# Patient Record
Sex: FEMALE | Race: WHITE | NOT HISPANIC OR LATINO | Employment: OTHER | URBAN - METROPOLITAN AREA
[De-identification: names, ages, dates, MRNs, and addresses within clinical notes are randomized per-mention and may not be internally consistent; named-entity substitution may affect disease eponyms.]

---

## 2017-01-21 ENCOUNTER — GENERIC CONVERSION - ENCOUNTER (OUTPATIENT)
Dept: OTHER | Facility: OTHER | Age: 77
End: 2017-01-21

## 2017-02-02 ENCOUNTER — ALLSCRIPTS OFFICE VISIT (OUTPATIENT)
Dept: OTHER | Facility: OTHER | Age: 77
End: 2017-02-02

## 2017-02-08 ENCOUNTER — ALLSCRIPTS OFFICE VISIT (OUTPATIENT)
Dept: OTHER | Facility: OTHER | Age: 77
End: 2017-02-08

## 2017-02-13 ENCOUNTER — LAB CONVERSION - ENCOUNTER (OUTPATIENT)
Dept: OTHER | Facility: OTHER | Age: 77
End: 2017-02-13

## 2017-02-13 LAB
CREATININE, RANDOM URINE (HISTORICAL): 99 MG/DL (ref 20–320)
HBA1C MFR BLD HPLC: 6.4 % OF TOTAL HGB
MAGNESIUM, UR (HISTORICAL): 2.6 MG/DL
MICROALBUMIN/CREATININE RATIO (HISTORICAL): 26 MCG/MG CREAT
TSH SERPL DL<=0.05 MIU/L-ACNC: 1.4 MIU/L (ref 0.4–4.5)

## 2017-02-14 ENCOUNTER — GENERIC CONVERSION - ENCOUNTER (OUTPATIENT)
Dept: OTHER | Facility: OTHER | Age: 77
End: 2017-02-14

## 2017-02-17 ENCOUNTER — ALLSCRIPTS OFFICE VISIT (OUTPATIENT)
Dept: OTHER | Facility: OTHER | Age: 77
End: 2017-02-17

## 2017-03-13 ENCOUNTER — HOSPITAL ENCOUNTER (OUTPATIENT)
Dept: MAMMOGRAPHY | Facility: HOSPITAL | Age: 77
Discharge: HOME | End: 2017-03-13
Payer: MEDICARE

## 2017-03-13 DIAGNOSIS — Z12.31 ENCOUNTER FOR SCREENING MAMMOGRAM FOR MALIGNANT NEOPLASM OF BREAST: ICD-10-CM

## 2017-03-13 PROCEDURE — G0202 SCR MAMMO BI INCL CAD: HCPCS

## 2017-03-26 ENCOUNTER — ANESTHESIA EVENT (OUTPATIENT)
Dept: GASTROENTEROLOGY | Facility: HOSPITAL | Age: 77
End: 2017-03-26
Payer: MEDICARE

## 2017-03-27 ENCOUNTER — ANESTHESIA (OUTPATIENT)
Dept: GASTROENTEROLOGY | Facility: HOSPITAL | Age: 77
End: 2017-03-27
Payer: MEDICARE

## 2017-03-27 ENCOUNTER — HOSPITAL ENCOUNTER (OUTPATIENT)
Facility: HOSPITAL | Age: 77
Setting detail: OUTPATIENT SURGERY
Discharge: HOME | End: 2017-03-27
Attending: INTERNAL MEDICINE | Admitting: INTERNAL MEDICINE
Payer: MEDICARE

## 2017-03-27 ENCOUNTER — GENERIC CONVERSION - ENCOUNTER (OUTPATIENT)
Dept: OTHER | Facility: OTHER | Age: 77
End: 2017-03-27

## 2017-03-27 VITALS
WEIGHT: 141.09 LBS | BODY MASS INDEX: 31.74 KG/M2 | HEIGHT: 56 IN | DIASTOLIC BLOOD PRESSURE: 76 MMHG | OXYGEN SATURATION: 95 % | HEART RATE: 71 BPM | TEMPERATURE: 97.7 F | RESPIRATION RATE: 18 BRPM | SYSTOLIC BLOOD PRESSURE: 168 MMHG

## 2017-03-27 LAB — GLUCOSE SERPL-MCNC: 93 MG/DL (ref 65–140)

## 2017-03-27 PROCEDURE — 82948 REAGENT STRIP/BLOOD GLUCOSE: CPT

## 2017-03-27 RX ORDER — SODIUM CHLORIDE, SODIUM LACTATE, POTASSIUM CHLORIDE, CALCIUM CHLORIDE 600; 310; 30; 20 MG/100ML; MG/100ML; MG/100ML; MG/100ML
125 INJECTION, SOLUTION INTRAVENOUS CONTINUOUS
Status: DISCONTINUED | OUTPATIENT
Start: 2017-03-27 | End: 2017-03-27 | Stop reason: HOSPADM

## 2017-03-27 RX ORDER — LABETALOL HYDROCHLORIDE 5 MG/ML
INJECTION, SOLUTION INTRAVENOUS AS NEEDED
Status: DISCONTINUED | OUTPATIENT
Start: 2017-03-27 | End: 2017-03-27 | Stop reason: SURG

## 2017-03-27 RX ORDER — PROPOFOL 10 MG/ML
INJECTION, EMULSION INTRAVENOUS AS NEEDED
Status: DISCONTINUED | OUTPATIENT
Start: 2017-03-27 | End: 2017-03-27 | Stop reason: SURG

## 2017-03-27 RX ORDER — CLONAZEPAM 0.5 MG/1
0.5 TABLET ORAL 2 TIMES DAILY
COMMUNITY
End: 2018-03-05 | Stop reason: SDUPTHER

## 2017-03-27 RX ORDER — LIDOCAINE HYDROCHLORIDE 10 MG/ML
0.5 INJECTION, SOLUTION INFILTRATION; PERINEURAL ONCE AS NEEDED
Status: DISCONTINUED | OUTPATIENT
Start: 2017-03-27 | End: 2017-03-27 | Stop reason: HOSPADM

## 2017-03-27 RX ORDER — PROPOFOL 10 MG/ML
INJECTION, EMULSION INTRAVENOUS CONTINUOUS PRN
Status: DISCONTINUED | OUTPATIENT
Start: 2017-03-27 | End: 2017-03-27 | Stop reason: SURG

## 2017-03-27 RX ORDER — LIDOCAINE HYDROCHLORIDE 10 MG/ML
INJECTION, SOLUTION INFILTRATION; PERINEURAL AS NEEDED
Status: DISCONTINUED | OUTPATIENT
Start: 2017-03-27 | End: 2017-03-27 | Stop reason: SURG

## 2017-03-27 RX ORDER — ESCITALOPRAM OXALATE 20 MG/1
20 TABLET ORAL DAILY
COMMUNITY
End: 2018-02-09 | Stop reason: ALTCHOICE

## 2017-03-27 RX ORDER — ATORVASTATIN CALCIUM 10 MG/1
10 TABLET, FILM COATED ORAL DAILY
COMMUNITY
End: 2018-07-18

## 2017-03-27 RX ORDER — RAMIPRIL 10 MG/1
10 CAPSULE ORAL DAILY
COMMUNITY
End: 2018-09-10 | Stop reason: SDUPTHER

## 2017-03-27 RX ORDER — BUSPIRONE HYDROCHLORIDE 10 MG/1
10 TABLET ORAL 3 TIMES DAILY
COMMUNITY
End: 2018-09-10 | Stop reason: SDUPTHER

## 2017-03-27 RX ADMIN — LABETALOL HYDROCHLORIDE 10 MG: 5 INJECTION, SOLUTION INTRAVENOUS at 08:16

## 2017-03-27 RX ADMIN — SODIUM CHLORIDE, POTASSIUM CHLORIDE, SODIUM LACTATE AND CALCIUM CHLORIDE 125 ML/HR: 600; 310; 30; 20 INJECTION, SOLUTION INTRAVENOUS at 07:49

## 2017-03-27 RX ADMIN — LIDOCAINE HYDROCHLORIDE 30 MG: 10 INJECTION, SOLUTION INFILTRATION; PERINEURAL at 08:10

## 2017-03-27 RX ADMIN — PROPOFOL 100 MCG/KG/MIN: 10 INJECTION, EMULSION INTRAVENOUS at 08:10

## 2017-03-27 RX ADMIN — PROPOFOL 100 MG: 10 INJECTION, EMULSION INTRAVENOUS at 08:10

## 2017-03-31 ENCOUNTER — ALLSCRIPTS OFFICE VISIT (OUTPATIENT)
Dept: OTHER | Facility: OTHER | Age: 77
End: 2017-03-31

## 2017-04-21 ENCOUNTER — ALLSCRIPTS OFFICE VISIT (OUTPATIENT)
Dept: OTHER | Facility: OTHER | Age: 77
End: 2017-04-21

## 2017-07-13 ENCOUNTER — ALLSCRIPTS OFFICE VISIT (OUTPATIENT)
Dept: OTHER | Facility: OTHER | Age: 77
End: 2017-07-13

## 2017-07-14 ENCOUNTER — ALLSCRIPTS OFFICE VISIT (OUTPATIENT)
Dept: OTHER | Facility: OTHER | Age: 77
End: 2017-07-14

## 2017-07-14 LAB — S PYO AG THROAT QL: NEGATIVE

## 2017-08-17 DIAGNOSIS — I10 ESSENTIAL (PRIMARY) HYPERTENSION: ICD-10-CM

## 2017-08-17 DIAGNOSIS — E78.5 HYPERLIPIDEMIA: ICD-10-CM

## 2017-08-17 DIAGNOSIS — E11.9 TYPE 2 DIABETES MELLITUS WITHOUT COMPLICATIONS (HCC): ICD-10-CM

## 2017-08-17 DIAGNOSIS — Z00.00 ENCOUNTER FOR GENERAL ADULT MEDICAL EXAMINATION WITHOUT ABNORMAL FINDINGS: ICD-10-CM

## 2017-11-13 DIAGNOSIS — I10 ESSENTIAL (PRIMARY) HYPERTENSION: ICD-10-CM

## 2017-11-13 DIAGNOSIS — E78.5 HYPERLIPIDEMIA: ICD-10-CM

## 2017-11-13 DIAGNOSIS — E11.9 TYPE 2 DIABETES MELLITUS WITHOUT COMPLICATIONS (HCC): ICD-10-CM

## 2017-11-13 DIAGNOSIS — F60.5 OBSESSIVE-COMPULSIVE PERSONALITY DISORDER (HCC): ICD-10-CM

## 2017-11-13 DIAGNOSIS — G47.00 INSOMNIA: ICD-10-CM

## 2017-11-13 DIAGNOSIS — F41.9 ANXIETY DISORDER: ICD-10-CM

## 2017-11-13 DIAGNOSIS — F41.8 OTHER SPECIFIED ANXIETY DISORDERS: ICD-10-CM

## 2017-11-20 ENCOUNTER — ALLSCRIPTS OFFICE VISIT (OUTPATIENT)
Dept: OTHER | Facility: OTHER | Age: 77
End: 2017-11-20

## 2017-11-21 NOTE — PROGRESS NOTES
Assessment    1  Hyperlipidemia (272 4) (E78 5)   2  Anxiety (300 00) (F41 9)   3  Benign essential hypertension (401 1) (I10)   4  Controlled type 2 diabetes mellitus without complication (732 92) (B70 2)   5  Insomnia (780 52) (G47 00)   6  Need for prophylactic vaccination and inoculation against influenza (V04 81) (Z23)    Plan  Anxiety, Benign essential hypertension, Controlled type 2 diabetes mellitus withoutcomplication, Hyperlipidemia, Insomnia    · (1) CBC/PLT/DIFF; Status:Active; Requested for:20Mar2018;    · (1) COMPREHENSIVE METABOLIC PANEL; Status:Active; Requested for:20Mar2018;    · (1) HEMOGLOBIN A1C; Status:Active; Requested for:20Mar2018;    · (1) LIPID PANEL, FASTING; Status:Active; Requested for:20Mar2018;    · (1) MICROALBUMIN CREATININE RATIO, RANDOM URINE; Status:Active; Requestedfor:20Mar2018;    · (1) TSH WITH FT4 REFLEX; Status:Active; Requested for:20Mar2018; Anxiety, Obsessive compulsive personality disorder    · Venlafaxine HCl  MG Oral Tablet Extended Release 24 Hour; Take 1tablet daily  Benign essential hypertension    · Ramipril 10 MG Oral Capsule; TAKE 1 CAPSULE ONCE DAILY  Controlled type 2 diabetes mellitus without complication    · MetFORMIN HCl - 500 MG Oral Tablet; take 1 tablet by mouth twice daily   · *VB - Foot Exam; Status:Active; Requested for:20Nov2017;   Depression with anxiety    · BusPIRone HCl - 10 MG Oral Tablet; TAKE 1 TABLET 3 TIMES DAILY   · ClonazePAM 0 5 MG Oral Tablet; 1/2 tablet in the morning, 1/2 tablets at 2pm andthen 1 tablet at bedtime  Hyperlipidemia    · Atorvastatin Calcium 10 MG Oral Tablet; take one tablet by mouth every day  Need for prophylactic vaccination and inoculation against influenza    · Fluzone High-Dose 0 5 ML Intramuscular Suspension Prefilled Syringe;INJECT 0 5  ML Intramuscular; To Be Done: [id number]    Discussion/Summary    - Chronic conditions remain well controlledDiabetes is well-controlled with hemoglobin A1c is 6 6%  Continue metformin 500 mg twice daily  Repeat hemoglobin A1c and urine for micro-albumin in 4 monthsCholesterol is well controlled on atorvastatin 10 mg once daily  and anxiety are well controlled on current regimen of Effexor, BuSpar and Klonopin  Refills providedflu vaccine provided in the officein 4 months for subsequent annual wellness visit  The patient was counseled regarding diagnostic results,-- instructions for management,-- prognosis,-- impressions  total time of encounter was 45 minutes-- and-- 30 minutes was spent counseling  Possible side effects of new medications were reviewed with the patient/guardian today  Chief Complaint  4 month follow up and lab review  Patient is here today for follow up of chronic conditions described in HPI  History of Present Illness  Patient presents for four-month follow-up of chronic conditions including diabetes mellitus type 2, hypertension, hyperlipidemia  She continues doing well on Effexor XR, BuSpar and regiment of clonazepam  She is no longer as negative  Labs showed hemoglobin A1c is stable at 6 6%, urine is negative for microalbumin, cholesterol is very well controlled at ADA goal, hypertension also well controlled  Patient would like to receive flu vaccination  She does need refills of all medications  Gynecologist informed her that she does not need to return this year for gynecologic examination  Review of Systems   Constitutional: recent weight loss, but-- No fever, no chills, feels well, no tiredness, no recent weight gain or weight loss  Eyes: No complaints of eye pain, no red eyes, no eyesight problems, no discharge, no dry eyes, no itching of eyes  ENT: no complaints of earache, no loss of hearing, no nose bleeds, no nasal discharge, no sore throat, no hoarseness  Cardiovascular: No complaints of slow heart rate, no fast heart rate, no chest pain, no palpitations, no leg claudication, no lower extremity edema    Respiratory: No complaints of shortness of breath, no wheezing, no cough, no SOB on exertion, no orthopnea, no PND  Gastrointestinal: No complaints of abdominal pain, no constipation, no nausea or vomiting, no diarrhea, no bloody stools  Genitourinary: No complaints of dysuria, no incontinence, no pelvic pain, no dysmenorrhea, no vaginal discharge or bleeding  Musculoskeletal: No complaints of arthralgias, no myalgias, no joint swelling or stiffness, no limb pain or swelling  Integumentary: No complaints of skin rash or lesions, no itching, no skin wounds, no breast pain or lump  Neurological: No complaints of headache, no confusion, no convulsions, no numbness, no dizziness or fainting, no tingling, no limb weakness, no difficulty walking  Psychiatric: as noted in HPI,-- not suicidal,-- no anxiety,-- no sleep disturbances-- and-- no depression  Endocrine: No complaints of proptosis, no hot flashes, no muscle weakness, no deepening of the voice, no feelings of weakness  Hematologic/Lymphatic: No complaints of swollen glands, no swollen glands in the neck, does not bleed easily, does not bruise easily  Preventive Quality 65 and Older: Falls Risk: The patient fell none times in the past 12 months  The patient is currently asymptomatic Symptoms Include:  Associated symptoms:  No associated symptoms are reported  The patient currently has no urinary incontinence symptoms  Over the past 2 weeks, how often have you been bothered by the following problems? 1 ) Little interest or pleasure in doing things? Not at all   2 ) Feeling down, depressed or hopeless? Not at all   3 ) Trouble falling asleep or sleeping too much? Not at all   4 ) Feeling tired or having little energy? Not at all   5 ) Poor appetite or overeating? Not at all   6 ) Feeling bad about yourself, or that you are a failure, or have let yourself or your family down?  Not at all   7 ) Trouble concentrating on things, such as reading a newspaper or watching television? Not at all   8 ) Moving or speaking so slowly that other people could have noticed, or the opposite, moving or speaking faster than usual? Not at all  How difficult have these problems made it for you to do your work, take care of things at home, or get along with people? Not at all  Score 0      Active Problems    1  Adenomatous polyp of colon (211 3) (D12 6)   2  Anxiety (300 00) (F41 9)   3  Anxiety associated with depression (300 4) (F41 8)   4  Benign colon polyp (211 3) (K63 5)   5  Benign essential hypertension (401 1) (I10)   6  Change in bowel habits (787 99) (R19 4)   7  Controlled type 2 diabetes mellitus without complication (644 92) (U21 4)   8  Depression with anxiety (300 4) (F41 8)   9  Encounter for gynecological examination without abnormal finding (V72 31) (Z01 419)   10  Encounter for screening mammogram for malignant neoplasm of breast (V76 12)  (Z12 31)   11  Hyperlipidemia (272 4) (E78 5)   12  Insomnia (780 52) (G47 00)   13  Need for pneumococcal vaccination (V03 82) (Z23)   14  Need for prophylactic vaccination and inoculation against influenza (V04 81) (Z23)   15  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)   16  Post-nasal drip (784 91) (R09 82)   17  Short-term memory loss (780 93) (R41 3)   18  Sore throat (462) (J02 9)    Past Medical History    1  History of Abdominal pain, LLQ (left lower quadrant) (789 04) (R10 32)   2  History of Acute venous embolism and thrombosis of deep vessels of distal lower extremity (453 42) (I82 4Z9)   3  History of Bilateral calf pain (729 5) (M79 661,M79 662)   4  History of Chest pressure (786 59) (R07 89)   5  History of Dysuria (788 1) (R30 0)   6  History of Edema of hand (782 3) (R60 0)   7  History of Edema, lower extremity (782 3) (R60 0)   8  History of dizziness (V13 89) (Z87 898)   9  History of Left hand pain (729 5) (M79 642)   10  History of Left shoulder pain (719 41) (M25 512)   11   History of Left wrist pain (719 43) (M25 532)   12  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)   13  History of Stiffness of finger joint of left hand (719 54) (M25 642)   14  History of Stress incontinence, female (625 6) (N39 3)   15  History of Swelling of left hand (729 81) (M79 89)   16  History of Urinary tract infection (599 0) (N39 0)    The active problems and past medical history were reviewed and updated today  Surgical History  1  History of  Section   2  History of Rotator Cuff Repair    The surgical history was reviewed and updated today  Family History  Mother    1  Family history of Depression With Anxiety   2  Family history of diabetes mellitus (V18 0) (Z83 3)   3  Family history of malignant neoplasm of breast (V16 3) (Z80 3)   4  Family history of Hypertension (V17 49)  Son    5  Family history of Alcoholism  Sister    6  Family history of Depression With Anxiety   7  Family history of cardiac disorder (V17 49) (Z82 49)   8  Family history of malignant neoplasm of breast (V16 3) (Z80 3)   9  Family history of Hypertension (V17 49)   10  Family history of Hypertension (V17 49)  Brother    6  Family history of diabetes mellitus (V18 0) (Z83 3)  Family History    12  Denied: Family history of Colon Cancer   13  Denied: Family history of Crohn's Disease   14  Denied: Family history of Liver Cancer    The family history was reviewed and updated today  Social History     · Denied: History of Alcohol Use (History)   · Lack of exercise (V69 0) (Z72 3)   · Marital History -    · Never A Smoker  The social history was reviewed and updated today  The social history was reviewed and is unchanged  Current Meds   1  Atorvastatin Calcium 10 MG Oral Tablet; take one tablet by mouth every day; Therapy: 21Qhp0518 to ([phone])  Requested for: 80BFP2155; Last Rx:97Qcj1960 Ordered   2  Azelastine HCl - 0 15 % Nasal Solution; insert 2 squirts in each nostril twice daily;  Therapy: 30UYS1311 to (Evaluate:59Vea1065)  Requested for: 82EAT6825; Last Rx:07Lsj4802 Ordered   3  BusPIRone HCl - 10 MG Oral Tablet; TAKE 1 TABLET 3 TIMES DAILY; Therapy: 46MQU1191 to ()  Requested for: 98XUX0778; Last Rx:40Qng8322 Ordered   4  ClonazePAM 0 5 MG Oral Tablet; 1/2 tablet in the morning, 1/2 tablets at 2pm and then 1 tablet at bedtime; Therapy: 52Jui9596 to (Evaluate:85Ifl9154); Last Rx:80Fql5679 Ordered   5  MetFORMIN HCl - 500 MG Oral Tablet; take 1 tablet by mouth twice daily; Therapy: 44YTZ9043 to ()  Requested for: 67LUQ9549; Last Rx:81Zai5289 Ordered   6  Ramipril 10 MG Oral Capsule; TAKE 1 CAPSULE ONCE DAILY; Therapy: 65TJT6489 to ()  Requested for: 38GCS8568; Last Rx:51Lrc0584 Ordered   7  Venlafaxine HCl  MG Oral Tablet Extended Release 24 Hour; Take 1 tablet daily; Therapy: 68XZB3287 to (Evaluate:05Lex6640)  Requested for: 28NFT2175; Last Rx:72Gwe0699 Ordered   8  Vitamin D3 1000 UNIT Oral Capsule; take 1 capsule daily; Therapy: 83GWB5404 to Recorded    The medication list was reviewed and updated today  Allergies  1  No Known Drug Allergies    Vitals  Vital Signs    Recorded: 20Nov2017 09:52AM   Heart Rate 84   Systolic 644   Diastolic 78   Height 4 ft 8 in   Weight 141 lb    BMI Calculated 31 61   BSA Calculated 1 53       Physical Exam   Constitutional  General appearance: No acute distress, well appearing and well nourished  Eyes  Conjunctiva and lids: No swelling, erythema or discharge  Pupils and irises: Equal, round and reactive to light  Ears, Nose, Mouth, and Throat  External inspection of ears and nose: Normal    Otoscopic examination: Tympanic membranes translucent with normal light reflex  Canals patent without erythema  Nasal mucosa, septum, and turbinates: Normal without edema or erythema  Oropharynx: Normal with no erythema, edema, exudate or lesions     Pulmonary  Respiratory effort: No increased work of breathing or signs of respiratory distress  Auscultation of lungs: Clear to auscultation  Cardiovascular  Palpation of heart: Normal PMI, no thrills  Auscultation of heart: Normal rate and rhythm, normal S1 and S2, without murmurs  Examination of extremities for edema and/or varicosities: Normal    Carotid pulses: Normal    Abdomen  Abdomen: Non-tender, no masses  Liver and spleen: No hepatomegaly or splenomegaly  Lymphatic  Palpation of lymph nodes in neck: No lymphadenopathy  Musculoskeletal  Gait and station: Normal    Digits and nails: Normal without clubbing or cyanosis  Inspection/palpation of joints, bones, and muscles: Normal    Skin  Skin and subcutaneous tissue: Normal without rashes or lesions  Neurologic  Cranial nerves: Cranial nerves 2-12 intact  Reflexes: 2+ and symmetric  Sensation: No sensory loss  Psychiatric  Orientation to person, place, and time: Normal    Mood and affect: Normal        Socks and shoes removed, Right Foot Findings: normal foot, no swelling, no erythema  The right toes were normal-- and-- had full range of motion  The sensory exam showed normal vibratory sensation at the level of the toes on the right  Normal tactile sensation with monofilament testing throughout the right foot  Socks and shoes removed, Left Foot Findings: normal foot, no swelling, no erythema  The left toes were normal-- and-- had full range of motion  The sensory exam showed normal vibratory sensation at the level of the toes on the left  Normal tactile sensation with monofilament testing throughout the left foot  Pulses:  2+ in the dorsalis pedis on the right  Pulses:  2+ in the dorsalis pedis on the left  Assign Risk Category: 0: No loss of protective sensation, no deformity  No present risk  Health Management  Benign colon polyp   COLONOSCOPY; every 3 years; Last 22OCT7432; Next Due: 14RWM9346;  Overdue    Signatures   Electronically signed by : Jada Medina DO; Nov 20 2017 10:23AM EST (Author)

## 2018-01-11 NOTE — PSYCH
History of Present Illness  Psychotherapy Provided [facility]: Individual Psychotherapy 40 minutes provided today  Goals addressed in session:   Met with pt individually for initial session  Pt reports that she is struggling with anxiety for the past several weeks  Pt reports that she struggles with catastrophic thinking pertaining to medical issues despite her doctor telling her that she is healthy  Pt reports that she lives with her daughter, son in law and grandchildren  Pt reports that she tries to stay busy by going to knitting classes, going to bible study, and reading, however, often times her anxiety gets in the way making her avoid things or stop going all together  Discussed importance of keeping up with relaxing activities and not letting the anxiety control her  HPI - Psych: Pt reports that she is on multiple medications for the anxiety  Pt reports that Dr Usha Perea prescribed Abilify recently in addition to the other medications that she is taking, however, she has not started to take it due to feeling like she is already on too much stuff  Encouraged pt to recognize that medication is not going to do everything for her that she needs to also have coping skills and techniques to manage the anxiety  Pt reports that she family is supportive of her  Pt denies SI   Note   Note:   Discussed coping skills and some grounding techniques that the pt should try to utilize when having increased anxiety  Pt does not want to commit to consistent OP therapy at this time, however, will follow up with this worker in 3 weeks to continue to discuss ways to manage the anxiety  Assessment    1   Anxiety (300 00) (F41 9)    Signatures   Electronically signed by : Gary Duran LCSW; Oct 14 2016 11:17AM EST                       (Author)

## 2018-01-11 NOTE — RESULT NOTES
Verified Results  (1) CBC/PLT/DIFF [id number] 09:05AM [employer]     Test Name Result Flag Reference   WHITE BLOOD CELL COUNT 5 1 Thousand/uL  3 8-10 8   RED BLOOD CELL COUNT 4 77 Million/uL  3 80-5 10   HEMOGLOBIN 13 6 g/dL  11 7-15 5   HEMATOCRIT 41 9 %  35 0-45 0   MCV 87 8 fL  80 0-100 0   MCH 28 5 pg  27 0-33 0   MCHC 32 5 g/dL  32 0-36 0   RDW 14 2 %  11 0-15 0   PLATELET COUNT 134 Thousand/uL  140-400   MPV 10 2 fL  7 5-12 5   ABSOLUTE NEUTROPHILS 3412 cells/uL  3055-4594   ABSOLUTE LYMPHOCYTES 1204 cells/uL  850-3900   ABSOLUTE MONOCYTES 301 cells/uL  200-950   ABSOLUTE EOSINOPHILS 153 cells/uL     ABSOLUTE BASOPHILS 31 cells/uL  0-200   NEUTROPHILS 66 9 %     LYMPHOCYTES 23 6 %     MONOCYTES 5 9 %     EOSINOPHILS 3 0 %     BASOPHILS 0 6 %       (1) COMPREHENSIVE METABOLIC PANEL [id number] 00:02ZE [employer]     Test Name Result Flag Reference   GLUCOSE 107 mg/dL H 65-99   Fasting reference interval   UREA NITROGEN (BUN) 20 mg/dL  7-25   CREATININE 0 73 mg/dL  0 60-0 93   For patients >52years of age, the reference limit  for Creatinine is approximately 13% higher for people  identified as -American  eGFR NON-AFR   AMERICAN 79 mL/min/1 73m2  > OR = 60   eGFR AFRICAN AMERICAN 91 mL/min/1 73m2  > OR = 60   BUN/CREATININE RATIO   9-47   NOT APPLICABLE (calc)   SODIUM 141 mmol/L  135-146   POTASSIUM 4 3 mmol/L  3 5-5 3   CHLORIDE 107 mmol/L     CARBON DIOXIDE 26 mmol/L  20-31   CALCIUM 9 2 mg/dL  8 6-10 4   PROTEIN, TOTAL 6 5 g/dL  6 1-8 1   ALBUMIN 4 0 g/dL  3 6-5 1   GLOBULIN 2 5 g/dL (calc)  1 9-3 7   ALBUMIN/GLOBULIN RATIO 1 6 (calc)  1 0-2 5   BILIRUBIN, TOTAL 0 7 mg/dL  0 2-1 2   ALKALINE PHOSPHATASE 69 U/L     AST 12 U/L  10-35   ALT 10 U/L  6-29     (1) LIPID PANEL, FASTING [id number] 09:05AM [employer]     Test Name Result Flag Reference   CHOLESTEROL, TOTAL 151 mg/dL  125-200   HDL CHOLESTEROL 46 mg/dL  > OR = 46   TRIGLICERIDES 357 mg/dL H <150   LDL-CHOLESTEROL 74 mg/dL (calc)  <130   Desirable range <100 mg/dL for patients with CHD or  diabetes and <70 mg/dL for diabetic patients with  known heart disease  CHOL/HDLC RATIO 3 3 (calc)  < OR = 5 0   NON HDL CHOLESTEROL 105 mg/dL (calc)     Target for non-HDL cholesterol is 30 mg/dL higher than   LDL cholesterol target  (Q) MICROALBUMIN, RANDOM URINE (W/CREATININE) [id number] 09:05AM [facility]     Test Name Result Flag Reference   CREATININE, RANDOM URINE 99 mg/dL     MICROALBUMIN 2 6 mg/dL     Reference Range  Not established   MICROALBUMIN/CREATININE$RATIO, RANDOM URINE 26 mcg/mg creat  <30   The ADA defines abnormalities in albumin  excretion as follows:     Category         Result (mcg/mg creatinine)     Normal                    <30  Microalbuminuria            Clinical albuminuria   > OR = 300     The ADA recommends that at least two of three  specimens collected within a 3-6 month period be  abnormal before considering a patient to be  within a diagnostic category  (Q) TSH, 3RD GENERATION W/REFLEX TO FT4 [id number] 09:05AM [facility]     Test Name Result Flag Reference   TSH W/REFLEX TO FT4 1 40 mIU/L  0 40-4 50     (Q) HEMOGLOBIN A1c [id number] 09:05AM [facility]   REPORT COMMENT:  FASTING:YES     Test Name Result Flag Reference   HEMOGLOBIN A1c 6 4 % of total Hgb H <5 7   According to ADA guidelines, hemoglobin A1c <7 0%  represents optimal control in non-pregnant diabetic  patients  Different metrics may apply to specific  patient populations  Standards of Medical Care in    Diabetes Care  2013;36:s11-s66     For the purpose of screening for the presence of  diabetes  <5 7%       Consistent with the absence of diabetes  5 7-6 4%    Consistent with increased risk for diabetes              (prediabetes)  >or=6 5%    Consistent with diabetes     This assay result is consistent with a higher risk  of diabetes       Currently, no consensus exists for use of hemoglobin  A1c for diagnosis of diabetes for children

## 2018-01-12 VITALS
BODY MASS INDEX: 33.07 KG/M2 | HEART RATE: 86 BPM | WEIGHT: 147 LBS | SYSTOLIC BLOOD PRESSURE: 134 MMHG | HEIGHT: 56 IN | RESPIRATION RATE: 16 BRPM | DIASTOLIC BLOOD PRESSURE: 88 MMHG

## 2018-01-12 NOTE — PROGRESS NOTES
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)   7  Encounter for preventive health examination (V70 0) (Z00 00)    Plan  Anxiety, Depression with anxiety, Obsessive compulsive personality disorder    · *1 - [facility] Physician Referral  Consult Only: the expectation is that  the referring provider will communicate back to the patient on treatment options  Evaluation and Treatment: the expectation is that the referred to provider will  communicate back to the patient on treatment options  Status: Hold For - Scheduling   Requested for: [id number]  Care Summary provided  : Yes  Benign essential hypertension    · Ramipril 10 MG Oral Capsule; TAKE 1 CAPSULE ONCE DAILY  Benign essential hypertension, Controlled type 2 diabetes mellitus without complication,  Health Maintenance, Hyperlipidemia    · (1) CBC/PLT/DIFF; Status:Canceled;    · (1) COMPREHENSIVE METABOLIC PANEL; Status:Canceled;    · (1) HEMOGLOBIN A1C; Status:Canceled;    · (1) LIPID PANEL, FASTING; Status:Canceled;    · (1) MICROALBUMIN CREATININE RATIO, RANDOM URINE; Status:Canceled;    · (1) TSH WITH FT4 REFLEX; Status:Canceled; Benign essential hypertension, Depression with anxiety    · Escitalopram Oxalate 20 MG Oral Tablet (Lexapro);  1 Tab daily  Controlled type 2 diabetes mellitus without complication    · MetFORMIN HCl - 500 MG Oral Tablet; take 1 tablet by mouth twice daily  Depression with anxiety    · BusPIRone HCl - 10 MG Oral Tablet; TAKE 1 TABLET 3 TIMES DAILY   · ClonazePAM 0 5 MG Oral Tablet; 1/2 tablet in the morning, 1/2 tablets at 2pm  and then 1 tablet at bedtime  Hyperlipidemia    · Atorvastatin Calcium 10 MG Oral Tablet; take one tablet by mouth every day    Discussion/Summary    - Initial annual wellness visit completed  - Diabetes is very well controlled on metformin 500 mg twice daily  Repeat he will A1c in 4 months  -Hypertension is well-controlled on metoprolol 10 mg once daily  - Hyperlipidemia well controlled on atorvastatin 10 mg once daily  -Patient's major issue is her depression with her anxiety  She does indeed have anxiety and is presently taking Lexapro 20 mg once daily, BuSpar 10 mg 3 times a day as well as Klonopin  She was given an additional prescription for Abilify 5 mg once daily to take additionally however she has not started this  I did recommend that patient start taking the medication  I believe that she would do much better if she was not living in her current situation  The situation is not abusive however her daughter and son-in-law have unrealistic Expectations of a 77-year-old female  She should not have to be responsible for the management of her grandchildren  I would like the patient to be seen at [facility] for positive aging to see if there are any resources that would be available or if they have any other suggestions   -Patient is to follow through with screening colonoscopy which is scheduled with gastroenterology   -Patient is to follow through with screening mammogram as ordered  Patient was reassured that her study should be perfectly fine as they have been in the past    Impression: Initial Annual Wellness Visit, with preventive exam as well as age and risk appropriate counseling completed  Cardiovascular screening and counseling: screening is current  Diabetes screening and counseling: screening is current  Colorectal cancer screening and counseling: screening is current and due for a colonoscopy (low risk)  Breast cancer screening and counseling: due for a screening mammogram    Cervical cancer screening and counseling: screening not indicated     Abdominal aortic aneurysm screening and counseling: screening is current  Glaucoma screening and counseling: screening is current  HIV screening and counseling: screening not indicated  Chief Complaint  AWV      History of Present Illness  HPI: Patient presents for initial annual wellness examination  Patient does complain of worsening depression with anxiety  She states that she is very frustrated as the woman that her daughter hired to drive her is presently working and can only take her places one day a week  She does not go out of the house  She is not able to do any of the things that she normally enjoys such as food shopping or reading at the bookstore  She is having to take care of her grandchildren who are somewhat demanding  She is having to do her homework because both her daughter and son-in-law do not return home until late in the evening  Patient feels very frustrated and track  She has also overly concerned about having her mammogram done as well as repeat screening colonoscopy  In the past both of these have been normal studies  Patient does develop unrealistic anxiety about having procedures and testing done  Patient did have one psychiatric hospitalization in the recent past  Most recent set of labs were reviewed with patient which showed improvement in her hemoglobin A1c and cholesterol profile  Normal CBC and CMP  Patient does not want to be seen with LCSW again in the office  She feels that she is just repeating herself  Suggestions have been made to her such as moving into assisted living facility however the patient states that she is not interested in moving out  Patient did not start taking Abilify in addition to her Lexapro  Abilify was prescribed at her last office visit  She did  the prescription but never started taking the medication as she was anxious about adding another medication on for her anxiety  (???)   Welcome to [employer] and Wellness Visits: The patient is being seen for the initial annual wellness visit  Medicare Screening and Risk Factors   Hospitalizations: no previous hospitalizations  Medicare Screening Tests Risk Questions   Abdominal aortic aneurysm risk assessment: none indicated  Osteoporosis risk assessment: , female gender, over 48years of age and past medical history of fracture(s)  HIV risk assessment: none indicated  Drug and Alcohol Use: The patient has never smoked cigarettes and has never used smokeless tobacco  The patient reports never drinking alcohol  Alcohol concern:   The patient has no concerns about alcohol abuse  She has never used illicit drugs  Diet and Physical Activity: Current diet includes well balanced meals, low salt food choices, 2-3 servings of fruit per day, 1 servings of vegetables per day, 1 servings of meat per day, 2-3 servings of whole grains per day and 1 cups of coffee per day  The patient does not exercise  Mood Disorder and Cognitive Impairment Screening: She reports feeling down, depressed, or hopeless over the past two weeks  She reports feeling little interest or pleasure in doing things over the past two weeks  Cognitive impairment screening: denies difficulty learning/retaining new information, denies difficulty handling complex tasks, denies difficulty with reasoning, denies difficulty with spatial ability and orientation, denies difficulty with language and denies difficulty with behavior  Functional Ability/Level of Safety: Hearing is slightly decreased, slightly decreased in the right ear, slightly decreased in the left ear and a hearing aid is not used  She reports hearing difficulties  The patient is currently able to do activities of daily living with limitations, unable to participate in social activities and unable to drive   Activities of daily living details: does not need help using the phone, no transportation help needed, does not need help shopping, no meal preparation help needed, does not need help doing housework, does not need help doing laundry, does not need help managing medications and does not need help managing money  Fall risk factors:  polypharmacy, antidepressant use, sedative use, antihypertensive use and previous fall, but The patient fell none times in the past 12 months , no alcohol use, no mobility impairment, no deconditioning, no postural hypotension, no visual impairment, no urinary incontinence, no cognitive impairment and up and go test was normal  Home safety risk factors:  no unfamiliar surroundings, no loose rugs, no poor household lighting, no uneven floors, no household clutter, grab bars in the bathroom and handrails on the stairs  Advance Directives: Advance directives: no living will, no durable power of  for health care directives and no advance directives  Co-Managers and Medical Equipment/Suppliers: See Patient Care Team   Preventive Quality Program 65 and Older: Falls Risk: The patient fell none times in the past 12 months  The patient is currently asymptomatic Symptoms Include:  Associated symptoms:  No associated symptoms are reported no pain from the injury  The patient currently has no urinary incontinence symptoms  Patient Care Team    Care Team Member Role Specialty Office Number   Alinfl[facility] Lalita HOGAN  Gastroenterology Adult [phone]   [facility]  Orthopedic Surgery [phone]   1 Steve Orozco [phone]   Lloyd Goodwin MD  Orthopedic Surgery [phone]     Review of Systems    Constitutional: negative  Eyes: negative  ENT: negative  Cardiovascular: negative  Respiratory: negative  Gastrointestinal: negative  Genitourinary: negative  Musculoskeletal: negative  Integumentary and Breasts: negative  Neurological: negative  Psychiatric: anxiety and depression, but as noted in HPI, no insomnia, no irritability and not suicidal    Endocrine: negative  Hematologic and Lymphatic: negative         No, the patient is not satisfied with Her life  Point = 1  Yes, the patient has dropped many of their activities and interests  Point = 1  Yes, the patient feels life is empty  Point = 1  Yes, the patient often gets bored  Point = 1  No, the patient is not hopeful about the future  Point = 1  Yes, the patient is bothered by thoughts they cant get out of their head  Point = 1  No, the patient is not in good spirits most of the time  Point = 1  Yes, the patient is afraid something bad is going to happen to them  Point = 1  No, the patient does not feel happy most of time  Point = 1  Yes, the patient often feels helpless  Point = 1  Yes, the patient often gets restless and fidgety  Point = 1  Yes, the patient prefers to stay home rather then try new things  Point = 1  Yes, the patient often worries about the future  Point = 1  No, the patient does not feel that they have more problems with their memory than most    No, the patient does not feel that it is wonderful to be alive now  Point = 1  Yes, the patient often feels downhearted and blue  Point = 1  Yes, the patient feels pretty worthless the way they currently are  Point = 1  No, the patient does not find life to be very exciting  Point = 1  No, the patient does not worry a lot about the past    Yes, it is hard for the patient to get started on new projects  Point = 1  No, the patient does not feel full of energy  Point = 1  Yes, the patient feels that their situation is hopeless  Point = 1  No, the patient does not feel that most people are better off then they are  Yes, the patient frequently gets upset over the little things  Point = 1  Yes, the patient frequently feels like crying  Point = 1  Yes, the patient finds it hard to concentrate  Point = 1  No, the patient does not enjoy getting up in the morning  Point = 1  Yes, the patient prefers to avoid social gatherings  Point = 1  No, the patient finds its hard to make decisions  Point = 1  No, the patient does not feel thier mind is clear as it used to be  Point = 1  Score 17   Normal 0 - 9, Mild Depression 10 - 19, Severe Depression 20 - 30      Active Problems    1  Adenomatous polyp of colon (211 3) (D12 6)   2  Anxiety (300 00) (F41 9)   3  Benign colon polyp (211 3) (K63 5)   4  Benign essential hypertension (401 1) (I10)   5  Change in bowel habits (787 99) (R19 4)   6  Controlled type 2 diabetes mellitus without complication (460 08) (J80 0)   7  Depression with anxiety (300 4) (F41 8)   8  Encounter for gynecological examination without abnormal finding (V72 31) (Z01 419)   9  Encounter for screening mammogram for malignant neoplasm of breast (V76 12)   (Z12 31)   10  Hyperlipidemia (272 4) (E78 5)   11  Insomnia (780 52) (G47 00)   12  Need for pneumococcal vaccination (V03 82) (Z23)   13  Need for prophylactic vaccination and inoculation against influenza (V04 81) (Z23)   14   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)    Past Medical History    · History of Abdominal pain, LLQ (left lower quadrant) (789 04) (R10 32)   · History of Acute venous embolism and thrombosis of deep vessels of distal lower  extremity (453 42) (I82 4Z9)   · History of Bilateral calf pain (729 5) (M79 661,M79 662)   · History of Chest pressure (786 59) (R07 89)   · History of Dysuria (788 1) (R30 0)   · History of Edema of hand (782 3) (R60 0)   · History of Edema, lower extremity (782 3) (R60 0)   · History of dizziness (V13 89) (U30 122)   · History of Left hand pain (729 5) (M79 642)   · History of Left shoulder pain (719 41) (M25 512)   · History of Left wrist pain (719 43) (M25 532)   · 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)   · History of Stiffness of finger joint of left hand (719 54) (M25 642)   · History of Stress incontinence, female (625 6) (N39 3)   · History of Swelling of left hand (729 81) (M79 89)   · History of Urinary tract infection (599 0) (N39 0)    The active problems and past medical history were reviewed and updated today  Surgical History    · History of  Section   · History of Rotator Cuff Repair    Family History  Mother    · Family history of Depression With Anxiety   · Family history of diabetes mellitus (V18 0) (Z83 3)   · Family history of malignant neoplasm of breast (V16 3) (Z80 3)   · Family history of Hypertension (V17 49)  Son    · Family history of Alcoholism  Sister    · Family history of Depression With Anxiety   · Family history of cardiac disorder (V17 49) (Z82 49)   · Family history of malignant neoplasm of breast (V16 3) (Z80 3)   · Family history of Hypertension (V17 49)   · Family history of Hypertension (V17 49)  Brother    · Family history of diabetes mellitus (V18 0) (Z83 3)  Family History    · Denied: Family history of Colon Cancer   · Denied: Family history of Crohn's Disease   · Denied: Family history of Liver Cancer    The family history was reviewed and updated today  Social History    · Denied: History of Alcohol Use (History)   · Lack of exercise (V69 0) (Z72 3)   · Marital History -    · Never A Smoker  The social history was reviewed and updated today  The social history was reviewed and is unchanged  Current Meds   1  Atorvastatin Calcium 10 MG Oral Tablet; take one tablet by mouth every day; Therapy: 93Trz7544 to ([facility])  Requested for: 94LJC6497; Last   Rx:2016 Ordered   2  BusPIRone HCl - 10 MG Oral Tablet; TAKE 1 TABLET 3 TIMES DAILY; Therapy: 46JGD6447 to [facility])  Requested for: 50QAU3541; Last   Rx:2016 Ordered   3  ClonazePAM 0 5 MG Oral Tablet; 1/2 tablet in the morning, 1/2 tablets at 2pm and then 1   tablet at bedtime; Therapy: 42Kkv2806 to (Evaluate:69Zrc4271); Last Rx:2016 Ordered   4  Dulcolax 5 MG Oral Tablet Delayed Release; TAKE 2 TABLET ONCE;    Therapy: 59AQN5698 to (Evaluate:08Dsj4422)  Requested for: 57ZYC1854; Last   Rx:17Lih4237 Ordered   5  Escitalopram Oxalate 20 MG Oral Tablet; 1 Tab daily  Requested for: 03BUQ5131; Last   Rx:11Oct2016 Ordered   6  MetFORMIN HCl - 500 MG Oral Tablet; take 1 tablet by mouth twice daily; Therapy: 50LIY4297 to ([facility])  Requested for: 61OTR9177; Last   Rx:11Oct2016 Ordered   7  PEG-3350/Electrolytes 236 GM Oral Solution Reconstituted; TAKE AS DIRECTED; Therapy: 02TMV0079 to (Last Rx:47Jmp2531)  Requested for: 33Tii3422 Ordered   8  Ramipril 10 MG Oral Capsule; TAKE 1 CAPSULE ONCE DAILY; Therapy: 31EDY1531 to ([facility])  Requested for: 16HHH7671; Last   Rx:11Oct2016 Ordered   9  Vitamin D3 1000 UNIT Oral Capsule; take 1 capsule daily; Therapy: 24LFW6472 to Recorded    The medication list was reviewed and updated today  Allergies    1  No Known Drug Allergies    Immunizations   1 2 3 4    Influenza  02-Oct-2013 18-Dec-2014 30-Sep-2015 11-Oct-2016    PCV  11-Oct-2016       PPSV  02-Oct-2013        Vitals  Signs    Temperature: 98 5 F  Heart Rate: 78  Respiration: 18  Systolic: 432  Diastolic: 84  Height: 4 ft 8 in  Weight: 153 lb   BMI Calculated: 34 3  BSA Calculated: 1 58  O2 Saturation: 97    Physical Exam    Constitutional   General appearance: No acute distress, well appearing and well nourished  Eyes   Conjunctiva and lids: No swelling, erythema or discharge  Pupils and irises: Equal, round, reactive to light  Ophthalmoscopic examination: Normal fundi and optic discs  Ears, Nose, Mouth, and Throat   External inspection of ears and nose: Normal     Otoscopic examination: Tympanic membranes translucent with normal light reflex  Canals patent without erythema  Hearing: Normal     Nasal mucosa, septum, and turbinates: Normal without edema or erythema  Lips, teeth, and gums: Normal, good dentition  Oropharynx: Normal with no erythema, edema, exudate or lesions  Neck   Neck: Supple, symmetric, trachea midline, no masses      Thyroid: Normal, no thyromegaly  Pulmonary   Respiratory effort: No increased work of breathing or signs of respiratory distress  Percussion of chest: Normal     Palpation of chest: Normal     Auscultation of lungs: Clear to auscultation  Cardiovascular   Palpation of heart: Normal PMI, no thrills  Auscultation of heart: Normal rate and rhythm, normal S1 and S2, no murmurs  Carotid pulses: 2+ bilaterally  Abdominal aorta: Normal     Femoral pulses: 2+ bilaterally  Pedal pulses: 2+ bilaterally  Examination of extremities for edema and/or varicosities: Normal     Abdomen   Abdomen: Non-tender, no masses  Liver and spleen: No hepatomegaly or splenomegaly  Examination for hernias: No hernia appreciated  Lymphatic   Palpation of lymph nodes in neck: No lymphadenopathy  Palpation of lymph nodes in axillae: No lymphadenopathy  Palpation of lymph nodes in groin: No lymphadenopathy  Palpation of lymph nodes in other areas: No lymphadenopathy  Musculoskeletal   Gait and station: Normal     Digits and nails: Normal without clubbing or cyanosis  Joints, bones, and muscles: Normal     Range of motion: Normal     Stability: Normal     Muscle strength/tone: Normal     Skin   Skin and subcutaneous tissue: Normal without rashes or lesions  Palpation of skin and subcutaneous tissue: Normal turgor  Neurologic   Cranial nerves: Cranial nerves II-XII intact  Reflexes: 2+ and symmetric  Sensation: No sensory loss  Psychiatric   Judgment and insight: Normal     Orientation to person, place, and time: Normal     Recent and remote memory: Intact  Mood and affect: Abnormal   Mood and Affect: not agitated, not angry, not anhedonic, anxious, not bewildered, not bizarre, not blunted, calm, not concerned, not constricted, not depressed, not despairing, not dysphoric, not elated, not elevated, not euphoric, no excessive crying, no excessive laughing, frustrated, not labile, not quiet, sad and tearful  Results/Data  Falls Risk Assessment (Dx Z13 89 Screen for Neurologic Disorder) 48QMK5355 09:37AM User, Ahs     Test Name Result Flag Reference   Falls Risk      No falls in the past year       Health Management  Benign colon polyp   COLONOSCOPY; every 3 years; Last 21ENW5665; Next Due: 11XBN6725; Overdue    Future Appointments    Date/Time Provider Specialty Site   06/28/2017 10:00 AM Jayson Barrios DO Family Medicine FAMILY [facility]   03/14/2017 11:00 AM JOHNNA Pritchard   Gastroenterology Adult [address]     Signatures   Electronically signed by : Reid Izquierdo DO; Feb 17 2017 12:07PM EST                       (Author)

## 2018-01-12 NOTE — PROGRESS NOTES
Assessment  Assessed    1  Anxiety associated with depression (300 4) (F41 8)   2  Short-term memory loss (780 93) (R41 3)   3  Benign essential hypertension (401 1) (I10)    Plan  #1 general anxiety disorder with depression; today GDS is 1/15  Overall patient's mood is quite improved  She is smiling  This is confirmed with her daughter who says there is less anxiety observed in the patient  She is currently taking Effexor  mg by mouth daily  No longer taking Lexapro 20 mg by mouth daily  At this point it would be in the patient's best interest to continue the current medication  I will follow-up in a few weeks  Monitor sodium while on an SNRI  #2 memory issues; daughter still concerned with some short-term memory issues last MMSE was 29/30  Given that the patient's mood has improved I will go ahead and recommend [facility] neuro psychological testing  We will monitor and follow-up  Discussion/Summary  Counseling Documentation With Imm: The patient, patient's family was counseled regarding instructions for management, risk factor reductions, prognosis, patient and family education, risks and benefits of treatment options, importance of compliance with treatment  total time of encounter was 40 minutes and 25 minutes was spent counseling  Chief Complaint  Chief Complaint Free Text Note Form: f/u new medication      History of Present Illness  Anxiety Disorder (Follow-Up): The patient is being seen for follow-up of generalized anxiety disorder  The patient reports doing well  She has had no significant interval events  Interval symptoms:  improved anxiety, improved difficulty concentrating, improved restlessness, improved panic attacks and improved sleep disruption  Medication(s): effexor  Geriatric Cognitive Impairment: Symptoms:  memory impairment  Review of Systems  Complete-Female:   Constitutional: No fever, no chills, feels well, no tiredness, no recent weight gain or weight loss  Eyes: No complaints of eye pain, no red eyes, no eyesight problems, no discharge, no dry eyes, no itching of eyes  ENT: no complaints of earache, no loss of hearing, no nose bleeds, no nasal discharge, no sore throat, no hoarseness  Cardiovascular: No complaints of slow heart rate, no fast heart rate, no chest pain, no palpitations, no leg claudication, no lower extremity edema  Respiratory: No complaints of shortness of breath, no wheezing, no cough, no SOB on exertion, no orthopnea, no PND  Gastrointestinal: No complaints of abdominal pain, no constipation, no nausea or vomiting, no diarrhea, no bloody stools  Genitourinary: No complaints of dysuria, no incontinence, no pelvic pain, no dysmenorrhea, no vaginal discharge or bleeding  Musculoskeletal: No complaints of arthralgias, no myalgias, no joint swelling or stiffness, no limb pain or swelling  Integumentary: No complaints of skin rash or lesions, no itching, no skin wounds, no breast pain or lump  Neurological: No complaints of headache, no confusion, no convulsions, no numbness, no dizziness or fainting, no tingling, no limb weakness, no difficulty walking  Psychiatric: Not suicidal, no sleep disturbance, no anxiety or depression, no change in personality, no emotional problems  Endocrine: No complaints of proptosis, no hot flashes, no muscle weakness, no deepening of the voice, no feelings of weakness  Hematologic/Lymphatic: No complaints of swollen glands, no swollen glands in the neck, does not bleed easily, does not bruise easily  Active Problems  Problems    1  Adenomatous polyp of colon (211 3) (D12 6)   2  Anxiety (300 00) (F41 9)   3  Anxiety associated with depression (300 4) (F41 8)   4  Benign colon polyp (211 3) (K63 5)   5  Benign essential hypertension (401 1) (I10)   6  Change in bowel habits (787 99) (R19 4)   7  Controlled type 2 diabetes mellitus without complication (019 23) (I65 6)   8   Depression with anxiety (300 4) (F41 8)   9  Encounter for gynecological examination without abnormal finding (V72 31) (Z01 419)   10  Encounter for screening mammogram for malignant neoplasm of breast (V76 12)    (Z12 31)   11  Hyperlipidemia (272 4) (E78 5)   12  Insomnia (780 52) (G47 00)   13  Need for pneumococcal vaccination (V03 82) (Z23)   14  Need for prophylactic vaccination and inoculation against influenza (V04 81) (Z23)   15  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)    Past Medical History  Problems    1  History of Abdominal pain, LLQ (left lower quadrant) (789 04) (R10 32)   2  History of Acute venous embolism and thrombosis of deep vessels of distal lower   extremity (453 42) (I82 4Z9)   3  History of Bilateral calf pain (729 5) (M79 661,M79 662)   4  History of Chest pressure (786 59) (R07 89)   5  History of Dysuria (788 1) (R30 0)   6  History of Edema of hand (782 3) (R60 0)   7  History of Edema, lower extremity (782 3) (R60 0)   8  History of dizziness (V13 89) (Z87 898)   9  History of Left hand pain (729 5) (M79 642)   10  History of Left shoulder pain (719 41) (M25 512)   11  History of Left wrist pain (719 43) (M25 532)   12  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)   13  History of Stiffness of finger joint of left hand (719 54) (M25 642)   14  History of Stress incontinence, female (625 6) (N39 3)   15  History of Swelling of left hand (729 81) (M79 89)   16  History of Urinary tract infection (599 0) (N39 0)    Surgical History  Problems    1  History of  Section   2  History of Rotator Cuff Repair    Family History  Mother    1  Family history of Depression With Anxiety   2  Family history of diabetes mellitus (V18 0) (Z83 3)   3  Family history of malignant neoplasm of breast (V16 3) (Z80 3)   4  Family history of Hypertension (V17 49)  Son    5  Family history of Alcoholism  Sister    6  Family history of Depression With Anxiety   7   Family history of cardiac disorder (V17 49) (Z82 49)   8  Family history of malignant neoplasm of breast (V16 3) (Z80 3)   9  Family history of Hypertension (V17 49)   10  Family history of Hypertension (V17 49)  Brother    6  Family history of diabetes mellitus (V18 0) (Z83 3)  Family History    12  Denied: Family history of Colon Cancer   13  Denied: Family history of Crohn's Disease   14  Denied: Family history of Liver Cancer    Social History  Problems    · Denied: History of Alcohol Use (History)   · Lack of exercise (V69 0) (Z72 3)   · Marital History -    · Never A Smoker    Current Meds   1  Atorvastatin Calcium 10 MG Oral Tablet; take one tablet by mouth every day; Therapy: 34Ztj0507 to (Evaluate:50Fsi8885)  Requested for: 66PEW9911; Last   Rx:70Hvq0559 Ordered   2  BusPIRone HCl - 10 MG Oral Tablet; TAKE 1 TABLET 3 TIMES DAILY; Therapy: 75HIG1981 to (Evaluate:01Set0779)  Requested for: 92ZSD4281; Last   Rx:31Gns1384 Ordered   3  ClonazePAM 0 5 MG Oral Tablet; 1/2 tablet in the morning, 1/2 tablets at 2pm and then 1   tablet at bedtime; Therapy: 62Kbn3877 to (Evaluate:82Hdt2659); Last Rx:64Byl1535 Ordered   4  Escitalopram Oxalate 20 MG Oral Tablet; 1 Tab daily  Requested for: 05TRX5087; Last   Rx:81Yde1522 Ordered   5  MetFORMIN HCl - 500 MG Oral Tablet; take 1 tablet by mouth twice daily; Therapy: 64NTE7642 to (Evaluate:92Xkn9422)  Requested for: 37LNT9228; Last   Rx:25Ajc0016 Ordered   6  PEG-3350/Electrolytes 236 GM Oral Solution Reconstituted; TAKE AS DIRECTED; Therapy: 26NDG5756 to (Last Rx:13Mar2017)  Requested for: 13WQD2762 Ordered   7  Ramipril 10 MG Oral Capsule; TAKE 1 CAPSULE ONCE DAILY; Therapy: 41UKT3136 to (Evaluate:72Kfj7854)  Requested for: 86MDA1935; Last   Rx:90Vzc5332 Ordered   8  Vitamin D3 1000 UNIT Oral Capsule; take 1 capsule daily; Therapy: 00UKK8400 to Recorded    Allergies  Medication    1   No Known Drug Allergies    Physical Exam  General Complete Exam (Brief):   Constitutional   General appearance: No acute distress, well appearing and well nourished  Eyes   Conjunctiva and lids: No swelling, erythema, or discharge  Pupils and irises: Equal, round and reactive to light  Ears, Nose, Mouth, and Throat   External inspection of ears and nose: Normal     Otoscopic examination: Tympanic membrance translucent with normal light reflex  Canals patent without erythema  Oropharynx: Normal with no erythema, edema, exudate or lesions  Pulmonary   Respiratory effort: No increased work of breathing or signs of respiratory distress  Auscultation of lungs: Clear to auscultation  Cardiovascular   Palpation of heart: Normal PMI, no thrills  Auscultation of heart: Normal rate and rhythm, normal S1 and S2, without murmurs  Examination of extremities for edema and/or varicosities: Normal     Abdomen   Abdomen: Non-tender, no masses  Liver and spleen: No hepatomegaly or splenomegaly  Lymphatic   Palpation of lymph nodes in neck: No lymphadenopathy  Musculoskeletal   Gait and station: Normal     Digits and nails: Abnormal   arthritic changes  Inspection/palpation of joints, bones, and muscles: Abnormal   arthritic changes  Skin   Skin and subcutaneous tissue: Abnormal   chronic changes  Neurologic   Cranial nerves: Cranial nerves 2-12 intact  Reflexes: 2+ and symmetric  Sensation: No sensory loss  Psychiatric   Orientation to person, place and time: Normal     Mood and affect: Normal        Health Management  Benign colon polyp   COLONOSCOPY; every 3 years; Last 70HMF0313; Next Due: 30UDB1918; Overdue    Future Appointments    Date/Time Provider Specialty Site   06/28/2017 10:00 AM Nae Christian DO [facility]  [address]     Signatures   Electronically signed by : Elias Wang DO;  Apr 21 2017 12:01PM EST                       (Author)

## 2018-01-13 VITALS
OXYGEN SATURATION: 97 % | SYSTOLIC BLOOD PRESSURE: 132 MMHG | BODY MASS INDEX: 34.42 KG/M2 | RESPIRATION RATE: 18 BRPM | HEART RATE: 78 BPM | DIASTOLIC BLOOD PRESSURE: 84 MMHG | WEIGHT: 153 LBS | TEMPERATURE: 98.5 F | HEIGHT: 56 IN

## 2018-01-13 VITALS
TEMPERATURE: 98 F | OXYGEN SATURATION: 98 % | DIASTOLIC BLOOD PRESSURE: 78 MMHG | SYSTOLIC BLOOD PRESSURE: 126 MMHG | RESPIRATION RATE: 18 BRPM | HEART RATE: 79 BPM | BODY MASS INDEX: 33.97 KG/M2 | HEIGHT: 56 IN | WEIGHT: 151 LBS

## 2018-01-13 VITALS
BODY MASS INDEX: 31.72 KG/M2 | WEIGHT: 141 LBS | HEIGHT: 56 IN | DIASTOLIC BLOOD PRESSURE: 78 MMHG | SYSTOLIC BLOOD PRESSURE: 130 MMHG | HEART RATE: 84 BPM

## 2018-01-13 VITALS
SYSTOLIC BLOOD PRESSURE: 128 MMHG | DIASTOLIC BLOOD PRESSURE: 84 MMHG | TEMPERATURE: 97.8 F | BODY MASS INDEX: 33.07 KG/M2 | RESPIRATION RATE: 16 BRPM | HEART RATE: 82 BPM | HEIGHT: 56 IN | WEIGHT: 147 LBS

## 2018-01-13 VITALS
BODY MASS INDEX: 33.97 KG/M2 | DIASTOLIC BLOOD PRESSURE: 88 MMHG | WEIGHT: 151 LBS | HEIGHT: 56 IN | SYSTOLIC BLOOD PRESSURE: 152 MMHG

## 2018-01-13 NOTE — RESULT NOTES
Verified Results  (1) MICROALBUMIN CREATININE RATIO, RANDOM URINE 22Jan2016 10:04AM Radha Look     Test Name Result Flag Reference   MICROALBUMIN/ CREAT R 28 mg/g creatinine  0-30   MICROALBUMIN,URINE 15 9 mg/L  0 0-20 0   CREATININE URINE 56 2 mg/dL       (1) CBC/PLT/DIFF 22Jan2016 09:25AM Radha Look     Test Name Result Flag Reference   WBC COUNT 5 87 Thousand/uL  4 31-10 16   RBC COUNT 4 74 Million/uL  3 81-5 12   HEMOGLOBIN 13 4 g/dL  11 5-15 4   HEMATOCRIT 42 7 %  34 8-46  1   MCV 90 1 fL  82 0-98 0   MCH 28 3 pg  26 8-34 3   MCHC 31 4 g/dL  31 4-37 4   RDW 13 7 %  11 6-15 1   MPV 11 2 fL  8 9-12 7   PLATELET COUNT 830 Thousands/uL  149-390   nRBC AUTOMATED 0 /100 WBCs     NEUTROPHILS RELATIVE PERCENT 59 %  43-75   LYMPHOCYTES RELATIVE PERCENT 29 %  14-44   MONOCYTES RELATIVE PERCENT 7 %  4-12   EOSINOPHILS RELATIVE PERCENT 4 %  0-6   BASOPHILS RELATIVE PERCENT 1 %  0-1   NEUTROPHILS ABSOLUTE COUNT 3 53 Thousands/µL  1 85-7 62   LYMPHOCYTES ABSOLUTE COUNT 1 69 Thousands/µL  0 60-4 47   MONOCYTES ABSOLUTE COUNT 0 39 Thousand/µL  0 17-1 22   EOSINOPHILS ABSOLUTE COUNT 0 21 Thousand/µL  0 00-0 61   BASOPHILS ABSOLUTE COUNT 0 03 Thousands/µL  0 00-0 10     (1) COMPREHENSIVE METABOLIC PANEL 20NNV1118 90:93DN Radha Look   National Kidney Disease Education Program recommendations are as follows:  GFR calculation is accurate only with a steady state creatinine  Chronic Kidney disease less than 60 ml/min/1 73 sq  meters  Kidney failure less than 15 ml/min/1 73 sq  meters  Test Name Result Flag Reference   GLUCOSE,RANDM 99 mg/dL     If the patient is fasting, the ADA then defines impaired fasting glucose as > 100 mg/dL and diabetes as > or equal to 123 mg/dL     SODIUM 138 mmol/L  136-145   POTASSIUM 4 4 mmol/L  3 5-5 3   CHLORIDE 106 mmol/L  100-108   CARBON DIOXIDE 28 mmol/L  21-32   ANION GAP (CALC) 4 mmol/L  4-13   BLOOD UREA NITROGEN 17 mg/dL  5-25   CREATININE 0 66 mg/dL  0 60-1 30   Standardized to IDMS reference method   CALCIUM 9 0 mg/dL  8 3-10 1   BILI, TOTAL 0 57 mg/dL  0 20-1 00   ALK PHOSPHATAS 76 U/L     ALT (SGPT) 19 U/L  12-78   AST(SGOT) 14 U/L  5-45   ALBUMIN 3 7 g/dL  3 5-5 0   TOTAL PROTEIN 6 7 g/dL  6 4-8 2   eGFR Non-African American      >60 0 ml/min/1 73sq m     (1) LIPID PANEL, FASTING 22Jan2016 09:25AM Elinor Koenig   Triglyceride:         Normal              <150 mg/dl       Borderline High    150-199 mg/dl       High               200-499 mg/dl       Very High          >499 mg/dl  Cholesterol:         Desirable        <200 mg/dl      Borderline High  200-239 mg/dl      High             >239 mg/dl  HDL Cholesterol:        High    >59 mg/dL      Low     <41 mg/dL  LDL CALCULATED:    This screening LDL is a calculated result  It does not have the accuracy of the Direct Measured LDL in the monitoring of patients with hyperlipidemia and/or statin therapy  Direct Measure LDL (PIN942) must be ordered separately in these patients  Test Name Result Flag Reference   CHOLESTEROL 153 mg/dL     HDL,DIRECT 47 mg/dL  40-60   LDL CHOLESTEROL CALCULATED 71 mg/dL  0-100   TRIGLYCERIDES 173 mg/dL H <=150     (1) HEMOGLOBIN A1C 22Jan2016 09:25AM Elinor Koenig   5 7-6 4% impaired fasting glucose  >=6 5% diagnosis of diabetes    Falsely low levels are seen in conditions linked to short RBC life span-  hemolytic anemia, and splenomegaly  Falsely elevated levels are seen in situations where there is an increased production of RBC- receipt of erythropoietin or blood transfusions  Adopted from ADA-Clinical Practice Recommendations     Test Name Result Flag Reference   HEMOGLOBIN A1C 6 3 % H 4 0-5 6   EST  AVG   GLUCOSE 134 mg/dl

## 2018-01-14 VITALS
SYSTOLIC BLOOD PRESSURE: 160 MMHG | HEIGHT: 56 IN | WEIGHT: 149 LBS | HEART RATE: 80 BPM | DIASTOLIC BLOOD PRESSURE: 90 MMHG | BODY MASS INDEX: 33.52 KG/M2 | RESPIRATION RATE: 18 BRPM

## 2018-01-16 NOTE — RESULT NOTES
Verified Results  (1) CBC/PLT/DIFF 04Oct2016 08:21AM Nickie Rolling Order Number: [id number]     Test Name Result Flag Reference   WBC COUNT 5 02 Thousand/uL  4 31-10 16   RBC COUNT 4 41 Million/uL  3 81-5 12   HEMOGLOBIN 12 8 g/dL  11 5-15 4   HEMATOCRIT 39 7 %  34 8-46  1   MCV 90 fL  82-98   MCH 29 0 pg  26 8-34 3   MCHC 32 2 g/dL  31 4-37 4   RDW 14 2 %  11 6-15 1   MPV 11 5 fL  8 9-12 7   PLATELET COUNT 679 Thousands/uL  149-390   nRBC AUTOMATED 0 /100 WBCs     NEUTROPHILS RELATIVE PERCENT 60 %  43-75   LYMPHOCYTES RELATIVE PERCENT 29 %  14-44   MONOCYTES RELATIVE PERCENT 7 %  4-12   EOSINOPHILS RELATIVE PERCENT 3 %  0-6   BASOPHILS RELATIVE PERCENT 1 %  0-1   NEUTROPHILS ABSOLUTE COUNT 3 02 Thousands/?L  1 85-7 62   LYMPHOCYTES ABSOLUTE COUNT 1 44 Thousands/?L  0 60-4 47   MONOCYTES ABSOLUTE COUNT 0 35 Thousand/?L  0 17-1 22   EOSINOPHILS ABSOLUTE COUNT 0 17 Thousand/?L  0 00-0 61   BASOPHILS ABSOLUTE COUNT 0 03 Thousands/?L  0 00-0 10     (1) COMPREHENSIVE METABOLIC PANEL 51KDB5382 96:14RH Nickie Rolling Order Number: [id number]     Test Name Result Flag Reference   GLUCOSE,RANDM 111 mg/dL     If the patient is fasting, the ADA then defines impaired fasting glucose as > 100 mg/dL and diabetes as > or equal to 123 mg/dL     SODIUM 139 mmol/L  136-145   POTASSIUM 4 6 mmol/L  3 5-5 3   CHLORIDE 105 mmol/L  100-108   CARBON DIOXIDE 28 mmol/L  21-32   ANION GAP (CALC) 6 mmol/L  4-13   BLOOD UREA NITROGEN 20 mg/dL  5-25   CREATININE 0 79 mg/dL  0 60-1 30   Standardized to IDMS reference method   CALCIUM 9 0 mg/dL  8 3-10 1   BILI, TOTAL 0 67 mg/dL  0 20-1 00   ALK PHOSPHATAS 69 U/L     ALT (SGPT) 18 U/L  12-78   AST(SGOT) 13 U/L  5-45   ALBUMIN 3 6 g/dL  3 5-5 0   TOTAL PROTEIN 6 7 g/dL  6 4-8 2   eGFR Non-African American      >60 0 ml/min/1 73sq m   [facility] [employer] Disease Education Program recommendations are as follows:  GFR calculation is accurate only with a steady state creatinine  Chronic Kidney disease less than 60 ml/min/1 73 sq  meters  Kidney failure less than 15 ml/min/1 73 sq  meters  (1) LIPID PANEL, FASTING 15MMU1900 08:21AM [employer] [facility] Order Number: [id number]     Test Name Result Flag Reference   CHOLESTEROL 135 mg/dL     HDL,DIRECT 44 mg/dL  40-60   Specimen collection should occur prior to Metamizole administration due to the potential for falsely depressed results  LDL CHOLESTEROL CALCULATED 67 mg/dL  0-100   Triglyceride:         Normal              <150 mg/dl       Borderline High    150-199 mg/dl       High               200-499 mg/dl       Very High          >499 mg/dl  Cholesterol:         Desirable        <200 mg/dl      Borderline High  200-239 mg/dl      High             >239 mg/dl  HDL Cholesterol:        High    >59 mg/dL      Low     <41 mg/dL  LDL CALCULATED:    This screening LDL is a calculated result  It does not have the accuracy of the Direct Measured LDL in the monitoring of patients with hyperlipidemia and/or statin therapy  Direct Measure LDL (SBC712) must be ordered separately in these patients  TRIGLYCERIDES 122 mg/dL  <=150   Specimen collection should occur prior to N-Acetylcysteine or Metamizole administration due to the potential for falsely depressed results  (1) HEMOGLOBIN A1C 04Oct2016 08:21AM [employer] [facility] Order Number: [id number]     Test Name Result Flag Reference   HEMOGLOBIN A1C 6 8 % H 4 2-6 3   EST  AVG   GLUCOSE 148 mg/dl       (1) MICROALBUMIN CREATININE RATIO, RANDOM URINE 04Oct2016 08:21AM [employer] [facility] Order Number: [id number]     Test Name Result Flag Reference   MICROALBUMIN/ CREAT R 23 mg/g creatinine  0-30   MICROALBUMIN,URINE 19 3 mg/L  0 0-20 0   CREATININE URINE 82 4 mg/dL       (1) TSH WITH FT4 REFLEX 04Oct2016 08:21AM [employer] [facility] Order Number: [id number]     Test Name Result Flag Reference   TSH 1 520 uIU/mL  0 358-3 740 Patients undergoing fluorescein dye angiography may retain small amounts of fluorescein in the body for 48-72 hours post procedure  Samples containing fluorescein can produce falsely depressed TSH values  If the patient had this procedure,a specimen should be resubmitted post fluorescein clearance            The recommended reference ranges for TSH during pregnancy are as follows:  First trimester 0 1 to 2 5 uIU/mL  Second trimester  0 2 to 3 0 uIU/mL  Third trimester 0 3 to 3 0 uIU/m

## 2018-01-17 NOTE — PSYCH
History of Present Illness  Psychotherapy Provided [facility]: Individual Psychotherapy 45 minutes provided today  Goals addressed in session:   Met with pt for a follow up session regarding her anxiety  Pt reports that she had a good Thanksgiving with her family  Pt reports that she has a "break down" during her knitting class recently due to her having issues with being able to concentrate fully on what she is doing and making small mistakes  Reminded the pt of the grounding techniques that have been discussed before and how those could be beneficial for her  Discussed the pt's struggle with finding her role in the home as she often wants to have control over things including her grand daughters school work  Encouraged the pt to use her thought challenging techniques when she is feeling overwhelmed by anxiety  HPI - Psych: Pt reports that she continues to take medications as prescribed, however, they do not appear to be fully helpful with her anxiety  Pt reports that she continues to go to knitting classes and read when she wants to relax   Note   Note:   Encouraged pt to utilize the skills that we have discussed in the past session in order to manage her anxiety  Pt will follow up in a few weeks in order to continue to process through anxiety and stressors  Assessment    1   Anxiety (300 00) (F41 9)    Signatures   Electronically signed by : Rolando Mendieta LCSW; Dec  2 2016  1:50PM EST                       (Author)    Electronically signed by : Rolando Mendieta LCSW; Dec  5 2016  2:23PM EST                       (Author)

## 2018-01-17 NOTE — PSYCH
History of Present Illness  Psychotherapy Provided [facility]: Individual Psychotherapy 45 minutes provided today  Goals addressed in session:   Met with pt for a follow up session  Pt reports that she continues to struggle with anxiety and that lately she has been more emotional when talking about certain subjects  Continued to discuss the pt's biggest anxiety provoking thought regarding her health and illnesses that she feels she may have  Discussed grounding techniques and practiced them by exploring how they would be utilized while doing something like knitting  Discussed how the pt wants to live the rest of her life and how getting some control over her anxiety will help that  Encouraged pt to block off 20 minutes each day to use as her "worry time" and to journal about her worries for that 20 minutes in hopes that throughout the rest of the day she will worry less  Pt was in agreement to trying this technique  HPI - Psych: Pt reports that she is still taking medication to help with the anxiety but at times is unsure if it is working well because she has been more emotional and is still struggling to manage the negative thinking  Pt reports that she is still struggling with finding her role in the household due to living with her daughter's family  Validated that living with family can be difficult and that everyone needs their own space at times  Pt denies SI   Note   Note:   Discussed some different coping skills including grounding techniques, thought challenging, and establishing a "worry time"  Pt is in agreement to try these techniques and will follow up in 4 weeks with this worker  Assessment    1   Anxiety (300 00) (F41 9)    Signatures   Electronically signed by : Rolando Mendieta LCSW; Nov 4 2016 10:09AM EST                       (Author)    Electronically signed by : Rolando Mendieta LCSW; Nov 4 2016 10:12AM EST                       (Author)

## 2018-01-17 NOTE — MISCELLANEOUS
Message  GI Reminder Recall [address] [place] [address] [address]:   Date: 01/21/2017   Dear Keyla Mi:     Review of our records shows you are due for the following: Colonoscopy  Please call the following office to schedule your appointment:   [address] [facility], [address], [place], [address] [place] [address]  [phone]  We look forward to hearing from you!      Sincerely,     Dr Gabby Awan MD      Signatures   Electronically signed by : Uriah Young, ; Jan 21 2017 12:09PM EST                       (Author)

## 2018-01-17 NOTE — RESULT NOTES
Verified Results  (1) CBC/PLT/DIFF [id number] 09:24AM Beauty Dage Order Number: [id number]   Order Number: [id number]     Test Name Result Flag Reference   WBC COUNT 4 95 Thousand/uL  4 31-10 16   RBC COUNT 4 61 Million/uL  3 81-5 12   HEMOGLOBIN 13 0 g/dL  11 5-15 4   HEMATOCRIT 41 4 %  34 8-46  1   MCV 90 fL  82-98   MCH 28 2 pg  26 8-34 3   MCHC 31 4 g/dL  31 4-37 4   RDW 13 9 %  11 6-15 1   MPV 11 3 fL  8 9-12 7   PLATELET COUNT 876 Thousands/uL  149-390   nRBC AUTOMATED 0 /100 WBCs     NEUTROPHILS RELATIVE PERCENT 57 %  43-75   LYMPHOCYTES RELATIVE PERCENT 32 %  14-44   MONOCYTES RELATIVE PERCENT 6 %  4-12   EOSINOPHILS RELATIVE PERCENT 4 %  0-6   BASOPHILS RELATIVE PERCENT 1 %  0-1   NEUTROPHILS ABSOLUTE COUNT 2 80 Thousands/?L  1 85-7 62   LYMPHOCYTES ABSOLUTE COUNT 1 60 Thousands/?L  0 60-4 47   MONOCYTES ABSOLUTE COUNT 0 31 Thousand/?L  0 17-1 22   EOSINOPHILS ABSOLUTE COUNT 0 20 Thousand/?L  0 00-0 61   BASOPHILS ABSOLUTE COUNT 0 03 Thousands/?L  0 00-0 10     (1) COMPREHENSIVE METABOLIC PANEL [id number] 34:95JX Yonatan Landa    Order Number: [id number]   Order Number: [id number] Order Number: [id number] Order Number: [id number]     Test Name Result Flag Reference   GLUCOSE,RANDM 83 mg/dL     If the patient is fasting, the ADA then defines impaired fasting glucose as > 100 mg/dL and diabetes as > or equal to 123 mg/dL     SODIUM 140 mmol/L  136-145   POTASSIUM 4 9 mmol/L  3 5-5 3   CHLORIDE 104 mmol/L  100-108   CARBON DIOXIDE 29 mmol/L  21-32   ANION GAP (CALC) 7 mmol/L  4-13   BLOOD UREA NITROGEN 17 mg/dL  5-25   CREATININE 0 68 mg/dL  0 60-1 30   Standardized to IDMS reference method   CALCIUM 8 5 mg/dL  8 3-10 1   BILI, TOTAL 0 81 mg/dL  0 20-1 00   ALK PHOSPHATAS 66 U/L     ALT (SGPT) 18 U/L  12-78   AST(SGOT) 17 U/L  5-45   ALBUMIN 3 8 g/dL  3 5-5 0   TOTAL PROTEIN 6 5 g/dL  6 4-8 2   eGFR Non-African American      >60 0 ml/min/1 73sq m   National Kidney Disease Education Program recommendations are as follows:  GFR calculation is accurate only with a steady state creatinine  Chronic Kidney disease less than 60 ml/min/1 73 sq  meters  Kidney failure less than 15 ml/min/1 73 sq  meters  (1) LIPID PANEL, FASTING 01Jun2016 09:24AM Jeannine Abraham Order Number: [id number]   Order Number: [id number] Order Number: [id number] Order Number: [id number]     Test Name Result Flag Reference   CHOLESTEROL 145 mg/dL     HDL,DIRECT 47 mg/dL  40-60   Specimen collection should occur prior to Metamizole administration due to the potential for falsely depressed results  LDL CHOLESTEROL CALCULATED 73 mg/dL  0-100   Triglyceride:         Normal              <150 mg/dl       Borderline High    150-199 mg/dl       High               200-499 mg/dl       Very High          >499 mg/dl  Cholesterol:         Desirable        <200 mg/dl      Borderline High  200-239 mg/dl      High             >239 mg/dl  HDL Cholesterol:        High    >59 mg/dL      Low     <41 mg/dL  LDL CALCULATED:    This screening LDL is a calculated result  It does not have the accuracy of the Direct Measured LDL in the monitoring of patients with hyperlipidemia and/or statin therapy  Direct Measure LDL (SRH456) must be ordered separately in these patients  TRIGLYCERIDES 127 mg/dL  <=150   Specimen collection should occur prior to N-Acetylcysteine or Metamizole administration due to the potential for falsely depressed results       (1) TSH [id number] 09:24AM Jeannine Jarad Order Number: [id number]  TW Order Number: [id number] Order Number: [id number] Order Number: [id number]     Test Name Result Flag Reference   TSH 1 490 uIU/mL  0 358-3 740   The recommended reference ranges for TSH during pregnancy are as follows:  First trimester 0 1 to 2 5 uIU/mL  Second trimester  0 2 to 3 0 uIU/mL  Third trimester 0 3 to 3 0 uIU/m     (1) HEMOGLOBIN A1C [id number] 09:24AM [facility]    Order Number: [id number]   Order Number: [id number]     Test Name Result Flag Reference   HEMOGLOBIN A1C 6 3 %  4 2-6 3   EST  AVG   GLUCOSE 134 mg/dl       (1) MICROALBUMIN CREATININE RATIO, RANDOM URINE 01Jun2016 09:24AM Alexis Swann Order Number: [id number]   Order Number: [id number]     Test Name Result Flag Reference   MICROALBUMIN/ CREAT R 21 mg/g creatinine  0-30   MICROALBUMIN,URINE 15 9 mg/L  0 0-20 0   CREATININE URINE 74 2 mg/dL

## 2018-02-09 ENCOUNTER — OFFICE VISIT (OUTPATIENT)
Dept: FAMILY MEDICINE CLINIC | Facility: CLINIC | Age: 78
End: 2018-02-09
Payer: MEDICARE

## 2018-02-09 VITALS
RESPIRATION RATE: 16 BRPM | BODY MASS INDEX: 31.72 KG/M2 | HEIGHT: 56 IN | TEMPERATURE: 98.7 F | SYSTOLIC BLOOD PRESSURE: 148 MMHG | WEIGHT: 141 LBS | HEART RATE: 76 BPM | DIASTOLIC BLOOD PRESSURE: 84 MMHG

## 2018-02-09 DIAGNOSIS — F60.5 OBSESSIVE COMPULSIVE PERSONALITY DISORDER (HCC): Primary | ICD-10-CM

## 2018-02-09 DIAGNOSIS — G47.00 INSOMNIA, UNSPECIFIED TYPE: ICD-10-CM

## 2018-02-09 DIAGNOSIS — F41.9 ANXIETY: ICD-10-CM

## 2018-02-09 PROBLEM — F41.8 ANXIETY ASSOCIATED WITH DEPRESSION: Status: ACTIVE | Noted: 2017-03-31

## 2018-02-09 PROCEDURE — 99214 OFFICE O/P EST MOD 30 MIN: CPT | Performed by: FAMILY MEDICINE

## 2018-02-09 RX ORDER — AZELASTINE HCL 205.5 UG/1
2 SPRAY NASAL 2 TIMES DAILY
COMMUNITY
Start: 2017-07-14 | End: 2019-07-08

## 2018-02-09 RX ORDER — BIOTIN 1 MG
1 TABLET ORAL DAILY
COMMUNITY
Start: 2017-02-17 | End: 2019-11-01

## 2018-02-09 RX ORDER — VENLAFAXINE HYDROCHLORIDE 150 MG/1
150 CAPSULE, EXTENDED RELEASE ORAL DAILY
Refills: 1 | COMMUNITY
Start: 2018-02-05 | End: 2018-02-09

## 2018-02-09 RX ORDER — VENLAFAXINE HYDROCHLORIDE 225 MG/1
225 TABLET, EXTENDED RELEASE ORAL
Qty: 30 TABLET | Refills: 0 | Status: SHIPPED | OUTPATIENT
Start: 2018-02-09 | End: 2018-02-12 | Stop reason: SDUPTHER

## 2018-02-09 NOTE — PROGRESS NOTES
Assessment/Plan:    -patient once again depression and anxiety as well as fixation on her own death and perceived illnesses that are not factual   She was reassured that she is in good health, she has had normal blood work, normal screening mammograms for at least the last 3 years, normal screening colonoscopy  She is not dying of anything any time soon  -patient does have some paranoia about this   -I would like for her to see our licensed social worker here in the office for counseling which she has done in the past  -increased dose of Effexor from 150 mg daily to 225 mg daily  -patient will continue on BuSpar and Klonopin t i d  She was advised to bring her nighttime dosage of clonazepam back to 1 mg at bedtime  -patient will be seen here in the office with LCSW for counseling  Patient is scheduled for a follow-up with me in March for re-evaluation of chronic conditions  -patient would likely benefit from being out of her daughter's house and possibly in an assisted living facility  She is still reluctant to consider this  Problem List Items Addressed This Visit     Anxiety    Relevant Medications    venlafaxine 225 MG TB24    Other Relevant Orders    Ambulatory referral to Social Work    Insomnia    Relevant Medications    venlafaxine 225 MG TB24    Other Relevant Orders    Ambulatory referral to Social Work    Obsessive compulsive personality disorder - Primary    Relevant Medications    venlafaxine 225 MG TB24    Other Relevant Orders    Ambulatory referral to Social Work            Subjective:      Patient ID: Smith Hernandez is a 76 y o  female  68-year-old female presents once again very depressed, anxious  She does have obsessive-compulsive disorder  She has been hospitalized for severe depression in the past   She had been doing well the last time that I had seen her back in September  Her depression and anxiety seem to be doing well  She was switched from Celexa to Effexor    She has been taking her medications as prescribed however she states that she did reduce her dose of Klonopin at bedtime from 1 mg to half a mg  She states that she recently got a reminder notice to have her annual screening mammogram performed which made her fixate on her having breast cancer, thinking about when and how she would die  She is not homicidal or suicidal   She again feels somewhat out of place at her daughter's home because she feels as though she is a burden  She argues with her daughter  She does not speak to her son-in-law  She tries not to impose upon them but she feels as though she is  She has gone through similar episodes and feelings before  Her blood work, mammograms, screening colonoscopies have always been normal       Groin Pain   Pertinent negatives include no chills  Past Medical History:   Diagnosis Date    Anxiety     Colon polyps     Depression     Diabetes mellitus ([address] )     Dizziness     [facility]     Dysuria     Hyperlipidemia     Hypertension     Insomnia     Obsessive compulsive personality disorder        No family history on file  Past Surgical History:   Procedure Laterality Date     SECTION      PA COLONOSCOPY FLX DX W/COLLJ SPEC WHEN PFRMD N/A 3/27/2017    Procedure: COLONOSCOPY;  Surgeon: Smaan Vieyra MD;  [facility]; Service: Gastroenterology    ROTATOR CUFF REPAIR Left     TONSILLECTOMY          reports that she has never smoked  She has never used smokeless tobacco  She reports that she does not drink alcohol or use drugs        Current Outpatient Prescriptions:     aspirin 81 MG tablet, Take 81 mg by mouth daily, Disp: , Rfl:     atorvastatin (LIPITOR) 10 mg tablet, Take 10 mg by mouth daily, Disp: , Rfl:     Azelastine HCl 0 15 % SOLN, 2 Squirts into each nostril 2 (two) times a day, Disp: , Rfl:     busPIRone (BUSPAR) 10 mg tablet, Take 10 mg by mouth 3 (three) times a day, Disp: , Rfl:    Cholecalciferol (VITAMIN D3) 1000 units CAPS, Take 1 capsule by mouth daily, Disp: , Rfl:     clonazePAM (KlonoPIN) 0 5 mg tablet, Take 0 5 mg by mouth 2 (two) times a day, Disp: , Rfl:     metFORMIN (GLUCOPHAGE) 500 mg tablet, Take 500 mg by mouth 2 (two) times a day with meals, Disp: , Rfl:     ramipril (ALTACE) 10 MG capsule, Take 10 mg by mouth daily, Disp: , Rfl:     venlafaxine (EFFEXOR-XR) 150 mg 24 hr capsule, Take 150 mg by mouth daily, Disp: , Rfl: 1    The following portions of the patient's history were reviewed and updated as appropriate: allergies, current medications, past family history, past medical history, past social history, past surgical history and problem list     Review of Systems   Constitutional: Negative for activity change, appetite change, chills and unexpected weight change  HENT: Negative  Genitourinary: Negative  Neurological: Negative  Psychiatric/Behavioral: Positive for agitation, behavioral problems, dysphoric mood and sleep disturbance  Negative for confusion, hallucinations, self-injury and suicidal ideas  The patient is nervous/anxious  @lab@    Objective:    /84 (BP Location: Right arm, Patient Position: Sitting, Cuff Size: Standard)   Pulse 76   Temp 98 7 °F (37 1 °C) (Tympanic)   Resp 16   Ht 4' 8" (1 422 m)   Wt 64 kg (141 lb)   BMI 31 61 kg/m²      Physical Exam   Constitutional: She is oriented to person, place, and time  She appears well-developed and well-nourished  Distressed: tearful  Neck: Normal range of motion  Neck supple  Cardiovascular: Normal rate, regular rhythm and normal heart sounds  Abdominal: Soft  Bowel sounds are normal    Neurological: She is alert and oriented to person, place, and time  Psychiatric: Her speech is normal  Her mood appears anxious  Her affect is not angry, not blunt, not labile and not inappropriate  She is agitated   She is not slowed, not withdrawn, not actively hallucinating and not combative  Thought content is paranoid  Thought content is not delusional  Cognition and memory are not impaired  She does not express impulsivity or inappropriate judgment  She exhibits a depressed mood  She expresses no homicidal and no suicidal ideation  She expresses no suicidal plans and no homicidal plans  She exhibits normal recent memory and normal remote memory  She is attentive                Problem List Items Addressed This Visit     Anxiety    Relevant Medications    venlafaxine 225 MG TB24    Other Relevant Orders    Ambulatory referral to Social Work    Insomnia    Relevant Medications    venlafaxine 225 MG TB24    Other Relevant Orders    Ambulatory referral to Social Work    Obsessive compulsive personality disorder - Primary    Relevant Medications    venlafaxine 225 MG TB24    Other Relevant Orders    Ambulatory referral to Social Work

## 2018-02-12 ENCOUNTER — TELEPHONE (OUTPATIENT)
Dept: FAMILY MEDICINE CLINIC | Facility: CLINIC | Age: 78
End: 2018-02-12

## 2018-02-12 DIAGNOSIS — F41.9 ANXIETY: ICD-10-CM

## 2018-02-12 DIAGNOSIS — G47.00 INSOMNIA, UNSPECIFIED TYPE: ICD-10-CM

## 2018-02-12 DIAGNOSIS — F60.5 OBSESSIVE COMPULSIVE PERSONALITY DISORDER (HCC): ICD-10-CM

## 2018-02-12 RX ORDER — VENLAFAXINE HYDROCHLORIDE 225 MG/1
225 TABLET, EXTENDED RELEASE ORAL
Qty: 30 TABLET | Refills: 0 | Status: SHIPPED | OUTPATIENT
Start: 2018-02-12 | End: 2018-04-03

## 2018-02-12 NOTE — TELEPHONE ENCOUNTER
Patients pharmacy called and advised that that Venlafaxine 225 mg is not covered Effexor XR is preferred  Did you wish to change or should we initiated a prior authorization? Did she try and fail anything if a prior authorization is appropriate ?

## 2018-03-05 DIAGNOSIS — F41.9 ANXIETY: Primary | ICD-10-CM

## 2018-03-05 RX ORDER — CLONAZEPAM 0.5 MG/1
0.5 TABLET ORAL 2 TIMES DAILY
Qty: 60 TABLET | Refills: 0 | Status: SHIPPED | OUTPATIENT
Start: 2018-03-05 | End: 2018-04-03 | Stop reason: SDUPTHER

## 2018-03-15 LAB
LEFT EYE DIABETIC RETINOPATHY: NORMAL
RIGHT EYE DIABETIC RETINOPATHY: NORMAL

## 2018-03-20 DIAGNOSIS — I10 ESSENTIAL (PRIMARY) HYPERTENSION: ICD-10-CM

## 2018-03-20 DIAGNOSIS — E78.5 HYPERLIPIDEMIA: ICD-10-CM

## 2018-03-20 DIAGNOSIS — F41.9 ANXIETY DISORDER: ICD-10-CM

## 2018-03-20 DIAGNOSIS — E11.9 TYPE 2 DIABETES MELLITUS WITHOUT COMPLICATIONS (HCC): ICD-10-CM

## 2018-03-20 DIAGNOSIS — G47.00 INSOMNIA: ICD-10-CM

## 2018-03-30 LAB
ALBUMIN SERPL-MCNC: 4 G/DL (ref 3.6–5.1)
ALBUMIN/CREAT UR: 22 MCG/MG CREAT
ALBUMIN/GLOB SERPL: 1.7 (CALC) (ref 1–2.5)
ALP SERPL-CCNC: 72 U/L (ref 33–130)
ALT SERPL-CCNC: 12 U/L (ref 6–29)
AST SERPL-CCNC: 16 U/L (ref 10–35)
BASOPHILS # BLD AUTO: 52 CELLS/UL (ref 0–200)
BASOPHILS NFR BLD AUTO: 0.9 %
BILIRUB SERPL-MCNC: 0.6 MG/DL (ref 0.2–1.2)
BUN SERPL-MCNC: 22 MG/DL (ref 7–25)
BUN/CREAT SERPL: ABNORMAL (CALC) (ref 6–22)
CALCIUM SERPL-MCNC: 9.2 MG/DL (ref 8.6–10.4)
CHLORIDE SERPL-SCNC: 105 MMOL/L (ref 98–110)
CHOLEST SERPL-MCNC: 174 MG/DL
CHOLEST/HDLC SERPL: 3.7 (CALC)
CO2 SERPL-SCNC: 29 MMOL/L (ref 20–31)
CREAT SERPL-MCNC: 0.74 MG/DL (ref 0.6–0.93)
CREAT UR-MCNC: 81 MG/DL (ref 20–320)
EOSINOPHIL # BLD AUTO: 220 CELLS/UL (ref 15–500)
EOSINOPHIL NFR BLD AUTO: 3.8 %
ERYTHROCYTE [DISTWIDTH] IN BLOOD BY AUTOMATED COUNT: 12.7 % (ref 11–15)
GLOBULIN SER CALC-MCNC: 2.4 G/DL (CALC) (ref 1.9–3.7)
GLUCOSE SERPL-MCNC: 104 MG/DL (ref 65–99)
HBA1C MFR BLD: 6.5 % OF TOTAL HGB
HCT VFR BLD AUTO: 39.4 % (ref 35–45)
HDLC SERPL-MCNC: 47 MG/DL
HGB BLD-MCNC: 13 G/DL (ref 11.7–15.5)
LDLC SERPL CALC-MCNC: 104 MG/DL (CALC)
LYMPHOCYTES # BLD AUTO: 1636 CELLS/UL (ref 850–3900)
LYMPHOCYTES NFR BLD AUTO: 28.2 %
MCH RBC QN AUTO: 28.8 PG (ref 27–33)
MCHC RBC AUTO-ENTMCNC: 33 G/DL (ref 32–36)
MCV RBC AUTO: 87.4 FL (ref 80–100)
MICROALBUMIN UR-MCNC: 1.8 MG/DL
MONOCYTES # BLD AUTO: 377 CELLS/UL (ref 200–950)
MONOCYTES NFR BLD AUTO: 6.5 %
NEUTROPHILS # BLD AUTO: 3515 CELLS/UL (ref 1500–7800)
NEUTROPHILS NFR BLD AUTO: 60.6 %
NONHDLC SERPL-MCNC: 127 MG/DL (CALC)
PLATELET # BLD AUTO: 229 THOUSAND/UL (ref 140–400)
PMV BLD REES-ECKER: 11.2 FL (ref 7.5–12.5)
POTASSIUM SERPL-SCNC: 4.6 MMOL/L (ref 3.5–5.3)
PROT SERPL-MCNC: 6.4 G/DL (ref 6.1–8.1)
RBC # BLD AUTO: 4.51 MILLION/UL (ref 3.8–5.1)
SL AMB EGFR AFRICAN AMERICAN: 89 ML/MIN/1.73M2
SL AMB EGFR NON AFRICAN AMERICAN: 77 ML/MIN/1.73M2
SODIUM SERPL-SCNC: 140 MMOL/L (ref 135–146)
TRIGL SERPL-MCNC: 131 MG/DL
TSH SERPL-ACNC: 2.02 MIU/L (ref 0.4–4.5)
WBC # BLD AUTO: 5.8 THOUSAND/UL (ref 3.8–10.8)

## 2018-04-03 ENCOUNTER — OFFICE VISIT (OUTPATIENT)
Dept: FAMILY MEDICINE CLINIC | Facility: CLINIC | Age: 78
End: 2018-04-03
Payer: MEDICARE

## 2018-04-03 VITALS
WEIGHT: 141 LBS | SYSTOLIC BLOOD PRESSURE: 134 MMHG | TEMPERATURE: 97.7 F | HEIGHT: 56 IN | BODY MASS INDEX: 31.72 KG/M2 | DIASTOLIC BLOOD PRESSURE: 78 MMHG | HEART RATE: 80 BPM | RESPIRATION RATE: 16 BRPM | OXYGEN SATURATION: 96 %

## 2018-04-03 DIAGNOSIS — F60.5 OBSESSIVE COMPULSIVE PERSONALITY DISORDER (HCC): ICD-10-CM

## 2018-04-03 DIAGNOSIS — F41.8 ANXIETY ASSOCIATED WITH DEPRESSION: ICD-10-CM

## 2018-04-03 DIAGNOSIS — Z00.00 MEDICARE ANNUAL WELLNESS VISIT, SUBSEQUENT: Primary | ICD-10-CM

## 2018-04-03 DIAGNOSIS — E11.9 CONTROLLED TYPE 2 DIABETES MELLITUS WITHOUT COMPLICATION, WITHOUT LONG-TERM CURRENT USE OF INSULIN (HCC): ICD-10-CM

## 2018-04-03 DIAGNOSIS — F41.9 ANXIETY: ICD-10-CM

## 2018-04-03 DIAGNOSIS — E78.2 MIXED HYPERLIPIDEMIA: ICD-10-CM

## 2018-04-03 DIAGNOSIS — I10 BENIGN ESSENTIAL HYPERTENSION: ICD-10-CM

## 2018-04-03 DIAGNOSIS — F60.5 OBSESSIVE-COMPULSIVE PERSONALITY DISORDER (HCC): Primary | ICD-10-CM

## 2018-04-03 DIAGNOSIS — Z12.39 BREAST CANCER SCREENING: ICD-10-CM

## 2018-04-03 DIAGNOSIS — F41.8 OTHER SPECIFIED ANXIETY DISORDERS: ICD-10-CM

## 2018-04-03 DIAGNOSIS — G47.00 INSOMNIA, UNSPECIFIED TYPE: ICD-10-CM

## 2018-04-03 PROCEDURE — G0439 PPPS, SUBSEQ VISIT: HCPCS | Performed by: FAMILY MEDICINE

## 2018-04-03 PROCEDURE — 99214 OFFICE O/P EST MOD 30 MIN: CPT | Performed by: FAMILY MEDICINE

## 2018-04-03 RX ORDER — VENLAFAXINE HYDROCHLORIDE 225 MG/1
225 TABLET, EXTENDED RELEASE ORAL
Qty: 90 TABLET | Refills: 1 | Status: SHIPPED | OUTPATIENT
Start: 2018-04-03 | End: 2018-04-03 | Stop reason: DRUGHIGH

## 2018-04-03 RX ORDER — VENLAFAXINE HYDROCHLORIDE 75 MG/1
CAPSULE, EXTENDED RELEASE ORAL
Refills: 0 | COMMUNITY
Start: 2018-02-12 | End: 2018-04-03

## 2018-04-03 RX ORDER — CLONAZEPAM 0.5 MG/1
TABLET ORAL
Qty: 60 TABLET | Refills: 5 | Status: SHIPPED | OUTPATIENT
Start: 2018-04-03 | End: 2018-11-01 | Stop reason: SDUPTHER

## 2018-04-03 NOTE — PROGRESS NOTES
HPI:  Rashel Ramirez is a 76 y o  female here for her  Subsequent annual wellness visit and follow-up of chronic conditions  Patient did have labs completed which shows very well controlled diabetes with hemoglobin A1c of 6 5%  Normal CBC, CMP  Urine is negative for microalbumin  Very well controlled cholesterol  Her labs have not changed very much over the course of the last 2 years  Patient does complain once again that her mind becomes fixated on her health  She is having difficulty sleeping  She states that she is having very vivid dreams / nightmares  Her daughter needs to wake her up because she is crying out in her sleep  Patient also is not actively doing anything  She resides in her daughter's house with her son-in-law and grandchildren  Her daughter is starting a new business so she is away on weekends  Her son is also busy with his own life  She does not have things that she would normally enjoy doing  In the past I have advised for her to look in to senior centers where she can be around other people in similar circumstances  She has always reluctant to do this but also frequently complains that she has nothing to do    At this time she is fixated on her annual mammogram   All of her mammograms in the past have been completely normal     Patient Active Problem List   Diagnosis    Adenomatous polyp of colon    Anxiety    Anxiety associated with depression    Benign essential hypertension    Controlled type 2 diabetes mellitus without complication ([address] )    Hyperlipidemia    Insomnia    Obsessive compulsive personality disorder     Past Medical History:   Diagnosis Date    Anxiety     Colon polyps     Depression     Diabetes mellitus ([address] )     Dizziness     [facility]     Dysuria     Hyperlipidemia     Hypertension     Insomnia     Obsessive compulsive personality disorder      Past Surgical History:   Procedure Laterality Date     SECTION      HI COLONOSCOPY FLX DX W/COLLJ SPEC WHEN PFRMD N/A 3/27/2017    Procedure: COLONOSCOPY;  Surgeon: Willian Rubio MD;  [facility]; Service: Gastroenterology    ROTATOR CUFF REPAIR Left     TONSILLECTOMY       History reviewed  No pertinent family history  History   Smoking Status    Never Smoker   Smokeless Tobacco    Never Used     History   Alcohol Use No      History   Drug Use No     /78   Pulse 80   Temp 97 7 °F (36 5 °C) (Tympanic)   Resp 16   Ht 4' 8" (1 422 m)   Wt 64 kg (141 lb)   SpO2 96%   BMI 31 61 kg/m²       Current Outpatient Prescriptions   Medication Sig Dispense Refill    aspirin 81 MG tablet Take 81 mg by mouth daily      atorvastatin (LIPITOR) 10 mg tablet Take 10 mg by mouth daily      Azelastine HCl 0 15 % SOLN 2 Squirts into each nostril 2 (two) times a day      busPIRone (BUSPAR) 10 mg tablet Take 10 mg by mouth 3 (three) times a day      Cholecalciferol (VITAMIN D3) 1000 units CAPS Take 1 capsule by mouth daily      clonazePAM (KlonoPIN) 0 5 mg tablet Take 1 tablet (0 5 mg total) by mouth 2 (two) times a day 60 tablet 0    metFORMIN (GLUCOPHAGE) 500 mg tablet Take 500 mg by mouth 2 (two) times a day with meals      ramipril (ALTACE) 10 MG capsule Take 10 mg by mouth daily      venlafaxine 225 MG TB24 Take 1 tablet (225 mg total) by mouth daily with breakfast 30 tablet 0    venlafaxine (EFFEXOR-XR) 75 mg 24 hr capsule TAKE 1 CAPSULE BY MOUTH DAILY WITH BREAKFAST  TAKE WITH 150 MG CAPSULE FOR TOTAL DOSE  MG  0     No current facility-administered medications for this visit        No Known Allergies  Immunization History   Administered Date(s) Administered    Influenza Split High Dose Preservative Free IM 10/02/2013, 12/18/2014, 09/30/2015, 10/11/2016, 11/20/2017    Pneumococcal Conjugate 13-Valent 10/11/2016    Pneumococcal Polysaccharide PPV23 10/02/2013       Patient Care Team:  Zen Dickinson DO as PCP - General  Sanjeev Pump, DO Lynn Banks MD Royce Gleason, MD    Medicare Screening Tests and Risk Assessments:  AWV Clinical     ISAR:       Once in a Lifetime Medicare Screening:       Medicare Screening Tests and Risk Assessment:   AAA Risk Assessment    Osteoporosis Risk Assessment    HIV Risk Assessment        Drug and Alcohol Use:   Tobacco use    Tobacco use duration    Tobacco Cessation Readiness    Alcohol use    Alcohol Treatment Readiness   Illicit Drug Use        Diet & Exercise:   Diet   How many servings a day of the following:   Exercise        Cognitive Impairment Screening:   Cognitive Impairment Screening        Functional Ability/Level of Safety:   Hearing    Hearing Impairment Assessment    Current Activities    Help needed with the folllowing:    ADL    Fall Risk   Injury History       Home Safety:   Home Safety Risk Factors       Advanced Directives:   Advanced Directives    Patient's End of Life Decisions        Urinary Incontinence:       Glaucoma:            Provider Screening    No data filed        No exam data present  Review of Systems   Constitutional: Negative  HENT: Negative  Eyes: Negative  Respiratory: Negative  Cardiovascular: Negative  Gastrointestinal: Negative  Endocrine: Negative  Genitourinary: Negative  Musculoskeletal: Negative  Skin: Negative  Allergic/Immunologic: Negative  Neurological: Negative  Hematological: Negative  Psychiatric/Behavioral: Positive for behavioral problems, dysphoric mood (  Depressed) and sleep disturbance ( chronic insomnia)  Negative for confusion, hallucinations, self-injury and suicidal ideas  The patient is nervous/anxious ( OCD)  The patient is not hyperactive  Physical Exam :  Physical Exam   Constitutional: She is oriented to person, place, and time  She appears well-developed and well-nourished  No distress  HENT:   Head: Normocephalic and atraumatic     Right Ear: External ear normal    Left Ear: External ear normal    Nose: Nose normal    Mouth/Throat: Oropharynx is clear and moist    Eyes: Conjunctivae and EOM are normal  Pupils are equal, round, and reactive to light  Neck: Normal range of motion  Neck supple  Cardiovascular: Normal rate, regular rhythm, normal heart sounds and intact distal pulses  Exam reveals no gallop and no friction rub  No murmur heard  Pulmonary/Chest: Effort normal and breath sounds normal  No respiratory distress  She has no wheezes  She has no rales  She exhibits no tenderness  Abdominal: Soft  Bowel sounds are normal    Musculoskeletal: Normal range of motion  She exhibits no edema, tenderness or deformity  Neurological: She is alert and oriented to person, place, and time  She displays normal reflexes  No cranial nerve deficit or sensory deficit  She exhibits normal muscle tone  Coordination normal    Skin: Skin is warm and dry  Capillary refill takes less than 2 seconds  No rash noted  She is not diaphoretic  Psychiatric: Her behavior is normal  Judgment normal    Patient is once again anxious and obsessive about her health and well being  These are unrealistic obsessions since she has had normal screening mammograms   Nursing note and vitals reviewed  Reviewed Updated [facility] Prior Wellness Visits:   Last Medicare wellness visit information was reviewed, patient interviewed , no change since last AWVyes  Last Medicare wellness visit information was reviewed, patient interviewed and updates made to the record today yes    Assessment and Plan:    Problem List Items Addressed This Visit     Anxiety      Patient's anxiety is worsening once again  Patient continues on venlafaxine 225 mg  Once daily  I did try to increase her dosage which she declined taking  She remains on BuSpar 10 mg t i d   I did suggest increasing her dosage of BuSpar which she again declined    Refill provided for clonazepam 0 5 mg  I did offer to provide referral to psychologist which the patient refused         Relevant Medications    clonazePAM (KlonoPIN) 0 5 mg tablet    venlafaxine 225 MG TB24    Other Relevant Orders    TSH, 3rd generation with T4 reflex    Anxiety associated with depression      Continue on Effexor 225 mg daily, BuSpar and clonazepam   Patient refused referral to Psychology  She is socially isolating herself by virtue of the fact that she does not drive and she resides in her daughter's home  I did suggest looking in to seen [facility] helping seniors program which can help provide transportation as well as companionship  She did take information in regards to this  Relevant Medications    venlafaxine 225 MG TB24    Benign essential hypertension      Hypertension is well controlled on ramipril 10 mg once daily  Continue same  No change in dosage  Relevant Orders    CBC and differential    TSH, 3rd generation with T4 reflex    Controlled type 2 diabetes mellitus without complication ([facility] [address] )      Very well controlled diabetes mellitus type 2 on metformin 500 mg twice daily  No change in dosage  Hemoglobin A1c of 6 5%  Urine is negative for microalbumin  Patient did recently have diabetic eye examination and glaucoma screening by ophthalmologist in March  She does see podiatrist for diabetic foot exam    Recheck diabetic parameters including hemoglobin A1c and urine for microalbumin in 6 months         Relevant Orders    HEMOGLOBIN A1C W/ EAG ESTIMATION    Microalbumin / creatinine urine ratio    Hyperlipidemia       Hyperlipidemia is well controlled on atorvastatin 10 mg once daily  This is at goal for diabetic  Continue same  Recheck lipid profile in 6 months         Relevant Orders    Comprehensive metabolic panel    Lipid panel    Insomnia    Relevant Medications    venlafaxine 225 MG TB24    Obsessive compulsive personality disorder      Continue Effexor and BuSpar    Strongly recommended psychology referral for counseling which the patient declined  Relevant Medications    venlafaxine 225 MG TB24    Breast cancer screening     Screening mammogram ordered  Patient was reassured that her prior screening mammograms were completely normal in as per  The [facility] preventative Services task Force after the age of [de-identified] she can stop having screening mammogram performed  Relevant Orders    Mammo screening bilateral w cad      Other Visit Diagnoses     Medicare annual wellness visit, subsequent    -  Primary        Health Maintenance Due   Topic Date Due    OPHTHALMOLOGY EXAM  11/15/1950    DTaP,Tdap,and Td Vaccines (1 - Tdap) 11/15/1959    GLAUCOMA SCREENING 67+ YR  11/15/2005       Will obtain reports from ophthalmologist and podiatrist   Patient will follow up in 3-4 months as I would like to see how she is doing    This is normally when her grandchildren on summer vacation in she is a little more busy

## 2018-04-03 NOTE — ASSESSMENT & PLAN NOTE
Very well controlled diabetes mellitus type 2 on metformin 500 mg twice daily  No change in dosage  Hemoglobin A1c of 6 5%  Urine is negative for microalbumin  Patient did recently have diabetic eye examination and glaucoma screening by ophthalmologist in March    She does see podiatrist for diabetic foot exam    Recheck diabetic parameters including hemoglobin A1c and urine for microalbumin in 6 months

## 2018-04-03 NOTE — ASSESSMENT & PLAN NOTE
Continue on Effexor 225 mg daily, BuSpar and clonazepam   Patient refused referral to Psychology  She is socially isolating herself by virtue of the fact that she does not drive and she resides in her daughter's home  I did suggest looking in to seen [employer] helping seniors program which can help provide transportation as well as companionship  She did take information in regards to this

## 2018-04-03 NOTE — ASSESSMENT & PLAN NOTE
Patient's anxiety is worsening once again  Patient continues on venlafaxine 225 mg  Once daily  I did try to increase her dosage which she declined taking  She remains on BuSpar 10 mg t i d   I did suggest increasing her dosage of BuSpar which she again declined    Refill provided for clonazepam 0 5 mg  I did offer to provide referral to psychologist which the patient refused

## 2018-04-03 NOTE — ASSESSMENT & PLAN NOTE
Hyperlipidemia is well controlled on atorvastatin 10 mg once daily  This is at goal for diabetic  Continue same    Recheck lipid profile in 6 months

## 2018-04-03 NOTE — ASSESSMENT & PLAN NOTE
Screening mammogram ordered  Patient was reassured that her prior screening mammograms were completely normal in as per  The [facility] preventative Services task Force after the age of [de-identified] she can stop having screening mammogram performed

## 2018-04-03 NOTE — ASSESSMENT & PLAN NOTE
Continue Effexor and BuSpar  Strongly recommended psychology referral for counseling which the patient declined

## 2018-04-04 RX ORDER — VENLAFAXINE HYDROCHLORIDE 150 MG/1
150 CAPSULE, EXTENDED RELEASE ORAL DAILY
Qty: 90 CAPSULE | Refills: 1 | Status: SHIPPED | OUTPATIENT
Start: 2018-04-04 | End: 2018-07-25 | Stop reason: SDUPTHER

## 2018-04-06 ENCOUNTER — HOSPITAL ENCOUNTER (OUTPATIENT)
Dept: MAMMOGRAPHY | Facility: HOSPITAL | Age: 78
Discharge: HOME | End: 2018-04-06
Payer: MEDICARE

## 2018-04-06 DIAGNOSIS — Z12.39 BREAST CANCER SCREENING: ICD-10-CM

## 2018-04-06 PROCEDURE — 77067 SCR MAMMO BI INCL CAD: CPT

## 2018-04-06 NOTE — PROGRESS NOTES
Please call patient and notify that results are normal   Completely normal screening mammogram   No evidence of malignancy  This has been unchanged for the last 5 years    She has the option of repeating in 1 year  Or she can stop performing mammograms altogether since she has never had an abnormality and she will be 78

## 2018-05-24 ENCOUNTER — OFFICE VISIT (OUTPATIENT)
Dept: GASTROENTEROLOGY | Facility: CLINIC | Age: 78
End: 2018-05-24
Payer: MEDICARE

## 2018-05-24 ENCOUNTER — TELEPHONE (OUTPATIENT)
Dept: GASTROENTEROLOGY | Facility: CLINIC | Age: 78
End: 2018-05-24

## 2018-05-24 VITALS
BODY MASS INDEX: 30.95 KG/M2 | TEMPERATURE: 98 F | HEART RATE: 91 BPM | HEIGHT: 56 IN | SYSTOLIC BLOOD PRESSURE: 132 MMHG | WEIGHT: 137.6 LBS | DIASTOLIC BLOOD PRESSURE: 78 MMHG

## 2018-05-24 DIAGNOSIS — R19.4 CHANGE IN BOWEL HABITS: Primary | ICD-10-CM

## 2018-05-24 PROCEDURE — 99214 OFFICE O/P EST MOD 30 MIN: CPT | Performed by: INTERNAL MEDICINE

## 2018-05-24 RX ORDER — DOCUSATE SODIUM 100 MG/1
100 CAPSULE, LIQUID FILLED ORAL 2 TIMES DAILY
COMMUNITY

## 2018-05-24 RX ORDER — POLYETHYLENE GLYCOL 3350 17 G/17G
17 POWDER, FOR SOLUTION ORAL DAILY
COMMUNITY
End: 2019-03-04

## 2018-05-24 NOTE — TELEPHONE ENCOUNTER
Dr Kimberly Duong pt    Pt called in stating she has been having loose bowels for about 1 week  She states she is taking colace and miralax   Please call back to discuss  [phone]

## 2018-05-24 NOTE — TELEPHONE ENCOUNTER
Patient has not been seen in office  Last scope was March 2017  Unable to offer advise over the phone  I scheduled office visit today with Dr Gussie Gottron in [place]

## 2018-05-24 NOTE — PROGRESS NOTES
Follow-up Note -  [facility]  Robert Noel 1940 female         Reason:  Follow-up    HPI:  Ms Trevor wang came in because of bowel issues since last week  I did colonoscopy on her in March 2017 which showed long tortuous colon  She has been taking MiraLax since then with good bowel movements but reports being constipated for couple of days last week and then liquid bowel movements  She start MiraLax and Colace but still reports having loose bowel movements couple of times a day  Denies any blood or mucus in the stool  She feels pressure in the rectum  No abdominal pain, nausea or vomiting  Good appetite, no recent weight loss  REVIEW OF SYSTEMS: Review of Systems   Constitutional: Negative for activity change, appetite change, chills, diaphoresis, fatigue, fever and unexpected weight change  HENT: Negative for ear discharge, ear pain, facial swelling, hearing loss, nosebleeds, sore throat, tinnitus and voice change  Eyes: Negative for pain, discharge, redness, itching and visual disturbance  Respiratory: Negative for apnea, cough, chest tightness, shortness of breath and wheezing  Cardiovascular: Negative for chest pain and palpitations  Gastrointestinal:        As noted in HPI   Endocrine: Negative for cold intolerance, heat intolerance and polyuria  Genitourinary: Negative for difficulty urinating, dysuria, flank pain, hematuria and urgency  Musculoskeletal: Positive for arthralgias  Negative for back pain, gait problem, joint swelling and myalgias  Skin: Negative for rash and wound  Neurological: Negative for dizziness, tremors, seizures, speech difficulty, light-headedness, numbness and headaches  Hematological: Negative for adenopathy  Does not bruise/bleed easily  Psychiatric/Behavioral: Negative for agitation, behavioral problems and confusion  The patient is not nervous/anxious           Past Medical History:   Diagnosis Date    Anxiety     Colon polyps     Depression     [facility] mellitus [facility] [address] [facility]     Dizziness     [facility]     Dysuria     Hyperlipidemia     Hypertension     Insomnia     Obsessive compulsive personality disorder       Past Surgical History:   Procedure Laterality Date     SECTION      COLONOSCOPY      [place] COLONOSCOPY FLX DX W/COLLJ SPEC WHEN PFRMD N/A 3/27/2017    Procedure: COLONOSCOPY;  Surgeon: Barbie Rivera MD;  [facility]; Service: Gastroenterology    ROTATOR CUFF REPAIR Left     TONSILLECTOMY       Social History     Social History    Marital status:      Spouse name: N/A    Number of children: N/A    Years of education: N/A     Occupational History    Not on file  Social History Main Topics    Smoking status: Never Smoker    Smokeless tobacco: Never Used    Alcohol use No    Drug use: No    Sexual activity: Not on file     Other Topics Concern    Not on file     Social History Narrative    No narrative on file     History reviewed  No pertinent family history  Patient has no known allergies  Current Outpatient Prescriptions   Medication Sig Dispense Refill    aspirin 81 MG tablet Take 81 mg by mouth daily      atorvastatin (LIPITOR) 10 mg tablet Take 10 mg by mouth daily      busPIRone (BUSPAR) 10 mg tablet Take 10 mg by mouth 3 (three) times a day      Cholecalciferol (VITAMIN D3) 1000 units CAPS Take 1 capsule by mouth daily      clonazePAM (KlonoPIN) 0 5 mg tablet 1/2 tablet in the morning, 1/2 tablet at 2:00 p m   And 1 full tablet at night 60 tablet 5    docusate sodium (COLACE) 100 mg capsule Take 100 mg by mouth 2 (two) times a day      metFORMIN (GLUCOPHAGE) 500 mg tablet Take 500 mg by mouth 2 (two) times a day with meals      polyethylene glycol (MIRALAX) 17 g packet Take 17 g by mouth daily      ramipril (ALTACE) 10 MG capsule Take 10 mg by mouth daily      venlafaxine (EFFEXOR-XR) 150 mg 24 hr capsule Take 1 capsule (150 mg total) by mouth daily 90 capsule 1    Azelastine HCl 0 15 % SOLN 2 Squirts into each nostril 2 (two) times a day       No current facility-administered medications for this visit  Blood pressure 132/78, pulse 91, temperature 98 °F (36 7 °C), temperature source Tympanic, height 4' 8" (1 422 m), weight 62 4 kg (137 lb 9 6 oz)  PHYSICAL EXAM: Physical Exam   HENT:   Head: Normocephalic and atraumatic  Mouth/Throat: Oropharynx is clear and moist    Eyes: Conjunctivae are normal  No scleral icterus  Neck: Neck supple  No JVD present  No tracheal deviation present  No thyromegaly present  Cardiovascular: Normal rate, regular rhythm and normal heart sounds  Exam reveals no gallop and no friction rub  No murmur heard  Pulmonary/Chest: Effort normal and breath sounds normal  No respiratory distress  She has no wheezes  She has no rales  She exhibits no tenderness  Abdominal: Soft  Bowel sounds are normal  She exhibits no distension and no mass  There is no tenderness  There is no rebound and no guarding  No hernia  Genitourinary:   Genitourinary Comments: Rectal exam-filled with soft brown stool   Lymphadenopathy:     She has no cervical adenopathy  Psychiatric: She has a normal mood and affect   Her behavior is normal  Thought content normal         Lab Results   Component Value Date    WBC 5 1 02/11/2017    HGB 13 0 03/29/2018    HCT 39 4 03/29/2018    MCV 87 4 03/29/2018     03/29/2018     Lab Results   Component Value Date    GLUCOSE 111 10/04/2016    CALCIUM 9 2 03/29/2018     02/11/2017    K 4 3 02/11/2017    CO2 26 02/11/2017     02/11/2017    BUN 22 03/29/2018    CREATININE 0 74 03/29/2018     Lab Results   Component Value Date    ALT 10 02/11/2017    AST 12 02/11/2017    ALKPHOS 69 02/11/2017    BILITOT 0 7 02/11/2017     Lab Results   Component Value Date    INR 1 51 (H) 02/28/2015    INR 1 23 (H) 02/27/2015    PROTIME 17 3 (H) 02/28/2015    PROTIME 14 8 (H) 02/27/2015       No results found  ASSESSMENT & PLAN:    Change in bowel habits  Episode of constipation followed by loose bowel movements-with evidence of stool on the rectal exam appear to be possible from overflow incontinence  Doubt for any significant bowel pathology     -advised her to take an enema and Dulcolax today     -Start back on MiraLax and stool softeners    -advised her to call me for any worsening symptoms

## 2018-05-24 NOTE — ASSESSMENT & PLAN NOTE
Episode of constipation followed by loose bowel movements-with evidence of stool on the rectal exam appear to be possible from overflow incontinence  Doubt for any significant bowel pathology     -advised her to take an enema and Dulcolax today     -Start back on MiraLax and stool softeners    -advised her to call me for any worsening symptoms

## 2018-05-24 NOTE — LETTER
May 24, 2018     Tej Mondragon DO  [address]  [place] [address]    Patient: Sheyla Mittal   YOB: 1940   Date of Visit: 5/24/2018       Dear Dr Melvi Hubbard:    Thank you for referring Berry Fairly to me for evaluation  Below are my notes for this consultation  If you have questions, please do not hesitate to call me  I look forward to following your patient along with you           Sincerely,        Hiwot Jefferson MD        CC: No Recipients

## 2018-06-07 NOTE — TELEPHONE ENCOUNTER
Patient called back in requesting a call back from a nurse in regards to her concerns with changes in her stools   Please return her call [phone]

## 2018-06-07 NOTE — TELEPHONE ENCOUNTER
Patient reporting "thin" bowel movements compared to large formed bowel movements a year ago, despite taking miralax and stool softener daily  She has concern that it is a sign of cancer  Denies other symptoms, states she is "obsessed" with her bowel movements  She is s/p office visit 5/24  Last scope was 3/2017, "long tortuous colon"  She only tried one dulcolax, no enema   I advised to follow your recommendation of enema and dulcolax (per package instructions)  I also suggested metamucil as a fiber source, increase water intake and increase activity  She will try these measures and advise if not effective

## 2018-06-11 NOTE — TELEPHONE ENCOUNTER
I called patient to see if symptoms improved, if not we will offer to schedule colonoscopy  I reached , left message to call back

## 2018-06-12 NOTE — TELEPHONE ENCOUNTER
I called patient to check on her symptoms to see if there was any improvement  She reports improvement with bowel movements daily with more "bulk" and denies abdominal pain and bloating  She is using metamucil, increasing fiber through food choices, increasing water intake and taking "3" stool softeners a day  She is not using miralax, dulcolax or fleets  I recommended she try one stool softener in the am and one in the pm, otherwise continue regimen if effective  I offered to schedule a colonoscopy in the future is symptoms resume

## 2018-07-18 DIAGNOSIS — E78.2 MIXED HYPERLIPIDEMIA: Primary | ICD-10-CM

## 2018-07-18 RX ORDER — ATORVASTATIN CALCIUM 10 MG/1
10 TABLET, FILM COATED ORAL DAILY
Qty: 90 TABLET | Refills: 1 | Status: SHIPPED | OUTPATIENT
Start: 2018-07-18 | End: 2019-01-03 | Stop reason: SDUPTHER

## 2018-07-25 ENCOUNTER — OFFICE VISIT (OUTPATIENT)
Dept: FAMILY MEDICINE CLINIC | Facility: CLINIC | Age: 78
End: 2018-07-25
Payer: MEDICARE

## 2018-07-25 VITALS
OXYGEN SATURATION: 98 % | DIASTOLIC BLOOD PRESSURE: 64 MMHG | WEIGHT: 141 LBS | BODY MASS INDEX: 31.72 KG/M2 | HEIGHT: 56 IN | SYSTOLIC BLOOD PRESSURE: 132 MMHG | RESPIRATION RATE: 16 BRPM | HEART RATE: 94 BPM

## 2018-07-25 DIAGNOSIS — F60.5 OBSESSIVE COMPULSIVE PERSONALITY DISORDER (HCC): ICD-10-CM

## 2018-07-25 DIAGNOSIS — E78.2 MIXED HYPERLIPIDEMIA: ICD-10-CM

## 2018-07-25 DIAGNOSIS — F41.8 OTHER SPECIFIED ANXIETY DISORDERS: ICD-10-CM

## 2018-07-25 DIAGNOSIS — F41.8 ANXIETY ASSOCIATED WITH DEPRESSION: ICD-10-CM

## 2018-07-25 DIAGNOSIS — F41.9 ANXIETY: ICD-10-CM

## 2018-07-25 DIAGNOSIS — I10 BENIGN ESSENTIAL HYPERTENSION: ICD-10-CM

## 2018-07-25 DIAGNOSIS — F60.5 OBSESSIVE-COMPULSIVE PERSONALITY DISORDER (HCC): ICD-10-CM

## 2018-07-25 DIAGNOSIS — E11.9 CONTROLLED TYPE 2 DIABETES MELLITUS WITHOUT COMPLICATION, WITHOUT LONG-TERM CURRENT USE OF INSULIN (HCC): Primary | ICD-10-CM

## 2018-07-25 DIAGNOSIS — G47.00 INSOMNIA, UNSPECIFIED TYPE: ICD-10-CM

## 2018-07-25 PROCEDURE — 99215 OFFICE O/P EST HI 40 MIN: CPT | Performed by: FAMILY MEDICINE

## 2018-07-25 RX ORDER — VENLAFAXINE HYDROCHLORIDE 150 MG/1
150 CAPSULE, EXTENDED RELEASE ORAL DAILY
Qty: 90 CAPSULE | Refills: 0 | Status: SHIPPED | OUTPATIENT
Start: 2018-07-25 | End: 2018-12-03 | Stop reason: SDUPTHER

## 2018-07-25 NOTE — ASSESSMENT & PLAN NOTE
Lab Results   Component Value Date    HGBA1C 6 5 (H) 03/29/2018       No results for input(s): POCGLU in the last 72 hours  Blood Sugar Average: Last 72 hrs:      diabetes has been very well controlled on metformin  Patient is not due for repeat hemoglobin A1c and microalbumin until October  She will continue on same medications  She is current with diabetic eye examinations and foot examinations

## 2018-07-25 NOTE — PROGRESS NOTES
Subjective:      Patient ID: Johan Marte is a 76 y o  female  Shanel Bass for counseling  Past Medical History:   Diagnosis Date    Anxiety     Colon polyps     Depression     Diabetes mellitus ([address] )     Dizziness     Last assessed: 8/31/15    [facility]     Dysuria     Hyperlipidemia     Hypertension     Insomnia     Obsessive compulsive personality disorder        Family History   Problem Relation Age of Onset    Depression Mother         w/anxiety    Diabetes Mother         Mellitus    Breast cancer Mother     Hypertension Mother     No Known Problems Father     Depression Sister         w/anxiety    Heart disease Sister         Cardiac Disorder    Breast cancer Sister     Hypertension Sister     Diabetes Brother         Mellitus    Alcohol abuse Son        Past Surgical History:   Procedure Laterality Date     SECTION      1964 son, 26 daughter    COLONOSCOPY      [place] COLONOSCOPY FLX DX W/COLLJ SPEC WHEN PFRMD N/A 3/27/2017    Procedure: COLONOSCOPY;  Surgeon: Shantel Pearson MD;  [facility]; Service: Gastroenterology    ROTATOR CUFF REPAIR Left     Last assessed: 3/29/15    TONSILLECTOMY          reports that she has never smoked  She has never used smokeless tobacco  She reports that she does not drink alcohol or use drugs  Current Outpatient Prescriptions:     aspirin 81 MG tablet, Take 81 mg by mouth daily, Disp: , Rfl:     atorvastatin (LIPITOR) 10 mg tablet, Take 1 tablet (10 mg total) by mouth daily, Disp: 90 tablet, Rfl: 1    Azelastine HCl 0 15 % SOLN, 2 Squirts into each nostril 2 (two) times a day, Disp: , Rfl:     busPIRone (BUSPAR) 10 mg tablet, Take 10 mg by mouth 3 (three) times a day, Disp: , Rfl:     Cholecalciferol (VITAMIN D3) 1000 units CAPS, Take 1 capsule by mouth daily, Disp: , Rfl:     clonazePAM (KlonoPIN) 0 5 mg tablet, 1/2 tablet in the morning, 1/2 tablet at 2:00 p m   And 1 full tablet at night, Disp: 60 tablet, Rfl: 5    docusate sodium (COLACE) 100 mg capsule, Take 100 mg by mouth 2 (two) times a day, Disp: , Rfl:     metFORMIN (GLUCOPHAGE) 500 mg tablet, Take 500 mg by mouth 2 (two) times a day with meals, Disp: , Rfl:     polyethylene glycol (MIRALAX) 17 g packet, Take 17 g by mouth daily, Disp: , Rfl:     ramipril (ALTACE) 10 MG capsule, Take 10 mg by mouth daily, Disp: , Rfl:     venlafaxine (EFFEXOR-XR) 150 mg 24 hr capsule, Take 1 capsule (150 mg total) by mouth daily, Disp: 90 capsule, Rfl: 1    {Common ambulatory SmartLinks:91866}    Review of Systems        Objective:    /64   Pulse 94   Resp 16   Ht 4' 8" (1 422 m)   Wt 64 kg (141 lb)   SpO2 98%   BMI 31 61 kg/m²      Physical Exam      No results found for this or any previous visit (from the past 1008 hour(s))  Assessment/Plan:    No problem-specific Assessment & Plan notes found for this encounter           {Assess/PlanSmartLinks:09714}

## 2018-07-25 NOTE — ASSESSMENT & PLAN NOTE
Hyperlipidemia is well controlled on atorvastatin 10 mg once daily  This is at goal for diabetic  Continue same    Recheck lipid profile  In October

## 2018-07-25 NOTE — PROGRESS NOTES
Subjective:      Patient ID: Rafael Lee is a 76 y o  female  Patient presents for 3 month follow-up at my request   This was to re-evaluate her depression with anxiety  Patient does have depression with anxiety as well as OCD tendencies  Patient states that she has actually been doing well  The last thing that she obsessed about was her bowel habits  She did see gastroenterologist and was reassured that her intermittent episodes of constipation and loose stools were not of any relation to a significant pathology  She was placed on routine dose of MiraLax  Patient actually started seeing a psychologist for counseling services which has helped  Patient was advised to do this on multiple occasions but had been putting it off  She finally found a psychologist that was close to her home and seems to be doing well  She is being seen on a biweekly basis  Psychologist is Kailyn Ocasio  patient had a normal screening mammogram   She is not due for repeat labs for a no other 3 months for chronic conditions  She does need refill of Effexor and is uncertain for refills of other medications  Patient is current on all of her screenings          Past Medical History:   Diagnosis Date    Anxiety     Colon polyps     Depression     [facility] mellitus ([facility] [address] [facility]     Dizziness     Last assessed: 8/31/15    [facility]     Dysuria     Hyperlipidemia     Hypertension     Insomnia     Obsessive compulsive personality disorder        Family History   Problem Relation Age of Onset    Depression Mother         w/anxiety    Diabetes Mother         Mellitus    Breast cancer Mother     Hypertension Mother     No Known Problems Father     Depression Sister         w/anxiety    Heart disease Sister         Cardiac Disorder    Breast cancer Sister     Hypertension Sister     Diabetes Brother         Mellitus    Alcohol abuse Son        Past Surgical History:   Procedure Laterality Date     SECTION      1966 son, 26 daughter    COLONOSCOPY      [place] COLONOSCOPY FLX DX W/COLLJ SPEC WHEN PFRMD N/A 3/27/2017    Procedure: COLONOSCOPY;  Surgeon: Willian Rubio MD;  [facility]; Service: Gastroenterology    ROTATOR CUFF REPAIR Left     Last assessed: 3/29/15    TONSILLECTOMY          reports that she has never smoked  She has never used smokeless tobacco  She reports that she does not drink alcohol or use drugs  Current Outpatient Prescriptions:     aspirin 81 MG tablet, Take 81 mg by mouth daily, Disp: , Rfl:     atorvastatin (LIPITOR) 10 mg tablet, Take 1 tablet (10 mg total) by mouth daily, Disp: 90 tablet, Rfl: 1    Azelastine HCl 0 15 % SOLN, 2 Squirts into each nostril 2 (two) times a day, Disp: , Rfl:     busPIRone (BUSPAR) 10 mg tablet, Take 10 mg by mouth 3 (three) times a day, Disp: , Rfl:     Cholecalciferol (VITAMIN D3) 1000 units CAPS, Take 1 capsule by mouth daily, Disp: , Rfl:     clonazePAM (KlonoPIN) 0 5 mg tablet, 1/2 tablet in the morning, 1/2 tablet at 2:00 p m  And 1 full tablet at night, Disp: 60 tablet, Rfl: 5    docusate sodium (COLACE) 100 mg capsule, Take 100 mg by mouth 2 (two) times a day, Disp: , Rfl:     metFORMIN (GLUCOPHAGE) 500 mg tablet, Take 500 mg by mouth 2 (two) times a day with meals, Disp: , Rfl:     polyethylene glycol (MIRALAX) 17 g packet, Take 17 g by mouth daily, Disp: , Rfl:     ramipril (ALTACE) 10 MG capsule, Take 10 mg by mouth daily, Disp: , Rfl:     venlafaxine (EFFEXOR-XR) 150 mg 24 hr capsule, Take 1 capsule (150 mg total) by mouth daily, Disp: 90 capsule, Rfl: 1    The following portions of the patient's history were reviewed and updated as appropriate: allergies, current medications, past family history, past medical history, past social history, past surgical history and problem list     Review of Systems   Constitutional: Negative  HENT: Negative  Eyes: Negative  Respiratory: Negative  Cardiovascular: Negative  Gastrointestinal: Negative  Endocrine: Negative  Genitourinary: Negative  Musculoskeletal: Negative  Skin: Negative  Allergic/Immunologic: Negative  Neurological: Negative  Hematological: Negative  Psychiatric/Behavioral: Negative  Objective:    /64   Pulse 94   Resp 16   Ht 4' 8" (1 422 m)   Wt 64 kg (141 lb)   SpO2 98%   BMI 31 61 kg/m²      Physical Exam   Constitutional: She is oriented to person, place, and time  She appears well-developed and well-nourished  HENT:   Head: Normocephalic and atraumatic  Eyes: Conjunctivae are normal  Pupils are equal, round, and reactive to light  Neck: Normal range of motion  Neck supple  Cardiovascular: Normal rate and regular rhythm  Pulmonary/Chest: Effort normal and breath sounds normal    Abdominal: Soft  Bowel sounds are normal    Neurological: She is alert and oriented to person, place, and time  She has normal reflexes  Psychiatric: She has a normal mood and affect  Her behavior is normal  Judgment and thought content normal    Nursing note and vitals reviewed  No results found for this or any previous visit (from the past 1008 hour(s))  Assessment/Plan:    No problem-specific Assessment & Plan notes found for this encounter  Problem List Items Addressed This Visit     Anxiety       Patient remains on BuSpar and routine dose of Klonopin  I actually believe that her anxiety is doing better  I am happy that she is establish with a psychologist for counseling services  That was very much longer overdue         Anxiety associated with depression      Continue on Effexor 225 mg daily, BuSpar and clonazepam  On current regimen  Patient seems to be doing well especially since she has found a psychologist for counseling services    Her significant stressors, her family, she is seeing a little less of because most all of them are working and her grandchildren are not in around the house as much         Benign essential hypertension      Hypertension is well controlled on ramipril 10 mg once daily  Continue same  No change in dosage  Controlled type 2 diabetes mellitus without complication ([facility] [address] ) - Primary     Lab Results   Component Value Date    HGBA1C 6 5 (H) 03/29/2018       No results for input(s): POCGLU in the last 72 hours  Blood Sugar Average: Last 72 hrs:      diabetes has been very well controlled on metformin  Patient is not due for repeat hemoglobin A1c and microalbumin until October  She will continue on same medications  She is current with diabetic eye examinations and foot examinations  Hyperlipidemia       Hyperlipidemia is well controlled on atorvastatin 10 mg once daily  This is at goal for diabetic  Continue same  Recheck lipid profile  In October         Insomnia      Patient remains on Klonopin at bedtime  Obsessive compulsive personality disorder      Continue Effexor and BuSpar  Strongly recommended psychology referral for counseling which the patient declined  Other Visit Diagnoses     Obsessive-compulsive personality disorder        Other specified anxiety disorders              I have spent 42 minutes with Patient  today in which greater than 50% of this time was spent in counseling/coordination of care regarding Diagnostic results, Prognosis, Risks and benefits of tx options, Intructions for management, Patient and family education, Importance of tx compliance, Risk factor reductions and Impressions

## 2018-07-25 NOTE — ASSESSMENT & PLAN NOTE
Patient remains on BuSpar and routine dose of Klonopin  I actually believe that her anxiety is doing better  I am happy that she is establish with a psychologist for counseling services    That was very much longer overdue

## 2018-07-25 NOTE — ASSESSMENT & PLAN NOTE
Continue on Effexor 225 mg daily, BuSpar and clonazepam  On current regimen  Patient seems to be doing well especially since she has found a psychologist for counseling services    Her significant stressors, her family, she is seeing a little less of because most all of them are working and her grandchildren are not in around the house as much

## 2018-09-10 DIAGNOSIS — I10 ESSENTIAL HYPERTENSION, BENIGN: ICD-10-CM

## 2018-09-10 DIAGNOSIS — E11.9 TYPE 2 DIABETES MELLITUS WITHOUT COMPLICATION, WITHOUT LONG-TERM CURRENT USE OF INSULIN (HCC): ICD-10-CM

## 2018-09-10 DIAGNOSIS — F41.8 DEPRESSION WITH ANXIETY: Primary | ICD-10-CM

## 2018-09-10 RX ORDER — RAMIPRIL 10 MG/1
10 CAPSULE ORAL DAILY
Qty: 30 CAPSULE | Refills: 5 | Status: SHIPPED | OUTPATIENT
Start: 2018-09-10 | End: 2019-03-04 | Stop reason: SDUPTHER

## 2018-09-10 RX ORDER — BUSPIRONE HYDROCHLORIDE 10 MG/1
10 TABLET ORAL 3 TIMES DAILY
Qty: 90 TABLET | Refills: 5 | Status: SHIPPED | OUTPATIENT
Start: 2018-09-10 | End: 2018-11-19 | Stop reason: SDUPTHER

## 2018-10-04 LAB
ALBUMIN SERPL-MCNC: 4.3 G/DL (ref 3.5–4.8)
ALBUMIN/CREAT UR: 34.9 MG/G CREAT (ref 0–30)
ALBUMIN/GLOB SERPL: 2 {RATIO} (ref 1.2–2.2)
ALP SERPL-CCNC: 71 IU/L (ref 39–117)
ALT SERPL-CCNC: 15 IU/L (ref 0–32)
AMBIG ABBREV DEFAULT: NORMAL
AMBIG ABBREV DEFAULT: NORMAL
AST SERPL-CCNC: 18 IU/L (ref 0–40)
BASOPHILS # BLD AUTO: 0 X10E3/UL (ref 0–0.2)
BASOPHILS NFR BLD AUTO: 1 %
BILIRUB SERPL-MCNC: 0.3 MG/DL (ref 0–1.2)
BUN SERPL-MCNC: 20 MG/DL (ref 8–27)
BUN/CREAT SERPL: 29 (ref 12–28)
CALCIUM SERPL-MCNC: 9.2 MG/DL (ref 8.7–10.3)
CHLORIDE SERPL-SCNC: 103 MMOL/L (ref 96–106)
CHOLEST SERPL-MCNC: 151 MG/DL (ref 100–199)
CO2 SERPL-SCNC: 24 MMOL/L (ref 20–29)
CREAT SERPL-MCNC: 0.7 MG/DL (ref 0.57–1)
CREAT UR-MCNC: 67.7 MG/DL
EOSINOPHIL # BLD AUTO: 0.2 X10E3/UL (ref 0–0.4)
EOSINOPHIL NFR BLD AUTO: 4 %
ERYTHROCYTE [DISTWIDTH] IN BLOOD BY AUTOMATED COUNT: 13.7 % (ref 12.3–15.4)
EST. AVERAGE GLUCOSE BLD GHB EST-MCNC: 151 MG/DL
GLOBULIN SER-MCNC: 2.1 G/DL (ref 1.5–4.5)
GLUCOSE SERPL-MCNC: 121 MG/DL (ref 65–99)
HBA1C MFR BLD: 6.9 % (ref 4.8–5.6)
HCT VFR BLD AUTO: 40.3 % (ref 34–46.6)
HDLC SERPL-MCNC: 47 MG/DL
HGB BLD-MCNC: 13.4 G/DL (ref 11.1–15.9)
IMM GRANULOCYTES # BLD: 0 X10E3/UL (ref 0–0.1)
IMM GRANULOCYTES NFR BLD: 0 %
LDLC SERPL CALC-MCNC: 76 MG/DL (ref 0–99)
LYMPHOCYTES # BLD AUTO: 1.5 X10E3/UL (ref 0.7–3.1)
LYMPHOCYTES NFR BLD AUTO: 28 %
MCH RBC QN AUTO: 28.8 PG (ref 26.6–33)
MCHC RBC AUTO-ENTMCNC: 33.3 G/DL (ref 31.5–35.7)
MCV RBC AUTO: 87 FL (ref 79–97)
MICROALBUMIN UR-MCNC: 23.6 UG/ML
MONOCYTES # BLD AUTO: 0.3 X10E3/UL (ref 0.1–0.9)
MONOCYTES NFR BLD AUTO: 6 %
NEUTROPHILS # BLD AUTO: 3.3 X10E3/UL (ref 1.4–7)
NEUTROPHILS NFR BLD AUTO: 61 %
PLATELET # BLD AUTO: 202 X10E3/UL (ref 150–379)
POTASSIUM SERPL-SCNC: 4.8 MMOL/L (ref 3.5–5.2)
PROT SERPL-MCNC: 6.4 G/DL (ref 6–8.5)
RBC # BLD AUTO: 4.65 X10E6/UL (ref 3.77–5.28)
SL AMB EGFR AFRICAN AMERICAN: 95 ML/MIN/1.73
SL AMB EGFR NON AFRICAN AMERICAN: 83 ML/MIN/1.73
SL AMB VLDL CHOLESTEROL CALC: 28 MG/DL (ref 5–40)
SODIUM SERPL-SCNC: 140 MMOL/L (ref 134–144)
TRIGL SERPL-MCNC: 140 MG/DL (ref 0–149)
TSH SERPL DL<=0.005 MIU/L-ACNC: 1.28 UIU/ML (ref 0.45–4.5)
WBC # BLD AUTO: 5.4 X10E3/UL (ref 3.4–10.8)

## 2018-10-15 ENCOUNTER — OFFICE VISIT (OUTPATIENT)
Dept: FAMILY MEDICINE CLINIC | Facility: CLINIC | Age: 78
End: 2018-10-15
Payer: MEDICARE

## 2018-10-15 VITALS
SYSTOLIC BLOOD PRESSURE: 128 MMHG | HEIGHT: 56 IN | RESPIRATION RATE: 16 BRPM | WEIGHT: 143 LBS | OXYGEN SATURATION: 98 % | BODY MASS INDEX: 32.17 KG/M2 | HEART RATE: 84 BPM | DIASTOLIC BLOOD PRESSURE: 68 MMHG

## 2018-10-15 DIAGNOSIS — F41.8 ANXIETY ASSOCIATED WITH DEPRESSION: ICD-10-CM

## 2018-10-15 DIAGNOSIS — E78.2 MIXED HYPERLIPIDEMIA: Primary | ICD-10-CM

## 2018-10-15 DIAGNOSIS — F60.5 OBSESSIVE COMPULSIVE PERSONALITY DISORDER (HCC): ICD-10-CM

## 2018-10-15 DIAGNOSIS — E11.9 CONTROLLED TYPE 2 DIABETES MELLITUS WITHOUT COMPLICATION, WITHOUT LONG-TERM CURRENT USE OF INSULIN (HCC): ICD-10-CM

## 2018-10-15 DIAGNOSIS — I10 BENIGN ESSENTIAL HYPERTENSION: ICD-10-CM

## 2018-10-15 DIAGNOSIS — F41.9 ANXIETY: ICD-10-CM

## 2018-10-15 DIAGNOSIS — Z23 FLU VACCINE NEED: ICD-10-CM

## 2018-10-15 DIAGNOSIS — G47.00 INSOMNIA, UNSPECIFIED TYPE: ICD-10-CM

## 2018-10-15 PROCEDURE — G0008 ADMIN INFLUENZA VIRUS VAC: HCPCS | Performed by: FAMILY MEDICINE

## 2018-10-15 PROCEDURE — 90662 IIV NO PRSV INCREASED AG IM: CPT | Performed by: FAMILY MEDICINE

## 2018-10-15 PROCEDURE — 99215 OFFICE O/P EST HI 40 MIN: CPT | Performed by: FAMILY MEDICINE

## 2018-10-15 NOTE — ASSESSMENT & PLAN NOTE
Hyperlipidemia is well controlled on atorvastatin 10 mg once daily  Recheck lipid profile in 4 months

## 2018-10-15 NOTE — ASSESSMENT & PLAN NOTE
Continue Effexor and BuSpar  Patient continues doing extremely well    Counseling and psychotherapy have been beneficial

## 2018-10-15 NOTE — PROGRESS NOTES
Subjective:      Patient ID: Andre Ramos is a 76 y o  female  78-year-old female presents for 3 month follow-up of chronic conditions including diabetes mellitus type 2, hyperlipidemia, hypertension, depression, anxiety  Patient has been feeling well  She does follow up with her counselor  She has not been feeling as anxious  She recently visited friends in [address] [place] [address] in [facility] 87 of her 78th birthday  Patient is current with screening colonoscopy and mammogram   Patient has followed up with geriatrician as well  On occasion she will have a wine spritzer which does make her very happy  Past Medical History:   Diagnosis Date    Anxiety     Colon polyps     Depression     [facility]     Dizziness     Last assessed: 8/31/15    [facility]     Dysuria     Hyperlipidemia     Hypertension     Insomnia     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[facility] )        Family History   Problem Relation Age of Onset    Depression Mother         w/anxiety    Diabetes Mother         Mellitus    Breast cancer Mother     Hypertension Mother     No Known Problems Father     Depression Sister         w/anxiety    Heart disease Sister         Cardiac Disorder    Breast cancer Sister     Hypertension Sister     Diabetes Brother         Mellitus    Alcohol abuse Son        Past Surgical History:   Procedure Laterality Date     SECTION       son, 26 daughter    COLONOSCOPY      MS COLONOSCOPY FLX DX W/COLLJ SPEC WHEN PFRMD N/A 3/27/2017    Procedure: COLONOSCOPY;  Surgeon: Lucy Cartwright MD;  [facility]; Service: Gastroenterology    ROTATOR CUFF REPAIR Left     Last assessed: 3/29/15    TONSILLECTOMY          reports that she has never smoked  She has never used smokeless tobacco  She reports that she does not drink alcohol or use drugs        Current Outpatient Prescriptions:     aspirin 81 MG tablet, Take 81 mg by mouth daily, Disp: , Rfl:     atorvastatin (LIPITOR) 10 mg tablet, Take 1 tablet (10 mg total) by mouth daily, Disp: 90 tablet, Rfl: 1    Azelastine HCl 0 15 % SOLN, 2 Squirts into each nostril 2 (two) times a day, Disp: , Rfl:     busPIRone (BUSPAR) 10 mg tablet, Take 1 tablet (10 mg total) by mouth 3 (three) times a day, Disp: 90 tablet, Rfl: 5    Cholecalciferol (VITAMIN D3) 1000 units CAPS, Take 1 capsule by mouth daily, Disp: , Rfl:     clonazePAM (KlonoPIN) 0 5 mg tablet, 1/2 tablet in the morning, 1/2 tablet at 2:00 p m  And 1 full tablet at night, Disp: 60 tablet, Rfl: 5    docusate sodium (COLACE) 100 mg capsule, Take 100 mg by mouth 2 (two) times a day, Disp: , Rfl:     metFORMIN (GLUCOPHAGE) 500 mg tablet, Take 1 tablet (500 mg total) by mouth 2 (two) times a day with meals, Disp: 60 tablet, Rfl: 5    polyethylene glycol (MIRALAX) 17 g packet, Take 17 g by mouth daily, Disp: , Rfl:     ramipril (ALTACE) 10 MG capsule, Take 1 capsule (10 mg total) by mouth daily, Disp: 30 capsule, Rfl: 5    venlafaxine (EFFEXOR-XR) 150 mg 24 hr capsule, Take 1 capsule (150 mg total) by mouth daily, Disp: 90 capsule, Rfl: 0    The following portions of the patient's history were reviewed and updated as appropriate: allergies, current medications, past family history, past medical history, past social history, past surgical history and problem list     Review of Systems   Constitutional: Negative  HENT: Negative  Eyes: Negative  Respiratory: Negative  Cardiovascular: Negative  Gastrointestinal: Negative  Endocrine: Negative  Genitourinary: Negative  Musculoskeletal: Negative  Skin: Negative  Allergic/Immunologic: Negative  Neurological: Negative  Hematological: Negative  Psychiatric/Behavioral: Negative              Objective:    /68   Pulse 84   Resp 16   Ht 4' 8" (1 422 m)   Wt 64 9 kg (143 lb)   SpO2 98%   BMI 32 06 kg/m²      Physical Exam   Constitutional: She is oriented to person, place, and time  She appears well-developed and well-nourished  HENT:   Head: Normocephalic and atraumatic  Eyes: Pupils are equal, round, and reactive to light  Conjunctivae are normal    Neck: Normal range of motion  Neck supple  Cardiovascular: Normal rate and regular rhythm  Pulmonary/Chest: Effort normal and breath sounds normal    Abdominal: Soft  Bowel sounds are normal    Neurological: She is alert and oriented to person, place, and time  She has normal reflexes  Psychiatric: She has a normal mood and affect  Her behavior is normal  Judgment and thought content normal    Nursing note and vitals reviewed          Recent Results (from the past 1008 hour(s))   CBC and differential    Collection Time: 10/03/18  9:08 AM   Result Value Ref Range    White Blood Cell Count 5 4 3 4 - 10 8 x10E3/uL    Red Blood Cell Count 4 65 3 77 - 5 28 x10E6/uL    Hemoglobin 13 4 11 1 - 15 9 g/dL    HCT 40 3 34 0 - 46 6 %    MCV 87 79 - 97 fL    MCH 28 8 26 6 - 33 0 pg    MCHC 33 3 31 5 - 35 7 g/dL    RDW 13 7 12 3 - 15 4 %    Platelet Count 633 032 - 379 x10E3/uL    Neutrophils 61 Not Estab  %    Lymphocytes 28 Not Estab  %    Monocytes 6 Not Estab  %    Eosinophils 4 Not Estab  %    Basophils Relative 1 Not Estab  %    Neutrophils (Absolute) 3 3 1 4 - 7 0 x10E3/uL    Lymphocytes (Absolute) 1 5 0 7 - 3 1 x10E3/uL    Monocytes (Absolute) 0 3 0 1 - 0 9 x10E3/uL    Eosinophils (Absolute) 0 2 0 0 - 0 4 x10E3/uL    Basophils (Absolute) 0 0 0 0 - 0 2 x10E3/uL    Immature Granulocytes 0 Not Estab  %    Immature Granulocytes (Absolute) 0 0 0 0 - 0 1 x10E3/uL   Comprehensive metabolic panel    Collection Time: 10/03/18  9:08 AM   Result Value Ref Range    Glucose 121 (H) 65 - 99 mg/dL    BUN 20 8 - 27 mg/dL    Creatinine 0 70 0 57 - 1 00 mg/dL    eGFR Non  83 >59 mL/min/1 73    SL AMB EGFR AFRICAN AMERICAN 95 >59 mL/min/1 73    SL AMB BUN/CREATININE RATIO 29 (H) 12 - 28    Sodium 140 134 - 144 mmol/L SL AMB POTASSIUM 4 8 3 5 - 5 2 mmol/L    Chloride 103 96 - 106 mmol/L    CO2 24 20 - 29 mmol/L    CALCIUM 9 2 8 7 - 10 3 mg/dL    SL AMB PROTEIN, TOTAL 6 4 6 0 - 8 5 g/dL    Albumin 4 3 3 5 - 4 8 g/dL    Globulin, Total 2 1 1 5 - 4 5 g/dL    SL AMB ALBUMIN/GLOBULIN RATIO 2 0 1 2 - 2 2    TOTAL BILIRUBIN 0 3 0 0 - 1 2 mg/dL    Alk Phos Isoenzymes 71 39 - 117 IU/L    SL AMB AST 18 0 - 40 IU/L    SL AMB ALT 15 0 - 32 IU/L   Lipid panel    Collection Time: 10/03/18  9:08 AM   Result Value Ref Range    Cholesterol, Total 151 100 - 199 mg/dL    Triglycerides 140 0 - 149 mg/dL    HDL 47 >39 mg/dL    SL AMB VLDL CHOLESTEROL CALC 28 5 - 40 mg/dL    LDL Direct 76 0 - 99 mg/dL   Microalbumin / creatinine urine ratio    Collection Time: 10/03/18  9:08 AM   Result Value Ref Range    Creatinine, Urine 67 7 Not Estab  mg/dL    Microalbum  ,U,Random 23 6 Not Estab  ug/mL    Microalb/Creat Ratio 34 9 (H) 0 0 - 30 0 mg/g creat   Hemoglobin A1C    Collection Time: 10/03/18  9:08 AM   Result Value Ref Range    Hemoglobin A1C 6 9 (H) 4 8 - 5 6 %    Estimated Average Glucose 151 mg/dL   TSH, 3rd generation with Free T4 reflex    Collection Time: 10/03/18  9:08 AM   Result Value Ref Range    TSH 1 280 0 450 - 4 500 uIU/mL   Ambig Abbrev Default    Collection Time: 10/03/18  9:08 AM   Result Value Ref Range    AMBIG ABBREV DEFAULT Comment    Ambig Abbrev Default    Collection Time: 10/03/18  9:08 AM   Result Value Ref Range    AMBIG ABBREV DEFAULT Comment        Assessment/Plan:    No problem-specific Assessment & Plan notes found for this encounter  Problem List Items Addressed This Visit     Anxiety       Anxiety is well controlled on Effexor  mg once daily and Klonopin 1/2 tablet t i d   Patient does follow up with counselor for therapy sessions  She has been doing extremely well on regimen medications           Relevant Orders    CBC and differential    RESOLVED: Anxiety associated with depression    Relevant Orders CBC and differential    TSH, 3rd generation with Free T4 reflex    Benign essential hypertension      Hypertension is well controlled on ramipril 10 mg once daily  Continue same  No change in dosage  Relevant Orders    CBC and differential    Controlled type 2 diabetes mellitus without complication ([facility] )     Lab Results   Component Value Date    HGBA1C 6 9 (H) 10/03/2018       No results for input(s): POCGLU in the last 72 hours  Blood Sugar Average: Last 72 hrs:     slight increase in hemoglobin A1c up from 6 4 to 6 9%  Patient does admit to some dietary indiscretion  Still within range  Patient remains on metformin 500 mg twice daily  Continue same  Recheck diabetic parameters in 4 months  Patient is current with diabetic foot examination is through podiatrist            Relevant Orders    CBC and differential    Hemoglobin A1C    Microalbumin / creatinine urine ratio    Hyperlipidemia - Primary       Hyperlipidemia is well controlled on atorvastatin 10 mg once daily  Recheck lipid profile in 4 months  Relevant Orders    Comprehensive metabolic panel    Lipid panel    Insomnia    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HCC)      Continue Effexor and BuSpar  Patient continues doing extremely well    Counseling and psychotherapy have been beneficial            Other Visit Diagnoses     Flu vaccine need        Relevant Orders    influenza vaccine, [id number], high-dose, PF 0 5 mL, for patients 65 yr+ (FLUZONE HIGH-DOSE) (Completed)

## 2018-10-15 NOTE — ASSESSMENT & PLAN NOTE
Anxiety is well controlled on Effexor  mg once daily and Klonopin 1/2 tablet t i d   Patient does follow up with counselor for therapy sessions  She has been doing extremely well on regimen medications

## 2018-11-01 DIAGNOSIS — F41.9 ANXIETY: ICD-10-CM

## 2018-11-01 RX ORDER — CLONAZEPAM 0.5 MG/1
TABLET ORAL
Qty: 60 TABLET | Refills: 5 | Status: SHIPPED | OUTPATIENT
Start: 2018-11-01 | End: 2019-05-21 | Stop reason: SDUPTHER

## 2018-11-19 DIAGNOSIS — F41.8 DEPRESSION WITH ANXIETY: ICD-10-CM

## 2018-11-19 RX ORDER — BUSPIRONE HYDROCHLORIDE 5 MG/1
10 TABLET ORAL 3 TIMES DAILY
Qty: 180 TABLET | Refills: 1 | Status: SHIPPED | OUTPATIENT
Start: 2018-11-19 | End: 2019-04-14 | Stop reason: SDUPTHER

## 2018-11-19 RX ORDER — BUSPIRONE HYDROCHLORIDE 10 MG/1
10 TABLET ORAL 3 TIMES DAILY
Qty: 90 TABLET | Refills: 3 | Status: SHIPPED | OUTPATIENT
Start: 2018-11-19 | End: 2018-11-19 | Stop reason: SDUPTHER

## 2018-11-19 NOTE — TELEPHONE ENCOUNTER
Well, I guess I have to  Sent over as 5 mg tablets taking 2 tablets 3 times a day    Hopefully the pharmacy will let the patient know

## 2018-12-03 DIAGNOSIS — F41.8 OTHER SPECIFIED ANXIETY DISORDERS: ICD-10-CM

## 2018-12-03 DIAGNOSIS — F60.5 OBSESSIVE-COMPULSIVE PERSONALITY DISORDER (HCC): ICD-10-CM

## 2018-12-03 RX ORDER — VENLAFAXINE HYDROCHLORIDE 150 MG/1
150 CAPSULE, EXTENDED RELEASE ORAL DAILY
Qty: 90 CAPSULE | Refills: 0 | Status: SHIPPED | OUTPATIENT
Start: 2018-12-03 | End: 2019-03-04 | Stop reason: SDUPTHER

## 2019-01-03 DIAGNOSIS — E78.2 MIXED HYPERLIPIDEMIA: ICD-10-CM

## 2019-01-03 RX ORDER — ATORVASTATIN CALCIUM 10 MG/1
10 TABLET, FILM COATED ORAL DAILY
Qty: 90 TABLET | Refills: 1 | Status: SHIPPED | OUTPATIENT
Start: 2019-01-03 | End: 2019-07-08 | Stop reason: SDUPTHER

## 2019-02-25 LAB
ALBUMIN SERPL-MCNC: 4 G/DL (ref 3.5–4.7)
ALBUMIN/GLOB SERPL: 1.7 {RATIO} (ref 1.2–2.2)
ALP SERPL-CCNC: 74 IU/L (ref 39–117)
ALT SERPL-CCNC: 13 IU/L (ref 0–32)
AST SERPL-CCNC: 16 IU/L (ref 0–40)
BASOPHILS # BLD AUTO: 0 X10E3/UL (ref 0–0.2)
BASOPHILS NFR BLD AUTO: 0 %
BILIRUB SERPL-MCNC: 0.5 MG/DL (ref 0–1.2)
BUN SERPL-MCNC: 17 MG/DL (ref 8–27)
BUN/CREAT SERPL: 24 (ref 12–28)
CALCIUM SERPL-MCNC: 9.2 MG/DL (ref 8.7–10.3)
CHLORIDE SERPL-SCNC: 102 MMOL/L (ref 96–106)
CHOLEST SERPL-MCNC: 154 MG/DL (ref 100–199)
CO2 SERPL-SCNC: 25 MMOL/L (ref 20–29)
CREAT SERPL-MCNC: 0.71 MG/DL (ref 0.57–1)
EOSINOPHIL # BLD AUTO: 0.2 X10E3/UL (ref 0–0.4)
EOSINOPHIL NFR BLD AUTO: 4 %
ERYTHROCYTE [DISTWIDTH] IN BLOOD BY AUTOMATED COUNT: 13.9 % (ref 12.3–15.4)
GLOBULIN SER-MCNC: 2.3 G/DL (ref 1.5–4.5)
GLUCOSE SERPL-MCNC: 118 MG/DL (ref 65–99)
HBA1C MFR BLD: 6.9 % (ref 4.8–5.6)
HCT VFR BLD AUTO: 39.5 % (ref 34–46.6)
HDLC SERPL-MCNC: 45 MG/DL
HGB BLD-MCNC: 12.7 G/DL (ref 11.1–15.9)
IMM GRANULOCYTES # BLD: 0 X10E3/UL (ref 0–0.1)
IMM GRANULOCYTES NFR BLD: 0 %
LABCORP COMMENT: NORMAL
LDLC SERPL CALC-MCNC: 82 MG/DL (ref 0–99)
LYMPHOCYTES # BLD AUTO: 1.4 X10E3/UL (ref 0.7–3.1)
LYMPHOCYTES NFR BLD AUTO: 23 %
MCH RBC QN AUTO: 28.1 PG (ref 26.6–33)
MCHC RBC AUTO-ENTMCNC: 32.2 G/DL (ref 31.5–35.7)
MCV RBC AUTO: 87 FL (ref 79–97)
MONOCYTES # BLD AUTO: 0.4 X10E3/UL (ref 0.1–0.9)
MONOCYTES NFR BLD AUTO: 6 %
NEUTROPHILS # BLD AUTO: 4.1 X10E3/UL (ref 1.4–7)
NEUTROPHILS NFR BLD AUTO: 67 %
PLATELET # BLD AUTO: 207 X10E3/UL (ref 150–379)
POTASSIUM SERPL-SCNC: 4.6 MMOL/L (ref 3.5–5.2)
PROT SERPL-MCNC: 6.3 G/DL (ref 6–8.5)
RBC # BLD AUTO: 4.52 X10E6/UL (ref 3.77–5.28)
SL AMB EGFR AFRICAN AMERICAN: 93 ML/MIN/1.73
SL AMB EGFR NON AFRICAN AMERICAN: 81 ML/MIN/1.73
SL AMB VLDL CHOLESTEROL CALC: 27 MG/DL (ref 5–40)
SODIUM SERPL-SCNC: 142 MMOL/L (ref 134–144)
TRIGL SERPL-MCNC: 133 MG/DL (ref 0–149)
TSH SERPL DL<=0.005 MIU/L-ACNC: 1.8 UIU/ML (ref 0.45–4.5)
WBC # BLD AUTO: 6 X10E3/UL (ref 3.4–10.8)

## 2019-03-04 ENCOUNTER — OFFICE VISIT (OUTPATIENT)
Dept: FAMILY MEDICINE CLINIC | Facility: CLINIC | Age: 79
End: 2019-03-04
Payer: MEDICARE

## 2019-03-04 VITALS
HEART RATE: 90 BPM | BODY MASS INDEX: 31.27 KG/M2 | RESPIRATION RATE: 16 BRPM | OXYGEN SATURATION: 96 % | HEIGHT: 56 IN | SYSTOLIC BLOOD PRESSURE: 138 MMHG | WEIGHT: 139 LBS | DIASTOLIC BLOOD PRESSURE: 82 MMHG

## 2019-03-04 DIAGNOSIS — F60.5 OBSESSIVE-COMPULSIVE PERSONALITY DISORDER (HCC): ICD-10-CM

## 2019-03-04 DIAGNOSIS — I10 ESSENTIAL HYPERTENSION, BENIGN: ICD-10-CM

## 2019-03-04 DIAGNOSIS — F41.8 OTHER SPECIFIED ANXIETY DISORDERS: ICD-10-CM

## 2019-03-04 DIAGNOSIS — E11.9 TYPE 2 DIABETES MELLITUS WITHOUT COMPLICATION, WITHOUT LONG-TERM CURRENT USE OF INSULIN (HCC): ICD-10-CM

## 2019-03-04 DIAGNOSIS — E11.9 CONTROLLED TYPE 2 DIABETES MELLITUS WITHOUT COMPLICATION, WITHOUT LONG-TERM CURRENT USE OF INSULIN (HCC): ICD-10-CM

## 2019-03-04 DIAGNOSIS — I10 BENIGN ESSENTIAL HYPERTENSION: Primary | ICD-10-CM

## 2019-03-04 DIAGNOSIS — E78.2 MIXED HYPERLIPIDEMIA: ICD-10-CM

## 2019-03-04 DIAGNOSIS — F41.9 ANXIETY: ICD-10-CM

## 2019-03-04 PROCEDURE — 99215 OFFICE O/P EST HI 40 MIN: CPT | Performed by: FAMILY MEDICINE

## 2019-03-04 RX ORDER — RAMIPRIL 10 MG/1
10 CAPSULE ORAL DAILY
Qty: 90 CAPSULE | Refills: 1 | Status: SHIPPED | OUTPATIENT
Start: 2019-03-04 | End: 2019-09-17 | Stop reason: SDUPTHER

## 2019-03-04 RX ORDER — VENLAFAXINE HYDROCHLORIDE 150 MG/1
150 CAPSULE, EXTENDED RELEASE ORAL DAILY
Qty: 90 CAPSULE | Refills: 1 | Status: SHIPPED | OUTPATIENT
Start: 2019-03-04 | End: 2019-08-21 | Stop reason: SDUPTHER

## 2019-03-04 NOTE — ASSESSMENT & PLAN NOTE
Patient remains on Effexor  mg once daily  As well as BuSpar 10 mg t i d  And Klonopin 0 5 mg t i d  He has been doing extremely well  She continues to go for counseling sessions  She should continue to follow up with psychologist   No need for change in medications

## 2019-03-04 NOTE — ASSESSMENT & PLAN NOTE
Lab Results   Component Value Date    HGBA1C 6 9 (H) 02/23/2019       No results for input(s): POCGLU in the last 72 hours  Blood Sugar Average: Last 72 hrs:     very well controlled on metformin 500 mg twice daily  Hemoglobin A1c of 6 9%  Continue same  Recheck diabetic parameters in 4 months  Patient does have appointment scheduled with podiatrist next week for follow-up in diabetic foot examination  No neuropathy  Reassured patient that she can have some sugar in moderation  She can certainly have a piece of dark chocolate

## 2019-03-04 NOTE — ASSESSMENT & PLAN NOTE
Hyperlipidemia remains very well controlled on atorvastatin 10 mg once daily  Continue same    Recheck lipid profile in 4 months

## 2019-03-04 NOTE — PROGRESS NOTES
Subjective:      Patient ID: Jenise Ledesma is a [de-identified] y o  female  Patient presents for follow-up of chronic conditions including hypertension, hyperlipidemia, diabetes mellitus type 2 which is very well controlled, anxiety and depression  Patient has been following with her counselor, Dr Compa Torres is her psychologist, [phone]  He does see the patient on a regular basis  Most recently the patient is still having issues with the coming fixated on health, helping out with her grandchildren she lives with  She lives with her daughter, son-in-law and grandchildren  Kayli Valente does have behavioral issues  Patient has difficulty controlling him when she is left at home with the grandchildren as a   She still has difficulty finding things that she enjoys doing  She was going to [employer] study, has a friend that she knits with to informed her that she would be more than willing to pick her up at her home  Patient does have issues with transportation as she does not drive  Patient becomes fixated on her medical conditions  She will not change her diet because she is diabetic  She does not want to eat anything that is sugar E or sweet  She started eating piece of dark chocolate and spit it out because she was afraid it would make her sugar go higher  Hemoglobin A1c remains well controlled at 6 9%  Patient does go to see podiatrist for diabetic foot examinations  She no longer needs mammograms as she is [de-identified]years of age  She no longer needs colonoscopies  Cholesterol remains very well controlled on atorvastatin    Needs refills all of her medications      Past Medical History:   Diagnosis Date    Anxiety     Colon polyps     Depression     Diabetes mellitus ([facility] )     Dizziness     Last assessed: 8/31/15    [facility]     Dysuria     Hyperlipidemia     Hypertension     Insomnia     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[facility] )        Family History Problem Relation Age of Onset    Depression Mother         w/anxiety    Diabetes Mother         Mellitus    Breast cancer Mother     Hypertension Mother     No Known Problems Father     Depression Sister         w/anxiety    Heart disease Sister         Cardiac Disorder    Breast cancer Sister     Hypertension Sister     Diabetes Brother         Mellitus    Alcohol abuse Son        Past Surgical History:   Procedure Laterality Date     SECTION      1964 son, 26 daughter    COLONOSCOPY      NE COLONOSCOPY FLX DX W/COLLJ SPEC WHEN PFRMD N/A 3/27/2017    Procedure: COLONOSCOPY;  Surgeon: Mae Shankar MD;  [facility]; Service: Gastroenterology    ROTATOR CUFF REPAIR Left     Last assessed: 3/29/15    TONSILLECTOMY          reports that she has never smoked  She has never used smokeless tobacco  She reports that she does not drink alcohol or use drugs  Current Outpatient Medications:     aspirin 81 MG tablet, Take 81 mg by mouth daily, Disp: , Rfl:     atorvastatin (LIPITOR) 10 mg tablet, Take 1 tablet (10 mg total) by mouth daily, Disp: 90 tablet, Rfl: 1    busPIRone (BUSPAR) 5 mg tablet, Take 2 tablets (10 mg total) by mouth 3 (three) times a day, Disp: 180 tablet, Rfl: 1    Cholecalciferol (VITAMIN D3) 1000 units CAPS, Take 1 capsule by mouth daily, Disp: , Rfl:     clonazePAM (KlonoPIN) 0 5 mg tablet, 1/2 tablet in the morning, 1/2 tablet at 2:00 p m   And 1 full tablet at night, Disp: 60 tablet, Rfl: 5    docusate sodium (COLACE) 100 mg capsule, Take 100 mg by mouth 2 (two) times a day, Disp: , Rfl:     metFORMIN (GLUCOPHAGE) 500 mg tablet, Take 1 tablet (500 mg total) by mouth 2 (two) times a day with meals, Disp: 180 tablet, Rfl: 1    ramipril (ALTACE) 10 MG capsule, Take 1 capsule (10 mg total) by mouth daily, Disp: 90 capsule, Rfl: 1    venlafaxine (EFFEXOR-XR) 150 mg 24 hr capsule, Take 1 capsule (150 mg total) by mouth daily, Disp: 90 capsule, Rfl: 1   Azelastine HCl 0 15 % SOLN, 2 Squirts into each nostril 2 (two) times a day, Disp: , Rfl:     The following portions of the patient's history were reviewed and updated as appropriate: allergies, current medications, past family history, past medical history, past social history, past surgical history and problem list     Review of Systems   Constitutional: Negative  HENT: Negative  Eyes: Negative  Respiratory: Negative  Cardiovascular: Negative  Gastrointestinal: Negative  Endocrine: Negative  Genitourinary: Negative  Musculoskeletal: Negative  Skin: Negative  Allergic/Immunologic: Negative  Neurological: Negative  Hematological: Negative  Psychiatric/Behavioral: Positive for dysphoric mood  Negative for confusion, decreased concentration, hallucinations, sleep disturbance and suicidal ideas  The patient is nervous/anxious  The patient is not hyperactive  Objective:    /82   Pulse 90   Resp 16   Ht 4' 8" (1 422 m)   Wt 63 kg (139 lb)   SpO2 96%   BMI 31 16 kg/m²      Physical Exam   Constitutional: She is oriented to person, place, and time  She appears well-developed and well-nourished  HENT:   Head: Normocephalic and atraumatic  Eyes: Pupils are equal, round, and reactive to light  Conjunctivae are normal    Neck: Normal range of motion  Neck supple  Cardiovascular: Normal rate, regular rhythm and normal heart sounds  Pulses are no weak pulses  Pulses:       Dorsalis pedis pulses are 2+ on the right side, and 2+ on the left side  Posterior tibial pulses are 2+ on the right side, and 2+ on the left side  Pulmonary/Chest: Effort normal and breath sounds normal    Abdominal: Soft  Bowel sounds are normal    Feet:   Right Foot:   Skin Integrity: Negative for ulcer, skin breakdown, erythema, warmth, callus or dry skin  Left Foot:   Skin Integrity: Negative for ulcer, skin breakdown, erythema, warmth, callus or dry skin     Neurological: She is alert and oriented to person, place, and time  She has normal reflexes  Psychiatric: Her behavior is normal  Judgment and thought content normal  Her mood appears anxious  Her speech is not rapid and/or pressured, not delayed and not slurred  Thought content is not paranoid  Cognition and memory are not impaired  She does not exhibit a depressed mood  She expresses no homicidal ideation  She expresses no suicidal plans and no homicidal plans  Patient does become obsessed about her health and well being and becomes fixated on these things  Nursing note and vitals reviewed  Patient's shoes and socks removed  Right Foot/Ankle   Right Foot Inspection  Skin Exam: skin normal and skin intact no dry skin, no warmth, no callus, no erythema, no maceration, no abnormal color, no pre-ulcer, no ulcer and no callus                          Toe Exam: ROM and strength within normal limits  Sensory   Vibration: intact  Proprioception: intact   Monofilament testing: intact  Vascular  Capillary refills: < 3 seconds  The right DP pulse is 2+  The right PT pulse is 2+  Left Foot/Ankle  Left Foot Inspection  Skin Exam: skin normal and skin intactno dry skin, no warmth, no erythema, no maceration, normal color, no pre-ulcer, no ulcer and no callus                         Toe Exam: ROM and strength within normal limits                   Sensory   Vibration: intact  Proprioception: intact  Monofilament: intact  Vascular  Capillary refills: < 3 seconds  The left DP pulse is 2+  The left PT pulse is 2+  Assign Risk Category:  No deformity present; No loss of protective sensation;  No weak pulses       Risk: 0    Recent Results (from the past 1008 hour(s))   CBC and differential    Collection Time: 02/23/19  8:26 AM   Result Value Ref Range    White Blood Cell Count 6 0 3 4 - 10 8 x10E3/uL    Red Blood Cell Count 4 52 3 77 - 5 28 x10E6/uL    Hemoglobin 12 7 11 1 - 15 9 g/dL    HCT 39 5 34 0 - 46 6 %    MCV 87 79 - 97 fL MCH 28 1 26 6 - 33 0 pg    MCHC 32 2 31 5 - 35 7 g/dL    RDW 13 9 12 3 - 15 4 %    Platelet Count 591 809 - 379 x10E3/uL    Neutrophils 67 Not Estab  %    Lymphocytes 23 Not Estab  %    Monocytes 6 Not Estab  %    Eosinophils 4 Not Estab  %    Basophils PCT 0 Not Estab  %    Neutrophils (Absolute) 4 1 1 4 - 7 0 x10E3/uL    Lymphocytes (Absolute) 1 4 0 7 - 3 1 x10E3/uL    Monocytes (Absolute) 0 4 0 1 - 0 9 x10E3/uL    Eosinophils (Absolute) 0 2 0 0 - 0 4 x10E3/uL    Basophils ABS 0 0 0 0 - 0 2 x10E3/uL    Immature Granulocytes 0 Not Estab  %    Immature Granulocytes (Absolute) 0 0 0 0 - 0 1 x10E3/uL   Comprehensive metabolic panel    Collection Time: 02/23/19  8:26 AM   Result Value Ref Range    Glucose, Random 118 (H) 65 - 99 mg/dL    BUN 17 8 - 27 mg/dL    Creatinine 0 71 0 57 - 1 00 mg/dL    eGFR Non  81 >59 mL/min/1 73    eGFR  93 >59 mL/min/1 73    SL AMB BUN/CREATININE RATIO 24 12 - 28    Sodium 142 134 - 144 mmol/L    Potassium 4 6 3 5 - 5 2 mmol/L    Chloride 102 96 - 106 mmol/L    CO2 25 20 - 29 mmol/L    CALCIUM 9 2 8 7 - 10 3 mg/dL    Protein, Total 6 3 6 0 - 8 5 g/dL    Albumin 4 0 3 5 - 4 7 g/dL    Globulin, Total 2 3 1 5 - 4 5 g/dL    Albumin/Globulin Ratio 1 7 1 2 - 2 2    TOTAL BILIRUBIN 0 5 0 0 - 1 2 mg/dL    Alk Phos Isoenzymes 74 39 - 117 IU/L    AST 16 0 - 40 IU/L    ALT 13 0 - 32 IU/L   Lipid panel    Collection Time: 02/23/19  8:26 AM   Result Value Ref Range    Cholesterol, Total 154 100 - 199 mg/dL    Triglycerides 133 0 - 149 mg/dL    HDL 45 >39 mg/dL    VLDL Cholesterol Calculated 27 5 - 40 mg/dL    LDL Direct 82 0 - 99 mg/dL   Hemoglobin A1c (w/out EAG)    Collection Time: 02/23/19  8:26 AM   Result Value Ref Range    Hemoglobin A1C 6 9 (H) 4 8 - 5 6 %   TSH WITH REFLEX TO FREE T4    Collection Time: 02/23/19  8:26 AM   Result Value Ref Range    TSH 1 800 0 450 - 4 500 uIU/mL   Specimen Status Report    Collection Time: 02/23/19  8:26 AM   Result Value Ref Range    Comment Comment        Assessment/Plan:    Hyperlipidemia  Hyperlipidemia remains very well controlled on atorvastatin 10 mg once daily  Continue same  Recheck lipid profile in 4 months    Anxiety  Patient remains on Effexor  mg once daily  As well as BuSpar 10 mg t i d  And Klonopin 0 5 mg t i d  He has been doing extremely well  She continues to go for counseling sessions  She should continue to follow up with psychologist   No need for change in medications  Benign essential hypertension  Hypertension remains well controlled on Altace 10 mg once daily  Continue same  Controlled type 2 diabetes mellitus without complication, without long-term current use of insulin ([facility])  Lab Results   Component Value Date    HGBA1C 6 9 (H) 02/23/2019       No results for input(s): POCGLU in the last 72 hours  Blood Sugar Average: Last 72 hrs:     very well controlled on metformin 500 mg twice daily  Hemoglobin A1c of 6 9%  Continue same  Recheck diabetic parameters in 4 months  Patient does have appointment scheduled with podiatrist next week for follow-up in diabetic foot examination  No neuropathy  Reassured patient that she can have some sugar in moderation  She can certainly have a piece of dark chocolate  Obsessive compulsive personality disorder  Patient remains on Effexor and BuSpar  I did reiterate once again that the patient needs to find people with similar hobbies and personalities  I did provide information for [facility] as well as [facility]  Patient needs to find things that she enjoys doing  I believe that the [facility] has transportation available  She needs to get out of the house in interact with other people of her age            Problem List Items Addressed This Visit        Endocrine    Controlled type 2 diabetes mellitus without complication, without long-term current use of insulin [facility])     Lab Results   Component Value Date HGBA1C 6 9 (H) 02/23/2019       No results for input(s): POCGLU in the last 72 hours  Blood Sugar Average: Last 72 hrs:     very well controlled on metformin 500 mg twice daily  Hemoglobin A1c of 6 9%  Continue same  Recheck diabetic parameters in 4 months  Patient does have appointment scheduled with podiatrist next week for follow-up in diabetic foot examination  No neuropathy  Reassured patient that she can have some sugar in moderation  She can certainly have a piece of dark chocolate  Relevant Medications    metFORMIN (GLUCOPHAGE) 500 mg tablet    Other Relevant Orders    Comprehensive metabolic panel    Hemoglobin A1C    Microalbumin / creatinine urine ratio    Lipid panel       Cardiovascular and Mediastinum    Benign essential hypertension - Primary     Hypertension remains well controlled on Altace 10 mg once daily  Continue same  Relevant Medications    ramipril (ALTACE) 10 MG capsule    Other Relevant Orders    CBC and differential    TSH, 3rd generation with Free T4 reflex       Other    Anxiety     Patient remains on Effexor  mg once daily  As well as BuSpar 10 mg t i d  And Klonopin 0 5 mg t i d  He has been doing extremely well  She continues to go for counseling sessions  She should continue to follow up with psychologist   No need for change in medications  Relevant Orders    TSH, 3rd generation with Free T4 reflex    Hyperlipidemia     Hyperlipidemia remains very well controlled on atorvastatin 10 mg once daily  Continue same    Recheck lipid profile in 4 months         Relevant Orders    Lipid panel      Other Visit Diagnoses     Obsessive-compulsive personality disorder ([address] )        Relevant Medications    venlafaxine (EFFEXOR-XR) 150 mg 24 hr capsule    Other specified anxiety disorders        Relevant Medications    venlafaxine (EFFEXOR-XR) 150 mg 24 hr capsule    Essential hypertension, benign        Relevant Medications    ramipril (ALTACE) 10 MG capsule    Type 2 diabetes mellitus without complication, without long-term current use of insulin ([facility])        Relevant Medications    metFORMIN (GLUCOPHAGE) 500 mg tablet          I have spent 45 minutes with Patient  today in which greater than 50% of this time was spent in counseling/coordination of care regarding Diagnostic results, Prognosis, Risks and benefits of tx options, Intructions for management, Patient and family education, Importance of tx compliance, Risk factor reductions and Impressions

## 2019-04-14 DIAGNOSIS — F41.8 DEPRESSION WITH ANXIETY: ICD-10-CM

## 2019-04-14 RX ORDER — BUSPIRONE HYDROCHLORIDE 5 MG/1
TABLET ORAL
Qty: 180 TABLET | Refills: 1 | Status: SHIPPED | OUTPATIENT
Start: 2019-04-14 | End: 2020-03-09

## 2019-05-21 DIAGNOSIS — F41.9 ANXIETY: ICD-10-CM

## 2019-05-21 RX ORDER — CLONAZEPAM 0.5 MG/1
TABLET ORAL
Qty: 60 TABLET | Refills: 5 | Status: SHIPPED | OUTPATIENT
Start: 2019-05-21 | End: 2019-07-08 | Stop reason: SDUPTHER

## 2019-07-01 LAB
ALBUMIN SERPL-MCNC: 4.1 G/DL (ref 3.6–5.1)
ALBUMIN/CREAT UR: 62 MCG/MG CREAT
ALBUMIN/GLOB SERPL: 1.8 (CALC) (ref 1–2.5)
ALP SERPL-CCNC: 68 U/L (ref 33–130)
ALT SERPL-CCNC: 13 U/L (ref 6–29)
AST SERPL-CCNC: 15 U/L (ref 10–35)
BASOPHILS # BLD AUTO: 52 CELLS/UL (ref 0–200)
BASOPHILS NFR BLD AUTO: 1 %
BILIRUB SERPL-MCNC: 0.4 MG/DL (ref 0.2–1.2)
BUN SERPL-MCNC: 20 MG/DL (ref 7–25)
BUN/CREAT SERPL: ABNORMAL (CALC) (ref 6–22)
CALCIUM SERPL-MCNC: 9.1 MG/DL (ref 8.6–10.4)
CHLORIDE SERPL-SCNC: 105 MMOL/L (ref 98–110)
CHOLEST SERPL-MCNC: 154 MG/DL
CHOLEST/HDLC SERPL: 3.1 (CALC)
CO2 SERPL-SCNC: 28 MMOL/L (ref 20–32)
CREAT SERPL-MCNC: 0.66 MG/DL (ref 0.6–0.88)
CREAT UR-MCNC: 55 MG/DL (ref 20–275)
EOSINOPHIL # BLD AUTO: 218 CELLS/UL (ref 15–500)
EOSINOPHIL NFR BLD AUTO: 4.2 %
ERYTHROCYTE [DISTWIDTH] IN BLOOD BY AUTOMATED COUNT: 12.7 % (ref 11–15)
GLOBULIN SER CALC-MCNC: 2.3 G/DL (CALC) (ref 1.9–3.7)
GLUCOSE SERPL-MCNC: 136 MG/DL (ref 65–99)
HBA1C MFR BLD: 7.2 % OF TOTAL HGB
HCT VFR BLD AUTO: 38.8 % (ref 35–45)
HDLC SERPL-MCNC: 49 MG/DL
HGB BLD-MCNC: 12.8 G/DL (ref 11.7–15.5)
LDLC SERPL CALC-MCNC: 84 MG/DL (CALC)
LYMPHOCYTES # BLD AUTO: 1576 CELLS/UL (ref 850–3900)
LYMPHOCYTES NFR BLD AUTO: 30.3 %
MCH RBC QN AUTO: 28.5 PG (ref 27–33)
MCHC RBC AUTO-ENTMCNC: 33 G/DL (ref 32–36)
MCV RBC AUTO: 86.4 FL (ref 80–100)
MICROALBUMIN UR-MCNC: 3.4 MG/DL
MONOCYTES # BLD AUTO: 307 CELLS/UL (ref 200–950)
MONOCYTES NFR BLD AUTO: 5.9 %
NEUTROPHILS # BLD AUTO: 3047 CELLS/UL (ref 1500–7800)
NEUTROPHILS NFR BLD AUTO: 58.6 %
NONHDLC SERPL-MCNC: 105 MG/DL (CALC)
PLATELET # BLD AUTO: 199 THOUSAND/UL (ref 140–400)
PMV BLD REES-ECKER: 11.5 FL (ref 7.5–12.5)
POTASSIUM SERPL-SCNC: 4.3 MMOL/L (ref 3.5–5.3)
PROT SERPL-MCNC: 6.4 G/DL (ref 6.1–8.1)
RBC # BLD AUTO: 4.49 MILLION/UL (ref 3.8–5.1)
SL AMB EGFR AFRICAN AMERICAN: 97 ML/MIN/1.73M2
SL AMB EGFR NON AFRICAN AMERICAN: 83 ML/MIN/1.73M2
SODIUM SERPL-SCNC: 141 MMOL/L (ref 135–146)
TRIGL SERPL-MCNC: 113 MG/DL
TSH SERPL-ACNC: 2.71 MIU/L (ref 0.4–4.5)
WBC # BLD AUTO: 5.2 THOUSAND/UL (ref 3.8–10.8)

## 2019-07-08 ENCOUNTER — OFFICE VISIT (OUTPATIENT)
Dept: FAMILY MEDICINE CLINIC | Facility: CLINIC | Age: 79
End: 2019-07-08
Payer: MEDICARE

## 2019-07-08 VITALS
BODY MASS INDEX: 31.54 KG/M2 | OXYGEN SATURATION: 98 % | RESPIRATION RATE: 18 BRPM | HEART RATE: 90 BPM | HEIGHT: 56 IN | DIASTOLIC BLOOD PRESSURE: 84 MMHG | SYSTOLIC BLOOD PRESSURE: 128 MMHG | WEIGHT: 140.2 LBS

## 2019-07-08 DIAGNOSIS — I10 BENIGN ESSENTIAL HYPERTENSION: ICD-10-CM

## 2019-07-08 DIAGNOSIS — E11.9 CONTROLLED TYPE 2 DIABETES MELLITUS WITHOUT COMPLICATION, WITHOUT LONG-TERM CURRENT USE OF INSULIN (HCC): ICD-10-CM

## 2019-07-08 DIAGNOSIS — F41.9 ANXIETY: Primary | ICD-10-CM

## 2019-07-08 DIAGNOSIS — F60.5 OBSESSIVE COMPULSIVE PERSONALITY DISORDER (HCC): ICD-10-CM

## 2019-07-08 DIAGNOSIS — E78.2 MIXED HYPERLIPIDEMIA: ICD-10-CM

## 2019-07-08 PROCEDURE — 99215 OFFICE O/P EST HI 40 MIN: CPT | Performed by: FAMILY MEDICINE

## 2019-07-08 RX ORDER — CLONAZEPAM 0.5 MG/1
TABLET ORAL
Qty: 60 TABLET | Refills: 5 | Status: SHIPPED | OUTPATIENT
Start: 2019-07-08 | End: 2020-02-03

## 2019-07-08 RX ORDER — ATORVASTATIN CALCIUM 10 MG/1
10 TABLET, FILM COATED ORAL DAILY
Qty: 90 TABLET | Refills: 1 | Status: SHIPPED | OUTPATIENT
Start: 2019-07-08 | End: 2020-01-13

## 2019-07-08 NOTE — PROGRESS NOTES
Subjective:      Patient ID: Celena Mancilla is a [de-identified] y o  female  47-year-old female presents for four-month follow-up of chronic conditions including diabetes mellitus type 2, hypertension, hyperlipidemia, generalized anxiety disorder, excessive compulsive personality disorder  Patient states that she is no longer seeing her therapist   She stopped going rather abruptly  She states that she really did not seem to notice a benefit from her counseling sessions and she seem to talk about the same things time after time  She feels that she is doing well on BuSpar and scheduled Klonopin  She states that things are less stressful as her grandchildren are not in school at this time so she is not having to fight with them about getting dressed in the morning or doing homework at night  She is frustrated that none of her grandchildren want to read in only want to play on their electronics (welcome to 2019 and children)  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  Labs reviewed which showed normal CBC, CMP with the exception of impaired fasting glucose of 136, hemoglobin A1c has increased from 6 9-7 2%  Cholesterol remains very well controlled  Patient does admit to dietary indiscretion and cheating  She has had cake recently and has been having more peanut butter than usual   She is compliant with taking all of her medications  She is scheduled for diabetic eye examination and diabetic foot examination    Overdue for subsequent annual wellness visit        Past Medical History:   Diagnosis Date    Anxiety     Colon polyps     Depression     Diabetes mellitus [facility]     Dizziness     Last assessed: 8/31/15    D[facility] venous thrombosis) ([facility])     Dysuria     Hyperlipidemia     Hypertension     Insomnia     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[facility] )        Family History   Problem Relation Age of Onset    Depression Mother         w/anxiety    Diabetes Mother         Mellitus    Breast cancer Mother     Hypertension Mother     No Known Problems Father     Depression Sister         w/anxiety    Heart disease Sister         Cardiac Disorder    Breast cancer Sister     Hypertension Sister     Diabetes Brother         Mellitus    Alcohol abuse Son        Past Surgical History:   Procedure Laterality Date     SECTION      1964 son, 26 daughter    COLONOSCOPY      ID COLONOSCOPY FLX DX W/COLLJ SPEC WHEN PFRMD N/A 3/27/2017    Procedure: COLONOSCOPY;  Surgeon: Hiwot Jefferson MD;  [facility]; Service: Gastroenterology    ROTATOR CUFF REPAIR Left     Last assessed: 3/29/15    TONSILLECTOMY          reports that she has never smoked  She has never used smokeless tobacco  She reports that she does not drink alcohol or use drugs        Current Outpatient Medications:     aspirin 81 MG tablet, Take 81 mg by mouth daily, Disp: , Rfl:     atorvastatin (LIPITOR) 10 mg tablet, Take 1 tablet (10 mg total) by mouth daily, Disp: 90 tablet, Rfl: 1    busPIRone (BUSPAR) 5 mg tablet, TAKE TWO TABLETS (10MG) BY MOUTH THREE TIMES A DAY, Disp: 180 tablet, Rfl: 1    Cholecalciferol (VITAMIN D3) 1000 units CAPS, Take 1 capsule by mouth daily, Disp: , Rfl:     clonazePAM (KlonoPIN) 0 5 mg tablet, TAKE ONE-HALF TABLET IN THE MORNING AND 2:00PM AND 1 FULL TABLET AT NIGHT, Disp: 60 tablet, Rfl: 5    docusate sodium (COLACE) 100 mg capsule, Take 100 mg by mouth 2 (two) times a day, Disp: , Rfl:     metFORMIN (GLUCOPHAGE) 500 mg tablet, Take 1 tablet (500 mg total) by mouth 2 (two) times a day with meals, Disp: 180 tablet, Rfl: 1    ramipril (ALTACE) 10 MG capsule, Take 1 capsule (10 mg total) by mouth daily, Disp: 90 capsule, Rfl: 1    venlafaxine (EFFEXOR-XR) 150 mg 24 hr capsule, Take 1 capsule (150 mg total) by mouth daily, Disp: 90 capsule, Rfl: 1    The following portions of the patient's history were reviewed and updated as appropriate: allergies, current medications, past family history, past medical history, past social history, past surgical history and problem list     Review of Systems   Constitutional: Negative  HENT: Negative  Eyes: Negative  Respiratory: Negative  Cardiovascular: Negative  Gastrointestinal: Negative  Endocrine: Negative  Genitourinary: Negative  Musculoskeletal: Negative  Skin: Negative  Allergic/Immunologic: Negative  Neurological: Negative  Hematological: Negative  Psychiatric/Behavioral: Negative  Negative for dysphoric mood  The patient is not nervous/anxious  Objective:    /84 (BP Location: Right arm, Patient Position: Sitting, Cuff Size: Large)   Pulse 90   Resp 18   Ht 4' 8" (1 422 m)   Wt 63 6 kg (140 lb 3 2 oz)   SpO2 98%   BMI 31 43 kg/m²      Physical Exam   Constitutional: She is oriented to person, place, and time  She appears well-developed and well-nourished  Overweight   HENT:   Head: Normocephalic and atraumatic  Eyes: Pupils are equal, round, and reactive to light  Conjunctivae are normal    Neck: Normal range of motion  Neck supple  Cardiovascular: Normal rate and regular rhythm  No murmur heard  Pulmonary/Chest: Effort normal and breath sounds normal    Abdominal: Soft  Bowel sounds are normal    Neurological: She is alert and oriented to person, place, and time  She has normal reflexes  Psychiatric: She has a normal mood and affect  Her behavior is normal  Judgment and thought content normal    Nursing note and vitals reviewed          Recent Results (from the past 1008 hour(s))   Lipid panel    Collection Time: 06/29/19  8:24 AM   Result Value Ref Range    Total Cholesterol 154 <200 mg/dL    HDL 49 (L) >50 mg/dL    Triglycerides 113 <150 mg/dL    LDL Direct 84 mg/dL (calc)    Chol HDLC Ratio 3 1 <5 0 (calc)    Non-HDL Cholesterol 105 <130 mg/dL (calc)   Microalbumin, Random Urine (W/Creatinine)    Collection Time: 06/29/19  8:24 AM   Result Value Ref Range Creatinine, Urine 55 20 - 275 mg/dL    Microalbum  ,U,Random 3 4 See Note: mg/dL    Microalb/Creat Ratio 62 (H) <30 mcg/mg creat   Comprehensive metabolic panel    Collection Time: 06/29/19  8:24 AM   Result Value Ref Range    Glucose, Random 136 (H) 65 - 99 mg/dL    BUN 20 7 - 25 mg/dL    Creatinine 0 66 0 60 - 0 88 mg/dL    eGFR Non  83 > OR = 60 mL/min/1 73m2    eGFR  97 > OR = 60 mL/min/1 73m2    SL AMB BUN/CREATININE RATIO NOT APPLICABLE 6 - 22 (calc)    Sodium 141 135 - 146 mmol/L    Potassium 4 3 3 5 - 5 3 mmol/L    Chloride 105 98 - 110 mmol/L    CO2 28 20 - 32 mmol/L    SL AMB CALCIUM 9 1 8 6 - 10 4 mg/dL    Protein, Total 6 4 6 1 - 8 1 g/dL    Albumin 4 1 3 6 - 5 1 g/dL    Globulin 2 3 1 9 - 3 7 g/dL (calc)    Albumin/Globulin Ratio 1 8 1 0 - 2 5 (calc)    TOTAL BILIRUBIN 0 4 0 2 - 1 2 mg/dL    Alkaline Phosphatase 68 33 - 130 U/L    AST 15 10 - 35 U/L    ALT 13 6 - 29 U/L   CBC and differential    Collection Time: 06/29/19  8:24 AM   Result Value Ref Range    White Blood Cell Count 5 2 3 8 - 10 8 Thousand/uL    Red Blood Cell Count 4 49 3 80 - 5 10 Million/uL    Hemoglobin 12 8 11 7 - 15 5 g/dL    HCT 38 8 35 0 - 45 0 %    MCV 86 4 80 0 - 100 0 fL    MCH 28 5 27 0 - 33 0 pg    MCHC 33 0 32 0 - 36 0 g/dL    RDW 12 7 11 0 - 15 0 %    Platelet Count 779 020 - 400 Thousand/uL    SL AMB MPV 11 5 7 5 - 12 5 fL    Neutrophils (Absolute) 3,047 1,500 - 7,800 cells/uL    Lymphocytes (Absolute) 1,576 850 - 3,900 cells/uL    Monocytes (Absolute) 307 200 - 950 cells/uL    Eosinophils (Absolute) 218 15 - 500 cells/uL    Basophils ABS 52 0 - 200 cells/uL    Neutrophils 58 6 %    Lymphocytes 30 3 %    Monocytes 5 9 %    Eosinophils 4 2 %    Basophils PCT 1 0 %   TSH, 3rd generation with Free T4 reflex    Collection Time: 06/29/19  8:24 AM   Result Value Ref Range    TSH W/RFX TO FREE T4 2 71 0 40 - 4 50 mIU/L   Hemoglobin A1c (w/out EAG)    Collection Time: 06/29/19  8:24 AM   Result Value Ref Range    Hemoglobin A1C 7 2 (H) <5 7 % of total Hgb       Assessment/Plan:    Controlled type 2 diabetes mellitus without complication, without long-term current use of insulin ([facility])  Lab Results   Component Value Date    HGBA1C 7 2 (H) 06/29/2019       No results for input(s): POCGLU in the last 72 hours  Blood Sugar Average: Last 72 hrs: Worsening glycemic control due to dietary indiscretions  I did offer to increase the dose of her metformin  Patient states that she will make dietary changes as opposed to going up in her dosage of medication  Recheck diabetic parameters in 4 months    Benign essential hypertension  Hypertension is very well controlled on ramipril 10 mg once daily  Continue same  Re-evaluate 4 months    Obsessive compulsive personality disorder  OCD seems to be slightly better  This has been something of a struggle for her but she seems to be doing well on Effexor and BuSpar  She has stopped going to see her therapist   In all likelihood this will change in she will need to go back to see her therapist once her grandchildren are back in school as they tend to be 1 of the biggest stressors for her    Hyperlipidemia  Well controlled on atorvastatin 10 mg once daily  Continue same  Repeat lipid profile in 4 months  Watch dietary intake of fat and cholesterol  Patient is aware of this  Anxiety  Anxiety seems to be well controlled on current regimen of Effexor  mg daily, BuSpar 10 mg t i d  And scheduled Klonopin  Refill provided of Klonopin  Prior to prescribing the controlled substance, a patient search was performed on the [place] prescription drug monitoring program web site  There was no evidence of diversion or misuse    Prescription provided            Problem List Items Addressed This Visit        Endocrine    Controlled type 2 diabetes mellitus without complication, without long-term current use of insulin [facility])     Lab Results   Component Value Date HGBA1C 7 2 (H) 06/29/2019       No results for input(s): POCGLU in the last 72 hours  Blood Sugar Average: Last 72 hrs: Worsening glycemic control due to dietary indiscretions  I did offer to increase the dose of her metformin  Patient states that she will make dietary changes as opposed to going up in her dosage of medication  Recheck diabetic parameters in 4 months         Relevant Orders    Comprehensive metabolic panel    Hemoglobin A1C    Lipid panel    Microalbumin / creatinine urine ratio       Cardiovascular and Mediastinum    Benign essential hypertension     Hypertension is very well controlled on ramipril 10 mg once daily  Continue same  Re-evaluate 4 months         Relevant Orders    CBC and differential    TSH, 3rd generation with Free T4 reflex       Other    Anxiety - Primary     Anxiety seems to be well controlled on current regimen of Effexor  mg daily, BuSpar 10 mg t i d  And scheduled Klonopin  Refill provided of Klonopin  Prior to prescribing the controlled substance, a patient search was performed on the [place] prescription drug monitoring program web site  There was no evidence of diversion or misuse  Prescription provided           Relevant Medications    clonazePAM (KlonoPIN) 0 5 mg tablet    Hyperlipidemia     Well controlled on atorvastatin 10 mg once daily  Continue same  Repeat lipid profile in 4 months  Watch dietary intake of fat and cholesterol  Patient is aware of this  Relevant Medications    atorvastatin (LIPITOR) 10 mg tablet    Other Relevant Orders    Lipid panel    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[address] )     OCD seems to be slightly better  This has been something of a struggle for her but she seems to be doing well on Effexor and BuSpar    She has stopped going to see her therapist   In all likelihood this will change in she will need to go back to see her therapist once her grandchildren are back in school as they tend to be 1 of the biggest stressors for her               BMI Counseling: Body mass index is 31 43 kg/m²  Discussed the patient's BMI with her  The BMI is above average  BMI counseling and education was provided to the patient  Nutrition recommendations include moderation in carbohydrate intake, increasing intake of lean protein, reducing intake of saturated fat and trans fat and reducing intake of cholesterol  I have spent 42 minutes with Patient  today in which greater than 50% of this time was spent in counseling/coordination of care regarding Diagnostic results, Prognosis, Risks and benefits of tx options, Intructions for management, Patient and family education, Importance of tx compliance, Risk factor reductions and Impressions

## 2019-07-09 NOTE — ASSESSMENT & PLAN NOTE
Well controlled on atorvastatin 10 mg once daily  Continue same  Repeat lipid profile in 4 months  Watch dietary intake of fat and cholesterol  Patient is aware of this

## 2019-07-09 NOTE — ASSESSMENT & PLAN NOTE
OCD seems to be slightly better  This has been something of a struggle for her but she seems to be doing well on Effexor and BuSpar    She has stopped going to see her therapist   In all likelihood this will change in she will need to go back to see her therapist once her grandchildren are back in school as they tend to be 1 of the biggest stressors for her

## 2019-07-09 NOTE — ASSESSMENT & PLAN NOTE
Anxiety seems to be well controlled on current regimen of Effexor  mg daily, BuSpar 10 mg t i d  And scheduled Klonopin  Refill provided of Klonopin  Prior to prescribing the controlled substance, a patient search was performed on the [place] prescription drug monitoring program web site  There was no evidence of diversion or misuse    Prescription provided

## 2019-07-09 NOTE — ASSESSMENT & PLAN NOTE
Hypertension is very well controlled on ramipril 10 mg once daily  Continue same    Re-evaluate 4 months

## 2019-07-09 NOTE — ASSESSMENT & PLAN NOTE
Lab Results   Component Value Date    HGBA1C 7 2 (H) 06/29/2019       No results for input(s): POCGLU in the last 72 hours  Blood Sugar Average: Last 72 hrs: Worsening glycemic control due to dietary indiscretions  I did offer to increase the dose of her metformin  Patient states that she will make dietary changes as opposed to going up in her dosage of medication    Recheck diabetic parameters in 4 months

## 2019-07-10 DIAGNOSIS — F41.8 DEPRESSION WITH ANXIETY: ICD-10-CM

## 2019-07-10 RX ORDER — BUSPIRONE HYDROCHLORIDE 10 MG/1
TABLET ORAL
Qty: 90 TABLET | Refills: 5 | Status: SHIPPED | OUTPATIENT
Start: 2019-07-10 | End: 2020-01-13

## 2019-08-21 DIAGNOSIS — F41.8 OTHER SPECIFIED ANXIETY DISORDERS: ICD-10-CM

## 2019-08-21 DIAGNOSIS — F60.5 OBSESSIVE-COMPULSIVE PERSONALITY DISORDER (HCC): ICD-10-CM

## 2019-08-21 RX ORDER — VENLAFAXINE HYDROCHLORIDE 150 MG/1
CAPSULE, EXTENDED RELEASE ORAL
Qty: 90 CAPSULE | Refills: 1 | Status: SHIPPED | OUTPATIENT
Start: 2019-08-21 | End: 2020-02-24

## 2019-09-17 DIAGNOSIS — I10 ESSENTIAL HYPERTENSION, BENIGN: ICD-10-CM

## 2019-09-17 DIAGNOSIS — E11.9 TYPE 2 DIABETES MELLITUS WITHOUT COMPLICATION, WITHOUT LONG-TERM CURRENT USE OF INSULIN (HCC): ICD-10-CM

## 2019-09-17 RX ORDER — RAMIPRIL 10 MG/1
10 CAPSULE ORAL DAILY
Qty: 90 CAPSULE | Refills: 1 | Status: SHIPPED | OUTPATIENT
Start: 2019-09-17 | End: 2020-03-09 | Stop reason: SDUPTHER

## 2019-10-31 LAB
ALBUMIN SERPL-MCNC: 4.2 G/DL (ref 3.5–4.7)
ALBUMIN/CREAT UR: 45.6 MG/G CREAT (ref 0–30)
ALBUMIN/GLOB SERPL: 2 {RATIO} (ref 1.2–2.2)
ALP SERPL-CCNC: 65 IU/L (ref 39–117)
ALT SERPL-CCNC: 14 IU/L (ref 0–32)
AST SERPL-CCNC: 15 IU/L (ref 0–40)
BASOPHILS # BLD AUTO: 0 X10E3/UL (ref 0–0.2)
BASOPHILS NFR BLD AUTO: 1 %
BILIRUB SERPL-MCNC: 0.3 MG/DL (ref 0–1.2)
BUN SERPL-MCNC: 19 MG/DL (ref 8–27)
BUN/CREAT SERPL: 26 (ref 12–28)
CALCIUM SERPL-MCNC: 9.1 MG/DL (ref 8.7–10.3)
CHLORIDE SERPL-SCNC: 101 MMOL/L (ref 96–106)
CHOLEST SERPL-MCNC: 151 MG/DL (ref 100–199)
CO2 SERPL-SCNC: 23 MMOL/L (ref 20–29)
CREAT SERPL-MCNC: 0.72 MG/DL (ref 0.57–1)
CREAT UR-MCNC: 73.9 MG/DL
EOSINOPHIL # BLD AUTO: 0.2 X10E3/UL (ref 0–0.4)
EOSINOPHIL NFR BLD AUTO: 4 %
ERYTHROCYTE [DISTWIDTH] IN BLOOD BY AUTOMATED COUNT: 14 % (ref 12.3–15.4)
GLOBULIN SER-MCNC: 2.1 G/DL (ref 1.5–4.5)
GLUCOSE SERPL-MCNC: 127 MG/DL (ref 65–99)
HBA1C MFR BLD: 7.1 % (ref 4.8–5.6)
HCT VFR BLD AUTO: 41 % (ref 34–46.6)
HDLC SERPL-MCNC: 50 MG/DL
HGB BLD-MCNC: 13.3 G/DL (ref 11.1–15.9)
IMM GRANULOCYTES # BLD: 0 X10E3/UL (ref 0–0.1)
IMM GRANULOCYTES NFR BLD: 0 %
LDLC SERPL CALC-MCNC: 78 MG/DL (ref 0–99)
LYMPHOCYTES # BLD AUTO: 1.5 X10E3/UL (ref 0.7–3.1)
LYMPHOCYTES NFR BLD AUTO: 29 %
MCH RBC QN AUTO: 28.8 PG (ref 26.6–33)
MCHC RBC AUTO-ENTMCNC: 32.4 G/DL (ref 31.5–35.7)
MCV RBC AUTO: 89 FL (ref 79–97)
MICROALBUMIN UR-MCNC: 33.7 UG/ML
MONOCYTES # BLD AUTO: 0.3 X10E3/UL (ref 0.1–0.9)
MONOCYTES NFR BLD AUTO: 6 %
NEUTROPHILS # BLD AUTO: 3.1 X10E3/UL (ref 1.4–7)
NEUTROPHILS NFR BLD AUTO: 60 %
PLATELET # BLD AUTO: 196 X10E3/UL (ref 150–450)
POTASSIUM SERPL-SCNC: 4.3 MMOL/L (ref 3.5–5.2)
PROT SERPL-MCNC: 6.3 G/DL (ref 6–8.5)
RBC # BLD AUTO: 4.62 X10E6/UL (ref 3.77–5.28)
SL AMB EGFR AFRICAN AMERICAN: 91 ML/MIN/1.73
SL AMB EGFR NON AFRICAN AMERICAN: 79 ML/MIN/1.73
SL AMB VLDL CHOLESTEROL CALC: 23 MG/DL (ref 5–40)
SODIUM SERPL-SCNC: 140 MMOL/L (ref 134–144)
TRIGL SERPL-MCNC: 116 MG/DL (ref 0–149)
TSH SERPL DL<=0.005 MIU/L-ACNC: 2.41 UIU/ML (ref 0.45–4.5)
WBC # BLD AUTO: 5.1 X10E3/UL (ref 3.4–10.8)

## 2019-11-01 ENCOUNTER — OFFICE VISIT (OUTPATIENT)
Dept: FAMILY MEDICINE CLINIC | Facility: CLINIC | Age: 79
End: 2019-11-01
Payer: MEDICARE

## 2019-11-01 VITALS
BODY MASS INDEX: 31.05 KG/M2 | HEART RATE: 90 BPM | OXYGEN SATURATION: 98 % | RESPIRATION RATE: 16 BRPM | DIASTOLIC BLOOD PRESSURE: 82 MMHG | SYSTOLIC BLOOD PRESSURE: 126 MMHG | HEIGHT: 56 IN | WEIGHT: 138 LBS

## 2019-11-01 DIAGNOSIS — E11.9 CONTROLLED TYPE 2 DIABETES MELLITUS WITHOUT COMPLICATION, WITHOUT LONG-TERM CURRENT USE OF INSULIN (HCC): ICD-10-CM

## 2019-11-01 DIAGNOSIS — G47.00 INSOMNIA, UNSPECIFIED TYPE: ICD-10-CM

## 2019-11-01 DIAGNOSIS — F60.5 OBSESSIVE COMPULSIVE PERSONALITY DISORDER (HCC): ICD-10-CM

## 2019-11-01 DIAGNOSIS — Z00.00 MEDICARE ANNUAL WELLNESS VISIT, SUBSEQUENT: Primary | ICD-10-CM

## 2019-11-01 DIAGNOSIS — F41.9 ANXIETY: ICD-10-CM

## 2019-11-01 DIAGNOSIS — I10 BENIGN ESSENTIAL HYPERTENSION: ICD-10-CM

## 2019-11-01 DIAGNOSIS — E78.2 MIXED HYPERLIPIDEMIA: ICD-10-CM

## 2019-11-01 DIAGNOSIS — Z23 FLU VACCINE NEED: ICD-10-CM

## 2019-11-01 PROCEDURE — 99215 OFFICE O/P EST HI 40 MIN: CPT | Performed by: FAMILY MEDICINE

## 2019-11-01 PROCEDURE — 90662 IIV NO PRSV INCREASED AG IM: CPT | Performed by: FAMILY MEDICINE

## 2019-11-01 PROCEDURE — G0439 PPPS, SUBSEQ VISIT: HCPCS | Performed by: FAMILY MEDICINE

## 2019-11-01 PROCEDURE — G0008 ADMIN INFLUENZA VIRUS VAC: HCPCS | Performed by: FAMILY MEDICINE

## 2019-11-01 NOTE — ASSESSMENT & PLAN NOTE
Lab Results   Component Value Date    HGBA1C 7 1 (H) 10/29/2019     Hemoglobin A1c of 7 1% in in [de-identified]year-old is essentially very well controlled  Patient remains on metformin 500 mg twice daily  No need to adjust medication or increase medications  She does not do well with change  I am satisfied with an A1c of 7 1%    Repeat diabetic parameters in 4 months

## 2019-11-01 NOTE — ASSESSMENT & PLAN NOTE
Hyperlipidemia remains very well controlled on atorvastatin 10 mg once daily  Continue same  Refill not necessary at this time    Total cholesterol 151, HDL 50, LDL 78

## 2019-11-01 NOTE — ASSESSMENT & PLAN NOTE
Grandchildren a back in school  This is a large stressor for the patient  She should return to seeing her therapist   The I did reach out to Dr Burton Osborn [phone] to make certain that she is on the look out the patient back in for counseling session    I also advised this to her son-in-law

## 2019-11-01 NOTE — PROGRESS NOTES
Assessment and Plan:     Problem List Items Addressed This Visit     None           Preventive health issues were discussed with patient, and age appropriate screening tests were ordered as noted in patient's After Visit Summary  Personalized health advice and appropriate referrals for health education or preventive services given if needed, as noted in patient's After Visit Summary  History of Present Illness:     Patient presents for Medicare Annual Wellness visit    Patient Care Team:  Yolis Bass DO as PCP - DO Yolis Coley DO Carolyne Mayans, MD Carletha Gavel, MD Mindy Ace, DO (Geriatric Medicine)  Brad Mccabe DPM (Podiatry)  Laura Cross MD as Endoscopist  Harley Bustillos (Optometry)     Problem List:     Patient Active Problem List   Diagnosis    Adenomatous polyp of colon    Anxiety    Benign essential hypertension    Controlled type 2 diabetes mellitus without complication, without long-term current use of insulin [facility]    Hyperlipidemia    Insomnia    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[facility]    Breast cancer screening    Change in bowel habits      Past Medical and Surgical History:     Past Medical History:   Diagnosis Date    Anxiety     Colon polyps     Depression     Diabetes mellitus ([facility] )     Dizziness     Last assessed: 8/31/15    DVT (deep venous thrombosis) ([facility])     Dysuria     Hyperlipidemia     Hypertension     Insomnia     Obsessive compulsive personality disorder [facility])      Past Surgical History:   Procedure Laterality Date     SECTION      1964 son, 26 daughter    COLONOSCOPY      [place] COLONOSCOPY FLX DX W/COLLJ SPEC WHEN PFRMD N/A 3/27/2017    Procedure: COLONOSCOPY;  Surgeon: Laura Cross MD;  [facility];   Service: Gastroenterology    ROTATOR CUFF REPAIR Left     Last assessed: 3/29/15    TONSILLECTOMY        Family History:     Family History   Problem Relation Age of Onset    Depression Mother w/anxiety    Diabetes Mother         Mellitus    Breast cancer Mother     Hypertension Mother     No Known Problems Father     Depression Sister         w/anxiety    Heart disease Sister         Cardiac Disorder    Breast cancer Sister     Hypertension Sister     Diabetes Brother         Mellitus    Alcohol abuse Son       Social History:     Social History     Socioeconomic History    Marital status:       Spouse name: None    Number of children: None    Years of education: None    Highest education level: None   Occupational History    None   Social Needs    Financial resource strain: None    Food insecurity:     Worry: None     Inability: None    Transportation needs:     Medical: None     Non-medical: None   Tobacco Use    Smoking status: Never Smoker    Smokeless tobacco: Never Used   Substance and Sexual Activity    Alcohol use: No    Drug use: No    Sexual activity: None   Lifestyle    Physical activity:     Days per week: None     Minutes per session: None    Stress: None   Relationships    Social connections:     Talks on phone: None     Gets together: None     Attends [employer] service: None     Active member of club or organization: None     Attends meetings of clubs or organizations: None     Relationship status: None    Intimate partner violence:     Fear of current or ex partner: None     Emotionally abused: None     Physically abused: None     Forced sexual activity: None   Other Topics Concern    None   Social History Narrative    Lack of exercise       Medications and Allergies:     Current Outpatient Medications   Medication Sig Dispense Refill    aspirin 81 MG tablet Take 81 mg by mouth daily      atorvastatin (LIPITOR) 10 mg tablet Take 1 tablet (10 mg total) by mouth daily 90 tablet 1    busPIRone (BUSPAR) 10 mg tablet TAKE ONE TABLET BY MOUTH THREE TIMES A DAY 90 tablet 5    busPIRone (BUSPAR) 5 mg tablet TAKE TWO TABLETS (10MG) BY MOUTH THREE TIMES A DAY 180 tablet 1    clonazePAM (KlonoPIN) 0 5 mg tablet TAKE ONE-HALF TABLET IN THE MORNING AND 2:00PM AND 1 FULL TABLET AT NIGHT 60 tablet 5    docusate sodium (COLACE) 100 mg capsule Take 100 mg by mouth 2 (two) times a day      metFORMIN (GLUCOPHAGE) 500 mg tablet Take 1 tablet (500 mg total) by mouth 2 (two) times a day with meals 180 tablet 1    ramipril (ALTACE) 10 MG capsule Take 1 capsule (10 mg total) by mouth daily 90 capsule 1    venlafaxine (EFFEXOR-XR) 150 mg 24 hr capsule TAKE ONE CAPSULE BY MOUTH EVERY DAY 90 capsule 1     No current facility-administered medications for this visit  No Known Allergies   Immunizations:     Immunization History   Administered Date(s) Administered    Influenza Split High Dose Preservative Free IM 10/02/2013, 12/18/2014, 09/30/2015, 10/11/2016, 11/20/2017    Influenza, high dose seasonal 0 5 mL 10/15/2018    Pneumococcal Conjugate 13-Valent 10/11/2016    Pneumococcal Polysaccharide PPV23 10/02/2013      Health Maintenance:         Topic Date Due    CRC Screening: Colonoscopy  03/27/2020         Topic Date Due    INFLUENZA VACCINE  07/01/2019      Medicare Health Risk Assessment:     /82   Pulse 90   Resp 16   Ht 4' 8" (1 422 m)   Wt 62 6 kg (138 lb)   SpO2 98%   BMI 30 94 kg/m²      Meri Warner is here for her Subsequent Wellness visit  Last Medicare Wellness visit information reviewed, patient interviewed, no change since last AWV  Health Risk Assessment:   Patient rates overall health as good  Patient feels that their physical health rating is same  Eyesight was rated as same  Hearing was rated as same  Patient feels that their emotional and mental health rating is same  Pain experienced in the last 7 days has been none  Patient states that she has experienced no weight loss or gain in last 6 months  Depression Screening:   PHQ-2 Score: 0  PHQ-9 Score: 0      Fall Risk Screening:    In the past year, patient has experienced: no history of falling in past year      Urinary Incontinence Screening:   Patient has not leaked urine accidently in the last six months  Home Safety:  Patient does not have trouble with stairs inside or outside of their home  Patient has working smoke alarms and has working carbon monoxide detector  Home safety hazards include: none  Nutrition:   Current diet is Regular  Medications:   Patient is currently taking over-the-counter supplements  OTC medications include: see medication list  Patient is able to manage medications  Activities of Daily Living (ADLs)/Instrumental Activities of Daily Living (IADLs):   Walk and transfer into and out of bed and chair?: Yes  Dress and groom yourself?: Yes    Bathe or shower yourself?: Yes    Feed yourself? Yes  Do your laundry/housekeeping?: Yes  Manage your money, pay your bills and track your expenses?: Yes  Make your own meals?: Yes    Do your own shopping?: Yes    Previous Hospitalizations:   Any hospitalizations or ED visits within the last 12 months?: No      Advance Care Planning:   Living will: Yes    Durable POA for healthcare:  Yes    Advanced directive: Yes    Advanced directive counseling given: No    Five wishes given: No    End of Life Decisions reviewed with patient: Yes    Provider agrees with end of life decisions: Yes      PREVENTIVE SCREENINGS      Cardiovascular Screening:    General: Screening Not Indicated and History Lipid Disorder      Diabetes Screening:     General: Screening Not Indicated and History Diabetes      Colorectal Cancer Screening:     General: Screening Current      Breast Cancer Screening:     General: Screening Current      Cervical Cancer Screening:    General: Screening Not Indicated      Osteoporosis Screening:    General: Screening Current      Abdominal Aortic Aneurysm (AAA) Screening:        General: Screening Not Indicated      Lung Cancer Screening:     General: Screening Not Indicated      Hepatitis C Screening:    General: Screening Not Indicated      Ryan Sheth, DO

## 2019-11-01 NOTE — ASSESSMENT & PLAN NOTE
Hypertension remains very well controlled on ramipril 10 mg once daily  Continue same    Re-evaluate in 4 months

## 2019-11-01 NOTE — PATIENT INSTRUCTIONS
Medicare Preventive Visit Patient Instructions  Thank you for completing your Welcome to Medicare Visit or Medicare Annual Wellness Visit today  Your next wellness visit will be due in one year (11/1/2020)  The screening/preventive services that you may require over the next 5-10 years are detailed below  Some tests may not apply to you based off risk factors and/or age  Screening tests ordered at today's visit but not completed yet may show as past due  Also, please note that scanned in results may not display below  Preventive Screenings:  Service Recommendations Previous Testing/Comments   Colorectal Cancer Screening  * Colonoscopy    * Fecal Occult Blood Test (FOBT)/Fecal Immunochemical Test (FIT)  * Fecal DNA/Cologuard Test  * Flexible Sigmoidoscopy Age: 54-65 years old   Colonoscopy: every 10 years (may be performed more frequently if at higher risk)  OR  FOBT/FIT: every 1 year  OR  Cologuard: every 3 years  OR  Sigmoidoscopy: every 5 years  Screening may be recommended earlier than age 48 if at higher risk for colorectal cancer  Also, an individualized decision between you and your healthcare provider will decide whether screening between the ages of 74-80 would be appropriate  Colonoscopy: 03/27/2017  FOBT/FIT: Not on file  Cologuard: Not on file  Sigmoidoscopy: Not on file    Screening Current     Breast Cancer Screening Age: 36 years old  Frequency: every 1-2 years  Not required if history of left and right mastectomy Mammogram: 04/06/2018    Screening Current   Cervical Cancer Screening Between the ages of 21-29, pap smear recommended once every 3 years  Between the ages of 33-67, can perform pap smear with HPV co-testing every 5 years     Recommendations may differ for women with a history of total hysterectomy, cervical cancer, or abnormal pap smears in past  Pap Smear: Not on file    Screening Not Indicated   Hepatitis C Screening Once for adults born between 1945 and 1965  More frequently in patients at high risk for Hepatitis C Hep C Antibody: Not on file       Diabetes Screening 1-2 times per year if you're at risk for diabetes or have pre-diabetes Fasting glucose: No results in last 5 years   A1C: 7 1 %    Screening Not Indicated  History Diabetes   Cholesterol Screening Once every 5 years if you don't have a lipid disorder  May order more often based on risk factors  Lipid panel: 10/29/2019    Screening Not Indicated  History Lipid Disorder     Other Preventive Screenings Covered by Medicare:  1  Abdominal Aortic Aneurysm (AAA) Screening: covered once if your at risk  You're considered to be at risk if you have a family history of AAA  2  Lung Cancer Screening: covers low dose CT scan once per year if you meet all of the following conditions: (1) Age 50-69; (2) No signs or symptoms of lung cancer; (3) Current smoker or have quit smoking within the last 15 years; (4) You have a tobacco smoking history of at least 30 pack years (packs per day multiplied by number of years you smoked); (5) You get a written order from a healthcare provider  3  Glaucoma Screening: covered annually if you're considered high risk: (1) You have diabetes OR (2) Family history of glaucoma OR (3)  aged 48 and older OR (3)  American aged 72 and older  3  Osteoporosis Screening: covered every 2 years if you meet one of the following conditions: (1) You're estrogen deficient and at risk for osteoporosis based off medical history and other findings; (2) Have a vertebral abnormality; (3) On glucocorticoid therapy for more than 3 months; (4) Have primary hyperparathyroidism; (5) On osteoporosis medications and need to assess response to drug therapy  · Last bone density test (DXA Scan): Not on file  5  HIV Screening: covered annually if you're between the age of 12-76  Also covered annually if you are younger than 13 and older than 72 with risk factors for HIV infection   For pregnant patients, it is covered up to 3 times per pregnancy  Immunizations:  Immunization Recommendations   Influenza Vaccine Annual influenza vaccination during flu season is recommended for all persons aged >= 6 months who do not have contraindications   Pneumococcal Vaccine (Prevnar and Pneumovax)  * Prevnar = PCV13  * Pneumovax = PPSV23   Adults 25-60 years old: 1-3 doses may be recommended based on certain risk factors  Adults 72 years old: Prevnar (PCV13) vaccine recommended followed by Pneumovax (PPSV23) vaccine  If already received PPSV23 since turning 65, then PCV13 recommended at least one year after PPSV23 dose  Hepatitis B Vaccine 3 dose series if at intermediate or high risk (ex: diabetes, end stage renal disease, liver disease)   Tetanus (Td) Vaccine - COST NOT COVERED BY MEDICARE PART B Following completion of primary series, a booster dose should be given every 10 years to maintain immunity against tetanus  Td may also be given as tetanus wound prophylaxis  Tdap Vaccine - COST NOT COVERED BY MEDICARE PART B Recommended at least once for all adults  For pregnant patients, recommended with each pregnancy  Shingles Vaccine (Shingrix) - COST NOT COVERED BY MEDICARE PART B  2 shot series recommended in those aged 48 and above     Health Maintenance Due:      Topic Date Due    CRC Screening: Colonoscopy  03/27/2020     Immunizations Due:      Topic Date Due    INFLUENZA VACCINE  07/01/2019     Advance Directives   What are advance directives? Advance directives are legal documents that state your wishes and plans for medical care  These plans are made ahead of time in case you lose your ability to make decisions for yourself  Advance directives can apply to any medical decision, such as the treatments you want, and if you want to donate organs  What are the types of advance directives? There are many types of advance directives, and each state has rules about how to use them   You may choose a combination of any of the following:  · Living will: This is a written record of the treatment you want  You can also choose which treatments you do not want, which to limit, and which to stop at a certain time  This includes surgery, medicine, IV fluid, and tube feedings  · Durable power of  for healthcare [facility]): This is a written record that states who you want to make healthcare choices for you when you are unable to make them for yourself  This person, called a proxy, is usually a family member or a friend  You may choose more than 1 proxy  · Do not resuscitate (DNR) order:  A DNR order is used in case your heart stops beating or you stop breathing  It is a request not to have certain forms of treatment, such as CPR  A DNR order may be included in other types of advance directives  · Medical directive: This covers the care that you want if you are in a coma, near death, or unable to make decisions for yourself  You can list the treatments you want for each condition  Treatment may include pain medicine, surgery, blood transfusions, dialysis, IV or tube feedings, and a ventilator (breathing machine)  · Values history: This document has questions about your views, beliefs, and how you feel and think about life  This information can help others choose the care that you would choose  Why are advance directives important? An advance directive helps you control your care  Although spoken wishes may be used, it is better to have your wishes written down  Spoken wishes can be misunderstood, or not followed  Treatments may be given even if you do not want them  An advance directive may make it easier for your family to make difficult choices about your care  Weight Management   Why it is important to manage your weight:  Being overweight increases your risk of health conditions such as heart disease, high blood pressure, type 2 diabetes, and certain types of cancer   It can also increase your risk for osteoarthritis, sleep apnea, and other respiratory problems  Aim for a slow, steady weight loss  Even a small amount of weight loss can lower your risk of health problems  How to lose weight safely:  A safe and healthy way to lose weight is to eat fewer calories and get regular exercise  You can lose up about 1 pound a week by decreasing the number of calories you eat by 500 calories each day  Healthy meal plan for weight management:  A healthy meal plan includes a variety of foods, contains fewer calories, and helps you stay healthy  A healthy meal plan includes the following:  · Eat whole-grain foods more often  A healthy meal plan should contain fiber  Fiber is the part of grains, fruits, and vegetables that is not broken down by your body  Whole-grain foods are healthy and provide extra fiber in your diet  Some examples of whole-grain foods are whole-wheat breads and pastas, oatmeal, brown rice, and bulgur  · Eat a variety of vegetables every day  Include dark, leafy greens such as spinach, kale, patrick greens, and mustard greens  Eat yellow and orange vegetables such as carrots, sweet potatoes, and winter squash  · Eat a variety of fruits every day  Choose fresh or canned fruit (canned in its own juice or light syrup) instead of juice  Fruit juice has very little or no fiber  · Eat low-fat dairy foods  Drink fat-free (skim) milk or 1% milk  Eat fat-free yogurt and low-fat cottage cheese  Try low-fat cheeses such as mozzarella and other reduced-fat cheeses  · Choose meat and other protein foods that are low in fat  Choose beans or other legumes such as split peas or lentils  Choose fish, skinless poultry (chicken or turkey), or lean cuts of red meat (beef or pork)  Before you cook meat or poultry, cut off any visible fat  · Use less fat and oil  Try baking foods instead of frying them  Add less fat, such as margarine, sour cream, regular salad dressing and mayonnaise to foods  Eat fewer high-fat foods   Some examples of high-fat foods include french fries, doughnuts, ice cream, and cakes  · Eat fewer sweets  Limit foods and drinks that are high in sugar  This includes candy, cookies, regular soda, and sweetened drinks  Exercise:  Exercise at least 30 minutes per day on most days of the week  Some examples of exercise include walking, biking, dancing, and swimming  You can also fit in more physical activity by taking the stairs instead of the elevator or parking farther away from stores  Ask your healthcare provider about the best exercise plan for you  © Copyright [employer]. 2018 Information is for End User's use only and may not be sold, redistributed or otherwise used for commercial purposes   All illustrations and images included in CareNotes® are the copyrighted property of [employer]  or [address] [place], [place] [address]justice

## 2019-11-01 NOTE — ASSESSMENT & PLAN NOTE
Patient remains on Effexor  mg once daily, BuSpar 10 mg t i d  And scheduled Klonopin  Does not presently need refills at this time  She has not had psychiatric follow-up    -patient always becomes very frustrated with her living situation but is unwilling to change her living situation  She does live with her daughter and son-in-law all and provides essentially the  within the home  Most of the time she feels very under appreciated  I have made recommendations about patient going to senior [facility], senior living situation so she can be around appears of her age the patient is unwilling to do this once again she does not like change

## 2019-11-01 NOTE — PROGRESS NOTES
Subjective:      Patient ID: Joseph Morales is a [address] y o  female  Patient presents for subsequent annual wellness visit and four-month follow-up of chronic conditions including diabetes mellitus type 2, hyperlipidemia, obsessive some compulsive disorder, depression with anxiety  Patient has not been going to see psychologist because she feels it is the same repetitive story  Patient feels that she is on 1 in daughter's home but that they also cannot function without  She has 1 who gets children on the bus, tries to supervised them getting there school work done  When she tries to be critical other school work, 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, her daughter becomes very defensive about it  Once again grandchildren are not always necessarily listening to instructions  It frustrates her that her grandchildren are not more interested in their education and attending school because she was a former educator and loves to read  Granddaughter who was in middle school ends up sleeping in her room in the same bed with her because she does not want asleep with her twin brother in the same room  She has made the suggestion that her daughter converts room on the 1st floor into her bedroom so that her granddaughter can have her room and her own separate room  Patient has been compliant with medications  Labs reviewed which showed normal CBC, CMP with the exception of impaired fasting glucose of 127, hemoglobin A1c of 7 1%  Patient is current with diabetic foot examinations and diabetic eye examinations  She would like to receive flu vaccination        Past Medical History:   Diagnosis Date    Anxiety     Colon polyps     Depression     [facility]     Dizziness     Last assessed: 8/31/15    [facility])     Dysuria     Hyperlipidemia     Hypertension     Insomnia     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[facility]        Family History   Problem Relation Age of Onset    Depression Mother         w/anxiety    Diabetes Mother         Mellitus    Breast cancer Mother     Hypertension Mother     No Known Problems Father     Depression Sister         w/anxiety    Heart disease Sister         Cardiac Disorder    Breast cancer Sister     Hypertension Sister     Diabetes Brother         Mellitus    Alcohol abuse Son        Past Surgical History:   Procedure Laterality Date     SECTION      1964 son, 26 daughter    COLONOSCOPY      [place] COLONOSCOPY FLX DX W/COLLJ SPEC WHEN PFRMD N/A 3/27/2017    Procedure: COLONOSCOPY;  Surgeon: Haider Wylie MD;  [facility]; Service: Gastroenterology    ROTATOR CUFF REPAIR Left     Last assessed: 3/29/15    TONSILLECTOMY          reports that she has never smoked  She has never used smokeless tobacco  She reports that she does not drink alcohol or use drugs        Current Outpatient Medications:     aspirin 81 MG tablet, Take 81 mg by mouth daily, Disp: , Rfl:     atorvastatin (LIPITOR) 10 mg tablet, Take 1 tablet (10 mg total) by mouth daily, Disp: 90 tablet, Rfl: 1    busPIRone (BUSPAR) 10 mg tablet, TAKE ONE TABLET BY MOUTH THREE TIMES A DAY, Disp: 90 tablet, Rfl: 5    busPIRone (BUSPAR) 5 mg tablet, TAKE TWO TABLETS (10MG) BY MOUTH THREE TIMES A DAY, Disp: 180 tablet, Rfl: 1    clonazePAM (KlonoPIN) 0 5 mg tablet, TAKE ONE-HALF TABLET IN THE MORNING AND 2:00PM AND 1 FULL TABLET AT NIGHT, Disp: 60 tablet, Rfl: 5    docusate sodium (COLACE) 100 mg capsule, Take 100 mg by mouth 2 (two) times a day, Disp: , Rfl:     metFORMIN (GLUCOPHAGE) 500 mg tablet, Take 1 tablet (500 mg total) by mouth 2 (two) times a day with meals, Disp: 180 tablet, Rfl: 1    ramipril (ALTACE) 10 MG capsule, Take 1 capsule (10 mg total) by mouth daily, Disp: 90 capsule, Rfl: 1    venlafaxine (EFFEXOR-XR) 150 mg 24 hr capsule, TAKE ONE CAPSULE BY MOUTH EVERY DAY, Disp: 90 capsule, Rfl: 1    The following portions of the patient's history were reviewed and updated as appropriate: allergies, current medications, past family history, past medical history, past social history, past surgical history and problem list     Review of Systems   Constitutional: Negative  HENT: Negative  Eyes: Negative  Respiratory: Negative  Cardiovascular: Negative  Gastrointestinal: Negative  Endocrine: Negative  Genitourinary: Negative  Musculoskeletal: Negative  Skin: Negative  Allergic/Immunologic: Negative  Neurological: Negative  Hematological: Negative  Psychiatric/Behavioral: Positive for dysphoric mood  Negative for behavioral problems, self-injury and suicidal ideas  The patient is nervous/anxious  Objective:    /82   Pulse 90   Resp 16   Ht 4' 8" (1 422 m)   Wt 62 6 kg (138 lb)   SpO2 98%   BMI 30 94 kg/m²      Physical Exam   Constitutional: She is oriented to person, place, and time  She appears well-developed and well-nourished  overweight   HENT:   Head: Normocephalic and atraumatic  Right Ear: External ear normal    Left Ear: External ear normal    Nose: Nose normal    Mouth/Throat: Oropharynx is clear and moist    Eyes: Pupils are equal, round, and reactive to light  Conjunctivae are normal    Neck: Normal range of motion  Neck supple  Cardiovascular: Normal rate and regular rhythm  No murmur heard  Pulmonary/Chest: Effort normal and breath sounds normal    Abdominal: Soft  Bowel sounds are normal    Musculoskeletal: Normal range of motion  Neurological: She is alert and oriented to person, place, and time  She has normal reflexes  She displays normal reflexes  No cranial nerve deficit or sensory deficit  She exhibits normal muscle tone  Coordination normal    15/15 on mini-mental status examination  Does exceptionally well with short-term and long-term recall   Skin: No erythema     Psychiatric: Her speech is normal and behavior is normal  Judgment and thought content normal  Her mood appears anxious  Cognition and memory are not impaired  She exhibits a depressed mood  She exhibits normal recent memory and normal remote memory  Readily tearful   Nursing note and vitals reviewed               Recent Results (from the past 1008 hour(s))   CBC and differential    Collection Time: 10/29/19  9:16 AM   Result Value Ref Range    White Blood Cell Count 5 1 3 4 - 10 8 x10E3/uL    Red Blood Cell Count 4 62 3 77 - 5 28 x10E6/uL    Hemoglobin 13 3 11 1 - 15 9 g/dL    HCT 41 0 34 0 - 46 6 %    MCV 89 79 - 97 fL    MCH 28 8 26 6 - 33 0 pg    MCHC 32 4 31 5 - 35 7 g/dL    RDW 14 0 12 3 - 15 4 %    Platelet Count 916 987 - 450 x10E3/uL    Neutrophils 60 Not Estab  %    Lymphocytes 29 Not Estab  %    Monocytes 6 Not Estab  %    Eosinophils 4 Not Estab  %    Basophils PCT 1 Not Estab  %    Neutrophils (Absolute) 3 1 1 4 - 7 0 x10E3/uL    Lymphocytes (Absolute) 1 5 0 7 - 3 1 x10E3/uL    Monocytes (Absolute) 0 3 0 1 - 0 9 x10E3/uL    Eosinophils (Absolute) 0 2 0 0 - 0 4 x10E3/uL    Basophils ABS 0 0 0 0 - 0 2 x10E3/uL    Immature Granulocytes 0 Not Estab  %    Immature Granulocytes (Absolute) 0 0 0 0 - 0 1 x10E3/uL   Comprehensive metabolic panel    Collection Time: 10/29/19  9:16 AM   Result Value Ref Range    Glucose, Random 127 (H) 65 - 99 mg/dL    BUN 19 8 - 27 mg/dL    Creatinine 0 72 0 57 - 1 00 mg/dL    eGFR Non  79 >59 mL/min/1 73    eGFR  91 >59 mL/min/1 73    SL AMB BUN/CREATININE RATIO 26 12 - 28    Sodium 140 134 - 144 mmol/L    Potassium 4 3 3 5 - 5 2 mmol/L    Chloride 101 96 - 106 mmol/L    CO2 23 20 - 29 mmol/L    CALCIUM 9 1 8 7 - 10 3 mg/dL    Protein, Total 6 3 6 0 - 8 5 g/dL    Albumin 4 2 3 5 - 4 7 g/dL    Globulin, Total 2 1 1 5 - 4 5 g/dL    Albumin/Globulin Ratio 2 0 1 2 - 2 2    TOTAL BILIRUBIN 0 3 0 0 - 1 2 mg/dL    Alk Phos Isoenzymes 65 39 - 117 IU/L    AST 15 0 - 40 IU/L    ALT 14 0 - 32 IU/L   Lipid panel    Collection Time: 10/29/19  9:16 AM Result Value Ref Range    Cholesterol, Total 151 100 - 199 mg/dL    Triglycerides 116 0 - 149 mg/dL    HDL 50 >39 mg/dL    VLDL Cholesterol Calculated 23 5 - 40 mg/dL    LDL Direct 78 0 - 99 mg/dL   Microalbumin / creatinine urine ratio    Collection Time: 10/29/19  9:16 AM   Result Value Ref Range    Creatinine, Urine 73 9 Not Estab  mg/dL    Microalbum  ,U,Random 33 7 Not Estab  ug/mL    Microalb/Creat Ratio 45 6 (H) 0 0 - 30 0 mg/g creat   Hemoglobin A1c (w/out EAG)    Collection Time: 10/29/19  9:16 AM   Result Value Ref Range    Hemoglobin A1C 7 1 (H) 4 8 - 5 6 %   TSH WITH REFLEX TO FREE T4    Collection Time: 10/29/19  9:16 AM   Result Value Ref Range    TSH 2 410 0 450 - 4 500 uIU/mL       Assessment/Plan:    Controlled type 2 diabetes mellitus without complication, without long-term current use of insulin ([facility])    Lab Results   Component Value Date    HGBA1C 7 1 (H) 10/29/2019     Hemoglobin A1c of 7 1% in in [de-identified]year-old is essentially very well controlled  Patient remains on metformin 500 mg twice daily  No need to adjust medication or increase medications  She does not do well with change  I am satisfied with an A1c of 7 1%  Repeat diabetic parameters in 4 months    Benign essential hypertension  Hypertension remains very well controlled on ramipril 10 mg once daily  Continue same  Re-evaluate in 4 months    Anxiety  Patient remains on Effexor  mg once daily, BuSpar 10 mg t i d  And scheduled Klonopin  Does not presently need refills at this time  She has not had psychiatric follow-up    -patient always becomes very frustrated with her living situation but is unwilling to change her living situation  She does live with her daughter and son-in-law all and provides essentially the  within the home  Most of the time she feels very under appreciated    I have made recommendations about patient going to senior center, senior living situation so she can be around appears of her age the patient is unwilling to do this once again she does not like change  Hyperlipidemia  Hyperlipidemia remains very well controlled on atorvastatin 10 mg once daily  Continue same  Refill not necessary at this time  Total cholesterol 151, HDL 50, LDL 78    Obsessive compulsive personality disorder  Grandchildren a back in school  This is a large stressor for the patient  She should return to seeing her therapist   The I did reach out to Dr Marifer Iverson [phone] to make certain that she is on the look out the patient back in for counseling session  I also advised this to her son-in-law    Medicare annual wellness visit, subsequent  Subsequent annual wellness visit completed  High-dose flu vaccine provided in the office  Patient is otherwise current on screening examinations          Problem List Items Addressed This Visit        Endocrine    Controlled type 2 diabetes mellitus without complication, without long-term current use of insulin ([facility] )       Lab Results   Component Value Date    HGBA1C 7 1 (H) 10/29/2019     Hemoglobin A1c of 7 1% in in [de-identified]year-old is essentially very well controlled  Patient remains on metformin 500 mg twice daily  No need to adjust medication or increase medications  She does not do well with change  I am satisfied with an A1c of 7 1%  Repeat diabetic parameters in 4 months         Relevant Orders    Comprehensive metabolic panel    Hemoglobin A1C    Microalbumin / creatinine urine ratio       Cardiovascular and Mediastinum    Benign essential hypertension     Hypertension remains very well controlled on ramipril 10 mg once daily  Continue same  Re-evaluate in 4 months         Relevant Orders    CBC and differential    TSH, 3rd generation with Free T4 reflex       Other    Anxiety     Patient remains on Effexor  mg once daily, BuSpar 10 mg t i d  And scheduled Klonopin  Does not presently need refills at this time    She has not had psychiatric follow-up    -patient always becomes very frustrated with her living situation but is unwilling to change her living situation  She does live with her daughter and son-in-law all and provides essentially the  within the home  Most of the time she feels very under appreciated  I have made recommendations about patient going to [facility], senior living situation so she can be around appears of her age the patient is unwilling to do this once again she does not like change  Relevant Orders    CBC and differential    Hyperlipidemia     Hyperlipidemia remains very well controlled on atorvastatin 10 mg once daily  Continue same  Refill not necessary at this time  Total cholesterol 151, HDL 50, LDL 78         Relevant Orders    Comprehensive metabolic panel    Lipid panel    Insomnia    Relevant Orders    CBC and differential    Medicare annual wellness visit, subsequent - Primary     Subsequent annual wellness visit completed  High-dose flu vaccine provided in the office  Patient is otherwise current on screening examinations         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[address] )     Grandchildren a back in school  This is a large stressor for the patient  She should return to seeing her therapist   The I did reach out to Dr Andrea Esquivel [phone] to make certain that she is on the look out the patient back in for counseling session    I also advised this to her son-in-law         Relevant Orders    CBC and differential      Other Visit Diagnoses     Flu vaccine need        Relevant Orders    influenza vaccine, [id number], high-dose, PF 0 5 mL (FLUZONE HIGH-DOSE) (Completed)          I have spent 46 minutes with Patient  today in which greater than 50% of this time was spent in counseling/coordination of care regarding Diagnostic results, Prognosis, Risks and benefits of tx options, Intructions for management, Patient and family education, Importance of tx compliance, Risk factor reductions and Impressions    Psychological counseling as well

## 2019-11-01 NOTE — ASSESSMENT & PLAN NOTE
Subsequent annual wellness visit completed  High-dose flu vaccine provided in the office    Patient is otherwise current on screening examinations

## 2019-11-12 LAB
LEFT EYE DIABETIC RETINOPATHY: NORMAL
RIGHT EYE DIABETIC RETINOPATHY: NORMAL

## 2020-01-13 DIAGNOSIS — F41.8 DEPRESSION WITH ANXIETY: ICD-10-CM

## 2020-01-13 DIAGNOSIS — E78.2 MIXED HYPERLIPIDEMIA: ICD-10-CM

## 2020-01-13 RX ORDER — ATORVASTATIN CALCIUM 10 MG/1
TABLET, FILM COATED ORAL
Qty: 90 TABLET | Refills: 0 | Status: SHIPPED | OUTPATIENT
Start: 2020-01-13 | End: 2020-03-09 | Stop reason: SDUPTHER

## 2020-01-13 RX ORDER — BUSPIRONE HYDROCHLORIDE 10 MG/1
TABLET ORAL
Qty: 90 TABLET | Refills: 0 | Status: SHIPPED | OUTPATIENT
Start: 2020-01-13 | End: 2020-02-11

## 2020-01-23 ENCOUNTER — OFFICE VISIT (OUTPATIENT)
Dept: FAMILY MEDICINE CLINIC | Facility: CLINIC | Age: 80
End: 2020-01-23
Payer: MEDICARE

## 2020-01-23 VITALS
SYSTOLIC BLOOD PRESSURE: 126 MMHG | BODY MASS INDEX: 31.05 KG/M2 | HEIGHT: 56 IN | HEART RATE: 88 BPM | WEIGHT: 138 LBS | RESPIRATION RATE: 16 BRPM | DIASTOLIC BLOOD PRESSURE: 74 MMHG | OXYGEN SATURATION: 98 %

## 2020-01-23 DIAGNOSIS — M15.9 GENERALIZED OSTEOARTHRITIS: Primary | ICD-10-CM

## 2020-01-23 DIAGNOSIS — F60.5 OBSESSIVE COMPULSIVE PERSONALITY DISORDER (HCC): ICD-10-CM

## 2020-01-23 PROCEDURE — 99214 OFFICE O/P EST MOD 30 MIN: CPT | Performed by: FAMILY MEDICINE

## 2020-01-23 RX ORDER — MELOXICAM 7.5 MG/1
7.5 TABLET ORAL DAILY
Qty: 30 TABLET | Refills: 1 | Status: SHIPPED | OUTPATIENT
Start: 2020-01-23 | End: 2020-01-23 | Stop reason: SDUPTHER

## 2020-01-23 RX ORDER — MELOXICAM 7.5 MG/1
7.5 TABLET ORAL DAILY
Qty: 30 TABLET | Refills: 1 | Status: SHIPPED | OUTPATIENT
Start: 2020-01-23 | End: 2020-06-09 | Stop reason: ALTCHOICE

## 2020-01-23 RX ORDER — CYCLOBENZAPRINE HCL 5 MG
5 TABLET ORAL
COMMUNITY
Start: 2020-01-15 | End: 2020-01-23

## 2020-01-23 NOTE — ASSESSMENT & PLAN NOTE
- at 78years of age and with some noticeable degenerative arthritic changes in many of her joints I reassured the patient that her pain that she will transiently get in her right hip is likely the result of osteoarthritis     -advised the patient that instead of being placed on prednisone she can take oral NSAIDs or even Tylenol arthritis  She would like to have a prescription    Patient given prescription for meloxicam 7 5 mg once daily

## 2020-01-23 NOTE — ASSESSMENT & PLAN NOTE
- again, I reassured the patient that her arthritic pains are nothing life-threatening  I did recommend that she reaches out to Dr Oleg Clemente upon her return from vacation for additional counseling sessions  These are usually helpful for her  Patient remains on Effexor, BuSpar and Klonopin  No need for change in dosage this time
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
## 2020-02-01 DIAGNOSIS — F41.9 ANXIETY: ICD-10-CM

## 2020-02-03 RX ORDER — CLONAZEPAM 0.5 MG/1
TABLET ORAL
Qty: 60 TABLET | Refills: 3 | Status: SHIPPED | OUTPATIENT
Start: 2020-02-03 | End: 2020-06-29 | Stop reason: SDUPTHER

## 2020-02-11 DIAGNOSIS — F41.8 DEPRESSION WITH ANXIETY: ICD-10-CM

## 2020-02-11 RX ORDER — BUSPIRONE HYDROCHLORIDE 10 MG/1
TABLET ORAL
Qty: 90 TABLET | Refills: 0 | Status: SHIPPED | OUTPATIENT
Start: 2020-02-11 | End: 2020-03-09 | Stop reason: SDUPTHER

## 2020-02-24 DIAGNOSIS — F41.8 OTHER SPECIFIED ANXIETY DISORDERS: ICD-10-CM

## 2020-02-24 DIAGNOSIS — F60.5 OBSESSIVE-COMPULSIVE PERSONALITY DISORDER (HCC): ICD-10-CM

## 2020-02-24 RX ORDER — VENLAFAXINE HYDROCHLORIDE 150 MG/1
CAPSULE, EXTENDED RELEASE ORAL
Qty: 90 CAPSULE | Refills: 1 | Status: SHIPPED | OUTPATIENT
Start: 2020-02-24 | End: 2020-08-24 | Stop reason: SDUPTHER

## 2020-03-02 ENCOUNTER — TELEPHONE (OUTPATIENT)
Dept: FAMILY MEDICINE CLINIC | Facility: CLINIC | Age: 80
End: 2020-03-02

## 2020-03-02 LAB
ALBUMIN SERPL-MCNC: 4.1 G/DL (ref 3.6–5.1)
ALBUMIN/CREAT UR: 54 MCG/MG CREAT
ALBUMIN/GLOB SERPL: 1.9 (CALC) (ref 1–2.5)
ALP SERPL-CCNC: 66 U/L (ref 37–153)
ALT SERPL-CCNC: 14 U/L (ref 6–29)
AST SERPL-CCNC: 13 U/L (ref 10–35)
BASOPHILS # BLD AUTO: 49 CELLS/UL (ref 0–200)
BASOPHILS NFR BLD AUTO: 0.9 %
BILIRUB SERPL-MCNC: 0.5 MG/DL (ref 0.2–1.2)
BUN SERPL-MCNC: 20 MG/DL (ref 7–25)
BUN/CREAT SERPL: ABNORMAL (CALC) (ref 6–22)
CALCIUM SERPL-MCNC: 9.6 MG/DL (ref 8.6–10.4)
CHLORIDE SERPL-SCNC: 106 MMOL/L (ref 98–110)
CHOLEST SERPL-MCNC: 154 MG/DL
CHOLEST/HDLC SERPL: 3.2 (CALC)
CO2 SERPL-SCNC: 30 MMOL/L (ref 20–32)
CREAT SERPL-MCNC: 0.7 MG/DL (ref 0.6–0.88)
CREAT UR-MCNC: 68 MG/DL (ref 20–275)
EOSINOPHIL # BLD AUTO: 227 CELLS/UL (ref 15–500)
EOSINOPHIL NFR BLD AUTO: 4.2 %
ERYTHROCYTE [DISTWIDTH] IN BLOOD BY AUTOMATED COUNT: 12.8 % (ref 11–15)
GLOBULIN SER CALC-MCNC: 2.2 G/DL (CALC) (ref 1.9–3.7)
GLUCOSE SERPL-MCNC: 143 MG/DL (ref 65–99)
HBA1C MFR BLD: 7.2 % OF TOTAL HGB
HCT VFR BLD AUTO: 41.3 % (ref 35–45)
HDLC SERPL-MCNC: 48 MG/DL
HGB BLD-MCNC: 13.5 G/DL (ref 11.7–15.5)
LDLC SERPL CALC-MCNC: 83 MG/DL (CALC)
LYMPHOCYTES # BLD AUTO: 1193 CELLS/UL (ref 850–3900)
LYMPHOCYTES NFR BLD AUTO: 22.1 %
MCH RBC QN AUTO: 28.8 PG (ref 27–33)
MCHC RBC AUTO-ENTMCNC: 32.7 G/DL (ref 32–36)
MCV RBC AUTO: 88.1 FL (ref 80–100)
MICROALBUMIN UR-MCNC: 3.7 MG/DL
MONOCYTES # BLD AUTO: 329 CELLS/UL (ref 200–950)
MONOCYTES NFR BLD AUTO: 6.1 %
NEUTROPHILS # BLD AUTO: 3602 CELLS/UL (ref 1500–7800)
NEUTROPHILS NFR BLD AUTO: 66.7 %
NONHDLC SERPL-MCNC: 106 MG/DL (CALC)
PLATELET # BLD AUTO: 211 THOUSAND/UL (ref 140–400)
PMV BLD REES-ECKER: 10.8 FL (ref 7.5–12.5)
POTASSIUM SERPL-SCNC: 4.5 MMOL/L (ref 3.5–5.3)
PROT SERPL-MCNC: 6.3 G/DL (ref 6.1–8.1)
RBC # BLD AUTO: 4.69 MILLION/UL (ref 3.8–5.1)
SL AMB EGFR AFRICAN AMERICAN: 94 ML/MIN/1.73M2
SL AMB EGFR NON AFRICAN AMERICAN: 81 ML/MIN/1.73M2
SODIUM SERPL-SCNC: 141 MMOL/L (ref 135–146)
TRIGL SERPL-MCNC: 132 MG/DL
TSH SERPL-ACNC: 1.01 MIU/L (ref 0.4–4.5)
WBC # BLD AUTO: 5.4 THOUSAND/UL (ref 3.8–10.8)

## 2020-03-02 NOTE — TELEPHONE ENCOUNTER
Gerard Ayala called in and wanted to know if we got Sandra's blood work back  She had them done at [facility] on [address] [phone]    Shey Carmen does have an appointment scheduled for 03/09/2020 but per Joanna Lora gets anxious    They would like a call back

## 2020-03-03 ENCOUNTER — TRANSCRIBE ORDERS (OUTPATIENT)
Dept: ADMINISTRATIVE | Facility: HOSPITAL | Age: 80
End: 2020-03-03

## 2020-03-03 ENCOUNTER — HOSPITAL ENCOUNTER (OUTPATIENT)
Dept: NON INVASIVE DIAGNOSTICS | Facility: CLINIC | Age: 80
Discharge: HOME | End: 2020-03-03
Payer: MEDICARE

## 2020-03-03 DIAGNOSIS — M79.604 RIGHT LEG PAIN: ICD-10-CM

## 2020-03-03 DIAGNOSIS — M79.604 RIGHT LEG PAIN: Primary | ICD-10-CM

## 2020-03-03 PROCEDURE — 93970 EXTREMITY STUDY: CPT

## 2020-03-03 PROCEDURE — 93970 EXTREMITY STUDY: CPT | Performed by: SURGERY

## 2020-03-09 ENCOUNTER — OFFICE VISIT (OUTPATIENT)
Dept: FAMILY MEDICINE CLINIC | Facility: CLINIC | Age: 80
End: 2020-03-09
Payer: MEDICARE

## 2020-03-09 VITALS
HEIGHT: 56 IN | WEIGHT: 137 LBS | BODY MASS INDEX: 30.82 KG/M2 | OXYGEN SATURATION: 98 % | DIASTOLIC BLOOD PRESSURE: 82 MMHG | HEART RATE: 87 BPM | RESPIRATION RATE: 14 BRPM | SYSTOLIC BLOOD PRESSURE: 130 MMHG

## 2020-03-09 DIAGNOSIS — I10 ESSENTIAL HYPERTENSION, BENIGN: ICD-10-CM

## 2020-03-09 DIAGNOSIS — F41.8 DEPRESSION WITH ANXIETY: ICD-10-CM

## 2020-03-09 DIAGNOSIS — F41.9 ANXIETY: ICD-10-CM

## 2020-03-09 DIAGNOSIS — G47.00 INSOMNIA, UNSPECIFIED TYPE: ICD-10-CM

## 2020-03-09 DIAGNOSIS — I10 BENIGN ESSENTIAL HYPERTENSION: Primary | ICD-10-CM

## 2020-03-09 DIAGNOSIS — E11.9 TYPE 2 DIABETES MELLITUS WITHOUT COMPLICATION, WITHOUT LONG-TERM CURRENT USE OF INSULIN (HCC): ICD-10-CM

## 2020-03-09 DIAGNOSIS — E78.2 MIXED HYPERLIPIDEMIA: ICD-10-CM

## 2020-03-09 PROCEDURE — 3075F SYST BP GE 130 - 139MM HG: CPT | Performed by: FAMILY MEDICINE

## 2020-03-09 PROCEDURE — 3079F DIAST BP 80-89 MM HG: CPT | Performed by: FAMILY MEDICINE

## 2020-03-09 PROCEDURE — 4040F PNEUMOC VAC/ADMIN/RCVD: CPT | Performed by: FAMILY MEDICINE

## 2020-03-09 PROCEDURE — 1036F TOBACCO NON-USER: CPT | Performed by: FAMILY MEDICINE

## 2020-03-09 PROCEDURE — 4010F ACE/ARB THERAPY RXD/TAKEN: CPT | Performed by: FAMILY MEDICINE

## 2020-03-09 PROCEDURE — 3008F BODY MASS INDEX DOCD: CPT | Performed by: FAMILY MEDICINE

## 2020-03-09 PROCEDURE — 1160F RVW MEDS BY RX/DR IN RCRD: CPT | Performed by: FAMILY MEDICINE

## 2020-03-09 PROCEDURE — 99214 OFFICE O/P EST MOD 30 MIN: CPT | Performed by: FAMILY MEDICINE

## 2020-03-09 PROCEDURE — 2022F DILAT RTA XM EVC RTNOPTHY: CPT | Performed by: FAMILY MEDICINE

## 2020-03-09 RX ORDER — BUSPIRONE HYDROCHLORIDE 10 MG/1
10 TABLET ORAL 3 TIMES DAILY
Qty: 270 TABLET | Refills: 1 | Status: SHIPPED | OUTPATIENT
Start: 2020-03-09 | End: 2020-06-04 | Stop reason: SDUPTHER

## 2020-03-09 RX ORDER — RAMIPRIL 10 MG/1
10 CAPSULE ORAL DAILY
Qty: 90 CAPSULE | Refills: 1 | Status: SHIPPED | OUTPATIENT
Start: 2020-03-09 | End: 2020-09-14 | Stop reason: SDUPTHER

## 2020-03-09 RX ORDER — ATORVASTATIN CALCIUM 10 MG/1
10 TABLET, FILM COATED ORAL DAILY
Qty: 90 TABLET | Refills: 0 | Status: SHIPPED | OUTPATIENT
Start: 2020-03-09 | End: 2020-06-29 | Stop reason: SDUPTHER

## 2020-03-09 NOTE — PROGRESS NOTES
Subjective:      Patient ID: Charli Roper is a 78 y o  female  Patient presents for 6 month follow-up of chronic conditions including diabetes mellitus type 2, hypertension, hyperlipidemia, generalized anxiety disorder and depression  Patient has actually been doing extremely well  She has been seeing her therapist on a regular basis and has been seeing her therapist along with her daughter  Daughter was actually recently laid off from her job so she now remains at home with the patient and is now helping to provide care of her children along with patient  She no longer has to fight on her own to have her grandchildren do their homework  She does make the realization that she is her own worst enemy in regards to her generalized anxiety disorder  She has come to accept this  She does rely on [place] in prayer to help her through which has seemed to help along with medications as well as counseling sessions  Labs reviewed which showed normal TSH, total cholesterol 154, HDL 48, LDL 83, triglycerides 132, normal CMP with the exception of a fasting glucose of 143, hemoglobin A1c increased slightly from 7 1-7 2%  Patient does need refills of medications  Scant amount of microalbumin in her urine  Patient was seen at Urgent Care on March 3rd after developing faint bruising on her right calf  She was sent for lower extremity venous Dopplers which were negative for DVT  The bruising actually subsided    No calf pain      Past Medical History:   Diagnosis Date    Anxiety     Colon polyps     Depression     Diabetes mellitus ([facility] )     Dizziness     Last assessed: 8/31/15    [facility]     Dysuria     Hyperlipidemia     Hypertension     Insomnia     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[facility] )        Family History   Problem Relation Age of Onset    Depression Mother         w/anxiety    Diabetes Mother         Mellitus    Breast cancer Mother     Hypertension Mother     No Known Problems Father     Depression Sister         w/anxiety    Heart disease Sister         Cardiac Disorder    Breast cancer Sister     Hypertension Sister     Diabetes Brother         Mellitus    Alcohol abuse Son        Past Surgical History:   Procedure Laterality Date     SECTION      1964 son, 26 daughter    COLONOSCOPY      CT COLONOSCOPY FLX DX W/COLLJ SPEC WHEN PFRMD N/A 3/27/2017    Procedure: COLONOSCOPY;  Surgeon: Vasu Nolan MD;  [facility]; Service: Gastroenterology    ROTATOR CUFF REPAIR Left     Last assessed: 3/29/15    TONSILLECTOMY          reports that she has never smoked  She has never used smokeless tobacco  She reports that she does not drink alcohol or use drugs        Current Outpatient Medications:     aspirin 81 MG tablet, Take 81 mg by mouth daily, Disp: , Rfl:     atorvastatin (LIPITOR) 10 mg tablet, Take 1 tablet (10 mg total) by mouth daily, Disp: 90 tablet, Rfl: 0    busPIRone (BUSPAR) 10 mg tablet, Take 1 tablet (10 mg total) by mouth 3 (three) times a day, Disp: 270 tablet, Rfl: 1    clonazePAM (KlonoPIN) 0 5 mg tablet, TAKE 1/2 TABLET BY MOUTH IN THE MORNING AND 2:00PM AND TAKE 1 TABLET AT NIGHT, Disp: 60 tablet, Rfl: 3    docusate sodium (COLACE) 100 mg capsule, Take 100 mg by mouth 2 (two) times a day, Disp: , Rfl:     meloxicam (MOBIC) 7 5 mg tablet, Take 1 tablet (7 5 mg total) by mouth daily, Disp: 30 tablet, Rfl: 1    metFORMIN (GLUCOPHAGE) 500 mg tablet, Take 1 tablet (500 mg total) by mouth 2 (two) times a day with meals, Disp: 180 tablet, Rfl: 1    ramipril (ALTACE) 10 MG capsule, Take 1 capsule (10 mg total) by mouth daily, Disp: 90 capsule, Rfl: 1    venlafaxine (EFFEXOR-XR) 150 mg 24 hr capsule, TAKE ONE CAPSULE BY MOUTH EVERY DAY, Disp: 90 capsule, Rfl: 1    The following portions of the patient's history were reviewed and updated as appropriate: allergies, current medications, past family history, past medical history, past social history, past surgical history and problem list     Review of Systems   Constitutional: Negative  HENT: Negative  Eyes: Negative  Respiratory: Negative  Cardiovascular: Negative  Negative for leg swelling  Gastrointestinal: Negative  Endocrine: Negative  Genitourinary: Negative  Musculoskeletal: Negative  Skin: Negative  Allergic/Immunologic: Negative  Neurological: Negative  Hematological: Negative for adenopathy  Bruises/bleeds easily  Psychiatric/Behavioral: Negative  Negative for dysphoric mood  The patient is not nervous/anxious  Objective:    /82 (BP Location: Left arm, Patient Position: Sitting, Cuff Size: Large)   Pulse 87   Resp 14   Ht 4' 8" (1 422 m)   Wt 62 1 kg (137 lb)   SpO2 98%   BMI 30 71 kg/m²      Physical Exam   Constitutional: She is oriented to person, place, and time  She appears well-developed and well-nourished  Overweight, no acute distress   HENT:   Head: Normocephalic and atraumatic  Eyes: Pupils are equal, round, and reactive to light  Conjunctivae are normal    Neck: Normal range of motion  Neck supple  Cardiovascular: Normal rate and regular rhythm  Pulmonary/Chest: Effort normal and breath sounds normal    Abdominal: Soft  Bowel sounds are normal    Neurological: She is alert and oriented to person, place, and time  She has normal reflexes  Psychiatric: She has a normal mood and affect  Her behavior is normal  Judgment and thought content normal    Patient is very calm and pleasant mannered   Nursing note and vitals reviewed          Recent Results (from the past 1008 hour(s))   Lipid panel    Collection Time: 02/29/20  9:27 AM   Result Value Ref Range    Total Cholesterol 154 <200 mg/dL    HDL 48 (L) > OR = 50 mg/dL    Triglycerides 132 <150 mg/dL    LDL Direct 83 mg/dL (calc)    Chol HDLC Ratio 3 2 <5 0 (calc)    Non-HDL Cholesterol 106 <130 mg/dL (calc)   Comprehensive metabolic panel    Collection Time: 02/29/20  9:27 AM   Result Value Ref Range    Glucose, Random 143 (H) 65 - 99 mg/dL    BUN 20 7 - 25 mg/dL    Creatinine 0 70 0 60 - 0 88 mg/dL    eGFR Non  81 > OR = 60 mL/min/1 73m2    eGFR  94 > OR = 60 mL/min/1 73m2    SL AMB BUN/CREATININE RATIO NOT APPLICABLE 6 - 22 (calc)    Sodium 141 135 - 146 mmol/L    Potassium 4 5 3 5 - 5 3 mmol/L    Chloride 106 98 - 110 mmol/L    CO2 30 20 - 32 mmol/L    Calcium 9 6 8 6 - 10 4 mg/dL    Protein, Total 6 3 6 1 - 8 1 g/dL    Albumin 4 1 3 6 - 5 1 g/dL    Globulin 2 2 1 9 - 3 7 g/dL (calc)    Albumin/Globulin Ratio 1 9 1 0 - 2 5 (calc)    TOTAL BILIRUBIN 0 5 0 2 - 1 2 mg/dL    Alkaline Phosphatase 66 37 - 153 U/L    AST 13 10 - 35 U/L    ALT 14 6 - 29 U/L   Microalbumin, Random Urine (W/Creatinine)    Collection Time: 02/29/20  9:27 AM   Result Value Ref Range    Creatinine, Urine 68 20 - 275 mg/dL    Microalbum  ,U,Random 3 7 See Note: mg/dL    Microalb/Creat Ratio 54 (H) <30 mcg/mg creat   CBC and differential    Collection Time: 02/29/20  9:27 AM   Result Value Ref Range    White Blood Cell Count 5 4 3 8 - 10 8 Thousand/uL    Red Blood Cell Count 4 69 3 80 - 5 10 Million/uL    Hemoglobin 13 5 11 7 - 15 5 g/dL    HCT 41 3 35 0 - 45 0 %    MCV 88 1 80 0 - 100 0 fL    MCH 28 8 27 0 - 33 0 pg    MCHC 32 7 32 0 - 36 0 g/dL    RDW 12 8 11 0 - 15 0 %    Platelet Count 162 806 - 400 Thousand/uL    SL AMB MPV 10 8 7 5 - 12 5 fL    Neutrophils (Absolute) 3,602 1,500 - 7,800 cells/uL    Lymphocytes (Absolute) 1,193 850 - 3,900 cells/uL    Monocytes (Absolute) 329 200 - 950 cells/uL    Eosinophils (Absolute) 227 15 - 500 cells/uL    Basophils ABS 49 0 - 200 cells/uL    Neutrophils 66 7 %    Lymphocytes 22 1 %    Monocytes 6 1 %    Eosinophils 4 2 %    Basophils PCT 0 9 %   TSH, 3rd generation with Free T4 reflex    Collection Time: 02/29/20  9:27 AM   Result Value Ref Range    TSH W/RFX TO FREE T4 1 01 0 40 - 4 50 mIU/L Hemoglobin A1c (w/out EAG)    Collection Time: 02/29/20  9:27 AM   Result Value Ref Range    Hemoglobin A1C 7 2 (H) <5 7 % of total Hgb       Assessment/Plan:    Controlled type 2 diabetes mellitus without complication, without long-term current use of insulin ([facility])    Lab Results   Component Value Date    HGBA1C 7 2 (H) 02/29/2020     Hemoglobin A1c of 7 2%  Adequate control given her age of 78 years  I would like to see her less than 7%  Patient remains on metformin 500 mg twice daily with meals  Refill provided  Repeat diabetic parameters in 6 months  Patient is due for diabetic foot examination  She needs to schedule with podiatrist    Benign essential hypertension  Hypertension remains very well controlled on ramipril 10 mg once daily  Refill provided  Would re-evaluate hypertension in 6 months    Anxiety  Patient continues to do well on Effexor  mg once daily, BuSpar 10 mg t i d  And Klonopin  She does need refills of BuSpar  Patient has not had follow-up with psychiatry but seems to be doing quite well on medications along with therapy  I do believe it helps that her daughter is at home to assist her with caring for her grandchildren as well as take some of the burden off of getting them to do their homework  Patient will continue on current medication regimen and follow-up with therapist as regularly scheduled  Re-evaluate in 6 months    Hyperlipidemia  Hyperlipidemia remains very well controlled on atorvastatin 10 mg once daily  Continue same  Repeat lipid profile in 6 months    Insomnia  Patient remains on Klonopin at bedtime  This does seem to help  Unfortunately she also has her granddaughter sleeping in bed with her  A definitely need to try to break that habit          Problem List Items Addressed This Visit        Cardiovascular and Mediastinum    Benign essential hypertension - Primary     Hypertension remains very well controlled on ramipril 10 mg once daily    Refill provided  Would re-evaluate hypertension in 6 months         Relevant Medications    ramipril (ALTACE) 10 MG capsule       Other    Anxiety     Patient continues to do well on Effexor  mg once daily, BuSpar 10 mg t i d  And Klonopin  She does need refills of BuSpar  Patient has not had follow-up with psychiatry but seems to be doing quite well on medications along with therapy  I do believe it helps that her daughter is at home to assist her with caring for her grandchildren as well as take some of the burden off of getting them to do their homework  Patient will continue on current medication regimen and follow-up with therapist as regularly scheduled  Re-evaluate in 6 months         Hyperlipidemia     Hyperlipidemia remains very well controlled on atorvastatin 10 mg once daily  Continue same  Repeat lipid profile in 6 months         Relevant Medications    atorvastatin (LIPITOR) 10 mg tablet    Other Relevant Orders    Comprehensive metabolic panel    Lipid panel    Insomnia     Patient remains on Klonopin at bedtime  This does seem to help  Unfortunately she also has her granddaughter sleeping in bed with her    A definitely need to try to break that habit           Other Visit Diagnoses     Type 2 diabetes mellitus without complication, without long-term current use of insulin ([facility])        Relevant Medications    metFORMIN (GLUCOPHAGE) 500 mg tablet    Other Relevant Orders    Hemoglobin A1C    Microalbumin / creatinine urine ratio    Essential hypertension, benign        Relevant Medications    ramipril (ALTACE) 10 MG capsule    Other Relevant Orders    CBC and differential    TSH, 3rd generation with Free T4 reflex    Depression with anxiety        Relevant Medications    busPIRone (BUSPAR) 10 mg tablet    Other Relevant Orders    CBC and differential    TSH, 3rd generation with Free T4 reflex

## 2020-03-10 NOTE — ASSESSMENT & PLAN NOTE
Lab Results   Component Value Date    HGBA1C 7 2 (H) 02/29/2020     Hemoglobin A1c of 7 2%  Adequate control given her age of 78 years  I would like to see her less than 7%  Patient remains on metformin 500 mg twice daily with meals  Refill provided  Repeat diabetic parameters in 6 months  Patient is due for diabetic foot examination    She needs to schedule with podiatrist

## 2020-03-10 NOTE — ASSESSMENT & PLAN NOTE
Patient continues to do well on Effexor  mg once daily, BuSpar 10 mg t i d  And Klonopin  She does need refills of BuSpar  Patient has not had follow-up with psychiatry but seems to be doing quite well on medications along with therapy  I do believe it helps that her daughter is at home to assist her with caring for her grandchildren as well as take some of the burden off of getting them to do their homework  Patient will continue on current medication regimen and follow-up with therapist as regularly scheduled    Re-evaluate in 6 months

## 2020-03-10 NOTE — ASSESSMENT & PLAN NOTE
Hypertension remains very well controlled on ramipril 10 mg once daily  Refill provided    Would re-evaluate hypertension in 6 months

## 2020-03-10 NOTE — ASSESSMENT & PLAN NOTE
Patient remains on Klonopin at bedtime  This does seem to help  Unfortunately she also has her granddaughter sleeping in bed with her    A definitely need to try to break that habit

## 2020-03-10 NOTE — ASSESSMENT & PLAN NOTE
Hyperlipidemia remains very well controlled on atorvastatin 10 mg once daily  Continue same    Repeat lipid profile in 6 months

## 2020-05-21 ENCOUNTER — APPOINTMENT (EMERGENCY)
Dept: CT IMAGING | Facility: HOSPITAL | Age: 80
DRG: 661 | End: 2020-05-21
Payer: MEDICARE

## 2020-05-21 ENCOUNTER — HOSPITAL ENCOUNTER (INPATIENT)
Facility: HOSPITAL | Age: 80
LOS: 1 days | Discharge: HOME | DRG: 661 | End: 2020-05-23
Attending: EMERGENCY MEDICINE | Admitting: INTERNAL MEDICINE
Payer: MEDICARE

## 2020-05-21 DIAGNOSIS — N28.89 PERINEPHRIC FLUID COLLECTION: ICD-10-CM

## 2020-05-21 DIAGNOSIS — N20.1 URETEROLITHIASIS: Primary | ICD-10-CM

## 2020-05-21 LAB
ALBUMIN SERPL BCP-MCNC: 3.8 G/DL (ref 3.5–5)
ALP SERPL-CCNC: 80 U/L (ref 46–116)
ALT SERPL W P-5'-P-CCNC: 19 U/L (ref 12–78)
ANION GAP SERPL CALCULATED.3IONS-SCNC: 10 MMOL/L (ref 4–13)
APTT PPP: 28 SECONDS (ref 23–37)
AST SERPL W P-5'-P-CCNC: 21 U/L (ref 5–45)
BACTERIA UR QL AUTO: ABNORMAL /HPF
BASOPHILS # BLD AUTO: 0.05 THOUSANDS/ΜL (ref 0–0.1)
BASOPHILS NFR BLD AUTO: 0 % (ref 0–1)
BILIRUB SERPL-MCNC: 0.33 MG/DL (ref 0.2–1)
BILIRUB UR QL STRIP: NEGATIVE
BUN SERPL-MCNC: 21 MG/DL (ref 5–25)
CALCIUM SERPL-MCNC: 9.2 MG/DL (ref 8.3–10.1)
CHLORIDE SERPL-SCNC: 102 MMOL/L (ref 100–108)
CLARITY UR: CLEAR
CO2 SERPL-SCNC: 24 MMOL/L (ref 21–32)
COLOR UR: YELLOW
CREAT SERPL-MCNC: 0.87 MG/DL (ref 0.6–1.3)
EOSINOPHIL # BLD AUTO: 0.12 THOUSAND/ΜL (ref 0–0.61)
EOSINOPHIL NFR BLD AUTO: 1 % (ref 0–6)
ERYTHROCYTE [DISTWIDTH] IN BLOOD BY AUTOMATED COUNT: 13.3 % (ref 11.6–15.1)
GFR SERPL CREATININE-BSD FRML MDRD: 63 ML/MIN/1.73SQ M
GLUCOSE SERPL-MCNC: 188 MG/DL (ref 65–140)
GLUCOSE UR STRIP-MCNC: NEGATIVE MG/DL
HCT VFR BLD AUTO: 45.2 % (ref 34.8–46.1)
HGB BLD-MCNC: 14.4 G/DL (ref 11.5–15.4)
HGB UR QL STRIP.AUTO: ABNORMAL
IMM GRANULOCYTES # BLD AUTO: 0.06 THOUSAND/UL (ref 0–0.2)
IMM GRANULOCYTES NFR BLD AUTO: 1 % (ref 0–2)
INR PPP: 1.01 (ref 0.84–1.19)
KETONES UR STRIP-MCNC: NEGATIVE MG/DL
LACTATE SERPL-SCNC: 1.4 MMOL/L (ref 0.5–2)
LEUKOCYTE ESTERASE UR QL STRIP: NEGATIVE
LYMPHOCYTES # BLD AUTO: 0.93 THOUSANDS/ΜL (ref 0.6–4.47)
LYMPHOCYTES NFR BLD AUTO: 7 % (ref 14–44)
MCH RBC QN AUTO: 28.9 PG (ref 26.8–34.3)
MCHC RBC AUTO-ENTMCNC: 31.9 G/DL (ref 31.4–37.4)
MCV RBC AUTO: 91 FL (ref 82–98)
MONOCYTES # BLD AUTO: 0.63 THOUSAND/ΜL (ref 0.17–1.22)
MONOCYTES NFR BLD AUTO: 5 % (ref 4–12)
NEUTROPHILS # BLD AUTO: 11.32 THOUSANDS/ΜL (ref 1.85–7.62)
NEUTS SEG NFR BLD AUTO: 86 % (ref 43–75)
NITRITE UR QL STRIP: NEGATIVE
NON-SQ EPI CELLS URNS QL MICRO: ABNORMAL /HPF
NRBC BLD AUTO-RTO: 0 /100 WBCS
PH UR STRIP.AUTO: 5.5 [PH] (ref 4.5–8)
PLATELET # BLD AUTO: 231 THOUSANDS/UL (ref 149–390)
PMV BLD AUTO: 10.5 FL (ref 8.9–12.7)
POTASSIUM SERPL-SCNC: 4.5 MMOL/L (ref 3.5–5.3)
PROT SERPL-MCNC: 7.5 G/DL (ref 6.4–8.2)
PROT UR STRIP-MCNC: ABNORMAL MG/DL
PROTHROMBIN TIME: 12.7 SECONDS (ref 11.6–14.5)
RBC # BLD AUTO: 4.99 MILLION/UL (ref 3.81–5.12)
RBC #/AREA URNS AUTO: ABNORMAL /HPF
SODIUM SERPL-SCNC: 136 MMOL/L (ref 136–145)
SP GR UR STRIP.AUTO: 1.02 (ref 1–1.03)
UROBILINOGEN UR QL STRIP.AUTO: 0.2 E.U./DL
WBC # BLD AUTO: 13.11 THOUSAND/UL (ref 4.31–10.16)
WBC #/AREA URNS AUTO: ABNORMAL /HPF

## 2020-05-21 PROCEDURE — 80053 COMPREHEN METABOLIC PANEL: CPT | Performed by: EMERGENCY MEDICINE

## 2020-05-21 PROCEDURE — 74177 CT ABD & PELVIS W/CONTRAST: CPT

## 2020-05-21 PROCEDURE — 36415 COLL VENOUS BLD VENIPUNCTURE: CPT | Performed by: EMERGENCY MEDICINE

## 2020-05-21 PROCEDURE — 81001 URINALYSIS AUTO W/SCOPE: CPT

## 2020-05-21 PROCEDURE — 99285 EMERGENCY DEPT VISIT HI MDM: CPT

## 2020-05-21 PROCEDURE — 96374 THER/PROPH/DIAG INJ IV PUSH: CPT

## 2020-05-21 PROCEDURE — 85610 PROTHROMBIN TIME: CPT | Performed by: EMERGENCY MEDICINE

## 2020-05-21 PROCEDURE — 85730 THROMBOPLASTIN TIME PARTIAL: CPT | Performed by: EMERGENCY MEDICINE

## 2020-05-21 PROCEDURE — 85025 COMPLETE CBC W/AUTO DIFF WBC: CPT | Performed by: EMERGENCY MEDICINE

## 2020-05-21 PROCEDURE — 99285 EMERGENCY DEPT VISIT HI MDM: CPT | Performed by: EMERGENCY MEDICINE

## 2020-05-21 PROCEDURE — 87635 SARS-COV-2 COVID-19 AMP PRB: CPT | Performed by: EMERGENCY MEDICINE

## 2020-05-21 PROCEDURE — 83605 ASSAY OF LACTIC ACID: CPT | Performed by: EMERGENCY MEDICINE

## 2020-05-21 PROCEDURE — 96361 HYDRATE IV INFUSION ADD-ON: CPT

## 2020-05-21 PROCEDURE — 96375 TX/PRO/DX INJ NEW DRUG ADDON: CPT

## 2020-05-21 RX ORDER — ONDANSETRON 2 MG/ML
4 INJECTION INTRAMUSCULAR; INTRAVENOUS ONCE
Status: COMPLETED | OUTPATIENT
Start: 2020-05-21 | End: 2020-05-21

## 2020-05-21 RX ORDER — MELATONIN
1000 DAILY
COMMUNITY

## 2020-05-21 RX ORDER — FENTANYL CITRATE 50 UG/ML
50 INJECTION, SOLUTION INTRAMUSCULAR; INTRAVENOUS ONCE
Status: COMPLETED | OUTPATIENT
Start: 2020-05-21 | End: 2020-05-21

## 2020-05-21 RX ADMIN — FENTANYL CITRATE 50 MCG: 50 INJECTION INTRAMUSCULAR; INTRAVENOUS at 22:46

## 2020-05-21 RX ADMIN — SODIUM CHLORIDE 500 ML: 0.9 INJECTION, SOLUTION INTRAVENOUS at 20:35

## 2020-05-21 RX ADMIN — ONDANSETRON 4 MG: 2 INJECTION INTRAMUSCULAR; INTRAVENOUS at 20:35

## 2020-05-21 RX ADMIN — IOHEXOL 100 ML: 350 INJECTION, SOLUTION INTRAVENOUS at 21:34

## 2020-05-21 RX ADMIN — MORPHINE SULFATE 2 MG: 2 INJECTION, SOLUTION INTRAMUSCULAR; INTRAVENOUS at 20:59

## 2020-05-21 RX ADMIN — MORPHINE SULFATE 2 MG: 2 INJECTION, SOLUTION INTRAMUSCULAR; INTRAVENOUS at 20:35

## 2020-05-22 ENCOUNTER — ANESTHESIA (OUTPATIENT)
Dept: PERIOP | Facility: HOSPITAL | Age: 80
DRG: 661 | End: 2020-05-22
Payer: MEDICARE

## 2020-05-22 ENCOUNTER — APPOINTMENT (OUTPATIENT)
Dept: RADIOLOGY | Facility: HOSPITAL | Age: 80
DRG: 661 | End: 2020-05-22
Payer: MEDICARE

## 2020-05-22 ENCOUNTER — ANESTHESIA EVENT (OUTPATIENT)
Dept: PERIOP | Facility: HOSPITAL | Age: 80
DRG: 661 | End: 2020-05-22
Payer: MEDICARE

## 2020-05-22 PROBLEM — N20.1 URETEROLITHIASIS: Status: ACTIVE | Noted: 2020-05-22

## 2020-05-22 LAB
ANION GAP SERPL CALCULATED.3IONS-SCNC: 8 MMOL/L (ref 4–13)
BASOPHILS # BLD AUTO: 0.05 THOUSANDS/ΜL (ref 0–0.1)
BASOPHILS NFR BLD AUTO: 1 % (ref 0–1)
BUN SERPL-MCNC: 18 MG/DL (ref 5–25)
CALCIUM SERPL-MCNC: 8 MG/DL (ref 8.3–10.1)
CHLORIDE SERPL-SCNC: 105 MMOL/L (ref 100–108)
CO2 SERPL-SCNC: 26 MMOL/L (ref 21–32)
CREAT SERPL-MCNC: 0.86 MG/DL (ref 0.6–1.3)
EOSINOPHIL # BLD AUTO: 0.03 THOUSAND/ΜL (ref 0–0.61)
EOSINOPHIL NFR BLD AUTO: 0 % (ref 0–6)
ERYTHROCYTE [DISTWIDTH] IN BLOOD BY AUTOMATED COUNT: 13.5 % (ref 11.6–15.1)
GFR SERPL CREATININE-BSD FRML MDRD: 64 ML/MIN/1.73SQ M
GLUCOSE SERPL-MCNC: 147 MG/DL (ref 65–140)
GLUCOSE SERPL-MCNC: 147 MG/DL (ref 65–140)
GLUCOSE SERPL-MCNC: 163 MG/DL (ref 65–140)
GLUCOSE SERPL-MCNC: 164 MG/DL (ref 65–140)
GLUCOSE SERPL-MCNC: 184 MG/DL (ref 65–140)
GLUCOSE SERPL-MCNC: 200 MG/DL (ref 65–140)
HCT VFR BLD AUTO: 43.1 % (ref 34.8–46.1)
HGB BLD-MCNC: 13.5 G/DL (ref 11.5–15.4)
IMM GRANULOCYTES # BLD AUTO: 0.03 THOUSAND/UL (ref 0–0.2)
IMM GRANULOCYTES NFR BLD AUTO: 0 % (ref 0–2)
LYMPHOCYTES # BLD AUTO: 1.04 THOUSANDS/ΜL (ref 0.6–4.47)
LYMPHOCYTES NFR BLD AUTO: 12 % (ref 14–44)
MCH RBC QN AUTO: 28.5 PG (ref 26.8–34.3)
MCHC RBC AUTO-ENTMCNC: 31.3 G/DL (ref 31.4–37.4)
MCV RBC AUTO: 91 FL (ref 82–98)
MONOCYTES # BLD AUTO: 0.56 THOUSAND/ΜL (ref 0.17–1.22)
MONOCYTES NFR BLD AUTO: 6 % (ref 4–12)
NEUTROPHILS # BLD AUTO: 7.1 THOUSANDS/ΜL (ref 1.85–7.62)
NEUTS SEG NFR BLD AUTO: 81 % (ref 43–75)
NRBC BLD AUTO-RTO: 0 /100 WBCS
PLATELET # BLD AUTO: 200 THOUSANDS/UL (ref 149–390)
PMV BLD AUTO: 10.4 FL (ref 8.9–12.7)
POTASSIUM SERPL-SCNC: 4.2 MMOL/L (ref 3.5–5.3)
RBC # BLD AUTO: 4.73 MILLION/UL (ref 3.81–5.12)
SARS-COV-2 RNA RESP QL NAA+PROBE: NEGATIVE
SODIUM SERPL-SCNC: 139 MMOL/L (ref 136–145)
WBC # BLD AUTO: 8.81 THOUSAND/UL (ref 4.31–10.16)

## 2020-05-22 PROCEDURE — 80048 BASIC METABOLIC PNL TOTAL CA: CPT | Performed by: PHYSICIAN ASSISTANT

## 2020-05-22 PROCEDURE — 99222 1ST HOSP IP/OBS MODERATE 55: CPT | Performed by: UROLOGY

## 2020-05-22 PROCEDURE — 99220 PR INITIAL OBSERVATION CARE/DAY 70 MINUTES: CPT | Performed by: INTERNAL MEDICINE

## 2020-05-22 PROCEDURE — C1769 GUIDE WIRE: HCPCS | Performed by: UROLOGY

## 2020-05-22 PROCEDURE — 52356 CYSTO/URETERO W/LITHOTRIPSY: CPT | Performed by: UROLOGY

## 2020-05-22 PROCEDURE — 0T768DZ DILATION OF RIGHT URETER WITH INTRALUMINAL DEVICE, VIA NATURAL OR ARTIFICIAL OPENING ENDOSCOPIC: ICD-10-PCS | Performed by: UROLOGY

## 2020-05-22 PROCEDURE — 0T5B8ZZ DESTRUCTION OF BLADDER, VIA NATURAL OR ARTIFICIAL OPENING ENDOSCOPIC: ICD-10-PCS | Performed by: UROLOGY

## 2020-05-22 PROCEDURE — C1758 CATHETER, URETERAL: HCPCS | Performed by: UROLOGY

## 2020-05-22 PROCEDURE — 0TC68ZZ EXTIRPATION OF MATTER FROM RIGHT URETER, VIA NATURAL OR ARTIFICIAL OPENING ENDOSCOPIC: ICD-10-PCS | Performed by: UROLOGY

## 2020-05-22 PROCEDURE — 52234 CYSTOSCOPY AND TREATMENT: CPT | Performed by: UROLOGY

## 2020-05-22 PROCEDURE — C2617 STENT, NON-COR, TEM W/O DEL: HCPCS | Performed by: UROLOGY

## 2020-05-22 PROCEDURE — 85025 COMPLETE CBC W/AUTO DIFF WBC: CPT | Performed by: PHYSICIAN ASSISTANT

## 2020-05-22 PROCEDURE — 74420 UROGRAPHY RTRGR +-KUB: CPT

## 2020-05-22 PROCEDURE — 88305 TISSUE EXAM BY PATHOLOGIST: CPT | Performed by: PATHOLOGY

## 2020-05-22 PROCEDURE — 82948 REAGENT STRIP/BLOOD GLUCOSE: CPT

## 2020-05-22 PROCEDURE — BT1D1ZZ FLUOROSCOPY OF RIGHT KIDNEY, URETER AND BLADDER USING LOW OSMOLAR CONTRAST: ICD-10-PCS | Performed by: UROLOGY

## 2020-05-22 DEVICE — STENT CONTOUR INJ 6FR 30CM: Type: IMPLANTABLE DEVICE | Site: URETER | Status: FUNCTIONAL

## 2020-05-22 RX ORDER — OXYCODONE HYDROCHLORIDE 5 MG/1
2.5 TABLET ORAL EVERY 6 HOURS PRN
Status: DISCONTINUED | OUTPATIENT
Start: 2020-05-22 | End: 2020-05-23 | Stop reason: HOSPADM

## 2020-05-22 RX ORDER — ALBUTEROL SULFATE 2.5 MG/3ML
2.5 SOLUTION RESPIRATORY (INHALATION) ONCE AS NEEDED
Status: DISCONTINUED | OUTPATIENT
Start: 2020-05-22 | End: 2020-05-22 | Stop reason: HOSPADM

## 2020-05-22 RX ORDER — LIDOCAINE HYDROCHLORIDE 10 MG/ML
INJECTION, SOLUTION EPIDURAL; INFILTRATION; INTRACAUDAL; PERINEURAL AS NEEDED
Status: DISCONTINUED | OUTPATIENT
Start: 2020-05-22 | End: 2020-05-22 | Stop reason: SURG

## 2020-05-22 RX ORDER — FENTANYL CITRATE/PF 50 MCG/ML
25 SYRINGE (ML) INJECTION
Status: DISCONTINUED | OUTPATIENT
Start: 2020-05-22 | End: 2020-05-22 | Stop reason: HOSPADM

## 2020-05-22 RX ORDER — GLYCOPYRROLATE 0.2 MG/ML
INJECTION INTRAMUSCULAR; INTRAVENOUS AS NEEDED
Status: DISCONTINUED | OUTPATIENT
Start: 2020-05-22 | End: 2020-05-22 | Stop reason: SURG

## 2020-05-22 RX ORDER — ATORVASTATIN CALCIUM 10 MG/1
10 TABLET, FILM COATED ORAL DAILY
Status: DISCONTINUED | OUTPATIENT
Start: 2020-05-22 | End: 2020-05-23 | Stop reason: HOSPADM

## 2020-05-22 RX ORDER — HYDROMORPHONE HCL/PF 1 MG/ML
0.2 SYRINGE (ML) INJECTION EVERY 6 HOURS PRN
Status: DISCONTINUED | OUTPATIENT
Start: 2020-05-22 | End: 2020-05-23 | Stop reason: HOSPADM

## 2020-05-22 RX ORDER — OXYBUTYNIN CHLORIDE 10 MG/1
10 TABLET, EXTENDED RELEASE ORAL DAILY
Qty: 14 TABLET | Refills: 0 | Status: SHIPPED | OUTPATIENT
Start: 2020-05-22 | End: 2020-09-14

## 2020-05-22 RX ORDER — BUSPIRONE HYDROCHLORIDE 5 MG/1
10 TABLET ORAL 3 TIMES DAILY
Status: DISCONTINUED | OUTPATIENT
Start: 2020-05-22 | End: 2020-05-23 | Stop reason: HOSPADM

## 2020-05-22 RX ORDER — METOCLOPRAMIDE HYDROCHLORIDE 5 MG/ML
5 INJECTION INTRAMUSCULAR; INTRAVENOUS ONCE AS NEEDED
Status: DISCONTINUED | OUTPATIENT
Start: 2020-05-22 | End: 2020-05-22 | Stop reason: HOSPADM

## 2020-05-22 RX ORDER — MELATONIN
1000 DAILY
Status: DISCONTINUED | OUTPATIENT
Start: 2020-05-22 | End: 2020-05-23 | Stop reason: HOSPADM

## 2020-05-22 RX ORDER — ASPIRIN 81 MG/1
81 TABLET, CHEWABLE ORAL DAILY
Status: DISCONTINUED | OUTPATIENT
Start: 2020-05-22 | End: 2020-05-23 | Stop reason: HOSPADM

## 2020-05-22 RX ORDER — CEFAZOLIN SODIUM 1 G/50ML
1000 SOLUTION INTRAVENOUS
Status: COMPLETED | OUTPATIENT
Start: 2020-05-22 | End: 2020-05-22

## 2020-05-22 RX ORDER — OXYBUTYNIN CHLORIDE 5 MG/1
10 TABLET, EXTENDED RELEASE ORAL DAILY
Status: DISCONTINUED | OUTPATIENT
Start: 2020-05-22 | End: 2020-05-23 | Stop reason: HOSPADM

## 2020-05-22 RX ORDER — CALCIUM CARBONATE 200(500)MG
1000 TABLET,CHEWABLE ORAL DAILY PRN
Status: DISCONTINUED | OUTPATIENT
Start: 2020-05-22 | End: 2020-05-23 | Stop reason: HOSPADM

## 2020-05-22 RX ORDER — HYDRALAZINE HYDROCHLORIDE 20 MG/ML
10 INJECTION INTRAMUSCULAR; INTRAVENOUS EVERY 6 HOURS PRN
Status: DISCONTINUED | OUTPATIENT
Start: 2020-05-22 | End: 2020-05-23 | Stop reason: HOSPADM

## 2020-05-22 RX ORDER — EPHEDRINE SULFATE 50 MG/ML
INJECTION INTRAVENOUS AS NEEDED
Status: DISCONTINUED | OUTPATIENT
Start: 2020-05-22 | End: 2020-05-22 | Stop reason: SURG

## 2020-05-22 RX ORDER — PROPOFOL 10 MG/ML
INJECTION, EMULSION INTRAVENOUS AS NEEDED
Status: DISCONTINUED | OUTPATIENT
Start: 2020-05-22 | End: 2020-05-22 | Stop reason: SURG

## 2020-05-22 RX ORDER — CLONAZEPAM 0.5 MG/1
0.25 TABLET ORAL 2 TIMES DAILY
Status: DISCONTINUED | OUTPATIENT
Start: 2020-05-22 | End: 2020-05-23 | Stop reason: HOSPADM

## 2020-05-22 RX ORDER — OXYCODONE HYDROCHLORIDE 5 MG/1
5 TABLET ORAL EVERY 6 HOURS PRN
Status: DISCONTINUED | OUTPATIENT
Start: 2020-05-22 | End: 2020-05-23 | Stop reason: HOSPADM

## 2020-05-22 RX ORDER — ONDANSETRON 2 MG/ML
4 INJECTION INTRAMUSCULAR; INTRAVENOUS ONCE AS NEEDED
Status: DISCONTINUED | OUTPATIENT
Start: 2020-05-22 | End: 2020-05-22 | Stop reason: HOSPADM

## 2020-05-22 RX ORDER — DOCUSATE SODIUM 100 MG/1
100 CAPSULE, LIQUID FILLED ORAL 2 TIMES DAILY
Status: DISCONTINUED | OUTPATIENT
Start: 2020-05-22 | End: 2020-05-23 | Stop reason: HOSPADM

## 2020-05-22 RX ORDER — CIPROFLOXACIN 250 MG/1
250 TABLET, FILM COATED ORAL EVERY 12 HOURS SCHEDULED
Qty: 10 TABLET | Refills: 0 | Status: SHIPPED | OUTPATIENT
Start: 2020-05-22 | End: 2020-05-27

## 2020-05-22 RX ORDER — LISINOPRIL 20 MG/1
40 TABLET ORAL DAILY
Status: DISCONTINUED | OUTPATIENT
Start: 2020-05-22 | End: 2020-05-23 | Stop reason: HOSPADM

## 2020-05-22 RX ORDER — TAMSULOSIN HYDROCHLORIDE 0.4 MG/1
0.4 CAPSULE ORAL
Status: DISCONTINUED | OUTPATIENT
Start: 2020-05-22 | End: 2020-05-22

## 2020-05-22 RX ORDER — MAGNESIUM HYDROXIDE 1200 MG/15ML
LIQUID ORAL AS NEEDED
Status: DISCONTINUED | OUTPATIENT
Start: 2020-05-22 | End: 2020-05-22 | Stop reason: HOSPADM

## 2020-05-22 RX ORDER — FENTANYL CITRATE 50 UG/ML
INJECTION, SOLUTION INTRAMUSCULAR; INTRAVENOUS AS NEEDED
Status: DISCONTINUED | OUTPATIENT
Start: 2020-05-22 | End: 2020-05-22 | Stop reason: SURG

## 2020-05-22 RX ORDER — ONDANSETRON 2 MG/ML
INJECTION INTRAMUSCULAR; INTRAVENOUS AS NEEDED
Status: DISCONTINUED | OUTPATIENT
Start: 2020-05-22 | End: 2020-05-22 | Stop reason: SURG

## 2020-05-22 RX ORDER — SODIUM CHLORIDE 9 MG/ML
75 INJECTION, SOLUTION INTRAVENOUS CONTINUOUS
Status: DISCONTINUED | OUTPATIENT
Start: 2020-05-22 | End: 2020-05-23

## 2020-05-22 RX ORDER — VENLAFAXINE HYDROCHLORIDE 150 MG/1
150 CAPSULE, EXTENDED RELEASE ORAL DAILY
Status: DISCONTINUED | OUTPATIENT
Start: 2020-05-22 | End: 2020-05-23 | Stop reason: HOSPADM

## 2020-05-22 RX ORDER — CIPROFLOXACIN 250 MG/1
250 TABLET, FILM COATED ORAL EVERY 12 HOURS SCHEDULED
Status: DISCONTINUED | OUTPATIENT
Start: 2020-05-22 | End: 2020-05-23 | Stop reason: HOSPADM

## 2020-05-22 RX ADMIN — INSULIN LISPRO 1 UNITS: 100 INJECTION, SOLUTION INTRAVENOUS; SUBCUTANEOUS at 17:20

## 2020-05-22 RX ADMIN — BUSPIRONE HYDROCHLORIDE 10 MG: 5 TABLET ORAL at 20:27

## 2020-05-22 RX ADMIN — SODIUM CHLORIDE 75 ML/HR: 0.9 INJECTION, SOLUTION INTRAVENOUS at 01:42

## 2020-05-22 RX ADMIN — OXYBUTYNIN 10 MG: 5 TABLET, FILM COATED, EXTENDED RELEASE ORAL at 17:19

## 2020-05-22 RX ADMIN — LISINOPRIL 40 MG: 20 TABLET ORAL at 09:43

## 2020-05-22 RX ADMIN — CLONAZEPAM 0.25 MG: 0.5 TABLET ORAL at 17:19

## 2020-05-22 RX ADMIN — OXYCODONE HYDROCHLORIDE 2.5 MG: 5 TABLET ORAL at 03:16

## 2020-05-22 RX ADMIN — OXYCODONE HYDROCHLORIDE 2.5 MG: 5 TABLET ORAL at 20:27

## 2020-05-22 RX ADMIN — BUSPIRONE HYDROCHLORIDE 10 MG: 5 TABLET ORAL at 17:19

## 2020-05-22 RX ADMIN — EPHEDRINE SULFATE 5 MG: 50 INJECTION, SOLUTION INTRAVENOUS at 13:23

## 2020-05-22 RX ADMIN — HYDROMORPHONE HYDROCHLORIDE 0.2 MG: 1 INJECTION, SOLUTION INTRAMUSCULAR; INTRAVENOUS; SUBCUTANEOUS at 22:54

## 2020-05-22 RX ADMIN — CIPROFLOXACIN HYDROCHLORIDE 250 MG: 250 TABLET, FILM COATED ORAL at 20:27

## 2020-05-22 RX ADMIN — CLONAZEPAM 0.25 MG: 0.5 TABLET ORAL at 09:43

## 2020-05-22 RX ADMIN — CEFAZOLIN SODIUM 1000 MG: 1 SOLUTION INTRAVENOUS at 13:13

## 2020-05-22 RX ADMIN — PHENYLEPHRINE HYDROCHLORIDE 200 MCG: 10 INJECTION INTRAVENOUS at 13:23

## 2020-05-22 RX ADMIN — GLYCOPYRROLATE 0.2 MG: 0.2 INJECTION, SOLUTION INTRAMUSCULAR; INTRAVENOUS at 13:18

## 2020-05-22 RX ADMIN — SODIUM CHLORIDE 75 ML/HR: 0.9 INJECTION, SOLUTION INTRAVENOUS at 03:05

## 2020-05-22 RX ADMIN — ONDANSETRON 4 MG: 2 INJECTION INTRAMUSCULAR; INTRAVENOUS at 13:44

## 2020-05-22 RX ADMIN — PROPOFOL 150 MG: 10 INJECTION, EMULSION INTRAVENOUS at 13:18

## 2020-05-22 RX ADMIN — LIDOCAINE HYDROCHLORIDE 60 MG: 10 INJECTION, SOLUTION EPIDURAL; INFILTRATION; INTRACAUDAL; PERINEURAL at 13:18

## 2020-05-22 RX ADMIN — FENTANYL CITRATE 25 MCG: 50 INJECTION INTRAMUSCULAR; INTRAVENOUS at 13:32

## 2020-05-22 RX ADMIN — FENTANYL CITRATE 50 MCG: 50 INJECTION INTRAMUSCULAR; INTRAVENOUS at 13:18

## 2020-05-22 RX ADMIN — BUSPIRONE HYDROCHLORIDE 10 MG: 5 TABLET ORAL at 09:43

## 2020-05-22 RX ADMIN — DOCUSATE SODIUM 100 MG: 100 CAPSULE, LIQUID FILLED ORAL at 17:20

## 2020-05-23 VITALS
RESPIRATION RATE: 18 BRPM | OXYGEN SATURATION: 95 % | DIASTOLIC BLOOD PRESSURE: 82 MMHG | HEART RATE: 89 BPM | SYSTOLIC BLOOD PRESSURE: 170 MMHG | TEMPERATURE: 98.1 F | WEIGHT: 135.36 LBS | BODY MASS INDEX: 30.45 KG/M2 | HEIGHT: 56 IN

## 2020-05-23 LAB
GLUCOSE SERPL-MCNC: 179 MG/DL (ref 65–140)
GLUCOSE SERPL-MCNC: 182 MG/DL (ref 65–140)
GLUCOSE SERPL-MCNC: 187 MG/DL (ref 65–140)

## 2020-05-23 PROCEDURE — 82948 REAGENT STRIP/BLOOD GLUCOSE: CPT

## 2020-05-23 PROCEDURE — 99239 HOSP IP/OBS DSCHRG MGMT >30: CPT | Performed by: INTERNAL MEDICINE

## 2020-05-23 PROCEDURE — 99232 SBSQ HOSP IP/OBS MODERATE 35: CPT | Performed by: UROLOGY

## 2020-05-23 RX ADMIN — CIPROFLOXACIN HYDROCHLORIDE 250 MG: 250 TABLET, FILM COATED ORAL at 10:00

## 2020-05-23 RX ADMIN — DOCUSATE SODIUM 100 MG: 100 CAPSULE, LIQUID FILLED ORAL at 10:00

## 2020-05-23 RX ADMIN — ATORVASTATIN CALCIUM 10 MG: 10 TABLET, FILM COATED ORAL at 09:59

## 2020-05-23 RX ADMIN — LISINOPRIL 40 MG: 20 TABLET ORAL at 09:59

## 2020-05-23 RX ADMIN — VENLAFAXINE HYDROCHLORIDE 150 MG: 150 CAPSULE, EXTENDED RELEASE ORAL at 10:00

## 2020-05-23 RX ADMIN — MELATONIN 1000 UNITS: at 09:59

## 2020-05-23 RX ADMIN — BUSPIRONE HYDROCHLORIDE 10 MG: 5 TABLET ORAL at 09:59

## 2020-05-23 RX ADMIN — CLONAZEPAM 0.25 MG: 0.5 TABLET ORAL at 10:00

## 2020-05-23 RX ADMIN — OXYBUTYNIN 10 MG: 5 TABLET, FILM COATED, EXTENDED RELEASE ORAL at 09:59

## 2020-05-27 ENCOUNTER — TELEPHONE (OUTPATIENT)
Dept: UROLOGY | Facility: MEDICAL CENTER | Age: 80
End: 2020-05-27

## 2020-05-27 ENCOUNTER — TRANSITIONAL CARE MANAGEMENT (OUTPATIENT)
Dept: FAMILY MEDICINE CLINIC | Facility: CLINIC | Age: 80
End: 2020-05-27

## 2020-05-29 ENCOUNTER — TELEMEDICINE (OUTPATIENT)
Dept: FAMILY MEDICINE CLINIC | Facility: CLINIC | Age: 80
End: 2020-05-29
Payer: MEDICARE

## 2020-05-29 DIAGNOSIS — N20.1 URETEROLITHIASIS: Primary | ICD-10-CM

## 2020-05-29 DIAGNOSIS — K59.04 CHRONIC IDIOPATHIC CONSTIPATION: ICD-10-CM

## 2020-05-29 PROCEDURE — 99495 TRANSJ CARE MGMT MOD F2F 14D: CPT | Performed by: FAMILY MEDICINE

## 2020-06-03 ENCOUNTER — TELEPHONE (OUTPATIENT)
Dept: FAMILY MEDICINE CLINIC | Facility: CLINIC | Age: 80
End: 2020-06-03

## 2020-06-04 ENCOUNTER — TELEPHONE (OUTPATIENT)
Dept: FAMILY MEDICINE CLINIC | Facility: CLINIC | Age: 80
End: 2020-06-04

## 2020-06-04 DIAGNOSIS — F41.8 DEPRESSION WITH ANXIETY: ICD-10-CM

## 2020-06-04 RX ORDER — BUSPIRONE HYDROCHLORIDE 15 MG/1
15 TABLET ORAL 3 TIMES DAILY
Qty: 90 TABLET | Refills: 0 | Status: SHIPPED | OUTPATIENT
Start: 2020-06-04 | End: 2020-06-29 | Stop reason: SDUPTHER

## 2020-06-09 ENCOUNTER — PROCEDURE VISIT (OUTPATIENT)
Dept: UROLOGY | Facility: MEDICAL CENTER | Age: 80
End: 2020-06-09
Payer: MEDICARE

## 2020-06-09 VITALS
WEIGHT: 135 LBS | SYSTOLIC BLOOD PRESSURE: 122 MMHG | DIASTOLIC BLOOD PRESSURE: 80 MMHG | BODY MASS INDEX: 30.37 KG/M2 | HEART RATE: 96 BPM | TEMPERATURE: 98.7 F | HEIGHT: 56 IN

## 2020-06-09 DIAGNOSIS — N20.1 URETEROLITHIASIS: ICD-10-CM

## 2020-06-09 DIAGNOSIS — D30.3 BENIGN BLADDER TUMOR: ICD-10-CM

## 2020-06-09 DIAGNOSIS — Z46.6 ENCOUNTER FOR REMOVAL OF URETERAL STENT: Primary | ICD-10-CM

## 2020-06-09 PROCEDURE — 3079F DIAST BP 80-89 MM HG: CPT | Performed by: UROLOGY

## 2020-06-09 PROCEDURE — 1111F DSCHRG MED/CURRENT MED MERGE: CPT | Performed by: UROLOGY

## 2020-06-09 PROCEDURE — 3008F BODY MASS INDEX DOCD: CPT | Performed by: UROLOGY

## 2020-06-09 PROCEDURE — 1124F ACP DISCUSS-NO DSCNMKR DOCD: CPT | Performed by: UROLOGY

## 2020-06-09 PROCEDURE — 1036F TOBACCO NON-USER: CPT | Performed by: UROLOGY

## 2020-06-09 PROCEDURE — 4040F PNEUMOC VAC/ADMIN/RCVD: CPT | Performed by: UROLOGY

## 2020-06-09 PROCEDURE — 2022F DILAT RTA XM EVC RTNOPTHY: CPT | Performed by: UROLOGY

## 2020-06-09 PROCEDURE — 3074F SYST BP LT 130 MM HG: CPT | Performed by: UROLOGY

## 2020-06-09 PROCEDURE — 99214 OFFICE O/P EST MOD 30 MIN: CPT | Performed by: UROLOGY

## 2020-06-09 PROCEDURE — 1160F RVW MEDS BY RX/DR IN RCRD: CPT | Performed by: UROLOGY

## 2020-06-09 PROCEDURE — 52310 CYSTOSCOPY AND TREATMENT: CPT | Performed by: UROLOGY

## 2020-06-09 RX ORDER — LINACLOTIDE 72 UG/1
CAPSULE, GELATIN COATED ORAL
COMMUNITY
Start: 2020-05-29 | End: 2020-06-09 | Stop reason: ALTCHOICE

## 2020-06-29 ENCOUNTER — OFFICE VISIT (OUTPATIENT)
Dept: FAMILY MEDICINE CLINIC | Facility: CLINIC | Age: 80
End: 2020-06-29
Payer: MEDICARE

## 2020-06-29 VITALS
OXYGEN SATURATION: 99 % | HEART RATE: 88 BPM | TEMPERATURE: 97.5 F | RESPIRATION RATE: 16 BRPM | DIASTOLIC BLOOD PRESSURE: 78 MMHG | HEIGHT: 56 IN | BODY MASS INDEX: 29.47 KG/M2 | WEIGHT: 131 LBS | SYSTOLIC BLOOD PRESSURE: 138 MMHG

## 2020-06-29 DIAGNOSIS — I10 BENIGN ESSENTIAL HYPERTENSION: ICD-10-CM

## 2020-06-29 DIAGNOSIS — E78.2 MIXED HYPERLIPIDEMIA: ICD-10-CM

## 2020-06-29 DIAGNOSIS — E11.9 CONTROLLED TYPE 2 DIABETES MELLITUS WITHOUT COMPLICATION, WITHOUT LONG-TERM CURRENT USE OF INSULIN (HCC): ICD-10-CM

## 2020-06-29 DIAGNOSIS — F51.01 PRIMARY INSOMNIA: ICD-10-CM

## 2020-06-29 DIAGNOSIS — F60.5 OBSESSIVE COMPULSIVE PERSONALITY DISORDER (HCC): Primary | ICD-10-CM

## 2020-06-29 DIAGNOSIS — F41.9 ANXIETY: ICD-10-CM

## 2020-06-29 DIAGNOSIS — F41.8 DEPRESSION WITH ANXIETY: ICD-10-CM

## 2020-06-29 PROCEDURE — 1036F TOBACCO NON-USER: CPT | Performed by: FAMILY MEDICINE

## 2020-06-29 PROCEDURE — 4040F PNEUMOC VAC/ADMIN/RCVD: CPT | Performed by: FAMILY MEDICINE

## 2020-06-29 PROCEDURE — 2022F DILAT RTA XM EVC RTNOPTHY: CPT | Performed by: FAMILY MEDICINE

## 2020-06-29 PROCEDURE — 3008F BODY MASS INDEX DOCD: CPT | Performed by: FAMILY MEDICINE

## 2020-06-29 PROCEDURE — 1160F RVW MEDS BY RX/DR IN RCRD: CPT | Performed by: FAMILY MEDICINE

## 2020-06-29 PROCEDURE — 3075F SYST BP GE 130 - 139MM HG: CPT | Performed by: FAMILY MEDICINE

## 2020-06-29 PROCEDURE — 1111F DSCHRG MED/CURRENT MED MERGE: CPT | Performed by: FAMILY MEDICINE

## 2020-06-29 PROCEDURE — 99215 OFFICE O/P EST HI 40 MIN: CPT | Performed by: FAMILY MEDICINE

## 2020-06-29 PROCEDURE — 3078F DIAST BP <80 MM HG: CPT | Performed by: FAMILY MEDICINE

## 2020-06-29 RX ORDER — BUSPIRONE HYDROCHLORIDE 15 MG/1
15 TABLET ORAL 3 TIMES DAILY
Qty: 90 TABLET | Refills: 2 | Status: SHIPPED | OUTPATIENT
Start: 2020-06-29 | End: 2020-09-24

## 2020-06-29 RX ORDER — ATORVASTATIN CALCIUM 10 MG/1
10 TABLET, FILM COATED ORAL DAILY
Qty: 90 TABLET | Refills: 1 | Status: SHIPPED | OUTPATIENT
Start: 2020-06-29 | End: 2021-01-12

## 2020-06-29 RX ORDER — CLONAZEPAM 0.5 MG/1
TABLET ORAL
Qty: 60 TABLET | Refills: 3 | Status: SHIPPED | OUTPATIENT
Start: 2020-06-29 | End: 2020-09-24

## 2020-06-30 PROBLEM — N20.1 URETEROLITHIASIS: Status: RESOLVED | Noted: 2020-05-22 | Resolved: 2020-06-30

## 2020-07-09 ENCOUNTER — TELEPHONE (OUTPATIENT)
Dept: FAMILY MEDICINE CLINIC | Facility: CLINIC | Age: 80
End: 2020-07-09

## 2020-07-09 NOTE — TELEPHONE ENCOUNTER
Daughter called back    She would like for her mother to try a nutritionist or dietician instead of medicine to see if this will help her    She would just need an order in the chart  She would like her to see Edmond Plummer  Can we add order or would patient need an OV?

## 2020-07-09 NOTE — TELEPHONE ENCOUNTER
Pt's daughter called and stated that the [place] that was given to her for constipation is not really helping  She wants to know if something else can be done for her? Please call back

## 2020-07-10 DIAGNOSIS — E11.9 CONTROLLED TYPE 2 DIABETES MELLITUS WITHOUT COMPLICATION, WITHOUT LONG-TERM CURRENT USE OF INSULIN (HCC): Primary | ICD-10-CM

## 2020-07-13 ENCOUNTER — CLINICAL SUPPORT (OUTPATIENT)
Dept: NUTRITION | Facility: HOSPITAL | Age: 80
End: 2020-07-13
Payer: MEDICARE

## 2020-07-13 VITALS — WEIGHT: 131.6 LBS | BODY MASS INDEX: 29.5 KG/M2

## 2020-07-13 DIAGNOSIS — E11.9 TYPE 2 DIABETES MELLITUS WITHOUT COMPLICATION, UNSPECIFIED WHETHER LONG TERM INSULIN USE (HCC): Primary | ICD-10-CM

## 2020-07-13 PROCEDURE — 97802 MEDICAL NUTRITION INDIV IN: CPT

## 2020-07-13 NOTE — PROGRESS NOTES
Initial Nutrition Assessment Form    Patient Name: Emiliano Sesay    YOB: 1940    Sex: Female     Assessment Date: 7/13/2020  Start Time: 1120 Stop Time: 65 Total Minutes: 61     Data:  Present at session: self   Parent/Patient Concerns: "I am constipated"   Medical Dx/Reason for Referral: DM   Past Medical History:   Diagnosis Date    Anxiety     Colon polyps     Depression     Diabetes mellitus ([facility] )     Dizziness     Last assessed: 8/31/15    [facility]     Dysuria     Hyperlipidemia     Hypertension     Insomnia     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[facility] )     Ureterolithiasis 5/22/2020       Current Outpatient Medications   Medication Sig Dispense Refill    aspirin 81 MG tablet Take 81 mg by mouth daily      atorvastatin (LIPITOR) 10 mg tablet Take 1 tablet (10 mg total) by mouth daily 90 tablet 1    busPIRone (BUSPAR) 15 mg tablet Take 1 tablet (15 mg total) by mouth 3 (three) times a day 90 tablet 2    cholecalciferol (VITAMIN D3) 1,000 units tablet Take 1,000 Units by mouth daily      clonazePAM (KlonoPIN) 0 5 mg tablet TAKE 1/2 TABLET BY MOUTH IN THE MORNING AND 2:00PM AND TAKE 1 TABLET AT NIGHT 60 tablet 3    docusate sodium (COLACE) 100 mg capsule Take 100 mg by mouth 2 (two) times a day      linaCLOtide (Linzess) 72 MCG CAPS Take 1 capsule by mouth daily 30 capsule 3    metFORMIN (GLUCOPHAGE) 500 mg tablet Take 1 tablet (500 mg total) by mouth 2 (two) times a day with meals 180 tablet 1    oxybutynin (DITROPAN-XL) 10 MG 24 hr tablet Take 1 tablet (10 mg total) by mouth daily for 14 days 14 tablet 0    ramipril (ALTACE) 10 MG capsule Take 1 capsule (10 mg total) by mouth daily 90 capsule 1    venlafaxine (EFFEXOR-XR) 150 mg 24 hr capsule TAKE ONE CAPSULE BY MOUTH EVERY DAY 90 capsule 1     No current facility-administered medications for this visit           Additional Meds/Supplements: n/a   Special Learning Needs: n/a   Height: HC Readings from Last 3 Encounters:   No data found for [facility]       Weight: Wt Readings from Last 10 Encounters:   07/13/20 59 7 kg (131 lb 9 6 oz)   06/29/20 59 4 kg (131 lb)   06/09/20 61 2 kg (135 lb)   05/22/20 61 4 kg (135 lb 5 8 oz)   03/09/20 62 1 kg (137 lb)   01/23/20 62 6 kg (138 lb)   11/01/19 62 6 kg (138 lb)   07/08/19 63 6 kg (140 lb 3 2 oz)   03/04/19 63 kg (139 lb)   10/15/18 64 9 kg (143 lb)     Estimated body mass index is 29 5 kg/m² as calculated from the following:    Height as of 6/29/20: 4' 8" (1 422 m)  Weight as of this encounter: 59 7 kg (131 lb 9 6 oz)  Recent Weight Change: [x]Yes     []No  Amount: 6# loss since March, wants to maintain current weight      Energy Needs:    No Known Allergies NKFA   Social History     Substance and Sexual Activity   Alcohol Use No    n/a   Social History     Tobacco Use   Smoking Status Never Smoker   Smokeless Tobacco Never Used    n/a   Who shops? daughter   Who cooks? daughter   Exercise:    Prior Counseling? []Yes     [x]No  When:      Why:         Diet Hx:  Breakfast: 2 eggs 2 sl ada killer bread (10gms fiber) with butter 8 oz water 1c of smooth move organic tea; 2 eggs 1 sl ada killer bread w/butter 1/2c oatmeal w/raisins  10   a m  Lunch: veggie pizza and 8 oz water-order out; salad chick peas blck beans and tuna fish   1230-1-130  p m  Dinner:  Anthonette Marrow on bun, salad-black beans-spring mix chick peas croutons olives gorgonzola or feta sometimes; 6-8oz water; flounder with broccoli   6-630  p m           Snacks: apple  1130; nectarine; 5 prunes AM -   PM -   HS -         Nutrition Diagnosis:   Altered Nutrition-Related Laboratory values  related to Kidney, liver, cardiac, endocrine, neurologic, and/or pulmonary dysfunction as evidenced by  Abnormal plasma glucose and/or HgbA1c levels       Medical Nutrition Therapy Intervention:  []Individualized Meal Plan []Understanding Lab Values   []Basic Pathophysiology of Disease []Food/Medication Interactions   []Food Diary []Exercise   []Lifestyle/Behavior Modification Techniques []Medication, Mechanism of Action   []Label Reading [x]Self Blood Glucose Monitoring- does not measure at home   []Weight/BMI Goals [x]Other - lives with her daughter and 3 grandchildren and    Other Notes: take out sometimes pizza; bed at 930-10; awake 2x/nt for bathroom, gest up out of bed at 9; no naps during the day Every 2-3days will have BM       Comprehension: []Excellent  []Very Good  [x]Good  []Fair   []Poor    Receptivity: []Excellent  []Very Good  [x]Good  []Fair   []Poor    Expected Compliance: []Excellent  []Very Good  [x]Good  []Fair   []Poor        Goals:  1 30-45gms CHO per meal   2   25-30gms fiber per day   3  60 water daily       No follow-ups on file    Labs:  CMP  Lab Results   Component Value Date     02/11/2017    K 4 2 05/22/2020     05/22/2020    CO2 26 05/22/2020    ANIONGAP 10 03/17/2015    BUN 18 05/22/2020    CREATININE 0 86 05/22/2020    GLUCOSE 134 03/17/2015    CALCIUM 8 0 (L) 05/22/2020    AST 21 05/21/2020    ALT 19 05/21/2020    ALKPHOS 80 05/21/2020    PROT 6 5 02/11/2017    BILITOT 0 7 02/11/2017    EGFR 64 05/22/2020       BMP  Lab Results   Component Value Date    GLUCOSE 134 03/17/2015    CALCIUM 8 0 (L) 05/22/2020     02/11/2017    K 4 2 05/22/2020    CO2 26 05/22/2020     05/22/2020    BUN 18 05/22/2020    CREATININE 0 86 05/22/2020       Lipids  Lab Results   Component Value Date    CHOL 151 02/11/2017    CHOL 127 03/08/2015    CHOL 153 12/04/2014     Lab Results   Component Value Date    HDL 48 (L) 02/29/2020    HDL 50 10/29/2019    HDL 49 (L) 06/29/2019     Lab Results   Component Value Date    LDLCALC 83 02/29/2020    LDLCALC 78 10/29/2019    LDLCALC 84 06/29/2019     Lab Results   Component Value Date    TRIG 132 02/29/2020    TRIG 116 10/29/2019    TRIG 113 06/29/2019     No results found for: CHOLHDL    Hemoglobin A1C  Lab Results   Component Value Date HGBA1C 7 2 (H) 02/29/2020       Fasting Glucose  No results found for: GLUF    Insulin     Thyroid  Lab Results   Component Value Date    TSH 2 410 10/29/2019       Hepatic Function Panel  Lab Results   Component Value Date    ALT 19 05/21/2020    AST 21 05/21/2020    ALKPHOS 80 05/21/2020    BILITOT 0 7 02/11/2017       Celiac Disease Antibody Panel  No results found for: ENDOMYSIAL IGA, GLIADIN IGA, GLIADIN IGG, IGA, TISSUE TRANSGLUT AB, TTG IGA   Iron  No results found for: IRON, TIBC, FERRITIN    Vitamins  No results found for: VITAMIN B2   No results found for: NICOTINAMIDE, NICOTINIC ACID   No results found for: VITAMINB6  No results found for: [id number]  No results found for: VITB5  No results found for: F1DAXIPQ  No results found for: THYROGLB  No results found for: VITAMIN K   No results found for: 25-HYDROXY VIT D   No components found for: Vinnie Payton MS RD LDN  [address] [id number]

## 2020-08-24 DIAGNOSIS — F41.8 OTHER SPECIFIED ANXIETY DISORDERS: ICD-10-CM

## 2020-08-24 DIAGNOSIS — F60.5 OBSESSIVE-COMPULSIVE PERSONALITY DISORDER (HCC): ICD-10-CM

## 2020-08-24 RX ORDER — VENLAFAXINE HYDROCHLORIDE 150 MG/1
150 CAPSULE, EXTENDED RELEASE ORAL DAILY
Qty: 90 CAPSULE | Refills: 1 | Status: SHIPPED | OUTPATIENT
Start: 2020-08-24 | End: 2021-02-22

## 2020-09-11 LAB
ALBUMIN SERPL-MCNC: 4.5 G/DL (ref 3.6–4.6)
ALBUMIN/CREAT UR: 34 MG/G CREAT (ref 0–29)
ALBUMIN/GLOB SERPL: 2 {RATIO} (ref 1.2–2.2)
ALP SERPL-CCNC: 78 IU/L (ref 39–117)
ALT SERPL-CCNC: 14 IU/L (ref 0–32)
AST SERPL-CCNC: 15 IU/L (ref 0–40)
BASOPHILS # BLD AUTO: 0 X10E3/UL (ref 0–0.2)
BASOPHILS NFR BLD AUTO: 1 %
BILIRUB SERPL-MCNC: 0.3 MG/DL (ref 0–1.2)
BUN SERPL-MCNC: 16 MG/DL (ref 8–27)
BUN/CREAT SERPL: 21 (ref 12–28)
CALCIUM SERPL-MCNC: 9.9 MG/DL (ref 8.7–10.3)
CHLORIDE SERPL-SCNC: 102 MMOL/L (ref 96–106)
CHOLEST SERPL-MCNC: 157 MG/DL (ref 100–199)
CO2 SERPL-SCNC: 26 MMOL/L (ref 20–29)
CREAT SERPL-MCNC: 0.75 MG/DL (ref 0.57–1)
CREAT UR-MCNC: 80.4 MG/DL
EOSINOPHIL # BLD AUTO: 0.2 X10E3/UL (ref 0–0.4)
EOSINOPHIL NFR BLD AUTO: 3 %
ERYTHROCYTE [DISTWIDTH] IN BLOOD BY AUTOMATED COUNT: 12.3 % (ref 11.7–15.4)
GLOBULIN SER-MCNC: 2.3 G/DL (ref 1.5–4.5)
GLUCOSE SERPL-MCNC: 135 MG/DL (ref 65–99)
HBA1C MFR BLD: 6.5 % (ref 4.8–5.6)
HCT VFR BLD AUTO: 43.1 % (ref 34–46.6)
HDLC SERPL-MCNC: 54 MG/DL
HGB BLD-MCNC: 14 G/DL (ref 11.1–15.9)
IMM GRANULOCYTES # BLD: 0 X10E3/UL (ref 0–0.1)
IMM GRANULOCYTES NFR BLD: 0 %
LDLC SERPL CALC-MCNC: 81 MG/DL (ref 0–99)
LYMPHOCYTES # BLD AUTO: 1.2 X10E3/UL (ref 0.7–3.1)
LYMPHOCYTES NFR BLD AUTO: 25 %
MCH RBC QN AUTO: 28.2 PG (ref 26.6–33)
MCHC RBC AUTO-ENTMCNC: 32.5 G/DL (ref 31.5–35.7)
MCV RBC AUTO: 87 FL (ref 79–97)
MICROALBUMIN UR-MCNC: 27.2 UG/ML
MONOCYTES # BLD AUTO: 0.3 X10E3/UL (ref 0.1–0.9)
MONOCYTES NFR BLD AUTO: 6 %
NEUTROPHILS # BLD AUTO: 3.2 X10E3/UL (ref 1.4–7)
NEUTROPHILS NFR BLD AUTO: 65 %
PLATELET # BLD AUTO: 224 X10E3/UL (ref 150–450)
POTASSIUM SERPL-SCNC: 4.5 MMOL/L (ref 3.5–5.2)
PROT SERPL-MCNC: 6.8 G/DL (ref 6–8.5)
RBC # BLD AUTO: 4.97 X10E6/UL (ref 3.77–5.28)
SL AMB EGFR AFRICAN AMERICAN: 86 ML/MIN/1.73
SL AMB EGFR NON AFRICAN AMERICAN: 75 ML/MIN/1.73
SL AMB VLDL CHOLESTEROL CALC: 22 MG/DL (ref 5–40)
SODIUM SERPL-SCNC: 140 MMOL/L (ref 134–144)
TRIGL SERPL-MCNC: 124 MG/DL (ref 0–149)
TSH SERPL DL<=0.005 MIU/L-ACNC: 1.51 UIU/ML (ref 0.45–4.5)
WBC # BLD AUTO: 5 X10E3/UL (ref 3.4–10.8)

## 2020-09-14 ENCOUNTER — OFFICE VISIT (OUTPATIENT)
Dept: FAMILY MEDICINE CLINIC | Facility: CLINIC | Age: 80
End: 2020-09-14
Payer: MEDICARE

## 2020-09-14 VITALS
TEMPERATURE: 97 F | HEART RATE: 84 BPM | OXYGEN SATURATION: 96 % | RESPIRATION RATE: 18 BRPM | DIASTOLIC BLOOD PRESSURE: 74 MMHG | WEIGHT: 131 LBS | SYSTOLIC BLOOD PRESSURE: 122 MMHG | BODY MASS INDEX: 29.47 KG/M2 | HEIGHT: 56 IN

## 2020-09-14 DIAGNOSIS — E78.2 MIXED HYPERLIPIDEMIA: ICD-10-CM

## 2020-09-14 DIAGNOSIS — I10 BENIGN ESSENTIAL HYPERTENSION: ICD-10-CM

## 2020-09-14 DIAGNOSIS — F41.8 OTHER SPECIFIED ANXIETY DISORDERS: ICD-10-CM

## 2020-09-14 DIAGNOSIS — I10 ESSENTIAL HYPERTENSION, BENIGN: ICD-10-CM

## 2020-09-14 DIAGNOSIS — F41.9 ANXIETY: ICD-10-CM

## 2020-09-14 DIAGNOSIS — K59.04 CHRONIC IDIOPATHIC CONSTIPATION: ICD-10-CM

## 2020-09-14 DIAGNOSIS — F60.5 OBSESSIVE-COMPULSIVE PERSONALITY DISORDER (HCC): ICD-10-CM

## 2020-09-14 DIAGNOSIS — F60.5 OBSESSIVE COMPULSIVE PERSONALITY DISORDER (HCC): Primary | ICD-10-CM

## 2020-09-14 DIAGNOSIS — E11.9 CONTROLLED TYPE 2 DIABETES MELLITUS WITHOUT COMPLICATION, WITHOUT LONG-TERM CURRENT USE OF INSULIN (HCC): ICD-10-CM

## 2020-09-14 DIAGNOSIS — Z23 FLU VACCINE NEED: ICD-10-CM

## 2020-09-14 PROCEDURE — 99215 OFFICE O/P EST HI 40 MIN: CPT | Performed by: FAMILY MEDICINE

## 2020-09-14 PROCEDURE — 90662 IIV NO PRSV INCREASED AG IM: CPT | Performed by: FAMILY MEDICINE

## 2020-09-14 PROCEDURE — G0008 ADMIN INFLUENZA VIRUS VAC: HCPCS | Performed by: FAMILY MEDICINE

## 2020-09-14 RX ORDER — VENLAFAXINE HYDROCHLORIDE 75 MG/1
75 CAPSULE, EXTENDED RELEASE ORAL
Qty: 90 CAPSULE | Refills: 1 | Status: SHIPPED | OUTPATIENT
Start: 2020-09-14 | End: 2021-03-03

## 2020-09-14 RX ORDER — RAMIPRIL 10 MG/1
10 CAPSULE ORAL DAILY
Qty: 90 CAPSULE | Refills: 1 | Status: SHIPPED | OUTPATIENT
Start: 2020-09-14 | End: 2021-03-15 | Stop reason: SDUPTHER

## 2020-09-14 RX ORDER — LINACLOTIDE 145 UG/1
145 CAPSULE, GELATIN COATED ORAL DAILY
Qty: 90 CAPSULE | Refills: 1 | Status: SHIPPED | OUTPATIENT
Start: 2020-09-14 | End: 2021-03-15 | Stop reason: SDUPTHER

## 2020-09-16 NOTE — ASSESSMENT & PLAN NOTE
Ongoing issues with anxiety  Patient has seen psychiatrist as an inpatient but not as an outpatient  We will try to make adjustments in her dose of Effexor increasing her dosage to 225 mg once daily  Will re-evaluate in 2 months  If they notice that she is having worsening anxiety despite increasing the dosage of medication then we may need referral to psychiatry    Patient will continue seeing therapist on a regular basis  Continue on BuSpar t i d   And Klonopin

## 2020-09-16 NOTE — PROGRESS NOTES
Subjective:      Patient ID: Dhaval Castro is a 78 y o  female  78-year-old female with past medical history of depression with anxiety, obsessive-compulsive disorder, hypertension, hyperlipidemia, diabetes mellitus type 2 presents with her daughter for follow-up of chronic conditions  Daughter notes that her anxiety seems to be worsened primarily due to corona virus pandemic  She is very reluctant to go anywhere other than remaining at home  She is having to help out with her grandchildren and there schooling  Patient once again seems to be obsessing about her health and certain maladies  Patient has been hospitalized for psychiatric symptoms in the past   She is presently seeing a counselor whom she sees on a regular basis  Daughter feels that she can have adjustment in her dosage of medication  Patient is in agreement  Labs reviewed which showed improved hemoglobin A1c and lipid profile  Labs reviewed in detail    Hemoglobin A1c at 6 5% which is the best it has been in several years      Past Medical History:   Diagnosis Date    Anxiety     Colon polyps     Depression     [facility] mellitus [facility] [address] [facility]     Dizziness     Last assessed: 8/31/15    DVT [facility]deep venous thrombosis) ([facility])     Dysuria     Hyperlipidemia     Hypertension     Insomnia     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[facility] [address] [facility]     Ureterolithiasis 2020       Family History   Problem Relation Age of Onset    Depression Mother         w/anxiety    Diabetes Mother         Mellitus    Breast cancer Mother     Hypertension Mother     No Known Problems Father     Depression Sister         w/anxiety    Heart disease Sister         Cardiac Disorder    Breast cancer Sister     Hypertension Sister     Diabetes Brother         Mellitus    Alcohol abuse Son        Past Surgical History:   Procedure Laterality Date     SECTION      1964 son, 26 daughter    COLONOSCOPY      [place] RETROGRADE PYELOGRAM  2020    OR COLONOSCOPY FLX DX W/COLLJ SPEC WHEN PFRMD N/A 3/27/2017    Procedure: COLONOSCOPY;  Surgeon: Lucy Cartwright MD;  [facility]; Service: Gastroenterology    [place] CYSTO/URETERO W/LITHOTRIPSY &INDWELL STENT INSRT Right 5/22/2020    Procedure: CYSTOSCOPY URETEROSCOPY WITH LITHOTRIPSY HOLMIUM LASER, RETROGRADE PYELOGRAM AND INSERTION STENT URETERAL; EXCISION OF BLADDER TUMOR;  Surgeon: Michael Bacon MD;  [facility];  Service: Urology    ROTATOR CUFF REPAIR Left     Last assessed: 3/29/15    TONSILLECTOMY          reports that she has never smoked  She has never used smokeless tobacco  She reports that she does not drink alcohol or use drugs        Current Outpatient Medications:     aspirin 81 MG tablet, Take 81 mg by mouth daily, Disp: , Rfl:     atorvastatin (LIPITOR) 10 mg tablet, Take 1 tablet (10 mg total) by mouth daily, Disp: 90 tablet, Rfl: 1    busPIRone (BUSPAR) 15 mg tablet, Take 1 tablet (15 mg total) by mouth 3 (three) times a day, Disp: 90 tablet, Rfl: 2    cholecalciferol (VITAMIN D3) 1,000 units tablet, Take 1,000 Units by mouth daily, Disp: , Rfl:     clonazePAM (KlonoPIN) 0 5 mg tablet, TAKE 1/2 TABLET BY MOUTH IN THE MORNING AND 2:00PM AND TAKE 1 TABLET AT NIGHT, Disp: 60 tablet, Rfl: 3    docusate sodium (COLACE) 100 mg capsule, Take 100 mg by mouth 2 (two) times a day, Disp: , Rfl:     metFORMIN (GLUCOPHAGE) 500 mg tablet, Take 1 tablet (500 mg total) by mouth 2 (two) times a day with meals, Disp: 180 tablet, Rfl: 1    ramipril (ALTACE) 10 MG capsule, Take 1 capsule (10 mg total) by mouth daily, Disp: 90 capsule, Rfl: 1    venlafaxine (EFFEXOR-XR) 150 mg 24 hr capsule, Take 1 capsule (150 mg total) by mouth daily, Disp: 90 capsule, Rfl: 1    linaCLOtide (Linzess) 145 MCG CAPS, Take 1 capsule (145 mcg total) by mouth daily, Disp: 90 capsule, Rfl: 1    venlafaxine (EFFEXOR-XR) 75 mg 24 hr capsule, Take 1 capsule (75 mg total) by mouth daily with breakfast Total daily dose to 225 mg, Disp: 90 capsule, Rfl: 1    The following portions of the patient's history were reviewed and updated as appropriate: allergies, current medications, past family history, past medical history, past social history, past surgical history and problem list     Review of Systems   Constitutional: Negative  HENT: Negative  Eyes: Negative  Respiratory: Negative  Cardiovascular: Negative  Gastrointestinal: Negative  Endocrine: Negative  Genitourinary: Negative  Musculoskeletal: Negative  Skin: Negative  Allergic/Immunologic: Negative  Neurological: Negative  Hematological: Negative  Psychiatric/Behavioral: Positive for sleep disturbance (Chronic insomnia)  The patient is nervous/anxious ( chronic anxiety/OCD symptoms)  Objective:    /74   Pulse 84   Temp (!) 97 °F (36 1 °C) (Tympanic)   Resp 18   Ht 4' 8" (1 422 m)   Wt 59 4 kg (131 lb)   SpO2 96%   BMI 29 37 kg/m²      Physical Exam  Vitals signs and nursing note reviewed  Constitutional:       General: She is not in acute distress  Appearance: She is well-developed  HENT:      Head: Normocephalic and atraumatic  Right Ear: External ear normal       Left Ear: External ear normal       Nose: Nose normal    Eyes:      Conjunctiva/sclera: Conjunctivae normal       Pupils: Pupils are equal, round, and reactive to light  Neck:      Musculoskeletal: Normal range of motion and neck supple  Cardiovascular:      Rate and Rhythm: Normal rate and regular rhythm  Heart sounds: Normal heart sounds  No murmur  Pulmonary:      Effort: Pulmonary effort is normal       Breath sounds: Normal breath sounds  Abdominal:      General: Bowel sounds are normal       Palpations: Abdomen is soft  Musculoskeletal: Normal range of motion  Skin:     General: Skin is warm and dry  Findings: No rash  Neurological:      Mental Status: She is alert and oriented to person, place, and time  Deep Tendon Reflexes: Reflexes are normal and symmetric  Psychiatric:         Attention and Perception: Attention normal          Mood and Affect: Mood is anxious  Affect is not inappropriate  Speech: Speech normal          Behavior: Behavior normal          Thought Content: Thought content is paranoid  Thought content is not delusional  Thought content does not include homicidal or suicidal ideation  Thought content does not include homicidal or suicidal plan           Judgment: Judgment normal            Recent Results (from the past 1008 hour(s))   CBC and differential    Collection Time: 09/10/20  9:39 AM   Result Value Ref Range    White Blood Cell Count 5 0 3 4 - 10 8 x10E3/uL    Red Blood Cell Count 4 97 3 77 - 5 28 x10E6/uL    Hemoglobin 14 0 11 1 - 15 9 g/dL    HCT 43 1 34 0 - 46 6 %    MCV 87 79 - 97 fL    MCH 28 2 26 6 - 33 0 pg    MCHC 32 5 31 5 - 35 7 g/dL    RDW 12 3 11 7 - 15 4 %    Platelet Count 473 346 - 450 x10E3/uL    Neutrophils 65 Not Estab  %    Lymphocytes 25 Not Estab  %    Monocytes 6 Not Estab  %    Eosinophils 3 Not Estab  %    Basophils PCT 1 Not Estab  %    Neutrophils (Absolute) 3 2 1 4 - 7 0 x10E3/uL    Lymphocytes (Absolute) 1 2 0 7 - 3 1 x10E3/uL    Monocytes (Absolute) 0 3 0 1 - 0 9 x10E3/uL    Eosinophils (Absolute) 0 2 0 0 - 0 4 x10E3/uL    Basophils ABS 0 0 0 0 - 0 2 x10E3/uL    Immature Granulocytes 0 Not Estab  %    Immature Granulocytes (Absolute) 0 0 0 0 - 0 1 x10E3/uL   Comprehensive metabolic panel    Collection Time: 09/10/20  9:39 AM   Result Value Ref Range    Glucose, Random 135 (H) 65 - 99 mg/dL    BUN 16 8 - 27 mg/dL    Creatinine 0 75 0 57 - 1 00 mg/dL    eGFR Non African American 75 >59 mL/min/1 73    eGFR  86 >59 mL/min/1 73    SL AMB BUN/CREATININE RATIO 21 12 - 28    Sodium 140 134 - 144 mmol/L    Potassium 4 5 3 5 - 5 2 mmol/L    Chloride 102 96 - 106 mmol/L    CO2 26 20 - 29 mmol/L    CALCIUM 9 9 8 7 - 10 3 mg/dL    Protein, Total 6 8 6 0 - 8 5 g/dL    Albumin 4 5 3 6 - 4 6 g/dL    Globulin, Total 2 3 1 5 - 4 5 g/dL    Albumin/Globulin Ratio 2 0 1 2 - 2 2    TOTAL BILIRUBIN 0 3 0 0 - 1 2 mg/dL    Alk Phos Isoenzymes 78 39 - 117 IU/L    AST 15 0 - 40 IU/L    ALT 14 0 - 32 IU/L   Lipid panel    Collection Time: 09/10/20  9:39 AM   Result Value Ref Range    Cholesterol, Total 157 100 - 199 mg/dL    Triglycerides 124 0 - 149 mg/dL    HDL 54 >39 mg/dL    VLDL Cholesterol Calculated 22 5 - 40 mg/dL    LDL Calculated 81 0 - 99 mg/dL   Microalbumin / creatinine urine ratio    Collection Time: 09/10/20  9:39 AM   Result Value Ref Range    Creatinine, Urine 80 4 Not Estab  mg/dL    Microalbum  ,U,Random 27 2 Not Estab  ug/mL    Microalb/Creat Ratio 34 (H) 0 - 29 mg/g creat   Hemoglobin A1c (w/out EAG)    Collection Time: 09/10/20  9:39 AM   Result Value Ref Range    Hemoglobin A1C 6 5 (H) 4 8 - 5 6 %   TSH WITH REFLEX TO FREE T4    Collection Time: 09/10/20  9:39 AM   Result Value Ref Range    TSH 1 510 0 450 - 4 500 uIU/mL       Assessment/Plan:    Chronic idiopathic constipation  -patient does need follow-up with Gastroenterology  -patient still continues to complain of intermittent episodes of constipation and she fixates on her bowels  She is taking Colace 100 mg twice daily and Linzess 72 mcg daily this no longer seems to be as effective  Increased dose of Linzess 145 mcg daily    Controlled type 2 diabetes mellitus without complication, without long-term current use of insulin ([facility])    Lab Results   Component Value Date    HGBA1C 6 5 (H) 09/10/2020     Diabetes remains very well controlled on metformin 500 mg twice daily  Hemoglobin A1c of 6 5% is excellent glycemic control  Repeat diabetic parameters to be done in 4 months    Benign essential hypertension  Hypertension remains very well controlled on ramipril 10 mg once daily  Continue same  Re-evaluate in 4 months    Anxiety  Ongoing issues with anxiety    Patient has seen psychiatrist as an inpatient but not as an outpatient  We will try to make adjustments in her dose of Effexor increasing her dosage to 225 mg once daily  Will re-evaluate in 2 months  If they notice that she is having worsening anxiety despite increasing the dosage of medication then we may need referral to psychiatry    Patient will continue seeing therapist on a regular basis  Continue on BuSpar t i d  And Klonopin    Hyperlipidemia  Very well controlled on atorvastatin 10 mg once daily  Repeat lipid profile in 4 months  Goal LDL less than 70 as diabetic    Obsessive compulsive personality disorder  Once again she is obsessed with her health and fixated on this  Corona virus pandemic is adding to the stress of her OCD  Patient continue seeing counselor  We will increase her dose of Effexor  mg once daily, continue on BuSpar 15 mg t i d  And Klonopin  Re-evaluate in 2 months          Problem List Items Addressed This Visit        Digestive    Chronic idiopathic constipation     -patient does need follow-up with Gastroenterology  -patient still continues to complain of intermittent episodes of constipation and she fixates on her bowels  She is taking Colace 100 mg twice daily and Linzess 72 mcg daily this no longer seems to be as effective  Increased dose of Linzess 145 mcg daily         Relevant Medications    linaCLOtide (Linzess) 145 MCG CAPS       Endocrine    Controlled type 2 diabetes mellitus without complication, without long-term current use of insulin ([facility])       Lab Results   Component Value Date    HGBA1C 6 5 (H) 09/10/2020     Diabetes remains very well controlled on metformin 500 mg twice daily  Hemoglobin A1c of 6 5% is excellent glycemic control    Repeat diabetic parameters to be done in 4 months         Relevant Orders    Hemoglobin A1C    Microalbumin / creatinine urine ratio       Cardiovascular and Mediastinum    Benign essential hypertension     Hypertension remains very well controlled on ramipril 10 mg once daily  Continue same  Re-evaluate in 4 months         Relevant Medications    ramipril (ALTACE) 10 MG capsule    Other Relevant Orders    CBC and differential    TSH, 3rd generation with Free T4 reflex       Other    Anxiety     Ongoing issues with anxiety  Patient has seen psychiatrist as an inpatient but not as an outpatient  We will try to make adjustments in her dose of Effexor increasing her dosage to 225 mg once daily  Will re-evaluate in 2 months  If they notice that she is having worsening anxiety despite increasing the dosage of medication then we may need referral to psychiatry    Patient will continue seeing therapist on a regular basis  Continue on BuSpar t i d  And Klonopin         Hyperlipidemia     Very well controlled on atorvastatin 10 mg once daily  Repeat lipid profile in 4 months  Goal LDL less than 70 as diabetic         Relevant Orders    Comprehensive metabolic panel    Lipid panel    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[facility] [address] ) - Primary     Once again she is obsessed with her health and fixated on this  Corona virus pandemic is adding to the stress of her OCD  Patient continue seeing counselor  We will increase her dose of Effexor  mg once daily, continue on BuSpar 15 mg t i d  And Klonopin  Re-evaluate in 2 months         Relevant Medications    venlafaxine (EFFEXOR-XR) 75 mg 24 hr capsule      Other Visit Diagnoses     Obsessive-compulsive personality disorder (HCC)        Relevant Medications    venlafaxine (EFFEXOR-XR) 75 mg 24 hr capsule    Other specified anxiety disorders        Relevant Medications    venlafaxine (EFFEXOR-XR) 75 mg 24 hr capsule    Essential hypertension, benign        Relevant Medications    ramipril (ALTACE) 10 MG capsule    Flu vaccine need        Relevant Orders    influenza vaccine, high-dose, PF 0 7 mL (FLUZONE HIGH-DOSE) (Completed)          BMI Counseling: Body mass index is 29 37 kg/m²   The BMI is above normal  Nutrition recommendations include moderation in carbohydrate intake, increasing intake of lean protein, reducing intake of saturated fat and trans fat and reducing intake of cholesterol  Exercise recommendations include joining a gym  I have spent 44 minutes with Patient and family today in which greater than 50% of this time was spent in counseling/coordination of care regarding Diagnostic results, Prognosis, Risks and benefits of tx options, Intructions for management, Patient and family education, Importance of tx compliance, Risk factor reductions and Impressions

## 2020-09-16 NOTE — ASSESSMENT & PLAN NOTE
Lab Results   Component Value Date    HGBA1C 6 5 (H) 09/10/2020     Diabetes remains very well controlled on metformin 500 mg twice daily  Hemoglobin A1c of 6 5% is excellent glycemic control    Repeat diabetic parameters to be done in 4 months

## 2020-09-16 NOTE — ASSESSMENT & PLAN NOTE
-patient does need follow-up with Gastroenterology  -patient still continues to complain of intermittent episodes of constipation and she fixates on her bowels  She is taking Colace 100 mg twice daily and Linzess 72 mcg daily this no longer seems to be as effective    Increased dose of Linzess 145 mcg daily

## 2020-09-16 NOTE — ASSESSMENT & PLAN NOTE
Very well controlled on atorvastatin 10 mg once daily  Repeat lipid profile in 4 months    Goal LDL less than 70 as diabetic

## 2020-09-24 DIAGNOSIS — E11.9 TYPE 2 DIABETES MELLITUS WITHOUT COMPLICATION, WITHOUT LONG-TERM CURRENT USE OF INSULIN (HCC): ICD-10-CM

## 2020-09-24 DIAGNOSIS — F41.9 ANXIETY: ICD-10-CM

## 2020-09-24 DIAGNOSIS — F41.8 DEPRESSION WITH ANXIETY: ICD-10-CM

## 2020-09-24 RX ORDER — CLONAZEPAM 0.5 MG/1
TABLET ORAL
Qty: 60 TABLET | Refills: 3 | Status: SHIPPED | OUTPATIENT
Start: 2020-09-24 | End: 2021-03-15 | Stop reason: SDUPTHER

## 2020-09-24 RX ORDER — BUSPIRONE HYDROCHLORIDE 15 MG/1
TABLET ORAL
Qty: 90 TABLET | Refills: 2 | Status: SHIPPED | OUTPATIENT
Start: 2020-09-24 | End: 2020-12-28

## 2020-12-28 DIAGNOSIS — F41.8 DEPRESSION WITH ANXIETY: ICD-10-CM

## 2020-12-28 RX ORDER — BUSPIRONE HYDROCHLORIDE 15 MG/1
TABLET ORAL
Qty: 90 TABLET | Refills: 2 | Status: SHIPPED | OUTPATIENT
Start: 2020-12-28 | End: 2021-03-29

## 2021-01-12 DIAGNOSIS — E78.2 MIXED HYPERLIPIDEMIA: ICD-10-CM

## 2021-01-12 RX ORDER — ATORVASTATIN CALCIUM 10 MG/1
TABLET, FILM COATED ORAL
Qty: 90 TABLET | Refills: 1 | Status: SHIPPED | OUTPATIENT
Start: 2021-01-12 | End: 2021-07-06

## 2021-01-22 LAB
ALBUMIN SERPL-MCNC: 4.1 G/DL (ref 3.6–5.1)
ALBUMIN/CREAT UR: 42 MCG/MG CREAT
ALBUMIN/GLOB SERPL: 1.7 (CALC) (ref 1–2.5)
ALP SERPL-CCNC: 77 U/L (ref 37–153)
ALT SERPL-CCNC: 13 U/L (ref 6–29)
AST SERPL-CCNC: 13 U/L (ref 10–35)
BASOPHILS # BLD AUTO: 51 CELLS/UL (ref 0–200)
BASOPHILS NFR BLD AUTO: 1 %
BILIRUB SERPL-MCNC: 0.4 MG/DL (ref 0.2–1.2)
BUN SERPL-MCNC: 22 MG/DL (ref 7–25)
BUN/CREAT SERPL: ABNORMAL (CALC) (ref 6–22)
CALCIUM SERPL-MCNC: 9.3 MG/DL (ref 8.6–10.4)
CHLORIDE SERPL-SCNC: 105 MMOL/L (ref 98–110)
CHOLEST SERPL-MCNC: 149 MG/DL
CHOLEST/HDLC SERPL: 3.2 (CALC)
CO2 SERPL-SCNC: 30 MMOL/L (ref 20–32)
CREAT SERPL-MCNC: 0.73 MG/DL (ref 0.6–0.88)
CREAT UR-MCNC: 79 MG/DL (ref 20–275)
EOSINOPHIL # BLD AUTO: 184 CELLS/UL (ref 15–500)
EOSINOPHIL NFR BLD AUTO: 3.6 %
ERYTHROCYTE [DISTWIDTH] IN BLOOD BY AUTOMATED COUNT: 12.8 % (ref 11–15)
GLOBULIN SER CALC-MCNC: 2.4 G/DL (CALC) (ref 1.9–3.7)
GLUCOSE SERPL-MCNC: 125 MG/DL (ref 65–99)
HBA1C MFR BLD: 6.7 % OF TOTAL HGB
HCT VFR BLD AUTO: 41.5 % (ref 35–45)
HDLC SERPL-MCNC: 47 MG/DL
HGB BLD-MCNC: 13.5 G/DL (ref 11.7–15.5)
LDLC SERPL CALC-MCNC: 80 MG/DL (CALC)
LYMPHOCYTES # BLD AUTO: 1464 CELLS/UL (ref 850–3900)
LYMPHOCYTES NFR BLD AUTO: 28.7 %
MCH RBC QN AUTO: 28.8 PG (ref 27–33)
MCHC RBC AUTO-ENTMCNC: 32.5 G/DL (ref 32–36)
MCV RBC AUTO: 88.5 FL (ref 80–100)
MICROALBUMIN UR-MCNC: 3.3 MG/DL
MONOCYTES # BLD AUTO: 311 CELLS/UL (ref 200–950)
MONOCYTES NFR BLD AUTO: 6.1 %
NEUTROPHILS # BLD AUTO: 3091 CELLS/UL (ref 1500–7800)
NEUTROPHILS NFR BLD AUTO: 60.6 %
NONHDLC SERPL-MCNC: 102 MG/DL (CALC)
PLATELET # BLD AUTO: 216 THOUSAND/UL (ref 140–400)
PMV BLD REES-ECKER: 11.4 FL (ref 7.5–12.5)
POTASSIUM SERPL-SCNC: 4.2 MMOL/L (ref 3.5–5.3)
PROT SERPL-MCNC: 6.5 G/DL (ref 6.1–8.1)
RBC # BLD AUTO: 4.69 MILLION/UL (ref 3.8–5.1)
SL AMB EGFR AFRICAN AMERICAN: 89 ML/MIN/1.73M2
SL AMB EGFR NON AFRICAN AMERICAN: 77 ML/MIN/1.73M2
SODIUM SERPL-SCNC: 142 MMOL/L (ref 135–146)
TRIGL SERPL-MCNC: 128 MG/DL
TSH SERPL-ACNC: 2.73 MIU/L (ref 0.4–4.5)
WBC # BLD AUTO: 5.1 THOUSAND/UL (ref 3.8–10.8)

## 2021-01-25 ENCOUNTER — TELEPHONE (OUTPATIENT)
Dept: FAMILY MEDICINE CLINIC | Facility: CLINIC | Age: 81
End: 2021-01-25

## 2021-01-25 NOTE — TELEPHONE ENCOUNTER
Patient's daughter called and stated that she wanted the results of her mom's labs  I told her that the doctor wanted to schedule an appointment with her to discuss them  I scheduled Meri Warner for 2/2/2021, but the daughter asked if I could send a message to Dr Luis Machado asking if he could call her Murillo Dragon) before her Mom's appointment

## 2021-02-01 ENCOUNTER — TELEPHONE (OUTPATIENT)
Dept: FAMILY MEDICINE CLINIC | Facility: CLINIC | Age: 81
End: 2021-02-01

## 2021-02-12 ENCOUNTER — OFFICE VISIT (OUTPATIENT)
Dept: FAMILY MEDICINE CLINIC | Facility: CLINIC | Age: 81
End: 2021-02-12
Payer: MEDICARE

## 2021-02-12 VITALS
DIASTOLIC BLOOD PRESSURE: 74 MMHG | WEIGHT: 132 LBS | HEART RATE: 82 BPM | RESPIRATION RATE: 16 BRPM | BODY MASS INDEX: 29.69 KG/M2 | SYSTOLIC BLOOD PRESSURE: 128 MMHG | OXYGEN SATURATION: 97 % | HEIGHT: 56 IN

## 2021-02-12 DIAGNOSIS — E78.2 MIXED HYPERLIPIDEMIA: ICD-10-CM

## 2021-02-12 DIAGNOSIS — F60.5 OBSESSIVE COMPULSIVE PERSONALITY DISORDER (HCC): ICD-10-CM

## 2021-02-12 DIAGNOSIS — G47.09 OTHER INSOMNIA: ICD-10-CM

## 2021-02-12 DIAGNOSIS — E11.9 CONTROLLED TYPE 2 DIABETES MELLITUS WITHOUT COMPLICATION, WITHOUT LONG-TERM CURRENT USE OF INSULIN (HCC): ICD-10-CM

## 2021-02-12 DIAGNOSIS — Z00.00 MEDICARE ANNUAL WELLNESS VISIT, SUBSEQUENT: Primary | ICD-10-CM

## 2021-02-12 DIAGNOSIS — I10 BENIGN ESSENTIAL HYPERTENSION: ICD-10-CM

## 2021-02-12 PROCEDURE — G0439 PPPS, SUBSEQ VISIT: HCPCS | Performed by: FAMILY MEDICINE

## 2021-02-12 PROCEDURE — 1123F ACP DISCUSS/DSCN MKR DOCD: CPT | Performed by: FAMILY MEDICINE

## 2021-02-12 PROCEDURE — 99214 OFFICE O/P EST MOD 30 MIN: CPT | Performed by: FAMILY MEDICINE

## 2021-02-12 NOTE — ASSESSMENT & PLAN NOTE
Longstanding history of generalized anxiety disorder  Patient has had inpatient psychiatric care  She continues on Effexor 225 mg once daily  Patient is still seeing counselor on a regular basis  I advised her to continue seeing counselor  -patient will continue on BuSpar 15 mg t i d   And scheduled Klonopin

## 2021-02-12 NOTE — PATIENT INSTRUCTIONS
Medicare Preventive Visit Patient Instructions  Thank you for completing your Welcome to Medicare Visit or Medicare Annual Wellness Visit today  Your next wellness visit will be due in one year (2/12/2022)  The screening/preventive services that you may require over the next 5-10 years are detailed below  Some tests may not apply to you based off risk factors and/or age  Screening tests ordered at today's visit but not completed yet may show as past due  Also, please note that scanned in results may not display below  Preventive Screenings:  Service Recommendations Previous Testing/Comments   Colorectal Cancer Screening  * Colonoscopy    * Fecal Occult Blood Test (FOBT)/Fecal Immunochemical Test (FIT)  * Fecal DNA/Cologuard Test  * Flexible Sigmoidoscopy Age: 54-65 years old   Colonoscopy: every 10 years (may be performed more frequently if at higher risk)  OR  FOBT/FIT: every 1 year  OR  Cologuard: every 3 years  OR  Sigmoidoscopy: every 5 years  Screening may be recommended earlier than age 48 if at higher risk for colorectal cancer  Also, an individualized decision between you and your healthcare provider will decide whether screening between the ages of 74-80 would be appropriate  Colonoscopy: Not on file  FOBT/FIT: Not on file  Cologuard: Not on file  Sigmoidoscopy: Not on file    Screening Current     Breast Cancer Screening Age: 36 years old  Frequency: every 1-2 years  Not required if history of left and right mastectomy Mammogram: 04/06/2018    Screening Current   Cervical Cancer Screening Between the ages of 21-29, pap smear recommended once every 3 years  Between the ages of 33-67, can perform pap smear with HPV co-testing every 5 years     Recommendations may differ for women with a history of total hysterectomy, cervical cancer, or abnormal pap smears in past  Pap Smear: Not on file    Screening Not Indicated   Hepatitis C Screening Once for adults born between 1945 and 1965  More frequently in patients at high risk for Hepatitis C Hep C Antibody: Not on file    Screening Not Indicated   Diabetes Screening 1-2 times per year if you're at risk for diabetes or have pre-diabetes Fasting glucose: No results in last 5 years   A1C: 6 7 % of total Hgb    Screening Not Indicated  History Diabetes   Cholesterol Screening Once every 5 years if you don't have a lipid disorder  May order more often based on risk factors  Lipid panel: 01/21/2021    Screening Not Indicated  History Lipid Disorder     Other Preventive Screenings Covered by Medicare:  1  Abdominal Aortic Aneurysm (AAA) Screening: covered once if your at risk  You're considered to be at risk if you have a family history of AAA  2  Lung Cancer Screening: covers low dose CT scan once per year if you meet all of the following conditions: (1) Age 50-69; (2) No signs or symptoms of lung cancer; (3) Current smoker or have quit smoking within the last 15 years; (4) You have a tobacco smoking history of at least 30 pack years (packs per day multiplied by number of years you smoked); (5) You get a written order from a healthcare provider  3  Glaucoma Screening: covered annually if you're considered high risk: (1) You have diabetes OR (2) Family history of glaucoma OR (3)  aged 48 and older OR (3)  American aged 72 and older  3  Osteoporosis Screening: covered every 2 years if you meet one of the following conditions: (1) You're estrogen deficient and at risk for osteoporosis based off medical history and other findings; (2) Have a vertebral abnormality; (3) On glucocorticoid therapy for more than 3 months; (4) Have primary hyperparathyroidism; (5) On osteoporosis medications and need to assess response to drug therapy  · Last bone density test (DXA Scan): Not on file  5  HIV Screening: covered annually if you're between the age of 12-76   Also covered annually if you are younger than 13 and older than 72 with risk factors for HIV infection  For pregnant patients, it is covered up to 3 times per pregnancy  Immunizations:  Immunization Recommendations   Influenza Vaccine Annual influenza vaccination during flu season is recommended for all persons aged >= 6 months who do not have contraindications   Pneumococcal Vaccine (Prevnar and Pneumovax)  * Prevnar = PCV13  * Pneumovax = PPSV23   Adults 25-60 years old: 1-3 doses may be recommended based on certain risk factors  Adults 72 years old: Prevnar (PCV13) vaccine recommended followed by Pneumovax (PPSV23) vaccine  If already received PPSV23 since turning 65, then PCV13 recommended at least one year after PPSV23 dose  Hepatitis B Vaccine 3 dose series if at intermediate or high risk (ex: diabetes, end stage renal disease, liver disease)   Tetanus (Td) Vaccine - COST NOT COVERED BY MEDICARE PART B Following completion of primary series, a booster dose should be given every 10 years to maintain immunity against tetanus  Td may also be given as tetanus wound prophylaxis  Tdap Vaccine - COST NOT COVERED BY MEDICARE PART B Recommended at least once for all adults  For pregnant patients, recommended with each pregnancy  Shingles Vaccine (Shingrix) - COST NOT COVERED BY MEDICARE PART B  2 shot series recommended in those aged 48 and above     Health Maintenance Due:      Topic Date Due    Colonoscopy Surveillance  03/27/2020     Immunizations Due:  There are no preventive care reminders to display for this patient  Advance Directives   What are advance directives? Advance directives are legal documents that state your wishes and plans for medical care  These plans are made ahead of time in case you lose your ability to make decisions for yourself  Advance directives can apply to any medical decision, such as the treatments you want, and if you want to donate organs  What are the types of advance directives?   There are many types of advance directives, and each state has rules about how to use them  You may choose a combination of any of the following:  · Living will: This is a written record of the treatment you want  You can also choose which treatments you do not want, which to limit, and which to stop at a certain time  This includes surgery, medicine, IV fluid, and tube feedings  · Durable power of  for healthcare [place] [facility]): This is a written record that states who you want to make healthcare choices for you when you are unable to make them for yourself  This person, called a proxy, is usually a family member or a friend  You may choose more than 1 proxy  · Do not resuscitate (DNR) order:  A DNR order is used in case your heart stops beating or you stop breathing  It is a request not to have certain forms of treatment, such as CPR  A DNR order may be included in other types of advance directives  · Medical directive: This covers the care that you want if you are in a coma, near death, or unable to make decisions for yourself  You can list the treatments you want for each condition  Treatment may include pain medicine, surgery, blood transfusions, dialysis, IV or tube feedings, and a ventilator (breathing machine)  · Values history: This document has questions about your views, beliefs, and how you feel and think about life  This information can help others choose the care that you would choose  Why are advance directives important? An advance directive helps you control your care  Although spoken wishes may be used, it is better to have your wishes written down  Spoken wishes can be misunderstood, or not followed  Treatments may be given even if you do not want them  An advance directive may make it easier for your family to make difficult choices about your care  Weight Management   Why it is important to manage your weight:  Being overweight increases your risk of health conditions such as heart disease, high blood pressure, type 2 diabetes, and certain types of cancer   It can also increase your risk for osteoarthritis, sleep apnea, and other respiratory problems  Aim for a slow, steady weight loss  Even a small amount of weight loss can lower your risk of health problems  How to lose weight safely:  A safe and healthy way to lose weight is to eat fewer calories and get regular exercise  You can lose up about 1 pound a week by decreasing the number of calories you eat by 500 calories each day  Healthy meal plan for weight management:  A healthy meal plan includes a variety of foods, contains fewer calories, and helps you stay healthy  A healthy meal plan includes the following:  · Eat whole-grain foods more often  A healthy meal plan should contain fiber  Fiber is the part of grains, fruits, and vegetables that is not broken down by your body  Whole-grain foods are healthy and provide extra fiber in your diet  Some examples of whole-grain foods are whole-wheat breads and pastas, oatmeal, brown rice, and bulgur  · Eat a variety of vegetables every day  Include dark, leafy greens such as spinach, kale, patrick greens, and mustard greens  Eat yellow and orange vegetables such as carrots, sweet potatoes, and winter squash  · Eat a variety of fruits every day  Choose fresh or canned fruit (canned in its own juice or light syrup) instead of juice  Fruit juice has very little or no fiber  · Eat low-fat dairy foods  Drink fat-free (skim) milk or 1% milk  Eat fat-free yogurt and low-fat cottage cheese  Try low-fat cheeses such as mozzarella and other reduced-fat cheeses  · Choose meat and other protein foods that are low in fat  Choose beans or other legumes such as split peas or lentils  Choose fish, skinless poultry (chicken or turkey), or lean cuts of red meat (beef or pork)  Before you cook meat or poultry, cut off any visible fat  · Use less fat and oil  Try baking foods instead of frying them   Add less fat, such as margarine, sour cream, regular salad dressing and mayonnaise to foods  Eat fewer high-fat foods  Some examples of high-fat foods include french fries, doughnuts, ice cream, and cakes  · Eat fewer sweets  Limit foods and drinks that are high in sugar  This includes candy, cookies, regular soda, and sweetened drinks  Exercise:  Exercise at least 30 minutes per day on most days of the week  Some examples of exercise include walking, biking, dancing, and swimming  You can also fit in more physical activity by taking the stairs instead of the elevator or parking farther away from stores  Ask your healthcare provider about the best exercise plan for you  © Copyright [employer] 2018 Information is for End User's use only and may not be sold, redistributed or otherwise used for commercial purposes   All illustrations and images included in CareNotes® are the copyrighted property of A D A M [employer]  or [address] [place] [address]

## 2021-02-12 NOTE — ASSESSMENT & PLAN NOTE
Hyperlipidemia remains very well controlled on atorvastatin 10 mg once daily  Repeat lipid profile to be done in 4 months    Goal LDL less than 70 since she is a diabetic

## 2021-02-12 NOTE — ASSESSMENT & PLAN NOTE
Once again the patient is obsessed with her own health and well being and fixated on this  COVID-19 pandemic is adding to her stress and her OCD  Patient continue seeing counselor on a regular basis  We did increase her dose of Effexor XR to 225 mg once daily  She remains on BuSpar 50 mg t i d  And p r n  Klonopin  Seems to be doing adequately well on this

## 2021-02-12 NOTE — PROGRESS NOTES
Assessment and Plan:     Problem List Items Addressed This Visit     None        BMI Counseling: Body mass index is 29 59 kg/m²  The BMI is above normal  Nutrition recommendations include reducing intake of saturated and trans fat and reducing intake of cholesterol  Exercise recommendations include obtaining a gym membership  Preventive health issues were discussed with patient, and age appropriate screening tests were ordered as noted in patient's After Visit Summary  Personalized health advice and appropriate referrals for health education or preventive services given if needed, as noted in patient's After Visit Summary       History of Present Illness:     Patient presents for Medicare Annual Wellness visit    Patient Care Team:  Leah Mccauley DO as PCP - General  DO Leah Diaz DO Baron Montgomery, MD Bertrum Province, MD Mila Fortes, DO (Geriatric Medicine)  Sandoval Pineda DPM (Podiatry)  Haider Wylie MD as Endoscopist  Ifrah Phillips (Optometry)     Problem List:     Patient Active Problem List   Diagnosis    Adenomatous polyp of colon    Anxiety    Benign essential hypertension    Controlled type 2 diabetes mellitus without complication, without long-term current use of insulin ([facility] )    Hyperlipidemia    Insomnia    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[facility] )    Breast cancer screening    Change in bowel habits    Medicare annual wellness visit, subsequent    Generalized osteoarthritis    Chronic idiopathic constipation      Past Medical and Surgical History:     Past Medical History:   Diagnosis Date    Anxiety     Colon polyps     Depression     Diabetes mellitus [facility]     Dizziness     Last assessed: 8/31/15    DVT (deep venous thrombosis) (HCC)     Dysuria     Hyperlipidemia     Hypertension     Insomnia     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[facility]     Ureterolithiasis 2020     Past Surgical History:   Procedure Laterality Date     Molly 38 son, 26 daughter    COLONOSCOPY      [facility] RETROGRADE P[facility]EL[facility]GRAM  5/22/2020    AZ COLONOSCOPY FLX DX W/COLLJ SPEC WHEN PFRMD N/A 3/27/2017    Procedure: COLONOSCOPY;  Surgeon: Raeann Lopez MD;  [facility]; Service: Gastroenterology    AZ CYSTO/URETERO W/LITHOTRIPSY &INDWELL STENT INSRT Right 5/22/2020    Procedure: CYSTOSCOPY URETEROSCOPY WITH LITHOTRIPSY HOLMIUM LASER, RETROGRADE PYELOGRAM AND INSERTION STENT URETERAL; EXCISION OF BLADDER TUMOR;  Surgeon: Madalyn Ramos MD;  [facility];  Service: Urology    ROTATOR CUFF REPAIR Left     Last assessed: 3/29/15    TONSILLECTOMY        Family History:     Family History   Problem Relation Age of Onset    Depression Mother         w/anxiety    Diabetes Mother         Mellitus    Breast cancer Mother     Hypertension Mother     No Known Problems Father     Depression Sister         w/anxiety    Heart disease Sister         Cardiac Disorder    Breast cancer Sister     Hypertension Sister     Diabetes Brother         Mellitus    Alcohol abuse Son       Social History:     Social History     Socioeconomic History    Marital status:       Spouse name: Not on file    Number of children: Not on file    Years of education: Not on file    Highest education level: Not on file   Occupational History    Not on file   Social Needs    Financial resource strain: Not on file    Food insecurity     Worry: Not on file     Inability: Not on file    Transportation needs     Medical: Not on file     Non-medical: Not on file   Tobacco Use    Smoking status: Never Smoker    Smokeless tobacco: Never Used   Substance and Sexual Activity    Alcohol use: No    Drug use: No    Sexual activity: Not on file   Lifestyle    Physical activity     Days per week: Not on file     Minutes per session: Not on file    Stress: Not on file   Relationships    Social connections     Talks on phone: Not on file     Gets together: Not on file     Attends Anabaptist service: Not on file     Active member of club or organization: Not on file     Attends meetings of clubs or organizations: Not on file     Relationship status: Not on file    Intimate partner violence     Fear of current or ex partner: Not on file     Emotionally abused: Not on file     Physically abused: Not on file     Forced sexual activity: Not on file   Other Topics Concern    Not on file   Social History Narrative    Lack of exercise       Medications and Allergies:     Current Outpatient Medications   Medication Sig Dispense Refill    aspirin 81 MG tablet Take 81 mg by mouth daily      atorvastatin (LIPITOR) 10 mg tablet TAKE ONE TABLET BY MOUTH EVERY DAY 90 tablet 1    busPIRone (BUSPAR) 15 mg tablet TAKE ONE TABLET BY MOUTH THREE TIMES A DAY 90 tablet 2    cholecalciferol (VITAMIN D3) 1,000 units tablet Take 1,000 Units by mouth daily      clonazePAM (KlonoPIN) 0 5 mg tablet TAKE 1/2 TABLET IN THE MORNING AND AT 2:00 P  M  AND TAKE ONE TABLET BY MOUTH AT BEDTIME 60 tablet 3    docusate sodium (COLACE) 100 mg capsule Take 100 mg by mouth 2 (two) times a day      linaCLOtide (Linzess) 145 MCG CAPS Take 1 capsule (145 mcg total) by mouth daily 90 capsule 1    metFORMIN (GLUCOPHAGE) 500 mg tablet TAKE ONE TABLET BY MOUTH TWICE A DAY WITH MEALS 180 tablet 1    ramipril (ALTACE) 10 MG capsule Take 1 capsule (10 mg total) by mouth daily 90 capsule 1    venlafaxine (EFFEXOR-XR) 150 mg 24 hr capsule Take 1 capsule (150 mg total) by mouth daily 90 capsule 1    venlafaxine (EFFEXOR-XR) 75 mg 24 hr capsule Take 1 capsule (75 mg total) by mouth daily with breakfast Total daily dose to 225 mg 90 capsule 1     No current facility-administered medications for this visit        No Known Allergies   Immunizations:     Immunization History   Administered Date(s) Administered    Influenza Split High Dose Preservative Free IM 10/02/2013, 12/18/2014, 09/30/2015, 10/11/2016, 11/20/2017  Influenza, high dose seasonal 0 7 mL 10/15/2018, 11/01/2019, 09/14/2020    Pneumococcal Conjugate 13-Valent 10/11/2016    Pneumococcal Polysaccharide PPV23 10/02/2013      Health Maintenance:         Topic Date Due    Colonoscopy Surveillance  03/27/2020     There are no preventive care reminders to display for this patient  Medicare Health Risk Assessment:     /74   Pulse 82   Resp 16   Ht 4' 8" (1 422 m)   Wt 59 9 kg (132 lb)   SpO2 97%   BMI 29 59 kg/m²      Lesly Gomez is here for her Subsequent Wellness visit  Last Medicare Wellness visit information reviewed, patient interviewed, no change since last AWV  Health Risk Assessment:   Patient rates overall health as good  Patient feels that their physical health rating is same  Eyesight was rated as same  Hearing was rated as same  Patient feels that their emotional and mental health rating is same  Pain experienced in the last 7 days has been none  Patient states that she has experienced no weight loss or gain in last 6 months  Fall Risk Screening: In the past year, patient has experienced: no history of falling in past year      Urinary Incontinence Screening:   Patient has not leaked urine accidently in the last six months  Home Safety:  Patient does not have trouble with stairs inside or outside of their home  Patient has working smoke alarms and has working carbon monoxide detector  Home safety hazards include: none  Nutrition:   Current diet is Regular  Medications:   Patient is currently taking over-the-counter supplements  OTC medications include: see medication list  Patient is able to manage medications  Activities of Daily Living (ADLs)/Instrumental Activities of Daily Living (IADLs):   Walk and transfer into and out of bed and chair?: Yes  Dress and groom yourself?: Yes    Bathe or shower yourself?: Yes    Feed yourself?  Yes  Do your laundry/housekeeping?: Yes  Manage your money, pay your bills and track your expenses?: Yes  Make your own meals?: Yes    Do your own shopping?: Yes    Previous Hospitalizations:   Any hospitalizations or ED visits within the last 12 months?: No      Advance Care Planning:   Living will: Yes    Durable POA for healthcare:  Yes    Advanced directive: Yes    Advanced directive counseling given: No    Five wishes given: No    End of Life Decisions reviewed with patient: Yes    Provider agrees with end of life decisions: Yes      PREVENTIVE SCREENINGS      Cardiovascular Screening:    General: Screening Not Indicated and History Lipid Disorder      Diabetes Screening:     General: Screening Not Indicated and History Diabetes      Colorectal Cancer Screening:     General: Screening Current      Breast Cancer Screening:     General: Screening Current      Cervical Cancer Screening:    General: Screening Not Indicated      Osteoporosis Screening:    General: Screening Current      Abdominal Aortic Aneurysm (AAA) Screening:        General: Screening Not Indicated      Lung Cancer Screening:     General: Screening Not Indicated      Hepatitis C Screening:    General: Screening Not Indicated      Efrem Beauchamp, DO

## 2021-02-12 NOTE — ASSESSMENT & PLAN NOTE
Hypertension remains very well controlled on ramipril 10 mg once daily  Continue same  Re-evaluate in 4 months in the office

## 2021-02-12 NOTE — ASSESSMENT & PLAN NOTE
Lab Results   Component Value Date    HGBA1C 6 7 (H) 01/21/2021     Hemoglobin A1c is slightly worse but still very well controlled on current dose of metformin 500 mg twice daily  No need for change of medications    Repeat diabetic parameters are to be performed in 4 months

## 2021-02-12 NOTE — PROGRESS NOTES
Subjective:      Patient ID: Yessica Esquivel is a 78 y o  female  78year-old with past medical history of depression with anxiety, obsessive-compulsive disorder, hypertension, hyperlipidemia, diabetes mellitus type 2 that is not is requiring presents for follow-up of chronic conditions  We did make adjustments to her dose of Effexor XR previously  She was having a difficult time with her anxiety and OCD along with Coronavirus pandemic  Patient does have OCD in regards to her own health and well being which she does become fixated on  Pandemic has been quite difficult  She has only been out of her house 4 times since last March  She does live with her daughter and son-in-law well as well as grandchildren  Patient has been having follow-up visits with therapist   Alejandrina Dewitt seem to be doing better on increased dose of Effexor X R  Labs reviewed which showed hemoglobin A1c at 6 7%, normal CBC, CMP with the exception of impaired fasting glucose of 125, lipid profile is largely unchanged  LDL cholesterol of 81, HDL 47   Patient would be willing to receive COVID-19 vaccination          Past Medical History:   Diagnosis Date    Anxiety     Colon polyps     Depression     Diabetes mellitus ([facility])     Dizziness     Last assessed: 8/31/15    DVT (deep venous thrombosis) (HCC)     Dysuria     Hyperlipidemia     Hypertension     Insomnia     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[place] [facility])     Ureterolithiasis 2020       Family History   Problem Relation Age of Onset    Depression Mother         w/anxiety    Diabetes Mother         Mellitus    Breast cancer Mother     Hypertension Mother     No Known Problems Father     Depression Sister         w/anxiety    Heart disease Sister         Cardiac Disorder    Breast cancer Sister     Hypertension Sister     Diabetes Brother         Mellitus    Alcohol abuse Son        Past Surgical History:   Procedure Laterality Date     SECTION 1966 son, 26 daughter    COLONOSCOPY      [facility] RETROGRADE PYEL[facility]GRAM  5/22/2020    SC COLONOSCOPY FLX DX W/COLLJ SPEC WHEN PFRMD N/A 3/27/2017    Procedure: COLONOSCOPY;  Surgeon: Gabriela Perez MD;  [facility]; Service: Gastroenterology    [place] CYSTO/URETERO W/LITHOTRIPSY &INDWELL STENT INSRT Right 5/22/2020    Procedure: CYSTOSCOPY URETEROSCOPY WITH LITHOTRIPSY HOLMIUM LASER, RETROGRADE PYELOGRAM AND INSERTION STENT URETERAL; EXCISION OF BLADDER TUMOR;  Surgeon: Otto Lema MD;  [facility];  Service: Urology    ROTATOR CUFF REPAIR Left     Last assessed: 3/29/15    TONSILLECTOMY          reports that she has never smoked  She has never used smokeless tobacco  She reports that she does not drink alcohol or use drugs  Current Outpatient Medications:     aspirin 81 MG tablet, Take 81 mg by mouth daily, Disp: , Rfl:     atorvastatin (LIPITOR) 10 mg tablet, TAKE ONE TABLET BY MOUTH EVERY DAY, Disp: 90 tablet, Rfl: 1    busPIRone (BUSPAR) 15 mg tablet, TAKE ONE TABLET BY MOUTH THREE TIMES A DAY, Disp: 90 tablet, Rfl: 2    cholecalciferol (VITAMIN D3) 1,000 units tablet, Take 1,000 Units by mouth daily, Disp: , Rfl:     clonazePAM (KlonoPIN) 0 5 mg tablet, TAKE 1/2 TABLET IN THE MORNING AND AT 2:00 P  M   AND TAKE ONE TABLET BY MOUTH AT BEDTIME, Disp: 60 tablet, Rfl: 3    docusate sodium (COLACE) 100 mg capsule, Take 100 mg by mouth 2 (two) times a day, Disp: , Rfl:     linaCLOtide (Linzess) 145 MCG CAPS, Take 1 capsule (145 mcg total) by mouth daily, Disp: 90 capsule, Rfl: 1    metFORMIN (GLUCOPHAGE) 500 mg tablet, TAKE ONE TABLET BY MOUTH TWICE A DAY WITH MEALS, Disp: 180 tablet, Rfl: 1    ramipril (ALTACE) 10 MG capsule, Take 1 capsule (10 mg total) by mouth daily, Disp: 90 capsule, Rfl: 1    venlafaxine (EFFEXOR-XR) 150 mg 24 hr capsule, Take 1 capsule (150 mg total) by mouth daily, Disp: 90 capsule, Rfl: 1    venlafaxine (EFFEXOR-XR) 75 mg 24 hr capsule, Take 1 capsule (75 mg total) by mouth daily with breakfast Total daily dose to 225 mg, Disp: 90 capsule, Rfl: 1    The following portions of the patient's history were reviewed and updated as appropriate: allergies, current medications, past family history, past medical history, past social history, past surgical history and problem list     Review of Systems   Constitutional: Negative  HENT: Negative  Eyes: Negative  Respiratory: Negative  Cardiovascular: Negative  Gastrointestinal: Negative  Endocrine: Negative  Genitourinary: Negative  Musculoskeletal: Negative  Skin: Negative  Allergic/Immunologic: Negative  Neurological: Negative  Hematological: Negative  Psychiatric/Behavioral: Positive for dysphoric mood (Obsessive compulsive disorder)  Negative for agitation, behavioral problems and confusion  The patient is nervous/anxious  Objective:    /74   Pulse 82   Resp 16   Ht 4' 8" (1 422 m)   Wt 59 9 kg (132 lb)   SpO2 97%   BMI 29 59 kg/m²      Physical Exam  Vitals signs and nursing note reviewed  Constitutional:       General: She is not in acute distress  Appearance: Normal appearance  She is well-developed and normal weight  She is not diaphoretic  HENT:      Head: Normocephalic and atraumatic  Right Ear: Tympanic membrane, ear canal and external ear normal       Left Ear: Tympanic membrane, ear canal and external ear normal    Eyes:      Extraocular Movements: Extraocular movements intact  Conjunctiva/sclera: Conjunctivae normal       Pupils: Pupils are equal, round, and reactive to light  Neck:      Musculoskeletal: Normal range of motion and neck supple  Cardiovascular:      Rate and Rhythm: Normal rate and regular rhythm  Pulses: Normal pulses  Heart sounds: Normal heart sounds  No murmur  Pulmonary:      Effort: Pulmonary effort is normal       Breath sounds: Normal breath sounds  Abdominal:      General: Abdomen is flat   Bowel sounds are normal       Palpations: Abdomen is soft  Musculoskeletal: Normal range of motion  Skin:     General: Skin is warm and dry  Findings: No rash  Neurological:      General: No focal deficit present  Mental Status: She is alert and oriented to person, place, and time  Mental status is at baseline  Deep Tendon Reflexes: Reflexes are normal and symmetric  Psychiatric:         Mood and Affect: Mood normal          Behavior: Behavior normal  Behavior is cooperative  Thought Content: Thought content normal  Thought content is not paranoid or delusional          Cognition and Memory: Cognition and memory normal          Judgment: Judgment normal  Judgment is not impulsive or inappropriate             Recent Results (from the past 1008 hour(s))   Lipid panel    Collection Time: 01/21/21  8:11 AM   Result Value Ref Range    Total Cholesterol 149 <200 mg/dL    HDL 47 (L) > OR = 50 mg/dL    Triglycerides 128 <150 mg/dL    LDL Calculated 80 mg/dL (calc)    Chol HDLC Ratio 3 2 <5 0 (calc)    Non-HDL Cholesterol 102 <130 mg/dL (calc)   Comprehensive metabolic panel    Collection Time: 01/21/21  8:11 AM   Result Value Ref Range    Glucose, Random 125 (H) 65 - 99 mg/dL    BUN 22 7 - 25 mg/dL    Creatinine 0 73 0 60 - 0 88 mg/dL    eGFR Non  77 > OR = 60 mL/min/1 73m2    eGFR  89 > OR = 60 mL/min/1 73m2    SL AMB BUN/CREATININE RATIO NOT APPLICABLE 6 - 22 (calc)    Sodium 142 135 - 146 mmol/L    Potassium 4 2 3 5 - 5 3 mmol/L    Chloride 105 98 - 110 mmol/L    CO2 30 20 - 32 mmol/L    Calcium 9 3 8 6 - 10 4 mg/dL    Protein, Total 6 5 6 1 - 8 1 g/dL    Albumin 4 1 3 6 - 5 1 g/dL    Globulin 2 4 1 9 - 3 7 g/dL (calc)    Albumin/Globulin Ratio 1 7 1 0 - 2 5 (calc)    TOTAL BILIRUBIN 0 4 0 2 - 1 2 mg/dL    Alkaline Phosphatase 77 37 - 153 U/L    AST 13 10 - 35 U/L    ALT 13 6 - 29 U/L   Microalbumin, Random Urine (W/Creatinine)    Collection Time: 01/21/21  8:11 AM Result Value Ref Range    Creatinine, Urine 79 20 - 275 mg/dL    Microalbum  ,U,Random 3 3 See Note: mg/dL    Microalb/Creat Ratio 42 (H) <30 mcg/mg creat   CBC and differential    Collection Time: 01/21/21  8:11 AM   Result Value Ref Range    White Blood Cell Count 5 1 3 8 - 10 8 Thousand/uL    Red Blood Cell Count 4 69 3 80 - 5 10 Million/uL    Hemoglobin 13 5 11 7 - 15 5 g/dL    HCT 41 5 35 0 - 45 0 %    MCV 88 5 80 0 - 100 0 fL    MCH 28 8 27 0 - 33 0 pg    MCHC 32 5 32 0 - 36 0 g/dL    RDW 12 8 11 0 - 15 0 %    Platelet Count 726 112 - 400 Thousand/uL    SL AMB MPV 11 4 7 5 - 12 5 fL    Neutrophils (Absolute) 3,091 1,500 - 7,800 cells/uL    Lymphocytes (Absolute) 1,464 850 - 3,900 cells/uL    Monocytes (Absolute) 311 200 - 950 cells/uL    Eosinophils (Absolute) 184 15 - 500 cells/uL    Basophils ABS 51 0 - 200 cells/uL    Neutrophils 60 6 %    Lymphocytes 28 7 %    Monocytes 6 1 %    Eosinophils 3 6 %    Basophils PCT 1 0 %   TSH, 3rd generation with Free T4 reflex    Collection Time: 01/21/21  8:11 AM   Result Value Ref Range    TSH W/RFX TO FREE T4 2 73 0 40 - 4 50 mIU/L   Hemoglobin A1c (w/out EAG)    Collection Time: 01/21/21  8:11 AM   Result Value Ref Range    Hemoglobin A1C 6 7 (H) <5 7 % of total Hgb       Assessment/Plan:    Controlled type 2 diabetes mellitus without complication, without long-term current use of insulin ([facility])    Lab Results   Component Value Date    HGBA1C 6 7 (H) 01/21/2021     Hemoglobin A1c is slightly worse but still very well controlled on current dose of metformin 500 mg twice daily  No need for change of medications  Repeat diabetic parameters are to be performed in 4 months    Benign essential hypertension  Hypertension remains very well controlled on ramipril 10 mg once daily  Continue same  Re-evaluate in 4 months in the office  Anxiety  Longstanding history of generalized anxiety disorder  Patient has had inpatient psychiatric care    She continues on Effexor 225 mg once daily  Patient is still seeing counselor on a regular basis  I advised her to continue seeing counselor  -patient will continue on BuSpar 15 mg t i d  And scheduled Klonopin    Mixed hyperlipidemia  Hyperlipidemia remains very well controlled on atorvastatin 10 mg once daily  Repeat lipid profile to be done in 4 months  Goal LDL less than 70 since she is a diabetic    Obsessive compulsive personality disorder  Once again the patient is obsessed with her own health and well being and fixated on this  COVID-19 pandemic is adding to her stress and her OCD  Patient continue seeing counselor on a regular basis  We did increase her dose of Effexor XR to 225 mg once daily  She remains on BuSpar 50 mg t i d  And p r n  Klonopin  Seems to be doing adequately well on this  Medicare annual wellness visit, subsequent  Subsequent annual wellness visit completed          Problem List Items Addressed This Visit        Endocrine    Controlled type 2 diabetes mellitus without complication, without long-term current use of insulin ([facility] )       Lab Results   Component Value Date    HGBA1C 6 7 (H) 01/21/2021     Hemoglobin A1c is slightly worse but still very well controlled on current dose of metformin 500 mg twice daily  No need for change of medications  Repeat diabetic parameters are to be performed in 4 months         Relevant Orders    Hemoglobin A1C    Microalbumin / creatinine urine ratio       Cardiovascular and Mediastinum    Benign essential hypertension     Hypertension remains very well controlled on ramipril 10 mg once daily  Continue same  Re-evaluate in 4 months in the office           Relevant Orders    CBC and differential    TSH, 3rd generation with Free T4 reflex       Other    Insomnia    Relevant Orders    CBC and differential    TSH, 3rd generation with Free T4 reflex    Medicare annual wellness visit, subsequent - Primary     Subsequent annual wellness visit completed         Mixed hyperlipidemia     Hyperlipidemia remains very well controlled on atorvastatin 10 mg once daily  Repeat lipid profile to be done in 4 months  Goal LDL less than 70 since she is a diabetic         Relevant Orders    Comprehensive metabolic panel    Lipid panel    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[address] )     Once again the patient is obsessed with her own health and well being and fixated on this  COVID-19 pandemic is adding to her stress and her OCD  Patient continue seeing counselor on a regular basis  We did increase her dose of Effexor XR to 225 mg once daily  She remains on BuSpar 50 mg t i d  And p r n  Klonopin  Seems to be doing adequately well on this

## 2021-02-14 ENCOUNTER — IMMUNIZATIONS (OUTPATIENT)
Dept: FAMILY MEDICINE CLINIC | Facility: HOSPITAL | Age: 81
End: 2021-02-14

## 2021-02-14 DIAGNOSIS — Z23 ENCOUNTER FOR IMMUNIZATION: Primary | ICD-10-CM

## 2021-02-14 PROCEDURE — 0001A SARS-COV-2 / COVID-19 MRNA VACCINE (PFIZER-BIONTECH) 30 MCG: CPT

## 2021-02-14 PROCEDURE — 91300 SARS-COV-2 / COVID-19 MRNA VACCINE (PFIZER-BIONTECH) 30 MCG: CPT

## 2021-02-22 DIAGNOSIS — F41.8 OTHER SPECIFIED ANXIETY DISORDERS: ICD-10-CM

## 2021-02-22 DIAGNOSIS — F60.5 OBSESSIVE-COMPULSIVE PERSONALITY DISORDER (HCC): ICD-10-CM

## 2021-02-22 RX ORDER — VENLAFAXINE HYDROCHLORIDE 150 MG/1
CAPSULE, EXTENDED RELEASE ORAL
Qty: 90 CAPSULE | Refills: 1 | Status: SHIPPED | OUTPATIENT
Start: 2021-02-22 | End: 2021-06-24 | Stop reason: SDUPTHER

## 2021-02-25 ENCOUNTER — TELEPHONE (OUTPATIENT)
Dept: FAMILY MEDICINE CLINIC | Facility: CLINIC | Age: 81
End: 2021-02-25

## 2021-02-25 NOTE — TELEPHONE ENCOUNTER
Andrés Sullivan is calling for Lisa Jaci  States her mom has 2 moles on her back that have been bothering her  She has been putting a cream on them called emuaid and covering them with a band aid  Would like to know if this cream is ok to put on  Did scheduled an appt with Dr Savannah Nicholson on 3/19 to have moles evaluated

## 2021-02-26 ENCOUNTER — TELEPHONE (OUTPATIENT)
Dept: DERMATOLOGY | Facility: CLINIC | Age: 81
End: 2021-02-26

## 2021-02-26 NOTE — TELEPHONE ENCOUNTER
Pt request an appt for 3/9 with Dr Cliff Sumner 7days told to give us a call back on Tue 3/2 appt are not guarateed

## 2021-03-02 DIAGNOSIS — F60.5 OBSESSIVE COMPULSIVE PERSONALITY DISORDER (HCC): ICD-10-CM

## 2021-03-03 ENCOUNTER — TELEPHONE (OUTPATIENT)
Dept: FAMILY MEDICINE CLINIC | Facility: CLINIC | Age: 81
End: 2021-03-03

## 2021-03-03 RX ORDER — VENLAFAXINE HYDROCHLORIDE 75 MG/1
CAPSULE, EXTENDED RELEASE ORAL
Qty: 90 CAPSULE | Refills: 1 | OUTPATIENT
Start: 2021-03-03

## 2021-03-03 NOTE — TELEPHONE ENCOUNTER
Patient is no longer on Effexor XR 75 mg  She is on 150 mg  That was refilled last month    Please call pharmacy and inform them

## 2021-03-03 NOTE — TELEPHONE ENCOUNTER
Patients daughter call in regards to Mom's venlafaxine 75 mg being stopped  Patient was taking both the 150 mg and 75 mg and in your last note patient was stable on this dosage and advised to continue  Patient's daughter was just calling to verify if mom should be reducing the dose that she is taking? Please advise

## 2021-03-04 DIAGNOSIS — F41.9 ANXIETY: Primary | ICD-10-CM

## 2021-03-04 DIAGNOSIS — F60.5 OBSESSIVE COMPULSIVE PERSONALITY DISORDER (HCC): ICD-10-CM

## 2021-03-04 RX ORDER — VENLAFAXINE HYDROCHLORIDE 75 MG/1
75 CAPSULE, EXTENDED RELEASE ORAL
Qty: 90 CAPSULE | Refills: 1 | Status: SHIPPED | OUTPATIENT
Start: 2021-03-04 | End: 2021-08-26

## 2021-03-04 NOTE — TELEPHONE ENCOUNTER
That was my falt  She should still be on 225 mg daily  Refill came over without total daily dosage of 225 mg instructions on it    Refilled and sent to pharmacy

## 2021-03-05 ENCOUNTER — IMMUNIZATIONS (OUTPATIENT)
Dept: FAMILY MEDICINE CLINIC | Facility: HOSPITAL | Age: 81
End: 2021-03-05

## 2021-03-05 DIAGNOSIS — Z23 ENCOUNTER FOR IMMUNIZATION: Primary | ICD-10-CM

## 2021-03-05 PROCEDURE — 0002A SARS-COV-2 / COVID-19 MRNA VACCINE (PFIZER-BIONTECH) 30 MCG: CPT

## 2021-03-05 PROCEDURE — 91300 SARS-COV-2 / COVID-19 MRNA VACCINE (PFIZER-BIONTECH) 30 MCG: CPT

## 2021-03-12 ENCOUNTER — TELEPHONE (OUTPATIENT)
Dept: FAMILY MEDICINE CLINIC | Facility: CLINIC | Age: 81
End: 2021-03-12

## 2021-03-12 NOTE — TELEPHONE ENCOUNTER
Andrés Sullivan (daughter) would like to speak with Dr Savannah Nicholson  Did not give any information as to why  Did inform her that she would be called back by a MA

## 2021-03-15 ENCOUNTER — OFFICE VISIT (OUTPATIENT)
Dept: FAMILY MEDICINE CLINIC | Facility: CLINIC | Age: 81
End: 2021-03-15
Payer: MEDICARE

## 2021-03-15 VITALS
HEIGHT: 56 IN | RESPIRATION RATE: 16 BRPM | BODY MASS INDEX: 28.79 KG/M2 | DIASTOLIC BLOOD PRESSURE: 80 MMHG | OXYGEN SATURATION: 97 % | SYSTOLIC BLOOD PRESSURE: 130 MMHG | TEMPERATURE: 98.5 F | HEART RATE: 88 BPM | WEIGHT: 128 LBS

## 2021-03-15 DIAGNOSIS — F41.9 ANXIETY: ICD-10-CM

## 2021-03-15 DIAGNOSIS — K59.04 CHRONIC IDIOPATHIC CONSTIPATION: ICD-10-CM

## 2021-03-15 DIAGNOSIS — L82.0 SEBORRHEIC KERATOSES, INFLAMED: Primary | ICD-10-CM

## 2021-03-15 DIAGNOSIS — I10 ESSENTIAL HYPERTENSION, BENIGN: ICD-10-CM

## 2021-03-15 DIAGNOSIS — E11.9 TYPE 2 DIABETES MELLITUS WITHOUT COMPLICATION, WITHOUT LONG-TERM CURRENT USE OF INSULIN (HCC): ICD-10-CM

## 2021-03-15 PROCEDURE — 99213 OFFICE O/P EST LOW 20 MIN: CPT | Performed by: FAMILY MEDICINE

## 2021-03-15 RX ORDER — RAMIPRIL 10 MG/1
10 CAPSULE ORAL DAILY
Qty: 90 CAPSULE | Refills: 1 | Status: SHIPPED | OUTPATIENT
Start: 2021-03-15 | End: 2021-09-03

## 2021-03-15 RX ORDER — CLONAZEPAM 0.5 MG/1
TABLET ORAL
Qty: 60 TABLET | Refills: 3 | Status: SHIPPED | OUTPATIENT
Start: 2021-03-15 | End: 2021-08-24

## 2021-03-15 RX ORDER — LINACLOTIDE 145 UG/1
145 CAPSULE, GELATIN COATED ORAL DAILY
Qty: 90 CAPSULE | Refills: 1 | Status: SHIPPED | OUTPATIENT
Start: 2021-03-15 | End: 2021-09-13

## 2021-03-15 NOTE — PROGRESS NOTES
Subjective:      Patient ID: Gautam Collins is a 78 y o  female  78-year-old female presents for evaluation 2 skin lesions on her lower back that she believes are changing in character and the patient is somewhat concerned about them  Patient also needs refills of chronic medications including ramipril, Linzess, metformin and scheduled Klonopin  Patient has been meeting with therapist through virtual visits  She has received both COVID-19 vaccinations      Past Medical History:   Diagnosis Date    Anxiety     Colon polyps     Depression     [facility]     Dizziness     Last assessed: 8/31/15    [facility]     Dysuria     Hyperlipidemia     Hypertension     Insomnia     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[facility] )     Ureterolithiasis 2020       Family History   Problem Relation Age of Onset    Depression Mother         w/anxiety    Diabetes Mother         Mellitus    Breast cancer Mother     Hypertension Mother     No Known Problems Father     Depression Sister         w/anxiety    Heart disease Sister         Cardiac Disorder    Breast cancer Sister     Hypertension Sister     Diabetes Brother         Mellitus    Alcohol abuse Son        Past Surgical History:   Procedure Laterality Date     SECTION      1966 son, 26 daughter    COLONOSCOPY      [place] RETROGRADE PYELOGRAM  2020    [place] COLONOSCOPY FLX DX W/COLLJ SPEC WHEN PFRMD N/A 3/27/2017    Procedure: COLONOSCOPY;  Surgeon: Sreedhar Hernandez MD;  [facility];   Service: Gastroenterology    [place] CYSTO/URETERO W/LITHOTRIPSY &INDWELL STENT INSRT Right 2020    Procedure: CYSTOSCOPY URETEROSCOPY WITH LITHOTRIPSY HOLMIUM LASER, RETROGRADE PYELOGRAM AND INSERTION STENT URETERAL; EXCISION OF BLADDER TUMOR;  Surgeon: Noemí Traylor MD;  [facility];  Service: Urology    ROTATOR CUFF REPAIR Left     Last assessed: 3/29/15    TONSILLECTOMY          reports that she has never smoked  She has never used smokeless tobacco  She reports that she does not drink alcohol or use drugs  Current Outpatient Medications:     aspirin 81 MG tablet, Take 81 mg by mouth daily, Disp: , Rfl:     atorvastatin (LIPITOR) 10 mg tablet, TAKE ONE TABLET BY MOUTH EVERY DAY, Disp: 90 tablet, Rfl: 1    busPIRone (BUSPAR) 15 mg tablet, TAKE ONE TABLET BY MOUTH THREE TIMES A DAY, Disp: 90 tablet, Rfl: 2    cholecalciferol (VITAMIN D3) 1,000 units tablet, Take 1,000 Units by mouth daily, Disp: , Rfl:     clonazePAM (KlonoPIN) 0 5 mg tablet, TAKE 1/2 TABLET IN THE MORNING AND AT 2:00 P  M  AND TAKE ONE TABLET BY MOUTH AT BEDTIME, Disp: 60 tablet, Rfl: 3    docusate sodium (COLACE) 100 mg capsule, Take 100 mg by mouth 2 (two) times a day, Disp: , Rfl:     linaCLOtide (Linzess) 145 MCG CAPS, Take 1 capsule (145 mcg total) by mouth daily, Disp: 90 capsule, Rfl: 1    metFORMIN (GLUCOPHAGE) 500 mg tablet, Take 1 tablet (500 mg total) by mouth 2 (two) times a day with meals, Disp: 180 tablet, Rfl: 1    ramipril (ALTACE) 10 MG capsule, Take 1 capsule (10 mg total) by mouth daily, Disp: 90 capsule, Rfl: 1    venlafaxine (EFFEXOR-XR) 150 mg 24 hr capsule, TAKE 1 CAPSULE BY MOUTH DAILY, Disp: 90 capsule, Rfl: 1    venlafaxine (EFFEXOR-XR) 75 mg 24 hr capsule, Take 1 capsule (75 mg total) by mouth daily with breakfast Total daily dose to 225 mg, Disp: 90 capsule, Rfl: 1    The following portions of the patient's history were reviewed and updated as appropriate: allergies, current medications, past family history, past medical history, past social history, past surgical history and problem list     Review of Systems   Constitutional: Negative  HENT: Negative  Eyes: Negative  Respiratory: Negative  Cardiovascular: Negative  Gastrointestinal: Negative  Endocrine: Negative  Genitourinary: Negative  Musculoskeletal: Negative      Skin: Positive for color change (Lesions on left lower back)  Allergic/Immunologic: Negative  Neurological: Negative  Hematological: Negative  Psychiatric/Behavioral: The patient is nervous/anxious  Objective:    /80 (BP Location: Right arm, Patient Position: Sitting, Cuff Size: Standard)   Pulse 88   Temp 98 5 °F (36 9 °C) (Tympanic)   Resp 16   Ht 4' 8" (1 422 m)   Wt 58 1 kg (128 lb)   SpO2 97%   BMI 28 70 kg/m²      Physical Exam  Vitals signs and nursing note reviewed  Constitutional:       General: She is not in acute distress  Appearance: Normal appearance  She is not ill-appearing  Skin:         Neurological:      Mental Status: She is alert  No results found for this or any previous visit (from the past 1008 hour(s))  Assessment/Plan:    Seborrheic keratoses, inflamed  - educational material printed from the [facility] in regards to diagnosis  -explained to the patient that these are benign skin lesions that do occur with aging    -since the lesions are irritated I did perform cryo destruction of both lesions which the patient tolerated very well  Explained to the patient that there is absolutely no concern for malignancy          Problem List Items Addressed This Visit        Digestive    Chronic idiopathic constipation    Relevant Medications    linaCLOtide (Linzess) 145 MCG CAPS       Other    Anxiety    Relevant Medications    clonazePAM (KlonoPIN) 0 5 mg tablet      Other Visit Diagnoses     Essential hypertension, benign        Relevant Medications    ramipril (ALTACE) 10 MG capsule    Type 2 diabetes mellitus without complication, without long-term current use of insulin (HCC)        Relevant Medications    metFORMIN (GLUCOPHAGE) 500 mg tablet          Lesion Destruction    Date/Time: 3/15/2021 7:05 PM  Performed by: Fabiana Doll DO  Authorized by: Fabiana Doll DO   Universal Protocol:  Consent: Verbal consent obtained    Consent given by: patient  Patient understanding: patient states understanding of the procedure being performed      Procedure Details - Lesion Destruction:     Number of Lesions:  2  Lesion 1:     Body area:  Trunk    Trunk location:  Back    Initial size (mm):  4    Malignancy: benign hyperkeratotic lesion      Destruction method: cryotherapy    Lesion 2:     Body area:  Trunk    Trunk location:  Back    Initial size (mm):  6    Malignancy: benign hyperkeratotic lesion      Destruction method: cryotherapy

## 2021-03-15 NOTE — ASSESSMENT & PLAN NOTE
- educational material printed from the [facility] in regards to diagnosis  -explained to the patient that these are benign skin lesions that do occur with aging    -since the lesions are irritated I did perform cryo destruction of both lesions which the patient tolerated very well    Explained to the patient that there is absolutely no concern for malignancy

## 2021-03-29 DIAGNOSIS — F41.8 DEPRESSION WITH ANXIETY: ICD-10-CM

## 2021-03-29 RX ORDER — BUSPIRONE HYDROCHLORIDE 15 MG/1
TABLET ORAL
Qty: 90 TABLET | Refills: 2 | Status: SHIPPED | OUTPATIENT
Start: 2021-03-29 | End: 2021-06-24 | Stop reason: SDUPTHER

## 2021-04-18 ENCOUNTER — HOSPITAL ENCOUNTER (EMERGENCY)
Facility: HOSPITAL | Age: 81
Discharge: HOME | End: 2021-04-18
Attending: EMERGENCY MEDICINE
Payer: MEDICARE

## 2021-04-18 ENCOUNTER — APPOINTMENT (EMERGENCY)
Dept: RADIOLOGY | Facility: HOSPITAL | Age: 81
End: 2021-04-18
Payer: MEDICARE

## 2021-04-18 VITALS
HEART RATE: 93 BPM | TEMPERATURE: 98.3 F | WEIGHT: 127.65 LBS | OXYGEN SATURATION: 98 % | HEIGHT: 56 IN | SYSTOLIC BLOOD PRESSURE: 189 MMHG | BODY MASS INDEX: 28.71 KG/M2 | RESPIRATION RATE: 16 BRPM | DIASTOLIC BLOOD PRESSURE: 87 MMHG

## 2021-04-18 DIAGNOSIS — S40.011A CONTUSION OF RIGHT SHOULDER, INITIAL ENCOUNTER: ICD-10-CM
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
## 2021-04-18 PROCEDURE — 99283 EMERGENCY DEPT VISIT LOW MDM: CPT

## 2021-04-18 PROCEDURE — 73030 X-RAY EXAM OF SHOULDER: CPT

## 2021-04-18 PROCEDURE — 99284 EMERGENCY DEPT VISIT MOD MDM: CPT | Performed by: EMERGENCY MEDICINE

## 2021-04-18 RX ORDER — LIDOCAINE 50 MG/G
1 PATCH TOPICAL ONCE
Status: DISCONTINUED | OUTPATIENT
Start: 2021-04-18 | End: 2021-04-18 | Stop reason: HOSPADM

## 2021-04-18 RX ORDER — ACETAMINOPHEN 325 MG/1
650 TABLET ORAL ONCE
Status: COMPLETED | OUTPATIENT
Start: 2021-04-18 | End: 2021-04-18

## 2021-04-18 RX ADMIN — LIDOCAINE 5% 1 PATCH: 700 PATCH TOPICAL at 20:03

## 2021-04-18 RX ADMIN — ACETAMINOPHEN 650 MG: 325 TABLET, FILM COATED ORAL at 20:04

## 2021-04-19 ENCOUNTER — TELEPHONE (OUTPATIENT)
Dept: FAMILY MEDICINE CLINIC | Facility: CLINIC | Age: 81
End: 2021-04-19

## 2021-04-19 DIAGNOSIS — M25.511 ACUTE PAIN OF RIGHT SHOULDER: Primary | ICD-10-CM

## 2021-04-19 RX ORDER — TRAMADOL HYDROCHLORIDE 50 MG/1
50 TABLET ORAL EVERY 8 HOURS PRN
Qty: 30 TABLET | Refills: 0 | Status: SHIPPED | OUTPATIENT
Start: 2021-04-19 | End: 2021-12-06

## 2021-04-19 NOTE — ED PROVIDER NOTES
History  Chief Complaint   Patient presents with    Fall     Pt fell down 3 steps on thursday  Pt states that she hurt her right arm during the fall  Pt states that she did not hit her head and takes baby asparin  78 YR FEMALE -- BROUGHT IN BY DAUGHTER STATES  3 DAYS AGO WALKING DOWN INDOOR CARPETED STAIRS LOST BALANCE AND FELL FORWARD ONTO ABD AND R SIDE-- C/O R SHOULDER PAIN- MILD PAIN AROUND UMBILICUS AT TIMES-- PT DENIES ANY HEAD/NECJ/CHEST/ BLE INJURY --       History provided by:  Patient and relative   used: No    Fall  Mechanism of injury: fall        Prior to Admission Medications   Prescriptions Last Dose Informant Patient Reported? Taking?   aspirin 81 MG tablet   Yes No   Sig: Take 81 mg by mouth daily   atorvastatin (LIPITOR) 10 mg tablet   No No   Sig: TAKE ONE TABLET BY MOUTH EVERY DAY   busPIRone (BUSPAR) 15 mg tablet   No No   Sig: TAKE 1 TABLET BY MOUTH 3 TIMES A DAY   cholecalciferol (VITAMIN D3) 1,000 units tablet   Yes No   Sig: Take 1,000 Units by mouth daily   clonazePAM (KlonoPIN) 0 5 mg tablet   No No   Sig: TAKE 1/2 TABLET IN THE MORNING AND AT 2:00 P  M   AND TAKE ONE TABLET BY MOUTH AT BEDTIME   docusate sodium (COLACE) 100 mg capsule   Yes No   Sig: Take 100 mg by mouth 2 (two) times a day   linaCLOtide (Linzess) 145 MCG CAPS   No No   Sig: Take 1 capsule (145 mcg total) by mouth daily   metFORMIN (GLUCOPHAGE) 500 mg tablet   No No   Sig: Take 1 tablet (500 mg total) by mouth 2 (two) times a day with meals   ramipril (ALTACE) 10 MG capsule   No No   Sig: Take 1 capsule (10 mg total) by mouth daily   venlafaxine (EFFEXOR-XR) 150 mg 24 hr capsule   No No   Sig: TAKE 1 CAPSULE BY MOUTH DAILY   venlafaxine (EFFEXOR-XR) 75 mg 24 hr capsule   No No   Sig: Take 1 capsule (75 mg total) by mouth daily with breakfast Total daily dose to 225 mg      Facility-Administered Medications: None       Past Medical History:   Diagnosis Date    Anxiety     Colon polyps     Depression     Diabetes mellitus ([facility] )     Dizziness     Last assessed: 8/31/15    [facility] thrombosis[facility]     Dysuria     Hyperlipidemia     Hypertension     Insomnia     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[facility] )     Ureterolithiasis 2020       Past Surgical History:   Procedure Laterality Date     SECTION      1964 son, 26 daughter    COLONOSCOPY      [facility] RETROGRADE PYELOGRAM  2020    SD COLONOSCOPY FLX DX W/COLLJ SPEC WHEN PFRMD N/A 3/27/2017    Procedure: COLONOSCOPY;  Surgeon: Mae Shankar MD;  [facility]; Service: Gastroenterology    SD CYSTO/URETERO W/LITHOTRIPSY &INDWELL STENT INSRT Right 2020    Procedure: CYSTOSCOPY URETEROSCOPY WITH LITHOTRIPSY HOLMIUM LASER, RETROGRADE PYELOGRAM AND INSERTION STENT URETERAL; EXCISION OF BLADDER TUMOR;  Surgeon: Jung Carlson MD;  Location: [facility];  Service: Urology    ROTATOR CUFF REPAIR Left     Last assessed: 3/29/15    TONSILLECTOMY         Family History   Problem Relation Age of Onset    Depression Mother         w/anxiety    Diabetes Mother         Mellitus    Breast cancer Mother     Hypertension Mother     No Known Problems Father     Depression Sister         w/anxiety    Heart disease Sister         Cardiac Disorder    Breast cancer Sister     Hypertension Sister     Diabetes Brother         Mellitus    Alcohol abuse Son      I have reviewed and agree with the history as documented  E-Cigarette/Vaping    E-Cigarette Use Never User      E-Cigarette/Vaping Substances     Social History     Tobacco Use    Smoking status: Never Smoker    Smokeless tobacco: Never Used   Substance Use Topics    Alcohol use: No    Drug use: No       Review of Systems   Constitutional: Negative  HENT: Negative  Eyes: Negative  Respiratory: Negative  Cardiovascular: Negative  Gastrointestinal: Negative  Endocrine: Negative  Genitourinary: Negative      Musculoskeletal: Negative  R SHOULDER PAIN    Skin: Negative  Allergic/Immunologic: Negative  Neurological: Negative  Hematological: Negative  Psychiatric/Behavioral: Negative  Physical Exam  Physical Exam  Vitals signs and nursing note reviewed  Constitutional:       General: She is not in acute distress  Appearance: Normal appearance  She is not ill-appearing, toxic-appearing or diaphoretic  Comments: AVSS- HTNSIVE-  PULSE OX 98 % ON RA- INTERPRETATION IS NORMAL- NO INTERVENTION    HENT:      Head: Normocephalic and atraumatic  Comments: NO SCALP TENDERNESS/COBNTUSION/ HEMATOMA     Nose: Nose normal       Mouth/Throat:      Mouth: Mucous membranes are moist    Eyes:      General: No scleral icterus  Right eye: No discharge  Left eye: No discharge  Extraocular Movements: Extraocular movements intact  Conjunctiva/sclera: Conjunctivae normal       Pupils: Pupils are equal, round, and reactive to light  Comments: MM PINK   Neck:      Musculoskeletal: Normal range of motion and neck supple  No neck rigidity or muscular tenderness  Vascular: No carotid bruit  Comments: NO PMT C/T/L/S SPINE   Cardiovascular:      Rate and Rhythm: Normal rate and regular rhythm  Pulses: Normal pulses  Heart sounds: Normal heart sounds  No murmur  No friction rub  No gallop  Pulmonary:      Effort: Pulmonary effort is normal  No respiratory distress  Breath sounds: Normal breath sounds  No stridor  No wheezing, rhonchi or rales  Chest:      Chest wall: No tenderness  Abdominal:      General: Bowel sounds are normal  There is no distension  Palpations: Abdomen is soft  There is no mass  Tenderness: There is no abdominal tenderness  There is no right CVA tenderness, left CVA tenderness, guarding or rebound  Hernia: No hernia is present        Comments: SOFT NT/ND- NO HSM/ NO CVA TENDERNESS/ NO PERITONEAL SIGNS- NO PULSATILE ABD MASS/BRUIT/ TENDERNESS- NO ABDOMINAL WALL ECCHYMOSIS   Musculoskeletal:         General: Tenderness and signs of injury present  No swelling or deformity  Right lower leg: No edema  Left lower leg: No edema  Comments: RUE- NT AT CLAVICLE/AC JT- MILD LATERAL SHOULDER AREA TENDERNESS- NO DEFROMITY- DECREASED ROM AT SHOULDER - NT AT ELBOW/FOREATRM/WRIST- NORMAL RUE DISTAL PULSE/SENSATION/ROM/STRENTH/CAP REFILL   Lymphadenopathy:      Cervical: No cervical adenopathy  Skin:     General: Skin is warm  Capillary Refill: Capillary refill takes less than 2 seconds  Coloration: Skin is not jaundiced or pale  Findings: No bruising, erythema, lesion or rash  Neurological:      General: No focal deficit present  Mental Status: She is alert and oriented to person, place, and time  Mental status is at baseline  Cranial Nerves: No cranial nerve deficit  Sensory: No sensory deficit  Motor: No weakness  Coordination: Coordination normal       Gait: Gait normal       Comments: NORMAL NON FOCAL NEURO EXAM    Psychiatric:         Mood and Affect: Mood normal          Behavior: Behavior normal          Thought Content:  Thought content normal          Judgment: Judgment normal          Vital Signs  ED Triage Vitals [04/18/21 1930]   Temperature Pulse Respirations Blood Pressure SpO2   98 3 °F (36 8 °C) 93 16 (!) 217/125 98 %      Temp Source Heart Rate Source Patient Position - Orthostatic VS BP Location FiO2 (%)   Oral Monitor Lying Left arm --      Pain Score       9           Vitals:    04/18/21 1930 04/18/21 2000 04/18/21 2030   BP: (!) 217/125 (!) 199/84 (!) 189/87   Pulse: 93     Patient Position - Orthostatic VS: Lying Sitting Lying         Visual Acuity  Visual Acuity      Most Recent Value   L Pupil Size (mm)  3   R Pupil Size (mm)  3          ED Medications  Medications   acetaminophen (TYLENOL) tablet 650 mg (650 mg Oral Given 4/18/21 2004)       Diagnostic Studies  Results Reviewed     None                 XR shoulder 2+ views RIGHT   ED Interpretation by Elsy Wadsworth MD (04/18 2039)                 Procedures  Procedures         ED Course  ED Course as of Apr 18 2258   Sun Apr 18, 2021 2008 R SHOULDER XRAY - NO FX/ DISLOCATION/ SEPARATION                                 SBIRT 22yo+      Most Recent Value   SBIRT (25 yo +)   In order to provide better care to our patients, we are screening all of our patients for alcohol and drug use  Would it be okay to ask you these screening questions? Yes Filed at: 04/18/2021 2010   Initial Alcohol Screen: US AUDIT-C    1  How often do you have a drink containing alcohol?  0 Filed at: 04/18/2021 2010   2  How many drinks containing alcohol do you have on a typical day you are drinking? 0 Filed at: 04/18/2021 2010   3a  Male UNDER 65: How often do you have five or more drinks on one occasion? 0 Filed at: 04/18/2021 2010   3b  FEMALE Any Age, or MALE 65+: How often do you have 4 or more drinks on one occassion? 0 Filed at: 04/18/2021 2010   Audit-C Score  0 Filed at: 04/18/2021 2010   ELROY: How many times in the past year have you    Used an illegal drug or used a prescription medication for non-medical reasons? Never Filed at: 04/18/2021 2010                    MDM    Disposition  Final diagnoses:   Fall, initial encounter   Contusion of right shoulder, initial encounter     Time reflects when diagnosis was documented in both MDM as applicable and the Disposition within this note     Time User Action Codes Description Comment    4/18/2021  8:48 PM Justyn Morton Add [[id number]  Antonio Manoj Fall, initial encounter     4/18/2021  8:48 PM Justyn Morton Add [[id number]] Contusion of right shoulder, initial encounter       ED Disposition     ED Disposition Condition Date/Time Comment    Discharge Stable Sun Apr 18, 2021 2048 [address] [facility] discharge to home/self care              Follow-up Information     Follow up With Specialties Details Why [address], MD Orthopedic Surgery Call  As needed [address] [facility]  [address] [facility]  [phone]            Discharge Medication List as of 4/18/2021  8:51 PM      CONTINUE these medications which have NOT CHANGED    Details   aspirin 81 MG tablet Take 81 mg by mouth daily, Until Discontinued, Historical Med      atorvastatin (LIPITOR) 10 mg tablet TAKE ONE TABLET BY MOUTH EVERY DAY, Normal      busPIRone (BUSPAR) 15 mg tablet TAKE 1 TABLET BY MOUTH 3 TIMES A DAY, Normal      cholecalciferol (VITAMIN D3) 1,000 units tablet Take 1,000 Units by mouth daily, Historical Med      clonazePAM (KlonoPIN) 0 5 mg tablet TAKE 1/2 TABLET IN THE MORNING AND AT 2:00 P  M  AND TAKE ONE TABLET BY MOUTH AT BEDTIME, Normal      docusate sodium (COLACE) 100 mg capsule Take 100 mg by mouth 2 (two) times a day, Historical Med      linaCLOtide (Linzess) 145 MCG CAPS Take 1 capsule (145 mcg total) by mouth daily, Starting Mon 3/15/2021, Normal      metFORMIN (GLUCOPHAGE) 500 mg tablet Take 1 tablet (500 mg total) by mouth 2 (two) times a day with meals, Starting Mon 3/15/2021, Normal      ramipril (ALTACE) 10 MG capsule Take 1 capsule (10 mg total) by mouth daily, Starting Mon 3/15/2021, Normal      !! venlafaxine (EFFEXOR-XR) 150 mg 24 hr capsule TAKE 1 CAPSULE BY MOUTH DAILY, Normal      !! venlafaxine (EFFEXOR-XR) 75 mg 24 hr capsule Take 1 capsule (75 mg total) by mouth daily with breakfast Total daily dose to 225 mg, Starting Thu 3/4/2021, Normal       !! - Potential duplicate medications found  Please discuss with provider  No discharge procedures on file      PDMP Review     None          ED Provider  Electronically Signed by           Henrietta Cuevas MD  04/18/21 6690

## 2021-04-19 NOTE — TELEPHONE ENCOUNTER
I spoke with Flora Rhodes and she stated that Rosangela Ryder (seen in ER for a fall)  was given a Lidocaine patch and Advised to take Tylenol neither of which is helping with her pain  Flora Rhodes stated that she was up all night  She wanted to know if there is something else that can be given to her

## 2021-04-19 NOTE — TELEPHONE ENCOUNTER
At age 78 I can give a limited supply of p r n  Tramadol which she did have in the past back in 2015 when she injured her shoulder after fall then

## 2021-04-19 NOTE — DISCHARGE INSTRUCTIONS
DIAGNOSIS; FALL/ RIGHT SHOULDER CONTUSION- BRUISE       - ACTIVITY AS TOLERATED     - RANGE OF MOTION OF RIGHT SHOULDER AS TOLERATED / RIGHT ARM SLING AS NEEDED FOR COMFORT     - THE LIDOCAINE PATCH CAN STAY ON FOR 12 HRS AT A TIME- CAN GET OVER THE COUNTER - CAN NOT GET WET OR WARM     - FOR PAIN- OVER THE COUNTER TYLENOL 500 MG EVERY 4 HRS     - IF NO IMPROVEMENT IN RANGE OF MOTION OF RIGHT SHOULDER WITHIN 1 WEEK- PLEASE CALL  THE ORTHOPEDIC DOCTOR TO SCHEDULE AN APPOINTMENT- YOU CAN SEE ANYONE IN THE GROUP

## 2021-04-30 ENCOUNTER — APPOINTMENT (EMERGENCY)
Dept: CT IMAGING | Facility: HOSPITAL | Age: 81
End: 2021-04-30
Payer: MEDICARE

## 2021-04-30 ENCOUNTER — HOSPITAL ENCOUNTER (EMERGENCY)
Facility: HOSPITAL | Age: 81
Discharge: HOME | End: 2021-05-01
Attending: EMERGENCY MEDICINE | Admitting: EMERGENCY MEDICINE
Payer: MEDICARE

## 2021-04-30 DIAGNOSIS — K59.00 CONSTIPATION: Primary | ICD-10-CM

## 2021-04-30 LAB
ALBUMIN SERPL BCP-MCNC: 3.7 G/DL (ref 3.5–5)
ALP SERPL-CCNC: 84 U/L (ref 46–116)
ALT SERPL W P-5'-P-CCNC: 22 U/L (ref 12–78)
ANION GAP SERPL CALCULATED.3IONS-SCNC: 7 MMOL/L (ref 4–13)
AST SERPL W P-5'-P-CCNC: 16 U/L (ref 5–45)
BASOPHILS # BLD AUTO: 0.05 THOUSANDS/ΜL (ref 0–0.1)
BASOPHILS NFR BLD AUTO: 1 % (ref 0–1)
BILIRUB SERPL-MCNC: 0.28 MG/DL (ref 0.2–1)
BUN SERPL-MCNC: 25 MG/DL (ref 5–25)
CALCIUM SERPL-MCNC: 9.2 MG/DL (ref 8.3–10.1)
CHLORIDE SERPL-SCNC: 105 MMOL/L (ref 100–108)
CO2 SERPL-SCNC: 26 MMOL/L (ref 21–32)
CREAT SERPL-MCNC: 0.91 MG/DL (ref 0.6–1.3)
EOSINOPHIL # BLD AUTO: 0.18 THOUSAND/ΜL (ref 0–0.61)
EOSINOPHIL NFR BLD AUTO: 3 % (ref 0–6)
ERYTHROCYTE [DISTWIDTH] IN BLOOD BY AUTOMATED COUNT: 13 % (ref 11.6–15.1)
GFR SERPL CREATININE-BSD FRML MDRD: 59 ML/MIN/1.73SQ M
GLUCOSE SERPL-MCNC: 334 MG/DL (ref 65–140)
HCT VFR BLD AUTO: 43.4 % (ref 34.8–46.1)
HGB BLD-MCNC: 13.6 G/DL (ref 11.5–15.4)
IMM GRANULOCYTES # BLD AUTO: 0.03 THOUSAND/UL (ref 0–0.2)
IMM GRANULOCYTES NFR BLD AUTO: 0 % (ref 0–2)
LIPASE SERPL-CCNC: 268 U/L (ref 73–393)
LYMPHOCYTES # BLD AUTO: 1.52 THOUSANDS/ΜL (ref 0.6–4.47)
LYMPHOCYTES NFR BLD AUTO: 22 % (ref 14–44)
MCH RBC QN AUTO: 28.9 PG (ref 26.8–34.3)
MCHC RBC AUTO-ENTMCNC: 31.3 G/DL (ref 31.4–37.4)
MCV RBC AUTO: 92 FL (ref 82–98)
MONOCYTES # BLD AUTO: 0.43 THOUSAND/ΜL (ref 0.17–1.22)
MONOCYTES NFR BLD AUTO: 6 % (ref 4–12)
NEUTROPHILS # BLD AUTO: 4.73 THOUSANDS/ΜL (ref 1.85–7.62)
NEUTS SEG NFR BLD AUTO: 68 % (ref 43–75)
NRBC BLD AUTO-RTO: 0 /100 WBCS
PLATELET # BLD AUTO: 218 THOUSANDS/UL (ref 149–390)
PMV BLD AUTO: 11.3 FL (ref 8.9–12.7)
POTASSIUM SERPL-SCNC: 4.2 MMOL/L (ref 3.5–5.3)
PROT SERPL-MCNC: 7.4 G/DL (ref 6.4–8.2)
RBC # BLD AUTO: 4.71 MILLION/UL (ref 3.81–5.12)
SODIUM SERPL-SCNC: 138 MMOL/L (ref 136–145)
WBC # BLD AUTO: 6.94 THOUSAND/UL (ref 4.31–10.16)

## 2021-04-30 PROCEDURE — 99284 EMERGENCY DEPT VISIT MOD MDM: CPT | Performed by: EMERGENCY MEDICINE

## 2021-04-30 PROCEDURE — 85025 COMPLETE CBC W/AUTO DIFF WBC: CPT | Performed by: EMERGENCY MEDICINE

## 2021-04-30 PROCEDURE — 74177 CT ABD & PELVIS W/CONTRAST: CPT

## 2021-04-30 PROCEDURE — G1004 CDSM NDSC: HCPCS

## 2021-04-30 PROCEDURE — 36415 COLL VENOUS BLD VENIPUNCTURE: CPT

## 2021-04-30 PROCEDURE — 99284 EMERGENCY DEPT VISIT MOD MDM: CPT

## 2021-04-30 PROCEDURE — 83690 ASSAY OF LIPASE: CPT | Performed by: EMERGENCY MEDICINE

## 2021-04-30 PROCEDURE — 80053 COMPREHEN METABOLIC PANEL: CPT | Performed by: EMERGENCY MEDICINE

## 2021-05-01 VITALS
DIASTOLIC BLOOD PRESSURE: 91 MMHG | OXYGEN SATURATION: 99 % | TEMPERATURE: 98.2 F | RESPIRATION RATE: 18 BRPM | SYSTOLIC BLOOD PRESSURE: 195 MMHG | HEART RATE: 97 BPM

## 2021-05-01 LAB
BACTERIA UR QL AUTO: ABNORMAL /HPF
BILIRUB UR QL STRIP: NEGATIVE
CAOX CRY URNS QL MICRO: ABNORMAL /HPF
CLARITY UR: CLEAR
COLOR UR: YELLOW
GLUCOSE UR STRIP-MCNC: ABNORMAL MG/DL
HGB UR QL STRIP.AUTO: ABNORMAL
KETONES UR STRIP-MCNC: ABNORMAL MG/DL
LEUKOCYTE ESTERASE UR QL STRIP: NEGATIVE
NITRITE UR QL STRIP: NEGATIVE
NON-SQ EPI CELLS URNS QL MICRO: ABNORMAL /HPF
PH UR STRIP.AUTO: 5 [PH] (ref 4.5–8)
PROT UR STRIP-MCNC: ABNORMAL MG/DL
RBC #/AREA URNS AUTO: ABNORMAL /HPF
SP GR UR STRIP.AUTO: 1.02 (ref 1–1.03)
UROBILINOGEN UR QL STRIP.AUTO: 0.2 E.U./DL
WBC #/AREA URNS AUTO: ABNORMAL /HPF

## 2021-05-01 PROCEDURE — 96360 HYDRATION IV INFUSION INIT: CPT

## 2021-05-01 PROCEDURE — 81001 URINALYSIS AUTO W/SCOPE: CPT

## 2021-05-01 RX ORDER — BISACODYL 10 MG
10 SUPPOSITORY, RECTAL RECTAL DAILY
Qty: 7 SUPPOSITORY | Refills: 0 | Status: SHIPPED | OUTPATIENT
Start: 2021-05-01 | End: 2021-05-08

## 2021-05-01 RX ORDER — DOCUSATE SODIUM 100 MG/1
100 CAPSULE, LIQUID FILLED ORAL EVERY 12 HOURS
Qty: 28 CAPSULE | Refills: 0 | Status: SHIPPED | OUTPATIENT
Start: 2021-05-01 | End: 2022-01-05

## 2021-05-01 RX ORDER — POLYETHYLENE GLYCOL 3350 17 G/17G
17 POWDER, FOR SOLUTION ORAL DAILY
Qty: 119 G | Refills: 0 | Status: SHIPPED | OUTPATIENT
Start: 2021-05-01 | End: 2021-12-06

## 2021-05-01 RX ADMIN — SODIUM CHLORIDE 500 ML: 0.9 INJECTION, SOLUTION INTRAVENOUS at 00:52

## 2021-05-01 RX ADMIN — IOHEXOL 100 ML: 350 INJECTION, SOLUTION INTRAVENOUS at 01:08

## 2021-05-01 NOTE — ED PROVIDER NOTES
History  Chief Complaint   Patient presents with    Constipation     pt recently stopped taking colace, reports trouble having a bowel movement  last bm was sometime this week  denies abdominal pain, no vomiting     HPI     68-year-old female presents emergency department due to difficulty having a bowel movement  Reports history of constipation  States she thought taking Colace  Last bowel movement sometime this week  Patient describes attempting manual disimpaction with a rubber glove  She attempted a Fleet enema with only minor stool  Describes some lower abdominal fullness  Denies any dysuria  States she was recently started on tramadol  Denies any nausea vomiting  Reports previous surgical history including 2   Prior to Admission Medications   Prescriptions Last Dose Informant Patient Reported? Taking?   aspirin 81 MG tablet   Yes No   Sig: Take 81 mg by mouth daily   atorvastatin (LIPITOR) 10 mg tablet   No No   Sig: TAKE ONE TABLET BY MOUTH EVERY DAY   busPIRone (BUSPAR) 15 mg tablet   No No   Sig: TAKE 1 TABLET BY MOUTH 3 TIMES A DAY   cholecalciferol (VITAMIN D3) 1,000 units tablet   Yes No   Sig: Take 1,000 Units by mouth daily   clonazePAM (KlonoPIN) 0 5 mg tablet   No No   Sig: TAKE 1/2 TABLET IN THE MORNING AND AT 2:00 P  M   AND TAKE ONE TABLET BY MOUTH AT BEDTIME   docusate sodium (COLACE) 100 mg capsule   Yes No   Sig: Take 100 mg by mouth 2 (two) times a day   linaCLOtide (Linzess) 145 MCG CAPS   No No   Sig: Take 1 capsule (145 mcg total) by mouth daily   metFORMIN (GLUCOPHAGE) 500 mg tablet   No No   Sig: Take 1 tablet (500 mg total) by mouth 2 (two) times a day with meals   ramipril (ALTACE) 10 MG capsule   No No   Sig: Take 1 capsule (10 mg total) by mouth daily   traMADol (ULTRAM) 50 mg tablet   No No   Sig: Take 1 tablet (50 mg total) by mouth every 8 (eight) hours as needed for moderate pain   venlafaxine (EFFEXOR-XR) 150 mg 24 hr capsule   No No   Sig: TAKE 1 CAPSULE BY MOUTH DAILY   venlafaxine (EFFEXOR-XR) 75 mg 24 hr capsule   No No   Sig: Take 1 capsule (75 mg total) by mouth daily with breakfast Total daily dose to 225 mg      Facility-Administered Medications: None       Past Medical History:   Diagnosis Date    Anxiety     Colon polyps     Depression     Diabetes mellitus ([facility] )     Dizziness     Last assessed: 8/31/15    DVT (deep venous thrombosis) ([facility])     Dysuria     Hyperlipidemia     Hypertension     Insomnia     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 RETROGRADE PYELOGRAM  2020    OR COLONOSCOPY FLX DX W/COLLJ SPEC WHEN PFRMD N/A 3/27/2017    Procedure: COLONOSCOPY;  Surgeon: Dany Godinez MD;  [facility]: [facility]; Service: Gastroenterology    OR CYSTO/URETERO W/LITHOTRIPSY &INDWELL STENT INSRT Right 2020    Procedure: CYSTOSCOPY URETEROSCOPY WITH LITHOTRIPSY HOLMIUM LASER, RETROGRADE PYELOGRAM AND INSERTION STENT URETERAL; EXCISION OF BLADDER TUMOR;  Surgeon: Sandre Skiff, MD;  Location: [facility];  Service: Urology    ROTATOR CUFF REPAIR Left     Last assessed: 3/29/15    TONSILLECTOMY         Family History   Problem Relation Age of Onset    Depression Mother         w/anxiety    Diabetes Mother         Mellitus    Breast cancer Mother     Hypertension Mother     No Known Problems Father     Depression Sister         w/anxiety    Heart disease Sister         Cardiac Disorder    Breast cancer Sister     Hypertension Sister     Diabetes Brother         Mellitus    Alcohol abuse Son      I have reviewed and agree with the history as documented      E-Cigarette/Vaping    E-Cigarette Use Never User      E-Cigarette/Vaping Substances     Social History     Tobacco Use    Smoking status: Never Smoker    Smokeless tobacco: Never Used   Substance Use Topics    Alcohol use: No    Drug use: No       Review of Systems   Gastrointestinal: Positive for abdominal distention and constipation  All other systems reviewed and are negative  Physical Exam  Physical Exam  Vitals signs and nursing note reviewed  Constitutional:       General: She is not in acute distress  Appearance: She is well-developed  HENT:      Head: Normocephalic and atraumatic  Eyes:      Pupils: Pupils are equal, round, and reactive to light  Neck:      Musculoskeletal: Normal range of motion and neck supple  Cardiovascular:      Rate and Rhythm: Normal rate and regular rhythm  Heart sounds: Normal heart sounds  No murmur  Pulmonary:      Effort: Pulmonary effort is normal  No respiratory distress  Breath sounds: Normal breath sounds  No wheezing or rales  Abdominal:      General: Bowel sounds are normal  There is no distension  Palpations: Abdomen is soft  Tenderness: There is no abdominal tenderness  There is no guarding or rebound  Comments: Slight lower abdominal distension  No focal tenderness  No tympany  Musculoskeletal: Normal range of motion  General: No deformity  Lymphadenopathy:      Cervical: No cervical adenopathy  Skin:     Capillary Refill: Capillary refill takes less than 2 seconds  Findings: No erythema or rash  Neurological:      Mental Status: She is alert and oriented to person, place, and time  Cranial Nerves: No cranial nerve deficit  Motor: No abnormal muscle tone        Coordination: Coordination normal    Psychiatric:         Behavior: Behavior normal          Vital Signs  ED Triage Vitals   Temperature Pulse Respirations Blood Pressure SpO2   04/30/21 2216 04/30/21 2215 04/30/21 2215 04/30/21 2215 04/30/21 2215   98 2 °F (36 8 °C) 100 18 (!) 194/84 98 %      Temp Source Heart Rate Source Patient Position - Orthostatic VS BP Location FiO2 (%)   04/30/21 2216 04/30/21 2215 -- 05/01/21 0052 --   Oral Monitor  Left arm       Pain Score       --                  Vitals:    04/30/21 2215 05/01/21 0052   BP: (!) 194/84 (!) 195/91   Pulse: 100 97         Visual Acuity      ED Medications  Medications   sodium chloride 0 9 % bolus 500 mL (500 mL Intravenous New Bag 5/1/21 0052)   iohexol (OMNIPAQUE) 350 MG/ML injection (SINGLE-DOSE) 100 mL (100 mL Intravenous Given 5/1/21 0108)       Diagnostic Studies  Results Reviewed     Procedure Component Value Units Date/Time    Urine Microscopic [[id number]]  (Abnormal) Collected: 05/01/21 0037    Lab Status: Final result Specimen: Urine, Clean Catch Updated: 05/01/21 0106     RBC, UA 2-4 /hpf      WBC, UA 1-2 /hpf      Epithelial Cells None Seen /hpf      Bacteria, UA None Seen /hpf      Ca Oxalate Sneha, UA Occasional /hpf     Urine Macroscopic, POC [[id number]]  (Abnormal) Collected: 05/01/21 0037    Lab Status: Final result Specimen: Urine Updated: 05/01/21 0039     Color, UA Yellow     Clarity, UA Clear     pH, UA 5 0     Leukocytes, UA Negative     Nitrite, UA Negative     Protein, UA Trace mg/dl      Glucose,  (1/2%) mg/dl      Ketones, UA Trace mg/dl      Urobilinogen, UA 0 2 E U /dl      Bilirubin, UA Negative     Blood, UA Small     Specific Kennedy, UA 1 025    Narrative:      CLINITEK RESULT    Lipase [[id number]]  (Normal) Collected: 04/30/21 2244    Lab Status: Final result Specimen: Blood from Arm, Left Updated: 04/30/21 2304     Lipase 268 u/L     Comprehensive metabolic panel [[id number]]  (Abnormal) Collected: 04/30/21 2244    Lab Status: Final result Specimen: Blood from Arm, Left Updated: 04/30/21 2304     Sodium 138 mmol/L      Potassium 4 2 mmol/L      Chloride 105 mmol/L      CO2 26 mmol/L      ANION GAP 7 mmol/L      BUN 25 mg/dL      Creatinine 0 91 mg/dL      Glucose 334 mg/dL      Calcium 9 2 mg/dL      AST 16 U/L      ALT 22 U/L      Alkaline Phosphatase 84 U/L      Total Protein 7 4 g/dL      Albumin 3 7 g/dL      Total Bilirubin 0 28 mg/dL eGFR 59 ml/min/1 73sq m     Narrative:      [place] guidelines for Chronic Kidney Disease (CKD):     Stage 1 with normal or high GFR (GFR > 90 mL/min/1 73 square meters)    Stage 2 Mild CKD (GFR = 60-89 mL/min/1 73 square meters)    Stage 3A Moderate CKD (GFR = 45-59 mL/min/1 73 square meters)    Stage 3B Moderate CKD (GFR = 30-44 mL/min/1 73 square meters)    Stage 4 Severe CKD (GFR = 15-29 mL/min/1 73 square meters)    Stage 5 End Stage CKD (GFR <15 mL/min/1 73 square meters)  Note: GFR calculation is accurate only with a steady state creatinine    CBC and differential [[id number]]  (Abnormal) Collected: 04/30/21 2244    Lab Status: Final result Specimen: Blood from Arm, Left Updated: 04/30/21 2252     WBC 6 94 Thousand/uL      RBC 4 71 Million/uL      Hemoglobin 13 6 g/dL      Hematocrit 43 4 %      MCV 92 fL      MCH 28 9 pg      MCHC 31 3 g/dL      RDW 13 0 %      MPV 11 3 fL      Platelets 331 Thousands/uL      nRBC 0 /100 WBCs      Neutrophils Relative 68 %      Immat GRANS % 0 %      Lymphocytes Relative 22 %      Monocytes Relative 6 %      Eosinophils Relative 3 %      Basophils Relative 1 %      Neutrophils Absolute 4 73 Thousands/µL      Immature Grans Absolute 0 03 Thousand/uL      Lymphocytes Absolute 1 52 Thousands/µL      Monocytes Absolute 0 43 Thousand/µL      Eosinophils Absolute 0 18 Thousand/µL      Basophils Absolute 0 05 Thousands/µL                  CT abdomen pelvis with contrast   Final Result by Beverly Headley DO (05/01 0129)      Large amount fecal material within the colon suggestive of constipation  Moderate amount of free fluid in the pelvis which may represent stercoral colitis among other etiologies              Workstation performed: [id number]                    Procedures  Procedures         ED Course                                           MDM  Number of Diagnoses or Management Options  Constipation: new and requires workup  Diagnosis management comments: CT scan to rule out obstruction  Labs unremarkable  Will gently hydrate  CT scan with no signs of fecal impaction  No signs of obstruction  Consistent with constipation  Recommended restarting Colace, MiraLax daily, Dulcolax suppository daily  Return precautions discussed  Patient had bowel movement prior to my evaluation while in the waiting room  No indication for emergent enema  Stable time of discharge home with soft, nontender abdomen  Amount and/or Complexity of Data Reviewed  Clinical lab tests: ordered and reviewed  Tests in the radiology section of CPT®: ordered and reviewed  Tests in the medicine section of CPT®: ordered and reviewed    Risk of Complications, Morbidity, and/or Mortality  Presenting problems: high  Diagnostic procedures: moderate  Management options: high    Patient Progress  Patient progress: stable      Disposition  Final diagnoses:   Constipation     Time reflects when diagnosis was documented in both MDM as applicable and the Disposition within this note     Time User Action Codes Description Comment    5/1/2021  1:35 AM Praveena Blount [K59 [id number]] Constipation       ED Disposition     ED Disposition Condition Date/Time Comment    Discharge Stable Sat May 1, 2021  1:35 AM Janice Higginbotham discharge to home/self care              Follow-up Information     Follow up With Specialties Details Why Contact Info Additional Information    Melissa Nelson DO Family Medicine Schedule an appointment as soon as possible for a visit in 2 days As needed [address]  [facility] [address] Emergency Department Emergency Medicine Go to  If symptoms worsen [address] Emergency Department, [address], [place], [place], [address]          Patient's Medications   Discharge Prescriptions    BISACODYL (DULCOLAX) 10 MG SUPPOSITORY Insert 1 suppository (10 mg total) into the rectum daily for 7 days       Start Date: 5/1/2021  End Date: 5/8/2021       Order Dose: 10 mg       Quantity: 7 suppository    Refills: 0    DOCUSATE SODIUM (COLACE) 100 MG CAPSULE    Take 1 capsule (100 mg total) by mouth every 12 (twelve) hours for 14 days       Start Date: 5/1/2021  End Date: 5/15/2021       Order Dose: 100 mg       Quantity: 28 capsule    Refills: 0    POLYETHYLENE GLYCOL (MIRALAX) 17 G PACKET    Take 17 g by mouth daily for 7 days       Start Date: 5/1/2021  End Date: 5/8/2021       Order Dose: 17 g       Quantity: 119 g    Refills: 0     No discharge procedures on file      PDMP Review     None          ED Provider  Electronically Signed by           Layo Riojas MD  05/01/21 7380

## 2021-05-01 NOTE — ED NOTES
[facility] [address] [place] [address]  05/01/21 6967

## 2021-05-06 ENCOUNTER — TELEPHONE (OUTPATIENT)
Dept: GASTROENTEROLOGY | Facility: AMBULARY SURGERY CENTER | Age: 81
End: 2021-05-06

## 2021-05-06 NOTE — TELEPHONE ENCOUNTER
Last office visit 5/24/2018 future appt 5/13/21  Hx: Constipation    Pt ws at ED 4/30 for constipation and rectal impaction  ER recommended daily miralax, PM colace, and daily dulcolax suposs for 14 days  Daughter reports pt is doing all these interventions and has had no BM since "maybe Monday"  Reports suppository tends to fall out so pt is not getting full dose  Denies N/V/blood in stool/other appreciating symptoms  Advised pt take her 145mcg Linzess with food in AM, trial fleet enema for today to help remove impacted stool if still present, increase 1 capful of miralax twice daily  Reviewed how to put in a suppository because it should not fall out if properly placed  Tomorrow can resume suppository if fleet enema does not work   Please advise

## 2021-05-06 NOTE — TELEPHONE ENCOUNTER
Agree, should do enema today  Can trial another enema tomorrow, if no BM she may need manual impaction with hands by either daughter or if not comfortable with doing so, at the ER again

## 2021-05-06 NOTE — TELEPHONE ENCOUNTER
Advised daughter Adán of provider recommendations, daughter agreeable  Instructed to call office if interventions do not work as she may need manual disimpaction by daughter or ER

## 2021-05-06 NOTE — TELEPHONE ENCOUNTER
Patients GI provider:  Dr Komal Trivedi    Number to return call: [phone]    Reason for call: Pt calling b/c after ed visit she is still experiencing chronic constaptation    Scheduled procedure/appointment date if applicable: 1/51/70

## 2021-05-13 ENCOUNTER — OFFICE VISIT (OUTPATIENT)
Dept: GASTROENTEROLOGY | Facility: CLINIC | Age: 81
End: 2021-05-13
Payer: MEDICARE

## 2021-05-13 VITALS
DIASTOLIC BLOOD PRESSURE: 88 MMHG | TEMPERATURE: 97.7 F | WEIGHT: 124.7 LBS | HEIGHT: 56 IN | BODY MASS INDEX: 28.05 KG/M2 | SYSTOLIC BLOOD PRESSURE: 152 MMHG

## 2021-05-13 DIAGNOSIS — K59.04 CHRONIC IDIOPATHIC CONSTIPATION: Primary | ICD-10-CM

## 2021-05-13 PROCEDURE — 99214 OFFICE O/P EST MOD 30 MIN: CPT | Performed by: INTERNAL MEDICINE

## 2021-05-13 NOTE — PROGRESS NOTES
Consultation - [facility]  Christine Geoff 1940 female         Chief Complaint:   constipation    HPI:   78-year-old female with history of diabetes mellitus, hyperlipidemia, depression, obsessive compulsive disorder was seen in the ER couple of weeks ago because of worsening constipation  She CT scan showed large amount of stool in the colon  Patient was brought in by her son who reports that she is very obsessive about her bowel movements and complains even though if she misses a day  Patient's son also tells me that  Prune juice helps her symptoms but she does not take regularly  Patient has problems with chronic constipation for which she has been on Linzess 145 micro g daily  I did colonoscopy on her in 2017 which showed long tortuous colon  Patient kept complaining about constipation issues and she knows that she is very obsessive with her bowel movements  REVIEW OF SYSTEMS: Review of Systems   Constitutional: Negative for activity change, appetite change, chills, diaphoresis, fatigue, fever and unexpected weight change  HENT: Negative for ear discharge, ear pain, facial swelling, hearing loss, nosebleeds, sore throat, tinnitus and voice change  Eyes: Negative for pain, discharge, redness, itching and visual disturbance  Respiratory: Negative for apnea, cough, chest tightness, shortness of breath and wheezing  Cardiovascular: Negative for chest pain and palpitations  Gastrointestinal:        As noted in HPI   Endocrine: Negative for cold intolerance, heat intolerance and polyuria  Genitourinary: Negative for difficulty urinating, dysuria, flank pain, hematuria and urgency  Musculoskeletal: Positive for arthralgias  Negative for back pain, gait problem, joint swelling and myalgias  Skin: Negative for rash and wound  Neurological: Negative for dizziness, tremors, seizures, speech difficulty, light-headedness, numbness and headaches     Hematological: Negative for adenopathy  Does not bruise/bleed easily  Psychiatric/Behavioral: Negative for agitation, behavioral problems and confusion  The patient is not nervous/anxious  Past Medical History:   Diagnosis Date    Anxiety     Colon polyps     Depression     Diabetes mellitus ([facility] [address] [facility]     Dizziness     Last assessed: 8/31/15    [facility] thrombosis[facility]     Dysuria     Hyperlipidemia     Hypertension     Insomnia     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     Ureterolithiasis 2020      Past Surgical History:   Procedure Laterality Date     SECTION      1964 son, 26 daughter    COLONOSCOPY      [facility] RETROGRADE PYELOGRAM  2020    VT COLONOSCOPY FLX DX W/COLLJ SPEC WHEN PFRMD N/A 3/27/2017    Procedure: COLONOSCOPY;  Surgeon: Mi Driver MD;  [facility]: [facility]; Service: Gastroenterology    VT CYSTO/URETERO W/LITHOTRIPSY &INDWELL STENT INSRT Right 2020    Procedure: CYSTOSCOPY URETEROSCOPY WITH LITHOTRIPSY HOLMIUM LASER, RETROGRADE PYELOGRAM AND INSERTION STENT URETERAL; EXCISION OF BLADDER TUMOR;  Surgeon: Missy Aguila MD;  Location: [facility];  Service: Urology    ROTATOR CUFF REPAIR Left     Last assessed: 3/29/15    TONSILLECTOMY       Social History     Socioeconomic History    Marital status:       Spouse name: Not on file    Number of children: Not on file    Years of education: Not on file    Highest education level: Not on file   Occupational History    Not on file   Social Needs    Financial resource strain: Not on file    Food insecurity     Worry: Not on file     Inability: Not on file    Transportation needs     Medical: Not on file     Non-medical: Not on file   Tobacco Use    Smoking status: Never Smoker    Smokeless tobacco: Never Used   Substance and Sexual Activity    Alcohol use: No    Drug use: No    Sexual activity: Not on file   Lifestyle    Physical activity     Days per week: Not on file     Minutes per session: Not on file    Stress: Not on file   Relationships    Social connections     Talks on phone: Not on file     Gets together: Not on file     Attends Confucianism service: Not on file     Active member of club or organization: Not on file     Attends meetings of clubs or organizations: Not on file     Relationship status: Not on file    Intimate partner violence     Fear of current or ex partner: Not on file     Emotionally abused: Not on file     Physically abused: Not on file     Forced sexual activity: Not on file   Other Topics Concern    Not on file   Social History Narrative    Lack of exercise     Family History   Problem Relation Age of Onset    Depression Mother         w/anxiety   Sylwia Mera Diabetes Mother         Mellitus    Breast cancer Mother     Hypertension Mother     No Known Problems Father     Depression Sister         w/anxiety    Heart disease Sister         Cardiac Disorder    Breast cancer Sister     Hypertension Sister     Diabetes Brother         Mellitus    Alcohol abuse Son      Patient has no known allergies  Current Outpatient Medications   Medication Sig Dispense Refill    aspirin 81 MG tablet Take 81 mg by mouth daily      atorvastatin (LIPITOR) 10 mg tablet TAKE ONE TABLET BY MOUTH EVERY DAY 90 tablet 1    busPIRone (BUSPAR) 15 mg tablet TAKE 1 TABLET BY MOUTH 3 TIMES A DAY 90 tablet 2    cholecalciferol (VITAMIN D3) 1,000 units tablet Take 1,000 Units by mouth daily      clonazePAM (KlonoPIN) 0 5 mg tablet TAKE 1/2 TABLET IN THE MORNING AND AT 2:00 P  M   AND TAKE ONE TABLET BY MOUTH AT BEDTIME 60 tablet 3    docusate sodium (COLACE) 100 mg capsule Take 1 capsule (100 mg total) by mouth every 12 (twelve) hours for 14 days 28 capsule 0    linaCLOtide (Linzess) 145 MCG CAPS Take 1 capsule (145 mcg total) by mouth daily 90 capsule 1    metFORMIN (GLUCOPHAGE) 500 mg tablet Take 1 tablet (500 mg total) by mouth 2 (two) times a day with meals 180 tablet 1    ramipril (ALTACE) 10 MG capsule Take 1 capsule (10 mg total) by mouth daily 90 capsule 1    traMADol (ULTRAM) 50 mg tablet Take 1 tablet (50 mg total) by mouth every 8 (eight) hours as needed for moderate pain 30 tablet 0    venlafaxine (EFFEXOR-XR) 150 mg 24 hr capsule TAKE 1 CAPSULE BY MOUTH DAILY 90 capsule 1    venlafaxine (EFFEXOR-XR) 75 mg 24 hr capsule Take 1 capsule (75 mg total) by mouth daily with breakfast Total daily dose to 225 mg 90 capsule 1    bisacodyl (DULCOLAX) 10 mg suppository Insert 1 suppository (10 mg total) into the rectum daily for 7 days 7 suppository 0    docusate sodium (COLACE) 100 mg capsule Take 100 mg by mouth 2 (two) times a day      polyethylene glycol (MIRALAX) 17 g packet Take 17 g by mouth daily for 7 days 119 g 0     No current facility-administered medications for this visit  Blood pressure 152/88, temperature 97 7 °F (36 5 °C), temperature source Temporal, height 4' 8" (1 422 m), weight 56 6 kg (124 lb 11 2 oz)  PHYSICAL EXAM: Physical Exam  Constitutional:       Appearance: She is well-developed  HENT:      Head: Normocephalic and atraumatic  Nose: Nose normal    Eyes:      General: No scleral icterus  Right eye: No discharge  Left eye: No discharge  Conjunctiva/sclera: Conjunctivae normal    Neck:      Musculoskeletal: Neck supple  Thyroid: No thyromegaly  Vascular: No JVD  Trachea: No tracheal deviation  Cardiovascular:      Rate and Rhythm: Normal rate and regular rhythm  Heart sounds: Normal heart sounds  No murmur  No friction rub  No gallop  Pulmonary:      Effort: Pulmonary effort is normal  No respiratory distress  Breath sounds: Normal breath sounds  No wheezing or rales  Chest:      Chest wall: No tenderness  Abdominal:      General: Bowel sounds are normal  There is no distension  Palpations: Abdomen is soft  There is no mass  Tenderness: There is no abdominal tenderness   There is no guarding or rebound  Hernia: No hernia is present  Musculoskeletal:         General: No tenderness or deformity  Lymphadenopathy:      Cervical: No cervical adenopathy  Skin:     General: Skin is warm and dry  Findings: No erythema or rash  Neurological:      Mental Status: She is alert and oriented to person, place, and time  Psychiatric:         Behavior: Behavior normal          Thought Content: Thought content normal           Lab Results   Component Value Date    WBC 6 94 04/30/2021    HGB 13 6 04/30/2021    HCT 43 4 04/30/2021    MCV 92 04/30/2021     04/30/2021     Lab Results   Component Value Date    GLUCOSE 134 03/17/2015    CALCIUM 9 2 04/30/2021     02/11/2017    K 4 2 04/30/2021    CO2 26 04/30/2021     04/30/2021    BUN 25 04/30/2021    CREATININE 0 91 04/30/2021     Lab Results   Component Value Date    ALT 22 04/30/2021    AST 16 04/30/2021    ALKPHOS 84 04/30/2021    BILITOT 0 7 02/11/2017     Lab Results   Component Value Date    INR 1 01 05/21/2020    INR 1 51 (H) 02/28/2015    INR 1 23 (H) 02/27/2015    PROTIME 12 7 05/21/2020    PROTIME 17 3 (H) 02/28/2015    PROTIME 14 8 (H) 02/27/2015       Ct Abdomen Pelvis With Contrast    Result Date: 5/1/2021  Impression: Large amount fecal material within the colon suggestive of constipation  Moderate amount of free fluid in the pelvis which may represent stercoral colitis among other etiologies  Workstation performed: [id number]       ASSESSMENT & PLAN:    Chronic idiopathic constipation  Patient with history of chronic constipation for which she has been on Linzess 145 micro g  Some of her symptoms appear to be secondary to her obsessive behavior     - explained to patient and her son in detail about the regular bowel regimen with MiraLax, prune juice and stool softeners  Advised to take Metamucil also regularly    - discussed about colonoscopy but concerned because of her age and also long tortuous colon    Patient is refusing at this time unless any worsening of symptoms     -  Continue Linzess 145 micro g daily    If she continues to have symptoms we can increase to 290 micro g    -  Patient reports that she has an appointment with geriatrician to discuss about her  Medication for depression and OCD

## 2021-05-13 NOTE — ASSESSMENT & PLAN NOTE
Patient with history of chronic constipation for which she has been on Linzess 145 micro g  Some of her symptoms appear to be secondary to her obsessive behavior     - explained to patient and her son in detail about the regular bowel regimen with MiraLax, prune juice and stool softeners  Advised to take Metamucil also regularly    - discussed about colonoscopy but concerned because of her age and also long tortuous colon  Patient is refusing at this time unless any worsening of symptoms     -  Continue Linzess 145 micro g daily    If she continues to have symptoms we can increase to 290 micro g    -  Patient reports that she has an appointment with geriatrician to discuss about her  Medication for depression and OCD

## 2021-06-15 LAB
ALBUMIN SERPL-MCNC: 4 G/DL (ref 3.6–5.1)
ALBUMIN/CREAT UR: 38 MCG/MG CREAT
ALBUMIN/GLOB SERPL: 1.7 (CALC) (ref 1–2.5)
ALP SERPL-CCNC: 74 U/L (ref 37–153)
ALT SERPL-CCNC: 11 U/L (ref 6–29)
AST SERPL-CCNC: 14 U/L (ref 10–35)
BASOPHILS # BLD AUTO: 28 CELLS/UL (ref 0–200)
BASOPHILS NFR BLD AUTO: 0.5 %
BILIRUB SERPL-MCNC: 0.4 MG/DL (ref 0.2–1.2)
BUN SERPL-MCNC: 16 MG/DL (ref 7–25)
BUN/CREAT SERPL: ABNORMAL (CALC) (ref 6–22)
CALCIUM SERPL-MCNC: 9.3 MG/DL (ref 8.6–10.4)
CHLORIDE SERPL-SCNC: 105 MMOL/L (ref 98–110)
CHOLEST SERPL-MCNC: 138 MG/DL
CHOLEST/HDLC SERPL: 2.8 (CALC)
CO2 SERPL-SCNC: 32 MMOL/L (ref 20–32)
CREAT SERPL-MCNC: 0.68 MG/DL (ref 0.6–0.88)
CREAT UR-MCNC: 47 MG/DL (ref 20–275)
EOSINOPHIL # BLD AUTO: 149 CELLS/UL (ref 15–500)
EOSINOPHIL NFR BLD AUTO: 2.7 %
ERYTHROCYTE [DISTWIDTH] IN BLOOD BY AUTOMATED COUNT: 12.5 % (ref 11–15)
GLOBULIN SER CALC-MCNC: 2.3 G/DL (CALC) (ref 1.9–3.7)
GLUCOSE SERPL-MCNC: 122 MG/DL (ref 65–99)
HBA1C MFR BLD: 6.6 % OF TOTAL HGB
HCT VFR BLD AUTO: 39 % (ref 35–45)
HDLC SERPL-MCNC: 50 MG/DL
HGB BLD-MCNC: 12.8 G/DL (ref 11.7–15.5)
LDLC SERPL CALC-MCNC: 68 MG/DL (CALC)
LYMPHOCYTES # BLD AUTO: 1573 CELLS/UL (ref 850–3900)
LYMPHOCYTES NFR BLD AUTO: 28.6 %
MCH RBC QN AUTO: 28.8 PG (ref 27–33)
MCHC RBC AUTO-ENTMCNC: 32.8 G/DL (ref 32–36)
MCV RBC AUTO: 87.8 FL (ref 80–100)
MICROALBUMIN UR-MCNC: 1.8 MG/DL
MONOCYTES # BLD AUTO: 363 CELLS/UL (ref 200–950)
MONOCYTES NFR BLD AUTO: 6.6 %
NEUTROPHILS # BLD AUTO: 3388 CELLS/UL (ref 1500–7800)
NEUTROPHILS NFR BLD AUTO: 61.6 %
NONHDLC SERPL-MCNC: 88 MG/DL (CALC)
PLATELET # BLD AUTO: 220 THOUSAND/UL (ref 140–400)
PMV BLD REES-ECKER: 10.9 FL (ref 7.5–12.5)
POTASSIUM SERPL-SCNC: 4.7 MMOL/L (ref 3.5–5.3)
PROT SERPL-MCNC: 6.3 G/DL (ref 6.1–8.1)
RBC # BLD AUTO: 4.44 MILLION/UL (ref 3.8–5.1)
SL AMB EGFR AFRICAN AMERICAN: 94 ML/MIN/1.73M2
SL AMB EGFR NON AFRICAN AMERICAN: 81 ML/MIN/1.73M2
SODIUM SERPL-SCNC: 142 MMOL/L (ref 135–146)
TRIGL SERPL-MCNC: 113 MG/DL
TSH SERPL-ACNC: 2.57 MIU/L (ref 0.4–4.5)
WBC # BLD AUTO: 5.5 THOUSAND/UL (ref 3.8–10.8)

## 2021-06-24 ENCOUNTER — OFFICE VISIT (OUTPATIENT)
Dept: FAMILY MEDICINE CLINIC | Facility: CLINIC | Age: 81
End: 2021-06-24
Payer: MEDICARE

## 2021-06-24 VITALS
RESPIRATION RATE: 16 BRPM | BODY MASS INDEX: 27.9 KG/M2 | HEART RATE: 90 BPM | DIASTOLIC BLOOD PRESSURE: 78 MMHG | HEIGHT: 56 IN | OXYGEN SATURATION: 98 % | WEIGHT: 124 LBS | SYSTOLIC BLOOD PRESSURE: 144 MMHG

## 2021-06-24 DIAGNOSIS — E11.9 TYPE 2 DIABETES MELLITUS WITHOUT COMPLICATION, WITHOUT LONG-TERM CURRENT USE OF INSULIN (HCC): ICD-10-CM

## 2021-06-24 DIAGNOSIS — E11.9 CONTROLLED TYPE 2 DIABETES MELLITUS WITHOUT COMPLICATION, WITHOUT LONG-TERM CURRENT USE OF INSULIN (HCC): ICD-10-CM

## 2021-06-24 DIAGNOSIS — F60.5 OBSESSIVE-COMPULSIVE PERSONALITY DISORDER (HCC): ICD-10-CM

## 2021-06-24 DIAGNOSIS — F41.8 OTHER SPECIFIED ANXIETY DISORDERS: ICD-10-CM

## 2021-06-24 DIAGNOSIS — E78.2 MIXED HYPERLIPIDEMIA: ICD-10-CM

## 2021-06-24 DIAGNOSIS — F41.8 DEPRESSION WITH ANXIETY: ICD-10-CM

## 2021-06-24 DIAGNOSIS — I10 BENIGN ESSENTIAL HYPERTENSION: Primary | ICD-10-CM

## 2021-06-24 DIAGNOSIS — F60.5 OBSESSIVE COMPULSIVE PERSONALITY DISORDER (HCC): ICD-10-CM

## 2021-06-24 PROCEDURE — 99215 OFFICE O/P EST HI 40 MIN: CPT | Performed by: FAMILY MEDICINE

## 2021-06-24 RX ORDER — BUSPIRONE HYDROCHLORIDE 15 MG/1
15 TABLET ORAL 3 TIMES DAILY
Qty: 90 TABLET | Refills: 5 | Status: SHIPPED | OUTPATIENT
Start: 2021-06-24 | End: 2021-12-06 | Stop reason: SDUPTHER

## 2021-06-24 RX ORDER — VENLAFAXINE HYDROCHLORIDE 150 MG/1
150 CAPSULE, EXTENDED RELEASE ORAL DAILY
Qty: 90 CAPSULE | Refills: 1 | Status: SHIPPED | OUTPATIENT
Start: 2021-06-24 | End: 2021-12-06 | Stop reason: SDUPTHER

## 2021-06-24 NOTE — PROGRESS NOTES
Subjective:      Patient ID: Librado Duckworth is a 78 y o  female  77-year-old female with past medical history of diabetes mellitus type 2, hypertension, hyperlipidemia, obsessive-compulsive personality disorder presents with her daughter for four-month follow-up of chronic conditions  Overall she has actually been doing quite well  She still has some obsessive tendencies as well as anxiety symptoms  She does meet with her therapist on a weekly basis which has proven to be very beneficial for her  She remains on Effexor XR, BuSpar and regimen id Klonopin  She finds that her worst times are usually in the morning as far as her anxiety is concerned  By the afternoon she is quite busy with her grandchildren that she really does not notice her anxiety  Also taking half a tablet at bedtime which does work well for her insomnia  Labs reviewed which showed improvement of hemoglobin A1c from 6 7-6 6%, improved lipid profile, normal CBC, CMP  Patient did meet with gastroenterologist in regards to chronic constipation  She does have a bowel movement once or twice per day  She remains on a regimen of high-fiber food, Linzess 145 mcg once daily, prune juice as well as Colace  She does become fixated on certain things especially her bowel movements  Definitely getting along with her daughter much better since her daughter is out working during the week and her grandchildren are getting older and more responsible for the their own behaviors    Patient does see podiatrist for diabetic foot examination      Past Medical History:   Diagnosis Date    Anxiety     Colon polyps     Depression     Diabetes mellitus ([facility] [address] )     Dizziness     Last assessed: 8/31/15    [facility]     Dysuria     Hyperlipidemia     Hypertension     Insomnia     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     Ureterolithiasis 5/22/2020       Family History   Problem Relation Age of Onset    Depression Mother         w/anxiety    Diabetes Mother         Mellitus    Breast cancer Mother     Hypertension Mother     No Known Problems Father     Depression Sister         w/anxiety    Heart disease Sister         Cardiac Disorder    Breast cancer Sister     Hypertension Sister     Diabetes Brother         Mellitus    Alcohol abuse Son        Past Surgical History:   Procedure Laterality Date     SECTION      1964 son, 26 daughter    COLONOSCOPY      [facility] RETROGRADE PYELOGRAM  2020    CT COLONOSCOPY FLX DX W/COLLJ SPEC WHEN PFRMD N/A 3/27/2017    Procedure: COLONOSCOPY;  Surgeon: Nita Sam MD;  [facility]; Service: Gastroenterology    CT CYSTO/URETERO W/LITHOTRIPSY &INDWELL STENT INSRT Right 2020    Procedure: CYSTOSCOPY URETEROSCOPY WITH LITHOTRIPSY HOLMIUM LASER, RETROGRADE PYELOGRAM AND INSERTION STENT URETERAL; EXCISION OF BLADDER TUMOR;  Surgeon: Mark Lee MD;  Location: [facility];  Service: Urology    ROTATOR CUFF REPAIR Left     Last assessed: 3/29/15    TONSILLECTOMY          reports that she has never smoked  She has never used smokeless tobacco  She reports that she does not drink alcohol and does not use drugs  Current Outpatient Medications:     aspirin 81 MG tablet, Take 81 mg by mouth daily, Disp: , Rfl:     atorvastatin (LIPITOR) 10 mg tablet, TAKE ONE TABLET BY MOUTH EVERY DAY, Disp: 90 tablet, Rfl: 1    cholecalciferol (VITAMIN D3) 1,000 units tablet, Take 1,000 Units by mouth daily, Disp: , Rfl:     clonazePAM (KlonoPIN) 0 5 mg tablet, TAKE 1/2 TABLET IN THE MORNING AND AT 2:00 P  M   AND TAKE ONE TABLET BY MOUTH AT BEDTIME, Disp: 60 tablet, Rfl: 3    docusate sodium (COLACE) 100 mg capsule, Take 100 mg by mouth 2 (two) times a day, Disp: , Rfl:     linaCLOtide (Linzess) 145 MCG CAPS, Take 1 capsule (145 mcg total) by mouth daily, Disp: 90 capsule, Rfl: 1    ramipril (ALTACE) 10 MG capsule, Take 1 capsule (10 mg total) by mouth daily, Disp: 90 capsule, Rfl: 1    traMADol (ULTRAM) 50 mg tablet, Take 1 tablet (50 mg total) by mouth every 8 (eight) hours as needed for moderate pain, Disp: 30 tablet, Rfl: 0    venlafaxine (EFFEXOR-XR) 75 mg 24 hr capsule, Take 1 capsule (75 mg total) by mouth daily with breakfast Total daily dose to 225 mg, Disp: 90 capsule, Rfl: 1    bisacodyl (DULCOLAX) 10 mg suppository, Insert 1 suppository (10 mg total) into the rectum daily for 7 days, Disp: 7 suppository, Rfl: 0    busPIRone (BUSPAR) 15 mg tablet, Take 1 tablet (15 mg total) by mouth 3 (three) times a day, Disp: 90 tablet, Rfl: 5    docusate sodium (COLACE) 100 mg capsule, Take 1 capsule (100 mg total) by mouth every 12 (twelve) hours for 14 days, Disp: 28 capsule, Rfl: 0    metFORMIN (GLUCOPHAGE) 500 mg tablet, Take 1 tablet (500 mg total) by mouth 2 (two) times a day with meals, Disp: 180 tablet, Rfl: 1    polyethylene glycol (MIRALAX) 17 g packet, Take 17 g by mouth daily for 7 days, Disp: 119 g, Rfl: 0    venlafaxine (EFFEXOR-XR) 150 mg 24 hr capsule, Take 1 capsule (150 mg total) by mouth daily, Disp: 90 capsule, Rfl: 1    The following portions of the patient's history were reviewed and updated as appropriate: allergies, current medications, past family history, past medical history, past social history, past surgical history and problem list     Review of Systems   Constitutional: Negative  HENT: Negative  Eyes: Negative  Respiratory: Negative  Cardiovascular: Negative  Gastrointestinal: Negative  Endocrine: Negative  Genitourinary: Negative  Musculoskeletal: Negative  Skin: Negative  Allergic/Immunologic: Negative  Neurological: Negative  Hematological: Negative  Psychiatric/Behavioral: The patient is nervous/anxious  Objective:    /78   Pulse 90   Resp 16   Ht 4' 8" (1 422 m)   Wt 56 2 kg (124 lb)   SpO2 98%   BMI 27 80 kg/m²      Physical Exam  Vitals and nursing note reviewed  Constitutional:       General: She is not in acute distress  Appearance: She is well-developed  She is not diaphoretic  HENT:      Head: Normocephalic and atraumatic  Right Ear: Tympanic membrane, ear canal and external ear normal       Left Ear: Tympanic membrane, ear canal and external ear normal    Eyes:      Extraocular Movements: Extraocular movements intact  Conjunctiva/sclera: Conjunctivae normal       Pupils: Pupils are equal, round, and reactive to light  Cardiovascular:      Rate and Rhythm: Normal rate and regular rhythm  Pulses: Normal pulses  Heart sounds: Normal heart sounds  No murmur heard  Pulmonary:      Effort: Pulmonary effort is normal       Breath sounds: Normal breath sounds  Abdominal:      General: Abdomen is flat  Bowel sounds are normal       Palpations: Abdomen is soft  Musculoskeletal:         General: Normal range of motion  Cervical back: Normal range of motion and neck supple  Skin:     General: Skin is warm and dry  Findings: No rash  Neurological:      General: No focal deficit present  Mental Status: She is alert and oriented to person, place, and time  Mental status is at baseline  Deep Tendon Reflexes: Reflexes are normal and symmetric  Psychiatric:         Mood and Affect: Mood is anxious  Behavior: Behavior normal          Thought Content:  Thought content normal          Judgment: Judgment normal            Recent Results (from the past 1008 hour(s))   Lipid panel    Collection Time: 06/14/21  8:12 AM   Result Value Ref Range    Total Cholesterol 138 <200 mg/dL    HDL 50 > OR = 50 mg/dL    Triglycerides 113 <150 mg/dL    LDL Calculated 68 mg/dL (calc)    Chol HDLC Ratio 2 8 <5 0 (calc)    Non-HDL Cholesterol 88 <130 mg/dL (calc)   Comprehensive metabolic panel    Collection Time: 06/14/21  8:12 AM   Result Value Ref Range    Glucose, Random 122 (H) 65 - 99 mg/dL    BUN 16 7 - 25 mg/dL    Creatinine 0 68 0 60 - 0 88 mg/dL    eGFR Non  81 > OR = 60 mL/min/1 73m2    eGFR  94 > OR = 60 mL/min/1 73m2    SL AMB BUN/CREATININE RATIO NOT APPLICABLE 6 - 22 (calc)    Sodium 142 135 - 146 mmol/L    Potassium 4 7 3 5 - 5 3 mmol/L    Chloride 105 98 - 110 mmol/L    CO2 32 20 - 32 mmol/L    Calcium 9 3 8 6 - 10 4 mg/dL    Protein, Total 6 3 6 1 - 8 1 g/dL    Albumin 4 0 3 6 - 5 1 g/dL    Globulin 2 3 1 9 - 3 7 g/dL (calc)    Albumin/Globulin Ratio 1 7 1 0 - 2 5 (calc)    TOTAL BILIRUBIN 0 4 0 2 - 1 2 mg/dL    Alkaline Phosphatase 74 37 - 153 U/L    AST 14 10 - 35 U/L    ALT 11 6 - 29 U/L   Microalbumin, Random Urine (W/Creatinine)    Collection Time: 06/14/21  8:12 AM   Result Value Ref Range    Creatinine, Urine 47 20 - 275 mg/dL    Microalbum  ,U,Random 1 8 See Note: mg/dL    Microalb/Creat Ratio 38 (H) <30 mcg/mg creat   CBC and differential    Collection Time: 06/14/21  8:12 AM   Result Value Ref Range    White Blood Cell Count 5 5 3 8 - 10 8 Thousand/uL    Red Blood Cell Count 4 44 3 80 - 5 10 Million/uL    Hemoglobin 12 8 11 7 - 15 5 g/dL    HCT 39 0 35 0 - 45 0 %    MCV 87 8 80 0 - 100 0 fL    MCH 28 8 27 0 - 33 0 pg    MCHC 32 8 32 0 - 36 0 g/dL    RDW 12 5 11 0 - 15 0 %    Platelet Count 408 893 - 400 Thousand/uL    SL AMB MPV 10 9 7 5 - 12 5 fL    Neutrophils (Absolute) 3,388 1,500 - 7,800 cells/uL    Lymphocytes (Absolute) 1,573 850 - 3,900 cells/uL    Monocytes (Absolute) 363 200 - 950 cells/uL    Eosinophils (Absolute) 149 15 - 500 cells/uL    Basophils ABS 28 0 - 200 cells/uL    Neutrophils 61 6 %    Lymphocytes 28 6 %    Monocytes 6 6 %    Eosinophils 2 7 %    Basophils PCT 0 5 %   TSH, 3rd generation with Free T4 reflex    Collection Time: 06/14/21  8:12 AM   Result Value Ref Range    TSH W/RFX TO FREE T4 2 57 0 40 - 4 50 mIU/L   Hemoglobin A1c (w/out EAG)    Collection Time: 06/14/21  8:12 AM   Result Value Ref Range    Hemoglobin A1C 6 6 (H) <5 7 % of total Hgb Assessment/Plan:    Controlled type 2 diabetes mellitus without complication, without long-term current use of insulin ([facility])    Lab Results   Component Value Date    HGBA1C 6 6 (H) 06/14/2021     Diabetes remains very well controlled on metformin 500 mg twice daily  Repeat diabetic parameters in 4 months  Patient does see podiatrist as well as ophthalmologist for diabetic foot and eye examination is respectively    Benign essential hypertension  Hypertension remains well controlled on ramipril 10 mg once daily  Continue same  Re-evaluate in 4 months    Obsessive compulsive personality disorder  Still something of a struggle with patient  She does still see her therapist on a weekly basis  Seems to be doing a little bit better with increase dosage of Effexor XR at 225 mg once daily  Remains on BuSpar 15 mg t i d  And scheduled Klonopin  I did recommend switching her Klonopin so that she is taking a full tablet in the morning and then half a tablet in the afternoon and half tablet at bedtime  Re-evaluate in 4 months    Mixed hyperlipidemia  Cholesterol remains well controlled on atorvastatin 10 mg once daily  Repeat lipid profile in 4 months  Goal LDL less than 70 since she is diabetic but she is 78years of age and has not had heart          Problem List Items Addressed This Visit        Endocrine    Controlled type 2 diabetes mellitus without complication, without long-term current use of insulin ([facility])       Lab Results   Component Value Date    HGBA1C 6 6 (H) 06/14/2021     Diabetes remains very well controlled on metformin 500 mg twice daily  Repeat diabetic parameters in 4 months    Patient does see podiatrist as well as ophthalmologist for diabetic foot and eye examination is respectively         Relevant Medications    metFORMIN (GLUCOPHAGE) 500 mg tablet    Other Relevant Orders    Hemoglobin A1C    Microalbumin / creatinine urine ratio       Cardiovascular and Mediastinum    Benign essential hypertension - Primary     Hypertension remains well controlled on ramipril 10 mg once daily  Continue same  Re-evaluate in 4 months         Relevant Orders    CBC and differential       Other    Mixed hyperlipidemia     Cholesterol remains well controlled on atorvastatin 10 mg once daily  Repeat lipid profile in 4 months  Goal LDL less than 70 since she is diabetic but she is 78years of age and has not had heart         Relevant Orders    Comprehensive metabolic panel    Lipid panel    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[address] )     Still something of a struggle with patient  She does still see her therapist on a weekly basis  Seems to be doing a little bit better with increase dosage of Effexor XR at 225 mg once daily  Remains on BuSpar 15 mg t i d  And scheduled Klonopin  I did recommend switching her Klonopin so that she is taking a full tablet in the morning and then half a tablet in the afternoon and half tablet at bedtime    Re-evaluate in 4 months         Relevant Medications    venlafaxine (EFFEXOR-XR) 150 mg 24 hr capsule    busPIRone (BUSPAR) 15 mg tablet    Other Relevant Orders    CBC and differential      Other Visit Diagnoses     Type 2 diabetes mellitus without complication, without long-term current use of insulin (HCC)        Relevant Medications    metFORMIN (GLUCOPHAGE) 500 mg tablet    Other Relevant Orders    Hemoglobin A1C    Microalbumin / creatinine urine ratio    Obsessive-compulsive personality disorder (HCC)        Relevant Medications    venlafaxine (EFFEXOR-XR) 150 mg 24 hr capsule    busPIRone (BUSPAR) 15 mg tablet    Other specified anxiety disorders        Relevant Medications    venlafaxine (EFFEXOR-XR) 150 mg 24 hr capsule    busPIRone (BUSPAR) 15 mg tablet    Other Relevant Orders    CBC and differential    TSH, 3rd generation with Free T4 reflex    Depression with anxiety        Relevant Medications    venlafaxine (EFFEXOR-XR) 150 mg 24 hr capsule    busPIRone (BUSPAR) 15 mg tablet    Other Relevant Orders    TSH, 3rd generation with Free T4 reflex          BMI Counseling: Body mass index is 27 8 kg/m²  The BMI is above normal  Nutrition recommendations include moderation in carbohydrate intake, increasing intake of lean protein, reducing intake of saturated fat and trans fat and reducing intake of cholesterol  Exercise recommendations include exercising 3-5 times per week  I have spent 41 minutes with Patient and family today in which greater than 50% of this time was spent in counseling/coordination of care regarding Diagnostic results, Prognosis, Risks and benefits of tx options, Intructions for management, Patient and family education, Importance of tx compliance, Risk factor reductions and Impressions

## 2021-06-25 PROBLEM — L82.0 SEBORRHEIC KERATOSES, INFLAMED: Status: RESOLVED | Noted: 2021-03-15 | Resolved: 2021-06-25

## 2021-06-25 NOTE — ASSESSMENT & PLAN NOTE
Cholesterol remains well controlled on atorvastatin 10 mg once daily  Repeat lipid profile in 4 months    Goal LDL less than 70 since she is diabetic but she is 78years of age and has not had heart

## 2021-06-25 NOTE — ASSESSMENT & PLAN NOTE
Lab Results   Component Value Date    HGBA1C 6 6 (H) 06/14/2021     Diabetes remains very well controlled on metformin 500 mg twice daily  Repeat diabetic parameters in 4 months    Patient does see podiatrist as well as ophthalmologist for diabetic foot and eye examination is respectively

## 2021-06-25 NOTE — ASSESSMENT & PLAN NOTE
Still something of a struggle with patient  She does still see her therapist on a weekly basis  Seems to be doing a little bit better with increase dosage of Effexor XR at 225 mg once daily  Remains on BuSpar 15 mg t i d  And scheduled Klonopin  I did recommend switching her Klonopin so that she is taking a full tablet in the morning and then half a tablet in the afternoon and half tablet at bedtime    Re-evaluate in 4 months

## 2021-06-25 NOTE — ASSESSMENT & PLAN NOTE
Hypertension remains well controlled on ramipril 10 mg once daily  Continue same    Re-evaluate in 4 months

## 2021-06-28 ENCOUNTER — TELEPHONE (OUTPATIENT)
Dept: FAMILY MEDICINE CLINIC | Facility: CLINIC | Age: 81
End: 2021-06-28

## 2021-06-28 NOTE — TELEPHONE ENCOUNTER
My fault  Once they start talking sometimes have become distracted and ordered labs for 6 months  She normally is seen every 4 months    New lab order generated

## 2021-06-28 NOTE — TELEPHONE ENCOUNTER
Pt was seen last week with Dr Cely Ferrari, she set up an appointment for 6 months when her labs are due  Bar Huang called today and stated that she is suppose to be seen in 4 months and do her lab dates need to be changed? Please call daughter back

## 2021-07-06 DIAGNOSIS — E78.2 MIXED HYPERLIPIDEMIA: ICD-10-CM

## 2021-07-06 RX ORDER — ATORVASTATIN CALCIUM 10 MG/1
TABLET, FILM COATED ORAL
Qty: 90 TABLET | Refills: 1 | Status: SHIPPED | OUTPATIENT
Start: 2021-07-06 | End: 2022-01-12 | Stop reason: SDUPTHER

## 2021-08-16 ENCOUNTER — TELEPHONE (OUTPATIENT)
Dept: FAMILY MEDICINE CLINIC | Facility: CLINIC | Age: 81
End: 2021-08-16

## 2021-08-16 NOTE — TELEPHONE ENCOUNTER
Ella Bales called and stated that Brittni Regan has been complaining of an excessively itchy scalp  She read that this is a side effect of Linzess  She wants to know if she should just continue the med and take something to remedy the itching or try something else  ?

## 2021-08-17 NOTE — TELEPHONE ENCOUNTER
She has been on Linzess for it least a year and a half  I would recommend that she continues taking it as she gets much more worked up when she is constipated

## 2021-08-24 DIAGNOSIS — F41.9 ANXIETY: ICD-10-CM

## 2021-08-24 RX ORDER — CLONAZEPAM 0.5 MG/1
TABLET ORAL
Qty: 60 TABLET | Refills: 3 | Status: SHIPPED | OUTPATIENT
Start: 2021-08-24 | End: 2022-01-03 | Stop reason: SDUPTHER

## 2021-08-25 DIAGNOSIS — F41.9 ANXIETY: ICD-10-CM

## 2021-08-25 DIAGNOSIS — F60.5 OBSESSIVE COMPULSIVE PERSONALITY DISORDER (HCC): ICD-10-CM

## 2021-08-26 RX ORDER — VENLAFAXINE HYDROCHLORIDE 75 MG/1
CAPSULE, EXTENDED RELEASE ORAL
Qty: 90 CAPSULE | Refills: 1 | Status: SHIPPED | OUTPATIENT
Start: 2021-08-26 | End: 2021-12-06

## 2021-09-13 DIAGNOSIS — K59.04 CHRONIC IDIOPATHIC CONSTIPATION: ICD-10-CM

## 2021-09-13 RX ORDER — LINACLOTIDE 145 UG/1
CAPSULE, GELATIN COATED ORAL
Qty: 90 CAPSULE | Refills: 1 | Status: SHIPPED | OUTPATIENT
Start: 2021-09-13 | End: 2022-03-17 | Stop reason: SDUPTHER

## 2021-10-22 ENCOUNTER — APPOINTMENT (OUTPATIENT)
Dept: MRI IMAGING | Facility: HOSPITAL | Age: 81
DRG: 069 | End: 2021-10-22
Payer: MEDICARE

## 2021-10-22 ENCOUNTER — TELEPHONE (OUTPATIENT)
Dept: CT IMAGING | Facility: HOSPITAL | Age: 81
End: 2021-10-22

## 2021-10-22 ENCOUNTER — HOSPITAL ENCOUNTER (INPATIENT)
Facility: HOSPITAL | Age: 81
LOS: 1 days | Discharge: HOME | DRG: 069 | End: 2021-10-24
Attending: EMERGENCY MEDICINE | Admitting: INTERNAL MEDICINE
Payer: MEDICARE

## 2021-10-22 ENCOUNTER — APPOINTMENT (OUTPATIENT)
Dept: CT IMAGING | Facility: HOSPITAL | Age: 81
DRG: 069 | End: 2021-10-22
Payer: MEDICARE

## 2021-10-22 ENCOUNTER — APPOINTMENT (EMERGENCY)
Dept: CT IMAGING | Facility: HOSPITAL | Age: 81
DRG: 069 | End: 2021-10-22
Payer: MEDICARE

## 2021-10-22 DIAGNOSIS — F60.5 OBSESSIVE COMPULSIVE PERSONALITY DISORDER (HCC): ICD-10-CM

## 2021-10-22 DIAGNOSIS — G47.09 OTHER INSOMNIA: ICD-10-CM

## 2021-10-22 DIAGNOSIS — I16.0 HYPERTENSIVE URGENCY: Primary | ICD-10-CM

## 2021-10-22 DIAGNOSIS — F41.9 ANXIETY: ICD-10-CM

## 2021-10-22 DIAGNOSIS — G45.9 TIA (TRANSIENT ISCHEMIC ATTACK): ICD-10-CM

## 2021-10-22 PROBLEM — H54.3 VISION LOSS, BILATERAL: Status: ACTIVE | Noted: 2021-10-22

## 2021-10-22 PROBLEM — H54.7 TRANSIENT ISCHEMIC ATTACK WITH VISUAL IMPAIRMENT: Status: ACTIVE | Noted: 2021-10-22

## 2021-10-22 LAB
ALBUMIN SERPL BCP-MCNC: 3.7 G/DL (ref 3.5–5)
ALP SERPL-CCNC: 77 U/L (ref 46–116)
ALT SERPL W P-5'-P-CCNC: 26 U/L (ref 12–78)
ANION GAP SERPL CALCULATED.3IONS-SCNC: 11 MMOL/L (ref 4–13)
AST SERPL W P-5'-P-CCNC: 25 U/L (ref 5–45)
BASOPHILS # BLD AUTO: 0.04 THOUSANDS/ΜL (ref 0–0.1)
BASOPHILS NFR BLD AUTO: 1 % (ref 0–1)
BILIRUB SERPL-MCNC: 0.39 MG/DL (ref 0.2–1)
BILIRUB UR QL STRIP: NEGATIVE
BUN SERPL-MCNC: 17 MG/DL (ref 5–25)
CALCIUM SERPL-MCNC: 9.1 MG/DL (ref 8.3–10.1)
CHLORIDE SERPL-SCNC: 106 MMOL/L (ref 100–108)
CHOLEST SERPL-MCNC: 157 MG/DL (ref 50–200)
CLARITY UR: CLEAR
CO2 SERPL-SCNC: 26 MMOL/L (ref 21–32)
COLOR UR: YELLOW
CREAT SERPL-MCNC: 0.75 MG/DL (ref 0.6–1.3)
EOSINOPHIL # BLD AUTO: 0.19 THOUSAND/ΜL (ref 0–0.61)
EOSINOPHIL NFR BLD AUTO: 3 % (ref 0–6)
ERYTHROCYTE [DISTWIDTH] IN BLOOD BY AUTOMATED COUNT: 13.4 % (ref 11.6–15.1)
GFR SERPL CREATININE-BSD FRML MDRD: 74 ML/MIN/1.73SQ M
GLUCOSE SERPL-MCNC: 121 MG/DL (ref 65–140)
GLUCOSE SERPL-MCNC: 142 MG/DL (ref 65–140)
GLUCOSE UR STRIP-MCNC: NEGATIVE MG/DL
HCT VFR BLD AUTO: 42.8 % (ref 34.8–46.1)
HDLC SERPL-MCNC: 54 MG/DL
HGB BLD-MCNC: 13.2 G/DL (ref 11.5–15.4)
HGB UR QL STRIP.AUTO: NEGATIVE
IMM GRANULOCYTES # BLD AUTO: 0.01 THOUSAND/UL (ref 0–0.2)
IMM GRANULOCYTES NFR BLD AUTO: 0 % (ref 0–2)
KETONES UR STRIP-MCNC: NEGATIVE MG/DL
LDLC SERPL CALC-MCNC: 75 MG/DL (ref 0–100)
LEUKOCYTE ESTERASE UR QL STRIP: NEGATIVE
LYMPHOCYTES # BLD AUTO: 1.28 THOUSANDS/ΜL (ref 0.6–4.47)
LYMPHOCYTES NFR BLD AUTO: 21 % (ref 14–44)
MAGNESIUM SERPL-MCNC: 2.2 MG/DL (ref 1.6–2.6)
MCH RBC QN AUTO: 28.5 PG (ref 26.8–34.3)
MCHC RBC AUTO-ENTMCNC: 30.8 G/DL (ref 31.4–37.4)
MCV RBC AUTO: 92 FL (ref 82–98)
MONOCYTES # BLD AUTO: 0.33 THOUSAND/ΜL (ref 0.17–1.22)
MONOCYTES NFR BLD AUTO: 6 % (ref 4–12)
NEUTROPHILS # BLD AUTO: 4.12 THOUSANDS/ΜL (ref 1.85–7.62)
NEUTS SEG NFR BLD AUTO: 69 % (ref 43–75)
NITRITE UR QL STRIP: NEGATIVE
NONHDLC SERPL-MCNC: 103 MG/DL
NRBC BLD AUTO-RTO: 0 /100 WBCS
PH UR STRIP.AUTO: 7.5 [PH] (ref 4.5–8)
PLATELET # BLD AUTO: 190 THOUSANDS/UL (ref 149–390)
PMV BLD AUTO: 10.7 FL (ref 8.9–12.7)
POTASSIUM SERPL-SCNC: 5.1 MMOL/L (ref 3.5–5.3)
PROT SERPL-MCNC: 7.1 G/DL (ref 6.4–8.2)
PROT UR STRIP-MCNC: NEGATIVE MG/DL
RBC # BLD AUTO: 4.63 MILLION/UL (ref 3.81–5.12)
SODIUM SERPL-SCNC: 143 MMOL/L (ref 136–145)
SP GR UR STRIP.AUTO: 1.02 (ref 1–1.03)
TRIGL SERPL-MCNC: 138 MG/DL
TROPONIN I SERPL-MCNC: <0.02 NG/ML
UROBILINOGEN UR QL STRIP.AUTO: 0.2 E.U./DL
WBC # BLD AUTO: 5.97 THOUSAND/UL (ref 4.31–10.16)

## 2021-10-22 PROCEDURE — 85025 COMPLETE CBC W/AUTO DIFF WBC: CPT | Performed by: EMERGENCY MEDICINE

## 2021-10-22 PROCEDURE — 80061 LIPID PANEL: CPT | Performed by: NURSE PRACTITIONER

## 2021-10-22 PROCEDURE — 99285 EMERGENCY DEPT VISIT HI MDM: CPT | Performed by: EMERGENCY MEDICINE

## 2021-10-22 PROCEDURE — 99205 OFFICE O/P NEW HI 60 MIN: CPT | Performed by: PSYCHIATRY & NEUROLOGY

## 2021-10-22 PROCEDURE — 84484 ASSAY OF TROPONIN QUANT: CPT | Performed by: EMERGENCY MEDICINE

## 2021-10-22 PROCEDURE — G1004 CDSM NDSC: HCPCS

## 2021-10-22 PROCEDURE — 70496 CT ANGIOGRAPHY HEAD: CPT

## 2021-10-22 PROCEDURE — 80053 COMPREHEN METABOLIC PANEL: CPT | Performed by: EMERGENCY MEDICINE

## 2021-10-22 PROCEDURE — 70450 CT HEAD/BRAIN W/O DYE: CPT

## 2021-10-22 PROCEDURE — 70498 CT ANGIOGRAPHY NECK: CPT

## 2021-10-22 PROCEDURE — 99220 PR INITIAL OBSERVATION CARE/DAY 70 MINUTES: CPT | Performed by: HOSPITALIST

## 2021-10-22 PROCEDURE — 96374 THER/PROPH/DIAG INJ IV PUSH: CPT

## 2021-10-22 PROCEDURE — 82948 REAGENT STRIP/BLOOD GLUCOSE: CPT

## 2021-10-22 PROCEDURE — 36415 COLL VENOUS BLD VENIPUNCTURE: CPT | Performed by: EMERGENCY MEDICINE

## 2021-10-22 PROCEDURE — 99285 EMERGENCY DEPT VISIT HI MDM: CPT

## 2021-10-22 PROCEDURE — 83735 ASSAY OF MAGNESIUM: CPT | Performed by: EMERGENCY MEDICINE

## 2021-10-22 PROCEDURE — 81003 URINALYSIS AUTO W/O SCOPE: CPT

## 2021-10-22 PROCEDURE — 93005 ELECTROCARDIOGRAM TRACING: CPT

## 2021-10-22 RX ORDER — BUSPIRONE HYDROCHLORIDE 5 MG/1
15 TABLET ORAL 3 TIMES DAILY
Status: DISCONTINUED | OUTPATIENT
Start: 2021-10-22 | End: 2021-10-24 | Stop reason: HOSPADM

## 2021-10-22 RX ORDER — TRAMADOL HYDROCHLORIDE 50 MG/1
50 TABLET ORAL EVERY 8 HOURS PRN
Status: DISCONTINUED | OUTPATIENT
Start: 2021-10-22 | End: 2021-10-24 | Stop reason: HOSPADM

## 2021-10-22 RX ORDER — CLONAZEPAM 0.5 MG/1
0.25 TABLET ORAL 2 TIMES DAILY
Status: DISCONTINUED | OUTPATIENT
Start: 2021-10-22 | End: 2021-10-24 | Stop reason: HOSPADM

## 2021-10-22 RX ORDER — ASPIRIN 81 MG/1
81 TABLET, CHEWABLE ORAL DAILY
Status: DISCONTINUED | OUTPATIENT
Start: 2021-10-23 | End: 2021-10-24 | Stop reason: HOSPADM

## 2021-10-22 RX ORDER — ONDANSETRON 2 MG/ML
4 INJECTION INTRAMUSCULAR; INTRAVENOUS EVERY 6 HOURS PRN
Status: DISCONTINUED | OUTPATIENT
Start: 2021-10-22 | End: 2021-10-24 | Stop reason: HOSPADM

## 2021-10-22 RX ORDER — LISINOPRIL 20 MG/1
40 TABLET ORAL DAILY
Status: DISCONTINUED | OUTPATIENT
Start: 2021-10-23 | End: 2021-10-24 | Stop reason: HOSPADM

## 2021-10-22 RX ORDER — LORAZEPAM 2 MG/ML
0.5 INJECTION INTRAMUSCULAR EVERY 6 HOURS PRN
Status: DISCONTINUED | OUTPATIENT
Start: 2021-10-22 | End: 2021-10-24 | Stop reason: HOSPADM

## 2021-10-22 RX ORDER — ASPIRIN 81 MG/1
81 TABLET ORAL ONCE
Status: COMPLETED | OUTPATIENT
Start: 2021-10-22 | End: 2021-10-22

## 2021-10-22 RX ORDER — CLONAZEPAM 0.5 MG/1
0.5 TABLET ORAL
Status: DISCONTINUED | OUTPATIENT
Start: 2021-10-22 | End: 2021-10-24 | Stop reason: HOSPADM

## 2021-10-22 RX ORDER — ATORVASTATIN CALCIUM 10 MG/1
10 TABLET, FILM COATED ORAL DAILY
Status: DISCONTINUED | OUTPATIENT
Start: 2021-10-23 | End: 2021-10-24 | Stop reason: HOSPADM

## 2021-10-22 RX ORDER — LABETALOL 20 MG/4 ML (5 MG/ML) INTRAVENOUS SYRINGE
10 ONCE
Status: COMPLETED | OUTPATIENT
Start: 2021-10-22 | End: 2021-10-22

## 2021-10-22 RX ADMIN — CLONAZEPAM 0.5 MG: 0.5 TABLET ORAL at 21:31

## 2021-10-22 RX ADMIN — LABETALOL 20 MG/4 ML (5 MG/ML) INTRAVENOUS SYRINGE 10 MG: at 13:15

## 2021-10-22 RX ADMIN — BUSPIRONE HYDROCHLORIDE 15 MG: 5 TABLET ORAL at 18:26

## 2021-10-22 RX ADMIN — IOHEXOL 85 ML: 350 INJECTION, SOLUTION INTRAVENOUS at 20:13

## 2021-10-22 RX ADMIN — CLONAZEPAM 0.25 MG: 0.5 TABLET ORAL at 18:26

## 2021-10-22 RX ADMIN — BUSPIRONE HYDROCHLORIDE 15 MG: 5 TABLET ORAL at 21:31

## 2021-10-22 RX ADMIN — ASPIRIN 81 MG: 81 TABLET, COATED ORAL at 18:26

## 2021-10-23 ENCOUNTER — APPOINTMENT (OUTPATIENT)
Dept: NON INVASIVE DIAGNOSTICS | Facility: HOSPITAL | Age: 81
DRG: 069 | End: 2021-10-23
Payer: MEDICARE

## 2021-10-23 ENCOUNTER — APPOINTMENT (INPATIENT)
Dept: MRI IMAGING | Facility: HOSPITAL | Age: 81
DRG: 069 | End: 2021-10-23
Payer: MEDICARE

## 2021-10-23 LAB
ANION GAP SERPL CALCULATED.3IONS-SCNC: 11 MMOL/L (ref 4–13)
AORTIC ROOT: 2.9 CM
APICAL FOUR CHAMBER EJECTION FRACTION: 52 %
ATRIAL RATE: 79 BPM
BASOPHILS # BLD AUTO: 0.04 THOUSANDS/ΜL (ref 0–0.1)
BASOPHILS NFR BLD AUTO: 1 % (ref 0–1)
BUN SERPL-MCNC: 18 MG/DL (ref 5–25)
CALCIUM SERPL-MCNC: 9.1 MG/DL (ref 8.3–10.1)
CHLORIDE SERPL-SCNC: 107 MMOL/L (ref 100–108)
CHOLEST SERPL-MCNC: 149 MG/DL (ref 50–200)
CO2 SERPL-SCNC: 25 MMOL/L (ref 21–32)
CREAT SERPL-MCNC: 0.83 MG/DL (ref 0.6–1.3)
E WAVE DECELERATION TIME: 211 MS
EOSINOPHIL # BLD AUTO: 0.16 THOUSAND/ΜL (ref 0–0.61)
EOSINOPHIL NFR BLD AUTO: 3 % (ref 0–6)
ERYTHROCYTE [DISTWIDTH] IN BLOOD BY AUTOMATED COUNT: 13.5 % (ref 11.6–15.1)
EST. AVERAGE GLUCOSE BLD GHB EST-MCNC: 143 MG/DL
FRACTIONAL SHORTENING: 28 % (ref 28–44)
GFR SERPL CREATININE-BSD FRML MDRD: 66 ML/MIN/1.73SQ M
GLUCOSE P FAST SERPL-MCNC: 137 MG/DL (ref 65–99)
GLUCOSE SERPL-MCNC: 137 MG/DL (ref 65–140)
GLUCOSE SERPL-MCNC: 143 MG/DL (ref 65–140)
GLUCOSE SERPL-MCNC: 172 MG/DL (ref 65–140)
GLUCOSE SERPL-MCNC: 172 MG/DL (ref 65–140)
GLUCOSE SERPL-MCNC: 271 MG/DL (ref 65–140)
HBA1C MFR BLD: 6.6 %
HCT VFR BLD AUTO: 41.6 % (ref 34.8–46.1)
HDLC SERPL-MCNC: 55 MG/DL
HGB BLD-MCNC: 13.2 G/DL (ref 11.5–15.4)
IMM GRANULOCYTES # BLD AUTO: 0.01 THOUSAND/UL (ref 0–0.2)
IMM GRANULOCYTES NFR BLD AUTO: 0 % (ref 0–2)
INTERVENTRICULAR SEPTUM IN DIASTOLE (PARASTERNAL SHORT AXIS VIEW): 1 CM
LDLC SERPL CALC-MCNC: 75 MG/DL (ref 0–100)
LEFT INTERNAL DIMENSION IN SYSTOLE: 2.8 CM (ref 2.1–4)
LEFT VENTRICULAR INTERNAL DIMENSION IN DIASTOLE: 3.9 CM (ref 3.66–5.45)
LEFT VENTRICULAR POSTERIOR WALL IN END DIASTOLE: 1 CM
LEFT VENTRICULAR STROKE VOLUME: 37 ML
LV EF: 48 %
LYMPHOCYTES # BLD AUTO: 1.43 THOUSANDS/ΜL (ref 0.6–4.47)
LYMPHOCYTES NFR BLD AUTO: 26 % (ref 14–44)
MCH RBC QN AUTO: 29 PG (ref 26.8–34.3)
MCHC RBC AUTO-ENTMCNC: 31.7 G/DL (ref 31.4–37.4)
MCV RBC AUTO: 91 FL (ref 82–98)
MONOCYTES # BLD AUTO: 0.43 THOUSAND/ΜL (ref 0.17–1.22)
MONOCYTES NFR BLD AUTO: 8 % (ref 4–12)
MV E'TISSUE VEL-SEP: 6 CM/S
MV PEAK A VEL: 0.86 M/S
MV PEAK E VEL: 50 CM/S
MV STENOSIS PRESSURE HALF TIME: 0 MS
NEUTROPHILS # BLD AUTO: 3.36 THOUSANDS/ΜL (ref 1.85–7.62)
NEUTS SEG NFR BLD AUTO: 62 % (ref 43–75)
NRBC BLD AUTO-RTO: 0 /100 WBCS
P AXIS: 68 DEGREES
PLATELET # BLD AUTO: 208 THOUSANDS/UL (ref 149–390)
PMV BLD AUTO: 10.9 FL (ref 8.9–12.7)
POTASSIUM SERPL-SCNC: 4.2 MMOL/L (ref 3.5–5.3)
PR INTERVAL: 166 MS
QRS AXIS: 24 DEGREES
QRSD INTERVAL: 68 MS
QT INTERVAL: 352 MS
QTC INTERVAL: 403 MS
RBC # BLD AUTO: 4.55 MILLION/UL (ref 3.81–5.12)
RIGHT VENTRICLE ID DIMENSION: 2.4 CM
SL CV PED ECHO LEFT VENTRICLE DIASTOLIC VOLUME (MOD BIPLANE) 2D: 67 ML
SL CV PED ECHO LEFT VENTRICLE SYSTOLIC VOLUME (MOD BIPLANE) 2D: 30 ML
SODIUM SERPL-SCNC: 143 MMOL/L (ref 136–145)
T WAVE AXIS: 71 DEGREES
TRICUSPID VALVE S': 0.5 CM/S
TRIGL SERPL-MCNC: 96 MG/DL
VENTRICULAR RATE: 79 BPM
WBC # BLD AUTO: 5.43 THOUSAND/UL (ref 4.31–10.16)
Z-SCORE OF LEFT VENTRICULAR DIMENSION IN END SYSTOLE: -1.35

## 2021-10-23 PROCEDURE — 83036 HEMOGLOBIN GLYCOSYLATED A1C: CPT | Performed by: HOSPITALIST

## 2021-10-23 PROCEDURE — NC001 PR NO CHARGE: Performed by: PSYCHIATRY & NEUROLOGY

## 2021-10-23 PROCEDURE — G1004 CDSM NDSC: HCPCS

## 2021-10-23 PROCEDURE — 85025 COMPLETE CBC W/AUTO DIFF WBC: CPT | Performed by: HOSPITALIST

## 2021-10-23 PROCEDURE — 70551 MRI BRAIN STEM W/O DYE: CPT

## 2021-10-23 PROCEDURE — 93306 TTE W/DOPPLER COMPLETE: CPT

## 2021-10-23 PROCEDURE — 82948 REAGENT STRIP/BLOOD GLUCOSE: CPT

## 2021-10-23 PROCEDURE — 80048 BASIC METABOLIC PNL TOTAL CA: CPT | Performed by: HOSPITALIST

## 2021-10-23 PROCEDURE — 93306 TTE W/DOPPLER COMPLETE: CPT | Performed by: INTERNAL MEDICINE

## 2021-10-23 PROCEDURE — 80061 LIPID PANEL: CPT | Performed by: HOSPITALIST

## 2021-10-23 PROCEDURE — 99232 SBSQ HOSP IP/OBS MODERATE 35: CPT | Performed by: INTERNAL MEDICINE

## 2021-10-23 PROCEDURE — 93010 ELECTROCARDIOGRAM REPORT: CPT | Performed by: INTERNAL MEDICINE

## 2021-10-23 PROCEDURE — 99214 OFFICE O/P EST MOD 30 MIN: CPT | Performed by: PSYCHIATRY & NEUROLOGY

## 2021-10-23 RX ADMIN — CLONAZEPAM 0.5 MG: 0.5 TABLET ORAL at 21:26

## 2021-10-23 RX ADMIN — ATORVASTATIN CALCIUM 10 MG: 10 TABLET, FILM COATED ORAL at 08:27

## 2021-10-23 RX ADMIN — ENOXAPARIN SODIUM 40 MG: 40 INJECTION SUBCUTANEOUS at 08:26

## 2021-10-23 RX ADMIN — INSULIN LISPRO 1 UNITS: 100 INJECTION, SOLUTION INTRAVENOUS; SUBCUTANEOUS at 21:26

## 2021-10-23 RX ADMIN — INSULIN LISPRO 3 UNITS: 100 INJECTION, SOLUTION INTRAVENOUS; SUBCUTANEOUS at 17:15

## 2021-10-23 RX ADMIN — VENLAFAXINE HYDROCHLORIDE 225 MG: 37.5 CAPSULE, EXTENDED RELEASE ORAL at 08:27

## 2021-10-23 RX ADMIN — BUSPIRONE HYDROCHLORIDE 15 MG: 5 TABLET ORAL at 08:27

## 2021-10-23 RX ADMIN — CLONAZEPAM 0.25 MG: 0.5 TABLET ORAL at 14:44

## 2021-10-23 RX ADMIN — CLONAZEPAM 0.25 MG: 0.5 TABLET ORAL at 08:27

## 2021-10-23 RX ADMIN — BUSPIRONE HYDROCHLORIDE 15 MG: 5 TABLET ORAL at 21:26

## 2021-10-23 RX ADMIN — INSULIN LISPRO 1 UNITS: 100 INJECTION, SOLUTION INTRAVENOUS; SUBCUTANEOUS at 14:42

## 2021-10-23 RX ADMIN — ASPIRIN 81 MG: 81 TABLET, CHEWABLE ORAL at 08:27

## 2021-10-23 RX ADMIN — LORAZEPAM 0.5 MG: 2 INJECTION INTRAMUSCULAR; INTRAVENOUS at 13:19

## 2021-10-23 RX ADMIN — LISINOPRIL 40 MG: 20 TABLET ORAL at 08:27

## 2021-10-23 RX ADMIN — BUSPIRONE HYDROCHLORIDE 15 MG: 5 TABLET ORAL at 17:14

## 2021-10-24 VITALS
DIASTOLIC BLOOD PRESSURE: 88 MMHG | WEIGHT: 126 LBS | OXYGEN SATURATION: 98 % | TEMPERATURE: 98.3 F | HEART RATE: 68 BPM | SYSTOLIC BLOOD PRESSURE: 148 MMHG | BODY MASS INDEX: 28.34 KG/M2 | HEIGHT: 56 IN | RESPIRATION RATE: 16 BRPM

## 2021-10-24 LAB
ANION GAP SERPL CALCULATED.3IONS-SCNC: 9 MMOL/L (ref 4–13)
BUN SERPL-MCNC: 25 MG/DL (ref 5–25)
CALCIUM SERPL-MCNC: 8.9 MG/DL (ref 8.3–10.1)
CHLORIDE SERPL-SCNC: 108 MMOL/L (ref 100–108)
CO2 SERPL-SCNC: 27 MMOL/L (ref 21–32)
CREAT SERPL-MCNC: 0.83 MG/DL (ref 0.6–1.3)
GFR SERPL CREATININE-BSD FRML MDRD: 66 ML/MIN/1.73SQ M
GLUCOSE SERPL-MCNC: 118 MG/DL (ref 65–140)
GLUCOSE SERPL-MCNC: 138 MG/DL (ref 65–140)
POTASSIUM SERPL-SCNC: 4.7 MMOL/L (ref 3.5–5.3)
SODIUM SERPL-SCNC: 144 MMOL/L (ref 136–145)

## 2021-10-24 PROCEDURE — 99239 HOSP IP/OBS DSCHRG MGMT >30: CPT | Performed by: INTERNAL MEDICINE

## 2021-10-24 PROCEDURE — 82948 REAGENT STRIP/BLOOD GLUCOSE: CPT

## 2021-10-24 PROCEDURE — 97163 PT EVAL HIGH COMPLEX 45 MIN: CPT

## 2021-10-24 PROCEDURE — 80048 BASIC METABOLIC PNL TOTAL CA: CPT

## 2021-10-24 RX ADMIN — ATORVASTATIN CALCIUM 10 MG: 10 TABLET, FILM COATED ORAL at 08:07

## 2021-10-24 RX ADMIN — CLONAZEPAM 0.25 MG: 0.5 TABLET ORAL at 08:06

## 2021-10-24 RX ADMIN — ENOXAPARIN SODIUM 40 MG: 40 INJECTION SUBCUTANEOUS at 08:06

## 2021-10-24 RX ADMIN — VENLAFAXINE HYDROCHLORIDE 225 MG: 37.5 CAPSULE, EXTENDED RELEASE ORAL at 08:06

## 2021-10-24 RX ADMIN — LISINOPRIL 40 MG: 20 TABLET ORAL at 08:06

## 2021-10-24 RX ADMIN — ASPIRIN 81 MG: 81 TABLET, CHEWABLE ORAL at 08:07

## 2021-10-24 RX ADMIN — BUSPIRONE HYDROCHLORIDE 15 MG: 5 TABLET ORAL at 08:06

## 2021-10-25 ENCOUNTER — TELEPHONE (OUTPATIENT)
Dept: PSYCHIATRY | Facility: CLINIC | Age: 81
End: 2021-10-25

## 2021-10-26 ENCOUNTER — TELEPHONE (OUTPATIENT)
Dept: NEUROLOGY | Facility: CLINIC | Age: 81
End: 2021-10-26

## 2021-10-26 ENCOUNTER — TRANSITIONAL CARE MANAGEMENT (OUTPATIENT)
Dept: FAMILY MEDICINE CLINIC | Facility: CLINIC | Age: 81
End: 2021-10-26

## 2021-11-02 ENCOUNTER — TELEPHONE (OUTPATIENT)
Dept: PSYCHIATRY | Facility: CLINIC | Age: 81
End: 2021-11-02

## 2021-11-04 ENCOUNTER — OFFICE VISIT (OUTPATIENT)
Dept: FAMILY MEDICINE CLINIC | Facility: CLINIC | Age: 81
End: 2021-11-04
Payer: MEDICARE

## 2021-11-04 VITALS
BODY MASS INDEX: 28.57 KG/M2 | OXYGEN SATURATION: 97 % | WEIGHT: 127 LBS | SYSTOLIC BLOOD PRESSURE: 144 MMHG | DIASTOLIC BLOOD PRESSURE: 74 MMHG | HEART RATE: 80 BPM | HEIGHT: 56 IN | RESPIRATION RATE: 16 BRPM

## 2021-11-04 DIAGNOSIS — H54.7 TRANSIENT ISCHEMIC ATTACK WITH VISUAL IMPAIRMENT: Primary | ICD-10-CM

## 2021-11-04 DIAGNOSIS — G45.9 TRANSIENT ISCHEMIC ATTACK WITH VISUAL IMPAIRMENT: Primary | ICD-10-CM

## 2021-11-04 PROCEDURE — 1111F DSCHRG MED/CURRENT MED MERGE: CPT | Performed by: FAMILY MEDICINE

## 2021-11-04 PROCEDURE — 99495 TRANSJ CARE MGMT MOD F2F 14D: CPT | Performed by: FAMILY MEDICINE

## 2021-12-02 DIAGNOSIS — Z03.818 ENCOUNTER FOR OBSERVATION FOR SUSPECTED EXPOSURE TO OTHER BIOLOGICAL AGENTS RULED OUT: Primary | ICD-10-CM

## 2021-12-02 PROCEDURE — U0005 INFEC AGEN DETEC AMPLI PROBE: HCPCS | Performed by: FAMILY MEDICINE

## 2021-12-02 PROCEDURE — U0003 INFECTIOUS AGENT DETECTION BY NUCLEIC ACID (DNA OR RNA); SEVERE ACUTE RESPIRATORY SYNDROME CORONAVIRUS 2 (SARS-COV-2) (CORONAVIRUS DISEASE [COVID-19]), AMPLIFIED PROBE TECHNIQUE, MAKING USE OF HIGH THROUGHPUT TECHNOLOGIES AS DESCRIBED BY CMS-2020-01-R: HCPCS | Performed by: FAMILY MEDICINE

## 2021-12-03 ENCOUNTER — TELEPHONE (OUTPATIENT)
Dept: PSYCHIATRY | Facility: CLINIC | Age: 81
End: 2021-12-03

## 2021-12-03 NOTE — TELEPHONE ENCOUNTER
My Allie Telles is currently not work  I informed administration  We'll see if they fix it by then  Would be better to see patient via MyChart   Thanks

## 2021-12-03 NOTE — TELEPHONE ENCOUNTER
Patient lives with her daughter who has covid, and she is quarantining  Patient asked to be seen virtually on Monday   Please use the email in the appt note to see patient via amwell

## 2021-12-06 ENCOUNTER — TELEMEDICINE (OUTPATIENT)
Dept: PSYCHIATRY | Facility: CLINIC | Age: 81
End: 2021-12-06
Payer: MEDICARE

## 2021-12-06 ENCOUNTER — TELEMEDICINE (OUTPATIENT)
Dept: NEUROLOGY | Facility: CLINIC | Age: 81
End: 2021-12-06
Payer: MEDICARE

## 2021-12-06 VITALS — WEIGHT: 127 LBS | BODY MASS INDEX: 28.47 KG/M2

## 2021-12-06 DIAGNOSIS — F33.1 MAJOR DEPRESSIVE DISORDER, RECURRENT, MODERATE (HCC): Primary | ICD-10-CM

## 2021-12-06 DIAGNOSIS — H54.7 TRANSIENT ISCHEMIC ATTACK WITH VISUAL IMPAIRMENT: Primary | ICD-10-CM

## 2021-12-06 DIAGNOSIS — G45.9 TRANSIENT ISCHEMIC ATTACK WITH VISUAL IMPAIRMENT: Primary | ICD-10-CM

## 2021-12-06 DIAGNOSIS — F41.9 ANXIETY: ICD-10-CM

## 2021-12-06 DIAGNOSIS — F45.1 SOMATIC SYMPTOM DISORDER: ICD-10-CM

## 2021-12-06 DIAGNOSIS — F41.1 GENERALIZED ANXIETY DISORDER: ICD-10-CM

## 2021-12-06 PROCEDURE — 99213 OFFICE O/P EST LOW 20 MIN: CPT | Performed by: NURSE PRACTITIONER

## 2021-12-06 PROCEDURE — 90792 PSYCH DIAG EVAL W/MED SRVCS: CPT | Performed by: PSYCHIATRY & NEUROLOGY

## 2021-12-06 RX ORDER — VENLAFAXINE HYDROCHLORIDE 150 MG/1
150 CAPSULE, EXTENDED RELEASE ORAL DAILY
Qty: 7 CAPSULE | Refills: 0 | Status: SHIPPED | OUTPATIENT
Start: 2021-12-06 | End: 2021-12-16

## 2021-12-06 RX ORDER — BUSPIRONE HYDROCHLORIDE 15 MG/1
15 TABLET ORAL 3 TIMES DAILY
Qty: 90 TABLET | Refills: 5 | Status: SHIPPED | OUTPATIENT
Start: 2021-12-06 | End: 2022-01-03 | Stop reason: SDUPTHER

## 2021-12-06 RX ORDER — FLUOXETINE HYDROCHLORIDE 20 MG/1
20 CAPSULE ORAL DAILY
Qty: 30 CAPSULE | Refills: 1 | Status: SHIPPED | OUTPATIENT
Start: 2021-12-13 | End: 2022-01-03 | Stop reason: SDUPTHER

## 2021-12-13 ENCOUNTER — TELEPHONE (OUTPATIENT)
Dept: FAMILY MEDICINE CLINIC | Facility: CLINIC | Age: 81
End: 2021-12-13

## 2021-12-13 ENCOUNTER — TELEPHONE (OUTPATIENT)
Dept: PSYCHIATRY | Facility: CLINIC | Age: 81
End: 2021-12-13

## 2021-12-13 NOTE — TELEPHONE ENCOUNTER
Pt's daughter called and stated pt's bp was high  She wants to know if pt should increase bp meds       12/13: 164/72   12/13: 154/75    12/10: 151/88  12/7:162/87  11/22:167/90

## 2021-12-15 ENCOUNTER — TELEPHONE (OUTPATIENT)
Dept: PSYCHIATRY | Facility: CLINIC | Age: 81
End: 2021-12-15

## 2021-12-15 LAB
ALBUMIN SERPL-MCNC: 3.9 G/DL (ref 3.6–5.1)
ALBUMIN/CREAT UR: 45 MCG/MG CREAT
ALBUMIN/GLOB SERPL: 1.7 (CALC) (ref 1–2.5)
ALP SERPL-CCNC: 73 U/L (ref 37–153)
ALT SERPL-CCNC: 14 U/L (ref 6–29)
AST SERPL-CCNC: 16 U/L (ref 10–35)
BASOPHILS # BLD AUTO: 61 CELLS/UL (ref 0–200)
BASOPHILS NFR BLD AUTO: 1.2 %
BILIRUB SERPL-MCNC: 0.5 MG/DL (ref 0.2–1.2)
BUN SERPL-MCNC: 19 MG/DL (ref 7–25)
BUN/CREAT SERPL: ABNORMAL (CALC) (ref 6–22)
CALCIUM SERPL-MCNC: 9.3 MG/DL (ref 8.6–10.4)
CHLORIDE SERPL-SCNC: 105 MMOL/L (ref 98–110)
CHOLEST SERPL-MCNC: 151 MG/DL
CHOLEST/HDLC SERPL: 3.1 (CALC)
CO2 SERPL-SCNC: 30 MMOL/L (ref 20–32)
CREAT SERPL-MCNC: 0.77 MG/DL (ref 0.6–0.88)
CREAT UR-MCNC: 64 MG/DL (ref 20–275)
EOSINOPHIL # BLD AUTO: 240 CELLS/UL (ref 15–500)
EOSINOPHIL NFR BLD AUTO: 4.7 %
ERYTHROCYTE [DISTWIDTH] IN BLOOD BY AUTOMATED COUNT: 12.4 % (ref 11–15)
GLOBULIN SER CALC-MCNC: 2.3 G/DL (CALC) (ref 1.9–3.7)
GLUCOSE SERPL-MCNC: 117 MG/DL (ref 65–99)
HBA1C MFR BLD: 6.5 % OF TOTAL HGB
HCT VFR BLD AUTO: 38.5 % (ref 35–45)
HDLC SERPL-MCNC: 48 MG/DL
HGB BLD-MCNC: 12.7 G/DL (ref 11.7–15.5)
LDLC SERPL CALC-MCNC: 79 MG/DL (CALC)
LYMPHOCYTES # BLD AUTO: 1372 CELLS/UL (ref 850–3900)
LYMPHOCYTES NFR BLD AUTO: 26.9 %
MCH RBC QN AUTO: 29 PG (ref 27–33)
MCHC RBC AUTO-ENTMCNC: 33 G/DL (ref 32–36)
MCV RBC AUTO: 87.9 FL (ref 80–100)
MICROALBUMIN UR-MCNC: 2.9 MG/DL
MONOCYTES # BLD AUTO: 357 CELLS/UL (ref 200–950)
MONOCYTES NFR BLD AUTO: 7 %
NEUTROPHILS # BLD AUTO: 3070 CELLS/UL (ref 1500–7800)
NEUTROPHILS NFR BLD AUTO: 60.2 %
NONHDLC SERPL-MCNC: 103 MG/DL (CALC)
PLATELET # BLD AUTO: 184 THOUSAND/UL (ref 140–400)
PMV BLD REES-ECKER: 10.9 FL (ref 7.5–12.5)
POTASSIUM SERPL-SCNC: 3.9 MMOL/L (ref 3.5–5.3)
PROT SERPL-MCNC: 6.2 G/DL (ref 6.1–8.1)
RBC # BLD AUTO: 4.38 MILLION/UL (ref 3.8–5.1)
SL AMB EGFR AFRICAN AMERICAN: 83 ML/MIN/1.73M2
SL AMB EGFR NON AFRICAN AMERICAN: 71 ML/MIN/1.73M2
SODIUM SERPL-SCNC: 141 MMOL/L (ref 135–146)
TRIGL SERPL-MCNC: 147 MG/DL
TSH SERPL-ACNC: 1.36 MIU/L (ref 0.4–4.5)
WBC # BLD AUTO: 5.1 THOUSAND/UL (ref 3.8–10.8)

## 2021-12-16 DIAGNOSIS — F41.1 GENERALIZED ANXIETY DISORDER: ICD-10-CM

## 2021-12-16 DIAGNOSIS — F33.1 MAJOR DEPRESSIVE DISORDER, RECURRENT, MODERATE (HCC): Primary | ICD-10-CM

## 2021-12-16 RX ORDER — VENLAFAXINE HYDROCHLORIDE 37.5 MG/1
37.5 CAPSULE, EXTENDED RELEASE ORAL DAILY
Qty: 7 CAPSULE | Refills: 0 | Status: SHIPPED | OUTPATIENT
Start: 2021-12-16 | End: 2022-01-03

## 2021-12-20 ENCOUNTER — OFFICE VISIT (OUTPATIENT)
Dept: FAMILY MEDICINE CLINIC | Facility: CLINIC | Age: 81
End: 2021-12-20
Payer: MEDICARE

## 2021-12-20 VITALS
RESPIRATION RATE: 16 BRPM | SYSTOLIC BLOOD PRESSURE: 128 MMHG | OXYGEN SATURATION: 97 % | HEIGHT: 56 IN | BODY MASS INDEX: 28.79 KG/M2 | DIASTOLIC BLOOD PRESSURE: 74 MMHG | WEIGHT: 128 LBS | HEART RATE: 80 BPM

## 2021-12-20 DIAGNOSIS — E78.2 MIXED HYPERLIPIDEMIA: ICD-10-CM

## 2021-12-20 DIAGNOSIS — I16.0 HYPERTENSIVE URGENCY: ICD-10-CM

## 2021-12-20 DIAGNOSIS — E11.9 CONTROLLED TYPE 2 DIABETES MELLITUS WITHOUT COMPLICATION, WITHOUT LONG-TERM CURRENT USE OF INSULIN (HCC): Primary | ICD-10-CM

## 2021-12-20 DIAGNOSIS — F41.9 ANXIETY: ICD-10-CM

## 2021-12-20 DIAGNOSIS — I10 BENIGN ESSENTIAL HYPERTENSION: ICD-10-CM

## 2021-12-20 PROCEDURE — 99215 OFFICE O/P EST HI 40 MIN: CPT | Performed by: FAMILY MEDICINE

## 2021-12-27 DIAGNOSIS — E11.9 TYPE 2 DIABETES MELLITUS WITHOUT COMPLICATION, WITHOUT LONG-TERM CURRENT USE OF INSULIN (HCC): ICD-10-CM

## 2022-01-03 ENCOUNTER — TELEPHONE (OUTPATIENT)
Dept: PSYCHIATRY | Facility: CLINIC | Age: 82
End: 2022-01-03

## 2022-01-03 ENCOUNTER — TELEMEDICINE (OUTPATIENT)
Dept: PSYCHIATRY | Facility: CLINIC | Age: 82
End: 2022-01-03

## 2022-01-03 DIAGNOSIS — F51.5 NIGHTMARES: ICD-10-CM

## 2022-01-03 DIAGNOSIS — F41.1 GENERALIZED ANXIETY DISORDER: ICD-10-CM

## 2022-01-03 DIAGNOSIS — F33.1 MAJOR DEPRESSIVE DISORDER, RECURRENT, MODERATE (HCC): Primary | ICD-10-CM

## 2022-01-03 DIAGNOSIS — F45.1 SOMATIC SYMPTOM DISORDER: ICD-10-CM

## 2022-01-03 DIAGNOSIS — F41.9 ANXIETY: ICD-10-CM

## 2022-01-03 RX ORDER — BUSPIRONE HYDROCHLORIDE 15 MG/1
15 TABLET ORAL 3 TIMES DAILY
Qty: 90 TABLET | Refills: 5 | Status: SHIPPED | OUTPATIENT
Start: 2022-01-03 | End: 2022-02-01 | Stop reason: SDUPTHER

## 2022-01-03 RX ORDER — PRAZOSIN HYDROCHLORIDE 1 MG/1
1 CAPSULE ORAL
Qty: 30 CAPSULE | Refills: 1 | Status: SHIPPED | OUTPATIENT
Start: 2022-01-03 | End: 2022-03-25

## 2022-01-03 RX ORDER — FLUOXETINE HYDROCHLORIDE 40 MG/1
40 CAPSULE ORAL DAILY
Qty: 30 CAPSULE | Refills: 1 | Status: SHIPPED | OUTPATIENT
Start: 2022-01-03 | End: 2022-02-01

## 2022-01-03 RX ORDER — CEPHALEXIN 500 MG/1
500 CAPSULE ORAL 2 TIMES DAILY
COMMUNITY
Start: 2022-01-02 | End: 2022-01-09

## 2022-01-03 NOTE — PSYCH
Virtual Brief Visit    Patient is located in the following state in which I hold an active license [place]      Encounter provider Atif Guzmán DO    Provider located at [address] [address] [phone]    The patient was identified by name and date of birth  Evelyn Salgado was informed that this is a telemedicine visit and that the visit is being conducted through [facility] and patient was informed this is a secure, HIPAA-complaint platform  She agrees to proceed  My office door was closed  No one else was in the room  She acknowledged consent and understanding of privacy and security of the video platform  The patient has agreed to participate and understands they can discontinue the visit at any time  Patient is aware this is a billable service  MEDICATION MANAGEMENT NOTE        [facility]      Name and Date of Birth:  Leonarda Rueda 78 y o  1940    Date of Visit: January 3, 2022    SUBJECTIVE:  CC: Ace Medina presents today for follow up with MDD, recurrent, moderate, JONAH, and somatic symptoms disorder  Somatic symptoms have continued but hard to determine because of the actual medical issues recent  Did have some tooth pain due to having them pulled  Does continue to have irrational and excessive reactions to her symptoms  Exams her body every day  Can not have a conversation about anything physical without crying  Effexor has now been discontinued which patient tolerated without issue  Depression and anxiety have mostly remained the same since last visit  Patient does "a lot dreaming" and often wakes up in a sweat and calls out in the middle of the night upset, can happen multiple times  Content is focused on being trapped or getting lost/confused  Appetite WNL and no noticeable changes in her weight  Concentration and memory have been reduced   Psychotherapy continues to be very helpful for her but doesn't always focus on what is bothering her  Indicates she had an incident last night with "belly ache" and needed to go to ED  Had blood in her urine  Ended up at Urgent care instead  Blood pressure was 145/71 on most recent measurement  Gave Xanax yesterday due to crying and high anxiety yesterday  Lito Presume denies any side effects from medications unless noted above    Since our last visit, overall symptoms have been unchanged  HPI ROS:     PHQ-9 Depression Screening    Little interest or pleasure in doing things: 3 - nearly every day  Feeling down, depressed, or hopeless: 3 - nearly every day  Trouble falling or staying asleep, or sleeping too much: 2 - more than half the days  Feeling tired or having little energy: 3 - nearly every day  Poor appetite or overeatin - several days  Feeling bad about yourself - or that you are a failure or have let yourself or your family down: 3 - nearly every day  Trouble concentrating on things, such as reading the newspaper or watching television: 3 - nearly every day  Moving or speaking so slowly that other people could have noticed   Or the opposite - being so fidgety or restless that you have been moving around a lot more than usual: 1 - several days  Thoughts that you would be better off dead, or of hurting yourself in some way: 0 - not at all  PHQ-9 Score: 19  Score Interpretation: Moderately severe depression         PHQ-2/9 Depression Screening    Little interest or pleasure in doing things: 3 - nearly every day  Feeling down, depressed, or hopeless: 3 - nearly every day  Trouble falling or staying asleep, or sleeping too much: 2 - more than half the days  Feeling tired or having little energy: 3 - nearly every day  Poor appetite or overeatin - several days  Feeling bad about yourself - or that you are a failure or have let yourself or your family down: 3 - nearly every day  Trouble concentrating on things, such as reading the newspaper or watching television: 3 - nearly every day  Moving or speaking so slowly that other people could have noticed  Or the opposite - being so fidgety or restless that you have been moving around a lot more than usual: 1 - several days  Thoughts that you would be better off dead, or of hurting yourself in some way: 0 - not at all  PHQ-9 Score: 19   PHQ-9 Interpretation: Moderately severe depression            JONAH-7 Flowsheet Screening      Most Recent Value   Over the last 2 weeks, how often have you been bothered by any of the following problems? Feeling nervous, anxious, or on edge 3   Not being able to stop or control worrying 3   Worrying too much about different things 3   Trouble relaxing 3   Being so restless that it is hard to sit still 0   Becoming easily annoyed or irritable 1   Feeling afraid as if something awful might happen 3   JONAH-7 Total Score 16        HPI ROS:             ('was' notes: recent => remote)  Medication Side Effects:  Denies  High blood pressure on Effexor   Depression (10 worst): 8/10 (Was 2/10)   Anxiety (10 worst): 8/10 (Was 2/10)   Safety concerns (SI, HI, etc): Denies SI/HI/AVH (Was Denied SI/HI/AVH)   Sleep: (NM = Nightmares) Somewhat reduced due to nightmares and awakening (Was 8 hours per night at baseline)   Energy: Reduced (Was WNL)   Appetite: WNL with 3 full meals (Was Overeating episodes more frequently)   Weight Change: No endorsed Denies     Review Of Systems as noted above  In addition:     Constitutional negative   ENT negative   Cardiovascular negative   Respiratory negative   Gastrointestinal abdominal pain and constipation   Genitourinary negative   Musculoskeletal pain unspecified   Integumentary skin lesions and itching   Neurological negative   Endocrine negative   Other Symptoms none     Pain none   Pain Scale 0     History Review:  The following portions of the patient's history were reviewed and updated as appropriate: allergies, current medications, past family history, past medical history, past social history, past surgical history and problem list      Lab Review: No new labs or no relevant labs needing review with patient today      OBJECTIVE:     MENTAL STATUS EXAM  Appearance:  age appropriate, dressed casually, overweight   Behavior:  Pleasant & cooperative, guarded   Speech:  Normal volume, regular rate and rhythm   Mood:  "worried about my body "   Affect:  mood congruent, depressed, constricted, anxious  Worse since last visit  Language: intact and appropriate for age, education, and intellect   Thought Process:  circumstantial, perserverative   Associations: intact associations   Thought Content:  negative thinking and cognitive distortions, somatic preoccupation   Perceptual Disturbances: no auditory or visual hallcunations   Risk Potential / Abnormal Thoughts: Suicidal ideation - None  Homicidal ideation - None  Potential for aggression - No       Consciousness:  Alert & Awake   Sensorium:  Grossly oriented   Attention: attention span and concentration appear shorter than expected for age       Fund of Knowledge:  Memory: awareness of current events: yes  past history: yes  vocabulary: yes  short term memory mildly impaired, long term memory grossly intact   Insight:  fair   Judgment: fair   Muscle Strength Muscle Tone: normal  normal   Gait/Station: uses cane   Motor Activity: no abnormal movements       Risks, Benefits And Possible Side Effects Of Medications:    AGREE: Risks, benefits, and possible side effects of medications explained to Catie Duncan and she (or legal representative) verbalizes understanding and agreement for treatment      Controlled Medication Discussion:     Patient using medication appropriately  Catie Duncan has been filling controlled prescriptions on time as prescribed according to [facility] program    Discussed with Catie Duncan the risks of sedation, respiratory depression, impairment of ability to drive and potential for abuse and addiction related to treatment with benzodiazepine medications  She understands risk of treatment with benzodiazepine medications, agrees to not drive if feels impaired and agrees to take medications as prescribed  Discussed with Sonny Yip Box warning on concurrent use of benzodiazepines and opioid medications including sedation, respiratory depression, coma and death  She understands the risk of treatment with benzodiazepines in addition to opioids and wants to continue taking those medications  ______________________________________________________________      Recent labs:  Orders Only on 12/15/2021   Component Date Value    Total Cholesterol 12/15/2021 151     HDL 12/15/2021 48*    Triglycerides 12/15/2021 147     LDL Calculated 12/15/2021 79     Chol HDLC Ratio 12/15/2021 3 1     Non-HDL Cholesterol 12/15/2021 103     Glucose, Random 12/15/2021 117*    BUN 12/15/2021 19     Creatinine 12/15/2021 0 77     eGFR Non  12/15/2021 71     eGFR  12/15/2021 83     SL AMB BUN/CREATININE RA* 74/82/4318 NOT APPLICABLE     Sodium 35/88/4788 141     Potassium 12/15/2021 3 9     Chloride 12/15/2021 105     CO2 12/15/2021 30     Calcium 12/15/2021 9 3     Protein, Total 12/15/2021 6 2     Albumin 12/15/2021 3 9     Globulin 12/15/2021 2 3     Albumin/Globulin Ratio 12/15/2021 1 7     TOTAL BILIRUBIN 12/15/2021 0 5     Alkaline Phosphatase 12/15/2021 73     AST 12/15/2021 16     ALT 12/15/2021 14     Creatinine, Urine 12/15/2021 64     Microalbum  ,U,Random 12/15/2021 2 9     Microalb/Creat Ratio 12/15/2021 45*    White Blood Cell Count 12/15/2021 5 1     Red Blood Cell Count 12/15/2021 4 38     Hemoglobin 12/15/2021 12 7     HCT 12/15/2021 38 5     MCV 12/15/2021 87 9     MCH 12/15/2021 29 0     MCHC 12/15/2021 33 0     RDW 12/15/2021 12 4     Platelet Count 72/11/3665 184     SL AMB MPV 12/15/2021 10 9     Neutrophils (Absolute) 12/15/2021 3,070     Lymphocytes (Absolute) 12/15/2021 1,372     Monocytes (Absolute) 12/15/2021 357     Eosinophils (Absolute) 12/15/2021 240     Basophils ABS 12/15/2021 61     Neutrophils 12/15/2021 60 2     Lymphocytes 12/15/2021 26 9     Monocytes 12/15/2021 7 0     Eosinophils 12/15/2021 4 7     Basophils PCT 12/15/2021 1 2     TSH W/RFX TO FREE T4 12/15/2021 1 36     Hemoglobin A1C 12/15/2021 6 5*         Past Psychiatric History  Previous diagnoses include: Depression, anxiety, and OCD  Prior outpatient psychiatric treatment:  Saw psychiatrist in her 35s for a period of 10 years  Prior therapy:  Sees therapist Dr Gillian Reese for the past 2-3 years  Prior inpatient psychiatric treatment:  1 time for a period of 3 weeks at [facility] in 2013 for panic attacks post surgery  Prior suicide attempts:  Denies  Prior self harm:  Denies  Prior violence or aggression:  Denies     Previous psychotropic medication trials:      Antidepressants:  Effexor, Zoloft, Prozac, and Lexapro     Mood Stabilizers:  Denies     Anxiolytics: BuSpar and Klonopin     Typical antipsychotics:  Denies     Atypical antipsychotics:  Denies     Hypnotics/sleep aids:  Denies     Social History:     The patient grew up in [address]  Childhood was described as "I scardy cat"      During childhood, parents were together but father cheated on her mother with another girl  They have 2 sister(s) and 2 brother(s)  Only 1 brother is still living but she has good relationship with him       Abuse/neglect: none     As far as the patient (or present family member) is aware, overall childhood development: Patient does ascribe to normal developmental including childhood as well as regular educational course overall (eg no IEP, special education)      Education level: Bachelor of arts   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  Supports herself on pension and social security    Marital status:  since 2006 when   from cancer  Children:  1 daughter whom she lives with and 1 son who lives in [facility] whom she has good relationship with  Current Living Situation: the patient currently lives with daughter, son-in-law, and 3 grandkids in [place], [place]   Social support:  Brother, daughter, son-in-law, grandkids, her ghanshyam, and her friend Edwin Zavala     [employer]ism Affiliation:  Yes   experience:  Denies  Legal history:  Denies  Access to Guns:  Denies     Substance use and treatment:  Tobacco use:  Denies  Caffeine Use:  1 cup of decaf in the morning  ETOH use:  Denies  Other substance use:  Denies  Endorses previous experimentation with:  Denies     Longest clean time: not applicable  History of Inpatient/Outpatient rehabilitation program: no        Traumatic History:      Abuse: none  Other Traumatic Events: none      Family Psychiatric History:      Psychiatric Illness:       Mother - depression, Sister - depression and anxiety disorder, Sister - depression and anxiety disorder  Substance Abuse:       Son - alcohol abuse  Suicide Attempts:        no family history of suicide attempts    Family Psychiatric History:   Family History   Problem Relation Age of Onset   Ardeth Needs Depression Mother         w/anxiety    Diabetes Mother         Mellitus    Breast cancer Mother     Hypertension Mother     No Known Problems Father     Depression Sister         w/anxiety    Heart disease Sister         Cardiac Disorder    Breast cancer Sister     Hypertension Sister     Diabetes Brother         Mellitus    Alcohol abuse Son          Medical / Surgical History:    Past Medical History:   Diagnosis Date    Anxiety     Colon polyps     Diabetes mellitus ([facility] )     Dizziness     Last assessed: 8/31/15    [facility]     Dysuria     Hyperlipidemia     Hypertension     Hypertensive urgency 10/22/2021    Insomnia     Ureterolithiasis 2020     Past Surgical History:   Procedure Laterality Date     SECTION      1964 son, 26 daughter    COLONOSCOPY      [facility] RETROGRADE PYELOGRAM  2020    VT COLONOSCOPY FLX DX W/COLLJ SPEC WHEN PFRMD N/A 3/27/2017    Procedure: COLONOSCOPY;  Surgeon: Romana Coreas MD;  [facility]; Service: Gastroenterology    VT CYSTO/URETERO W/LITHOTRIPSY &INDWELL STENT INSRT Right 2020    Procedure: CYSTOSCOPY URETEROSCOPY WITH LITHOTRIPSY HOLMIUM LASER, RETROGRADE PYELOGRAM AND INSERTION STENT URETERAL; EXCISION OF BLADDER TUMOR;  Surgeon: Keesha Ferrer MD;  Location: [facility];  Service: Urology    ROTATOR CUFF REPAIR Left     Last assessed: 3/29/15    TONSILLECTOMY         Assessment/Plan:        Diagnoses and all orders for this visit:    Major depressive disorder, recurrent, moderate (HCC)  -     FLUoxetine (PROzac) 40 MG capsule; Take 1 capsule (40 mg total) by mouth daily    Generalized anxiety disorder  -     busPIRone (BUSPAR) 15 mg tablet; Take 1 tablet (15 mg total) by mouth 3 (three) times a day  -     FLUoxetine (PROzac) 40 MG capsule; Take 1 capsule (40 mg total) by mouth daily    Somatic symptom disorder    Anxiety    Nightmares  -     prazosin (MINIPRESS) 1 mg capsule; Take 1 capsule (1 mg total) by mouth daily at bedtime    Other orders  -     cephalexin (KEFLEX) 500 mg capsule; Take 500 mg by mouth 2 (two) times a day        ______________________________________________________________________    Additional Assessment:  Patient reports worse anxiety and mood since discontinuation of Effexor and starting Prozac  Reports that the holidays have also contributed to these symptoms as she misses her   Did provide education to patient that she is now on a low-dose of Prozac which will need to be titrated accordingly to address these concerns and she voices understanding    Does report that she has only been taking approximately half the prescribed dose of her Klonopin and patient was encouraged to take her medications as prescribed  Did discuss plan moving forward of weaning her off of Klonopin once her Prozac has been established at a therapeutic level but that her anxiety needs to be controlled in the meantime 3 use of these medications  States that she was taking Xanax 1 time last week for acute episode of high anxiety and patient was encouraged to use Klonopin instead as it does not have rebound effects that are associated with Xanax  Patient voiced understanding was agreeable to taking medication as it had been prescribed  Will increase patient's dose of Prozac for further supportive mood and anxiety  Patient has been experiencing nightmares which have been impacting her sleep negatively and causing her more anxiety  States that these are often about her getting lost or confused  Will start patient on low dose of prazosin and did discuss orthostatic affects with both patient and daughter who agree to monitor her for symptoms and get out of bed slowly and only take the medication at night  Patient was encouraged to come into the office for in-person visit in order to monitor blood pressures which they report have been under moderately good control in addition to need for further evaluation on SLUMS for noted memory and concentration difficulties  Will continue patient's BuSpar without change  Continued to encourage increased physical activity, stable diet, and maintain sleep hygiene  Patient's labs are currently up-to-date  Scales:  PHQ-9 was 10, now 19    JONAH-7 was 17, now 16         Treatment Plan:        Patient has been educated about their diagnosis and treatment modalities  They voiced understanding and agreement with the following plan:    Discussed medications and if treatment adjustment was needed/desired  1) MEDS:           - Increase Prozac 40 mg PO QD for mood and anxiety    PARQ completed including serotonin syndrome, SIADH, worsening depression, suicidality, induction of eligio, GI upset, headaches, activation, sexual side effects, sedation, potential drug interactions, and others    - Continue Klonopin 0 5 mg PO TID for breakthrough anxiety as a bridge and wean off in the future  Discussed with patient the risks of sedation, respiratory depression, impairment in judgment and motor function  Depression, mood changes, GI changes, and others discussed  Patient was also informed of risks of being on or starting opioid medications due to drug interactions and potential for serious respiratory depression and death  - Continue Buspar 15 mg PO TID for anxiety  PARQ completed including serotonin syndrome, rare TD/EPS, dizziness, sedation, GI distress, confusion, possible mood changes, xerostomia and visual disturbances    - Start Prazosin 1 mg PO QHS for nightmares  PARQ completed including dizziness, sedation, headache, blood pressure changes (including orthostatic hypotension and syncope), medication interaction risk, and GI distress     2) Labs:  - 10/24/21: BMP unremarkable  - 10/23/21: CBC and lipid panel unremarkable   A1c elevated at 6 6   - 6/14/21: TSH wnl at 2 57  -12/15/21: A1c was 6 5 and controlled and TSH wnl at 1 36     3) Therapy:    - Continue with own therapist Dr Camacho Reyes     4) Medical:   - Pt will f/u with other providers as needed   - On Vitamin D supplement  - Consider cognitive examination with SLUMS at next visit due to virtual nature of current visit and need for in person testing     5) Other: Support as needed   - encouraged increase in physical activity including walking and low impact exercises/stretching   - recommended improvement of diet with low carbohydrates and high fiber   - recommended improvement in sleep hygiene and avoiding electronics at night     6) Follow up:   - Follow up in 1 month for medication management    - Patient will call if issues or concerns      7) Treatment Plan:    - Enacted 12/06/2021     Discussed self monitoring of symptoms, and symptom monitoring tools  Patient has been informed of 24 hours and weekend coverage for urgent situations accessed by calling the main clinic phone number  Psychotherapy in session:  Time spent performing psychotherapy: 16 Minutes    Video Exam    There were no vitals filed for this visit  Physical Exam     I spent 45 minutes directly with the patient during this visit    VIRTUAL VISIT DISCLAIMER      Soraida Randall verbally agrees to participate in [employer]  Pt is aware that [employer] could be limited without vital signs or the ability to perform a full hands-on physical exam  Soraida Randall understands she or the provider may request at any time to terminate the video visit and request the patient to seek care or treatment in person

## 2022-01-03 NOTE — TELEPHONE ENCOUNTER
Returned patient's call  As per patient, changed today's appt to virtual  Relayed that to the doctor

## 2022-01-04 ENCOUNTER — TELEPHONE (OUTPATIENT)
Dept: PSYCHIATRY | Facility: CLINIC | Age: 82
End: 2022-01-04

## 2022-01-04 NOTE — TELEPHONE ENCOUNTER
Pt's daughter did  the Klonopin yesterday, thinking it was your prescription  It was from Dr Wilfredo Perez  The patient will have enough to last a while  Daughter is requesting that you put in an rx (for when she needs a refill) that reads 1 pill 3 times per day

## 2022-01-05 RX ORDER — CLONAZEPAM 0.5 MG/1
0.5 TABLET ORAL 3 TIMES DAILY
Qty: 90 TABLET | Refills: 1
Start: 2022-01-05 | End: 2022-01-24 | Stop reason: SDUPTHER

## 2022-01-12 DIAGNOSIS — E78.2 MIXED HYPERLIPIDEMIA: ICD-10-CM

## 2022-01-12 RX ORDER — ATORVASTATIN CALCIUM 10 MG/1
10 TABLET, FILM COATED ORAL DAILY
Qty: 90 TABLET | Refills: 0 | Status: SHIPPED | OUTPATIENT
Start: 2022-01-12 | End: 2022-04-17 | Stop reason: SDUPTHER

## 2022-01-23 ENCOUNTER — IMMUNIZATIONS (OUTPATIENT)
Dept: FAMILY MEDICINE CLINIC | Facility: HOSPITAL | Age: 82
End: 2022-01-23

## 2022-01-23 DIAGNOSIS — Z23 ENCOUNTER FOR IMMUNIZATION: Primary | ICD-10-CM

## 2022-01-23 PROCEDURE — 91300 COVID-19 PFIZER VACC 0.3 ML: CPT

## 2022-01-23 PROCEDURE — 0001A COVID-19 PFIZER VACC 0.3 ML: CPT

## 2022-01-24 DIAGNOSIS — F41.9 ANXIETY: ICD-10-CM

## 2022-01-24 RX ORDER — CLONAZEPAM 0.5 MG/1
0.5 TABLET ORAL 3 TIMES DAILY
Qty: 90 TABLET | Refills: 1 | Status: SHIPPED | OUTPATIENT
Start: 2022-02-02 | End: 2022-04-11 | Stop reason: SDUPTHER

## 2022-01-24 NOTE — TELEPHONE ENCOUNTER
PDMP review shows that patient had refill by Dr Jud Guillaume on 1/3/22 for 30 days  Will reorder medication for 2/2/22 as patient should have enough until that time

## 2022-01-27 ENCOUNTER — APPOINTMENT (EMERGENCY)
Dept: CT IMAGING | Facility: HOSPITAL | Age: 82
End: 2022-01-27
Payer: MEDICARE

## 2022-01-27 ENCOUNTER — HOSPITAL ENCOUNTER (EMERGENCY)
Facility: HOSPITAL | Age: 82
Discharge: HOME | End: 2022-01-27
Attending: EMERGENCY MEDICINE
Payer: MEDICARE

## 2022-01-27 VITALS
DIASTOLIC BLOOD PRESSURE: 78 MMHG | RESPIRATION RATE: 16 BRPM | SYSTOLIC BLOOD PRESSURE: 192 MMHG | OXYGEN SATURATION: 96 % | HEART RATE: 87 BPM | TEMPERATURE: 97.8 F

## 2022-01-27 DIAGNOSIS — N13.2 HYDRONEPHROSIS WITH URETERAL CALCULUS: Primary | ICD-10-CM

## 2022-01-27 LAB
ALBUMIN SERPL BCP-MCNC: 3.7 G/DL (ref 3.5–5)
ALP SERPL-CCNC: 78 U/L (ref 46–116)
ALT SERPL W P-5'-P-CCNC: 31 U/L (ref 12–78)
ANION GAP SERPL CALCULATED.3IONS-SCNC: 10 MMOL/L (ref 4–13)
AST SERPL W P-5'-P-CCNC: 42 U/L (ref 5–45)
BACTERIA UR QL AUTO: ABNORMAL /HPF
BASOPHILS # BLD AUTO: 0.04 THOUSANDS/ΜL (ref 0–0.1)
BASOPHILS NFR BLD AUTO: 1 % (ref 0–1)
BILIRUB SERPL-MCNC: 0.53 MG/DL (ref 0.2–1)
BILIRUB UR QL STRIP: NEGATIVE
BUN SERPL-MCNC: 22 MG/DL (ref 5–25)
CALCIUM SERPL-MCNC: 8.9 MG/DL (ref 8.3–10.1)
CHLORIDE SERPL-SCNC: 103 MMOL/L (ref 100–108)
CLARITY UR: ABNORMAL
CO2 SERPL-SCNC: 24 MMOL/L (ref 21–32)
COLOR UR: ABNORMAL
CREAT SERPL-MCNC: 0.82 MG/DL (ref 0.6–1.3)
EOSINOPHIL # BLD AUTO: 0.31 THOUSAND/ΜL (ref 0–0.61)
EOSINOPHIL NFR BLD AUTO: 5 % (ref 0–6)
ERYTHROCYTE [DISTWIDTH] IN BLOOD BY AUTOMATED COUNT: 13.2 % (ref 11.6–15.1)
GFR SERPL CREATININE-BSD FRML MDRD: 66 ML/MIN/1.73SQ M
GLUCOSE SERPL-MCNC: 250 MG/DL (ref 65–140)
GLUCOSE UR STRIP-MCNC: ABNORMAL MG/DL
HCT VFR BLD AUTO: 39.3 % (ref 34.8–46.1)
HGB BLD-MCNC: 12.7 G/DL (ref 11.5–15.4)
HGB UR QL STRIP.AUTO: ABNORMAL
IMM GRANULOCYTES # BLD AUTO: 0.01 THOUSAND/UL (ref 0–0.2)
IMM GRANULOCYTES NFR BLD AUTO: 0 % (ref 0–2)
KETONES UR STRIP-MCNC: NEGATIVE MG/DL
LACTATE SERPL-SCNC: 2.1 MMOL/L (ref 0.5–2)
LEUKOCYTE ESTERASE UR QL STRIP: NEGATIVE
LIPASE SERPL-CCNC: 108 U/L (ref 73–393)
LYMPHOCYTES # BLD AUTO: 1.22 THOUSANDS/ΜL (ref 0.6–4.47)
LYMPHOCYTES NFR BLD AUTO: 20 % (ref 14–44)
MCH RBC QN AUTO: 29.8 PG (ref 26.8–34.3)
MCHC RBC AUTO-ENTMCNC: 32.3 G/DL (ref 31.4–37.4)
MCV RBC AUTO: 92 FL (ref 82–98)
MONOCYTES # BLD AUTO: 0.38 THOUSAND/ΜL (ref 0.17–1.22)
MONOCYTES NFR BLD AUTO: 6 % (ref 4–12)
NEUTROPHILS # BLD AUTO: 4.01 THOUSANDS/ΜL (ref 1.85–7.62)
NEUTS SEG NFR BLD AUTO: 68 % (ref 43–75)
NITRITE UR QL STRIP: NEGATIVE
NON-SQ EPI CELLS URNS QL MICRO: ABNORMAL /HPF
NRBC BLD AUTO-RTO: 0 /100 WBCS
PH UR STRIP.AUTO: 5.5 [PH]
PLATELET # BLD AUTO: 191 THOUSANDS/UL (ref 149–390)
PMV BLD AUTO: 11.4 FL (ref 8.9–12.7)
POTASSIUM SERPL-SCNC: 4.8 MMOL/L (ref 3.5–5.3)
PROT SERPL-MCNC: 7.4 G/DL (ref 6.4–8.2)
PROT UR STRIP-MCNC: ABNORMAL MG/DL
RBC # BLD AUTO: 4.26 MILLION/UL (ref 3.81–5.12)
RBC #/AREA URNS AUTO: ABNORMAL /HPF
SODIUM SERPL-SCNC: 137 MMOL/L (ref 136–145)
SP GR UR STRIP.AUTO: 1.02 (ref 1–1.03)
UROBILINOGEN UR QL STRIP.AUTO: 0.2 E.U./DL
WBC # BLD AUTO: 5.97 THOUSAND/UL (ref 4.31–10.16)
WBC #/AREA URNS AUTO: ABNORMAL /HPF

## 2022-01-27 PROCEDURE — 83690 ASSAY OF LIPASE: CPT | Performed by: EMERGENCY MEDICINE

## 2022-01-27 PROCEDURE — 85025 COMPLETE CBC W/AUTO DIFF WBC: CPT | Performed by: EMERGENCY MEDICINE

## 2022-01-27 PROCEDURE — 99284 EMERGENCY DEPT VISIT MOD MDM: CPT

## 2022-01-27 PROCEDURE — 96361 HYDRATE IV INFUSION ADD-ON: CPT

## 2022-01-27 PROCEDURE — 96374 THER/PROPH/DIAG INJ IV PUSH: CPT

## 2022-01-27 PROCEDURE — 96375 TX/PRO/DX INJ NEW DRUG ADDON: CPT

## 2022-01-27 PROCEDURE — 83605 ASSAY OF LACTIC ACID: CPT | Performed by: EMERGENCY MEDICINE

## 2022-01-27 PROCEDURE — 74176 CT ABD & PELVIS W/O CONTRAST: CPT

## 2022-01-27 PROCEDURE — 81001 URINALYSIS AUTO W/SCOPE: CPT | Performed by: EMERGENCY MEDICINE

## 2022-01-27 PROCEDURE — 80053 COMPREHEN METABOLIC PANEL: CPT | Performed by: EMERGENCY MEDICINE

## 2022-01-27 PROCEDURE — 36415 COLL VENOUS BLD VENIPUNCTURE: CPT | Performed by: EMERGENCY MEDICINE

## 2022-01-27 PROCEDURE — 99284 EMERGENCY DEPT VISIT MOD MDM: CPT | Performed by: EMERGENCY MEDICINE

## 2022-01-27 RX ORDER — ONDANSETRON 2 MG/ML
4 INJECTION INTRAMUSCULAR; INTRAVENOUS ONCE
Status: COMPLETED | OUTPATIENT
Start: 2022-01-27 | End: 2022-01-27

## 2022-01-27 RX ORDER — KETOROLAC TROMETHAMINE 30 MG/ML
15 INJECTION, SOLUTION INTRAMUSCULAR; INTRAVENOUS ONCE
Status: COMPLETED | OUTPATIENT
Start: 2022-01-27 | End: 2022-01-27

## 2022-01-27 RX ORDER — HYDROMORPHONE HCL/PF 1 MG/ML
0.5 SYRINGE (ML) INJECTION ONCE
Status: COMPLETED | OUTPATIENT
Start: 2022-01-27 | End: 2022-01-27

## 2022-01-27 RX ADMIN — KETOROLAC TROMETHAMINE 15 MG: 30 INJECTION, SOLUTION INTRAMUSCULAR at 22:54

## 2022-01-27 RX ADMIN — HYDROMORPHONE HYDROCHLORIDE 0.5 MG: 1 INJECTION, SOLUTION INTRAMUSCULAR; INTRAVENOUS; SUBCUTANEOUS at 21:19

## 2022-01-27 RX ADMIN — SODIUM CHLORIDE 500 ML: 0.9 INJECTION, SOLUTION INTRAVENOUS at 21:18

## 2022-01-27 RX ADMIN — ONDANSETRON 4 MG: 2 INJECTION INTRAMUSCULAR; INTRAVENOUS at 21:18

## 2022-01-28 NOTE — DISCHARGE INSTRUCTIONS
If symptoms return, go to [address] if it is between Friday and Sunday

## 2022-01-28 NOTE — ED PROVIDER NOTES
History  Chief Complaint   Patient presents with    Abdominal Pain     C/o RLQ pain (radiates posterior) and nausea starting this evening  This is an 78-year-old female presenting from home with her daughter for evaluation of right lower quadrant abdominal pain that started about 2 hours ago around 7:00 p m  [place] She states it came on fairly suddenly it has been constant ever since  No vomiting, diarrhea or fevers or chills  History of kidney stones, UTIs  History provided by:  Patient   used: No    Abdominal Pain      Prior to Admission Medications   Prescriptions Last Dose Informant Patient Reported? Taking?    FLUoxetine (PROzac) 40 MG capsule   No No   Sig: Take 1 capsule (40 mg total) by mouth daily   Linzess 145 MCG CAPS  Self No No   Sig: TAKE ONE CAPSULE BY MOUTH EVERY DAY   aspirin 81 MG tablet  Self Yes No   Sig: Take 81 mg by mouth daily   atorvastatin (LIPITOR) 10 mg tablet   No No   Sig: Take 1 tablet (10 mg total) by mouth daily   bisacodyl (DULCOLAX) 10 mg suppository   No No   Sig: Insert 1 suppository (10 mg total) into the rectum daily for 7 days   busPIRone (BUSPAR) 15 mg tablet   No No   Sig: Take 1 tablet (15 mg total) by mouth 3 (three) times a day   cholecalciferol (VITAMIN D3) 1,000 units tablet  Self Yes No   Sig: Take 1,000 Units by mouth daily   clonazePAM (KlonoPIN) 0 5 mg tablet   No No   Sig: Take 1 tablet (0 5 mg total) by mouth 3 (three) times a day   docusate sodium (COLACE) 100 mg capsule  Self Yes No   Sig: Take 100 mg by mouth 2 (two) times a day   metFORMIN (GLUCOPHAGE) 500 mg tablet   No No   Sig: Take 1 tablet (500 mg total) by mouth 2 (two) times a day with meals   polyethylene glycol (MIRALAX) 17 g packet   No No   Sig: Take 17 g by mouth daily for 7 days   prazosin (MINIPRESS) 1 mg capsule   No No   Sig: Take 1 capsule (1 mg total) by mouth daily at bedtime   ramipril (ALTACE) 10 MG capsule  Self No No   Sig: TAKE ONE CAPSULE BY MOUTH EVERY DAY Facility-Administered Medications: None       Past Medical History:   Diagnosis Date    Anxiety     Colon polyps     [facility] mellitus ([facility] [address] )     Dizziness     Last assessed: 8/31/15    DVT (deep venous thrombosis) ([facility] )     Dysuria     Hyperlipidemia     Hypertension     Hypertensive urgency 10/22/2021    Insomnia     Ureterolithiasis 2020       Past Surgical History:   Procedure Laterality Date     SECTION      1964 son, 26 daughter    COLONOSCOPY      [place] RETROGRADE PYELOGRAM  2020    MT COLONOSCOPY FLX DX W/COLLJ SPEC WHEN PFRMD N/A 3/27/2017    Procedure: COLONOSCOPY;  Surgeon: Jeffery Mark MD;  [facility]: [facility]; Service: Gastroenterology    MT CYSTO/URETERO W/LITHOTRIPSY &INDWELL STENT INSRT Right 2020    Procedure: CYSTOSCOPY URETEROSCOPY WITH LITHOTRIPSY HOLMIUM LASER, RETROGRADE PYELOGRAM AND INSERTION STENT URETERAL; EXCISION OF BLADDER TUMOR;  Surgeon: Yajaira Diaan MD;  Location: [facility];  Service: Urology    ROTATOR CUFF REPAIR Left     Last assessed: 3/29/15    TONSILLECTOMY         Family History   Problem Relation Age of Onset    Depression Mother         w/anxiety    Diabetes Mother         Mellitus    Breast cancer Mother     Hypertension Mother     No Known Problems Father     Depression Sister         w/anxiety    Heart disease Sister         Cardiac Disorder    Breast cancer Sister     Hypertension Sister     Diabetes Brother         Mellitus    Alcohol abuse Son      I have reviewed and agree with the history as documented  E-Cigarette/Vaping    E-Cigarette Use Never User      E-Cigarette/Vaping Substances     Social History     Tobacco Use    Smoking status: Never Smoker    Smokeless tobacco: Never Used   Vaping Use    Vaping Use: Never used   Substance Use Topics    Alcohol use: No    Drug use: No       Review of Systems   Gastrointestinal: Positive for abdominal pain     All other systems reviewed and are negative  Physical Exam  Physical Exam  Vitals and nursing note reviewed  Constitutional:       General: She is in acute distress  Appearance: She is well-developed  She is obese  She is not ill-appearing, toxic-appearing or diaphoretic  Comments: Mild pain distress   HENT:      Head: Normocephalic and atraumatic  Mouth/Throat:      Mouth: Mucous membranes are moist    Eyes:      Extraocular Movements: Extraocular movements intact  Cardiovascular:      Rate and Rhythm: Normal rate and regular rhythm  Heart sounds: Normal heart sounds  Pulmonary:      Effort: Pulmonary effort is normal       Breath sounds: Normal breath sounds  Abdominal:      General: Abdomen is flat  Bowel sounds are normal  There is no distension or abdominal bruit  There are no signs of injury  Palpations: Abdomen is soft  There is no shifting dullness  Tenderness: There is abdominal tenderness in the right upper quadrant and right lower quadrant  There is no right CVA tenderness, left CVA tenderness, guarding or rebound  Hernia: No hernia is present  Genitourinary:     Adnexa: Right adnexa normal and left adnexa normal    Skin:     General: Skin is warm and dry  Capillary Refill: Capillary refill takes less than 2 seconds  Neurological:      General: No focal deficit present  Mental Status: She is alert     Psychiatric:         Mood and Affect: Mood normal          Behavior: Behavior normal          Vital Signs  ED Triage Vitals [01/27/22 2012]   Temperature Pulse Respirations Blood Pressure SpO2   97 8 °F (36 6 °C) 82 18 (!) 196/82 98 %      Temp Source Heart Rate Source Patient Position - Orthostatic VS BP Location FiO2 (%)   Oral Monitor Sitting Left arm --      Pain Score       8           Vitals:    01/27/22 2030 01/27/22 2230 01/27/22 2300 01/27/22 2352   BP: (!) 211/91 (!) 178/81 (!) 192/78    Pulse: 78 84 72 87   Patient Position - Orthostatic VS: Lying Lying Lying Visual Acuity      ED Medications  Medications   ondansetron (ZOFRAN) injection 4 mg (4 mg Intravenous Given 1/27/22 2118)   HYDROmorphone (DILAUDID) injection 0 5 mg (0 5 mg Intravenous Given 1/27/22 2119)   sodium chloride 0 9 % bolus 500 mL (0 mL Intravenous Stopped 1/27/22 2200)   ketorolac (TORADOL) injection 15 mg (15 mg Intravenous Given 1/27/22 2254)       Diagnostic Studies  Results Reviewed     Procedure Component Value Units Date/Time    Urine Microscopic [[id number]]  (Abnormal) Collected: 01/27/22 2111    Lab Status: Final result Specimen: Urine, Clean Catch Updated: 01/27/22 2218     RBC, UA 30-50 /hpf      WBC, UA 0-1 /hpf      Epithelial Cells None Seen /hpf      Bacteria, UA None Seen /hpf     Lactic acid [[id number]]  (Abnormal) Collected: 01/27/22 2109    Lab Status: Final result Specimen: Blood from Arm, Right Updated: 01/27/22 2151     LACTIC ACID 2 1 mmol/L     Narrative:      Result may be elevated if tourniquet was used during collection      CMP [[id number]]  (Abnormal) Collected: 01/27/22 2109    Lab Status: Final result Specimen: Blood from Arm, Right Updated: 01/27/22 2144     Sodium 137 mmol/L      Potassium 4 8 mmol/L      Chloride 103 mmol/L      CO2 24 mmol/L      ANION GAP 10 mmol/L      BUN 22 mg/dL      Creatinine 0 82 mg/dL      Glucose 250 mg/dL      Calcium 8 9 mg/dL      AST 42 U/L      ALT 31 U/L      Alkaline Phosphatase 78 U/L      Total Protein 7 4 g/dL      Albumin 3 7 g/dL      Total Bilirubin 0 53 mg/dL      eGFR 66 ml/min/1 73sq m     Narrative:      Parish guidelines for Chronic Kidney Disease (CKD):     Stage 1 with normal or high GFR (GFR > 90 mL/min/1 73 square meters)    Stage 2 Mild CKD (GFR = 60-89 mL/min/1 73 square meters)    Stage 3A Moderate CKD (GFR = 45-59 mL/min/1 73 square meters)    Stage 3B Moderate CKD (GFR = 30-44 mL/min/1 73 square meters)    Stage 4 Severe CKD (GFR = 15-29 mL/min/1 73 square meters)    Stage 5 End Stage CKD (GFR <15 mL/min/1 73 square meters)  Note: GFR calculation is accurate only with a steady state creatinine    Lipase [[id number]]  (Normal) Collected: 01/27/22 2109    Lab Status: Final result Specimen: Blood from Arm, Right Updated: 01/27/22 2144     Lipase 108 u/L     UA w Reflex to Microscopic w Reflex to Culture [[id number]]  (Abnormal) Collected: 01/27/22 2111    Lab Status: Final result Specimen: Urine, Clean Catch Updated: 01/27/22 2129     Color, UA Light Yellow     Clarity, UA Slightly Cloudy     Specific Gravity, UA 1 025     pH, UA 5 5     Leukocytes, UA Negative     Nitrite, UA Negative     Protein, UA 30 (1+) mg/dl      Glucose,  (1/4%) mg/dl      Ketones, UA Negative mg/dl      Urobilinogen, UA 0 2 E U /dl      Bilirubin, UA Negative     Blood, UA Large    CBC and differential [[id number]] Collected: 01/27/22 2109    Lab Status: Final result Specimen: Blood from Arm, Right Updated: 01/27/22 2121     WBC 5 97 Thousand/uL      RBC 4 26 Million/uL      Hemoglobin 12 7 g/dL      Hematocrit 39 3 %      MCV 92 fL      MCH 29 8 pg      MCHC 32 3 g/dL      RDW 13 2 %      MPV 11 4 fL      Platelets 238 Thousands/uL      nRBC 0 /100 WBCs      Neutrophils Relative 68 %      Immat GRANS % 0 %      Lymphocytes Relative 20 %      Monocytes Relative 6 %      Eosinophils Relative 5 %      Basophils Relative 1 %      Neutrophils Absolute 4 01 Thousands/µL      Immature Grans Absolute 0 01 Thousand/uL      Lymphocytes Absolute 1 22 Thousands/µL      Monocytes Absolute 0 38 Thousand/µL      Eosinophils Absolute 0 31 Thousand/µL      Basophils Absolute 0 04 Thousands/µL                  CT renal stone study abdomen pelvis without contrast   Final Result by Abel Mathur DO (01/27 2253)      Moderate right-sided hydroureteronephrosis with a 3 mm obstructing stone in the right portion of the urinary bladder  Moderate right-sided perinephric stranding  Nonobstructing bilateral intrarenal calculi  Workstation performed: [id number]                    Procedures  Procedures         ED Course  ED Course as of 01/29/22 1237   Thu Jan 27, 2022   2311 Pt is pain free (states just before pain meds given)  3 mm stone at right UVJ, but may have just passed  Stable for d/c home - d/w [facility], will arrange f/u  SBIRT 20yo+      Most Recent Value   SBIRT (24 yo +)    In order to provide better care to our patients, we are screening all of our patients for alcohol and drug use  Would it be okay to ask you these screening questions? Yes Filed at: 01/27/2022 2201   Initial Alcohol Screen: US AUDIT-C     1  How often do you have a drink containing alcohol? 0 Filed at: 01/27/2022 2201   2  How many drinks containing alcohol do you have on a typical day you are drinking? 0 Filed at: 01/27/2022 2201   3b  FEMALE Any Age, or MALE 65+: How often do you have 4 or more drinks on one occassion? 0 Filed at: 01/27/2022 2201   Audit-C Score 0 Filed at: 01/27/2022 2201   ELROY: How many times in the past year have you    Used an illegal drug or used a prescription medication for non-medical reasons?  Never Filed at: 01/27/2022 2201                    [facility]  Number of Diagnoses or Management Options  Hydronephrosis with ureteral calculus: new and requires workup     Amount and/or Complexity of Data Reviewed  Clinical lab tests: ordered and reviewed  Tests in the radiology section of CPT®: ordered and reviewed  Decide to obtain previous medical records or to obtain history from someone other than the patient: yes  Discuss the patient with other providers: yes    Risk of Complications, Morbidity, and/or Mortality  Presenting problems: moderate  Diagnostic procedures: moderate  Management options: moderate    Patient Progress  Patient progress: stable      Disposition  Final diagnoses:   Hydronephrosis with ureteral calculus     Time reflects when diagnosis was documented in both MDM as applicable and the Disposition within this note     Time User Action Codes Description Comment    1/27/2022 11:15 PM Luis Hooper Add [N13 2] Hydronephrosis with ureteral calculus       ED Disposition     ED Disposition Condition Date/Time Comment    Discharge Stable Thu Jan 27, 2022 11:14 PM Ethelene Failing discharge to home/self care              Follow-up Information     Follow up With Specialties Details Why Contact Info Additional [address] [facility] In 3 days  [phone]  70  [facility] [place], [address] [place], [place], [address] [facility]          Discharge Medication List as of 1/27/2022 11:16 PM      CONTINUE these medications which have NOT CHANGED    Details   aspirin 81 MG tablet Take 81 mg by mouth daily, Historical Med      atorvastatin (LIPITOR) 10 mg tablet Take 1 tablet (10 mg total) by mouth daily, Starting Wed 1/12/2022, Normal      bisacodyl (DULCOLAX) 10 mg suppository Insert 1 suppository (10 mg total) into the rectum daily for 7 days, Starting Sat 5/1/2021, Until Sat 5/8/2021, Print      busPIRone (BUSPAR) 15 mg tablet Take 1 tablet (15 mg total) by mouth 3 (three) times a day, Starting Mon 1/3/2022, Normal      cholecalciferol (VITAMIN D3) 1,000 units tablet Take 1,000 Units by mouth daily, Historical Med      clonazePAM (KlonoPIN) 0 5 mg tablet Take 1 tablet (0 5 mg total) by mouth 3 (three) times a day, Starting Wed 2/2/2022, Until Sun 4/3/2022, Normal      docusate sodium (COLACE) 100 mg capsule Take 100 mg by mouth 2 (two) times a day, Historical Med      FLUoxetine (PROzac) 40 MG capsule Take 1 capsule (40 mg total) by mouth daily, Starting Mon 1/3/2022, Until Fri 3/4/2022, Normal      Linzess 145 MCG CAPS TAKE ONE CAPSULE BY MOUTH EVERY DAY, Normal      metFORMIN (GLUCOPHAGE) 500 mg tablet Take 1 tablet (500 mg total) by mouth 2 (two) times a day with meals, Starting Mon 12/27/2021, Normal      polyethylene glycol (MIRALAX) 17 g packet Take 17 g by mouth daily for 7 days, Starting Sat 5/1/2021, Until Mon 12/6/2021, Print      prazosin (MINIPRESS) 1 mg capsule Take 1 capsule (1 mg total) by mouth daily at bedtime, Starting Mon 1/3/2022, Until Fri 3/4/2022, Normal      ramipril (ALTACE) 10 MG capsule TAKE ONE CAPSULE BY MOUTH EVERY DAY, Normal             No discharge procedures on file      PDMP Review       Value Time User    PDMP Reviewed  Yes 12/6/2021  3:33 PM Atif Guzmán DO          ED Provider  Electronically Signed by           Molly Monroe DO  01/29/22 5054

## 2022-02-01 ENCOUNTER — OFFICE VISIT (OUTPATIENT)
Dept: PSYCHIATRY | Facility: CLINIC | Age: 82
End: 2022-02-01

## 2022-02-01 VITALS — DIASTOLIC BLOOD PRESSURE: 78 MMHG | SYSTOLIC BLOOD PRESSURE: 190 MMHG | WEIGHT: 131 LBS | BODY MASS INDEX: 29.37 KG/M2

## 2022-02-01 DIAGNOSIS — G31.84 MILD NEUROCOGNITIVE DISORDER: ICD-10-CM

## 2022-02-01 DIAGNOSIS — F45.21 ILLNESS ANXIETY DISORDER: Primary | ICD-10-CM

## 2022-02-01 DIAGNOSIS — F41.1 GENERALIZED ANXIETY DISORDER: ICD-10-CM

## 2022-02-01 DIAGNOSIS — F33.0 MAJOR DEPRESSIVE DISORDER, RECURRENT, MILD (HCC): ICD-10-CM

## 2022-02-01 RX ORDER — BUSPIRONE HYDROCHLORIDE 15 MG/1
15 TABLET ORAL 3 TIMES DAILY
Qty: 90 TABLET | Refills: 5 | Status: SHIPPED | OUTPATIENT
Start: 2022-02-01 | End: 2022-06-15 | Stop reason: SDUPTHER

## 2022-02-01 RX ORDER — FLUOXETINE HYDROCHLORIDE 60 MG/1
60 TABLET, FILM COATED ORAL; ORAL DAILY
Qty: 30 TABLET | Refills: 1 | Status: SHIPPED | OUTPATIENT
Start: 2022-02-01 | End: 2022-02-09

## 2022-02-01 NOTE — PSYCH
MEDICATION MANAGEMENT NOTE        [facility]      Name and Date of Birth:  Idalia Ink 78 y o  1940    Date of Visit: February 1, 2022    SUBJECTIVE:  CC: Sofy Gunderson presents today for follow up with MDD, recurrent, moderate, JONAH, Somatic symptom disorder, and nightmares  States that she is anxious in regards to having a neurology follow up for a kidney stone she had this week  Blood pressure elevated in office  Indicates that she feels depressed and has negative thoughts about her health  Says she is "panicking now for 2 weeks from now and will continue to until then " Looks at her body everyday  Daughter says her health anxiety is longstanding with extreme reactions to symptoms and is never sure when something else is wrong  Says she gets thorough workups but they have all been negative to this person, yet patient persists with anxiety about her health with significant somatic symptoms  Reports that she is angry with her physician currently because she missed her last appointment and reportedly "lost her mind" about this per daughter  States this reaction was disproportionate as it was just for a check up  Worrying excessively about her constipation  Daughter indicates that she needs reassure from him continuously  Does report worry about multiple other things in her life including her grandchildren who has IEP but is doing well in school despite that  Also constantly worrying about son who is an alcoholic  Sleep is unchanged and she states she did not take her Prazosin because she was worried about the syncope  Continues to have dreams of being lost or confused  Forgets words when she is trying to talk and is repeating stories multiple times  Klonopin use is now steady and consistent  Appetite is WNL and diet is under decent control  Physical activity is unchanged   Therapy with Dr Jack Thomas continue which is of good benefit to her, however her anxiety continues despite the therapy  Rosangela Ryder denies any side effects from medications unless noted above    Since our last visit, overall symptoms have been unchanged  HPI ROS:     PHQ-9 Depression Screening    Little interest or pleasure in doing things: 1 - several days  Feeling down, depressed, or hopeless: 3 - nearly every day  Trouble falling or staying asleep, or sleeping too much: 1 - several days  Feeling tired or having little energy: 2 - more than half the days  Poor appetite or overeatin - not at all  Feeling bad about yourself - or that you are a failure or have let yourself or your family down: 1 - several days  Trouble concentrating on things, such as reading the newspaper or watching television: 1 - several days  Moving or speaking so slowly that other people could have noticed  Or the opposite - being so fidgety or restless that you have been moving around a lot more than usual: 0 - not at all  Thoughts that you would be better off dead, or of hurting yourself in some way: 0 - not at all  PHQ-9 Score: 9  Score Interpretation: Mild depression         PHQ-2/9 Depression Screening    Little interest or pleasure in doing things: 1 - several days  Feeling down, depressed, or hopeless: 3 - nearly every day  Trouble falling or staying asleep, or sleeping too much: 1 - several days  Feeling tired or having little energy: 2 - more than half the days  Poor appetite or overeatin - not at all  Feeling bad about yourself - or that you are a failure or have let yourself or your family down: 1 - several days  Trouble concentrating on things, such as reading the newspaper or watching television: 1 - several days  Moving or speaking so slowly that other people could have noticed   Or the opposite - being so fidgety or restless that you have been moving around a lot more than usual: 0 - not at all  Thoughts that you would be better off dead, or of hurting yourself in some way: 0 - not at all  PHQ-9 Score: 9 PHQ-9 Interpretation: Mild depression            JONAH-7 Flowsheet Screening      Most Recent Value   Over the last 2 weeks, how often have you been bothered by any of the following problems? Feeling nervous, anxious, or on edge 3   Not being able to stop or control worrying 3   Worrying too much about different things 3   Trouble relaxing 3   Being so restless that it is hard to sit still 2   Becoming easily annoyed or irritable 2   Feeling afraid as if something awful might happen 3   JONAH-7 Total Score 19        HPI ROS:             ('was' notes: recent => remote)  Medication Side Effects:  Denied  Denied   Depression (10 worst): 9/10 (Was 7/7)   Anxiety (10 worst): 9/10 (Was 7/7)   Safety concerns (SI, HI, etc): Denies SI/HI/AVH (Was Denied SI/HI/AVH)   Sleep: (NM = Nightmares) Unchanged (8-9 hours interrupted) (Was Somewhat reduced due to nightmares and awakening)   Energy: Fluctuating based on activity level (Was Reduced)   Appetite: WNL (Was WNL with 3 full meals/day)   Weight Change: Gained 3 lbs in a month None endorsed     Review Of Systems as noted above  In addition:     Constitutional recent weight gain (3 lbs)   ENT itchy scalp   Cardiovascular negative   Respiratory negative   Gastrointestinal negative   Genitourinary negative   Musculoskeletal negative   Integumentary negative   Neurological negative   Endocrine negative   Other Symptoms none     Pain none   Pain Scale 0     History Review:  The following portions of the patient's history were reviewed and updated as appropriate: allergies, current medications, past family history, past medical history, past social history, past surgical history and problem list      Lab Review: Labs were reviewed and discussed with patient      OBJECTIVE:     MENTAL STATUS EXAM  Appearance:  age appropriate, dressed casually, overweight   Behavior:  Pleasant & cooperative, guarded, psychomotor agitation   Speech:  soft, paucity of speech   Mood:  "I just panic until I follow up for stuff "   Affect:  mood congruent, dysphoric, depressed, constricted, anxious   Language: intact and appropriate for age, education, and intellect   Thought Process:  circumstantial, perserverative   Associations: intact associations   Thought Content:  negative thinking and cognitive distortions, negative ruminations   Perceptual Disturbances: no auditory or visual hallcunations   Risk Potential / Abnormal Thoughts: Suicidal ideation - None  Homicidal ideation - None  Potential for aggression - No       Consciousness:  Alert & Awake   Sensorium:  Grossly oriented   Attention: attention span and concentration appear shorter than expected for age       Fund of Knowledge:  Memory: awareness of current events: yes  past history: yes  vocabulary: yes  short term memory mildly impaired, long term memory grossly intact   Insight:  fair   Judgment: fair   Muscle Strength Muscle Tone: normal  normal   Gait/Station: normal gait/station with good balance   Motor Activity: no abnormal movements       Risks, Benefits And Possible Side Effects Of Medications:    AGREE: Risks, benefits, and possible side effects of medications explained to Sofy Gunderson and she (or legal representative) verbalizes understanding and agreement for treatment  Controlled Medication Discussion:     Patient using medication appropriately  Sofy Lindsayfelipe has been filling controlled prescriptions on time as prescribed according to [facility]    Discussed with Sofy Gunderson the risks of sedation, respiratory depression, impairment of ability to drive and potential for abuse and addiction related to treatment with benzodiazepine medications  She understands risk of treatment with benzodiazepine medications, agrees to not drive if feels impaired and agrees to take medications as prescribed    Discussed with Dheeraj Dwyer warning on concurrent use of benzodiazepines and opioid medications including sedation, respiratory depression, coma and death  She understands the risk of treatment with benzodiazepines in addition to opioids and wants to continue taking those medications  ______________________________________________________________      Recent labs:   Admission on 01/27/2022, Discharged on 01/27/2022   Component Date Value    WBC 01/27/2022 5 97     RBC 01/27/2022 4 26     Hemoglobin 01/27/2022 12 7     Hematocrit 01/27/2022 39 3     MCV 01/27/2022 92     MCH 01/27/2022 29 8     MCHC 01/27/2022 32 3     RDW 01/27/2022 13 2     MPV 01/27/2022 11 4     Platelets 77/61/9835 191     nRBC 01/27/2022 0     Neutrophils Relative 01/27/2022 68     Immat GRANS % 01/27/2022 0     Lymphocytes Relative 01/27/2022 20     Monocytes Relative 01/27/2022 6     Eosinophils Relative 01/27/2022 5     Basophils Relative 01/27/2022 1     Neutrophils Absolute 01/27/2022 4 01     Immature Grans Absolute 01/27/2022 0 01     Lymphocytes Absolute 01/27/2022 1 22     Monocytes Absolute 01/27/2022 0 38     Eosinophils Absolute 01/27/2022 0 31     Basophils Absolute 01/27/2022 0 04     Sodium 01/27/2022 137     Potassium 01/27/2022 4 8     Chloride 01/27/2022 103     CO2 01/27/2022 24     ANION GAP 01/27/2022 10     BUN 01/27/2022 22     Creatinine 01/27/2022 0 82     Glucose 01/27/2022 250*    Calcium 01/27/2022 8 9     AST 01/27/2022 42     ALT 01/27/2022 31     Alkaline Phosphatase 01/27/2022 78     Total Protein 01/27/2022 7 4     Albumin 01/27/2022 3 7     Total Bilirubin 01/27/2022 0 53     eGFR 01/27/2022 66     Lipase 01/27/2022 108     Color, UA 01/27/2022 Light Yellow     Clarity, UA 01/27/2022 Slightly Cloudy     Specific Gravity, UA 01/27/2022 1 025     pH, UA 01/27/2022 5 5     Leukocytes, UA 01/27/2022 Negative     Nitrite, UA 01/27/2022 Negative     Protein, UA 01/27/2022 30 (1+)*    Glucose, UA 01/27/2022 250 (1/4%)*    Ketones, UA 01/27/2022 Negative     Urobilinogen, UA 2022 0 2     Bilirubin, UA 2022 Negative     Blood, UA 2022 Large*    LACTIC ACID 2022 2 1*    RBC, UA 2022 30-50*    WBC, UA 2022 0-1     Epithelial Cells 2022 None Seen     Bacteria, UA 2022 None Seen          Past Psychiatric History  Previous diagnoses include: Depression, anxiety, and OCD  Prior outpatient psychiatric treatment:  Saw psychiatrist in her 35s for a period of 10 years  Prior therapy:  Sees therapist Dr Marielle Anguiano the past 2-3 years  Prior inpatient psychiatric treatment:  1 time for a period of 3 weeks at [facility] in 2013 for panic attacks post surgery  Prior suicide attempts:  Denies  Prior self harm:  Denies  Prior violence or aggression:  Denies     Previous psychotropic medication trials:      Antidepressants:  Effexor, Zoloft, Prozac, and Lexapro     Mood Stabilizers:  Denies     Anxiolytics:  BuSpar and Klonopin     Typical antipsychotics:  Denies     Atypical antipsychotics:  Denies     Hypnotics/sleep aids:  Denies     Social History:     The patient grew up [address] was described as "I scardy cat"      During childhood, parents were together but father cheated on her mother with another girl  They have 2 sister(s) and 2 brother(s)   Only 1 brother is still living but she has good relationship with him       Abuse/neglect: none     As far as the patient (or present family member) is aware, overall childhood development: Patient does ascribe to normal developmental including childhood as well as regular educational course overall (eg no IEP, special education)      Education level:  Bachelor of arts   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   Supports herself on pension and social security    Marital status:   since  when   from cancer  Children:  1 daughter whom she lives with and 1 son who lives in [address] whom she has good relationship with  Current Living Situation: the patient currently lives with daughter, son-in-law, and 3 grandkids in [place], [facility] Priscilla Mata, daughter, son-in-law, grandkids, her ghanshyam, and her friend Edmundo Reddy  Access to Guns:  Denies     Substance use and treatment:  Tobacco use:  Denies  Caffeine Use:  1 cup of decaf in the morning  ETOH use:  Denies  Other substance use:  Denies     Endorses previous experimentation with:  Denies     Longest clean time: not applicable  History of Inpatient/Outpatient rehabilitation program: no        Traumatic History:      Abuse: none  Other Traumatic Events: none      Family Psychiatric History:      Psychiatric Illness:      Mother - depression, Sister - depression and anxiety disorder, Sister - depression and anxiety disorder  Substance Abuse:       Son - alcohol abuse  Suicide Attempts:        no family history of suicide attempts    Family Psychiatric History:   Family History   Problem Relation Age of Onset    Depression Mother         w/anxiety    Diabetes Mother         Mellitus    Breast cancer Mother     Hypertension Mother     No Known Problems Father     Depression Sister         w/anxiety    Heart disease Sister         Cardiac Disorder    Breast cancer Sister     Hypertension Sister     Diabetes Brother         Mellitus    Alcohol abuse Son          Medical / Surgical History:    Past Medical History:   Diagnosis Date    Colon polyps     Diabetes [facility]     Dizziness     Last assessed: 8/31/15    DVT [facility]deep venous thrombosis) ([facility]     Dysuria     Hyperlipidemia     Hypertension     Hypertensive urgency 10/22/2021    Insomnia     Ureterolithiasis 2020     Past Surgical History:   Procedure Laterality Date     SECTION      1964 son, 26 daughter    COLONOSCOPY      FL RETROGRADE PYELOGRAM  2020    TN COLONOSCOPY FLX DX W/COLLJ SPEC WHEN PFRMD N/A 3/27/2017    Procedure: COLONOSCOPY;  Surgeon: Savanna Bell MD;  [facility]: [facility]; Service: Gastroenterology    IA CYSTO/URETERO W/LITHOTRIPSY &INDWELL STENT INSRT Right 5/22/2020    Procedure: CYSTOSCOPY URETEROSCOPY WITH LITHOTRIPSY HOLMIUM LASER, RETROGRADE PYELOGRAM AND INSERTION STENT URETERAL; EXCISION OF BLADDER TUMOR;  Surgeon: Brock Machuca MD;  Location: [facility];  Service: Urology    ROTATOR CUFF REPAIR Left     Last assessed: 3/29/15    TONSILLECTOMY         Assessment/Plan:        Diagnoses and all orders for this visit:    Illness anxiety disorder  -     FLUoxetine (PROzac) 60 MG TABS; Take 1 tablet (60 mg total) by mouth daily    Major depressive disorder, recurrent, mild (HCC)  -     FLUoxetine (PROzac) 60 MG TABS; Take 1 tablet (60 mg total) by mouth daily    Generalized anxiety disorder  -     FLUoxetine (PROzac) 60 MG TABS; Take 1 tablet (60 mg total) by mouth daily  -     busPIRone (BUSPAR) 15 mg tablet; Take 1 tablet (15 mg total) by mouth 3 (three) times a day    Mild neurocognitive disorder        ______________________________________________________________________    Additional Assessment:  Patient shows very high level of anxiety increased since last time which is centrally focused on her physical health but does generalized other areas of her life  Depression is noticeably decreased on objective questioning which indicates that Prozac may have started working to a degree  Does report having fall out with PCP recently after not arriving to appointment on time and being denied access to the appointment  Daughter reports excessive preoccupation with illnesses and believing she will become sick  Spending excessive time checking her body and worrying about her physical health  Patient and daughter both state that when she has a follow-up for something she worries about it until she is able to follow up    This is causing moderate level of dysfunction in the family and her relationship with others  Endorse some troubles with memory and patient received screening via slums which resulted at 23/30 indicating mild neurocognitive impairment  It is unclear how much of this may be due to her current treatment with Klonopin which she is receiving 3 times a day and could be impacting her memory at her current age  Nevertheless, patient understands the risks associated with using this medication but believes treatment of her anxiety currently outweighs the benefits of discontinuing Klonopin  Did explain to patient that this medication should be weaned off after she has been stabilized on Prozac to which she voiced understanding and agreement with  Continue to encourage patient to increase physical activity, monitor diet, increase peer socialization  Blood pressure was elevated in office today and she was encouraged to follow up with her primary care provider for possible titration is indicated  This provider will reach out to PCP to inform him of blood pressure elevation in multiple settings including EGD yesterday  It is likely the patient's anxiety is higher today after having kidney stone removed/past earlier this week  Will increase patient's Prozac dose and plan to titrate the max dose at calming appointment for illness anxiety disorder which does seem to be a better diagnosis and somatic symptom disorder as her physical complaints are mild and anxiety about them is disproportionate  Will continue BuSpar for daytime anxiety coverage  Patient has not been taking her prazosin and reports nightmares continue  Instructed patient to inform provider if she makes any medication changes in the future to which she voiced understanding and agreement with  We will continue to trial prazosin and daughter plans to monitor her    Patient was unwilling to try this medication previously due to being concerned with fall risk and was reassured about this and just needing close monitoring by daughter which she will have  Scales:  PHQ-9 was 19, now 9    JONAH-7 was 16, now 19         Treatment Plan:        Patient has been educated about their diagnosis and treatment modalities  They voiced understanding and agreement with the following plan:    Discussed medications and if treatment adjustment was needed/desired  1) MEDS:           - Increase Prozac to 60 mg PO QD for mood and anxiety  PARQ completed including serotonin syndrome, SIADH, worsening depression, suicidality, induction of eligio, GI upset, headaches, activation, sexual side effects, sedation, potential drug interactions, and others    - Continue Klonopin 0 5 mg PO TID for breakthrough anxiety as a bridge and wean off in the future  Discussed with patient the risks of sedation, respiratory depression, impairment in judgment and motor function  Depression, mood changes, GI changes, and others discussed  Patient was also informed of risks of being on or starting opioid medications due to drug interactions and potential for serious respiratory depression and death     - Continue Buspar 15 mg PO TID for anxiety  PARQ completed including serotonin syndrome, rare TD/EPS, dizziness, sedation, GI distress, confusion, possible mood changes, xerostomia and visual disturbances  - Continue/start Prazosin 1 mg PO QHS for nightmares  PARQ completed including dizziness, sedation, headache, blood pressure changes (including orthostatic hypotension and syncope), medication interaction risk, and GI distress     2) Labs:  - 10/24/21: BMP unremarkable  - 10/23/21: CBC and lipid panel unremarkable  A1c elevated at 6 6   - 6/14/21: TSH wnl at 2 57  -12/15/21: A1c was 6 5 and controlled and TSH wnl at 1 36     3) Therapy:    - Continue with own therapist Dr Lisseth Mock     4) Medical:   - Pt will f/u with other providers as needed   - On Vitamin D supplement  - SLUMS: 23/30 showing mild neurocognitive impairment   Of note, it is unclear how much BZDs could be impacting these scores  - F/u with PCP for blood pressure medication adjustments as indicated     5) Other: Support as needed   - encouraged increase in physical activity including walking and low impact exercises/stretching   - recommended improvement of diet with low carbohydrates and high fiber   - recommended improvement in sleep hygiene and avoiding electronics at night     6) Follow up:   - Follow up in 1 month for medication management    - Patient will call if issues or concerns      7) Treatment Plan:    - Enacted 12/06/2021     Discussed self monitoring of symptoms, and symptom monitoring tools  Patient has been informed of 24 hours and weekend coverage for urgent situations accessed by calling the main clinic phone number               Psychotherapy in session:  Time spent performing psychotherapy: 16 Minutes

## 2022-02-02 ENCOUNTER — TELEPHONE (OUTPATIENT)
Dept: FAMILY MEDICINE CLINIC | Facility: CLINIC | Age: 82
End: 2022-02-02

## 2022-02-02 NOTE — TELEPHONE ENCOUNTER
Please contact the patient's daughter about scheduling an office follow-up visit  Received message from psychiatrist that her blood pressure was elevated while she was at his office  Patient's blood pressure was also elevated while she was in the emergency room due to flank pain in the process of passing a kidney stone  Most human beings would have elevated blood pressure when passing a kidney stone  Not the most fun thing in the world  Blood pressure should have normalized by the time she was being seen by psychiatrist   Thank you    ----- Message from Ivonne Green DO sent at 2/1/2022  1:00 PM EST -----  Regarding: Blood pressure  Hello there, just wanted to inform you that your patient had elevated blood pressure of 190/78 today during our office visit  She states it was elevated in ED last week as well  She might need an increase of her blood pressure medications but that of course is up to your clinical judgement  Thank you

## 2022-02-03 ENCOUNTER — OFFICE VISIT (OUTPATIENT)
Dept: FAMILY MEDICINE CLINIC | Facility: CLINIC | Age: 82
End: 2022-02-03
Payer: MEDICARE

## 2022-02-03 VITALS
HEART RATE: 78 BPM | SYSTOLIC BLOOD PRESSURE: 154 MMHG | WEIGHT: 128 LBS | OXYGEN SATURATION: 97 % | DIASTOLIC BLOOD PRESSURE: 82 MMHG | HEIGHT: 56 IN | RESPIRATION RATE: 16 BRPM | BODY MASS INDEX: 28.79 KG/M2

## 2022-02-03 DIAGNOSIS — N20.0 NEPHROLITHIASIS: ICD-10-CM

## 2022-02-03 DIAGNOSIS — F41.1 GENERALIZED ANXIETY DISORDER: ICD-10-CM

## 2022-02-03 DIAGNOSIS — I10 ESSENTIAL HYPERTENSION, BENIGN: ICD-10-CM

## 2022-02-03 DIAGNOSIS — I10 BENIGN ESSENTIAL HYPERTENSION: Primary | ICD-10-CM

## 2022-02-03 PROCEDURE — 99214 OFFICE O/P EST MOD 30 MIN: CPT | Performed by: FAMILY MEDICINE

## 2022-02-03 RX ORDER — LISINOPRIL AND HYDROCHLOROTHIAZIDE 12.5; 1 MG/1; MG/1
1 TABLET ORAL DAILY
Qty: 90 TABLET | Refills: 1 | Status: SHIPPED | OUTPATIENT
Start: 2022-02-03 | End: 2022-08-01

## 2022-02-03 NOTE — PROGRESS NOTES
Subjective:      Patient ID: Librado Duckworth is a 78 y o  female  24-year-old female with past medical history of benign essential hypertension, generalized anxiety disorder which at times can be severe presents for re-evaluation of hypertension  Patient was previously noted to be well controlled on ramipril 10 mg once daily  Psychiatrist has been making adjustments to her psychiatric medications  She has been gradually weaned off of Effexor and is being titrated upwards on Prozac  She remains on t i d  Klonopin as well as BuSpar  Patient does not always do well with change  Patient was seen in ER after developing flank pain  Diagnosed with nephrolithiasis and a 3 mm obstructing kidney stone with nephrolithiasis  Patient did passed a kidney stone while in the ER and is now scheduled for follow-up with Urology  She does drink adequate amounts of water  Urinating normally  She does have some anxiety about upcoming Urology appointment    She is still seeing psychiatrist as well as therapist      Past Medical History:   Diagnosis Date    Colon polyps     Diabetes mellitus ([facility] [address] )     Dizziness     Last assessed: 8/31/15    DVT (deep venous thrombosis) ([facility] [address] [facility]     Dysuria     Hyperlipidemia     Hypertension     Hypertensive urgency 10/22/2021    Insomnia     Ureterolithiasis 2020       Family History   Problem Relation Age of Onset    Depression Mother         w/anxiety    Diabetes Mother         Mellitus    Breast cancer Mother     Hypertension Mother     No Known Problems Father     Depression Sister         w/anxiety    Heart disease Sister         Cardiac Disorder    Breast cancer Sister     Hypertension Sister     Diabetes Brother         Mellitus    Alcohol abuse Son        Past Surgical History:   Procedure Laterality Date     SECTION       son, 26 daughter    COLONOSCOPY      FL RETROGRADE PYELOGRAM  2020    UT COLONOSCOPY FLX DX W/COLLJ SPEC WHEN PFRMD N/A 3/27/2017    Procedure: COLONOSCOPY;  Surgeon: Gisselle Pierce MD;  [facility]: [facility]; Service: Gastroenterology    WV CYSTO/URETERO W/LITHOTRIPSY &INDWELL STENT INSRT Right 5/22/2020    Procedure: CYSTOSCOPY URETEROSCOPY WITH LITHOTRIPSY HOLMIUM LASER, RETROGRADE PYELOGRAM AND INSERTION STENT URETERAL; EXCISION OF BLADDER TUMOR;  Surgeon: Dylan Loo MD;  Location: [facility];  Service: Urology    ROTATOR CUFF REPAIR Left     Last assessed: 3/29/15    TONSILLECTOMY          reports that she has never smoked  She has never used smokeless tobacco  She reports that she does not drink alcohol and does not use drugs        Current Outpatient Medications:     aspirin 81 MG tablet, Take 81 mg by mouth daily, Disp: , Rfl:     atorvastatin (LIPITOR) 10 mg tablet, Take 1 tablet (10 mg total) by mouth daily, Disp: 90 tablet, Rfl: 0    busPIRone (BUSPAR) 15 mg tablet, Take 1 tablet (15 mg total) by mouth 3 (three) times a day, Disp: 90 tablet, Rfl: 5    cholecalciferol (VITAMIN D3) 1,000 units tablet, Take 1,000 Units by mouth daily, Disp: , Rfl:     clonazePAM (KlonoPIN) 0 5 mg tablet, Take 1 tablet (0 5 mg total) by mouth 3 (three) times a day, Disp: 90 tablet, Rfl: 1    docusate sodium (COLACE) 100 mg capsule, Take 100 mg by mouth 2 (two) times a day, Disp: , Rfl:     FLUoxetine (PROzac) 60 MG TABS, Take 1 tablet (60 mg total) by mouth daily, Disp: 30 tablet, Rfl: 1    Linzess 145 MCG CAPS, TAKE ONE CAPSULE BY MOUTH EVERY DAY, Disp: 90 capsule, Rfl: 1    metFORMIN (GLUCOPHAGE) 500 mg tablet, Take 1 tablet (500 mg total) by mouth 2 (two) times a day with meals, Disp: 180 tablet, Rfl: 0    prazosin (MINIPRESS) 1 mg capsule, Take 1 capsule (1 mg total) by mouth daily at bedtime, Disp: 30 capsule, Rfl: 1    bisacodyl (DULCOLAX) 10 mg suppository, Insert 1 suppository (10 mg total) into the rectum daily for 7 days, Disp: 7 suppository, Rfl: 0    lisinopril-hydrochlorothiazide (PRINZIDE,ZESTORETIC) 10-12 5 MG per tablet, Take 1 tablet by mouth daily, Disp: 90 tablet, Rfl: 1    polyethylene glycol (MIRALAX) 17 g packet, Take 17 g by mouth daily for 7 days, Disp: 119 g, Rfl: 0    The following portions of the patient's history were reviewed and updated as appropriate: allergies, current medications, past family history, past medical history, past social history, past surgical history and problem list     Review of Systems   Constitutional: Negative  Eyes: Negative  Negative for visual disturbance  Cardiovascular: Positive for leg swelling (Mild lower extremity swelling)  Psychiatric/Behavioral: Negative for agitation, confusion and sleep disturbance  The patient is nervous/anxious  Objective:    /82   Pulse 78   Resp 16   Ht 4' 8" (1 422 m)   Wt 58 1 kg (128 lb)   SpO2 97%   BMI 28 70 kg/m²      Physical Exam  Vitals and nursing note reviewed  Constitutional:       General: She is not in acute distress  Appearance: Normal appearance  She is normal weight  She is not ill-appearing  Cardiovascular:      Rate and Rhythm: Normal rate and regular rhythm  Pulses: Normal pulses  Heart sounds: Normal heart sounds  Musculoskeletal:         General: No swelling or tenderness  Right lower leg: Edema (Trace) present  Left lower leg: Edema ( trace) present  Neurological:      General: No focal deficit present  Mental Status: She is alert and oriented to person, place, and time  Mental status is at baseline  Cranial Nerves: No cranial nerve deficit  Psychiatric:         Mood and Affect: Mood normal          Behavior: Behavior normal          Thought Content:  Thought content normal          Judgment: Judgment normal            Recent Results (from the past 1008 hour(s))   CBC and differential    Collection Time: 01/27/22  9:09 PM   Result Value Ref Range    WBC 5 97 4 31 - 10 16 Thousand/uL    RBC 4 26 3 81 - 5 12 Million/uL    Hemoglobin 12 7 11 5 - 15 4 g/dL    Hematocrit 39 3 34 8 - 46 1 %    MCV 92 82 - 98 fL    MCH 29 8 26 8 - 34 3 pg    MCHC 32 3 31 4 - 37 4 g/dL    RDW 13 2 11 6 - 15 1 %    MPV 11 4 8 9 - 12 7 fL    Platelets 477 354 - 182 Thousands/uL    nRBC 0 /100 WBCs    Neutrophils Relative 68 43 - 75 %    Immat GRANS % 0 0 - 2 %    Lymphocytes Relative 20 14 - 44 %    Monocytes Relative 6 4 - 12 %    Eosinophils Relative 5 0 - 6 %    Basophils Relative 1 0 - 1 %    Neutrophils Absolute 4 01 1 85 - 7 62 Thousands/µL    Immature Grans Absolute 0 01 0 00 - 0 20 Thousand/uL    Lymphocytes Absolute 1 22 0 60 - 4 47 Thousands/µL    Monocytes Absolute 0 38 0 17 - 1 22 Thousand/µL    Eosinophils Absolute 0 31 0 00 - 0 61 Thousand/µL    Basophils Absolute 0 04 0 00 - 0 10 Thousands/µL   CMP    Collection Time: 01/27/22  9:09 PM   Result Value Ref Range    Sodium 137 136 - 145 mmol/L    Potassium 4 8 3 5 - 5 3 mmol/L    Chloride 103 100 - 108 mmol/L    CO2 24 21 - 32 mmol/L    ANION GAP 10 4 - 13 mmol/L    BUN 22 5 - 25 mg/dL    Creatinine 0 82 0 60 - 1 30 mg/dL    Glucose 250 (H) 65 - 140 mg/dL    Calcium 8 9 8 3 - 10 1 mg/dL    AST 42 5 - 45 U/L    ALT 31 12 - 78 U/L    Alkaline Phosphatase 78 46 - 116 U/L    Total Protein 7 4 6 4 - 8 2 g/dL    Albumin 3 7 3 5 - 5 0 g/dL    Total Bilirubin 0 53 0 20 - 1 00 mg/dL    eGFR 66 ml/min/1 73sq m   Lipase    Collection Time: 01/27/22  9:09 PM   Result Value Ref Range    Lipase 108 73 - 393 u/L   Lactic acid    Collection Time: 01/27/22  9:09 PM   Result Value Ref Range    LACTIC ACID 2 1 (HH) 0 5 - 2 0 mmol/L   UA w Reflex to Microscopic w Reflex to Culture    Collection Time: 01/27/22  9:11 PM    Specimen: Urine, Clean Catch   Result Value Ref Range    Color, UA Light Yellow     Clarity, UA Slightly Cloudy     Specific [place], UA 1 025 1 003 - 1 030    pH, UA 5 5 4 5, 5 0, 5 5, 6 0, 6 5, 7 0, 7 5, 8 0    Leukocytes, UA Negative Negative    Nitrite, UA Negative Negative    Protein, UA 30 (1+) (A) Negative mg/dl Glucose,  (1/4%) (A) Negative mg/dl    Ketones, UA Negative Negative mg/dl    Urobilinogen, UA 0 2 0 2, 1 0 E U /dl E U /dl    Bilirubin, UA Negative Negative    Blood, UA Large (A) Negative   Urine Microscopic    Collection Time: 01/27/22  9:11 PM   Result Value Ref Range    RBC, UA 30-50 (A) None Seen, 0-1, 1-2, 2-4, 0-5 /hpf    WBC, UA 0-1 None Seen, 0-1, 1-2, 0-5, 2-4 /hpf    Epithelial Cells None Seen None Seen, Occasional /hpf    Bacteria, UA None Seen None Seen, Occasional /hpf       Assessment/Plan:    Benign essential hypertension  Patient had been previously well controlled on lisinopril 10 mg once daily alone  She is also on prazosin which is prescribed by psychiatrist 1 mg at bedtime  Blood pressure is slightly more elevated  Likely because of some of the changes in her medications as well as recent kidney stone she is developing some worsening of her anxiety which is not quite under control just yet    -since the patient did have a kidney stone and her blood pressure is elevated, I will place her on her low-dose hydrochlorothiazide in combination with her lisinopril  If she does have tendency for calcium oxalate kidney stones then this would help to prevent stone formation and will also lower her blood pressure     -they do have blood pressure cuff at home and her daughter will periodically check her blood pressure readings and she will also have blood pressure checks done while at specialists office and with me in follow-up    Nephrolithiasis  3 mm kidney stone that caused hydronephrosis  Patient is scheduled to see Urology in follow-up with a renal ultrasound as well     -make certain the patient is drinking adequate amount of water  Will also place patient on thiazide diuretic    She did not catch her stone so we do not know what the composition of her stone was but since the vast majority of kidney stones 10 to be calcium based thiazide diuretic will help with prevention    Generalized anxiety disorder  Still has her generalized anxiety disorder  Appreciate psychiatrist help in making adjustments to her medication  Daughter has to  increased dose of fluoxetine so that she is taking 60 mg once daily  Follow-up with psychiatrist as scheduled          Problem List Items Addressed This Visit        Cardiovascular and Mediastinum    Benign essential hypertension - Primary     Patient had been previously well controlled on lisinopril 10 mg once daily alone  She is also on prazosin which is prescribed by psychiatrist 1 mg at bedtime  Blood pressure is slightly more elevated  Likely because of some of the changes in her medications as well as recent kidney stone she is developing some worsening of her anxiety which is not quite under control just yet    -since the patient did have a kidney stone and her blood pressure is elevated, I will place her on her low-dose hydrochlorothiazide in combination with her lisinopril  If she does have tendency for calcium oxalate kidney stones then this would help to prevent stone formation and will also lower her blood pressure     -they do have blood pressure cuff at home and her daughter will periodically check her blood pressure readings and she will also have blood pressure checks done while at specialists office and with me in follow-up         Relevant Medications    lisinopril-hydrochlorothiazide (PRINZIDE,ZESTORETIC) 10-12 5 MG per tablet       Genitourinary    Nephrolithiasis     3 mm kidney stone that caused hydronephrosis  Patient is scheduled to see Urology in follow-up with a renal ultrasound as well     -make certain the patient is drinking adequate amount of water  Will also place patient on thiazide diuretic    She did not catch her stone so we do not know what the composition of her stone was but since the vast majority of kidney stones 10 to be calcium based thiazide diuretic will help with prevention            Other    Generalized anxiety disorder     Still has her generalized anxiety disorder  Appreciate psychiatrist help in making adjustments to her medication  Daughter has to  increased dose of fluoxetine so that she is taking 60 mg once daily    Follow-up with psychiatrist as scheduled           Other Visit Diagnoses     Essential hypertension, benign        Relevant Medications    lisinopril-hydrochlorothiazide (PRINZIDE,ZESTORETIC) 10-12 5 MG per tablet

## 2022-02-03 NOTE — ASSESSMENT & PLAN NOTE
3 mm kidney stone that caused hydronephrosis  Patient is scheduled to see Urology in follow-up with a renal ultrasound as well     -make certain the patient is drinking adequate amount of water  Will also place patient on thiazide diuretic    She did not catch her stone so we do not know what the composition of her stone was but since the vast majority of kidney stones 10 to be calcium based thiazide diuretic will help with prevention

## 2022-02-03 NOTE — ASSESSMENT & PLAN NOTE
Still has her generalized anxiety disorder  Appreciate psychiatrist help in making adjustments to her medication  Daughter has to  increased dose of fluoxetine so that she is taking 60 mg once daily    Follow-up with psychiatrist as scheduled

## 2022-02-03 NOTE — ASSESSMENT & PLAN NOTE
Patient had been previously well controlled on lisinopril 10 mg once daily alone  She is also on prazosin which is prescribed by psychiatrist 1 mg at bedtime  Blood pressure is slightly more elevated  Likely because of some of the changes in her medications as well as recent kidney stone she is developing some worsening of her anxiety which is not quite under control just yet    -since the patient did have a kidney stone and her blood pressure is elevated, I will place her on her low-dose hydrochlorothiazide in combination with her lisinopril    If she does have tendency for calcium oxalate kidney stones then this would help to prevent stone formation and will also lower her blood pressure     -they do have blood pressure cuff at home and her daughter will periodically check her blood pressure readings and she will also have blood pressure checks done while at specialists office and with me in follow-up

## 2022-02-09 ENCOUNTER — TELEPHONE (OUTPATIENT)
Dept: PSYCHIATRY | Facility: CLINIC | Age: 82
End: 2022-02-09

## 2022-02-09 RX ORDER — FLUOXETINE HYDROCHLORIDE 20 MG/1
60 CAPSULE ORAL DAILY
Qty: 90 CAPSULE | Refills: 1 | Status: SHIPPED | OUTPATIENT
Start: 2022-02-09 | End: 2022-03-25 | Stop reason: SDUPTHER

## 2022-02-09 NOTE — TELEPHONE ENCOUNTER
Patients daughter called and stated that medicare will not cover prozac 60mg tablets  The pharmacist informed her that medicare will cover prozac capsules equaling 60mg  Could you please send new rx to [facility]?

## 2022-02-21 ENCOUNTER — TELEPHONE (OUTPATIENT)
Dept: PSYCHIATRY | Facility: CLINIC | Age: 82
End: 2022-02-21

## 2022-02-21 NOTE — TELEPHONE ENCOUNTER
Pt's daughter called  Wants to know if pt can skip the middle of the day Clonazepam  Two reasons  You increased one of her medications and mom is groggy

## 2022-03-17 DIAGNOSIS — K59.04 CHRONIC IDIOPATHIC CONSTIPATION: ICD-10-CM

## 2022-03-17 RX ORDER — LINACLOTIDE 145 UG/1
1 CAPSULE, GELATIN COATED ORAL DAILY
Qty: 90 CAPSULE | Refills: 0 | Status: SHIPPED | OUTPATIENT
Start: 2022-03-17 | End: 2022-06-14

## 2022-03-25 ENCOUNTER — TELEMEDICINE (OUTPATIENT)
Dept: PSYCHIATRY | Facility: CLINIC | Age: 82
End: 2022-03-25

## 2022-03-25 DIAGNOSIS — G31.84 MILD NEUROCOGNITIVE DISORDER: ICD-10-CM

## 2022-03-25 DIAGNOSIS — F41.1 GENERALIZED ANXIETY DISORDER: ICD-10-CM

## 2022-03-25 DIAGNOSIS — F33.0 MAJOR DEPRESSIVE DISORDER, RECURRENT, MILD (HCC): ICD-10-CM

## 2022-03-25 DIAGNOSIS — F45.21 ILLNESS ANXIETY DISORDER: Primary | ICD-10-CM

## 2022-03-25 DIAGNOSIS — F33.1 MAJOR DEPRESSIVE DISORDER, RECURRENT, MODERATE (HCC): ICD-10-CM

## 2022-03-25 RX ORDER — FLUOXETINE HYDROCHLORIDE 40 MG/1
80 CAPSULE ORAL DAILY
Qty: 60 CAPSULE | Refills: 1 | Status: SHIPPED | OUTPATIENT
Start: 2022-03-25 | End: 2022-05-31 | Stop reason: SDUPTHER

## 2022-03-25 NOTE — PSYCH
Virtual Brief Visit    Patient is located in the following state in which I hold an active license [place]      Encounter provider Deb Cloud DO    Provider located at [address] [address] [phone]    The patient was identified by name and date of birth  Maria Isabel Alegre was informed that this is a telemedicine visit and that the visit is being conducted through [facility] and patient was informed this is a secure, HIPAA-complaint platform  She agrees to proceed  My office door was closed  No one else was in the room  She acknowledged consent and understanding of privacy and security of the video platform  The patient has agreed to participate and understands they can discontinue the visit at any time  Patient is aware this is a billable service  MEDICATION MANAGEMENT NOTE        [facility]      Name and Date of Birth:  Maria Isabel Alegre 78 y o  1940    Date of Visit: March 25, 2022    SUBJECTIVE:  CC: Christoph Gongora presents today for follow up with Illness anxiety disorder, MDD, recurrent, mild, and JONAH  Indicates they had to switch to virtual due her daughter doing taxes  Says they can come in after tax season  Patient states that she had a fall in the middle of the night and couldn't get back up  Granddaughter heard her and got patient's daughter and  to come get her  Indicates that she now fears going into the bathroom  Daughter has ordered rail for her to get up from the toilet now which are on the way  Also got an S O S  button which will inform daughter if she needs help  Anxiety is now much higher due to this episode  Does not report depression  Indicates she is obsessed and worried about having bowel movements and plans her entire day around that  Says she has been to the ED several times and they have reassured her but her worry continues   States she does not understand why she is so obsessed over these things  States she is isn't sure how he got on the floor but thinks it was because "I missed the bowel " Sleep has been adequate with at least 8 hours per night  Nightmares have not been occurring but does have some odd dreams  Appetite is WNL and diet has been varied with vegetation and fiber  Physical activity has been limited other than chores in the house and has been out to store with family  PCP follow up care has been done without problems  Blood pressure monitoring has been consistent and says that last blood pressure at PCP was within normal limits  Continues to worry about appointments until she is able to follow up  Continues to check her body due to concerns about her physical health but less than before  Memory continues to be unchanged  Klonopin use is consistent at twice per day  Peer socialization has been limited as her best friend has her own problems and doesn't socialize much  Indicates that she is going to therapy one time per week which has been helpful for there  Reza Graceh to possible syncope with Prazosin    Since our last visit, overall symptoms have been unchanged  HPI ROS:     PHQ-9 Depression Screening    Little interest or pleasure in doing things: 2 - more than half the days  Feeling down, depressed, or hopeless: 3 - nearly every day  Trouble falling or staying asleep, or sleeping too much: 0 - not at all  Feeling tired or having little energy: 2 - more than half the days  Poor appetite or overeatin - not at all  Feeling bad about yourself - or that you are a failure or have let yourself or your family down: 3 - nearly every day  Trouble concentrating on things, such as reading the newspaper or watching television: 2 - more than half the days  Moving or speaking so slowly that other people could have noticed   Or the opposite - being so fidgety or restless that you have been moving around a lot more than usual: 0 - not at all  Thoughts that you would be better off dead, or of hurting yourself in some way: 0 - not at all  PHQ-9 Score: 12  Score Interpretation: Moderate depression         PHQ-2/9 Depression Screening    Little interest or pleasure in doing things: 2 - more than half the days  Feeling down, depressed, or hopeless: 3 - nearly every day  Trouble falling or staying asleep, or sleeping too much: 0 - not at all  Feeling tired or having little energy: 2 - more than half the days  Poor appetite or overeatin - not at all  Feeling bad about yourself - or that you are a failure or have let yourself or your family down: 3 - nearly every day  Trouble concentrating on things, such as reading the newspaper or watching television: 2 - more than half the days  Moving or speaking so slowly that other people could have noticed  Or the opposite - being so fidgety or restless that you have been moving around a lot more than usual: 0 - not at all  Thoughts that you would be better off dead, or of hurting yourself in some way: 0 - not at all  PHQ-9 Score: 12   PHQ-9 Interpretation: Moderate depression        JONAH-7 Flowsheet Screening      Most Recent Value   Over the last 2 weeks, how often have you been bothered by any of the following problems?     Feeling nervous, anxious, or on edge 3   Not being able to stop or control worrying 3   Worrying too much about different things 3   Trouble relaxing 2   Being so restless that it is hard to sit still 2   Becoming easily annoyed or irritable 3   Feeling afraid as if something awful might happen 3   JONAH-7 Total Score 19          HPI ROS:             ('was' notes: recent => remote)  Medication Side Effects:  Possible syncope with Prazosin  Denied   Depression (10 worst): 8/10 (Was 9/10)   Anxiety (10 worst): 8/10 (Was 9/10)   Safety concerns (SI, HI, etc): Denied SI/HI/AVH (Was Denied SI/HI/AVH)   Sleep: (NM = Nightmares) WNL (Was Unchanged 8-9 hours interrupted)   Energy: Fluctuating based on activity level (Was Fluctuating based on activity level)   Appetite: WNL (Was WNL)   Weight Change: Unknown due to virtual visit Gained 3 lbs in a month     Review Of Systems as noted above  In addition:     Constitutional negative   ENT negative   Cardiovascular negative   Respiratory negative   Gastrointestinal negative   Genitourinary negative   Musculoskeletal negative   Integumentary negative   Neurological negative   Endocrine negative   Other Symptoms none     Pain none   Pain Scale 0     History Review:  The following portions of the patient's history were reviewed and updated as appropriate: allergies, current medications, past family history, past medical history, past social history, past surgical history and problem list      Lab Review: Labs were reviewed and discussed with patient      OBJECTIVE:     MENTAL STATUS EXAM  Appearance:  age appropriate, dressed casually, thin & gaunt looking   Behavior:  Pleasant & cooperative, guarded, psychomotor agitation   Speech:  soft, staccato   Mood:  "frustrated "   Affect:  mood congruent, depressed, constricted, anxious   Language: intact and appropriate for age, education, and intellect   Thought Process:  Linear and goal directed   Associations: intact associations   Thought Content:  negative thinking and cognitive distortions, obsessive, intrusive, obsessive, ruminating thoughts   Perceptual Disturbances: no auditory or visual hallcunations   Risk Potential / Abnormal Thoughts: Suicidal ideation - None  Homicidal ideation - None  Potential for aggression - No       Consciousness:  Alert & Awake   Sensorium:  Grossly oriented   Attention: attention span and concentration appear shorter than expected for age       Fund of Knowledge:  Memory: awareness of current events: yes  past history: yes  vocabulary: yes  short term memory moderately impaired, long term memory mildly impaired   Insight:  limited   Judgment: fair   Muscle Strength Muscle Tone: Unable to determine due to virtual visit  Unable to determine due to virtual visit   Gait/Station: not observed   Motor Activity: no abnormal movements       Risks, Benefits And Possible Side Effects Of Medications:    AGREE: Risks, benefits, and possible side effects of medications explained to Agatha Avalos and she (or legal representative) verbalizes understanding and agreement for treatment  Controlled Medication Discussion:     Patient using medication appropriately  Agatha Avalos has been filling controlled prescriptions on time as prescribed according to [facility]    Discussed with Agatha Avalos the risks of sedation, respiratory depression, impairment of ability to drive and potential for abuse and addiction related to treatment with benzodiazepine medications  She understands risk of treatment with benzodiazepine medications, agrees to not drive if feels impaired and agrees to take medications as prescribed  Discussed with Mickey Dwyer warning on concurrent use of benzodiazepines and opioid medications including sedation, respiratory depression, coma and death  She understands the risk of treatment with benzodiazepines in addition to opioids and wants to continue taking those medications  ______________________________________________________________      Recent labs:  No visits with results within 1 Month(s) from this visit     Latest known visit with results is:   Admission on 01/27/2022, Discharged on 01/27/2022   Component Date Value    WBC 01/27/2022 5 97     RBC 01/27/2022 4 26     Hemoglobin 01/27/2022 12 7     Hematocrit 01/27/2022 39 3     MCV 01/27/2022 92     MCH 01/27/2022 29 8     MCHC 01/27/2022 32 3     RDW 01/27/2022 13 2     MPV 01/27/2022 11 4     Platelets 13/18/1243 191     nRBC 01/27/2022 0     Neutrophils Relative 01/27/2022 68     Immat GRANS % 01/27/2022 0     Lymphocytes Relative 01/27/2022 20     Monocytes Relative 01/27/2022 6     Eosinophils Relative 01/27/2022 5     Basophils Relative 01/27/2022 1     Neutrophils Absolute 01/27/2022 4 01     Immature Grans Absolute 01/27/2022 0 01     Lymphocytes Absolute 01/27/2022 1 22     Monocytes Absolute 01/27/2022 0 38     Eosinophils Absolute 01/27/2022 0 31     Basophils Absolute 01/27/2022 0 04     Sodium 01/27/2022 137     Potassium 01/27/2022 4 8     Chloride 01/27/2022 103     CO2 01/27/2022 24     ANION GAP 01/27/2022 10     BUN 01/27/2022 22     Creatinine 01/27/2022 0 82     Glucose 01/27/2022 250*    Calcium 01/27/2022 8 9     AST 01/27/2022 42     ALT 01/27/2022 31     Alkaline Phosphatase 01/27/2022 78     Total Protein 01/27/2022 7 4     Albumin 01/27/2022 3 7     Total Bilirubin 01/27/2022 0 53     eGFR 01/27/2022 66     Lipase 01/27/2022 108     Color, UA 01/27/2022 Light Yellow     Clarity, UA 01/27/2022 Slightly Cloudy     Specific Gravity, UA 01/27/2022 1 025     pH, UA 01/27/2022 5 5     Leukocytes, UA 01/27/2022 Negative     Nitrite, UA 01/27/2022 Negative     Protein, UA 01/27/2022 30 (1+)*    Glucose, UA 01/27/2022 250 (1/4%)*    Ketones, UA 01/27/2022 Negative     Urobilinogen, UA 01/27/2022 0 2     Bilirubin, UA 01/27/2022 Negative     Blood, UA 01/27/2022 Large*    LACTIC ACID 01/27/2022 2 1*    RBC, UA 01/27/2022 30-50*    WBC, UA 01/27/2022 0-1     Epithelial Cells 01/27/2022 None Seen     Bacteria, UA 01/27/2022 None Seen          Past Psychiatric History  Previous diagnoses include: Depression, anxiety, and OCD  Prior outpatient psychiatric treatment:  Saw psychiatrist in her 35s for a period of 10 years  Prior therapy:  Sees therapist Dr Evens Euceda the past 2-3 years  Prior inpatient psychiatric treatment:  1 time for a period of 3 weeks at [address] [place] [address] in 2013 for panic attacks post surgery  Prior suicide attempts:  Denies  Prior self harm:  Denies  Prior violence or aggression:  Denies     Previous psychotropic medication trials:    Antidepressants:  Effexor, Zoloft, Prozac, and Lexapro     Mood Stabilizers:  Denies     Anxiolytics:  BuSpar and Klonopin     Typical antipsychotics:  Denies     Atypical antipsychotics:  Denies     Hypnotics/sleep aids:  Denies     Social History:     The patient grew up [address] was described as "I scardy cat"      During childhood, parents were together but father cheated on her mother with another girl  They have 2 sister(s) and 2 brother(s)   Only 1 brother is still living but she has good relationship with him       Abuse/neglect: none     As far as the patient (or present family member) is aware, overall childhood development: Patient does ascribe to normal developmental including childhood as well as regular educational course overall (eg no IEP, special education)      Education level:  Bachelor of arts   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   Supports herself on pension and social security    Marital status:   since  when   from cancer  Children:  1 daughter whom she lives with and 1 son who lives in [place] whom she has good relationship with  Current Living Situation: the patient currently lives with daughter, son-in-law, and 3 grandkids in [place], [place]   Social support:  Brother, daughter, son-in-law, grandkids, her ghanshyam, and her friend Devi Likes  Access to Guns:  Denies     Substance use and treatment:  Tobacco use:  Denies  Caffeine Use:  1 cup of decaf in the morning  ETOH use:  Denies  Other substance use:  Denies     Endorses previous experimentation with:  Denies     Longest clean time: not applicable  History of Inpatient/Outpatient rehabilitation program: no        Traumatic History:      Abuse: none  Other Traumatic Events: none      Family Psychiatric History:      Psychiatric Illness:      Mother - depression, Sister - depression and anxiety disorder, Sister - depression and anxiety disorder  Substance Abuse:       Son - alcohol abuse  Suicide Attempts:        no family history of suicide attempts    Family Psychiatric History:   Family History   Problem Relation Age of Onset    Depression Mother         w/anxiety    Diabetes Mother         Mellitus    Breast cancer Mother     Hypertension Mother     No Known Problems Father     Depression Sister         w/anxiety    Heart disease Sister         Cardiac Disorder    Breast cancer Sister     Hypertension Sister     Diabetes Brother         Mellitus    Alcohol abuse Son          Medical / Surgical History:    Past Medical History:   Diagnosis Date    Colon polyps     [facility] mellitus [facility]     Dizziness     Last assessed: 8/31/15    DVT (deep venous thrombosis) ([facility]     Dysuria     Hyperlipidemia     Hypertension     Hypertensive urgency 10/22/2021    Insomnia     Ureterolithiasis 2020     Past Surgical History:   Procedure Laterality Date     SECTION      1964 son, 26 daughter    COLONOSCOPY      [facility] RETROGRADE PYELOGRAM  2020    NC COLONOSCOPY FLX DX W/COLLJ SPEC WHEN PFRMD N/A 3/27/2017    Procedure: COLONOSCOPY;  Surgeon: Asuncion Chandler MD;  [facility];   Service: Gastroenterology    NC CYSTO/URETERO W/LITHOTRIPSY &INDWELL STENT INSRT Right 2020    Procedure: CYSTOSCOPY URETEROSCOPY WITH LITHOTRIPSY HOLMIUM LASER, RETROGRADE PYELOGRAM AND INSERTION STENT URETERAL; EXCISION OF BLADDER TUMOR;  Surgeon: Samuel Merlos MD;  [facility]: [facility];  Service: Urology    ROTATOR CUFF REPAIR Left     Last assessed: 3/29/15    TONSILLECTOMY         Assessment/Plan:        Diagnoses and all orders for this visit:    Illness anxiety disorder    Major depressive disorder, recurrent, moderate (HCC)    Generalized anxiety disorder    Mild neurocognitive disorder        ______________________________________________________________________    Additional Assessment:  Patient continues to display a high level of anxiety and depression based on objective and subjective measurements  Patient's anxiety continues to be fixated on her physical condition but does report that she is checking her body less frequently  Does continue to worry about follow-up and getting reassured about her health constantly  Patient did have recent fall in bathroom and reportedly landed on her backside as she was not able to correctly sit on the toilet seat  Syncope secondary to prazosin cannot be ruled out however and patient secondarily reports that her odd dreams continue  Also reports that she gets very worried when she gets up at night to go to the bathroom because of stated concerns and warning with this medication  Regardless of if this medication is causing syncope, it is clearly causing patient to have worsening illness anxiety and will therefore be discontinued at this time  Will increase patient's Prozac for further support of overall baseline anxiety which will now be at optimal level  Could consider augmenting with mood stabilizer such as Abilify in the future  Consider decreasing Klonopin further due to noted and unchanged reduction in memory and possible contribution to instability/ataxia as well  Did discuss these risk with patient and daughter who is patient's power of  and both voiced understanding and agreement to continue Klonopin with plan to wean off in the coming appointments as her medications are adjusted  Encourage patient to increase physical activity and social interactions with finding activities with others her own age  Encouraged continued efforts to maintain appropriate diet and monitor her sleep hygiene  Patient reports that her blood pressure has been stable in recent visits with her PCP    Did discuss limitations of virtual visit including inability to monitor her blood pressure to which she voiced understanding of risks associated with this   Reminded patient to acquire outstanding lab work ordered by her PCP for full workup of psychiatric concerns as well  Scales:  PHQ-9 was 9, now 12    JONAH-7 was 19, now 19         Treatment Plan:        Patient has been educated about their diagnosis and treatment modalities  They voiced understanding and agreement with the following plan:    Discussed medications and if treatment adjustment was needed/desired     1) MEDS:           - Increase Prozac to 80 mg PO QD for mood and anxiety  PARQ completed including serotonin syndrome, SIADH, worsening depression, suicidality, induction of eligio, GI upset, headaches, activation, sexual side effects, sedation, potential drug interactions, and others    - Continue Klonopin 0 5 mg PO BID for breakthrough anxiety as a bridge and wean off in the future  Discussed with patient the risks of sedation, respiratory depression, impairment in judgment and motor function  Depression, mood changes, GI changes, and others discussed  Patient was also informed of risks of being on or starting opioid medications due to drug interactions and potential for serious respiratory depression and death     - Continue Buspar 15 mg PO TID for anxiety  PARQ completed including serotonin syndrome, rare TD/EPS, dizziness, sedation, GI distress, confusion, possible mood changes, xerostomia and visual disturbances    - Discontinue Prazosin 1 mg PO QHS due to refractory nightmares, worsening illness anxiety, and possible syncope event  PARQ completed including dizziness, sedation, headache, blood pressure changes (including orthostatic hypotension and syncope), medication interaction risk, and GI distress     2) Labs:  - 10/24/21: BMP unremarkable  - 10/23/21: CBC and lipid panel unremarkable   A1c elevated at 6 6   - 6/14/21: TSH wnl at 2 57  -12/15/21: A1c was 6 5 and controlled and TSH wnl at 1 36  - 1/27/22: CBC unremarkable and CMP has increased glucose at 205   - 12/20/22 TSH, Lipid panel, and A1c ordered  Reminded patient to acquire these labs     3) Therapy:    - Continue with own therapist Dr Juanjose Fernandes     4) Medical:   - Pt will f/u with other providers as needed   - On Vitamin D supplement  - SLUMS: 23/30 showing mild neurocognitive impairment  Of note, it is unclear how much BZDs could be impacting these scores     5) Other: Support as needed   - encouraged increase in physical activity including walking and low impact exercises/stretching   - recommended improvement of diet with low carbohydrates and high fiber   - recommended improvement in sleep hygiene and avoiding electronics at night     6) Follow up:   - Follow up in 1 month for medication management    - Patient will call if issues or concerns      7) Treatment Plan:    - Enacted 12/06/2021 (renew at next appointment)     Discussed self monitoring of symptoms, and symptom monitoring tools  Patient has been informed of 24 hours and weekend coverage for urgent situations accessed by calling the main clinic phone number  Psychotherapy in session:  Time spent performing psychotherapy: 16 Minutes     Video Exam    There were no vitals filed for this visit  Physical Exam     I spent 45 minutes directly with the patient during this visit    VIRTUAL VISIT Kenny 96 verbally agrees to participate in [employer]  Pt is aware that [employer] could be limited without vital signs or the ability to perform a full hands-on physical exam  Iglesia Damon understands she or the provider may request at any time to terminate the video visit and request the patient to seek care or treatment in person

## 2022-03-28 DIAGNOSIS — E11.9 TYPE 2 DIABETES MELLITUS WITHOUT COMPLICATION, WITHOUT LONG-TERM CURRENT USE OF INSULIN (HCC): ICD-10-CM

## 2022-04-11 DIAGNOSIS — F41.9 ANXIETY: ICD-10-CM

## 2022-04-11 NOTE — TELEPHONE ENCOUNTER
Pt calling stating that she called the pharmacy today for a refill of her Klonopin and they said they were going to contact the office about it  Pharmacy told her the office never got back to her  She is out of her Klonopin and would like it refilled as soon as possible  Gently reminded pt that offices have 72 hours to refill medications and to please call more in advance for refills  She verbalized understanding and was appreciative

## 2022-04-12 RX ORDER — CLONAZEPAM 0.5 MG/1
0.5 TABLET ORAL 3 TIMES DAILY
Qty: 90 TABLET | Refills: 0 | Status: SHIPPED | OUTPATIENT
Start: 2022-04-12 | End: 2022-05-16 | Stop reason: SDUPTHER

## 2022-04-17 DIAGNOSIS — E78.2 MIXED HYPERLIPIDEMIA: ICD-10-CM

## 2022-04-18 RX ORDER — ATORVASTATIN CALCIUM 10 MG/1
10 TABLET, FILM COATED ORAL DAILY
Qty: 90 TABLET | Refills: 0 | Status: SHIPPED | OUTPATIENT
Start: 2022-04-18 | End: 2022-07-04 | Stop reason: SDUPTHER

## 2022-04-20 ENCOUNTER — HOSPITAL ENCOUNTER (OUTPATIENT)
Facility: HOSPITAL | Age: 82
Setting detail: OBSERVATION
Discharge: HOME | End: 2022-04-21
Attending: EMERGENCY MEDICINE | Admitting: HOSPITALIST
Payer: MEDICARE

## 2022-04-20 ENCOUNTER — APPOINTMENT (EMERGENCY)
Dept: CT IMAGING | Facility: HOSPITAL | Age: 82
End: 2022-04-20
Payer: MEDICARE

## 2022-04-20 DIAGNOSIS — N13.2 HYDRONEPHROSIS CONCURRENT WITH AND DUE TO CALCULI OF KIDNEY AND URETER: Primary | ICD-10-CM

## 2022-04-20 DIAGNOSIS — E86.0 DEHYDRATION: ICD-10-CM

## 2022-04-20 DIAGNOSIS — N20.0 NEPHROLITHIASIS: ICD-10-CM

## 2022-04-20 DIAGNOSIS — N39.0 UTI (URINARY TRACT INFECTION): ICD-10-CM

## 2022-04-20 DIAGNOSIS — R31.9 HEMATURIA: ICD-10-CM

## 2022-04-20 LAB
ALBUMIN SERPL BCP-MCNC: 3.8 G/DL (ref 3.5–5)
ALP SERPL-CCNC: 58 U/L (ref 46–116)
ALT SERPL W P-5'-P-CCNC: 25 U/L (ref 12–78)
ANION GAP SERPL CALCULATED.3IONS-SCNC: 9 MMOL/L (ref 4–13)
AST SERPL W P-5'-P-CCNC: 15 U/L (ref 5–45)
BACTERIA UR QL AUTO: ABNORMAL /HPF
BASOPHILS # BLD AUTO: 0.03 THOUSANDS/ΜL (ref 0–0.1)
BASOPHILS NFR BLD AUTO: 1 % (ref 0–1)
BILIRUB SERPL-MCNC: 0.28 MG/DL (ref 0.2–1)
BILIRUB UR QL STRIP: NEGATIVE
BUN SERPL-MCNC: 30 MG/DL (ref 5–25)
CALCIUM SERPL-MCNC: 9.1 MG/DL (ref 8.3–10.1)
CHLORIDE SERPL-SCNC: 102 MMOL/L (ref 100–108)
CK SERPL-CCNC: 80 U/L (ref 26–192)
CLARITY UR: ABNORMAL
CO2 SERPL-SCNC: 27 MMOL/L (ref 21–32)
COLOR UR: ABNORMAL
CREAT SERPL-MCNC: 1.05 MG/DL (ref 0.6–1.3)
EOSINOPHIL # BLD AUTO: 0.16 THOUSAND/ΜL (ref 0–0.61)
EOSINOPHIL NFR BLD AUTO: 3 % (ref 0–6)
ERYTHROCYTE [DISTWIDTH] IN BLOOD BY AUTOMATED COUNT: 14 % (ref 11.6–15.1)
GFR SERPL CREATININE-BSD FRML MDRD: 49 ML/MIN/1.73SQ M
GLUCOSE SERPL-MCNC: 185 MG/DL (ref 65–140)
GLUCOSE UR STRIP-MCNC: ABNORMAL MG/DL
HCT VFR BLD AUTO: 37.1 % (ref 34.8–46.1)
HGB BLD-MCNC: 11.7 G/DL (ref 11.5–15.4)
HGB UR QL STRIP.AUTO: ABNORMAL
IMM GRANULOCYTES # BLD AUTO: 0.02 THOUSAND/UL (ref 0–0.2)
IMM GRANULOCYTES NFR BLD AUTO: 0 % (ref 0–2)
KETONES UR STRIP-MCNC: NEGATIVE MG/DL
LEUKOCYTE ESTERASE UR QL STRIP: ABNORMAL
LYMPHOCYTES # BLD AUTO: 1.39 THOUSANDS/ΜL (ref 0.6–4.47)
LYMPHOCYTES NFR BLD AUTO: 25 % (ref 14–44)
MCH RBC QN AUTO: 28.3 PG (ref 26.8–34.3)
MCHC RBC AUTO-ENTMCNC: 31.5 G/DL (ref 31.4–37.4)
MCV RBC AUTO: 90 FL (ref 82–98)
MONOCYTES # BLD AUTO: 0.4 THOUSAND/ΜL (ref 0.17–1.22)
MONOCYTES NFR BLD AUTO: 7 % (ref 4–12)
NEUTROPHILS # BLD AUTO: 3.57 THOUSANDS/ΜL (ref 1.85–7.62)
NEUTS SEG NFR BLD AUTO: 64 % (ref 43–75)
NITRITE UR QL STRIP: NEGATIVE
NON-SQ EPI CELLS URNS QL MICRO: ABNORMAL /HPF
NRBC BLD AUTO-RTO: 0 /100 WBCS
PH UR STRIP.AUTO: 5.5 [PH]
PLATELET # BLD AUTO: 204 THOUSANDS/UL (ref 149–390)
PMV BLD AUTO: 10.9 FL (ref 8.9–12.7)
POTASSIUM SERPL-SCNC: 4 MMOL/L (ref 3.5–5.3)
PROT SERPL-MCNC: 7.2 G/DL (ref 6.4–8.2)
PROT UR STRIP-MCNC: ABNORMAL MG/DL
RBC # BLD AUTO: 4.13 MILLION/UL (ref 3.81–5.12)
RBC #/AREA URNS AUTO: ABNORMAL /HPF
SODIUM SERPL-SCNC: 138 MMOL/L (ref 136–145)
SP GR UR STRIP.AUTO: 1.02 (ref 1–1.03)
UROBILINOGEN UR QL STRIP.AUTO: 0.2 E.U./DL
WBC # BLD AUTO: 5.57 THOUSAND/UL (ref 4.31–10.16)
WBC #/AREA URNS AUTO: ABNORMAL /HPF

## 2022-04-20 PROCEDURE — G1004 CDSM NDSC: HCPCS

## 2022-04-20 PROCEDURE — 85610 PROTHROMBIN TIME: CPT | Performed by: EMERGENCY MEDICINE

## 2022-04-20 PROCEDURE — 74178 CT ABD&PLV WO CNTR FLWD CNTR: CPT

## 2022-04-20 PROCEDURE — 81001 URINALYSIS AUTO W/SCOPE: CPT | Performed by: EMERGENCY MEDICINE

## 2022-04-20 PROCEDURE — 36415 COLL VENOUS BLD VENIPUNCTURE: CPT | Performed by: EMERGENCY MEDICINE

## 2022-04-20 PROCEDURE — 86850 RBC ANTIBODY SCREEN: CPT | Performed by: EMERGENCY MEDICINE

## 2022-04-20 PROCEDURE — 99285 EMERGENCY DEPT VISIT HI MDM: CPT | Performed by: EMERGENCY MEDICINE

## 2022-04-20 PROCEDURE — 86901 BLOOD TYPING SEROLOGIC RH(D): CPT | Performed by: EMERGENCY MEDICINE

## 2022-04-20 PROCEDURE — 85730 THROMBOPLASTIN TIME PARTIAL: CPT | Performed by: EMERGENCY MEDICINE

## 2022-04-20 PROCEDURE — 85025 COMPLETE CBC W/AUTO DIFF WBC: CPT | Performed by: EMERGENCY MEDICINE

## 2022-04-20 PROCEDURE — 87086 URINE CULTURE/COLONY COUNT: CPT | Performed by: EMERGENCY MEDICINE

## 2022-04-20 PROCEDURE — 96360 HYDRATION IV INFUSION INIT: CPT

## 2022-04-20 PROCEDURE — 86900 BLOOD TYPING SEROLOGIC ABO: CPT | Performed by: EMERGENCY MEDICINE

## 2022-04-20 PROCEDURE — 99285 EMERGENCY DEPT VISIT HI MDM: CPT

## 2022-04-20 PROCEDURE — 80053 COMPREHEN METABOLIC PANEL: CPT | Performed by: EMERGENCY MEDICINE

## 2022-04-20 PROCEDURE — 82550 ASSAY OF CK (CPK): CPT | Performed by: EMERGENCY MEDICINE

## 2022-04-20 RX ADMIN — SODIUM CHLORIDE 250 ML: 0.9 INJECTION, SOLUTION INTRAVENOUS at 23:29

## 2022-04-21 ENCOUNTER — TRANSITIONAL CARE MANAGEMENT (OUTPATIENT)
Dept: FAMILY MEDICINE CLINIC | Facility: CLINIC | Age: 82
End: 2022-04-21

## 2022-04-21 ENCOUNTER — APPOINTMENT (EMERGENCY)
Dept: RADIOLOGY | Facility: HOSPITAL | Age: 82
End: 2022-04-21
Payer: MEDICARE

## 2022-04-21 VITALS
SYSTOLIC BLOOD PRESSURE: 145 MMHG | WEIGHT: 127.87 LBS | HEIGHT: 56 IN | HEART RATE: 79 BPM | DIASTOLIC BLOOD PRESSURE: 72 MMHG | OXYGEN SATURATION: 98 % | TEMPERATURE: 98 F | RESPIRATION RATE: 16 BRPM | BODY MASS INDEX: 28.76 KG/M2

## 2022-04-21 PROBLEM — R31.9 HEMATURIA: Status: ACTIVE | Noted: 2022-04-21

## 2022-04-21 LAB
ABO GROUP BLD: NORMAL
ABO GROUP BLD: NORMAL
ANION GAP SERPL CALCULATED.3IONS-SCNC: 9 MMOL/L (ref 4–13)
APTT PPP: 29 SECONDS (ref 23–37)
ATRIAL RATE: 72 BPM
BASOPHILS # BLD AUTO: 0.04 THOUSANDS/ΜL (ref 0–0.1)
BASOPHILS NFR BLD AUTO: 1 % (ref 0–1)
BLD GP AB SCN SERPL QL: NEGATIVE
BUN SERPL-MCNC: 25 MG/DL (ref 5–25)
CALCIUM SERPL-MCNC: 8.8 MG/DL (ref 8.3–10.1)
CHLORIDE SERPL-SCNC: 105 MMOL/L (ref 100–108)
CO2 SERPL-SCNC: 24 MMOL/L (ref 21–32)
CREAT SERPL-MCNC: 0.79 MG/DL (ref 0.6–1.3)
EOSINOPHIL # BLD AUTO: 0.17 THOUSAND/ΜL (ref 0–0.61)
EOSINOPHIL NFR BLD AUTO: 3 % (ref 0–6)
ERYTHROCYTE [DISTWIDTH] IN BLOOD BY AUTOMATED COUNT: 13.8 % (ref 11.6–15.1)
GFR SERPL CREATININE-BSD FRML MDRD: 69 ML/MIN/1.73SQ M
GLUCOSE P FAST SERPL-MCNC: 108 MG/DL (ref 65–99)
GLUCOSE SERPL-MCNC: 101 MG/DL (ref 65–140)
GLUCOSE SERPL-MCNC: 108 MG/DL (ref 65–140)
GLUCOSE SERPL-MCNC: 90 MG/DL (ref 65–140)
HCT VFR BLD AUTO: 38.8 % (ref 34.8–46.1)
HGB BLD-MCNC: 12.3 G/DL (ref 11.5–15.4)
IMM GRANULOCYTES # BLD AUTO: 0.02 THOUSAND/UL (ref 0–0.2)
IMM GRANULOCYTES NFR BLD AUTO: 0 % (ref 0–2)
INR PPP: 1.01 (ref 0.84–1.19)
LYMPHOCYTES # BLD AUTO: 1.35 THOUSANDS/ΜL (ref 0.6–4.47)
LYMPHOCYTES NFR BLD AUTO: 23 % (ref 14–44)
MCH RBC QN AUTO: 28.9 PG (ref 26.8–34.3)
MCHC RBC AUTO-ENTMCNC: 31.7 G/DL (ref 31.4–37.4)
MCV RBC AUTO: 91 FL (ref 82–98)
MONOCYTES # BLD AUTO: 0.46 THOUSAND/ΜL (ref 0.17–1.22)
MONOCYTES NFR BLD AUTO: 8 % (ref 4–12)
NEUTROPHILS # BLD AUTO: 3.76 THOUSANDS/ΜL (ref 1.85–7.62)
NEUTS SEG NFR BLD AUTO: 65 % (ref 43–75)
NRBC BLD AUTO-RTO: 0 /100 WBCS
P AXIS: 55 DEGREES
PLATELET # BLD AUTO: 189 THOUSANDS/UL (ref 149–390)
PMV BLD AUTO: 10.6 FL (ref 8.9–12.7)
POTASSIUM SERPL-SCNC: 4.3 MMOL/L (ref 3.5–5.3)
PR INTERVAL: 162 MS
PROTHROMBIN TIME: 13.3 SECONDS (ref 11.6–14.5)
QRS AXIS: 4 DEGREES
QRSD INTERVAL: 74 MS
QT INTERVAL: 372 MS
QTC INTERVAL: 407 MS
RBC # BLD AUTO: 4.25 MILLION/UL (ref 3.81–5.12)
RH BLD: POSITIVE
RH BLD: POSITIVE
SODIUM SERPL-SCNC: 138 MMOL/L (ref 136–145)
SPECIMEN EXPIRATION DATE: NORMAL
T WAVE AXIS: 24 DEGREES
VENTRICULAR RATE: 72 BPM
WBC # BLD AUTO: 5.8 THOUSAND/UL (ref 4.31–10.16)

## 2022-04-21 PROCEDURE — 85025 COMPLETE CBC W/AUTO DIFF WBC: CPT | Performed by: INTERNAL MEDICINE

## 2022-04-21 PROCEDURE — RECHECK: Performed by: HOSPITALIST

## 2022-04-21 PROCEDURE — 80048 BASIC METABOLIC PNL TOTAL CA: CPT | Performed by: INTERNAL MEDICINE

## 2022-04-21 PROCEDURE — 99220 PR INITIAL OBSERVATION CARE/DAY 70 MINUTES: CPT | Performed by: HOSPITALIST

## 2022-04-21 PROCEDURE — 71045 X-RAY EXAM CHEST 1 VIEW: CPT

## 2022-04-21 PROCEDURE — 82948 REAGENT STRIP/BLOOD GLUCOSE: CPT

## 2022-04-21 PROCEDURE — 93005 ELECTROCARDIOGRAM TRACING: CPT

## 2022-04-21 PROCEDURE — 93010 ELECTROCARDIOGRAM REPORT: CPT | Performed by: INTERNAL MEDICINE

## 2022-04-21 RX ORDER — ASPIRIN 81 MG/1
81 TABLET, CHEWABLE ORAL DAILY
Status: DISCONTINUED | OUTPATIENT
Start: 2022-04-21 | End: 2022-04-21 | Stop reason: HOSPADM

## 2022-04-21 RX ORDER — MELATONIN
1000 DAILY
Status: DISCONTINUED | OUTPATIENT
Start: 2022-04-21 | End: 2022-04-21 | Stop reason: HOSPADM

## 2022-04-21 RX ORDER — FLUOXETINE HYDROCHLORIDE 20 MG/1
80 CAPSULE ORAL DAILY
Status: DISCONTINUED | OUTPATIENT
Start: 2022-04-21 | End: 2022-04-21 | Stop reason: HOSPADM

## 2022-04-21 RX ORDER — ACETAMINOPHEN 325 MG/1
650 TABLET ORAL EVERY 6 HOURS PRN
Status: DISCONTINUED | OUTPATIENT
Start: 2022-04-21 | End: 2022-04-21 | Stop reason: HOSPADM

## 2022-04-21 RX ORDER — SODIUM CHLORIDE 9 MG/ML
100 INJECTION, SOLUTION INTRAVENOUS CONTINUOUS
Status: DISCONTINUED | OUTPATIENT
Start: 2022-04-21 | End: 2022-04-21 | Stop reason: HOSPADM

## 2022-04-21 RX ORDER — DOCUSATE SODIUM 100 MG/1
100 CAPSULE, LIQUID FILLED ORAL 2 TIMES DAILY
Status: DISCONTINUED | OUTPATIENT
Start: 2022-04-21 | End: 2022-04-21 | Stop reason: HOSPADM

## 2022-04-21 RX ORDER — ATORVASTATIN CALCIUM 10 MG/1
10 TABLET, FILM COATED ORAL DAILY
Status: DISCONTINUED | OUTPATIENT
Start: 2022-04-21 | End: 2022-04-21 | Stop reason: HOSPADM

## 2022-04-21 RX ORDER — CLONAZEPAM 0.5 MG/1
0.5 TABLET ORAL 2 TIMES DAILY
Status: DISCONTINUED | OUTPATIENT
Start: 2022-04-21 | End: 2022-04-21 | Stop reason: HOSPADM

## 2022-04-21 RX ORDER — NITROFURANTOIN 25; 75 MG/1; MG/1
CAPSULE ORAL
COMMUNITY
Start: 2022-04-20 | End: 2022-04-25

## 2022-04-21 RX ORDER — TAMSULOSIN HYDROCHLORIDE 0.4 MG/1
0.4 CAPSULE ORAL
Status: DISCONTINUED | OUTPATIENT
Start: 2022-04-21 | End: 2022-04-21 | Stop reason: HOSPADM

## 2022-04-21 RX ORDER — PHENAZOPYRIDINE HYDROCHLORIDE 100 MG/1
100 TABLET, FILM COATED ORAL 3 TIMES DAILY PRN
COMMUNITY
Start: 2022-04-20 | End: 2022-04-25

## 2022-04-21 RX ORDER — TAMSULOSIN HYDROCHLORIDE 0.4 MG/1
0.4 CAPSULE ORAL ONCE
Status: COMPLETED | OUTPATIENT
Start: 2022-04-21 | End: 2022-04-21

## 2022-04-21 RX ORDER — TAMSULOSIN HYDROCHLORIDE 0.4 MG/1
0.4 CAPSULE ORAL
Qty: 20 CAPSULE | Refills: 0 | Status: SHIPPED | OUTPATIENT
Start: 2022-04-21 | End: 2022-04-25

## 2022-04-21 RX ORDER — HYDRALAZINE HYDROCHLORIDE 20 MG/ML
10 INJECTION INTRAMUSCULAR; INTRAVENOUS EVERY 6 HOURS PRN
Status: DISCONTINUED | OUTPATIENT
Start: 2022-04-21 | End: 2022-04-21 | Stop reason: HOSPADM

## 2022-04-21 RX ADMIN — IOHEXOL 100 ML: 350 INJECTION, SOLUTION INTRAVENOUS at 00:11

## 2022-04-21 RX ADMIN — ATORVASTATIN CALCIUM 10 MG: 10 TABLET, FILM COATED ORAL at 09:00

## 2022-04-21 RX ADMIN — CLONAZEPAM 0.5 MG: 0.5 TABLET ORAL at 08:59

## 2022-04-21 RX ADMIN — Medication 1000 MG: at 01:31

## 2022-04-21 RX ADMIN — DOCUSATE SODIUM 100 MG: 100 CAPSULE, LIQUID FILLED ORAL at 09:00

## 2022-04-21 RX ADMIN — ASPIRIN 81 MG CHEWABLE TABLET 81 MG: 81 TABLET CHEWABLE at 09:00

## 2022-04-21 RX ADMIN — TAMSULOSIN HYDROCHLORIDE 0.4 MG: 0.4 CAPSULE ORAL at 01:03

## 2022-04-21 RX ADMIN — HYDROCHLOROTHIAZIDE: 12.5 TABLET ORAL at 08:59

## 2022-04-21 RX ADMIN — FLUOXETINE 80 MG: 20 CAPSULE ORAL at 09:00

## 2022-04-21 RX ADMIN — BUSPIRONE HYDROCHLORIDE 15 MG: 10 TABLET ORAL at 09:10

## 2022-04-21 RX ADMIN — SODIUM CHLORIDE 125 ML/HR: 0.9 INJECTION, SOLUTION INTRAVENOUS at 01:04

## 2022-04-21 RX ADMIN — Medication 1000 UNITS: at 09:00

## 2022-04-21 NOTE — H&P
[facility]  H&P- Joseph Morales 1940, 78 y o  female MRN: [id number]  Unit/Bed#: W -01 Encounter: [id number]  Primary Care Provider: Monika Ahumada, DO   Date and time admitted to hospital: 4/20/2022 10:20 PM    * Hematuria  Assessment & Plan  · Gross hematuria for 1 day  No urinary symptoms, no pain  · Was seen in [facility] urgent care, discharged on Macrobid  Because of persistent hematuria patient came to LTAC, located within [facility]  · CT renal protocol showed mild left hydroureteronephrosis secondary to a 6 mm stone at the distal left ureter  Bilateral nonobstructing nephrolithiasis  · Hemoglobin stable but decreased compared to a few months ago  · Was started on IV fluids, tamsulosin and ceftriaxone in ER  · Etiology for hematuria nephrolithiasis, unlikely UTI    Plan:  · Continue IV normal saline with 100 cc/h   · Continue tamsulosin  · Strain all urine  · Monitor off antibiotics  Patient's urine is clear and she has no symptoms  · Tylenol for pain  · Urology consult    Hypertension  Assessment & Plan  · Blood pressure persistent elevated  · Patient on lisinopril-hydrochlorothiazide 10-12 5 mg daily  Reported that her blood pressure this morning was 145/65 mmHg but has intermittent high blood pressure  Patient was recently started on this medication  · Will need medication adjustment through PCP for better blood pressure control  · Hydralazine 10 mg p r n  For systolic blood pressure >371 mmHg    Nephrolithiasis  Assessment & Plan  · History of nephrolithiasis, per patient had lithotripsy in the past  · Last ER presentation in January with hematuria, was found to have 3 mm stone, was passed in ER     · Follows with Urology    Generalized anxiety disorder  Assessment & Plan  · Follows with psychiatry  · On buspirone 15 mg t i d , Prozac 80 mg daily, Klonopin 0 5 b i d     Mixed hyperlipidemia  Assessment & Plan  · Continue atorvastatin 10 mg daily    Diabetes mellitus [facility])  Assessment & Plan  Lab Results   Component Value Date    HGBA1C 6 5 (H) 12/15/2021       No results for input(s): POCGLU in the last 72 hours  Blood Sugar Average: Last 72 hrs:  ·  home medication metformin 500 mg b i d   · Hold metformin while inpatient,  · SSI and Accu-Chek  · Diabetic diet    VTE Pharmacologic Prophylaxis: VTE Score: 3 Moderate Risk (Score 3-4) - Pharmacological DVT Prophylaxis Contraindicated  Sequential Compression Devices Ordered  Code Status: Level 1 - Full Code   Discussion with family: Updated  (daughter) at bedside  Anticipated Length of Stay: Patient will be admitted on an observation basis with an anticipated length of stay of less than 2 midnights secondary to Hematuria  Chief Complaint:  Hematuria    History of Present Illness:  Allyson Reed is a 78 y o  female with a PMH of hypertension, generalized anxiety disorder, hyperlipidemia, nephrolithiasis  who presents with hematuria  Patient reported gross hematuria x1 day, went to [facility] to be seen and was discharged on Macrobid  Patient continued to have hematuria, came in [facility] Emergency Department to be evaluated  Patient denied dysuria, frequency, abdominal or flank pain  No chills or fever, nausea or vomiting  Patient reported she had lithotripsy in the past, also in January she had a 3 mm stone that was passed by itself  Upon presentation patient stable afebrile, no leukocytosis, urine showed RBC no leukocytosis, nitrates  CT renal protocol showed 6 mm stone at the distal left ureter with mild left hydroureteronephrosis, conglomerate of nonobstructive stones at the left inferior pole consisting of approximately 5 stones measuring on the order of 3 mm  Patient was admitted for further management    Review of Systems:  Review of Systems   Constitutional: Negative for chills and fever  Respiratory: Negative for cough, chest tightness and shortness of breath  Cardiovascular: Negative for chest pain  Gastrointestinal: Negative for abdominal pain, constipation, diarrhea, nausea and vomiting  Genitourinary: Positive for hematuria  Negative for difficulty urinating, dysuria, flank pain and frequency  All other systems reviewed and are negative  Past Medical and Surgical History:   Past Medical History:   Diagnosis Date    Colon polyps     Diabetes mellitus ([facility] )     Dizziness     Last assessed: 8/31/15    DVT (deep venous thrombosis) ([facility] )     Dysuria     Hyperlipidemia     Hypertension     Hypertensive urgency 10/22/2021    Insomnia     Ureterolithiasis 2020       Past Surgical History:   Procedure Laterality Date     SECTION      1964 son, 26 daughter    COLONOSCOPY      [facility] RETROGRADE PYELOGRAM  2020    MD COLONOSCOPY FLX DX W/COLLJ SPEC WHEN PFRMD N/A 3/27/2017    Procedure: COLONOSCOPY;  Surgeon: Sonal Lobo MD;  Location: [facility]; Service: Gastroenterology    MD CYSTO/URETERO W/LITHOTRIPSY &INDWELL STENT INSRT Right 2020    Procedure: CYSTOSCOPY URETEROSCOPY WITH LITHOTRIPSY HOLMIUM LASER, RETROGRADE PYELOGRAM AND INSERTION STENT URETERAL; EXCISION OF BLADDER TUMOR;  Surgeon: Delsie Bence, MD;  Location: [facility];  Service: Urology    ROTATOR CUFF REPAIR Left     Last assessed: 3/29/15    TONSILLECTOMY         Meds/Allergies:  Prior to Admission medications    Medication Sig Start Date End Date Taking?  Authorizing Provider   aspirin 81 MG tablet Take 81 mg by mouth daily   Yes Historical Provider, MD   atorvastatin (LIPITOR) 10 mg tablet Take 1 tablet (10 mg total) by mouth daily 22  Yes Denise Solorio DO   busPIRone (BUSPAR) 15 mg tablet Take 1 tablet (15 mg total) by mouth 3 (three) times a day 22  Yes Jose Hammer,    cholecalciferol (VITAMIN D3) 1,000 units tablet Take 1,000 Units by mouth daily   Yes Historical Provider, MD   clonazePAM (KlonoPIN) 0 5 mg tablet Take 1 tablet (0 5 mg total) by mouth 3 (three) times a day 4/12/22 6/11/22 Yes Keyshawn Cortez DO   docusate sodium (COLACE) 100 mg capsule Take 100 mg by mouth 2 (two) times a day   Yes Talia Pimentel MD   FLUoxetine (PROzac) 40 MG capsule Take 2 capsules (80 mg total) by mouth daily 3/25/22 5/24/22 Yes Keyshawn Cortez DO   linaCLOtide (Linzess) 145 MCG CAPS Take 1 capsule (145 mcg total) by mouth daily 3/17/22  Yes Ryan Sheth DO   lisinopril-hydrochlorothiazide (PRINZIDE,ZESTORETIC) 10-12 5 MG per tablet Take 1 tablet by mouth daily 2/3/22  Yes Ryan Sheth DO   metFORMIN (GLUCOPHAGE) 500 mg tablet Take 1 tablet (500 mg total) by mouth 2 (two) times a day with meals 3/29/22  Yes Ryan Sheth DO   bisacodyl (DULCOLAX) 10 mg suppository Insert 1 suppository (10 mg total) into the rectum daily for 7 days 5/1/21 5/8/21  Merritt Rothman MD   polyethylene glycol (MIRALAX) 17 g packet Take 17 g by mouth daily for 7 days 5/1/21 12/6/21  Merritt Rothman MD     I have reviewed home medications with patient personally  Allergies: No Known Allergies    Social History:  Marital Status:     Occupation: retired   Patient Pre-hospital Living Situation: With other family member: daughter   Patient Pre-hospital Level of Mobility: walks  Patient Pre-hospital Diet Restrictions: diabetic   Substance Use History:   Social History     Substance and Sexual Activity   Alcohol Use No     Social History     Tobacco Use   Smoking Status Never Smoker   Smokeless Tobacco Never Used     Social History     Substance and Sexual Activity   Drug Use No       Family History:  Family History   Problem Relation Age of Onset    Depression Mother         w/anxiety    Diabetes Mother         Mellitus    Breast cancer Mother     Hypertension Mother     No Known Problems Father     Depression Sister         w/anxiety    Heart disease Sister         Cardiac Disorder    Breast cancer Sister     Hypertension Sister     Diabetes Brother         Mellitus    Alcohol abuse Son        Physical Exam:     Vitals:   Blood Pressure: (!) 190/86 (04/21/22 0044)  Pulse: 78 (04/21/22 0044)  Temperature: 98 1 °F (36 7 °C) (04/20/22 2148)  Temp Source: Oral (04/20/22 2148)  Respirations: 20 (04/21/22 0044)  SpO2: 98 % (04/21/22 0044)    Physical Exam  Vitals reviewed  Constitutional:       General: She is not in acute distress  Appearance: She is normal weight  She is not diaphoretic  HENT:      Head: Normocephalic  Eyes:      General: No scleral icterus  Right eye: No discharge  Left eye: No discharge  Cardiovascular:      Rate and Rhythm: Normal rate and regular rhythm  Pulmonary:      Effort: Pulmonary effort is normal  No respiratory distress  Breath sounds: Normal breath sounds  No wheezing, rhonchi or rales  Abdominal:      General: There is no distension  Palpations: Abdomen is soft  Tenderness: There is no abdominal tenderness  There is no right CVA tenderness, left CVA tenderness, guarding or rebound  Musculoskeletal:      Right lower leg: Edema (trace ) present  Left lower leg: Edema (trace ) present  Skin:     General: Skin is warm  Neurological:      Mental Status: She is alert  Psychiatric:         Mood and Affect: Mood normal          Behavior: Behavior normal          Thought Content:  Thought content normal          Judgment: Judgment normal           Additional Data:     Lab Results:  Results from last 7 days   Lab Units 04/20/22  2326   WBC Thousand/uL 5 57   HEMOGLOBIN g/dL 11 7   HEMATOCRIT % 37 1   PLATELETS Thousands/uL 204   NEUTROS PCT % 64   LYMPHS PCT % 25   MONOS PCT % 7   EOS PCT % 3     Results from last 7 days   Lab Units 04/20/22  2326   SODIUM mmol/L 138   POTASSIUM mmol/L 4 0   CHLORIDE mmol/L 102   CO2 mmol/L 27   BUN mg/dL 30*   CREATININE mg/dL 1 05   ANION GAP mmol/L 9   CALCIUM mg/dL 9 1   ALBUMIN g/dL 3 8   TOTAL BILIRUBIN mg/dL 0 28   ALK PHOS U/L 58   ALT U/L 25   AST U/L 15   GLUCOSE RANDOM mg/dL 185*     Results from last 7 days   Lab Units 04/20/22  2326   INR  1 01                   Imaging: Reviewed radiology reports from this admission including: chest xray and abdominal/pelvic CT  XR chest 1 view portable   ED Interpretation by Prema Farfan MD (04/21 0106)   No acute disease read by me  CT renal protocol   ED Interpretation by Prema Farfan MD (04/21 0046)   FINDINGS:     ABDOMEN     RIGHT KIDNEY AND URETER:  No solid renal mass  No detectable urothelial mass  No hydronephrosis or hydroureter  Punctate nonobstructing calculus the right superior pole  No perinephric collection      LEFT KIDNEY AND URETER:  No solid renal mass  1 4 cm cyst at the inferior pole of left kidney  No detectable urothelial mass  Mild left hydroureteronephrosis secondary to a 6 mm stone at the distal left ureter     Conglomerate of nonobstructing stones at the left inferior pole consisting of approximately 5 stones measuring on the order of 3 mm  No perinephric collection      URINARY BLADDER:  No bladder wall mass  No calculi         LOWER CHEST:  No clinically significant abnormality identified in the visualized lower chest      LIVER/BILIARY TREE:  One or more subcentimeter sharply circumscribed low-density hepatic lesion(s) are noted, too small to accurately characterize, but statistically most likely to represent subcentimeter hepatic cysts  No suspiciou   s solid hepatic   lesion is identified  Hepatic contours are normal   No biliary dilatation      GALLBLADDER:  No calcified gallstones  No pericholecystic inflammatory change      SPLEEN:  Unremarkable      PANCREAS:  Unremarkable      ADRENAL GLANDS:  Unremarkable      STOMACH AND BOWEL:  Unremarkable      ABDOMINOPELVIC CAVITY:  No ascites  No free intraperitoneal air   No lymphadenopathy      VESSELS:  Unremarkable for patient's age      PELVIS     REPRODUCTIVE ORGANS:  Unremarkable for patient's age      ABDOMINAL WALL/INGUINAL REGIONS: Unremarkable      OSSEOUS STRUCTURES:  No acute fracture or destructive osseous lesion      IMPRESSION:     Mild left hydroureteronephrosis secondary to a 6 mm stone at the distal left ureter       Bilateral nonobstructing nephrolithiasis     Workstation performed: [id number]      Final Result by Ilsa Montiel MD (04/21 0044)      Mild left hydroureteronephrosis secondary to a 6 mm stone at the distal left ureter        Bilateral nonobstructing nephrolithiasis      Workstation performed: [id number]             EKG and Other Studies Reviewed on Admission:       ** Please Note: This note has been constructed using a voice recognition system   **

## 2022-04-21 NOTE — DISCHARGE SUMMARY
[facility]  Discharge- Avelina Escobar 1940, 78 y o  female MRN: [id number]  Unit/Bed#: W -01 Encounter: [id number]  Primary Care Provider: Leah Mccauley DO   Date and time admitted to hospital: 4/20/2022 10:20 PM    * Hematuria  Assessment & Plan  · Gross hematuria for 1 day  No urinary symptoms, no pain  · Was seen in [facility] urgent care, discharged on Macrobid  Because of persistent hematuria patient came to [facility]  · CT renal protocol showed mild left hydroureteronephrosis secondary to a 6 mm stone at the distal left ureter  Bilateral nonobstructing nephrolithiasis  · Was started on IV fluids, tamsulosin  · Discussed with urology: recommend medical expulsive therapy    Plan:  · Tamsulosin  · Encouraged on p o  Hydration  · Advised to return to the ED if patient develops fever, chills, suprapubic pain, flank pain, or other signs of infection  · Urology will follow-up with patient next week were address resolution of symptoms versus definitive management if persistent symptoms    Nephrolithiasis  Assessment & Plan  · History of nephrolithiasis, per patient had lithotripsy in the past  · Last ER presentation in January with hematuria, was found to have 3 mm stone, was passed in ER  · Follows with Urology  · See above    Generalized anxiety disorder  Assessment & Plan  · Follows with psychiatry  · On buspirone 15 mg t i d , Prozac 80 mg daily, Klonopin 0 5 b i d     Mixed hyperlipidemia  Assessment & Plan  · Continue atorvastatin 10 mg daily    Diabetes mellitus [facility])  Assessment & Plan  Lab Results   Component Value Date    HGBA1C 6 5 (H) 12/15/2021       Recent Labs     04/21/22  0159 04/21/22  0523   POCGLU 90 101       Blood Sugar Average: Last 72 hrs:  · (P) 95 5 home medication metformin 500 mg b i d , continue    Hypertension  Assessment & Plan  · Blood pressure persistent elevated  · Patient on lisinopril-hydrochlorothiazide 10-12 5 mg daily    Reported that her blood pressure this morning was 145/65 mmHg but has intermittent high blood pressure  Patient was recently started on this medication  · Will need medication adjustment through PCP for better blood pressure control      Medical Problems             Resolved Problems  Date Reviewed: 4/20/2022    None              Discharging Resident: Rosy Michaud MD  Discharging Attending: No att  providers found  PCP: Estefani Bustillos DO  Admission Date:   Admission Orders (From admission, onward)     Ordered        04/21/22 0107  Place in Observation  Once                      Discharge Date: 04/21/22    Consultations During Hospital Stay:  · Urology    Procedures Performed:   · None    Significant Findings / Test Results:   · CT renal protocol:Mild left hydroureteronephrosis secondary to a 6 mm stone at the distal left ureter  Bilateral nonobstructing nephrolithiasis    Incidental Findings:   · None    Test Results Pending at Discharge (will require follow up): · None     Outpatient Tests Requested:  · None    Complications:  None    Reason for Admission:  Gross hematuria    Hospital Course:   Nickie Johnson is a 78 y o  female patient with hypertension, hyperlipidemia, recurrent urolithiasis, diabetes mellitus, DVT (not on anticoagulation), who originally presented to the hospital on 4/20/2022 due to gross hematuria  She was seen in [facility] and was prescribed antibiotics  However due to persistent hematuria, patient presented to the emergency department  On admission she had stable vital signs  CT renal protocol showed mild left hydroureteronephrosis with a 6 mm stone in the distal left ureter  She was started on IV fluids with tamsulosin  Urology was consulted  As the stone was in the distal ureter, and patient was hemodynamically stable and asymptomatic, Urology recommended medical expulsive therapy  Patient was discharged on tamsulosin    Advised to return to the emergency department if she develops any signs/symptoms of infection  She will be seen the urology office next week to evaluate for resolution of symptoms and if not for definitive management of persistent symptoms  Please see above list of diagnoses and related plan for additional information  Condition at Discharge: stable    Discharge Day Visit / Exam:     Subjective:  Feeling well, denies abdominal pain, flank pain  Hematuria is now resolved  No fevers or chills  Good p o  Intake  Vitals: Blood Pressure: 145/72 (04/21/22 0738)  Pulse: 79 (04/21/22 0738)  Temperature: 98 °F (36 7 °C) (04/21/22 0738)  Temp Source: Oral (04/20/22 2148)  Respirations: 16 (04/21/22 0738)  Height: 4' 8" (142 2 cm) (04/21/22 0203)  Weight - Scale: 58 kg (127 lb 13 9 oz) (04/21/22 0203)  SpO2: 98 % (04/21/22 0900)     Exam:   Physical Exam  Vitals reviewed  Constitutional:       General: She is not in acute distress  Appearance: Normal appearance  She is not ill-appearing or toxic-appearing  HENT:      Head: Normocephalic  Nose: Nose normal       Mouth/Throat:      Mouth: Mucous membranes are moist       Pharynx: Oropharynx is clear  Eyes:      Pupils: Pupils are equal, round, and reactive to light  Cardiovascular:      Rate and Rhythm: Normal rate and regular rhythm  Pulses: Normal pulses  Heart sounds: Normal heart sounds  No murmur heard  Pulmonary:      Effort: Pulmonary effort is normal  No respiratory distress  Breath sounds: Normal breath sounds  No wheezing or rales  Abdominal:      General: There is no distension  Palpations: Abdomen is soft  Tenderness: There is no abdominal tenderness  There is no right CVA tenderness or left CVA tenderness  Musculoskeletal:      Right lower leg: No edema  Left lower leg: No edema  Skin:     General: Skin is warm  Capillary Refill: Capillary refill takes less than 2 seconds  Neurological:      Mental Status: She is alert   Mental status is at baseline  Psychiatric:         Mood and Affect: Mood normal           Discussion with Family: daughter updated by Dr Weeks Most  Discharge instructions/Information to patient and family:   See after visit summary for information provided to patient and family  Provisions for Follow-Up Care:  See after visit summary for information related to follow-up care and any pertinent home health orders  Disposition:   Home    Planned Readmission: none     Discharge Medications:  See after visit summary for reconciled discharge medications provided to patient and/or family        **Please Note: This note may have been constructed using a voice recognition system**

## 2022-04-21 NOTE — ASSESSMENT & PLAN NOTE
Lab Results   Component Value Date    HGBA1C 6 5 (H) 12/15/2021       Recent Labs     04/21/22  0159 04/21/22  0523   POCGLU 90 101       Blood Sugar Average: Last 72 hrs:  · (P) 95 5 home medication metformin 500 mg b i d , continue

## 2022-04-21 NOTE — ASSESSMENT & PLAN NOTE
· History of nephrolithiasis, per patient had lithotripsy in the past  · Last ER presentation in January with hematuria, was found to have 3 mm stone, was passed in ER     · Follows with Urology  · See above

## 2022-04-21 NOTE — DISCHARGE INSTRUCTIONS
Kidney Stones   WHAT YOU NEED TO KNOW:   Kidney stones form in the urinary system when the water and waste in your urine are out of balance  When this happens, certain types of waste crystals separate from the urine  The crystals build up and form kidney stones  You may have more than one kidney stone  DISCHARGE INSTRUCTIONS:   Seek care immediately if:   · You are vomiting and it is not relieved with medicine  Call your doctor or kidney specialist if:   · You have a fever  · You have trouble urinating  · You see blood in your urine  · You have severe pain  · You have any questions or concerns about your condition or care  Medicines: You may need any of the following:  · NSAIDs , such as ibuprofen, help decrease swelling, pain, and fever  This medicine is available with or without a doctor's order  NSAIDs can cause stomach bleeding or kidney problems in certain people  If you take blood thinner medicine, always ask your healthcare provider if NSAIDs are safe for you  Always read the medicine label and follow directions  · Acetaminophen  decreases pain and fever  It is available without a doctor's order  Ask how much to take and how often to take it  Follow directions  Read the labels of all other medicines you are using to see if they also contain acetaminophen, or ask your doctor or pharmacist  Acetaminophen can cause liver damage if not taken correctly  Do not use more than 4 grams (4,000 milligrams) total of acetaminophen in one day  · Prescription pain medicine  may be given  Ask your healthcare provider how to take this medicine safely  Some prescription pain medicines contain acetaminophen  Do not take other medicines that contain acetaminophen without talking to your healthcare provider  Too much acetaminophen may cause liver damage  Prescription pain medicine may cause constipation  Ask your healthcare provider how to prevent or treat constipation       · Medicines  to balance your electrolytes may be needed  · Take your medicine as directed  Contact your healthcare provider if you think your medicine is not helping or if you have side effects  Tell him or her if you are allergic to any medicine  Keep a list of the medicines, vitamins, and herbs you take  Include the amounts, and when and why you take them  Bring the list or the pill bottles to follow-up visits  Carry your medicine list with you in case of an emergency  What you can do to manage kidney stones:   · Drink more liquids  Your healthcare provider may tell you to drink at least 8 to 12 (eight-ounce) cups of liquids each day  This helps flush out the kidney stones when you urinate  Water is the best liquid to drink  · Strain your urine every time you go to the bathroom  Urinate through a strainer or a piece of thin cloth to catch the stones  Take the stones to your healthcare provider so they can be sent to the lab for tests  This will help your healthcare providers plan the best treatment for you  · Ask if you should avoid any foods  You may need to limit oxalate  Oxalate is a chemical found in some plant foods  The most common type of kidney stone is made up of crystals that contain calcium and oxalate  Your healthcare provider or dietitian may recommend that you limit oxalate if you get this type of kidney stone often  You may need to limit how much sodium (salt) or protein you eat  Ask for information about the best foods for you  · Be physically active as directed  Your stones may pass more easily if you stay active  Physical activity can also help you manage your weight  Ask about the best activities for you  After you pass the kidney stones: Your healthcare provider may  order a 24-hour urine test  Results from a 24-hour urine test will help your healthcare provider plan ways to prevent more stones from forming  Your healthcare provider will give you more instructions    Follow up with your doctor or kidney specialist as directed:  Write down your questions so you remember to ask them during your visits  © Copyright [employer] 2022 Information is for End User's use only and may not be sold, redistributed or otherwise used for commercial purposes  All illustrations and images included in CareNotes® are the copyrighted property of [employer]  or Alex Snowden  The above information is an  only  It is not intended as medical advice for individual conditions or treatments  Talk to your doctor, nurse or pharmacist before following any medical regimen to see if it is safe and effective for you

## 2022-04-21 NOTE — ASSESSMENT & PLAN NOTE
· Gross hematuria for 1 day  No urinary symptoms, no pain  · Was seen in [facility] urgent care, discharged on Macrobid  Because of persistent hematuria patient came to [facility]  · CT renal protocol showed mild left hydroureteronephrosis secondary to a 6 mm stone at the distal left ureter  Bilateral nonobstructing nephrolithiasis  · Hemoglobin stable but decreased compared to a few months ago  · Was started on IV fluids, tamsulosin and ceftriaxone in ER  · Etiology for hematuria nephrolithiasis, unlikely UTI    Plan:  · Continue IV normal saline with 100 cc/h   · Continue tamsulosin  · Strain all urine  · Monitor off antibiotics  Patient's urine is clear and she has no symptoms     · Tylenol for pain  · Urology consult

## 2022-04-21 NOTE — PLAN OF CARE
Problem: MOBILITY - ADULT  Goal: Maintain or return to baseline ADL function  Description: INTERVENTIONS:  -  Assess patient's ability to carry out ADLs; assess patient's baseline for ADL function and identify physical deficits which impact ability to perform ADLs (bathing, care of mouth/teeth, toileting, grooming, dressing, etc )  - Assess/evaluate cause of self-care deficits   - Assess range of motion  - Assess patient's mobility; develop plan if impaired  - Assess patient's need for assistive devices and provide as appropriate  - Encourage maximum independence but intervene and supervise when necessary  - Involve family in performance of ADLs  - Assess for home care needs following discharge   - Consider OT consult to assist with ADL evaluation and planning for discharge  - Provide patient education as appropriate  4/21/2022 1051 by Lucretia Palma  Outcome: Adequate for Discharge  4/21/2022 0915 by Lucretia Palma  Outcome: Progressing  Goal: Maintains/Returns to pre admission functional level  Description: INTERVENTIONS:  - Perform BMAT or MOVE assessment daily    - Set and communicate daily mobility goal to care team and patient/family/caregiver  - Collaborate with rehabilitation services on mobility goals if consulted  - Perform Range of Motion 2 times a day  - Reposition patient every 2 hours    - Dangle patient 2 times a day  - Stand patient 2 times a day  - Ambulate patient 2 times a day  - Out of bed to chair 2 times a day   - Out of bed for meals 2 times a day  - Out of bed for toileting  - Record patient progress and toleration of activity level   4/21/2022 1051 by Lucretia Palma  Outcome: Adequate for Discharge  4/21/2022 0915 by Lucretia Palma  Outcome: Progressing     Problem: Potential for Falls  Goal: Patient will remain free of falls  Description: INTERVENTIONS:  - Educate patient/family on patient safety including physical limitations  - Instruct patient to call for assistance with activity   - Consult OT/PT to assist with strengthening/mobility   - Keep Call bell within reach  - Keep bed low and locked with side rails adjusted as appropriate  - Keep care items and personal belongings within reach  - Initiate and maintain comfort rounds  - Make Fall Risk Sign visible to staff  - Offer Toileting every 2 Hours, in advance of need  - Initiate/Maintain bed alarm  - Obtain necessary fall risk management equipment: bed  - Apply yellow socks and bracelet for high fall risk patients  - Consider moving patient to room near nurses station  4/21/2022 1051 by Rose Flores  Outcome: Adequate for Discharge  4/21/2022 0915 by Rose Flores  Outcome: Progressing     Problem: Prexisting or High Potential for Compromised Skin Integrity  Goal: Skin integrity is maintained or improved  Description: INTERVENTIONS:  - Identify patients at risk for skin breakdown  - Assess and monitor skin integrity  - Assess and monitor nutrition and hydration status  - Monitor labs   - Assess for incontinence   - Turn and reposition patient  - Assist with mobility/ambulation  - Relieve pressure over bony prominences  - Avoid friction and shearing  - Provide appropriate hygiene as needed including keeping skin clean and dry  - Evaluate need for skin moisturizer/barrier cream  - Collaborate with interdisciplinary team   - Patient/family teaching  - Consider wound care consult   4/21/2022 1051 by Rose Flores  Outcome: Adequate for Discharge  4/21/2022 0915 by Rose Flores  Outcome: Progressing     Problem: PAIN - ADULT  Goal: Verbalizes/displays adequate comfort level or baseline comfort level  Description: Interventions:  - Encourage patient to monitor pain and request assistance  - Assess pain using appropriate pain scale  - Administer analgesics based on type and severity of pain and evaluate response  - Implement non-pharmacological measures as appropriate and evaluate response  - Consider cultural and social influences on pain and pain management  - Notify physician/advanced practitioner if interventions unsuccessful or patient reports new pain  4/21/2022 1051 by Lazaro Doan  Outcome: Adequate for Discharge  4/21/2022 0915 by Lazaro Doan  Outcome: Progressing     Problem: INFECTION - ADULT  Goal: Absence or prevention of progression during hospitalization  Description: INTERVENTIONS:  - Assess and monitor for signs and symptoms of infection  - Monitor lab/diagnostic results  - Monitor all insertion sites, i e  indwelling lines, tubes, and drains  - Monitor endotracheal if appropriate and nasal secretions for changes in amount and color  - [place] appropriate cooling/warming therapies per order  - Administer medications as ordered  - Instruct and encourage patient and family to use good hand hygiene technique  - Identify and instruct in appropriate isolation precautions for identified infection/condition  4/21/2022 1051 by Lazaro Doan  Outcome: Adequate for Discharge  4/21/2022 0915 by Lazaro Doan  Outcome: Progressing  Goal: Absence of fever/infection during neutropenic period  Description: INTERVENTIONS:  - Monitor WBC    4/21/2022 1051 by Lazaro Doan  Outcome: Adequate for Discharge  4/21/2022 0915 by Lazaro Doan  Outcome: Progressing     Problem: DISCHARGE PLANNING  Goal: Discharge to home or other facility with appropriate resources  Description: INTERVENTIONS:  - Identify barriers to discharge w/patient and caregiver  - Arrange for needed discharge resources and transportation as appropriate  - Identify discharge learning needs (meds, wound care, etc )  - Arrange for interpretive services to assist at discharge as needed  - Refer to Case Management Department for coordinating discharge planning if the patient needs post-hospital services based on physician/advanced practitioner order or complex needs related to functional status, cognitive ability, or social support system  4/21/2022 1051 by Lazaro Doan  Outcome: Adequate for Discharge  4/21/2022 0915 by Stefano Terrazas Katarzyna  Outcome: Progressing     Problem: Knowledge Deficit  Goal: Patient/family/caregiver demonstrates understanding of disease process, treatment plan, medications, and discharge instructions  Description: Complete learning assessment and assess knowledge base    Interventions:  - Provide teaching at level of understanding  - Provide teaching via preferred learning methods  4/21/2022 1051 by Sarah Owen  Outcome: Adequate for Discharge  4/21/2022 0915 by Sarah Owen  Outcome: Progressing

## 2022-04-21 NOTE — ASSESSMENT & PLAN NOTE
· Blood pressure persistent elevated  · Patient on lisinopril-hydrochlorothiazide 10-12 5 mg daily  Reported that her blood pressure this morning was 145/65 mmHg but has intermittent high blood pressure    Patient was recently started on this medication  · Will need medication adjustment through PCP for better blood pressure control

## 2022-04-21 NOTE — ASSESSMENT & PLAN NOTE
Lab Results   Component Value Date    HGBA1C 6 5 (H) 12/15/2021       No results for input(s): POCGLU in the last 72 hours      Blood Sugar Average: Last 72 hrs:  ·  home medication metformin 500 mg b i d   · Hold metformin while inpatient,  · SSI and Accu-Chek  · Diabetic diet

## 2022-04-21 NOTE — ASSESSMENT & PLAN NOTE
· Gross hematuria for 1 day  No urinary symptoms, no pain  · Was seen in [facility] urgent care, discharged on Macrobid  Because of persistent hematuria patient came to [facility]  · CT renal protocol showed mild left hydroureteronephrosis secondary to a 6 mm stone at the distal left ureter  Bilateral nonobstructing nephrolithiasis  · Was started on IV fluids, tamsulosin  · Discussed with urology: recommend medical expulsive therapy    Plan:  · Tamsulosin  · Encouraged on p o   Hydration  · Advised to return to the ED if patient develops fever, chills, suprapubic pain, flank pain, or other signs of infection  · Urology will follow-up with patient next week were address resolution of symptoms versus definitive management if persistent symptoms

## 2022-04-21 NOTE — ASSESSMENT & PLAN NOTE
· Blood pressure persistent elevated  · Patient on lisinopril-hydrochlorothiazide 10-12 5 mg daily  Reported that her blood pressure this morning was 145/65 mmHg but has intermittent high blood pressure  Patient was recently started on this medication  · Will need medication adjustment through PCP for better blood pressure control  · Hydralazine 10 mg p r n   For systolic blood pressure >169 mmHg

## 2022-04-21 NOTE — ED PROVIDER NOTES
History  Chief Complaint   Patient presents with    Blood in Urine     Pt states she has noticed discolored urine recently  Today noticed pink/red urine, seen at urgent care and given pyridium/macrobid  Denies ABD pain, CP/SOB, fevers, denies GI sx  Hx kidney stones/bloody urine  Patient is a 78year old female with hematuria today without clots  No trauma  No fever  No N/V  No abdominal pain or back or flank pain  Was seen by Mica Bustillos today and started on pyridium and macrobid  Was last seen in this ED on 1/27/22 for hydronephrosis with ureteral calculus  Takes low dose ASA  No other anticoagulant use  (+) Increased anxiety from blood in urine  [facility] website checked on this patient and last Rx filled was on 4/12/22 for clonazepam for 30 day supply  History provided by:  Patient and relative (daughter)   used: No    Blood in Urine  Pertinent negatives include no abdominal pain, fever, flank pain, nausea or vomiting  Prior to Admission Medications   Prescriptions Last Dose Informant Patient Reported? Taking?    FLUoxetine (PROzac) 40 MG capsule   No No   Sig: Take 2 capsules (80 mg total) by mouth daily   aspirin 81 MG tablet  Self Yes No   Sig: Take 81 mg by mouth daily   atorvastatin (LIPITOR) 10 mg tablet   No No   Sig: Take 1 tablet (10 mg total) by mouth daily   bisacodyl (DULCOLAX) 10 mg suppository   No No   Sig: Insert 1 suppository (10 mg total) into the rectum daily for 7 days   busPIRone (BUSPAR) 15 mg tablet   No No   Sig: Take 1 tablet (15 mg total) by mouth 3 (three) times a day   cholecalciferol (VITAMIN D3) 1,000 units tablet  Self Yes No   Sig: Take 1,000 Units by mouth daily   clonazePAM (KlonoPIN) 0 5 mg tablet   No No   Sig: Take 1 tablet (0 5 mg total) by mouth 3 (three) times a day   docusate sodium (COLACE) 100 mg capsule  Self Yes No   Sig: Take 100 mg by mouth 2 (two) times a day   linaCLOtide (Linzess) 145 MCG CAPS   No No   Sig: Take 1 capsule (145 mcg total) by mouth daily   lisinopril-hydrochlorothiazide (PRINZIDE,ZESTORETIC) 10-12 5 MG per tablet   No No   Sig: Take 1 tablet by mouth daily   metFORMIN (GLUCOPHAGE) 500 mg tablet   No No   Sig: Take 1 tablet (500 mg total) by mouth 2 (two) times a day with meals   polyethylene glycol (MIRALAX) 17 g packet   No No   Sig: Take 17 g by mouth daily for 7 days      Facility-Administered Medications: None       Past Medical History:   Diagnosis Date    Colon polyps     Diabetes mellitus ([facility] )     Dizziness     Last assessed: 8/31/15    DVT [facility]deep venous thrombosis) [facility] )     Dysuria     Hyperlipidemia     Hypertension     Hypertensive urgency 10/22/2021    Insomnia     Ureterolithiasis 2020       Past Surgical History:   Procedure Laterality Date     SECTION      1964 son, 26 daughter    COLONOSCOPY      [facility] RETROGRADE PYELOGRAM  2020    MD COLONOSCOPY FLX DX W/COLLJ SPEC WHEN PFRMD N/A 3/27/2017    Procedure: COLONOSCOPY;  Surgeon: Estrella Hernandez MD;  Location: [facility]; Service: Gastroenterology    MD CYSTO/URETERO W/LITHOTRIPSY &INDWELL STENT INSRT Right 2020    Procedure: CYSTOSCOPY URETEROSCOPY WITH LITHOTRIPSY HOLMIUM LASER, RETROGRADE PYELOGRAM AND INSERTION STENT URETERAL; EXCISION OF BLADDER TUMOR;  Surgeon: Precious Urias MD;  Location: [facility];  Service: Urology    ROTATOR CUFF REPAIR Left     Last assessed: 3/29/15    TONSILLECTOMY         Family History   Problem Relation Age of Onset    Depression Mother         w/anxiety    Diabetes Mother         Mellitus    Breast cancer Mother     Hypertension Mother     No Known Problems Father     Depression Sister         w/anxiety    Heart disease Sister         Cardiac Disorder    Breast cancer Sister     Hypertension Sister     Diabetes Brother         Mellitus    Alcohol abuse Son      I have reviewed and agree with the history as documented      E-Cigarette/Vaping    E-Cigarette Use Never User      E-Cigarette/Vaping Substances     Social History     Tobacco Use    Smoking status: Never Smoker    Smokeless tobacco: Never Used   Vaping Use    Vaping Use: Never used   Substance Use Topics    Alcohol use: No    Drug use: No       Review of Systems   Constitutional: Negative for fever  Gastrointestinal: Negative for abdominal pain, nausea and vomiting  Genitourinary: Positive for hematuria  Negative for flank pain  Musculoskeletal: Negative for back pain  Psychiatric/Behavioral: The patient is nervous/anxious  All other systems reviewed and are negative  Physical Exam  Physical Exam  Vitals and nursing note reviewed  Constitutional:       General: She is in acute distress (mild)  HENT:      Head: Normocephalic and atraumatic  Mouth/Throat:      Mouth: Mucous membranes are moist    Eyes:      General: No scleral icterus  Conjunctiva/sclera: Conjunctivae normal    Cardiovascular:      Rate and Rhythm: Normal rate and regular rhythm  Heart sounds: Normal heart sounds  No murmur heard  Pulmonary:      Effort: Pulmonary effort is normal  No respiratory distress  Breath sounds: Normal breath sounds  Abdominal:      General: Bowel sounds are normal  There is no distension  Palpations: Abdomen is soft  Tenderness: There is no abdominal tenderness  There is no right CVA tenderness or left CVA tenderness  Musculoskeletal:         General: No deformity  Cervical back: Normal range of motion and neck supple  Right lower leg: No edema  Left lower leg: No edema  Skin:     General: Skin is warm and dry  Findings: No erythema or rash  Neurological:      General: No focal deficit present  Mental Status: She is alert and oriented to person, place, and time  Psychiatric:      Comments: Somewhat anxious            Vital Signs  ED Triage Vitals   Temperature Pulse Respirations Blood Pressure SpO2   04/20/22 2148 04/20/22 2148 04/20/22 2148 04/20/22 2148 04/20/22 2148   98 1 °F (36 7 °C) 83 20 (!) 201/84 98 %      Temp Source Heart Rate Source Patient Position - Orthostatic VS BP Location FiO2 (%)   04/20/22 2148 04/20/22 2148 04/21/22 0044 04/20/22 2148 --   Oral Monitor Lying Left arm       Pain Score       --                  Vitals:    04/20/22 2148 04/21/22 0044   BP: (!) 201/84 (!) 190/86   Pulse: 83 78   Patient Position - Orthostatic VS:  Lying         Visual Acuity  Visual Acuity      Most Recent Value   L Pupil Size (mm) 2   R Pupil Size (mm) 2          ED Medications  Medications   sodium chloride 0 9 % infusion (125 mL/hr Intravenous New Bag 4/21/22 0104)   ceftriaxone (ROCEPHIN) 1 g/50 mL in dextrose IVPB (has no administration in time range)   sodium chloride 0 9 % bolus 250 mL (0 mL Intravenous Stopped 4/21/22 0036)   iohexol (OMNIPAQUE) 350 MG/ML injection (SINGLE-DOSE) 100 mL (100 mL Intravenous Given 4/21/22 0011)   tamsulosin (FLOMAX) capsule 0 4 mg (0 4 mg Oral Given 4/21/22 0103)       Diagnostic Studies  Results Reviewed     Procedure Component Value Units Date/Time    Protime-INR [[id number]]  (Normal) Collected: 04/20/22 2326    Lab Status: Final result Specimen: Blood from Arm, Left Updated: 04/21/22 0010     Protime 13 3 seconds      INR 1 01    APTT [[id number]]  (Normal) Collected: 04/20/22 2326    Lab Status: Final result Specimen: Blood from Arm, Left Updated: 04/21/22 0010     PTT 29 seconds     Urine Microscopic [[id number]]  (Abnormal) Collected: 04/20/22 2304    Lab Status: Final result Specimen: Urine, Clean Catch Updated: 04/20/22 2359     RBC, UA Innumerable /hpf      WBC, UA 1-2 /hpf      Epithelial Cells Occasional /hpf      Bacteria, UA Occasional /hpf     CK Total with Reflex CKMB [[id number]]  (Normal) Collected: 04/20/22 2326    Lab Status: Final result Specimen: Blood from Arm, Left Updated: 04/20/22 2353     Total CK 80 U/L     Comprehensive metabolic panel [[id number]]  (Abnormal) Collected: 04/20/22 2326    Lab Status: Final result Specimen: Blood from Arm, Left Updated: 04/20/22 2353     Sodium 138 mmol/L      Potassium 4 0 mmol/L      Chloride 102 mmol/L      CO2 27 mmol/L      ANION GAP 9 mmol/L      BUN 30 mg/dL      Creatinine 1 05 mg/dL      Glucose 185 mg/dL      Calcium 9 1 mg/dL      AST 15 U/L      ALT 25 U/L      Alkaline Phosphatase 58 U/L      Total Protein 7 2 g/dL      Albumin 3 8 g/dL      Total Bilirubin 0 28 mg/dL      eGFR 49 ml/min/1 73sq m     Narrative:      [place] guidelines for Chronic Kidney Disease (CKD):     Stage 1 with normal or high GFR (GFR > 90 mL/min/1 73 square meters)    Stage 2 Mild CKD (GFR = 60-89 mL/min/1 73 square meters)    Stage 3A Moderate CKD (GFR = 45-59 mL/min/1 73 square meters)    Stage 3B Moderate CKD (GFR = 30-44 mL/min/1 73 square meters)    Stage 4 Severe CKD (GFR = 15-29 mL/min/1 73 square meters)    Stage 5 End Stage CKD (GFR <15 mL/min/1 73 square meters)  Note: GFR calculation is accurate only with a steady state creatinine    CBC and differential [[id number]] Collected: 04/20/22 2326    Lab Status: Final result Specimen: Blood from Arm, Left Updated: 04/20/22 2342     WBC 5 57 Thousand/uL      RBC 4 13 Million/uL      Hemoglobin 11 7 g/dL      Hematocrit 37 1 %      MCV 90 fL      MCH 28 3 pg      MCHC 31 5 g/dL      RDW 14 0 %      MPV 10 9 fL      Platelets 984 Thousands/uL      nRBC 0 /100 WBCs      Neutrophils Relative 64 %      Immat GRANS % 0 %      Lymphocytes Relative 25 %      Monocytes Relative 7 %      Eosinophils Relative 3 %      Basophils Relative 1 %      Neutrophils Absolute 3 57 Thousands/µL      Immature Grans Absolute 0 02 Thousand/uL      Lymphocytes Absolute 1 39 Thousands/µL      Monocytes Absolute 0 40 Thousand/µL      Eosinophils Absolute 0 16 Thousand/µL      Basophils Absolute 0 03 Thousands/µL     UA (URINE) with reflex to Scope [[id number]]  (Abnormal) Collected: 04/20/22 2304    Lab Status: Final result Specimen: Urine, Clean Catch Updated: 04/20/22 2319     Color, UA [place]     Clarity, UA Cloudy     Specific [place], UA 1 020     pH, UA 5 5     Leukocytes, UA Elevated glucose may cause decreased leukocyte values  See urine microscopic for [facility] result/     Nitrite, UA Negative     Protein, UA 30 (1+) mg/dl      Glucose, UA >=1000 (1%) mg/dl      Ketones, UA Negative mg/dl      Urobilinogen, UA 0 2 E U /dl      Bilirubin, UA Negative     Blood, UA Large    Urine culture [[id number]] Collected: 04/20/22 2304    Lab Status: In process Specimen: Urine, Clean Catch Updated: 04/20/22 2315                 XR chest 1 view portable   ED Interpretation by Karolina Loaiza MD (04/21 0106)   No acute disease read by me  CT renal protocol   ED Interpretation by Karolina Loaiza MD (04/21 0046)   FINDINGS:     ABDOMEN     RIGHT KIDNEY AND URETER:  No solid renal mass  No detectable urothelial mass  No hydronephrosis or hydroureter  Punctate nonobstructing calculus the right superior pole  No perinephric collection      LEFT KIDNEY AND URETER:  No solid renal mass  1 4 cm cyst at the inferior pole of left kidney  No detectable urothelial mass  Mild left hydroureteronephrosis secondary to a 6 mm stone at the distal left ureter     Conglomerate of nonobstructing stones at the left inferior pole consisting of approximately 5 stones measuring on the order of 3 mm  No perinephric collection      URINARY BLADDER:  No bladder wall mass  No calculi         LOWER CHEST:  No clinically significant abnormality identified in the visualized lower chest      LIVER/BILIARY TREE:  One or more subcentimeter sharply circumscribed low-density hepatic lesion(s) are noted, too small to accurately characterize, but statistically most likely to represent subcentimeter hepatic cysts  No suspiciou   s solid hepatic   lesion is identified    Hepatic contours are normal   No biliary dilatation      GALLBLADDER:  No calcified gallstones  No pericholecystic inflammatory change      SPLEEN:  Unremarkable      PANCREAS:  Unremarkable      ADRENAL GLANDS:  Unremarkable      STOMACH AND BOWEL:  Unremarkable      ABDOMINOPELVIC CAVITY:  No ascites  No free intraperitoneal air  No lymphadenopathy      VESSELS:  Unremarkable for patient's age      PELVIS     REPRODUCTIVE ORGANS:  Unremarkable for patient's age      ABDOMINAL WALL/INGUINAL REGIONS:  Unremarkable      OSSEOUS STRUCTURES:  No acute fracture or destructive osseous lesion      IMPRESSION:     Mild left hydroureteronephrosis secondary to a 6 mm stone at the distal left ureter       Bilateral nonobstructing nephrolithiasis     Workstation performed: [id number]      Final Result by Bam Honeycutt MD (04/21 0044)      Mild left hydroureteronephrosis secondary to a 6 mm stone at the distal left ureter        Bilateral nonobstructing nephrolithiasis      Workstation performed: [id number]                    Procedures  ECG 12 Lead Documentation Only    Date/Time: 4/21/2022 1:11 AM  Performed by: Salud Parra MD  Authorized by: Salud Parra MD     Indications / Diagnosis:  Hematuria; kidney stone  ECG reviewed by me, the ED Provider: yes    Patient location:  ED  Previous ECG:     Previous ECG:  Compared to current    Comparison ECG info:  10/22/21    Similarity:  No change  Quality:     Tracing quality:  Limited by artifact  Rate:     ECG rate:  72    ECG rate assessment: normal    Rhythm:     Rhythm: sinus rhythm    Ectopy:     Ectopy: none    QRS:     QRS axis:  Normal    QRS intervals:  Normal  Conduction:     Conduction: normal    ST segments:     ST segments:  Non-specific  T waves:     T waves: non-specific               ED Course  ED Course as of 04/21/22 0116   Thu Apr 21, 2022   0000 eGFR: 49  IVFs ordered  [place] d/w patient and daughter with patient's permission  1225 CT d/w patient and daughter   Daughter wants patient to be admitted overnight for observation  MDM  Number of Diagnoses or Management Options  Diagnosis management comments: DDx including but not limited to: UTI, hemorrhagic cystitis, coagulopathy, anemia, renal colic, pyelonephritis, tumor, urinary retention, recent instrumentation, rhabdomyolysis  Amount and/or Complexity of Data Reviewed  Clinical lab tests: ordered and reviewed  Tests in the radiology section of CPT®: ordered and reviewed  Decide to obtain previous medical records or to obtain history from someone other than the patient: yes  Obtain history from someone other than the patient: yes  Review and summarize past medical records: yes  Independent visualization of images, tracings, or specimens: yes        Disposition  Final diagnoses:   Hydronephrosis concurrent with and due to calculi of kidney and ureter - left side   Hematuria   UTI (urinary tract infection)   Dehydration     Time reflects when diagnosis was documented in both MDM as applicable and the Disposition within this note     Time User Action Codes Description Comment    4/21/2022 12:47 AM Catha Dimmer Add [N13 2] Hydronephrosis concurrent with and due to calculi of kidney and ureter     4/21/2022 12:47 AM Catha Dimmer Modify [N13 2] Hydronephrosis concurrent with and due to calculi of kidney and ureter left side    4/21/2022 12:47 AM Catha Dimmer Add [R31 9] Hematuria     4/21/2022 12:47 AM Catha Dimmer Add [N39 0] UTI (urinary tract infection)     4/21/2022  1:06 AM Catha Dimmer Add [E86 0] Dehydration       ED Disposition     ED Disposition Condition Date/Time Comment    Admit Stable Thu Apr 21, 2022  1:07 AM Case was discussed with [facility] resident and the patient's admission status was agreed to be Admission Status: observation status to the service of Dr Marielena Baez           Follow-up Information    None         Patient's Medications   Discharge Prescriptions    No medications on file No discharge procedures on file      PDMP Review       Value Time User    PDMP Reviewed  Yes 4/20/2022 10:34 PM Fior Martino MD          ED Provider  Electronically Signed by           Fior Martino MD  04/21/22 6099

## 2022-04-21 NOTE — PLAN OF CARE
Problem: MOBILITY - ADULT  Goal: Maintain or return to baseline ADL function  Description: INTERVENTIONS:  -  Assess patient's ability to carry out ADLs; assess patient's baseline for ADL function and identify physical deficits which impact ability to perform ADLs (bathing, care of mouth/teeth, toileting, grooming, dressing, etc )  - Assess/evaluate cause of self-care deficits   - Assess range of motion  - Assess patient's mobility; develop plan if impaired  - Assess patient's need for assistive devices and provide as appropriate  - Encourage maximum independence but intervene and supervise when necessary  - Involve family in performance of ADLs  - Assess for home care needs following discharge   - Consider OT consult to assist with ADL evaluation and planning for discharge  - Provide patient education as appropriate  Outcome: Progressing  Goal: Maintains/Returns to pre admission functional level  Description: INTERVENTIONS:  - Perform BMAT or MOVE assessment daily    - Set and communicate daily mobility goal to care team and patient/family/caregiver     - Collaborate with rehabilitation services on mobility goals if consulted  - Out of bed for toileting  - Record patient progress and toleration of activity level   Outcome: Progressing     Problem: Prexisting or High Potential for Compromised Skin Integrity  Goal: Skin integrity is maintained or improved  Description: INTERVENTIONS:  - Identify patients at risk for skin breakdown  - Assess and monitor skin integrity  - Assess and monitor nutrition and hydration status  - Monitor labs   - Assess for incontinence   - Turn and reposition patient  - Assist with mobility/ambulation  - Relieve pressure over bony prominences  - Avoid friction and shearing  - Provide appropriate hygiene as needed including keeping skin clean and dry  - Evaluate need for skin moisturizer/barrier cream  - Collaborate with interdisciplinary team   - Patient/family teaching  - Consider wound care consult   Outcome: Progressing     Problem: PAIN - ADULT  Goal: Verbalizes/displays adequate comfort level or baseline comfort level  Description: Interventions:  - Encourage patient to monitor pain and request assistance  - Assess pain using appropriate pain scale  - Administer analgesics based on type and severity of pain and evaluate response  - Implement non-pharmacological measures as appropriate and evaluate response  - Consider cultural and social influences on pain and pain management  - Notify physician/advanced practitioner if interventions unsuccessful or patient reports new pain  Outcome: Progressing     Problem: INFECTION - ADULT  Goal: Absence or prevention of progression during hospitalization  Description: INTERVENTIONS:  - Assess and monitor for signs and symptoms of infection  - Monitor lab/diagnostic results  - Monitor all insertion sites, i e  indwelling lines, tubes, and drains  - Monitor endotracheal if appropriate and nasal secretions for changes in amount and color  - Sod appropriate cooling/warming therapies per order  - Administer medications as ordered  - Instruct and encourage patient and family to use good hand hygiene technique  - Identify and instruct in appropriate isolation precautions for identified infection/condition  Outcome: Progressing  Goal: Absence of fever/infection during neutropenic period  Description: INTERVENTIONS:  - Monitor WBC    Outcome: Progressing     Problem: DISCHARGE PLANNING  Goal: Discharge to home or other facility with appropriate resources  Description: INTERVENTIONS:  - Identify barriers to discharge w/patient and caregiver  - Arrange for needed discharge resources and transportation as appropriate  - Identify discharge learning needs (meds, wound care, etc )  - Arrange for interpretive services to assist at discharge as needed  - Refer to Case Management Department for coordinating discharge planning if the patient needs post-hospital services based on physician/advanced practitioner order or complex needs related to functional status, cognitive ability, or social support system  Outcome: Progressing     Problem: Knowledge Deficit  Goal: Patient/family/caregiver demonstrates understanding of disease process, treatment plan, medications, and discharge instructions  Description: Complete learning assessment and assess knowledge base    Interventions:  - Provide teaching at level of understanding  - Provide teaching via preferred learning methods  Outcome: Progressing

## 2022-04-21 NOTE — DISCHARGE INSTR - AVS FIRST PAGE
Dear Lexii Weaver,     It was our pleasure to care for you here at [facility]  It is our hope that we were always able to exceed the expected standards for your care during your stay  You were hospitalized due to kidney stone  You were cared for on the 4th floor under the service of Mak Gomes MD with the [facility] who covers for your primary care physician (PCP), Fabiana Doll DO, while you were hospitalized  If you have any questions or concerns related to this hospitalization, you may contact us at [phone]  For follow up as well as medication refills, we recommend that you follow up with your primary care physician  A registered nurse will reach out to you by phone within a few days after your discharge to answer any additional questions that you may have after going home  However, at this time we provide for you here, the most important instructions / recommendations at discharge:     Notable Medication Adjustments -   Continue to take flomax once daily  Testing Required after Discharge -   None   Important follow up information -   Please strain all urine and keep stone when passed for analysis  Other Instructions -   Any sign of worsening condition including fevers, chills, increased lethargy, or nausea and vomiting, please return to the ED for further treatment   Please review this entire after visit summary as additional general instructions including medication list, appointments, activity, diet, any pertinent wound care, and other additional recommendations from your care team that may be provided for you        Sincerely,     Mak Gomes MD

## 2022-04-21 NOTE — ASSESSMENT & PLAN NOTE
· History of nephrolithiasis, per patient had lithotripsy in the past  · Last ER presentation in January with hematuria, was found to have 3 mm stone, was passed in ER     · Follows with Urology

## 2022-04-21 NOTE — TREATMENT PLAN
Nickie Johnson is an 68-year-old female admitted for left flank pain secondary to CT confirmed left obstructing ureteral calculus--6 mm and distal with mild hydronephrosis  Patient was admitted overnight for symptom management  She is afebrile, hemodynamically stable without evidence of toxicity/infection, acute kidney injury or uncontrolled pain  She will be discharged by the Internal Medicine service to continue medical expulsive therapy  Should patient developed fever, chills or intractable symptoms would recommend ER re-evaluation  Our service will contact patient for symptom reassessment with reimaging and potential for fast track ureteroscopy outpatient

## 2022-04-21 NOTE — PLAN OF CARE
Problem: MOBILITY - ADULT  Goal: Maintain or return to baseline ADL function  Description: INTERVENTIONS:  -  Assess patient's ability to carry out ADLs; assess patient's baseline for ADL function and identify physical deficits which impact ability to perform ADLs (bathing, care of mouth/teeth, toileting, grooming, dressing, etc )  - Assess/evaluate cause of self-care deficits   - Assess range of motion  - Assess patient's mobility; develop plan if impaired  - Assess patient's need for assistive devices and provide as appropriate  - Encourage maximum independence but intervene and supervise when necessary  - Involve family in performance of ADLs  - Assess for home care needs following discharge   - Consider OT consult to assist with ADL evaluation and planning for discharge  - Provide patient education as appropriate  Outcome: Progressing  Goal: Maintains/Returns to pre admission functional level  Description: INTERVENTIONS:  - Perform BMAT or MOVE assessment daily    - Set and communicate daily mobility goal to care team and patient/family/caregiver  - Collaborate with rehabilitation services on mobility goals if consulted  - Perform Range of Motion 2 times a day  - Reposition patient every 2 hours    - Dangle patient 2 times a day  - Stand patient 2 times a day  - Ambulate patient 2 times a day  - Out of bed to chair 2 times a day   - Out of bed for meals 2 times a day  - Out of bed for toileting  - Record patient progress and toleration of activity level   Outcome: Progressing     Problem: Potential for Falls  Goal: Patient will remain free of falls  Description: INTERVENTIONS:  - Educate patient/family on patient safety including physical limitations  - Instruct patient to call for assistance with activity   - Consult OT/PT to assist with strengthening/mobility   - Keep Call bell within reach  - Keep bed low and locked with side rails adjusted as appropriate  - Keep care items and personal belongings within reach  - Initiate and maintain comfort rounds  - Make Fall Risk Sign visible to staff  - Offer Toileting every 2 Hours, in advance of need  - Initiate/Maintain bed alarm  - Obtain necessary fall risk management equipment: bed  - Apply yellow socks and bracelet for high fall risk patients  - Consider moving patient to room near nurses station  Outcome: Progressing     Problem: Prexisting or High Potential for Compromised Skin Integrity  Goal: Skin integrity is maintained or improved  Description: INTERVENTIONS:  - Identify patients at risk for skin breakdown  - Assess and monitor skin integrity  - Assess and monitor nutrition and hydration status  - Monitor labs   - Assess for incontinence   - Turn and reposition patient  - Assist with mobility/ambulation  - Relieve pressure over bony prominences  - Avoid friction and shearing  - Provide appropriate hygiene as needed including keeping skin clean and dry  - Evaluate need for skin moisturizer/barrier cream  - Collaborate with interdisciplinary team   - Patient/family teaching  - Consider wound care consult   Outcome: Progressing     Problem: PAIN - ADULT  Goal: Verbalizes/displays adequate comfort level or baseline comfort level  Description: Interventions:  - Encourage patient to monitor pain and request assistance  - Assess pain using appropriate pain scale  - Administer analgesics based on type and severity of pain and evaluate response  - Implement non-pharmacological measures as appropriate and evaluate response  - Consider cultural and social influences on pain and pain management  - Notify physician/advanced practitioner if interventions unsuccessful or patient reports new pain  Outcome: Progressing     Problem: INFECTION - ADULT  Goal: Absence or prevention of progression during hospitalization  Description: INTERVENTIONS:  - Assess and monitor for signs and symptoms of infection  - Monitor lab/diagnostic results  - Monitor all insertion sites, i e  indwelling lines, tubes, and drains  - Monitor endotracheal if appropriate and nasal secretions for changes in amount and color  - Emma appropriate cooling/warming therapies per order  - Administer medications as ordered  - Instruct and encourage patient and family to use good hand hygiene technique  - Identify and instruct in appropriate isolation precautions for identified infection/condition  Outcome: Progressing  Goal: Absence of fever/infection during neutropenic period  Description: INTERVENTIONS:  - Monitor WBC    Outcome: Progressing     Problem: DISCHARGE PLANNING  Goal: Discharge to home or other facility with appropriate resources  Description: INTERVENTIONS:  - Identify barriers to discharge w/patient and caregiver  - Arrange for needed discharge resources and transportation as appropriate  - Identify discharge learning needs (meds, wound care, etc )  - Arrange for interpretive services to assist at discharge as needed  - Refer to Case Management Department for coordinating discharge planning if the patient needs post-hospital services based on physician/advanced practitioner order or complex needs related to functional status, cognitive ability, or social support system  Outcome: Progressing     Problem: Knowledge Deficit  Goal: Patient/family/caregiver demonstrates understanding of disease process, treatment plan, medications, and discharge instructions  Description: Complete learning assessment and assess knowledge base    Interventions:  - Provide teaching at level of understanding  - Provide teaching via preferred learning methods  Outcome: Progressing

## 2022-04-22 LAB — BACTERIA UR CULT: NORMAL

## 2022-04-23 ENCOUNTER — HOSPITAL ENCOUNTER (OUTPATIENT)
Dept: ULTRASOUND IMAGING | Facility: HOSPITAL | Age: 82
Discharge: HOME | End: 2022-04-23
Payer: MEDICARE

## 2022-04-23 DIAGNOSIS — N13.2 HYDRONEPHROSIS CONCURRENT WITH AND DUE TO CALCULI OF KIDNEY AND URETER: ICD-10-CM

## 2022-04-23 PROCEDURE — 76770 US EXAM ABDO BACK WALL COMP: CPT

## 2022-04-25 ENCOUNTER — OFFICE VISIT (OUTPATIENT)
Dept: FAMILY MEDICINE CLINIC | Facility: CLINIC | Age: 82
End: 2022-04-25
Payer: MEDICARE

## 2022-04-25 VITALS
HEART RATE: 80 BPM | HEIGHT: 56 IN | RESPIRATION RATE: 16 BRPM | BODY MASS INDEX: 27.9 KG/M2 | OXYGEN SATURATION: 96 % | WEIGHT: 124 LBS | SYSTOLIC BLOOD PRESSURE: 126 MMHG | DIASTOLIC BLOOD PRESSURE: 78 MMHG

## 2022-04-25 DIAGNOSIS — F33.0 MAJOR DEPRESSIVE DISORDER, RECURRENT, MILD (HCC): ICD-10-CM

## 2022-04-25 DIAGNOSIS — I10 PRIMARY HYPERTENSION: ICD-10-CM

## 2022-04-25 DIAGNOSIS — E11.9 TYPE 2 DIABETES MELLITUS WITHOUT COMPLICATION, WITHOUT LONG-TERM CURRENT USE OF INSULIN (HCC): ICD-10-CM

## 2022-04-25 DIAGNOSIS — E78.2 MIXED HYPERLIPIDEMIA: ICD-10-CM

## 2022-04-25 DIAGNOSIS — N20.0 NEPHROLITHIASIS: Primary | ICD-10-CM

## 2022-04-25 PROBLEM — R31.9 HEMATURIA: Status: RESOLVED | Noted: 2022-04-21 | Resolved: 2022-04-25

## 2022-04-25 LAB — SL AMB POCT HEMOGLOBIN AIC: 6.7 (ref ?–6.5)

## 2022-04-25 PROCEDURE — 99496 TRANSJ CARE MGMT HIGH F2F 7D: CPT | Performed by: FAMILY MEDICINE

## 2022-04-25 PROCEDURE — 83036 HEMOGLOBIN GLYCOSYLATED A1C: CPT | Performed by: FAMILY MEDICINE

## 2022-04-25 PROCEDURE — 1111F DSCHRG MED/CURRENT MED MERGE: CPT | Performed by: FAMILY MEDICINE

## 2022-04-25 NOTE — PROGRESS NOTES
Subjective:      Patient ID: Radha Acharya is a 78 y o  female  TCM Call (since 3/25/2022)     Date and time call was made  4/21/2022 11:52 AM    Hospital care reviewed  Records reviewed        Patient was hospitialized at  [facility]        Date of Admission  04/20/22    Date of discharge  04/21/22    Diagnosis  Hematuria    Disposition  Home    Were the patients medications reviewed and updated  Yes    Current Symptoms  None      TCM Call (since 3/25/2022)     Post hospital issues  None    Should patient be enrolled in anticoag monitoring? No    Scheduled for follow up? Yes    Patients specialists  Urologist    Did you obtain your prescribed medications  Yes    Do you need help managing your prescriptions or medications  No    Is transportation to your appointment needed  No    I have advised the patient to call PCP with any new or worsening symptoms    Gerardo , [address] [place] [address]  Family    The type of support provided  None    Do you have social support  Yes, as much as I need    Are you recieving any outpatient services  No    Are you recieving home care services  No    Are you using any community resources  No    Current waiver services  No    Have you fallen in the last 12 months  No    Interperter language line needed  No    Counseling  Patient      TCM visit with her daughter  Below is discharge summary as per hospitalist team   Dr Stephan Temple (attending hospitalist):    "Hospital Course:   Radha Acharya is a 78 y o  female patient with hypertension, hyperlipidemia, recurrent urolithiasis, diabetes mellitus, DVT (not on anticoagulation), who originally presented to the hospital on 4/20/2022 due to gross hematuria  She was seen in [facility] and was prescribed antibiotics  However due to persistent hematuria, patient presented to the emergency department  On admission she had stable vital signs    CT renal protocol showed mild left hydroureteronephrosis with a 6 mm stone in the distal left ureter  She was started on IV fluids with tamsulosin  Urology was consulted  As the stone was in the distal ureter, and patient was hemodynamically stable and asymptomatic, Urology recommended medical expulsive therapy  Patient was discharged on tamsulosin  Advised to return to the emergency department if she develops any signs/symptoms of infection  She will be seen the urology office next week to evaluate for resolution of symptoms and if not for definitive management of persistent symptoms "    Patient did pass the kidney stone which they did collect and brought in in a specimen cup  No longer having any pain or discomfort  Scheduled to follow-up with Urology on Wednesday  No fevers or chills  No flank pain  Past Medical History:   Diagnosis Date    Colon polyps     Diabetes mellitus ([address] )     Dizziness     Last assessed: 8/31/15    [facility] thrombosis[facility]     Dysuria     Hematuria 2022    Hyperlipidemia     Hypertension     Hypertensive urgency 10/22/2021    Insomnia     Ureterolithiasis 2020       Family History   Problem Relation Age of Onset    Depression Mother         w/anxiety    Diabetes Mother         Mellitus    Breast cancer Mother     Hypertension Mother     No Known Problems Father     Depression Sister         w/anxiety    Heart disease Sister         Cardiac Disorder    Breast cancer Sister     Hypertension Sister     Diabetes Brother         Mellitus    Alcohol abuse Son        Past Surgical History:   Procedure Laterality Date     SECTION      1964 son, 26 daughter    COLONOSCOPY      [place] RETROGRADE PYELOGRAM  2020    [place] COLONOSCOPY FLX DX W/COLLJ SPEC WHEN PFRMD N/A 3/27/2017    Procedure: COLONOSCOPY;  Surgeon: Ulices Hinds MD;  [facility];   Service: Gastroenterology    [place] CYSTO/URETERO W/LITHOTRIPSY &INDWELL STENT INSRT Right 2020    Procedure: CYSTOSCOPY URETEROSCOPY WITH LITHOTRIPSY HOLMIUM LASER, RETROGRADE PYELOGRAM AND INSERTION STENT URETERAL; EXCISION OF BLADDER TUMOR;  Surgeon: Cristel Velasquez MD;  Location[facility];  Service: Urology    ROTATOR CUFF REPAIR Left     Last assessed: 3/29/15    TONSILLECTOMY          reports that she has never smoked  She has never used smokeless tobacco  She reports that she does not drink alcohol and does not use drugs        Current Outpatient Medications:     aspirin 81 MG tablet, Take 81 mg by mouth daily, Disp: , Rfl:     atorvastatin (LIPITOR) 10 mg tablet, Take 1 tablet (10 mg total) by mouth daily, Disp: 90 tablet, Rfl: 0    busPIRone (BUSPAR) 15 mg tablet, Take 1 tablet (15 mg total) by mouth 3 (three) times a day, Disp: 90 tablet, Rfl: 5    cholecalciferol (VITAMIN D3) 1,000 units tablet, Take 1,000 Units by mouth daily, Disp: , Rfl:     clonazePAM (KlonoPIN) 0 5 mg tablet, Take 1 tablet (0 5 mg total) by mouth 3 (three) times a day, Disp: 90 tablet, Rfl: 0    docusate sodium (COLACE) 100 mg capsule, Take 100 mg by mouth 2 (two) times a day, Disp: , Rfl:     FLUoxetine (PROzac) 40 MG capsule, Take 2 capsules (80 mg total) by mouth daily, Disp: 60 capsule, Rfl: 1    linaCLOtide (Linzess) 145 MCG CAPS, Take 1 capsule (145 mcg total) by mouth daily, Disp: 90 capsule, Rfl: 0    lisinopril-hydrochlorothiazide (PRINZIDE,ZESTORETIC) 10-12 5 MG per tablet, Take 1 tablet by mouth daily, Disp: 90 tablet, Rfl: 1    metFORMIN (GLUCOPHAGE) 500 mg tablet, Take 1 tablet (500 mg total) by mouth 2 (two) times a day with meals, Disp: 180 tablet, Rfl: 0    bisacodyl (DULCOLAX) 10 mg suppository, Insert 1 suppository (10 mg total) into the rectum daily for 7 days, Disp: 7 suppository, Rfl: 0    polyethylene glycol (MIRALAX) 17 g packet, Take 17 g by mouth daily for 7 days, Disp: 119 g, Rfl: 0    The following portions of the patient's history were reviewed and updated as appropriate: allergies, current medications, past family history, past medical history, past social history, past surgical history and problem list     Review of Systems   Constitutional: Negative  HENT: Negative  Eyes: Negative  Respiratory: Negative  Cardiovascular: Negative  Gastrointestinal: Negative  Endocrine: Negative  Genitourinary: Negative  Negative for difficulty urinating, dysuria and flank pain  Musculoskeletal: Negative  Skin: Negative  Allergic/Immunologic: Negative  Neurological: Negative  Hematological: Negative  Psychiatric/Behavioral: Positive for dysphoric mood (Depression)  The patient is nervous/anxious ( anxiety)  Objective:    /78   Pulse 80   Resp 16   Ht 4' 8" (1 422 m)   Wt 56 2 kg (124 lb)   SpO2 96%   BMI 27 80 kg/m²      Physical Exam  Vitals and nursing note reviewed  Constitutional:       General: She is not in acute distress  Appearance: Normal appearance  She is well-developed and normal weight  She is not diaphoretic  HENT:      Nose: Nose normal    Eyes:      Conjunctiva/sclera: Conjunctivae normal       Pupils: Pupils are equal, round, and reactive to light  Cardiovascular:      Rate and Rhythm: Normal rate and regular rhythm  Heart sounds: Normal heart sounds  No murmur heard  Pulmonary:      Effort: Pulmonary effort is normal       Breath sounds: Normal breath sounds  Abdominal:      General: Bowel sounds are normal       Palpations: Abdomen is soft  Musculoskeletal:         General: Normal range of motion  Cervical back: Normal range of motion and neck supple  Right lower leg: No edema  Left lower leg: No edema  Skin:     General: Skin is warm and dry  Findings: No rash  Neurological:      General: No focal deficit present  Mental Status: She is alert and oriented to person, place, and time  Mental status is at baseline  Deep Tendon Reflexes: Reflexes are normal and symmetric  Psychiatric:         Mood and Affect: Mood is depressed  Behavior: Behavior normal          Thought Content: Thought content normal          Judgment: Judgment normal            Recent Results (from the past 1008 hour(s))   UA (URINE) with reflex to Scope    Collection Time: 04/20/22 11:04 PM   Result Value Ref Range    Color, UA [place]     Clarity, UA Cloudy     Specific [place], UA 1 020 1 003 - 1 030    pH, UA 5 5 4 5, 5 0, 5 5, 6 0, 6 5, 7 0, 7 5, 8 0    Leukocytes, UA (A) Negative     Elevated glucose may cause decreased leukocyte values   See urine microscopic for [facility] result/    Nitrite, UA Negative Negative    Protein, UA 30 (1+) (A) Negative mg/dl    Glucose, UA >=1000 (1%) (A) Negative mg/dl    Ketones, UA Negative Negative mg/dl    Urobilinogen, UA 0 2 0 2, 1 0 E U /dl E U /dl    Bilirubin, UA Negative Negative    Blood, UA Large (A) Negative   Urine culture    Collection Time: 04/20/22 11:04 PM    Specimen: Urine, Clean Catch   Result Value Ref Range    Urine Culture No Growth <1000 cfu/mL    Urine Microscopic    Collection Time: 04/20/22 11:04 PM   Result Value Ref Range    RBC, UA Innumerable (A) None Seen, 0-1, 1-2, 2-4, 0-5 /hpf    WBC, UA 1-2 None Seen, 0-1, 1-2, 0-5, 2-4 /hpf    Epithelial Cells Occasional None Seen, Occasional /hpf    Bacteria, UA Occasional None Seen, Occasional /hpf   CBC and differential    Collection Time: 04/20/22 11:26 PM   Result Value Ref Range    WBC 5 57 4 31 - 10 16 Thousand/uL    RBC 4 13 3 81 - 5 12 Million/uL    Hemoglobin 11 7 11 5 - 15 4 g/dL    Hematocrit 37 1 34 8 - 46 1 %    MCV 90 82 - 98 fL    MCH 28 3 26 8 - 34 3 pg    MCHC 31 5 31 4 - 37 4 g/dL    RDW 14 0 11 6 - 15 1 %    MPV 10 9 8 9 - 12 7 fL    Platelets 981 787 - 184 Thousands/uL    nRBC 0 /100 WBCs    Neutrophils Relative 64 43 - 75 %    Immat GRANS % 0 0 - 2 %    Lymphocytes Relative 25 14 - 44 %    Monocytes Relative 7 4 - 12 %    Eosinophils Relative 3 0 - 6 %    Basophils Relative 1 0 - 1 % Neutrophils Absolute 3 57 1 85 - 7 62 Thousands/µL    Immature Grans Absolute 0 02 0 00 - 0 20 Thousand/uL    Lymphocytes Absolute 1 39 0 60 - 4 47 Thousands/µL    Monocytes Absolute 0 40 0 17 - 1 22 Thousand/µL    Eosinophils Absolute 0 16 0 00 - 0 61 Thousand/µL    Basophils Absolute 0 03 0 00 - 0 10 Thousands/µL   Protime-INR    Collection Time: 04/20/22 11:26 PM   Result Value Ref Range    Protime 13 3 11 6 - 14 5 seconds    INR 1 01 0 84 - 1 19   APTT    Collection Time: 04/20/22 11:26 PM   Result Value Ref Range    PTT 29 23 - 37 seconds   Comprehensive metabolic panel    Collection Time: 04/20/22 11:26 PM   Result Value Ref Range    Sodium 138 136 - 145 mmol/L    Potassium 4 0 3 5 - 5 3 mmol/L    Chloride 102 100 - 108 mmol/L    CO2 27 21 - 32 mmol/L    ANION GAP 9 4 - 13 mmol/L    BUN 30 (H) 5 - 25 mg/dL    Creatinine 1 05 0 60 - 1 30 mg/dL    Glucose 185 (H) 65 - 140 mg/dL    Calcium 9 1 8 3 - 10 1 mg/dL    AST 15 5 - 45 U/L    ALT 25 12 - 78 U/L    Alkaline Phosphatase 58 46 - 116 U/L    Total Protein 7 2 6 4 - 8 2 g/dL    Albumin 3 8 3 5 - 5 0 g/dL    Total Bilirubin 0 28 0 20 - 1 00 mg/dL    eGFR 49 ml/min/1 73sq m   Type and screen    Collection Time: 04/20/22 11:26 PM   Result Value Ref Range    ABO Grouping A     Rh Factor Positive     Antibody Screen Negative     Specimen Expiration Date [id number]    CK Total with Reflex CKMB    Collection Time: 04/20/22 11:26 PM   Result Value Ref Range    Total CK 80 26 - 192 U/L   ECG 12 lead    Collection Time: 04/21/22  1:06 AM   Result Value Ref Range    Ventricular Rate 72 BPM    Atrial Rate 72 BPM    SC Interval 162 ms    QRSD Interval 74 ms    QT Interval 372 ms    QTC Interval 407 ms    P Axis 55 degrees    QRS Axis 4 degrees    T Wave Axis 24 degrees   Fingerstick Glucose (POCT)    Collection Time: 04/21/22  1:59 AM   Result Value Ref Range    POC Glucose 90 65 - 140 mg/dl   ABORh Recheck - Contact Blood Bank Prior to Collection    Collection Time: 04/21/22  4:43 AM   Result Value Ref Range    ABO Grouping A     Rh Factor Positive    Basic metabolic panel    Collection Time: 04/21/22  4:43 AM   Result Value Ref Range    Sodium 138 136 - 145 mmol/L    Potassium 4 3 3 5 - 5 3 mmol/L    Chloride 105 100 - 108 mmol/L    CO2 24 21 - 32 mmol/L    ANION GAP 9 4 - 13 mmol/L    BUN 25 5 - 25 mg/dL    Creatinine 0 79 0 60 - 1 30 mg/dL    Glucose 108 65 - 140 mg/dL    Glucose, Fasting 108 (H) 65 - 99 mg/dL    Calcium 8 8 8 3 - 10 1 mg/dL    eGFR 69 ml/min/1 73sq m   CBC and differential    Collection Time: 04/21/22  4:43 AM   Result Value Ref Range    WBC 5 80 4 31 - 10 16 Thousand/uL    RBC 4 25 3 81 - 5 12 Million/uL    Hemoglobin 12 3 11 5 - 15 4 g/dL    Hematocrit 38 8 34 8 - 46 1 %    MCV 91 82 - 98 fL    MCH 28 9 26 8 - 34 3 pg    MCHC 31 7 31 4 - 37 4 g/dL    RDW 13 8 11 6 - 15 1 %    MPV 10 6 8 9 - 12 7 fL    Platelets 488 064 - 597 Thousands/uL    nRBC 0 /100 WBCs    Neutrophils Relative 65 43 - 75 %    Immat GRANS % 0 0 - 2 %    Lymphocytes Relative 23 14 - 44 %    Monocytes Relative 8 4 - 12 %    Eosinophils Relative 3 0 - 6 %    Basophils Relative 1 0 - 1 %    Neutrophils Absolute 3 76 1 85 - 7 62 Thousands/µL    Immature Grans Absolute 0 02 0 00 - 0 20 Thousand/uL    Lymphocytes Absolute 1 35 0 60 - 4 47 Thousands/µL    Monocytes Absolute 0 46 0 17 - 1 22 Thousand/µL    Eosinophils Absolute 0 17 0 00 - 0 61 Thousand/µL    Basophils Absolute 0 04 0 00 - 0 10 Thousands/µL   Fingerstick Glucose (POCT)    Collection Time: 04/21/22  5:23 AM   Result Value Ref Range    POC Glucose 101 65 - 140 mg/dl   POCT hemoglobin A1c    Collection Time: 04/25/22 12:00 PM   Result Value Ref Range    Hemoglobin A1C 6 7 (A) 6 5       Assessment/Plan:    Type 2 diabetes mellitus without complication, without long-term current use of insulin (HCC)    Lab Results   Component Value Date    HGBA1C 6 7 (A) 04/25/2022     Hemoglobin A1c performed in the office which was normal at 6 7%  Still under her goal   Continue on metformin 500 mg twice daily    Primary hypertension  Hypertension remains well controlled on current dose of lisinopril/hydrochlorothiazide    Nephrolithiasis  Patient did pass 6 mm kidney stone after being placed on Flomax  She does have other stones bilaterally  She will be seeing Urology  She is on thiazide diuretic  She does drink water throughout the course of the day    -stone will be sent for stone analysis for composition    Mixed hyperlipidemia  Remains on atorvastatin 10 mg once daily  She did not have lipid panel done during her hospitalization which I explained is normal because her hospitalization was for an acute issue    -patient's cholesterol really has largely remained unchanged  Repeat lipid panel will be done in 4 months    Major depressive disorder, recurrent, mild ([facility] [address] )  Patient does follow up with psychiatrist as well as therapist     Psychiatrist is apparently trying to increase her dose of fluoxetine and trying to get her away from taking Klonopin for her anxiety  Patient is quite an anxious person normally          Problem List Items Addressed This Visit        Endocrine    Type 2 diabetes mellitus without complication, without long-term current use of insulin ([facility] [address] )       Lab Results   Component Value Date    HGBA1C 6 7 (A) 04/25/2022     Hemoglobin A1c performed in the office which was normal at 6 7%    Still under her goal   Continue on metformin 500 mg twice daily         Relevant Orders    Comprehensive metabolic panel    Hemoglobin A1C    Microalbumin / creatinine urine ratio    POCT hemoglobin A1c (Completed)       Cardiovascular and Mediastinum    Primary hypertension     Hypertension remains well controlled on current dose of lisinopril/hydrochlorothiazide         Relevant Orders    CBC and differential    TSH, 3rd generation with Free T4 reflex       Genitourinary    Nephrolithiasis - Primary     Patient did pass 6 mm kidney stone after being placed on Flomax  She does have other stones bilaterally  She will be seeing Urology  She is on thiazide diuretic  She does drink water throughout the course of the day    -stone will be sent for stone analysis for composition         Relevant Orders    Stone analysis       Other    Major depressive disorder, recurrent, mild ([address] )     Patient does follow up with psychiatrist as well as therapist     Psychiatrist is apparently trying to increase her dose of fluoxetine and trying to get her away from taking Klonopin for her anxiety  Patient is quite an anxious person normally         Mixed hyperlipidemia     Remains on atorvastatin 10 mg once daily  She did not have lipid panel done during her hospitalization which I explained is normal because her hospitalization was for an acute issue    -patient's cholesterol really has largely remained unchanged    Repeat lipid panel will be done in 4 months         Relevant Orders    Lipid panel

## 2022-04-25 NOTE — ASSESSMENT & PLAN NOTE
Lab Results   Component Value Date    HGBA1C 6 7 (A) 04/25/2022     Hemoglobin A1c performed in the office which was normal at 6 7%    Still under her goal   Continue on metformin 500 mg twice daily

## 2022-04-25 NOTE — ASSESSMENT & PLAN NOTE
Patient does follow up with psychiatrist as well as therapist     Psychiatrist is apparently trying to increase her dose of fluoxetine and trying to get her away from taking Klonopin for her anxiety    Patient is quite an anxious person normally

## 2022-04-25 NOTE — ASSESSMENT & PLAN NOTE
Remains on atorvastatin 10 mg once daily  She did not have lipid panel done during her hospitalization which I explained is normal because her hospitalization was for an acute issue    -patient's cholesterol really has largely remained unchanged    Repeat lipid panel will be done in 4 months

## 2022-04-25 NOTE — ASSESSMENT & PLAN NOTE
Patient did pass 6 mm kidney stone after being placed on Flomax  She does have other stones bilaterally  She will be seeing Urology  She is on thiazide diuretic    She does drink water throughout the course of the day    -stone will be sent for stone analysis for composition

## 2022-04-26 ENCOUNTER — OFFICE VISIT (OUTPATIENT)
Dept: PSYCHIATRY | Facility: CLINIC | Age: 82
End: 2022-04-26

## 2022-04-26 VITALS — WEIGHT: 123 LBS | BODY MASS INDEX: 27.58 KG/M2 | DIASTOLIC BLOOD PRESSURE: 81 MMHG | SYSTOLIC BLOOD PRESSURE: 160 MMHG

## 2022-04-26 DIAGNOSIS — F33.0 MAJOR DEPRESSIVE DISORDER, RECURRENT, MILD (HCC): ICD-10-CM

## 2022-04-26 DIAGNOSIS — F41.1 GENERALIZED ANXIETY DISORDER: ICD-10-CM

## 2022-04-26 DIAGNOSIS — F45.21 ILLNESS ANXIETY DISORDER: Primary | ICD-10-CM

## 2022-04-26 RX ORDER — ARIPIPRAZOLE 2 MG/1
2 TABLET ORAL DAILY
Qty: 30 TABLET | Refills: 1 | Status: SHIPPED | OUTPATIENT
Start: 2022-04-26 | End: 2022-06-07

## 2022-04-26 NOTE — PSYCH
MEDICATION MANAGEMENT NOTE        [place] [address] [facility]      Name and Date of Birth:  Jeanie Smith 78 y o  1940    Date of Visit: April 26, 2022    SUBJECTIVE:  CC: Mal Mcneal presents today for follow up with Illness anxiety disorder, MDD, recurrent, moderate, JONAH, and mild neurocognitive disorder  Worries about physical condition had an episode with a kidney stone last week while in observation in hospital  Had ultrasound and has urology follow up tomorrow  They are trying to figure out what we can do to avoid as many stones  Worrying about her follow up appointments continues but has also canceled some appointments for fear of knowing what is going on with her  Has been checking urine color lately and is anxious about having another one  Does not have as much of a problem with depression as with anxiety which is still elevated at times  States she is engaged in psychotherapy which is helpful for her and gets reading to engage  State her son is getting professional help for his alcoholism which is upsetting to her  Says she is also relieved that he is getting help also at the same time  Klonopin use has been consistently used 2 times per day  Memory has been decent and she doesn't seem as "sluggish" to daughter  Recent falls/instability have not recurred since prior visit  Odd dreams are less odd and less frequent but does scream out when they happen which upset's her and given her a hard time getting back to sleep  Sleep is fragmented by getting up and going to the bathroom but getting 8-10 hours per night  Appetite is decent and diet has been improved and she is mindful doing more to improve the quality of what she eats  Physical activity is limit but she does get to the store sometimes and gets out on the porch  Patient was unable to fill labs because she forgot but indicates she will do so prior to follow up appointment      Mal Mcneal denies any side effects from medications unless noted above    Since our last visit, overall symptoms have been gradually improving  HPI ROS:     PHQ-9 Depression Screening    Little interest or pleasure in doing things: 1 - several days  Feeling down, depressed, or hopeless: 2 - more than half the days  Trouble falling or staying asleep, or sleeping too much: 1 - several days  Feeling tired or having little energy: 1 - several days  Poor appetite or overeatin - not at all  Feeling bad about yourself - or that you are a failure or have let yourself or your family down: 1 - several days  Trouble concentrating on things, such as reading the newspaper or watching television: 1 - several days  Moving or speaking so slowly that other people could have noticed  Or the opposite - being so fidgety or restless that you have been moving around a lot more than usual: 0 - not at all  Thoughts that you would be better off dead, or of hurting yourself in some way: 0 - not at all  PHQ-9 Score: 7  Score Interpretation: Mild depression         PHQ-2/9 Depression Screening    Little interest or pleasure in doing things: 1 - several days  Feeling down, depressed, or hopeless: 2 - more than half the days  Trouble falling or staying asleep, or sleeping too much: 1 - several days  Feeling tired or having little energy: 1 - several days  Poor appetite or overeatin - not at all  Feeling bad about yourself - or that you are a failure or have let yourself or your family down: 1 - several days  Trouble concentrating on things, such as reading the newspaper or watching television: 1 - several days  Moving or speaking so slowly that other people could have noticed   Or the opposite - being so fidgety or restless that you have been moving around a lot more than usual: 0 - not at all  Thoughts that you would be better off dead, or of hurting yourself in some way: 0 - not at all  PHQ-9 Score: 7   PHQ-9 Interpretation: Mild depression        JONAH-7 Flowsheet Screening      Most Recent Value   Over the last 2 weeks, how often have you been bothered by any of the following problems? Feeling nervous, anxious, or on edge 3   Not being able to stop or control worrying 3   Worrying too much about different things 2   Trouble relaxing 3   Being so restless that it is hard to sit still 2   Becoming easily annoyed or irritable 1   Feeling afraid as if something awful might happen 3   JONAH-7 Total Score 17        HPI ROS:             ('was' notes: recent => remote)  Medication Side Effects:  Denied  Possible syncope with Prazosin   Depression (10 worst): 9/10 (Was 8/10)   Anxiety (10 worst): 9/10 (Was 8/10)   Safety concerns (SI, HI, etc): Denied SI/HI/AVH (Was Denied SI/HI/AVH)   Sleep: (NM = Nightmares) WNL (Was WNL)   Energy: Unchangd (Was Fluctuating based on activity level)   Appetite: WNL and healthier choices (Was WNL)   Weight Change: Lost 8 lbs in last 3 months Unknown due to virtual visit     Review Of Systems as noted above  In addition:     Constitutional recent weight loss (8 lbs)   ENT negative   Cardiovascular negative   Respiratory negative   Gastrointestinal negative   Genitourinary negative   Musculoskeletal negative   Integumentary negative   Neurological negative   Endocrine negative   Other Symptoms none     Pain none   Pain Scale 0     History Review:  The following portions of the patient's history were reviewed and updated as appropriate: allergies, current medications, past family history, past medical history, past social history, past surgical history and problem list      Lab Review: Labs were reviewed and discussed with patient      OBJECTIVE:     MENTAL STATUS EXAM  Appearance:  age appropriate, dressed casually, overweight   Behavior:  Pleasant & cooperative, guarded, psychomotor agitation, restless and fidgety   Speech:  Regular rate and rhythm, soft   Mood:  "still anxious about my health "   Affect:  mood congruent, depressed, brighter with some improvement, constricted, anxious   Language: intact and appropriate for age, education, and intellect   Thought Process:  Linear and goal directed   Associations: intact associations   Thought Content:  negative thinking and cognitive distortions, obsessive, negative ruminations, preoccupied with medical health concerns   Perceptual Disturbances: no auditory or visual hallcunations   Risk Potential / Abnormal Thoughts: Suicidal ideation - None  Homicidal ideation - None  Potential for aggression - No       Consciousness:  Alert & Awake   Sensorium:  Grossly oriented   Attention: attention span and concentration are age appropriate       Fund of Knowledge:  Memory: awareness of current events: yes  past history: yes  vocabulary: yes  short term memory mildly impaired, long term memory grossly intact   Insight:  limited   Judgment: fair   Muscle Strength Muscle Tone: normal  normal   Gait/Station: normal gait/station with good balance   Motor Activity: no abnormal movements       Risks, Benefits And Possible Side Effects Of Medications:    AGREE: Risks, benefits, and possible side effects of medications explained to Scott Luz and she (or legal representative) verbalizes understanding and agreement for treatment  Controlled Medication Discussion:     Patient using medication appropriately  Scott Luz has been filling controlled prescriptions on time as prescribed according to [facility] program    Discussed with Scott Luz the risks of sedation, respiratory depression, impairment of ability to drive and potential for abuse and addiction related to treatment with benzodiazepine medications  She understands risk of treatment with benzodiazepine medications, agrees to not drive if feels impaired and agrees to take medications as prescribed  Discussed with Danette Dwyer warning on concurrent use of benzodiazepines and opioid medications including sedation, respiratory depression, coma and death   She understands the risk of treatment with benzodiazepines in addition to opioids and wants to continue taking those medications    ______________________________________________________________      Recent labs:  Office Visit on 04/25/2022   Component Date Value    Hemoglobin A1C 04/25/2022 6 7*   Admission on 04/20/2022, Discharged on 04/21/2022   Component Date Value    WBC 04/20/2022 5 57     RBC 04/20/2022 4 13     Hemoglobin 04/20/2022 11 7     Hematocrit 04/20/2022 37 1     MCV 04/20/2022 90     MCH 04/20/2022 28 3     MCHC 04/20/2022 31 5     RDW 04/20/2022 14 0     MPV 04/20/2022 10 9     Platelets 38/08/1318 204     nRBC 04/20/2022 0     Neutrophils Relative 04/20/2022 64     Immat GRANS % 04/20/2022 0     Lymphocytes Relative 04/20/2022 25     Monocytes Relative 04/20/2022 7     Eosinophils Relative 04/20/2022 3     Basophils Relative 04/20/2022 1     Neutrophils Absolute 04/20/2022 3 57     Immature Grans Absolute 04/20/2022 0 02     Lymphocytes Absolute 04/20/2022 1 39     Monocytes Absolute 04/20/2022 0 40     Eosinophils Absolute 04/20/2022 0 16     Basophils Absolute 04/20/2022 0 03     Protime 04/20/2022 13 3     INR 04/20/2022 1 01     PTT 04/20/2022 29     Sodium 04/20/2022 138     Potassium 04/20/2022 4 0     Chloride 04/20/2022 102     CO2 04/20/2022 27     ANION GAP 04/20/2022 9     BUN 04/20/2022 30*    Creatinine 04/20/2022 1 05     Glucose 04/20/2022 185*    Calcium 04/20/2022 9 1     AST 04/20/2022 15     ALT 04/20/2022 25     Alkaline Phosphatase 04/20/2022 58     Total Protein 04/20/2022 7 2     Albumin 04/20/2022 3 8     Total Bilirubin 04/20/2022 0 28     eGFR 04/20/2022 49     ABO Grouping 04/20/2022 A     Rh Factor 04/20/2022 Positive     Antibody Screen 04/20/2022 Negative     Specimen Expiration Date 04/20/2022 [id number]     Total CK 04/20/2022 80     Color, UA 04/20/2022 Linda     Clarity, UA 04/20/2022 Cloudy     Specific Gravity, UA 04/20/2022 1 020     pH, UA 04/20/2022 5 5     Leukocytes, UA 04/20/2022 Elevated glucose may cause decreased leukocyte values   See urine microscopic for [facility] result/*    Nitrite, UA 04/20/2022 Negative     Protein, UA 04/20/2022 30 (1+)*    Glucose, UA 04/20/2022 >=1000 (1%)*    Ketones, UA 04/20/2022 Negative     Urobilinogen, UA 04/20/2022 0 2     Bilirubin, UA 04/20/2022 Negative     Blood, UA 04/20/2022 Large*    Urine Culture 04/20/2022 No Growth <1000 cfu/mL     RBC, UA 04/20/2022 Innumerable*    WBC, UA 04/20/2022 1-2     Epithelial Cells 04/20/2022 Occasional     Bacteria, UA 04/20/2022 Occasional     ABO Grouping 04/21/2022 A     Rh Factor 04/21/2022 Positive     POC Glucose 04/21/2022 90     Sodium 04/21/2022 138     Potassium 04/21/2022 4 3     Chloride 04/21/2022 105     CO2 04/21/2022 24     ANION GAP 04/21/2022 9     BUN 04/21/2022 25     Creatinine 04/21/2022 0 79     Glucose 04/21/2022 108     Glucose, Fasting 04/21/2022 108*    Calcium 04/21/2022 8 8     eGFR 04/21/2022 69     WBC 04/21/2022 5 80     RBC 04/21/2022 4 25     Hemoglobin 04/21/2022 12 3     Hematocrit 04/21/2022 38 8     MCV 04/21/2022 91     MCH 04/21/2022 28 9     MCHC 04/21/2022 31 7     RDW 04/21/2022 13 8     MPV 04/21/2022 10 6     Platelets 97/96/6917 189     nRBC 04/21/2022 0     Neutrophils Relative 04/21/2022 65     Immat GRANS % 04/21/2022 0     Lymphocytes Relative 04/21/2022 23     Monocytes Relative 04/21/2022 8     Eosinophils Relative 04/21/2022 3     Basophils Relative 04/21/2022 1     Neutrophils Absolute 04/21/2022 3 76     Immature Grans Absolute 04/21/2022 0 02     Lymphocytes Absolute 04/21/2022 1 35     Monocytes Absolute 04/21/2022 0 46     Eosinophils Absolute 04/21/2022 0 17     Basophils Absolute 04/21/2022 0 04     POC Glucose 04/21/2022 101     Ventricular Rate 04/21/2022 72     Atrial Rate 04/21/2022 72     OR Interval 04/21/2022 162     QRSD Interval 2022 74     QT Interval 2022 372     QTC Interval 2022 407     P Axis 2022 55     QRS Axis 2022 4     T Wave Axis 2022 24          Past Psychiatric History  Previous diagnoses include: Depression, anxiety, and OCD  Prior outpatient psychiatric treatment:  Saw psychiatrist in her 35s for a period of 10 years  Prior therapy:  Sees therapist Dr Мария Shafer the past 2-3 years  Prior inpatient psychiatric treatment:  1 time for a period of 3 weeks at [facility] in 2013 for panic attacks post surgery  Prior suicide attempts:  Denies  Prior self harm:  Denies  Prior violence or aggression:  Denies     Previous psychotropic medication trials:      Antidepressants:  Effexor, Zoloft, Prozac, and Lexapro     Mood Stabilizers:  Denies     Anxiolytics:  BuSpar and Klonopin     Typical antipsychotics:  Denies     Atypical antipsychotics:  Denies     Hypnotics/sleep aids:  Denies    PTSD meds: Prazosin (fainting?)     Social History:     The patient grew up [address] was described as "I scardy cat"      During childhood, parents were together but father cheated on her mother with another girl  They have 2 sister(s) and 2 brother(s)   Only 1 brother is still living but she has good relationship with him       Abuse/neglect: none     As far as the patient (or present family member) is aware, overall childhood development: Patient does ascribe to normal developmental including childhood as well as regular educational course overall (eg no IEP, special education)      Education level:  Bachelor of arts   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   Supports herself on pension and social security    Marital status:   since  when   from cancer  Children:  1 daughter whom she lives with and 1 son who lives in [address] whom she has good relationship with  Current Living Situation: the patient currently lives with daughter, son-in-law, and 3 grandkids in [place], [facility] Priscilla Mata, daughter, son-in-law, grandkids, her ghanshyam, and her friend Krysten Naik  Access to Guns:  Denies     Substance use and treatment:  Tobacco use:  Denies  Caffeine Use:  1 cup of decaf in the morning  ETOH use:  Denies  Other substance use:  Denies     Endorses previous experimentation with:  Denies     Longest clean time: not applicable  History of Inpatient/Outpatient rehabilitation program: no        Traumatic History:      Abuse: none  Other Traumatic Events: none      Family Psychiatric History:      Psychiatric Illness:      Mother - depression, Sister - depression and anxiety disorder, Sister - depression and anxiety disorder  Substance Abuse:       Son - alcohol abuse  Suicide Attempts:        no family history of suicide attempts    Family Psychiatric History:   Family History   Problem Relation Age of Onset    Depression Mother         w/anxiety    Diabetes Mother         Mellitus    Breast cancer Mother     Hypertension Mother     No Known Problems Father     Depression Sister         w/anxiety    Heart disease Sister         Cardiac Disorder    Breast cancer Sister     Hypertension Sister     Diabetes Brother         Mellitus    Alcohol abuse Son          Medical / Surgical History:    Past Medical History:   Diagnosis Date    Colon polyps     [facility] mellitus [facility] )     Dizziness     Last assessed: 8/31/15    DVT [facility]deep venous thrombosis) [facility] )     Dysuria     Hematuria 2022    Hyperlipidemia     Hypertension     Hypertensive urgency 10/22/2021    Insomnia     Ureterolithiasis 2020     Past Surgical History:   Procedure Laterality Date     SECTION      1964 son, 26 daughter    COLONOSCOPY      [facility] RETROGRADE P[facility]EL[facility]GRAM  2020    ND COLONOSCOPY FLX DX W/COLLJ SPEC WHEN PFRMD N/A 3/27/2017    Procedure: COLONOSCOPY;  Surgeon: Antelmo Kumar MD;  [facility]; Service: Gastroenterology    NV CYSTO/URETERO W/LITHOTRIPSY &INDWELL STENT INSRT Right 5/22/2020    Procedure: CYSTOSCOPY URETEROSCOPY WITH LITHOTRIPSY HOLMIUM LASER, RETROGRADE PYELOGRAM AND INSERTION STENT URETERAL; EXCISION OF BLADDER TUMOR;  Surgeon: Vivian Martínez MD;  [facility];  Service: Urology    ROTATOR CUFF REPAIR Left     Last assessed: 3/29/15    TONSILLECTOMY         Assessment/Plan:        Diagnoses and all orders for this visit:    Illness anxiety disorder  -     ARIPiprazole (ABILIFY) 2 mg tablet; Take 1 tablet (2 mg total) by mouth daily    Major depressive disorder, recurrent, mild (HCC)  -     ARIPiprazole (ABILIFY) 2 mg tablet; Take 1 tablet (2 mg total) by mouth daily    Generalized anxiety disorder        ______________________________________________________________________    Additional Assessment:  Patient shows good improvement in terms of mood and some mild improvement in terms of anxiety but this does remain high in the setting of legitimate medical concerns as she recently passed an additional kidney stone  States that she has anxiety about her son who recently engaged in detox/rehab for his alcoholism  Significant psychosocial stressors are occurring but patient actually seems to be handling them quite well despite this  She has been engaging in therapy which is significant benefit to her and doing assignments at home  Encourage patient to engage in behavioral activation and get outside more and be more active which she is attempting  Patient states she would like to re-engage in Christianity activities but has been unable to motivate herself to do so  Discussed with patient that sometimes we have to do things that we do not have a desire to in order to gain the benefit from them and keep are self in motion to which she voiced understanding and agreement with    Patient continues to have illness anxiety with checking her urine following kidney stones and having excessive worry about her follow-up appointments with providers and occasionally even cancelling these in fear of learning what is actually going on with her  Patient did become upset when told that she lost weight and did explain to patient that improvements of diet that she endorsed to this provider were likely contributing to this and tried to reframe this for patient to which she was modestly receptive to  Encourage patient to obtain outstanding lab work for full organic workup patient's psychiatric symptoms  Will add small dose of Abilify to augment her SSRI and due to symptoms that border on paranoia currently  Could titrate this medication towards effectiveness  Would like to discontinue patient's Klonopin in the near future if anxiety can become under better control on mood stabilizing agent as this medication is contraindicated in the elderly due to instability issues and could be contributing to patient's memory difficulties that she experiences from time to time  Will continue Prozac at high dose for mood and anxiety in addition to BuSpar but could consider cross titrating this medication with gabapentin in the future  Recommended improvements in diet and monitoring her sleep hygiene  Scales:  PHQ-9 was 12, now 7  - Prior 9    JONAH-7 was 19, now 17  - Prior 19         Treatment Plan:        Patient has been educated about their diagnosis and treatment modalities  They voiced understanding and agreement with the following plan:    Discussed medications and if treatment adjustment was needed/desired  1) MEDS:           - Continue Prozac 80 mg PO QD for mood and anxiety    PARQ completed including serotonin syndrome, SIADH, worsening depression, suicidality, induction of eligio, GI upset, headaches, activation, sexual side effects, sedation, potential drug interactions, and others    - Continue Klonopin 0 5 mg PO BID for breakthrough anxiety as a bridge and wean off in the future  Discussed with patient the risks of sedation, respiratory depression, impairment in judgment and motor function  Depression, mood changes, GI changes, and others discussed  Patient was also informed of risks of being on or starting opioid medications due to drug interactions and potential for serious respiratory depression and death     - Continue Buspar 15 mg PO TID for anxiety  PARQ completed including serotonin syndrome, rare TD/EPS, dizziness, sedation, GI distress, confusion, possible mood changes, xerostomia and visual disturbances   - Start Abilify 2 mg PO QD for mood stabilization and augmentation of SSRI  PARQ discussed which include dizziness, insomnia, akathisia, nausea/vomiting, orthostatic hypotension, constipation, headache, sedation, rare risk of EPS, rare impulse control problems, seizures, rare NMS, and risk of CVA in elderly with dementia  - Consider cross titration of Buspar with Gabapentin once optimized on Abilify     2) Labs:  - 10/24/21: BMP unremarkable  - 10/23/21: CBC and lipid panel unremarkable  A1c elevated at 6 6   - 6/14/21: TSH wnl at 2 57  -12/15/21: A1c was 6 5 and controlled and TSH wnl at 1 36  - 1/27/22: CBC unremarkable and CMP has increased glucose at 205  - 4/25/22: A1c was elevated at 6 7  - 12/20/22 TSH, Lipid panel, CBC, CMP, and A1c ordered by PCP  Reminded patient to acquire these labs     3) Therapy:    - Continue with own therapist Dr Belinda Ferraro     4) Medical:   - Pt will f/u with other providers as needed   - On Vitamin D supplement  - SLUMS: 23/30 showing mild neurocognitive impairment  Of note, it is unclear how much BZDs could be impacting these scores       5) Other: Support as needed   - encouraged increase in physical activity including walking and low impact exercises/stretching   - recommended improvement of diet with low carbohydrates and high fiber   - recommended improvement in sleep hygiene and avoiding electronics at night     6) Follow up:   - Follow up in 1 month for medication management    - Patient will call if issues or concerns      7) Treatment Plan:    - Enacted 4/26/22     Discussed self monitoring of symptoms, and symptom monitoring tools  Patient has been informed of 24 hours and weekend coverage for urgent situations accessed by calling the main clinic phone number               Psychotherapy in session:  Time spent performing psychotherapy: 16 Minutes

## 2022-04-26 NOTE — BH TREATMENT PLAN
Name and Date of Birth:  Yessica Esquivel 78 y o  1940    Date of Treatment Plan: April 26, 2022    Diagnosis/Diagnoses:     1  Illness anxiety disorder    2  Major depressive disorder, recurrent, mild ([address] )    3  Generalized anxiety disorder      Strengths/Personal Resources for Self-Care: supportive family, supportive friends, taking medications as prescribed, ability to adapt to life changes, ability to communicate needs, ability to communicate well, ability to listen, ability to reason, ability to understand psychiatric illness, family ties, financial means, financial security, general fund of knowledge, motivation for treatment, ability to negotiate basic needs, Mosque affiliation, strong ghanshyam, well educated, willingness to work on problems, work skills      Area/Areas of need (in own words): anxiety, depression, attention and concentration problems, difficulty tolerating medications, poor attention span, poor concentration, poor memory  1  Long Term Goal: alleviate anxiety, alleviate depression, decrease paranoid thoughts, improve attention, improve concentration, improve interaction with people in the community, Feel better about self, Feel calmer, Feel happier, Feel more positive about the future  Target Date: 6 months - 10/26/2022  Person/Persons responsible for completion of goal: Court Rom and adult daughter    2  Short Term Objective (s) - How will we reach this goal?:     A  Provider new recommended medication/dosage changes and/or continue medication(s): continue current medications as prescribed, obtain outstanding labs  B   Attend psychotherapy regularly  C   Take walks regularly      Target Date: 6 months - 10/26/2022  Person/Persons Responsible for Completion of Goal: Court Rom and adult daughter     Progress Towards Goals: continuing treatment    Treatment Modality: medication management every 1 month, continue psychotherapy with own therapist    Review due 90 to 120 days from date of this plan: 4 months - 8/26/2022  Expected length of service: ongoing treatment unless revised    My Physician/PA/NP and I have developed this plan together and I agree to work on the goals and objectives  I understand the treatment goals that were developed for my treatment      Signature:       Date and time:  Signature of parent/guardian if under age of 15 years: Date and time:  Signature of provider:      Date and time:  Signature of Supervising Physician:    Date and time: 4/26/2022      Edith Hedrick DO

## 2022-04-27 ENCOUNTER — OFFICE VISIT (OUTPATIENT)
Dept: UROLOGY | Facility: CLINIC | Age: 82
End: 2022-04-27
Payer: MEDICARE

## 2022-04-27 VITALS
BODY MASS INDEX: 28.21 KG/M2 | HEART RATE: 62 BPM | HEIGHT: 56 IN | WEIGHT: 125.4 LBS | DIASTOLIC BLOOD PRESSURE: 78 MMHG | SYSTOLIC BLOOD PRESSURE: 132 MMHG

## 2022-04-27 DIAGNOSIS — N20.0 NEPHROLITHIASIS: Primary | ICD-10-CM

## 2022-04-27 LAB
COMPN STONE: NORMAL
SL AMB  POCT GLUCOSE, UA: NORMAL
SL AMB LEUKOCYTE ESTERASE,UA: NORMAL
SL AMB POCT BILIRUBIN,UA: NORMAL
SL AMB POCT BLOOD,UA: NORMAL
SL AMB POCT CLARITY,UA: CLEAR
SL AMB POCT COLOR,UA: YELLOW
SL AMB POCT KETONES,UA: NORMAL
SL AMB POCT NITRITE,UA: NORMAL
SL AMB POCT PH,UA: 5
SL AMB POCT SPECIFIC GRAVITY,UA: 1.03
SL AMB POCT URINE PROTEIN: NORMAL
SL AMB POCT UROBILINOGEN: 0.2
WT STONE: 0.12 G

## 2022-04-27 PROCEDURE — 81002 URINALYSIS NONAUTO W/O SCOPE: CPT | Performed by: PHYSICIAN ASSISTANT

## 2022-04-27 PROCEDURE — 99213 OFFICE O/P EST LOW 20 MIN: CPT | Performed by: PHYSICIAN ASSISTANT

## 2022-04-27 NOTE — PROGRESS NOTES
1  Nephrolithiasis  POCT urine dip    XR abdomen 1 view kub       Assessment and plan:       1  Nephrolithiasis   - passed stone, f/u US negative for residual obstruction  Pending stone analysis  - proper hydration and dietary modifications  - f/u 1 year KUB  - contact sooner with s/sx of a passing stone     Ravi Thacker PA-C      Chief Complaint     Chief Complaint   Patient presents with    Nephrolithiasis       History of Present Illness     Avelina Escobar is a 78 y o  female presenting for hospital follow-up of nephrolithiasis  Patient had presented to the emergency department 04/20/2022 secondary to left flank pain  CT confirmed a left obstructing 6 mm left ureteral stone with mild hydronephrosis  Patient was discharged with medical expulsive therapy  Patient fortunately was able to pass her stone and provided to her primary care provider 04/25/2022  This was sent out for stone analysis and is pending  Ultrasound performed 04/23/2022 was negative for hydronephrosis  Bilateral ureteral jets detected  Urine dip leukocyte and nitrite negative, moderate blood  Laboratory     Lab Results   Component Value Date    CREATININE 0 79 04/21/2022       Review of Systems     Review of Systems   Constitutional: Negative for activity change, appetite change, chills, diaphoresis, fatigue, fever and unexpected weight change  Respiratory: Negative for chest tightness and shortness of breath  Cardiovascular: Negative for chest pain, palpitations and leg swelling  Gastrointestinal: Negative for abdominal distention, abdominal pain, constipation, diarrhea, nausea and vomiting  Genitourinary: Negative for decreased urine volume, difficulty urinating, dysuria, enuresis, flank pain, frequency, genital sores, hematuria and urgency  Musculoskeletal: Negative for back pain, gait problem and myalgias  Skin: Negative for color change, pallor, rash and wound     Psychiatric/Behavioral: Negative for behavioral problems  The patient is not nervous/anxious  Allergies     No Known Allergies    Physical Exam     Physical Exam  Constitutional:       General: She is not in acute distress  Appearance: Normal appearance  She is normal weight  She is not ill-appearing, toxic-appearing or diaphoretic  HENT:      Head: Normocephalic and atraumatic  Eyes:      General:         Right eye: No discharge  Left eye: No discharge  Conjunctiva/sclera: Conjunctivae normal    Pulmonary:      Effort: Pulmonary effort is normal  No respiratory distress  Musculoskeletal:         General: No swelling or tenderness  Normal range of motion  Skin:     General: Skin is warm and dry  Coloration: Skin is not jaundiced or pale  Neurological:      General: No focal deficit present  Mental Status: She is alert and oriented to person, place, and time  Psychiatric:         Mood and Affect: Mood normal          Behavior: Behavior normal          Thought Content:  Thought content normal            Vital Signs     Vitals:    04/27/22 0752   BP: 132/78   BP Location: Left arm   Patient Position: Sitting   Cuff Size: Adult   Pulse: 62   Weight: 56 9 kg (125 lb 6 4 oz)   Height: 4' 8" (1 422 m)         Current Medications       Current Outpatient Medications:     aspirin 81 MG tablet, Take 81 mg by mouth daily, Disp: , Rfl:     atorvastatin (LIPITOR) 10 mg tablet, Take 1 tablet (10 mg total) by mouth daily, Disp: 90 tablet, Rfl: 0    busPIRone (BUSPAR) 15 mg tablet, Take 1 tablet (15 mg total) by mouth 3 (three) times a day, Disp: 90 tablet, Rfl: 5    cholecalciferol (VITAMIN D3) 1,000 units tablet, Take 1,000 Units by mouth daily, Disp: , Rfl:     docusate sodium (COLACE) 100 mg capsule, Take 100 mg by mouth 2 (two) times a day, Disp: , Rfl:     linaCLOtide (Linzess) 145 MCG CAPS, Take 1 capsule (145 mcg total) by mouth daily, Disp: 90 capsule, Rfl: 0    lisinopril-hydrochlorothiazide (PRINZIDE,ZESTORETIC) 10-12 5 MG per tablet, Take 1 tablet by mouth daily, Disp: 90 tablet, Rfl: 1    metFORMIN (GLUCOPHAGE) 500 mg tablet, Take 1 tablet (500 mg total) by mouth 2 (two) times a day with meals, Disp: 180 tablet, Rfl: 0    bisacodyl (DULCOLAX) 10 mg suppository, Insert 1 suppository (10 mg total) into the rectum daily for 7 days, Disp: 7 suppository, Rfl: 0    clonazePAM (KlonoPIN) 0 5 mg tablet, Take 1 tablet (0 5 mg total) by mouth daily, Disp: 30 tablet, Rfl: 0    clonazePAM (KlonoPIN) 1 mg tablet, Take 1 tablet (1 mg total) by mouth daily at bedtime, Disp: 30 tablet, Rfl: 0    FLUoxetine (PROzac) 20 mg capsule, Take 1 capsule (20 mg total) by mouth daily for 7 days, Disp: 7 capsule, Rfl: 0    FLUoxetine (PROzac) 40 MG capsule, Take 1 capsule (40 mg total) by mouth daily, Disp: 30 capsule, Rfl: 1    polyethylene glycol (MIRALAX) 17 g packet, Take 17 g by mouth daily for 7 days, Disp: 119 g, Rfl: 0      Active Problems     Patient Active Problem List   Diagnosis    Adenomatous polyp of colon    Primary hypertension    Type 2 diabetes mellitus without complication, without long-term current use of insulin (HCC)    Mixed hyperlipidemia    Insomnia    Breast cancer screening    Change in bowel habits    Medicare annual wellness visit, subsequent    Generalized osteoarthritis    Chronic idiopathic constipation    Likely TIA with visual impairment vs HTN episode    Illness anxiety disorder    Major depressive disorder, recurrent, mild (HCC)    Generalized anxiety disorder    Mild neurocognitive disorder    Uric acid kidney stone         Past Medical History     Past Medical History:   Diagnosis Date    Colon polyps     Diabetes mellitus [facility] [address] )     Dizziness     Last assessed: 8/31/15    [facility] thrombosis[facility]     Dysuria     Hematuria 4/21/2022    Hyperlipidemia     Hypertension     Hypertensive urgency 10/22/2021    Insomnia     Ureterolithiasis 5/22/2020 Surgical History     Past Surgical History:   Procedure Laterality Date     SECTION      1964 son, 26 daughter    COLONOSCOPY      [place] RETROGRADE PYELOGRAM  2020    MI COLONOSCOPY FLX DX W/COLLJ SPEC WHEN PFRMD N/A 3/27/2017    Procedure: COLONOSCOPY;  Surgeon: Sam Martines MD;  [facility];   Service: Gastroenterology    MI CYSTO/URETERO W/LITHOTRIPSY &INDWELL STENT INSRT Right 2020    Procedure: CYSTOSCOPY URETEROSCOPY WITH LITHOTRIPSY HOLMIUM LASER, RETROGRADE PYELOGRAM AND INSERTION STENT URETERAL; EXCISION OF BLADDER TUMOR;  Surgeon: Adwoa Hughes MD;  Location: [facility];  Service: Urology    ROTATOR CUFF REPAIR Left     Last assessed: 3/29/15    TONSILLECTOMY           Family History     Family History   Problem Relation Age of Onset    Depression Mother         w/anxiety    Diabetes Mother         Mellitus    Breast cancer Mother     Hypertension Mother     No Known Problems Father     Depression Sister         w/anxiety    Heart disease Sister         Cardiac Disorder    Breast cancer Sister     Hypertension Sister     Diabetes Brother         Mellitus    Alcohol abuse Son          Social History     Social History       Radiology

## 2022-04-28 ENCOUNTER — TELEPHONE (OUTPATIENT)
Dept: FAMILY MEDICINE CLINIC | Facility: CLINIC | Age: 82
End: 2022-04-28

## 2022-04-28 NOTE — TELEPHONE ENCOUNTER
Patient was advised and will have lab test done    Lab order was emailed to Radha Jordan at her request

## 2022-04-28 NOTE — TELEPHONE ENCOUNTER
----- Message from Candi SenderDO sent at 4/28/2022  7:58 AM EDT -----  Please call the patient regarding her abnormal result  Kidney stone was predominantly uric acid 80% and calcium oxalate 20%  I have never tested her uric acid level    Order placed for uric acid level

## 2022-05-02 ENCOUNTER — TELEPHONE (OUTPATIENT)
Dept: FAMILY MEDICINE CLINIC | Facility: CLINIC | Age: 82
End: 2022-05-02

## 2022-05-02 NOTE — TELEPHONE ENCOUNTER
Blood pressure was 126/78 when she was seen in the office on April 25th  I would actually recommend that her daughter give her an extra Klonopin  I see that she was placed on Abilify 2 mg in addition to 80 mg of fluoxetine by psychiatrist   Heather Nolen is new agent to her  I would like for her to hold the Abilify as that can be associated with increases in blood pressure as well as dizziness    This message is also being copied to psychiatrist

## 2022-05-02 NOTE — TELEPHONE ENCOUNTER
Lane Baker was advised and will give an extra klonopin and hold abilify    We will call her back after response from psychiatrist

## 2022-05-05 ENCOUNTER — TELEPHONE (OUTPATIENT)
Dept: FAMILY MEDICINE CLINIC | Facility: CLINIC | Age: 82
End: 2022-05-05

## 2022-05-05 LAB — URATE SERPL-MCNC: 4 MG/DL (ref 2.5–7)

## 2022-05-05 NOTE — RESULT ENCOUNTER NOTE
Please call patient and notify that results are normal   Normal uric acid level at 4 0  No indication for hyperuricemia  To prevent kidney stones just make certain that she is drinking adequate amount of fluids throughout the course the day    Sixty-four fluid ounces is recommended

## 2022-05-05 NOTE — TELEPHONE ENCOUNTER
----- Message from Keiko Dove DO sent at 5/5/2022  8:09 AM EDT -----  Please call patient and notify that results are normal   Normal uric acid level at 4 0  No indication for hyperuricemia  To prevent kidney stones just make certain that she is drinking adequate amount of fluids throughout the course the day    Sixty-four fluid ounces is recommended

## 2022-05-16 DIAGNOSIS — F41.9 ANXIETY: ICD-10-CM

## 2022-05-16 RX ORDER — CLONAZEPAM 0.5 MG/1
0.5 TABLET ORAL 3 TIMES DAILY
Qty: 90 TABLET | Refills: 0 | Status: SHIPPED | OUTPATIENT
Start: 2022-05-16 | End: 2022-06-07 | Stop reason: SDUPTHER

## 2022-05-31 DIAGNOSIS — F33.0 MAJOR DEPRESSIVE DISORDER, RECURRENT, MILD (HCC): ICD-10-CM

## 2022-05-31 DIAGNOSIS — F41.1 GENERALIZED ANXIETY DISORDER: ICD-10-CM

## 2022-05-31 DIAGNOSIS — F45.21 ILLNESS ANXIETY DISORDER: ICD-10-CM

## 2022-05-31 RX ORDER — FLUOXETINE HYDROCHLORIDE 40 MG/1
80 CAPSULE ORAL DAILY
Qty: 60 CAPSULE | Refills: 1 | Status: SHIPPED | OUTPATIENT
Start: 2022-05-31 | End: 2022-06-07 | Stop reason: SDUPTHER

## 2022-06-01 LAB
LEFT EYE DIABETIC RETINOPATHY: NORMAL
RIGHT EYE DIABETIC RETINOPATHY: NORMAL

## 2022-06-07 ENCOUNTER — TELEMEDICINE (OUTPATIENT)
Dept: PSYCHIATRY | Facility: CLINIC | Age: 82
End: 2022-06-07

## 2022-06-07 DIAGNOSIS — F45.21 ILLNESS ANXIETY DISORDER: Primary | ICD-10-CM

## 2022-06-07 DIAGNOSIS — F41.1 GENERALIZED ANXIETY DISORDER: ICD-10-CM

## 2022-06-07 DIAGNOSIS — F33.1 MAJOR DEPRESSIVE DISORDER, RECURRENT, MODERATE (HCC): ICD-10-CM

## 2022-06-07 RX ORDER — CLONAZEPAM 1 MG/1
1 TABLET ORAL
Qty: 30 TABLET | Refills: 0 | Status: SHIPPED | OUTPATIENT
Start: 2022-06-07 | End: 2022-07-05 | Stop reason: SDUPTHER

## 2022-06-07 RX ORDER — FLUOXETINE HYDROCHLORIDE 40 MG/1
40 CAPSULE ORAL DAILY
Qty: 30 CAPSULE | Refills: 1 | Status: SHIPPED | OUTPATIENT
Start: 2022-06-07 | End: 2022-08-06

## 2022-06-07 RX ORDER — FLUOXETINE HYDROCHLORIDE 20 MG/1
20 CAPSULE ORAL DAILY
Qty: 7 CAPSULE | Refills: 0 | Status: SHIPPED | OUTPATIENT
Start: 2022-06-07 | End: 2022-06-15

## 2022-06-07 RX ORDER — CLONAZEPAM 0.5 MG/1
0.5 TABLET ORAL DAILY
Qty: 30 TABLET | Refills: 0 | Status: SHIPPED | OUTPATIENT
Start: 2022-06-07 | End: 2022-07-05 | Stop reason: SDUPTHER

## 2022-06-07 NOTE — PSYCH
Patient is located in the following state in which I hold an active license [place]      Encounter provider Ariana Esquivel DO    Provider located at [address] [address] [phone]    The patient was identified by name and date of birth  Eri Quiroga was informed that this is a telemedicine visit and that the visit is being conducted through [facility] and patient was informed this is a secure, HIPAA-complaint platform  She agrees to proceed  My office door was closed  No one else was in the room  She acknowledged consent and understanding of privacy and security of the video platform  The patient has agreed to participate and understands they can discontinue the visit at any time  Patient is aware this is a billable service  MEDICATION MANAGEMENT NOTE        [facility]      Name and Date of Birth:  Natalie Dan 78 y o  1940    Date of Visit: June 7, 2022    SUBJECTIVE:  CC: Scott Silva presents today for follow up with Illness anxiety disorder, MDD, recurrent, mild, and JONAH  States that things are "not going too great " Says she recently wanted to see a friend in [place] and was later told that her daughter couldn't go with here  States she is afraid to go anywhere alone and was told by her daughter that they could go in two weeks which she now is not sure she wants to do  Continues to be afraid of falling and to be away from home  Says she doesn't go for walks or leave her house at all now due to this feel of falling  Says she keeps thinking something bad is going to happen to her  Indicates she is becoming more forgetful and derails her line of thoughts sometimes  Does report a poor mood secondary to these feelings and high level of anxiety  States daughter things patient is being selfish for needing her around all the time  Worries about her physical health  Continues to check urine, skin, and cancel provider appointments to avoid learning pronoses  Psychotherapy has been occurring but does not seem to be controlling her Illness anxiety  Motivation to do things is very poor  States she feels too sluggish with her evening dose of Klonopin  Sleep initiation is good but she gets nervous when she gets up to go to the bathroom but does get good quantity at 10 hours of sleep but does not feel rested  States she is worried about the things she needs to do for the day  Appetite is good and her diet is stable and unchanged  Physical activity is limited 2nd to worries about her health and mobility  Lab work has still not been acquired as patient reports she forgot but states she will try to obtain this prior to next follow up visit  [facility] to memory issues and lethargy on Klonopin    Since our last visit, overall symptoms have been unchanged  HPI ROS:     PHQ-9 Depression Screening    Little interest or pleasure in doing things: 3 - nearly every day  Feeling down, depressed, or hopeless: 3 - nearly every day  Trouble falling or staying asleep, or sleeping too much: 0 - not at all  Feeling tired or having little energy: 2 - more than half the days  Poor appetite or overeatin - not at all  Feeling bad about yourself - or that you are a failure or have let yourself or your family down: 3 - nearly every day  Trouble concentrating on things, such as reading the newspaper or watching television: 3 - nearly every day  Moving or speaking so slowly that other people could have noticed   Or the opposite - being so fidgety or restless that you have been moving around a lot more than usual: 2 - more than half the days  Thoughts that you would be better off dead, or of hurting yourself in some way: 0 - not at all  PHQ-9 Score: 16  Score Interpretation: Moderately severe depression         PHQ-2/9 Depression Screening    Little interest or pleasure in doing things: 3 - nearly every day  Feeling down, depressed, or hopeless: 3 - nearly every day  Trouble falling or staying asleep, or sleeping too much: 0 - not at all  Feeling tired or having little energy: 2 - more than half the days  Poor appetite or overeatin - not at all  Feeling bad about yourself - or that you are a failure or have let yourself or your family down: 3 - nearly every day  Trouble concentrating on things, such as reading the newspaper or watching television: 3 - nearly every day  Moving or speaking so slowly that other people could have noticed  Or the opposite - being so fidgety or restless that you have been moving around a lot more than usual: 2 - more than half the days  Thoughts that you would be better off dead, or of hurting yourself in some way: 0 - not at all  PHQ-9 Score: 16   PHQ-9 Interpretation: Moderately severe depression        JONAH-7 Flowsheet Screening    Flowsheet Row Most Recent Value   Over the last 2 weeks, how often have you been bothered by any of the following problems? Feeling nervous, anxious, or on edge 3   Not being able to stop or control worrying 3   Worrying too much about different things 3   Trouble relaxing 3   Being so restless that it is hard to sit still 2   Becoming easily annoyed or irritable 2   Feeling afraid as if something awful might happen 3   JONAH-7 Total Score 19        HPI ROS:             ('was' notes: recent => remote)  Medication Side Effects:  Memory issues and lethargy on Klonopin  Denied   Depression (10 worst): 910 (Was /10)   Anxiety (10 worst): /10 (Was 10)   Safety concerns (SI, HI, etc): Denied SI/HI/AVH (Was Denied SI/HI/AVH)   Sleep: (NM = Nightmares) WNL (Was WNL)   Energy: Unchanged and up and down (Was Unchanged)   Appetite: WNL (Was WNL and healthier choices)   Weight Change: No changes Lost 8 lbs in last 3 months     Review Of Systems as noted above   In addition:     Constitutional negative   ENT negative   Cardiovascular negative   Respiratory negative   Gastrointestinal negative   Genitourinary negative   Musculoskeletal negative   Integumentary negative   Neurological negative   Endocrine negative   Other Symptoms none     Pain none   Pain Scale 0     History Review:  The following portions of the patient's history were reviewed and updated as appropriate: allergies, current medications, past family history, past medical history, past social history, past surgical history and problem list      Lab Review: No new labs or no relevant labs needing review with patient today      OBJECTIVE:     MENTAL STATUS EXAM  Appearance:  age appropriate, dressed casually, overweight   Behavior:  Pleasant & cooperative, guarded, psychomotor agitation, restless and fidgety   Speech:  loud, hyperverbal but not pressured, rambling   Mood:  "I'm so anxious I can't leave the house "   Affect:  mood congruent, depressed, tearful, labile, constricted, anxious   Language: intact and appropriate for age, education, and intellect   Thought Process:  circumferential, tangential   Associations: intact associations   Thought Content:  negative thinking and cognitive distortions, intrusive, obsessive, negative, ruminating thoughts   Perceptual Disturbances: no auditory or visual hallcunations   Risk Potential / Abnormal Thoughts: Suicidal ideation - None  Homicidal ideation - None  Potential for aggression - No       Consciousness:  Alert & Awake   Sensorium:  Grossly oriented   Attention: attention span and concentration appear shorter than expected for age       Fund of Knowledge:  Memory: awareness of current events: yes  past history: yes  vocabulary: yes  short term memory moderately impaired, long term memory grossly intact   Insight:  limited   Judgment: fair   Muscle Strength Muscle Tone: Could not assess due to virtual visit  Could not assess due to virtual visit   Gait/Station: not observed   Motor Activity: no abnormal movements       Risks, Benefits And Possible Side Effects Of Medications:    AGREE: Risks, benefits, and possible side effects of medications explained to Leticia Rodríguez and she (or legal representative) verbalizes understanding and agreement for treatment  Controlled Medication Discussion:     Patient using medication appropriately  Leticia Rodríguez has been filling controlled prescriptions on time as prescribed according to [facility] program    Discussed with Leticia Rodríguez the risks of sedation, respiratory depression, impairment of ability to drive and potential for abuse and addiction related to treatment with benzodiazepine medications  She understands risk of treatment with benzodiazepine medications, agrees to not drive if feels impaired and agrees to take medications as prescribed  Discussed with Omie Favre Box warning on concurrent use of benzodiazepines and opioid medications including sedation, respiratory depression, coma and death  She understands the risk of treatment with benzodiazepines in addition to opioids and wants to continue taking those medications  ______________________________________________________________      Recent labs:  No visits with results within 1 Month(s) from this visit     Latest known visit with results is:   Orders Only on 05/04/2022   Component Date Value    Uric Acid 05/04/2022 4 0          Past Psychiatric History  Previous diagnoses include: Depression, anxiety, and OCD  Prior outpatient psychiatric treatment:  Saw psychiatrist in her 35s for a period of 10 years  Prior therapy:  Sees therapist Dr Vincent Stauffer the past 2-3 years  Prior inpatient psychiatric treatment:  1 time for a period of 3 weeks at [facility] in 2013 for panic attacks post surgery  Prior suicide attempts:  Denies  Prior self harm:  Denies  Prior violence or aggression:  Denies     Previous psychotropic medication trials:      Antidepressants:  Effexor, Zoloft, Prozac, and Lexapro     Mood Stabilizers:  Denies     Anxiolytics:  BuSpar and Klonopin     Typical antipsychotics:  Denies     Atypical antipsychotics:  Denies     Hypnotics/sleep aids:  Denies     PTSD meds: Prazosin (fainting?)     Social History:     The patient grew up [address] was described as "I scardy cat"      During childhood, parents were together but father cheated on her mother with another girl  They have 2 sister(s) and 2 brother(s)   Only 1 brother is still living but she has good relationship with him       Abuse/neglect: none     As far as the patient (or present family member) is aware, overall childhood development: Patient does ascribe to normal developmental including childhood as well as regular educational course overall (eg no IEP, special education)      Education level:  Bachelor of arts   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   Supports herself on pension and social security    Marital status:   since  when   from cancer  Children:  1 daughter whom she lives with and 1 son who lives in [place] whom she has good relationship with  Current Living Situation: the patient currently lives with daughter, son-in-law, and 3 grandkids in [place], [place]   Social support:  Brother, daughter, son-in-law, grandkids, her ghanshyam, and her friend Lenward [employer]  Access to Guns:  Denies     Substance use and treatment:  Tobacco use:  Denies  Caffeine Use:  1 cup of decaf in the morning  ETOH use:  Denies  Other substance use:  Denies     Endorses previous experimentation with:  Denies     Longest clean time: not applicable  History of Inpatient/Outpatient rehabilitation program: no        Traumatic History:      Abuse: none  Other Traumatic Events: none      Family Psychiatric History:      Psychiatric Illness:      Mother - depression, Sister - depression and anxiety disorder, Sister - depression and anxiety disorder  Substance Abuse:       Son - alcohol abuse  Suicide Attempts:        no family history of suicide attempts    Family Psychiatric History:   Family History   Problem Relation Age of Onset    Depression Mother         w/anxiety    Diabetes Mother         Mellitus    Breast cancer Mother     Hypertension Mother     No Known Problems Father     Depression Sister         w/anxiety    Heart disease Sister         Cardiac Disorder    Breast cancer Sister     Hypertension Sister     Diabetes Brother         Mellitus    Alcohol abuse Son          Medical / Surgical History:    Past Medical History:   Diagnosis Date    Colon polyps     Diabetes mellitus ([facility] [address] )     Dizziness     Last assessed: 8/31/15    DVT [facility]deep venous thrombosis) ([facility])     Dysuria     Hematuria 2022    Hyperlipidemia     Hypertension     Hypertensive urgency 10/22/2021    Insomnia     Ureterolithiasis 2020     Past Surgical History:   Procedure Laterality Date     SECTION      1964 son, 26 daughter    COLONOSCOPY      [facility] RETROGRADE PYELOGRAM  2020    NC COLONOSCOPY FLX DX W/COLLJ SPEC WHEN PFRMD N/A 3/27/2017    Procedure: COLONOSCOPY;  Surgeon: Chantale Gardner MD;  [facility];   Service: Gastroenterology    NC CYSTO/URETERO W/LITHOTRIPSY &INDWELL STENT INSRT Right 2020    Procedure: CYSTOSCOPY URETEROSCOPY WITH LITHOTRIPSY HOLMIUM LASER, RETROGRADE PYELOGRAM AND INSERTION STENT URETERAL; EXCISION OF BLADDER TUMOR;  Surgeon: Mike Cleveland MD;  [facility];  Service: Urology    ROTATOR CUFF REPAIR Left     Last assessed: 3/29/15    TONSILLECTOMY         Assessment/Plan:        Diagnoses and all orders for this visit:    Illness anxiety disorder    Generalized anxiety disorder    Major depressive disorder, recurrent, mild ([facility] [address] )        ______________________________________________________________________    Additional Assessment:  Patient has significantly increased depression anxiety symptoms based on objective and subjective measurements today  Patient does not seem to be responding to high dose of Prozac despite this provider believing she has some OCD like processing behind her line of thinking  Has high level of illness anxiety disorder and is completely fixated on her physical symptoms  Continues to check her skin in urine frequently and has canceled physician appointments due to worrying about prognoses she could have in regards to her health  States that she wakes herself up at night to go to the bathroom and has difficulty getting back to sleep due to her nervousness with walking  States that her anxiety is so high that she is now isolating herself in her house and is not engaging in activities  States that she recently had to discontinue a trip that she was planning to [place] because her daughter could not go with her as she has significant fears of being alone  Indicates that she feels lethargic and tired with her mid day dose of Klonopin and is experiencing moderate level of memory difficulties  Will consolidate patient's Klonopin from the afternoon towards the evening hours but keep her on current dose at this time as this provider will start decreasing Prozac with plan to cross titrate her to different SNRI of Effexor versus Trintellix at next appointment  Will move her follow-up to next week and try to get her in person so that blood pressure can be acquired prior to making this change  Will continue BuSpar during the day for anxiety control but could consider changing this to gabapentin as her anxiety seems to be refractory to BuSpar  Informed patient that she should make attempts to acquire her outstanding lab work for full organic workup of her psychiatric concerns to which she voiced understanding and agreement with    Encouraged her to increase physical activity at least try to ambulate as this is likely causing significant problems with her anxiety and depression as well  Encourage reduce isolation and trying to get out of the house a little  Encouraged continued efforts at stable diet with good intake which has not been an issue for her to this point  Scales:  PHQ-9 was 7, now 16  - Prior 12, 9    JONAH-7 was 17, now 19  - Prior 19, 19         Treatment Plan:        Patient has been educated about their diagnosis and treatment modalities  They voiced understanding and agreement with the following plan:    Discussed medications and if treatment adjustment was needed/desired  1) MEDS:           - Decrease Prozac to 40 mg PO QD over the next week and plan to switch patient to Effexor vs Trintellix at next appointment next week  PARQ completed including serotonin syndrome, SIADH, worsening depression, suicidality, induction of eligio, GI upset, headaches, activation, sexual side effects, sedation, potential drug interactions, and others    - Changed Klonopin to 0 5 mg PO QD and 1 mg PO QHS for breakthrough anxiety  Discussed with patient the risks of sedation, respiratory depression, impairment in judgment and motor function  Depression, mood changes, GI changes, and others discussed  Patient was also informed of risks of being on or starting opioid medications due to drug interactions and potential for serious respiratory depression and death     - Continue Buspar 15 mg PO TID for anxiety  PARQ completed including serotonin syndrome, rare TD/EPS, dizziness, sedation, GI distress, confusion, possible mood changes, xerostomia and visual disturbances  - Consider cross titration of Buspar with Gabapentin     2) Labs:  - 10/24/21: BMP unremarkable  - 10/23/21: CBC and lipid panel unremarkable  A1c elevated at 6 6   - 6/14/21: TSH wnl at 2 57  -12/15/21: A1c was 6 5 and controlled and TSH wnl at 1 36  - 1/27/22: CBC unremarkable and CMP has increased glucose at 205    - 4/25/22: A1c was elevated at 6 7  - 12/20/22 TSH, Lipid panel, CBC, CMP, and A1c ordered by PCP and reminded (3rd time) to acquire these medications     3) Therapy:    - Continue with own therapist Dr Violetta Live     4) Medical:   - Pt will f/u with other providers as needed   - On Vitamin D supplement  - SLUMS: 23/30 showing mild neurocognitive impairment  Of note, it is unclear how much BZDs could be impacting these scores     5) Other: Support as needed   - encouraged increase in physical activity including walking and low impact exercises/stretching   - recommended improvement of diet with low carbohydrates and high fiber   - recommended improvement in sleep hygiene and avoiding electronics at night     6) Follow up:   - Follow up in 1 week for medication management    - Patient will call if issues or concerns      7) Treatment Plan:    - Enacted 4/26/22     Discussed self monitoring of symptoms, and symptom monitoring tools  Patient has been informed of 24 hours and weekend coverage for urgent situations accessed by calling the main clinic phone number  Psychotherapy in session:  Time spent performing psychotherapy: 16 Minutes    Video Exam    There were no vitals filed for this visit  Physical Exam     I spent 45 minutes directly with the patient during this visit    VIRTUAL VISIT DISCLAIMER      Kristopher Botello verbally agrees to participate in [employer]  Pt is aware that [employer] could be limited without vital signs or the ability to perform a full hands-on physical exam  Kristopher Botello understands she or the provider may request at any time to terminate the video visit and request the patient to seek care or treatment in person

## 2022-06-08 ENCOUNTER — TELEPHONE (OUTPATIENT)
Dept: FAMILY MEDICINE CLINIC | Facility: CLINIC | Age: 82
End: 2022-06-08

## 2022-06-08 ENCOUNTER — TELEPHONE (OUTPATIENT)
Dept: PSYCHIATRY | Facility: CLINIC | Age: 82
End: 2022-06-08

## 2022-06-08 NOTE — TELEPHONE ENCOUNTER
He is prescribing her psychiatric medication  I am not managing her psychiatric medication  She has long psychiatric history    She is to continue with Dr Annika Puga as well as with her therapist

## 2022-06-08 NOTE — TELEPHONE ENCOUNTER
Zenon Hernandez called regarding her Mom's visits to the Psychiatrist who is with [facility]  She says she is not totally sure that there's a benefit to it and would like to have a consultation or a phone call with the doctor    Please advise

## 2022-06-08 NOTE — TELEPHONE ENCOUNTER
Daughter called and asked what blood work Gretel Tee has to do  I checked the chart but did not see anything  Can you please clarify and let me know so I can give the patient a call back

## 2022-06-09 NOTE — TELEPHONE ENCOUNTER
Patient's daughter, Savanna Naik, called back to request her last message requesting to speak to Dr Sandi Zhou be cancelled  She stated her mother has an appointment with Dr Evan Dorsey, the psychiatrist, on Wednesday and she will talk to him about managing this then

## 2022-06-09 NOTE — TELEPHONE ENCOUNTER
I spoke with patients daughter and gave her the message, Patient reiterated that she wants you to manage the medication  I repeated he advised he is not willing to do that  Patient then requested that you call her to discuss  I advised I do not know if you would do that but I would send a message  Daughter then advised that it is the least you can do as her PCP to call her and discuss  Then proceeded to say I have concerns about my mother  I did tell her she may need to schedule an appointment to discuss concerns  Please advise

## 2022-06-14 DIAGNOSIS — K59.04 CHRONIC IDIOPATHIC CONSTIPATION: ICD-10-CM

## 2022-06-14 RX ORDER — LINACLOTIDE 145 UG/1
CAPSULE, GELATIN COATED ORAL
Qty: 90 CAPSULE | Refills: 0 | Status: SHIPPED | OUTPATIENT
Start: 2022-06-14

## 2022-06-15 ENCOUNTER — OFFICE VISIT (OUTPATIENT)
Dept: PSYCHIATRY | Facility: CLINIC | Age: 82
End: 2022-06-15

## 2022-06-15 VITALS — DIASTOLIC BLOOD PRESSURE: 80 MMHG | SYSTOLIC BLOOD PRESSURE: 157 MMHG | WEIGHT: 121 LBS | BODY MASS INDEX: 27.13 KG/M2

## 2022-06-15 DIAGNOSIS — F41.1 GENERALIZED ANXIETY DISORDER: ICD-10-CM

## 2022-06-15 DIAGNOSIS — F45.21 ILLNESS ANXIETY DISORDER: Primary | ICD-10-CM

## 2022-06-15 DIAGNOSIS — Z79.899 ENCOUNTER FOR MEDICATION MANAGEMENT: ICD-10-CM

## 2022-06-15 DIAGNOSIS — F33.0 MAJOR DEPRESSIVE DISORDER, RECURRENT, MILD (HCC): ICD-10-CM

## 2022-06-15 RX ORDER — GABAPENTIN 100 MG/1
100 CAPSULE ORAL 3 TIMES DAILY
Qty: 90 CAPSULE | Refills: 1 | Status: SHIPPED | OUTPATIENT
Start: 2022-06-15 | End: 2022-07-14

## 2022-06-15 RX ORDER — BUSPIRONE HYDROCHLORIDE 10 MG/1
10 TABLET ORAL 2 TIMES DAILY
Qty: 14 TABLET | Refills: 0 | Status: SHIPPED | OUTPATIENT
Start: 2022-06-15 | End: 2022-07-14

## 2022-06-15 NOTE — PSYCH
MEDICATION MANAGEMENT NOTE        [facility]      Name and Date of Birth:  Radha Barnett 78 y o  1940    Date of Visit: Madisyn 15, 2022    SUBJECTIVE:  CC: Scott Sukh presents today for follow up with Illness anxiety disorder, JONAH, and MDD, recurrent, mild  States that "I still am a little depressed " Indicates she needs to "have my little ifeanyi " Does report that she is happy to be here to take care of herself  She is happy her daughter's boss is understanding of them taking time to come  States her therapy has been going well and it does help her with her anxiety sometimes  Says therapist will be away from a weeks soon  Gets reading material that helps her understand what is going on which helps her feel ok about how to handle things sometimes  Says she has negative thoughts a lot still  Therapist thinks that she is engaging in therapy better following medication management appointments with this provider  Anxiety continues to be focused mostly on her health but also generalized at times  States that she is surprised by how many doctor appointments and labs she has had done which increases her anxiety  Buspar does not seem to be providing her with any relief of anxiety  Patient continues to be extremely fixated on her physical health and spends many hours during the day with obsessive and intrusive thoughts regarding this and performing compulsive actions of checking her body  States she is very concerned about her ability to ambulate and worries when she gets up at night to use the restroom but is able to get back to sleep sometimes also  Continues to isolate herself in her house and not engage in activities  Becomes very concerned when her daughter is not around  Indicates that evening hour lethargy has improved since consolidation of her Klonopin to night time  Memory continues to be an issue at times   Lab work was again not acquired because the script time was dates for much later  Sleep at least 8 hours of sleep  Appetite has been WNL and indicates she is watching her diet and avoiding sugary snacks at night  Physical activity has been limited but did get out the last few days  Says she has hobbies she wants to do but can not concentrate or motivate herself to do them  Negrita Moreno denies any side effects from medications unless noted above    Since our last visit, overall symptoms have been unchanged  HPI ROS:     PHQ-9 Depression Screening    Little interest or pleasure in doing things: 2 - more than half the days  Feeling down, depressed, or hopeless: 3 - nearly every day  Trouble falling or staying asleep, or sleeping too much: 1 - several days  Feeling tired or having little energy: 3 - nearly every day  Poor appetite or overeatin - not at all  Feeling bad about yourself - or that you are a failure or have let yourself or your family down: 2 - more than half the days  Trouble concentrating on things, such as reading the newspaper or watching television: 2 - more than half the days  Moving or speaking so slowly that other people could have noticed   Or the opposite - being so fidgety or restless that you have been moving around a lot more than usual: 0 - not at all  Thoughts that you would be better off dead, or of hurting yourself in some way: 0 - not at all  PHQ-9 Score: 13  Score Interpretation: Moderate depression         PHQ-2/9 Depression Screening    Little interest or pleasure in doing things: 2 - more than half the days  Feeling down, depressed, or hopeless: 3 - nearly every day  Trouble falling or staying asleep, or sleeping too much: 1 - several days  Feeling tired or having little energy: 3 - nearly every day  Poor appetite or overeatin - not at all  Feeling bad about yourself - or that you are a failure or have let yourself or your family down: 2 - more than half the days  Trouble concentrating on things, such as reading the newspaper or watching television: 2 - more than half the days  Moving or speaking so slowly that other people could have noticed  Or the opposite - being so fidgety or restless that you have been moving around a lot more than usual: 0 - not at all  Thoughts that you would be better off dead, or of hurting yourself in some way: 0 - not at all  PHQ-9 Score: 13   PHQ-9 Interpretation: Moderate depression        JONAH-7 Flowsheet Screening    Flowsheet Row Most Recent Value   Over the last 2 weeks, how often have you been bothered by any of the following problems? Feeling nervous, anxious, or on edge 3   Not being able to stop or control worrying 3   Worrying too much about different things 3   Trouble relaxing 3   Being so restless that it is hard to sit still 2   Becoming easily annoyed or irritable 1   Feeling afraid as if something awful might happen 1   JONAH-7 Total Score 16        HPI ROS:             ('was' notes: recent => remote)  Medication Side Effects:  Denied  Memory issues and lethargy on Klonopin   Depression (10 worst): 9/10 (Was 9/10)   Anxiety (10 worst): 9/10 (Was 9/10)   Safety concerns (SI, HI, etc): Denied SI/HI/AVH (Was Denied SI/HI/AVH)   Sleep: (NM = Nightmares) WNL at 8+ hours (Was WNL)   Energy: Up and down (Was Unchanged and up and down)   Appetite: WNL (Was WNL)   Weight Change: Lost 7 lbs in one year No changes     Review Of Systems as noted above  In addition:     Constitutional recent weight loss (7 lbs)   ENT negative   Cardiovascular negative   Respiratory negative   Gastrointestinal negative   Genitourinary negative   Musculoskeletal negative   Integumentary negative   Neurological negative   Endocrine negative   Other Symptoms none     Pain none   Pain Scale 0     History Review:  The following portions of the patient's history were reviewed and updated as appropriate: allergies, current medications, past family history, past medical history, past social history, past surgical history and problem list      Lab Review: No new labs or no relevant labs needing review with patient today      OBJECTIVE:     MENTAL STATUS EXAM  Appearance:  age appropriate, dressed casually, thin & gaunt looking   Behavior:  Pleasant & cooperative, guarded, psychomotor agitation   Speech:  soft, paucity of speech, latency   Mood:  "I still am a little depressed "   Affect:  mood congruent, depressed, constricted, anxious   Language: intact and appropriate for age, education, and intellect   Thought Process:  circumferential   Associations: intact associations   Thought Content:  negative thinking and cognitive distortions, somatic preoccupation, negative ruminations   Perceptual Disturbances: no auditory or visual hallcunations   Risk Potential / Abnormal Thoughts: Suicidal ideation - None  Homicidal ideation - None  Potential for aggression - No       Consciousness:  Alert & Awake   Sensorium:  Grossly oriented   Attention: attention span and concentration are age appropriate       Fund of Knowledge:  Memory: awareness of current events: yes  past history: yes  vocabulary: yes  short term memory moderately impaired, long term memory grossly intact   Insight:  fair   Judgment: fair   Muscle Strength Muscle Tone: normal  normal   Gait/Station: normal gait/station with good balance   Motor Activity: no abnormal movements       Risks, Benefits And Possible Side Effects Of Medications:    AGREE: Risks, benefits, and possible side effects of medications explained to Kalie Polo and she (or legal representative) verbalizes understanding and agreement for treatment      Controlled Medication Discussion:     Patient using medication appropriately  Kalie Polo has been filling controlled prescriptions on time as prescribed according to [facility]    Discussed with Kalie Polo the risks of sedation, respiratory depression, impairment of ability to drive and potential for abuse and addiction related to treatment with benzodiazepine medications  She understands risk of treatment with benzodiazepine medications, agrees to not drive if feels impaired and agrees to take medications as prescribed  Discussed with Nanette McHenry Box warning on concurrent use of benzodiazepines and opioid medications including sedation, respiratory depression, coma and death  She understands the risk of treatment with benzodiazepines in addition to opioids and wants to continue taking those medications  ______________________________________________________________      Recent labs:  No visits with results within 1 Month(s) from this visit  Latest known visit with results is:   Orders Only on 05/04/2022   Component Date Value    Uric Acid 05/04/2022 4 0          Past Psychiatric History  Previous diagnoses include: Depression, anxiety, and OCD  Prior outpatient psychiatric treatment:  Saw psychiatrist in her 35s for a period of 10 years  Prior therapy:  Sees therapist Dr Jon Garces the past 2-3 years  Prior inpatient psychiatric treatment:  1 time for a period of 3 weeks at [facility] in 2013 for panic attacks post surgery  Prior suicide attempts:  Denies  Prior self harm:  Denies  Prior violence or aggression:  Denies     Previous psychotropic medication trials:      Antidepressants:  Effexor, Zoloft, Prozac, and Lexapro     Mood Stabilizers:  Denies     Anxiolytics:  BuSpar and Klonopin     Typical antipsychotics:  Denies     Atypical antipsychotics:  Denies     Hypnotics/sleep aids:  Denies     PTSD meds: Prazosin (fainting?)     Social History:     The patient grew up [address] was described as "I scardy cat"      During childhood, parents were together but father cheated on her mother with another girl   They have 2 sister(s) and 2 brother(s)   Only 1 brother is still living but she has good relationship with him       Abuse/neglect: none     As far as the patient (or present family member) is aware, overall childhood development: Patient does ascribe to normal developmental including childhood as well as regular educational course overall (eg no IEP, special education)      Education level:  Bachelor of arts   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   Supports herself on pension and social security    Marital status:   since  when   from cancer  Children:  1 daughter whom she lives with and 1 son who lives in [facility] whom she has good relationship with  Current Living Situation: the patient currently lives with daughter, son-in-law, and 3 grandkids in [place], [place]   Social support:  Brother, daughter, son-in-law, grandkids, her ghanshyam, and her friend Laron Dutton  Access to Guns:  Denies     Substance use and treatment:  Tobacco use:  Denies  Caffeine Use:  1 cup of decaf in the morning  ETOH use:  Denies  Other substance use:  Denies     Endorses previous experimentation with:  Denies     Longest clean time: not applicable  History of Inpatient/Outpatient rehabilitation program: no        Traumatic History:      Abuse: none  Other Traumatic Events: none      Family Psychiatric History:      Psychiatric Illness:      Mother - depression, Sister - depression and anxiety disorder, Sister - depression and anxiety disorder  Substance Abuse:       Son - alcohol abuse  Suicide Attempts:        no family history of suicide attempts    Family Psychiatric History:   Family History   Problem Relation Age of Onset    Depression Mother         w/anxiety    Diabetes Mother         Mellitus    Breast cancer Mother     Hypertension Mother     No Known Problems Father     Depression Sister         w/anxiety    Heart disease Sister         Cardiac Disorder    Breast cancer Sister     Hypertension Sister     Diabetes Brother         Mellitus    Alcohol abuse Son          Medical / Surgical History:    Past Medical History:   Diagnosis Date    Colon polyps     Diabetes mellitus ([address] )     Dizziness     Last assessed: 8/31/15    DVT (deep venous thrombosis) ([facility])     Dysuria     Hematuria 2022    Hyperlipidemia     Hypertension     Hypertensive urgency 10/22/2021    Insomnia     Ureterolithiasis 2020     Past Surgical History:   Procedure Laterality Date     SECTION      1964 son, 26 daughter    COLONOSCOPY      [place] RETROGRADE PYELOGRAM  2020    MT COLONOSCOPY FLX DX W/COLLJ SPEC WHEN PFRMD N/A 3/27/2017    Procedure: COLONOSCOPY;  Surgeon: Carmen Barnett MD;  [facility]; Service: Gastroenterology    MT CYSTO/URETERO W/LITHOTRIPSY &INDWELL STENT INSRT Right 2020    Procedure: CYSTOSCOPY URETEROSCOPY WITH LITHOTRIPSY HOLMIUM LASER, RETROGRADE PYELOGRAM AND INSERTION STENT URETERAL; EXCISION OF BLADDER TUMOR;  Surgeon: Paul Villalta MD;  Location: [facility];  Service: Urology    ROTATOR CUFF REPAIR Left     Last assessed: 3/29/15    TONSILLECTOMY         Assessment/Plan:        There are no diagnoses linked to this encounter     ______________________________________________________________________    Additional Assessment:  Patient does display mild reduction in both mood and anxiety symptoms based on objective measurements in approximately same level of both based on subjective measurements  Patient does appear brighter in conversation and does smile occasionally during interview which does represent an improvement since previous virtual appointment with this provider  Does not have episodes of crying or display acute agitation  Does report that she does have crying spells when not at appointment and feels better at the moment as she is taking care of her health  Reports high level of anxiety with many diagnostic tests being done on her and states she even had anxiety when this provider weight her earlier in appointment    Did provide psychoeducation about her close follow-up she is receiving and how this should provide her with some level of relief to which she voiced understanding and agreement with  Both patient and her daughter have a relationship which is stressful at times but are undergoing counseling which this provider recommended to be done in a family oriented session more frequently to help with control of their relationship characteristics which they both agreed  Patient has been getting benefit of attending therapy which is largely where she will probably gain most of her anxiety control for her illness anxiety disorder  Patient does appear less agitated after reduction of Prozac which will be done again as she was feeling very stable on 40 mg dose previously  If this does not control her anxiety could consider switching to Trintellix or Effexor for norepinephrine benefit which could help with motivation and energy concerns  Patient did benefit from consolidation of Klonopin towards the evening hours as she is no longer feeling lethargic during the afternoon hours  Could consider further reduction of this medication as it may be impacting her memory though this provider does suspect some organic trouble with memory is supported by her most recent cognitive examination  Could consider retesting cognitive skills in the near future as well  Patient would also benefit from reducing Klonopin due to ambulation difficulties and alternative methods for managing her anxiety  Will discontinue patient's BuSpar as this is been unhelpful for her anxiety to this point and will switch to gabapentin 3 times a day instead  Patient was encouraged to increase her physical activity in engage in activities outside of the house including physical therapy for balance which would be preventative for ambulation difficulties to which he voiced understanding and agreement with  Encouraged engagement in Congregation which is of significant spiritual benefit for this patient  Will reorder patient's labs as they were dated by PCP to be drawn later for full organic workup of patient's psychiatric concerns  Encourage patient to maintain appropriate diet and appetite which he has been doing  Sleep luckily has been within normal limits and was encouraged to continue good sleep hygiene practices  Scales:  PHQ-9 was 16, now 13  - Prior 7, 12, 9    JONAH-7 was 19, now 16  - Prior 17, 19, 19         Treatment Plan:        Patient has been educated about their diagnosis and treatment modalities  They voiced understanding and agreement with the following plan:    Discussed medications and if treatment adjustment was needed/desired  1) JPZU:           - Continue Prozac 40 mg PO QD for anxiety and mood  Could consider switch to Effexor or Trintellix for norepinephrine benefit if depressive symptoms are refractory  PARQ completed including serotonin syndrome, SIADH, worsening depression, suicidality, induction of eligio, GI upset, headaches, activation, sexual side effects, sedation, potential drug interactions, and others    - Continue Klonopin 0 5 mg PO QD and 1 mg PO QHS for breakthrough anxiety  Consider reduction in coming appointments  Discussed with patient the risks of sedation, respiratory depression, impairment in judgment and motor function  Depression, mood changes, GI changes, and others discussed  Patient was also informed of risks of being on or starting opioid medications due to drug interactions and potential for serious respiratory depression and death     - Decrease Buspar to 10 PO BID for anxiety for one week and then discontinue in one week due to refractory anxiety symptoms    PARQ completed including serotonin syndrome, rare TD/EPS, dizziness, sedation, GI distress, confusion, possible mood changes, xerostomia and visual disturbances   - Start Gabapentin 100 mg PO TID for anxiety  PARQ for neurontin including depression/suicidality, allergic reactions (SJS, angioedema, rhabdomyolysis, eosinophilia, anaphylaxis), dizziness and somnolence, diarrhea, xerostomia, headaches, drug interactions and others     2) Labs:  - 10/24/21: BMP unremarkable  - 10/23/21: CBC and lipid panel unremarkable  A1c elevated at 6 6   - 6/14/21: TSH wnl at 2 57  -12/15/21: A1c was 6 5 and controlled and TSH wnl at 1 36  - 1/27/22: CBC unremarkable and CMP has increased glucose at 205  - 4/25/22: A1c was elevated at 6 7  - Reordered TSH, Lipid panel, CBC, CMP, and A1c  Added Vitamin D level      3) Therapy:    - Continue with own therapist Dr Jodi Constantino     4) Medical:   - Pt will f/u with other providers as needed   - On Vitamin D supplement  - SLUMS: 23/30 showing mild neurocognitive impairment  Of note, it is unclear how much BZDs could be impacting these scores     5) Other: Support as needed   - encouraged increase in physical activity including walking and low impact exercises/stretching   - recommended improvement of diet with low carbohydrates and high fiber   - recommended improvement in sleep hygiene and avoiding electronics at night     6) Follow up:   - Follow up in 1 month for medication management    - Patient will call if issues or concerns      7) Treatment Plan:    - Enacted 4/26/22     Discussed self monitoring of symptoms, and symptom monitoring tools  Patient has been informed of 24 hours and weekend coverage for urgent situations accessed by calling the main clinic phone number               Psychotherapy in session:  Time spent performing psychotherapy: 16 Minutes

## 2022-07-04 DIAGNOSIS — E11.9 TYPE 2 DIABETES MELLITUS WITHOUT COMPLICATION, WITHOUT LONG-TERM CURRENT USE OF INSULIN (HCC): ICD-10-CM

## 2022-07-04 DIAGNOSIS — E78.2 MIXED HYPERLIPIDEMIA: ICD-10-CM

## 2022-07-05 DIAGNOSIS — E11.9 TYPE 2 DIABETES MELLITUS WITHOUT COMPLICATION, WITHOUT LONG-TERM CURRENT USE OF INSULIN (HCC): ICD-10-CM

## 2022-07-05 DIAGNOSIS — F41.1 GENERALIZED ANXIETY DISORDER: ICD-10-CM

## 2022-07-05 RX ORDER — ATORVASTATIN CALCIUM 10 MG/1
10 TABLET, FILM COATED ORAL DAILY
Qty: 90 TABLET | Refills: 0 | Status: SHIPPED | OUTPATIENT
Start: 2022-07-05 | End: 2022-10-03 | Stop reason: SDUPTHER

## 2022-07-05 RX ORDER — CLONAZEPAM 0.5 MG/1
0.5 TABLET ORAL DAILY
Qty: 30 TABLET | Refills: 0 | Status: SHIPPED | OUTPATIENT
Start: 2022-07-07 | End: 2022-08-16 | Stop reason: SDUPTHER

## 2022-07-05 RX ORDER — CLONAZEPAM 1 MG/1
1 TABLET ORAL
Qty: 30 TABLET | Refills: 0 | Status: SHIPPED | OUTPATIENT
Start: 2022-07-07 | End: 2022-08-01 | Stop reason: SDUPTHER

## 2022-07-14 ENCOUNTER — OFFICE VISIT (OUTPATIENT)
Dept: PSYCHIATRY | Facility: CLINIC | Age: 82
End: 2022-07-14
Payer: MEDICARE

## 2022-07-14 VITALS — WEIGHT: 124.7 LBS | DIASTOLIC BLOOD PRESSURE: 74 MMHG | BODY MASS INDEX: 27.96 KG/M2 | SYSTOLIC BLOOD PRESSURE: 161 MMHG

## 2022-07-14 DIAGNOSIS — F33.0 MAJOR DEPRESSIVE DISORDER, RECURRENT, MILD (HCC): ICD-10-CM

## 2022-07-14 DIAGNOSIS — F41.1 GENERALIZED ANXIETY DISORDER: Primary | ICD-10-CM

## 2022-07-14 PROCEDURE — 90792 PSYCH DIAG EVAL W/MED SRVCS: CPT | Performed by: PSYCHIATRY & NEUROLOGY

## 2022-07-14 RX ORDER — GABAPENTIN 100 MG/1
200 CAPSULE ORAL 3 TIMES DAILY
Qty: 180 CAPSULE | Refills: 0 | Status: SHIPPED | OUTPATIENT
Start: 2022-07-14 | End: 2022-08-23

## 2022-07-14 NOTE — PSYCH
PSYCHIATRIC EVALUATION - Transition of [address] [place] [address]    Name and Date of Birth:  Cirilo Salguero 78 y o  1940 MRN: [id number]    Date of Visit: July 14, 2022    Reason for visit: transition of care    HPI     Cirilo Salguero is a 78 y o  female with a past psychiatric history significant for MDD, JONAH, OCD, Illness Anxiety Disorder, who presents to the [address] [place] [address] [address] with her daughter Victoriano Roper to outpatient clinic for transition of care visit  Dothanvikram Taylor presents reporting ongoing chronic anxiety symptoms  She notes that she typically worries about multiple things in her daily life, including preoccupation about her somatic issues, about the safety of her family members, integrity of the household, and in general, anxious about everything   She says that this has been happening for decades, originating since she was a teenager, as she would worry about everything that is going on around her at that time, but recently has been getting worse  She says that when she gets up in the morning, she typically thinks that the day will go bad  With regards to somatic issues, sometimes she worries when she does not have a bowel movement every single day, thinking that something is wrong with her  Whenever she goes for medical followups, she becomes preoccupied with worries about whether she is okay  She frequently is in conflict with her daughter, as her daughter thinks that she is [place]" with worries and checks up on everyone and everything, despite multiple reassurances that everything is okay  She notes that she has been on multiple different medications in the past, and has been seeing a therapist for the past 3 years to address these worries, but thinks that she is not making as much progress she would like  She reports feeling depressed at times due to her worries    She reports decreased interest in her previous activities, intermittent feelings of hopelessness, intermittent decreased energy, intermittent decrease in concentration, and intermittent psychomotor slowing  She denies suicidal thoughts, ideation, and denies homicidal ideation  She denies history of auditory or visual hallucinations  She feels safe at home, and does not believe that someone is after her or trying to intentionally hurt her  The anxious thoughts occur, and at times are difficult to control, but does not have compulsive thoughts and her anxieties not alleviated by addressing the cause of the anxiety  Rather, she starts to focus on other things that can be wrong  She denies history of eligio/hypomania  Patient states that she has been taking her medications regularly, including gabapentin, Prozac, and Klonopin  Patient's daughter and her state that they have been improvement in anxiety level since they started gabapentin  Previously, she was on Prozac 80 mg, but had to cut down to 40 mg at the instruction of her previous psychiatrist due to increased moodiness, although patient's daughter disagrees with this  Patient and daughter are interested in eventual taper of clonazepam in subsequent visits  According to the daughter, she is frustrated because their household is stable  Patient lives with Ema Carney, her , and 3 grand kids ranging from age 15-16  Patient's daughter has tried multiple things before to try to help the patient, but says that she does not understand her worries or anxieties, since the patient does not have any real problems   She says that the patient has worries are causing arguments  According to the patient, she says that she wants more friends and someone to talk to  She says that she has 2 friends that she grew up with, but both live in [place] and apparently both have their own personal issues, and that she does not want to call them and bother them    She says that even when she was a teenager, because of her anxiety, she would stay at her friend's place so that she does not have to be alone  Current Rating Scores:     Current PHQ-9   PHQ-2/9 Depression Screening    Little interest or pleasure in doing things: 2 - more than half the days  Feeling down, depressed, or hopeless: 3 - nearly every day  Trouble falling or staying asleep, or sleeping too much: 0 - not at all  Feeling tired or having little energy: 2 - more than half the days  Poor appetite or overeatin - not at all  Feeling bad about yourself - or that you are a failure or have let yourself or your family down: 1 - several days  Trouble concentrating on things, such as reading the newspaper or watching television: 2 - more than half the days  Moving or speaking so slowly that other people could have noticed  Or the opposite - being so fidgety or restless that you have been moving around a lot more than usual: 0 - not at all  Thoughts that you would be better off dead, or of hurting yourself in some way: 0 - not at all  PHQ-9 Score: 10   PHQ-9 Interpretation: Moderate depression        Current JONAH-7 is   JONAH-7 Flowsheet Screening    Flowsheet Row Most Recent Value   Over the last 2 weeks, how often have you been bothered by any of the following problems?     Feeling nervous, anxious, or on edge 2   Not being able to stop or control worrying 3   Worrying too much about different things 2   Trouble relaxing 3   Being so restless that it is hard to sit still 0   Becoming easily annoyed or irritable 2   Feeling afraid as if something awful might happen 3   JONAH-7 Total Score 15          Psychiatric Review Of Systems:    Sleep changes: no  Appetite changes: no  Weight changes: no  Energy/anergy: decreased  Interest/pleasure/anhedonia: decreased  Attention/concentration: decreased  Psychomotor agitation/retardation: yes  Somatic symptoms: no  Anxiety/panic: worrying  Zaira: no  Guilty/hopeless: yes, intermittently  Self injurious behavior/risky behavior: no  Suicidal ideation: no  Homicidal ideation: no  Auditory hallucinations: no  Visual hallucinations: no  Other hallucinations: no  Delusional thinking: no  Eating disorder history: no  Obsessive/compulsive symptoms: no    Review Of Systems:    Constitutional negative   ENT negative   Cardiovascular negative   Respiratory negative   Gastrointestinal negative   Genitourinary negative   Musculoskeletal negative   Integumentary negative   Neurological negative   Endocrine negative   Other Symptoms none, all other systems are negative       Family Psychiatric History:     Family History   Problem Relation Age of Onset    Depression Mother         w/anxiety    Diabetes Mother         Mellitus    Breast cancer Mother     Hypertension Mother     No Known Problems Father     Depression Sister         w/anxiety    Heart disease Sister         Cardiac Disorder    Breast cancer Sister     Hypertension Sister     Diabetes Brother         Mellitus    Alcohol abuse Son      Mother and sisters with hx of depression  Past Psychiatric History:   Past diagnoses: MDD, Anxiety, OCD  Inpatient psychiatric admissions: one previous admission for 3 weeks at [facility] in 2013 for panic attack after surgery  Prior outpatient psychiatric linkage: Dr Monk Drain until transition of care  Past/current psychotherapy: sees Dr Zuly Fox for the past 2-3 years  History of suicidal attempts/gestures: denies  History of violence/aggressive behaviors: denies  Psychotropic medication trials: Effexor, Zoloft, Prozac, Lexapro, Buspar, Klonopin, Prasozin (syncopy?)  Substance abuse inpatient/outpatient rehabilitation: denies    Substance Abuse History:  Denies substance use  Denies history of alcohol, illict substance, or tobacco abuse  Denies past legal actions or arrests secondary to substance intoxication  The patient denies prior DWIs/DUIs   Ace Medina does not exhibit objective evidence of substance withdrawal during today's examination nor does Ace Medina appear under the influence of any psychoactive substance  Social History:  Grew up in [place], [place]  Developmental: Denies a history of milestone/developmental delay  Denies a history of in-utero exposure to toxins/illicit substances  There is no documented history of IEP or need for special education  Education: Bachelor of Arts  Marital history:  since 2006 since   from cancer  Living arrangement, social support: lives with daughter, son in law, and 3 grandkids in [place], [place]  Social support includes brother, daughter, S-I-L, and 3 grandkids  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  Has pension and social security  Access to firearms: Denies direct access to weapons/firearms  Elyse Jorgensen has no history of arrests or violence with a deadly weapon  Traumatic History:     Abuse: denies  Other Traumatic Events: other traumatic events: denies    Past Medical History:    Past Medical History:   Diagnosis Date    Colon polyps     Diabetes mellitus ([facility] [address] )     Dizziness     Last assessed: 8/31/15    [facility]deep venous thrombosis) ([facility])     Dysuria     Hematuria 2022    Hyperlipidemia     Hypertension     Hypertensive urgency 10/22/2021    Insomnia     Ureterolithiasis 2020        Past Surgical History:   Procedure Laterality Date     SECTION      1964 son, 26 daughter    COLONOSCOPY      [facility] RETROGRADE PYELOGRAM  2020    MS COLONOSCOPY FLX DX W/COLLJ SPEC WHEN PFRMD N/A 3/27/2017    Procedure: COLONOSCOPY;  Surgeon: Siena Mueller MD;  [facility];   Service: Gastroenterology    MS CYSTO/URETERO W/LITHOTRIPSY &INDWELL STENT INSRT Right 2020    Procedure: CYSTOSCOPY URETEROSCOPY WITH LITHOTRIPSY HOLMIUM LASER, RETROGRADE PYELOGRAM AND INSERTION STENT URETERAL; EXCISION OF BLADDER TUMOR;  Surgeon: Terell Rebolledo MD;  Location: [facility];  Service: Urology    ROTATOR CUFF REPAIR Left     Last assessed: 3/29/15    TONSILLECTOMY       No Known Allergies    History Review: The following portions of the patient's history were reviewed and updated as appropriate: allergies, current medications, past family history, past medical history, past social history, past surgical history and problem list     OBJECTIVE:    Vital signs in last 24 hours:    Vitals:    07/14/22 1152   BP: 161/74   Weight: 56 6 kg (124 lb 11 2 oz)       Mental Status Evaluation:    Appearance age appropriate, casually dressed  Not in acute distress     Behavior pleasant, cooperative, calm   Speech normal rate and volume   Mood "a bit anxious"   Affect constricted   Thought Processes organized, goal directed   Associations intact associations   Thought Content no overt delusions   Perceptual Disturbances: Denies auditory or visual hallucinations and Does not appear to be responding to internal stimuli   Abnormal Thoughts  Risk Potential Denies suicidal or homicidal ideation, intent, or plan   Orientation oriented to person, place, time/date and situation   Memory recent and remote memory grossly intact   Consciousness alert and awake   Attention Span Concentration Span attention span and concentration are age appropriate   Intellect appears to be of average intelligence   Insight fair   Judgement fair   Muscle Strength and  Gait normal muscle strength and normal muscle tone, slow gait and normal balance   Motor Activity no abnormal movements   Language no difficulty naming common objects, no difficulty repeating a phrase   Fund of Knowledge adequate knowledge of current events  adequate fund of knowledge regarding past history  adequate fund of knowledge regarding vocabulary        Laboratory Results: I have personally reviewed all pertinent laboratory/tests results    Recent Labs (last 6 months):   Orders Only on 06/01/2022   Component Date Value    Right Eye Diabetic Retin* 06/01/2022 None     Left Eye Diabetic Retino* 06/01/2022 None    Orders Only on 05/04/2022   Component Date Value    Uric Acid 05/04/2022 4 0    Office Visit on 04/27/2022   Component Date Value    LEUKOCYTE ESTERASE,UA 04/27/2022 neg     NITRITE,UA 04/27/2022 neg     [facility]B POCT UROBILINOGEN 04/27/2022 0 2     POCT URINE PROTEIN 04/27/2022 neg      PH,UA 04/27/2022 5 0     BLOOD,UA 04/27/2022 moderate     SPECIFIC GRAVITY,UA 04/27/2022 1 030     KETONES,UA 04/27/2022 neg     BILIRUBIN,UA 04/27/2022 neg     GLUCOSE, UA 04/27/2022 neg      COLOR,UA 04/27/2022 yellow     CLARITY,UA 04/27/2022 clear    Orders Only on 04/25/2022   Component Date Value    Component 1 04/25/2022 Uric Acid Dihydrate 80% Calcium Oxalate Monohydrate (Whewellite) 20%     Stone Weight 04/25/2022 0 120    Office Visit on 04/25/2022   Component Date Value    Hemoglobin A1C 04/25/2022 6 7 (A)   Admission on 04/20/2022, Discharged on 04/21/2022   Component Date Value    WBC 04/20/2022 5 57     RBC 04/20/2022 4 13     Hemoglobin 04/20/2022 11 7     Hematocrit 04/20/2022 37 1     MCV 04/20/2022 90     MCH 04/20/2022 28 3     MCHC 04/20/2022 31 5     RDW 04/20/2022 14 0     MPV 04/20/2022 10 9     Platelets 53/26/8755 204     nRBC 04/20/2022 0     Neutrophils Relative 04/20/2022 64     Immat GRANS % 04/20/2022 0     Lymphocytes Relative 04/20/2022 25     Monocytes Relative 04/20/2022 7     Eosinophils Relative 04/20/2022 3     Basophils Relative 04/20/2022 1     Neutrophils Absolute 04/20/2022 3 57     Immature Grans Absolute 04/20/2022 0 02     Lymphocytes Absolute 04/20/2022 1 39     Monocytes Absolute 04/20/2022 0 40     Eosinophils Absolute 04/20/2022 0 16     Basophils Absolute 04/20/2022 0 03     Protime 04/20/2022 13 3     INR 04/20/2022 1 01     PTT 04/20/2022 29     Sodium 04/20/2022 138     Potassium 04/20/2022 4 0     Chloride 04/20/2022 102     CO2 04/20/2022 27     ANION GAP 04/20/2022 9     BUN 04/20/2022 30 (A)    Creatinine 04/20/2022 1 05     Glucose 04/20/2022 185 (A)    Calcium 04/20/2022 9 1     AST 04/20/2022 15     ALT 04/20/2022 25     Alkaline Phosphatase 04/20/2022 58     Total Protein 04/20/2022 7 2     Albumin 04/20/2022 3 8     Total Bilirubin 04/20/2022 0 28     eGFR 04/20/2022 49     ABO Grouping 04/20/2022 A     Rh Factor 04/20/2022 Positive     Antibody Screen 04/20/2022 Negative     Specimen Expiration Date 04/20/2022 [id number]     Total CK 04/20/2022 80     Color, UA 04/20/2022 Linda     Clarity, UA 04/20/2022 Cloudy     Specific Gravity, UA 04/20/2022 1 020     pH, UA 04/20/2022 5 5     Leukocytes, UA 04/20/2022 Elevated glucose may cause decreased leukocyte values   See urine microscopic for [facility] result/ (A)    Nitrite, UA 04/20/2022 Negative     Protein, UA 04/20/2022 30 (1+) (A)    Glucose, UA 04/20/2022 >=1000 (1%) (A)    Ketones, UA 04/20/2022 Negative     Urobilinogen, UA 04/20/2022 0 2     Bilirubin, UA 04/20/2022 Negative     Occult Blood, UA 04/20/2022 Large (A)    Urine Culture 04/20/2022 No Growth <1000 cfu/mL     RBC, UA 04/20/2022 Innumerable (A)    WBC, UA 04/20/2022 1-2     Epithelial Cells 04/20/2022 Occasional     Bacteria, UA 04/20/2022 Occasional     ABO Grouping 04/21/2022 A     Rh Factor 04/21/2022 Positive     POC Glucose 04/21/2022 90     Sodium 04/21/2022 138     Potassium 04/21/2022 4 3     Chloride 04/21/2022 105     CO2 04/21/2022 24     ANION GAP 04/21/2022 9     BUN 04/21/2022 25     Creatinine 04/21/2022 0 79     Glucose 04/21/2022 108     Glucose, Fasting 04/21/2022 108 (A)    Calcium 04/21/2022 8 8     eGFR 04/21/2022 69     WBC 04/21/2022 5 80     RBC 04/21/2022 4 25     Hemoglobin 04/21/2022 12 3     Hematocrit 04/21/2022 38 8     MCV 04/21/2022 91     MCH 04/21/2022 28 9     MCHC 04/21/2022 31 7     RDW 04/21/2022 13 8     MPV 04/21/2022 10 6     Platelets 53/00/4648 189     nRBC 04/21/2022 0     Neutrophils Relative 04/21/2022 65     Immat GRANS % 04/21/2022 0     Lymphocytes Relative 04/21/2022 23     Monocytes Relative 04/21/2022 8     Eosinophils Relative 04/21/2022 3     Basophils Relative 04/21/2022 1     Neutrophils Absolute 04/21/2022 3 76     Immature Grans Absolute 04/21/2022 0 02     Lymphocytes Absolute 04/21/2022 1 35     Monocytes Absolute 04/21/2022 0 46     Eosinophils Absolute 04/21/2022 0 17     Basophils Absolute 04/21/2022 0 04     POC Glucose 04/21/2022 101     Ventricular Rate 04/21/2022 72     Atrial Rate 04/21/2022 72     MD Interval 04/21/2022 162     QRSD Interval 04/21/2022 74     QT Interval 04/21/2022 372     QTC Interval 04/21/2022 407     P Axis 04/21/2022 55     QRS Axis 04/21/2022 4     T [place] 04/21/2022 24    Admission on 01/27/2022, Discharged on 01/27/2022   Component Date Value    WBC 01/27/2022 5 97     RBC 01/27/2022 4 26     Hemoglobin 01/27/2022 12 7     Hematocrit 01/27/2022 39 3     MCV 01/27/2022 92     MCH 01/27/2022 29 8     MCHC 01/27/2022 32 3     RDW 01/27/2022 13 2     MPV 01/27/2022 11 4     Platelets 52/93/2978 191     nRBC 01/27/2022 0     Neutrophils Relative 01/27/2022 68     Immat GRANS % 01/27/2022 0     Lymphocytes Relative 01/27/2022 20     Monocytes Relative 01/27/2022 6     Eosinophils Relative 01/27/2022 5     Basophils Relative 01/27/2022 1     Neutrophils Absolute 01/27/2022 4 01     Immature Grans Absolute 01/27/2022 0 01     Lymphocytes Absolute 01/27/2022 1 22     Monocytes Absolute 01/27/2022 0 38     Eosinophils Absolute 01/27/2022 0 31     Basophils Absolute 01/27/2022 0 04     Sodium 01/27/2022 137     Potassium 01/27/2022 4 8     Chloride 01/27/2022 103     CO2 01/27/2022 24     ANION GAP 01/27/2022 10     BUN 01/27/2022 22     Creatinine 01/27/2022 0 82     Glucose 01/27/2022 250 (A)    Calcium 01/27/2022 8 9     AST 01/27/2022 42     ALT 01/27/2022 31     Alkaline Phosphatase 01/27/2022 78     Total Protein 01/27/2022 7 4     Albumin 01/27/2022 3 7     Total Bilirubin 01/27/2022 0 53     eGFR 01/27/2022 66     Lipase 01/27/2022 108     Color, UA 01/27/2022 Light Yellow     Clarity, UA 01/27/2022 Slightly Cloudy     Specific Gravity, UA 01/27/2022 1 025     pH, UA 01/27/2022 5 5     Leukocytes, UA 01/27/2022 Negative     Nitrite, UA 01/27/2022 Negative     Protein, UA 01/27/2022 30 (1+) (A)    Glucose, UA 01/27/2022 250 (1/4%) (A)    Ketones, UA 01/27/2022 Negative     Urobilinogen, UA 01/27/2022 0 2     Bilirubin, UA 01/27/2022 Negative     Occult Blood, UA 01/27/2022 Large (A)    LACTIC ACID 01/27/2022 2 1 (A)    RBC, UA 01/27/2022 30-50 (A)    WBC, UA 01/27/2022 0-1     Epithelial Cells 01/27/2022 None Seen     Bacteria, UA 01/27/2022 None Seen        Suicide/Homicide Risk Assessment:    Risk of Harm to Self:  The following ratings are based on assessment at the time of the interview and review of records  Demographic risk factors include: ,  status, elderly (76 or older)   Historical Risk Factors include: chronic depressive symptoms, chronic anxiety symptoms  Recent Specific Risk Factors include: diagnosis of mood disorder, current depressive symptoms, current anxiety symptoms  Protective Factors: no current suicidal ideation, access to mental health treatment, being a parent, compliant with medications, compliant with mental health treatment, connection to own children, good health, having a sense of purpose or meaning in life, healthy fear of risky behaviors and pain, supportive family, supportive friends, Fear of illness and fear of death  Weapons: none  The following steps have been taken to ensure weapons are properly secured: not applicable  Based on today's assessment, Luciano Gordon presents the following risk of harm to self: low    Risk of Harm to Others: The following ratings are based on assessment at the time of the interview and review of records  Demographic Risk Factors include: none    Historical Risk Factors include: none   Recent Specific Risk Factors include: concomitant mood disorder  Protective Factors: no current homicidal ideation, access to mental health treatment, being a parent, compliant with medications, compliant with mental health treatment, connection to own children, supportive family  Weapons: none  The following steps have been taken to ensure weapons are properly secured: not applicable  Based on today's assessment, Luciano Gordon presents the following risk of harm to others: low    The following interventions are recommended: no intervention changes needed  Although patient's acute lethality risk is LOW, long-term/chronic lethality risk is mildly elevated given chronic anxiety and depression  However, at the current moment, Luciano Gordon is future-oriented, forward-thinking, and demonstrates ability to act in a self-preserving manner as evidenced by volitionally presenting to the clinic today, seeking treatment  She is also concerned about her health and afraid of death and dying  Smith Hernandez contracts for safety and is not an imminent risk of harm to self or others  Outpatient level of care is deemed appropriate at this current time  Luciano Gordon understands that if they can no longer contract for safety, they need to call the office or report to their nearest Emergency Room for immediate evaluation  At this juncture, inpatient hospitalization is not currently warranted  To mitigate future risk, patient should adhere to treatment recommendations, avoid alcohol/illicit substance use, utilize community-based resources and familiar support, and prioritize mental health treatment  Assessment/Plan:   Smith Hernandez is a 78 y o   female, , lives with daughter, son-in-law, and 3 grandkids, retiredw/ PMH of diabetes, hyperlipidemia, hypertension and PPH of MDD, JONAH, one prior psychiatric admissions, no prior SA, who presented to the mental health clinic for transition of care visit    It appears that the patient continues to experience anxiety symptoms and symptoms of depression  These issues have been going on for some time, possibly even decades, according to the patient  She notes that at time she feels lonely and wants someone to talk to, even when there people in the household  She says that she frequently has to check up on them, and has being worrying about multiple things, which have been coming into conflict with her family members who she has been living with, primarily her daughter  She denies SI/HI  She denies symptoms of psychosis, and objectively does not appear to be experiencing psychosis  She has been appropriately taking her medications, and appropriately seeing her psychotherapist for the last 3 years, who she will continue seeing for the foreseeable future  Patient and daughter would like to continue with regular psychiatric follow-up for medication management  Discussed with the patient various strategies to manage anxiety, including planning activities for the day, including a reward system for completing tasks and monitoring causes/triggers for anxiety  Discussed journaling has an option as well  Discuss treatment options at this time regarding medications, and patient and daughter are willing to try a slow increase in gabapentin does to help with anxiety symptoms  They said that they will try increasing nighttime dose to 200 mg, and see it affects anxiety, and if needed, increase it further up to 200 mg TID  Instructed the patient and daughter to call if she experiences any concerning adverse effects  Will put order for 200 mg TID and explained to patient and daughter to increase QHS dose to 200 mg for now  DSM-5 Diagnoses:     1 )  Generalized anxiety disorder   2 )  Major depressive disorder      Treatment Recommendations/Precautions:   Continue Prozac 40 mg daily for mood symptoms    Increase nighttime gabapentin from 100 mg to 200 mg    Continue gabapentin 100 mg QAM and 100 mg afternoon doses   Continue Klonopin 0 5 mg in the morning and 1 mg QHS for anxiety symptoms  Will consider tapering in the future   Encouraged patient and daughter to obtain labs prior to next visit   Continue following up with primary care physician for medical management   Continue seeing own psychotherapist Dr Debbi Lee Medication management follow-up in 4 weeks   Aware of 24 hour and weekend coverage for urgent situations accessed by calling [facility] main practice number    Medications Risks/Benefits:      Risks, Benefits And Possible Side Effects Of Medications:    Risks, benefits, and possible side effects of medications explained to Marisa Troy and she verbalizes understanding and agreement for treatment  Side effect discussed for Prozac, including serotonin syndrome, SIADH, worsening depression, suicidality, induction of eligio, GI upset, headaches, activation, sexual side effects, sedation, potential drug interactions, and others  Side effects discussed for neurontin, including depression/suicidality, allergic reactions (SJS, angioedema, rhabdomyolysis, eosinophilia, anaphylaxis), dizziness and somnolence, diarrhea, xerostomia, headaches, drug interactions and others  Side effect discussed for Klonopin, including sedation, dizziness, tremors, hypotension, possible link to neurocognitive problems, risk of dependence  Explained patient not to discontinue abruptly due to risk of withdrawal symptoms  Controlled Medication Discussion:     Marisa Troy has been filling controlled prescriptions on time as prescribed according to [place] [facility] Monitoring Program    Treatment Plan:    Completed and signed during the session: Yes - with Marisa Troy      Note Share Disclaimer:      This note was not shared with the patient due to reasonable likelihood of causing patient harm      Maurizio Monsalve MD 07/14/22

## 2022-07-14 NOTE — BH TREATMENT PLAN
TREATMENT PLAN        [facility]    Name and Date of Birth:  Homa Butt 78 y o  1940  Date of Treatment Plan: July 14, 2022  Diagnosis/Diagnoses:    1  Generalized anxiety disorder    2  Major depressive disorder, recurrent, mild (HCC)            Strengths/Personal Resources for Self-Care: supportive family, taking medications as prescribed, ability to listen, average or above intelligence, family ties, general fund of knowledge, ability to negotiate basic needs, well educated    Area/Areas of need: anxiety symptoms, depressive symptoms    Long Term Goal: improve control of mood  Target Date: 6 months - January 14, 2023  Person/Persons responsible for completion of goal: Henrietta Rubi and Sofya Beth MD     Short Term Objective (s) - How will we reach this goal?:   1  Take medications as prescribed  2  Attend psychiatry appointments regularly  3  Continue psychotherapy regularly  4  Try relaxation techniques  Target Date: 6 months - January 14, 2023  Person/Persons Responsible for Completion of Goal: Henrietta Rubi     Progress Towards Goals: Continuing treatment    Treatment Modality: medication management every 1-3 months as needed and follow up with PCP  Review due 180 days from date of this plan: January 10, 2023   Expected length of service: Ongoing treatment    My physician and I have developed this plan together, and I agree to work on the goals and objectives  I understand the treatment goals that were developed for my treatment      The treatment plan was created between Sofya Beth MD and Homa Butt on 07/14/22 at 11:42 AM

## 2022-08-01 DIAGNOSIS — F41.1 GENERALIZED ANXIETY DISORDER: ICD-10-CM

## 2022-08-01 DIAGNOSIS — I10 BENIGN ESSENTIAL HYPERTENSION: ICD-10-CM

## 2022-08-01 RX ORDER — LISINOPRIL AND HYDROCHLOROTHIAZIDE 12.5; 1 MG/1; MG/1
TABLET ORAL
Qty: 90 TABLET | Refills: 1 | Status: SHIPPED | OUTPATIENT
Start: 2022-08-01 | End: 2022-10-24 | Stop reason: SDUPTHER

## 2022-08-01 RX ORDER — CLONAZEPAM 1 MG/1
1 TABLET ORAL
Qty: 30 TABLET | Refills: 0 | Status: SHIPPED | OUTPATIENT
Start: 2022-08-01 | End: 2022-09-09

## 2022-08-16 DIAGNOSIS — F41.1 GENERALIZED ANXIETY DISORDER: ICD-10-CM

## 2022-08-16 RX ORDER — CLONAZEPAM 0.5 MG/1
0.5 TABLET ORAL DAILY
Qty: 30 TABLET | Refills: 0 | Status: CANCELLED | OUTPATIENT
Start: 2022-08-16 | End: 2022-09-15

## 2022-08-16 RX ORDER — CLONAZEPAM 0.5 MG/1
0.5 TABLET ORAL DAILY
Qty: 30 TABLET | Refills: 0 | Status: SHIPPED | OUTPATIENT
Start: 2022-08-16 | End: 2022-09-09 | Stop reason: SDUPTHER

## 2022-08-23 ENCOUNTER — TELEPHONE (OUTPATIENT)
Dept: FAMILY MEDICINE CLINIC | Facility: CLINIC | Age: 82
End: 2022-08-23

## 2022-08-23 ENCOUNTER — TELEMEDICINE (OUTPATIENT)
Dept: PSYCHIATRY | Facility: CLINIC | Age: 82
End: 2022-08-23
Payer: MEDICARE

## 2022-08-23 DIAGNOSIS — F41.1 GENERALIZED ANXIETY DISORDER: ICD-10-CM

## 2022-08-23 DIAGNOSIS — F45.21 ILLNESS ANXIETY DISORDER: ICD-10-CM

## 2022-08-23 DIAGNOSIS — F33.1 MAJOR DEPRESSIVE DISORDER, RECURRENT, MODERATE (HCC): ICD-10-CM

## 2022-08-23 PROCEDURE — 99214 OFFICE O/P EST MOD 30 MIN: CPT | Performed by: PSYCHIATRY & NEUROLOGY

## 2022-08-23 RX ORDER — FLUOXETINE HYDROCHLORIDE 20 MG/1
20 CAPSULE ORAL DAILY
Qty: 30 CAPSULE | Refills: 1 | Status: SHIPPED | OUTPATIENT
Start: 2022-08-23 | End: 2022-09-19 | Stop reason: SDUPTHER

## 2022-08-23 RX ORDER — GABAPENTIN 100 MG/1
CAPSULE ORAL
Qty: 180 CAPSULE | Refills: 1 | Status: SHIPPED | OUTPATIENT
Start: 2022-08-23

## 2022-08-23 RX ORDER — FLUOXETINE HYDROCHLORIDE 40 MG/1
40 CAPSULE ORAL DAILY
Qty: 30 CAPSULE | Refills: 1 | Status: SHIPPED | OUTPATIENT
Start: 2022-08-23 | End: 2022-09-26 | Stop reason: SDUPTHER

## 2022-08-23 NOTE — PSYCH
MEDICATION MANAGEMENT NOTE - VIRTUAL        [address]      Name and Date of Birth:  Elyse Jorgensen 78 y o  1940 MRN: [id number]    Date of Visit: August 23, 2022    Virtual Regular Visit    Verification of patient location:    Patient is located in the following state in which I hold an active license [place]    Encounter provider Jo-Ann Henry MD    Provider located at [address] [place] [id number] [phone]    Recent Visits  No visits were found meeting these conditions  Showing recent visits within past 7 days and meeting all other requirements  Today's Visits  Date Type Provider Dept   08/23/22 Telemedicine MD Amie Matute 18 today's visits and meeting all other requirements  Future Appointments  No visits were found meeting these conditions  Showing future appointments within next 150 days and meeting all other requirements      The patient was identified by name and date of birth  Elyse Jorgensen was informed that this is a telemedicine visit and that the visit is being conducted through [facility] and patient was informed this is a secure, HIPAA-complaint platform  She agrees to proceed     My office door was closed  The patient was notified the following individuals were present in the room: Glory Schilder, MD for direct supervision  She acknowledged consent and understanding of privacy and security of the video platform  The patient has agreed to participate and understands they can discontinue the visit at any time  Patient is aware this is a billable service         Reason for Visit: Follow-up visit for medication management     SUBJECTIVE:    Elyse Jorgensen is a 78 y o  female with past psychiatric history significant for MDD, JONAH, OCD, 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, who was personally seen and evaluated today at the [address] [place] [address] [address] outpatient clinic for virtual visit for follow-up and medication management  Patient's daughter, Josie Miller, also present in the room with Lito Presume  Lito Presume presents reporting that anxiety has been the same as before and thinks that it may have worsened over the last week  She says that she went to visit her friend out of state recently which made her anxious by anticipation, but improved after she was on the road  Yesterday, she fell down while trying to get up from chair after therapy session and hit the chair with back of her head, and then was on the ground  Reports no LOC or blacking out, but reports some localized minor pain but no swelling  She says that she was on the ground for a short time and unable to get up, and called her grandkids who rushed to help, but they had difficulty picking her up  She told them to go ask neighbors for help, and while they went outside, she was able to get up by herself  Since that time, she has been spending time worrying about potentially falling to the point of asking everyone for help just to walk  She notes that she had fear of falling in the past but this incident made the anxiety worse  Aside from the worry about falling, she still has generalized anxiety about multiple things which also includes worries about her health  She also notes that sometimes she gets anxious to the point of not being able to pronounce words correctly, especially to others  She also notes that she forgets people's names  She still has anxiety about bodily functions such as bowel movements  Denies anxiety attacks  Reports some intermittent depression that is linked to anxiety  According to daughter, Josie Miller, she is concerned that mentally Lito Presume is not doing well  She notes that she has been more anxious recently, and has been "Moping" around  When not thinking about anxiety, she reports that she focuses on worrying about something   And recently, has been worrying about falling and due to this worrying, patient was unstable whereas when she is not preoccupied with worries about fall she has no issues with balance  Carmina Rendon says that she did not call anyone after Sandra's fall, but called during the session to request a referral for PT  Carmina Rendon is frustrated due to not seeing any relief in patient's anxiety levels, and does not think that the current therapy sessions with own therapist is helping much, and wants to find additional therapist  Carmina Rendon and Raj Barraza still want to keep the current therapist        Current Rating Scores:     Current PHQ-9   PHQ-2/9 Depression Screening    Little interest or pleasure in doing things: 0 - not at all  Feeling down, depressed, or hopeless: 3 - nearly every day  Trouble falling or staying asleep, or sleeping too much: 2 - more than half the days  Feeling tired or having little energy: 3 - nearly every day  Poor appetite or overeatin - not at all  Feeling bad about yourself - or that you are a failure or have let yourself or your family down: 1 - several days  Trouble concentrating on things, such as reading the newspaper or watching television: 1 - several days  Moving or speaking so slowly that other people could have noticed  Or the opposite - being so fidgety or restless that you have been moving around a lot more than usual: 1 - several days  Thoughts that you would be better off dead, or of hurting yourself in some way: 0 - not at all  PHQ-9 Score: 11   PHQ-9 Interpretation: Moderate depression        Current JONAH-7 is   JONAH-7 Flowsheet Screening    Flowsheet Row Most Recent Value   Over the last 2 weeks, how often have you been bothered by any of the following problems?     Feeling nervous, anxious, or on edge 1   Not being able to stop or control worrying 3   Worrying too much about different things 3   Trouble relaxing 3   Being so restless that it is hard to sit still 2   Becoming easily annoyed or irritable 2   Feeling afraid as if something awful might happen 3   JNOAH-7 Total Score 17        Review Of Systems:      Constitutional negative   ENT negative   Cardiovascular negative   Respiratory negative   Gastrointestinal negative   Genitourinary negative   Musculoskeletal negative   Integumentary negative   Neurological negative   Endocrine negative   Other Symptoms none, all other systems are negative       Past Psychiatric History: (unchanged information from previous note copied and italicized) - Information that is bolded has been updated  Past diagnoses: MDD, Anxiety, OCD  Inpatient psychiatric admissions: one previous admission for 3 weeks at [facility] in 2013 for panic attack after surgery  Prior outpatient psychiatric linkage: Dr Hernandez Query until transition of care  Past/current psychotherapy: sees Dr Brenna Amezquita for the past 2-3 years  History of suicidal attempts/gestures: denies  History of violence/aggressive behaviors: denies  Psychotropic medication trials: Effexor, Zoloft, Prozac, Lexapro, Buspar, Klonopin, Prasozin (syncopy?)  Substance abuse inpatient/outpatient rehabilitation: denies     Substance Abuse History: (unchanged information from previous note copied and italicized) - Information that is bolded has been updated  Denies history of alcohol, illict substance, or tobacco abuse  Denies past legal actions or arrests secondary to substance intoxication  The patient denies prior DWIs/DUIs  Cornell Lu does not exhibit objective evidence of substance withdrawal during today's examination nor does Cornell Lu appear under the influence of any psychoactive substance  Social History: (unchanged information from previous note copied and italicized) - Information that is bolded has been updated  Grew up in [place], [place]  Developmental: Denies a history of milestone/developmental delay  Denies a history of in-utero exposure to toxins/illicit substances   There is no documented history of IEP or need for special education  Education: Bachelor of Arts  Marital history:  since 2006 since   from cancer  Living arrangement, social support: lives with daughter, son in law, and 3 grandkids in [place], [place]  Social support includes brother, daughter, S-I-L, and 3 grandkids  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  Has pension and social security  Access to firearms: Denies direct access to weapons/firearms  Sebastian Soliman has no history of arrests or violence with a deadly weapon  Traumatic History: (unchanged information from previous note copied and italicized) - Information that is bolded has been updated  Abuse: denies  Other Traumatic Events: other traumatic events: denies      Past Medical History:    Past Medical History:   Diagnosis Date    Colon polyps     Diabetes mellitus ([facility] [address] )     Dizziness     Last assessed: 8/31/15    [facility] thrombosis[facility])     Dysuria     Hematuria 2022    Hyperlipidemia     Hypertension     Hypertensive urgency 10/22/2021    Insomnia     Ureterolithiasis 2020        Past Surgical History:   Procedure Laterality Date     SECTION      1964 son, 26 daughter    COLONOSCOPY      [facility] RETROGRADE PYELOGRAM  2020    NC COLONOSCOPY FLX DX W/COLLJ SPEC WHEN PFRMD N/A 3/27/2017    Procedure: COLONOSCOPY;  Surgeon: Gisselle Pierce MD;  [facility];   Service: Gastroenterology    NC CYSTO/URETERO W/LITHOTRIPSY &INDWELL STENT INSRT Right 2020    Procedure: CYSTOSCOPY URETEROSCOPY WITH LITHOTRIPSY HOLMIUM LASER, RETROGRADE PYELOGRAM AND INSERTION STENT URETERAL; EXCISION OF BLADDER TUMOR;  Surgeon: Dylan Loo MD;  Location: [facility];  Service: Urology    ROTATOR CUFF REPAIR Left     Last assessed: 3/29/15    TONSILLECTOMY       No Known Allergies    Family Psychiatric History:     Family History   Problem Relation Age of Onset    Depression Mother         w/anxiety    Diabetes Mother         Mellitus  Breast cancer Mother     Hypertension Mother     No Known Problems Father     Depression Sister         w/anxiety    Heart disease Sister         Cardiac Disorder    Breast cancer Sister     Hypertension Sister     Diabetes Brother         Mellitus    Alcohol abuse Son        History Review: The following portions of the patient's history were reviewed and updated as appropriate: allergies, current medications, past family history, past medical history, past social history, past surgical history and problem list          OBJECTIVE:     Vital signs in last 24 hours: There were no vitals filed for this visit      Mental Status Evaluation:    Appearance appears stated age and casually dressed   Behavior cooperative and good eye contact, appears calm   Speech normal rate and volume   Mood ""not too good""   Affect constricted   Thought Processes organized, goal directed   Associations intact associations   Thought Content no overt delusions   Perceptual Disturbances: no auditory hallucinations, no visual hallucinations, does not appear responding to internal stimuli   Abnormal Thoughts  Risk Potential Denies suicidal or homicidal ideation, intent, or plan   Orientation oriented to person, place and time/date   Memory recent and remote memory grossly intact   Consciousness alert and awake   Attention Span Concentration Span attention span and concentration are age appropriate   Intellect appears to be of average intelligence   Insight fair   Judgement fair   Muscle Strength and  Gait unable to assess today due to virtual visit   Motor activity unable to assess today due to virtual visit   Language no difficulty naming common objects, no difficulty repeating a phrase   Fund of Knowledge adequate knowledge of current events  adequate fund of knowledge regarding past history  adequate fund of knowledge regarding vocabulary        Laboratory Results: I have personally reviewed all pertinent laboratory/tests results    Recent Labs (last 6 months):   Orders Only on 06/01/2022   Component Date Value    Right Eye Diabetic Retin* 06/01/2022 None     Left Eye Diabetic Retino* 06/01/2022 None    Orders Only on 05/04/2022   Component Date Value    Uric Acid 05/04/2022 4 0    Office Visit on 04/27/2022   Component Date Value    LEUKOCYTE ESTERASE,UA 04/27/2022 neg     NITRITE,UA 04/27/2022 neg     SL AMB POCT UROBILINOGEN 04/27/2022 0 2     POCT URINE PROTEIN 04/27/2022 neg      PH,UA 04/27/2022 5 0     BLOOD,UA 04/27/2022 moderate     SPECIFIC GRAVITY,UA 04/27/2022 1 030     KETONES,UA 04/27/2022 neg     BILIRUBIN,UA 04/27/2022 neg     GLUCOSE, UA 04/27/2022 neg      COLOR,UA 04/27/2022 yellow     CLARITY,UA 04/27/2022 clear    Orders Only on 04/25/2022   Component Date Value    Component 1 04/25/2022 Uric Acid Dihydrate 80% Calcium Oxalate Monohydrate (Whewellite) 20%     Stone Weight 04/25/2022 0 120    Office Visit on 04/25/2022   Component Date Value    Hemoglobin A1C 04/25/2022 6 7 (A)   Admission on 04/20/2022, Discharged on 04/21/2022   Component Date Value    WBC 04/20/2022 5 57     RBC 04/20/2022 4 13     Hemoglobin 04/20/2022 11 7     Hematocrit 04/20/2022 37 1     MCV 04/20/2022 90     MCH 04/20/2022 28 3     MCHC 04/20/2022 31 5     RDW 04/20/2022 14 0     MPV 04/20/2022 10 9     Platelets 83/63/9263 204     nRBC 04/20/2022 0     Neutrophils Relative 04/20/2022 64     Immat GRANS % 04/20/2022 0     Lymphocytes Relative 04/20/2022 25     Monocytes Relative 04/20/2022 7     Eosinophils Relative 04/20/2022 3     Basophils Relative 04/20/2022 1     Neutrophils Absolute 04/20/2022 3 57     Immature Grans Absolute 04/20/2022 0 02     Lymphocytes Absolute 04/20/2022 1 39     Monocytes Absolute 04/20/2022 0 40     Eosinophils Absolute 04/20/2022 0 16     Basophils Absolute 04/20/2022 0 03     Protime 04/20/2022 13 3     INR 04/20/2022 1 01     PTT 04/20/2022 29     Sodium 04/20/2022 138     Potassium 04/20/2022 4 0     Chloride 04/20/2022 102     CO2 04/20/2022 27     ANION GAP 04/20/2022 9     BUN 04/20/2022 30 (A)    Creatinine 04/20/2022 1 05     Glucose 04/20/2022 185 (A)    Calcium 04/20/2022 9 1     AST 04/20/2022 15     ALT 04/20/2022 25     Alkaline Phosphatase 04/20/2022 58     Total Protein 04/20/2022 7 2     Albumin 04/20/2022 3 8     Total Bilirubin 04/20/2022 0 28     eGFR 04/20/2022 49     ABO Grouping 04/20/2022 A     Rh Factor 04/20/2022 Positive     Antibody Screen 04/20/2022 Negative     Specimen Expiration Date 04/20/2022 [id number]     Total CK 04/20/2022 80     Color, UA 04/20/2022 Linda     Clarity, UA 04/20/2022 Cloudy     Specific Gravity, UA 04/20/2022 1 020     pH, UA 04/20/2022 5 5     Leukocytes, UA 04/20/2022 Elevated glucose may cause decreased leukocyte values   See urine microscopic for [facility] result/ (A)    Nitrite, UA 04/20/2022 Negative     Protein, UA 04/20/2022 30 (1+) (A)    Glucose, UA 04/20/2022 >=1000 (1%) (A)    Ketones, UA 04/20/2022 Negative     Urobilinogen, UA 04/20/2022 0 2     Bilirubin, UA 04/20/2022 Negative     Occult Blood, UA 04/20/2022 Large (A)    Urine Culture 04/20/2022 No Growth <1000 cfu/mL     RBC, UA 04/20/2022 Innumerable (A)    WBC, UA 04/20/2022 1-2     Epithelial Cells 04/20/2022 Occasional     Bacteria, UA 04/20/2022 Occasional     ABO Grouping 04/21/2022 A     Rh Factor 04/21/2022 Positive     POC Glucose 04/21/2022 90     Sodium 04/21/2022 138     Potassium 04/21/2022 4 3     Chloride 04/21/2022 105     CO2 04/21/2022 24     ANION GAP 04/21/2022 9     BUN 04/21/2022 25     Creatinine 04/21/2022 0 79     Glucose 04/21/2022 108     Glucose, Fasting 04/21/2022 108 (A)    Calcium 04/21/2022 8 8     eGFR 04/21/2022 69     WBC 04/21/2022 5 80     RBC 04/21/2022 4 25     Hemoglobin 04/21/2022 12 3     Hematocrit 04/21/2022 38 8     MCV 04/21/2022 91     MCH 04/21/2022 28 9  MCHC 04/21/2022 31 7     RDW 04/21/2022 13 8     MPV 04/21/2022 10 6     Platelets 26/52/6286 189     nRBC 04/21/2022 0     Neutrophils Relative 04/21/2022 65     Immat GRANS % 04/21/2022 0     Lymphocytes Relative 04/21/2022 23     Monocytes Relative 04/21/2022 8     Eosinophils Relative 04/21/2022 3     Basophils Relative 04/21/2022 1     Neutrophils Absolute 04/21/2022 3 76     Immature Grans Absolute 04/21/2022 0 02     Lymphocytes Absolute 04/21/2022 1 35     Monocytes Absolute 04/21/2022 0 46     Eosinophils Absolute 04/21/2022 0 17     Basophils Absolute 04/21/2022 0 04     POC Glucose 04/21/2022 101     Ventricular Rate 04/21/2022 72     Atrial Rate 04/21/2022 72     IA Interval 04/21/2022 162     QRSD Interval 04/21/2022 74     QT Interval 04/21/2022 372     QTC Interval 04/21/2022 407     P Axis 04/21/2022 55     QRS Axis 04/21/2022 4     T Wave Axis 04/21/2022 24        Suicide/Homicide Risk Assessment:    Risk of Harm to Self:  The following ratings are based on assessment at the time of the interview and review of records  Demographic risk factors include: ,  status, elderly (76 or older)   Historical Risk Factors include: chronic depressive symptoms, chronic anxiety symptoms  Recent Specific Risk Factors include: diagnosis of mood disorder, current depressive symptoms, current anxiety symptoms  Protective Factors: no current suicidal ideation, access to mental health treatment, being a parent, compliant with medications, compliant with mental health treatment, connection to own children, good health, having a sense of purpose or meaning in life, supportive family, fears of illness and fear of death  Weapons: none  The following steps have been taken to ensure weapons are properly secured: not applicable  Based on today's assessment, Paola Nash presents the following risk of harm to self: low    Risk of Harm to Others:   The following ratings are based on assessment at the time of the interview and review of records  Demographic Risk Factors include: none  Historical Risk Factors include: none  Recent Specific Risk Factors include: concomitant mood disorder  Protective Factors: no current homicidal ideation, access to mental health treatment, being , compliant with medications, compliant with mental health treatment, connection to own children, support system, supportive friends  Weapons: none  The following steps have been taken to ensure weapons are properly secured: not applicable  Based on today's assessment, Claudetta Repjason presents the following risk of harm to others: low    The following interventions are recommended: no intervention changes needed      Lethality Statement:  Based on today's assessment and clinical criteria, Judi Jimenez contracts for safety and is not an imminent risk of harm to self or others  Outpatient level of care is deemed appropriate at this current time  Claudetta Repine understands that if they can no longer contract for safety, they need to call the office or report to their nearest Emergency Room for immediate evaluation  Assessment/Plan:     Judi Jimenez is a 78 y o   female, , lives with daughter, son-in-law, and 3 grandkids, retiredw/ PMH of diabetes, hyperlipidemia, hypertension and PPH of MDD, JONAH, one prior psychiatric admissions, no prior SA, who presented to the mental health clinic virtually for medication management  Pt has chronic hx of anxiety symptoms since childhood  DSM-5 Diagnoses:   1  Illness anxiety disorder    2  Generalized anxiety disorder    3   Major depressive disorder, recurrent, moderate (HCC)      JONAH  MDD    Treatment Recommendations/Precautions:  · Continue Prozac 40 mg daily for mood symptoms   · Increase Neurontin from 100 mg to 200 mg QAM for anxiety (off-label, but evidence based)  · Continue Neurontin 100 mg at noon, 200 mg QHS for anxiety  · Continue Klonopin 0 5 mg in the morning for anxiety  · Decrease Klonopin from 1 mg QHS to 0 5 mg QHS for anxiety  Will initiate slow taper over time   Medication management follow up in 4 weeks   Continue medical management with PCP   Schedule psychotherapy intake session with Dr Tanner Gonsales with own therapist   Aware of 24 hour and weekend coverage for urgent situations accessed by calling [facility] main practice number    Medications Risks/Benefits      Risks, Benefits And Possible Side Effects Of Medications:    Risks, benefits, and possible side effects of medications explained to Quita Escalona and she verbalizes understanding and agreement for treatment  Prozac risks and potential SE discussed, including serotonin syndrome, SIADH, worsening depression, suicidality, induction of eligio, GI upset, headaches, activation, sexual side effects, sedation, potential drug interactions, and others  Neurontin risks and potential SE discussed,  including depression/suicidality, allergic reactions (SJS, angioedema, rhabdomyolysis, eosinophilia, anaphylaxis), dizziness and somnolence, diarrhea, xerostomia, headaches, drug interactions and others  Controlled Medication Discussion:     Quita Escalona has been filling controlled prescriptions on time as prescribed according to Melchor Lizama [address]     Quita Escalona informed of the adverse risks and effects of chronic benzodiazepine use including: propensity for falls/falling, increased sedation and respiratory depression (particularly if administered in higher dose(s) than prescribed or if taken together with another sedating substance like alcohol, pain medication, etc ), as well as the risk for addiction and/or dependency (particularly if administered in increased frequency or in higher doses than prescribed) and withdrawal (abrupt cessation) including seizures and death   Quita Escalona was informed to not drive or operate heavy machinery while taking benzodiazepines, as the medication can cause increased drowsiness  Encouraged the patient to consider tapering as risks of side effects and consequences can be higher, especially in older age  Patient verbalizes understanding and wishes to proceed with slow taper  Psychotherapy Provided:     Individual psychotherapy provided: Yes  Medication education provided to Junius Maker  Supportive therapy provided  Treatment Plan:    Completed and signed during the session: Not applicable - Treatment Plan not due at this session    Note Share Disclaimer: This note was not shared with the patient due to reasonable likelihood of causing patient harm    VIRTUAL VISIT [facility] verbally agrees to participate in [employer]  Pt is aware that [employer] could be limited without vital signs or the ability to perform a full hands-on physical exam  Sheree Mancera understands she or the provider may request at any time to terminate the video visit and request the patient to seek care or treatment in person       Surendra Kilpatrick MD 08/23/22

## 2022-08-23 NOTE — TELEPHONE ENCOUNTER
Patient's daughter, Dayanara Dos Santos, called and stated her mother has fallen in the past and again yesterday  She would like to request an order for PT for balance to see if it helps

## 2022-08-24 DIAGNOSIS — R26.89 BALANCE PROBLEM: Primary | ICD-10-CM

## 2022-08-24 NOTE — PROGRESS NOTES
PT Evaluation     Today's date: 2022  Patient name: Mckinley Tate  : 1940  MRN: [id number]  Referring provider: Areli Sal DO  Dx:   Encounter Diagnosis     ICD-10-CM    1  Balance problem  R26 [id number] Ambulatory Referral to Physical Therapy                  Assessment  Assessment details: Mckinley Tate is a 78 y o  female who presents with complaints of falls  She demonstrates decreased blaance and stability, slowed gait speed, decreased safety awareness, decreased endurance and activity tolerance, decreased LE strength and decreased function  Patient would benefit from skilled physical therapy in order to address said deficits and improve functional mobility  Understanding of Dx/Px/POC: good   Prognosis: good    Goals  STG:  Decrease TUG > 3 seconds   Increase LE strength 1/2 grade    LTG:  Improve 2 min walk test > 50 feet  Decrease TUG > 3 seconds  Increase LE strength 1 grade  Increase FOTO to expected score   Pt will ambulate outdoors without fear or difficulty   Pt will demonstrate safe/appropriate use of SPC     Plan  Patient would benefit from: skilled physical therapy  Planned therapy interventions: abdominal trunk stabilization, activity modification, joint mobilization, manual therapy, massage, neuromuscular re-education, postural training, patient education, strengthening, stretching, therapeutic activities, balance, body mechanics training, therapeutic exercise, functional ROM exercises, flexibility, gait training and home exercise program  Frequency: 2x week  Duration in weeks: 6  Treatment plan discussed with: patient        Subjective Evaluation    History of Present Illness  Mechanism of injury: Patient with history of falls, especially recently  She reports difficulty with balance  She notes recently she had a difficult time getting up  She does not know source of falls  No leg pain, n/t, no bowel bladder changes  She does present with [facility] today for instructional use   She uses at night when she goes to the bathroom at night  She lives at home with her daughter  2 [address] home with 2 story home  She notes a second railing was put in for her to utilize  Usually performing step-to  She does do house keeping  She does dress and shower on her own  She has a walk in shower but is fearful of showering on her own  She will look into shower chair  She is fearful to walk alone     Pain  No pain reported          Objective     Strength/Myotome Testing     Left Hip   Planes of Motion   Flexion: 4    Right Hip   Planes of Motion   Flexion: 4-    Left Knee   Flexion: 4+  Extension: 4    Right Knee   Flexion: 4+  Extension: 4    Left Ankle/Foot   Dorsiflexion: 4    Right Ankle/Foot   Dorsiflexion: 4  Neuro Exam:     Functional outcomes   5x sit to stand: 17 55 (seconds)  2 minute walk test: 320 feet ( 2 laps ) no AD  TU 36 (seconds)             Precautions:     Manuals                                                                 Neuro Re-Ed             Step ups to foam NV            SLS             Side step foam beam NV            Tandem walk foam beam  NV            FRANKS balance test  NV                                      Ther Ex             nustep NV            Marches at bar NV            Standing hip abd NV            Standing hip ext NV            Mini squats  NV   Leg press          Heel/toe raises  NV            Knee ext machine             Ham curl machine              TB side step             Ther Activity             Step up and overs             Sit to stands from chair with foam  NV           Stepping over hurdles fwd/lateral   NV  With foams          Retro walks tony                                        Gait Training             Gait tx with SPC (in right hand)  8'

## 2022-08-24 NOTE — TELEPHONE ENCOUNTER
I certainly can place an order for physical therapy however have a noticed increased incidence of fall since she was switched from buspirone to gabapentin by Psychiatry?   There is possibility that gabapentin could be contributing to her imbalance

## 2022-08-26 ENCOUNTER — TELEMEDICINE (OUTPATIENT)
Dept: PSYCHIATRY | Facility: CLINIC | Age: 82
End: 2022-08-26

## 2022-08-26 DIAGNOSIS — F45.1 SOMATIC SYMPTOM DISORDER: ICD-10-CM

## 2022-08-26 DIAGNOSIS — F41.1 GENERALIZED ANXIETY DISORDER: Primary | ICD-10-CM

## 2022-08-26 DIAGNOSIS — F33.1 MAJOR DEPRESSIVE DISORDER, RECURRENT, MODERATE (HCC): ICD-10-CM

## 2022-08-26 NOTE — PSYCH
Assessment/Plan:      Diagnoses and all orders for this visit:    Generalized anxiety disorder    Major depressive disorder, recurrent, moderate (HCC)    Somatic symptom disorder          Subjective:      Patient ID: Velasquez Padilla is a 78 y o  female  HPI:     Pre-morbid level of function and History of Present Illness: Sarika Mack is an 78 YOF w/ hx of MDD, JONAH, illness anxiety vs somatic symptoms disorder, MCI, who presents for psychotherapy intake session  She notes that her primary complaint is anxiety, which has been ongoing since she was a young child, around the age of 11-16  She notes that she has had no triggers to cause anxiety and reports no traumatic events in the past, but was always afraid of her environment  She presents herself as a chronic worrier, constantly worrying about multiple things on a daily basis, from worrying about locked doors, to safety of her family, to somatic symptoms and worrying about her healthy in general      Exploring her source of anxiety,y she says that she 1st started to experience worries when she was a child, and at the [place] [address]  She notes that there are a lot of foot traffic in the area, and that she was afraid of a lot of people there who were coming from work every day  She says that she would often times run and hide when a train would stop every evening and workers would get off  She notes that she would be afraid of people entering their homes, breaking through the windows, yet there has been no such incidences in the past   She says that she was always afraid to be alone, and always needed someone to be there, especially in the evening time  She is notes that whenever she would be alone at home, she would always run up stairs to her neighbor's apartment, and sleep in the same room as her female friend    When she was , the anxiety continued, and she would sometimes run downstairs to her mother-in-law's apartment and sometimes leaving her  child, which was silly to her in hind sight  She eventually tackled her fear of being alone and was able to sleep in her own apartment  Over the years, the anxiety symptoms have persisted  The death of her  few years ago has exacerbated her symptoms  There is also miscommunication between daughter and Christoph Gongora per her report  IT seems like there is always arguments and frustrations on both of their behalf  She currently lives with daughter, son in law, grandchildren, two dogs  Feels relatively safe at home when anxiety is not high  Reports that she mostly spends the days at home and rarely comes outside due to anxiety  Previous Psychiatric/psychological treatment/year: Patient sees own private therapist  Current Psychiatrist/Therapist: Dr Allen Alarcon (psychiatrist)  Outpatient and/or Partial and Other Community Resources Used (CTT, ICM, VNA): N/A      Problem Assessment:     SOCIAL/VOCATION:  Family Constellation (include parents, relationship with each and pertinent Psych/Medical History):     Family History   Problem Relation Age of Onset    Depression Mother         w/anxiety    Diabetes Mother         Mellitus    Breast cancer Mother     Hypertension Mother     No Known Problems Father     Depression Sister         w/anxiety    Heart disease Sister         Cardiac Disorder    Breast cancer Sister     Hypertension Sister     Diabetes Brother         Mellitus    Alcohol abuse Son      Mother: "okay" relationship with mother  Hx of Depression and Mother  Spouse: good relationship with   No psych issues  Father: strained relationship with father   Children: good relationship with son and daughter   Sibling ( sister): oldest sister took care of her   Sibling ( sister): good relationship   Sibling ( brother): good relationship  Sibling (alive brother): 80  Good relationship now   In the past had some conflict d/t needing to care for father  Other: [place] relates best to daughter  she lives with daughter  she does not live alone  Domestic Violence: No past history of domestic violence    Additional Comments related to family/relationships/peer support: N/A  School or Work History (strengths/limitations/needs): BA, retired     Her highest grade level achieved was [employer] history includes: none    Financial status: stable    LEISURE ASSESSMENT (Include past and present hobbies/interests and level of involvement (Ex: Group/Club Affiliations): Affiliated with [employer]  Not going since COVID    her primary language is [place]  Preferred language is [place]  Ethnic considerations are none  Religions affiliations and level of involvement none   Does spirituality help you cope? Yes   Watches mas on the computer  FUNCTIONAL STATUS: There has been a recent change in Kendra Blizzard ability to do the following: walking    Level of Assistance Needed/By Whom?: assistance with walking  Uses cane  Kendra Blizzard learns best by  reading    SUBSTANCE ABUSE ASSESSMENT: no substance abuse    Substance/Route/Age/Amount/Frequency/Last Use: n/a    DETOX HISTORY: n/a    Previous detox/rehab treatment: n/a    HEALTH ASSESSMENT: no referral to PCP needed at this time  Daughter called to schedule appt for PT  LEGAL: No Mental Health Advance Directive or Power of  on file    Prenatal History: N/A    Delivery History: N/A    Developmental Milestones: N/A  Temperament as an infant was N/A  Temperament as a toddler was N/A  Temperament at school age was N/A  Temperament as a teenager was anxious      Risk Assessment:   The following ratings are based on my interview(s) with Kendra Blizzard    Risk of Harm to Self:   Demographic risk factors include , never  or  status and elderly (76 or older)   Historical Risk Factors include chronic depressive symptoms, chornic anxiety symptoms  Recent Specific Risk Factors include diagnosis of mood disorder, current depressive symptoms, curent anxiety symptoms  Additional Factors for a Child or Adolescent n/a    Risk of Harm to Others:   Demographic Risk Factors include none  Historical Risk Factors include none  Recent Specific Risk Factors include concomitant mood or thought disorder    Access to Weapons:   Samara Dakin has access to the following weapons: none  The following steps have been taken to ensure weapons are properly secured: n/a    Based on the above information, the client presents the following risk of harm to self or others:  low    The following interventions are recommended:   no intervention changes    Notes regarding this Risk Assessment: none        Review Of Systems:     Mood Anxiety   Behavior Normal    Thought Content no overt delusions   General Normal    Personality Normal   Other Psych Symptoms depression and anxiety   Constitutional Negative   ENT Negative   Cardiovascular Negative   Respiratory Negative   Gastrointestinal Constipation   Genitourinary Negative   Musculoskeletal Negative   Integumentary Negative   Neurological Negative   Endocrine Normal          Mental status:  Appearance calm and cooperative    Mood anxious   Affect affect was constricted   Speech soft speech      Thought Processes normal thought processes   Hallucinations no hallucinations present    Thought Content no delusions   Abnormal Thoughts no suicidal thoughts  and no homicidal thoughts    Orientation  oriented to person and place and time   Remote Memory short term memory intact and long term memory intact   Attention Span concentration intact   Intellect Appears to be of Average Intelligence   Fund of Knowledge displays adequate knowledge of current events and adequate fund of knowledge regarding past history   Insight Insight intact   Judgement judgment was intact   Muscle Strength unable to assess due to virtual visit   Language no difficulty naming common objects and no difficulty repeating a phrase    Pain none   Pain Scale 0     This note was not shared with the patient due to this is a psychotherapy note

## 2022-08-28 LAB
ALBUMIN SERPL-MCNC: 4.1 G/DL (ref 3.6–5.1)
ALBUMIN/CREAT UR: 11 MCG/MG CREAT
ALBUMIN/GLOB SERPL: 1.9 (CALC) (ref 1–2.5)
ALP SERPL-CCNC: 51 U/L (ref 37–153)
ALT SERPL-CCNC: 14 U/L (ref 6–29)
AST SERPL-CCNC: 15 U/L (ref 10–35)
BASOPHILS # BLD AUTO: 38 CELLS/UL (ref 0–200)
BASOPHILS NFR BLD AUTO: 0.8 %
BILIRUB SERPL-MCNC: 0.4 MG/DL (ref 0.2–1.2)
BUN SERPL-MCNC: 27 MG/DL (ref 7–25)
BUN/CREAT SERPL: 35 (CALC) (ref 6–22)
CALCIUM SERPL-MCNC: 9.7 MG/DL (ref 8.6–10.4)
CHLORIDE SERPL-SCNC: 105 MMOL/L (ref 98–110)
CHOLEST SERPL-MCNC: 156 MG/DL
CHOLEST/HDLC SERPL: 2.7 (CALC)
CO2 SERPL-SCNC: 30 MMOL/L (ref 20–32)
CREAT SERPL-MCNC: 0.77 MG/DL (ref 0.6–0.95)
CREAT UR-MCNC: 63 MG/DL (ref 20–275)
EOSINOPHIL # BLD AUTO: 197 CELLS/UL (ref 15–500)
EOSINOPHIL NFR BLD AUTO: 4.1 %
ERYTHROCYTE [DISTWIDTH] IN BLOOD BY AUTOMATED COUNT: 12.4 % (ref 11–15)
GFR/BSA.PRED SERPLBLD CYS-BASED-ARV: 76 ML/MIN/1.73M2
GLOBULIN SER CALC-MCNC: 2.2 G/DL (CALC) (ref 1.9–3.7)
GLUCOSE SERPL-MCNC: 107 MG/DL (ref 65–99)
HBA1C MFR BLD: 6.3 % OF TOTAL HGB
HCT VFR BLD AUTO: 38.9 % (ref 35–45)
HDLC SERPL-MCNC: 57 MG/DL
HGB BLD-MCNC: 12.2 G/DL (ref 11.7–15.5)
LDLC SERPL CALC-MCNC: 77 MG/DL (CALC)
LYMPHOCYTES # BLD AUTO: 1502 CELLS/UL (ref 850–3900)
LYMPHOCYTES NFR BLD AUTO: 31.3 %
MCH RBC QN AUTO: 28 PG (ref 27–33)
MCHC RBC AUTO-ENTMCNC: 31.4 G/DL (ref 32–36)
MCV RBC AUTO: 89.4 FL (ref 80–100)
MICROALBUMIN UR-MCNC: 0.7 MG/DL
MONOCYTES # BLD AUTO: 326 CELLS/UL (ref 200–950)
MONOCYTES NFR BLD AUTO: 6.8 %
NEUTROPHILS # BLD AUTO: 2736 CELLS/UL (ref 1500–7800)
NEUTROPHILS NFR BLD AUTO: 57 %
NONHDLC SERPL-MCNC: 99 MG/DL (CALC)
PLATELET # BLD AUTO: 188 THOUSAND/UL (ref 140–400)
PMV BLD REES-ECKER: 10.9 FL (ref 7.5–12.5)
POTASSIUM SERPL-SCNC: 4.7 MMOL/L (ref 3.5–5.3)
PROT SERPL-MCNC: 6.3 G/DL (ref 6.1–8.1)
RBC # BLD AUTO: 4.35 MILLION/UL (ref 3.8–5.1)
SODIUM SERPL-SCNC: 140 MMOL/L (ref 135–146)
TRIGL SERPL-MCNC: 137 MG/DL
TSH SERPL-ACNC: 2.02 MIU/L (ref 0.4–4.5)
WBC # BLD AUTO: 4.8 THOUSAND/UL (ref 3.8–10.8)

## 2022-08-30 ENCOUNTER — EVALUATION (OUTPATIENT)
Dept: PHYSICAL THERAPY | Facility: CLINIC | Age: 82
End: 2022-08-30
Payer: MEDICARE

## 2022-08-30 ENCOUNTER — OFFICE VISIT (OUTPATIENT)
Dept: FAMILY MEDICINE CLINIC | Facility: CLINIC | Age: 82
End: 2022-08-30
Payer: MEDICARE

## 2022-08-30 VITALS — WEIGHT: 127 LBS | BODY MASS INDEX: 28.57 KG/M2 | HEIGHT: 56 IN

## 2022-08-30 DIAGNOSIS — Z00.00 MEDICARE ANNUAL WELLNESS VISIT, SUBSEQUENT: ICD-10-CM

## 2022-08-30 DIAGNOSIS — F41.1 GENERALIZED ANXIETY DISORDER: ICD-10-CM

## 2022-08-30 DIAGNOSIS — R26.89 BALANCE PROBLEM: ICD-10-CM

## 2022-08-30 DIAGNOSIS — I10 PRIMARY HYPERTENSION: ICD-10-CM

## 2022-08-30 DIAGNOSIS — N20.0 URIC ACID KIDNEY STONE: ICD-10-CM

## 2022-08-30 DIAGNOSIS — R26.89 BALANCE PROBLEM: Primary | ICD-10-CM

## 2022-08-30 DIAGNOSIS — K59.04 CHRONIC IDIOPATHIC CONSTIPATION: ICD-10-CM

## 2022-08-30 DIAGNOSIS — E11.9 TYPE 2 DIABETES MELLITUS WITHOUT COMPLICATION, WITHOUT LONG-TERM CURRENT USE OF INSULIN (HCC): ICD-10-CM

## 2022-08-30 DIAGNOSIS — F33.0 MAJOR DEPRESSIVE DISORDER, RECURRENT, MILD (HCC): ICD-10-CM

## 2022-08-30 DIAGNOSIS — Z23 FLU VACCINE NEED: Primary | ICD-10-CM

## 2022-08-30 DIAGNOSIS — E78.2 MIXED HYPERLIPIDEMIA: ICD-10-CM

## 2022-08-30 PROCEDURE — 97162 PT EVAL MOD COMPLEX 30 MIN: CPT | Performed by: PHYSICAL THERAPIST

## 2022-08-30 PROCEDURE — G0439 PPPS, SUBSEQ VISIT: HCPCS | Performed by: FAMILY MEDICINE

## 2022-08-30 PROCEDURE — 99215 OFFICE O/P EST HI 40 MIN: CPT | Performed by: FAMILY MEDICINE

## 2022-08-30 PROCEDURE — 90662 IIV NO PRSV INCREASED AG IM: CPT | Performed by: FAMILY MEDICINE

## 2022-08-30 PROCEDURE — 97530 THERAPEUTIC ACTIVITIES: CPT | Performed by: PHYSICAL THERAPIST

## 2022-08-30 PROCEDURE — G0008 ADMIN INFLUENZA VIRUS VAC: HCPCS | Performed by: FAMILY MEDICINE

## 2022-08-30 RX ORDER — LINACLOTIDE 290 UG/1
290 CAPSULE, GELATIN COATED ORAL DAILY
Qty: 90 CAPSULE | Refills: 1 | Status: SHIPPED | OUTPATIENT
Start: 2022-08-30 | End: 2022-09-07

## 2022-08-30 NOTE — ASSESSMENT & PLAN NOTE
Patient has been on BuSpar as well as Klonopin in the past   Most recently she was switched off of BuSpar and placed on gabapentin  I believe that the gabapentin is very sedating and also can be making her feel excessively fatigued and unsteady  I sent a message to Psychiatry resident about possibly reconsidering the use of gabapentin

## 2022-08-30 NOTE — ASSESSMENT & PLAN NOTE
Patient is seeing therapist as well as psychiatry resident    Her dose of fluoxetine has been increased to 60 mg once daily

## 2022-08-30 NOTE — PROGRESS NOTES
Assessment and Plan:     Problem List Items Addressed This Visit        Digestive    Chronic idiopathic constipation     -patient still with symptoms of constipation  She remains on Linzess 145 mcg  Some of her concern is related to her obsessive nature and anxiety     -as per recommendation from Gastroenterology notes if she is still having constipation then her dose of Linzess can be increased to 290 mcg once daily  Dose increased         Relevant Medications    linaCLOtide (Linzess) 290 MCG CAPS       Endocrine    Type 2 diabetes mellitus without complication, without long-term current use of insulin ([facility])       Lab Results   Component Value Date    HGBA1C 6 3 (H) 08/27/2022     Hemoglobin A1c at 6 3%  Continue on metformin 500 mg twice daily  Working well for her  Repeat diabetic parameters in 4 months         Relevant Orders    Hemoglobin A1C    Microalbumin / creatinine urine ratio       Cardiovascular and Mediastinum    Primary hypertension     Well controlled on lisinopril/hydrochlorothiazide  Continue same  Genitourinary    Uric acid kidney stone     She is seeing Urology  Remains on thiazide diuretic  She does drink water during the course the day  Check uric acid level with next set of labs         Relevant Orders    Uric acid       Other    Balance problem     Really did not have issue until she was started on gabapentin  Generalized anxiety disorder     Patient has been on BuSpar as well as Klonopin in the past   Most recently she was switched off of BuSpar and placed on gabapentin  I believe that the gabapentin is very sedating and also can be making her feel excessively fatigued and unsteady  I sent a message to Psychiatry resident about possibly reconsidering the use of gabapentin  Relevant Orders    TSH, 3rd generation with Free T4 reflex    Major depressive disorder, recurrent, mild ([facility] [address] )     Patient is seeing therapist as well as psychiatry resident    Her dose of fluoxetine has been increased to 60 mg once daily         Medicare annual wellness visit, subsequent     Subsequent annual wellness visit completed         Mixed hyperlipidemia     Cholesterol remains very well controlled on atorvastatin 10 mg once daily  Repeat lipid panel in 4 months         Relevant Orders    Comprehensive metabolic panel    Lipid panel      Other Visit Diagnoses     Flu vaccine need    -  Primary    Relevant Orders    influenza vaccine, high-dose, PF 0 7 mL (FLUZONE HIGH-DOSE) (Completed)        BMI Counseling: There is no height or weight on file to calculate BMI  The BMI is above normal  Nutrition recommendations include reducing intake of saturated and trans fat and reducing intake of cholesterol  Exercise recommendations include obtaining a gym membership  Falls Plan of Care: referral to physical therapy  Preventive health issues were discussed with patient, and age appropriate screening tests were ordered as noted in patient's After Visit Summary  Personalized health advice and appropriate referrals for health education or preventive services given if needed, as noted in patient's After Visit Summary       History of Present Illness:     Patient presents for Medicare Annual Wellness visit    Patient Care Team:  Andra Loera DO as PCP - General  DO Andra Oliveros DO Rosalee Junker, MD Orin Hutching, MD Verba Fisher, DPM (Podiatry)  Catie Samson MD as Endoscopist  [facility])     Problem List:     Patient Active Problem List   Diagnosis    Adenomatous polyp of colon    Primary hypertension    Type 2 diabetes mellitus without complication, without long-term current use of insulin ([facility] [address] )    Mixed hyperlipidemia    Insomnia    Breast cancer screening    Change in bowel habits    Medicare annual wellness visit, subsequent    Generalized osteoarthritis    Chronic idiopathic constipation    Likely TIA with visual impairment vs HTN episode  Illness anxiety disorder    Major depressive disorder, recurrent, mild (HCC)    Generalized anxiety disorder    Mild neurocognitive disorder    Uric acid kidney stone    Balance problem      Past Medical and Surgical History:     Past Medical History:   Diagnosis Date    Colon polyps     Diabetes mellitus ([address] )     Dizziness     Last assessed: 8/31/15    [facility] thrombosis) [facility]     Dysuria     Hematuria 2022    Hyperlipidemia     Hypertension     Hypertensive urgency 10/22/2021    Insomnia     Ureterolithiasis 2020     Past Surgical History:   Procedure Laterality Date     SECTION      1964 son, 26 daughter    COLONOSCOPY      [facility] RETROGRADE PYELOGRAM  2020    GA COLONOSCOPY FLX DX W/COLLJ SPEC WHEN PFRMD N/A 3/27/2017    Procedure: COLONOSCOPY;  Surgeon: Savanna Bell MD;  [facility]: [facility]; Service: Gastroenterology    GA CYSTO/URETERO W/LITHOTRIPSY &INDWELL STENT INSRT Right 2020    Procedure: CYSTOSCOPY URETEROSCOPY WITH LITHOTRIPSY HOLMIUM LASER, RETROGRADE PYELOGRAM AND INSERTION STENT URETERAL; EXCISION OF BLADDER TUMOR;  Surgeon: Brock Machuca MD;  Location: [facility];  Service: Urology    ROTATOR CUFF REPAIR Left     Last assessed: 3/29/15    TONSILLECTOMY        Family History:     Family History   Problem Relation Age of Onset    Depression Mother         w/anxiety    Diabetes Mother         Mellitus    Breast cancer Mother     Hypertension Mother     No Known Problems Father     Depression Sister         w/anxiety    Heart disease Sister         Cardiac Disorder    Breast cancer Sister     Hypertension Sister     Diabetes Brother         Mellitus    Alcohol abuse Son       Social History:     Social History     Socioeconomic History    Marital status:       Spouse name: None    Number of children: None    Years of education: None    Highest education level: None   Occupational History    None   Tobacco Use  Smoking status: Never Smoker    Smokeless tobacco: Never Used   Vaping Use    Vaping Use: Never used   Substance and Sexual Activity    Alcohol use: Never    Drug use: No    Sexual activity: None   Other Topics Concern    None   Social History Narrative    Lack of exercise     Social Determinants of Health     Financial Resource Strain: Low Risk     Difficulty of Paying Living Expenses: Not very hard   Food Insecurity: Not on file   Transportation Needs: No Transportation Needs    Lack of Transportation (Medical): No    Lack of Transportation (Non-Medical): No   Physical Activity: Not on file   Stress: Not on file   Social Connections: Not on file   Intimate Partner Violence: Not on file   Housing Stability: Not on file       Medications and Allergies:     Current Outpatient Medications   Medication Sig Dispense Refill    aspirin 81 MG tablet Take 81 mg by mouth daily      atorvastatin (LIPITOR) 10 mg tablet Take 1 tablet (10 mg total) by mouth daily 90 tablet 0    cholecalciferol (VITAMIN D3) 1,000 units tablet Take 1,000 Units by mouth daily      clonazePAM (KlonoPIN) 0 5 mg tablet Take 1 tablet (0 5 mg total) by mouth daily 30 tablet 0    docusate sodium (COLACE) 100 mg capsule Take 100 mg by mouth 2 (two) times a day      FLUoxetine (PROzac) 20 mg capsule Take 1 capsule (20 mg total) by mouth daily 30 capsule 1    FLUoxetine (PROzac) 40 MG capsule Take 1 capsule (40 mg total) by mouth daily 30 capsule 1    gabapentin (Neurontin) 100 mg capsule Take 2 capsule (200 mg total) in the morning, 1 capsule (100 mg total)  in the afternoon, and 2 capsules (200 mg total) at night time   180 capsule 1    linaCLOtide (Linzess) 290 MCG CAPS Take 1 capsule by mouth in the morning 90 capsule 1    lisinopril-hydrochlorothiazide (PRINZIDE,ZESTORETIC) 10-12 5 MG per tablet TAKE ONE TABLET BY MOUTH EVERY DAY 90 tablet 1    metFORMIN (GLUCOPHAGE) 500 mg tablet Take 1 tablet (500 mg total) by mouth 2 (two) times a day with meals 180 tablet 0    metFORMIN (GLUCOPHAGE) 500 mg tablet TAKE ONE TABLET BY MOUTH TWICE A DAY WITH MEALS 180 tablet 0    clonazePAM (KlonoPIN) 1 mg tablet Take 1 tablet (1 mg total) by mouth daily at bedtime (Patient not taking: Reported on 8/30/2022) 30 tablet 0     No current facility-administered medications for this visit  No Known Allergies   Immunizations:     Immunization History   Administered Date(s) Administered    COVID-19 PFIZER VACCINE 0 3 ML IM 02/14/2021, 03/05/2021, 01/23/2022    Influenza Split High Dose Preservative Free IM 10/02/2013, 12/18/2014, 09/30/2015, 10/11/2016, 11/20/2017    Influenza, high dose seasonal 0 7 mL 10/15/2018, 11/01/2019, 09/14/2020, 08/30/2022    Pneumococcal Conjugate 13-Valent 10/11/2016    Pneumococcal Polysaccharide PPV23 10/02/2013      Health Maintenance:         Topic Date Due    Colorectal Cancer Screening  03/27/2020         Topic Date Due    COVID-19 Vaccine (4 - Booster for [place] series) 05/23/2022      Medicare Health Risk Assessment:     Ht 4' 8" (1 422 m)   Wt 57 6 kg (127 lb)   BMI 28 47 kg/m²      Betty Martínez is here for her Subsequent Wellness visit  Last Medicare Wellness visit information reviewed, patient interviewed, no change since last AWV  Health Risk Assessment:   Patient rates overall health as good  Patient feels that their physical health rating is same  Patient is satisfied with their life  Eyesight was rated as same  Hearing was rated as same  Patient feels that their emotional and mental health rating is same  Patients states they are never, rarely angry  Patient states they are sometimes unusually tired/fatigued  Pain experienced in the last 7 days has been none  Patient states that she has experienced no weight loss or gain in last 6 months  Fall Risk Screening:    In the past year, patient has experienced: no history of falling in past year      Urinary Incontinence Screening:   Patient has not leaked urine accidently in the last six months  Home Safety:  Patient does not have trouble with stairs inside or outside of their home  Patient has working smoke alarms and has working carbon monoxide detector  Home safety hazards include: none  Nutrition:   Current diet is Regular  Medications:   Patient is currently taking over-the-counter supplements  OTC medications include: see medication list  Patient is able to manage medications  Activities of Daily Living (ADLs)/Instrumental Activities of Daily Living (IADLs):   Walk and transfer into and out of bed and chair?: Yes  Dress and groom yourself?: Yes    Bathe or shower yourself?: Yes    Feed yourself? Yes  Do your laundry/housekeeping?: Yes  Manage your money, pay your bills and track your expenses?: Yes  Make your own meals?: Yes    Do your own shopping?: Yes    Previous Hospitalizations:   Any hospitalizations or ED visits within the last 12 months?: No      Advance Care Planning:   Living will: Yes    Durable POA for healthcare:  Yes    Advanced directive: Yes    Advanced directive counseling given: No    Five wishes given: No    End of Life Decisions reviewed with patient: Yes    Provider agrees with end of life decisions: Yes      PREVENTIVE SCREENINGS      Cardiovascular Screening:    General: Screening Not Indicated and History Lipid Disorder      Diabetes Screening:     General: Screening Not Indicated and History Diabetes      Colorectal Cancer Screening:     General: Screening Current      Breast Cancer Screening:     General: Screening Current      Cervical Cancer Screening:    General: Screening Not Indicated      Osteoporosis Screening:    General: Screening Current      Abdominal Aortic Aneurysm (AAA) Screening:        General: Screening Not Indicated      Lung Cancer Screening:     General: Screening Not Indicated      Hepatitis C Screening:    General: Screening Not Indicated    Screening, Brief Intervention, and Referral to Treatment (SBIRT)    Screening  Typical number of drinks in a day: 0  Typical number of drinks in a week: 0  Interpretation: Low risk drinking behavior  AUDIT-C Screenin) How often did you have a drink containing alcohol in the past year? never  2) How many drinks did you have on a typical day when you were drinking in the past year? 0  3) How often did you have 6 or more drinks on one occasion in the past year? never    AUDIT-C Score: 0  Interpretation: Score 0-2 (female): Negative screen for alcohol misuse    Single Item Drug Screening:  How often have you used an illegal drug (including marijuana) or a prescription medication for non-medical reasons in the past year? never    Single Item Drug Screen Score: 0  Interpretation: Negative screen for possible drug use disorder      Dilma Snow DO    A 1year-old female with past medical history of hypertension, diabetes mellitus type 2, hyperlipidemia, major depressive disorder, generalized anxiety disorder presents with her daughter for subsequent annual wellness visit and follow-up of chronic conditions  Most recently the patient has been complaining of excessive fatigue, tiredness, hypersomnia and feeling unsteady on her feet  She even fell out of her chair after having virtual visit with new psychiatrist   Not certain why because I can not read psychiatry notes due to patient can not potentiality but the patient was taken off of BuSpar and placed on titrating dose of gabapentin in the past month  Since that time the patient has been referred to Physical therapy to evaluate her gait and balance  According to daughter they are trying to get her away from being on benzodiazepines because she is elderly but not certain why they discontinued BuSpar  Labs reviewed which showed normal CBC, CMP, TSH, very well controlled lipid profile and her hemoglobin A1c decreased from 6 5-6 3%      Review of Systems   Constitutional: Positive for fatigue  HENT: Negative  Eyes: Negative  Respiratory: Negative  Cardiovascular: Negative  Gastrointestinal: Positive for constipation  Endocrine: Negative  Genitourinary: Negative  Musculoskeletal: Positive for gait problem ( unsteady on her feet)  Skin: Negative  Allergic/Immunologic: Negative  Hematological: Negative  Psychiatric/Behavioral: Positive for sleep disturbance (Hypersomnia)  The patient is nervous/anxious  Physical Exam  Vitals and nursing note reviewed  Constitutional:       General: She is not in acute distress  Appearance: She is well-developed  She is not diaphoretic  HENT:      Head: Normocephalic and atraumatic  Right Ear: External ear normal       Left Ear: External ear normal       Nose: Nose normal    Eyes:      Conjunctiva/sclera: Conjunctivae normal       Pupils: Pupils are equal, round, and reactive to light  Cardiovascular:      Rate and Rhythm: Normal rate and regular rhythm  Heart sounds: Normal heart sounds  No murmur heard  No friction rub  No gallop  Pulmonary:      Effort: Pulmonary effort is normal  No respiratory distress  Breath sounds: Normal breath sounds  No wheezing or rales  Chest:      Chest wall: No tenderness  Abdominal:      General: Bowel sounds are normal       Palpations: Abdomen is soft  Musculoskeletal:         General: No tenderness or deformity  Normal range of motion  Cervical back: Normal range of motion and neck supple  Skin:     General: Skin is warm and dry  Capillary Refill: Capillary refill takes less than 2 seconds  Findings: No rash  Neurological:      Mental Status: She is alert and oriented to person, place, and time  Cranial Nerves: No cranial nerve deficit  Sensory: No sensory deficit  Motor: No abnormal muscle tone        Coordination: Coordination normal       Deep Tendon Reflexes: Reflexes normal    Psychiatric:         Mood and Affect: Mood normal          Behavior: Behavior normal          Thought Content:  Thought content normal          Judgment: Judgment normal

## 2022-08-30 NOTE — PATIENT INSTRUCTIONS
Medicare Preventive Visit Patient Instructions  Thank you for completing your Welcome to Medicare Visit or Medicare Annual Wellness Visit today  Your next wellness visit will be due in one year (8/31/2023)  The screening/preventive services that you may require over the next 5-10 years are detailed below  Some tests may not apply to you based off risk factors and/or age  Screening tests ordered at today's visit but not completed yet may show as past due  Also, please note that scanned in results may not display below  Preventive Screenings:  Service Recommendations Previous Testing/Comments   Colorectal Cancer Screening  * Colonoscopy    * Fecal Occult Blood Test (FOBT)/Fecal Immunochemical Test (FIT)  * Fecal DNA/Cologuard Test  * Flexible Sigmoidoscopy Age: 39-70 years old   Colonoscopy: every 10 years (may be performed more frequently if at higher risk)  OR  FOBT/FIT: every 1 year  OR  Cologuard: every 3 years  OR  Sigmoidoscopy: every 5 years  Screening may be recommended earlier than age 39 if at higher risk for colorectal cancer  Also, an individualized decision between you and your healthcare provider will decide whether screening between the ages of 74-80 would be appropriate  Colonoscopy: 03/27/2017  FOBT/FIT: Not on file  Cologuard: Not on file  Sigmoidoscopy: Not on file    Screening Current     Breast Cancer Screening Age: 36 years old  Frequency: every 1-2 years  Not required if history of left and right mastectomy Mammogram: 04/06/2018    Screening Current   Cervical Cancer Screening Between the ages of 21-29, pap smear recommended once every 3 years  Between the ages of 33-67, can perform pap smear with HPV co-testing every 5 years     Recommendations may differ for women with a history of total hysterectomy, cervical cancer, or abnormal pap smears in past  Pap Smear: Not on file    Screening Not Indicated   Hepatitis C Screening Once for adults born between 1945 and 1965  More frequently in patients at high risk for Hepatitis C Hep C Antibody: Not on file    Screening Not Indicated   Diabetes Screening 1-2 times per year if you're at risk for diabetes or have pre-diabetes Fasting glucose: 108 mg/dL (4/21/2022)  A1C: 6 3 % of total Hgb (8/27/2022)  Screening Not Indicated  History Diabetes   Cholesterol Screening Once every 5 years if you don't have a lipid disorder  May order more often based on risk factors  Lipid panel: 08/27/2022    Screening Not Indicated  History Lipid Disorder     Other Preventive Screenings Covered by Medicare:  1  Abdominal Aortic Aneurysm (AAA) Screening: covered once if your at risk  You're considered to be at risk if you have a family history of AAA  2  Lung Cancer Screening: covers low dose CT scan once per year if you meet all of the following conditions: (1) Age 50-69; (2) No signs or symptoms of lung cancer; (3) Current smoker or have quit smoking within the last 15 years; (4) You have a tobacco smoking history of at least 20 pack years (packs per day multiplied by number of years you smoked); (5) You get a written order from a healthcare provider  3  Glaucoma Screening: covered annually if you're considered high risk: (1) You have diabetes OR (2) Family history of glaucoma OR (3)  aged 48 and older OR (3)  American aged 72 and older  3  Osteoporosis Screening: covered every 2 years if you meet one of the following conditions: (1) You're estrogen deficient and at risk for osteoporosis based off medical history and other findings; (2) Have a vertebral abnormality; (3) On glucocorticoid therapy for more than 3 months; (4) Have primary hyperparathyroidism; (5) On osteoporosis medications and need to assess response to drug therapy  · Last bone density test (DXA Scan): Not on file  5  HIV Screening: covered annually if you're between the age of 12-76   Also covered annually if you are younger than 13 and older than 72 with risk factors for HIV infection  For pregnant patients, it is covered up to 3 times per pregnancy  Immunizations:  Immunization Recommendations   Influenza Vaccine Annual influenza vaccination during flu season is recommended for all persons aged >= 6 months who do not have contraindications   Pneumococcal Vaccine   * Pneumococcal conjugate vaccine = PCV13 (Prevnar 13), PCV15 (Vaxneuvance), PCV20 (Prevnar 20)  * Pneumococcal polysaccharide vaccine = PPSV23 (Pneumovax) Adults 25-60 years old: 1-3 doses may be recommended based on certain risk factors  Adults 72 years old: 1-2 doses may be recommended based off what pneumonia vaccine you previously received   Hepatitis B Vaccine 3 dose series if at intermediate or high risk (ex: diabetes, end stage renal disease, liver disease)   Tetanus (Td) Vaccine - COST NOT COVERED BY MEDICARE PART B Following completion of primary series, a booster dose should be given every 10 years to maintain immunity against tetanus  Td may also be given as tetanus wound prophylaxis  Tdap Vaccine - COST NOT COVERED BY MEDICARE PART B Recommended at least once for all adults  For pregnant patients, recommended with each pregnancy  Shingles Vaccine (Shingrix) - COST NOT COVERED BY MEDICARE PART B  2 shot series recommended in those aged 48 and above     Health Maintenance Due:      Topic Date Due    Colorectal Cancer Screening  03/27/2020     Immunizations Due:      Topic Date Due    COVID-19 Vaccine (4 - Booster for Pfizer series) 05/23/2022    Influenza Vaccine (1) 09/01/2022     Advance Directives   What are advance directives? Advance directives are legal documents that state your wishes and plans for medical care  These plans are made ahead of time in case you lose your ability to make decisions for yourself  Advance directives can apply to any medical decision, such as the treatments you want, and if you want to donate organs  What are the types of advance directives?   There are many types of advance directives, and each state has rules about how to use them  You may choose a combination of any of the following:  · Living will: This is a written record of the treatment you want  You can also choose which treatments you do not want, which to limit, and which to stop at a certain time  This includes surgery, medicine, IV fluid, and tube feedings  · Durable power of  for healthcare [facility]): This is a written record that states who you want to make healthcare choices for you when you are unable to make them for yourself  This person, called a proxy, is usually a family member or a friend  You may choose more than 1 proxy  · Do not resuscitate (DNR) order:  A DNR order is used in case your heart stops beating or you stop breathing  It is a request not to have certain forms of treatment, such as CPR  A DNR order may be included in other types of advance directives  · Medical directive: This covers the care that you want if you are in a coma, near death, or unable to make decisions for yourself  You can list the treatments you want for each condition  Treatment may include pain medicine, surgery, blood transfusions, dialysis, IV or tube feedings, and a ventilator (breathing machine)  · Values history: This document has questions about your views, beliefs, and how you feel and think about life  This information can help others choose the care that you would choose  Why are advance directives important? An advance directive helps you control your care  Although spoken wishes may be used, it is better to have your wishes written down  Spoken wishes can be misunderstood, or not followed  Treatments may be given even if you do not want them  An advance directive may make it easier for your family to make difficult choices about your care     Weight Management   Why it is important to manage your weight:  Being overweight increases your risk of health conditions such as heart disease, high blood pressure, type 2 diabetes, and certain types of cancer  It can also increase your risk for osteoarthritis, sleep apnea, and other respiratory problems  Aim for a slow, steady weight loss  Even a small amount of weight loss can lower your risk of health problems  How to lose weight safely:  A safe and healthy way to lose weight is to eat fewer calories and get regular exercise  You can lose up about 1 pound a week by decreasing the number of calories you eat by 500 calories each day  Healthy meal plan for weight management:  A healthy meal plan includes a variety of foods, contains fewer calories, and helps you stay healthy  A healthy meal plan includes the following:  · Eat whole-grain foods more often  A healthy meal plan should contain fiber  Fiber is the part of grains, fruits, and vegetables that is not broken down by your body  Whole-grain foods are healthy and provide extra fiber in your diet  Some examples of whole-grain foods are whole-wheat breads and pastas, oatmeal, brown rice, and bulgur  · Eat a variety of vegetables every day  Include dark, leafy greens such as spinach, kale, patrick greens, and mustard greens  Eat yellow and orange vegetables such as carrots, sweet potatoes, and winter squash  · Eat a variety of fruits every day  Choose fresh or canned fruit (canned in its own juice or light syrup) instead of juice  Fruit juice has very little or no fiber  · Eat low-fat dairy foods  Drink fat-free (skim) milk or 1% milk  Eat fat-free yogurt and low-fat cottage cheese  Try low-fat cheeses such as mozzarella and other reduced-fat cheeses  · Choose meat and other protein foods that are low in fat  Choose beans or other legumes such as split peas or lentils  Choose fish, skinless poultry (chicken or turkey), or lean cuts of red meat (beef or pork)  Before you cook meat or poultry, cut off any visible fat  · Use less fat and oil  Try baking foods instead of frying them   Add less fat, such as margarine, sour cream, regular salad dressing and mayonnaise to foods  Eat fewer high-fat foods  Some examples of high-fat foods include french fries, doughnuts, ice cream, and cakes  · Eat fewer sweets  Limit foods and drinks that are high in sugar  This includes candy, cookies, regular soda, and sweetened drinks  Exercise:  Exercise at least 30 minutes per day on most days of the week  Some examples of exercise include walking, biking, dancing, and swimming  You can also fit in more physical activity by taking the stairs instead of the elevator or parking farther away from stores  Ask your healthcare provider about the best exercise plan for you  © Copyright [employer] 2018 Information is for End User's use only and may not be sold, redistributed or otherwise used for commercial purposes   All illustrations and images included in CareNotes® are the copyrighted property of A D A M , Inc  or [address] [place] [address]

## 2022-08-30 NOTE — ASSESSMENT & PLAN NOTE
Lab Results   Component Value Date    HGBA1C 6 3 (H) 08/27/2022     Hemoglobin A1c at 6 3%  Continue on metformin 500 mg twice daily  Working well for her    Repeat diabetic parameters in 4 months

## 2022-08-30 NOTE — ASSESSMENT & PLAN NOTE
Cholesterol remains very well controlled on atorvastatin 10 mg once daily    Repeat lipid panel in 4 months

## 2022-08-30 NOTE — ASSESSMENT & PLAN NOTE
She is seeing Urology  Remains on thiazide diuretic  She does drink water during the course the day    Check uric acid level with next set of labs

## 2022-08-30 NOTE — ASSESSMENT & PLAN NOTE
-patient still with symptoms of constipation  She remains on Linzess 145 mcg  Some of her concern is related to her obsessive nature and anxiety     -as per recommendation from Gastroenterology notes if she is still having constipation then her dose of Linzess can be increased to 290 mcg once daily    Dose increased

## 2022-09-01 ENCOUNTER — OFFICE VISIT (OUTPATIENT)
Dept: PHYSICAL THERAPY | Facility: CLINIC | Age: 82
End: 2022-09-01
Payer: MEDICARE

## 2022-09-01 DIAGNOSIS — R26.89 BALANCE PROBLEM: Primary | ICD-10-CM

## 2022-09-01 PROCEDURE — 97530 THERAPEUTIC ACTIVITIES: CPT | Performed by: PHYSICAL THERAPIST

## 2022-09-01 PROCEDURE — 97110 THERAPEUTIC EXERCISES: CPT | Performed by: PHYSICAL THERAPIST

## 2022-09-01 PROCEDURE — 97112 NEUROMUSCULAR REEDUCATION: CPT | Performed by: PHYSICAL THERAPIST

## 2022-09-01 NOTE — PROGRESS NOTES
Daily Note     Today's date: 2022  Patient name: Christine Tellez  : 1940  MRN: [id number]  Referring provider: Daniel Cm DO  Dx:   Encounter Diagnosis     ICD-10-CM    1  Balance problem  R26 89                 Subjective: Pt reports she is having some difficulty using her cane and getting the pattern down  She feels like she is "being clumsy" when she walks         Objective: See treatment diary below      Assessment: Tolerated treatment well  Patient would benefit from continued PT Pt reports she feels fatigue after Nustep and standing exercises  She reports no pain though is able to complete exercises without excessive rest breaks  Most benefit from walking with canes for understanding reciprocal UE swing during gait for proper cane use  Educated on cane use again after this activity with significantly improved carryover  Continue to review  Plan: Progress treatment as tolerated         Precautions:     Manuals                                                                Neuro Re-Ed             Step ups to foam NV            SLS             Side step foam beam NV            Tandem walk foam beam  NV            FRANKS balance test  NV                                      Ther Ex             nustep NV 5' lvl 2            Marches at bar NV 2x10           Standing hip abd NV 2x10           Standing hip ext NV 2x10           Mini squats  NV x10  Leg press          Heel/toe raises  NV x10 ea            Knee ext machine             Ham curl machine              TB side step             Ther Activity             Step up and overs             Sit to stands from chair with foam  NV           Stepping over hurdles fwd/lateral   NV  With foams          Retro walks tony              Gait tx with 2 canes for arms   5'                        Gait Training             Gait tx with SPC (in right hand)  8' 10'

## 2022-09-02 ENCOUNTER — TELEMEDICINE (OUTPATIENT)
Dept: PSYCHIATRY | Facility: CLINIC | Age: 82
End: 2022-09-02
Payer: MEDICARE

## 2022-09-02 DIAGNOSIS — F41.1 GENERALIZED ANXIETY DISORDER: Primary | ICD-10-CM

## 2022-09-02 DIAGNOSIS — F33.0 MAJOR DEPRESSIVE DISORDER, RECURRENT, MILD (HCC): ICD-10-CM

## 2022-09-02 DIAGNOSIS — F45.1 SOMATIC SYMPTOM DISORDER: ICD-10-CM

## 2022-09-02 PROCEDURE — 90834 PSYTX W PT 45 MINUTES: CPT | Performed by: PSYCHIATRY & NEUROLOGY

## 2022-09-02 NOTE — PSYCH
Psychotherapy Provided: Individual Psychotherapy 55 minutes     Length of time in session: 55 minutes, follow up in 1 week    Encounter Diagnosis     ICD-10-CM    1  Generalized anxiety disorder  F41 1    2  Somatic symptom disorder  F45 1    3  Major depressive disorder, recurrent, mild (HCC)  F33 0        Goals addressed in session: Goal 1     Pain:  none      Current suicide risk : Low     Data:  Jalenjoan Andersrset notes slight improvement in anxiety from last visit  Last Saturday, woke up and was feeling anxious prior to going to [facility] with family  Malena Isaac was angry with her and reportedly was yelling at her  Argument ensured, but Malena Isaac stayed  This is one of the rarer times that Sushil Duvall had chance to speak to Malena Isaac as pt felt like Malena Isaac is avoiding her  They went to dinner together and had catching up to do, which Sushil Duvall chance to talk to her  Sandra's daughter's in laws are here for the weekend  Belkis Delacruz looked sick and Sushil Duvall was worried to stay with kids and MIL due to her worrying something wrong with her liver  She says that this made her worried about her own health, thinking that something bad might happen to her own health  Sushil Duvall notes that she went to physical therapy for ambulation issues which she liked  She says that taking care of her health has been helpful for anxiety  Additionally, Malena Isaac signed her up for bible class which she is looking forward to attend  When suggesting senior community centers, she says that she does not like social situations but feels alone at home even when there are a lot of people at home  Still has periods of preoccupation with bowel movements  When discussing suggestions from previous session, she says that she did not make a planner for the day and to make list of tasks that she can use to distract her self when worrying about things       Assessment:  78 YOF w/ PPH for MDD, JONAH, OCD, illness anxiety disorder v s somatic symptom disorder who is following up for individual psychotherapy session  Patient continues to report anxiety symptoms but slightly improved compared to previous intake session  Performed cognitive reframing and mindfulness, which the patient was receptive to  Encouraged to speak with daughter regarding attending [facility] and/or go to [place]  Encouraged to write letter to her younger self  Plan:    Continue individual psychotherapy  Continue medication management with Dr Alvarez Landing: Diagnosis and Treatment Plan explained to Steven Veloz relates understanding diagnosis and is agreeable to Treatment Plan   Yes     This note was not shared with the patient due to this is a psychotherapy note

## 2022-09-06 ENCOUNTER — OFFICE VISIT (OUTPATIENT)
Dept: PHYSICAL THERAPY | Facility: CLINIC | Age: 82
End: 2022-09-06
Payer: MEDICARE

## 2022-09-06 ENCOUNTER — TELEPHONE (OUTPATIENT)
Dept: FAMILY MEDICINE CLINIC | Facility: CLINIC | Age: 82
End: 2022-09-06

## 2022-09-06 DIAGNOSIS — R26.89 BALANCE PROBLEM: Primary | ICD-10-CM

## 2022-09-06 PROCEDURE — 97110 THERAPEUTIC EXERCISES: CPT

## 2022-09-06 PROCEDURE — 97530 THERAPEUTIC ACTIVITIES: CPT

## 2022-09-06 PROCEDURE — 97112 NEUROMUSCULAR REEDUCATION: CPT

## 2022-09-06 NOTE — BH TREATMENT PLAN
Alek Dixon  1940     Date of Initial Treatment Plan: 08/26/22   Date of Current Treatment Plan: 08/26/22    Treatment Plan Number 1     Strengths/Personal Resources for Self Care: educated, relatively self-sufficient, likes reading    Diagnosis:   1  Generalized anxiety disorder     2  Major depressive disorder, recurrent, moderate (HCC)     3  Somatic symptom disorder         Area of Needs: generalized anxiety, illness anxiety, somatic symptom anxiety      Long Term Goal 1: A: Decrease in overall anxiety levels    Target Date: 6 months  Completion Date: TBD         Short Term Objectives for Goal 1: AAttend therapy sessions for individual psychotherapy, CBT intervention, and complete weekly assignments    GOAL 1: Modality: Individual 4x per month   Completion Date TBD    Behavioral Health Treatment Plan [facility]: Diagnosis and Treatment Plan explained to Gloria Franz relates understanding diagnosis and is agreeable to Treatment Plan         This note was not shared with the patient due to this is a psychotherapy note

## 2022-09-06 NOTE — TELEPHONE ENCOUNTER
Patient's daughter, Casimiro Mi, called and stated her mother's Linzess dosage was increased from 145mcg to 290mcg during her last visit  She has been experiencing stomaches/cramping and would like to know if she can go back to the original dose of 145mcg  If, so, she will need a new order sent to the pharmacy as she no longer has any of the 145mcg left

## 2022-09-06 NOTE — PROGRESS NOTES
Daily Note     Today's date: 2022  Patient name: Yumi Huang  : 1940  MRN: [id number]  Referring provider: Sin Guzman DO  Dx:   Encounter Diagnosis     ICD-10-CM    1  Balance problem  R26 89                   Subjective: Pt c/o LE fatigue post tx last visit  Objective: See treatment diary below      Assessment: Tolerated treatment well  Patient would benefit from continued PT  Min UE assist needed w/ side stepping on foam, negotiating hurdles; pt needs cues to  feet w/ forward hurdles  Pt able to perform sit to stands w/out UE assist  Min to mod LE fatigue noted post tx  Continues to have difficulty demonstrating reciprocal gait; focused on heel/toe gait  Plan: Progress treatment as tolerated         Precautions:     Manuals                                                               Neuro Re-Ed             Step ups to foam NV  10 ea green foam          SLS             Side step foam beam NV  3 laps          Tandem walk foam beam  NV            FRANKS balance test  NV                                      Ther Ex             nustep NV 5' lvl 2  5' lv 2          Marches at bar NV 2x10 2x10 ea          Standing hip abd NV 2x10 2x10 ea          Standing hip ext NV 2x10 2x10 ea          Mini squats  NV x10 2x10 Leg press          Heel/toe raises  NV x10 ea  15 ea          Knee ext machine             Ham curl machine              TB side step             Ther Activity             Step up and overs             Sit to stands from chair with foam  NV x10          Stepping over hurdles fwd/lateral   NV 3 laps ea With foams          Retro walks tony              Gait tx with 2 canes for arms   5' 3'                       Gait Training             Gait tx with SPC (in right hand)  8' 10' 5', no cane, heel/toe

## 2022-09-07 DIAGNOSIS — K59.04 CHRONIC IDIOPATHIC CONSTIPATION: ICD-10-CM

## 2022-09-07 NOTE — TELEPHONE ENCOUNTER
Whether her insurance will cover going back to 145 mcg after the just filled 852 mcg I am not certain    Sent to pharmacy

## 2022-09-08 ENCOUNTER — APPOINTMENT (OUTPATIENT)
Dept: PHYSICAL THERAPY | Facility: CLINIC | Age: 82
End: 2022-09-08
Payer: MEDICARE

## 2022-09-09 ENCOUNTER — TELEMEDICINE (OUTPATIENT)
Dept: PSYCHIATRY | Facility: CLINIC | Age: 82
End: 2022-09-09

## 2022-09-09 DIAGNOSIS — F33.0 MAJOR DEPRESSIVE DISORDER, RECURRENT, MILD (HCC): ICD-10-CM

## 2022-09-09 DIAGNOSIS — F41.1 GENERALIZED ANXIETY DISORDER: Primary | ICD-10-CM

## 2022-09-09 DIAGNOSIS — F45.1 SOMATIC SYMPTOM DISORDER: ICD-10-CM

## 2022-09-09 PROCEDURE — NC001 PR NO CHARGE: Performed by: PSYCHIATRY & NEUROLOGY

## 2022-09-09 RX ORDER — CLONAZEPAM 0.5 MG/1
0.5 TABLET ORAL 2 TIMES DAILY
Qty: 60 TABLET | Refills: 0 | Status: SHIPPED | OUTPATIENT
Start: 2022-09-09 | End: 2022-10-04 | Stop reason: SDUPTHER

## 2022-09-09 NOTE — PSYCH
Psychotherapy Provided: Individual Psychotherapy 55 minutes     Length of time in session: 55 minutes, follow up in 1 week    Encounter Diagnosis     ICD-10-CM    1  Generalized anxiety disorder  F41 1 clonazePAM (KlonoPIN) 0 5 mg tablet   2  Major depressive disorder, recurrent, mild (HCC)  F33 0    3  Somatic symptom disorder  F45 1        Goals addressed in session: Goal 1     Pain:      none    0    Current suicide risk : Low     Data: In-laws were visiting, and Marisa Troy was the only one home  MIL was jaundiced  Afraid to let them in at first  MIL in [facility], will stay with Balwinder Vieira and will have visiting nurse  She went to Dr Namrata Akhtar and asked to change to higher dosage of Linzess  Rozella Purchase upset was upset with Alexrahul Dial about her anxiety and fear of interaction with others, which made Tanda Dial upset  She notes that her Granddaughter has COVID from school band - which made her upset about the jose of her grand daughter which made her anxious about her own health  Moreover, her son came last weekend, and drank alcohol by himself - upsetting for Marisa Troy  Since last week's session, she wrote a letter to herself when she was 13years old  Major theme was that much of the anxiety is unfounded and has not happened  Message to her 13year old self was to live in the present moment  Assessment:  78 YOF w/ PPH for MDD, JONAH, OCD, illness anxiety disorder v s somatic symptom disorder who is following up for individual psychotherapy session  Continues to worry about multiple things, including fear of serious illness, daughter's mother in-law's health, and fear of interaction with daughter's in-laws  CBT interventions focused on cognitive distortions performed  Plan:  Continue individual psychotherapy  Continue medication management with psychiatrist    [id number]: Diagnosis and Treatment Plan explained to Whitney Alpers relates understanding diagnosis and is agreeable to Treatment Plan   Yes

## 2022-09-12 ENCOUNTER — APPOINTMENT (OUTPATIENT)
Dept: PHYSICAL THERAPY | Facility: CLINIC | Age: 82
End: 2022-09-12
Payer: MEDICARE

## 2022-09-13 ENCOUNTER — APPOINTMENT (OUTPATIENT)
Dept: PHYSICAL THERAPY | Facility: CLINIC | Age: 82
End: 2022-09-13
Payer: MEDICARE

## 2022-09-14 ENCOUNTER — OFFICE VISIT (OUTPATIENT)
Dept: PHYSICAL THERAPY | Facility: CLINIC | Age: 82
End: 2022-09-14
Payer: MEDICARE

## 2022-09-14 DIAGNOSIS — R26.89 BALANCE PROBLEM: Primary | ICD-10-CM

## 2022-09-14 PROCEDURE — 97112 NEUROMUSCULAR REEDUCATION: CPT

## 2022-09-14 PROCEDURE — 97110 THERAPEUTIC EXERCISES: CPT

## 2022-09-14 PROCEDURE — 97530 THERAPEUTIC ACTIVITIES: CPT

## 2022-09-14 NOTE — PROGRESS NOTES
Daily Note     Today's date: 2022  Patient name: Alek Dixon  : 1940  MRN: [id number]  Referring provider: Nae Christian DO  Dx:   Encounter Diagnosis     ICD-10-CM    1  Balance problem  R26 89                   Subjective: Pt reports no adverse effects following last tx  Objective: See treatment diary below      Assessment: Tolerated treatment fair  Patient would benefit from continued PT   LE fatigue noted post tx; defers leg press to nv  Frequent rest breaks needed today  Improved performance when negotiating hurdles today  Plan: Progress treatment as tolerated         Precautions:     Manuals                                                              Neuro Re-Ed             Step ups to foam NV  10 ea green foam 10 ea green foam          SLS             Side step foam beam NV  3 laps 3 laps         Tandem walk foam beam  NV            FRANKS balance test  NV                                      Ther Ex             nustep NV 5' lvl 2  5' lv 2 5' lv 2         Marches at bar NV 2x10 2x10 ea 2x10 ea         Standing hip abd NV 2x10 2x10 ea 2x10 ea         Standing hip ext NV 2x10 2x10 ea 2x10 ea         Mini squats  NV x10 2x10 Leg press NV          Heel/toe raises  NV x10 ea  15 ea 20 ea         Knee ext machine             Ham curl machine              TB side step             Ther Activity             Step up and overs             Sit to stands from chair with foam  NV x10 x10 blk foam         Stepping over hurdles fwd/lateral   NV 3 laps ea 3 laps ea W/ foams        Retro walks tony              Gait tx with 2 canes for arms   5' 3'                       Gait Training             Gait tx with SPC (in right hand)  8' 10' 5', no cane, heel/toe

## 2022-09-15 ENCOUNTER — APPOINTMENT (OUTPATIENT)
Dept: PHYSICAL THERAPY | Facility: CLINIC | Age: 82
End: 2022-09-15
Payer: MEDICARE

## 2022-09-16 ENCOUNTER — TELEMEDICINE (OUTPATIENT)
Dept: PSYCHIATRY | Facility: CLINIC | Age: 82
End: 2022-09-16

## 2022-09-16 DIAGNOSIS — F33.0 MAJOR DEPRESSIVE DISORDER, RECURRENT, MILD (HCC): ICD-10-CM

## 2022-09-16 DIAGNOSIS — F41.1 GENERALIZED ANXIETY DISORDER: Primary | ICD-10-CM

## 2022-09-16 DIAGNOSIS — F45.1 SOMATIC SYMPTOM DISORDER: ICD-10-CM

## 2022-09-16 PROCEDURE — NC001 PR NO CHARGE: Performed by: PSYCHIATRY & NEUROLOGY

## 2022-09-16 NOTE — PSYCH
Psychotherapy Provided: Individual Psychotherapy 55 minutes     Length of time in session: 55 minutes, follow up in 1 week    Encounter Diagnosis     ICD-10-CM    1  Generalized anxiety disorder  F41 1    2  Somatic symptom disorder  F45 1    3  Major depressive disorder, recurrent, mild (HCC)  F33 0      Goals addressed in session: Goal 1     Pain:   none    Current suicide risk : Low     Data:   Lizzy Diggs is an 78year old female presenting for individual psychotherapy  Upset about daughter's MIL being sick  MIL and her  will be moving into their household and there have been a lot of activity  Daughter has been putting off the patient and has not been paying attention  Has been having more anxiety about her own body and her health, which she has been ruminating frequently about  She notes that generally she tends to stay away from daughter's in-laws due to [address] being "not a people person" and BEN being "very talkative " She note that daughter will be going to [place] and wanted to take Samara Dakin and to drop her off at [facility] home but Samara Dakin was hesitant and did not want to leave her home  It appears that part of the anxiety is that she believes that other people may be judging her negatively and she is always concerned about how she will be perceived and therefore causes her anxiety  As discussed during pervious sessions, she has not done guided mediation and scheduled worry time  She is unsure why she has not done so but will try again  Assessment:  78 YOF w/ PPH for MDD, JONAH, OCD, illness anxiety disorder v s somatic symptom disorder who is following up for individual psychotherapy session  Continues to report anxiety about multiple things, including about possibility of having a serious illness, interaction with others, and worries in general   Supportive psychotherapy provided in session  CBT interventions performed      Plan:  Continue individual psychotherapy weekly  Continue medication management with psychiatrist    [address]: Diagnosis and Treatment Plan explained to Ramon Pineda relates understanding diagnosis and is agreeable to Treatment Plan   Yes     This note was not shared with the patient due to this is a psychotherapy note

## 2022-09-19 ENCOUNTER — APPOINTMENT (OUTPATIENT)
Dept: PHYSICAL THERAPY | Facility: CLINIC | Age: 82
End: 2022-09-19
Payer: MEDICARE

## 2022-09-19 DIAGNOSIS — F33.1 MAJOR DEPRESSIVE DISORDER, RECURRENT, MODERATE (HCC): ICD-10-CM

## 2022-09-19 DIAGNOSIS — F41.1 GENERALIZED ANXIETY DISORDER: ICD-10-CM

## 2022-09-19 RX ORDER — FLUOXETINE HYDROCHLORIDE 20 MG/1
20 CAPSULE ORAL DAILY
Qty: 30 CAPSULE | Refills: 1 | Status: SHIPPED | OUTPATIENT
Start: 2022-09-19 | End: 2022-10-04

## 2022-09-21 ENCOUNTER — APPOINTMENT (OUTPATIENT)
Dept: PHYSICAL THERAPY | Facility: CLINIC | Age: 82
End: 2022-09-21
Payer: MEDICARE

## 2022-09-23 ENCOUNTER — TELEMEDICINE (OUTPATIENT)
Dept: PSYCHIATRY | Facility: CLINIC | Age: 82
End: 2022-09-23
Payer: MEDICARE

## 2022-09-23 DIAGNOSIS — F41.1 GENERALIZED ANXIETY DISORDER: Primary | ICD-10-CM

## 2022-09-23 DIAGNOSIS — F45.1 SOMATIC SYMPTOM DISORDER: ICD-10-CM

## 2022-09-23 DIAGNOSIS — F33.0 MAJOR DEPRESSIVE DISORDER, RECURRENT, MILD (HCC): ICD-10-CM

## 2022-09-23 PROCEDURE — 90834 PSYTX W PT 45 MINUTES: CPT | Performed by: PSYCHIATRY & NEUROLOGY

## 2022-09-23 NOTE — PSYCH
Psychotherapy Provided: Individual Psychotherapy 55 minutes     Length of time in session: 55 minutes, follow up in 1 week    Encounter Diagnosis     ICD-10-CM    1  Generalized anxiety disorder  F41 1    2  Major depressive disorder, recurrent, mild (HCC)  F33 0    3  Somatic symptom disorder  F45 1        Goals addressed in session: Goal 1     Pain:    none    Current suicide risk : Low     Data:    Chayito Hernandes is an 78year old female presenting for individual psychotherapy  She didn't speak to Sen Ko because she does not want to be a burden  Talked a bit about family situation and appears that everyone is stressed at this time  Flora Rhodes spoke to grief counselor who will be coming to speak with family members  Flora Rhodes wants Rosangela Ryder to be involved as well and Rosangela Ryder agrees  Rosangela Ryder is afraid, anticipating, when he MIL's disease will catch up to her  She doesn't talk much to others as she doesn't want to be a burden  She said that she does not want to go to her friend's place because she doesn't want to be burden  She feels abandoned when her daughter Saul Schneider to clean the home of her in-laws  Discussed goals, coping skills, and Rosangela Ryder says that she hasn't tried the interventions discussed during previous sessions  Reinforced the concepts discussed before and provided CBT  Rosangela Ryder and Flora Rhodes will make a plan for today to go out to [place] and to dinner and have daughter-mother day  Rosangela Ryder was hesitant initially but then agreed  Assessment:  78 YOF w/ PPH for MDD, JOANH, OCD, illness anxiety disorder v s somatic symptom disorder who is following up for individual psychotherapy session  Notes anxiety about multiple things, about having a serious illness, and interactions with others  Supportive and CBT provided  Tasks given to patient and daughter who will assist Rosangela Ryder      Plan:  Continue individual psychotherapy weekly  Continue medication management with psychiatrist    [address]: Diagnosis and Treatment Plan explained to Ar An relates understanding diagnosis and is agreeable to Treatment Plan   Yes     This note was not shared with the patient due to this is a psychotherapy note

## 2022-09-26 ENCOUNTER — OFFICE VISIT (OUTPATIENT)
Dept: PHYSICAL THERAPY | Facility: CLINIC | Age: 82
End: 2022-09-26
Payer: MEDICARE

## 2022-09-26 DIAGNOSIS — F41.1 GENERALIZED ANXIETY DISORDER: ICD-10-CM

## 2022-09-26 DIAGNOSIS — F45.21 ILLNESS ANXIETY DISORDER: ICD-10-CM

## 2022-09-26 DIAGNOSIS — F33.1 MAJOR DEPRESSIVE DISORDER, RECURRENT, MODERATE (HCC): ICD-10-CM

## 2022-09-26 DIAGNOSIS — R26.89 BALANCE PROBLEM: Primary | ICD-10-CM

## 2022-09-26 PROCEDURE — 97110 THERAPEUTIC EXERCISES: CPT

## 2022-09-26 PROCEDURE — 97112 NEUROMUSCULAR REEDUCATION: CPT

## 2022-09-26 PROCEDURE — 97530 THERAPEUTIC ACTIVITIES: CPT

## 2022-09-26 RX ORDER — FLUOXETINE HYDROCHLORIDE 40 MG/1
40 CAPSULE ORAL DAILY
Qty: 30 CAPSULE | Refills: 1 | Status: SHIPPED | OUTPATIENT
Start: 2022-09-26 | End: 2022-10-24 | Stop reason: SDUPTHER

## 2022-09-26 NOTE — PROGRESS NOTES
Daily Note     Today's date: 2022  Patient name: Dhaval Castro  : 1940  MRN: [id number]  Referring provider: Ciara Garrett DO  Dx:   Encounter Diagnosis     ICD-10-CM    1  Balance problem  R26 89                   Subjective: Pt reports she continues to feel off balance when going to the bathroom at night, and states she uses a cane for safety  Objective: See treatment diary below      Assessment: Tolerated treatment fair  Patient would benefit from continued PT  Pt c/o BL quad fatigue, weakness following standing hip exercises  Brief, but frequent rest breaks needed throughout tx today  Min UE assist needed when negotiating foam beam, hurdles; progress toward no UE  Pt unable to perform sit to stands w/out some UE assist      Plan: Progress treatment as tolerated         Precautions:     Manuals                                                             Neuro Re-Ed             Step ups to foam NV  10 ea green foam 10 ea green foam  10 ea green foam        SLS             Side step foam beam NV  3 laps 3 laps 3 laps        Tandem walk foam beam  NV    2 laps        FRANKS balance test  NV                                      Ther Ex             nustep NV 5' lvl 2  5' lv 2 5' lv 2 5' lv 2        Marches at bar NV 2x10 2x10 ea 2x10 ea 2x10 ea        Standing hip abd NV 2x10 2x10 ea 2x10 ea 2x10 ea        Standing hip ext NV 2x10 2x10 ea 2x10 ea 2x10 ea        Mini squats  NV x10 2x10 Leg press NV  2x10 Leg press NV       Heel/toe raises  NV x10 ea  15 ea 20 ea 20 ea        Knee ext machine             Ham curl machine              TB side step             Ther Activity             Step up and overs             Sit to stands from chair with foam  NV x10 x10 blk foam 10         Stepping over hurdles fwd/lateral   NV 3 laps ea 3 laps ea W/ foams 2 laps ea        Retro walks tony              Gait tx with 2 canes for arms   5' 3'                       Gait Training Gait tx with SPC (in right hand)  8' 10' 5', no cane, heel/toe

## 2022-09-29 ENCOUNTER — OFFICE VISIT (OUTPATIENT)
Dept: PHYSICAL THERAPY | Facility: CLINIC | Age: 82
End: 2022-09-29
Payer: MEDICARE

## 2022-09-29 DIAGNOSIS — R26.89 BALANCE PROBLEM: Primary | ICD-10-CM

## 2022-09-29 PROCEDURE — 97110 THERAPEUTIC EXERCISES: CPT

## 2022-09-29 PROCEDURE — 97530 THERAPEUTIC ACTIVITIES: CPT

## 2022-09-29 PROCEDURE — 97112 NEUROMUSCULAR REEDUCATION: CPT

## 2022-09-29 NOTE — PROGRESS NOTES
Daily Note     Today's date: 2022  Patient name: Cari Osborn  : 1940  MRN: [id number]  Referring provider: Kodak Lacy DO  Dx:   Encounter Diagnosis     ICD-10-CM    1  Balance problem  R26 89                   Subjective: Pt reports no soreness following last visit  Objective: See treatment diary below      Assessment: Tolerated treatment well  Patient would benefit from continued PT   Improved endurance noted today, as pt takes very few rest breaks  Mild LE fatigue noted post tx  Fair tolerance to addition of seated knee extension; no discomfort noted, only fatigue  Pt uncomfortable placing feet on the leg press platform, despite seat adjustments; hold at this time  Plan: Progress treatment as tolerated         Precautions:     Manuals                                                            Neuro Re-Ed             Step ups to foam NV  10 ea green foam 10 ea green foam  10 ea green foam 10 ea green foam       SLS             Side step foam beam NV  3 laps 3 laps 3 laps 3 laps        Tandem walk foam beam  NV    2 laps 2 laps       FRANKS balance test  NV                                      Ther Ex             nustep NV 5' lvl 2  5' lv 2 5' lv 2 5' lv 2 5' lv 2       Marches at bar NV 2x10 2x10 ea 2x10 ea 2x10 ea 2x10 ea       Standing hip abd NV 2x10 2x10 ea 2x10 ea 2x10 ea 2x10 ea       Standing hip ext NV 2x10 2x10 ea 2x10 ea 2x10 ea 2x10 ea       Mini squats  NV x10 2x10 Leg press NV  2x10 Leg press-unable; squats 2x10       Heel/toe raises  NV x10 ea  15 ea 20 ea 20 ea 20 ea       Knee ext machine      11# x10       Ham curl machine              TB side step             Ther Activity             Step up and overs             Sit to stands from chair with foam  NV x10 x10 blk foam 10  10       Stepping over hurdles fwd/lateral   NV 3 laps ea 3 laps ea W/ foams 2 laps ea w/ foams 2 laps ea       Retro walks tony              Gait tx with 2 canes for arms 5' 3'                       Gait Training             Gait tx with SPC (in right hand)  8' 10' 5', no cane, heel/toe

## 2022-09-30 ENCOUNTER — TELEMEDICINE (OUTPATIENT)
Dept: PSYCHIATRY | Facility: CLINIC | Age: 82
End: 2022-09-30

## 2022-09-30 DIAGNOSIS — F33.0 MAJOR DEPRESSIVE DISORDER, RECURRENT, MILD (HCC): ICD-10-CM

## 2022-09-30 DIAGNOSIS — F45.1 SOMATIC SYMPTOM DISORDER: ICD-10-CM

## 2022-09-30 DIAGNOSIS — F41.1 GENERALIZED ANXIETY DISORDER: Primary | ICD-10-CM

## 2022-09-30 PROCEDURE — NC001 PR NO CHARGE: Performed by: PSYCHIATRY & NEUROLOGY

## 2022-09-30 NOTE — PSYCH
Psychotherapy Provided: Individual Psychotherapy 55 minutes     Length of time in session: 55 minutes, follow up in 1 week    Encounter Diagnosis     ICD-10-CM    1  Generalized anxiety disorder  F41 1    2  Major depressive disorder, recurrent, mild (HCC)  F33 0    3  Somatic symptom disorder  F45 1        Goals addressed in session: Goal 1     Pain:  None    Current suicide risk : Low     Data: This is a 78year old female who is presenting for individual psychotherapy  Patient notes ongoing issues with anxiety and depression but for the last week has had some mild improvements  She notes that she still has the intrusive thoughts about what other people think, in particular about her daughter's MIL who is terminally ill  She notes that she still has worries about multiple things on a daily basis and has not started the process of making a journal and task list for the day  Discussed ways to address anxiety, CBT utilized during session  Daughter says that she will help Lois Reyna with making task list     Assessment:  78 YOF w/ PPH for MDD, JONAH, OCD, illness anxiety disorder v s somatic symptom disorder who is following up for individual psychotherapy session  Still has anxiety issues  Less preoccupation with somatic symptoms today during session  CBT utilizing which was somewhat effective and will need to continue with CBT  Plan:  Continue individual therapy weekly, will switch to bi-weekly starting next week  Continue medication management with psychiatrist    [address]: Diagnosis and Treatment Plan explained to Emerita Brooke relates understanding diagnosis and is agreeable to Treatment Plan   Yes     This note was not shared with the patient due to this is a psychotherapy note

## 2022-10-03 ENCOUNTER — OFFICE VISIT (OUTPATIENT)
Dept: PHYSICAL THERAPY | Facility: CLINIC | Age: 82
End: 2022-10-03
Payer: MEDICARE

## 2022-10-03 DIAGNOSIS — R26.89 BALANCE PROBLEM: Primary | ICD-10-CM

## 2022-10-03 DIAGNOSIS — E11.9 TYPE 2 DIABETES MELLITUS WITHOUT COMPLICATION, WITHOUT LONG-TERM CURRENT USE OF INSULIN (HCC): ICD-10-CM

## 2022-10-03 DIAGNOSIS — E78.2 MIXED HYPERLIPIDEMIA: ICD-10-CM

## 2022-10-03 PROCEDURE — 97112 NEUROMUSCULAR REEDUCATION: CPT | Performed by: PHYSICAL THERAPIST

## 2022-10-03 PROCEDURE — 97530 THERAPEUTIC ACTIVITIES: CPT | Performed by: PHYSICAL THERAPIST

## 2022-10-03 PROCEDURE — 97110 THERAPEUTIC EXERCISES: CPT | Performed by: PHYSICAL THERAPIST

## 2022-10-03 RX ORDER — ATORVASTATIN CALCIUM 10 MG/1
10 TABLET, FILM COATED ORAL DAILY
Qty: 90 TABLET | Refills: 0 | Status: SHIPPED | OUTPATIENT
Start: 2022-10-03

## 2022-10-03 NOTE — PROGRESS NOTES
Daily Note     Today's date: 10/3/2022  Patient name: Smith Hernandez  : 1940  MRN: [id number]  Referring provider: Carolyn Castillo DO  Dx:   Encounter Diagnosis     ICD-10-CM    1  Balance problem  R26 89                   Subjective: Pt reports she feels like she is getting around a little faster  She went food shopping with her daughter this morning and wasn't "too far behind her"       Objective: See treatment diary below      Assessment: Tolerated treatment well  Patient would benefit from continued PT   Patient tolerates added weight today without complaints  She reports no significant change in fatigue level  Patient encouraged to not use UE assist with balance exercises in order to maximize lower extremity stability  Patient does well, though noted apprehension and CG needed  Continue progressing as tolerated  Plan: Progress treatment as tolerated         Precautions:     Manuals 8/30 9/1 9/6 9/14 9/26 9/29 10/3                                                          Neuro Re-Ed             Step ups to foam NV  10 ea green foam 10 ea green foam  10 ea green foam 10 ea green foam biodes foam x10      SLS             Side step foam beam NV  3 laps 3 laps 3 laps 3 laps  x3 laps       Tandem walk foam beam  NV    2 laps 2 laps       FRANKS balance test  NV                                      Ther Ex             nustep NV 5' lvl 2  5' lv 2 5' lv 2 5' lv 2 5' lv 2 5' lvl 2       Marches at bar NV 2x10 2x10 ea 2x10 ea 2x10 ea 2x10 ea x10 1 5#       Standing hip abd NV 2x10 2x10 ea 2x10 ea 2x10 ea 2x10 ea x10 1 5#      Standing hip ext NV 2x10 2x10 ea 2x10 ea 2x10 ea 2x10 ea x10 1 5#      Mini squats  NV x10 2x10 Leg press NV  2x10 Leg press-unable; squats 2x10       Heel/toe raises  NV x10 ea  15 ea 20 ea 20 ea 20 ea 20x ea       Knee ext machine      11# x10       Ham curl machine              TB side step             Ther Activity             Step up and overs             Sit to stands from chair with foam  NV x10 x10 blk foam 10  10       Stepping over hurdles fwd/lateral   NV 3 laps ea 3 laps ea W/ foams 2 laps ea w/ foams 2 laps ea NV      Retro walks tony              Gait tx with 2 canes for arms   5' 3'                       Gait Training             Gait tx with SPC (in right hand)  8' 10' 5', no cane, heel/toe

## 2022-10-04 ENCOUNTER — TELEMEDICINE (OUTPATIENT)
Dept: PSYCHIATRY | Facility: CLINIC | Age: 82
End: 2022-10-04
Payer: MEDICARE

## 2022-10-04 DIAGNOSIS — F41.1 GENERALIZED ANXIETY DISORDER: Primary | ICD-10-CM

## 2022-10-04 DIAGNOSIS — F45.1 SOMATIC SYMPTOM DISORDER: ICD-10-CM

## 2022-10-04 DIAGNOSIS — F45.21 ILLNESS ANXIETY DISORDER: ICD-10-CM

## 2022-10-04 DIAGNOSIS — F33.1 MAJOR DEPRESSIVE DISORDER, RECURRENT, MODERATE (HCC): ICD-10-CM

## 2022-10-04 PROCEDURE — 99214 OFFICE O/P EST MOD 30 MIN: CPT | Performed by: PSYCHIATRY & NEUROLOGY

## 2022-10-04 RX ORDER — CLONAZEPAM 0.5 MG/1
0.5 TABLET ORAL 2 TIMES DAILY
Qty: 60 TABLET | Refills: 0 | Status: SHIPPED | OUTPATIENT
Start: 2022-10-04 | End: 2022-11-03

## 2022-10-04 NOTE — PSYCH
MEDICATION MANAGEMENT NOTE - Virtual         [facility]        Name and Date of Birth:  Lillie Figueroa 78 y o  1940 MRN: [id number]    Date of Visit: October 4, 2022    Reason for Visit: Follow-up visit for medication management     Verification of patient location:    Patient is located in the following state in which I hold an active license [place]    Reason for visit is for medication management for anxiety and depression     Encounter provider Joseph Almendarez MD    Provider located at [address] [place] [id number] [phone]      Recent Visits  Date Type Provider Dept   09/30/22 Telemedicine MD Amie Valdez 18 recent visits within past 7 days and meeting all other requirements  Today's Visits  Date Type Provider Dept   10/04/22 Telemedicine MD Amie Valdez 18 today's visits and meeting all other requirements  Future Appointments  No visits were found meeting these conditions  Showing future appointments within next 150 days and meeting all other requirements       The patient was identified by name and date of birth  Lillie Figueroa was informed that this is a telemedicine visit and that the visit is being conducted through[employer] Embedded and patient was informed this is a secure, HIPAA-complaint platform  She agrees to proceed     My office door was closed  The patient was notified the following individuals were present in the room: Dr Kate Smith  She acknowledged consent and understanding of privacy and security of the video platform  The patient has agreed to participate and understands they can discontinue the visit at any time  Patient is aware this is a billable service      SUBJECTIVE:    Lillie Figueroa is a 78 y o  female with past psychiatric history significant for JONAH, MDD, somatic symptoms disorder, who was personally seen and evaluated today at the [address] [place] [address] [address] outpatient clinic for follow-up and medication management  Kendra Blizzard presents reporting ongoing depression and anxiety issues  According to daughter, Palmer Choi, patient still seems to focus a lot on anxiety issues, which has been contributing to depression as well  However, she notes that since Kendra Blizzard started PT, she feels "brighter" after completing sessions  Sometimes still cries about her life and out of sadness but unable to localize the source of sadness  Recently, has had episodes of anxiety, but also had birthday for granddaughter  Patient's daughter's mother in law, who is in Hospice care at home, will be moving out of the house and going to [facility]  Kendra Blizzard denies SI/HI  Denies auditory hallucinations, visual hallucinations  Denies paranoia  Feeling safe at home      Current Rating Scores:     None completed today  Review Of Systems:      Constitutional negative   ENT negative   Cardiovascular negative   Respiratory negative   Gastrointestinal negative   Genitourinary negative   Musculoskeletal negative   Integumentary negative   Neurological negative   Endocrine negative   Other Symptoms none, all other systems are negative       Past Psychiatric History: (unchanged information from previous note copied and italicized) - Information that is bolded has been updated       Past diagnoses: MDD, Anxiety, OCD  Inpatient psychiatric admissions: one previous admission for 3 weeks at [facility] in 2013 for panic attack after surgery  Prior outpatient psychiatric linkage: Dr Kaity Gong until transition of care  Past/current psychotherapy: sees Dr Benjamin De La Paz for the past 2-3 years  History of suicidal attempts/gestures: denies  History of violence/aggressive behaviors: denies  Psychotropic medication trials: Effexor, Zoloft, Prozac, Lexapro, Buspar, Klonopin, Prasozin (syncopy?)  Substance abuse inpatient/outpatient rehabilitation: denies     Substance Abuse History: (unchanged information from previous note copied and italicized) - Information that is bolded has been updated  Denies history of alcohol, illict substance, or tobacco abuse  Denies past legal actions or arrests secondary to substance intoxication  The patient denies prior DWIs/DUIs  Sandra does not exhibit objective evidence of substance withdrawal during today's examination nor does Sandra appear under the influence of any psychoactive substance  Social History: (unchanged information from previous note copied and italicized) - Information that is bolded has been updated  Grew up in [place], [place]  Developmental: Denies a history of milestone/developmental delay  Denies a history of in-utero exposure to toxins/illicit substances  There is no documented history of IEP or need for special education  Education: Bachelor of Arts  Marital history:  since  since   from cancer  Living arrangement, social support: lives with daughter, son in law, and 3 grandkids in [place], [place]  Social support includes brother, daughter, S-I-L, and 3 grandkids  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  Has pension and social security  Access to firearms: Denies direct access to weapons/firearms  Sandra Peña has no history of arrests or violence with a deadly weapon  Traumatic History: (unchanged information from previous note copied and italicized) - Information that is bolded has been updated       Abuse: denies  Other Traumatic Events: other traumatic events: denies      Past Medical History:    Past Medical History:   Diagnosis Date    Colon polyps     Diabetes mellitus ([facility] [address] )     Dizziness     Last assessed: 8/31/15    DVT [facility]deep venous thrombosis) ([facility])     Dysuria     Hematuria 2022    Hyperlipidemia     Hypertension     Hypertensive urgency 10/22/2021    Insomnia     Ureterolithiasis 2020 Past Surgical History:   Procedure Laterality Date     SECTION      1964 son, 26 daughter    COLONOSCOPY      [facility] RETROGRADE PYELOGRAM  2020    DE COLONOSCOPY FLX DX W/COLLJ SPEC WHEN PFRMD N/A 3/27/2017    Procedure: COLONOSCOPY;  Surgeon: Cassie Gamboa MD;  [facility]; Service: Gastroenterology    DE CYSTO/URETERO W/LITHOTRIPSY &INDWELL STENT INSRT Right 2020    Procedure: CYSTOSCOPY URETEROSCOPY WITH LITHOTRIPSY HOLMIUM LASER, RETROGRADE PYELOGRAM AND INSERTION STENT URETERAL; EXCISION OF BLADDER TUMOR;  Surgeon: Wagner Quiroga MD;  Location: [facility];  Service: Urology    ROTATOR CUFF REPAIR Left     Last assessed: 3/29/15    TONSILLECTOMY       No Known Allergies      Family Psychiatric History:     Family History   Problem Relation Age of Onset    Depression Mother         w/anxiety    Diabetes Mother         Mellitus    Breast cancer Mother     Hypertension Mother     No Known Problems Father     Depression Sister         w/anxiety    Heart disease Sister         Cardiac Disorder    Breast cancer Sister     Hypertension Sister     Diabetes Brother         Mellitus    Alcohol abuse Son      Mother and sister with hx of depression    History Review: The following portions of the patient's history were reviewed and updated as appropriate: allergies, current medications, past family history, past medical history, past social history, past surgical history and problem list        OBJECTIVE:     Vital signs in last 24 hours: There were no vitals filed for this visit      Mental Status Evaluation:    Appearance appears stated age and casually dressed   Behavior calm and cooperative   Speech normal rate and volume   Mood "good"   Affect constricted   Thought Processes organized, goal directed   Associations intact associations   Thought Content no overt delusions   Perceptual Disturbances: no auditory hallucinations, no visual hallucinations, does not appear responding to internal stimuli   Abnormal Thoughts  Risk Potential Denies suicidal or homicidal ideation, intent, or plan   Orientation oriented to person, place, time/date and situation   Memory recent and remote memory grossly intact   Consciousness alert and awake   Attention Span Concentration Span attention span and concentration are age appropriate   Intellect appears to be of average intelligence   Insight fair   Judgement fair   Muscle Strength and  Gait unable to assess today due to virtual visit   Motor activity unable to assess today due to virtual visit   Language no difficulty naming common objects, no difficulty repeating a phrase   Fund of Knowledge adequate knowledge of current events  adequate fund of knowledge regarding past history  adequate fund of knowledge regarding vocabulary        Laboratory Results: I have personally reviewed all pertinent laboratory/tests results    Recent Labs (last 6 months):   Orders Only on 08/27/2022   Component Date Value    Total Cholesterol 08/27/2022 156     HDL 08/27/2022 57     Triglycerides 08/27/2022 137     LDL Calculated 08/27/2022 77     Chol HDLC Ratio 08/27/2022 2 7     Non-HDL Cholesterol 08/27/2022 99     Glucose, Random 08/27/2022 107 (A)    BUN 08/27/2022 27 (A)    Creatinine 08/27/2022 0 77     eGFR 08/27/2022 76     SL AMB BUN/CREATININE RA* 08/27/2022 35 (A)    Sodium 08/27/2022 140     Potassium 08/27/2022 4 7     Chloride 08/27/2022 105     CO2 08/27/2022 30     Calcium 08/27/2022 9 7     Protein, Total 08/27/2022 6 3     Albumin 08/27/2022 4 1     Globulin 08/27/2022 2 2     Albumin/Globulin Ratio 08/27/2022 1 9     TOTAL BILIRUBIN 08/27/2022 0 4     Alkaline Phosphatase 08/27/2022 51     AST 08/27/2022 15     ALT 08/27/2022 14     Creatinine, Urine 08/27/2022 63     Microalbum  ,U,Random 08/27/2022 0 7     Microalb/Creat Ratio 08/27/2022 11     White Blood Cell Count 08/27/2022 4 8     Red Blood Cell Count 08/27/2022 4 35     Hemoglobin 08/27/2022 12 2     HCT 08/27/2022 38 9     MCV 08/27/2022 89 4     MCH 08/27/2022 28 0     MCHC 08/27/2022 31 4 (A)    RDW 08/27/2022 12 4     Platelet Count 00/46/4964 188     SL AMB MPV 08/27/2022 10 9     Neutrophils (Absolute) 08/27/2022 2,736     Lymphocytes (Absolute) 08/27/2022 1,502     Monocytes (Absolute) 08/27/2022 326     Eosinophils (Absolute) 08/27/2022 197     Basophils ABS 08/27/2022 38     Neutrophils 08/27/2022 57     Lymphocytes 08/27/2022 31 3     Monocytes 08/27/2022 6 8     Eosinophils 08/27/2022 4 1     Basophils PCT 08/27/2022 0 8     TSH W/RFX TO FREE T4 08/27/2022 2 02     Hemoglobin A1C 08/27/2022 6 3 (A)   Orders Only on 06/01/2022   Component Date Value    Right Eye Diabetic Retin* 06/01/2022 None     Left Eye Diabetic Retino* 06/01/2022 None    Orders Only on 05/04/2022   Component Date Value    Uric Acid 05/04/2022 4 0    Office Visit on 04/27/2022   Component Date Value    LEUKOCYTE ESTERASE,UA 04/27/2022 neg     NITRITE,UA 04/27/2022 neg     SL AMB POCT UROBILINOGEN 04/27/2022 0 2     POCT URINE PROTEIN 04/27/2022 neg      PH,UA 04/27/2022 5 0     BLOOD,UA 04/27/2022 moderate     SPECIFIC GRAVITY,UA 04/27/2022 1 030     KETONES,UA 04/27/2022 neg     BILIRUBIN,UA 04/27/2022 neg     GLUCOSE, UA 04/27/2022 neg      COLOR,UA 04/27/2022 yellow     CLARITY,UA 04/27/2022 clear    Orders Only on 04/25/2022   Component Date Value    Component 1 04/25/2022 Uric Acid Dihydrate 80% Calcium Oxalate Monohydrate (Whewellite) 20%     Stone Weight 04/25/2022 0 120    Office Visit on 04/25/2022   Component Date Value    Hemoglobin A1C 04/25/2022 6 7 (A)   Admission on 04/20/2022, Discharged on 04/21/2022   Component Date Value    WBC 04/20/2022 5 57     RBC 04/20/2022 4 13     Hemoglobin 04/20/2022 11 7     Hematocrit 04/20/2022 37 1     MCV 04/20/2022 90     [address] 04/20/2022 28 3     MCHC 04/20/2022 31 5     RDW 04/20/2022 14 0     MPV 04/20/2022 10 9     Platelets 48/13/3984 204     nRBC 04/20/2022 0     Neutrophils Relative 04/20/2022 64     Immat GRANS % 04/20/2022 0     Lymphocytes Relative 04/20/2022 25     Monocytes Relative 04/20/2022 7     Eosinophils Relative 04/20/2022 3     Basophils Relative 04/20/2022 1     Neutrophils Absolute 04/20/2022 3 57     Immature Grans Absolute 04/20/2022 0 02     Lymphocytes Absolute 04/20/2022 1 39     Monocytes Absolute 04/20/2022 0 40     Eosinophils Absolute 04/20/2022 0 16     Basophils Absolute 04/20/2022 0 03     Protime 04/20/2022 13 3     INR 04/20/2022 1 01     PTT 04/20/2022 29     Sodium 04/20/2022 138     Potassium 04/20/2022 4 0     Chloride 04/20/2022 102     CO2 04/20/2022 27     ANION GAP 04/20/2022 9     BUN 04/20/2022 30 (A)    Creatinine 04/20/2022 1 05     Glucose 04/20/2022 185 (A)    Calcium 04/20/2022 9 1     AST 04/20/2022 15     ALT 04/20/2022 25     Alkaline Phosphatase 04/20/2022 58     Total Protein 04/20/2022 7 2     Albumin 04/20/2022 3 8     Total Bilirubin 04/20/2022 0 28     eGFR 04/20/2022 49     ABO Grouping 04/20/2022 A     Rh Factor 04/20/2022 Positive     Antibody Screen 04/20/2022 Negative     Specimen Expiration Date 04/20/2022 [id number]     Total CK 04/20/2022 80     Color, UA 04/20/2022 Linda     Clarity, UA 04/20/2022 Cloudy     Specific Gravity, UA 04/20/2022 1 020     pH, UA 04/20/2022 5 5     Leukocytes, UA 04/20/2022 Elevated glucose may cause decreased leukocyte values   See urine microscopic for [facility] result/ (A)    Nitrite, UA 04/20/2022 Negative     Protein, UA 04/20/2022 30 (1+) (A)    Glucose, UA 04/20/2022 >=1000 (1%) (A)    Ketones, UA 04/20/2022 Negative     Urobilinogen, UA 04/20/2022 0 2     Bilirubin, UA 04/20/2022 Negative     Occult Blood, UA 04/20/2022 Large (A)    Urine Culture 04/20/2022 No Growth <1000 cfu/mL     RBC, UA 04/20/2022 Innumerable (A)    WBC, UA 04/20/2022 1-2     Epithelial Cells 04/20/2022 Occasional     Bacteria, UA 04/20/2022 Occasional     ABO Grouping 04/21/2022 A     Rh Factor 04/21/2022 Positive     POC Glucose 04/21/2022 90     Sodium 04/21/2022 138     Potassium 04/21/2022 4 3     Chloride 04/21/2022 105     CO2 04/21/2022 24     ANION GAP 04/21/2022 9     BUN 04/21/2022 25     Creatinine 04/21/2022 0 79     Glucose 04/21/2022 108     Glucose, Fasting 04/21/2022 108 (A)    Calcium 04/21/2022 8 8     eGFR 04/21/2022 69     WBC 04/21/2022 5 80     RBC 04/21/2022 4 25     Hemoglobin 04/21/2022 12 3     Hematocrit 04/21/2022 38 8     MCV 04/21/2022 91     MCH 04/21/2022 28 9     MCHC 04/21/2022 31 7     RDW 04/21/2022 13 8     MPV 04/21/2022 10 6     Platelets 37/48/3980 189     nRBC 04/21/2022 0     Neutrophils Relative 04/21/2022 65     Immat GRANS % 04/21/2022 0     Lymphocytes Relative 04/21/2022 23     Monocytes Relative 04/21/2022 8     Eosinophils Relative 04/21/2022 3     Basophils Relative 04/21/2022 1     Neutrophils Absolute 04/21/2022 3 76     Immature Grans Absolute 04/21/2022 0 02     Lymphocytes Absolute 04/21/2022 1 35     Monocytes Absolute 04/21/2022 0 46     Eosinophils Absolute 04/21/2022 0 17     Basophils Absolute 04/21/2022 0 04     POC Glucose 04/21/2022 101     Ventricular Rate 04/21/2022 72     Atrial Rate 04/21/2022 72     MT Interval 04/21/2022 162     QRSD Interval 04/21/2022 74     QT Interval 04/21/2022 372     QTC Interval 04/21/2022 407     P Axis 04/21/2022 55     QRS Axis 04/21/2022 4     T Wave Axis 04/21/2022 24        Suicide/Homicide Risk Assessment:    Risk of Harm to Self:  · The following ratings are based on assessment at the time of the interview and review of records  · Demographic risk factors include: ,  status, elderly (76 or older)   · Historical Risk Factors include: chronic depressive symptoms, chronic anxiety symptoms  · Recent Specific Risk Factors include: diagnosis of mood disorder, current depressive symptoms, current anxiety symptoms  · Protective Factors: no current suicidal ideation, access to mental health treatment, being a parent, compliant with medications, compliant with mental health treatment, connection to own children, good health, having a sense of purpose or meaning in life, supportive family, fears of illness and fear of death  · Weapons: none  The following steps have been taken to ensure weapons are properly secured: not applicable  · Based on today's assessment, Waldon Rinne presents the following risk of harm to self: low     Risk of Harm to Others:  · The following ratings are based on assessment at the time of the interview and review of records  · Demographic Risk Factors include: none  · Historical Risk Factors include: none  · Recent Specific Risk Factors include: concomitant mood disorder  · Protective Factors: no current homicidal ideation, access to mental health treatment, being , compliant with medications, compliant with mental health treatment, connection to own children, support system, supportive friends  · Weapons: none  The following steps have been taken to ensure weapons are properly secured: not applicable  · Based on today's assessment, Waldon Rinne presents the following risk of harm to others: low    The following interventions are recommended: no intervention changes needed      Lethality Statement:  Based on today's assessment and clinical criteria, Rose Gabriel contracts for safety and is not an imminent risk of harm to self or others  Outpatient level of care is deemed appropriate at this current time  Waldon Rinne understands that if they can no longer contract for safety, they need to call the office or report to their nearest Emergency Room for immediate evaluation  Assessment/Plan:     Rose Gabriel is a 78 y o    female, , lives with daughter, son-in-law, and 3 grandkids, retiredw/ PMH of diabetes, hyperlipidemia, hypertension and PPH of MDD, JONAH, one prior psychiatric admissions, no prior SA, who presented to the mental health clinic virtually for medication management  Pt has chronic hx of anxiety symptoms since childhood  Undergoing psychotherapy sessions  Still reports anxiety and depression at this time  Today's Plan:  After discussion with Lisseth Carver (daughter) and [facility], as well as discussion with attending psychiatrist, will increase Prozac from 60 mg to 80 mg daily for depression anxiety  Additionally, will decrease total gabapentin dose from 400 mg daily to 200 mg daily  Long-term plan is to gradually taper the patient from gabapentin and clonazepam while optimizing the dose of SSRI  Risks, side effects, and potential benefits discussed with patient and daughter, who verbalize understanding and agreement with plan  Encouraged patient to continue PT, get out of the house and spend time with family as much as possible  DSM-5 Diagnoses:   1  Generalized anxiety disorder    2  Illness anxiety disorder    3  Major depressive disorder, recurrent, moderate (HCC)    4  Somatic symptom disorder          Treatment Recommendations/Precautions:   Increase Prozac from 60 mg daily to 80 mg daily for mood symptoms    Decrease Neurontin to 100 mg twice daily (decrease in the total dose by half)   Continue Klonopin 0 5 mg BID for anxiety   Medication management follow up in 4 weeks   Continue medical management with PCP   Continue individual psychotherapy session biweekly   Aware of 24 hour and weekend coverage for urgent situations accessed by calling [facility] main practice number    Medications Risks/Benefits      Risks, Benefits And Possible Side Effects Of Medications:    Risks, benefits, and possible side effects of medications explained to [facility] and she verbalizes understanding and agreement for treatment      Prozac PARQ completed including serotonin syndrome, SIADH, worsening depression, suicidality, induction of eligio, GI upset, headaches, activation, sexual side effects, sedation, potential drug interactions, and others  Controlled Medication Discussion:     Demarcus Starr has been filling controlled prescriptions on time as prescribed according to [address] informed of the adverse risks and effects of chronic benzodiazepine use including: propensity for falls/falling, increased sedation and respiratory depression (particularly if administered in higher dose(s) than prescribed or if taken together with another sedating substance like alcohol, pain medication, etc ), as well as the risk for addiction and/or dependency (particularly if administered in increased frequency or in higher doses than prescribed) and withdrawal (abrupt cessation) including seizures and death  Demarcus Starr was informed to not drive or operate heavy machinery while taking benzodiazepines, as the medication can cause increased drowsiness  Patient verbalizes understanding  Psychotherapy Provided:     Individual psychotherapy provided: Yes  Medication changes discussed with Demarcus Starr  Medication education provided to Demarcus Starr  Educated on importance of medication and treatment compliance  Supportive therapy provided  Treatment Plan:    Completed and signed during the session: Not applicable - Treatment Plan not due at this session    Note Share Disclaimer: This note was not shared with the patient due to reasonable likelihood of causing patient harm       VIRTUAL VISIT [address] verbally agrees to participate in [employer]  Pt is aware that [employer] could be limited without vital signs or the ability to perform a full hands-on physical exam  Alek Dixon understands she or the provider may request at any time to terminate the video visit and request the patient to seek care or treatment in person      Shereen Zavala Diego Boyd MD 10/04/22

## 2022-10-06 ENCOUNTER — APPOINTMENT (OUTPATIENT)
Dept: PHYSICAL THERAPY | Facility: CLINIC | Age: 82
End: 2022-10-06

## 2022-10-07 ENCOUNTER — TELEMEDICINE (OUTPATIENT)
Dept: PSYCHIATRY | Facility: CLINIC | Age: 82
End: 2022-10-07

## 2022-10-07 DIAGNOSIS — F41.1 GENERALIZED ANXIETY DISORDER: Primary | ICD-10-CM

## 2022-10-07 DIAGNOSIS — F45.1 SOMATIC SYMPTOM DISORDER: ICD-10-CM

## 2022-10-07 DIAGNOSIS — F33.0 MAJOR DEPRESSIVE DISORDER, RECURRENT, MILD (HCC): ICD-10-CM

## 2022-10-07 PROCEDURE — NC001 PR NO CHARGE: Performed by: PSYCHIATRY & NEUROLOGY

## 2022-10-07 NOTE — PSYCH
Psychotherapy Provided: Individual Psychotherapy 45 minutes     Length of time in session: 45 minutes, follow up in 2 week    Encounter Diagnosis     ICD-10-CM    1  Generalized anxiety disorder  F41 1    2  Somatic symptom disorder  F45 1    3  Major depressive disorder, recurrent, mild (HCC)  F33 0        Goals addressed in session: Goal 1     Pain:    none  Current suicide risk : Low     Data: This is a 78year old female who is presenting for individual psychotherapy  Ramone Moreland says that she has an itchy scalp, which is bothering her  She thinks that it could be something serious  Tries to forget about it during the day  Still anxious about different things  Thought that Johana Pat is close to death and alive  Feels like others are judging her, such as focusing on her scalp without hair  Does not like to look at her mirror and does not like to take photos of herself as she thinks that she does not look attractive  Assessment:  78 YOF w/ PPH for MDD, JONAH, OCD, illness anxiety disorder v s somatic symptom disorder who is following up for individual psychotherapy session  Patient continues to express somatic-oriented anxious concerns  Concerned about how others perceive her based on her appearance  Internal worries are externalized to other's judgement and perception  CBT utilized  Encouraged to spend more time with family and to go outside of home as frequently as possible  Plan:  Continue individual therapy biweekly  Continue medication management with psychiatrist    [address]: Diagnosis and Treatment Plan explained to Cielo Restrepo relates understanding diagnosis and is agreeable to Treatment Plan   Yes     This note was not shared with the patient due to this is a psychotherapy note

## 2022-10-24 ENCOUNTER — OFFICE VISIT (OUTPATIENT)
Dept: FAMILY MEDICINE CLINIC | Facility: CLINIC | Age: 82
End: 2022-10-24
Payer: MEDICARE

## 2022-10-24 VITALS
OXYGEN SATURATION: 97 % | BODY MASS INDEX: 28.79 KG/M2 | WEIGHT: 128 LBS | HEIGHT: 56 IN | DIASTOLIC BLOOD PRESSURE: 78 MMHG | RESPIRATION RATE: 16 BRPM | HEART RATE: 80 BPM | SYSTOLIC BLOOD PRESSURE: 128 MMHG

## 2022-10-24 DIAGNOSIS — R23.8 DRY SCALP: Primary | ICD-10-CM

## 2022-10-24 DIAGNOSIS — F33.1 MAJOR DEPRESSIVE DISORDER, RECURRENT, MODERATE (HCC): ICD-10-CM

## 2022-10-24 DIAGNOSIS — I10 BENIGN ESSENTIAL HYPERTENSION: ICD-10-CM

## 2022-10-24 DIAGNOSIS — F41.1 GENERALIZED ANXIETY DISORDER: ICD-10-CM

## 2022-10-24 DIAGNOSIS — F45.21 ILLNESS ANXIETY DISORDER: ICD-10-CM

## 2022-10-24 PROCEDURE — 99213 OFFICE O/P EST LOW 20 MIN: CPT | Performed by: FAMILY MEDICINE

## 2022-10-24 RX ORDER — FLUOXETINE HYDROCHLORIDE 40 MG/1
80 CAPSULE ORAL DAILY
Qty: 60 CAPSULE | Refills: 2 | Status: SHIPPED | OUTPATIENT
Start: 2022-10-24 | End: 2022-12-23

## 2022-10-24 RX ORDER — CLOBETASOL PROPIONATE 0.05 G/100ML
1 SHAMPOO TOPICAL DAILY
Qty: 118 ML | Refills: 1 | Status: SHIPPED | OUTPATIENT
Start: 2022-10-24

## 2022-10-24 RX ORDER — LISINOPRIL AND HYDROCHLOROTHIAZIDE 12.5; 1 MG/1; MG/1
1 TABLET ORAL DAILY
Qty: 90 TABLET | Refills: 0 | Status: SHIPPED | OUTPATIENT
Start: 2022-10-24

## 2022-10-24 NOTE — PROGRESS NOTES
Subjective:      Patient ID: Rubin Jenkins is a 78 y o  female  57-year-old female presents with her daughter complaining of dry, itchy scalp  She did try using over-the-counter tinge of product without any relief  The itching will keep her up at night  No rash  Scalp is dry and flaky  She thought it may have been related to a side effect from 1 of her medications  Past Medical History:   Diagnosis Date   • Colon polyps    • Diabetes mellitus ([address] )    • Dizziness     Last assessed: 8/31/15   • DVT (deep venous thrombosis) ([facility])    • Dysuria    • Hematuria 2022   • Hyperlipidemia    • Hypertension    • Hypertensive urgency 10/22/2021   • Insomnia    • Ureterolithiasis 2020       Family History   Problem Relation Age of Onset   • Depression Mother         w/anxiety   • Diabetes Mother         Mellitus   • Breast cancer Mother    • Hypertension Mother    • No Known Problems Father    • Depression Sister         w/anxiety   • Heart disease Sister         Cardiac Disorder   • Breast cancer Sister    • Hypertension Sister    • Diabetes Brother         Mellitus   • Alcohol abuse Son        Past Surgical History:   Procedure Laterality Date   •  SECTION      1964 son, 26 daughter   • COLONOSCOPY     • FL RETROGRADE PYELOGRAM  2020   • ME COLONOSCOPY FLX DX W/COLLJ SPEC WHEN PFRMD N/A 3/27/2017    Procedure: COLONOSCOPY;  Surgeon: Cullen Yip MD;  Location: [facility]; Service: Gastroenterology   • ME CYSTO/URETERO W/LITHOTRIPSY &INDWELL STENT INSRT Right 2020    Procedure: CYSTOSCOPY URETEROSCOPY WITH LITHOTRIPSY HOLMIUM LASER, RETROGRADE PYELOGRAM AND INSERTION STENT URETERAL; EXCISION OF BLADDER TUMOR;  Surgeon: Deborah Mauricio MD;  Location: [facility];  Service: Urology   • ROTATOR CUFF REPAIR Left     Last assessed: 3/29/15   • TONSILLECTOMY          reports that she has never smoked   She has never used smokeless tobacco  She reports that she does not drink alcohol and does not use drugs  Current Outpatient Medications:   •  aspirin 81 MG tablet, Take 81 mg by mouth daily, Disp: , Rfl:   •  atorvastatin (LIPITOR) 10 mg tablet, Take 1 tablet (10 mg total) by mouth daily, Disp: 90 tablet, Rfl: 0  •  cholecalciferol (VITAMIN D3) 1,000 units tablet, Take 1,000 Units by mouth daily, Disp: , Rfl:   •  clobetasol propionate (CLOBEX) 0 05 % shampoo, Apply 1 application topically daily, Disp: 118 mL, Rfl: 1  •  clonazePAM (KlonoPIN) 0 5 mg tablet, Take 1 tablet (0 5 mg total) by mouth 2 (two) times a day, Disp: 60 tablet, Rfl: 0  •  docusate sodium (COLACE) 100 mg capsule, Take 100 mg by mouth 2 (two) times a day, Disp: , Rfl:   •  FLUoxetine (PROzac) 40 MG capsule, Take 1 capsule (40 mg total) by mouth daily (Patient taking differently: Take 40 mg by mouth 2 (two) times a day), Disp: 30 capsule, Rfl: 1  •  gabapentin (Neurontin) 100 mg capsule, Take 2 capsule (200 mg total) in the morning, 1 capsule (100 mg total)  in the afternoon, and 2 capsules (200 mg total) at night time   (Patient taking differently: 2 (two) times a day Take 1 capsule (100 mg total) in the morning, 1 capsule (100 mg total)  in the afternoon,), Disp: 180 capsule, Rfl: 1  •  linaCLOtide 145 MCG CAPS, Take 1 capsule (145 mcg total) by mouth in the morning, Disp: 90 capsule, Rfl: 1  •  lisinopril-hydrochlorothiazide (PRINZIDE,ZESTORETIC) 10-12 5 MG per tablet, TAKE ONE TABLET BY MOUTH EVERY DAY, Disp: 90 tablet, Rfl: 1  •  metFORMIN (GLUCOPHAGE) 500 mg tablet, TAKE ONE TABLET BY MOUTH TWICE A DAY WITH MEALS, Disp: 180 tablet, Rfl: 0  •  metFORMIN (GLUCOPHAGE) 500 mg tablet, Take 1 tablet (500 mg total) by mouth 2 (two) times a day with meals, Disp: 180 tablet, Rfl: 0    The following portions of the patient's history were reviewed and updated as appropriate: allergies, current medications, past family history, past medical history, past social history, past surgical history and problem list     Review of Systems Constitutional: Negative  Skin: Positive for rash (Dry and itchy scalp)  Objective:    /78   Pulse 80   Resp 16   Ht 4' 8" (1 422 m)   Wt 58 1 kg (128 lb)   SpO2 97%   BMI 28 70 kg/m²      Physical Exam  Vitals and nursing note reviewed  Constitutional:       Appearance: Normal appearance  She is normal weight  Skin:     Comments: Some scaling  No fluorescence with black light of the scalp   Neurological:      Mental Status: She is alert  No results found for this or any previous visit (from the past 1008 hour(s))  Assessment/Plan:    Dry scalp  Mild seborrheic dermatitis of the scalp    -clobetasol shampoo prescribed and instructed to use once daily  Can also apply scalpicin ointment          Problem List Items Addressed This Visit        Other    Dry scalp - Primary     Mild seborrheic dermatitis of the scalp    -clobetasol shampoo prescribed and instructed to use once daily    Can also apply scalpicin ointment         Relevant Medications    clobetasol propionate (CLOBEX) 0 05 % shampoo

## 2022-10-24 NOTE — ASSESSMENT & PLAN NOTE
Mild seborrheic dermatitis of the scalp    -clobetasol shampoo prescribed and instructed to use once daily    Can also apply scalpicin ointment

## 2022-10-28 ENCOUNTER — TELEMEDICINE (OUTPATIENT)
Dept: PSYCHIATRY | Facility: CLINIC | Age: 82
End: 2022-10-28

## 2022-10-28 DIAGNOSIS — F41.1 GENERALIZED ANXIETY DISORDER: Primary | ICD-10-CM

## 2022-10-28 DIAGNOSIS — F33.0 MAJOR DEPRESSIVE DISORDER, RECURRENT, MILD (HCC): ICD-10-CM

## 2022-10-28 DIAGNOSIS — F45.1 SOMATIC SYMPTOM DISORDER: ICD-10-CM

## 2022-10-28 PROCEDURE — NC001 PR NO CHARGE: Performed by: PSYCHIATRY & NEUROLOGY

## 2022-10-28 NOTE — PSYCH
Psychotherapy Provided: Individual Psychotherapy 50 minutes     Length of time in session: 50 minutes, follow up in 2 week    Encounter Diagnosis     ICD-10-CM    1  Generalized anxiety disorder  F41 1    2  Somatic symptom disorder  F45 1    3  Major depressive disorder, recurrent, mild (HCC)  F33 0      Goals addressed in session: Goal 1     Pain:    none  0  Current suicide risk : Low     Data:  78year old female who is presenting for individual psychotherapy  She notes that she went to PCP office regarding her scalp itching and was given a shampoo  She notes that she made comment as she was leaving PCP office that daughter's mother in law came to [place] to die which led to argument on the way back home with daughter  She still struggles with negative thoughts and preoccupation with physical symptoms thinking that something seriously is wrong with her  Hs thoughts that other people are judging her as "crazy"  She notes that she is still the same person with anxiety issues now and when she was younger  During conversation she says that she gets jealous when others like her daughter are not taking care of others and caring for others  She says that the she has thoughts that the in-laws came on purpose to their household to shift her daughter away from her  Assessment:  78 YOF w/ PPH for MDD, JONAH, OCD, illness anxiety disorder v s somatic symptom disorder who is following up for individual psychotherapy session  Exhibits similar recurrent negative thinking patterns  No SI/HI  There appears to be an element of learned helplessness  Will continue with CBT and focused homework/assignemtns  Plan:  Continue individual psychotherapy  Continue psychiatric follow up    [address]: Diagnosis and Treatment Plan explained to Chente Gardiner relates understanding diagnosis and is agreeable to Treatment Plan   Yes     This note was not shared with the patient due to this is a psychotherapy note

## 2022-10-31 ENCOUNTER — EVALUATION (OUTPATIENT)
Dept: PHYSICAL THERAPY | Facility: CLINIC | Age: 82
End: 2022-10-31

## 2022-10-31 DIAGNOSIS — R26.89 BALANCE PROBLEM: Primary | ICD-10-CM

## 2022-10-31 NOTE — PROGRESS NOTES
PT Re-Evaluation     Today's date: 10/31/2022  Patient name: Teresa Méndez  : 1940  MRN: [id number]  Referring provider: Donna Montano DO  Dx:   Encounter Diagnosis     ICD-10-CM    1  Balance problem  R26 89                   Assessment  Assessment details: Patient demonstrates improved sit to stand test and improved TUG score as compared to initial evaluation  Patient demonstrates mild improvement in bilateral LE strength since initial evaluation  Patient states she wishes to continue exercises independently at home at this time  Patient will be discharged from PT at this time with recommendation to continue with HEP activities  Goals  STG:  Decrease TUG > 3 seconds - met  Increase LE strength 1/2 grade - partially met    LTG:  Improve 2 min walk test > 50 feet - not met  Increase LE strength 1 grade - not met  Increase FOTO to expected score - NT  Pt will ambulate outdoors without fear or difficulty - not met  Pt will demonstrate safe/appropriate use of SPC - met    Plan  Planned therapy interventions: home exercise program        Subjective Evaluation    History of Present Illness  Mechanism of injury: Patient states she feels approximately 10% improved since beginning PT treatment  Patient states she has not had any recent falls, but states she continues to feel unsteady during ambulation  Patient states she is fearful of walking to her mailbox at her home           Objective     Functional Assessment        Comments  Strength/Myotome Testing      Left Hip   Planes of Motion   Flexion: 4     Right Hip   Planes of Motion   Flexion: 4     Left Knee   Flexion: 4+  Extension: 4+     Right Knee   Flexion: 4+  Extension: 4+     Left Ankle/Foot   Dorsiflexion: 4+     Right Ankle/Foot   Dorsiflexion: 4+       Functional outcomes   5x sit to stand: 14 95 (seconds)  2 minute walk test: 325 feet ( 2 laps ) no AD  TU 46 (seconds)            Precautions:      Manuals 8/30 9/1 9/6 9/14 9/26 9/29 10/3  10/31                                                        Re-eval                KK                               Neuro Re-Ed                       Step ups to foam NV   10 ea green foam 10 ea green foam  10 ea green foam 10 ea green foam biodex foam x10  biodex foam x10       SLS                       Side step foam beam NV   3 laps 3 laps 3 laps 3 laps  x3 laps   3 laps       Tandem walk foam beam  NV       2 laps 2 laps           FRANKS balance test  NV                                                                     Ther Ex                       nustep NV 5' lvl 2  5' lv 2 5' lv 2 5' lv 2 5' lv 2 5' lvl 2   np       Marches at bar NV 2x10 2x10 ea 2x10 ea 2x10 ea 2x10 ea x10 1 5#   x20 2 5#        Standing hip abd NV 2x10 2x10 ea 2x10 ea 2x10 ea 2x10 ea x10 1 5#  x20 2 5#        Standing hip ext NV 2x10 2x10 ea 2x10 ea 2x10 ea 2x10 ea x10 1 5#  x20 2 5#        Mini squats  NV x10 2x10 Leg press NV  2x10 Leg press-unable; squats 2x10          Heel/toe raises  NV x10 ea  15 ea 20 ea 20 ea 20 ea 20x ea   20x ea       Knee ext machine           11# x10           Ham curl machine                        TB side step                       Ther Activity                       Step up and overs                       Sit to stands from chair with foam   NV x10 x10 blk foam 10  10           Stepping over hurdles fwd/lateral    NV 3 laps ea 3 laps ea W/ foams 2 laps ea w/ foams 2 laps ea NV  np       Retro walks tony                        Gait tx with 2 canes for arms    5' 3'                                         Gait Training                       Gait tx with SPC (in right hand)  8' 10' 5', no cane, heel/toe

## 2022-11-07 DIAGNOSIS — F41.1 GENERALIZED ANXIETY DISORDER: ICD-10-CM

## 2022-11-07 RX ORDER — CLONAZEPAM 0.5 MG/1
0.5 TABLET ORAL 2 TIMES DAILY
Qty: 60 TABLET | Refills: 0 | Status: SHIPPED | OUTPATIENT
Start: 2022-11-07 | End: 2022-12-07

## 2022-11-08 ENCOUNTER — TELEMEDICINE (OUTPATIENT)
Dept: PSYCHIATRY | Facility: CLINIC | Age: 82
End: 2022-11-08

## 2022-11-08 DIAGNOSIS — F33.0 MAJOR DEPRESSIVE DISORDER, RECURRENT, MILD (HCC): ICD-10-CM

## 2022-11-08 DIAGNOSIS — F41.1 GENERALIZED ANXIETY DISORDER: Primary | ICD-10-CM

## 2022-11-08 DIAGNOSIS — F45.1 SOMATIC SYMPTOM DISORDER: ICD-10-CM

## 2022-11-08 NOTE — PSYCH
MEDICATION MANAGEMENT NOTE - VIRTUAL        [facility]      Name and Date of Birth:  Sabina Collier 78 y o  1940 MRN: [id number]    Date of Visit: November 8, 2022    Visit Time    Visit Start Time: 8:50 AM  Visit Stop Time: 10:00 AM  Total Visit Duration: 70 minutes    Virtual Regular Visit    Verification of patient location:    Patient is located in the following state in which I hold an active license [place]    Encounter provider Shiva Dotson MD    Provider located at [address] [place] [id number] [phone]    Recent Visits  No visits were found meeting these conditions  Showing recent visits within past 7 days and meeting all other requirements  Today's Visits  Date Type Provider Dept   11/08/22 Telemedicine MD [facility] today's visits and meeting all other requirements  Future Appointments  No visits were found meeting these conditions  Showing future appointments within next 150 days and meeting all other requirements    The patient was identified by name and date of birth  Sabina Collier was informed that this is a telemedicine visit and that the visit is being conducted throughthe e-channel platform  She agrees to proceed     My office door was closed  The patient was notified the following individuals were present in the room Dr Ilan Garcia  She acknowledged consent and understanding of privacy and security of the video platform  The patient has agreed to participate and understands they can discontinue the visit at any time  Patient is aware this is a billable service         Reason for Visit: Follow-up visit for medication management     SUBJECTIVE:    Sabina Collier is a 78 y o  female with past psychiatric history significant for JONAH, Somatic Symptom Disorder, Depression,  who was personally seen and evaluated today at the [address] [place] [address] E outpatient clinic for follow-up and medication management  With regards to current mood symptoms, she continues to experience preoccupation with somatic symptoms, such as bowel movement  She note that she thinks about bowel movement until she has had a bowel movement  She says that her daughter Jose E Warren is "ready to throw me out" with regards to Sandra's complaints about anxiety issues and concerns about Janay's father in law  She reports constant arguing and fighting with each other  She reports other somatic complaints such as scalp itching which she has brought up in the past and how she feels being judged negatively by her PCP for coming to the office with these issues  Anxiety seems to be situational with regards to somatic symptoms, as it worsens when she thinks about various somatic issues and life in general  Overall, has not had worsening of her anxiety symptoms and seems to not have changed over the years  However, notes that she is able to do her tasks around the house without being prompted by her daughter  Denies SI/HI  Denies AVH  Denies medication SE  Current Rating Scores:     Current PHQ-9   PHQ-2/9 Depression Screening    Little interest or pleasure in doing things: 3 - nearly every day  Feeling down, depressed, or hopeless: 1 - several days  Trouble falling or staying asleep, or sleeping too much: 1 - several days  Feeling tired or having little energy: 2 - more than half the days  Poor appetite or overeatin - not at all  Feeling bad about yourself - or that you are a failure or have let yourself or your family down: 2 - more than half the days  Trouble concentrating on things, such as reading the newspaper or watching television: 0 - not at all  Moving or speaking so slowly that other people could have noticed   Or the opposite - being so fidgety or restless that you have been moving around a lot more than usual: 0 - not at all  Thoughts that you would be better off dead, or of hurting yourself in some way: 0 - not at all  PHQ-9 Score: 9   PHQ-9 Interpretation: Mild depression        Current JONAH-7 is   JONAH-7 Flowsheet Screening    Flowsheet Row Most Recent Value   Over the last 2 weeks, how often have you been bothered by any of the following problems? Feeling nervous, anxious, or on edge 2   Not being able to stop or control worrying 3   Worrying too much about different things 3   Trouble relaxing 3   Being so restless that it is hard to sit still 1   Becoming easily annoyed or irritable 1   Feeling afraid as if something awful might happen 3   JONAH-7 Total Score 16        Review Of Systems:      Constitutional negative   ENT negative   Cardiovascular negative   Respiratory negative   Gastrointestinal negative   Genitourinary negative   Musculoskeletal negative   Integumentary negative   Neurological negative   Endocrine negative   Other Symptoms none, all other systems are negative       Past Psychiatric History: (unchanged information from previous note copied and italicized) - Information that is bolded has been updated  Past diagnoses: MDD, Anxiety, OCD  Inpatient psychiatric admissions: one previous admission for 3 weeks at [facility] in 2013 for panic attack after surgery  Prior outpatient psychiatric linkage: Dr Levi Son until transition of care  Past/current psychotherapy: sees Dr Vicki Pantoja for the past 2-3 years  History of suicidal attempts/gestures: denies  History of violence/aggressive behaviors: denies  Psychotropic medication trials: Effexor, Zoloft, Prozac, Lexapro, Buspar, Klonopin, Prasozin (syncopy?)  Substance abuse inpatient/outpatient rehabilitation: denies     Substance Abuse History: (unchanged information from previous note copied and italicized) - Information that is bolded has been updated  Denies history of alcohol, illict substance, or tobacco abuse   Denies past legal actions or arrests secondary to substance intoxication  The patient denies prior DWIs/DUIs  Cordero Breath does not exhibit objective evidence of substance withdrawal during today's examination nor does Cordero Breath appear under the influence of any psychoactive substance  Social History: (unchanged information from previous note copied and italicized) - Information that is bolded has been updated  Grew up in [place], [place]  Developmental: Denies a history of milestone/developmental delay  Denies a history of in-utero exposure to toxins/illicit substances  There is no documented history of IEP or need for special education  Education: Bachelor of Arts  Marital history:  since 2006 since   from cancer  Living arrangement, social support: lives with daughter, son in law, and 3 grandkids in [place], [place]  Social support includes brother, daughter, S-I-L, and 3 grandkids  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  Has pension and social security  Access to firearms: Denies direct access to weapons/firearms  Sandra Peña has no history of arrests or violence with a deadly weapon  Traumatic History: (unchanged information from previous note copied and italicized) - Information that is bolded has been updated  Abuse: denies  Other Traumatic Events: other traumatic events: denies    Past Medical History:    Past Medical History:   Diagnosis Date   • Colon polyps    • Diabetes mellitus ([facility] [address] [facility] )    • Dizziness     Last assessed: 8/31/15   • [facility] thrombosis[facility]    • Dysuria    • Hematuria 2022   • Hyperlipidemia    • Hypertension    • Hypertensive urgency 10/22/2021   • Insomnia    • Ureterolithiasis 2020        Past Surgical History:   Procedure Laterality Date   •  SECTION      1964 son, 26 daughter   • COLONOSCOPY     • FL RETROGRADE PYELOGRAM  2020   • WA COLONOSCOPY FLX DX W/COLLJ SPEC WHEN PFRMD N/A 3/27/2017    Procedure: COLONOSCOPY;  Surgeon: Mauri Gaspar MD;  Location: [facility];   Service: Gastroenterology   • AK CYSTO/URETERO W/LITHOTRIPSY &INDWELL STENT INSRT Right 5/22/2020    Procedure: CYSTOSCOPY URETEROSCOPY WITH LITHOTRIPSY HOLMIUM LASER, RETROGRADE PYELOGRAM AND INSERTION STENT URETERAL; EXCISION OF BLADDER TUMOR;  Surgeon: Lynette Osman MD;  Location: [facility];  Service: Urology   • ROTATOR CUFF REPAIR Left     Last assessed: 3/29/15   • TONSILLECTOMY       No Known Allergies      Family Psychiatric History:     Family History   Problem Relation Age of Onset   • Depression Mother         w/anxiety   • Diabetes Mother         Mellitus   • Breast cancer Mother    • Hypertension Mother    • No Known Problems Father    • Depression Sister         w/anxiety   • Heart disease Sister         Cardiac Disorder   • Breast cancer Sister    • Hypertension Sister    • Diabetes Brother         Mellitus   • Alcohol abuse Son      Mother - Depression   Sister - Depression    History Review: The following portions of the patient's history were reviewed and updated as appropriate: allergies, current medications, past family history, past medical history, past social history, past surgical history and problem list          OBJECTIVE:     Vital signs in last 24 hours: There were no vitals filed for this visit      Mental Status Evaluation:    Appearance appears stated age and casually dressed   Behavior calm and cooperative   Speech normal rate, normal volume, normal pitch   Mood "ok"   Affect normal range and intensity, appropriate   Thought Processes organized, goal directed   Associations intact associations   Thought Content no overt delusions   Perceptual Disturbances: no auditory hallucinations, no visual hallucinations, does not appear responding to internal stimuli   Abnormal Thoughts  Risk Potential Denies suicidal or homicidal ideation, intent, or plan   Orientation oriented to person, place, time/date and situation   Memory recent and remote memory grossly intact   Consciousness alert and awake Attention Span Concentration Span attention span and concentration are age appropriate   Intellect appears to be of average intelligence   Insight fair   Judgement fair   Muscle Strength and  Gait unable to assess today due to virtual visit   Motor activity unable to assess today due to virtual visit   Language no difficulty naming common objects, no difficulty repeating a phrase   Fund of Knowledge adequate knowledge of current events  adequate fund of knowledge regarding past history  adequate fund of knowledge regarding vocabulary        Laboratory Results: I have personally reviewed all pertinent laboratory/tests results    Recent Labs (last 6 months):   Orders Only on 08/27/2022   Component Date Value   • Total Cholesterol 08/27/2022 156    • HDL 08/27/2022 57    • Triglycerides 08/27/2022 137    • LDL Calculated 08/27/2022 77    • Chol HDLC Ratio 08/27/2022 2 7    • Non-HDL Cholesterol 08/27/2022 99    • Glucose, Random 08/27/2022 107 (A)   • BUN 08/27/2022 27 (A)   • Creatinine 08/27/2022 0 77    • eGFR 08/27/2022 76    • SL AMB BUN/CREATININE RA* 08/27/2022 35 (A)   • Sodium 08/27/2022 140    • Potassium 08/27/2022 4 7    • Chloride 08/27/2022 105    • CO2 08/27/2022 30    • Calcium 08/27/2022 9 7    • Protein, Total 08/27/2022 6 3    • Albumin 08/27/2022 4 1    • Globulin 08/27/2022 2 2    • Albumin/Globulin Ratio 08/27/2022 1 9    • TOTAL BILIRUBIN 08/27/2022 0 4    • Alkaline Phosphatase 08/27/2022 51    • AST 08/27/2022 15    • ALT 08/27/2022 14    • Creatinine, Urine 08/27/2022 63    • Microalbum  ,U,Random 08/27/2022 0 7    • Microalb/Creat Ratio 08/27/2022 11    • White Blood Cell Count 08/27/2022 4 8    • Red Blood Cell Count 08/27/2022 4 35    • Hemoglobin 08/27/2022 12 2    • HCT 08/27/2022 38 9    • MCV 08/27/2022 89 4    • MCH 08/27/2022 28 0    • MCHC 08/27/2022 31 4 (A)   • RDW 08/27/2022 12 4    • Platelet Count 60/96/7462 188    • SL AMB MPV 08/27/2022 10 9    • Neutrophils (Absolute) 08/27/2022 2,736    • Lymphocytes (Absolute) 08/27/2022 1,502    • Monocytes (Absolute) 08/27/2022 326    • Eosinophils (Absolute) 08/27/2022 197    • Basophils ABS 08/27/2022 38    • Neutrophils 08/27/2022 57    • Lymphocytes 08/27/2022 31 3    • Monocytes 08/27/2022 6 8    • Eosinophils 08/27/2022 4 1    • Basophils PCT 08/27/2022 0 8    • TSH W/RFX TO FREE T4 08/27/2022 2 02    • Hemoglobin A1C 08/27/2022 6 3 (A)   Orders Only on 06/01/2022   Component Date Value   • Right Eye Diabetic Retin* 06/01/2022 None    • Left Eye Diabetic Retino* 06/01/2022 None        Suicide/Homicide Risk Assessment:  Risks Reviewed  Risk of Harm to Self:  ·           The following ratings are based on assessment at the time of the interview and review of records  ·           Demographic risk factors include: ,  status, elderly (76 or older)   ·           Historical Risk Factors include: chronic depressive symptoms, chronic anxiety symptoms  ·           Recent Specific Risk Factors include: diagnosis of mood disorder, current depressive symptoms, current anxiety symptoms  ·           Protective Factors: no current suicidal ideation, access to mental health treatment, being a parent, compliant with medications, compliant with mental health treatment, connection to own children, good health, having a sense of purpose or meaning in life, supportive family, fears of illness and fear of death  ·           Weapons: none  The following steps have been taken to ensure weapons are properly secured: not applicable  ·           Based on today's assessment, [facility] presents the following risk of harm to self: low     Risk of Harm to Others:  ·           The following ratings are based on assessment at the time of the interview and review of records  ·           Demographic Risk Factors include: none  ·           Historical Risk Factors include: none  ·           Recent Specific Risk Factors include: concomitant mood disorder    · Protective Factors: no current homicidal ideation, access to mental health treatment, being , compliant with medications, compliant with mental health treatment, connection to own children, support system, supportive friends  ·           Weapons: none  The following steps have been taken to ensure weapons are properly secured: not applicable  ·           Based on today's assessment, Maryan Tanner presents the following risk of harm to others: low    The following interventions are recommended: no intervention changes needed      Lethality Statement:  Based on today's assessment and clinical criteria, Allyson Reed contracts for safety and is not an imminent risk of harm to self or others  Outpatient level of care is deemed appropriate at this current time  Maryan Tanner understands that if they can no longer contract for safety, they need to call the office or report to their nearest Emergency Room for immediate evaluation  Assessment/Plan:     Allyson Reed is a 78 y o   female, , lives with daughter, son-in-law, and 3 grandkids, retiredw/ PMH of diabetes, hyperlipidemia, hypertension and PPH of MDD, JONHA, one prior psychiatric admissions, no prior SA, who presented to the mental health clinic virtually for medication management  Pt has chronic hx of anxiety symptoms since childhood which have not seemed to change over the years  Currently participating in individual psychotherapy sessions every 2 weeks  At this time will continue the current Prozac dose for at least another 4 weeks and re-evaluate  If there is still no progress, will consider starting augmentation regimen  Today's Plan: Will continue current mediation regimen consisting of Prozac 80 mg daily, Klonopin 0 5 mg bid, and Neurontin 100 mg twice daily  DSM-5 Diagnoses:   1  Generalized anxiety disorder    2  Somatic symptom disorder    3   Major depressive disorder, recurrent, mild [facility]        Treatment Recommendations/Precautions:  • Continue Prozac 80 mg daily for mood symptoms  • Continue Neurontin 100 mg bid for anxiety  • Continue Klonopin 0 5 mg bid for anxiety  • Medication management follow up in 4-6 weeks  • Continue medical management with PCP  • Continue psychotherapy with own therapist  • Aware of 24 hour and weekend coverage for urgent situations accessed by calling [facility] main practice number    Medications Risks/Benefits      Risks, Benefits And Possible Side Effects Of Medications:    Risks, benefits, and possible side effects of medications explained to Jass Donahue and she verbalizes understanding and agreement for treatment  Prozac risks, possible SE discussed, including serotonin syndrome, SIADH, worsening depression, suicidality, induction of eligio, GI upset, headaches, activation, sexual side effects, sedation, potential drug interactions, and others  Controlled Medication Discussion:     Jass Donahue has been filling controlled prescriptions on time as prescribed according to [address] informed of the adverse risks and effects of chronic benzodiazepine use including: propensity for falls/falling, increased sedation and respiratory depression (particularly if administered in higher dose(s) than prescribed or if taken together with another sedating substance like alcohol, pain medication, etc ), as well as the risk for addiction and/or dependency (particularly if administered in increased frequency or in higher doses than prescribed) and withdrawal (abrupt cessation) including seizures and death  Jass Donahue was informed to not drive or operate heavy machinery while taking benzodiazepines, as the medication can cause increased drowsiness  Patient verbalizes understanding  Psychotherapy Provided:     Individual psychotherapy provided: Yes  Medication changes discussed with Jass Donahue  Medication education provided to Jassalbin Donahue    Goals discussed during in session: control anxiety  Supportive therapy provided  Treatment Plan:    Completed and signed during the session: Not applicable - Treatment Plan not due at this session    Note Share Disclaimer: This note was not shared with the patient due to privacy exception: note includes other individuals    VIRTUAL VISIT [facility] verbally agrees to participate in [employer]  Pt is aware that [employer] could be limited without vital signs or the ability to perform a full hands-on physical exam  Star Patee understands she or the provider may request at any time to terminate the video visit and request the patient to seek care or treatment in person       Amy Morton MD 11/08/22

## 2022-11-11 ENCOUNTER — TELEMEDICINE (OUTPATIENT)
Dept: PSYCHIATRY | Facility: CLINIC | Age: 82
End: 2022-11-11

## 2022-11-11 DIAGNOSIS — F41.1 GENERALIZED ANXIETY DISORDER: Primary | ICD-10-CM

## 2022-11-11 DIAGNOSIS — F45.1 SOMATIC SYMPTOM DISORDER: ICD-10-CM

## 2022-11-11 DIAGNOSIS — F33.0 MAJOR DEPRESSIVE DISORDER, RECURRENT, MILD (HCC): ICD-10-CM

## 2022-11-11 NOTE — PSYCH
Psychotherapy Provided: Individual Psychotherapy 55 minutes     Length of time in session: 55 minutes, follow up in 4 weeks    Encounter Diagnosis     ICD-10-CM    1  Generalized anxiety disorder  F41 1    2  Somatic symptom disorder  F45 1    3  Major depressive disorder, recurrent, mild (HCC)  F33 0        Goals addressed in session: Goal 1     Pain:      none    0    Current suicide risk : Low     Data: This is a 78year old female with hx of JONAH, illness anxiety disorder, and Depression, presenting for individual psychotherapy  Patient and daughter Amada Anderson state that the patient has been feeling depressed recently  Marine Jalyn says that the still has anxiety symptoms with regard to somatic symptoms which make her anxious despite reassurance from multiple sources  She once again speaks of how she cannot comprehend how her daughter' MIL decided to show up to their home and move in with them  She continues to talk about how her PCP thinks negative of her whenever she goes for a visit  As discussed previously she has done the task of making a list of what others have done for her and what she has done for others recently  Assessment:  78 YOF presenting with long-term history of JONAH, illness anxiety disorder, and depression  Ongoing symptoms of anxiety  CBT utilized during session with various effect, though effect is temporary  Encourage patient to journal her feels with respect to what is making her anxious and to use mindfulness techniques  Plan:  Continue individual psychotherapy  Continue outpatient psychiatric follow up    [address]: Diagnosis and Treatment Plan explained to Vito Vazquez relates understanding diagnosis and is agreeable to Treatment Plan   Yes     This note was not shared with the patient due to this is a psychotherapy note    11/15/22

## 2022-12-05 DIAGNOSIS — F41.1 GENERALIZED ANXIETY DISORDER: ICD-10-CM

## 2022-12-05 RX ORDER — CLONAZEPAM 0.5 MG/1
0.5 TABLET ORAL 2 TIMES DAILY
Qty: 60 TABLET | Refills: 0 | Status: SHIPPED | OUTPATIENT
Start: 2022-12-05 | End: 2023-01-04

## 2022-12-09 ENCOUNTER — TELEMEDICINE (OUTPATIENT)
Dept: PSYCHIATRY | Facility: CLINIC | Age: 82
End: 2022-12-09

## 2022-12-09 DIAGNOSIS — F45.1 SOMATIC SYMPTOM DISORDER: ICD-10-CM

## 2022-12-09 DIAGNOSIS — F41.1 GENERALIZED ANXIETY DISORDER: Primary | ICD-10-CM

## 2022-12-09 DIAGNOSIS — F33.0 MAJOR DEPRESSIVE DISORDER, RECURRENT, MILD (HCC): ICD-10-CM

## 2022-12-09 NOTE — PSYCH
Psychotherapy Provided: Individual Psychotherapy 50 minutes     Length of time in session: 50 minutes, follow up in 2-4 weeks    Encounter Diagnosis     ICD-10-CM    1  Generalized anxiety disorder  F41 1       2  Somatic symptom disorder  F45 1       3  Major depressive disorder, recurrent, mild (HCC)  F33 0         Goals addressed in session: Goal 1     Pain:      none    0    Current suicide risk : Low     Data: 78year old female presenting for psychotherapy  Patient anxious about having BM now and taking Linzess, and having regular daily bowel movements  She notes that when she wakes up in the morning, she has urge to go to the bathroom  She wants constant reassurance and know what is going on regarding her bowel movements  Continues to be preoccupied with these issues  She also states that she feels jealous of her daughter's father in law, because he does things "like nothing"  She has fear that Zakia Morales came in the first place  She states that she is "jealous" that Zulema Faith is doing things by herself and not around to be available for her  Wants Janay's BEN to be gone because she wants her "privacy again " Janay's BEN tries to help Lois Reyna, helps around the house  Offered Lois Sirjaxson once basement is done, to have see at his place  She told him that she does not like to go down the stairs  She says that she was always worried about herself  She was happy when she started having regular BM, but now is a source of worries  Assessment: 78 YOF presenting with long-term history of JONAH, illness anxiety disorder, and Depression  Preoccupation with bowel movements have been ongoing, which is causing anxiety  Other source of anxiety include preoccupation with daughter's BEN being present  There seems to be underlying element of jealousy with daughter  Task: Write down what would happen if you don't go to the bathroom and what would happen if you do go to the bathroom       Plan:  Continue psychotherapy   Continue medication management    Behavioral Health Treatment Plan [facility]: Diagnosis and Treatment Plan explained to Donah Peabody relates understanding diagnosis and is agreeable to Treatment Plan   Yes     Visit start and stop times:    12/09/22

## 2022-12-20 ENCOUNTER — TELEMEDICINE (OUTPATIENT)
Dept: PSYCHIATRY | Facility: CLINIC | Age: 82
End: 2022-12-20

## 2022-12-20 DIAGNOSIS — F45.1 SOMATIC SYMPTOM DISORDER: ICD-10-CM

## 2022-12-20 DIAGNOSIS — F33.1 MAJOR DEPRESSIVE DISORDER, RECURRENT, MODERATE (HCC): ICD-10-CM

## 2022-12-20 DIAGNOSIS — F41.1 GENERALIZED ANXIETY DISORDER: Primary | ICD-10-CM

## 2022-12-20 DIAGNOSIS — F45.21 ILLNESS ANXIETY DISORDER: ICD-10-CM

## 2022-12-20 DIAGNOSIS — F33.0 MAJOR DEPRESSIVE DISORDER, RECURRENT, MILD (HCC): ICD-10-CM

## 2022-12-20 NOTE — PSYCH
MEDICATION MANAGEMENT NOTE - VIRTUAL        [facility]      Name and Date of Birth:  Epifanio Robbins 78 y o  1940 MRN: [id number]    Date of Visit: December 20, 2022    Virtual Regular Visit    Verification of patient location:    Patient is located in the following state in which I hold an active license [place]    Encounter provider Yuriy Falcon MD    Provider located at [address] [place], [place] [id number] [phone]    Recent Visits  No visits were found meeting these conditions  Showing recent visits within past 7 days and meeting all other requirements  Future Appointments  No visits were found meeting these conditions  Showing future appointments within next 150 days and meeting all other requirements      The patient was identified by name and date of birth  Epifanio Robbins was informed that this is a telemedicine visit and that the visit is being conducted throughthe [employer] platform  She agrees to proceed  My office door was closed  The patient was notified the following individuals were present in the room Dr Nory Porras  They acknowledged consent and understanding of privacy and security of the video platform  The patient has agreed to participate and understands they can discontinue the visit at any time  Patient is aware this is a billable service  Reason for Visit: Follow-up visit for medication management     SUBJECTIVE:    Rodrigo Butt is a 78 y o   Female with history of JONAH, Somatic Symptom Disorder, Depression, presents for follow up for medication management  At this time, patient is experiencing sore throat, and her grandson has the coronavirus - not as worried about the symptoms  Still reports symptoms of anxiety and intrusive thoughts  Still finds it hard to have another person in the household, cant get used to her daughter's father in law   Still "getting over the arrival of in laws  Attempted to journal, but didn't feel like it  Has been doing housework to keep her mind occupied  Thinks that anxiety symptoms have been improving until today in the morning when she noticed she looked into mirror and she started focusing on somatic symptoms  At times when she wakes up she feels fine  Helped grandkids around house  Feels like she had good weekend  Less tearful when speaking with daughter  Has been encouraging herself  Not as preoccupied with bowel movements  Has gone outside, more comfortable outdoors, went with son in law, daughter  Pt denies SI/HI  Pt denies symptoms of psychosis including AVH and paranoia  Denies sx of eligio or hypomania at this time  Current Rating Scores:     None completed today  Review Of Systems:      Constitutional negative   ENT negative   Cardiovascular negative   Respiratory negative   Gastrointestinal negative   Genitourinary negative   Musculoskeletal negative   Integumentary negative   Neurological negative   Endocrine negative   Other Symptoms none, all other systems are negative     Past Psychiatric History: (unchanged information from previous note copied and italicized) - Information that is bolded has been updated  Past diagnoses: MDD, Anxiety, OCD  Inpatient psychiatric admissions: one previous admission for 3 weeks at [facility] in 2013 for panic attack after surgery  Prior outpatient psychiatric linkage: Dr Julia Gaxiola until transition of care  Past/current psychotherapy: sees Dr Chayr Sterling for the past 2-3 years  History of suicidal attempts/gestures: denies  History of violence/aggressive behaviors: denies  Psychotropic medication trials: Effexor, Zoloft, Prozac, Lexapro, Buspar, Klonopin, Prasozin (syncopy?)  Substance abuse inpatient/outpatient rehabilitation: denies      Substance Abuse History: (unchanged information from previous note copied and italicized) - Information that is bolded has been updated     Denies history of alcohol, illict substance, or tobacco abuse  Denies past legal actions or arrests secondary to substance intoxication  The patient denies prior DWIs/DUIs  Juni Durbin does not exhibit objective evidence of substance withdrawal during today's examination nor does Juni Durbin appear under the influence of any psychoactive substance      Social History: (unchanged information from previous note copied and italicized) - Information that is bolded has been updated  Grew up in [place], [place]  Developmental: Denies a history of milestone/developmental delay  Denies a history of in-utero exposure to toxins/illicit substances  There is no documented history of IEP or need for special education  Education: Bachelor of Arts  Marital history:  since 2006 since   from cancer  Living arrangement, social support: lives with daughter, son in law, and 3 grandkids in [place], [place]  Social support includes brother, daughter, S-I-L, and 3 grandkids  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  Has pension and social security    Access to firearms: Denies direct access to weapons/firearms  Sandra Peña has no history of arrests or violence with a deadly weapon      Traumatic History: (unchanged information from previous note copied and italicized) - Information that is bolded has been updated       Abuse: denies  Other Traumatic Events: other traumatic events: denies    Past Medical History:    Past Medical History:   Diagnosis Date   • Colon polyps    • Diabetes mellitus ([facility] [address] )    • Dizziness     Last assessed: 8/31/15   • DVT (deep venous thrombosis) ([facility] [address] )    • Dysuria    • Hematuria 2022   • Hyperlipidemia    • Hypertension    • Hypertensive urgency 10/22/2021   • Insomnia    • Ureterolithiasis 2020        Past Surgical History:   Procedure Laterality Date   •  SECTION      1964 son, 26 daughter   • COLONOSCOPY     • FL RETROGRADE PYELOGRAM  2020   • CA COLONOSCOPY FLX DX W/COLLJ SPEC WHEN PFRMD N/A 3/27/2017    Procedure: COLONOSCOPY;  Surgeon: Bolivar Maya MD;  Location: [facility]; Service: Gastroenterology   • NE CYSTO/URETERO W/LITHOTRIPSY &INDWELL STENT INSRT Right 5/22/2020    Procedure: CYSTOSCOPY URETEROSCOPY WITH LITHOTRIPSY HOLMIUM LASER, RETROGRADE PYELOGRAM AND INSERTION STENT URETERAL; EXCISION OF BLADDER TUMOR;  Surgeon: Fanny Peres MD;  Location: [facility];  Service: Urology   • ROTATOR CUFF REPAIR Left     Last assessed: 3/29/15   • TONSILLECTOMY       No Known Allergies    Substance Abuse History:    Social History     Substance and Sexual Activity   Alcohol Use Never     Social History     Substance and Sexual Activity   Drug Use No       Social History:    Social History     Socioeconomic History   • Marital status:      Spouse name: Not on file   • Number of children: Not on file   • Years of education: Not on file   • Highest education level: Not on file   Occupational History   • Not on file   Tobacco Use   • Smoking status: Never   • Smokeless tobacco: Never   Vaping Use   • Vaping Use: Never used   Substance and Sexual Activity   • Alcohol use: Never   • Drug use: No   • Sexual activity: Not on file   Other Topics Concern   • Not on file   Social History Narrative    Lack of exercise     Social Determinants of Health     Financial Resource Strain: Low Risk    • Difficulty of Paying Living Expenses: Not very hard   Food Insecurity: Not on file   Transportation Needs: No Transportation Needs   • Lack of Transportation (Medical): No   • Lack of Transportation (Non-Medical):  No   Physical Activity: Not on file   Stress: Not on file   Social Connections: Not on file   Intimate Partner Violence: Not on file   Housing Stability: Not on file       Family Psychiatric History:     Family History   Problem Relation Age of Onset   • Depression Mother         w/anxiety   • Diabetes Mother         Mellitus   • Breast cancer Mother    • Hypertension Mother • No Known Problems Father    • Depression Sister         w/anxiety   • Heart disease Sister         Cardiac Disorder   • Breast cancer Sister    • Hypertension Sister    • Diabetes Brother         Mellitus   • Alcohol abuse Son        History Review: The following portions of the patient's history were reviewed and updated as appropriate: allergies, current medications, past family history, past medical history, past social history, past surgical history and problem list          OBJECTIVE:     Vital signs in last 24 hours: There were no vitals filed for this visit      Mental Status Evaluation:    Appearance appears stated age and casually dressed   Behavior calm and cooperative   Speech normal rate, normal volume, normal pitch   Mood "not bad"   Affect reactive   Thought Processes organized, goal directed   Associations intact associations   Thought Content no overt delusions   Perceptual Disturbances: no auditory hallucinations, no visual hallucinations, does not appear responding to internal stimuli   Abnormal Thoughts  Risk Potential Denies suicidal or homicidal ideation, intent, or plan   Orientation oriented to person, place, time/date and situation   Memory recent and remote memory grossly intact   Consciousness alert and awake   Attention Span Concentration Span attention span and concentration are age appropriate   Intellect appears to be of average intelligence   Insight fair   Judgement fair   Muscle Strength and  Gait unable to assess today due to virtual visit   Motor activity unable to assess today due to virtual visit   Language no difficulty naming common objects   Fund of Knowledge adequate knowledge of current events  adequate fund of knowledge regarding past history  adequate fund of knowledge regarding vocabulary        Laboratory Results: I have personally reviewed all pertinent laboratory/tests results    Recent Labs (last 6 months):   Orders Only on 08/27/2022   Component Date Value   • Total Cholesterol 08/27/2022 156    • HDL 08/27/2022 57    • Triglycerides 08/27/2022 137    • LDL Calculated 08/27/2022 77    • Chol HDLC Ratio 08/27/2022 2 7    • Non-HDL Cholesterol 08/27/2022 99    • Glucose, Random 08/27/2022 107 (H)    • BUN 08/27/2022 27 (H)    • Creatinine 08/27/2022 0 77    • eGFR 08/27/2022 76    • SL AMB BUN/CREATININE RA* 08/27/2022 35 (H)    • Sodium 08/27/2022 140    • Potassium 08/27/2022 4 7    • Chloride 08/27/2022 105    • CO2 08/27/2022 30    • Calcium 08/27/2022 9 7    • Protein, Total 08/27/2022 6 3    • Albumin 08/27/2022 4 1    • Globulin 08/27/2022 2 2    • Albumin/Globulin Ratio 08/27/2022 1 9    • TOTAL BILIRUBIN 08/27/2022 0 4    • Alkaline Phosphatase 08/27/2022 51    • AST 08/27/2022 15    • ALT 08/27/2022 14    • Creatinine, Urine 08/27/2022 63    • Microalbum  ,U,Random 08/27/2022 0 7    • Microalb/Creat Ratio 08/27/2022 11    • White Blood Cell Count 08/27/2022 4 8    • Red Blood Cell Count 08/27/2022 4 35    • Hemoglobin 08/27/2022 12 2    • HCT 08/27/2022 38 9    • MCV 08/27/2022 89 4    • MCH 08/27/2022 28 0    • MCHC 08/27/2022 31 4 (L)    • RDW 08/27/2022 12 4    • Platelet Count 14/76/5752 188    • SL AMB MPV 08/27/2022 10 9    • Neutrophils (Absolute) 08/27/2022 2,736    • Lymphocytes (Absolute) 08/27/2022 1,502    • Monocytes (Absolute) 08/27/2022 326    • Eosinophils (Absolute) 08/27/2022 197    • Basophils ABS 08/27/2022 38    • Neutrophils 08/27/2022 57    • Lymphocytes 08/27/2022 31 3    • Monocytes 08/27/2022 6 8    • Eosinophils 08/27/2022 4 1    • Basophils PCT 08/27/2022 0 8    • TSH W/RFX TO FREE T4 08/27/2022 2 02    • Hemoglobin A1C 08/27/2022 6 3 (H)        Suicide/Homicide Risk Assessment:  Risks Reviewed      Risk of Harm to Self:  ·           The following ratings are based on assessment at the time of the interview and review of records  ·           Demographic risk factors include: ,  status, elderly (76 or older)   · Historical Risk Factors include: chronic depressive symptoms, chronic anxiety symptoms  ·           Recent Specific Risk Factors include: diagnosis of mood disorder, current depressive symptoms, current anxiety symptoms  ·           Protective Factors: no current suicidal ideation, access to mental health treatment, being a parent, compliant with medications, compliant with mental health treatment, connection to own children, good health, having a sense of purpose or meaning in life, supportive family, fears of illness and fear of death  ·           Weapons: none  The following steps have been taken to ensure weapons are properly secured: not applicable  ·           Based on today's assessment, Deysi Stanford presents the following risk of harm to self: low     Risk of Harm to Others:  ·           The following ratings are based on assessment at the time of the interview and review of records  ·           Demographic Risk Factors include: none  ·           Historical Risk Factors include: none  ·           Recent Specific Risk Factors include: concomitant mood disorder  ·           Protective Factors: no current homicidal ideation, access to mental health treatment, being , compliant with medications, compliant with mental health treatment, connection to own children, support system, supportive friends  ·           Weapons: none  The following steps have been taken to ensure weapons are properly secured: not applicable  ·           Based on today's assessment, Deysi Stanford presents the following risk of harm to others: low    The following interventions are recommended: no intervention changes needed      Lethality Statement:  Based on today's assessment and clinical criteria, Rodrick Jane contracts for safety and is not an imminent risk of harm to self or others  Outpatient level of care is deemed appropriate at this current time   Anaijohnson Abdoulaye understands that if they can no longer contract for safety, they need to call the office or report to their nearest Emergency Room for immediate evaluation  Assessment/Plan:   Alma Noble is a 78 y o   female, , lives with daughter, son-in-law, and 3 grandkids, retiredw/ PMH of diabetes, hyperlipidemia, hypertension and PPH of MDD, JONAH, one prior psychiatric admissions, no prior SA, who presented to the mental health clinic virtually for medication management  At this time, patient is experiencing improvement in symptoms of anxiety  Less preoccupied with somatic symptoms over the last week  Less anxious overall  Brighter affect, going outside more  More confident  Will continue current medication regimen  Today's Plan: At this time, will continue current medication regimen  Risks, potential side effects discussed with patient who verbalizes agreement  DSM-5 Diagnoses:   1  Generalized anxiety disorder    2  Somatic symptom disorder    3  Major depressive disorder, recurrent, mild (HCC)      Treatment Recommendations/Precautions:  • Continue Prozac 80 mg daily for mood symptoms  • Continue Klonopin 0 5 mg bid for anxiety  • Continue Neurontin 100 mg bid for anxiety  • Medication management follow up in 4 weeks  • Continue medical management with PCP  • Aware of need to follow up with family physician for medical issues  • Aware of 24 hour and weekend coverage for urgent situations accessed by calling [facility] main practice number    Medications Risks/Benefits      Risks, Benefits And Possible Side Effects Of Medications:    Risks, benefits, and possible side effects of medications explained to Alma Noble and she verbalizes understanding and agreement for treatment        Controlled Medication Discussion:     Alma Noble has been filling controlled prescriptions on time as prescribed according to [address] Monitoring Program    Psychotherapy Provided:     Individual psychotherapy provided: Counseling was provided during the session today for 16 minutes  Medications, treatment progress and treatment plan reviewed with Demteri Lindsey  Medication education provided to Demetri Lindsey  Supportive therapy provided  Treatment Plan:    Completed and signed during the session: Not applicable - Treatment Plan not due at this session    Visit Time    Visit Start Time: 8:45 AM  Visit Stop Time: 9:35 AM  Total Visit Duration: 55 minutes    VIRTUAL VISIT [facility] verbally agrees to participate in [employer]  Pt is aware that [employer] could be limited without vital signs or the ability to perform a full hands-on physical exam  Darrel Mckeon understands she or the provider may request at any time to terminate the video visit and request the patient to seek care or treatment in person       Kevin Salvador MD 12/20/22

## 2023-01-03 DIAGNOSIS — F41.1 GENERALIZED ANXIETY DISORDER: ICD-10-CM

## 2023-01-03 RX ORDER — CLONAZEPAM 0.5 MG/1
0.5 TABLET ORAL 2 TIMES DAILY
Qty: 60 TABLET | Refills: 0 | Status: SHIPPED | OUTPATIENT
Start: 2023-01-03 | End: 2023-02-02

## 2023-01-05 ENCOUNTER — RA CDI HCC (OUTPATIENT)
Dept: OTHER | Facility: HOSPITAL | Age: 83
End: 2023-01-05

## 2023-01-05 NOTE — PROGRESS NOTES
[facility] [address]  coding opportunities       Chart reviewed, no opportunity found: CHART REVIEWED, NO OPPORTUNITY FOUND        Patients Insurance     Medicare Insurance: Medicare

## 2023-01-06 ENCOUNTER — TELEMEDICINE (OUTPATIENT)
Dept: PSYCHIATRY | Facility: CLINIC | Age: 83
End: 2023-01-06

## 2023-01-06 DIAGNOSIS — F33.0 MAJOR DEPRESSIVE DISORDER, RECURRENT, MILD (HCC): ICD-10-CM

## 2023-01-06 DIAGNOSIS — F45.1 SOMATIC SYMPTOM DISORDER: ICD-10-CM

## 2023-01-06 DIAGNOSIS — F41.1 GENERALIZED ANXIETY DISORDER: Primary | ICD-10-CM

## 2023-01-06 DIAGNOSIS — E11.9 TYPE 2 DIABETES MELLITUS WITHOUT COMPLICATION, WITHOUT LONG-TERM CURRENT USE OF INSULIN (HCC): ICD-10-CM

## 2023-01-06 NOTE — PSYCH
Virtual Regular Visit    Verification of patient location:    Patient is located in the following state in which I hold an active license [place]    Encounter provider Shari Yoo MD    Provider located at [address] [place] [id number] [phone]    Recent Visits  No visits were found meeting these conditions  Showing recent visits within past 7 days and meeting all other requirements  Future Appointments  No visits were found meeting these conditions  Showing future appointments within next 150 days and meeting all other requirements     The patient was identified by name and date of birth  Sebastian Soliman was informed that this is a telemedicine visit and that the visit is being conducted throughthe [employer] platform  She agrees to proceed  My office door was closed  No one else was in the room  She acknowledged consent and understanding of privacy and security of the video platform  The patient has agreed to participate and understands they can discontinue the visit at any time  Psychotherapy Provided: Individual Psychotherapy 65 minutes     Length of time in session: 65 minutes, follow up in 2 week    Encounter Diagnosis     ICD-10-CM    1  Generalized anxiety disorder  F41 1       2  Somatic symptom disorder  F45 1       3  Major depressive disorder, recurrent, mild (HCC)  F33 0           Goals addressed in session: Goal 1     Pain:      none    0    Current suicide risk : Low     Data:  She notes that things are so-so  She notes that her son is now in rehab which makes her feel calmer and more reassured   Had an argument with daughter Doug Dietz as her granddaughter's friend came to visit over the holidays and she didn't think that Doug Dietz served the appropriate meal  She wrote a note and once daughter read it, she stormed to her room and threw it at her, stating that if she didn't like it then "too bad " She thinks its because she was not feeling herself  She was having problems with "bathroom" and still have problem with her "scalp" that it is itching  She finds it hard that Janay's BEN has not been around as much  She thinks that "something is going on in my head " Still thinking about her scalp care and finds that it has been on her mind for the last several weeks  Feels more  Depressed daily because of her scalp, and anxiously awaiting appointment with Dr Chica Abdi which is next week  She is thinking of what she will say to her PCP so that she does not take up much of his time  Thinking every day what she will say to him, and feels embarrassed that she has not showered every day  Already anticipates what her PCP will say and what she will say  Unsure why she only showers twice a week  She notes that she never showered every single day and therefore did not apply for the shampoo  She says that if her PCP says that shampoo wont work if she doesn't use it, she will respond with "time to find another doctor " She is unsure why she fill respond about it  Initially she felt comfortable with her PCP who was similar to her PCP in [place] and initially she "used him" as her psychiatrist initially and thinks she took advantage of him  It started with difference in time of appt, and then what was written in appt book was another  She went by appt card and at one point when she went to appt she was not seen as she was 40 minutes late  She said that she was "hysterical " This happened in the beginning of Pandemic and afterwards she says that she went into appointments not saying as much  Upset about food served over Christmas holiday when granddaughter's friend  She said that the meat was hard, and she was upset that her son in law made lasagna with side of olives, and she thought that she wanted something else  She also has been having less bowel movements   Didn't remember about her last BM, and she panicked yesterday as a result, and her last BM entry was on Helga  She says that she stopped prune juice after Linzess  She told her daughter about it, and Ly Krishbairon told her not to worry too much about it  Last BM was today in the morning  She sent her daughter's BEN to get prune juice  She was upset with him because he was shopping and came bask two hours later with the prune juice  Has been having word finding difficulties and forgetfulness  Assessment:  Burke Segura is a 78 y o   female, , lives with daughter, son-in-law, and 3 grandkids, retiredw/ PMH of diabetes, hyperlipidemia, hypertension and PPH of MDD, JONAH, one prior psychiatric admissions, no prior SA, who presented to the mental health clinic virtually for medication management  Still continues to perseverate on somatic concerns  Anxiety seems to be less prominent  Plan:  Continue individual psychotherapy  Continue medication management     Behavioral Health Treatment Plan [facility]: Diagnosis and Treatment Plan explained to Wicho Robert relates understanding diagnosis and is agreeable to Treatment Plan   Yes     Visit start and stop times:    01/06/23     Visit Time    Visit Start Time: 8:05 AM  Visit Stop Time: 9:10 AM  Total Visit Duration: 65 minutes    This note was not shared with the patient due to this is a psychotherapy note

## 2023-01-09 LAB
ALBUMIN SERPL-MCNC: 3.9 G/DL (ref 3.6–5.1)
ALBUMIN/CREAT UR: 19 MCG/MG CREAT
ALBUMIN/GLOB SERPL: 1.6 (CALC) (ref 1–2.5)
ALP SERPL-CCNC: 55 U/L (ref 37–153)
ALT SERPL-CCNC: 15 U/L (ref 6–29)
AST SERPL-CCNC: 17 U/L (ref 10–35)
BILIRUB SERPL-MCNC: 0.4 MG/DL (ref 0.2–1.2)
BUN SERPL-MCNC: 26 MG/DL (ref 7–25)
BUN/CREAT SERPL: 32 (CALC) (ref 6–22)
CALCIUM SERPL-MCNC: 9.4 MG/DL (ref 8.6–10.4)
CHLORIDE SERPL-SCNC: 105 MMOL/L (ref 98–110)
CHOLEST SERPL-MCNC: 149 MG/DL
CHOLEST/HDLC SERPL: 3.1 (CALC)
CO2 SERPL-SCNC: 28 MMOL/L (ref 20–32)
CREAT SERPL-MCNC: 0.82 MG/DL (ref 0.6–0.95)
CREAT UR-MCNC: 70 MG/DL (ref 20–275)
GFR/BSA.PRED SERPLBLD CYS-BASED-ARV: 70 ML/MIN/1.73M2
GLOBULIN SER CALC-MCNC: 2.4 G/DL (CALC) (ref 1.9–3.7)
GLUCOSE SERPL-MCNC: 105 MG/DL (ref 65–99)
HDLC SERPL-MCNC: 48 MG/DL
LDLC SERPL CALC-MCNC: 75 MG/DL (CALC)
MICROALBUMIN UR-MCNC: 1.3 MG/DL
NONHDLC SERPL-MCNC: 101 MG/DL (CALC)
POTASSIUM SERPL-SCNC: 4.1 MMOL/L (ref 3.5–5.3)
PROT SERPL-MCNC: 6.3 G/DL (ref 6.1–8.1)
SODIUM SERPL-SCNC: 140 MMOL/L (ref 135–146)
TRIGL SERPL-MCNC: 162 MG/DL
TSH SERPL-ACNC: 2.32 MIU/L (ref 0.4–4.5)
URATE SERPL-MCNC: 4.9 MG/DL (ref 2.5–7)

## 2023-01-15 DIAGNOSIS — I10 BENIGN ESSENTIAL HYPERTENSION: ICD-10-CM

## 2023-01-15 DIAGNOSIS — E78.2 MIXED HYPERLIPIDEMIA: ICD-10-CM

## 2023-01-16 DIAGNOSIS — F41.1 GENERALIZED ANXIETY DISORDER: ICD-10-CM

## 2023-01-16 RX ORDER — LISINOPRIL AND HYDROCHLOROTHIAZIDE 12.5; 1 MG/1; MG/1
1 TABLET ORAL DAILY
Qty: 90 TABLET | Refills: 0 | Status: SHIPPED | OUTPATIENT
Start: 2023-01-16

## 2023-01-16 RX ORDER — ATORVASTATIN CALCIUM 10 MG/1
10 TABLET, FILM COATED ORAL DAILY
Qty: 90 TABLET | Refills: 0 | Status: SHIPPED | OUTPATIENT
Start: 2023-01-16

## 2023-01-17 RX ORDER — GABAPENTIN 100 MG/1
CAPSULE ORAL
Qty: 180 CAPSULE | Refills: 1 | Status: SHIPPED | OUTPATIENT
Start: 2023-01-17

## 2023-01-23 ENCOUNTER — OFFICE VISIT (OUTPATIENT)
Dept: PSYCHIATRY | Facility: CLINIC | Age: 83
End: 2023-01-23

## 2023-01-23 DIAGNOSIS — F45.1 SOMATIC SYMPTOM DISORDER: ICD-10-CM

## 2023-01-23 DIAGNOSIS — F33.0 MAJOR DEPRESSIVE DISORDER, RECURRENT, MILD (HCC): Primary | ICD-10-CM

## 2023-01-23 DIAGNOSIS — G31.84 MILD NEUROCOGNITIVE DISORDER: ICD-10-CM

## 2023-01-23 DIAGNOSIS — F41.1 GENERALIZED ANXIETY DISORDER: ICD-10-CM

## 2023-01-23 DIAGNOSIS — F33.1 MAJOR DEPRESSIVE DISORDER, RECURRENT, MODERATE (HCC): ICD-10-CM

## 2023-01-23 RX ORDER — FLUOXETINE HYDROCHLORIDE 20 MG/1
20 CAPSULE ORAL DAILY
Qty: 7 CAPSULE | Refills: 0 | Status: SHIPPED | OUTPATIENT
Start: 2023-01-23 | End: 2023-01-30

## 2023-01-23 RX ORDER — RISPERIDONE 0.25 MG/1
0.25 TABLET ORAL
Qty: 30 TABLET | Refills: 0 | Status: SHIPPED | OUTPATIENT
Start: 2023-01-23

## 2023-01-23 RX ORDER — FLUOXETINE HYDROCHLORIDE 40 MG/1
40 CAPSULE ORAL DAILY
Qty: 30 CAPSULE | Refills: 2 | Status: SHIPPED | OUTPATIENT
Start: 2023-01-23 | End: 2023-03-24

## 2023-01-23 NOTE — PATIENT INSTRUCTIONS
Medication Instruction:    Decrease Prozac (fluoxetine) from 80 mg to 60 mg  Take Prozac 60 mg (40 mg capsule and 20 mg capsule together) for 1 week  After 1 week, start taking Prozac 40 mg daily    Start taking Risperdal (risperidone) 0 25 mg (1 tablet) once a day in the evening  Continue taking Klonopin (clonazepam) 0 5 mg (1 tablet) twice a day  Continue taking Neurontin (gabapentin) 200 mg in the morning, 100 mg in the afternoon, and 200 mg in the evening

## 2023-01-23 NOTE — PSYCH
MEDICATION MANAGEMENT NOTE        [facility]    Name and Date of Birth:  Isabelle Osborn 78 y o  1940 MRN: [id number]    Date of Visit: January 23, 2023    Reason for Visit: Follow-up visit for medication management     SUBJECTIVE:    Yao Henry is a 78 y o   Female with history of Generalized Anxiety Disorder, Somatic Symptoms Disorder, presents with daughter Luciana Macias for follow up for medication management  Yao Henry presents reporting ongoing anxiety symptoms  She states that she continues to think about bowel movements to the point of preoccupation and obsession, this time believing the medication Linzess is causing excess BM  Still anxious about Janay's BEN moving in with them several months ago and continues to be preoccupied with these recurrent thoughts  Also has other somatic preoccupations such as itchy scalp for which she was prescribed medicated shampoo but has not used it, then spending a lot of time thinking about what her PCP will tell her, and thinking that he will not spend much time with her  Luciana Dinnathan noticed that Yao Henry continues to break down at random times and cry, and with episodes of sadness  Reports good appetite, good energy levels, concentration  Denies feelings of hopelessness  Denies medication SE  Recaaristideso Carl currently denies SI/HI  Recardo Starch denies symptoms of eligio/hypomania  Recardo Starch denies symptoms of psychosis such as AVH, paranoia  Current Rating Scores:     None completed today  Review Of Systems:      Constitutional negative   ENT negative   Cardiovascular negative   Respiratory negative   Gastrointestinal negative   Genitourinary negative   Musculoskeletal negative   Integumentary negative   Neurological negative   Endocrine negative   Other Symptoms none, all other systems are negative       Past Psychiatric History: (unchanged information from previous note copied and italicized) - Information that is bolded has been updated  Past diagnoses: MDD, Anxiety, OCD  Inpatient psychiatric admissions: one previous admission for 3 weeks at [facility] in 2013 for panic attack after surgery  Prior outpatient psychiatric linkage: Dr Zoila Yu until transition of care  Past/current psychotherapy: sees Dr Alex Draper for the past 2-3 years  History of suicidal attempts/gestures: denies  History of violence/aggressive behaviors: denies  Psychotropic medication trials: Effexor, Zoloft, Prozac, Lexapro, Buspar, Klonopin, Prasozin (syncopy?)  Substance abuse inpatient/outpatient rehabilitation: denies       Substance Abuse History: (unchanged information from previous note copied and italicized) - Information that is bolded has been updated  Denies history of alcohol, illict substance, or tobacco abuse  Denies past legal actions or arrests secondary to substance intoxication  The patient denies prior DWIs/DUIs  Maribel Frames does not exhibit objective evidence of substance withdrawal during today's examination nor does Maribel Frames appear under the influence of any psychoactive substance  Social History: (unchanged information from previous note copied and italicized) - Information that is bolded has been updated  Denies a history of milestone/developmental delay  Denies a history of in-utero exposure to toxins/illicit substances  There is no documented history of IEP or need for special education  Education: Bachelor of Arts  Marital history:  since 2006 since   from cancer  Living arrangement, social support: lives with daughter, son in law, and 3 grandkids in [place], [place]  Social support includes brother, daughter, S-I-L, and 3 grandkids  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  Has pension and social security    Access to firearms: Denies direct access to weapons/firearms  Sandra Peña has no history of arrests or violence with a deadly weapon        Traumatic History: (unchanged information from previous note copied and italicized) - Information that is bolded has been updated  Abuse: denies  Other Traumatic Events: other traumatic events: denies      Past Medical History:    Past Medical History:   Diagnosis Date   • Colon polyps    • Diabetes mellitus ([facility] [address] )    • Dizziness     Last assessed: 8/31/15   • DVT (deep venous thrombosis) ([facility] )    • Dysuria    • Hematuria 2022   • Hyperlipidemia    • Hypertension    • Hypertensive urgency 10/22/2021   • Insomnia    • Ureterolithiasis 2020        Past Surgical History:   Procedure Laterality Date   •  SECTION      1964 son, 26 daughter   • COLONOSCOPY     • FL RETROGRADE PYELOGRAM  2020   • MS COLONOSCOPY FLX DX W/COLLJ SPEC WHEN PFRMD N/A 3/27/2017    Procedure: COLONOSCOPY;  Surgeon: Sylvia Baez MD;  Location: [facility]; Service: Gastroenterology   • MS CYSTO/URETERO W/LITHOTRIPSY &INDWELL STENT INSRT Right 2020    Procedure: CYSTOSCOPY URETEROSCOPY WITH LITHOTRIPSY HOLMIUM LASER, RETROGRADE PYELOGRAM AND INSERTION STENT URETERAL; EXCISION OF BLADDER TUMOR;  Surgeon: Damaris Gabriel MD;  Location: [facility];  Service: Urology   • ROTATOR CUFF REPAIR Left     Last assessed: 3/29/15   • TONSILLECTOMY       No Known Allergies    Substance Abuse History:    Social History     Substance and Sexual Activity   Alcohol Use Never     Social History     Substance and Sexual Activity   Drug Use No       Social History:    Social History     Socioeconomic History   • Marital status:       Spouse name: Not on file   • Number of children: Not on file   • Years of education: Not on file   • Highest education level: Not on file   Occupational History   • Not on file   Tobacco Use   • Smoking status: Never   • Smokeless tobacco: Never   Vaping Use   • Vaping Use: Never used   Substance and Sexual Activity   • Alcohol use: Never   • Drug use: No   • Sexual activity: Not on file   Other Topics Concern   • Not on file   Social History Narrative    Lack of exercise     Social Determinants of Health     Financial Resource Strain: Low Risk    • Difficulty of Paying Living Expenses: Not very hard   Food Insecurity: Not on file   Transportation Needs: No Transportation Needs   • Lack of Transportation (Medical): No   • Lack of Transportation (Non-Medical): No   Physical Activity: Not on file   Stress: Not on file   Social Connections: Not on file   Intimate Partner Violence: Not on file   Housing Stability: Not on file       Family Psychiatric History:     Family History   Problem Relation Age of Onset   • Depression Mother         w/anxiety   • Diabetes Mother         Mellitus   • Breast cancer Mother    • Hypertension Mother    • No Known Problems Father    • Depression Sister         w/anxiety   • Heart disease Sister         Cardiac Disorder   • Breast cancer Sister    • Hypertension Sister    • Diabetes Brother         Mellitus   • Alcohol abuse Son        History Review: The following portions of the patient's history were reviewed and updated as appropriate: allergies, current medications, past family history, past medical history, past social history, past surgical history and problem list          OBJECTIVE:     Vital signs in last 24 hours: There were no vitals filed for this visit      Mental Status Evaluation:    Appearance appears stated age   Behavior calm, cooperative and minimally cooperative   Speech normal rate, normal volume, normal pitch   Mood "ok"   Affect normal range and intensity, appropriate   Thought Processes organized, goal directed   Associations intact associations   Thought Content no overt delusions   Perceptual Disturbances: no auditory hallucinations, no visual hallucinations   Abnormal Thoughts  Risk Potential Denies suicidal or homicidal ideation, plan, or intent   Orientation oriented to person, place, time/date and situation   Memory recent and remote memory grossly intact   Consciousness alert and awake   Attention Span Concentration Span attention span and concentration are age appropriate   Intellect appears to be of average intelligence   Insight intact   Judgement intact   Muscle Strength and  Gait normal muscle strength and normal muscle tone, normal gait and normal balance   Motor activity no abnormal movements   Language no difficulty naming common objects   Fund of Knowledge adequate knowledge of current events       Laboratory Results: I have personally reviewed all pertinent laboratory/tests results    Recent Labs (last 6 months):   Orders Only on 01/09/2023   Component Date Value   • Total Cholesterol 01/09/2023 149    • HDL 01/09/2023 48 (L)    • Triglycerides 01/09/2023 162 (H)    • LDL Calculated 01/09/2023 75    • Chol HDLC Ratio 01/09/2023 3 1    • Non-HDL Cholesterol 01/09/2023 101    • Uric Acid 01/09/2023 4 9    • Glucose, Random 01/09/2023 105 (H)    • BUN 01/09/2023 26 (H)    • Creatinine 01/09/2023 0 82    • eGFR 01/09/2023 70    • SL AMB BUN/CREATININE RA* 01/09/2023 32 (H)    • Sodium 01/09/2023 140    • Potassium 01/09/2023 4 1    • Chloride 01/09/2023 105    • CO2 01/09/2023 28    • Calcium 01/09/2023 9 4    • Protein, Total 01/09/2023 6 3    • Albumin 01/09/2023 3 9    • Globulin 01/09/2023 2 4    • Albumin/Globulin Ratio 01/09/2023 1 6    • TOTAL BILIRUBIN 01/09/2023 0 4    • Alkaline Phosphatase 01/09/2023 55    • AST 01/09/2023 17    • ALT 01/09/2023 15    • Creatinine, Urine 01/09/2023 70    • Microalbum  ,U,Random 01/09/2023 1 3    • Microalb/Creat Ratio 01/09/2023 19    • TSH 01/09/2023 2 32    Orders Only on 08/27/2022   Component Date Value   • Total Cholesterol 08/27/2022 156    • HDL 08/27/2022 57    • Triglycerides 08/27/2022 137    • LDL Calculated 08/27/2022 77    • Chol HDLC Ratio 08/27/2022 2 7    • Non-HDL Cholesterol 08/27/2022 99    • Glucose, Random 08/27/2022 107 (H)    • BUN 08/27/2022 27 (H)    • Creatinine 08/27/2022 0 77    • eGFR 08/27/2022 76    • SL AMB BUN/CREATININE RA* 08/27/2022 35 (H)    • Sodium 08/27/2022 140    • Potassium 08/27/2022 4 7    • Chloride 08/27/2022 105    • CO2 08/27/2022 30    • Calcium 08/27/2022 9 7    • Protein, Total 08/27/2022 6 3    • Albumin 08/27/2022 4 1    • Globulin 08/27/2022 2 2    • Albumin/Globulin Ratio 08/27/2022 1 9    • TOTAL BILIRUBIN 08/27/2022 0 4    • Alkaline Phosphatase 08/27/2022 51    • AST 08/27/2022 15    • ALT 08/27/2022 14    • Creatinine, Urine 08/27/2022 63    • Microalbum  ,U,Random 08/27/2022 0 7    • Microalb/Creat Ratio 08/27/2022 11    • White Blood Cell Count 08/27/2022 4 8    • Red Blood Cell Count 08/27/2022 4 35    • Hemoglobin 08/27/2022 12 2    • HCT 08/27/2022 38 9    • MCV 08/27/2022 89 4    • MCH 08/27/2022 28 0    • MCHC 08/27/2022 31 4 (L)    • RDW 08/27/2022 12 4    • Platelet Count 00/73/1427 188    • SL AMB MPV 08/27/2022 10 9    • Neutrophils (Absolute) 08/27/2022 2,736    • Lymphocytes (Absolute) 08/27/2022 1,502    • Monocytes (Absolute) 08/27/2022 326    • Eosinophils (Absolute) 08/27/2022 197    • Basophils ABS 08/27/2022 38    • Neutrophils 08/27/2022 57    • Lymphocytes 08/27/2022 31 3    • Monocytes 08/27/2022 6 8    • Eosinophils 08/27/2022 4 1    • Basophils PCT 08/27/2022 0 8    • TSH W/RFX TO FREE T4 08/27/2022 2 02    • Hemoglobin A1C 08/27/2022 6 3 (H)        Suicide/Homicide Risk Assessment:  Reviewed risks    Risk of Harm to Self:  ·           The following ratings are based on assessment at the time of the interview and review of records  ·           Demographic risk factors include: ,  status, elderly (76 or older)   ·           Historical Risk Factors include: chronic depressive symptoms, chronic anxiety symptoms  ·           Recent Specific Risk Factors include: diagnosis of mood disorder, current depressive symptoms, current anxiety symptoms  ·           Protective Factors: no current suicidal ideation, access to mental health treatment, being a parent, compliant with medications, compliant with mental health treatment, connection to own children, good health, having a sense of purpose or meaning in life, supportive family, fears of illness and fear of death  ·           Weapons: none  The following steps have been taken to ensure weapons are properly secured: not applicable  ·           Based on today's assessment, Juan Pablo Ragsdale presents the following risk of harm to self: low     Risk of Harm to Others:  ·           The following ratings are based on assessment at the time of the interview and review of records  ·           Demographic Risk Factors include: none  ·           Historical Risk Factors include: none  ·           Recent Specific Risk Factors include: concomitant mood disorder  ·           Protective Factors: no current homicidal ideation, access to mental health treatment, being , compliant with medications, compliant with mental health treatment, connection to own children, support system, supportive friends  ·           Weapons: none  The following steps have been taken to ensure weapons are properly secured: not applicable  ·           Based on today's assessment, Juan Pablo Ragsdale presents the following risk of harm to others: low      The following interventions are recommended: no intervention changes needed      Lethality Statement:  Based on today's assessment and clinical criteria, [facility] contracts for safety and is not an imminent risk of harm to self or others  Outpatient level of care is deemed appropriate at this current time  Juan Pablo Ragsdale understands that if they can no longer contract for safety, they need to call the office or report to their nearest Emergency Room for immediate evaluation  Assessment/Plan:     Juan Pablo Ragsdale is a 78 y o    female, , lives with daughter, son-in-law, and 3 grandkids, retiredw/ PMH of diabetes, hyperlipidemia, hypertension and PPH of MDD, JONAH, one prior psychiatric admissions, no prior SA, who presented to the mental health clinic virtually for medication management  At this time, patient reports rumination and constantly thinking about somatic symptoms  Still thinking about itchy scalp being serious illness; thinking about bowel movements, and others  Ongoing anxiety symptoms  Pt will be started on Risperdal 0 25 mg dose and decrease in Prozac dose gradually  The obsessive thoughts appear to be almost delusional in nature and Risperdal would be useful in this situation  Long term goal discontinuing benzodiazepines and other meds that can contribute to cognitive dysfunction, falls, and safety in general       MOCA Score 25/30, indicating mild neurocognitive disorder/dysfunction  Today's Plan: At this time, will start Risperdal 0 25 mg dose qhs  Will decrease Prozac to 40 mg daily  Risks, potential side effects discussed with patient who verbalizes agreement  DSM-5 Diagnoses:   1  Major depressive disorder, recurrent, mild ([address] )    2  Somatic symptom disorder    3  Generalized anxiety disorder    4  Major depressive disorder, recurrent, moderate ([facility])        Treatment Recommendations/Precautions:  • Start Risperidone 0 25 mg qhs for anxiety, obsessive thoughts, and depression  • Decrease Prozac from 80 mg to 60 mg, and eventually, to 40 mg    • Continue Neurontin 200 mg QAM, 100 mg during afternoon, and 200 mg in the evening  • Continue Klonopin 0 5 mg tablet bid for anxiety    • Medication management follow up in 9-10 weeks  • Continue medical management with PCP  • Aware of need to follow up with family physician for medical issues  • Aware of 24 hour and weekend coverage for urgent situations accessed by calling [facility] main practice number    Medications Risks/Benefits      Risks, Benefits And Possible Side Effects Of Medications:    Risks, benefits, and possible side effects of medications explained to Ela Caraballo and she verbalizes understanding and agreement for treatment  Risperdal PARQ completed including sedation, agitation, akathisia, TD, dystonic reactions, NMS, EPS, GI distress, dizziness, risk of metabolic syndrome, orthostatic hypotension and cardiovascular risks such as QT prolongation, increased prolactin  Antipsychotic Black-Box Warning explained  Controlled Medication Discussion:     No recent records found for controlled prescriptions according to [place] Prescription Drug Monitoring Program    Psychotherapy Provided:     Individual psychotherapy provided: Counseling was provided during the session today for 16 minutes  Medications, treatment progress and treatment plan reviewed with Tenna Cord  Medication education provided to Tenna Cord  Goals discussed during in session: continue improvement in anxiety        Treatment Plan:    Completed and signed during the session: Not applicable - Treatment Plan not due at this session    Visit Time    Visit Start Time: 3:35 PM  Visit Stop Time: 4:50 PM  Total Visit Duration: 75 minutes    Tanmay Shi MD 01/23/23

## 2023-01-26 ENCOUNTER — OFFICE VISIT (OUTPATIENT)
Dept: FAMILY MEDICINE CLINIC | Facility: CLINIC | Age: 83
End: 2023-01-26

## 2023-01-26 VITALS
RESPIRATION RATE: 16 BRPM | DIASTOLIC BLOOD PRESSURE: 72 MMHG | WEIGHT: 119 LBS | SYSTOLIC BLOOD PRESSURE: 138 MMHG | OXYGEN SATURATION: 98 % | BODY MASS INDEX: 26.77 KG/M2 | HEART RATE: 80 BPM | HEIGHT: 56 IN

## 2023-01-26 DIAGNOSIS — K59.04 CHRONIC IDIOPATHIC CONSTIPATION: ICD-10-CM

## 2023-01-26 DIAGNOSIS — I10 PRIMARY HYPERTENSION: ICD-10-CM

## 2023-01-26 DIAGNOSIS — F33.0 MAJOR DEPRESSIVE DISORDER, RECURRENT, MILD (HCC): ICD-10-CM

## 2023-01-26 DIAGNOSIS — R23.8 DRY SCALP: ICD-10-CM

## 2023-01-26 DIAGNOSIS — E11.9 TYPE 2 DIABETES MELLITUS WITHOUT COMPLICATION, WITHOUT LONG-TERM CURRENT USE OF INSULIN (HCC): Primary | ICD-10-CM

## 2023-01-26 DIAGNOSIS — F41.1 GENERALIZED ANXIETY DISORDER: ICD-10-CM

## 2023-01-26 DIAGNOSIS — E78.2 MIXED HYPERLIPIDEMIA: ICD-10-CM

## 2023-01-26 PROBLEM — H54.7 TRANSIENT ISCHEMIC ATTACK WITH VISUAL IMPAIRMENT: Status: RESOLVED | Noted: 2021-10-22 | Resolved: 2023-01-26

## 2023-01-26 PROBLEM — G45.9 TRANSIENT ISCHEMIC ATTACK WITH VISUAL IMPAIRMENT: Status: RESOLVED | Noted: 2021-10-22 | Resolved: 2023-01-26

## 2023-01-26 LAB — SL AMB POCT HEMOGLOBIN AIC: 6.5 (ref ?–6.5)

## 2023-01-26 RX ORDER — TRIAMCINOLONE ACETONIDE 1 MG/ML
LOTION TOPICAL DAILY
Qty: 60 ML | Refills: 0 | Status: SHIPPED | OUTPATIENT
Start: 2023-01-26

## 2023-01-26 NOTE — ASSESSMENT & PLAN NOTE
Subdermal of the scalp  Does not really like the way that she has to wait 15 minutes before shampooing out clobetasol shampoo    Given a prescription for triamcinolone ointment which she can simply rub into her scalp

## 2023-01-26 NOTE — ASSESSMENT & PLAN NOTE
Cholesterol still remains very well controlled on atorvastatin 10 mg once daily  Repeat lipid panel to be done in 4 months    Goal LDL less than 70

## 2023-01-26 NOTE — PROGRESS NOTES
Subjective:      Patient ID: Lindsay Simons is a 78 y o  female  47-year-old female with past medical history of hypertension, diabetes mellitus type 2, hyperlipidemia, major depressive disorder, generalized anxiety disorder presents with her daughter for subsequent annual wellness visit and follow-up of chronic conditions  Patient has had follow-up with her psychiatrist   [place] [facility] resident is seeing her  They are reducing her dose of fluoxetine from 80 mg to 60 mg and have ordered Risperdal to take at bedtime, very low dosage  Home life is a little busier since daughters father-in-law has moved in with them and apparently they do not really get along that well and most everybody else is out of the house  Total cholesterol 149, HDL 48, triglycerides 162, LDL 75, uric acid level 4 9, normal CMP with the exception of impaired fasting glucose of 105, hemoglobin A1c increased from 6 3 to 6 5%, urine is negative for microalbumin  Patient has lost some weight  Does not have much of an appetite  She did have COVID-19 infection about 1 month ago and daughter thinks that that may have affected her sense of taste but she really does not have much appetite    She is taking Linzess for chronic constipation      Past Medical History:   Diagnosis Date   • Colon polyps    • Diabetes mellitus ([facility] [address] )    • Dizziness     Last assessed: 8/31/15   • DVT (deep venous thrombosis) ([facility])    • Dysuria    • Hematuria 4/21/2022   • Hyperlipidemia    • Hypertension    • Hypertensive urgency 10/22/2021   • Insomnia    • Ureterolithiasis 5/22/2020       Family History   Problem Relation Age of Onset   • Depression Mother         w/anxiety   • Diabetes Mother         Mellitus   • Breast cancer Mother    • Hypertension Mother    • No Known Problems Father    • Depression Sister         w/anxiety   • Heart disease Sister         Cardiac Disorder   • Breast cancer Sister    • Hypertension Sister    • Diabetes Brother         Mellitus • Alcohol abuse Son        Past Surgical History:   Procedure Laterality Date   •  SECTION      1964 son, 26 daughter   • COLONOSCOPY     • FL RETROGRADE PYELOGRAM  2020   • NV COLONOSCOPY FLX DX W/COLLJ SPEC WHEN PFRMD N/A 3/27/2017    Procedure: COLONOSCOPY;  Surgeon: Carlton Reardon MD;  Location: [facility]; Service: Gastroenterology   • NV CYSTO/URETERO W/LITHOTRIPSY &INDWELL STENT INSRT Right 2020    Procedure: CYSTOSCOPY URETEROSCOPY WITH LITHOTRIPSY HOLMIUM LASER, RETROGRADE PYELOGRAM AND INSERTION STENT URETERAL; EXCISION OF BLADDER TUMOR;  Surgeon: Milton Krishnamurthy MD;  Location: [facility];  Service: Urology   • ROTATOR CUFF REPAIR Left     Last assessed: 3/29/15   • TONSILLECTOMY          reports that she has never smoked  She has never used smokeless tobacco  She reports that she does not drink alcohol and does not use drugs  Current Outpatient Medications:   •  aspirin 81 MG tablet, Take 81 mg by mouth daily, Disp: , Rfl:   •  atorvastatin (LIPITOR) 10 mg tablet, Take 1 tablet (10 mg total) by mouth daily, Disp: 90 tablet, Rfl: 0  •  cholecalciferol (VITAMIN D3) 1,000 units tablet, Take 1,000 Units by mouth daily, Disp: , Rfl:   •  clonazePAM (KlonoPIN) 0 5 mg tablet, Take 1 tablet (0 5 mg total) by mouth 2 (two) times a day, Disp: 60 tablet, Rfl: 0  •  docusate sodium (COLACE) 100 mg capsule, Take 100 mg by mouth 2 (two) times a day, Disp: , Rfl:   •  FLUoxetine (PROzac) 20 mg capsule, Take 1 capsule (20 mg total) by mouth daily for 7 days   Take together with 40 mg dose for a total daily dose of 60 mg , Disp: 7 capsule, Rfl: 0  •  FLUoxetine (PROzac) 40 MG capsule, Take 1 capsule (40 mg total) by mouth daily, Disp: 30 capsule, Rfl: 2  •  gabapentin (Neurontin) 100 mg capsule, Take 2 capsule (200 mg total) in the morning, 1 capsule (100 mg total)  in the afternoon, and 2 capsules (200 mg total) at night time  , Disp: 180 capsule, Rfl: 1  •  linaCLOtide 145 MCG CAPS, Take 1 capsule (145 mcg total) by mouth in the morning, Disp: 90 capsule, Rfl: 1  •  lisinopril-hydrochlorothiazide (PRINZIDE,ZESTORETIC) 10-12 5 MG per tablet, Take 1 tablet by mouth daily, Disp: 90 tablet, Rfl: 0  •  metFORMIN (GLUCOPHAGE) 500 mg tablet, TAKE ONE TABLET BY MOUTH TWICE A DAY WITH MEALS, Disp: 180 tablet, Rfl: 0  •  metFORMIN (GLUCOPHAGE) 500 mg tablet, TAKE ONE TABLET BY MOUTH TWICE A DAY WITH MEALS, Disp: 180 tablet, Rfl: 0  •  risperiDONE (RisperDAL) 0 25 mg tablet, Take 1 tablet (0 25 mg total) by mouth daily at bedtime, Disp: 30 tablet, Rfl: 0  •  triamcinolone (KENALOG) 0 1 % lotion, Apply topically in the morning, Disp: 60 mL, Rfl: 0    The following portions of the patient's history were reviewed and updated as appropriate: allergies, current medications, past family history, past medical history, past social history, past surgical history and problem list     Review of Systems   Constitutional: Positive for unexpected weight change ( Weight loss)  Negative for fatigue  HENT: Negative  Eyes: Negative  Respiratory: Negative  Cardiovascular: Negative  Gastrointestinal: Positive for constipation  Endocrine: Negative  Genitourinary: Negative  Musculoskeletal: Negative for gait problem ( unsteady on her feet)  Skin: Negative  Allergic/Immunologic: Negative  Hematological: Negative  Psychiatric/Behavioral: Positive for sleep disturbance (Hypersomnia)  Negative for behavioral problems  The patient is not nervous/anxious  Objective:    /72   Pulse 80   Resp 16   Ht 4' 8" (1 422 m)   Wt 54 kg (119 lb)   SpO2 98%   BMI 26 68 kg/m²      Physical Exam  Vitals and nursing note reviewed  Constitutional:       General: She is not in acute distress  Appearance: Normal appearance  She is well-developed and normal weight  She is not diaphoretic     HENT:      Nose: Nose normal    Eyes:      Conjunctiva/sclera: Conjunctivae normal       Pupils: Pupils are equal, round, and reactive to light  Cardiovascular:      Rate and Rhythm: Normal rate and regular rhythm  Heart sounds: Normal heart sounds  No murmur heard  Pulmonary:      Effort: Pulmonary effort is normal       Breath sounds: Normal breath sounds  Abdominal:      General: Bowel sounds are normal       Palpations: Abdomen is soft  Musculoskeletal:         General: Normal range of motion  Cervical back: Normal range of motion and neck supple  Right lower leg: No edema  Left lower leg: No edema  Skin:     General: Skin is warm and dry  Findings: No rash  Neurological:      General: No focal deficit present  Mental Status: She is alert and oriented to person, place, and time  Mental status is at baseline  Deep Tendon Reflexes: Reflexes are normal and symmetric  Psychiatric:         Mood and Affect: Mood is depressed  Behavior: Behavior normal          Thought Content:  Thought content normal          Judgment: Judgment normal            Recent Results (from the past 1008 hour(s))   Lipid panel    Collection Time: 01/09/23  7:58 AM   Result Value Ref Range    Total Cholesterol 149 <200 mg/dL    HDL 48 (L) > OR = 50 mg/dL    Triglycerides 162 (H) <150 mg/dL    LDL Calculated 75 mg/dL (calc)    Chol HDLC Ratio 3 1 <5 0 (calc)    Non-HDL Cholesterol 101 <130 mg/dL (calc)   Uric acid    Collection Time: 01/09/23  7:58 AM   Result Value Ref Range    Uric Acid 4 9 2 5 - 7 0 mg/dL   Comprehensive metabolic panel    Collection Time: 01/09/23  7:58 AM   Result Value Ref Range    Glucose, Random 105 (H) 65 - 99 mg/dL    BUN 26 (H) 7 - 25 mg/dL    Creatinine 0 82 0 60 - 0 95 mg/dL    eGFR 70 > OR = 60 mL/min/1 73m2    SL AMB BUN/CREATININE RATIO 32 (H) 6 - 22 (calc)    Sodium 140 135 - 146 mmol/L    Potassium 4 1 3 5 - 5 3 mmol/L    Chloride 105 98 - 110 mmol/L    CO2 28 20 - 32 mmol/L    Calcium 9 4 8 6 - 10 4 mg/dL    Protein, Total 6 3 6 1 - 8 1 g/dL    Albumin 3 9 3 6 - 5 1 g/dL    Globulin 2 4 1 9 - 3 7 g/dL (calc)    Albumin/Globulin Ratio 1 6 1 0 - 2 5 (calc)    TOTAL BILIRUBIN 0 4 0 2 - 1 2 mg/dL    Alkaline Phosphatase 55 37 - 153 U/L    AST 17 10 - 35 U/L    ALT 15 6 - 29 U/L   Microalbumin, Random Urine (W/Creatinine)    Collection Time: 01/09/23  7:58 AM   Result Value Ref Range    Creatinine, Urine 70 20 - 275 mg/dL    Microalbum  ,U,Random 1 3 See Note: mg/dL    Microalb/Creat Ratio 19 <30 mcg/mg creat   TSH, 3rd generation    Collection Time: 01/09/23  7:58 AM   Result Value Ref Range    TSH 2 32 0 40 - 4 50 mIU/L   POCT hemoglobin A1c    Collection Time: 01/26/23  1:41 PM   Result Value Ref Range    Hemoglobin A1C 6 5 6 5       Assessment/Plan:    Chronic idiopathic constipation  Patient remains on Linzess 145 mcg once daily  It sounds as though she is having adequate bowel movements  At 1 point we did try increasing her dose of Linzess to 290 mcg daily and she developed excessive diarrhea  Either feast or famine  Stay with 145 mcg    Type 2 diabetes mellitus without complication, without long-term current use of insulin (HCC)  Hemoglobin A1c performed today in the office was at 6 5%  Lab Results   Component Value Date    HGBA1C 6 5 01/26/2023     Doing well  Patient remains on metformin  Happy with hemoglobin A1c less than 7%  Repeat diabetic parameters in 4 months    Primary hypertension  Very well controlled on lisinopril/hydrochlorothiazide  Continue same    Mixed hyperlipidemia  Cholesterol still remains very well controlled on atorvastatin 10 mg once daily  Repeat lipid panel to be done in 4 months  Goal LDL less than 70    Major depressive disorder, recurrent, mild ([address] )  Managed per psychiatry as well as her therapist on an outpatient basis    Dry scalp  Subdermal of the scalp  Does not really like the way that she has to wait 15 minutes before shampooing out clobetasol shampoo    Given a prescription for triamcinolone ointment which she can simply rub into her scalp          Problem List Items Addressed This Visit        Digestive    Chronic idiopathic constipation     Patient remains on Linzess 145 mcg once daily  It sounds as though she is having adequate bowel movements  At 1 point we did try increasing her dose of Linzess to 290 mcg daily and she developed excessive diarrhea  Either feast or famine  Stay with 145 mcg            Endocrine    Type 2 diabetes mellitus without complication, without long-term current use of insulin (HCC) - Primary     Hemoglobin A1c performed today in the office was at 6 5%  Lab Results   Component Value Date    HGBA1C 6 5 01/26/2023     Doing well  Patient remains on metformin  Happy with hemoglobin A1c less than 7%  Repeat diabetic parameters in 4 months         Relevant Orders    POCT hemoglobin A1c (Completed)    Hemoglobin A1C    Microalbumin / creatinine urine ratio       Cardiovascular and Mediastinum    Primary hypertension     Very well controlled on lisinopril/hydrochlorothiazide  Continue same         Relevant Orders    CBC and differential       Other    Dry scalp     Subdermal of the scalp  Does not really like the way that she has to wait 15 minutes before shampooing out clobetasol shampoo  Given a prescription for triamcinolone ointment which she can simply rub into her scalp         Relevant Medications    triamcinolone (KENALOG) 0 1 % lotion    Generalized anxiety disorder    Relevant Orders    TSH, 3rd generation with Free T4 reflex    Major depressive disorder, recurrent, mild ([address] )     Managed per psychiatry as well as her therapist on an outpatient basis         Mixed hyperlipidemia     Cholesterol still remains very well controlled on atorvastatin 10 mg once daily  Repeat lipid panel to be done in 4 months    Goal LDL less than 70         Relevant Orders    Comprehensive metabolic panel    Lipid panel

## 2023-01-26 NOTE — ASSESSMENT & PLAN NOTE
Patient remains on Linzess 145 mcg once daily  It sounds as though she is having adequate bowel movements  At 1 point we did try increasing her dose of Linzess to 290 mcg daily and she developed excessive diarrhea  Either feast or famine    Stay with 145 mcg

## 2023-01-26 NOTE — ASSESSMENT & PLAN NOTE
Hemoglobin A1c performed today in the office was at 6 5%  Lab Results   Component Value Date    HGBA1C 6 5 01/26/2023     Doing well  Patient remains on metformin  Happy with hemoglobin A1c less than 7%    Repeat diabetic parameters in 4 months

## 2023-02-02 ENCOUNTER — TELEPHONE (OUTPATIENT)
Dept: PSYCHIATRY | Facility: CLINIC | Age: 83
End: 2023-02-02

## 2023-02-02 NOTE — TELEPHONE ENCOUNTER
Patient daughter called and is question the dose on Gabapentin  Patient is stating that she is only take 1 cap BID and the bottle says 2 AM, 1 afternoon, 2 HS  Daughter is saying that the patient is doing well and is not sure what to do  Daughter is worried that making a jump from 200mg to 500mg is to much for her  Can you please call patient daughter and let her know what she should do

## 2023-02-03 ENCOUNTER — TELEPHONE (OUTPATIENT)
Dept: PSYCHIATRY | Facility: CLINIC | Age: 83
End: 2023-02-03

## 2023-02-03 NOTE — TELEPHONE ENCOUNTER
Called patient's daughter back, per request, she reports that Casie Zamarripa is only taking 100 mg qAM and 100 qHS though per Dr Eva Mason notes, she is supposed to be taking 200 mg qAM, 100 mg afternoon, and 200 mg qhs  Per Landon Pablo, she does not have in her notes of this increase and she attends all of the medication management appointments  They would like to continue gabapentin at 100 mg bid, as they have been doing  Plan to continue gabapentin 100 mg bid, to be reevaluated during next appointment with Dr Lawrence Quintero  No other questions or concerns at this time

## 2023-02-06 DIAGNOSIS — F41.1 GENERALIZED ANXIETY DISORDER: ICD-10-CM

## 2023-02-06 RX ORDER — CLONAZEPAM 0.5 MG/1
0.5 TABLET ORAL 2 TIMES DAILY
Qty: 60 TABLET | Refills: 0 | Status: SHIPPED | OUTPATIENT
Start: 2023-02-06 | End: 2023-03-08

## 2023-02-06 RX ORDER — CLONAZEPAM 0.5 MG/1
0.5 TABLET ORAL 2 TIMES DAILY
Qty: 60 TABLET | Refills: 0
Start: 2023-02-06 | End: 2023-02-06 | Stop reason: SDUPTHER

## 2023-02-11 DIAGNOSIS — K59.04 CHRONIC IDIOPATHIC CONSTIPATION: ICD-10-CM

## 2023-02-20 DIAGNOSIS — F33.0 MAJOR DEPRESSIVE DISORDER, RECURRENT, MILD (HCC): ICD-10-CM

## 2023-02-20 DIAGNOSIS — F45.1 SOMATIC SYMPTOM DISORDER: ICD-10-CM

## 2023-02-20 RX ORDER — RISPERIDONE 0.25 MG/1
0.25 TABLET ORAL
Qty: 30 TABLET | Refills: 0 | Status: SHIPPED | OUTPATIENT
Start: 2023-02-20

## 2023-03-06 DIAGNOSIS — F41.1 GENERALIZED ANXIETY DISORDER: ICD-10-CM

## 2023-03-06 RX ORDER — CLONAZEPAM 0.5 MG/1
0.5 TABLET ORAL 2 TIMES DAILY
Qty: 60 TABLET | Refills: 0
Start: 2023-03-06 | End: 2023-03-06 | Stop reason: SDUPTHER

## 2023-03-06 RX ORDER — CLONAZEPAM 0.5 MG/1
0.5 TABLET ORAL 2 TIMES DAILY
Qty: 60 TABLET | Refills: 0 | Status: SHIPPED | OUTPATIENT
Start: 2023-03-06 | End: 2023-04-05

## 2023-03-11 ENCOUNTER — HOSPITAL ENCOUNTER (EMERGENCY)
Facility: HOSPITAL | Age: 83
Discharge: HOME | End: 2023-03-11
Attending: EMERGENCY MEDICINE | Admitting: EMERGENCY MEDICINE

## 2023-03-11 VITALS
TEMPERATURE: 97.8 F | OXYGEN SATURATION: 98 % | RESPIRATION RATE: 18 BRPM | DIASTOLIC BLOOD PRESSURE: 79 MMHG | HEART RATE: 81 BPM | SYSTOLIC BLOOD PRESSURE: 183 MMHG

## 2023-03-11 DIAGNOSIS — L29.9 ITCHY SCALP: Primary | ICD-10-CM

## 2023-03-12 NOTE — ED PROVIDER NOTES
History  Chief Complaint   Patient presents with   • Medical Problem     As per daughter patient has been complaining of heda irritation, feels like a bruise/as if patient bumped head but denies injury, progressively  getting worse, was treated with cream/shampoo     78year old female presenting today with concerns of scalp irritation  Patient states that she has tried shampoo and cream but states that it feels like she has a bruise and pain on her scalp  Patient unable to localize where she gets pain at this time  Patient denies any falls or headstrikes  Patient's daughter states that she has been scratching her head  Patient states that when she lays her head on the pillow, after a while she will need to swap positions because she experiences discomfort in her scalp, but it improves when she moves her head  Patient denies any vision loss, falls, headstrikes, ear pain, difficultyt swallowing, photophobia, or any other symptoms at this time  Prior to Admission Medications   Prescriptions Last Dose Informant Patient Reported? Taking? FLUoxetine (PROzac) 20 mg capsule   No No   Sig: Take 1 capsule (20 mg total) by mouth daily for 7 days   Take together with 40 mg dose for a total daily dose of 60 mg     FLUoxetine (PROzac) 40 MG capsule   No No   Sig: Take 1 capsule (40 mg total) by mouth daily   aspirin 81 MG tablet   Yes No   Sig: Take 81 mg by mouth daily   atorvastatin (LIPITOR) 10 mg tablet   No No   Sig: Take 1 tablet (10 mg total) by mouth daily   cholecalciferol (VITAMIN D3) 1,000 units tablet   Yes No   Sig: Take 1,000 Units by mouth daily   clonazePAM (KlonoPIN) 0 5 mg tablet   No No   Sig: Take 1 tablet (0 5 mg total) by mouth 2 (two) times a day   docusate sodium (COLACE) 100 mg capsule   Yes No   Sig: Take 100 mg by mouth 2 (two) times a day   gabapentin (Neurontin) 100 mg capsule   No No   Sig: Take 2 capsule (200 mg total) in the morning, 1 capsule (100 mg total)  in the afternoon, and 2 capsules (200 mg total) at night time  linaCLOtide 145 MCG CAPS   No No   Sig: Take 1 capsule (145 mcg total) by mouth in the morning   lisinopril-hydrochlorothiazide (PRINZIDE,ZESTORETIC) 10-12 5 MG per tablet   No No   Sig: Take 1 tablet by mouth daily   metFORMIN (GLUCOPHAGE) 500 mg tablet   No No   Sig: TAKE ONE TABLET BY MOUTH TWICE A DAY WITH MEALS   metFORMIN (GLUCOPHAGE) 500 mg tablet   No No   Sig: TAKE ONE TABLET BY MOUTH TWICE A DAY WITH MEALS   risperiDONE (RisperDAL) 0 25 mg tablet   No No   Sig: Take 1 tablet (0 25 mg total) by mouth daily at bedtime   triamcinolone (KENALOG) 0 1 % lotion   No No   Sig: Apply topically in the morning      Facility-Administered Medications: None       Past Medical History:   Diagnosis Date   • Colon polyps    • Diabetes mellitus (HCC)    • Dizziness     Last assessed: 8/31/15   • DVT (deep venous thrombosis) (HCC)    • Dysuria    • Hematuria 2022   • Hyperlipidemia    • Hypertension    • Hypertensive urgency 10/22/2021   • Insomnia    • Ureterolithiasis 2020       Past Surgical History:   Procedure Laterality Date   •  SECTION      1966 son, 26 daughter   • COLONOSCOPY     • FL RETROGRADE PYELOGRAM  2020   • LA COLONOSCOPY FLX DX W/COLLJ SPEC WHEN PFRMD N/A 3/27/2017    Procedure: COLONOSCOPY;  Surgeon: Adry Arias MD;  [facility]: [facility];   Service: Gastroenterology   • LA CYSTO/URETERO W/LITHOTRIPSY &INDWELL STENT INSRT Right 2020    Procedure: CYSTOSCOPY URETEROSCOPY WITH LITHOTRIPSY HOLMIUM LASER, RETROGRADE PYELOGRAM AND INSERTION STENT URETERAL; EXCISION OF BLADDER TUMOR;  Surgeon: Demetra Finney MD;  Location: [facility];  Service: Urology   • ROTATOR CUFF REPAIR Left     Last assessed: 3/29/15   • TONSILLECTOMY         Family History   Problem Relation Age of Onset   • Depression Mother         w/anxiety   • Diabetes Mother         Mellitus   • Breast cancer Mother    • Hypertension Mother    • No Known Problems Father    • Depression Sister         w/anxiety   • Heart disease Sister         Cardiac Disorder   • Breast cancer Sister    • Hypertension Sister    • Diabetes Brother         Mellitus   • Alcohol abuse Son      I have reviewed and agree with the history as documented  E-Cigarette/Vaping   • E-Cigarette Use Never User      E-Cigarette/Vaping Substances   • Nicotine No    • THC No    • CBD No    • Flavoring No    • Other No    • Unknown No      Social History     Tobacco Use   • Smoking status: Never   • Smokeless tobacco: Never   Vaping Use   • Vaping Use: Never used   Substance Use Topics   • Alcohol use: Never   • Drug use: No       Review of Systems   Constitutional: Negative for chills and fever  HENT: Negative for ear pain and sore throat  Head itchiness and scalp pain     Eyes: Negative for pain and visual disturbance  Respiratory: Negative for cough and shortness of breath  Cardiovascular: Negative for chest pain and palpitations  Gastrointestinal: Negative for abdominal pain and vomiting  Genitourinary: Negative for dysuria and hematuria  Musculoskeletal: Negative for arthralgias and back pain  Skin: Negative for color change and rash  Neurological: Negative for seizures and syncope  All other systems reviewed and are negative  Physical Exam  Physical Exam  Vitals and nursing note reviewed  Constitutional:       General: She is not in acute distress  Appearance: She is well-developed  She is not ill-appearing  HENT:      Head: Normocephalic and atraumatic  Comments: Dry scalp noted, no lacerations, or signs of injury  Eyes:      Conjunctiva/sclera: Conjunctivae normal    Cardiovascular:      Rate and Rhythm: Normal rate and regular rhythm  Heart sounds: No murmur heard  Pulmonary:      Effort: Pulmonary effort is normal  No respiratory distress  Breath sounds: Normal breath sounds  Abdominal:      Palpations: Abdomen is soft  Tenderness:  There is no abdominal tenderness  Musculoskeletal:         General: No swelling  Cervical back: Neck supple  Skin:     General: Skin is warm and dry  Capillary Refill: Capillary refill takes less than 2 seconds  Neurological:      Mental Status: She is alert  Psychiatric:         Mood and Affect: Mood normal          Vital Signs  ED Triage Vitals   Temperature Pulse Respirations Blood Pressure SpO2   03/11/23 1520 03/11/23 1519 03/11/23 1519 03/11/23 1520 03/11/23 1519   97 8 °F (36 6 °C) 88 18 (!) 183/79 99 %      Temp Source Heart Rate Source Patient Position - Orthostatic VS BP Location FiO2 (%)   03/11/23 1520 03/11/23 1519 03/11/23 1530 03/11/23 1530 --   Oral Monitor Lying Right arm       Pain Score       --                  Vitals:    03/11/23 1519 03/11/23 1520 03/11/23 1530   BP:  (!) 183/79 (!) 183/79   Pulse: 88  81   Patient Position - Orthostatic VS:   Lying         Visual Acuity      ED Medications  Medications - No data to display    Diagnostic Studies  Results Reviewed     None                 No orders to display              Procedures  Procedures         ED Course                                             Medical Decision Making  78year old female presenting today with concerns of itchy and discomfort in her scalp  Unable to identify a specific region  Patient's head atraumatic, normocephalic and without any signs of traumas  No recent falls or headstrikes  Poatient has tried shampoo and steroid cream to help with her dry hair but states that she has not seen any improvement  Daughter states that she will try a treatment for a couple days and then stop due to her not thinking that it is working  Admits to some stress recently in her life  Patient requesting head CT because she "thinks that she has a brain tumor"   Patient without any neurologic deficits, and well appearing on exam  Patient informed that she does not meet any criteria for a head CT, and it would likely be a wasted exam/charge for her  Will discharge patient with close follow up to PCP and have her purchase over the counter steroid/cooling cream for instant relief of any itching or head pain, as well as to help with her dry scalp  Patient at time of discharge was well appearing and in no acute distress  All questions answered  Disposition  Final diagnoses:   Itchy scalp     Time reflects when diagnosis was documented in both MDM as applicable and the Disposition within this note     Time User Action Codes Description Comment    3/11/2023  4:01 PM Misbah Frandy Add [L29 9] Itchy scalp       ED Disposition     ED Disposition   Discharge    Condition   Stable    Date/Time   Sat Mar 11, 2023  4:01 PM    [address] discharge to home/self care                 Follow-up Information     Follow up With Specialties Details Why Contact Info Additional [address] Emergency Department Emergency Medicine Go to  If symptoms worsen [address] Emergency Department, [address], [facility], [address] [facility],  Family Medicine Schedule an appointment as soon as possible for a visit  As needed [address] [facility]  [phone]             Discharge Medication List as of 3/11/2023  4:03 PM      CONTINUE these medications which have NOT CHANGED    Details   aspirin 81 MG tablet Take 81 mg by mouth daily, Historical Med      atorvastatin (LIPITOR) 10 mg tablet Take 1 tablet (10 mg total) by mouth daily, Starting Mon 1/16/2023, Normal      cholecalciferol (VITAMIN D3) 1,000 units tablet Take 1,000 Units by mouth daily, Historical Med      clonazePAM (KlonoPIN) 0 5 mg tablet Take 1 tablet (0 5 mg total) by mouth 2 (two) times a day, Starting Mon 3/6/2023, Until Wed 4/5/2023, Normal      docusate sodium (COLACE) 100 mg capsule Take 100 mg by mouth 2 (two) times a day, Historical Med      FLUoxetine (PROzac) 40 MG capsule Take 1 capsule (40 mg total) by mouth daily, Starting Mon 1/23/2023, Until Fri 3/24/2023, Normal      gabapentin (Neurontin) 100 mg capsule Take 2 capsule (200 mg total) in the morning, 1 capsule (100 mg total)  in the afternoon, and 2 capsules (200 mg total) at night time , Normal      linaCLOtide 145 MCG CAPS Take 1 capsule (145 mcg total) by mouth in the morning, Starting Mon 2/13/2023, Normal      lisinopril-hydrochlorothiazide (PRINZIDE,ZESTORETIC) 10-12 5 MG per tablet Take 1 tablet by mouth daily, Starting Mon 1/16/2023, Normal      !! metFORMIN (GLUCOPHAGE) 500 mg tablet TAKE ONE TABLET BY MOUTH TWICE A DAY WITH MEALS, Normal      !! metFORMIN (GLUCOPHAGE) 500 mg tablet TAKE ONE TABLET BY MOUTH TWICE A DAY WITH MEALS, Normal      risperiDONE (RisperDAL) 0 25 mg tablet Take 1 tablet (0 25 mg total) by mouth daily at bedtime, Starting Mon 2/20/2023, Normal      triamcinolone (KENALOG) 0 1 % lotion Apply topically in the morning, Starting Thu 1/26/2023, Normal       !! - Potential duplicate medications found  Please discuss with provider  No discharge procedures on file      PDMP Review       Value Time User    PDMP Reviewed  Yes 3/6/2023 10:22 AM Debra Marquez MD          ED Provider  Electronically Signed by           Nydia Naranjo PA-C  03/12/23 [id number]

## 2023-03-13 DIAGNOSIS — K59.04 CHRONIC IDIOPATHIC CONSTIPATION: ICD-10-CM

## 2023-03-17 ENCOUNTER — TELEMEDICINE (OUTPATIENT)
Dept: PSYCHIATRY | Facility: CLINIC | Age: 83
End: 2023-03-17

## 2023-03-17 DIAGNOSIS — F33.0 MAJOR DEPRESSIVE DISORDER, RECURRENT, MILD (HCC): ICD-10-CM

## 2023-03-17 DIAGNOSIS — F41.1 GENERALIZED ANXIETY DISORDER: Primary | ICD-10-CM

## 2023-03-17 DIAGNOSIS — F45.1 SOMATIC SYMPTOM DISORDER: ICD-10-CM

## 2023-03-17 NOTE — PSYCH
Behavioral Health Psychotherapy Progress Note    Psychotherapy Provided: Individual Psychotherapy     1  Generalized anxiety disorder        2  Major depressive disorder, recurrent, mild ([address] )        3  Somatic symptom disorder            Goals addressed in session: Goal 1      DATA: Not so good  She says that she is in trouble again  Last Saturday, she decided that she will not be waking up with itchy scalp, worried aabout BM, and then went to the ED  Had recent evaluation with scalp by PCP, which she found not useful  Daughter was mad, and then then her JEANIE drove to the hospital  She was reexamined in the ED which made her feel well  Afterwards started having problems with Zulema Faith  After dinner had argument with daughter, told her that her daughter made "terrible dinner" and she notes that she was "nasty" and mean to her daughter and daughter walked out of the room  She wanted to argue with Zulema Faith, to see where she is not being right or doing things that she feels is not right (she does not get help with housework, though Zulema Faith says that she will take care of it, but then Lois Reyna feels guilty about it )     Next day she realized she was sorry  She went to store with her BEN the next day for grocery shopping  She notes that Risperdal making her feel better and she has been talking more  She is anxious because daughter's BEN's family will visit this weekend  Wants others to feel sorry for Lois Reyna, that Janay's mean and making it worse by having her BEN live with them  Does not have a "man" protecting her  Feels afraid to be by herself  And wants attention from her daughter  Pt acknowledged that this is what is happening  She gets upset when daughter leaves to do things for herself  She gets scared  Feels afraid of alienating others  She says that she is tired of saying the same things in every session and begins to feel that she is "stupid "    Goal: wants to stop thinking about something being wrong with her  -----------------------------------------------------------------    During this session, this clinician used the following therapeutic modalities: Cognitive Behavioral Therapy and Supportive Psychotherapy    Substance Abuse was not addressed during this session  If the client is diagnosed with a co-occurring substance use disorder, please indicate any changes in the frequency or amount of use: N/A  Stage of change for addressing substance use diagnoses: No substance use/Not applicable    ASSESSMENT:  Lillie Figueroa presents with a Euthymic/ normal mood  her affect is Normal range and intensity, which is congruent, with her mood and the content of the session  The client has made minimal progress on their goals  Patient seems to continue exhibiting similar concerns that are source of anxiety despite repeated reassurance  Needs persistent reassurance, and the need to be taken care of other which has been ongoing since she was a child  She has had to rely on others to make decisions for her  Lillie Figueroa presents with a minimal risk of suicide, minimal risk of self-harm, and minimal risk of harm to others  For any risk assessment that surpasses a "low" rating, a safety plan must be developed  A safety plan was indicated: no  If yes, describe in detail N/A    PLAN: Between sessions, Lillie Figueroa will continue with individual therapy  At the next session, the therapist will use Cognitive Behavioral Therapy and Supportive Psychotherapy to address  Behavioral Health Treatment Plan and Discharge Planning: Lillie Figueroa is aware of and agrees to continue to work on their treatment plan  They have identified and are working toward their discharge goals   yes    Visit start and stop times:    03/17/23    Visit Time    Visit Start Time: 8:00 AM  Visit Stop Time: 8:58 AM  Total Visit Duration: 58 minutes

## 2023-03-23 DIAGNOSIS — F45.1 SOMATIC SYMPTOM DISORDER: ICD-10-CM

## 2023-03-23 DIAGNOSIS — F33.0 MAJOR DEPRESSIVE DISORDER, RECURRENT, MILD (HCC): ICD-10-CM

## 2023-03-23 RX ORDER — RISPERIDONE 0.25 MG/1
0.25 TABLET ORAL
Qty: 30 TABLET | Refills: 0 | Status: SHIPPED | OUTPATIENT
Start: 2023-03-23

## 2023-03-31 ENCOUNTER — TELEPHONE (OUTPATIENT)
Dept: PSYCHIATRY | Facility: CLINIC | Age: 83
End: 2023-03-31

## 2023-03-31 ENCOUNTER — TELEMEDICINE (OUTPATIENT)
Dept: PSYCHIATRY | Facility: CLINIC | Age: 83
End: 2023-03-31

## 2023-03-31 DIAGNOSIS — F45.1 SOMATIC SYMPTOM DISORDER: ICD-10-CM

## 2023-03-31 DIAGNOSIS — F33.0 MAJOR DEPRESSIVE DISORDER, RECURRENT, MILD (HCC): ICD-10-CM

## 2023-03-31 DIAGNOSIS — F41.1 GENERALIZED ANXIETY DISORDER: Primary | ICD-10-CM

## 2023-03-31 NOTE — PSYCH
Virtual Regular Visit    Verification of patient location:    Patient is located in the following state in which I hold an active license [place]      Assessment/Plan:    Problem List Items Addressed This Visit        Other    Major depressive disorder, recurrent, mild ([facility] [address] )    Generalized anxiety disorder - Primary   Other Visit Diagnoses     Somatic symptom disorder              Goals addressed in session: Goal 1          Reason for visit is No chief complaint on file  Encounter provider Colonel Gottron, MD    Provider located at [address] [place] [id number] [phone]      Recent Visits  No visits were found meeting these conditions  Showing recent visits within past 7 days and meeting all other requirements  Future Appointments  No visits were found meeting these conditions  Showing future appointments within next 150 days and meeting all other requirements       The patient was identified by name and date of birth  Rangel Rogel was informed that this is a telemedicine visit and that the visit is being conducted throughthe [employer] platform  She agrees to proceed     My office door was closed  No one else was in the room  She acknowledged consent and understanding of privacy and security of the video platform  The patient has agreed to participate and understands they can discontinue the visit at any time  Patient is aware this is a billable service  Behavioral Health Psychotherapy Progress Note    Psychotherapy Provided: Individual Psychotherapy     1  Generalized anxiety disorder        2  Major depressive disorder, recurrent, mild ([facility] [address] )        3  Somatic symptom disorder            Goals addressed in session: Goal 1     DATA: Elvin Boas is reporting that things are the same as they were with no changes  Still having problems with scalp   Daughter BEN made comments towards her daughter, that she doesn't tell him "anything  Saw another therapist, didn't want to talk about her issues  She focused on doing worksheets instead  Talked about health anxiety with therapist  She thinks that her daughter is overwhelmed with everything  Notes that it doesn't help her to talk about negative thinks, except to think that she is thankful that she is here  She thinks that she is actually complaining about herself  \"Do I want to live with it, the scalp itchiness? \"     Yumiko Farrell one 3/19: Wrote about her daily routine, then talked about her daughter  She thought that she would be going to shopping, but plans changed  3/20: It was 1 week using OTC headcream  3/21: somatic symptom complaints  Upset that her day is ruined because of it  She didn't follow any instruction to put on any treatment recs for itchy scalp  She notes that one somatic issue finishes another starts  She says that she doesn't know what to do  Asked Janay's DIL to take her to grocery store to buy cereal for granddaughter and cookies for grandson  CBT, perspective, made her realize that her requests of her daughter is not working  She says that she will try not to attack her when she comes from work  She will not talk about herself or problems to [place]  She will talk about things that happened around the house  She doesn't have anyone to talk to all day, and therefore she feels lonely  Calls brother often  Gets panicky when she doesn't call her brother  She says that sometimes she doesn't call her friend and makes excuses for not calling on them  Wants to no longer just talk about herself, she says that she is frustrated with her problems and wants to get better  During this session, this clinician used the following therapeutic modalities: Cognitive Behavioral Therapy    Substance Abuse was not addressed during this session   If the client is diagnosed with a co-occurring substance use disorder, please indicate any changes in the frequency or amount " "of use: N/a  Stage of change for addressing substance use diagnoses: No substance use/Not applicable    ASSESSMENT:  Shaquille Blevins presents with a Euthymic/ normal mood  her affect is Constricted, which is congruent, with her mood and the content of the session  The client has made some progress on their goals  Shaquille Blevins presents with a minimal risk of suicide, minimal risk of self-harm, and minimal risk of harm to others  For any risk assessment that surpasses a \"low\" rating, a safety plan must be developed  A safety plan was indicated: no  If yes, describe in detail n/a    PLAN: Between sessions, Shaquille Blevins will continue to journal about her negative emotions and reflect on the reason for why she feels resentment and anger towards her daughter and herself  At the next session, the therapist will use Cognitive Behavioral Therapy to address the automatic and fixed beliefs  Referral given to 60+ group psychotherapy  Behavioral Health Treatment Plan and Discharge Planning: Shaquille Blevins is aware of and agrees to continue to work on their treatment plan  They have identified and are working toward their discharge goals   yes    Visit start and stop times:    03/31/23     Visit Time    Visit Start Time: 8:02 AM  Visit Stop Time: 9:07 AM  Total Visit Duration: 65 minutes      "

## 2023-03-31 NOTE — TELEPHONE ENCOUNTER
Pt called to get her appt for 4/3/23 at 8:00am switched to a virtual visit due to transportation issues   Writer switched appt

## 2023-04-03 ENCOUNTER — TELEMEDICINE (OUTPATIENT)
Dept: PSYCHIATRY | Facility: CLINIC | Age: 83
End: 2023-04-03

## 2023-04-03 DIAGNOSIS — E11.9 TYPE 2 DIABETES MELLITUS WITHOUT COMPLICATION, WITHOUT LONG-TERM CURRENT USE OF INSULIN (HCC): ICD-10-CM

## 2023-04-03 DIAGNOSIS — F33.0 MAJOR DEPRESSIVE DISORDER, RECURRENT, MILD (HCC): Primary | ICD-10-CM

## 2023-04-03 DIAGNOSIS — F41.1 GENERALIZED ANXIETY DISORDER: ICD-10-CM

## 2023-04-03 DIAGNOSIS — F45.1 SOMATIC SYMPTOM DISORDER: ICD-10-CM

## 2023-04-03 DIAGNOSIS — F33.1 MAJOR DEPRESSIVE DISORDER, RECURRENT, MODERATE (HCC): ICD-10-CM

## 2023-04-03 RX ORDER — FLUOXETINE HYDROCHLORIDE 40 MG/1
40 CAPSULE ORAL DAILY
Qty: 30 CAPSULE | Refills: 2 | Status: SHIPPED | OUTPATIENT
Start: 2023-04-03 | End: 2023-06-02

## 2023-04-03 RX ORDER — GABAPENTIN 100 MG/1
100 CAPSULE ORAL 2 TIMES DAILY
Qty: 90 CAPSULE | Refills: 1 | Status: SHIPPED | OUTPATIENT
Start: 2023-04-03

## 2023-04-03 RX ORDER — RISPERIDONE 0.25 MG/1
0.25 TABLET ORAL 2 TIMES DAILY
Qty: 60 TABLET | Refills: 2 | Status: SHIPPED | OUTPATIENT
Start: 2023-04-03 | End: 2023-05-03

## 2023-04-03 RX ORDER — CLONAZEPAM 0.5 MG/1
0.5 TABLET ORAL 2 TIMES DAILY
Qty: 60 TABLET | Refills: 0 | Status: SHIPPED | OUTPATIENT
Start: 2023-04-03 | End: 2023-05-03

## 2023-04-03 NOTE — PSYCH
MEDICATION MANAGEMENT NOTE - VIRTUAL        [facility]      Name and Date of Birth:  Spencer Joaquin 78 y o  1940 MRN: [id number]    Date of Visit: April 3, 2023    Virtual Regular Visit    Verification of patient location:    Patient is located in the following state in which I hold an active license [place]    Encounter provider Yumiko Salazar MD    Provider located at [address] [place] [id number] [phone]    Recent Visits  Date Type Provider Dept   03/31/23 Telephone Keily Mckenna MD [facility]   03/31/23 Telemedicine [facility] recent visits within past 7 days and meeting all other requirements  Future Appointments  No visits were found meeting these conditions  Showing future appointments within next 150 days and meeting all other requirements      The patient was identified by name and date of birth  Spencer Joaquin was informed that this is a telemedicine visit and that the visit is being conducted throughthe [employer] platform  She agrees to proceed     My office door was closed  The patient was notified the following individuals were present in the room: Dr Suleman Jauregui  They acknowledged consent and understanding of privacy and security of the video platform  The patient has agreed to participate and understands they can discontinue the visit at any time  Patient is aware this is a billable service  Reason for Visit: Follow-up visit for medication management     SUBJECTIVE:    Minnie Ortiz is a 78 y o   female, , lives with daughter, son-in-law, and 3 grandkids, retiredw/ PMH of diabetes, hyperlipidemia, hypertension and PPH of MDD, JONAH, one prior psychiatric admissions, no prior SA, who presented to the mental health clinic virtually for medication management       At this time, Minnie Ortiz states that she still has intrusive thoughts about somatic symptoms and anxiety  According to daughter Asia Headley who was present, she says that Sandra's mood changes depending on day  Has good days at times  Unsure of what causes negative days  Asia Headley states that when Risperdal was started it worked as she felt more confident and less anxious, but the effects decreased after several weeks  However, she notes that there have been improvement in mood swings, that she is no longer breaking down and crying as frequently  Reports good sleep, appetite, and energy levels  Denies medication SE  [place] denies SI/HI, and self harm  [place] denies symptoms of psychosis including AVH, delusions, and paranoia  Denies symptoms consistent with presentation of eligio or hypomania at this time  Current Rating Scores:     None completed today  Review Of Systems:      Constitutional negative   ENT negative   Cardiovascular negative   Respiratory negative   Gastrointestinal negative   Genitourinary negative   Musculoskeletal negative   Integumentary negative   Neurological negative   Endocrine negative   Other Symptoms none, all other systems are negative       Past Psychiatric History: (unchanged information from previous note copied and italicized) - Information that is bolded has been updated       Past diagnoses: MDD, Anxiety, OCD  Inpatient psychiatric admissions: one previous admission for 3 weeks at [facility] in 2013 for panic attack after surgery  Prior outpatient psychiatric linkage: Dr Brandon Kelly until transition of care  Past/current psychotherapy: sees Dr Pooja Robert for the past 2-3 years  History of suicidal attempts/gestures: denies  History of violence/aggressive behaviors: denies  Psychotropic medication trials: Effexor, Zoloft, Prozac, Lexapro, Buspar, Klonopin, Prasozin (syncopy?), Abilify (dizziness), Risperdal (new)  Substance abuse inpatient/outpatient rehabilitation: denies     Substance Abuse History: (unchanged information from previous note copied and italicized) - Information that is bolded has been updated  Denies history of alcohol, illict substance, or tobacco abuse  Denies past legal actions or arrests secondary to substance intoxication  The patient denies prior DWIs/DUIs  Mar Che does not exhibit objective evidence of substance withdrawal during today's examination nor does Mar Che appear under the influence of any psychoactive substance  Social History: (unchanged information from previous note copied and italicized) - Information that is bolded has been updated  Denies a history of milestone/developmental delay  Denies a history of in-utero exposure to toxins/illicit substances  There is no documented history of IEP or need for special education  Education: Bachelor of Arts  Marital history:  since 2006 since   from cancer  Living arrangement, social support: lives with daughter, son in law, and 3 grandkids in [place], [place]  Social support includes brother, daughter, S-I-L, and 3 grandkids  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 [address] )    • Dizziness     Last assessed: 8/31/15   • DVT (deep venous thrombosis) ([facility] [address] )    • Dysuria    • Hematuria 2022   • Hyperlipidemia    • Hypertension    • Hypertensive urgency 10/22/2021   • Insomnia    • Ureterolithiasis 2020        Past Surgical History:   Procedure Laterality Date   •  SECTION      1964 son, 26 daughter   • COLONOSCOPY • FL RETROGRADE PYELOGRAM  5/22/2020   • GA COLONOSCOPY FLX DX W/COLLJ SPEC WHEN PFRMD N/A 3/27/2017    Procedure: COLONOSCOPY;  Surgeon: Marcelino Serna MD;  Location: [facility]; Service: Gastroenterology   • GA CYSTO/URETERO W/LITHOTRIPSY &INDWELL STENT INSRT Right 5/22/2020    Procedure: CYSTOSCOPY URETEROSCOPY WITH LITHOTRIPSY HOLMIUM LASER, RETROGRADE PYELOGRAM AND INSERTION STENT URETERAL; EXCISION OF BLADDER TUMOR;  Surgeon: Kriss Rincon MD;  Location: [facility];  Service: Urology   • ROTATOR CUFF REPAIR Left     Last assessed: 3/29/15   • TONSILLECTOMY       No Known Allergies    Substance Abuse History:    Social History     Substance and Sexual Activity   Alcohol Use Never     Social History     Substance and Sexual Activity   Drug Use No       Social History:    Social History     Socioeconomic History   • Marital status:      Spouse name: Not on file   • Number of children: Not on file   • Years of education: Not on file   • Highest education level: Not on file   Occupational History   • Not on file   Tobacco Use   • Smoking status: Never   • Smokeless tobacco: Never   Vaping Use   • Vaping Use: Never used   Substance and Sexual Activity   • Alcohol use: Never   • Drug use: No   • Sexual activity: Not on file   Other Topics Concern   • Not on file   Social History Narrative    Lack of exercise     Social Determinants of Health     Financial Resource Strain: Low Risk    • Difficulty of Paying Living Expenses: Not very hard   Food Insecurity: Not on file   Transportation Needs: No Transportation Needs   • Lack of Transportation (Medical): No   • Lack of Transportation (Non-Medical):  No   Physical Activity: Not on file   Stress: Not on file   Social Connections: Not on file   Intimate Partner Violence: Not on file   Housing Stability: Not on file       Family Psychiatric History:     Family History   Problem Relation Age of Onset   • Depression Mother         w/anxiety   • Diabetes Mother "   Mellitus   • Breast cancer Mother    • Hypertension Mother    • No Known Problems Father    • Depression Sister         w/anxiety   • Heart disease Sister         Cardiac Disorder   • Breast cancer Sister    • Hypertension Sister    • Diabetes Brother         Mellitus   • Alcohol abuse Son        History Review: The following portions of the patient's history were reviewed and updated as appropriate: allergies, current medications, past family history, past medical history, past social history, past surgical history and problem list          OBJECTIVE:     Vital signs in last 24 hours: There were no vitals filed for this visit      Mental Status Evaluation:    Appearance appears stated age and casually dressed   Behavior calm and cooperative   Speech normal rate, normal volume, normal pitch   Mood \"\"I'm ok\"\"   Affect constricted   Thought Processes circumstantial and perseverative   Associations intact associations   Thought Content no overt delusions, , but has recurrent somatic preoccupation   Perceptual Disturbances: no auditory hallucinations, no visual hallucinations, does not appear responding to internal stimuli   Abnormal Thoughts  Risk Potential Denies suicidal or homicidal ideation, plan, or intent   Orientation oriented to person, place, time/date and situation   Memory recent and remote memory grossly intact   Consciousness alert and awake   Attention Span Concentration Span attention span and concentration are age appropriate   Intellect appears to be of average intelligence   Insight intact   Judgement intact   Muscle Strength and  Gait unable to assess today due to virtual visit   Motor activity unable to assess today due to virtual visit   Language no difficulty naming common objects   Fund of Knowledge adequate knowledge of current events  adequate fund of knowledge regarding past history  adequate fund of knowledge regarding vocabulary        Laboratory Results: I have personally reviewed all " pertinent laboratory/tests results    Recent Labs (last 6 months):   Office Visit on 01/26/2023   Component Date Value   • Hemoglobin A1C 01/26/2023 6 5    Orders Only on 01/09/2023   Component Date Value   • Total Cholesterol 01/09/2023 149    • HDL 01/09/2023 48 (L)    • Triglycerides 01/09/2023 162 (H)    • LDL Calculated 01/09/2023 75    • Chol HDLC Ratio 01/09/2023 3 1    • Non-HDL Cholesterol 01/09/2023 101    • Uric Acid 01/09/2023 4 9    • Glucose, Random 01/09/2023 105 (H)    • BUN 01/09/2023 26 (H)    • Creatinine 01/09/2023 0 82    • eGFR 01/09/2023 70    • SL AMB BUN/CREATININE RA* 01/09/2023 32 (H)    • Sodium 01/09/2023 140    • Potassium 01/09/2023 4 1    • Chloride 01/09/2023 105    • CO2 01/09/2023 28    • Calcium 01/09/2023 9 4    • Protein, Total 01/09/2023 6 3    • Albumin 01/09/2023 3 9    • Globulin 01/09/2023 2 4    • Albumin/Globulin Ratio 01/09/2023 1 6    • TOTAL BILIRUBIN 01/09/2023 0 4    • Alkaline Phosphatase 01/09/2023 55    • AST 01/09/2023 17    • ALT 01/09/2023 15    • Creatinine, Urine 01/09/2023 70    • Microalbum  ,U,Random 01/09/2023 1 3    • Microalb/Creat Ratio 01/09/2023 19    • TSH 01/09/2023 2 32        Suicide/Homicide Risk Assessment:  Risks Reviewed      Risk of Harm to Self:  ·           The following ratings are based on assessment at the time of the interview and review of records  ·           Demographic risk factors include: ,  status, elderly (76 or older)   ·           Historical Risk Factors include: chronic depressive symptoms, chronic anxiety symptoms  ·           Recent Specific Risk Factors include: diagnosis of mood disorder, current depressive symptoms, current anxiety symptoms  ·           Protective Factors: no current suicidal ideation, access to mental health treatment, being a parent, compliant with medications, compliant with mental health treatment, connection to own children, good health, having a sense of purpose or meaning in life, supportive family, fears of illness and fear of death  ·           Weapons: none  The following steps have been taken to ensure weapons are properly secured: not applicable  ·           Based on today's assessment, Blenda Sandhoff presents the following risk of harm to self: low     Risk of Harm to Others:  ·           The following ratings are based on assessment at the time of the interview and review of records  ·           Demographic Risk Factors include: none  ·           Historical Risk Factors include: none  ·           Recent Specific Risk Factors include: concomitant mood disorder  ·           Protective Factors: no current homicidal ideation, access to mental health treatment, being , compliant with medications, compliant with mental health treatment, connection to own children, support system, supportive friends  ·           Weapons: none  The following steps have been taken to ensure weapons are properly secured: not applicable  ·           Based on today's assessment, Blenda Sandhoff presents the following risk of harm to others: low    The following interventions are recommended: no intervention changes needed      Lethality Statement:  Based on today's assessment and clinical criteria, Maite Villatoro contracts for safety and is not an imminent risk of harm to self or others  Outpatient level of care is deemed appropriate at this current time  Blenda Sandhoff understands that if they can no longer contract for safety, they need to call the office or report to their nearest Emergency Room for immediate evaluation  Assessment/Plan:   Blenda Sandhoff is a 78 y o   female, , lives with daughter, son-in-law, and 3 grandkids, retiredw/ PMH of diabetes, hyperlipidemia, hypertension and PPH of MDD, JONAH, one prior psychiatric admissions, no prior SA, who presented to the mental health clinic virtually for medication management      At this time, patient is experiencing ongoing symptoms of anxiety,intrusive and obsessive thoughts about somatic issues, as well as rumination about her concerns  Initial improvement in these symptoms when Risperdal was started in January, and lasted for several weeks, but then the effect seems to have disappeared  At this time, will start Risperdal 0 25 mg in the morning to complement evening dose  Neurontin was decreased to 100 mg bid by family  Prozac now at 40 mg daily  Today's Plan: At this time, will start Risperdal 0 25 mg in the AM  Risks, potential side effects discussed with patient who verbalizes agreement  DSM-5 Diagnoses:   1  Major depressive disorder, recurrent, mild ([facility] [address] )    2  Generalized anxiety disorder    3  Somatic symptom disorder      Treatment Recommendations/Precautions:  • Increase Risperdal from 0 25 mg qhs to 0 25 mg bid for anxiety, obsessive intrusive thoughts, and depression  • Continue Prozac 40 mg daily for mood symptoms   • Continue Neurontin 100 mg bid (decreased by family) for anxiety  • Klonopin 0 5 mg bid for anxiety  • Medication management follow up in 8 weeks  • Continue medical management with PCP  • Aware of need to follow up with family physician for medical issues  • Aware of 24 hour and weekend coverage for urgent situations accessed by calling [facility] main practice number  • Recommend following up with ordered labs    Medications Risks/Benefits      Risks, Benefits And Possible Side Effects Of Medications:    Risks, benefits, and possible side effects of medications explained to Mark Lee and she verbalizes understanding and agreement for treatment      Risperdal PARQ completed including sedation, agitation, akathisia, TD, dystonic reactions, NMS, EPS, GI distress, dizziness, risk of metabolic syndrome, orthostatic hypotension and cardiovascular risks such as QT prolongation, increased prolactin    Controlled Medication Discussion:     Mark Lee has been filling controlled prescriptions on time as prescribed according to [place] [employer] Monitoring [facility]    Psychotherapy Provided:     Individual psychotherapy provided: Yes  Medications, treatment progress and treatment plan reviewed with Concepcion Aschoff  Medication changes discussed with Concepcion Aschoff  Medication education provided to Concepcion Aschoff  Treatment Plan:    Completed and signed during the session: Yes - Treatment Plan done but not signed at time of office visit due to: virtual visit  Plan reviewed in person and verbal consent given  Visit Time    Visit Start Time: 8:08 AM  Visit Stop Time: 8:42 AM  Total Visit Duration: 34 minutes    VIRTUAL VISIT [facility] verbally agrees to participate in [employer]  Pt is aware that [employer] could be limited without vital signs or the ability to perform a full hands-on physical exam  Eric Agee understands she or the provider may request at any time to terminate the video visit and request the patient to seek care or treatment in person       Yessenia Guerrero MD 04/03/23

## 2023-04-03 NOTE — BH TREATMENT PLAN
TREATMENT PLAN        [facility]    Name and Date of Birth:  Adelita Simons 78 y o  1940  Date of Treatment Plan: April 3, 2023  Diagnosis/Diagnoses:    1  Major depressive disorder, recurrent, mild ([address] )    2  Generalized anxiety disorder    3  Somatic symptom disorder    4  Major depressive disorder, recurrent, moderate ([facility])        Strengths/Personal Resources for Self-Care: supportive family, taking medications as prescribed, ability to reason, average or above intelligence, good physical health, motivation for treatment, ability to negotiate basic needs    Area/Areas of need: anxiety symptoms, depressive symptoms, obsessive-compulsive symptoms, somatic preoccupation    Long Term Goal: continue improvement in anxiety, continue improvement in obsessive thoughts, somatic preoccupation  Target Date: 6 months - October 3, 2023  Person/Persons responsible for completion of goal: Sushil Horner and Talib Wiley MD     Short Term Objective (s) - How will we reach this goal?:   1  Take medications as prescribed  2  Attend psychiatry appointments regularly  3  Continue psychotherapy regularly  4  Avoid alcohol   5  Avoid drugs   6  Try relaxation techniques  Target Date: 6 months - October 3, 2023  Person/Persons Responsible for Completion of Goal: Sushil Horner     Progress Towards Goals: Continuing treatment    Treatment Modality: medication management every 1-3 months as needed and continue psychotherapy  Review due 180 days from date of this plan: September 30, 2023   Expected length of service: Ongoing treatment    My physician and I have developed this plan together, and I agree to work on the goals and objectives  I understand the treatment goals that were developed for my treatment  The treatment plan was created between Talib Wiley MD and Adelita Simons on 04/03/23 at 8:30 AM but not signed at the time of the visit due to virtual visit   The plan was reviewed, and verbal consent was given

## 2023-04-04 DIAGNOSIS — R23.8 DRY SCALP: ICD-10-CM

## 2023-04-05 RX ORDER — TRIAMCINOLONE ACETONIDE 1 MG/ML
LOTION TOPICAL DAILY
Qty: 60 ML | Refills: 0 | Status: SHIPPED | OUTPATIENT
Start: 2023-04-05

## 2023-05-01 ENCOUNTER — HOSPITAL ENCOUNTER (EMERGENCY)
Facility: HOSPITAL | Age: 83
Discharge: HOME | End: 2023-05-02
Attending: EMERGENCY MEDICINE

## 2023-05-01 DIAGNOSIS — F41.1 GENERALIZED ANXIETY DISORDER: ICD-10-CM

## 2023-05-01 DIAGNOSIS — N20.0 KIDNEY STONE: ICD-10-CM

## 2023-05-01 DIAGNOSIS — N13.30 HYDRONEPHROSIS: ICD-10-CM

## 2023-05-01 DIAGNOSIS — R31.9 HEMATURIA: Primary | ICD-10-CM

## 2023-05-01 LAB
BACTERIA UR QL AUTO: ABNORMAL /HPF
BILIRUB UR QL STRIP: NEGATIVE
CAOX CRY URNS QL MICRO: ABNORMAL /HPF
CLARITY UR: CLEAR
COLOR UR: ABNORMAL
GLUCOSE UR STRIP-MCNC: NEGATIVE MG/DL
HGB UR QL STRIP.AUTO: ABNORMAL
KETONES UR STRIP-MCNC: NEGATIVE MG/DL
LEUKOCYTE ESTERASE UR QL STRIP: NEGATIVE
NITRITE UR QL STRIP: NEGATIVE
NON-SQ EPI CELLS URNS QL MICRO: ABNORMAL /HPF
PH UR STRIP.AUTO: 6.5 [PH]
PROT UR STRIP-MCNC: ABNORMAL MG/DL
RBC #/AREA URNS AUTO: ABNORMAL /HPF
SP GR UR STRIP.AUTO: 1.01 (ref 1–1.03)
UROBILINOGEN UR STRIP-ACNC: <2 MG/DL
WBC #/AREA URNS AUTO: ABNORMAL /HPF

## 2023-05-01 RX ORDER — CLONAZEPAM 0.5 MG/1
0.5 TABLET ORAL 2 TIMES DAILY
Qty: 60 TABLET | Refills: 0 | Status: SHIPPED | OUTPATIENT
Start: 2023-05-01 | End: 2023-05-31

## 2023-05-02 ENCOUNTER — APPOINTMENT (EMERGENCY)
Dept: CT IMAGING | Facility: HOSPITAL | Age: 83
End: 2023-05-02

## 2023-05-02 ENCOUNTER — TELEPHONE (OUTPATIENT)
Dept: UROLOGY | Facility: MEDICAL CENTER | Age: 83
End: 2023-05-02

## 2023-05-02 VITALS
DIASTOLIC BLOOD PRESSURE: 79 MMHG | HEART RATE: 70 BPM | OXYGEN SATURATION: 98 % | SYSTOLIC BLOOD PRESSURE: 176 MMHG | TEMPERATURE: 98 F | RESPIRATION RATE: 16 BRPM

## 2023-05-02 LAB
ALBUMIN SERPL BCP-MCNC: 3.9 G/DL (ref 3.5–5)
ALP SERPL-CCNC: 56 U/L (ref 34–104)
ALT SERPL W P-5'-P-CCNC: 14 U/L (ref 7–52)
ANION GAP SERPL CALCULATED.3IONS-SCNC: 7 MMOL/L (ref 4–13)
AST SERPL W P-5'-P-CCNC: 15 U/L (ref 13–39)
BASOPHILS # BLD AUTO: 0.03 THOUSANDS/ÂΜL (ref 0–0.1)
BASOPHILS NFR BLD AUTO: 1 % (ref 0–1)
BILIRUB SERPL-MCNC: 0.36 MG/DL (ref 0.2–1)
BUN SERPL-MCNC: 26 MG/DL (ref 5–25)
CALCIUM SERPL-MCNC: 9.4 MG/DL (ref 8.4–10.2)
CHLORIDE SERPL-SCNC: 102 MMOL/L (ref 96–108)
CO2 SERPL-SCNC: 29 MMOL/L (ref 21–32)
CREAT SERPL-MCNC: 0.9 MG/DL (ref 0.6–1.3)
EOSINOPHIL # BLD AUTO: 0.22 THOUSAND/ÂΜL (ref 0–0.61)
EOSINOPHIL NFR BLD AUTO: 4 % (ref 0–6)
ERYTHROCYTE [DISTWIDTH] IN BLOOD BY AUTOMATED COUNT: 13.4 % (ref 11.6–15.1)
GFR SERPL CREATININE-BSD FRML MDRD: 58 ML/MIN/1.73SQ M
GLUCOSE SERPL-MCNC: 150 MG/DL (ref 65–140)
HCT VFR BLD AUTO: 37.5 % (ref 34.8–46.1)
HGB BLD-MCNC: 11.7 G/DL (ref 11.5–15.4)
IMM GRANULOCYTES # BLD AUTO: 0.01 THOUSAND/UL (ref 0–0.2)
IMM GRANULOCYTES NFR BLD AUTO: 0 % (ref 0–2)
LYMPHOCYTES # BLD AUTO: 1.34 THOUSANDS/ÂΜL (ref 0.6–4.47)
LYMPHOCYTES NFR BLD AUTO: 26 % (ref 14–44)
MCH RBC QN AUTO: 28.5 PG (ref 26.8–34.3)
MCHC RBC AUTO-ENTMCNC: 31.2 G/DL (ref 31.4–37.4)
MCV RBC AUTO: 91 FL (ref 82–98)
MONOCYTES # BLD AUTO: 0.4 THOUSAND/ÂΜL (ref 0.17–1.22)
MONOCYTES NFR BLD AUTO: 8 % (ref 4–12)
NEUTROPHILS # BLD AUTO: 3.13 THOUSANDS/ÂΜL (ref 1.85–7.62)
NEUTS SEG NFR BLD AUTO: 61 % (ref 43–75)
NRBC BLD AUTO-RTO: 0 /100 WBCS
PLATELET # BLD AUTO: 176 THOUSANDS/UL (ref 149–390)
PMV BLD AUTO: 10.4 FL (ref 8.9–12.7)
POTASSIUM SERPL-SCNC: 3.9 MMOL/L (ref 3.5–5.3)
PROT SERPL-MCNC: 6.5 G/DL (ref 6.4–8.4)
RBC # BLD AUTO: 4.11 MILLION/UL (ref 3.81–5.12)
SODIUM SERPL-SCNC: 138 MMOL/L (ref 135–147)
WBC # BLD AUTO: 5.13 THOUSAND/UL (ref 4.31–10.16)

## 2023-05-02 RX ORDER — TAMSULOSIN HYDROCHLORIDE 0.4 MG/1
0.4 CAPSULE ORAL
Qty: 10 CAPSULE | Refills: 0 | Status: SHIPPED | OUTPATIENT
Start: 2023-05-02 | End: 2023-05-08 | Stop reason: SDUPTHER

## 2023-05-02 RX ADMIN — IOHEXOL 100 ML: 350 INJECTION, SOLUTION INTRAVENOUS at 00:51

## 2023-05-02 NOTE — ED PROVIDER NOTES
History  Chief Complaint   Patient presents with    Possible UTI     Reports an episode of dark urine followed by an episode of pink tinged urine  Denies pain  Hx kidney stones     78-year-old female presenting due to red urine  Patient states earlier today she noticed that her urine was dark when she peed  Patient states that the water in the toilet was red but when she wiped there was no blood so she does not think it was from her vagina  Patient denies any abdominal pain, nausea, vomiting, fever, chills, pain with urination, AMS  Patient does endorse a history of kidney stones  Prior to Admission Medications   Prescriptions Last Dose Informant Patient Reported? Taking?    FLUoxetine (PROzac) 40 MG capsule 5/2/2023  No Yes   Sig: Take 1 capsule (40 mg total) by mouth daily   aspirin 81 MG tablet 5/2/2023  Yes Yes   Sig: Take 81 mg by mouth daily   atorvastatin (LIPITOR) 10 mg tablet 5/1/2023  No Yes   Sig: Take 1 tablet (10 mg total) by mouth daily   cholecalciferol (VITAMIN D3) 1,000 units tablet 5/2/2023  Yes Yes   Sig: Take 1,000 Units by mouth daily   clonazePAM (KlonoPIN) 0 5 mg tablet 5/2/2023  No Yes   Sig: Take 1 tablet (0 5 mg total) by mouth 2 (two) times a day   docusate sodium (COLACE) 100 mg capsule 5/1/2023  Yes Yes   Sig: Take 100 mg by mouth 2 (two) times a day   gabapentin (Neurontin) 100 mg capsule 5/2/2023  No Yes   Sig: Take 1 capsule (100 mg total) by mouth 2 (two) times a day   linaCLOtide 145 MCG CAPS 5/2/2023  No Yes   Sig: Take 1 capsule (145 mcg total) by mouth in the morning   lisinopril-hydrochlorothiazide (PRINZIDE,ZESTORETIC) 10-12 5 MG per tablet 5/2/2023  No Yes   Sig: Take 1 tablet by mouth daily   metFORMIN (GLUCOPHAGE) 500 mg tablet 5/2/2023  No Yes   Sig: TAKE ONE TABLET BY MOUTH TWICE A DAY WITH MEALS   metFORMIN (GLUCOPHAGE) 500 mg tablet 5/2/2023  No Yes   Sig: Take 1 tablet (500 mg total) by mouth 2 (two) times a day with meals   risperiDONE (RisperDAL) 0 25 mg tablet 2023  No Yes   Sig: Take 1 tablet (0 25 mg total) by mouth 2 (two) times a day   triamcinolone (KENALOG) 0 1 % lotion Not Taking  No No   Sig: Apply topically in the morning   Patient not taking: Reported on 2023      Facility-Administered Medications: None       Past Medical History:   Diagnosis Date    Colon polyps     Diabetes mellitus ([address] )     Dizziness     Last assessed: 8/31/15    [facility] thrombosis[facility])     Dysuria     Hematuria 2022    Hyperlipidemia     Hypertension     Hypertensive urgency 10/22/2021    Insomnia     Ureterolithiasis 2020       Past Surgical History:   Procedure Laterality Date     SECTION      1964 son, 26 daughter    COLONOSCOPY      [facility] RETROGRADE PYELOGRAM  2020    NC COLONOSCOPY FLX DX W/COLLJ SPEC WHEN PFRMD N/A 3/27/2017    Procedure: COLONOSCOPY;  Surgeon: Howard Patterson MD;  [facility]; Service: Gastroenterology    NC CYSTO/URETERO W/LITHOTRIPSY &INDWELL STENT INSRT Right 2020    Procedure: CYSTOSCOPY URETEROSCOPY WITH LITHOTRIPSY HOLMIUM LASER, RETROGRADE PYELOGRAM AND INSERTION STENT URETERAL; EXCISION OF BLADDER TUMOR;  Surgeon: Mauri Solo MD;  Location: [facility];  Service: Urology    ROTATOR CUFF REPAIR Left     Last assessed: 3/29/15    TONSILLECTOMY         Family History   Problem Relation Age of Onset    Depression Mother         w/anxiety    Diabetes Mother         Mellitus    Breast cancer Mother     Hypertension Mother     No Known Problems Father     Depression Sister         w/anxiety    Heart disease Sister         Cardiac Disorder    Breast cancer Sister     Hypertension Sister     Diabetes Brother         Mellitus    Alcohol abuse Son      I have reviewed and agree with the history as documented      E-Cigarette/Vaping    E-Cigarette Use Never User      E-Cigarette/Vaping Substances    Nicotine No     THC No     CBD No     Flavoring No     Other No     Unknown No      Social History     Tobacco Use    Smoking status: Never    Smokeless tobacco: Never   Vaping Use    Vaping Use: Never used   Substance Use Topics    Alcohol use: Never    Drug use: No        Review of Systems   Constitutional: Negative for chills and fever  HENT: Negative for ear pain and sore throat  Eyes: Negative for pain and visual disturbance  Respiratory: Negative for cough and shortness of breath  Cardiovascular: Negative for chest pain and palpitations  Gastrointestinal: Negative for abdominal pain, constipation, diarrhea, nausea and vomiting  Genitourinary: Positive for hematuria  Negative for dysuria  Musculoskeletal: Negative for arthralgias and back pain  Skin: Negative for color change and rash  Neurological: Negative for dizziness, seizures, syncope, weakness and headaches  All other systems reviewed and are negative  Physical Exam  ED Triage Vitals [05/01/23 2228]   Temperature Pulse Respirations Blood Pressure SpO2   98 °F (36 7 °C) 79 16 (!) 202/81 99 %      Temp Source Heart Rate Source Patient Position - Orthostatic VS BP Location FiO2 (%)   Oral Monitor Sitting Right arm --      Pain Score       --             Orthostatic Vital Signs  Vitals:    05/01/23 2228 05/01/23 2230 05/02/23 0200   BP: (!) 202/81 (!) 171/77 (!) 176/79   Pulse: 79  70   Patient Position - Orthostatic VS: Sitting Sitting Lying       Physical Exam  Vitals and nursing note reviewed  Constitutional:       General: She is not in acute distress  Appearance: She is well-developed  HENT:      Head: Normocephalic and atraumatic  Nose: Nose normal  No congestion  Mouth/Throat:      Mouth: Mucous membranes are moist       Pharynx: Oropharynx is clear  Eyes:      Extraocular Movements: Extraocular movements intact  Conjunctiva/sclera: Conjunctivae normal       Pupils: Pupils are equal, round, and reactive to light     Cardiovascular:      Rate and Rhythm: Normal rate and regular rhythm  Pulses: Normal pulses  Heart sounds: Normal heart sounds  No murmur heard  Pulmonary:      Effort: Pulmonary effort is normal  No respiratory distress  Breath sounds: Normal breath sounds  Chest:      Chest wall: No tenderness  Abdominal:      General: Abdomen is flat  Bowel sounds are normal       Palpations: Abdomen is soft  Tenderness: There is no abdominal tenderness  There is no right CVA tenderness or left CVA tenderness  Musculoskeletal:         General: No deformity or signs of injury  Normal range of motion  Cervical back: Normal range of motion and neck supple  No rigidity or tenderness  Skin:     General: Skin is warm and dry  Findings: No bruising, lesion or rash  Neurological:      General: No focal deficit present  Mental Status: She is alert  Cranial Nerves: No cranial nerve deficit  Sensory: No sensory deficit           ED Medications  Medications   iohexol (OMNIPAQUE) 350 MG/ML injection (SINGLE-DOSE) 100 mL (100 mL Intravenous Given 5/2/23 0051)       Diagnostic Studies  Results Reviewed     Procedure Component Value Units Date/Time    Comprehensive metabolic panel [[id number]]  (Abnormal) Collected: 05/02/23 0014    Lab Status: Final result Specimen: Blood from Arm, Right Updated: 05/02/23 0042     Sodium 138 mmol/L      Potassium 3 9 mmol/L      Chloride 102 mmol/L      CO2 29 mmol/L      ANION GAP 7 mmol/L      BUN 26 mg/dL      Creatinine 0 90 mg/dL      Glucose 150 mg/dL      Calcium 9 4 mg/dL      AST 15 U/L      ALT 14 U/L      Alkaline Phosphatase 56 U/L      Total Protein 6 5 g/dL      Albumin 3 9 g/dL      Total Bilirubin 0 36 mg/dL      eGFR 58 ml/min/1 73sq m     Narrative:      [place] guidelines for Chronic Kidney Disease (CKD):     Stage 1 with normal or high GFR (GFR > 90 mL/min/1 73 square meters)    Stage 2 Mild CKD (GFR = 60-89 mL/min/1 73 square meters)    Stage 3A Moderate CKD (GFR = 45-59 mL/min/1 73 square meters)    Stage 3B Moderate CKD (GFR = 30-44 mL/min/1 73 square meters)    Stage 4 Severe CKD (GFR = 15-29 mL/min/1 73 square meters)    Stage 5 End Stage CKD (GFR <15 mL/min/1 73 square meters)  Note: GFR calculation is accurate only with a steady state creatinine    CBC and differential [[id number]]  (Abnormal) Collected: 05/02/23 0014    Lab Status: Final result Specimen: Blood from Arm, Right Updated: 05/02/23 0022     WBC 5 13 Thousand/uL      RBC 4 11 Million/uL      Hemoglobin 11 7 g/dL      Hematocrit 37 5 %      MCV 91 fL      MCH 28 5 pg      MCHC 31 2 g/dL      RDW 13 4 %      MPV 10 4 fL      Platelets 363 Thousands/uL      nRBC 0 /100 WBCs      Neutrophils Relative 61 %      Immat GRANS % 0 %      Lymphocytes Relative 26 %      Monocytes Relative 8 %      Eosinophils Relative 4 %      Basophils Relative 1 %      Neutrophils Absolute 3 13 Thousands/µL      Immature Grans Absolute 0 01 Thousand/uL      Lymphocytes Absolute 1 34 Thousands/µL      Monocytes Absolute 0 40 Thousand/µL      Eosinophils Absolute 0 22 Thousand/µL      Basophils Absolute 0 03 Thousands/µL     Urine Microscopic [[id number]]  (Abnormal) Collected: 05/01/23 2331    Lab Status: Final result Specimen: Urine, Clean Catch Updated: 05/01/23 2350     RBC, UA Innumerable /hpf      WBC, UA 20-30 /hpf      Epithelial Cells Occasional /hpf      Bacteria, UA Occasional /hpf      Ca Oxalate Sneha, UA Occasional /hpf     UA (URINE) with reflex to Scope [[id number]]  (Abnormal) Collected: 05/01/23 2331    Lab Status: Final result Specimen: Urine, Clean Catch Updated: 05/01/23 2343     Color, UA Light Brown     Clarity, UA Clear     Specific Gravity, UA 1 007     pH, UA 6 5     Leukocytes, UA Negative     Nitrite, UA Negative     Protein, UA Trace mg/dl      Glucose, UA Negative mg/dl      Ketones, UA Negative mg/dl      Urobilinogen, UA <2 0 mg/dl      Bilirubin, UA Negative     Occult Blood, UA Large CT abdomen pelvis with contrast   Final Result by Kirit Antonio MD (05/02 0141)      Bilateral nephrolithiasis  5 mm calculus in the proximal left ureter  Additional 7 mm calculus in the left mid ureter  There is associated moderate left hydronephrosis  Workstation performed: [id number]               Procedures  Procedures      ED Course  ED Course as of 05/02/23 0537   Mon May 01, 2023   212 78year-old female presenting due to dark-colored urine today  Patient with history of kidney stones is denying any dysuria, abdominal pain, lower back pain, altered mental status, nausea, vomiting, fever, chills  Will check urine for infection  2356 RBC, UA(!): Innumerable   2356 WBC, UA(!): 20-30   2356 Ca Oxalate Sneha, UA(!): Occasional   2356 Due to gross blood in urine and innumerable RBCs with no clear signs of infection, will check labs and do a CT AP to evaluate  Concern for kidney stone, neoplasm, fistula, vaginal/rectal bleeding  Tue May 02, 2023   0027 CBC and differential(!)  Unremarkable   0049 Comprehensive metabolic panel(!)  Unremarkable   0052 Pt requesting Derm referral for scalp dryness and itching  0148 CT abdomen pelvis with contrast  IMPRESSION:     Bilateral nephrolithiasis  5 mm calculus in the proximal left ureter  Additional 7 mm calculus in the left mid ureter  There is associated moderate left hydronephrosis       0207 Patient informed of CT results and to kidney stones in the left ureter  Patient continues to deny pain at this time  Recommend follow-up with urology for reassessment and management  Patient is agreeable and appropriate for discharge  SBIRT 20yo+    Flowsheet Row Most Recent Value   Initial Alcohol Screen: US AUDIT-C     1  How often do you have a drink containing alcohol? 0 Filed at: 05/02/2023 0141   2  How many drinks containing alcohol do you have on a typical day you are drinking? 0 Filed at: 05/02/2023 0141   3a  Male UNDER 65: How often do you have five or more drinks on one occasion? 0 Filed at: 05/02/2023 0141   3b  FEMALE Any Age, or MALE 65+: How often do you have 4 or more drinks on one occassion? 0 Filed at: 05/02/2023 0141   Audit-C Score 0 Filed at: 05/02/2023 0141   ELROY: How many times in the past year have you    Used an illegal drug or used a prescription medication for non-medical reasons? Never Filed at: 05/02/2023 0141                MDM      Disposition  Final diagnoses:   Hematuria   Hydronephrosis   Kidney stone     Time reflects when diagnosis was documented in both MDM as applicable and the Disposition within this note     Time User Action Codes Description Comment    5/2/2023  1:59 AM Catherine Munoz Add [R31 9] Hematuria     5/2/2023  2:00 AM Catherine Munoz Add [N13 30] Hydronephrosis     5/2/2023  2:00 AM Catherine Munoz Add [N20 0] Kidney stone       ED Disposition     ED Disposition   Discharge    Condition   Stable    Date/Time   Tue May 2, 2023  1:59 AM    Comment   Cleelisabet Pontiff discharge to home/self care                 Follow-up Information     Follow up With Specialties Details Why Contact Info Additional [address] [facility]   [address] [facility] [id number]  [phone] [facility] [address] [place], [place], [address] [address] Emergency Department Emergency Medicine   [facility] [address] Emergency Department, [address], [place], [place], [address]          Discharge Medication List as of 5/2/2023  2:06 AM      START taking these medications    Details   tamsulosin (FLOMAX) 0 4 mg Take 1 capsule (0 4 mg total) by mouth daily with dinner for 10 days, Starting Tue 5/2/2023, Until Fri 5/12/2023, Normal         CONTINUE these medications which have NOT CHANGED Details   aspirin 81 MG tablet Take 81 mg by mouth daily, Historical Med      atorvastatin (LIPITOR) 10 mg tablet Take 1 tablet (10 mg total) by mouth daily, Starting Mon 4/17/2023, Normal      cholecalciferol (VITAMIN D3) 1,000 units tablet Take 1,000 Units by mouth daily, Historical Med      clonazePAM (KlonoPIN) 0 5 mg tablet Take 1 tablet (0 5 mg total) by mouth 2 (two) times a day, Starting Mon 5/1/2023, Until Wed 5/31/2023, Normal      docusate sodium (COLACE) 100 mg capsule Take 100 mg by mouth 2 (two) times a day, Historical Med      FLUoxetine (PROzac) 40 MG capsule Take 1 capsule (40 mg total) by mouth daily, Starting Mon 4/3/2023, Until Fri 6/2/2023, Normal      gabapentin (Neurontin) 100 mg capsule Take 1 capsule (100 mg total) by mouth 2 (two) times a day, Starting Mon 4/3/2023, Normal      linaCLOtide 145 MCG CAPS Take 1 capsule (145 mcg total) by mouth in the morning, Starting Tue 3/14/2023, Normal      lisinopril-hydrochlorothiazide (PRINZIDE,ZESTORETIC) 10-12 5 MG per tablet Take 1 tablet by mouth daily, Starting Mon 4/17/2023, Normal      !! metFORMIN (GLUCOPHAGE) 500 mg tablet TAKE ONE TABLET BY MOUTH TWICE A DAY WITH MEALS, Normal      !! metFORMIN (GLUCOPHAGE) 500 mg tablet Take 1 tablet (500 mg total) by mouth 2 (two) times a day with meals, Starting Mon 4/3/2023, Normal      risperiDONE (RisperDAL) 0 25 mg tablet Take 1 tablet (0 25 mg total) by mouth 2 (two) times a day, Starting Mon 4/3/2023, Until Wed 5/3/2023, Normal      triamcinolone (KENALOG) 0 1 % lotion Apply topically in the morning, Starting Wed 4/5/2023, Normal       !! - Potential duplicate medications found  Please discuss with provider  PDMP Review       Value Time User    PDMP Reviewed  Yes 5/1/2023 10:01 AM Keily Rehman MD           ED Provider  Attending physically available and evaluated Valery Leal  I managed the patient along with the ED Attending      Electronically Signed by         Flavio Kamara MD  05/02/23 9576

## 2023-05-02 NOTE — DISCHARGE INSTRUCTIONS
Please call Urology to schedule an appointment for reassessment and management of kidney stone  Thank you for allowing us to take part in your care

## 2023-05-02 NOTE — ED ATTENDING ATTESTATION
5/1/2023  I, Phoebe Ochoa MD, saw and evaluated the patient  I have discussed the patient with the resident/non-physician practitioner and agree with the resident's/non-physician practitioner's findings, Plan of Care, and MDM as documented in the resident's/non-physician practitioner's note, except where noted  All available labs and Radiology studies were reviewed  I was present for key portions of any procedure(s) performed by the resident/non-physician practitioner and I was immediately available to provide assistance  At this point I agree with the current assessment done in the Emergency Department  I have conducted an independent evaluation of this patient a history and physical is as follows:    ED Course  ED Course as of 05/02/23 0540   Mon May 01, 2023   2352 RBC, UA(!): Innumerable   2353 WBC, UA(!): 20-30   2353 Ca Oxalate Sneha, UA(!): Occasional   2353 Asymptomatic   Tue May 02, 2023   0145 CT abdomen pelvis with contrast  Bilateral nephrolithiasis  5 mm calculus in the proximal left ureter  Additional 7 mm calculus in the left mid ureter  There is associated moderate left hydronephrosis           In summary, this is an 66-year-old female presenting to the emergency department with urine changes  Past medical history significant for previous kidney stones, hypertension, diabetes, hyperlipidemia  Patient reports that earlier today she noted that she had an episode of dark urine which then progressed to a pinkish tinged urine prompting arrival to the emergency department  Patient denies fever, back pain, dysuria, urinary frequency, urinary retention, changes in stool, vaginal bleeding, chest pain, shortness of breath, palpitations, dizziness  Vital signs on arrival notable for hypertension  Physical exam is grossly unremarkable  Patient is well-appearing without abdominal tenderness or flank pain  Heart and lungs within normal limits      Differential diagnosis includes urinary tract infection, pyelonephritis, bladder cancer, vaginal bleeding, stones  UA shows innumerable red blood cells with white blood cells, but no evidence of bacteria  Further lab work and imaging was ordered  Reassessment, patient continues to be without any symptoms  CT showed lateral nephrolithiasis  After extensive discussion with patient, patient feels comfortable at this time  Results were reviewed with the patient  Recommended prompt follow-up with urology tomorrow  Patient verbalized understanding and will call office schedule an appointment  Strict return precautions given  Upon discharge, patient was fully alert, oriented, GCS 15, ambulatory, without further complaints      Critical Care Time  Procedures

## 2023-05-02 NOTE — TELEPHONE ENCOUNTER
Patient seen by Romel Renteria at [facility]    Patient was in the ER yesterday with hematuria  Patient had a ct scan and it showed   Bilateral nephrolithiasis  5 mm calculus in the proximal left ureter  Additional 7 mm calculus in the left mid ureter  There is associated moderate left hydronephrosis  Please review and see how soon patient is to be seen in the office      Patient can be reached at    [phone]

## 2023-05-02 NOTE — TELEPHONE ENCOUNTER
Call transferred by AAN MCCAIN \[facility]\"" patient daughter reports that she is not having any pain or discomfort   Was found on imaging and referred to our practice  Is currently on flomax   Due to appt availability , appt given in our [facility] location

## 2023-05-05 ENCOUNTER — TELEMEDICINE (OUTPATIENT)
Dept: PSYCHIATRY | Facility: CLINIC | Age: 83
End: 2023-05-05

## 2023-05-05 DIAGNOSIS — F33.0 MAJOR DEPRESSIVE DISORDER, RECURRENT, MILD (HCC): ICD-10-CM

## 2023-05-05 DIAGNOSIS — F45.1 SOMATIC SYMPTOM DISORDER: Primary | ICD-10-CM

## 2023-05-05 DIAGNOSIS — F41.1 GENERALIZED ANXIETY DISORDER: ICD-10-CM

## 2023-05-05 NOTE — PSYCH
"Behavioral Health Psychotherapy Progress Note     Psychotherapy Provided: Individual Psychotherapy     1  Somatic symptom disorder        2  Major depressive disorder, recurrent, mild ([address] )        3  Generalized anxiety disorder          Goals addressed in session: Goal 1     DATA: Royal Neely states that Kathy Jacome is crying less, less drama, went to two shows, saw granddaughter perform in two bands  Overall generalized anxiety seems to have     Daughter notes that the catastrophic thinking persists, and talks to her all of the time about the somatic symptoms  Goal for next session: journal common complaints, write down the most likely reason that the problem exists, and the most reasonable reason that the problem exists  During this session, this clinician used the following therapeutic modalities: Cognitive Behavioral Therapy    Substance Abuse was not addressed during this session  If the client is diagnosed with a co-occurring substance use disorder, please indicate any changes in the frequency or amount of use: n/a  Stage of change for addressing substance use diagnoses: No substance use/Not applicable    ASSESSMENT:  Barbara Narayanan presents with a Euthymic/ normal mood  her affect is Normal range and intensity, which is congruent, with her mood and the content of the session  The client has made progress on their goals  Barbara Narayanan presents with a minimal risk of suicide, minimal risk of self-harm, and minimal risk of harm to others  For any risk assessment that surpasses a \"low\" rating, a safety plan must be developed  A safety plan was indicated: no  If yes, describe in detail n/a    PLAN: Between sessions, Barbara Narayanan will continue to use mindfulness techniques and journaling  At the next session, the therapist will use Cognitive Behavioral Therapy to address the negative automatic thought processes      Behavioral Health Treatment Plan and Discharge Planning: Barbara Narayanan is aware of " and agrees to continue to work on their treatment plan  They have identified and are working toward their discharge goals   yes    Visit start and stop times:  Start Time: 8:01 AM  Stop Time: 8:50 AM  05/05/23      This note was not shared with the patient due to this is a psychotherapy note

## 2023-05-08 ENCOUNTER — OFFICE VISIT (OUTPATIENT)
Dept: UROLOGY | Facility: CLINIC | Age: 83
End: 2023-05-08

## 2023-05-08 VITALS
DIASTOLIC BLOOD PRESSURE: 78 MMHG | SYSTOLIC BLOOD PRESSURE: 120 MMHG | HEIGHT: 56 IN | HEART RATE: 80 BPM | BODY MASS INDEX: 27.8 KG/M2 | RESPIRATION RATE: 18 BRPM | OXYGEN SATURATION: 98 % | WEIGHT: 123.6 LBS

## 2023-05-08 DIAGNOSIS — R31.9 HEMATURIA: ICD-10-CM

## 2023-05-08 DIAGNOSIS — N13.30 HYDRONEPHROSIS: ICD-10-CM

## 2023-05-08 DIAGNOSIS — N20.0 KIDNEY STONE: ICD-10-CM

## 2023-05-08 LAB
SL AMB  POCT GLUCOSE, UA: NORMAL
SL AMB LEUKOCYTE ESTERASE,UA: NORMAL
SL AMB POCT BILIRUBIN,UA: NORMAL
SL AMB POCT BLOOD,UA: NORMAL
SL AMB POCT CLARITY,UA: CLEAR
SL AMB POCT COLOR,UA: YELLOW
SL AMB POCT KETONES,UA: NORMAL
SL AMB POCT NITRITE,UA: NORMAL
SL AMB POCT PH,UA: 5
SL AMB POCT SPECIFIC GRAVITY,UA: 1010
SL AMB POCT URINE PROTEIN: NORMAL
SL AMB POCT UROBILINOGEN: 0.2

## 2023-05-08 RX ORDER — TAMSULOSIN HYDROCHLORIDE 0.4 MG/1
0.4 CAPSULE ORAL
Qty: 30 CAPSULE | Refills: 1 | Status: SHIPPED | OUTPATIENT
Start: 2023-05-08 | End: 2023-07-07

## 2023-05-08 NOTE — PATIENT INSTRUCTIONS
This done the past is likely 1 that was at the bottom of the ureter but the stone at the top of the ureter is probably still present  Because the stone may have already passed was bigger than this stone at the top of the ureter I think it is reasonable to see if you would be able to pass the stone spontaneously versus going to the operating room  We typically give 4 to 6 weeks for stone passage on tamsulosin/Flomax    We will continue tamsulosin (Flomax) for another 4 to 5 weeks  I gave you a refill  Please reach out have any dizziness or lightheadedness    Also call the office if you experience any fevers, chills, nausea, vomiting, or symptoms of a UTI such as burning with urination, frequency, urgency  Please strain all urine when reasonable and call the office if you were to pass a stone  If there is no known stone passage in 4 to 5 weeks we will plan to get a repeat CT scan to see if stone is still present and at that time we will more strongly consider surgical intervention to remove the stone  To monitor your kidney function to this time please obtain the blood work that I ordered (BMP) within the next 1 to 2 weeks    This should be fasting

## 2023-05-08 NOTE — PROGRESS NOTES
Office Visit- Urology  Herrera Turner 1940 MRN: [id number]      Assessment/Discussion/Plan    78 y o  female managed by     1  Left ureteral stone  -5 mm proximal left ureteral stone and 5x5x7 mm stone in the distal left ureter  Subsequent moderate hydronephrosis   -Creatinine last measured at 0 9 on May 2, 2023 -we will obtain a repeat BMP in 1 to 2 weeks to ensure that patient is not in severe GIGI  -Afebrile and asymptomatic    -Patient passed stone and brought into the office will send for stone analysis -in all likelihood this represents the distal stone which was larger in size than the proximal left ureteral stone  Therefore I think it is reasonable to see if patient will be able to pass 5 cm proximal left ureteral stone  Offered surgical intervention but patient wants to see if she can pass    -Patient instructed to continue with Flomax and to strain all urine  She was notified to call office if she passed a stone  Reviewed ER parameters  If there is no known stone passage within the next approximately 4 weeks placement we will obtain a repeat CT scan  Consideration of surgical intervention if stone is still present  -follow up after obtainment of CT scan          Chief Complaint:   Keli Elder is a 78 y o  female presenting to the office for an initial evaluation regarding  Left ureteral stones        Subjective    Patient is an 24-year-old female with a past medical history significant for hypertension, hyperlipidemia, diabetes mellitus, and DVT presents to the office today for evaluation of left ureteral stones  Patient presented to the emergency on 5/1 for evaluation of hematuria as she thought she was experiencing an infection  However, a CT scan demonstrated two ureteral stones- 15X5X 7 mm distal ureteral stone andone 5 mm proximal ureteral stone both in the left ureter   She denied pain at that time so patient was discharged medical expulsive therapy with Flomax and instructed to follow-up with urology  Patient states that she did pass a stone was able to bring it into the office  She is currently asymptomatic denying subjective fever, chills, dysuria, frequency, urgency, gross hematuria, nausea, vomiting  Patient does have a history of nephrolithiasis requiring ureteroscopy intervention about 2 years ago  Spontaneously passed her stone in the past as well  She was last seen in the office a year ago  ROS:   Review of Systems   Constitutional: Negative for chills, fatigue and fever  HENT: Negative  Respiratory: Negative for shortness of breath  Cardiovascular: Negative for chest pain  Genitourinary: Negative for difficulty urinating, dysuria, flank pain, frequency and urgency  Neurological: Negative for dizziness and light-headedness  Past Medical History  Past Medical History:   Diagnosis Date   • Colon polyps    • [facility] mellitus [facility] )    • Dizziness     Last assessed: 8/31/15   • DVT (deep venous thrombosis) ([facility] )    • Dysuria    • Hematuria 2022   • Hyperlipidemia    • Hypertension    • Hypertensive urgency 10/22/2021   • Insomnia    • Ureterolithiasis 2020       Past Surgical History  Past Surgical History:   Procedure Laterality Date   •  SECTION      1964 son, 26 daughter   • COLONOSCOPY     • FL RETROGRADE PYELOGRAM  2020   • MD COLONOSCOPY FLX DX W/COLLJ SPEC WHEN PFRMD N/A 3/27/2017    Procedure: COLONOSCOPY;  Surgeon: Anabell Madera MD;  [facility]: [facility];   Service: Gastroenterology   • MD CYSTO/URETERO W/LITHOTRIPSY &INDWELL STENT INSRT Right 2020    Procedure: CYSTOSCOPY URETEROSCOPY WITH LITHOTRIPSY HOLMIUM LASER, RETROGRADE PYELOGRAM AND INSERTION STENT URETERAL; EXCISION OF BLADDER TUMOR;  Surgeon: Nanci Peck MD;  Location: [facility];  Service: Urology   • ROTATOR CUFF REPAIR Left     Last assessed: 3/29/15   • TONSILLECTOMY         Past Family History  Family History   Problem Relation Age of Onset   • Depression Mother         w/anxiety   • Diabetes Mother         Mellitus   • Breast cancer Mother    • Hypertension Mother    • No Known Problems Father    • Depression Sister         w/anxiety   • Heart disease Sister         Cardiac Disorder   • Breast cancer Sister    • Hypertension Sister    • Diabetes Brother         Mellitus   • Alcohol abuse Son        Past Social history  Social History     Socioeconomic History   • Marital status:      Spouse name: Not on file   • Number of children: Not on file   • Years of education: Not on file   • Highest education level: Not on file   Occupational History   • Not on file   Tobacco Use   • Smoking status: Never   • Smokeless tobacco: Never   Vaping Use   • Vaping Use: Never used   Substance and Sexual Activity   • Alcohol use: Never   • Drug use: No   • Sexual activity: Not on file   Other Topics Concern   • Not on file   Social History Narrative    Lack of exercise     Social Determinants of Health     Financial Resource Strain: Low Risk    • Difficulty of Paying Living Expenses: Not very hard   Food Insecurity: Not on file   Transportation Needs: No Transportation Needs   • Lack of Transportation (Medical): No   • Lack of Transportation (Non-Medical):  No   Physical Activity: Not on file   Stress: Not on file   Social Connections: Not on file   Intimate Partner Violence: Not on file   Housing Stability: Not on file       Current Medications  Current Outpatient Medications   Medication Sig Dispense Refill   • aspirin 81 MG tablet Take 81 mg by mouth daily     • atorvastatin (LIPITOR) 10 mg tablet Take 1 tablet (10 mg total) by mouth daily 90 tablet 0   • cholecalciferol (VITAMIN D3) 1,000 units tablet Take 1,000 Units by mouth daily     • clonazePAM (KlonoPIN) 0 5 mg tablet Take 1 tablet (0 5 mg total) by mouth 2 (two) times a day 60 tablet 0   • docusate sodium (COLACE) 100 mg capsule Take 100 mg by mouth 2 (two) times a day     • FLUoxetine (PROzac) 40 MG capsule Take 1 "capsule (40 mg total) by mouth daily 30 capsule 2   • gabapentin (Neurontin) 100 mg capsule Take 1 capsule (100 mg total) by mouth 2 (two) times a day 90 capsule 1   • linaCLOtide 145 MCG CAPS Take 1 capsule (145 mcg total) by mouth in the morning 90 capsule 0   • lisinopril-hydrochlorothiazide (PRINZIDE,ZESTORETIC) 10-12 5 MG per tablet Take 1 tablet by mouth daily 90 tablet 0   • metFORMIN (GLUCOPHAGE) 500 mg tablet TAKE ONE TABLET BY MOUTH TWICE A DAY WITH MEALS 180 tablet 0   • metFORMIN (GLUCOPHAGE) 500 mg tablet Take 1 tablet (500 mg total) by mouth 2 (two) times a day with meals 180 tablet 0   • tamsulosin (FLOMAX) 0 4 mg Take 1 capsule (0 4 mg total) by mouth daily with dinner 30 capsule 1   • risperiDONE (RisperDAL) 0 25 mg tablet Take 1 tablet (0 25 mg total) by mouth 2 (two) times a day 60 tablet 2   • triamcinolone (KENALOG) 0 1 % lotion Apply topically in the morning (Patient not taking: Reported on 5/2/2023) 60 mL 0     No current facility-administered medications for this visit  Allergies  No Known Allergies    OBJECTIVE    Vitals   Vitals:    05/08/23 0759   BP: 120/78   BP Location: Right arm   Patient Position: Sitting   Cuff Size: Large   Pulse: 80   Resp: 18   SpO2: 98%   Weight: 56 1 kg (123 lb 9 6 oz)   Height: 4' 8\" (1 422 m)       PVR:    Physical Exam  Vitals reviewed  Constitutional:       General: She is not in acute distress  Appearance: Normal appearance  She is normal weight  She is not ill-appearing or toxic-appearing  HENT:      Head: Normocephalic and atraumatic  Right Ear: External ear normal       Left Ear: External ear normal    Eyes:      Conjunctiva/sclera: Conjunctivae normal    Cardiovascular:      Rate and Rhythm: Normal rate  Pulmonary:      Effort: Pulmonary effort is normal  No respiratory distress  Abdominal:      Tenderness: There is no right CVA tenderness or left CVA tenderness     Musculoskeletal:      Cervical back: Normal range of motion and " neck supple  Skin:     General: Skin is warm and dry  Neurological:      General: No focal deficit present  Mental Status: She is alert  Psychiatric:         Mood and Affect: Mood normal          Behavior: Behavior normal          Thought Content: Thought content normal           Labs:     Lab Results   Component Value Date    CREATININE 0 90 05/02/2023      Lab Results   Component Value Date    HGBA1C 6 5 01/26/2023     Lab Results   Component Value Date    GLUCOSE 134 03/17/2015    CALCIUM 9 4 05/02/2023     02/11/2017    K 3 9 05/02/2023    CO2 29 05/02/2023     05/02/2023    BUN 26 (H) 05/02/2023    CREATININE 0 90 05/02/2023       I have personally reviewed all pertinent lab results and reviewed with patient    Imaging     CT ABDOMEN AND PELVIS WITH IV CONTRAST 5/2     INDICATION:   Hematuria, gross/macroscopic  hematuria, hx of kidney stone      COMPARISON: CT of the abdomen and pelvis on April 21, 2022      TECHNIQUE:  CT examination of the abdomen and pelvis was performed  Multiplanar 2D reformatted images were created from the source data      Radiation dose length product (DLP) for this visit:  576 mGy-cm   This examination, like all CT scans performed in the [facility], was performed utilizing techniques to minimize radiation dose exposure, including the use of iterative   reconstruction and automated exposure control      IV Contrast:  100 mL of iohexol (OMNIPAQUE)  Enteric Contrast:  Enteric contrast was not administered      FINDINGS:     ABDOMEN     LOWER CHEST:  No clinically significant abnormality identified in the visualized lower chest      LIVER/BILIARY TREE:  One or more subcentimeter sharply circumscribed low-density hepatic lesion(s) are noted, too small to accurately characterize, but statistically most likely to represent subcentimeter hepatic cysts  No suspicious solid hepatic   lesion is identified    Hepatic contours are normal   No biliary dilatation      GALLBLADDER:  No calcified gallstones  No pericholecystic inflammatory change      SPLEEN:  Unremarkable      PANCREAS:  Unremarkable      ADRENAL GLANDS:  Unremarkable      KIDNEYS/URETERS: There is a 5 mm calculus in the proximal left ureter (series 301, image 85)  There is an additional 5 x 5 x 7 mm calculus further downstream in the left mid ureter (series 301, image 90)  Additional calculi within the left kidney measure   up to 4 mm  There is moderate left hydronephrosis  Calculi within the right kidney measure up to 3 mm  No right-sided hydronephrosis      STOMACH AND BOWEL: No bowel obstruction  Stool-filled colon      APPENDIX: A normal appendix was visualized      ABDOMINOPELVIC CAVITY:  No ascites  No pneumoperitoneum  No lymphadenopathy      VESSELS:  Unremarkable for patient's age      PELVIS     REPRODUCTIVE ORGANS:  Unremarkable for patient's age      URINARY BLADDER:  Unremarkable      ABDOMINAL WALL/INGUINAL REGIONS:  Unremarkable      OSSEOUS STRUCTURES: No acute fracture or destructive osseous lesion  Spinal degenerative changes are noted      IMPRESSION:     Bilateral nephrolithiasis  5 mm calculus in the proximal left ureter  Additional 7 mm calculus in the left mid ureter  There is associated moderate left hydronephrosis             Salvatore Ryder PA-C  Date: 5/8/2023 Time: 8:11 PM  [facility]    This note was written using fluency dictation software  Please excuse any resulting minor grammatical errors

## 2023-05-10 ENCOUNTER — TELEPHONE (OUTPATIENT)
Dept: FAMILY MEDICINE CLINIC | Facility: CLINIC | Age: 83
End: 2023-05-10

## 2023-05-10 NOTE — TELEPHONE ENCOUNTER
Patient called and stated she's been taking Linzess and Colace and last Tuesday (5/2), she made two bowel movements in a row and felt fine  However, she's been doing the same routine and hasn't had a bowel movement now in 6 days    Patient said she isn't going to the ER and will try a laxative tonight but wanted me to send a message to see what else she can do since the med combo isn't working for her

## 2023-05-11 NOTE — TELEPHONE ENCOUNTER
Would recommend that she reach out to gastroenterology, Dr Rosey Sánchez, regarding recommendations  At this point I have no other recommendations

## 2023-05-15 LAB
COLOR STONE: NORMAL
COM MFR STONE: 10 %
COMMENT-STONE3: NORMAL
COMPOSITION: NORMAL
LABORATORY COMMENT REPORT: NORMAL
PHOTO: NORMAL
SIZE STONE: NORMAL MM
SPEC SOURCE SUBJ: NORMAL
STONE ANALYSIS-IMP: NORMAL
URATE MFR STONE: 90 %
WT STONE: 106 MG

## 2023-05-19 ENCOUNTER — TELEMEDICINE (OUTPATIENT)
Dept: PSYCHIATRY | Facility: CLINIC | Age: 83
End: 2023-05-19

## 2023-05-19 DIAGNOSIS — F33.0 MAJOR DEPRESSIVE DISORDER, RECURRENT, MILD (HCC): ICD-10-CM

## 2023-05-19 DIAGNOSIS — F41.1 GENERALIZED ANXIETY DISORDER: Primary | ICD-10-CM

## 2023-05-19 DIAGNOSIS — F45.1 SOMATIC SYMPTOM DISORDER: ICD-10-CM

## 2023-05-19 NOTE — PSYCH
"Behavioral Health Psychotherapy Progress Note    Psychotherapy Provided: Individual Psychotherapy     1  Generalized anxiety disorder        2  Somatic symptom disorder        3  Major depressive disorder, recurrent, mild ([address] )          Goals addressed in session: Goal 1     DATA: Magali Diaz notes that she is confused and angry because of events over the last week  Notes that she was unable to have BM  She called her PCP and said that he has no further recommendations  She said that her PCP told her that she was supposed to adhere to the medication regimen which she has not followed up  She said that she went to GI physician before who told her that she needs to follow her medication regimen appropriately  This made her upset and has been keeping her down  Then she has had conflicts with her daughter at home  She continues to be preoccupied with BM on daily basis  During this session, this clinician used the following therapeutic modalities: Cognitive Behavioral Therapy and Supportive Psychotherapy    Substance Abuse was not addressed during this session  If the client is diagnosed with a co-occurring substance use disorder, please indicate any changes in the frequency or amount of use: n/a  Stage of change for addressing substance use diagnoses: No substance use/Not applicable     ASSESSMENT:  Bucky Lacy presents with a Euthymic/ normal mood  her affect is Constricted, which is congruent, with her mood and the content of the session  The client has made some progress on their goals  Bucky Lacy presents with a minimal risk of suicide, minimal risk of self-harm, and minimal risk of harm to others  For any risk assessment that surpasses a \"low\" rating, a safety plan must be developed  A safety plan was indicated: no  If yes, describe in detail n/a    PLAN: Between sessions, Bucky Lacy will continue to use the strategies to cope   At the next session, the therapist will use Cognitive " Behavioral Therapy and Supportive Psychotherapy  Behavioral Health Treatment Plan and Discharge Planning: Hansa Ceballos is aware of and agrees to continue to work on their treatment plan  They have identified and are working toward their discharge goals   yes    Visit start and stop times:  Start: 8:00 AM  Stop: 8:59 AM  05/19/23      This note was not shared with the patient due to this is a psychotherapy note

## 2023-05-24 ENCOUNTER — TELEPHONE (OUTPATIENT)
Dept: PSYCHIATRY | Facility: CLINIC | Age: 83
End: 2023-05-24

## 2023-05-24 NOTE — TELEPHONE ENCOUNTER
Joer scheduled patient for group therapy beginning 5/25 at 2 pm and scheduled appointments for every 2nd and 4th Thursday through July

## 2023-06-01 ENCOUNTER — RA CDI HCC (OUTPATIENT)
Dept: OTHER | Facility: HOSPITAL | Age: 83
End: 2023-06-01

## 2023-06-01 NOTE — PROGRESS NOTES
[facility]  coding opportunities          Chart Reviewed number of suggestions sent to Provider: 1  [id number]     Patients Insurance     Medicare Insurance: Medicare

## 2023-06-02 ENCOUNTER — APPOINTMENT (OUTPATIENT)
Dept: LAB | Facility: CLINIC | Age: 83
End: 2023-06-02
Payer: MEDICARE

## 2023-06-02 DIAGNOSIS — F41.1 GENERALIZED ANXIETY DISORDER: ICD-10-CM

## 2023-06-02 DIAGNOSIS — N20.0 KIDNEY STONE: ICD-10-CM

## 2023-06-02 DIAGNOSIS — E78.2 MIXED HYPERLIPIDEMIA: ICD-10-CM

## 2023-06-02 DIAGNOSIS — E11.9 TYPE 2 DIABETES MELLITUS WITHOUT COMPLICATION, WITHOUT LONG-TERM CURRENT USE OF INSULIN (HCC): ICD-10-CM

## 2023-06-02 DIAGNOSIS — N20.0 URIC ACID KIDNEY STONE: ICD-10-CM

## 2023-06-02 DIAGNOSIS — Z79.899 ENCOUNTER FOR MEDICATION MANAGEMENT: ICD-10-CM

## 2023-06-02 DIAGNOSIS — I10 PRIMARY HYPERTENSION: ICD-10-CM

## 2023-06-02 LAB
25(OH)D3 SERPL-MCNC: 37.1 NG/ML (ref 30–100)
ALBUMIN SERPL BCP-MCNC: 3.7 G/DL (ref 3.5–5)
ALP SERPL-CCNC: 55 U/L (ref 46–116)
ALT SERPL W P-5'-P-CCNC: 17 U/L (ref 12–78)
ANION GAP SERPL CALCULATED.3IONS-SCNC: 4 MMOL/L (ref 4–13)
AST SERPL W P-5'-P-CCNC: 14 U/L (ref 5–45)
BASOPHILS # BLD AUTO: 0.05 THOUSANDS/ÂΜL (ref 0–0.1)
BASOPHILS NFR BLD AUTO: 1 % (ref 0–1)
BILIRUB SERPL-MCNC: 0.36 MG/DL (ref 0.2–1)
BUN SERPL-MCNC: 28 MG/DL (ref 5–25)
CALCIUM SERPL-MCNC: 9.6 MG/DL (ref 8.3–10.1)
CHLORIDE SERPL-SCNC: 106 MMOL/L (ref 96–108)
CHOLEST SERPL-MCNC: 130 MG/DL
CO2 SERPL-SCNC: 27 MMOL/L (ref 21–32)
CREAT SERPL-MCNC: 0.91 MG/DL (ref 0.6–1.3)
CREAT UR-MCNC: 89.9 MG/DL
EOSINOPHIL # BLD AUTO: 0.17 THOUSAND/ÂΜL (ref 0–0.61)
EOSINOPHIL NFR BLD AUTO: 3 % (ref 0–6)
ERYTHROCYTE [DISTWIDTH] IN BLOOD BY AUTOMATED COUNT: 13.8 % (ref 11.6–15.1)
EST. AVERAGE GLUCOSE BLD GHB EST-MCNC: 140 MG/DL
GFR SERPL CREATININE-BSD FRML MDRD: 58 ML/MIN/1.73SQ M
GLUCOSE P FAST SERPL-MCNC: 101 MG/DL (ref 65–99)
HBA1C MFR BLD: 6.5 %
HCT VFR BLD AUTO: 37.8 % (ref 34.8–46.1)
HDLC SERPL-MCNC: 58 MG/DL
HGB BLD-MCNC: 11.9 G/DL (ref 11.5–15.4)
IMM GRANULOCYTES # BLD AUTO: 0.01 THOUSAND/UL (ref 0–0.2)
IMM GRANULOCYTES NFR BLD AUTO: 0 % (ref 0–2)
LDLC SERPL CALC-MCNC: 47 MG/DL (ref 0–100)
LYMPHOCYTES # BLD AUTO: 1.49 THOUSANDS/ÂΜL (ref 0.6–4.47)
LYMPHOCYTES NFR BLD AUTO: 30 % (ref 14–44)
MCH RBC QN AUTO: 29.2 PG (ref 26.8–34.3)
MCHC RBC AUTO-ENTMCNC: 31.5 G/DL (ref 31.4–37.4)
MCV RBC AUTO: 93 FL (ref 82–98)
MICROALBUMIN UR-MCNC: 9.8 MG/L (ref 0–20)
MICROALBUMIN/CREAT 24H UR: 11 MG/G CREATININE (ref 0–30)
MONOCYTES # BLD AUTO: 0.35 THOUSAND/ÂΜL (ref 0.17–1.22)
MONOCYTES NFR BLD AUTO: 7 % (ref 4–12)
NEUTROPHILS # BLD AUTO: 2.97 THOUSANDS/ÂΜL (ref 1.85–7.62)
NEUTS SEG NFR BLD AUTO: 59 % (ref 43–75)
NRBC BLD AUTO-RTO: 0 /100 WBCS
PLATELET # BLD AUTO: 198 THOUSANDS/UL (ref 149–390)
PMV BLD AUTO: 11.2 FL (ref 8.9–12.7)
POTASSIUM SERPL-SCNC: 4.5 MMOL/L (ref 3.5–5.3)
PROT SERPL-MCNC: 6.7 G/DL (ref 6.4–8.4)
RBC # BLD AUTO: 4.08 MILLION/UL (ref 3.81–5.12)
SODIUM SERPL-SCNC: 137 MMOL/L (ref 135–147)
TRIGL SERPL-MCNC: 125 MG/DL
TSH SERPL DL<=0.05 MIU/L-ACNC: 1.64 UIU/ML (ref 0.45–4.5)
URATE SERPL-MCNC: 5.6 MG/DL (ref 2–7.5)
WBC # BLD AUTO: 5.04 THOUSAND/UL (ref 4.31–10.16)

## 2023-06-02 PROCEDURE — 83036 HEMOGLOBIN GLYCOSYLATED A1C: CPT

## 2023-06-02 PROCEDURE — 80061 LIPID PANEL: CPT

## 2023-06-02 PROCEDURE — 82043 UR ALBUMIN QUANTITATIVE: CPT

## 2023-06-02 PROCEDURE — 84443 ASSAY THYROID STIM HORMONE: CPT

## 2023-06-02 PROCEDURE — 82306 VITAMIN D 25 HYDROXY: CPT

## 2023-06-02 PROCEDURE — 80053 COMPREHEN METABOLIC PANEL: CPT

## 2023-06-02 PROCEDURE — 82570 ASSAY OF URINE CREATININE: CPT

## 2023-06-02 PROCEDURE — 36415 COLL VENOUS BLD VENIPUNCTURE: CPT

## 2023-06-02 PROCEDURE — 85025 COMPLETE CBC W/AUTO DIFF WBC: CPT

## 2023-06-02 PROCEDURE — 84550 ASSAY OF BLOOD/URIC ACID: CPT

## 2023-06-05 ENCOUNTER — TELEMEDICINE (OUTPATIENT)
Dept: PSYCHIATRY | Facility: CLINIC | Age: 83
End: 2023-06-05
Payer: MEDICARE

## 2023-06-05 ENCOUNTER — TELEPHONE (OUTPATIENT)
Dept: PSYCHIATRY | Facility: CLINIC | Age: 83
End: 2023-06-05

## 2023-06-05 DIAGNOSIS — G31.84 MILD NEUROCOGNITIVE DISORDER: ICD-10-CM

## 2023-06-05 DIAGNOSIS — F45.1 SOMATIC SYMPTOM DISORDER: ICD-10-CM

## 2023-06-05 DIAGNOSIS — F41.1 GENERALIZED ANXIETY DISORDER: Primary | ICD-10-CM

## 2023-06-05 DIAGNOSIS — F33.0 MAJOR DEPRESSIVE DISORDER, RECURRENT, MILD (HCC): ICD-10-CM

## 2023-06-05 DIAGNOSIS — F33.1 MAJOR DEPRESSIVE DISORDER, RECURRENT, MODERATE (HCC): ICD-10-CM

## 2023-06-05 PROCEDURE — 99214 OFFICE O/P EST MOD 30 MIN: CPT | Performed by: PSYCHIATRY & NEUROLOGY

## 2023-06-05 PROCEDURE — 90833 PSYTX W PT W E/M 30 MIN: CPT | Performed by: PSYCHIATRY & NEUROLOGY

## 2023-06-05 RX ORDER — GABAPENTIN 100 MG/1
100 CAPSULE ORAL 2 TIMES DAILY
Qty: 90 CAPSULE | Refills: 1 | Status: SHIPPED | OUTPATIENT
Start: 2023-06-05

## 2023-06-05 RX ORDER — RISPERIDONE 0.25 MG/1
0.25 TABLET ORAL 2 TIMES DAILY
Qty: 60 TABLET | Refills: 2 | Status: SHIPPED | OUTPATIENT
Start: 2023-06-05 | End: 2023-07-05

## 2023-06-05 RX ORDER — FLUOXETINE HYDROCHLORIDE 40 MG/1
40 CAPSULE ORAL DAILY
Qty: 30 CAPSULE | Refills: 2 | Status: SHIPPED | OUTPATIENT
Start: 2023-06-05 | End: 2023-08-04

## 2023-06-05 RX ORDER — CLONAZEPAM 0.5 MG/1
0.5 TABLET ORAL 2 TIMES DAILY
Qty: 60 TABLET | Refills: 0 | Status: SHIPPED | OUTPATIENT
Start: 2023-06-05 | End: 2023-07-05

## 2023-06-05 NOTE — PSYCH
MEDICATION MANAGEMENT NOTE - VIRTUAL        [facility]    Name and Date of Birth:  Tiny Rincon 78 y o  1940 MRN: [id number]    Date of Visit: June 5, 2023    Virtual Regular Visit    Verification of patient location:    Patient is located in the following state in which I hold an active license [place]    Encounter provider Oziel Thompson MD    Provider located at [address] [place] [id number] [phone]    Recent Visits  No visits were found meeting these conditions  Showing recent visits within past 7 days and meeting all other requirements  Today's Visits  Date Type Provider Dept   06/05/23 Telemedicine MD Stanley Culvermarcia 16 today's visits and meeting all other requirements  Future Appointments  No visits were found meeting these conditions  Showing future appointments within next 150 days and meeting all other requirements      The patient was identified by name and date of birth  Tiny Rincon was informed that this is a telemedicine visit and that the visit is being conducted throughthe [employer] platform  She agrees to proceed  My office door was closed  The patient was notified the following individuals were present in the room Dr Elissa Patterson  They acknowledged consent and understanding of privacy and security of the video platform  The patient has agreed to participate and understands they can discontinue the visit at any time  Patient is aware this is a billable service  Reason for Visit: Follow-up visit for medication management     SUBJECTIVE:    Jonel Montgomery is a 78 y o    female, , lives with daughter, son-in-law, and 3 grandkids, retiredw/ PMH of diabetes, hyperlipidemia, hypertension and PPH of MDD, JONAH, one prior psychiatric admissions, no prior SA, who presented to the mental health clinic virtually for medication management  At this time, Colt Miramontes reports that things have been the same, with similar issues including intermittent anxiety, intrusive thoughts, and mild depressive episodes that last for several days and improve  At this time, does not wish to make changes to medications  She thinks that Risperdal twice daily has made most of the changes for her in terms of how she feels, and says that he is able to think clearer  She feels that her daughter has been avoiding her due to continuously bringing up the issues regarding somatic complaints  Denies medication side effects  Denies falls, dizziness, problems with balance  Colt Miramontes denies SI/HI, and self harm  Colt Miramontes denies symptoms of psychosis including AVH, delusions, and paranoia  Denies symptoms consistent with presentation of eligio or hypomania at this time  Current Rating Scores:     None completed today      Review Of Systems:      Constitutional negative   ENT negative   Cardiovascular negative   Respiratory negative   Gastrointestinal negative   Genitourinary negative   Musculoskeletal negative   Integumentary negative   Neurological negative   Endocrine negative   Other Symptoms none, all other systems are negative       Past Psychiatric History: (unchanged information from previous note copied and italicized) - Information that is bolded has been updated       Past diagnoses: MDD, Anxiety, OCD  Inpatient psychiatric admissions: one previous admission for 3 weeks at [facility] in 2013 for panic attack after surgery  Prior outpatient psychiatric linkage: Dr Param Quintero until transition of care  Past/current psychotherapy: sees Dr Nakita Roth for the past 2-3 years  History of suicidal attempts/gestures: denies  History of violence/aggressive behaviors: denies  Psychotropic medication trials: Effexor, Zoloft, Prozac, Lexapro, Buspar, Klonopin, Prasozin (syncopy?), Abilify (dizziness), Neurontin, Risperdal   Substance abuse inpatient/outpatient rehabilitation: denies      Substance Abuse History: (unchanged information from previous note copied and italicized) - Information that is bolded has been updated       Denies history of alcohol, illict substance, or tobacco abuse  Denies past legal actions or arrests secondary to substance intoxication  The patient denies prior DWIs/DUIs  Vanessa Trammell does not exhibit objective evidence of substance withdrawal during today's examination nor does Vanessa Trammell appear under the influence of any psychoactive substance      Social History: (unchanged information from previous note copied and italicized) - Information that is bolded has been updated       Denies a history of milestone/developmental delay  Denies a history of in-utero exposure to toxins/illicit substances  There is no documented history of IEP or need for special education  Education: Bachelor of Arts  Marital history:  since 2006 since   from cancer  Living arrangement, social support: lives with daughter, son in law, and 3 grandkids in [place], [place]  Social support includes brother, daughter, S-I-L, and 3 grandkids  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  Has pension and social security    Access to firearms: Denies direct access to weapons/firearms  Sandra Peña has no history of arrests or violence with a deadly weapon         Traumatic History: (unchanged information from previous note copied and italicized) - Information that is bolded has been updated       Abuse: denies  Other Traumatic Events: other traumatic events: denies    Past Medical History:    Past Medical History:   Diagnosis Date   • Colon polyps    • Diabetes mellitus [facility] )    • Dizziness     Last assessed: 8/31/15   • DVT (deep venous thrombosis) ([facility] )    • Dysuria    • Hematuria 2022   • Hyperlipidemia    • Hypertension    • Hypertensive urgency 10/22/2021   • Insomnia    • Ureterolithiasis 2020        Past Surgical History:   Procedure Laterality Date • [address] son, 26 daughter   • COLONOSCOPY     • FL RETROGRADE PYELOGRAM  5/22/2020   • SD COLONOSCOPY FLX DX W/COLLJ SPEC WHEN PFRMD N/A 3/27/2017    Procedure: COLONOSCOPY;  Surgeon: Kulwinder Gonzales MD;  Location: [facility]; Service: Gastroenterology   • SD CYSTO/URETERO W/LITHOTRIPSY &INDWELL STENT INSRT Right 5/22/2020    Procedure: CYSTOSCOPY URETEROSCOPY WITH LITHOTRIPSY HOLMIUM LASER, RETROGRADE PYELOGRAM AND INSERTION STENT URETERAL; EXCISION OF BLADDER TUMOR;  Surgeon: Jacki Arriaza MD;  Location: [facility];  Service: Urology   • ROTATOR CUFF REPAIR Left     Last assessed: 3/29/15   • TONSILLECTOMY       No Known Allergies    Substance Abuse History:    Social History     Substance and Sexual Activity   Alcohol Use Never     Social History     Substance and Sexual Activity   Drug Use No       Social History:    Social History     Socioeconomic History   • Marital status:      Spouse name: Not on file   • Number of children: Not on file   • Years of education: Not on file   • Highest education level: Not on file   Occupational History   • Not on file   Tobacco Use   • Smoking status: Never   • Smokeless tobacco: Never   Vaping Use   • Vaping Use: Never used   Substance and Sexual Activity   • Alcohol use: Never   • Drug use: No   • Sexual activity: Not on file   Other Topics Concern   • Not on file   Social History Narrative    Lack of exercise     Social Determinants of Health     Financial Resource Strain: Low Risk  (8/30/2022)    Overall Financial Resource Strain (CARDIA)    • Difficulty of Paying Living Expenses: Not very hard   Food Insecurity: Not on file   Transportation Needs: No Transportation Needs (8/30/2022)    PRAPARE - Transportation    • Lack of Transportation (Medical): No    • Lack of Transportation (Non-Medical):  No   Physical Activity: Not on file   Stress: Not on file   Social Connections: Not on file   Intimate Partner Violence: Not on file   Housing "Stability: Not on file       Family Psychiatric History:     Family History   Problem Relation Age of Onset   • Depression Mother         w/anxiety   • Diabetes Mother         Mellitus   • Breast cancer Mother    • Hypertension Mother    • No Known Problems Father    • Depression Sister         w/anxiety   • Heart disease Sister         Cardiac Disorder   • Breast cancer Sister    • Hypertension Sister    • Diabetes Brother         Mellitus   • Alcohol abuse Son        History Review: The following portions of the patient's history were reviewed and updated as appropriate: allergies, current medications, past family history, past medical history, past social history, past surgical history and problem list          OBJECTIVE:     Vital signs in last 24 hours: There were no vitals filed for this visit      Mental Status Evaluation:    Appearance appears stated age and casually dressed   Behavior calm and cooperative   Speech normal rate, normal volume, normal pitch   Mood \"\"still the same\"\"   Affect normal range and intensity, appropriate   Thought Processes organized, goal directed   Associations intact associations   Thought Content no overt delusions   Perceptual Disturbances: no auditory hallucinations, no visual hallucinations, does not appear responding to internal stimuli   Abnormal Thoughts  Risk Potential Denies suicidal or homicidal ideation, plan, or intent   Orientation oriented to person, place, time/date and situation   Memory recent and remote memory grossly intact   Consciousness alert and awake   Attention Span Concentration Span attention span and concentration are age appropriate   Intellect appears to be of average intelligence   Insight intact   Judgement intact   Muscle Strength and  Gait unable to assess today due to virtual visit   Motor activity unable to assess today due to virtual visit   Language no difficulty naming common objects   Fund of Knowledge adequate knowledge of current " events  adequate fund of knowledge regarding past history  adequate fund of knowledge regarding vocabulary        Laboratory Results: I have personally reviewed all pertinent laboratory/tests results    Recent Labs (last 6 months):   Appointment on 06/02/2023   Component Date Value   • Cholesterol 06/02/2023 130    • Triglycerides 06/02/2023 125    • HDL, Direct 06/02/2023 58    • LDL Calculated 06/02/2023 47    • Vit D, 25-Hydroxy 06/02/2023 37 1    • Uric Acid 06/02/2023 5 6    • WBC 06/02/2023 5 04    • RBC 06/02/2023 4 08    • Hemoglobin 06/02/2023 11 9    • Hematocrit 06/02/2023 37 8    • MCV 06/02/2023 93    • MCH 06/02/2023 29 2    • MCHC 06/02/2023 31 5    • RDW 06/02/2023 13 8    • MPV 06/02/2023 11 2    • Platelets 37/31/9934 198    • nRBC 06/02/2023 0    • Neutrophils Relative 06/02/2023 59    • Immat GRANS % 06/02/2023 0    • Lymphocytes Relative 06/02/2023 30    • Monocytes Relative 06/02/2023 7    • Eosinophils Relative 06/02/2023 3    • Basophils Relative 06/02/2023 1    • Neutrophils Absolute 06/02/2023 2 97    • Immature Grans Absolute 06/02/2023 0 01    • Lymphocytes Absolute 06/02/2023 1 49    • Monocytes Absolute 06/02/2023 0 35    • Eosinophils Absolute 06/02/2023 0 17    • Basophils Absolute 06/02/2023 0 05    • Sodium 06/02/2023 137    • Potassium 06/02/2023 4 5    • Chloride 06/02/2023 106    • CO2 06/02/2023 27    • ANION GAP 06/02/2023 4    • BUN 06/02/2023 28 (H)    • Creatinine 06/02/2023 0 91    • Glucose, Fasting 06/02/2023 101 (H)    • Calcium 06/02/2023 9 6    • AST 06/02/2023 14    • ALT 06/02/2023 17    • Alkaline Phosphatase 06/02/2023 55    • Total Protein 06/02/2023 6 7    • Albumin 06/02/2023 3 7    • Total Bilirubin 06/02/2023 0 36    • eGFR 06/02/2023 58    • Hemoglobin A1C 06/02/2023 6 5 (H)    • EAG 06/02/2023 140    • Creatinine, Ur 06/02/2023 89 9    • Albumin,U,Random 06/02/2023 9 8    • Albumin Creat Ratio 06/02/2023 11    • TSH 3RD GENERATON 06/02/2023 1 640    Office Visit on 05/08/2023   Component Date Value   • COLOR 05/08/2023 Johnson    • Size 05/08/2023 7x6    • Stone Weight 05/08/2023 106    • Composition 05/08/2023 Comment    • Ca Oxalate,Monohydr  05/08/2023 10    • URIC ACID 05/08/2023 90    • STONE COMMENT 05/08/2023 Comment    • Please note 05/08/2023 Comment    • Disclaimer 05/08/2023 Comment    • Photo 05/08/2023 Comment    • Stone Source 05/08/2023 Comment    • LEUKOCYTE ESTERASE,UA 05/08/2023 -    • Lowella Me 05/08/2023 -    • SL AMB POCT UROBILINOGEN 05/08/2023 0 2    • POCT URINE PROTEIN 05/08/2023 -    •  PH,UA 05/08/2023 5 0    • BLOOD,UA 05/08/2023 +    • SPECIFIC GRAVITY,UA 05/08/2023 1,010    • Laural Leavens 05/08/2023 -    • BILIRUBIN,UA 05/08/2023 -    • GLUCOSE, UA 05/08/2023 -    •  COLOR,UA 05/08/2023 yellow    • CLARITY,UA 05/08/2023 cleaR    Admission on 05/01/2023, Discharged on 05/02/2023   Component Date Value   • Color, UA 05/01/2023 Light Brown    • Clarity, UA 05/01/2023 Clear    • Specific Gravity, UA 05/01/2023 1 007    • pH, UA 05/01/2023 6 5    • Leukocytes, UA 05/01/2023 Negative    • Nitrite, UA 05/01/2023 Negative    • Protein, UA 05/01/2023 Trace (A)    • Glucose, UA 05/01/2023 Negative    • Ketones, UA 05/01/2023 Negative    • Urobilinogen, UA 05/01/2023 <2 0    • Bilirubin, UA 05/01/2023 Negative    • Occult Blood, UA 05/01/2023 Large (A)    • RBC, UA 05/01/2023 Innumerable (A)    • WBC, UA 05/01/2023 20-30 (A)    • Epithelial Cells 05/01/2023 Occasional    • Bacteria, UA 05/01/2023 Occasional    • Ca Oxalate Sneha, UA 05/01/2023 Occasional (A)    • WBC 05/02/2023 5 13    • RBC 05/02/2023 4 11    • Hemoglobin 05/02/2023 11 7    • Hematocrit 05/02/2023 37 5    • MCV 05/02/2023 91    • MCH 05/02/2023 28 5    • MCHC 05/02/2023 31 2 (L)    • RDW 05/02/2023 13 4    • MPV 05/02/2023 10 4    • Platelets 62/03/2310 176    • nRBC 05/02/2023 0    • Neutrophils Relative 05/02/2023 61    • Immat GRANS % 05/02/2023 0    • Lymphocytes Relative 05/02/2023 26    • Monocytes Relative 05/02/2023 8    • Eosinophils Relative 05/02/2023 4    • Basophils Relative 05/02/2023 1    • Neutrophils Absolute 05/02/2023 3 13    • Immature Grans Absolute 05/02/2023 0 01    • Lymphocytes Absolute 05/02/2023 1 34    • Monocytes Absolute 05/02/2023 0 40    • Eosinophils Absolute 05/02/2023 0 22    • Basophils Absolute 05/02/2023 0 03    • Sodium 05/02/2023 138    • Potassium 05/02/2023 3 9    • Chloride 05/02/2023 102    • CO2 05/02/2023 29    • ANION GAP 05/02/2023 7    • BUN 05/02/2023 26 (H)    • Creatinine 05/02/2023 0 90    • Glucose 05/02/2023 150 (H)    • Calcium 05/02/2023 9 4    • AST 05/02/2023 15    • ALT 05/02/2023 14    • Alkaline Phosphatase 05/02/2023 56    • Total Protein 05/02/2023 6 5    • Albumin 05/02/2023 3 9    • Total Bilirubin 05/02/2023 0 36    • eGFR 05/02/2023 58    Office Visit on 01/26/2023   Component Date Value   • Hemoglobin A1C 01/26/2023 6 5    Orders Only on 01/09/2023   Component Date Value   • Total Cholesterol 01/09/2023 149    • HDL 01/09/2023 48 (L)    • Triglycerides 01/09/2023 162 (H)    • LDL Calculated 01/09/2023 75    • Chol HDLC Ratio 01/09/2023 3 1    • Non-HDL Cholesterol 01/09/2023 101    • Uric Acid 01/09/2023 4 9    • Glucose, Random 01/09/2023 105 (H)    • BUN 01/09/2023 26 (H)    • Creatinine 01/09/2023 0 82    • eGFR 01/09/2023 70    • SL AMB BUN/CREATININE RA* 01/09/2023 32 (H)    • Sodium 01/09/2023 140    • Potassium 01/09/2023 4 1    • Chloride 01/09/2023 105    • CO2 01/09/2023 28    • Calcium 01/09/2023 9 4    • Protein, Total 01/09/2023 6 3    • Albumin 01/09/2023 3 9    • Globulin 01/09/2023 2 4    • Albumin/Globulin Ratio 01/09/2023 1 6    • TOTAL BILIRUBIN 01/09/2023 0 4    • Alkaline Phosphatase 01/09/2023 55    • AST 01/09/2023 17    • ALT 01/09/2023 15    • Creatinine, Urine 01/09/2023 70    • Albumin,U,Random 01/09/2023 1 3    • Microalb/Creat Ratio 01/09/2023 19    • TSH 01/09/2023 2 32        Suicide/Homicide Risk Assessment:  Risks Reviewed  Risk of Harm to Self:  ·           The following ratings are based on assessment at the time of the interview and review of records  ·           Demographic risk factors include: ,  status, elderly (76 or older)   ·           Historical Risk Factors include: chronic depressive symptoms, chronic anxiety symptoms  ·           Recent Specific Risk Factors include: diagnosis of mood disorder, current depressive symptoms, current anxiety symptoms  ·           Protective Factors: no current suicidal ideation, access to mental health treatment, being a parent, compliant with medications, compliant with mental health treatment, connection to own children, good health, having a sense of purpose or meaning in life, supportive family, fears of illness and fear of death  ·           Weapons: none  The following steps have been taken to ensure weapons are properly secured: not applicable  ·           Based on today's assessment, Mikaela Reilly presents the following risk of harm to self: low     Risk of Harm to Others:  ·           The following ratings are based on assessment at the time of the interview and review of records  ·           Demographic Risk Factors include: none  ·           Historical Risk Factors include: none  ·           Recent Specific Risk Factors include: concomitant mood disorder  ·           Protective Factors: no current homicidal ideation, access to mental health treatment, being , compliant with medications, compliant with mental health treatment, connection to own children, support system, supportive friends  ·           Weapons: none   The following steps have been taken to ensure weapons are properly secured: not applicable  ·           Based on today's assessment, Mikaela Reilly presents the following risk of harm to others: low    The following interventions are recommended: no intervention changes needed      Lethality Statement:  Based on today's assessment and clinical criteria, [facility] contracts for safety and is not an imminent risk of harm to self or others  Outpatient level of care is deemed appropriate at this current time  Sandra Grey understands that if they can no longer contract for safety, they need to call the office or report to their nearest Emergency Room for immediate evaluation  Assessment/Plan:   Sandra Grey is a 78 y o   female, , lives with daughter, son-in-law, and 3 grandkids, retiredw/ PMH of diabetes, hyperlipidemia, hypertension and PPH of MDD, JONAH, one prior psychiatric admissions, no prior SA, who presented to the mental health clinic virtually for medication management  At this time, patient is experiencing ongoing symptoms of anxiety,intrusive and obsessive thoughts about somatic issues, as well as rumination about her concerns, though slightly improved on Risperdal     At this time, patient would like to continue current medication without any changes  She wants to wait a little bit before making any decisions regarding medications  Patient will follow up with group psychotherapy June 22, and continue scheduling thereafter  Patient will follow-up with Dr Maximus Leiva for transfer of care visit in July  Today's Plan: At this time, will continue current medication    Risks, potential side effects discussed with patient who verbalizes agreement  DSM-5 Diagnoses:   1  Generalized anxiety disorder    2  Somatic symptom disorder    3  Major depressive disorder, recurrent, mild ([address] )    4  Mild neurocognitive disorder    5   Major depressive disorder, recurrent, moderate (HCC)      Treatment Recommendations/Precautions:  • Continue Risperdal from 0 25 mg bid for anxiety, obsessive intrusive thoughts, and depression  • Continue Prozac 40 mg daily for mood symptoms   • Continue Neurontin 100 mg bid for anxiety  • Continue Klonopin 0 5 mg bid for anxiety  • Medication management follow up in 8 weeks for MARIZA with Dr Martha Christopher  • Continue medical management with PCP  • Aware of need to follow up with family physician for medical issues  • Aware of 24 hour and weekend coverage for urgent situations accessed by calling [facility] main practice number    Medications Risks/Benefits      Risks, Benefits And Possible Side Effects Of Medications:    Risks, benefits, and possible side effects of medications explained to Hollie Puentes and she verbalizes understanding and agreement for treatment  Controlled Medication Discussion:     Hollie Puentes has been filling controlled prescriptions on time as prescribed according to [address] [place] [address] Monitoring Program    Psychotherapy Provided:    Individual psychotherapy provided: Yes  Counseling was provided during the session today for 16 minutes  Medications, treatment progress and treatment plan reviewed with Hollie Puentes  Medication education provided to Hollie Puentes  Treatment Plan:    Completed and signed during the session: Not applicable - Treatment Plan not due at this session    Visit Time    Visit Start Time: 9:05 AM  Visit Stop Time: 9:55 AM  Total Visit Duration: 55 minutes    VIRTUAL VISIT [facility] verbally agrees to participate in [employer]  Pt is aware that [employer] could be limited without vital signs or the ability to perform a full hands-on physical exam  Carlos Martinez understands she or the provider may request at any time to terminate the video visit and request the patient to seek care or treatment in person       Jose Allen MD 06/05/23

## 2023-06-08 ENCOUNTER — OFFICE VISIT (OUTPATIENT)
Dept: FAMILY MEDICINE CLINIC | Facility: CLINIC | Age: 83
End: 2023-06-08
Payer: MEDICARE

## 2023-06-08 VITALS
HEIGHT: 56 IN | RESPIRATION RATE: 16 BRPM | BODY MASS INDEX: 27.44 KG/M2 | DIASTOLIC BLOOD PRESSURE: 74 MMHG | HEART RATE: 82 BPM | OXYGEN SATURATION: 98 % | SYSTOLIC BLOOD PRESSURE: 122 MMHG | WEIGHT: 122 LBS

## 2023-06-08 DIAGNOSIS — E78.2 MIXED HYPERLIPIDEMIA: ICD-10-CM

## 2023-06-08 DIAGNOSIS — I10 BENIGN ESSENTIAL HYPERTENSION: ICD-10-CM

## 2023-06-08 DIAGNOSIS — K59.04 CHRONIC IDIOPATHIC CONSTIPATION: ICD-10-CM

## 2023-06-08 DIAGNOSIS — E11.9 TYPE 2 DIABETES MELLITUS WITHOUT COMPLICATION, WITHOUT LONG-TERM CURRENT USE OF INSULIN (HCC): Primary | ICD-10-CM

## 2023-06-08 DIAGNOSIS — R23.8 DRY SCALP: ICD-10-CM

## 2023-06-08 DIAGNOSIS — I10 PRIMARY HYPERTENSION: ICD-10-CM

## 2023-06-08 DIAGNOSIS — F33.0 MAJOR DEPRESSIVE DISORDER, RECURRENT, MILD (HCC): ICD-10-CM

## 2023-06-08 PROBLEM — R26.89 BALANCE PROBLEM: Status: RESOLVED | Noted: 2022-08-24 | Resolved: 2023-06-08

## 2023-06-08 PROCEDURE — 99215 OFFICE O/P EST HI 40 MIN: CPT | Performed by: FAMILY MEDICINE

## 2023-06-08 RX ORDER — LISINOPRIL AND HYDROCHLOROTHIAZIDE 12.5; 1 MG/1; MG/1
1 TABLET ORAL DAILY
Qty: 90 TABLET | Refills: 3 | Status: SHIPPED | OUTPATIENT
Start: 2023-06-08

## 2023-06-08 RX ORDER — ATORVASTATIN CALCIUM 10 MG/1
10 TABLET, FILM COATED ORAL DAILY
Qty: 90 TABLET | Refills: 0 | Status: SHIPPED | OUTPATIENT
Start: 2023-06-08 | End: 2023-06-08 | Stop reason: SDUPTHER

## 2023-06-08 RX ORDER — LISINOPRIL AND HYDROCHLOROTHIAZIDE 12.5; 1 MG/1; MG/1
1 TABLET ORAL DAILY
Qty: 90 TABLET | Refills: 0 | Status: SHIPPED | OUTPATIENT
Start: 2023-06-08 | End: 2023-06-08 | Stop reason: SDUPTHER

## 2023-06-08 RX ORDER — ATORVASTATIN CALCIUM 10 MG/1
10 TABLET, FILM COATED ORAL DAILY
Qty: 90 TABLET | Refills: 3 | Status: SHIPPED | OUTPATIENT
Start: 2023-06-08

## 2023-06-08 NOTE — PROGRESS NOTES
Subjective:      Patient ID: Solange Brand is a 78 y o  female  49-year-old female with past medical history of hypertension, diabetes mellitus type 2, hyperlipidemia, major depressive disorder, generalized anxiety disorder presents for follow-up of chronic conditions  Patient has had follow-up with her psychiatrist, therapist   She has been doing quite well on Risperdal   It seems as though the Resporal has made her a little more talkative and interactive  She is still living with her daughter, son-in-law, son-in-law's father as well as her grandchildren  She was registered for group therapy sessions and continue seeing her therapist through virtual visits  Labs reviewed which showed hemoglobin A1c at 6 5%, cholesterol is significantly improved with an LDL of 43  She states that her daughter has been feeding them more chicken because beef is too expensive  Remainder of labs reviewed  She continues doing quite well  Does need refill of medications    Sometimes her Kristina Apple is working well for her other times she may not have a bowel movement for for 5 days      Past Medical History:   Diagnosis Date   • Colon polyps    • Diabetes mellitus ([facility] [address] )    • Dizziness     Last assessed: 8/31/15   • DVT (deep venous thrombosis) ([facility])    • Dysuria    • Hematuria 2022   • Hyperlipidemia    • Hypertension    • Hypertensive urgency 10/22/2021   • Insomnia    • Ureterolithiasis 2020       Family History   Problem Relation Age of Onset   • Depression Mother         w/anxiety   • Diabetes Mother         Mellitus   • Breast cancer Mother    • Hypertension Mother    • No Known Problems Father    • Depression Sister         w/anxiety   • Heart disease Sister         Cardiac Disorder   • Breast cancer Sister    • Hypertension Sister    • Diabetes Brother         Mellitus   • Alcohol abuse Son        Past Surgical History:   Procedure Laterality Date   •  SECTION       son, 26 daughter   • COLONOSCOPY • FL RETROGRADE PYELOGRAM  5/22/2020   • DE COLONOSCOPY FLX DX W/COLLJ SPEC WHEN PFRMD N/A 3/27/2017    Procedure: COLONOSCOPY;  Surgeon: Samy Adam MD;  Location: [facility]; Service: Gastroenterology   • DE CYSTO/URETERO W/LITHOTRIPSY &INDWELL STENT INSRT Right 5/22/2020    Procedure: CYSTOSCOPY URETEROSCOPY WITH LITHOTRIPSY HOLMIUM LASER, RETROGRADE PYELOGRAM AND INSERTION STENT URETERAL; EXCISION OF BLADDER TUMOR;  Surgeon: Tika Steward MD;  Location: [facility];  Service: Urology   • ROTATOR CUFF REPAIR Left     Last assessed: 3/29/15   • TONSILLECTOMY          reports that she has never smoked  She has never used smokeless tobacco  She reports that she does not drink alcohol and does not use drugs        Current Outpatient Medications:   •  aspirin 81 MG tablet, Take 81 mg by mouth daily, Disp: , Rfl:   •  atorvastatin (LIPITOR) 10 mg tablet, Take 1 tablet (10 mg total) by mouth daily, Disp: 90 tablet, Rfl: 3  •  cholecalciferol (VITAMIN D3) 1,000 units tablet, Take 1,000 Units by mouth daily, Disp: , Rfl:   •  clonazePAM (KlonoPIN) 0 5 mg tablet, Take 1 tablet (0 5 mg total) by mouth 2 (two) times a day, Disp: 60 tablet, Rfl: 0  •  docusate sodium (COLACE) 100 mg capsule, Take 100 mg by mouth 2 (two) times a day, Disp: , Rfl:   •  FLUoxetine (PROzac) 40 MG capsule, Take 1 capsule (40 mg total) by mouth daily, Disp: 30 capsule, Rfl: 2  •  gabapentin (Neurontin) 100 mg capsule, Take 1 capsule (100 mg total) by mouth 2 (two) times a day, Disp: 90 capsule, Rfl: 1  •  linaCLOtide 145 MCG CAPS, Take 1 capsule (145 mcg total) by mouth in the morning, Disp: 90 capsule, Rfl: 3  •  lisinopril-hydrochlorothiazide (PRINZIDE,ZESTORETIC) 10-12 5 MG per tablet, Take 1 tablet by mouth daily, Disp: 90 tablet, Rfl: 3  •  metFORMIN (GLUCOPHAGE) 500 mg tablet, TAKE ONE TABLET BY MOUTH TWICE A DAY WITH MEALS, Disp: 180 tablet, Rfl: 0  •  metFORMIN (GLUCOPHAGE) 500 mg tablet, Take 1 tablet (500 mg total) by mouth 2 "(two) times a day with meals, Disp: 180 tablet, Rfl: 0  •  risperiDONE (RisperDAL) 0 25 mg tablet, Take 1 tablet (0 25 mg total) by mouth 2 (two) times a day, Disp: 60 tablet, Rfl: 2  •  tamsulosin (FLOMAX) 0 4 mg, Take 1 capsule (0 4 mg total) by mouth daily with dinner, Disp: 30 capsule, Rfl: 1  •  triamcinolone (KENALOG) 0 1 % lotion, Apply topically in the morning (Patient not taking: Reported on 5/2/2023), Disp: 60 mL, Rfl: 0    The following portions of the patient's history were reviewed and updated as appropriate: allergies, current medications, past family history, past medical history, past social history, past surgical history and problem list     Review of Systems   Constitutional: Negative for fatigue and unexpected weight change  HENT: Negative  Eyes: Negative  Respiratory: Negative  Cardiovascular: Negative  Gastrointestinal: Positive for constipation  Endocrine: Negative  Genitourinary: Negative  Musculoskeletal: Negative for gait problem ( unsteady on her feet)  Skin:        Dry itchy scalp   Allergic/Immunologic: Negative  Hematological: Negative  Psychiatric/Behavioral: Negative for behavioral problems and sleep disturbance  The patient is not nervous/anxious  Objective:    /74   Pulse 82   Resp 16   Ht 4' 8\" (1 422 m)   Wt 55 3 kg (122 lb)   SpO2 98%   BMI 27 35 kg/m²      Physical Exam  Vitals and nursing note reviewed  Constitutional:       General: She is not in acute distress  Appearance: Normal appearance  She is well-developed and normal weight  She is not diaphoretic  HENT:      Head: Normocephalic and atraumatic  Nose: Nose normal    Eyes:      Conjunctiva/sclera: Conjunctivae normal       Pupils: Pupils are equal, round, and reactive to light  Cardiovascular:      Rate and Rhythm: Normal rate and regular rhythm  Heart sounds: Normal heart sounds  No murmur heard    Pulmonary:      Effort: Pulmonary effort is normal  " Breath sounds: Normal breath sounds  Abdominal:      General: Bowel sounds are normal       Palpations: Abdomen is soft  Musculoskeletal:         General: Normal range of motion  Cervical back: Normal range of motion and neck supple  Right lower leg: No edema  Left lower leg: No edema  Skin:     General: Skin is warm and dry  Findings: No rash  Neurological:      General: No focal deficit present  Mental Status: She is alert and oriented to person, place, and time  Mental status is at baseline  Deep Tendon Reflexes: Reflexes are normal and symmetric  Psychiatric:         Mood and Affect: Mood is not depressed  Behavior: Behavior normal          Thought Content:  Thought content normal          Judgment: Judgment normal            Recent Results (from the past 1008 hour(s))   UA (URINE) with reflex to Scope    Collection Time: 05/01/23 11:31 PM   Result Value Ref Range    Color, UA Light Brown     Clarity, UA Clear     Specific Gravity, UA 1 007 1 003 - 1 030    pH, UA 6 5 4 5, 5 0, 5 5, 6 0, 6 5, 7 0, 7 5, 8 0    Leukocytes, UA Negative Negative    Nitrite, UA Negative Negative    Protein, UA Trace (A) Negative mg/dl    Glucose, UA Negative Negative mg/dl    Ketones, UA Negative Negative mg/dl    Urobilinogen, UA <2 0 <2 0 mg/dl mg/dl    Bilirubin, UA Negative Negative    Occult Blood, UA Large (A) Negative   Urine Microscopic    Collection Time: 05/01/23 11:31 PM   Result Value Ref Range    RBC, UA Innumerable (A) None Seen, 1-2 /hpf    WBC, UA 20-30 (A) None Seen, 1-2 /hpf    Epithelial Cells Occasional None Seen, Occasional /hpf    Bacteria, UA Occasional None Seen, Occasional /hpf    Ca Oxalate Sneha, UA Occasional (A) None Seen /hpf   CBC and differential    Collection Time: 05/02/23 12:14 AM   Result Value Ref Range    WBC 5 13 4 31 - 10 16 Thousand/uL    RBC 4 11 3 81 - 5 12 Million/uL    Hemoglobin 11 7 11 5 - 15 4 g/dL    Hematocrit 37 5 34 8 - 46 1 % MCV 91 82 - 98 fL    MCH 28 5 26 8 - 34 3 pg    MCHC 31 2 (L) 31 4 - 37 4 g/dL    RDW 13 4 11 6 - 15 1 %    MPV 10 4 8 9 - 12 7 fL    Platelets 145 601 - 881 Thousands/uL    nRBC 0 /100 WBCs    Neutrophils Relative 61 43 - 75 %    Immat GRANS % 0 0 - 2 %    Lymphocytes Relative 26 14 - 44 %    Monocytes Relative 8 4 - 12 %    Eosinophils Relative 4 0 - 6 %    Basophils Relative 1 0 - 1 %    Neutrophils Absolute 3 13 1 85 - 7 62 Thousands/µL    Immature Grans Absolute 0 01 0 00 - 0 20 Thousand/uL    Lymphocytes Absolute 1 34 0 60 - 4 47 Thousands/µL    Monocytes Absolute 0 40 0 17 - 1 22 Thousand/µL    Eosinophils Absolute 0 22 0 00 - 0 61 Thousand/µL    Basophils Absolute 0 03 0 00 - 0 10 Thousands/µL   Comprehensive metabolic panel    Collection Time: 05/02/23 12:14 AM   Result Value Ref Range    Sodium 138 135 - 147 mmol/L    Potassium 3 9 3 5 - 5 3 mmol/L    Chloride 102 96 - 108 mmol/L    CO2 29 21 - 32 mmol/L    ANION GAP 7 4 - 13 mmol/L    BUN 26 (H) 5 - 25 mg/dL    Creatinine 0 90 0 60 - 1 30 mg/dL    Glucose 150 (H) 65 - 140 mg/dL    Calcium 9 4 8 4 - 10 2 mg/dL    AST 15 13 - 39 U/L    ALT 14 7 - 52 U/L    Alkaline Phosphatase 56 34 - 104 U/L    Total Protein 6 5 6 4 - 8 4 g/dL    Albumin 3 9 3 5 - 5 0 g/dL    Total Bilirubin 0 36 0 20 - 1 00 mg/dL    eGFR 58 ml/min/1 73sq m   POCT urine dip    Collection Time: 05/08/23  8:18 AM   Result Value Ref Range    LEUKOCYTE ESTERASE,UA -     NITRITE,UA -     SL AMB POCT UROBILINOGEN 0 2     POCT URINE PROTEIN -      PH,UA 5 0     BLOOD,UA +     SPECIFIC GRAVITY,UA 1,010     KETONES,UA -     BILIRUBIN,UA -     GLUCOSE, UA -      COLOR,UA yellow     CLARITY,UA cleaR    Stone analysis    Collection Time: 05/08/23  9:56 AM   Result Value Ref Range    COLOR Tan     Size 7x6 mm    Stone Weight 106 mg    Composition Comment     Ca Oxalate,Monohydr  10 %    URIC ACID 90 %    STONE COMMENT Comment     Please note Comment     Disclaimer Comment     Photo Comment     Stone Source Comment    Lipid Panel with Direct LDL reflex    Collection Time: 06/02/23  7:57 AM   Result Value Ref Range    Cholesterol 130 See Comment mg/dL    Triglycerides 125 See Comment mg/dL    HDL, Direct 58 >=50 mg/dL    LDL Calculated 47 0 - 100 mg/dL   Vitamin D 25 hydroxy    Collection Time: 06/02/23  7:57 AM   Result Value Ref Range    Vit D, 25-Hydroxy 37 1 30 0 - 100 0 ng/mL   Uric acid    Collection Time: 06/02/23  7:57 AM   Result Value Ref Range    Uric Acid 5 6 2 0 - 7 5 mg/dL   CBC and differential    Collection Time: 06/02/23  7:57 AM   Result Value Ref Range    WBC 5 04 4 31 - 10 16 Thousand/uL    RBC 4 08 3 81 - 5 12 Million/uL    Hemoglobin 11 9 11 5 - 15 4 g/dL    Hematocrit 37 8 34 8 - 46 1 %    MCV 93 82 - 98 fL    MCH 29 2 26 8 - 34 3 pg    MCHC 31 5 31 4 - 37 4 g/dL    RDW 13 8 11 6 - 15 1 %    MPV 11 2 8 9 - 12 7 fL    Platelets 864 245 - 114 Thousands/uL    nRBC 0 /100 WBCs    Neutrophils Relative 59 43 - 75 %    Immat GRANS % 0 0 - 2 %    Lymphocytes Relative 30 14 - 44 %    Monocytes Relative 7 4 - 12 %    Eosinophils Relative 3 0 - 6 %    Basophils Relative 1 0 - 1 %    Neutrophils Absolute 2 97 1 85 - 7 62 Thousands/µL    Immature Grans Absolute 0 01 0 00 - 0 20 Thousand/uL    Lymphocytes Absolute 1 49 0 60 - 4 47 Thousands/µL    Monocytes Absolute 0 35 0 17 - 1 22 Thousand/µL    Eosinophils Absolute 0 17 0 00 - 0 61 Thousand/µL    Basophils Absolute 0 05 0 00 - 0 10 Thousands/µL   Comprehensive metabolic panel    Collection Time: 06/02/23  7:57 AM   Result Value Ref Range    Sodium 137 135 - 147 mmol/L    Potassium 4 5 3 5 - 5 3 mmol/L    Chloride 106 96 - 108 mmol/L    CO2 27 21 - 32 mmol/L    ANION GAP 4 4 - 13 mmol/L    BUN 28 (H) 5 - 25 mg/dL    Creatinine 0 91 0 60 - 1 30 mg/dL    Glucose, Fasting 101 (H) 65 - 99 mg/dL    Calcium 9 6 8 3 - 10 1 mg/dL    AST 14 5 - 45 U/L    ALT 17 12 - 78 U/L    Alkaline Phosphatase 55 46 - 116 U/L    Total Protein 6 7 6 4 - 8 4 g/dL    Albumin 3 7 3 5 - 5 0 g/dL    Total Bilirubin 0 36 0 20 - 1 00 mg/dL    eGFR 58 ml/min/1 73sq m   Hemoglobin A1C    Collection Time: 06/02/23  7:57 AM   Result Value Ref Range    Hemoglobin A1C 6 5 (H) Normal 3 8-5 6%; PreDiabetic 5 7-6 4%; Diabetic >=6 5%; Glycemic control for adults with diabetes <7 0% %     mg/dl   Microalbumin / creatinine urine ratio    Collection Time: 06/02/23  7:57 AM   Result Value Ref Range    Creatinine, Ur 89 9 mg/dL    Albumin,U,Random 9 8 0 0 - 20 0 mg/L    Albumin Creat Ratio 11 0 - 30 mg/g creatinine   TSH, 3rd generation with Free T4 reflex    Collection Time: 06/02/23  7:57 AM   Result Value Ref Range    TSH 3RD GENERATON 1 640 0 450 - 4 500 uIU/mL       Assessment/Plan:    Major depressive disorder, recurrent, mild ([address] )  Managed by psychiatry as well as therapist    Mixed hyperlipidemia  LDL goal less than 70 since the patient is diabetic  She is at goal with an LDL of 47  Continue on atorvastatin 10 mg once daily  Repeat lipid panel to be done in 4 months    Type 2 diabetes mellitus without complication, without long-term current use of insulin ([facility])    Lab Results   Component Value Date    HGBA1C 6 5 (H) 06/02/2023     Again she continues doing well  Hemoglobin A1c less than 7%  Repeat diabetic parameters to be done in 4 months  Continue on same dose of metformin    Chronic idiopathic constipation  Remains on Linzess 145 mcg once daily  Refill provided  Advised patient that she should be taking Metamucil on a daily basis to increase her fiber content and only can take MiraLAX if she has not had a bowel movement in several days  Primary hypertension  Remains very well controlled on lisinopril/hydrochlorothiazide  Continue same  Refill provided    Dry scalp  Seborrheic dermatitis of the scalp  Triamcinolone ointment was given to the patient at last visit  Just needs to rub it into her scalp  She had it in her purse    I showed her how to apply            Problem List Items Addressed This Visit        Digestive    Chronic idiopathic constipation     Remains on Linzess 145 mcg once daily  Refill provided  Advised patient that she should be taking Metamucil on a daily basis to increase her fiber content and only can take MiraLAX if she has not had a bowel movement in several days  Relevant Medications    linaCLOtide 145 MCG CAPS       Endocrine    Type 2 diabetes mellitus without complication, without long-term current use of insulin ([facility] [address] ) - Primary       Lab Results   Component Value Date    HGBA1C 6 5 (H) 06/02/2023     Again she continues doing well  Hemoglobin A1c less than 7%  Repeat diabetic parameters to be done in 4 months  Continue on same dose of metformin         Relevant Orders    Comprehensive metabolic panel    Hemoglobin A1C    Albumin / creatinine urine ratio       Cardiovascular and Mediastinum    Primary hypertension     Remains very well controlled on lisinopril/hydrochlorothiazide  Continue same  Refill provided         Relevant Medications    lisinopril-hydrochlorothiazide (PRINZIDE,ZESTORETIC) 10-12 5 MG per tablet       Musculoskeletal and Integument    Dry scalp     Seborrheic dermatitis of the scalp  Triamcinolone ointment was given to the patient at last visit  Just needs to rub it into her scalp  She had it in her purse  I showed her how to apply            Other    Major depressive disorder, recurrent, mild ([facility] [address] )     Managed by psychiatry as well as therapist         Mixed hyperlipidemia     LDL goal less than 70 since the patient is diabetic  She is at goal with an LDL of 47  Continue on atorvastatin 10 mg once daily    Repeat lipid panel to be done in 4 months         Relevant Medications    atorvastatin (LIPITOR) 10 mg tablet    Other Relevant Orders    Comprehensive metabolic panel    Lipid panel   Other Visit Diagnoses     Benign essential hypertension        Relevant Medications    lisinopril-hydrochlorothiazide (PRINZIDE,ZESTORETIC) 10-12 5 MG per tablet    Other Relevant Orders    CBC and differential    TSH, 3rd generation with Free T4 reflex          I have spent a total time of 41 minutes on 06/08/23 in caring for this patient including Diagnostic results, Prognosis, Risks and benefits of tx options, Instructions for management, Patient and family education, Importance of tx compliance, Risk factor reductions, Impressions, Counseling / Coordination of care, Documenting in the medical record, Reviewing / ordering tests, medicine, procedures  , Obtaining or reviewing history   and Communicating with other healthcare professionals

## 2023-06-08 NOTE — ASSESSMENT & PLAN NOTE
LDL goal less than 70 since the patient is diabetic  She is at goal with an LDL of 47  Continue on atorvastatin 10 mg once daily    Repeat lipid panel to be done in 4 months
Lab Results   Component Value Date    HGBA1C 6 5 (H) 06/02/2023     Again she continues doing well  Hemoglobin A1c less than 7%  Repeat diabetic parameters to be done in 4 months    Continue on same dose of metformin
Managed by psychiatry as well as therapist
Remains on Linzess 145 mcg once daily  Refill provided  Advised patient that she should be taking Metamucil on a daily basis to increase her fiber content and only can take MiraLAX if she has not had a bowel movement in several days 
Remains very well controlled on lisinopril/hydrochlorothiazide  Continue same    Refill provided
Seborrheic dermatitis of the scalp  Triamcinolone ointment was given to the patient at last visit  Just needs to rub it into her scalp  She had it in her purse    I showed her how to apply
No

## 2023-06-19 ENCOUNTER — HOSPITAL ENCOUNTER (OUTPATIENT)
Dept: CT IMAGING | Facility: HOSPITAL | Age: 83
Discharge: HOME | End: 2023-06-19
Payer: MEDICARE

## 2023-06-19 DIAGNOSIS — N20.0 KIDNEY STONE: ICD-10-CM

## 2023-06-19 PROCEDURE — 74176 CT ABD & PELVIS W/O CONTRAST: CPT

## 2023-06-19 PROCEDURE — G1004 CDSM NDSC: HCPCS

## 2023-06-22 ENCOUNTER — OFFICE VISIT (OUTPATIENT)
Dept: BEHAVIORAL/MENTAL HEALTH CLINIC | Facility: CLINIC | Age: 83
End: 2023-06-22
Payer: MEDICARE

## 2023-06-22 DIAGNOSIS — F41.1 GENERALIZED ANXIETY DISORDER: ICD-10-CM

## 2023-06-22 DIAGNOSIS — F33.0 MAJOR DEPRESSIVE DISORDER, RECURRENT, MILD (HCC): Primary | ICD-10-CM

## 2023-06-22 PROCEDURE — 90853 GROUP PSYCHOTHERAPY: CPT | Performed by: PSYCHIATRY & NEUROLOGY

## 2023-06-22 NOTE — PSYCH
"Behavioral Health Psychotherapy Group Progress Note    Psychotherapy Provided: Group Therapy    1  Major depressive disorder, recurrent, mild ([address] )        2  Generalized anxiety disorder            Goals addressed in session: Goal 1     Group Name: Older Adult Anxiety and depression group    Topic(s) covered: anxiety, depression, PTSD, coping skills    Skill(s) covered: mindfulness, grounding, breathing techniques, thought reframing    Group summary:  Group discussed how anxiety affects them on a daily basis, general psychoeducation on fight/flight/freeze process, and coping strategies to help ease anxiety symptoms  Data: Maxie Burkitt attended today's group  She shared that she struggles with both anxiety and depression, with her biggest anxiety revolving around her health  She said that even a small cut will lead her to think \"my whole finger is cut off  \"  She shared that she has even lost friendships because she could not handle others' health conditions because she is so fearful of being sick herself  Substance Abuse was not addressed during this session  If the client is diagnosed with a co-occurring substance use disorder, please indicate any changes in the frequency or amount of use: n/a  Stage of change for addressing substance use diagnoses: No substance use/Not applicable    ASSESSMENT: Kp Sims appeared  with a Anxious mood  Her affect is Constricted, which is congruent, with his mood and the content of the session  She appeared to was attentive throughout the group and interacted appropriately with and was supportive of the other group members  Antoinette Quiñonez presents with a lowrisk of suicide,lowrisk of self-harm, and minimal risk of harm to others  For any risk assessment that surpasses a \"low\" rating, a safety plan must be developed  A safety plan was indicated: no  If yes, describe in detail n/a    PLAN: Maxie Burkitt will work on coping skills discussed today   The next group is scheduled for July " 13th and will cover the topic of anxiety, depression, PTSD, coping skills  Behavioral Health Treatment Plan and Discharge Planning: Sharonda Gaines is aware of and agrees to continue to work on their treatment plan  They have identified and are working toward their discharge goals   yes    Visit start and stop times:    06/22/23  Start Time: 1401  Stop Time: 1500  Total Visit Time: 59 minutes

## 2023-07-03 DIAGNOSIS — E11.9 TYPE 2 DIABETES MELLITUS WITHOUT COMPLICATION, WITHOUT LONG-TERM CURRENT USE OF INSULIN (HCC): ICD-10-CM

## 2023-07-05 DIAGNOSIS — F33.1 MAJOR DEPRESSIVE DISORDER, RECURRENT, MODERATE (HCC): ICD-10-CM

## 2023-07-05 DIAGNOSIS — F41.1 GENERALIZED ANXIETY DISORDER: ICD-10-CM

## 2023-07-05 DIAGNOSIS — F33.0 MAJOR DEPRESSIVE DISORDER, RECURRENT, MILD (HCC): ICD-10-CM

## 2023-07-06 RX ORDER — FLUOXETINE HYDROCHLORIDE 40 MG/1
40 CAPSULE ORAL DAILY
Qty: 30 CAPSULE | Refills: 2 | Status: SHIPPED | OUTPATIENT
Start: 2023-07-06 | End: 2023-09-04

## 2023-07-06 RX ORDER — CLONAZEPAM 0.5 MG/1
0.5 TABLET ORAL 2 TIMES DAILY
Qty: 60 TABLET | Refills: 0 | Status: SHIPPED | OUTPATIENT
Start: 2023-07-06 | End: 2023-08-05

## 2023-07-10 DIAGNOSIS — F41.1 GENERALIZED ANXIETY DISORDER: ICD-10-CM

## 2023-07-10 DIAGNOSIS — E78.2 MIXED HYPERLIPIDEMIA: ICD-10-CM

## 2023-07-10 RX ORDER — GABAPENTIN 100 MG/1
100 CAPSULE ORAL 2 TIMES DAILY
Qty: 90 CAPSULE | Refills: 1 | Status: SHIPPED | OUTPATIENT
Start: 2023-07-10

## 2023-07-10 RX ORDER — ATORVASTATIN CALCIUM 10 MG/1
10 TABLET, FILM COATED ORAL DAILY
Qty: 90 TABLET | Refills: 0 | Status: SHIPPED | OUTPATIENT
Start: 2023-07-10

## 2023-07-17 DIAGNOSIS — I10 BENIGN ESSENTIAL HYPERTENSION: ICD-10-CM

## 2023-07-17 RX ORDER — LISINOPRIL AND HYDROCHLOROTHIAZIDE 12.5; 1 MG/1; MG/1
1 TABLET ORAL DAILY
Qty: 90 TABLET | Refills: 0 | Status: SHIPPED | OUTPATIENT
Start: 2023-07-17

## 2023-07-19 ENCOUNTER — OFFICE VISIT (OUTPATIENT)
Dept: PSYCHIATRY | Facility: CLINIC | Age: 83
End: 2023-07-19
Payer: MEDICARE

## 2023-07-19 DIAGNOSIS — F41.1 GENERALIZED ANXIETY DISORDER: Primary | ICD-10-CM

## 2023-07-19 DIAGNOSIS — F33.0 MAJOR DEPRESSIVE DISORDER, RECURRENT, MILD (HCC): ICD-10-CM

## 2023-07-19 PROCEDURE — 90792 PSYCH DIAG EVAL W/MED SRVCS: CPT | Performed by: PSYCHIATRY & NEUROLOGY

## 2023-07-19 NOTE — BH TREATMENT PLAN
TREATMENT PLAN        [address] [facility]    Name and Date of Birth:  Marco Antonio Jansen 78 y.o. 1940  Date of Treatment Plan: July 19, 2023  Diagnosis/Diagnoses:    1. Generalized anxiety disorder    2. Major depressive disorder, recurrent, mild (HCC)        Strengths/Personal Resources for Self-Care: supportive family, ability to listen, general fund of knowledge, motivation for treatment, ability to negotiate basic needs, [employer] affiliation, special hobby/interest, strong ghanshyam, well educated, work skills    Area/Areas of need: anxiety symptoms, depressive symptoms    Long Term Goal: improve control of anxiety  Target Date: 6 months - January 19, 2024  Person/Persons responsible for completion of goal: Licha Mooney and Carolina July, DO     Short Term Objective (s) - How will we reach this goal?:   1. Take medications as prescribed  2. Attend psychiatry appointments regularly  3. Take walks regularly  4. Try breathing exercises  5. Try relaxation techniques  Target Date: 2 months - September 19, 2023  Person/Persons Responsible for Completion of Goal: Licha Mooney     Progress Towards Goals: Continuing treatment    Treatment Modality: medication management every 1-3 months as needed  Review due 180 days from date of this plan: January 15, 2024   Expected length of service: Ongoing treatment    My physician and I have developed this plan together, and I agree to work on the goals and objectives. I understand the treatment goals that were developed for my treatment. The treatment plan was created between Melrose July, DO and Marco Antonio Jansen on 07/19/23 at 11:21 AM but not signed at the time of the visit due to [address] The plan was reviewed, and verbal consent was given.

## 2023-07-19 NOTE — PATIENT INSTRUCTIONS
Stop your Risperidone by taking 0.25 once a day for 5 days then stopping it on day 6. Practice utilizing the 5 senses (a grounding technique) & 4-7-8 breathing technique    Please present for your previously scheduled appointment approximately 15 minutes prior to appointment time. If you are running late or are unable to attend your appointment, please call our [facility] [facility] office at [phone] or our [facility]) office at [phone] if applicable to notify the office of your absence. If you are in psychological crisis including not limited to suicidal/homicidal thoughts or thoughts of self-injury, do not hesitate to call/contact your [facility] hotline (see below) or attend to the nearest emergency department.   [facility] Crisis: [address] Crisis: [phone]  [address] Crisis: [address] Crisis: [address] Crisis: [address] Crisis: [address] Crisis: [phone]  National Suicide Prevention Hotline: 1-991.579.7750

## 2023-07-19 NOTE — PSYCH
Psychiatric Evaluation - Department of [address] [place] [address]    Name and Date of Birth:  Martine Canas 78 y.o. 1940 MRN: [id number]    Date of Visit: July 19, 2023    ASSESSMENT & PLAN     Martine Canas is a 78 y.o. female, ,  and presently living daughter, son-in-law, 3 grandkids; retired teacher; with prior psychiatric diagnoses of MDD, JONAH; no past suicide attempts (0); 1 past psychiatric hospitalizations (for anxiety); with suicide risk factors including medical problems, anxiety and age (52+); and medical history including DM2, HTN, DVT hx, OA, HLD, TIA, status post rotator cuff surgery. Martine Canas is referred from Dr. Talisha Copeland, presenting today with a main concern of anxiety & depressive symptoms. Treatment Plan:  The Treatment Plan was completed and signed. . If done today, the next plan will be due January 15, 2024. Suicide/Homicide Risk Assessment:  Risk of Harm to Self:  The following ratings are based on assessment at the time of the interview  Demographic risk factors include: ,  status, elderly (76 or older)   Historical Risk Factors include: chronic psychiatric problems, history of depression, history of anxiety  Recent Specific Risk Factors include: diagnosis of depression, current depressive symptoms, current anxiety symptoms  Protective Factors: no current suicidal ideation, being a parent, compliant with medications, compliant with mental health treatment, having a desire to be alive, having a desire to live, having a sense of purpose or meaning in life, having pets, personal beliefs, personal beliefs about the meaning and value of life, Gnosticism beliefs discouraging suicide, resiliency, restricted access to lethal means, stable living environment, supportive family  Weapons: none.  The following steps have been taken to ensure weapons are properly secured: not applicable  Based on today's assessment, Lucita Romero presents the following risk of harm to self: minimal    Risk of Harm to Others: The following ratings are based on assessment at the time of the interview  Demographic Risk Factors include: none. Historical Risk Factors include: none. Recent Specific Risk Factors include: multiple stressors, social difficulties. Protective Factors: no current homicidal ideation, being a parent, compliant with medications, compliant with mental health treatment, no substance use problems, personal beliefs, Temple beliefs, resilience, restricted access to lethal means, safe and stable living environment, supportive family  Weapons: none. The following steps have been taken to ensure weapons are properly secured: not applicable  Based on today's assessment, Lucita Romero presents the following risk of harm to others: minimal    Medical Decision Making / Counseling / Coordination of Care: The following interventions are recommended: no intervention changes needed. Although patient's acute lethality risk is LOW, long-term/chronic lethality risk is mildly elevated in the presence of anxiety & depressive symptoms. At the current moment, Lucita Romero is future-oriented, forward-thinking, and demonstrates ability to act in a self-preserving manner as evidenced by volitionally seeking psychiatric evaluation and treatment today. At this juncture, inpatient hospitalization is not currently warranted. To mitigate future risk, patient should adhere to the recommendations of this writer, avoid alcohol/illicit substance use, utilize community-based resources and familiar support, and prioritize mental health treatment. Based on today's assessment and clinical criteria, Bruce Thornton contracts for safety and is not an imminent risk of harm to self or others. Outpatient level of care is deemed appropriate at this present time.  Lucita Romero understands that if they are no longer able to contract for safety, they need to call/contact the outpatient office including this writer, call/contact crisis and/orattend to the nearest Emergency Department for immediate evaluation. The diagnosis and treatment plan were reviewed with the patient. Risks, benefits, and alternatives to treatment were discussed. The importance of medication and treatment compliance was reviewed with the patient. Diagnoses:   1. Anxiety  2. MDD    Treatment Recommendations/Precautions:  • Discontinue Risperdal due to concerns for side effects  • Continue Prozac 40mg for anxiety and depression symptoms  o Considerations of Symbyax for anxiety with obsessive thoughts  • Continue Gabapentin 100mg BID for anxiety (used off-label but evidence-based)  • Continue Klonopin 0.5mg BID for anxiety  o Plans of decreasing in the future  • Medication management every 2 months  • Aware of 24 hour and weekend coverage for urgent situations accessed by calling [facility] main practice number    Medications Risks/Benefits:    Risks, benefits, and possible side effects of medications explained to Licha Vinicio and she verbalizes understanding and agreement for treatment. including:   • PARQ was completed for the class of SSRIs including transient anxiety, insomnia or sedation, headaches, constipation or diarrhea; and longer-standing sexual side effects, bleeding risk (especially with NSAIDs), and weight gain; as well as suicidal thoughts in patients under 22years old, serotonin syndrome, and induction of eligio. Potential for discontinuation syndrome (flu-like symptoms, insomnia, nausea, sensory disturbances, and headache) was also discussed. • PARQ was completed for gabapentin including sedation, dizziness, tremor, GI upset, potential for weight gain, and rare suicidal thinking.     Controlled Medication Discussion:   Licha Mooney has been filling controlled prescriptions on time as prescribed according to [address] [facility]    Reason for visit: Initial psychiatric intake assessment    Chief complaint: Anxiety and depressive symptoms. HISTORY OF PRESENT ILLNESS (HPI)      Norm Rowe was referred by Dr. Edna Maria and is now presenting for transfer of care for a full psychiatric intake assessment including evaluation for medication and psychotherapy. Norm Rowe is a 78 y.o., female,  ,  and presently living daughter, son-in-law, 3 grandkids; retired teacher; with prior psychiatric diagnoses of MDD, JONAH; no past suicide attempts (0); 1 past psychiatric hospitalizations (for anxiety); with suicide risk factors including medical problems, anxiety and age (52+); and medical history including DM2, HTN, DVT hx, OA, HLD, TIA, status post rotator cuff surgery, presenting to the [address] [place] [address] outpatient clinic for intake assessment. At her last visit with Dr. Edna Maria, pt was reporting: intermittent anxiety, intrusive thoughts, and mild depressive episodes that last for several days. At the time, she did not want medication changes, and felt te Risperdal was making most of the change in able to think clearer. Risperdal was initially begun for concerns of obsessive thoughts with concerns of delusions in nature. There were some stressors with daughter. Patient presents today with daughter, Brie Joiner. Patient notes that she has headaches and itchy scalp. The medication was increased and they felt that it has worsened. PCP has taken a look, prescribed a cream, without any relief. They note that this is new and never been an issue. She notes waking up feeling her head has been itchy, head being sore, and with a headache. It can awake her from sleep as well. She notes she falls asleep fairly early, but awakes throughout the night because of the bathroom. It can be difficult to go back to sleep. Difficulty comes from sometimes worrying, and/or the discomfort with her scalp.      Stressor mentioned was her granddaughter who is dating a female, while she is [employer]. Additionally, a father-in-law recently moved in. He lives in the basement, but she feels worried about things including concerns of being believing they are together which does not feel "right." It was talked that if he does not eat a second portion, she is afraid to eat a second portion because it would not be "fair."     She feels like the other medications are helping, but is also concerned about being overmedicated. At this time, she was agreeable to discontinue the Risperdal to see if it was causing her scalp reaction. Presently, patient adamantly denies suicidal/homicidal ideation in addition to thoughts of self-injury, citing family & Orthodoxy as deterrents against self-harm; contracts for safety, see above for risk assessment. At conclusion of evaluation, patient is amenable to the recommendations of this writer including: medications as prescribed, attending routine appointments. Also, patient is amenable to calling/contacting the outpatient office including this writer if any acute adverse effects of their medication regimen arise in addition to any comments or concerns pertaining to their psychiatric management. Patient is amenable to calling/contacting crisis and/or attending to the nearest emergency department if their clinical condition deteriorates to assure their safety and stability, stating that they are able to appropriately confide in their Family (daughter) & good friend regarding their psychiatric state.     PSYCHIATRIC REVIEW OF SYSTEMS     Sleep change: no  Interest change: no  • Interests include: read (books), knitting  Guilt/Hopelessness/helplessness/worthlessness: yes, guilt (giving daughter stress)  Energy change: yes, decreased  Concentration/Attention change: yes, decreased  Appetite change: yes, decreased   • Weight changes & timeframe: no  Psychomotor agitation/retardation: no  Somatic symptoms: no  Suicidal ideation: no  Homicidal ideation: no  Zaira/hypomania: no    Anxiety/panic attack: yes  • JONAH symptoms: difficulty concentrating, fatigue, insomnia, irritable and restlessness/keyed up  • Panic Disorder symptoms: no symptoms suggestive of panic disorder  Obsessive/compulsive symptoms: obsessive thoughts without compulsions  Eating Disorder symptoms: no historical or current eating disorder. no binge eating disorder; no anorexia nervosa. no symptoms of bulimia; does not stopped eating when trying to lose weight when younger    Auditory hallucinations: no  Visual hallucinations: no  Other perceptual disturbances: no  Delusional thinking: no    REVIEW OF SYSTEMS     Constitutional negative   ENT negative   Cardiovascular negative   Respiratory negative   Gastrointestinal negative   Genitourinary negative   Musculoskeletal negative   Integumentary itchy scalp   Neurological negative   Endocrine negative   Other Symptoms none, all other systems are negative     HISTORICAL INFORMATION     Family Psychiatric History:     Family History   Problem Relation Age of Onset   • Depression Mother         w/anxiety   • Diabetes Mother         Mellitus   • Breast cancer Mother    • Hypertension Mother    • No Known Problems Father    • Depression Sister         w/anxiety   • Heart disease Sister         Cardiac Disorder   • Breast cancer Sister    • Hypertension Sister    • Diabetes Brother         Mellitus   • Alcohol abuse Son      Additional fam hx:   Family hx of psychiatric diagnosis: yes, 2 sisters (depression & anxiety), Mom (Depression & anxiety)  Family hx of suicide: no  Family Hx of drug abuse:  Son with alcohol abuse  Family Hx of medical diagnosis: yes, as listed above Mom (DM, breast cancer, HTN), sister (heart issues, breast cancer, HTN), brother (DM)    Past Psychiatric History:   Previous diagnosis: MDD, Anxiety  Previous inpatient psychiatric admissions: 1 prior admission for 3 weeks at [facility] in 2013 (panic attack after surgery). Present/previous outpatient psychiatrist: saw psychiatrists in [facility] growing up; Dr. Huber Freeman & Dr. Kirsty Man prior to transfer. Present/previous therapy/psychotherapy: Dr. Matilde Lemon for past 3 years. History of suicidal attempts/gestures: Denies. Self-injurious behavior/high-risk behavior: no.  History of violence/aggressive behaviors: No.  Other Services: patient denies    Psychiatric medication trial:   • Antidepressants  o Effexor, Zoloft, Prozac, Lexapro  • Antipsychotics  o Abilify (Dizzy), Risperdal  • Mood stabilizers  o N/A  • Sedative hypnotics  o Klonopin  • Others  o Buspar, Neurontin    Substance Abuse History:  Nicotine use (cigarettes & vape): Denies  Caffeine use: 1 cup day Decafe  Alcohol use: Denies  Marijuana use: Denies  Other substances: Denies    Longest clean time: N/A  Previous inpatient/outpatient substance abuse rehabilitation: No.    Patient denies previous legal actions or arrests related to substance intoxication including prior DWIs/DUIs. Juan Luis Trejo does not apear under the influence or withdrawal of any psychoactive substance throughout today's examination. I have assessed this patient for substance use within the past 12 months. Social History:  Born/Raise: [address] - moved to [place]  when begun living with family  Early life/developmental: Denies a history of milestone/developmental delay. Denies a history of in-utero exposure to toxins/illicit substances. Family: 2 brother(s) & 2 sister(s), raised by parents  Education: Bachelor of Arts - Teaching  • Learning Disabilities: There is no documented history of IEP or need for special education.   Occupational History: retired teacher  Anabapt[employer] Affiliation: [address]  Marital history:  since  -   from cancer  Children: yes, 2 (1 daughter & 1 son), 3 grandkids  Living arrangement: daughter, father-in-law, son-in-law, 3 grandkids in [facility], 2 dogs, [address]  Support system: good support system - daughter, therapist   Hx: no  Legal Hx: no  Access to firearms: patient denies. Katey Rosen has no history of arrests or violence pertaining to use of a deadly weapon. Traumatic History:   Abuse: none is reported  Other Traumatic Events: Denies  Flashbacks/Nightmares: no     Past Medical History:  Hx of seizures: no  Hx of concussions & ongoing symptoms: no    Past Medical History:   Diagnosis Date   • Colon polyps    • Diabetes mellitus ([address])    • Dizziness     Last assessed: 8/31/15   • DVT (deep venous thrombosis) ([address])    • Dysuria    • Hematuria 2022   • Hyperlipidemia    • Hypertension    • Hypertensive urgency 10/22/2021   • Insomnia    • Ureterolithiasis 2020        Past Surgical History:   Procedure Laterality Date   •  SECTION      1964 son, 26 daughter   • COLONOSCOPY     • FL RETROGRADE PYELOGRAM  2020   • LA COLONOSCOPY FLX DX W/COLLJ SPEC WHEN PFRMD N/A 3/27/2017    Procedure: COLONOSCOPY;  Surgeon: George Medel MD;  [facility]; Service: Gastroenterology   • LA CYSTO/URETERO W/LITHOTRIPSY &INDWELL STENT INSRT Right 2020    Procedure: CYSTOSCOPY URETEROSCOPY WITH LITHOTRIPSY HOLMIUM LASER, RETROGRADE PYELOGRAM AND INSERTION STENT URETERAL; EXCISION OF BLADDER TUMOR;  Surgeon: Montez Gentile MD;  Location: [facility];  Service: Urology   • ROTATOR CUFF REPAIR Left     Last assessed: 3/29/15   • TONSILLECTOMY       No Known Allergies    History Review: The following portions of the patient's history were reviewed and updated as appropriate: allergies, current medications, past family history, past medical history, past social history, past surgical history and problem list.    OBJECTIVE     Vital signs in last 24 hours: There were no vitals filed for this visit.   Visit Vitals  OB Status Postmenopausal   Smoking Status Never      Wt Readings from Last 6 Encounters:   23 55.3 kg (122 lb)   23 56.1 kg (123 lb 9.6 oz)   23 54 kg (119 lb)   10/24/22 58.1 kg (128 lb)   22 57.6 kg (127 lb)   22 56.6 kg (124 lb 11.2 oz)        Last recorded QTc: 407 on 22    Current Rating Scores:   Current PHQ-9   PHQ-2/9 Depression Screening    Little interest or pleasure in doing things: 1 - several days  Feeling down, depressed, or hopeless: 3 - nearly every day  Trouble falling or staying asleep, or sleeping too much: 0 - not at all  Feeling tired or having little energy: 1 - several days  Poor appetite or overeatin - several days  Feeling bad about yourself - or that you are a failure or have let yourself or your family down: 2 - more than half the days  Trouble concentrating on things, such as reading the newspaper or watching television: 1 - several days  Moving or speaking so slowly that other people could have noticed. Or the opposite - being so fidgety or restless that you have been moving around a lot more than usual: 0 - not at all  Thoughts that you would be better off dead, or of hurting yourself in some way: 0 - not at all  PHQ-9 Score: 9   PHQ-9 Interpretation: Mild depression        Current JONAH-7 is   JONAH-7 Flowsheet Screening    Flowsheet Row Most Recent Value   Over the last 2 weeks, how often have you been bothered by any of the following problems? Feeling nervous, anxious, or on edge 2   Not being able to stop or control worrying 2   Worrying too much about different things 3   Trouble relaxing 2   Being so restless that it is hard to sit still 1   Becoming easily annoyed or irritable 1   Feeling afraid as if something awful might happen 2   JONAH-7 Total Score 13      .     Mental Status Evaluation:    Appearance:  age appropriate, casually dressed   Behavior:  cooperative, calm   Motor: no abnormal movements   Speech:  normal volume, rat   Mood:  anxious   Affect:  constricted   Thought Process:  circumstantial   Thought Content: {no overt delusions   Perceptual disturbances: no auditory hallucinations, no visual hallucinations   Risk Potential: Suicidal ideation - None  Homicidal ideation - None  Potential for aggression - No   Cognition: oriented to self and situation, appears to be of average intelligence and cognition not formally tested   Insight:  fair   Judgment: fair     Labs & Imaging:  I have personally reviewed all pertinent laboratory tests and imaging results. Appointment on 06/02/2023   Component Date Value Ref Range Status   • Cholesterol 06/02/2023 130  See Comment mg/dL Final    Cholesterol:         Pediatric <18 Years        Desirable          <170 mg/dL      Borderline High    170-199 mg/dL      High               >=200 mg/dL        Adult >=18 Years            Desirable         <200 mg/dL      Borderline High   200-239 mg/dL      High              >239 mg/dL     • Triglycerides 06/02/2023 125  See Comment mg/dL Final    Triglyceride:     0-9Y            <75mg/dL     10Y-17Y         <90 mg/dL       >=18Y     Normal          <150 mg/dL     Borderline High 150-199 mg/dL     High            200-499 mg/dL        Very High       >499 mg/dL    Specimen collection should occur prior to N-Acetylcysteine or Metamizole administration due to the potential for falsely depressed results. • HDL, Direct 06/02/2023 58  >=50 mg/dL Final    Specimen collection should occur prior to Metamizole administration due to the potential for falsley depressed results. • LDL Calculated 06/02/2023 47  0 - 100 mg/dL Final    LDL Cholesterol:     Optimal           <100 mg/dl     Near Optimal      100-129 mg/dl     Above Optimal       Borderline High 130-159 mg/dl       High            160-189 mg/dl       Very High       >189 mg/dl         This screening LDL is a calculated result. It does not have the accuracy of the Direct Measured LDL in the monitoring of patients with hyperlipidemia and/or statin therapy. Direct Measure LDL ([id number]) must be ordered separately in these patients.    • Vit D, 25-Hydroxy 06/02/2023 37.1  30.0 - 100.0 ng/mL Final    Vitamin D guidelines established by Clinical Guidelines Subcommittee  of the Endocrine Society Task Force, 2011    Deficiency <20ng/ml   Insufficiency 20-30ng/ml   Sufficient  ng/ml    • Uric Acid 06/02/2023 5.6  2.0 - 7.5 mg/dL Final    Specimen collection should occur prior to Metamizole administration due to the potential for falsely depressed results.    • WBC 06/02/2023 5.04  4.31 - 10.16 Thousand/uL Final   • RBC 06/02/2023 4.08  3.81 - 5.12 Million/uL Final   • Hemoglobin 06/02/2023 11.9  11.5 - 15.4 g/dL Final   • Hematocrit 06/02/2023 37.8  34.8 - 46.1 % Final   • MCV 06/02/2023 93  82 - 98 fL Final   • MCH 06/02/2023 29.2  26.8 - 34.3 pg Final   • MCHC 06/02/2023 31.5  31.4 - 37.4 g/dL Final   • RDW 06/02/2023 13.8  11.6 - 15.1 % Final   • MPV 06/02/2023 11.2  8.9 - 12.7 fL Final   • Platelets 32/45/8159 198  149 - 390 Thousands/uL Final   • nRBC 06/02/2023 0  /100 WBCs Final   • Neutrophils Relative 06/02/2023 59  43 - 75 % Final   • Immat GRANS % 06/02/2023 0  0 - 2 % Final   • Lymphocytes Relative 06/02/2023 30  14 - 44 % Final   • Monocytes Relative 06/02/2023 7  4 - 12 % Final   • Eosinophils Relative 06/02/2023 3  0 - 6 % Final   • Basophils Relative 06/02/2023 1  0 - 1 % Final   • Neutrophils Absolute 06/02/2023 2.97  1.85 - 7.62 Thousands/µL Final   • Immature Grans Absolute 06/02/2023 0.01  0.00 - 0.20 Thousand/uL Final   • Lymphocytes Absolute 06/02/2023 1.49  0.60 - 4.47 Thousands/µL Final   • Monocytes Absolute 06/02/2023 0.35  0.17 - 1.22 Thousand/µL Final   • Eosinophils Absolute 06/02/2023 0.17  0.00 - 0.61 Thousand/µL Final   • Basophils Absolute 06/02/2023 0.05  0.00 - 0.10 Thousands/µL Final   • Sodium 06/02/2023 137  135 - 147 mmol/L Final   • Potassium 06/02/2023 4.5  3.5 - 5.3 mmol/L Final   • Chloride 06/02/2023 106  96 - 108 mmol/L Final   • CO2 06/02/2023 27  21 - 32 mmol/L Final   • ANION GAP 06/02/2023 4  4 - 13 mmol/L Final   • BUN 06/02/2023 28 (H)  5 - 25 mg/dL Final   • Creatinine 06/02/2023 0.91  0.60 - 1.30 mg/dL Final    Standardized to IDMS reference method   • Glucose, Fasting 06/02/2023 101 (H)  65 - 99 mg/dL Final    Specimen collection should occur prior to Sulfasalazine administration due to the potential for falsely depressed results. Specimen collection should occur prior to Sulfapyridine administration due to the potential for falsely elevated results. • Calcium 06/02/2023 9.6  8.3 - 10.1 mg/dL Final   • AST 06/02/2023 14  5 - 45 U/L Final    Specimen collection should occur prior to Sulfasalazine administration due to the potential for falsely depressed results. • ALT 06/02/2023 17  12 - 78 U/L Final    Specimen collection should occur prior to Sulfasalazine and/or Sulfapyridine administration due to the potential for falsely depressed results. • Alkaline Phosphatase 06/02/2023 55  46 - 116 U/L Final   • Total Protein 06/02/2023 6.7  6.4 - 8.4 g/dL Final   • Albumin 06/02/2023 3.7  3.5 - 5.0 g/dL Final   • Total Bilirubin 06/02/2023 0.36  0.20 - 1.00 mg/dL Final    Use of this assay is not recommended for patients undergoing treatment with eltrombopag due to the potential for falsely elevated results. • eGFR 06/02/2023 58  ml/min/1.73sq m Final   • Hemoglobin A1C 06/02/2023 6.5 (H)  Normal 3.8-5.6%; PreDiabetic 5.7-6.4%;  Diabetic >=6.5%; Glycemic control for adults with diabetes <7.0% % Final   • EAG 06/02/2023 140  mg/dl Final   • Creatinine, Ur 06/02/2023 89.9  mg/dL Final   • Albumin,U,Random 06/02/2023 9.8  0.0 - 20.0 mg/L Final   • Albumin Creat Ratio 06/02/2023 11  0 - 30 mg/g creatinine Final   • TSH 3RD GENERATON 06/02/2023 1.640  0.450 - 4.500 uIU/mL Final    The recommended reference ranges for TSH during pregnancy are as follows:   First trimester 0.1 to 2.5 uIU/mL   Second trimester  0.2 to 3.0 uIU/mL   Third trimester 0.3 to 3.0 uIU/m    Note: Normal ranges may not apply to patients who are transgender, non-binary, or whose legal sex, sex at birth, and gender identity differ. Adult TSH (3rd generation) reference range follows the recommended guidelines of the American Thyroid Association, January, 2020. Office Visit on 05/08/2023   Component Date Value Ref Range Status   • COLOR 05/08/2023 Johnson   Final   • Size 05/08/2023 7x6  mm Final    Single piece received. • Stone Weight 05/08/2023 106  mg Final   • Composition 05/08/2023 Comment   Final    Percentage (Represents the % composition)   • Ca Oxalate,Monohydr. 05/08/2023 10  % Final   • URIC ACID 05/08/2023 90  % Final   • STONE COMMENT 05/08/2023 Comment   Final    Physician questions regarding Calcul[employer] Analysis contact  [employer] at: [phone]. • Please note 05/08/2023 Comment   Final    Calculi report will follow via computer, mail or   delivery. • Disclaimer 05/08/2023 Comment   Final    This test was developed and its performance characteristics  determined by [facility].  It has not been cleared or approved  by the Food and Drug Administration.    • Photo 05/08/2023 Comment   Final    Photograph will follow under a separate cover   • [employer] 05/08/2023 Comment   Final    Not provided   • LEUKOCYTE ESTERASE,UA 05/08/2023 -   Final   • NITRITE,UA 05/08/2023 -   Final   • SL AMB POCT UROBILINOGEN 05/08/2023 0.2   Final   • POCT URINE PROTEIN 05/08/2023 -   Final   •  PH,UA 05/08/2023 5.0   Final   • BLOOD,UA 05/08/2023 +   Final   • SPECIFIC GRAVITY,UA 05/08/2023 1,010   Final   • Gwyndolyn Punter 05/08/2023 -   Final   • BILIRUBIN,UA 05/08/2023 -   Final   • GLUCOSE, UA 05/08/2023 -   Final   •  COLOR,UA 05/08/2023 yellow   Final   • CLARITY,UA 05/08/2023 cleaR   Final   Admission on 05/01/2023, Discharged on 05/02/2023   Component Date Value Ref Range Status   • Color, UA 05/01/2023 Light Brown   Final   • Clarity, UA 05/01/2023 Clear   Final   • Specific Gravity, UA 05/01/2023 1.007  1.003 - 1.030 Final   • pH, UA 05/01/2023 6.5  4.5, 5.0, 5.5, 6.0, 6.5, 7.0, 7.5, 8.0 Final   • Leukocytes, UA 05/01/2023 Negative  Negative Final   • Nitrite, UA 05/01/2023 Negative  Negative Final   • Protein, UA 05/01/2023 Trace (A)  Negative mg/dl Final   • Glucose, UA 05/01/2023 Negative  Negative mg/dl Final   • Ketones, UA 05/01/2023 Negative  Negative mg/dl Final   • Urobilinogen, UA 05/01/2023 <2.0  <2.0 mg/dl mg/dl Final   • Bilirubin, UA 05/01/2023 Negative  Negative Final   • Occult Blood, UA 05/01/2023 Large (A)  Negative Final   • RBC, UA 05/01/2023 Innumerable (A)  None Seen, 1-2 /hpf Final   • WBC, UA 05/01/2023 20-30 (A)  None Seen, 1-2 /hpf Final   • Epithelial Cells 05/01/2023 Occasional  None Seen, Occasional /hpf Final   • Bacteria, UA 05/01/2023 Occasional  None Seen, Occasional /hpf Final   • Ca Oxalate Sneha, UA 05/01/2023 Occasional (A)  None Seen /hpf Final   • WBC 05/02/2023 5.13  4.31 - 10.16 Thousand/uL Final   • RBC 05/02/2023 4.11  3.81 - 5.12 Million/uL Final   • Hemoglobin 05/02/2023 11.7  11.5 - 15.4 g/dL Final   • Hematocrit 05/02/2023 37.5  34.8 - 46.1 % Final   • MCV 05/02/2023 91  82 - 98 fL Final   • MCH 05/02/2023 28.5  26.8 - 34.3 pg Final   • MCHC 05/02/2023 31.2 (L)  31.4 - 37.4 g/dL Final   • RDW 05/02/2023 13.4  11.6 - 15.1 % Final   • MPV 05/02/2023 10.4  8.9 - 12.7 fL Final   • Platelets 24/89/2391 176  149 - 390 Thousands/uL Final   • nRBC 05/02/2023 0  /100 WBCs Final   • Neutrophils Relative 05/02/2023 61  43 - 75 % Final   • Immat GRANS % 05/02/2023 0  0 - 2 % Final   • Lymphocytes Relative 05/02/2023 26  14 - 44 % Final   • Monocytes Relative 05/02/2023 8  4 - 12 % Final   • Eosinophils Relative 05/02/2023 4  0 - 6 % Final   • Basophils Relative 05/02/2023 1  0 - 1 % Final   • Neutrophils Absolute 05/02/2023 3.13  1.85 - 7.62 Thousands/µL Final   • Immature Grans Absolute 05/02/2023 0.01  0.00 - 0.20 Thousand/uL Final   • Lymphocytes Absolute 05/02/2023 1.34  0.60 - 4.47 Thousands/µL Final   • Monocytes Absolute 05/02/2023 0.40  0.17 - 1.22 Thousand/µL Final   • Eosinophils Absolute 05/02/2023 0.22  0.00 - 0.61 Thousand/µL Final   • Basophils Absolute 05/02/2023 0.03  0.00 - 0.10 Thousands/µL Final   • Sodium 05/02/2023 138  135 - 147 mmol/L Final   • Potassium 05/02/2023 3.9  3.5 - 5.3 mmol/L Final   • Chloride 05/02/2023 102  96 - 108 mmol/L Final   • CO2 05/02/2023 29  21 - 32 mmol/L Final   • ANION GAP 05/02/2023 7  4 - 13 mmol/L Final   • BUN 05/02/2023 26 (H)  5 - 25 mg/dL Final   • Creatinine 05/02/2023 0.90  0.60 - 1.30 mg/dL Final    Standardized to IDMS reference method   • Glucose 05/02/2023 150 (H)  65 - 140 mg/dL Final    If the patient is fasting, the ADA then defines impaired fasting glucose as > 100 mg/dL and diabetes as > or equal to 123 mg/dL. • Calcium 05/02/2023 9.4  8.4 - 10.2 mg/dL Final   • AST 05/02/2023 15  13 - 39 U/L Final   • ALT 05/02/2023 14  7 - 52 U/L Final    Specimen collection should occur prior to Sulfasalazine administration due to the potential for falsely depressed results. • Alkaline Phosphatase 05/02/2023 56  34 - 104 U/L Final   • Total Protein 05/02/2023 6.5  6.4 - 8.4 g/dL Final   • Albumin 05/02/2023 3.9  3.5 - 5.0 g/dL Final   • Total Bilirubin 05/02/2023 0.36  0.20 - 1.00 mg/dL Final    Use of this assay is not recommended for patients undergoing treatment with eltrombopag due to the potential for falsely elevated results. N-acetyl-p-benzoquinone imine (metabolite of Acetaminophen) will generate erroneously low results in samples for patients that have taken an overdose of Acetaminophen. • eGFR 05/02/2023 58  ml/min/1.73sq m Final   Office Visit on 01/26/2023   Component Date Value Ref Range Status   • Hemoglobin A1C 01/26/2023 6.5  6.5 Final       Note Share: This note was shared with patient.     I spent 60 minutes directly with the patient during this visit    Kaylah Hernández DO 07/19/23

## 2023-07-21 ENCOUNTER — HOSPITAL ENCOUNTER (EMERGENCY)
Facility: HOSPITAL | Age: 83
Discharge: HOME | End: 2023-07-21
Attending: EMERGENCY MEDICINE
Payer: MEDICARE

## 2023-07-21 ENCOUNTER — APPOINTMENT (EMERGENCY)
Dept: CT IMAGING | Facility: HOSPITAL | Age: 83
End: 2023-07-21
Payer: MEDICARE

## 2023-07-21 ENCOUNTER — TELEPHONE (OUTPATIENT)
Dept: UROLOGY | Facility: AMBULATORY SURGERY CENTER | Age: 83
End: 2023-07-21

## 2023-07-21 VITALS
RESPIRATION RATE: 18 BRPM | DIASTOLIC BLOOD PRESSURE: 72 MMHG | SYSTOLIC BLOOD PRESSURE: 158 MMHG | HEART RATE: 69 BPM | BODY MASS INDEX: 27.88 KG/M2 | OXYGEN SATURATION: 97 % | WEIGHT: 124.34 LBS | TEMPERATURE: 98.2 F

## 2023-07-21 DIAGNOSIS — N20.0 KIDNEY STONE: Primary | ICD-10-CM

## 2023-07-21 DIAGNOSIS — R10.9 FLANK PAIN: Primary | ICD-10-CM

## 2023-07-21 DIAGNOSIS — K59.00 CONSTIPATION: ICD-10-CM

## 2023-07-21 DIAGNOSIS — N20.0 KIDNEY STONES: Primary | ICD-10-CM

## 2023-07-21 LAB
ALBUMIN SERPL BCP-MCNC: 3.8 G/DL (ref 3.5–5)
ALP SERPL-CCNC: 42 U/L (ref 34–104)
ALT SERPL W P-5'-P-CCNC: 10 U/L (ref 7–52)
ANION GAP SERPL CALCULATED.3IONS-SCNC: 8 MMOL/L
AST SERPL W P-5'-P-CCNC: 13 U/L (ref 13–39)
BASOPHILS # BLD AUTO: 0.04 THOUSANDS/ÂΜL (ref 0–0.1)
BASOPHILS NFR BLD AUTO: 1 % (ref 0–1)
BILIRUB SERPL-MCNC: 0.38 MG/DL (ref 0.2–1)
BILIRUB UR QL STRIP: NEGATIVE
BUN SERPL-MCNC: 31 MG/DL (ref 5–25)
CALCIUM SERPL-MCNC: 9 MG/DL (ref 8.4–10.2)
CHLORIDE SERPL-SCNC: 105 MMOL/L (ref 96–108)
CLARITY UR: CLEAR
CO2 SERPL-SCNC: 24 MMOL/L (ref 21–32)
COLOR UR: NORMAL
CREAT SERPL-MCNC: 0.88 MG/DL (ref 0.6–1.3)
EOSINOPHIL # BLD AUTO: 0.2 THOUSAND/ÂΜL (ref 0–0.61)
EOSINOPHIL NFR BLD AUTO: 3 % (ref 0–6)
ERYTHROCYTE [DISTWIDTH] IN BLOOD BY AUTOMATED COUNT: 13.4 % (ref 11.6–15.1)
GFR SERPL CREATININE-BSD FRML MDRD: 60 ML/MIN/1.73SQ M
GLUCOSE SERPL-MCNC: 93 MG/DL (ref 65–140)
GLUCOSE UR STRIP-MCNC: NEGATIVE MG/DL
HCT VFR BLD AUTO: 36.7 % (ref 34.8–46.1)
HGB BLD-MCNC: 11.7 G/DL (ref 11.5–15.4)
HGB UR QL STRIP.AUTO: NEGATIVE
IMM GRANULOCYTES # BLD AUTO: 0.02 THOUSAND/UL (ref 0–0.2)
IMM GRANULOCYTES NFR BLD AUTO: 0 % (ref 0–2)
KETONES UR STRIP-MCNC: NEGATIVE MG/DL
LEUKOCYTE ESTERASE UR QL STRIP: NEGATIVE
LIPASE SERPL-CCNC: 228 U/L (ref 11–82)
LYMPHOCYTES # BLD AUTO: 1.24 THOUSANDS/ÂΜL (ref 0.6–4.47)
LYMPHOCYTES NFR BLD AUTO: 21 % (ref 14–44)
MCH RBC QN AUTO: 29.5 PG (ref 26.8–34.3)
MCHC RBC AUTO-ENTMCNC: 31.9 G/DL (ref 31.4–37.4)
MCV RBC AUTO: 92 FL (ref 82–98)
MONOCYTES # BLD AUTO: 0.39 THOUSAND/ÂΜL (ref 0.17–1.22)
MONOCYTES NFR BLD AUTO: 6 % (ref 4–12)
NEUTROPHILS # BLD AUTO: 4.17 THOUSANDS/ÂΜL (ref 1.85–7.62)
NEUTS SEG NFR BLD AUTO: 69 % (ref 43–75)
NITRITE UR QL STRIP: NEGATIVE
NRBC BLD AUTO-RTO: 0 /100 WBCS
PH UR STRIP.AUTO: 5 [PH]
PLATELET # BLD AUTO: 188 THOUSANDS/UL (ref 149–390)
PMV BLD AUTO: 10.8 FL (ref 8.9–12.7)
POTASSIUM SERPL-SCNC: 4.4 MMOL/L (ref 3.5–5.3)
PROT SERPL-MCNC: 6.1 G/DL (ref 6.4–8.4)
PROT UR STRIP-MCNC: NEGATIVE MG/DL
RBC # BLD AUTO: 3.97 MILLION/UL (ref 3.81–5.12)
SODIUM SERPL-SCNC: 137 MMOL/L (ref 135–147)
SP GR UR STRIP.AUTO: 1.01 (ref 1–1.03)
UROBILINOGEN UR STRIP-ACNC: <2 MG/DL
WBC # BLD AUTO: 6.06 THOUSAND/UL (ref 4.31–10.16)

## 2023-07-21 PROCEDURE — G1004 CDSM NDSC: HCPCS

## 2023-07-21 PROCEDURE — 36415 COLL VENOUS BLD VENIPUNCTURE: CPT | Performed by: EMERGENCY MEDICINE

## 2023-07-21 PROCEDURE — 80053 COMPREHEN METABOLIC PANEL: CPT | Performed by: EMERGENCY MEDICINE

## 2023-07-21 PROCEDURE — 85025 COMPLETE CBC W/AUTO DIFF WBC: CPT | Performed by: EMERGENCY MEDICINE

## 2023-07-21 PROCEDURE — 74176 CT ABD & PELVIS W/O CONTRAST: CPT

## 2023-07-21 PROCEDURE — 83690 ASSAY OF LIPASE: CPT | Performed by: EMERGENCY MEDICINE

## 2023-07-21 PROCEDURE — 81003 URINALYSIS AUTO W/O SCOPE: CPT | Performed by: EMERGENCY MEDICINE

## 2023-07-21 NOTE — ED ATTENDING ATTESTATION
7/21/2023  IYesenia, DO, saw and evaluated the patient. I have discussed the patient with the resident/non-physician practitioner and agree with the resident's/non-physician practitioner's findings, Plan of Care, and MDM as documented in the resident's/non-physician practitioner's note, except where noted. All available labs and Radiology studies were reviewed. I was present for key portions of any procedure(s) performed by the resident/non-physician practitioner and I was immediately available to provide assistance. At this point I agree with the current assessment done in the Emergency Department. I have conducted an independent evaluation of this patient a history and physical is as follows:    ED Course   78year old female presents to the ED for evaluation of right sided flank pain and dysuria x 1 week. Patient has been having urinary frequency and dysuria intermittently. She denies having fevers or chills. She has been ambulating without difficulty. No nausea or vomiting. Patient does have a history of kidney stones and recalls having similar symptoms. She has had prior intervention for kidney stones that she was unable to pass. Patient took ibuprofen this morning and states that she feels better and is not requesting pain medication at this time. PMHx: HTN, DM, kidney stones  Physical exam: Patient is awake and alert, no acute distress. Pupils are equal reactive. Mucous membranes are moist.  Neck is supple. Heart is regular rate and rhythm without ectopy. Lungs are clear to auscultation. Abdomen is soft, nontender, nondistended with normal active bowel sounds. No reproducible CVA tenderness. Speech is clear and appropriate. No focal motor or sensory deficits. Assessment: 47-year-old female presents with 1 week history of right-sided flank and low back pain with dysuria and urinary frequency. Patient is nontoxic and afebrile.   Differential diagnosis includes but is not limited to acute renal colic, musculoskeletal back pain, acute cystitis, urinary tract infection, pyelonephritis. Plan: We will check CT scan abdomen pelvis to evaluate for obstructing kidney stone, will evaluate urinalysis to check for infection. We will check lab work and provide pain medication as needed and reassess.             Critical Care Time  Procedures

## 2023-07-21 NOTE — ED PROVIDER NOTES
History  Chief Complaint   Patient presents with   • Back Pain     Pt c/o lower back pain x1 week. Pt has hx of kidney stones. Pt reports burning with urination and frequency     Palma Gordon) Palma Gordon is a 78 y.o. female who identifies as female    They presented to the emergency department on July 21, 2023. Patient presents with:  Back Pain: Pt c/o lower back pain x1 week. Pt has hx of kidney stones. Pt reports burning with urination and frequency  . The patient states that waxing and waning right flank and right back pain associated with dysuria and increased urinary frequency. Patient states symptoms feel similar to prior kidney stones. Called her urologist today who recommended UA and renal ultrasound along with KUB but they wanted to come in for further imaging. Pain is well controlled at this time reports taken ibuprofen prior to arrival.  Denies injury or trauma to the back. Reports chronic constipation, and has been using Linzess. Denies fevers chills, abdominal pain, chest pain shortness of breath nausea vomiting diarrhea. Denies vaginal bleeding or discharge. Denies radiation of back pain to the legs, urinary/bowel incontinence. Allergies include:  No Known Allergies      Immunizations:    Immunization History  Administered            Date(s) Administered   COVID-19 PFIZER VACCINE 0.3 ML IM                         02/14/2021 03/05/2021 01/23/2022     Influenza Split High Dose Preservative Free IM                         10/02/2013  12/18/2014  09/30/2015                           10/11/2016  11/20/2017     Influenza, high dose seasonal 0.7 mL                         10/15/2018  11/01/2019  09/14/2020                           08/30/2022     Pneumococcal Conjugate 13-Valent                         10/11/2016     Pneumococcal Polysaccharide PPV23                         10/02/2013    Immunizations Reviewed.             Prior to Admission Medications   Prescriptions Last Dose Informant Patient Reported? Taking? FLUoxetine (PROzac) 40 MG capsule   No No   Sig: Take 1 capsule (40 mg total) by mouth daily   aspirin 81 MG tablet  Self Yes No   Sig: Take 81 mg by mouth daily   atorvastatin (LIPITOR) 10 mg tablet   No No   Sig: Take 1 tablet (10 mg total) by mouth daily   clonazePAM (KlonoPIN) 0.5 mg tablet   No No   Sig: Take 1 tablet (0.5 mg total) by mouth 2 (two) times a day   docusate sodium (COLACE) 100 mg capsule  Self Yes No   Sig: Take 100 mg by mouth 2 (two) times a day   gabapentin (Neurontin) 100 mg capsule   No No   Sig: Take 1 capsule (100 mg total) by mouth 2 (two) times a day   linaCLOtide 145 MCG CAPS   No No   Sig: Take 1 capsule (145 mcg total) by mouth in the morning   lisinopril-hydrochlorothiazide (PRINZIDE,ZESTORETIC) 10-12.5 MG per tablet   No No   Sig: Take 1 tablet by mouth daily   metFORMIN (GLUCOPHAGE) 500 mg tablet   No No   Sig: TAKE ONE TABLET BY MOUTH TWICE A DAY WITH MEALS   metFORMIN (GLUCOPHAGE) 500 mg tablet   No No   Sig: Take 1 tablet (500 mg total) by mouth 2 (two) times a day with meals   tamsulosin (FLOMAX) 0.4 mg   No No   Sig: Take 1 capsule (0.4 mg total) by mouth daily with dinner      Facility-Administered Medications: None       Past Medical History:   Diagnosis Date   • Colon polyps    • Diabetes mellitus (HCC)    • Dizziness     Last assessed: 8/31/15   • DVT (deep venous thrombosis) (HCC)    • Dysuria    • Hematuria 2022   • Hyperlipidemia    • Hypertension    • Hypertensive urgency 10/22/2021   • Insomnia    • Ureterolithiasis 2020       Past Surgical History:   Procedure Laterality Date   •  SECTION      1964 son, 26 daughter   • COLONOSCOPY     • FL RETROGRADE PYELOGRAM  2020   • RI COLONOSCOPY FLX DX W/COLLJ SPEC WHEN PFRMD N/A 3/27/2017    Procedure: COLONOSCOPY;  Surgeon: Garry Rosales MD;  Location: [facility];   Service: Gastroenterology   • RI CYSTO/URETERO W/LITHOTRIPSY &INDWELL STENT INSRT Right 2020 Procedure: CYSTOSCOPY URETEROSCOPY WITH LITHOTRIPSY HOLMIUM LASER, RETROGRADE PYELOGRAM AND INSERTION STENT URETERAL; EXCISION OF BLADDER TUMOR;  Surgeon: Oswald Carey MD;  Location: [facility];  Service: Urology   • ROTATOR CUFF REPAIR Left     Last assessed: 3/29/15   • TONSILLECTOMY         Family History   Problem Relation Age of Onset   • Depression Mother         w/anxiety   • Diabetes Mother         Mellitus   • Breast cancer Mother    • Hypertension Mother    • No Known Problems Father    • Depression Sister         w/anxiety   • Heart disease Sister         Cardiac Disorder   • Breast cancer Sister    • Hypertension Sister    • Diabetes Brother         Mellitus   • Alcohol abuse Son      I have reviewed and agree with the history as documented. E-Cigarette/Vaping   • E-Cigarette Use Never User      E-Cigarette/Vaping Substances   • Nicotine No    • THC No    • CBD No    • Flavoring No    • Other No    • Unknown No      Social History     Tobacco Use   • Smoking status: Never   • Smokeless tobacco: Never   Vaping Use   • Vaping Use: Never used   Substance Use Topics   • Alcohol use: Never   • Drug use: No        Review of Systems   Constitutional: Negative for chills and fever. HENT: Negative for ear pain and sore throat. Eyes: Negative for pain and visual disturbance. Respiratory: Negative for cough and shortness of breath. Cardiovascular: Negative for chest pain and palpitations. Gastrointestinal: Positive for constipation. Negative for abdominal pain, blood in stool, diarrhea, nausea and vomiting. Genitourinary: Positive for dysuria, flank pain and frequency. Negative for hematuria, vaginal bleeding and vaginal pain. Musculoskeletal: Positive for back pain. Negative for arthralgias. Skin: Negative for color change and rash. Neurological: Positive for headaches. Negative for seizures, syncope and numbness. All other systems reviewed and are negative.       Physical Exam  ED Triage Vitals [07/21/23 1411]   Temperature Pulse Respirations Blood Pressure SpO2   98.2 °F (36.8 °C) 79 20 (!) 199/84 98 %      Temp Source Heart Rate Source Patient Position - Orthostatic VS BP Location FiO2 (%)   Oral Monitor Sitting Right arm --      Pain Score       --             Orthostatic Vital Signs  Vitals:    07/21/23 1411 07/21/23 1425 07/21/23 1500 07/21/23 1600   BP: (!) 199/84 (!) 181/81 145/65 158/72   Pulse: 79  72 69   Patient Position - Orthostatic VS: Sitting  Sitting Lying       Physical Exam  Vitals and nursing note reviewed. Constitutional:       Appearance: Normal appearance. She is normal weight. HENT:      Head: Normocephalic and atraumatic. Right Ear: External ear normal.      Left Ear: External ear normal.      Nose: Nose normal.      Mouth/Throat:      Mouth: Mucous membranes are moist.      Pharynx: Oropharynx is clear. Eyes:      Extraocular Movements: Extraocular movements intact. Conjunctiva/sclera: Conjunctivae normal.      Pupils: Pupils are equal, round, and reactive to light. Cardiovascular:      Rate and Rhythm: Normal rate and regular rhythm. Pulses: Normal pulses. Heart sounds: Normal heart sounds. No murmur heard. No friction rub. No gallop. Pulmonary:      Effort: Pulmonary effort is normal. No respiratory distress. Breath sounds: Normal breath sounds. No wheezing, rhonchi or rales. Abdominal:      General: Abdomen is flat. Bowel sounds are normal. There is no distension. Palpations: Abdomen is soft. There is no mass. Tenderness: There is no abdominal tenderness. There is no right CVA tenderness, left CVA tenderness, guarding or rebound. Hernia: No hernia is present. Musculoskeletal:         General: No swelling or tenderness. Normal range of motion. Cervical back: Normal range of motion and neck supple. No tenderness or bony tenderness. Thoracic back: No tenderness or bony tenderness.       Lumbar back: No tenderness or bony tenderness. Negative right straight leg raise test and negative left straight leg raise test.   Skin:     General: Skin is warm and dry. Capillary Refill: Capillary refill takes less than 2 seconds. Neurological:      General: No focal deficit present. Mental Status: She is alert and oriented to person, place, and time. Mental status is at baseline. Psychiatric:         Mood and Affect: Mood normal.         Behavior: Behavior normal.         Thought Content:  Thought content normal.         Judgment: Judgment normal.         ED Medications  Medications - No data to display    Diagnostic Studies  Results Reviewed     Procedure Component Value Units Date/Time    CMP [[id number]]  (Abnormal) Collected: 07/21/23 1533    Lab Status: Final result Specimen: Blood from Arm, Right Updated: 07/21/23 1559     Sodium 137 mmol/L      Potassium 4.4 mmol/L      Chloride 105 mmol/L      CO2 24 mmol/L      ANION GAP 8 mmol/L      BUN 31 mg/dL      Creatinine 0.88 mg/dL      Glucose 93 mg/dL      Calcium 9.0 mg/dL      AST 13 U/L      ALT 10 U/L      Alkaline Phosphatase 42 U/L      Total Protein 6.1 g/dL      Albumin 3.8 g/dL      Total Bilirubin 0.38 mg/dL      eGFR 60 ml/min/1.73sq m     Narrative:      [place] guidelines for Chronic Kidney Disease (CKD):   •  Stage 1 with normal or high GFR (GFR > 90 mL/min/1.73 square meters)  •  Stage 2 Mild CKD (GFR = 60-89 mL/min/1.73 square meters)  •  Stage 3A Moderate CKD (GFR = 45-59 mL/min/1.73 square meters)  •  Stage 3B Moderate CKD (GFR = 30-44 mL/min/1.73 square meters)  •  Stage 4 Severe CKD (GFR = 15-29 mL/min/1.73 square meters)  •  Stage 5 End Stage CKD (GFR <15 mL/min/1.73 square meters)  Note: GFR calculation is accurate only with a steady state creatinine    Lipase [[id number]]  (Abnormal) Collected: 07/21/23 1533    Lab Status: Final result Specimen: Blood from Arm, Right Updated: 07/21/23 1559     Lipase 228 u/L     UA w Reflex to Microscopic w Reflex to Culture [[id number]] Collected: 07/21/23 1543    Lab Status: Final result Specimen: Urine, Clean Catch Updated: 07/21/23 1558     Color, UA Light Yellow     Clarity, UA Clear     Specific Gravity, UA 1.014     pH, UA 5.0     Leukocytes, UA Negative     Nitrite, UA Negative     Protein, UA Negative mg/dl      Glucose, UA Negative mg/dl      Ketones, UA Negative mg/dl      Urobilinogen, UA <2.0 mg/dl      Bilirubin, UA Negative     Occult Blood, UA Negative    CBC and differential [[id number]] Collected: 07/21/23 1436    Lab Status: Final result Specimen: Blood from Arm, Right Updated: 07/21/23 1449     WBC 6.06 Thousand/uL      RBC 3.97 Million/uL      Hemoglobin 11.7 g/dL      Hematocrit 36.7 %      MCV 92 fL      MCH 29.5 pg      MCHC 31.9 g/dL      RDW 13.4 %      MPV 10.8 fL      Platelets 973 Thousands/uL      nRBC 0 /100 WBCs      Neutrophils Relative 69 %      Immat GRANS % 0 %      Lymphocytes Relative 21 %      Monocytes Relative 6 %      Eosinophils Relative 3 %      Basophils Relative 1 %      Neutrophils Absolute 4.17 Thousands/µL      Immature Grans Absolute 0.02 Thousand/uL      Lymphocytes Absolute 1.24 Thousands/µL      Monocytes Absolute 0.39 Thousand/µL      Eosinophils Absolute 0.20 Thousand/µL      Basophils Absolute 0.04 Thousands/µL                  CT renal stone study abdomen pelvis wo contrast   Final Result by Courtney Valencia MD (07/21 1515)      Bilateral nonobstructing renal calculi. No hydronephrosis. Large amount stool throughout the colon, suggesting constipation. Workstation performed: [id number]               Procedures  Procedures      ED Course  ED Course as of 07/22/23 0142 Fri Jul 21, 2023   1408 Reveiwed CT 6/13/2023: RIGHT KIDNEY AND URETER: There are several tiny punctate nonobstructing right renal calculi.  No hydronephrosis or hydroureter.     LEFT KIDNEY AND URETER: Previously noted left-sided hydroureteronephrosis and ureteral calculi no longer seen. There are several nonobstructing left renal calculi with the largest measuring up to 5 mm in the lower pole. There is a 1.1 cm hypodense lesion   with faint dependent calcification anteriorly in the left kidney, not significantly changed in size possibly a complex cyst.     URINARY BLADDER:  Unremarkable. \Bilateral nephrolithiasis. No obstructive uropathy. Previously noted left-sided hydronephrosis and left ureteral calculi no longer seen.     Complex left renal cyst appears stable in size. 1434 Patient is an 78-year-old female presenting with right-sided flank pain back pain with dysuria and urinary frequency. Patient states symptoms feel like prior kidney stones, wanted to come in for further evaluation after talking with her urologist for kidney stones. Patient is afebrile, appears well-hydrated, exam is benign no CVA tenderness or abdominal tenderness. No red flag symptoms for back pain. Suspect urolithiasis/renal colic, but DDx includes pyelonephritis, hydronephrosis, constipation, MSK back pain, cystitis, pancreatitis. Given history and exam, low suspicion for AAA, cauda equina, cholecystitis, mesenteric ischemia, shingles, pneumonia, malignancy, acute fracture. Will eval with CBC, CMP, Lipase, UA and CT renal imaging. Offered patient pain medicines but she declines at this time. [phone] Patient hypertensive. 199/84, repeat 181/81. Daughter states this is common for her when coming to the emergency room. She reports full compliance with her hypertensive medication. Denies current headache.   1522 CBC and differential  CBC unremarkable. No elevated white count, no anemia.   1522 CT renal study: Bilateral nonobstructing renal calculi. No hydronephrosis.     Large amount stool throughout the colon, suggesting constipation   1523 Reviewed CT imaging which showed no acute findings other than constipation.   Suspect back pain to be related to both renal colic and concurrent constipation. UA pending. patient is currently taking Linzess we will add on MiraLAX and have her follow-up with her primary care doctor. Dispo pending further lab work, if reassuring will discharge patient home. 1536 Reviewed results with patient and daughter thus far. Patient declines pain medicine at this time. Patient does report passing a bowel movement today. Has also been taking Colace suggested adding MiraLAX daily to help with constipation. 1558 UA w Reflex to Microscopic w Reflex to Culture  UA without signs of infection, or blood. 1608 Lipase(!): 228  This is not greater than 3 times upper limit of normal.  Patient does not have a white count. Suspect this is related to constipation/GI irritation. Patient has elevated lipases in the past.  I do not suspect acute pancreatitis at this time. Repeat abdominal exam is benign without tenderness to palpation   1613 CMP(!)  Creatinine is at patient's baseline. No other electrolyte abnormalities at this time. 1622 Reviewed all results with patient and daughter. Patient blood pressure improved. Pain is well managed at this time. They are advised to follow-up with her PMD as well as urology regarding results. Reviewed strict return precautions. Advised Tylenol for pain control along with hydration, and MiraLAX for constipation. Patient and daughter agreeable with discharge plan                                       Medical Decision Making  Patient presents with:  Back Pain: Pt c/o lower back pain x1 week. Pt has hx of kidney stones. Pt reports burning with urination and frequency    Patient is an 57-year-old female presenting with right-sided flank pain back pain with dysuria and urinary frequency. Patient states symptoms feel like prior kidney stones, she wanted to come in for further evaluation after talking with her urologist for kidney stones.   Patient is afebrile, appears well-hydrated, exam is benign, with no CVA tenderness or abdominal tenderness. No red flag symptoms for back pain, bilateral negative straight leg raise. Suspect urolithiasis/renal colic, but DDx includes  Cystitis, pyelonephritis, hydronephrosis, constipation, MSK back pain, pancreatitis, gastroparesis. Given history and exam, low suspicion for AAA, cauda equina, cholecystitis, mesenteric ischemia, shingles, pneumonia, malignancy, acute fracture. Will eval with CBC, CMP, Lipase, UA and CT renal imaging. Offered patient pain medicines but she declines at this time. Patient without focal abdominal tenderness on serial exam, pain well-tolerated. CT renal study showing bilateral nonobstructing stones with no hydronephrosis. CBC unremarkable. CMP showing baseline renal function normal electrolytes and normal LFTs. Lipase is elevated however it is not greater than 3 times the upper limit of normal, CT scan negative for acute pancreatitis, patient has no focal abdominal tenderness, is tolerating p.o. without N/V, I do not suspect acute pancreatitis at this time. UA without signs of infection or hematuria to suggest obstructing stone. CT however significant for large amount of stool. Suspect symptoms renal colic with concurrent constipation. Patient is already on Linzess and Colace, and she was advised to start taking MiraLAX daily constipation. She reports passing a bowel movement earlier today. She feels like she can manage her pain using Tylenol at home. Patient given strict return precautions and advised follow-up with her urologist as well as her PMD.  She understands the need for follow-up and is agreeable with discharge plan.     Temp:  (98.2 °F (36.8 °C)) 98.2 °F (36.8 °C)  HR:  (69-79) 69  Resp:  (18-20) 18  BP: (145-199)/(65-84) 158/72        Due to patient's history and presentation the following laboratory evidence was collected:  Recent Results (from the past 12 hour(s))  -CBC and differential:   Collection Time: 07/21/23  2:36 PM       Result Value             Ref Range           WBC                         6.06              4.31 - 10.16*       RBC                         3.97              3.81 - 5.12 *       Hemoglobin                  11.7              11.5 - 15.4 *       Hematocrit                  36.7              34.8 - 46.1 %       MCV                         92                82 - 98 fL          MCH                         29.5              26.8 - 34.3 *       MCHC                        31.9              31.4 - 37.4 *       RDW                         13.4              11.6 - 15.1 %       MPV                         10.8              8.9 - 12.7 fL       Platelets                   188               149 - 390 Th*       nRBC                        0                 /100 WBCs           Neutrophils Relative        69                43 - 75 %           Immat GRANS %               0                 0 - 2 %             Lymphocytes Relative        21                14 - 44 %           Monocytes Relative          6                 4 - 12 %            Eosinophils Relative        3                 0 - 6 %             Basophils Relative          1                 0 - 1 %             Neutrophils Absolute        4.17              1.85 - 7.62 *       Immature Grans Absolute     0.02              0.00 - 0.20 *       Lymphocytes Absolute        1.24              0.60 - 4.47 *       Monocytes Absolute          0.39              0.17 - 1.22 *       Eosinophils Absolute        0.20              0.00 - 0.61 *       Basophils Absolute          0.04              0.00 - 0.10 *  -CMP:   Collection Time: 07/21/23  3:33 PM       Result                      Value             Ref Range           Sodium                      137               135 - 147 mm*       Potassium                   4.4               3.5 - 5.3 mm*       Chloride                    105               96 - 108 mmo*       CO2                         24                21 - 32 mmol*       ANION GAP                   8                 mmol/L              BUN                         31 (H)            5 - 25 mg/dL        Creatinine                  0.88              0.60 - 1.30 *       Glucose                     93                65 - 140 mg/*       Calcium                     9.0               8.4 - 10.2 m*       AST                         13                13 - 39 U/L         ALT                         10                7 - 52 U/L          Alkaline Phosphatase        42                34 - 104 U/L        Total Protein               6.1 (L)           6.4 - 8.4 g/*       Albumin                     3.8               3.5 - 5.0 g/*       Total Bilirubin             0.38              0.20 - 1.00 *       eGFR                        60                ml/min/1.73s*  -Lipase:   Collection Time: 07/21/23  3:33 PM       Result                      Value             Ref Range           Lipase                      228 (H)           11 - 82 u/L    -UA w Reflex to Microscopic w Reflex to Culture:   Collection Time: 07/21/23  3:43 PM  Specimen: Urine, Clean Catch       Result                      Value             Ref Range           Color, UA                   Light Yellow                          Clarity, UA                 Clear                                 Specific [place], UA        1.014             1.003 - 1.030       pH, UA                      5.0               4.5, 5.0, 5.*       Leukocytes, UA              Negative          Negative            Nitrite, UA                 Negative          Negative            Protein, UA                 Negative          Negative mg/*       Glucose, UA                 Negative          Negative mg/*       Ketones, UA                 Negative          Negative mg/*       Urobilinogen, UA            <2.0              <2.0 mg/dl m*       Bilirubin, UA               Negative          Negative            Occult Blood, UA            Negative          Negative         Due to patient's history and presentation the following imaging was collected:  CT renal stone study abdomen pelvis wo contrast   Final Result        Bilateral nonobstructing renal calculi. No hydronephrosis. Large amount stool throughout the colon, suggesting constipation. Workstation performed: [id number]         No results found. Patient appears well, is nontoxic appearing, she expresses understanding and agrees with plan of care at this time. In light of this patient would benefit from outpatient management. Amount and/or Complexity of Data Reviewed  Independent Historian:      Details: Daughter, Jaime Martino. External Data Reviewed: labs, radiology and notes. Labs: ordered. Decision-making details documented in ED Course. Radiology: ordered. Decision-making details documented in ED Course. Risk  OTC drugs. Disposition  Final diagnoses:   Flank pain   Constipation     Time reflects when diagnosis was documented in both MDM as applicable and the Disposition within this note     Time User Action Codes Description Comment    7/21/2023  4:07 PM Julia Blount [R10.9] Flank pain     7/21/2023  4:07 PM Julia Blount [K59.00] Constipation       ED Disposition     ED Disposition   Discharge    Condition   Stable    Date/Time   Fri Jul 21, 2023  4:15 PM    [address] [place] [address] discharge to home/self care.                Follow-up Information     Follow up With Specialties Details Why Contact Marlen Diaz, DO Family Medicine Schedule an appointment as soon as possible for a visit  As needed, If symptoms worsen [address] [facility] [place] [address] [facility] NADINE [place] Physician Assistant, Urology Call in 2 days As needed, If symptoms worsen [facility]  [phone]            Discharge Medication List as of 7/21/2023  4:15 PM      CONTINUE these medications which have NOT CHANGED    Details aspirin 81 MG tablet Take 81 mg by mouth daily, Historical Med      atorvastatin (LIPITOR) 10 mg tablet Take 1 tablet (10 mg total) by mouth daily, Starting Mon 7/10/2023, Normal      clonazePAM (KlonoPIN) 0.5 mg tablet Take 1 tablet (0.5 mg total) by mouth 2 (two) times a day, Starting Thu 7/6/2023, Until Sat 8/5/2023, Normal      docusate sodium (COLACE) 100 mg capsule Take 100 mg by mouth 2 (two) times a day, Historical Med      FLUoxetine (PROzac) 40 MG capsule Take 1 capsule (40 mg total) by mouth daily, Starting Thu 7/6/2023, Until Mon 9/4/2023, Normal      gabapentin (Neurontin) 100 mg capsule Take 1 capsule (100 mg total) by mouth 2 (two) times a day, Starting Mon 7/10/2023, Normal      linaCLOtide 145 MCG CAPS Take 1 capsule (145 mcg total) by mouth in the morning, Starting Thu 6/8/2023, Normal      lisinopril-hydrochlorothiazide (PRINZIDE,ZESTORETIC) 10-12.5 MG per tablet Take 1 tablet by mouth daily, Starting Mon 7/17/2023, Normal      !! metFORMIN (GLUCOPHAGE) 500 mg tablet TAKE ONE TABLET BY MOUTH TWICE A DAY WITH MEALS, Normal      !! metFORMIN (GLUCOPHAGE) 500 mg tablet Take 1 tablet (500 mg total) by mouth 2 (two) times a day with meals, Starting Mon 7/3/2023, Normal      tamsulosin (FLOMAX) 0.4 mg Take 1 capsule (0.4 mg total) by mouth daily with dinner, Starting Wed 6/14/2023, Normal       !! - Potential duplicate medications found. Please discuss with provider. No discharge procedures on file. PDMP Review       Value Time User    PDMP Reviewed  Yes 7/19/2023  9:54 AM Yoly Florentino DO           ED Provider  Attending physically available and evaluated Katey Rosen. I managed the patient along with the ED Attending.     Electronically Signed by         Corie Scott DO  07/22/23 0881

## 2023-07-21 NOTE — TELEPHONE ENCOUNTER
Called and spoke to patient and patient's daughter, Dennie Millman. Reviewed urine testing order in patient's chart as well as KUB and ultrasound. Central scheduling number given for ultrasound. Dennie Millman will have patient go for urine testing and KUB today. Advise given the weekend they can call after hours or over the weekend to review results if final.  Reviewed we will not reorder Flomax until we have the imaging resulted. Dennie Millman thankful for call and verbalized understanding.

## 2023-07-21 NOTE — DISCHARGE INSTRUCTIONS
Today you are seen in the emergency department for right-sided flank and back pain. We did a work-up that consisted of CT imaging urinalysis and blood work. At this time there is no clear emergent cause to explain your symptoms. Your CT scan did not show any signs of obstructing kidney stones in your urine and blood work did not show any signs of infection. Your CT scan did show constipation, please continue to use Colace and MiraLAX daily to help with this. Please follow-up with your primary care doctor and a urologist within the next week. Please return to the emergency department if you develop any fevers, vomiting, inability urinate/pass bowel movements, worsening pain, chest pain, shortness of breath or any other concerning symptoms.

## 2023-07-21 NOTE — TELEPHONE ENCOUNTER
Pt under care of Suzanne Saez     Last Seen:  5/8/23    Pt calling due to: Pt daughter called and stated pt is starting to have back pain for about a week on the right and pt did pass a kidney stone not sure if more are present. Pt is out of flomax. Pt daughter stated [facility] or [facility] for appt if needed.  Pt daughter is also asking for suggestions to help with the pain in the meantime    Pt can be reached at: [phone] (daughter)

## 2023-07-24 NOTE — TELEPHONE ENCOUNTER
Patient ended up presenting to the ER. CT obtained as well as labs. Patient had non obstruction stones and constipation on imaging. Urine testing negative.  Please review and advise if any fu needed with uro

## 2023-07-24 NOTE — TELEPHONE ENCOUNTER
NADINE Saucedo; [facility] Clinical 16 minutes ago (12:03 PM)       She can just follow-up in a year with a KUB prior to visit as recommended in Yasmine's last note.

## 2023-08-06 DIAGNOSIS — F41.1 GENERALIZED ANXIETY DISORDER: ICD-10-CM

## 2023-08-06 RX ORDER — CLONAZEPAM 0.5 MG/1
0.5 TABLET ORAL 2 TIMES DAILY
Qty: 60 TABLET | Refills: 0 | OUTPATIENT
Start: 2023-08-06

## 2023-08-07 ENCOUNTER — TELEPHONE (OUTPATIENT)
Dept: PSYCHIATRY | Facility: CLINIC | Age: 83
End: 2023-08-07

## 2023-08-07 RX ORDER — CLONAZEPAM 0.5 MG/1
0.5 TABLET ORAL 2 TIMES DAILY
Qty: 60 TABLET | Refills: 0 | Status: SHIPPED | OUTPATIENT
Start: 2023-08-07 | End: 2023-09-06

## 2023-08-07 RX ORDER — CLONAZEPAM 0.5 MG/1
0.5 TABLET ORAL 2 TIMES DAILY
Qty: 60 TABLET | Refills: 0
Start: 2023-08-07 | End: 2023-08-07 | Stop reason: SDUPTHER

## 2023-08-07 NOTE — TELEPHONE ENCOUNTER
Patient daughter called and asked for a f/u appt and provider does not have anything for today or tomorrow. If you can give her a call back. After the last appt it was in agreement that she would stop the Risperdal but now she is have affects and is wondering if she can go back on it. Patient is leaving for vacation Wednesday morning with daughter.

## 2023-08-08 ENCOUNTER — TELEPHONE (OUTPATIENT)
Dept: PSYCHIATRY | Facility: CLINIC | Age: 83
End: 2023-08-08

## 2023-08-08 NOTE — TELEPHONE ENCOUNTER
Discussed with daughter Terese Nageotte) at noon in regards to request for a call. Pt has notably continued to have scalp issues after being off the Risperdal. Initially there were no correlations as to why the pt's scalp could have become dry aside from starting Risperdal and also worsening scalp issues when Risperdal was increased. Though this is not a recorded side effect of Risperdal, it was decided to wean off the medication to see if there was a correlation. Since the patient has been off the medication, she has been in much more distress. The scalp continues to also have issues, and it was decided that it would be better to restart the medication gradually to help the pt's mood and behaviors. All parties in agreement at this time, and no further voiced questions or concerns.

## 2023-08-08 NOTE — TELEPHONE ENCOUNTER
Patient's daughter called again this morning. She would very much like an answer to her call yesterday.

## 2023-08-21 ENCOUNTER — TELEPHONE (OUTPATIENT)
Dept: PSYCHIATRY | Facility: CLINIC | Age: 83
End: 2023-08-21

## 2023-08-21 DIAGNOSIS — F45.1 SOMATIC SYMPTOM DISORDER: Primary | ICD-10-CM

## 2023-08-21 DIAGNOSIS — F33.1 MAJOR DEPRESSIVE DISORDER, RECURRENT, MODERATE (HCC): ICD-10-CM

## 2023-08-21 RX ORDER — RISPERIDONE 0.25 MG/1
0.25 TABLET ORAL 2 TIMES DAILY
Qty: 20 TABLET | Refills: 0 | Status: SHIPPED | OUTPATIENT
Start: 2023-08-21 | End: 2023-08-28

## 2023-08-21 NOTE — TELEPHONE ENCOUNTER
Patient daughter called and stated that they need a refill on Risperidone 0.25mg 1 BID. Please send to [facility] in [facility].

## 2023-08-25 NOTE — PSYCH
Psychiatric Progress Note: Medication Management    [address] [facility]    Name and Date of Birth:  Haim Duque 78 y.o. 1940 MRN: [id number]    Date of Visit: August 28, 2023    Reason for Visit: Follow-up visit regarding medication management     Virtual Visit Disclaimer & Required Documentation  TeleMed provider: Jackelin Manuel. and Dr. Arianna Baca. My office door is closed. No one else is in the room. The patient is located in [place], where I hold an active license. Haim Duque understands that this is a telemedicine visit and that the visit is being conducted through [employer]. Patient is aware that [address] could be limited without vital signs or the ability to perform a full hands-on physical exam. Patient is aware this is a billable service. The patient agrees to participate and understands they can discontinue the visit at any time. ASSESSMENT & PLAN     Haim Duque is a 78 y.o. female, ,  and presently living daughter, son-in-law, 3 grandkids; retired teacher; with prior psychiatric diagnoses of MDD, JONAH; no past suicide attempts (0); 1 past psychiatric hospitalizations (for anxiety); with suicide risk factors including medical problems, anxiety and age (52+); and medical history including DM2, HTN, DVT hx, OA, HLD, TIA, status post rotator cuff surgery, was personally seen and evaluated today at the [address] [place] [address] outpatient clinic for follow-up regarding medication management. She notes that the risperdal being stopped really worsened her symptoms and while on it has been helping, but continues to have many of the same issues and symptoms as previous (obsessive and ruminating thoughts). Considered zyprexa as a Symbax combination, but due to risperdal being helpful and risk for sedation, discussed increasing risperdal dose.  Also discussed pushing her boundaries to achieve her goals such as meeting others and worked on CBT to reframe thinking. No acute risks for suicidal or homicidal as well as auditory/visual hallucinations. Suicide/Homicide Risk Assessment:  Risk of Harm to Self:  · The following ratings are based on assessment at the time of the interview and review of records   • Demographic risk factors include: ,  status, elderly (76 or older)   • Historical Risk Factors include: chronic psychiatric problems, history of depression, history of anxiety  • Recent Specific Risk Factors include: diagnosis of depression, current depressive symptoms, current anxiety symptoms  • Protective Factors: no current suicidal ideation, being a parent, compliant with medications, compliant with mental health treatment, having a desire to be alive, having a desire to live, having a sense of purpose or meaning in life, having pets, personal beliefs, personal beliefs about the meaning and value of life, Buddhist beliefs discouraging suicide, resiliency, restricted access to lethal means, stable living environment, supportive family  • Weapons: none. The following steps have been taken to ensure weapons are properly secured: not applicable  • Based on today's assessment, Benjamincathryn Holcomb presents the following risk of harm to self: minimal    Risk of Harm to Others:  • The following ratings are based on assessment at the time of the interview and review of records   • Demographic Risk Factors include: none. • Historical Risk Factors include: none. • Recent Specific Risk Factors include: multiple stressors, social difficulties. • Protective Factors: no current homicidal ideation, being a parent, compliant with medications, compliant with mental health treatment, no substance use problems, personal beliefs, Buddhist beliefs, resilience, restricted access to lethal means, safe and stable living environment, supportive family  • Weapons: none.  The following steps have been taken to ensure weapons are properly secured: not applicable  • Based on today's assessment, Deidra Esposito presents the following risk of harm to others: minimal    The following interventions are recommended: no intervention changes needed. Although patient's acute lethality risk is low, long-term/chronic lethality risk is mildly elevated in the presence of anxiety & depressive symptoms. At the current moment, Deidra Esposito is future-oriented, forward-thinking, and demonstrates ability to act in a self-preserving manner as evidenced by volitionally presenting to the clinic today, seeking treatment. Additionally, Deidra Esposito sits throughout the assessment wearing personal protective gear (i.e. medical mask) in the context of the ongoing COVID-19 pandemic, suggesting a will and desire to live. At this juncture, inpatient hospitalization is not currently warranted. To mitigate future risk, patient should adhere to the recommendations of this writer, avoid alcohol/illicit substance use, utilize community-based resources and familiar support and prioritize mental health treatment. Based on today's assessment and clinical criteria, Ruy Telles contracts for safety and is not an imminent risk of harm to self or others. Outpatient level of care is deemed appropriate at this present time. Deidra Esposito understands that if they are no longer able to contract for safety, they need to call/contact the outpatient office including this writer, call/contact crisis and/orattend to the nearest Emergency Department for immediate evaluation.     DSM-5 Diagnoses:   • Anxiety  • MDD    Treatment Recommendations/Precautions:  • Increase Risperdal 0.25 QD and 0.50mg HS for obsessive thoughts  • Continue Prozac 40mg for anxiety and depression symptoms  • Continue Gabapentin 100mg BID for anxiety (used off-label but evidence-based)  • Continue Klonopin 0.5mg BID for anxiety  • Medication management every 1-3 months  • Aware of 24 hour and weekend coverage for urgent situations accessed by calling [facility] practice number    Medications Risks/Benefits    Risks, benefits, and possible side effects of medications explained to David Macdonaldster and she verbalizes understanding and agreement for treatment. including:   • PARQ was completed for the class of SSRIs including transient anxiety, insomnia or sedation, headaches, constipation or diarrhea; and longer-standing sexual side effects, bleeding risk (especially with NSAIDs), and weight gain; as well as suicidal thoughts in patients under 22years old, serotonin syndrome, and induction of eligio. Potential for discontinuation syndrome (flu-like symptoms, insomnia, nausea, sensory disturbances, and headache) was also discussed. • Risks of taking benzodiazepines were discussed, including risk of sedation and respiratory depression, particularly when combined with alcohol, opioids, or other central nervous system-depressants. Addiction potential, withdrawal seizures with abrupt discontinuation, and other potential adverse effects were discussed. The patient was instructed not to drive or operate machinery while taking benzodiazepines. • PARQ was completed for gabapentin including sedation, dizziness, tremor, GI upset, potential for weight gain, and rare suicidal thinking. • PARQ was completed for antipsychotic medication including sedation, GI distress, dizziness, risk of metabolic syndrome, EPS (akathisia, TD, etc), rare NMS, orthostatic hypotension, cardiovascular risks such as QT prolongation, increased prolactin, and others    Controlled Medication Discussion:   David Macdonaldster has been filling controlled prescriptions on time as prescribed according to [facility]    Psychotherapy Provided:   Individual psychotherapy provided: Yes  Counseling was provided during the session today for 20 minutes.      Treatment Plan:  Completed and signed during the session: Not applicable - Treatment Plan not due at this session. Next due treatment plan date is: January 15, 2024. SUBJECTIVE     Chief Complaint: Worries about granddaughter. Ruy Telles ,  and presently living daughter, son-in-law, 3 grandkids; retired teacher; with prior psychiatric diagnoses of MDD, JONAH; no past suicide attempts (0); 1 past psychiatric hospitalizations (for anxiety); with suicide risk factors including medical problems, anxiety and age (52+); and medical history including DM2, HTN, DVT hx, OA, HLD, TIA, status post rotator cuff surgery, who was personally seen and evaluated at the [address] [place] [address] outpatient clinic for follow-up regarding medication management. At the time of last visit, pt had reported concerns for risperdal causing an itching scalp. Since stopping, pt worsened and multiple phone call discussions to have the patient restarted on Risperdal due to worsening symptoms. Pt reported at the time, difficulty with stressors including granddaughter dating preferences, father-in-law moving in, and pt's ongoing ruminating thoughts. Deidra Esposito states that since their previous outpatient psychiatric appointment with this writer, "more or less okay." Today pt is present with daughter, Radha Snell. She notes still thinking about granddaughter and it has taken up a lot of time in her head. Notes that that the father-in-law sometimes they can talk a little bit maybe during coffee, and then he usually goes out so they don't interact too much which is a positive as it has been a previous stressor. Pt wants more time with daughter - though understands she is busy. She notes wanting to meet people, but then also not wanting to do anything to meet other people, understanding that she is stopping herself from succeeding. She also understands sometimes jumping to conclusions as if her granddaughter "hates me" though there is no proof of it.  She mentions that her granddaughter does well in school, no drugs, very responsible, but is dating another girl at age 12 which is hard for her. She is hesitant about going to Bible study despite never giving it a chance and was encouraged to write a list of things to increase her social network, and having to attempt at least one per week. She also mentions reading prison through a book and afraid someone was going to die in the book, and in her mind she felt she had to throw it away. She denies any side effects to her medicaitons and noting that she still has an itchy scalp so realized it was not caused by the risperdal. She is agreeable to both zyprexa trial and incresaing risperdal, but ultimately decided to increase risperdal to see if it helps. No other concerns with her medications and will continue with her weekly therapy. Presently, patient adamantly denies suicidal/homicidal ideation in addition to thoughts of self-injury, citing family & Taoism as deterrents against self-harm; contracts for safety, see above for risk assessment. At conclusion of evaluation, patient is amenable to the recommendations of this writer including: medications as prescribed, attending routine appointments. Also, patient is amenable to calling/contacting the outpatient office including this writer if any acute adverse effects of their medication regimen arise in addition to any comments or concerns pertaining to their psychiatric management. Patient is amenable to calling/contacting crisis and/or attending to the nearest emergency department if their clinical condition deteriorates to assure their safety and stability, stating that they are able to appropriately confide in their Family (daughter) & good friend regarding their psychiatric state. PSYCHIATRIC REVIEW OF SYSTEMS     Unchanged information from this writer's previous assessment is copied; information that has changed is bolded.     Sleep change: no  Interest change: no  • Interests include: read (books), knitting  Guilt/Hopelessness/helplessness/worthlessness: yes, guilt (giving daughter stress)  Energy change: yes, decreased  Concentration/Attention change: yes, decreased  Appetite change: yes, decreased   • Weight changes & timeframe: no  Psychomotor agitation/retardation: no  Somatic symptoms: yes  Suicidal ideation: no  Homicidal ideation: no  Zaira/hypomania: no     Anxiety/panic attack: yes  • JONAH symptoms: difficulty concentrating, fatigue, insomnia, irritable and restlessness/keyed up  • Panic Disorder symptoms: no symptoms suggestive of panic disorder  Obsessive/compulsive symptoms: obsessive thoughts without compulsions  Eating Disorder symptoms: no historical or current eating disorder. no binge eating disorder; no anorexia nervosa. no symptoms of bulimia; does not stopped eating when trying to lose weight when younger     Auditory hallucinations: no  Visual hallucinations: no  Other perceptual disturbances: no  Delusional thinking: no    REVIEW OF SYSTEMS     Constitutional negative   ENT negative   Cardiovascular negative   Respiratory negative   Gastrointestinal negative   Genitourinary negative   Musculoskeletal negative   Integumentary negative   Neurological negative   Endocrine negative   Other Symptoms none, all other systems are negative     HISTORICAL INFORMATION     History Review: The following portions of the patient's history were reviewed and updated as appropriate: allergies, current medications, past family history, past medical history, past social history, past surgical history and problem list.    Unchanged information from this writer's previous assessment is copied; information that has changed is bolded.     Family History   Problem Relation Age of Onset   • Depression Mother         w/anxiety   • Diabetes Mother         Mellitus   • Breast cancer Mother    • Hypertension Mother    • No Known Problems Father    • Depression Sister         w/anxiety   • Heart disease Sister Cardiac Disorder   • Breast cancer Sister    • Hypertension Sister    • Diabetes Brother         Mellitus   • Alcohol abuse Son      Additional fam hx:   Family hx of psychiatric diagnosis: yes, 2 sisters (depression & anxiety), Mom (Depression & anxiety)  Family hx of suicide: no  Family Hx of drug abuse: Son with alcohol abuse  Family Hx of medical diagnosis: yes, as listed above Mom (DM, breast cancer, HTN), sister (heart issues, breast cancer, HTN), brother (DM)     Past Psychiatric History:   Previous diagnosis: MDD, Anxiety  Previous inpatient psychiatric admissions: 1 prior admission for 3 weeks at [facility] in 2013 (panic attack after surgery). Present/previous outpatient psychiatrist: saw psychiatrists in [facility] growing up; Dr. Yajaira Padilla & Dr. Abdoul Chaidez prior to transfer. Present/previous therapy/psychotherapy: Dr. Chyna Munoz for past 3 years. History of suicidal attempts/gestures: Denies. Self-injurious behavior/high-risk behavior: no.  History of violence/aggressive behaviors: No.  Other Services: patient denies     Psychiatric medication trial:   • Antidepressants  ? Effexor, Zoloft, Prozac, Lexapro  • Antipsychotics  ? Abilify (Dizzy), Risperdal  • Mood stabilizers  ? N/A  • Sedative hypnotics  ? Klonopin  • Others  ? Buspar, Neurontin     Substance Abuse History:  Nicotine use (cigarettes & vape): Denies  Caffeine use: 1 cup day Decafe  Alcohol use: Denies  Marijuana use: Denies  Other substances: Denies     Longest clean time: N/A  Previous inpatient/outpatient substance abuse rehabilitation: No.     Patient denies previous legal actions or arrests related to substance intoxication including prior DWIs/DUIs.  Angie Chino does not apear under the influence or withdrawal of any psychoactive substance throughout today's examination.      I have assessed this patient for substance use within the past 12 months.     Social History:  Born/Raise: [address] - moved to [place] 2012 when begun living with family  Early life/developmental: Denies a history of milestone/developmental delay. Denies a history of in-utero exposure to toxins/illicit substances. Family: 2 brother(s) & 2 sister(s), raised by parents  Education: Bachelor of Arts - Teaching  • Learning Disabilities: There is no documented history of IEP or need for special education. Occupational History: retired teacher  Zoroastrianism Affiliation: [address] [place] [address]  Marital history:  since  -   from cancer  Children: yes, 2 (1 daughter & 1 son), 3 grandkids  Living arrangement: daughter, father-in-law, son-in-law, 3 grandkids in [facility], 2 dogs, guinea pig  Support system: good support system - daughter, therapist   Hx: no  Legal Hx: no  Access to firearms: patient denies. Maria Fernanda Ramirez has no history of arrests or violence pertaining to use of a deadly weapon.      Traumatic History:   Abuse: none is reported  Other Traumatic Events: Denies  Flashbacks/Nightmares: no     Past Medical History:  Hx of seizures: no  Hx of concussions & ongoing symptoms: no    OBJECTIVE     Vital signs in last 24 hours: There were no vitals filed for this visit. Laboratory Results: I have personally reviewed all pertinent laboratory/tests results  Recent Labs (last 2 months):    Admission on 2023, Discharged on 2023   Component Date Value   • WBC 2023 6.06    • RBC 2023 3.97    • Hemoglobin 2023 11.7    • Hematocrit 2023 36.7    • MCV 2023 92    • MCH 2023 29.5    • MCHC 2023 31.9    • RDW 2023 13.4    • MPV 2023 10.8    • Platelets  188    • nRBC 2023 0    • Neutrophils Relative 2023 69    • Immat GRANS % 2023 0    • Lymphocytes Relative 2023 21    • Monocytes Relative 2023 6    • Eosinophils Relative 2023 3    • Basophils Relative 2023 1    • Neutrophils Absolute 2023 4.17    • Immature Grans Absolute 2023 0.02    • Lymphocytes Absolute 07/21/2023 1.24    • Monocytes Absolute 07/21/2023 0.39    • Eosinophils Absolute 07/21/2023 0.20    • Basophils Absolute 07/21/2023 0.04    • Sodium 07/21/2023 137    • Potassium 07/21/2023 4.4    • Chloride 07/21/2023 105    • CO2 07/21/2023 24    • ANION GAP 07/21/2023 8    • BUN 07/21/2023 31 (H)    • Creatinine 07/21/2023 0.88    • Glucose 07/21/2023 93    • Calcium 07/21/2023 9.0    • AST 07/21/2023 13    • ALT 07/21/2023 10    • Alkaline Phosphatase 07/21/2023 42    • Total Protein 07/21/2023 6.1 (L)    • Albumin 07/21/2023 3.8    • Total Bilirubin 07/21/2023 0.38    • eGFR 07/21/2023 60    • Lipase 07/21/2023 228 (H)    • Color, UA 07/21/2023 Light Yellow    • Clarity, UA 07/21/2023 Clear    • Specific Gravity, UA 07/21/2023 1.014    • pH, UA 07/21/2023 5.0    • Leukocytes, UA 07/21/2023 Negative    • Nitrite, UA 07/21/2023 Negative    • Protein, UA 07/21/2023 Negative    • Glucose, UA 07/21/2023 Negative    • Ketones, UA 07/21/2023 Negative    • Urobilinogen, UA 07/21/2023 <2.0    • Bilirubin, UA 07/21/2023 Negative    • Occult Blood, UA 07/21/2023 Negative        Mental Status Evaluation:    Appearance:  age appropriate, casually dressed   Behavior:  cooperative, calm   Motor: no abnormal movements   Speech:  normal rate, normal volume, normal pitch   Mood:  "more or less okay"   Affect:  constricted   Thought Process:  circumstantial at times   Thought Content: {no overt delusions but ruminating thoughts   Perceptual disturbances: no auditory hallucinations, no visual hallucinations   Risk Potential: Suicidal ideation - None  Homicidal ideation - None  Potential for aggression - No   Cognition: oriented to self and situation, appears to be of average intelligence and cognition not formally tested   Insight:  fair   Judgment: fair     Note Share: This note was shared with patient.     I spent 45 minutes directly with the patient during this visit    Charley Harry DO 08/28/23

## 2023-08-28 ENCOUNTER — TELEMEDICINE (OUTPATIENT)
Dept: PSYCHIATRY | Facility: CLINIC | Age: 83
End: 2023-08-28
Payer: MEDICARE

## 2023-08-28 DIAGNOSIS — F45.1 SOMATIC SYMPTOM DISORDER: ICD-10-CM

## 2023-08-28 DIAGNOSIS — F41.1 GENERALIZED ANXIETY DISORDER: Primary | ICD-10-CM

## 2023-08-28 DIAGNOSIS — F33.1 MAJOR DEPRESSIVE DISORDER, RECURRENT, MODERATE (HCC): ICD-10-CM

## 2023-08-28 PROCEDURE — 99213 OFFICE O/P EST LOW 20 MIN: CPT

## 2023-08-28 RX ORDER — OLANZAPINE 2.5 MG/1
5 TABLET ORAL
Qty: 60 TABLET | Refills: 1 | Status: CANCELLED | OUTPATIENT
Start: 2023-08-28 | End: 2023-10-27

## 2023-08-28 RX ORDER — GABAPENTIN 100 MG/1
100 CAPSULE ORAL 2 TIMES DAILY
Qty: 60 CAPSULE | Refills: 1 | Status: SHIPPED | OUTPATIENT
Start: 2023-08-28 | End: 2023-10-27

## 2023-08-28 RX ORDER — RISPERIDONE 0.25 MG/1
TABLET ORAL
Qty: 90 TABLET | Refills: 1 | Status: SHIPPED | OUTPATIENT
Start: 2023-08-28 | End: 2023-10-27

## 2023-08-28 RX ORDER — CLONAZEPAM 0.5 MG/1
0.5 TABLET ORAL 2 TIMES DAILY
Qty: 60 TABLET | Refills: 0
Start: 2023-08-28 | End: 2023-09-05 | Stop reason: SDUPTHER

## 2023-08-28 RX ORDER — FLUOXETINE HYDROCHLORIDE 40 MG/1
40 CAPSULE ORAL DAILY
Qty: 30 CAPSULE | Refills: 1 | Status: SHIPPED | OUTPATIENT
Start: 2023-08-28 | End: 2023-10-27

## 2023-08-28 NOTE — PATIENT INSTRUCTIONS
Continue your Klonopin, Prozac, and Gabapentin dosing  Change risperdal to 0.25mg in the morning and 0.5mg at night  Create a list of 5 things to increase your social network and due 1 task per week  Gratitude journal by writing 3 new things you're grateful for each day    Please present for your previously scheduled appointment approximately 15 minutes prior to appointment time. If you are running late or are unable to attend your appointment, please call our [facility] office at [phone] or our [facility] office at [phone] if applicable to notify the office of your absence. If you are in psychological crisis including not limited to suicidal/homicidal thoughts or thoughts of self-injury, do not hesitate to call/contact your [facility] hotline (see below) or attend to the nearest emergency department.   [facility] Crisis: [address] [facility] Crisis: [phone]  [address] [facility] Crisis: [address] [facility] Crisis: [address] Crisis: [address] Crisis: [address] [facility] Crisis: [phone]  National Suicide Prevention Hotline: 6-121.239.2691

## 2023-09-05 DIAGNOSIS — F41.1 GENERALIZED ANXIETY DISORDER: ICD-10-CM

## 2023-09-05 RX ORDER — CLONAZEPAM 0.5 MG/1
0.5 TABLET ORAL 2 TIMES DAILY
Qty: 60 TABLET | Refills: 1
Start: 2023-09-05 | End: 2023-09-05 | Stop reason: SDUPTHER

## 2023-09-05 RX ORDER — CLONAZEPAM 0.5 MG/1
0.5 TABLET ORAL 2 TIMES DAILY
Qty: 60 TABLET | Refills: 1 | Status: SHIPPED | OUTPATIENT
Start: 2023-09-05 | End: 2023-11-04

## 2023-09-05 RX ORDER — CLONAZEPAM 0.5 MG/1
0.5 TABLET ORAL 2 TIMES DAILY
Qty: 60 TABLET | Refills: 1 | Status: SHIPPED | OUTPATIENT
Start: 2023-09-05 | End: 2023-09-05 | Stop reason: SDUPTHER

## 2023-09-05 NOTE — TELEPHONE ENCOUNTER
Received a message from the pharmacist at [facility] regarding the prescription for clonazepam. Dr. George Harry number is invalid in the system. Please review/have a supervising physician re-send.

## 2023-09-22 ENCOUNTER — TELEPHONE (OUTPATIENT)
Age: 83
End: 2023-09-22

## 2023-09-25 DIAGNOSIS — E11.9 TYPE 2 DIABETES MELLITUS WITHOUT COMPLICATION, WITHOUT LONG-TERM CURRENT USE OF INSULIN (HCC): ICD-10-CM

## 2023-10-08 DIAGNOSIS — E78.2 MIXED HYPERLIPIDEMIA: ICD-10-CM

## 2023-10-09 ENCOUNTER — RA CDI HCC (OUTPATIENT)
Dept: OTHER | Facility: HOSPITAL | Age: 83
End: 2023-10-09

## 2023-10-09 RX ORDER — ATORVASTATIN CALCIUM 10 MG/1
10 TABLET, FILM COATED ORAL DAILY
Qty: 90 TABLET | Refills: 0 | Status: SHIPPED | OUTPATIENT
Start: 2023-10-09 | End: 2023-10-16 | Stop reason: SDUPTHER

## 2023-10-11 ENCOUNTER — APPOINTMENT (OUTPATIENT)
Dept: LAB | Facility: CLINIC | Age: 83
End: 2023-10-11
Payer: MEDICARE

## 2023-10-11 DIAGNOSIS — I10 BENIGN ESSENTIAL HYPERTENSION: ICD-10-CM

## 2023-10-11 DIAGNOSIS — E11.9 TYPE 2 DIABETES MELLITUS WITHOUT COMPLICATION, WITHOUT LONG-TERM CURRENT USE OF INSULIN (HCC): ICD-10-CM

## 2023-10-11 DIAGNOSIS — N20.0 KIDNEY STONE: ICD-10-CM

## 2023-10-11 DIAGNOSIS — E78.2 MIXED HYPERLIPIDEMIA: ICD-10-CM

## 2023-10-11 LAB
ALBUMIN SERPL BCP-MCNC: 4 G/DL (ref 3.5–5)
ALP SERPL-CCNC: 48 U/L (ref 34–104)
ALT SERPL W P-5'-P-CCNC: 11 U/L (ref 7–52)
ANION GAP SERPL CALCULATED.3IONS-SCNC: 8 MMOL/L
AST SERPL W P-5'-P-CCNC: 14 U/L (ref 13–39)
BASOPHILS # BLD AUTO: 0.05 THOUSANDS/ÂΜL (ref 0–0.1)
BASOPHILS NFR BLD AUTO: 1 % (ref 0–1)
BILIRUB SERPL-MCNC: 0.5 MG/DL (ref 0.2–1)
BUN SERPL-MCNC: 25 MG/DL (ref 5–25)
CALCIUM SERPL-MCNC: 9.6 MG/DL (ref 8.4–10.2)
CHLORIDE SERPL-SCNC: 105 MMOL/L (ref 96–108)
CHOLEST SERPL-MCNC: 144 MG/DL
CO2 SERPL-SCNC: 27 MMOL/L (ref 21–32)
CREAT SERPL-MCNC: 0.83 MG/DL (ref 0.6–1.3)
CREAT UR-MCNC: 56 MG/DL
EOSINOPHIL # BLD AUTO: 0.21 THOUSAND/ÂΜL (ref 0–0.61)
EOSINOPHIL NFR BLD AUTO: 4 % (ref 0–6)
ERYTHROCYTE [DISTWIDTH] IN BLOOD BY AUTOMATED COUNT: 13.2 % (ref 11.6–15.1)
EST. AVERAGE GLUCOSE BLD GHB EST-MCNC: 140 MG/DL
GFR SERPL CREATININE-BSD FRML MDRD: 64 ML/MIN/1.73SQ M
GLUCOSE P FAST SERPL-MCNC: 96 MG/DL (ref 65–99)
HBA1C MFR BLD: 6.5 %
HCT VFR BLD AUTO: 36.5 % (ref 34.8–46.1)
HDLC SERPL-MCNC: 52 MG/DL
HGB BLD-MCNC: 11.9 G/DL (ref 11.5–15.4)
IMM GRANULOCYTES # BLD AUTO: 0.02 THOUSAND/UL (ref 0–0.2)
IMM GRANULOCYTES NFR BLD AUTO: 0 % (ref 0–2)
LDLC SERPL CALC-MCNC: 71 MG/DL (ref 0–100)
LYMPHOCYTES # BLD AUTO: 1.45 THOUSANDS/ÂΜL (ref 0.6–4.47)
LYMPHOCYTES NFR BLD AUTO: 29 % (ref 14–44)
MCH RBC QN AUTO: 29.8 PG (ref 26.8–34.3)
MCHC RBC AUTO-ENTMCNC: 32.6 G/DL (ref 31.4–37.4)
MCV RBC AUTO: 91 FL (ref 82–98)
MICROALBUMIN UR-MCNC: 8.3 MG/L
MICROALBUMIN/CREAT 24H UR: 15 MG/G CREATININE (ref 0–30)
MONOCYTES # BLD AUTO: 0.36 THOUSAND/ÂΜL (ref 0.17–1.22)
MONOCYTES NFR BLD AUTO: 7 % (ref 4–12)
NEUTROPHILS # BLD AUTO: 2.84 THOUSANDS/ÂΜL (ref 1.85–7.62)
NEUTS SEG NFR BLD AUTO: 59 % (ref 43–75)
NONHDLC SERPL-MCNC: 92 MG/DL
NRBC BLD AUTO-RTO: 0 /100 WBCS
PLATELET # BLD AUTO: 188 THOUSANDS/UL (ref 149–390)
PMV BLD AUTO: 11 FL (ref 8.9–12.7)
POTASSIUM SERPL-SCNC: 3.9 MMOL/L (ref 3.5–5.3)
PROT SERPL-MCNC: 6.6 G/DL (ref 6.4–8.4)
RBC # BLD AUTO: 4 MILLION/UL (ref 3.81–5.12)
SODIUM SERPL-SCNC: 140 MMOL/L (ref 135–147)
TRIGL SERPL-MCNC: 105 MG/DL
TSH SERPL DL<=0.05 MIU/L-ACNC: 2.33 UIU/ML (ref 0.45–4.5)
WBC # BLD AUTO: 4.93 THOUSAND/UL (ref 4.31–10.16)

## 2023-10-11 PROCEDURE — 84443 ASSAY THYROID STIM HORMONE: CPT

## 2023-10-11 PROCEDURE — 85025 COMPLETE CBC W/AUTO DIFF WBC: CPT

## 2023-10-11 PROCEDURE — 82570 ASSAY OF URINE CREATININE: CPT

## 2023-10-11 PROCEDURE — 36415 COLL VENOUS BLD VENIPUNCTURE: CPT

## 2023-10-11 PROCEDURE — 80053 COMPREHEN METABOLIC PANEL: CPT

## 2023-10-11 PROCEDURE — 83036 HEMOGLOBIN GLYCOSYLATED A1C: CPT

## 2023-10-11 PROCEDURE — 82043 UR ALBUMIN QUANTITATIVE: CPT

## 2023-10-11 PROCEDURE — 80061 LIPID PANEL: CPT

## 2023-10-13 ENCOUNTER — TELEMEDICINE (OUTPATIENT)
Dept: PSYCHIATRY | Facility: CLINIC | Age: 83
End: 2023-10-13
Payer: MEDICARE

## 2023-10-13 DIAGNOSIS — F33.1 MAJOR DEPRESSIVE DISORDER, RECURRENT, MODERATE (HCC): ICD-10-CM

## 2023-10-13 DIAGNOSIS — F45.1 SOMATIC SYMPTOM DISORDER: ICD-10-CM

## 2023-10-13 DIAGNOSIS — F41.1 GENERALIZED ANXIETY DISORDER: ICD-10-CM

## 2023-10-13 PROCEDURE — 99213 OFFICE O/P EST LOW 20 MIN: CPT

## 2023-10-13 RX ORDER — CLONAZEPAM 0.5 MG/1
0.5 TABLET ORAL 2 TIMES DAILY
Qty: 60 TABLET | Refills: 1 | Status: SHIPPED | OUTPATIENT
Start: 2023-10-13 | End: 2023-12-12

## 2023-10-13 RX ORDER — FLUOXETINE HYDROCHLORIDE 40 MG/1
40 CAPSULE ORAL DAILY
Qty: 30 CAPSULE | Refills: 1 | Status: SHIPPED | OUTPATIENT
Start: 2023-10-13 | End: 2023-12-12

## 2023-10-13 RX ORDER — RISPERIDONE 0.25 MG/1
TABLET ORAL
Qty: 90 TABLET | Refills: 1 | Status: SHIPPED | OUTPATIENT
Start: 2023-10-13 | End: 2023-12-12

## 2023-10-13 RX ORDER — GABAPENTIN 100 MG/1
100 CAPSULE ORAL 2 TIMES DAILY
Qty: 60 CAPSULE | Refills: 1 | Status: SHIPPED | OUTPATIENT
Start: 2023-10-13 | End: 2023-12-12

## 2023-10-13 NOTE — PATIENT INSTRUCTIONS
Continue taking your medications as prescribed - no changes at this time    Please present for your previously scheduled appointment approximately 15 minutes prior to appointment time. If you are running late or are unable to attend your appointment, please call our  [address]  office at [phone] or our [facility]) office at [phone] if applicable to notify the office of your absence. If you are in psychological crisis including not limited to suicidal/homicidal thoughts or thoughts of self-injury, do not hesitate to call/contact your [facility] hotline (see below) or attend to the nearest emergency department.   [facility] Crisis: [address] Crisis: [phone]  [address] Crisis: [address] [facility] [address] Crisis: [address] Crisis: [address] Crisis: [address] [facility] [address] Crisis: [phone]  National Suicide Prevention Hotline: 5-448.594.8725

## 2023-10-13 NOTE — PSYCH
Psychiatric Progress Note: Medication Management    [facility]    Name and Date of Birth:  Tammy Suarez 78 y.o. 1940 MRN: [id number]    Date of Visit: October 13, 2023    Reason for Visit: Follow-up visit regarding medication management     Virtual Visit Disclaimer & Required Documentation  TeleMed provider: Justice Phillips and Dr. Carmen Bennett. My office door is closed. No one else is in the room. The patient is located in [place], where I hold an active license. Tammy Suarez understands that this is a telemedicine visit and that the visit is being conducted through [employer]. Patient is aware that [address] could be limited without vital signs or the ability to perform a full hands-on physical exam. Patient is aware this is a billable service. The patient agrees to participate and understands they can discontinue the visit at any time. ASSESSMENT & PLAN     Tammy Suarez is a 78 y.o. female, ,  and presently living daughter, son-in-law, 3 grandkids; retired teacher; with prior psychiatric diagnoses of MDD, JONAH; no past suicide attempts (0); 1 past psychiatric hospitalizations (for anxiety); with suicide risk factors including medical problems, anxiety and age (52+); and medical history including DM2, HTN, DVT hx, OA, HLD, TIA, status post rotator cuff surgery was personally seen and evaluated today at the [address] [place] [address] outpatient clinic for follow-up regarding medication management. Patient reports with the slight increase in her Risperdal that things seem to have been going better. Regarding her depressive symptoms her appetite and concentration and energy seem to have improved. Her thinking also seems to be more clear though she still ruminates on things including the concerns of Friday the 13th but has been trying to think positively despite worrying about it for multiple days. Given that she feels overall improved and has found better coping strategies such as more reading and calling a close friend routinely, she has also been more tolerable with her stressors which includes her daughter's father-in-law. At this time no adjustments will be made as patient feels things are stable. Could consider reducing low dose of gabapentin in the future and optimizing Prozac. Suicide/Homicide Risk Assessment:  Risk of Harm to Self:  The following ratings are based on assessment at the time of the interview and review of records  Demographic risk factors include: ,  status, elderly (76 or older)   Historical Risk Factors include: chronic psychiatric problems, history of depression, history of anxiety  Recent Specific Risk Factors include: diagnosis of depression, current depressive symptoms, current anxiety symptoms  Protective Factors: no current suicidal ideation, being a parent, compliant with medications, compliant with mental health treatment, having a desire to be alive, having a desire to live, having a sense of purpose or meaning in life, having pets, personal beliefs, personal beliefs about the meaning and value of life, Adventist beliefs discouraging suicide, resiliency, restricted access to lethal means, stable living environment, supportive family  Weapons: none. The following steps have been taken to ensure weapons are properly secured: not applicable  Based on today's assessment, Brittanie Rutledge presents the following risk of harm to self: minimal    Risk of Harm to Others: The following ratings are based on assessment at the time of the interview and review of records  Demographic Risk Factors include: none. Historical Risk Factors include: none. Recent Specific Risk Factors include: multiple stressors, social difficulties.   Protective Factors: no current homicidal ideation, being a parent, compliant with medications, compliant with mental health treatment, no substance use problems, personal beliefs, Episcopalian beliefs, resilience, restricted access to lethal means, safe and stable living environment, supportive family  Weapons: none. The following steps have been taken to ensure weapons are properly secured: not applicable  Based on today's assessment, Vivian Contreras presents the following risk of harm to others: minimal    The following interventions are recommended: no intervention changes needed. Although patient's acute lethality risk is low, long-term/chronic lethality risk is mildly elevated in the presence of anxiety and depressive symptoms. At the current moment, Vivian Contreras is future-oriented, forward-thinking, and demonstrates ability to act in a self-preserving manner as evidenced by volitionally presenting to the clinic today, seeking treatment. Additionally, Vivian Contreras sits throughout the assessment wearing personal protective gear (i.e. medical mask) in the context of the ongoing COVID-19 pandemic, suggesting a will and desire to live. At this juncture, inpatient hospitalization is not currently warranted. To mitigate future risk, patient should adhere to the recommendations of this writer, avoid alcohol/illicit substance use, utilize community-based resources and familiar support and prioritize mental health treatment. Based on today's assessment and clinical criteria, Doyle Naidu contracts for safety and is not an imminent risk of harm to self or others. Outpatient level of care is deemed appropriate at this present time. Vivian Contreras understands that if they are no longer able to contract for safety, they need to call/contact the outpatient office including this writer, call/contact crisis and/orattend to the nearest Emergency Department for immediate evaluation.     DSM-5 Diagnoses:   Anxiety  MDD    Treatment Recommendations/Precautions:  Notable Changes  N/A  Other Treatment recommendations  Continue Risperdal 0.25 QD and 0.50mg HS for obsessive thoughts  Continue Prozac 40mg for anxiety and depression symptoms  Continue Gabapentin 100mg BID for anxiety (used off-label but evidence-based)  Can consider reducing for less medications and optimizing prozac if needed  Continue Klonopin 0.5mg BID for anxiety  Medication management every 1-3 months  Aware of 24 hour and weekend coverage for urgent situations accessed by calling [facility] main practice number    Medications Risks/Benefits    Risks, benefits, and possible side effects of medications explained to Alexey Horn and she verbalizes understanding and agreement for treatment. including:   Burnard Villafuerte was completed for second generation antipsychotic medication including sedation, GI distress, dizziness, risk of metabolic syndrome, EPS (akathisia, TD, etc), rare NMS, orthostatic hypotension, cardiovascular risks such as QT prolongation, increased prolactin, and others. PARQ was completed for the class of SSRIs including transient anxiety, insomnia or sedation, headaches, constipation or diarrhea; and longer-standing sexual side effects, bleeding risk (especially with NSAIDs), and weight gain; as well as suicidal thoughts in patients under 22years old, serotonin syndrome, and induction of eligio. Potential for discontinuation syndrome (flu-like symptoms, insomnia, nausea, sensory disturbances, and headache) was also discussed. PARQ was completed for gabapentin including sedation, dizziness, tremor, GI upset, potential for weight gain, and rare suicidal thinking. Risks of taking benzodiazepines were discussed, including risk of sedation and respiratory depression, particularly when combined with alcohol, opioids, or other central nervous system-depressants. Addiction potential, withdrawal seizures with abrupt discontinuation, and other potential adverse effects were discussed. The patient was instructed not to drive or operate machinery while taking benzodiazepines.      Controlled Medication Discussion:   Alexey Horn has been filling controlled prescriptions on time as prescribed according to [address] [place], [place] [address]e Monitoring Program    Psychotherapy Provided:   Individual psychotherapy provided: Yes  Counseling was provided during the session today for 10 minutes. Treatment Plan:  Completed and signed during the session: Not applicable - Treatment Plan not due at this session. Next due treatment plan date is: January 15, 2024. SUBJECTIVE     Chief Complaint: Occasional bad days. Romana Todd 78 y.o. female, ,  and presently living daughter, son-in-law, 3 grandkids; retired teacher; with prior psychiatric diagnoses of MDD, JONAH; no past suicide attempts (0); 1 past psychiatric hospitalizations (for anxiety); with suicide risk factors including medical problems, anxiety and age (52+); and medical history including DM2, HTN, DVT hx, OA, HLD, TIA, status post rotator cuff surgery, who was personally seen and evaluated at the [address] [place] [address] outpatient clinic for follow-up regarding medication management. At the time of last visit, pt had tried to stop the risperdal for concerns of side effects for dry scalp, but felt worsened and the scalp did not improve so got restarted on the medications. Her dose was increased at the end of the visit and she was encouraged to push her boundaries to achieve some of her goals. Stephen Bronson states that since their previous outpatient psychiatric appointment with this writer, pt had reported that things have been"okay." She notes some days are not the best but she tries to keep herself busy. She notes that she has a pile of books to read, and her favorite place is [address] [place] [address]. She notes liking history as well as [employer] and [employer]. She also helps with the laundry for her whole family so these things have been keeping he busy. She notes today her granddaughter got sick so she is helping take care of her.  She notes that her daughter's father in law who is has been living in the house is visiting [place] for a few weeks. She notes initially admitting to "hating him" but things have improved and they have gotten a lot better now. She notes he keeps himself busy and he updates her on news, so they get along. She was not able to meet others which was a goal last time, but she has a dear friend that she feels she can open up to. They made a deal to discuss a book if they were to talk and try to read the same book together. She tries not to call he friend too much because she did not want to upset or push too much on this friend with her own stressors/problems. She is still following with therapist Tay weekly which she enjoys and has opportunities to talk there. Regarding her depressive symptoms, she notes sometimes she feels down but has not had it stay long. She was thinking about Friday the 13th for some time worried about it, but tried to make a Presybeterian connection with the number 13 in a positive way which has helped. She notes he energy and concentration have improved and she has been reading much better. She notes that appetite has improved and sleeping without concerns. Denies any suicidal, homicidal, and hallucinations. She feels that the medications are doing their jobs. She continues to take the medications without noticeable side effects. She continues to have itchy scalp which she will be following with her PCP for. She notes that the first time of Risperdal made a difference, and the additional dose seems to have been even more beneficial. She feels things are going well and she likes where her medications are and does not need any adjustments. Presently, patient adamantly denies suicidal/homicidal ideation in addition to thoughts of self-injury, citing family & Orthodox as deterrents against self-harm; contracts for safety, see above for risk assessment.  At conclusion of evaluation, patient is amenable to the recommendations of this writer including: medications as prescribed, attending routine appointments. Also, patient is amenable to calling/contacting the outpatient office including this writer if any acute adverse effects of their medication regimen arise in addition to any comments or concerns pertaining to their psychiatric management. Patient is amenable to calling/contacting crisis and/or attending to the nearest emergency department if their clinical condition deteriorates to assure their safety and stability, stating that they are able to appropriately confide in their Family (daughter) & good friend regarding their psychiatric state. Current Rating Scores:   None completed today. PSYCHIATRIC REVIEW OF SYSTEMS     Unchanged information from this writer's previous assessment is copied; information that has changed is bolded. Sleep change: no  Interest change: no  Interests include: read (books), knitting  Guilt/Hopelessness/helplessness/worthlessness: yes, guilt (giving daughter stress)  Energy change: yes, decreased  Concentration/Attention change: yes, decreased  Appetite change: yes, decreased   Weight changes & timeframe: no  Psychomotor agitation/retardation: no  Somatic symptoms: yes  Suicidal ideation: no  Homicidal ideation: no  Zaira/hypomania: no     Anxiety/panic attack: yes  JONAH symptoms: difficulty concentrating, fatigue, insomnia, irritable and restlessness/keyed up  Panic Disorder symptoms: no symptoms suggestive of panic disorder  Obsessive/compulsive symptoms: obsessive thoughts without compulsions  Eating Disorder symptoms: no historical or current eating disorder. no binge eating disorder; no anorexia nervosa.  no symptoms of bulimia; does not stopped eating when trying to lose weight when younger     Auditory hallucinations: no  Visual hallucinations: no  Other perceptual disturbances: no  Delusional thinking: no    REVIEW OF SYSTEMS     Constitutional negative   ENT negative   Cardiovascular negative   Respiratory negative   Gastrointestinal negative   Genitourinary negative   Musculoskeletal negative   Integumentary negative   Neurological negative   Endocrine negative   Other Symptoms none, all other systems are negative     HISTORICAL INFORMATION     History Review: The following portions of the patient's history were reviewed and updated as appropriate: allergies, current medications, past family history, past medical history, past social history, past surgical history, and problem list.    Unchanged information from this writer's previous assessment is copied; information that has changed is bolded. Family History   Problem Relation Age of Onset    Depression Mother         w/anxiety    Diabetes Mother         Mellitus    Breast cancer Mother     Hypertension Mother     No Known Problems Father     Depression Sister         w/anxiety    Heart disease Sister         Cardiac Disorder    Breast cancer Sister     Hypertension Sister     Diabetes Brother         Mellitus    Alcohol abuse Son      Additional fam hx:   Family hx of psychiatric diagnosis: yes, 2 sisters (depression & anxiety), Mom (Depression & anxiety)  Family hx of suicide: no  Family Hx of drug abuse: Son with alcohol abuse  Family Hx of medical diagnosis: yes, as listed above Mom (DM, breast cancer, HTN), sister (heart issues, breast cancer, HTN), brother (DM)    Past Psychiatric History:   Previous diagnosis: MDD, Anxiety  Previous inpatient psychiatric admissions: 1 prior admission for 3 weeks [facility] 2013 (panic attack after surgery). Present/previous outpatient psychiatrist: saw psychiatrists in [facility] ; Dr. Duane Cobos & Dr. Joey Taylor prior to transfer. Present/previous therapy/psychotherapy: Dr. Josue Juan for past 3 years. History of suicidal attempts/gestures: Denies.   Self-injurious behavior/high-risk behavior: no.  History of violence/aggressive behaviors: No.  Other Services: patient denies     Psychiatric medication trial:   Antidepressants  Effexor, Zoloft, Prozac, Lexapro  Antipsychotics  Abilify (Dizzy), Risperdal  Mood stabilizers  N/A  Sedative hypnotics  Klonopin  Others  Buspar, Neurontin     Substance Abuse History:  Nicotine use (cigarettes & vape): Denies  Caffeine use: 1 cup day Decafe  Alcohol use: Denies  Marijuana use: Denies  Other substances: Denies     Longest clean time: N/A  Previous inpatient/outpatient substance abuse rehabilitation: No.     Patient denies previous legal actions or arrests related to substance intoxication including prior DWIs/DUIs. Alanna Trey does not apear under the influence or withdrawal of any psychoactive substance throughout today's examination. I have assessed this patient for substance use within the past 12 months. Social History:  Born/Raise: [address] [facility] - moved to [place]  when begun living with family  Early life/developmental: Denies a history of milestone/developmental delay. Denies a history of in-utero exposure to toxins/illicit substances. Family: 2 brother(s) & 2 sister(s), raised by parents  Education: Bachelor of Arts - Teaching  Learning Disabilities: There is no documented history of IEP or need for special education. Occupational History: retired teacher  Confucianism Affiliation: [address]  Marital history:  since  -   from cancer  Children: yes, 2 (1 daughter & 1 son), 3 grandkids  Living arrangement: daughter, father-in-law, son-in-law, 3 grandkids in [facility], 2 dogs, guinea pig  Support system: good support system - daughter, therapist   Hx: no  Legal Hx: no  Access to firearms: patient denies. Yoana Satnana has no history of arrests or violence pertaining to use of a deadly weapon.       Traumatic History:   Abuse: none is reported  Other Traumatic Events: Denies  Flashbacks/Nightmares: no     Past Medical History:  Hx of seizures: no  Hx of concussions & ongoing symptoms: no    OBJECTIVE     Vital signs in last 24 hours:  There were no vitals filed for this visit.     Laboratory Results: I have personally reviewed all pertinent laboratory/tests results  Recent Labs (last 4 months):   Appointment on 10/11/2023   Component Date Value    WBC 10/11/2023 4.93     RBC 10/11/2023 4.00     Hemoglobin 10/11/2023 11.9     Hematocrit 10/11/2023 36.5     MCV 10/11/2023 91     MCH 10/11/2023 29.8     MCHC 10/11/2023 32.6     RDW 10/11/2023 13.2     MPV 10/11/2023 11.0     Platelets 20/43/3126 188     nRBC 10/11/2023 0     Neutrophils Relative 10/11/2023 59     Immat GRANS % 10/11/2023 0     Lymphocytes Relative 10/11/2023 29     Monocytes Relative 10/11/2023 7     Eosinophils Relative 10/11/2023 4     Basophils Relative 10/11/2023 1     Neutrophils Absolute 10/11/2023 2.84     Immature Grans Absolute 10/11/2023 0.02     Lymphocytes Absolute 10/11/2023 1.45     Monocytes Absolute 10/11/2023 0.36     Eosinophils Absolute 10/11/2023 0.21     Basophils Absolute 10/11/2023 0.05     Sodium 10/11/2023 140     Potassium 10/11/2023 3.9     Chloride 10/11/2023 105     CO2 10/11/2023 27     ANION GAP 10/11/2023 8     BUN 10/11/2023 25     Creatinine 10/11/2023 0.83     Glucose, Fasting 10/11/2023 96     Calcium 10/11/2023 9.6     AST 10/11/2023 14     ALT 10/11/2023 11     Alkaline Phosphatase 10/11/2023 48     Total Protein 10/11/2023 6.6     Albumin 10/11/2023 4.0     Total Bilirubin 10/11/2023 0.50     eGFR 10/11/2023 64     Hemoglobin A1C 10/11/2023 6.5 (H)     EAG 10/11/2023 140     Cholesterol 10/11/2023 144     Triglycerides 10/11/2023 105     HDL, Direct 10/11/2023 52     LDL Calculated 10/11/2023 71     Non-HDL-Chol (CHOL-HDL) 10/11/2023 92     Creatinine, Ur 10/11/2023 56.0     Albumin,U,Random 10/11/2023 8.3     Albumin Creat Ratio 10/11/2023 15     TSH 3RD GENERATON 10/11/2023 2.332    Admission on 07/21/2023, Discharged on 07/21/2023   Component Date Value    WBC 07/21/2023 6.06     RBC 07/21/2023 3.97     Hemoglobin 07/21/2023 11.7 Hematocrit 07/21/2023 36.7     MCV 07/21/2023 92     MCH 07/21/2023 29.5     MCHC 07/21/2023 31.9     RDW 07/21/2023 13.4     MPV 07/21/2023 10.8     Platelets 35/77/9332 188     nRBC 07/21/2023 0     Neutrophils Relative 07/21/2023 69     Immat GRANS % 07/21/2023 0     Lymphocytes Relative 07/21/2023 21     Monocytes Relative 07/21/2023 6     Eosinophils Relative 07/21/2023 3     Basophils Relative 07/21/2023 1     Neutrophils Absolute 07/21/2023 4.17     Immature Grans Absolute 07/21/2023 0.02     Lymphocytes Absolute 07/21/2023 1.24     Monocytes Absolute 07/21/2023 0.39     Eosinophils Absolute 07/21/2023 0.20     Basophils Absolute 07/21/2023 0.04     Sodium 07/21/2023 137     Potassium 07/21/2023 4.4     Chloride 07/21/2023 105     CO2 07/21/2023 24     ANION GAP 07/21/2023 8     BUN 07/21/2023 31 (H)     Creatinine 07/21/2023 0.88     Glucose 07/21/2023 93     Calcium 07/21/2023 9.0     AST 07/21/2023 13     ALT 07/21/2023 10     Alkaline Phosphatase 07/21/2023 42     Total Protein 07/21/2023 6.1 (L)     Albumin 07/21/2023 3.8     Total Bilirubin 07/21/2023 0.38     eGFR 07/21/2023 60     Lipase 07/21/2023 228 (H)     Color, UA 07/21/2023 Light Yellow     Clarity, UA 07/21/2023 Clear     Specific Gravity, UA 07/21/2023 1.014     pH, UA 07/21/2023 5.0     Leukocytes, UA 07/21/2023 Negative     Nitrite, UA 07/21/2023 Negative     Protein, UA 07/21/2023 Negative     Glucose, UA 07/21/2023 Negative     Ketones, UA 07/21/2023 Negative     Urobilinogen, UA 07/21/2023 <2.0     Bilirubin, UA 07/21/2023 Negative     Occult Blood, UA 07/21/2023 Negative        Mental Status Evaluation:    Appearance:  age appropriate, casually dressed, glasses   Behavior:  cooperative, calm   Motor: no abnormal movements   Speech:  normal volume, normal pitch, talkative, normal rate   Mood:  "Okay"   Affect:  constricted   Thought Process:  circumstantial at times but easily redirected; mostly linear and logical   Thought Content: {no overt delusions, ruminating thoughts improved   Perceptual disturbances: no auditory hallucinations, no visual hallucinations   Risk Potential: Suicidal ideation - None at present  Homicidal ideation - None at present  Potential for aggression - Not at present   Cognition: oriented to self and situation, appears to be of average intelligence, and cognition not formally tested   Insight:  fair   Judgment: fair     Note Share: This note was shared with patient.     I spent 40 minutes directly with the patient during this visit    Anne Curry,  10/13/23

## 2023-10-16 ENCOUNTER — OFFICE VISIT (OUTPATIENT)
Dept: FAMILY MEDICINE CLINIC | Facility: CLINIC | Age: 83
End: 2023-10-16
Payer: MEDICARE

## 2023-10-16 VITALS
BODY MASS INDEX: 27.22 KG/M2 | SYSTOLIC BLOOD PRESSURE: 134 MMHG | WEIGHT: 121 LBS | RESPIRATION RATE: 16 BRPM | OXYGEN SATURATION: 98 % | HEIGHT: 56 IN | DIASTOLIC BLOOD PRESSURE: 76 MMHG | HEART RATE: 74 BPM

## 2023-10-16 DIAGNOSIS — I10 PRIMARY HYPERTENSION: ICD-10-CM

## 2023-10-16 DIAGNOSIS — F45.21 ILLNESS ANXIETY DISORDER: ICD-10-CM

## 2023-10-16 DIAGNOSIS — Z23 FLU VACCINE NEED: ICD-10-CM

## 2023-10-16 DIAGNOSIS — R23.8 DRY SCALP: ICD-10-CM

## 2023-10-16 DIAGNOSIS — E11.9 TYPE 2 DIABETES MELLITUS WITHOUT COMPLICATION, WITHOUT LONG-TERM CURRENT USE OF INSULIN (HCC): ICD-10-CM

## 2023-10-16 DIAGNOSIS — I10 BENIGN ESSENTIAL HYPERTENSION: ICD-10-CM

## 2023-10-16 DIAGNOSIS — F33.0 MAJOR DEPRESSIVE DISORDER, RECURRENT, MILD (HCC): Primary | ICD-10-CM

## 2023-10-16 DIAGNOSIS — E78.2 MIXED HYPERLIPIDEMIA: ICD-10-CM

## 2023-10-16 PROCEDURE — G0439 PPPS, SUBSEQ VISIT: HCPCS | Performed by: FAMILY MEDICINE

## 2023-10-16 PROCEDURE — 99214 OFFICE O/P EST MOD 30 MIN: CPT | Performed by: FAMILY MEDICINE

## 2023-10-16 PROCEDURE — G0008 ADMIN INFLUENZA VIRUS VAC: HCPCS | Performed by: FAMILY MEDICINE

## 2023-10-16 PROCEDURE — 90662 IIV NO PRSV INCREASED AG IM: CPT | Performed by: FAMILY MEDICINE

## 2023-10-16 RX ORDER — ATORVASTATIN CALCIUM 10 MG/1
10 TABLET, FILM COATED ORAL DAILY
Qty: 90 TABLET | Refills: 3 | Status: SHIPPED | OUTPATIENT
Start: 2023-10-16

## 2023-10-16 RX ORDER — KETOCONAZOLE 20 MG/ML
1 SHAMPOO TOPICAL
Qty: 120 ML | Refills: 1 | Status: SHIPPED | OUTPATIENT
Start: 2023-10-17

## 2023-10-16 RX ORDER — LISINOPRIL AND HYDROCHLOROTHIAZIDE 12.5; 1 MG/1; MG/1
1 TABLET ORAL DAILY
Qty: 90 TABLET | Refills: 3 | Status: SHIPPED | OUTPATIENT
Start: 2023-10-16

## 2023-10-16 NOTE — ASSESSMENT & PLAN NOTE
Lab Results   Component Value Date    HGBA1C 6.5 (H) 10/11/2023   Hemoglobin A1c of 6.5%. Continue on metformin 500 mg twice daily. Normal diabetic foot examination. She is doing very well.   I did advise her she can enjoy a a cookie or 2 when she wants to the.  Repeat diabetic parameters in 4 months

## 2023-10-16 NOTE — PROGRESS NOTES
Assessment and Plan:     Problem List Items Addressed This Visit        Endocrine    Type 2 diabetes mellitus without complication, without long-term current use of insulin ([address])       Lab Results   Component Value Date    HGBA1C 6.5 (H) 10/11/2023   Hemoglobin A1c of 6.5%. Continue on metformin 500 mg twice daily. Normal diabetic foot examination. She is doing very well. I did advise her she can enjoy a a cookie or 2 when she wants to the.  Repeat diabetic parameters in 4 months         Relevant Medications    metFORMIN (GLUCOPHAGE) 500 mg tablet    Other Relevant Orders    Comprehensive metabolic panel    Hemoglobin A1C    Lipid panel    Albumin / creatinine urine ratio       Cardiovascular and Mediastinum    Primary hypertension     Hypertension remains well controlled on lisinopril/hydrochlorothiazide. Continue same. Relevant Medications    lisinopril-hydrochlorothiazide (PRINZIDE,ZESTORETIC) 10-12.5 MG per tablet       Musculoskeletal and Integument    Dry scalp     Ketoconazole shampoo prescribed. Relevant Medications    ketoconazole (NIZORAL) 2 % shampoo (Start on 10/17/2023)       Other    Illness anxiety disorder    Relevant Orders    TSH, 3rd generation with Free T4 reflex    Major depressive disorder, recurrent, mild ([address]) - Primary     Managed by psychiatry         Mixed hyperlipidemia     Patient remains on atorvastatin 10 mg once daily. Cholesterol has increased slightly. She is [age]years old.   Repeat lipid panel in 4 months         Relevant Medications    atorvastatin (LIPITOR) 10 mg tablet   Other Visit Diagnoses     Benign essential hypertension        Relevant Medications    lisinopril-hydrochlorothiazide (PRINZIDE,ZESTORETIC) 10-12.5 MG per tablet    Other Relevant Orders    CBC and differential    TSH, 3rd generation with Free T4 reflex    Flu vaccine need        Relevant Orders    influenza vaccine, high-dose, PF 0.7 mL (FLUZONE HIGH-DOSE) (Completed)        BMI Counseling: Body mass index is 27.13 kg/m². The BMI is above normal. Nutrition recommendations include reducing intake of saturated and trans fat and reducing intake of cholesterol. Exercise recommendations include obtaining a gym membership. Rationale for BMI follow-up plan is due to patient being overweight or obese. Falls Plan of Care: referral to physical therapy. Preventive health issues were discussed with patient, and age appropriate screening tests were ordered as noted in patient's After Visit Summary. Personalized health advice and appropriate referrals for health education or preventive services given if needed, as noted in patient's After Visit Summary.      History of Present Illness:     Patient presents for Medicare Annual Wellness visit    Patient Care Team:  Rashaun Patton DO as PCP - General  Wolashley Stanton, DO Yousif Dillard MD [facility], DPM (Podiatry)  Yousif Fofana MD as Endoscopist  Roberto Carlos Powers [facility])     Problem List:     Patient Active Problem List   Diagnosis   • Adenomatous polyp of colon   • Primary hypertension   • Type 2 diabetes mellitus without complication, without long-term current use of insulin ([address])   • Mixed hyperlipidemia   • Insomnia   • Breast cancer screening   • Change in bowel habits   • Medicare annual wellness visit, subsequent   • Generalized osteoarthritis   • Chronic idiopathic constipation   • Illness anxiety disorder   • Major depressive disorder, recurrent, mild (HCC)   • Generalized anxiety disorder   • Mild neurocognitive disorder   • Uric acid kidney stone   • Dry scalp      Past Medical and Surgical History:     Past Medical History:   Diagnosis Date   • Colon polyps    • Diabetes mellitus ([address])    • Dizziness     Last assessed: 8/31/15   • DVT (deep venous thrombosis) ([address])    • Dysuria    • Hematuria 4/21/2022   • Hyperlipidemia    • Hypertension    • Hypertensive urgency 10/22/2021   • Insomnia    • Ureterolithiasis 2020     Past Surgical History:   Procedure Laterality Date   •  SECTION      1964 son, 26 daughter   • COLONOSCOPY     • FL RETROGRADE PYELOGRAM  2020   • OR COLONOSCOPY FLX DX W/COLLJ SPEC WHEN PFRMD N/A 3/27/2017    Procedure: COLONOSCOPY;  Surgeon: Shabana Freeman MD;  Location: [facility]; Service: Gastroenterology   • OR CYSTO/URETERO W/LITHOTRIPSY &INDWELL STENT INSRT Right 2020    Procedure: CYSTOSCOPY URETEROSCOPY WITH LITHOTRIPSY HOLMIUM LASER, RETROGRADE PYELOGRAM AND INSERTION STENT URETERAL; EXCISION OF BLADDER TUMOR;  Surgeon: Vamsi Poe MD;  Location: [facility];  Service: Urology   • ROTATOR CUFF REPAIR Left     Last assessed: 3/29/15   • TONSILLECTOMY        Family History:     Family History   Problem Relation Age of Onset   • Depression Mother         w/anxiety   • Diabetes Mother         Mellitus   • Breast cancer Mother    • Hypertension Mother    • No Known Problems Father    • Depression Sister         w/anxiety   • Heart disease Sister         Cardiac Disorder   • Breast cancer Sister    • Hypertension Sister    • Diabetes Brother         Mellitus   • Alcohol abuse Son       Social History:     Social History     Socioeconomic History   • Marital status:       Spouse name: None   • Number of children: None   • Years of education: None   • Highest education level: None   Occupational History   • None   Tobacco Use   • Smoking status: Never   • Smokeless tobacco: Never   Vaping Use   • Vaping Use: Never used   Substance and Sexual Activity   • Alcohol use: Never   • Drug use: No   • Sexual activity: None   Other Topics Concern   • None   Social History Narrative    Lack of exercise     Social Determinants of Health     Financial Resource Strain: Low Risk  (10/16/2023)    Overall Financial Resource Strain (CARDIA)    • Difficulty of Paying Living Expenses: Not very hard   Food Insecurity: Not on file   Transportation Needs: No Transportation Needs (10/16/2023) PRAPARE - Transportation    • Lack of Transportation (Medical): No    • Lack of Transportation (Non-Medical): No   Physical Activity: Not on file   Stress: Not on file   Social Connections: Not on file   Intimate Partner Violence: Not on file   Housing Stability: Not on file       Medications and Allergies:     Current Outpatient Medications   Medication Sig Dispense Refill   • atorvastatin (LIPITOR) 10 mg tablet Take 1 tablet (10 mg total) by mouth daily 90 tablet 3   • [START ON 10/17/2023] ketoconazole (NIZORAL) 2 % shampoo Apply 1 Application topically 4 (four) times a week 120 mL 1   • lisinopril-hydrochlorothiazide (PRINZIDE,ZESTORETIC) 10-12.5 MG per tablet Take 1 tablet by mouth daily 90 tablet 3   • metFORMIN (GLUCOPHAGE) 500 mg tablet Take 1 tablet (500 mg total) by mouth 2 (two) times a day with meals 180 tablet 3   • aspirin 81 MG tablet Take 81 mg by mouth daily     • clonazePAM (KlonoPIN) 0.5 mg tablet Take 1 tablet (0.5 mg total) by mouth 2 (two) times a day 60 tablet 1   • docusate sodium (COLACE) 100 mg capsule Take 100 mg by mouth 2 (two) times a day     • FLUoxetine (PROzac) 40 MG capsule Take 1 capsule (40 mg total) by mouth daily 30 capsule 1   • gabapentin (Neurontin) 100 mg capsule Take 1 capsule (100 mg total) by mouth 2 (two) times a day 60 capsule 1   • linaCLOtide 145 MCG CAPS Take 1 capsule (145 mcg total) by mouth in the morning 90 capsule 3   • risperiDONE (RisperDAL) 0.25 mg tablet Take 1 tablet (0.25 mg total) by mouth daily AND 2 tablets (0.5 mg total) daily at bedtime. 90 tablet 1   • tamsulosin (FLOMAX) 0.4 mg Take 1 capsule (0.4 mg total) by mouth daily with dinner 30 capsule 0     No current facility-administered medications for this visit.      No Known Allergies   Immunizations:     Immunization History   Administered Date(s) Administered   • COVID-19 PFIZER VACCINE 0.3 ML IM 02/14/2021, 03/05/2021, 01/23/2022   • Influenza Split High Dose Preservative Free IM 10/02/2013, 12/18/2014, 09/30/2015, 10/11/2016, 11/20/2017   • Influenza, high dose seasonal 0.7 mL 10/15/2018, 11/01/2019, 09/14/2020, 08/30/2022, 10/16/2023   • Pneumococcal Conjugate 13-Valent 10/11/2016   • Pneumococcal Polysaccharide PPV23 10/02/2013      Health Maintenance:         Topic Date Due   • Colorectal Cancer Screening  03/27/2020         Topic Date Due   • COVID-19 Vaccine (4 - Pfizer series) 03/20/2022      Medicare Health Risk Assessment:     /76   Pulse 74   Resp 16   Ht 4' 8" (1.422 m)   Wt 54.9 kg (121 lb)   SpO2 98%   BMI 27.13 kg/m²      Stephen Bronson is here for her Subsequent Wellness visit. Last Medicare Wellness visit information reviewed, patient interviewed, no change since last AWV. Health Risk Assessment:   Patient rates overall health as good. Patient feels that their physical health rating is same. Patient is satisfied with their life. Eyesight was rated as same. Hearing was rated as same. Patient feels that their emotional and mental health rating is same. Patients states they are never, rarely angry. Patient states they are sometimes unusually tired/fatigued. Pain experienced in the last 7 days has been none. Patient states that she has experienced no weight loss or gain in last 6 months. Fall Risk Screening: In the past year, patient has experienced: no history of falling in past year      Urinary Incontinence Screening:   Patient has not leaked urine accidently in the last six months. Home Safety:  Patient does not have trouble with stairs inside or outside of their home. Patient has working smoke alarms and has working carbon monoxide detector. Home safety hazards include: none. Nutrition:   Current diet is Regular. Medications:   Patient is currently taking over-the-counter supplements. OTC medications include: see medication list. Patient is able to manage medications.      Activities of Daily Living (ADLs)/Instrumental Activities of Daily Living (IADLs):   Walk and transfer into and out of bed and chair?: Yes  Dress and groom yourself?: Yes    Bathe or shower yourself?: Yes    Feed yourself? Yes  Do your laundry/housekeeping?: Yes  Manage your money, pay your bills and track your expenses?: Yes  Make your own meals?: Yes    Do your own shopping?: Yes    Previous Hospitalizations:   Any hospitalizations or ED visits within the last 12 months?: No      Advance Care Planning:   Living will: Yes    Durable POA for healthcare: Yes    Advanced directive: Yes    Advanced directive counseling given: No    Five wishes given: No    End of Life Decisions reviewed with patient: Yes    Provider agrees with end of life decisions: Yes      PREVENTIVE SCREENINGS      Cardiovascular Screening:    General: Screening Not Indicated and History Lipid Disorder      Diabetes Screening:     General: Screening Not Indicated and History Diabetes      Colorectal Cancer Screening:     General: Screening Current      Breast Cancer Screening:     General: Screening Current      Cervical Cancer Screening:    General: Screening Not Indicated      Osteoporosis Screening:    General: Screening Current      Abdominal Aortic Aneurysm (AAA) Screening:        General: Screening Not Indicated      Lung Cancer Screening:     General: Screening Not Indicated      Hepatitis C Screening:    General: Screening Not Indicated    Screening, Brief Intervention, and Referral to Treatment (SBIRT)    Screening  Typical number of drinks in a day: 0  Typical number of drinks in a week: 0  Interpretation: Low risk drinking behavior.     AUDIT-C Screenin) How often did you have a drink containing alcohol in the past year? never  2) How many drinks did you have on a typical day when you were drinking in the past year? 0  3) How often did you have 6 or more drinks on one occasion in the past year? never    AUDIT-C Score: 0  Interpretation: Score 0-2 (female): Negative screen for alcohol misuse    Single Item Drug Screening:  How often have you used an illegal drug (including marijuana) or a prescription medication for non-medical reasons in the past year? never    Single Item Drug Screen Score: 0  Interpretation: Negative screen for possible drug use disorder      Tiffanie Perales DO    78-year-old female with past medical history of hypertension, diabetes mellitus type 2, hyperlipidemia, major depressive disorder, generalized anxiety disorder presents with her daughter for subsequent annual wellness visit and follow-up of chronic conditions. Continue seeing new psychiatrist who prescribes medications as well as therapist.  Hemoglobin A1c of 6.5%. Patient does complain of scaling of her scalp. She was noted to have dry scalp. Did not like clobetasol shampoo because she had to wait 15 minutes to wash it out. Has been using Neutrogena shampoo        Review of Systems   Constitutional:  Negative for fatigue. HENT: Negative. Eyes: Negative. Respiratory: Negative. Cardiovascular: Negative. Gastrointestinal:  Negative for constipation. Endocrine: Negative. Genitourinary: Negative. Musculoskeletal:  Negative for gait problem. Skin: Negative. Dryness of her scalp   Allergic/Immunologic: Negative. Hematological: Negative. Psychiatric/Behavioral:  Positive for sleep disturbance (Hypersomnia). The patient is nervous/anxious. Physical Exam  Vitals and nursing note reviewed. Constitutional:       General: She is not in acute distress. Appearance: Normal appearance. She is well-developed. She is not diaphoretic. HENT:      Head: Normocephalic and atraumatic. Right Ear: External ear normal.      Left Ear: External ear normal.      Nose: Nose normal.   Eyes:      Conjunctiva/sclera: Conjunctivae normal.      Pupils: Pupils are equal, round, and reactive to light. Cardiovascular:      Rate and Rhythm: Normal rate and regular rhythm.       Pulses: no weak pulses          Dorsalis pedis pulses are 2+ on the right side and 2+ on the left side. Posterior tibial pulses are 2+ on the right side and 2+ on the left side. Heart sounds: Normal heart sounds. No murmur heard. No friction rub. No gallop. Pulmonary:      Effort: Pulmonary effort is normal. No respiratory distress. Breath sounds: Normal breath sounds. No wheezing or rales. Chest:      Chest wall: No tenderness. Abdominal:      General: Bowel sounds are normal.      Palpations: Abdomen is soft. Musculoskeletal:         General: No tenderness or deformity. Normal range of motion. Cervical back: Normal range of motion and neck supple. Feet:      Right foot:      Skin integrity: No ulcer, skin breakdown, erythema, warmth, callus or dry skin. Left foot:      Skin integrity: No ulcer, skin breakdown, erythema, warmth, callus or dry skin. Skin:     General: Skin is warm and dry. Capillary Refill: Capillary refill takes less than 2 seconds. Findings: No rash. Neurological:      Mental Status: She is alert and oriented to person, place, and time. Cranial Nerves: No cranial nerve deficit. Sensory: No sensory deficit. Motor: No abnormal muscle tone. Coordination: Coordination normal.      Deep Tendon Reflexes: Reflexes normal.   Psychiatric:         Mood and Affect: Mood normal.         Behavior: Behavior normal.         Thought Content:  Thought content normal.         Judgment: Judgment normal.       Recent Results (from the past 1512 hour(s))   CBC and differential    Collection Time: 10/11/23  8:00 AM   Result Value Ref Range    WBC 4.93 4.31 - 10.16 Thousand/uL    RBC 4.00 3.81 - 5.12 Million/uL    Hemoglobin 11.9 11.5 - 15.4 g/dL    Hematocrit 36.5 34.8 - 46.1 %    MCV 91 82 - 98 fL    MCH 29.8 26.8 - 34.3 pg    MCHC 32.6 31.4 - 37.4 g/dL    RDW 13.2 11.6 - 15.1 %    MPV 11.0 8.9 - 12.7 fL    Platelets 921 461 - 406 Thousands/uL    nRBC 0 /100 WBCs    Neutrophils Relative 59 43 - 75 %    Immat GRANS % 0 0 - 2 %    Lymphocytes Relative 29 14 - 44 %    Monocytes Relative 7 4 - 12 %    Eosinophils Relative 4 0 - 6 %    Basophils Relative 1 0 - 1 %    Neutrophils Absolute 2.84 1.85 - 7.62 Thousands/µL    Immature Grans Absolute 0.02 0.00 - 0.20 Thousand/uL    Lymphocytes Absolute 1.45 0.60 - 4.47 Thousands/µL    Monocytes Absolute 0.36 0.17 - 1.22 Thousand/µL    Eosinophils Absolute 0.21 0.00 - 0.61 Thousand/µL    Basophils Absolute 0.05 0.00 - 0.10 Thousands/µL   Comprehensive metabolic panel    Collection Time: 10/11/23  8:00 AM   Result Value Ref Range    Sodium 140 135 - 147 mmol/L    Potassium 3.9 3.5 - 5.3 mmol/L    Chloride 105 96 - 108 mmol/L    CO2 27 21 - 32 mmol/L    ANION GAP 8 mmol/L    BUN 25 5 - 25 mg/dL    Creatinine 0.83 0.60 - 1.30 mg/dL    Glucose, Fasting 96 65 - 99 mg/dL    Calcium 9.6 8.4 - 10.2 mg/dL    AST 14 13 - 39 U/L    ALT 11 7 - 52 U/L    Alkaline Phosphatase 48 34 - 104 U/L    Total Protein 6.6 6.4 - 8.4 g/dL    Albumin 4.0 3.5 - 5.0 g/dL    Total Bilirubin 0.50 0.20 - 1.00 mg/dL    eGFR 64 ml/min/1.73sq m   Hemoglobin A1C    Collection Time: 10/11/23  8:00 AM   Result Value Ref Range    Hemoglobin A1C 6.5 (H) Normal 4.0-5.6%; PreDiabetic 5.7-6.4%;  Diabetic >=6.5%; Glycemic control for adults with diabetes <7.0% %     mg/dl   Lipid panel    Collection Time: 10/11/23  8:00 AM   Result Value Ref Range    Cholesterol 144 See Comment mg/dL    Triglycerides 105 See Comment mg/dL    HDL, Direct 52 >=50 mg/dL    LDL Calculated 71 0 - 100 mg/dL    Non-HDL-Chol (CHOL-HDL) 92 mg/dl   Albumin / creatinine urine ratio    Collection Time: 10/11/23  8:00 AM   Result Value Ref Range    Creatinine, Ur 56.0 Reference range not established. mg/dL    Albumin,U,Random 8.3 <20.0 mg/L    Albumin Creat Ratio 15 0 - 30 mg/g creatinine   TSH, 3rd generation with Free T4 reflex    Collection Time: 10/11/23  8:00 AM   Result Value Ref Range    TSH 3RD GENERATON 2.332 0.450 - 4.500 uIU/mL      Patient's shoes and socks removed. Right Foot/Ankle   Right Foot Inspection  Skin Exam: skin normal and skin intact. No dry skin, no warmth, no callus, no erythema, no maceration, no abnormal color, no pre-ulcer, no ulcer and no callus. Toe Exam: ROM and strength within normal limits. Sensory   Vibration: intact  Proprioception: intact  Monofilament testing: intact    Vascular  Capillary refills: < 3 seconds  The right DP pulse is 2+. The right PT pulse is 2+. Left Foot/Ankle  Left Foot Inspection  Skin Exam: skin normal and skin intact. No dry skin, no warmth, no erythema, no maceration, normal color, no pre-ulcer, no ulcer and no callus. Toe Exam: ROM and strength within normal limits. Sensory   Vibration: intact  Proprioception: intact  Monofilament testing: intact    Vascular  Capillary refills: < 3 seconds  The left DP pulse is 2+. The left PT pulse is 2+.      Assign Risk Category  No deformity present  No loss of protective sensation  No weak pulses  Risk: 0

## 2023-10-16 NOTE — PATIENT INSTRUCTIONS
Medicare Preventive Visit Patient Instructions  Thank you for completing your Welcome to Medicare Visit or Medicare Annual Wellness Visit today. Your next wellness visit will be due in one year (10/16/2024). The screening/preventive services that you may require over the next 5-10 years are detailed below. Some tests may not apply to you based off risk factors and/or age. Screening tests ordered at today's visit but not completed yet may show as past due. Also, please note that scanned in results may not display below. Preventive Screenings:  Service Recommendations Previous Testing/Comments   Colorectal Cancer Screening  * Colonoscopy    * Fecal Occult Blood Test (FOBT)/Fecal Immunochemical Test (FIT)  * Fecal DNA/Cologuard Test  * Flexible Sigmoidoscopy Age: 43-73 years old   Colonoscopy: every 10 years (may be performed more frequently if at higher risk)  OR  FOBT/FIT: every 1 year  OR  Cologuard: every 3 years  OR  Sigmoidoscopy: every 5 years  Screening may be recommended earlier than age 39 if at higher risk for colorectal cancer. Also, an individualized decision between you and your healthcare provider will decide whether screening between the ages of 77-80 would be appropriate. Colonoscopy: 03/27/2017  FOBT/FIT: Not on file  Cologuard: Not on file  Sigmoidoscopy: Not on file    Screening Current     Breast Cancer Screening Age: 36 years old  Frequency: every 1-2 years  Not required if history of left and right mastectomy Mammogram: 04/06/2018    Screening Current   Cervical Cancer Screening Between the ages of 21-29, pap smear recommended once every 3 years. Between the ages of 32-69, can perform pap smear with HPV co-testing every 5 years.    Recommendations may differ for women with a history of total hysterectomy, cervical cancer, or abnormal pap smears in past. Pap Smear: Not on file    Screening Not Indicated   Hepatitis C Screening Once for adults born between 1945 and 1965  More frequently in patients at high risk for Hepatitis C Hep C Antibody: Not on file    Screening Not Indicated   Diabetes Screening 1-2 times per year if you're at risk for diabetes or have pre-diabetes Fasting glucose: 96 mg/dL (10/11/2023)  A1C: 6.5 % (10/11/2023)  Screening Not Indicated  History Diabetes   Cholesterol Screening Once every 5 years if you don't have a lipid disorder. May order more often based on risk factors. Lipid panel: 10/11/2023    Screening Not Indicated  History Lipid Disorder     Other Preventive Screenings Covered by Medicare:  Abdominal Aortic Aneurysm (AAA) Screening: covered once if your at risk. You're considered to be at risk if you have a family history of AAA. Lung Cancer Screening: covers low dose CT scan once per year if you meet all of the following conditions: (1) Age 48-67; (2) No signs or symptoms of lung cancer; (3) Current smoker or have quit smoking within the last 15 years; (4) You have a tobacco smoking history of at least 20 pack years (packs per day multiplied by number of years you smoked); (5) You get a written order from a healthcare provider. Glaucoma Screening: covered annually if you're considered high risk: (1) You have diabetes OR (2) Family history of glaucoma OR (3)  aged 48 and older OR (3)  American aged 72 and older  Osteoporosis Screening: covered every 2 years if you meet one of the following conditions: (1) You're estrogen deficient and at risk for osteoporosis based off medical history and other findings; (2) Have a vertebral abnormality; (3) On glucocorticoid therapy for more than 3 months; (4) Have primary hyperparathyroidism; (5) On osteoporosis medications and need to assess response to drug therapy. Last bone density test (DXA Scan): Not on file. HIV Screening: covered annually if you're between the age of 14-79. Also covered annually if you are younger than 13 and older than 72 with risk factors for HIV infection.  For pregnant patients, it is covered up to 3 times per pregnancy. Immunizations:  Immunization Recommendations   Influenza Vaccine Annual influenza vaccination during flu season is recommended for all persons aged >= 6 months who do not have contraindications   Pneumococcal Vaccine   * Pneumococcal conjugate vaccine = PCV13 (Prevnar 13), PCV15 (Vaxneuvance), PCV20 (Prevnar 20)  * Pneumococcal polysaccharide vaccine = PPSV23 (Pneumovax) Adults 11-73 yo with certain risk factors or if 69+ yo  If never received any pneumonia vaccine: recommend Prevnar 20 (PCV20)  Give PCV20 if previously received 1 dose of PCV13 or PPSV23   Hepatitis B Vaccine 3 dose series if at intermediate or high risk (ex: diabetes, end stage renal disease, liver disease)   Respiratory syncytial virus (RSV) Vaccine - COVERED BY MEDICARE PART D  * RSVPreF3 (Arexvy) CDC recommends that adults 61years of age and older may receive a single dose of RSV vaccine using shared clinical decision-making (SCDM)   Tetanus (Td) Vaccine - COST NOT COVERED BY MEDICARE PART B Following completion of primary series, a booster dose should be given every 10 years to maintain immunity against tetanus. Td may also be given as tetanus wound prophylaxis. Tdap Vaccine - COST NOT COVERED BY MEDICARE PART B Recommended at least once for all adults. For pregnant patients, recommended with each pregnancy. Shingles Vaccine (Shingrix) - COST NOT COVERED BY MEDICARE PART B  2 shot series recommended in those 19 years and older who have or will have weakened immune systems or those 50 years and older     Health Maintenance Due:      Topic Date Due   • Colorectal Cancer Screening  03/27/2020     Immunizations Due:      Topic Date Due   • COVID-19 Vaccine (4 - Pfizer series) 03/20/2022   • Influenza Vaccine (1) 09/01/2023     Advance Directives   What are advance directives? Advance directives are legal documents that state your wishes and plans for medical care.  These plans are made ahead of time in case you lose your ability to make decisions for yourself. Advance directives can apply to any medical decision, such as the treatments you want, and if you want to donate organs. What are the types of advance directives? There are many types of advance directives, and each state has rules about how to use them. You may choose a combination of any of the following:  Living will: This is a written record of the treatment you want. You can also choose which treatments you do not want, which to limit, and which to stop at a certain time. This includes surgery, medicine, IV fluid, and tube feedings. Durable power of  for healthcare [facility]): This is a written record that states who you want to make healthcare choices for you when you are unable to make them for yourself. This person, called a proxy, is usually a family member or a friend. You may choose more than 1 proxy. Do not resuscitate (DNR) order:  A DNR order is used in case your heart stops beating or you stop breathing. It is a request not to have certain forms of treatment, such as CPR. A DNR order may be included in other types of advance directives. Medical directive: This covers the care that you want if you are in a coma, near death, or unable to make decisions for yourself. You can list the treatments you want for each condition. Treatment may include pain medicine, surgery, blood transfusions, dialysis, IV or tube feedings, and a ventilator (breathing machine). Values history: This document has questions about your views, beliefs, and how you feel and think about life. This information can help others choose the care that you would choose. Why are advance directives important? An advance directive helps you control your care. Although spoken wishes may be used, it is better to have your wishes written down. Spoken wishes can be misunderstood, or not followed. Treatments may be given even if you do not want them.  An advance directive may make it easier for your family to make difficult choices about your care. Weight Management   Why it is important to manage your weight:  Being overweight increases your risk of health conditions such as heart disease, high blood pressure, type 2 diabetes, and certain types of cancer. It can also increase your risk for osteoarthritis, sleep apnea, and other respiratory problems. Aim for a slow, steady weight loss. Even a small amount of weight loss can lower your risk of health problems. How to lose weight safely:  A safe and healthy way to lose weight is to eat fewer calories and get regular exercise. You can lose up about 1 pound a week by decreasing the number of calories you eat by 500 calories each day. Healthy meal plan for weight management:  A healthy meal plan includes a variety of foods, contains fewer calories, and helps you stay healthy. A healthy meal plan includes the following:  Eat whole-grain foods more often. A healthy meal plan should contain fiber. Fiber is the part of grains, fruits, and vegetables that is not broken down by your body. Whole-grain foods are healthy and provide extra fiber in your diet. Some examples of whole-grain foods are whole-wheat breads and pastas, oatmeal, brown rice, and bulgur. Eat a variety of vegetables every day. Include dark, leafy greens such as spinach, kale, patrick greens, and mustard greens. Eat yellow and orange vegetables such as carrots, sweet potatoes, and winter squash. Eat a variety of fruits every day. Choose fresh or canned fruit (canned in its own juice or light syrup) instead of juice. Fruit juice has very little or no fiber. Eat low-fat dairy foods. Drink fat-free (skim) milk or 1% milk. Eat fat-free yogurt and low-fat cottage cheese. Try low-fat cheeses such as mozzarella and other reduced-fat cheeses. Choose meat and other protein foods that are low in fat.   Choose beans or other legumes such as split peas or lentils. Choose fish, skinless poultry (chicken or turkey), or lean cuts of red meat (beef or pork). Before you cook meat or poultry, cut off any visible fat. Use less fat and oil. Try baking foods instead of frying them. Add less fat, such as margarine, sour cream, regular salad dressing and mayonnaise to foods. Eat fewer high-fat foods. Some examples of high-fat foods include french fries, doughnuts, ice cream, and cakes. Eat fewer sweets. Limit foods and drinks that are high in sugar. This includes candy, cookies, regular soda, and sweetened drinks. Exercise:  Exercise at least 30 minutes per day on most days of the week. Some examples of exercise include walking, biking, dancing, and swimming. You can also fit in more physical activity by taking the stairs instead of the elevator or parking farther away from stores. Ask your healthcare provider about the best exercise plan for you. © Copyright [employer] 2018 Information is for End User's use only and may not be sold, redistributed or otherwise used for commercial purposes.  All illustrations and images included in CareNotes® are the copyrighted property of A.D.A.M., Inc. or [address] [place] [address]

## 2023-10-16 NOTE — ASSESSMENT & PLAN NOTE
Patient remains on atorvastatin 10 mg once daily. Cholesterol has increased slightly. She is [age]years old.   Repeat lipid panel in 4 months

## 2023-11-16 ENCOUNTER — HOSPITAL ENCOUNTER (EMERGENCY)
Facility: HOSPITAL | Age: 83
Discharge: HOME | End: 2023-11-16
Attending: EMERGENCY MEDICINE
Payer: MEDICARE

## 2023-11-16 VITALS
SYSTOLIC BLOOD PRESSURE: 193 MMHG | DIASTOLIC BLOOD PRESSURE: 74 MMHG | OXYGEN SATURATION: 98 % | RESPIRATION RATE: 17 BRPM | HEART RATE: 75 BPM | TEMPERATURE: 98.1 F

## 2023-11-16 DIAGNOSIS — R13.10 DYSPHAGIA: Primary | ICD-10-CM

## 2023-11-16 PROCEDURE — 99283 EMERGENCY DEPT VISIT LOW MDM: CPT

## 2023-11-16 PROCEDURE — 99283 EMERGENCY DEPT VISIT LOW MDM: CPT | Performed by: EMERGENCY MEDICINE

## 2023-11-16 RX ORDER — FAMOTIDINE 20 MG/1
20 TABLET, FILM COATED ORAL ONCE
Status: COMPLETED | OUTPATIENT
Start: 2023-11-16 | End: 2023-11-16

## 2023-11-16 RX ORDER — OMEPRAZOLE 20 MG/1
20 CAPSULE, DELAYED RELEASE ORAL DAILY
Qty: 30 CAPSULE | Refills: 0 | Status: SHIPPED | OUTPATIENT
Start: 2023-11-16

## 2023-11-16 RX ADMIN — FAMOTIDINE 20 MG: 20 TABLET, FILM COATED ORAL at 20:28

## 2023-11-17 NOTE — DISCHARGE INSTRUCTIONS
Please follow-up with gastroenterology and family doctor for reassessment and management of symptoms. Thank you for allowing us to take part in your care.

## 2023-11-17 NOTE — ED PROVIDER NOTES
History  Chief Complaint   Patient presents with    Medical Problem     Pt family member reports yesterday pt was eating and then felt like she was unable to swallow and feels like the food was not going down all the way. Pt denies trouble breathing. 17-year-old female brought in by daughter due to episodes of difficulty swallowing food at home. Patient states it happened the first time yesterday when she ate a piece of meat loaf felt like it got stuck in her throat. The sensation lasted for a minute or 2 and then resolved but patient seems to have gotten anxious per daughter and is now worried about eating. Since then patient has taken her medications, drink fluids and eating eggs without problems but when she tried to eat a piece of bread is seem to get stuck again. Patient denies any choking, shortness of breath, closing of airway, chest pain or any other symptoms at this time. Prior to Admission Medications   Prescriptions Last Dose Informant Patient Reported? Taking?    FLUoxetine (PROzac) 40 MG capsule   No No   Sig: Take 1 capsule (40 mg total) by mouth daily   aspirin 81 MG tablet  Self Yes No   Sig: Take 81 mg by mouth daily   atorvastatin (LIPITOR) 10 mg tablet   No No   Sig: Take 1 tablet (10 mg total) by mouth daily   clonazePAM (KlonoPIN) 0.5 mg tablet   No No   Sig: Take 1 tablet (0.5 mg total) by mouth 2 (two) times a day   docusate sodium (COLACE) 100 mg capsule  Self Yes No   Sig: Take 100 mg by mouth 2 (two) times a day   gabapentin (Neurontin) 100 mg capsule   No No   Sig: Take 1 capsule (100 mg total) by mouth 2 (two) times a day   ketoconazole (NIZORAL) 2 % shampoo   No No   Sig: Apply 1 Application topically 4 (four) times a week   linaCLOtide 145 MCG CAPS   No No   Sig: Take 1 capsule (145 mcg total) by mouth in the morning   lisinopril-hydrochlorothiazide (PRINZIDE,ZESTORETIC) 10-12.5 MG per tablet   No No   Sig: Take 1 tablet by mouth daily   metFORMIN (GLUCOPHAGE) 500 mg tablet   No No   Sig: Take 1 tablet (500 mg total) by mouth 2 (two) times a day with meals   risperiDONE (RisperDAL) 0.25 mg tablet   No No   Sig: Take 1 tablet (0.25 mg total) by mouth daily AND 2 tablets (0.5 mg total) daily at bedtime. tamsulosin (FLOMAX) 0.4 mg   No No   Sig: Take 1 capsule (0.4 mg total) by mouth daily with dinner      Facility-Administered Medications: None       Past Medical History:   Diagnosis Date    Colon polyps     Diabetes mellitus ([address])     Dizziness     Last assessed: 8/31/15    DVT (deep venous thrombosis) ([address])     Dysuria     Hematuria 2022    Hyperlipidemia     Hypertension     Hypertensive urgency 10/22/2021    Insomnia     Ureterolithiasis 2020       Past Surgical History:   Procedure Laterality Date     SECTION      1964 son, 26 daughter    COLONOSCOPY      FL RETROGRADE PYELOGRAM  2020    SC COLONOSCOPY FLX DX W/COLLJ SPEC WHEN PFRMD N/A 3/27/2017    Procedure: COLONOSCOPY;  Surgeon: Rosenda Berg MD;  Location: [facility]; Service: Gastroenterology    SC CYSTO/URETERO W/LITHOTRIPSY &INDWELL STENT INSRT Right 2020    Procedure: CYSTOSCOPY URETEROSCOPY WITH LITHOTRIPSY HOLMIUM LASER, RETROGRADE PYELOGRAM AND INSERTION STENT URETERAL; EXCISION OF BLADDER TUMOR;  Surgeon: Breanna Desir MD;  Location: [facility];  Service: Urology    ROTATOR CUFF REPAIR Left     Last assessed: 3/29/15    TONSILLECTOMY         Family History   Problem Relation Age of Onset    Depression Mother         w/anxiety    Diabetes Mother         Mellitus    Breast cancer Mother     Hypertension Mother     No Known Problems Father     Depression Sister         w/anxiety    Heart disease Sister         Cardiac Disorder    Breast cancer Sister     Hypertension Sister     Diabetes Brother         Mellitus    Alcohol abuse Son      I have reviewed and agree with the history as documented.     E-Cigarette/Vaping    E-Cigarette Use Never User      E-Cigarette/Vaping Substances Nicotine No     THC No     CBD No     Flavoring No     Other No     Unknown No      Social History     Tobacco Use    Smoking status: Never    Smokeless tobacco: Never   Vaping Use    Vaping Use: Never used   Substance Use Topics    Alcohol use: Never    Drug use: No        Review of Systems   Constitutional:  Negative for chills and fever. HENT:  Negative for ear pain and sore throat. Eyes:  Negative for pain and visual disturbance. Respiratory:  Negative for cough and shortness of breath. Cardiovascular:  Negative for chest pain and palpitations. Gastrointestinal:  Negative for abdominal pain, nausea and vomiting. Dysphagia   Genitourinary:  Negative for dysuria and hematuria. Musculoskeletal:  Negative for arthralgias and back pain. Skin:  Negative for color change and rash. Neurological:  Negative for seizures and syncope. All other systems reviewed and are negative. Physical Exam  ED Triage Vitals [11/16/23 1906]   Temperature Pulse Respirations Blood Pressure SpO2   98.1 °F (36.7 °C) 75 17 (!) 193/74 98 %      Temp Source Heart Rate Source Patient Position - Orthostatic VS BP Location FiO2 (%)   Oral Monitor Sitting Right arm --      Pain Score       --             Orthostatic Vital Signs  Vitals:    11/16/23 1906   BP: (!) 193/74   Pulse: 75   Patient Position - Orthostatic VS: Sitting       Physical Exam  Vitals and nursing note reviewed. Constitutional:       General: She is not in acute distress. Appearance: She is well-developed. HENT:      Head: Normocephalic and atraumatic. Nose: Nose normal. No congestion. Eyes:      Extraocular Movements: Extraocular movements intact. Conjunctiva/sclera: Conjunctivae normal.   Cardiovascular:      Rate and Rhythm: Normal rate and regular rhythm. Pulses: Normal pulses. Heart sounds: Normal heart sounds. No murmur heard. Pulmonary:      Effort: Pulmonary effort is normal. No respiratory distress. Breath sounds: Normal breath sounds. Chest:      Chest wall: No tenderness. Abdominal:      General: Abdomen is flat. Bowel sounds are normal.      Palpations: Abdomen is soft. Tenderness: There is no abdominal tenderness. There is no right CVA tenderness or left CVA tenderness. Musculoskeletal:         General: No deformity or signs of injury. Normal range of motion. Cervical back: Normal range of motion and neck supple. No rigidity or tenderness. Skin:     General: Skin is warm and dry. Findings: No bruising, lesion or rash. Neurological:      General: No focal deficit present. Mental Status: She is alert. Cranial Nerves: No cranial nerve deficit. Sensory: No sensory deficit. ED Medications  Medications   famotidine (PEPCID) tablet 20 mg (20 mg Oral Given 11/16/23 2028)       Diagnostic Studies  Results Reviewed       None                   No orders to display         Procedures  Procedures      ED Course  ED Course as of 11/16/23 2212   Thu Nov 16, 2023 2210 Patient presenting to the emergency department due to dysphagia. From discussion with family, patient is having no other symptoms and gets herself very worked up when things like this happen. Now she is scared of eating food because of the food that got stuck in her throat momentarily through the night prior. Patient has been able to tolerate fluid and soft foods. May be secondary due to history of acid reflux. Given Pepcid in the emergency department and patient was able to tolerate applesauce and pudding. Patient is agreeable and appropriate for discharge at this time, recommend follow-up with gastroenterology. Given prescription of omeprazole. Return precautions discussed. Medical Decision Making  Risk  Prescription drug management.           Disposition  Final diagnoses:   Dysphagia     Time reflects when diagnosis was documented in both MDM as applicable and the Disposition within this note       Time User Action Codes Description Comment    11/16/2023  8:25 PM Lorenzo Tilley Add [Q47.29] Dysphagia           ED Disposition       ED Disposition   Discharge    Condition   Stable    Date/Time   Thu Nov 16, 2023  8:25 PM    [address] discharge to home/self care.                    Follow-up Information       Follow up With Specialties Details Why Contact Info Additional DO [facility]   [address] [facility]) [place] [address]  [phone]       [address] [place] [address] Emergency Department Emergency Medicine   [address] [place] [address] Emergency Department, [facility] ([place] [facility], [address] [facility]) Gastroenterology   [address] [facility]  [address] [facility] [id number]  [address] [facility] [place]), [address] [facility], [address] [facility]), [facility], [place]            Discharge Medication List as of 11/16/2023  8:27 PM        START taking these medications    Details   omeprazole (PriLOSEC) 20 mg delayed release capsule Take 1 capsule (20 mg total) by mouth daily, Starting Thu 11/16/2023, Normal           CONTINUE these medications which have NOT CHANGED    Details   aspirin 81 MG tablet Take 81 mg by mouth daily, Historical Med      atorvastatin (LIPITOR) 10 mg tablet Take 1 tablet (10 mg total) by mouth daily, Starting Mon 10/16/2023, Normal      clonazePAM (KlonoPIN) 0.5 mg tablet Take 1 tablet (0.5 mg total) by mouth 2 (two) times a day, Starting Fri 10/13/2023, Until Tue 12/12/2023, Normal      docusate sodium (COLACE) 100 mg capsule Take 100 mg by mouth 2 (two) times a day, Historical Med      FLUoxetine (PROzac) 40 MG capsule Take 1 capsule (40 mg total) by mouth daily, Starting Fri 10/13/2023, Until Tue 12/12/2023, Normal gabapentin (Neurontin) 100 mg capsule Take 1 capsule (100 mg total) by mouth 2 (two) times a day, Starting Fri 10/13/2023, Until Tue 12/12/2023, Normal      ketoconazole (NIZORAL) 2 % shampoo Apply 1 Application topically 4 (four) times a week, Starting Tue 10/17/2023, Normal      linaCLOtide 145 MCG CAPS Take 1 capsule (145 mcg total) by mouth in the morning, Starting Thu 6/8/2023, Normal      lisinopril-hydrochlorothiazide (PRINZIDE,ZESTORETIC) 10-12.5 MG per tablet Take 1 tablet by mouth daily, Starting Mon 10/16/2023, Normal      metFORMIN (GLUCOPHAGE) 500 mg tablet Take 1 tablet (500 mg total) by mouth 2 (two) times a day with meals, Starting Mon 10/16/2023, Normal      risperiDONE (RisperDAL) 0.25 mg tablet Multiple Dosages:Starting Fri 10/13/2023, Until Tue 12/12/2023 at 2359Take 1 tablet (0.25 mg total) by mouth daily AND 2 tablets (0.5 mg total) daily at bedtime., Normal      tamsulosin (FLOMAX) 0.4 mg Take 1 capsule (0.4 mg total) by mouth daily with dinner, Starting Wed 6/14/2023, Normal               PDMP Review         Value Time User    PDMP Reviewed  Yes 10/13/2023  8:11 AM Roel Buckner DO             ED Provider  Attending physically available and evaluated Quintin Mujica. I managed the patient along with the ED Attending.     Electronically Signed by           Va Zhao MD  11/16/23 2494

## 2023-11-17 NOTE — ED ATTENDING ATTESTATION
11/16/2023  IHarriet MD, saw and evaluated the patient. I have discussed the patient with the resident/non-physician practitioner and agree with the resident's/non-physician practitioner's findings, Plan of Care, and MDM as documented in the resident's/non-physician practitioner's note, except where noted. All available labs and Radiology studies were reviewed. I was present for key portions of any procedure(s) performed by the resident/non-physician practitioner and I was immediately available to provide assistance. At this point I agree with the current assessment done in the Emergency Department. I have conducted an independent evaluation of this patient a history and physical is as follows:    61-year-old presenting with difficulty swallowing solids, able to swallow but states get stuck for a few minutes. No trouble swallowing liquids. No fevers. No weight loss. No night sweats. Has not had EGD in the past.    Discussed with patient and family at length importance of following with gastroenterology for likely EGD. Verbalized understanding and will follow-up.         ED Course         Critical Care Time  Procedures

## 2023-11-24 DIAGNOSIS — R23.8 DRY SCALP: Primary | ICD-10-CM

## 2023-11-24 RX ORDER — MOMETASONE FUROATE 1 MG/ML
SOLUTION TOPICAL DAILY
Qty: 60 ML | Refills: 0 | Status: SHIPPED | OUTPATIENT
Start: 2023-11-24

## 2023-12-11 ENCOUNTER — TELEMEDICINE (OUTPATIENT)
Dept: PSYCHIATRY | Facility: CLINIC | Age: 83
End: 2023-12-11
Payer: MEDICARE

## 2023-12-11 DIAGNOSIS — F45.1 SOMATIC SYMPTOM DISORDER: ICD-10-CM

## 2023-12-11 DIAGNOSIS — F33.1 MAJOR DEPRESSIVE DISORDER, RECURRENT, MODERATE (HCC): ICD-10-CM

## 2023-12-11 DIAGNOSIS — F41.1 GENERALIZED ANXIETY DISORDER: ICD-10-CM

## 2023-12-11 PROCEDURE — 99214 OFFICE O/P EST MOD 30 MIN: CPT

## 2023-12-11 RX ORDER — FLUOXETINE HYDROCHLORIDE 20 MG/1
20 CAPSULE ORAL DAILY
Qty: 30 CAPSULE | Refills: 1 | Status: SHIPPED | OUTPATIENT
Start: 2023-12-11 | End: 2024-02-09

## 2023-12-11 RX ORDER — FLUOXETINE HYDROCHLORIDE 40 MG/1
40 CAPSULE ORAL DAILY
Qty: 30 CAPSULE | Refills: 1 | Status: SHIPPED | OUTPATIENT
Start: 2023-12-11 | End: 2024-02-09

## 2023-12-11 RX ORDER — GABAPENTIN 100 MG/1
100 CAPSULE ORAL 2 TIMES DAILY
Qty: 60 CAPSULE | Refills: 1 | Status: SHIPPED | OUTPATIENT
Start: 2023-12-11 | End: 2024-02-09

## 2023-12-11 RX ORDER — CLONAZEPAM 0.5 MG/1
0.5 TABLET ORAL 2 TIMES DAILY
Qty: 60 TABLET | Refills: 1 | Status: SHIPPED | OUTPATIENT
Start: 2023-12-11 | End: 2024-02-09

## 2023-12-11 RX ORDER — RISPERIDONE 0.25 MG/1
TABLET ORAL
Qty: 90 TABLET | Refills: 1 | Status: SHIPPED | OUTPATIENT
Start: 2023-12-11 | End: 2024-02-09

## 2023-12-11 NOTE — PSYCH
Psychiatric Progress Note: Medication Management    [facility]    Name and Date of Birth:  Haim Duque 78 y.o. 1940 MRN: [id number]    Date of Visit: December 11, 2023    Reason for Visit: Follow-up visit regarding medication management     Virtual Visit Disclaimer & Required Documentation  TeleMed provider: Jackelin Manuel. and Dr. Mata Jimenez. My office door is closed. No one else is in the room. The patient is located in [place], where I hold an active license. Haim Duque understands that this is a telemedicine visit and that the visit is being conducted through D[employer]. Patient is aware that [address] could be limited without vital signs or the ability to perform a full hands-on physical exam. Patient is aware this is a billable service. The patient agrees to participate and understands they can discontinue the visit at any time. ASSESSMENT & PLAN     Haim Duque is a 78 y.o. female, ,  and presently living daughter, son-in-law, 3 grandkids; retired teacher; with prior psychiatric diagnoses of MDD, JONAH; no past suicide attempts (0); 1 past psychiatric hospitalizations (for anxiety); with suicide risk factors including medical problems, anxiety and age (52+); and medical history including DM2, HTN, DVT hx, OA, HLD, TIA, status post rotator cuff surgery was personally seen and evaluated today at the [address] [place] [address] outpatient clinic for follow-up regarding medication management. Patient reports not doing well noting that she feels more depressed and has been in her head a lot. She notes that she continues to think a lot about possible brain cancer with her scalp being itchy or safety of her granddaughter as well as not calling her friend for fear of upsetting her.   She notes that she has not been very active with doing other things to distract herself though uses her coloring book occasionally. She was counseled on being more active physically but as well as with others and finding ways to distract herself that are not individual activities. Given her ongoing depressive and anxiety symptoms as well as obsessive thoughts we will optimize Prozac to see if this increased dose helps with her ongoing symptoms. Suicide/Homicide Risk Assessment:  Risk of Harm to Self:  The following ratings are based on assessment at the time of the interview and review of records  Demographic risk factors include: ,  status, elderly (76 or older)   Historical Risk Factors include: chronic psychiatric problems, history of depression, history of anxiety  Recent Specific Risk Factors include: diagnosis of depression, current depressive symptoms, current anxiety symptoms  Protective Factors: no current suicidal ideation, being a parent, compliant with medications, compliant with mental health treatment, having a desire to be alive, having a desire to live, having a sense of purpose or meaning in life, having pets, personal beliefs, personal beliefs about the meaning and value of life, Syn[employer] beliefs discouraging suicide, resiliency, restricted access to lethal means, stable living environment, supportive family  Weapons: none. The following steps have been taken to ensure weapons are properly secured: not applicable  Based on today's assessment, Floyd Ellington presents the following risk of harm to self: minimal    Risk of Harm to Others: The following ratings are based on assessment at the time of the interview and review of records  Demographic Risk Factors include: none. Historical Risk Factors include: none. Recent Specific Risk Factors include: multiple stressors, social difficulties.   Protective Factors: no current homicidal ideation, being a parent, compliant with medications, compliant with mental health treatment, no substance use problems, personal beliefs, Synagogue beliefs, resilience, restricted access to lethal means, safe and stable living environment, supportive family  Weapons: none. The following steps have been taken to ensure weapons are properly secured: not applicable  Based on today's assessment, David Andrade presents the following risk of harm to others: minimal    The following interventions are recommended: no intervention changes needed. Although patient's acute lethality risk is low, long-term/chronic lethality risk is mildly elevated in the presence of anxiety and depressive symptoms. At the current moment, David Andrade is future-oriented, forward-thinking, and demonstrates ability to act in a self-preserving manner as evidenced by volitionally presenting to the clinic today, seeking treatment. Additionally, David Andrade sits throughout the assessment wearing personal protective gear (i.e. medical mask) in the context of the ongoing COVID-19 pandemic, suggesting a will and desire to live. At this juncture, inpatient hospitalization is not currently warranted. To mitigate future risk, patient should adhere to the recommendations of this writer, avoid alcohol/illicit substance use, utilize community-based resources and familiar support and prioritize mental health treatment. Based on today's assessment and clinical criteria, Loren Bower contracts for safety and is not an imminent risk of harm to self or others. Outpatient level of care is deemed appropriate at this present time. David Andrade understands that if they are no longer able to contract for safety, they need to call/contact the outpatient office including this writer, call/contact crisis and/orattend to the nearest Emergency Department for immediate evaluation.     DSM-5 Diagnoses:   Anxiety  MDD  Obsessive thoughts r/o OCD    Treatment Recommendations/Precautions:  Notable Changes  Increase Prozac 60mg for anxiety and depression symptoms & possible OCD  Smile when you wake up  Increase your activity (go for a walk, hangout with others, etc.)  Look into therapy group through [facility]  Other Treatment recommendations  Continue Risperdal 0.25 QD and 0.50mg HS for obsessive thoughts  Continue Gabapentin 100mg BID for anxiety (used off-label but evidence-based)  Continue Klonopin 0.5mg BID for anxiety  Medication management every 1-3 months  Aware of 24 hour and weekend coverage for urgent situations accessed by calling [facility] main practice number    Medications Risks/Benefits    Risks, benefits, and possible side effects of medications explained to Taye Davila and she verbalizes understanding and agreement for treatment. including:   Angela El was completed for second generation antipsychotic medication including sedation, GI distress, dizziness, risk of metabolic syndrome, EPS (akathisia, TD, etc), rare NMS, orthostatic hypotension, cardiovascular risks such as QT prolongation, increased prolactin, and others. PARQ was completed for the class of SSRIs including transient anxiety, insomnia or sedation, headaches, constipation or diarrhea; and longer-standing sexual side effects, bleeding risk (especially with NSAIDs), and weight gain; as well as suicidal thoughts in patients under 22years old, serotonin syndrome, and induction of eligio. Potential for discontinuation syndrome (flu-like symptoms, insomnia, nausea, sensory disturbances, and headache) was also discussed. PARQ was completed for gabapentin including sedation, dizziness, tremor, GI upset, potential for weight gain, and rare suicidal thinking. .parq    Controlled Medication Discussion:   Merryduarte Lola has been filling controlled prescriptions on time as prescribed [facility]    Psychotherapy Provided:   Individual psychotherapy provided: Yes  Counseling was provided during the session today for 20 minutes. Treatment Plan:  Completed and signed during the session: Not applicable - Treatment Plan not due at this session.  Next due treatment plan date is: January 15, 2024     SUBJECTIVE     Chief Complaint: "Not doing well"     Eleanora Brunner 78 y.o. female, , 43 [facility] and presently living daughter, son-in-law, 3 grandkids; retired teacher; with prior psychiatric diagnoses of MDD, JONAH; no past suicide attempts (0); 1 past psychiatric hospitalizations (for anxiety); with suicide risk factors including medical problems, anxiety and age (52+); and medical history including DM2, HTN, DVT hx, OA, HLD, TIA, status post rotator cuff surgery who was personally seen and evaluated at the [address] [place] [address] outpatient clinic for follow-up regarding medication management. At the time of last visit, pt had reported doing better with the slight increase in the Risperdal.  Her depressive symptoms including her appetite and concentration and energy seems to be slowly improving. She was concerned about Friday the 13th at the time because she was doing better and being more tolerable with stressors including her daughter's father-in-law no medication adjustments were made at the time. Anai Vega states that since their previous outpatient psychiatric appointment with this writer, states that she is not doing well. Daughter, Roxana Abraham, is also present who notes doing good. Patient notes that there is still concerns with the scalp. She has tried multiple creams with her scalp, but without success. She has worries about brain cancer or wanting to get a scan for possible cancer. She was reminded she had a scan in late 2021 without any concerns for cancer, and that her symptoms do not correlate with brain cancer. Additionally, the workup when evaluated in the emergency room and her doctor appointments did not have suspicions which is why they decided against doing the scans. She was reminded that the scans would be performed if there were concerns.   She also notes having worries about her granddaughter and if she's safe, despite nothing ever happening to her previously. She was reminded to think logically and worked on thinking logically set up negatively. She notes feeling depressed and being in her head a lot. Depression she rates 9/10 but states there are no ever thoughts of passive or active SI. She notes sometimes getting up for the bathroom and having some difficulties falling back asleep. Eating has been without issues. Energy seems somewhat lower from past visit, and is low as previous. She notes takes a lot of energy to push through. She notes some difficulties with concentration such as reading books. She does not believe there were any true memory difficulties but gets distracted easily. Sometimes she takes out a coloring book, but does not have that many interests that she wants to pursue. She does continue to have anxiety. She still talks with her good friend, but is afraid of "upsetting' her with certain topics. As a result, she has not been very consistent with talking with her. Regarding medications, she has been consistent with taking them. She notes no side effects to her medications. She is agreeable to trying an increased Prozac dose to address some of these obsessive thoughts, and ongoing depressive and anxiety symptoms. Presently, patient adamantly denies suicidal/homicidal ideation in addition to thoughts of self-injury, citing family & [place] as deterrents against self-harm; contracts for safety, see above for risk assessment. At conclusion of evaluation, patient is amenable to the recommendations of this writer including: medications as prescribed, attending routine appointments. Also, patient is amenable to calling/contacting the outpatient office including this writer if any acute adverse effects of their medication regimen arise in addition to any comments or concerns pertaining to their psychiatric management.   Patient is amenable to calling/contacting crisis and/or attending to the nearest emergency department if their clinical condition deteriorates to assure their safety and stability, stating that they are able to appropriately confide in their Family (daughter) & good friend regarding their psychiatric state. Current Rating Scores:   None completed today. PSYCHIATRIC REVIEW OF SYSTEMS     Unchanged information from this writer's previous assessment is copied; information that has changed is bolded. Sleep change: no  Interest change: no  Interests include: read (books), knitting  Guilt/Hopelessness/helplessness/worthlessness: yes, guilt (giving daughter stress)  Energy change: yes, decreased  Concentration/Attention change: yes, decreased  Appetite change: yes, decreased   Weight changes & timeframe: no  Psychomotor agitation/retardation: no  Somatic symptoms: yes  Suicidal ideation: no  Homicidal ideation: no  Zaira/hypomania: no     Anxiety/panic attack: yes  JONAH symptoms: difficulty concentrating, fatigue, insomnia, irritable and restlessness/keyed up  Panic Disorder symptoms: no symptoms suggestive of panic disorder  Obsessive/compulsive symptoms: obsessive thoughts without compulsions  Eating Disorder symptoms: no historical or current eating disorder. no binge eating disorder; no anorexia nervosa. no symptoms of bulimia; does not stopped eating when trying to lose weight when younger     Auditory hallucinations: no  Visual hallucinations: no  Other perceptual disturbances: no  Delusional thinking: no    REVIEW OF SYSTEMS     Constitutional negative   ENT negative   Cardiovascular negative   Respiratory negative   Gastrointestinal negative   Genitourinary negative   Musculoskeletal negative   Integumentary negative   Neurological negative   Endocrine negative   Other Symptoms none, all other systems are negative     HISTORICAL INFORMATION     History Review:  The following portions of the patient's history were reviewed and updated as appropriate: allergies, current medications, past family history, past medical history, past social history, past surgical history, and problem list.    Unchanged information from this writer's previous assessment is copied; information that has changed is bolded. Family History   Problem Relation Age of Onset    Depression Mother         w/anxiety    Diabetes Mother         Mellitus    Breast cancer Mother     Hypertension Mother     No Known Problems Father     Depression Sister         w/anxiety    Heart disease Sister         Cardiac Disorder    Breast cancer Sister     Hypertension Sister     Diabetes Brother         Mellitus    Alcohol abuse Son      Additional fam hx:   Family hx of psychiatric diagnosis: yes, 2 sisters (depression & anxiety), Mom (Depression & anxiety)  Family hx of suicide: no  Family Hx of drug abuse: Son with alcohol abuse  Family Hx of medical diagnosis: yes, as listed above Mom (DM, breast cancer, HTN), sister (heart issues, breast cancer, HTN), brother (DM)     Past Psychiatric History:   Previous diagnosis: MDD, Anxiety  Previous inpatient psychiatric admissions: 1 prior admission for 3 weeks at [facility] in 2013 (panic attack after surgery). Present/previous outpatient psychiatrist: saw psychiatrists in [facility] growing up; Dr. Lizzette Jason & Dr. Chacorta Squires prior to transfer. Present/previous therapy/psychotherapy: Dr. Army Madison for past 3 years. History of suicidal attempts/gestures: Denies.   Self-injurious behavior/high-risk behavior: no.  History of violence/aggressive behaviors: No.  Other Services: patient denies     Psychiatric medication trial:   Antidepressants  Effexor, Zoloft, Prozac, Lexapro  Antipsychotics  Abilify (Dizzy), Risperdal  Mood stabilizers  N/A  Sedative hypnotics  Klonopin  Others  Buspar, Neurontin     Substance Abuse History:  Nicotine use (cigarettes & vape): Denies  Caffeine use: 1 cup day Decafe  Alcohol use: Denies  Marijuana use: Denies  Other substances: Denies     Longest clean time: N/A  Previous inpatient/outpatient substance abuse rehabilitation: No.     Patient denies previous legal actions or arrests related to substance intoxication including prior DWIs/DUIs. Barry Avalos does not apear under the influence or withdrawal of any psychoactive substance throughout today's examination. I have assessed this patient for substance use within the past 12 months. Social History:  Born/Raise: [address] [facility] - moved to [place]  when begun living with family  Early life/developmental: Denies a history of milestone/developmental delay. Denies a history of in-utero exposure to toxins/illicit substances. Family: 2 brother(s) & 2 sister(s), raised by parents  Education: Bachelor of Arts - Teaching  Learning Disabilities: There is no documented history of IEP or need for special education. Occupational History: retired teacher  Congregational Affiliation: [address]  Marital history:  since  -   from cancer  Children: yes, 2 (1 daughter & 1 son), 3 grandkids  Living arrangement: daughter, father-in-law, son-in-law, 3 grandkids in [facility], 2 dogs, guinea pig  Support system: good support system - daughter, therapist   Hx: no  Legal Hx: no  Access to firearms: patient denies. [facility] has no history of arrests or violence pertaining to use of a deadly weapon. Traumatic History:   Abuse: none is reported  Other Traumatic Events: Denies  Flashbacks/Nightmares: no     Past Medical History:  Hx of seizures: no  Hx of concussions & ongoing symptoms: no    OBJECTIVE     Vital signs in last 24 hours: There were no vitals filed for this visit.     Laboratory Results: I have personally reviewed all pertinent laboratory/tests results  Recent Labs (last 4 months):   Appointment on 10/11/2023   Component Date Value    WBC 10/11/2023 4.93     RBC 10/11/2023 4.00     Hemoglobin 10/11/2023 11.9     Hematocrit 10/11/2023 36.5     MCV 10/11/2023 91     MCH 10/11/2023 29.8     MCHC 10/11/2023 32.6 RDW 10/11/2023 13.2     MPV 10/11/2023 11.0     Platelets 59/14/3332 188     nRBC 10/11/2023 0     Neutrophils Relative 10/11/2023 59     Immat GRANS % 10/11/2023 0     Lymphocytes Relative 10/11/2023 29     Monocytes Relative 10/11/2023 7     Eosinophils Relative 10/11/2023 4     Basophils Relative 10/11/2023 1     Neutrophils Absolute 10/11/2023 2.84     Immature Grans Absolute 10/11/2023 0.02     Lymphocytes Absolute 10/11/2023 1.45     Monocytes Absolute 10/11/2023 0.36     Eosinophils Absolute 10/11/2023 0.21     Basophils Absolute 10/11/2023 0.05     Sodium 10/11/2023 140     Potassium 10/11/2023 3.9     Chloride 10/11/2023 105     CO2 10/11/2023 27     ANION GAP 10/11/2023 8     BUN 10/11/2023 25     Creatinine 10/11/2023 0.83     Glucose, Fasting 10/11/2023 96     Calcium 10/11/2023 9.6     AST 10/11/2023 14     ALT 10/11/2023 11     Alkaline Phosphatase 10/11/2023 48     Total Protein 10/11/2023 6.6     Albumin 10/11/2023 4.0     Total Bilirubin 10/11/2023 0.50     eGFR 10/11/2023 64     Hemoglobin A1C 10/11/2023 6.5 (H)     EAG 10/11/2023 140     Cholesterol 10/11/2023 144     Triglycerides 10/11/2023 105     HDL, Direct 10/11/2023 52     LDL Calculated 10/11/2023 71     Non-HDL-Chol (CHOL-HDL) 10/11/2023 92     Creatinine, Ur 10/11/2023 56.0     Albumin,U,Random 10/11/2023 8.3     Albumin Creat Ratio 10/11/2023 15     TSH 3RD GENERATON 10/11/2023 2.332        Mental Status Evaluation:    Appearance:  age appropriate, casually dressed, glasses   Behavior:  cooperative, calm   Motor: no abnormal movements   Speech:  coherent, decreased volume   Mood:  "More down"   Affect:  constricted   Thought Process:  negative thinking   Thought Content: {no overt delusions, ruminating thoughts   Perceptual disturbances: no auditory hallucinations, no visual hallucinations   Risk Potential: Suicidal ideation - None at present  Homicidal ideation - None at present  Potential for aggression - Not at present   Cognition: oriented to self and situation, appears to be of average intelligence, and cognition not formally tested   Insight:  fair   Judgment: fair     Note Share: This note was shared with patient. Visit Time  Start: 8:50. Stop: 10:00.   I spent 70 minutes directly with the patient during this visit    Haim Villalobos DO 12/11/23

## 2023-12-11 NOTE — PATIENT INSTRUCTIONS
Notable Changes  Increase Prozac 60mg for anxiety and depression symptoms & possible OCD  Smile when you wake up  Increase your activity (go for a walk, hangout with others, etc.)  Look into therapy group through [facility]'s  Other Treatment recommendations  Continue Risperdal 0.25 QD and 0.50mg HS for obsessive thoughts  Continue Gabapentin 100mg BID for anxiety (used off-label but evidence-based)  Continue Klonopin 0.5mg BID for anxiety    Please present for your previously scheduled appointment approximately 15 minutes prior to appointment time. If you are running late or are unable to attend your appointment, please call our [facility] office at [phone] or our [facility] office at [phone] if applicable to notify the office of your absence. If you are in psychological crisis including not limited to suicidal/homicidal thoughts or thoughts of self-injury, do not hesitate to call/contact your [facility] hotline (see below) or attend to the nearest emergency department.   [facility] Crisis: [address] Crisis: [phone]  [address] [employer] Crisis: [address] Crisis: [address] Crisis: [address] Crisis: [address] Crisis: [phone]  National Suicide Prevention Hotline: 5-740.382.4570

## 2023-12-21 ENCOUNTER — TELEPHONE (OUTPATIENT)
Dept: PSYCHIATRY | Facility: CLINIC | Age: 83
End: 2023-12-21

## 2023-12-21 NOTE — TELEPHONE ENCOUNTER
CM received a message from Dr. Antony to assist in finding activities for Pt.     Writer outreached to Janay, at [phone]. Writer stated that resources would be emailed (Day Centers, Senior Centers, and Meet Up). Resources emailed to [email or web address]

## 2023-12-31 DIAGNOSIS — E11.9 TYPE 2 DIABETES MELLITUS WITHOUT COMPLICATION, WITHOUT LONG-TERM CURRENT USE OF INSULIN (HCC): ICD-10-CM

## 2024-01-07 DIAGNOSIS — E78.2 MIXED HYPERLIPIDEMIA: ICD-10-CM

## 2024-01-08 RX ORDER — ATORVASTATIN CALCIUM 10 MG/1
10 TABLET, FILM COATED ORAL DAILY
Qty: 90 TABLET | Refills: 0 | Status: SHIPPED | OUTPATIENT
Start: 2024-01-08

## 2024-01-30 ENCOUNTER — DOCUMENTATION (OUTPATIENT)
Dept: BEHAVIORAL/MENTAL HEALTH CLINIC | Facility: CLINIC | Age: 84
End: 2024-01-30

## 2024-01-30 DIAGNOSIS — F33.0 MAJOR DEPRESSIVE DISORDER, RECURRENT, MILD (HCC): Primary | ICD-10-CM

## 2024-01-30 DIAGNOSIS — F41.1 GENERALIZED ANXIETY DISORDER: ICD-10-CM

## 2024-01-30 NOTE — PROGRESS NOTES
Psychotherapy Discharge Summary    Preferred Name: Sandra Peña  YOB: 1940    Admission date to psychotherapy: 6/22/2023    Referred by: Dr. Momin    Presenting Problem: anxiety particularly surrounding her health; depression    Course of treatment included : group counseling    Progress/Outcome of Treatment Goals (brief summary of course of treatment) Sandra only attended one group session and did not return for any further group treatment    Treatment Complications (if any): lack of attendance    Treatment Progress: poor    Current [facility] Psychiatric Provider: Dr. Antony    Discharge Medications include: in chart    Discharge Date: 1/30/2024    Discharge Diagnosis:   1. Major depressive disorder, recurrent, mild (HCC)        2. Generalized anxiety disorder            Criteria for Discharge:  did not return for group treatment    Aftercare recommendations include (include specific referral names and phone numbers, if appropriate): return for therapy  (individual or group) as desired    Prognosis: fair

## 2024-01-31 ENCOUNTER — TELEPHONE (OUTPATIENT)
Dept: PSYCHIATRY | Facility: CLINIC | Age: 84
End: 2024-01-31

## 2024-02-01 ENCOUNTER — HOSPITAL ENCOUNTER (EMERGENCY)
Facility: HOSPITAL | Age: 84
Discharge: HOME | End: 2024-02-01
Attending: EMERGENCY MEDICINE
Payer: MEDICARE

## 2024-02-01 ENCOUNTER — APPOINTMENT (EMERGENCY)
Dept: CT IMAGING | Facility: HOSPITAL | Age: 84
End: 2024-02-01
Payer: MEDICARE

## 2024-02-01 VITALS
RESPIRATION RATE: 18 BRPM | TEMPERATURE: 97.9 F | OXYGEN SATURATION: 98 % | WEIGHT: 113.76 LBS | DIASTOLIC BLOOD PRESSURE: 70 MMHG | BODY MASS INDEX: 25.5 KG/M2 | HEART RATE: 90 BPM | SYSTOLIC BLOOD PRESSURE: 160 MMHG
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
## 2024-02-01 PROCEDURE — 70450 CT HEAD/BRAIN W/O DYE: CPT

## 2024-02-01 PROCEDURE — 90715 TDAP VACCINE 7 YRS/> IM: CPT | Performed by: EMERGENCY MEDICINE

## 2024-02-01 PROCEDURE — G0168 WOUND CLOSURE BY ADHESIVE: HCPCS | Performed by: EMERGENCY MEDICINE

## 2024-02-01 PROCEDURE — 90471 IMMUNIZATION ADMIN: CPT

## 2024-02-01 PROCEDURE — 99284 EMERGENCY DEPT VISIT MOD MDM: CPT | Performed by: EMERGENCY MEDICINE

## 2024-02-01 PROCEDURE — 99284 EMERGENCY DEPT VISIT MOD MDM: CPT

## 2024-02-01 PROCEDURE — 93005 ELECTROCARDIOGRAM TRACING: CPT

## 2024-02-01 PROCEDURE — G1004 CDSM NDSC: HCPCS

## 2024-02-01 RX ADMIN — TETANUS TOXOID, REDUCED DIPHTHERIA TOXOID AND ACELLULAR PERTUSSIS VACCINE, ADSORBED 0.5 ML: 5; 2.5; 8; 8; 2.5 SUSPENSION INTRAMUSCULAR at 19:01

## 2024-02-01 NOTE — TELEPHONE ENCOUNTER
DISCHARGE LETTER for ZACH Cruz, VIRIDIANAW (certified and regular) placed in outgoing mail on 02/01/24.    Article #:  [id number]    Address:  [address] [place] [address]  Michell LONGO [id number]

## 2024-02-01 NOTE — ED PROVIDER NOTES
"History  Chief Complaint   Patient presents with    Fall     Reports that she fell and struck left eyelid. Reports her foot got stuck when she got up to move      Ms. Peña is an 84yo M w/ PMH of diabetes, DVT, HLD, HTN, insomnia, kidney stones presents to the ED after a fall.  Patient has multiple family members with her, history obtained in conjunction with the [facility] present the patient.  Daughter reports that she witnessed the fall, her grandmother stood up from her chair, seemed to wobble for a second and then fell hitting the left side of her face on the hardwood floor.  Patient reports that she is unsure why she fell, but she was conscious the whole time, remembers the fall, is not currently having a headache, vision changes, N/V, or pain in any of her joints, or her neck.  Another family member reports that the patient's Prozac was recently increased, they have had multiple conversations with both her psychiatrist and her primary care about their concerns that with this recent increase the patient seems more \"woozy\".   Patient typically walks unaided.  Patient is on 81 mg of aspirin daily.        Prior to Admission Medications   Prescriptions Last Dose Informant Patient Reported? Taking?   FLUoxetine (PROzac) 20 mg capsule   No No   Sig: Take 1 capsule (20 mg total) by mouth daily Combine with your 40mg for a total of 60 daily   FLUoxetine (PROzac) 40 MG capsule   No No   Sig: Take 1 capsule (40 mg total) by mouth daily Combine with your 20mg for a total of 60 daily   aspirin 81 MG tablet  Self Yes No   Sig: Take 81 mg by mouth daily   atorvastatin (LIPITOR) 10 mg tablet   No No   Sig: Take 1 tablet (10 mg total) by mouth daily   clonazePAM (KlonoPIN) 0.5 mg tablet   No No   Sig: Take 1 tablet (0.5 mg total) by mouth 2 (two) times a day   docusate sodium (COLACE) 100 mg capsule  Self Yes No   Sig: Take 100 mg by mouth 2 (two) times a day   gabapentin (Neurontin) 100 mg capsule   No No   Sig: Take 1 capsule " (100 mg total) by mouth 2 (two) times a day   ketoconazole (NIZORAL) 2 % shampoo   No No   Sig: Apply 1 Application topically 4 (four) times a week   linaCLOtide 145 MCG CAPS   No No   Sig: Take 1 capsule (145 mcg total) by mouth in the morning   lisinopril-hydrochlorothiazide (PRINZIDE,ZESTORETIC) 10-12.5 MG per tablet   No No   Sig: Take 1 tablet by mouth daily   metFORMIN (GLUCOPHAGE) 500 mg tablet   No No   Sig: Take 1 tablet (500 mg total) by mouth 2 (two) times a day with meals   mometasone (ELOCON) 0.1 % lotion   No No   Sig: Apply topically daily   omeprazole (PriLOSEC) 20 mg delayed release capsule   No No   Sig: Take 1 capsule (20 mg total) by mouth daily   risperiDONE (RisperDAL) 0.25 mg tablet   No No   Sig: Take 1 tablet (0.25 mg total) by mouth daily AND 2 tablets (0.5 mg total) daily at bedtime.   tamsulosin (FLOMAX) 0.4 mg   No No   Sig: Take 1 capsule (0.4 mg total) by mouth daily with dinner      Facility-Administered Medications: None       Past Medical History:   Diagnosis Date    Colon polyps     Diabetes mellitus (HCC)     Dizziness     Last assessed: 8/31/15    DVT (deep venous thrombosis) (HCC)     Dysuria     Hematuria 2022    Hyperlipidemia     Hypertension     Hypertensive urgency 10/22/2021    Insomnia     Ureterolithiasis 2020       Past Surgical History:   Procedure Laterality Date     SECTION      1964 son, 1968 daughter    COLONOSCOPY      FL RETROGRADE PYELOGRAM  2020    MT COLONOSCOPY FLX DX W/COLLJ SPEC WHEN PFRMD N/A 3/27/2017    Procedure: COLONOSCOPY;  Surgeon: Gold Francis MD;  Location: [facility];  Service: Gastroenterology    MT CYSTO/URETERO W/LITHOTRIPSY &INDWELL STENT INSRT Right 2020    Procedure: CYSTOSCOPY URETEROSCOPY WITH LITHOTRIPSY HOLMIUM LASER, RETROGRADE PYELOGRAM AND INSERTION STENT URETERAL; EXCISION OF BLADDER TUMOR;  Surgeon: Edwin Love MD;  Location: [facility];  Service: Urology    ROTATOR CUFF REPAIR Left     Last  assessed: 3/29/15    TONSILLECTOMY         Family History   Problem Relation Age of Onset    Depression Mother         w/anxiety    Diabetes Mother         Mellitus    Breast cancer Mother     Hypertension Mother     No Known Problems Father     Depression Sister         w/anxiety    Heart disease Sister         Cardiac Disorder    Breast cancer Sister     Hypertension Sister     Diabetes Brother         Mellitus    Alcohol abuse Son      I have reviewed and agree with the history as documented.    E-Cigarette/Vaping    E-Cigarette Use Never User      E-Cigarette/Vaping Substances    Nicotine No     THC No     CBD No     Flavoring No     Other No     Unknown No      Social History     Tobacco Use    Smoking status: Never    Smokeless tobacco: Never   Vaping Use    Vaping status: Never Used   Substance Use Topics    Alcohol use: Never    Drug use: No        Review of Systems   Constitutional:  Negative for activity change, chills and fever.   Eyes:  Negative for pain and visual disturbance.   Respiratory:  Negative for chest tightness and shortness of breath.    Cardiovascular:  Negative for chest pain.   Gastrointestinal:  Negative for abdominal pain, constipation, diarrhea, nausea and vomiting.   Genitourinary:  Negative for dysuria and hematuria.   Skin:  Negative for rash and wound.   Neurological:  Positive for dizziness and light-headedness. Negative for syncope and headaches.   Psychiatric/Behavioral: Negative.         Physical Exam  ED Triage Vitals   Temperature Pulse Respirations Blood Pressure SpO2   02/01/24 1631 02/01/24 1631 02/01/24 1631 02/01/24 1631 02/01/24 1631   97.9 °F (36.6 °C) 83 17 148/65 98 %      Temp Source Heart Rate Source Patient Position - Orthostatic VS BP Location FiO2 (%)   02/01/24 1631 02/01/24 1635 02/01/24 1631 02/01/24 1631 --   Oral Monitor Lying Right arm       Pain Score       02/01/24 1635       5             Orthostatic Vital Signs  Vitals:    02/01/24 1716 02/01/24 1730  "02/01/24 1745 02/01/24 1800   BP: 143/66 142/58 167/74 160/70   Pulse: 84 82 95 90   Patient Position - Orthostatic VS:           Physical Exam  Vitals and nursing note reviewed.   Constitutional:       Appearance: Normal appearance.   HENT:      Head: Normocephalic and atraumatic.      Right Ear: External ear normal.      Left Ear: External ear normal.      Nose: Nose normal.      Mouth/Throat:      Mouth: Mucous membranes are moist.      Pharynx: Oropharynx is clear.   Eyes:      Extraocular Movements: Extraocular movements intact.      Conjunctiva/sclera: Conjunctivae normal.      Pupils: Pupils are equal, round, and reactive to light.   Cardiovascular:      Rate and Rhythm: Normal rate and regular rhythm.      Heart sounds: Normal heart sounds.   Pulmonary:      Effort: Pulmonary effort is normal.      Breath sounds: Normal breath sounds.   Abdominal:      General: Abdomen is flat.      Palpations: Abdomen is soft.   Musculoskeletal:         General: Normal range of motion.      Cervical back: Normal range of motion and neck supple. No rigidity or tenderness.   Skin:     General: Skin is warm and dry.   Neurological:      General: No focal deficit present.      Mental Status: She is alert and oriented to person, place, and time.   Psychiatric:         Mood and Affect: Mood normal.         Behavior: Behavior normal.         ED Medications  Medications   tetanus-diphtheria-acellular pertussis (BOOSTRIX) IM injection 0.5 mL (0.5 mL Intramuscular Given 2/1/24 1901)       Diagnostic Studies  Results Reviewed       None                   CT head without contrast   Final Result by Jason Marie DO (02/01 1849)      No acute intracranial abnormality.  Stable chronic microangiopathic changes within the brain.      This examination was marked \"immediate notification\" in Epic in order to begin the standard process by which the radiology reading room liaison alerts the referring practitioner.                " "  Workstation performed: [id number]               Procedures  Procedures      ED Course  ED Course as of 02/01/24 1924   Thu Feb 01, 2024   1915 CT head without contrast  No acute intracranial abnormality.  Stable chronic microangiopathic changes within the brain.                             SBIRT 20yo+      Flowsheet Row Most Recent Value   Initial Alcohol Screen: US AUDIT-C     1. How often do you have a drink containing alcohol? 0 Filed at: 02/01/2024 1634   2. How many drinks containing alcohol do you have on a typical day you are drinking?  0 Filed at: 02/01/2024 1634   3b. FEMALE Any Age, or MALE 65+: How often do you have 4 or more drinks on one occassion? 0 Filed at: 02/01/2024 1634   Audit-C Score 0 Filed at: 02/01/2024 1634   ELROY: How many times in the past year have you...    Used an illegal drug or used a prescription medication for non-medical reasons? Never Filed at: 02/01/2024 1634                  Medical Decision Making  Ms. Peña is an 84yo M w/ PMH of diabetes, DVT, HLD, HTN, insomnia, kidney stones presents to the ED after a fall.    Based on history and physical DDx includes but is not limited to: adverse medication effect, vasovagal syncope, mechanical fall, electrolyte disturbance, arrhythmia.    Head CT:\" No acute intracranial abnormality.  Stable chronic microangiopathic changes within the brain. \"    Results shared with patient and family. Return precautions given and patient and family express understanding and are comfortable with discharge.      Amount and/or Complexity of Data Reviewed  Radiology: ordered. Decision-making details documented in ED Course.    Risk  Prescription drug management.          Disposition  Final diagnoses:   Fall, initial encounter     Time reflects when diagnosis was documented in both MDM as applicable and the Disposition within this note       Time User Action Codes Description Comment    2/1/2024  7:16 PM Enma Meyer Add [W19.XXXA] Fall, initial " encounter           ED Disposition       ED Disposition   Discharge    Condition   Stable    Date/Time   Thu Feb 1, 2024 1916    Comment   Sandra Peña discharge to home/self care.                   Follow-up Information       Follow up With Specialties Details Why Contact Info    Ramin De Paz,  Family Medicine Schedule an appointment as soon as possible for a visit  Schedule an appointment to discuss your medication concerns. [address] [facility] [address]  [phone]              Patient's Medications   Discharge Prescriptions    No medications on file     No discharge procedures on file.    PDMP Review         Value Time User    PDMP Reviewed  Yes 12/11/2023  9:30 AM Ede Antony DO             ED Provider  Attending physically available and evaluated Sandra Peña. I managed the patient along with the ED Attending.    Electronically Signed by           Enma Meyer MD  02/01/24 1928

## 2024-02-02 LAB
ATRIAL RATE: 88 BPM
P AXIS: 23 DEGREES
PR INTERVAL: 158 MS
QRS AXIS: 8 DEGREES
QRSD INTERVAL: 60 MS
QT INTERVAL: 474 MS
QTC INTERVAL: 573 MS
T WAVE AXIS: 53 DEGREES
VENTRICULAR RATE: 88 BPM

## 2024-02-06 NOTE — ED PROCEDURE NOTE
PROCEDURE  Universal Protocol:  Consent: Verbal consent obtained.  Risks and benefits: risks, benefits and alternatives were discussed  Consent given by: patient (son)  Timeout called at: 2/1/2024 4:45 PM.  Patient understanding: patient states understanding of the procedure being performed  Patient consent: the patient's understanding of the procedure matches consent given  Procedure consent: procedure consent matches procedure scheduled  Relevant documents: relevant documents present and verified  Test results: test results available and properly labeled  Site marked: the operative site was marked  Radiology Images displayed and confirmed. If images not available, report reviewed: imaging studies available  Required blood products, implants, devices, and special equipment available: none.  Patient identity confirmed: verbally with patient  Laceration repair    Date/Time: 2/1/2024 4:45 PM    Performed by: Mathew Jeong MD  Authorized by: Mathew Jeong MD  Body area: head/neck  Location details: left eyebrow  Laceration length: 1 cm  Contaminated: none.  Foreign body present: none.  Tendon involvement: none  Nerve involvement: none  Vascular damage: no    Sedation:  Patient sedated: no      Wound Dehiscence:  Superficial Wound Dehiscence: simple closure      Procedure Details:  Irrigation solution: sterile water.  Debridement: none  Degree of undermining: none  Skin closure: glue  Approximation: close  Approximation difficulty: simple  Patient tolerance: patient tolerated the procedure well with no immediate complications  Comments: Band aid applied - note pt has 2 separate .5METERWALK cm linear horizontal/ oblique lacs-              Mathew Jeong MD  02/06/24 0805

## 2024-02-06 NOTE — ED ATTENDING ATTESTATION
2/1/2024  I, Mathew Jeong MD, saw and evaluated the patient. I have discussed the patient with the resident/non-physician practitioner and agree with the resident's/non-physician practitioner's findings, Plan of Care, and MDM as documented in the resident's/non-physician practitioner's note, except where noted. All available labs and Radiology studies were reviewed.  I was present for key portions of any procedure(s) performed by the resident/non-physician practitioner and I was immediately available to provide assistance.       At this point I agree with the current assessment done in the Emergency Department.  I have conducted an independent evaluation of this patient a history and physical is as follows:see h and p above     ED Course         Critical Care Time  Procedures

## 2024-02-07 ENCOUNTER — TELEMEDICINE (OUTPATIENT)
Dept: PSYCHIATRY | Facility: CLINIC | Age: 84
End: 2024-02-07
Payer: MEDICARE

## 2024-02-07 DIAGNOSIS — F33.1 MAJOR DEPRESSIVE DISORDER, RECURRENT, MODERATE (HCC): ICD-10-CM

## 2024-02-07 DIAGNOSIS — F45.1 SOMATIC SYMPTOM DISORDER: ICD-10-CM

## 2024-02-07 DIAGNOSIS — F41.1 GENERALIZED ANXIETY DISORDER: ICD-10-CM

## 2024-02-07 PROCEDURE — 99214 OFFICE O/P EST MOD 30 MIN: CPT

## 2024-02-07 PROCEDURE — 90833 PSYTX W PT W E/M 30 MIN: CPT

## 2024-02-07 RX ORDER — FLUOXETINE HYDROCHLORIDE 20 MG/1
20 CAPSULE ORAL DAILY
Qty: 30 CAPSULE | Refills: 1 | Status: SHIPPED | OUTPATIENT
Start: 2024-02-07 | End: 2024-04-07

## 2024-02-07 RX ORDER — FLUOXETINE HYDROCHLORIDE 40 MG/1
40 CAPSULE ORAL DAILY
Qty: 30 CAPSULE | Refills: 1 | Status: SHIPPED | OUTPATIENT
Start: 2024-02-07 | End: 2024-04-07

## 2024-02-07 RX ORDER — RISPERIDONE 0.25 MG/1
TABLET ORAL
Qty: 90 TABLET | Refills: 1 | Status: SHIPPED | OUTPATIENT
Start: 2024-02-07 | End: 2024-04-07

## 2024-02-07 RX ORDER — CLONAZEPAM 0.5 MG/1
0.5 TABLET ORAL 2 TIMES DAILY
Qty: 60 TABLET | Refills: 1 | Status: SHIPPED | OUTPATIENT
Start: 2024-02-19 | End: 2024-04-19

## 2024-02-07 NOTE — BH TREATMENT PLAN
TREATMENT PLAN        [facility]    Name and Date of Birth:  Sandra Peña 83 y.o. 1940  Date of Treatment Plan: February 7, 2024  Diagnosis/Diagnoses:    1. Generalized anxiety disorder    2. Major depressive disorder, recurrent, moderate (HCC)    3. Somatic symptom disorder        Strengths/Personal Resources for Self-Care: supportive family, supportive friends, taking medications as prescribed, ability to listen, ability to reason, average or above intelligence, family ties, general fund of knowledge, ability to negotiate basic needs, special hobby/interest, strong ghanshyam, willingness to work on problems    Area/Areas of need: anxiety symptoms, depressive symptoms    Long Term Goal: improve control of anxiety and depressive symptoms  Target Date: 6 months - August 7, 2024  Person/Persons responsible for completion of goal: Kylie Antony DO     Short Term Objective (s) - How will we reach this goal?:   Take medications as prescribed  Attend psychiatry appointments regularly  Follow up with medical providers  Practice coping skills  Avoid alcohol   Avoid drugs   Eat a healthy diet   Exercise more   Take walks regularly  Spend more time with friends and family  Try breathing exercises  Try relaxation techniques  Target Date: 3 months - May 7, 2024  Person/Persons Responsible for Completion of Goal: Sandra     Progress Towards Goals: Continuing treatment    Treatment Modality: medication management every 1-3 months as needed and continue psychotherapy  Review due 180 days from date of this plan: August 5, 2024   Expected length of service: Ongoing treatment    My physician and I have developed this plan together, and I agree to work on the goals and objectives. I understand the treatment goals that were developed for my treatment.    The treatment plan was created between Ede Antony DO and Sandra Peña on 02/07/24 at 10:25 AM. Because of virtual  visit agree verbally.

## 2024-02-07 NOTE — BH CRISIS PLAN
Client Name: Sandra Peña       Client YOB: 1940    [facility] Safety Plan      Creation Date: 2/7/24 Update Date: 2/7/24   Created By: Ede Antony DO Last Updated By: Ede Antony DO      Step 1: Warning Signs:   Warning Signs   heart racing   frightened feeling            Step 2: Internal Coping Strategies:   Internal Coping Strategies   Talk to someone            Step 3: People and social settings that provide distraction:   Name Contact Information   Janay Mckeon [phone]            Step 4: People whom I can ask for help during a crisis:      Name Contact Information    Janay Mckeon [phone]      Step 5: Professionals or agencies I can contact during a crisis:      [employer]inican/Agency Name Phone Emergency Contact    911 & 001          Crisis Phone Numbers:   Suicide Prevention Lifeline: Call or Text  988 Crisis Text Line: Text HOME to 038-924   Please note: Some [facility] do not have a separate number for Child/Adolescent specific crisis. If your county is not listed under Child/Adolescent, please call the adult number for your county      Adult Crisis Numbers: Child/Adolescent Crisis Numbers   [facility]: [phone] [facility]: [phone]   [facility]: [phone] [facility]: [phone]   [facility]: [phone] [place]: [phone]   [facility]: [phone] [place]: [phone]   Critical access hospital/[facility]: [phone]   [facility]: [phone]   [facility]: [phone]   [place] [facility]: [phone] (daytime) [phone] (after hours, weekends, holidays)      Step 6: Making the environment safer (plan for lethal means safety):   Plan: Denies any access to weapons/firearms.     Optional: What is most important to me and worth living for?   Family  Hopeful - Aspirations     [place] Safety Plan. Angeline Coleman and Donaldo Landeros. Used with permission of the authors.

## 2024-02-07 NOTE — PATIENT INSTRUCTIONS
- work on handouts sent to email  - stop taking gabapentin  - continue all other medications at this time    Please present for your previously scheduled appointment approximately 15 minutes prior to appointment time. If you are running late or are unable to attend your appointment, please call our [place] office at [phone] or our [place] office at [phone] if applicable to notify the office of your absence.    If you are in psychological crisis including not limited to suicidal/homicidal thoughts or thoughts of self-injury, do not hesitate to call/contact your County Crisis hotline (see below) or attend to the nearest emergency department.  [facility] Crisis: [phone]  [facility] Crisis: [phone]  [facility] Crisis: [phone]  [facility] Crisis: [phone]  [facility] Crisis: [phone]  [facility] Crisis: [phone]  [facility] Crisis: [phone]  National Suicide Prevention Hotline: 1-731.446.2811

## 2024-02-07 NOTE — PSYCH
Psychiatric Progress Note: Medication Management    [facility]    Name and Date of Birth:  Sandra Peña 83 y.o. 1940 MRN: [id number]    Date of Visit: February 7, 2024    Reason for Visit: Follow-up visit regarding medication management     Virtual Visit Disclaimer & Required Documentation  TeleMed provider: Arpan Mera. and Dr. Ralph Rosas III. My office door is closed. No one else is in the room. The patient is located in [place], where I hold an active license. Sandra Peña understands that this is a telemedicine visit and that the visit is being conducted through Epic Embedded platform. Patient is aware that Virtual Care Services could be limited without vital signs or the ability to perform a full hands-on physical exam. Patient is aware this is a billable service. The patient agrees to participate and understands they can discontinue the visit at any time.        ASSESSMENT & PLAN     Sandra Peña is a 83 y.o. female, ,  and presently living daughter, son-in-law, 3 grandkids; retired teacher; with prior psychiatric diagnoses of MDD, JOANH; no past suicide attempts (0); 1 past psychiatric hospitalizations (for anxiety); with suicide risk factors including medical problems, anxiety and age (50+); and medical history including DM2, HTN, DVT hx, OA, HLD, TIA, status post rotator cuff surgery who was personally seen and evaluated at the [facility] outpatient clinic for follow-up regarding medication management.     Patient continues to struggle with depressive and anxiety symptoms. She has been copmliant with her medications but felt the prozac increase did not make any improvements in symptoms. She did note that energy has been low and daughter voiced wanting to reduce polypharmacy concerns. Given that she is on a starting dose of gabapentin, agreed to discontinue this medications. Notably, prozac increase may  increase levels of risperdal so will continue to monitor or consider reduction in the future. Additionally discussed, possibly going down on klonopin in the future though she has been on it for >5 years it would need to be gradual. Regarding the risperdal which could cause sedation, previously going down on this medication made her thinking worse and would prefer not reducing it. During the visit, therapy was conducted and handouts were sent for patient to work on with her therapist and at home. No acute concerns for suicidality, homicidality, hallucinations. No further medication changes other than gabapentin at this time.    DSM-5 Diagnoses:   Anxiety  MDD  Obsessive thoughts r/o OCD    Treatment Recommendations/Precautions:  Notable Changes  Discontinue gabapentin due to concerns for sluggishness and wanting to reduce polypharmacy  Other Treatment recommendations  Continue Prozac 60mg for anxiety and depression symptoms  Interaction CYP2D6 with prozac may be increasing effects of risperdal  Continue Risperdal 0.25 QD and 0.50mg HS for obsessive thoughts  Continue Klonopin 0.5mg BID for anxiety  Consider decreasing in the future as there are concerns for sluggishness  Work on handouts emailed (facts vs feelings & automatic thoughts)   Look into therapy group through [facility]  Medication management every 1-3 months  Aware of 24 hour and weekend coverage for urgent situations accessed by calling [facility] main practice number    Medications Risks/Benefits    Risks, benefits, and possible side effects of medications explained to Sandra and she verbalizes understanding and agreement for treatment. including:   PARQ was completed for the class of SSRIs including transient anxiety, insomnia or sedation, headaches, constipation or diarrhea; and longer-standing sexual side effects, bleeding risk (especially with NSAIDs), and weight gain; as well as suicidal thoughts in patients under 25 years old,  serotonin syndrome, and induction of eligio. Potential for discontinuation syndrome (flu-like symptoms, insomnia, nausea, sensory disturbances, and headache) was also discussed.   PARQ was completed for second generation antipsychotic medication including sedation, GI distress, dizziness, risk of metabolic syndrome, EPS (akathisia, TD, etc), rare NMS, orthostatic hypotension, cardiovascular risks such as QT prolongation, increased prolactin, and others.  Risks of taking benzodiazepines were discussed, including risk of sedation and respiratory depression, particularly when combined with alcohol, opioids, or other central nervous system-depressants. Addiction potential, withdrawal seizures with abrupt discontinuation, and other potential adverse effects were discussed. The patient was instructed not to drive or operate machinery while taking benzodiazepines.     Controlled Medication Discussion:   Sandra has been filling controlled prescriptions on time as prescribed according to [place] Prescription Drug Monitoring Program    Treatment Plan:  Completed and signed during the session: Yes - Treatment Plan done but not signed at time of office visit due to:  Plan reviewed in person and verbal consent given due to COVID social distancing. Next due treatment plan date is: August 5, 2024      Psychotherapy Provided:   Individual psychotherapy provided: Yes  Counseling was provided during the session today for 20 minutes.     Suicide/Homicide Risk Assessment:  Risk of Harm to Self:  The following ratings are based on assessment at the time of the interview and review of records  Demographic risk factors include: ,  status, elderly (75 or older)   Historical Risk Factors include: chronic psychiatric problems, history of depression, history of anxiety  Recent Specific Risk Factors include: diagnosis of depression, current depressive symptoms, current anxiety symptoms  Protective Factors: no current  suicidal ideation, being a parent, compliant with medications, compliant with mental health treatment, having a desire to be alive, having a desire to live, having a sense of purpose or meaning in life, having pets, personal beliefs, personal beliefs about the meaning and value of life, Baptism beliefs discouraging suicide, resiliency, restricted access to lethal means, stable living environment, supportive family  Weapons: none. The following steps have been taken to ensure weapons are properly secured: not applicable  Based on today's assessment, Sandra presents the following risk of harm to self: minimal    Risk of Harm to Others:  The following ratings are based on assessment at the time of the interview and review of records  Demographic Risk Factors include: none.  Historical Risk Factors include: none.  Recent Specific Risk Factors include: multiple stressors, social difficulties.  Protective Factors: no current homicidal ideation, being a parent, compliant with medications, compliant with mental health treatment, no substance use problems, personal beliefs, Baptism beliefs, resilience, restricted access to lethal means, safe and stable living environment, supportive family  Weapons: none. The following steps have been taken to ensure weapons are properly secured: not applicable  Based on today's assessment, Sandra presents the following risk of harm to others: minimal    The following interventions are recommended: no intervention changes needed. Although patient's acute lethality risk is low, long-term/chronic lethality risk is mildly elevated in the presence of anxiety and depressive symptoms. At the current moment, Sandra is future-oriented, forward-thinking, and demonstrates ability to act in a self-preserving manner as evidenced by volitionally presenting to the clinic today, seeking treatment. Additionally, Sandra sits throughout the assessment wearing personal protective gear (i.e. medical mask) in  the context of the ongoing COVID-19 pandemic, suggesting a will and desire to live. At this juncture, inpatient hospitalization is not currently warranted. To mitigate future risk, patient should adhere to the recommendations of this writer, avoid alcohol/illicit substance use, utilize community-based resources and familiar support and prioritize mental health treatment.     Based on today's assessment and clinical criteria, Sandra Peña contracts for safety and is not an imminent risk of harm to self or others. Outpatient level of care is deemed appropriate at this present time. Sandra understands that if they are no longer able to contract for safety, they need to call/contact the outpatient office including this writer, call/contact crisis and/orattend to the nearest Emergency Department for immediate evaluation.    SUBJECTIVE     Chief Complaint: Concerns of sluggish.     Sandra Peña 83 y.o. female, ,  and presently living daughter, son-in-law, 3 grandkids; retired teacher; with prior psychiatric diagnoses of MDD, JONAH; no past suicide attempts (0); 1 past psychiatric hospitalizations (for anxiety); with suicide risk factors including medical problems, anxiety and age (50+); and medical history including DM2, HTN, DVT hx, OA, HLD, TIA, status post rotator cuff surgery who was personally seen and evaluated at the [facility] outpatient clinic for follow-up regarding medication management.     At the time of last visit, pt had reported not doing well noting that she feels more depressed and has been in her head a lot.  She notes that she continues to think a lot about possible brain cancer with her scalp being itchy or safety of her granddaughter as well as not calling her friend for fear of upsetting her.  She notes that she has not been very active with doing other things to distract herself though uses her coloring book occasionally.  She was counseled on being more  "active physically but as well as with others and finding ways to distract herself that are not individual activities.  Given her ongoing depressive and anxiety symptoms as well as obsessive thoughts we will optimize Prozac to see if this increased dose helps with her ongoing symptoms.     Sandra states that since their previous outpatient psychiatric appointment with this writer, she notes she has been staying \"anxious.\" She notes worrying about falling and about her chores. She notes \"not feeling the most well\" regarding her mental state. Patient notes that she just had a fall the past week. She was in the kitchen and grandkids were coming home and had a fall. Went to the ED and was worked up and discharged. She was able to walk fine at the hospital, so there were no issues. She notes the fall led her to get a scan which she has ruthie wanting cause of fear of something going on in her head because of her itchy scalp history, such as concerns for cancer.    During the session, discussed automatic thoughts and ways to catch herself and possible intervene. Examples included being hesitant to call her friends because she was afraid of bothering her friend, but eventually did it. She notes it was a quick phone call before getting interrupted by another call. She had feelings that maybe her friend didn't want to listen or didn't enjoy listening to her.     Regarding her depression she notes it a 7/10. Regarding sleep and appetite these have been fairly consistent. Her son helps with making meals for her or freezing meals for her to have but does not like the spicy food. Energy continues to be on the lower side and is a \"struggle.\" Daughter notes feeling like she's sluggish. Denies any suicidal both passive and active. Denies any homicidal or hallucinations. She notes the anxiety is also a 7/10 and seems to always be there.    Regarding the Prozac, daughter felt that she seems more sluggish. In regards to the depression and " anxiety that seems to have remained the same.     Presently, patient adamantly denies suicidal/homicidal ideation in addition to thoughts of self-injury, citing family & Religious as deterrents against self-harm; contracts for safety, see above for risk assessment. At conclusion of evaluation, patient is amenable to the recommendations of this writer including: medications as prescribed, attending routine appointments.  Also, patient is amenable to calling/contacting the outpatient office including this writer if any acute adverse effects of their medication regimen arise in addition to any comments or concerns pertaining to their psychiatric management.  Patient is amenable to calling/contacting crisis and/or attending to the nearest emergency department if their clinical condition deteriorates to assure their safety and stability, stating that they are able to appropriately confide in their Family (daughter) & good friend regarding their psychiatric state.      Current Rating Scores:   None completed today.    PSYCHIATRIC REVIEW OF SYSTEMS     Unchanged information from this writer's previous assessment is copied; information that has changed is bolded.    Sleep change: no  Interest change: no  Interests include: read (books), knitting  Guilt/Hopelessness/helplessness/worthlessness: yes, guilt (giving daughter stress)  Energy change: yes, decreased  Concentration/Attention change: yes, decreased  Appetite change: yes, decreased   Weight changes & timeframe: no  Psychomotor agitation/retardation: no  Somatic symptoms: yes  Suicidal ideation: no  Homicidal ideation: no  Zaira/hypomania: no     Anxiety/panic attack: yes  JONAH symptoms: difficulty concentrating, fatigue, insomnia, irritable and restlessness/keyed up  Panic Disorder symptoms: no symptoms suggestive of panic disorder  Obsessive/compulsive symptoms: obsessive thoughts without compulsions  Eating Disorder symptoms: no historical or current eating disorder.  no binge eating disorder; no anorexia nervosa. no symptoms of bulimia; does not stopped eating when trying to lose weight when younger     Auditory hallucinations: no  Visual hallucinations: no  Other perceptual disturbances: no  Delusional thinking: no    REVIEW OF SYSTEMS     Constitutional negative   ENT negative   Cardiovascular negative   Respiratory negative   Gastrointestinal negative   Genitourinary negative   Musculoskeletal negative   Integumentary skin wound - around eye   Neurological negative   Endocrine negative   Other Symptoms none, all other systems are negative     HISTORICAL INFORMATION     History Review: The following portions of the patient's history were reviewed and updated as appropriate: allergies, current medications, past family history, past medical history, past social history, past surgical history, and problem list.    Unchanged information from this writer's previous assessment is copied; information that has changed is bolded.    Family History   Problem Relation Age of Onset    Depression Mother         w/anxiety    Diabetes Mother         Mellitus    Breast cancer Mother     Hypertension Mother     No Known Problems Father     Depression Sister         w/anxiety    Heart disease Sister         Cardiac Disorder    Breast cancer Sister     Hypertension Sister     Diabetes Brother         Mellitus    Alcohol abuse Son      Additional fam hx:   Family hx of psychiatric diagnosis: yes, 2 sisters (depression & anxiety), Mom (Depression & anxiety)  Family hx of suicide: no  Family Hx of drug abuse: Son with alcohol abuse  Family Hx of medical diagnosis: yes, as listed above Mom (DM, breast cancer, HTN), sister (heart issues, breast cancer, HTN), brother (DM)     Past Psychiatric History:   Previous diagnosis: MDD, Anxiety  Previous inpatient psychiatric admissions: 1 prior admission for 3 weeks at [facility] in 2013 (panic attack after surgery).  Present/previous outpatient  psychiatrist: saw psychiatrists in [place] growing up; Dr. Rae & Dr. Momin prior to transfer.  Present/previous therapy/psychotherapy: Dr. Cross for past 3 years.  History of suicidal attempts/gestures: Denies.  Self-injurious behavior/high-risk behavior: no.  History of violence/aggressive behaviors: No.  Other Services: patient denies     Psychiatric medication trial:   Antidepressants  Effexor, Zoloft, Prozac, Lexapro  Antipsychotics  Abilify (Dizzy), Risperdal  Mood stabilizers  N/A  Sedative hypnotics  Klonopin  Others  Buspar, Neurontin     Substance Abuse History:  Nicotine use (cigarettes & vape): Denies  Caffeine use: 1 cup day Decafe  Alcohol use: Denies  Marijuana use: Denies  Other substances: Denies     Longest clean time: N/A  Previous inpatient/outpatient substance abuse rehabilitation: No.     Patient denies previous legal actions or arrests related to substance intoxication including prior DWIs/DUIs. Sandra does not apear under the influence or withdrawal of any psychoactive substance throughout today's examination.      I have assessed this patient for substance use within the past 12 months.     Social History:  Born/Raise: [place], [place] - moved to [place]  when begun living with family  Early life/developmental: Denies a history of milestone/developmental delay. Denies a history of in-utero exposure to toxins/illicit substances.   Family: 2 brother(s) & 2 sister(s), raised by parents  Education: Bachelor of Arts - Teaching  Learning Disabilities: There is no documented history of IEP or need for special education.  Occupational History: retired teacher  Protestant Affiliation: Restorationist  Marital history:  since 2006 -   from cancer  Children: yes, 2 (1 daughter & 1 son), 3 grandkids  Living arrangement: daughter, father-in-law, son-in-law, 3 grandkids in [place], 2 dogs, guinea pig  Support system: good support system - daughter, therapist   Hx: no  Legal Hx: no  Access to  firearms: patient denies. Sandra Peña has no history of arrests or violence pertaining to use of a deadly weapon.      Traumatic History:   Abuse: none is reported  Other Traumatic Events: Denies  Flashbacks/Nightmares: no     Past Medical History:  Hx of seizures: no  Hx of concussions & ongoing symptoms: no    OBJECTIVE     Vital signs in last 24 hours:  There were no vitals filed for this visit.    Laboratory Results: I have personally reviewed all pertinent laboratory/tests results  Recent Labs (last 4 months):   Admission on 02/01/2024, Discharged on 02/01/2024   Component Date Value    Ventricular Rate 02/01/2024 88     Atrial Rate 02/01/2024 88     WI Interval 02/01/2024 158     QRSD Interval 02/01/2024 60     QT Interval 02/01/2024 474     QTC Interval 02/01/2024 573     P Axis 02/01/2024 23     QRS Axis 02/01/2024 8     T Wave [place] 02/01/2024 53    Appointment on 10/11/2023   Component Date Value    WBC 10/11/2023 4.93     RBC 10/11/2023 4.00     Hemoglobin 10/11/2023 11.9     Hematocrit 10/11/2023 36.5     MCV 10/11/2023 91     MCH 10/11/2023 29.8     MCHC 10/11/2023 32.6     RDW 10/11/2023 13.2     MPV 10/11/2023 11.0     Platelets 10/11/2023 188     nRBC 10/11/2023 0     Neutrophils Relative 10/11/2023 59     Immat GRANS % 10/11/2023 0     Lymphocytes Relative 10/11/2023 29     Monocytes Relative 10/11/2023 7     Eosinophils Relative 10/11/2023 4     Basophils Relative 10/11/2023 1     Neutrophils Absolute 10/11/2023 2.84     Immature Grans Absolute 10/11/2023 0.02     Lymphocytes Absolute 10/11/2023 1.45     Monocytes Absolute 10/11/2023 0.36     Eosinophils Absolute 10/11/2023 0.21     Basophils Absolute 10/11/2023 0.05     Sodium 10/11/2023 140     Potassium 10/11/2023 3.9     Chloride 10/11/2023 105     CO2 10/11/2023 27     ANION GAP 10/11/2023 8     BUN 10/11/2023 25     Creatinine 10/11/2023 0.83     Glucose, Fasting 10/11/2023 96     Calcium 10/11/2023 9.6     AST 10/11/2023 14     ALT  10/11/2023 11     Alkaline Phosphatase 10/11/2023 48     Total Protein 10/11/2023 6.6     Albumin 10/11/2023 4.0     Total Bilirubin 10/11/2023 0.50     eGFR 10/11/2023 64     Hemoglobin A1C 10/11/2023 6.5 (H)     EAG 10/11/2023 140     Cholesterol 10/11/2023 144     Triglycerides 10/11/2023 105     HDL, Direct 10/11/2023 52     LDL Calculated 10/11/2023 71     Non-HDL-Chol (CHOL-HDL) 10/11/2023 92     Creatinine, Ur 10/11/2023 56.0     Albumin,U,Random 10/11/2023 8.3     Albumin Creat Ratio 10/11/2023 15     TSH 3RD GENERATON 10/11/2023 2.332        Mental Status Evaluation:    Appearance:  age appropriate, casually dressed, bruised eye from recent fall   Behavior:  cooperative, calm   Motor: no abnormal movements   Speech:  normal rate, normal volume, normal pitch   Mood:  anxious   Affect:  constricted   Thought Process:  circumstantial   Thought Content: {no overt delusions   Perceptual disturbances: no auditory hallucinations, no visual hallucinations   Risk Potential: Suicidal ideation - None at present  Homicidal ideation - None at present  Potential for aggression - Not at present   Cognition: oriented to self and situation, appears to be of average intelligence, and cognition not formally tested   Insight:  fair   Judgment: fair     Note Share:   This note was shared with patient.    Visit Time  Start: 9:45. Stop: 10:52.  I spent 67 minutes directly with the patient during this visit    Ede Antony DO 02/07/24

## 2024-02-08 ENCOUNTER — OFFICE VISIT (OUTPATIENT)
Dept: DERMATOLOGY | Facility: CLINIC | Age: 84
End: 2024-02-08
Payer: MEDICARE

## 2024-02-08 VITALS — BODY MASS INDEX: 25.64 KG/M2 | WEIGHT: 114 LBS | TEMPERATURE: 97.6 F | HEIGHT: 56 IN

## 2024-02-08 DIAGNOSIS — L85.8 RETENTION HYPERKERATOSIS: ICD-10-CM

## 2024-02-08 DIAGNOSIS — L82.1 SEBORRHEIC KERATOSIS: ICD-10-CM

## 2024-02-08 DIAGNOSIS — L21.9 SEBORRHEIC DERMATITIS: Primary | ICD-10-CM

## 2024-02-08 PROCEDURE — 99204 OFFICE O/P NEW MOD 45 MIN: CPT | Performed by: DERMATOLOGY

## 2024-02-08 RX ORDER — FLUOCINONIDE TOPICAL SOLUTION USP, 0.05% 0.5 MG/ML
SOLUTION TOPICAL
Qty: 60 ML | Refills: 2 | Status: SHIPPED | OUTPATIENT
Start: 2024-02-08

## 2024-02-08 RX ORDER — KETOCONAZOLE 20 MG/ML
SHAMPOO TOPICAL
Qty: 120 ML | Refills: 6 | Status: SHIPPED | OUTPATIENT
Start: 2024-02-08

## 2024-02-08 RX ORDER — TRIAMCINOLONE ACETONIDE 1 MG/G
CREAM TOPICAL
Qty: 80 G | Refills: 1 | Status: SHIPPED | OUTPATIENT
Start: 2024-02-08

## 2024-02-08 NOTE — PATIENT INSTRUCTIONS
"  SEBORRHEIC KERATOSIS; NON-INFLAMED    Assessment and Plan:  Based on a thorough discussion of this condition and the management approach to it (including a comprehensive discussion of the known risks, side effects and potential benefits of treatment), the patient (family) agrees to implement the following specific plan:  Reassured benign    Seborrheic Keratosis  A seborrheic keratosis is a harmless warty spot that appears during adult life as a common sign of skin aging.  Seborrheic keratoses can arise on any area of skin, covered or uncovered, with the usual exception of the palms and soles. They do not arise from mucous membranes. Seborrheic keratoses can have highly variable appearance.      Seborrheic keratoses are extremely common. It has been estimated that over 90% of adults over the age of 60 years have one or more of them. They occur in males and females of all races, typically beginning to erupt in the 30s or 40s. They are uncommon under the age of 20 years.  The precise cause of seborrhoeic keratoses is not known.  Seborrhoeic keratoses are considered degenerative in nature. As time goes by, seborrheic keratoses tend to become more numerous. Some people inherit a tendency to develop a very large number of them; some people may have hundreds of them.    The name \"seborrheic keratosis\" is misleading, because these lesions are not limited to a seborrhoeic distribution (scalp, mid-face, chest, upper back), nor are they formed from sebaceous glands, nor are they associated with sebum -- which is greasy.  Seborrheic keratosis may also be called \"SK,\" \"Seb K,\" \"basal cell papilloma,\" \"senile wart,\" or \"barnacle.\"      Researchers have noted:  Eruptive seborrhoeic keratoses can follow sunburn or dermatitis  Skin friction may be the reason they appear in body folds  Viral cause (e.g., human papillomavirus) seems unlikely  Stable and clonal mutations or activation of FRFR3, PIK3CA, SHAMA, AKT1 and EGFR genes are " "found in seborrhoeic keratoses  Seborrhoeic keratosis can arise from solar lentigo  FRFR3 mutations also arise in solar lentigines. These mutations are associated with increased age and location on the head and neck, suggesting a role of ultraviolet radiation in these lesions  Seborrheic keratoses do not harbour tumour suppressor gene mutations  Epidermal growth factor receptor inhibitors, which are used to treat some cancers, often result in an increase in verrucal (warty) keratoses.    There is no easy way to remove multiple lesions on a single occasion.  Unless a specific lesion is \"inflamed\" and is causing pain or stinging/burning or is bleeding, most insurance companies do not authorize treatment.     SEBORRHEIC DERMATITIS    Assessment and Plan:  Based on a thorough discussion of this condition and the management approach to it (including a comprehensive discussion of the known risks, side effects and potential benefits of treatment), the patient (family) agrees to implement the following specific plan:  Start Lidex Solution apply topically into the scalp daily as needed for flares.  Continue Ketoconazole 2% shampoo apply topically to the scalp leave on for 5 minutes then rinse off completely once weekly.  Triamcinolone 0.1% ointment apply topically to the areas affected on the shoulders and abdomen as needed for itching.   Recommend gently exfoliation when showering, recommend using gently moisturizer after each shower.     Seborrheic Dermatitis   Seborrheic dermatitis is a common, chronic or relapsing form of eczema/dermatitis that mainly affects the sebaceous, gland-rich regions of the scalp, face, and trunk.  There are infantile and adult forms of seborrhoeic dermatitis. It is sometimes associated with psoriasis and, in that clinical scenario, may be referred to as \"sebo-psoriasis.\"  Seborrheic dermatitis is also known as \"seborrheic eczema.\"  Dandruff (also called \"pityriasis capitis\") is an uninflamed " "form of seborrhoeic dermatitis. Dandruff presents as bran-like scaly patches scattered within hair-bearing areas of the scalp.  In an infant, this condition may be referred to as \"cradle cap.\"  The cause of seborrheic dermatitis is not completely understood. It is associated with proliferation of various species of the skin commensal Malassezia, in its yeast (non-pathogenic) form. Its metabolites (such as the fatty acids oleic acid, malssezin, and indole-3-carbaldehyde) may cause an inflammatory reaction. Differences in skin barrier lipid content and function may account for individual presentations.    Infantile Seborrheic Dermatitis  Infantile seborrheic dermatitis affects babies under the age of 3 months and usually resolves by 6-12 months of age.  Infantile seborrheic dermatitis causes \"cradle cap\" (diffuse, greasy scaling on scalp). The rash may spread to affect armpit and groin folds (a type of \"napkin dermatitis\").  There may be associated salmon-pink colored patches that may flake or peel.  The rash in this case is usually not especially itchy, so the baby often appears undisturbed by the rash, even when more generalized.    Adult Seborrheic Dermatitis  Adult seborrheic dermatitis tends to begin in late adolescence; prevalence is greatest in young adults and in the elderly. It is more common in males than in females.    The following factors are sometimes associated with severe adult seborrheic dermatitis:  Oily skin  Familial tendency to seborrhoeic dermatitis or a family history of psoriasis  Immunosuppression: organ transplant recipient, human immunodeficiency virus (HIV) infection and patients with lymphoma  Neurological and psychiatric diseases: Parkinson disease, tardive dyskinesia, depression, epilepsy, facial nerve palsy, spinal cord injury and congenital disorders such as Down syndrome  Treatment for psoriasis with psoralen and ultraviolet A (PUVA) therapy  Lack of sleep  Stressful events.    In " adults, seborrheic dermatitis may typically affect the scalp, face (creases around the nose, behind ears, within eyebrows) and upper trunk. Typical clinical features include:  Winter flares, improving in summer following sun exposure  Minimal itch most of the time  Combination oily and dry mid-facial skin  Ill-defined localized scaly patches or diffuse scale in the scalp  Blepharitis; scaly red eyelid margins  [place]-pink, thin, scaly, and ill-defined plaques in skin folds on both sides of the face  Petal or ring-shaped flaky patches on hair-line and on anterior chest  Rash in armpits, under the breasts, in the groin folds and genital creases  Superficial folliculitis (inflamed hair follicles) on cheeks and upper trunk    Seborrheic dermatitis is diagnosed by its clinical appearance and behavior. Skin biopsy may be helpful but is rarely necessary to make this diagnosis.

## 2024-02-08 NOTE — PROGRESS NOTES
"[facility] Note     Patient Name: Sandra Peña  Encounter Date: 2/8/24     Have you been cared for by a [facility] Dermatologist in the last 3 years and, if so, which description applies to you?    NO.   I am considered a \"new\" patient and must complete all patient intake questions. I am FEMALE/of child-bearing potential.    REVIEW OF SYSTEMS:  Have you recently had or currently have any of the following? Recent fever or chills? No  Any non-healing wound? No  Are you pregnant or planning to become pregnant? No  Are you currently or planning to be nursing or breast feeding? No   PAST MEDICAL HISTORY:  Have you personally ever had or currently have any of the following?  If \"YES,\" then please provide more detail. Skin cancer (such as Melanoma, Basal Cell Carcinoma, Squamous Cell Carcinoma?  No  Tuberculosis, HIV/AIDS, Hepatitis B or C: No  Radiation Treatment No   HISTORY OF IMMUNOSUPPRESSION:   Do you have a history of any of the following:  Systemic Immunosuppression such as Diabetes, Biologic or Immunotherapy, Chemotherapy, Organ Transplantation, Bone Marrow Transplantation?  YES, diabetes    Answering \"YES\" requires the addition of the dotphrase \"IMMUNOSUPPRESSED\" as the first diagnosis of the patient's visit.   FAMILY HISTORY:  Any \"first degree relatives\" (parent, brother, sister, or child) with the following?    Skin Cancer, Pancreatic or Other Cancer? YES, breast cancer, mother and sister. Brother lung cancer   PATIENT EXPERIENCE:    Do you want the Dermatologist to perform a COMPLETE skin exam today including a clinical examination under the \"bra and underwear\" areas?  NO  If necessary, do we have your permission to call and leave a detailed message on your Preferred Phone number that includes your specific medical information?  Yes      No Known Allergies   Current Outpatient Medications:     aspirin 81 MG tablet, Take 81 mg by mouth daily, Disp: , Rfl:     atorvastatin (LIPITOR) 10 mg " tablet, Take 1 tablet (10 mg total) by mouth daily, Disp: 90 tablet, Rfl: 0    [START ON 2/19/2024] clonazePAM (KlonoPIN) 0.5 mg tablet, Take 1 tablet (0.5 mg total) by mouth 2 (two) times a day Do not start before February 19, 2024., Disp: 60 tablet, Rfl: 1    docusate sodium (COLACE) 100 mg capsule, Take 100 mg by mouth 2 (two) times a day, Disp: , Rfl:     FLUoxetine (PROzac) 20 mg capsule, Take 1 capsule (20 mg total) by mouth daily Combine with your 40mg for a total of 60 daily, Disp: 30 capsule, Rfl: 1    FLUoxetine (PROzac) 40 MG capsule, Take 1 capsule (40 mg total) by mouth daily Combine with your 20mg for a total of 60 daily, Disp: 30 capsule, Rfl: 1    ketoconazole (NIZORAL) 2 % shampoo, Apply 1 Application topically 4 (four) times a week, Disp: 120 mL, Rfl: 1    linaCLOtide 145 MCG CAPS, Take 1 capsule (145 mcg total) by mouth in the morning, Disp: 90 capsule, Rfl: 3    lisinopril-hydrochlorothiazide (PRINZIDE,ZESTORETIC) 10-12.5 MG per tablet, Take 1 tablet by mouth daily, Disp: 90 tablet, Rfl: 3    metFORMIN (GLUCOPHAGE) 500 mg tablet, Take 1 tablet (500 mg total) by mouth 2 (two) times a day with meals, Disp: 180 tablet, Rfl: 1    mometasone (ELOCON) 0.1 % lotion, Apply topically daily, Disp: 60 mL, Rfl: 0    omeprazole (PriLOSEC) 20 mg delayed release capsule, Take 1 capsule (20 mg total) by mouth daily, Disp: 30 capsule, Rfl: 0    risperiDONE (RisperDAL) 0.25 mg tablet, Take 1 tablet (0.25 mg total) by mouth daily AND 2 tablets (0.5 mg total) daily at bedtime., Disp: 90 tablet, Rfl: 1    tamsulosin (FLOMAX) 0.4 mg, Take 1 capsule (0.4 mg total) by mouth daily with dinner, Disp: 30 capsule, Rfl: 0          Whom besides the patient is providing clinical information about today's encounter?   Other:  patient is with her Daughter    Physical Exam and Assessment/Plan by Diagnosis:      SEBORRHEIC KERATOSIS; NON-INFLAMED    Physical Exam:  Anatomic Location Affected:  face, inframammary folds,   "abdomen  Morphological Description:  Flat and raised, waxy, smooth to warty textured, yellow to brownish-grey to dark brown to blackish, discrete, \"stuck-on\" appearing papules.      Additional History of Present Condition:  Patient reports new bumps on the skin.  Denies itch, burn, pain, bleeding or ulceration.  Present constantly; nothing seems to make it worse or better.  No prior treatment.      Assessment and Plan:  Based on a thorough discussion of this condition and the management approach to it (including a comprehensive discussion of the known risks, side effects and potential benefits of treatment), the patient (family) agrees to implement the following specific plan:  Reassured benign      SEBORRHEIC DERMATITIS    Physical Exam:  Anatomic Location Affected:  scalp  Morphological Description:  diffuse scaling       Additional History of Present Condition:  used Clobetasol shampoo, ketoconazole 2% shampoo, and mometasone lotion.    Assessment and Plan:  Based on a thorough discussion of this condition and the management approach to it (including a comprehensive discussion of the known risks, side effects and potential benefits of treatment), the patient (family) agrees to implement the following specific plan:  Start Lidex Solution apply topically into the scalp daily as needed for flares. Once daily  Continue Ketoconazole 2% shampoo apply topically to the scalp leave on for 5 minutes then rinse off completely once weekly.      Retention Hyperkeratosis   Physical Exam:  Anatomic Location Affected:  shoulders and inframammary   Morphological Description:  crusted tan plaques   Pertinent Positives:  Pertinent Negatives:    Additional History of Present Condition:  present for months. Not improving with  moisturizing     Assessment and Plan:  Based on a thorough discussion of this condition and the management approach to it (including a comprehensive discussion of the known risks, side effects and potential " benefits of treatment), the patient (family) agrees to implement the following specific plan:  Discussed etiology, course of condition, expectations, treatment options. Patient expressed understanding and ok with plan.   Light exfoliation followed by moisturizing     Scribe Attestation      I,:  Rosemary Roberts am acting as a scribe while in the presence of the attending physician.:       I,:  Dorothea Portillo MD personally performed the services described in this documentation    as scribed in my presence.:

## 2024-02-12 ENCOUNTER — APPOINTMENT (OUTPATIENT)
Dept: LAB | Facility: CLINIC | Age: 84
End: 2024-02-12
Payer: MEDICARE

## 2024-02-12 DIAGNOSIS — E11.9 TYPE 2 DIABETES MELLITUS WITHOUT COMPLICATION, WITHOUT LONG-TERM CURRENT USE OF INSULIN (HCC): ICD-10-CM

## 2024-02-12 DIAGNOSIS — F45.21 ILLNESS ANXIETY DISORDER: ICD-10-CM

## 2024-02-12 DIAGNOSIS — I10 BENIGN ESSENTIAL HYPERTENSION: ICD-10-CM

## 2024-02-12 LAB
ALBUMIN SERPL BCP-MCNC: 4 G/DL (ref 3.5–5)
ALP SERPL-CCNC: 52 U/L (ref 34–104)
ALT SERPL W P-5'-P-CCNC: 14 U/L (ref 7–52)
ANION GAP SERPL CALCULATED.3IONS-SCNC: 8 MMOL/L
AST SERPL W P-5'-P-CCNC: 17 U/L (ref 13–39)
BACTERIA UR QL AUTO: NORMAL /HPF
BASOPHILS # BLD AUTO: 0.05 THOUSANDS/ÂΜL (ref 0–0.1)
BASOPHILS NFR BLD AUTO: 1 % (ref 0–1)
BILIRUB SERPL-MCNC: 0.6 MG/DL (ref 0.2–1)
BILIRUB UR QL STRIP: NEGATIVE
BUN SERPL-MCNC: 31 MG/DL (ref 5–25)
CALCIUM SERPL-MCNC: 9.6 MG/DL (ref 8.4–10.2)
CHLORIDE SERPL-SCNC: 103 MMOL/L (ref 96–108)
CHOLEST SERPL-MCNC: 135 MG/DL
CLARITY UR: CLEAR
CO2 SERPL-SCNC: 28 MMOL/L (ref 21–32)
COLOR UR: NORMAL
CREAT SERPL-MCNC: 0.93 MG/DL (ref 0.6–1.3)
CREAT UR-MCNC: 81.1 MG/DL
EOSINOPHIL # BLD AUTO: 0.18 THOUSAND/ÂΜL (ref 0–0.61)
EOSINOPHIL NFR BLD AUTO: 4 % (ref 0–6)
ERYTHROCYTE [DISTWIDTH] IN BLOOD BY AUTOMATED COUNT: 13.3 % (ref 11.6–15.1)
EST. AVERAGE GLUCOSE BLD GHB EST-MCNC: 154 MG/DL
GFR SERPL CREATININE-BSD FRML MDRD: 56 ML/MIN/1.73SQ M
GLUCOSE P FAST SERPL-MCNC: 99 MG/DL (ref 65–99)
GLUCOSE UR STRIP-MCNC: NEGATIVE MG/DL
HBA1C MFR BLD: 7 %
HCT VFR BLD AUTO: 34.7 % (ref 34.8–46.1)
HDLC SERPL-MCNC: 56 MG/DL
HGB BLD-MCNC: 10.9 G/DL (ref 11.5–15.4)
HGB UR QL STRIP.AUTO: NEGATIVE
IMM GRANULOCYTES # BLD AUTO: 0.01 THOUSAND/UL (ref 0–0.2)
IMM GRANULOCYTES NFR BLD AUTO: 0 % (ref 0–2)
KETONES UR STRIP-MCNC: NEGATIVE MG/DL
LDLC SERPL CALC-MCNC: 61 MG/DL (ref 0–100)
LEUKOCYTE ESTERASE UR QL STRIP: NEGATIVE
LYMPHOCYTES # BLD AUTO: 1.25 THOUSANDS/ÂΜL (ref 0.6–4.47)
LYMPHOCYTES NFR BLD AUTO: 26 % (ref 14–44)
MCH RBC QN AUTO: 28.7 PG (ref 26.8–34.3)
MCHC RBC AUTO-ENTMCNC: 31.4 G/DL (ref 31.4–37.4)
MCV RBC AUTO: 91 FL (ref 82–98)
MICROALBUMIN UR-MCNC: 12.9 MG/L
MICROALBUMIN/CREAT 24H UR: 16 MG/G CREATININE (ref 0–30)
MONOCYTES # BLD AUTO: 0.27 THOUSAND/ÂΜL (ref 0.17–1.22)
MONOCYTES NFR BLD AUTO: 6 % (ref 4–12)
NEUTROPHILS # BLD AUTO: 3.1 THOUSANDS/ÂΜL (ref 1.85–7.62)
NEUTS SEG NFR BLD AUTO: 63 % (ref 43–75)
NITRITE UR QL STRIP: NEGATIVE
NON-SQ EPI CELLS URNS QL MICRO: NORMAL /HPF
NONHDLC SERPL-MCNC: 79 MG/DL
NRBC BLD AUTO-RTO: 0 /100 WBCS
PH UR STRIP.AUTO: 5.5 [PH]
PLATELET # BLD AUTO: 242 THOUSANDS/UL (ref 149–390)
PMV BLD AUTO: 10.9 FL (ref 8.9–12.7)
POTASSIUM SERPL-SCNC: 4.3 MMOL/L (ref 3.5–5.3)
PROT SERPL-MCNC: 6.6 G/DL (ref 6.4–8.4)
PROT UR STRIP-MCNC: NEGATIVE MG/DL
RBC # BLD AUTO: 3.8 MILLION/UL (ref 3.81–5.12)
RBC #/AREA URNS AUTO: NORMAL /HPF
SODIUM SERPL-SCNC: 139 MMOL/L (ref 135–147)
SP GR UR STRIP.AUTO: 1.02 (ref 1–1.03)
TRIGL SERPL-MCNC: 89 MG/DL
TSH SERPL DL<=0.05 MIU/L-ACNC: 1.65 UIU/ML (ref 0.45–4.5)
UROBILINOGEN UR STRIP-ACNC: <2 MG/DL
WBC # BLD AUTO: 4.86 THOUSAND/UL (ref 4.31–10.16)
WBC #/AREA URNS AUTO: NORMAL /HPF

## 2024-02-12 PROCEDURE — 82570 ASSAY OF URINE CREATININE: CPT

## 2024-02-12 PROCEDURE — 83036 HEMOGLOBIN GLYCOSYLATED A1C: CPT

## 2024-02-12 PROCEDURE — 80061 LIPID PANEL: CPT

## 2024-02-12 PROCEDURE — 84443 ASSAY THYROID STIM HORMONE: CPT

## 2024-02-12 PROCEDURE — 82043 UR ALBUMIN QUANTITATIVE: CPT

## 2024-02-12 PROCEDURE — 80053 COMPREHEN METABOLIC PANEL: CPT

## 2024-02-12 PROCEDURE — 36415 COLL VENOUS BLD VENIPUNCTURE: CPT

## 2024-02-12 PROCEDURE — 85025 COMPLETE CBC W/AUTO DIFF WBC: CPT

## 2024-02-13 LAB — BACTERIA UR CULT: NORMAL

## 2024-02-14 ENCOUNTER — TELEPHONE (OUTPATIENT)
Dept: PSYCHIATRY | Facility: CLINIC | Age: 84
End: 2024-02-14

## 2024-02-14 ENCOUNTER — TELEPHONE (OUTPATIENT)
Dept: FAMILY MEDICINE CLINIC | Facility: CLINIC | Age: 84
End: 2024-02-14

## 2024-02-14 NOTE — TELEPHONE ENCOUNTER
Daughter called noting that patient has been experiencing symptoms since the increase of prozac from 40 to 60. She had forgotten to mention it at the last visit and noted some stomach issues as well as light headed symptoms.     Because pt has some fixation on somatic symptoms, daughter feels knowing that she will be on a prior dose may also mentally help. Given that prozac can interact with her other medications such as enhance risperidone effects, agreed to go back to the Prozac 40mg daily and to call back if there are issues. Patient has upcoming appointment to discuss further in March.     There are no concerns with gabapentin discontinuation which was done at the last visit.     No further questions or concerns at this time.

## 2024-02-14 NOTE — TELEPHONE ENCOUNTER
Patient daughter called and stated that she would like to know if they can change the medication Prozac back to 40mg daily from 60mg.  Patient is say that she is light headed  and daughter is saying this started back in December when it was increased.

## 2024-02-16 ENCOUNTER — HOSPITAL ENCOUNTER (EMERGENCY)
Facility: HOSPITAL | Age: 84
Discharge: HOME | End: 2024-02-16
Attending: EMERGENCY MEDICINE
Payer: MEDICARE

## 2024-02-16 ENCOUNTER — APPOINTMENT (EMERGENCY)
Dept: CT IMAGING | Facility: HOSPITAL | Age: 84
End: 2024-02-16
Payer: MEDICARE

## 2024-02-16 VITALS
SYSTOLIC BLOOD PRESSURE: 153 MMHG | RESPIRATION RATE: 18 BRPM | OXYGEN SATURATION: 97 % | TEMPERATURE: 97.9 F | HEART RATE: 83 BPM | DIASTOLIC BLOOD PRESSURE: 70 MMHG

## 2024-02-16 DIAGNOSIS — N28.1 RENAL CYST, LEFT: Primary | ICD-10-CM

## 2024-02-16 DIAGNOSIS — K59.00 CONSTIPATION: ICD-10-CM

## 2024-02-16 DIAGNOSIS — R16.0 HEPATOMEGALY: ICD-10-CM

## 2024-02-16 LAB
ANION GAP SERPL CALCULATED.3IONS-SCNC: 10 MMOL/L
BASOPHILS # BLD AUTO: 0.05 THOUSANDS/ÂΜL (ref 0–0.1)
BASOPHILS NFR BLD AUTO: 1 % (ref 0–1)
BUN SERPL-MCNC: 27 MG/DL (ref 5–25)
CALCIUM SERPL-MCNC: 9.5 MG/DL (ref 8.4–10.2)
CHLORIDE SERPL-SCNC: 103 MMOL/L (ref 96–108)
CO2 SERPL-SCNC: 24 MMOL/L (ref 21–32)
CREAT SERPL-MCNC: 0.96 MG/DL (ref 0.6–1.3)
EOSINOPHIL # BLD AUTO: 0.05 THOUSAND/ÂΜL (ref 0–0.61)
EOSINOPHIL NFR BLD AUTO: 1 % (ref 0–6)
ERYTHROCYTE [DISTWIDTH] IN BLOOD BY AUTOMATED COUNT: 13.5 % (ref 11.6–15.1)
GFR SERPL CREATININE-BSD FRML MDRD: 54 ML/MIN/1.73SQ M
GLUCOSE SERPL-MCNC: 137 MG/DL (ref 65–140)
HCT VFR BLD AUTO: 35.4 % (ref 34.8–46.1)
HGB BLD-MCNC: 11.4 G/DL (ref 11.5–15.4)
IMM GRANULOCYTES # BLD AUTO: 0.03 THOUSAND/UL (ref 0–0.2)
IMM GRANULOCYTES NFR BLD AUTO: 0 % (ref 0–2)
LYMPHOCYTES # BLD AUTO: 0.6 THOUSANDS/ÂΜL (ref 0.6–4.47)
LYMPHOCYTES NFR BLD AUTO: 6 % (ref 14–44)
MCH RBC QN AUTO: 29.2 PG (ref 26.8–34.3)
MCHC RBC AUTO-ENTMCNC: 32.2 G/DL (ref 31.4–37.4)
MCV RBC AUTO: 91 FL (ref 82–98)
MONOCYTES # BLD AUTO: 0.28 THOUSAND/ÂΜL (ref 0.17–1.22)
MONOCYTES NFR BLD AUTO: 3 % (ref 4–12)
NEUTROPHILS # BLD AUTO: 8.71 THOUSANDS/ÂΜL (ref 1.85–7.62)
NEUTS SEG NFR BLD AUTO: 89 % (ref 43–75)
NRBC BLD AUTO-RTO: 0 /100 WBCS
PLATELET # BLD AUTO: 212 THOUSANDS/UL (ref 149–390)
PMV BLD AUTO: 10.2 FL (ref 8.9–12.7)
POTASSIUM SERPL-SCNC: 4 MMOL/L (ref 3.5–5.3)
RBC # BLD AUTO: 3.9 MILLION/UL (ref 3.81–5.12)
SODIUM SERPL-SCNC: 137 MMOL/L (ref 135–147)
WBC # BLD AUTO: 9.72 THOUSAND/UL (ref 4.31–10.16)

## 2024-02-16 PROCEDURE — 74177 CT ABD & PELVIS W/CONTRAST: CPT

## 2024-02-16 PROCEDURE — 99284 EMERGENCY DEPT VISIT MOD MDM: CPT

## 2024-02-16 PROCEDURE — 36415 COLL VENOUS BLD VENIPUNCTURE: CPT | Performed by: EMERGENCY MEDICINE

## 2024-02-16 PROCEDURE — 99285 EMERGENCY DEPT VISIT HI MDM: CPT | Performed by: EMERGENCY MEDICINE

## 2024-02-16 PROCEDURE — 85025 COMPLETE CBC W/AUTO DIFF WBC: CPT | Performed by: EMERGENCY MEDICINE

## 2024-02-16 PROCEDURE — G1004 CDSM NDSC: HCPCS

## 2024-02-16 PROCEDURE — 80048 BASIC METABOLIC PNL TOTAL CA: CPT | Performed by: EMERGENCY MEDICINE

## 2024-02-16 RX ORDER — POLYETHYLENE GLYCOL 3350 17 G/17G
17 POWDER, FOR SOLUTION ORAL ONCE
Status: COMPLETED | OUTPATIENT
Start: 2024-02-16 | End: 2024-02-16

## 2024-02-16 RX ORDER — ENEMA 19; 7 G/133ML; G/133ML
1 ENEMA RECTAL ONCE
Status: COMPLETED | OUTPATIENT
Start: 2024-02-16 | End: 2024-02-16

## 2024-02-16 RX ORDER — POLYETHYLENE GLYCOL 3350 17 G/17G
17 POWDER, FOR SOLUTION ORAL DAILY
Qty: 119 G | Refills: 0 | Status: SHIPPED | OUTPATIENT
Start: 2024-02-16 | End: 2024-02-23

## 2024-02-16 RX ADMIN — SODIUM PHOSPHATE, DIBASIC AND SODIUM PHOSPHATE, MONOBASIC 1 ENEMA: 7; 19 ENEMA RECTAL at 13:43

## 2024-02-16 RX ADMIN — POLYETHYLENE GLYCOL 3350 17 G: 17 POWDER, FOR SOLUTION ORAL at 13:43

## 2024-02-16 RX ADMIN — IOHEXOL 80 ML: 350 INJECTION, SOLUTION INTRAVENOUS at 11:20

## 2024-02-16 NOTE — ED PROVIDER NOTES
History  Chief Complaint   Patient presents with    Constipation     Pt presentse to the ED with complaints of rectal pain, constipation x 1 week, OTC remedies unsuccessful. Pt reports unable to bear down d/t pain.        Constipation  Associated symptoms: no abdominal pain, no diarrhea, no dysuria, no fever, no nausea and no vomiting      83-year-old presented to the ER with constipation.  Patient with history of chronic constipation.  Unable to provide information last time she had a bowel movement.  Passing gas.  No nausea vomiting.  No abdominal pain.  No fevers or chills.  No trauma.  Unsure when his last time she took MiraLAX.  Supposed to take daily but has not been.  Unsure last colonoscopy.  Has not seen GI in years.      Prior to Admission Medications   Prescriptions Last Dose Informant Patient Reported? Taking?   FLUoxetine (PROzac) 20 mg capsule   No No   Sig: Take 1 capsule (20 mg total) by mouth daily Combine with your 40mg for a total of 60 daily   FLUoxetine (PROzac) 40 MG capsule   No No   Sig: Take 1 capsule (40 mg total) by mouth daily Combine with your 20mg for a total of 60 daily   aspirin 81 MG tablet  Self Yes No   Sig: Take 81 mg by mouth daily   atorvastatin (LIPITOR) 10 mg tablet   No No   Sig: Take 1 tablet (10 mg total) by mouth daily   clonazePAM (KlonoPIN) 0.5 mg tablet   No No   Sig: Take 1 tablet (0.5 mg total) by mouth 2 (two) times a day Do not start before February 19, 2024.   docusate sodium (COLACE) 100 mg capsule  Self Yes No   Sig: Take 100 mg by mouth 2 (two) times a day   fluocinonide (LIDEX) 0.05 % external solution   No No   Sig: Scalp solution- apply to affected area once a day as needed   ketoconazole (NIZORAL) 2 % shampoo   No No   Sig: Apply 1 Application topically 4 (four) times a week   ketoconazole (NIZORAL) 2 % shampoo   No No   Sig: To the scalp leave on for 5 minutes then rinse off completely. Once a week   linaCLOtide 145 MCG CAPS   No No   Sig: Take 1 capsule  (145 mcg total) by mouth in the morning   lisinopril-hydrochlorothiazide (PRINZIDE,ZESTORETIC) 10-12.5 MG per tablet   No No   Sig: Take 1 tablet by mouth daily   metFORMIN (GLUCOPHAGE) 500 mg tablet   No No   Sig: Take 1 tablet (500 mg total) by mouth 2 (two) times a day with meals   mometasone (ELOCON) 0.1 % lotion   No No   Sig: Apply topically daily   omeprazole (PriLOSEC) 20 mg delayed release capsule   No No   Sig: Take 1 capsule (20 mg total) by mouth daily   risperiDONE (RisperDAL) 0.25 mg tablet   No No   Sig: Take 1 tablet (0.25 mg total) by mouth daily AND 2 tablets (0.5 mg total) daily at bedtime.   tamsulosin (FLOMAX) 0.4 mg   No No   Sig: Take 1 capsule (0.4 mg total) by mouth daily with dinner   triamcinolone (KENALOG) 0.1 % cream   No No   Sig: Apply under the breasts twice a day up to 2 weeks as needed for redness or itching      Facility-Administered Medications: None       Past Medical History:   Diagnosis Date    Colon polyps     Diabetes mellitus (HCC)     Dizziness     Last assessed: 8/31/15    DVT (deep venous thrombosis) (HCC)     Dysuria     Hematuria 2022    Hyperlipidemia     Hypertension     Hypertensive urgency 10/22/2021    Insomnia     Ureterolithiasis 2020       Past Surgical History:   Procedure Laterality Date     SECTION      1964 son, 1968 daughter    COLONOSCOPY      FL RETROGRADE PYELOGRAM  2020    KS COLONOSCOPY FLX DX W/COLLJ SPEC WHEN PFRMD N/A 3/27/2017    Procedure: COLONOSCOPY;  Surgeon: Gold Francis MD;  Location: [facility];  Service: Gastroenterology    KS CYSTO/URETERO W/LITHOTRIPSY &INDWELL STENT INSRT Right 2020    Procedure: CYSTOSCOPY URETEROSCOPY WITH LITHOTRIPSY HOLMIUM LASER, RETROGRADE PYELOGRAM AND INSERTION STENT URETERAL; EXCISION OF BLADDER TUMOR;  Surgeon: Edwin Love MD;  Location: [facility];  Service: Urology    ROTATOR CUFF REPAIR Left     Last assessed: 3/29/15    TONSILLECTOMY         Family History   Problem  Relation Age of Onset    Depression Mother         w/anxiety    Diabetes Mother         Mellitus    Breast cancer Mother     Hypertension Mother     No Known Problems Father     Depression Sister         w/anxiety    Heart disease Sister         Cardiac Disorder    Breast cancer Sister     Hypertension Sister     Diabetes Brother         Mellitus    Alcohol abuse Son      I have reviewed and agree with the history as documented.    E-Cigarette/Vaping    E-Cigarette Use Never User      E-Cigarette/Vaping Substances    Nicotine No     THC No     CBD No     Flavoring No     Other No     Unknown No      Social History     Tobacco Use    Smoking status: Never    Smokeless tobacco: Never   Vaping Use    Vaping status: Never Used   Substance Use Topics    Alcohol use: Never    Drug use: No       Review of Systems   Constitutional:  Negative for chills, diaphoresis and fever.   Respiratory:  Negative for cough, shortness of breath, wheezing and stridor.    Cardiovascular:  Negative for chest pain, palpitations and leg swelling.   Gastrointestinal:  Positive for constipation. Negative for abdominal pain, blood in stool, diarrhea, nausea and vomiting.   Genitourinary:  Negative for dysuria, frequency and urgency.   Musculoskeletal:  Negative for neck stiffness.   Skin:  Negative for pallor and rash.   Neurological:  Negative for dizziness, syncope, weakness, light-headedness and headaches.   All other systems reviewed and are negative.      Physical Exam  Physical Exam  Vitals reviewed.   Constitutional:       Appearance: Normal appearance. She is well-developed.   HENT:      Head: Normocephalic and atraumatic.   Eyes:      Extraocular Movements: Extraocular movements intact.      Pupils: Pupils are equal, round, and reactive to light.   Cardiovascular:      Rate and Rhythm: Normal rate and regular rhythm.      Heart sounds: Normal heart sounds.   Pulmonary:      Effort: Pulmonary effort is normal. No respiratory distress.       Breath sounds: Normal breath sounds.   Abdominal:      General: Bowel sounds are normal.      Palpations: Abdomen is soft.      Tenderness: There is no abdominal tenderness.   Musculoskeletal:         General: No swelling or tenderness. Normal range of motion.      Cervical back: Normal range of motion and neck supple.   Skin:     General: Skin is warm and dry.      Capillary Refill: Capillary refill takes less than 2 seconds.   Neurological:      General: No focal deficit present.      Mental Status: She is alert and oriented to person, place, and time.         Vital Signs  ED Triage Vitals [02/16/24 0921]   Temperature Pulse Respirations Blood Pressure SpO2   97.9 °F (36.6 °C) 91 20 (!) 184/81 99 %      Temp Source Heart Rate Source Patient Position - Orthostatic VS BP Location FiO2 (%)   Oral Monitor Sitting Right arm --      Pain Score       8           Vitals:    02/16/24 1400 02/16/24 1426 02/16/24 1430 02/16/24 1500   BP: 163/67 152/67 160/67 153/70   Pulse: 80 81 81 83   Patient Position - Orthostatic VS:  Sitting           Visual Acuity      ED Medications  Medications   iohexol (OMNIPAQUE) 350 MG/ML injection (MULTI-DOSE) 80 mL (80 mL Intravenous Given 2/16/24 1120)   polyethylene glycol (MIRALAX) packet 17 g (17 g Oral Given 2/16/24 1343)   sodium phosphate-biphosphate (FLEET) enema 1 enema (1 enema Rectal Given 2/16/24 1343)       Diagnostic Studies  Results Reviewed       Procedure Component Value Units Date/Time    Basic metabolic panel [[id number]]  (Abnormal) Collected: 02/16/24 1018    Lab Status: Final result Specimen: Blood from Arm, Right Updated: 02/16/24 1113     Sodium 137 mmol/L      Potassium 4.0 mmol/L      Chloride 103 mmol/L      CO2 24 mmol/L      ANION GAP 10 mmol/L      BUN 27 mg/dL      Creatinine 0.96 mg/dL      Glucose 137 mg/dL      Calcium 9.5 mg/dL      eGFR 54 ml/min/1.73sq m     Narrative:      National Kidney Disease Foundation guidelines for Chronic Kidney Disease (CKD):      Stage 1 with normal or high GFR (GFR > 90 mL/min/1.73 square meters)    Stage 2 Mild CKD (GFR = 60-89 mL/min/1.73 square meters)    Stage 3A Moderate CKD (GFR = 45-59 mL/min/1.73 square meters)    Stage 3B Moderate CKD (GFR = 30-44 mL/min/1.73 square meters)    Stage 4 Severe CKD (GFR = 15-29 mL/min/1.73 square meters)    Stage 5 End Stage CKD (GFR <15 mL/min/1.73 square meters)  Note: GFR calculation is accurate only with a steady state creatinine    CBC and differential [[id number]]  (Abnormal) Collected: 02/16/24 1018    Lab Status: Final result Specimen: Blood from Arm, Right Updated: 02/16/24 1029     WBC 9.72 Thousand/uL      RBC 3.90 Million/uL      Hemoglobin 11.4 g/dL      Hematocrit 35.4 %      MCV 91 fL      MCH 29.2 pg      MCHC 32.2 g/dL      RDW 13.5 %      MPV 10.2 fL      Platelets 212 Thousands/uL      nRBC 0 /100 WBCs      Neutrophils Relative 89 %      Immat GRANS % 0 %      Lymphocytes Relative 6 %      Monocytes Relative 3 %      Eosinophils Relative 1 %      Basophils Relative 1 %      Neutrophils Absolute 8.71 Thousands/µL      Immature Grans Absolute 0.03 Thousand/uL      Lymphocytes Absolute 0.60 Thousands/µL      Monocytes Absolute 0.28 Thousand/µL      Eosinophils Absolute 0.05 Thousand/µL      Basophils Absolute 0.05 Thousands/µL                    CT abdomen pelvis with contrast   Final Result by Nasreen Golden MD (02/16 1321)   Rectum distended with hard stool. Mild rectal wall thickening and surrounding stranding. Mild presacral edema. This is concerning for fecal impaction and stercoral proctitis. No abscess or perforation.   Fecal retention throughout the colon.   Mild hepatomegaly.   Chronic findings, as per the body of the report.            Workstation performed: [id number]                    Procedures  Procedures         ED Course                               SBIRT 22yo+      Flowsheet Row Most Recent Value   Initial Alcohol Screen: US AUDIT-C     1. How often do you have a  drink containing alcohol? 0 Filed at: 02/16/2024 0927   2. How many drinks containing alcohol do you have on a typical day you are drinking?  0 Filed at: 02/16/2024 0927   3b. FEMALE Any Age, or MALE 65+: How often do you have 4 or more drinks on one occassion? 0 Filed at: 02/16/2024 0927   Audit-C Score 0 Filed at: 02/16/2024 0927   ELROY: How many times in the past year have you...    Used an illegal drug or used a prescription medication for non-medical reasons? Never Filed at: 02/16/2024 0927                      Medical Decision Making  83-year-old with constipation versus impaction versus sterile proctitis versus obstruction.  Will get CT.  Check electrolytes and CBC.    Patient received an enema and MiraLAX, had a large bowel movement in the ER.  Abdomen soft nontender.  To follow-up as outpatient.  Patient and daughter in agreement with plan.    Amount and/or Complexity of Data Reviewed  Labs: ordered.  Radiology: ordered.    Risk  OTC drugs.  Prescription drug management.             Disposition  Final diagnoses:   Renal cyst, left   Hepatomegaly   Constipation     Time reflects when diagnosis was documented in both MDM as applicable and the Disposition within this note       Time User Action Codes Description Comment    2/16/2024  2:55 PM Piper Nolan Add [N28.1] Renal cyst, left     2/16/2024  2:55 PM Piper Nolan Add [R16.0] Hepatomegaly     2/16/2024  2:55 PM AdebayoevPiper joyner Add [K59.00] Constipation           ED Disposition       ED Disposition   Discharge    Condition   Stable    Date/Time   Fri Feb 16, 2024 1501    Comment   Sandra Peña discharge to home/self care.                   Follow-up Information       Follow up With Specialties Details Why Contact Info Additional Information    Ramin De Paz, DO Family Medicine Schedule an appointment as soon as possible for a visit  re-evaluation [address]  [facility] [address] [phone]       [facility] Emergency  Department Emergency Medicine  If symptoms worsen [address] [address]  [phone] [facility] Emergency Department, [address], [place], [place], [address]    Gold Francis MD Gastroenterology Schedule an appointment as soon as possible for a visit  constipation [address]  [address]  [facility] [address]  [phone]       [facility] [place] [facility] Schedule an appointment as soon as possible for a visit  kidney cyst [address]  [facility] [id number] [phone] [facility] [place], [address], [place], [place], [id number] [phone]            Discharge Medication List as of 2/16/2024  3:03 PM        START taking these medications    Details   polyethylene glycol (MIRALAX) 17 g packet Take 17 g by mouth daily for 7 days, Starting Fri 2/16/2024, Until Fri 2/23/2024, Normal           CONTINUE these medications which have NOT CHANGED    Details   aspirin 81 MG tablet Take 81 mg by mouth daily, Historical Med      atorvastatin (LIPITOR) 10 mg tablet Take 1 tablet (10 mg total) by mouth daily, Starting Mon 1/8/2024, Normal      clonazePAM (KlonoPIN) 0.5 mg tablet Take 1 tablet (0.5 mg total) by mouth 2 (two) times a day Do not start before February 19, 2024., Starting Mon 2/19/2024, Until Fri 4/19/2024, Normal      docusate sodium (COLACE) 100 mg capsule Take 100 mg by mouth 2 (two) times a day, Historical Med      fluocinonide (LIDEX) 0.05 % external solution Scalp solution- apply to affected area once a day as needed, Normal      !! FLUoxetine (PROzac) 20 mg capsule Take 1 capsule (20 mg total) by mouth daily Combine with your 40mg for a total of 60 daily, Starting Wed 2/7/2024, Until Sun 4/7/2024, Normal      !! FLUoxetine (PROzac) 40 MG capsule Take 1 capsule (40 mg total) by mouth daily Combine with your 20mg for a total of 60 daily, Starting Wed 2/7/2024,  Until Sun 4/7/2024, Normal      !! ketoconazole (NIZORAL) 2 % shampoo Apply 1 Application topically 4 (four) times a week, Starting Tue 10/17/2023, Normal      !! ketoconazole (NIZORAL) 2 % shampoo To the scalp leave on for 5 minutes then rinse off completely. Once a week, Normal      linaCLOtide 145 MCG CAPS Take 1 capsule (145 mcg total) by mouth in the morning, Starting Thu 6/8/2023, Normal      lisinopril-hydrochlorothiazide (PRINZIDE,ZESTORETIC) 10-12.5 MG per tablet Take 1 tablet by mouth daily, Starting Mon 10/16/2023, Normal      metFORMIN (GLUCOPHAGE) 500 mg tablet Take 1 tablet (500 mg total) by mouth 2 (two) times a day with meals, Starting Tue 1/2/2024, Normal      mometasone (ELOCON) 0.1 % lotion Apply topically daily, Starting Fri 11/24/2023, Normal      omeprazole (PriLOSEC) 20 mg delayed release capsule Take 1 capsule (20 mg total) by mouth daily, Starting Thu 11/16/2023, Normal      risperiDONE (RisperDAL) 0.25 mg tablet Multiple Dosages:Starting Wed 2/7/2024, Until Sun 4/7/2024 at 2359Take 1 tablet (0.25 mg total) by mouth daily AND 2 tablets (0.5 mg total) daily at bedtime., Normal      tamsulosin (FLOMAX) 0.4 mg Take 1 capsule (0.4 mg total) by mouth daily with dinner, Starting Wed 6/14/2023, Normal      triamcinolone (KENALOG) 0.1 % cream Apply under the breasts twice a day up to 2 weeks as needed for redness or itching, Normal       !! - Potential duplicate medications found. Please discuss with provider.              PDMP Review         Value Time User    PDMP Reviewed  Yes 2/7/2024 10:58 AM Ede Antony DO ED Provider  Electronically Signed by             Piper Nolan MD  02/16/24 3957

## 2024-02-21 PROBLEM — Z12.39 BREAST CANCER SCREENING: Status: RESOLVED | Noted: 2018-04-03 | Resolved: 2024-02-21

## 2024-02-21 PROBLEM — Z00.00 MEDICARE ANNUAL WELLNESS VISIT, SUBSEQUENT: Status: RESOLVED | Noted: 2019-11-01 | Resolved: 2024-02-21

## 2024-02-27 ENCOUNTER — OFFICE VISIT (OUTPATIENT)
Dept: FAMILY MEDICINE CLINIC | Facility: CLINIC | Age: 84
End: 2024-02-27
Payer: MEDICARE

## 2024-02-27 VITALS
HEIGHT: 56 IN | BODY MASS INDEX: 26.1 KG/M2 | WEIGHT: 116 LBS | HEART RATE: 78 BPM | RESPIRATION RATE: 16 BRPM | DIASTOLIC BLOOD PRESSURE: 68 MMHG | SYSTOLIC BLOOD PRESSURE: 132 MMHG | OXYGEN SATURATION: 97 %

## 2024-02-27 DIAGNOSIS — F33.1 MAJOR DEPRESSIVE DISORDER, RECURRENT, MODERATE (HCC): ICD-10-CM

## 2024-02-27 DIAGNOSIS — E11.9 TYPE 2 DIABETES MELLITUS WITHOUT COMPLICATION, WITHOUT LONG-TERM CURRENT USE OF INSULIN (HCC): ICD-10-CM

## 2024-02-27 DIAGNOSIS — R26.89 BALANCE PROBLEM: ICD-10-CM

## 2024-02-27 DIAGNOSIS — I10 PRIMARY HYPERTENSION: Primary | ICD-10-CM

## 2024-02-27 DIAGNOSIS — F33.0 MAJOR DEPRESSIVE DISORDER, RECURRENT, MILD (HCC): ICD-10-CM

## 2024-02-27 DIAGNOSIS — K59.04 CHRONIC IDIOPATHIC CONSTIPATION: ICD-10-CM

## 2024-02-27 DIAGNOSIS — E78.2 MIXED HYPERLIPIDEMIA: ICD-10-CM

## 2024-02-27 DIAGNOSIS — F41.1 GENERALIZED ANXIETY DISORDER: ICD-10-CM

## 2024-02-27 PROCEDURE — 99215 OFFICE O/P EST HI 40 MIN: CPT | Performed by: FAMILY MEDICINE

## 2024-02-27 NOTE — ASSESSMENT & PLAN NOTE
In the past the patient was on BuSpar and Klonopin.  She was taken off of BuSpar and placed on gabapentin but then gabapentin was discontinued.  She is on respite all since January 2023 for her obsessive thoughts.  They are considering trying to get her off of clonazepam.  She has been on clonazepam for a very long time since her psychiatric hospitalization close to 10 years ago

## 2024-02-27 NOTE — ASSESSMENT & PLAN NOTE
Remains on Linzess 145 mcg once daily.  We did attempt to increase her Linzess at 1 time to 290 mcg and that was too much for her causing abdominal pain and diarrhea.  She was told that she is able to take MiraLAX.  I advised patient that if she has not had a bowel movement in 2 days then she can take MiraLAX.  She needs to make certain that she is drinking adequate amount of water on a daily basis.  She is scheduled for follow-up with gastroenterology in April

## 2024-02-27 NOTE — ASSESSMENT & PLAN NOTE
Remains on atorvastatin.  Goal LDL is less than 70.  Her LDL is 61.  Continue same.  Repeat lipid panel in 6 months

## 2024-02-27 NOTE — ASSESSMENT & PLAN NOTE
Psychiatry has been addressing this.  I did send message over to psychiatry.  She has a mouth tremor, slightly off balance and not quite as lucid as she has been in the past.  I believe this may be related to her atypical antipsychotic but this is being prescribed by psychiatry.

## 2024-02-27 NOTE — ASSESSMENT & PLAN NOTE
Lab Results   Component Value Date    HGBA1C 7.0 (H) 02/12/2024     Hemoglobin A1c at 7.0%.  No change.  Continue on metformin.  She is 83 years of age.  She is doing pretty well.  Repeat diabetic parameters in 6 months

## 2024-02-27 NOTE — PROGRESS NOTES
Subjective:      Patient ID: Sandra Peña is a 83 y.o. female.    83-year-old female presents with her daughter whom she lives with for follow-up of chronic conditions and review of recent hospitalizations including recent fall, recent episode of severe constipation.  Patient has been following up with psychiatry and they have been trying to make adjustments to her medication.  Her dose of Prozac was increased and then decreased after it was found that she was more lightheaded after the dose was increased.  I am unable to read psychiatry notes however her daughter was able to pull up the full psychiatry note through [facility] so I did read her last virtual visit with psychiatry resident on February 7.  Looking to possibly get her off of Klonopin.  She has been on Klonopin for many years and it has been highly effective even since she had psych hospitalization.  She has been on risperidone since January 2023 and they would like to keep her on risperidone due to her obsessive thoughts.  I reviewed my labs including her hemoglobin A1c, and urine for microalbumin, cholesterol.  All those things are doing very well.  Patient feels that she is not as steady on her feet, struggles to comprehend things that she is reading, has difficulty doing puzzles where in the past she had no issue with doing 1000 piece puzzle.  No longer seems to be constipated after having 7 days of MiraLAX in addition to her Linzess        Past Medical History:   Diagnosis Date   • Colon polyps    • Diabetes mellitus (HCC)    • Dizziness     Last assessed: 8/31/15   • DVT (deep venous thrombosis) ([facility])    • Dysuria    • Hematuria 4/21/2022   • Hyperlipidemia    • Hypertension    • Hypertensive urgency 10/22/2021   • Insomnia    • Ureterolithiasis 5/22/2020       Family History   Problem Relation Age of Onset   • Depression Mother         w/anxiety   • Diabetes Mother         Mellitus   • Breast cancer Mother    • Hypertension Mother    • No Known  Problems Father    • Depression Sister         w/anxiety   • Heart disease Sister         Cardiac Disorder   • Breast cancer Sister    • Hypertension Sister    • Diabetes Brother         Mellitus   • Alcohol abuse Son        Past Surgical History:   Procedure Laterality Date   •  SECTION      1964 son, 1968 daughter   • COLONOSCOPY     • FL RETROGRADE PYELOGRAM  2020   • WI COLONOSCOPY FLX DX W/COLLJ SPEC WHEN PFRMD N/A 3/27/2017    Procedure: COLONOSCOPY;  Surgeon: Gold Francis MD;  Location: [facility];  Service: Gastroenterology   • WI CYSTO/URETERO W/LITHOTRIPSY &INDWELL STENT INSRT Right 2020    Procedure: CYSTOSCOPY URETEROSCOPY WITH LITHOTRIPSY HOLMIUM LASER, RETROGRADE PYELOGRAM AND INSERTION STENT URETERAL; EXCISION OF BLADDER TUMOR;  Surgeon: Edwin Love MD;  Location: [facility];  Service: Urology   • ROTATOR CUFF REPAIR Left     Last assessed: 3/29/15   • TONSILLECTOMY          reports that she has never smoked. She has never used smokeless tobacco. She reports that she does not drink alcohol and does not use drugs.      Current Outpatient Medications:   •  aspirin 81 MG tablet, Take 81 mg by mouth daily, Disp: , Rfl:   •  atorvastatin (LIPITOR) 10 mg tablet, Take 1 tablet (10 mg total) by mouth daily, Disp: 90 tablet, Rfl: 0  •  clonazePAM (KlonoPIN) 0.5 mg tablet, Take 1 tablet (0.5 mg total) by mouth 2 (two) times a day Do not start before 2024., Disp: 60 tablet, Rfl: 1  •  docusate sodium (COLACE) 100 mg capsule, Take 100 mg by mouth 2 (two) times a day, Disp: , Rfl:   •  fluocinonide (LIDEX) 0.05 % external solution, Scalp solution- apply to affected area once a day as needed, Disp: 60 mL, Rfl: 2  •  FLUoxetine (PROzac) 20 mg capsule, Take 1 capsule (20 mg total) by mouth daily Combine with your 40mg for a total of 60 daily, Disp: 30 capsule, Rfl: 1  •  FLUoxetine (PROzac) 40 MG capsule, Take 1 capsule (40 mg total) by mouth daily Combine with your 20mg for a  total of 60 daily, Disp: 30 capsule, Rfl: 1  •  ketoconazole (NIZORAL) 2 % shampoo, Apply 1 Application topically 4 (four) times a week, Disp: 120 mL, Rfl: 1  •  ketoconazole (NIZORAL) 2 % shampoo, To the scalp leave on for 5 minutes then rinse off completely. Once a week, Disp: 120 mL, Rfl: 6  •  linaCLOtide 145 MCG CAPS, Take 1 capsule (145 mcg total) by mouth in the morning, Disp: 90 capsule, Rfl: 3  •  lisinopril-hydrochlorothiazide (PRINZIDE,ZESTORETIC) 10-12.5 MG per tablet, Take 1 tablet by mouth daily, Disp: 90 tablet, Rfl: 3  •  metFORMIN (GLUCOPHAGE) 500 mg tablet, Take 1 tablet (500 mg total) by mouth 2 (two) times a day with meals, Disp: 180 tablet, Rfl: 1  •  mometasone (ELOCON) 0.1 % lotion, Apply topically daily, Disp: 60 mL, Rfl: 0  •  omeprazole (PriLOSEC) 20 mg delayed release capsule, Take 1 capsule (20 mg total) by mouth daily, Disp: 30 capsule, Rfl: 0  •  risperiDONE (RisperDAL) 0.25 mg tablet, Take 1 tablet (0.25 mg total) by mouth daily AND 2 tablets (0.5 mg total) daily at bedtime., Disp: 90 tablet, Rfl: 1  •  tamsulosin (FLOMAX) 0.4 mg, Take 1 capsule (0.4 mg total) by mouth daily with dinner, Disp: 30 capsule, Rfl: 0  •  triamcinolone (KENALOG) 0.1 % cream, Apply under the breasts twice a day up to 2 weeks as needed for redness or itching, Disp: 80 g, Rfl: 1  •  polyethylene glycol (MIRALAX) 17 g packet, Take 17 g by mouth daily for 7 days, Disp: 119 g, Rfl: 0    The following portions of the patient's history were reviewed and updated as appropriate: allergies, current medications, past family history, past medical history, past social history, past surgical history and problem list.    Review of Systems   Constitutional:  Negative for fatigue and unexpected weight change.   HENT: Negative.     Eyes: Negative.    Respiratory: Negative.     Cardiovascular: Negative.    Gastrointestinal:  Positive for constipation.   Endocrine: Negative.    Genitourinary: Negative.    Musculoskeletal:   "Positive for gait problem ( unsteady on her feet).   Skin:         Dry itchy scalp   Allergic/Immunologic: Negative.    Neurological:  Positive for dizziness and tremors. Negative for facial asymmetry.   Hematological: Negative.    Psychiatric/Behavioral:  Positive for decreased concentration. Negative for behavioral problems and sleep disturbance. The patient is not nervous/anxious.            Objective:    /68   Pulse 78   Resp 16   Ht 4' 8\" (1.422 m)   Wt 52.6 kg (116 lb)   SpO2 97%   BMI 26.01 kg/m²      Physical Exam  Vitals and nursing note reviewed.   Constitutional:       General: She is not in acute distress.     Appearance: Normal appearance. She is well-developed and normal weight. She is not diaphoretic.   HENT:      Head:      Comments: Laceration above the left eyebrow is healing.  There is slight ecchymoses     Nose: Nose normal.   Eyes:      Conjunctiva/sclera: Conjunctivae normal.      Pupils: Pupils are equal, round, and reactive to light.   Cardiovascular:      Rate and Rhythm: Normal rate and regular rhythm.      Heart sounds: Normal heart sounds. No murmur heard.  Pulmonary:      Effort: Pulmonary effort is normal.      Breath sounds: Normal breath sounds.   Abdominal:      General: Bowel sounds are normal.      Palpations: Abdomen is soft.   Musculoskeletal:         General: Normal range of motion.      Cervical back: Normal range of motion and neck supple.      Right lower leg: No edema.      Left lower leg: No edema.   Skin:     General: Skin is warm and dry.      Findings: No rash.   Neurological:      General: No focal deficit present.      Mental Status: She is alert and oriented to person, place, and time. Mental status is at baseline.      Deep Tendon Reflexes: Reflexes are normal and symmetric.      Comments: Is displaying a mouth tremor which she has never had before   Psychiatric:         Mood and Affect: Mood is not depressed.         Behavior: Behavior normal.         " Thought Content: Thought content normal.         Judgment: Judgment normal.      Comments: Patient is not quite as sharp or as lucid.  She does seem to search for words when she is speaking           Recent Results (from the past 1008 hour(s))   ECG 12 lead    Collection Time: 02/01/24  4:39 PM   Result Value Ref Range    Ventricular Rate 88 BPM    Atrial Rate 88 BPM    AZ Interval 158 ms    QRSD Interval 60 ms    QT Interval 474 ms    QTC Interval 573 ms    P Axis 23 degrees    QRS Axis 8 degrees    T Wave [place] 53 degrees   Urinalysis with microscopic    Collection Time: 02/12/24  8:29 AM   Result Value Ref Range    Color, UA Light Yellow     Clarity, UA Clear     Specific Gravity, UA 1.017 1.003 - 1.030    pH, UA 5.5 4.5, 5.0, 5.5, 6.0, 6.5, 7.0, 7.5, 8.0    Leukocytes, UA Negative Negative    Nitrite, UA Negative Negative    Protein, UA Negative Negative mg/dl    Glucose, UA Negative Negative mg/dl    Ketones, UA Negative Negative mg/dl    Urobilinogen, UA <2.0 <2.0 mg/dl mg/dl    Bilirubin, UA Negative Negative    Occult Blood, UA Negative Negative    RBC, UA 1-2 None Seen, 1-2 /hpf    WBC, UA None Seen None Seen, 1-2 /hpf    Epithelial Cells None Seen None Seen, Occasional /hpf    Bacteria, UA Occasional None Seen, Occasional /hpf   Urine culture    Collection Time: 02/12/24  8:29 AM    Specimen: Urine, Clean Catch   Result Value Ref Range    Urine Culture No Growth <1000 cfu/mL    CBC and differential    Collection Time: 02/12/24  8:29 AM   Result Value Ref Range    WBC 4.86 4.31 - 10.16 Thousand/uL    RBC 3.80 (L) 3.81 - 5.12 Million/uL    Hemoglobin 10.9 (L) 11.5 - 15.4 g/dL    Hematocrit 34.7 (L) 34.8 - 46.1 %    MCV 91 82 - 98 fL    MCH 28.7 26.8 - 34.3 pg    MCHC 31.4 31.4 - 37.4 g/dL    RDW 13.3 11.6 - 15.1 %    MPV 10.9 8.9 - 12.7 fL    Platelets 242 149 - 390 Thousands/uL    nRBC 0 /100 WBCs    Neutrophils Relative 63 43 - 75 %    Immat GRANS % 0 0 - 2 %    Lymphocytes Relative 26 14 - 44 %     Monocytes Relative 6 4 - 12 %    Eosinophils Relative 4 0 - 6 %    Basophils Relative 1 0 - 1 %    Neutrophils Absolute 3.10 1.85 - 7.62 Thousands/µL    Immature Grans Absolute 0.01 0.00 - 0.20 Thousand/uL    Lymphocytes Absolute 1.25 0.60 - 4.47 Thousands/µL    Monocytes Absolute 0.27 0.17 - 1.22 Thousand/µL    Eosinophils Absolute 0.18 0.00 - 0.61 Thousand/µL    Basophils Absolute 0.05 0.00 - 0.10 Thousands/µL   Comprehensive metabolic panel    Collection Time: 02/12/24  8:29 AM   Result Value Ref Range    Sodium 139 135 - 147 mmol/L    Potassium 4.3 3.5 - 5.3 mmol/L    Chloride 103 96 - 108 mmol/L    CO2 28 21 - 32 mmol/L    ANION GAP 8 mmol/L    BUN 31 (H) 5 - 25 mg/dL    Creatinine 0.93 0.60 - 1.30 mg/dL    Glucose, Fasting 99 65 - 99 mg/dL    Calcium 9.6 8.4 - 10.2 mg/dL    AST 17 13 - 39 U/L    ALT 14 7 - 52 U/L    Alkaline Phosphatase 52 34 - 104 U/L    Total Protein 6.6 6.4 - 8.4 g/dL    Albumin 4.0 3.5 - 5.0 g/dL    Total Bilirubin 0.60 0.20 - 1.00 mg/dL    eGFR 56 ml/min/1.73sq m   Hemoglobin A1C    Collection Time: 02/12/24  8:29 AM   Result Value Ref Range    Hemoglobin A1C 7.0 (H) Normal 4.0-5.6%; PreDiabetic 5.7-6.4%; Diabetic >=6.5%; Glycemic control for adults with diabetes <7.0% %     mg/dl   Lipid panel    Collection Time: 02/12/24  8:29 AM   Result Value Ref Range    Cholesterol 135 See Comment mg/dL    Triglycerides 89 See Comment mg/dL    HDL, Direct 56 >=50 mg/dL    LDL Calculated 61 0 - 100 mg/dL    Non-HDL-Chol (CHOL-HDL) 79 mg/dl   Albumin / creatinine urine ratio    Collection Time: 02/12/24  8:29 AM   Result Value Ref Range    Creatinine, Ur 81.1 Reference range not established. mg/dL    Albumin,U,Random 12.9 <20.0 mg/L    Albumin Creat Ratio 16 0 - 30 mg/g creatinine   TSH, 3rd generation with Free T4 reflex    Collection Time: 02/12/24  8:29 AM   Result Value Ref Range    TSH 3RD GENERATON 1.649 0.450 - 4.500 uIU/mL   CBC and differential    Collection Time: 02/16/24 10:18 AM    Result Value Ref Range    WBC 9.72 4.31 - 10.16 Thousand/uL    RBC 3.90 3.81 - 5.12 Million/uL    Hemoglobin 11.4 (L) 11.5 - 15.4 g/dL    Hematocrit 35.4 34.8 - 46.1 %    MCV 91 82 - 98 fL    MCH 29.2 26.8 - 34.3 pg    MCHC 32.2 31.4 - 37.4 g/dL    RDW 13.5 11.6 - 15.1 %    MPV 10.2 8.9 - 12.7 fL    Platelets 212 149 - 390 Thousands/uL    nRBC 0 /100 WBCs    Neutrophils Relative 89 (H) 43 - 75 %    Immat GRANS % 0 0 - 2 %    Lymphocytes Relative 6 (L) 14 - 44 %    Monocytes Relative 3 (L) 4 - 12 %    Eosinophils Relative 1 0 - 6 %    Basophils Relative 1 0 - 1 %    Neutrophils Absolute 8.71 (H) 1.85 - 7.62 Thousands/µL    Immature Grans Absolute 0.03 0.00 - 0.20 Thousand/uL    Lymphocytes Absolute 0.60 0.60 - 4.47 Thousands/µL    Monocytes Absolute 0.28 0.17 - 1.22 Thousand/µL    Eosinophils Absolute 0.05 0.00 - 0.61 Thousand/µL    Basophils Absolute 0.05 0.00 - 0.10 Thousands/µL   Basic metabolic panel    Collection Time: 02/16/24 10:18 AM   Result Value Ref Range    Sodium 137 135 - 147 mmol/L    Potassium 4.0 3.5 - 5.3 mmol/L    Chloride 103 96 - 108 mmol/L    CO2 24 21 - 32 mmol/L    ANION GAP 10 mmol/L    BUN 27 (H) 5 - 25 mg/dL    Creatinine 0.96 0.60 - 1.30 mg/dL    Glucose 137 65 - 140 mg/dL    Calcium 9.5 8.4 - 10.2 mg/dL    eGFR 54 ml/min/1.73sq m       Assessment/Plan:    Chronic idiopathic constipation  Remains on Linzess 145 mcg once daily.  We did attempt to increase her Linzess at 1 time to 290 mcg and that was too much for her causing abdominal pain and diarrhea.  She was told that she is able to take MiraLAX.  I advised patient that if she has not had a bowel movement in 2 days then she can take MiraLAX.  She needs to make certain that she is drinking adequate amount of water on a daily basis.  She is scheduled for follow-up with gastroenterology in April    Type 2 diabetes mellitus without complication, without long-term current use of insulin (HCC)    Lab Results   Component Value Date    HGBA1C  7.0 (H) 02/12/2024     Hemoglobin A1c at 7.0%.  No change.  Continue on metformin.  She is 83 years of age.  She is doing pretty well.  Repeat diabetic parameters in 6 months    Primary hypertension  Well-controlled on lisinopril/hydrochlorothiazide.  Continue same.  Refills are not necessary    Mixed hyperlipidemia  Remains on atorvastatin.  Goal LDL is less than 70.  Her LDL is 61.  Continue same.  Repeat lipid panel in 6 months    Major depressive disorder, recurrent, moderate (HCC)  Psychiatry has been addressing this.  I did send message over to psychiatry.  She has a mouth tremor, slightly off balance and not quite as lucid as she has been in the past.  I believe this may be related to her atypical antipsychotic but this is being prescribed by psychiatry.    Generalized anxiety disorder  In the past the patient was on BuSpar and Klonopin.  She was taken off of BuSpar and placed on gabapentin but then gabapentin was discontinued.  She is on respite all since January 2023 for her obsessive thoughts.  They are considering trying to get her off of clonazepam.  She has been on clonazepam for a very long time since her psychiatric hospitalization close to 10 years ago    Balance problem  Off of gabapentin but on Risperdal.  May just be an aspect of her aging.  Will send over to PT for balance therapy          Problem List Items Addressed This Visit        Digestive    Chronic idiopathic constipation     Remains on Linzess 145 mcg once daily.  We did attempt to increase her Linzess at 1 time to 290 mcg and that was too much for her causing abdominal pain and diarrhea.  She was told that she is able to take MiraLAX.  I advised patient that if she has not had a bowel movement in 2 days then she can take MiraLAX.  She needs to make certain that she is drinking adequate amount of water on a daily basis.  She is scheduled for follow-up with gastroenterology in April            Endocrine    Type 2 diabetes mellitus without  complication, without long-term current use of insulin (HCC)       Lab Results   Component Value Date    HGBA1C 7.0 (H) 02/12/2024     Hemoglobin A1c at 7.0%.  No change.  Continue on metformin.  She is 83 years of age.  She is doing pretty well.  Repeat diabetic parameters in 6 months         Relevant Orders    Albumin / creatinine urine ratio    Hemoglobin A1C       Cardiovascular and Mediastinum    Primary hypertension - Primary     Well-controlled on lisinopril/hydrochlorothiazide.  Continue same.  Refills are not necessary         Relevant Orders    CBC and differential       Other    Balance problem     Off of gabapentin but on Risperdal.  May just be an aspect of her aging.  Will send over to PT for balance therapy         Relevant Orders    Ambulatory Referral to Physical Therapy    Generalized anxiety disorder     In the past the patient was on BuSpar and Klonopin.  She was taken off of BuSpar and placed on gabapentin but then gabapentin was discontinued.  She is on respite all since January 2023 for her obsessive thoughts.  They are considering trying to get her off of clonazepam.  She has been on clonazepam for a very long time since her psychiatric hospitalization close to 10 years ago         Relevant Orders    TSH, 3rd generation with Free T4 reflex    RESOLVED: Major depressive disorder, recurrent, mild (HCC)    Major depressive disorder, recurrent, moderate (HCC)     Psychiatry has been addressing this.  I did send message over to psychiatry.  She has a mouth tremor, slightly off balance and not quite as lucid as she has been in the past.  I believe this may be related to her atypical antipsychotic but this is being prescribed by psychiatry.         Mixed hyperlipidemia     Remains on atorvastatin.  Goal LDL is less than 70.  Her LDL is 61.  Continue same.  Repeat lipid panel in 6 months         Relevant Orders    Comprehensive metabolic panel    Lipid panel       I have spent a total time of 44  minutes on 02/27/24 in caring for this patient including Diagnostic results, Prognosis, Risks and benefits of tx options, Instructions for management, Patient and family education, Importance of tx compliance, Risk factor reductions, Impressions, Counseling / Coordination of care, Documenting in the medical record, Reviewing / ordering tests, medicine, procedures  , Obtaining or reviewing history  , and Communicating with other healthcare professionals .

## 2024-02-27 NOTE — ASSESSMENT & PLAN NOTE
Off of gabapentin but on Risperdal.  May just be an aspect of her aging.  Will send over to PT for balance therapy

## 2024-03-05 ENCOUNTER — EVALUATION (OUTPATIENT)
Dept: PHYSICAL THERAPY | Facility: CLINIC | Age: 84
End: 2024-03-05
Payer: MEDICARE

## 2024-03-05 DIAGNOSIS — Z74.09 IMPAIRED FUNCTIONAL MOBILITY, BALANCE, GAIT, AND ENDURANCE: ICD-10-CM

## 2024-03-05 DIAGNOSIS — R26.89 BALANCE PROBLEM: Primary | ICD-10-CM

## 2024-03-05 PROCEDURE — 97112 NEUROMUSCULAR REEDUCATION: CPT | Performed by: PHYSICAL THERAPIST

## 2024-03-05 PROCEDURE — 97162 PT EVAL MOD COMPLEX 30 MIN: CPT | Performed by: PHYSICAL THERAPIST

## 2024-03-05 NOTE — PROGRESS NOTES
PT Evaluation     Today's date: 3/5/2024  Patient name: Sandra Peña  : 1940  MRN: [id number]  Referring provider: Ramin De Paz DO  Dx:   Encounter Diagnosis     ICD-10-CM    1. Balance problem  R26.89 Ambulatory Referral to Physical Therapy      2. Impaired functional mobility, balance, gait, and endurance  Z74.09           Start Time: 08  Stop Time: 08  Total time in clinic (min): 40 minutes    Assessment  Assessment details: Sandra Peña is a pleasant 83 y.o. female who presents with chronic gait and balance dysfunction.  The patient's greatest concerns are worry over not knowing what's wrong, concern at no signs of improvement, and fear of not being able to keep active.      No further referral appears necessary at this time based upon examination results.    Primary movement impairment diagnosis of impaired functional mobility, balance, and endurance resulting in pathoanatomical symptoms of balance dysfunction and limiting her ability to exercise or recreation, get out of a chair, perform household chores, stand, and walk.    Primary Impairments:  1) Decreased mm strength of the LE's   2) Impaired static and dynamic balance     Etiologic factors include none recalled by the patient.    Impairments: abnormal or restricted ROM, activity intolerance, impaired balance, impaired physical strength, lacks appropriate home exercise program, pain with function and poor body mechanics    Symptom irritability: moderateUnderstanding of Dx/Px/POC: good   Prognosis: good  Prognosis details: Positive prognostic indicators include positive attitude toward recovery and good understanding of diagnosis and treatment plan options.  Negative prognostic indicators include chronicity of symptoms.      Goals  Patient will be independent with home exercise program.   Patient will be able to manage symptoms independently.     Short Term Goals 2-4 wks   1. Pt will be independent with initial HEP   2. Pt will decrease  TUG score by 3 seconds   3. Pt will improve deficient mm strength by 1/2 grade     Long Term Goals 6-8 wks  1. Pt will increase 2MWT by >50 feet   2. Pt will decrease 5xSTS by 5-7 seconds   3. Pt will improve FOTO score to greater than expected by d/c      Plan  Patient would benefit from: skilled physical therapy  Planned modality interventions: Modalities PRN.  Planned therapy interventions: activity modification, manual therapy, neuromuscular re-education, patient education, therapeutic activities, therapeutic exercise, graded activity, home exercise program, behavior modification, self care and balance  Frequency: 2x week  Duration in weeks: 6  Plan of Care expiration date: 4/16/2024  Treatment plan discussed with: patient        Subjective Evaluation    History of Present Illness  Mechanism of injury: Sandra Peña presents with c/c of chronic balance disorder and recent falls. Pt had recent falls on 2/1 and 2/16, and was taken to ED both times. Imaging performed and was (-) for acute osseous injury or internal bleeding. Pt unsure of exact cause of falls, however, notes she feels she may not pick her feet up enough at times and also reports getting lightheaded with transfers and anxious with walking. Pt lives with her daughter that can assist as needed. Pt lives in 2 story house, half bath first floor, bed and full bath on second floor, with 6+6 steps to second floor  Pain location: no pain reported   Aggravating factors: transfers   Relieving factors: no specific activity   Imaging: CT Scan in ED after recent falls (no acute osseous injury, no internal bleeding)   Previous treatments: previous physical therapy  Occupation/recreation: retired   Sleeping: some difficulty returning to sleep after using bathroom at night   Patient concerns: improving mobility, improving function, improving strength, improving balance/stability, and remaining active  Special Questions: (-) Red flag screening            Objective      Strength/Myotome Testing     Left Wrist/Hand      (2nd hand position)     Trial 1: 20    Trial 2: 20    Trial 3: 20    Average: 20    Right Wrist/Hand      (2nd hand position)     Trial 1: 10    Trial 2: 12    Trial 3: 12    Average: 11.33    Left Hip   Planes of Motion   Flexion: 4  Extension: 4  Abduction: 4  Adduction: 4+    Right Hip   Planes of Motion   Flexion: 4  Extension: 4  Abduction: 4  Adduction: 4+    Left Knee   Flexion: 4  Extension: 4+    Right Knee   Flexion: 4  Extension: 4+    Left Ankle/Foot   Dorsiflexion: 4  Plantar flexion: 4    Right Ankle/Foot   Dorsiflexion: 4  Plantar flexion: 4    Additional Strength Details  Diffuse pain in the left knee with MMT   Neuro Exam:     Functional outcomes   5x sit to stand: 39.44 (seconds)  2 minute walk test: 176 feet  TU.71 (seconds)  Functional outcome comment: Use of armrests to ascend and descend with 5xSTS testing              Diagnosis: Balance Disorder    Precautions: Hx of Falls, Anxiety    POC: 3/5-   Authorization: ROD    Access Code:  [place]   Visit Count 1 2 3 4 5   Manuals 3/5                                Neuro Re-Ed        EO/EC Firm        EO/EC Foam         Tandem Stance         Tandem Walk         Side Step         Jhony Walk                 Education  S/S, POC       There Ex         Active warm up        Standing Hip 3-way         Heel Raise         Hip Abd Iso         Hip Add Iso                       Ther Act             Squat         Step up         Lunge                                     Modalities

## 2024-03-07 ENCOUNTER — OFFICE VISIT (OUTPATIENT)
Dept: PHYSICAL THERAPY | Facility: CLINIC | Age: 84
End: 2024-03-07
Payer: MEDICARE

## 2024-03-07 DIAGNOSIS — R26.89 BALANCE PROBLEM: Primary | ICD-10-CM

## 2024-03-07 DIAGNOSIS — Z74.09 IMPAIRED FUNCTIONAL MOBILITY, BALANCE, GAIT, AND ENDURANCE: ICD-10-CM

## 2024-03-07 PROCEDURE — 97112 NEUROMUSCULAR REEDUCATION: CPT | Performed by: PHYSICAL THERAPIST

## 2024-03-07 PROCEDURE — 97110 THERAPEUTIC EXERCISES: CPT | Performed by: PHYSICAL THERAPIST

## 2024-03-07 NOTE — PROGRESS NOTES
"Daily Note     Today's date: 3/7/2024  Patient name: Sandra Peña  : 1940  MRN: [id number]  Referring provider: Ramin De Paz DO  Dx:   Encounter Diagnosis     ICD-10-CM    1. Balance problem  R26.89       2. Impaired functional mobility, balance, gait, and endurance  Z74.09           Start Time: 1215  Stop Time: 1257  Total time in clinic (min): 42 minutes    Subjective: Pt with no new concerns noted at this time.       Objective: See treatment diary below      Assessment: Initiated TE and balance program. Tolerated treatment well. No pain reported with exercises. Occasional cuing required for proper exercise technique. Pt provided with HEP with 4 exercises performed during today's session. Minor fatigue noted post tx consistent with exercises performed. Progress as tolerated. Patient would benefit from continued PT      Plan: Continue per plan of care.      Diagnosis: Balance Disorder    Precautions: Hx of Falls, Anxiety    POC: 3/5-   Authorization: [facility]    Access Code:  [email or web address]   Visit Count 1 2 3 4 5   Manuals 3/5  3/7                               Neuro Re-Ed        EO/EC Firm  3x 30\" EC       EO/EC Foam   3x 30\" EO   Airex       Tandem Stance         Tandem Walk   2x at bar       Side Step   2 laps on foam       Jhony Walk   3 laps 3 hurdles               Education  S/S, POC HEP       There Ex         Active warm up  NuStep L1 5'       Standing Hip 3-way   2x10 ea BL  HEP     Heel Raise   20x  HEP     Hip Abd Iso         Hip Add Iso                       Ther Act             Squat   2x10       Step up         Lunge                                     Modalities                                            "

## 2024-03-11 ENCOUNTER — TELEPHONE (OUTPATIENT)
Dept: PSYCHIATRY | Facility: CLINIC | Age: 84
End: 2024-03-11

## 2024-03-12 ENCOUNTER — OFFICE VISIT (OUTPATIENT)
Dept: PHYSICAL THERAPY | Facility: CLINIC | Age: 84
End: 2024-03-12
Payer: MEDICARE

## 2024-03-12 DIAGNOSIS — Z74.09 IMPAIRED FUNCTIONAL MOBILITY, BALANCE, GAIT, AND ENDURANCE: ICD-10-CM

## 2024-03-12 DIAGNOSIS — R26.89 BALANCE PROBLEM: Primary | ICD-10-CM

## 2024-03-12 PROCEDURE — 97110 THERAPEUTIC EXERCISES: CPT

## 2024-03-12 PROCEDURE — 97112 NEUROMUSCULAR REEDUCATION: CPT

## 2024-03-12 NOTE — PROGRESS NOTES
"Daily Note     Today's date: 3/12/2024  Patient name: Sandra Peña  : 1940  MRN: [id number]  Referring provider: Ramin De Paz DO  Dx:   Encounter Diagnosis     ICD-10-CM    1. Balance problem  R26.89       2. Impaired functional mobility, balance, gait, and endurance  Z74.09                      Subjective: Pt reports mild fatigue following last session.       Objective: See treatment diary below      Assessment: Tolerated treatment well. Patient would benefit from continued PT.  Pt able to perform standing balance on air ex w/out UE assist.  Pt gets anxious when performing side stepping on foam; however, pt able to complete well w/ UE assist on railing. Cues needed w/ mini squats for proper technique.  Pt reports min LE fatigue post tx.       Plan: Progress treatment as tolerated.       Diagnosis: Balance Disorder    Precautions: Hx of Falls, Anxiety    POC: 3/5-   Authorization: [facility]    Access Code:  [email or web address]   Visit Count 1 2 3 4 5   Manuals 3/5  3/7  3/12                             Neuro Re-Ed        EO/EC Firm  3x 30\" EC  3x30\" EC     EO/EC Foam   3x 30\" EO   Airex  3x30\" EO airex     Tandem Stance         Tandem Walk   2x at bar  NV     Side Step   2 laps on foam  2 laps on foam     Jhony Walk   3 laps 3 hurdles  2 laps, 3 hurdles             Education  S/S, POC HEP       There Ex         Active warm up  NuStep L1 5'  Nustep 5' lv 1     Standing Hip 3-way   2x10 ea BL  HEP     Heel Raise   20x  HEP     Hip Abd Iso    20x3\"     Hip Add Iso   20x3\"                    Ther Act             Squat   2x10  2x10     Step up         Lunge                                     Modalities                                              "

## 2024-03-12 NOTE — TELEPHONE ENCOUNTER
"Spoke with daughter Janay who is pt's caretaker. Daughter was curious if she could give old xanax to patient.    She was advised not to do this given the concerns for dependence and possible withdrawal as well as already being on klonopin and wanting her to eventually decrease use of benzodiazepines. Concerns for possible two medications on board with respiratory depression, etc. were discussed and to wait until appointment to discuss other as needed medications.     Daughter did note that the lip tremor seems to have occurred in the last year and describes it as a constant \"quiver.\"     No further questions or concerns voiced.   "

## 2024-03-15 ENCOUNTER — OFFICE VISIT (OUTPATIENT)
Dept: PHYSICAL THERAPY | Facility: CLINIC | Age: 84
End: 2024-03-15
Payer: MEDICARE

## 2024-03-15 DIAGNOSIS — R26.89 BALANCE PROBLEM: Primary | ICD-10-CM

## 2024-03-15 PROCEDURE — 97110 THERAPEUTIC EXERCISES: CPT

## 2024-03-15 PROCEDURE — 97112 NEUROMUSCULAR REEDUCATION: CPT

## 2024-03-15 NOTE — PROGRESS NOTES
"Daily Note     Today's date: 3/15/2024  Patient name: Sandra Peña  : 1940  MRN: [id number]  Referring provider: Ramin De Paz DO  Dx:   Encounter Diagnosis     ICD-10-CM    1. Balance problem  R26.89                      Subjective: Pt reports no new complaints.      Objective: See treatment diary below      Assessment: Tolerated treatment well. Patient exhibited good technique with therapeutic exercises and would benefit from continued PT.  Minimal movement/sway noted w/ standing balance w/out UE assist. Good tolerance to addition of LAQ. No significant fatigue noted post tx.       Plan: Progress treatment as tolerated.       Diagnosis: Balance Disorder    Precautions: Hx of Falls, Anxiety    POC: 3/5-   Authorization: [facility]    Access Code:  [email or web address]   Visit Count 1 2 3 4 5   Manuals 3/5  3/7  3/12 3/15                            Neuro Re-Ed        EO/EC Firm  3x 30\" EC  3x30\" EC 2x30\" EC     EO/EC Foam   3x 30\" EO   Airex  3x30\" EO airex 3x30\" EO airex    Tandem Stance         Tandem Walk   2x at bar  NV 2 laps    Side Step   2 laps on foam  2 laps on foam 2 laps on foam    Jhony Walk   3 laps 3 hurdles  2 laps, 3 hurdles 2 laps, 3 hurdles            Education  S/S, POC HEP       There Ex         Active warm up  NuStep L1 5'  Nustep 5' lv 1 Nustep 5' lv 1    Standing Hip 3-way   2x10 ea BL  HEP     Heel Raise   20x  HEP     Hip Abd Iso    20x3\" 20x3\"    Hip Add Iso   20x3\" 20x3\"    LAQ    10 ea                   Ther Act             Squat   2x10  2x10 2x10    Step up         Lunge                                     Modalities                                                "

## 2024-03-19 ENCOUNTER — OFFICE VISIT (OUTPATIENT)
Dept: PHYSICAL THERAPY | Facility: CLINIC | Age: 84
End: 2024-03-19
Payer: MEDICARE

## 2024-03-19 DIAGNOSIS — R26.89 BALANCE PROBLEM: Primary | ICD-10-CM

## 2024-03-19 DIAGNOSIS — Z74.09 IMPAIRED FUNCTIONAL MOBILITY, BALANCE, GAIT, AND ENDURANCE: ICD-10-CM

## 2024-03-19 PROCEDURE — 97110 THERAPEUTIC EXERCISES: CPT | Performed by: PHYSICAL THERAPIST

## 2024-03-19 PROCEDURE — 97112 NEUROMUSCULAR REEDUCATION: CPT | Performed by: PHYSICAL THERAPIST

## 2024-03-19 NOTE — PROGRESS NOTES
"Daily Note     Today's date: 3/19/2024  Patient name: Sandra Peña  : 1940  MRN: [id number]  Referring provider: Ramin De Paz DO  Dx:   Encounter Diagnosis     ICD-10-CM    1. Balance problem  R26.89       2. Impaired functional mobility, balance, gait, and endurance  Z74.09           Start Time: 1100  Stop Time: 1140  Total time in clinic (min): 40 minutes    Subjective: Pt with no new concerns noted at this time.       Objective: See treatment diary below      Assessment: Tolerated treatment well. Continued with current POC with good tolerance from the patient. Close supervision required with standing balance and walking exercises, however, no LOB noted. Positive response to Blaze Pod exercises and will increase at NV. Progress as tolerated. Patient would benefit from continued PT      Plan: Continue per plan of care.      Diagnosis: Balance Disorder    Precautions: Hx of Falls, Anxiety    POC: 3/5-   Authorization: [facility]    Access Code:  [email or web address]   Visit Count 1 2 3 4 5   Manuals 3/5  3/7  3/12 3/15                            Neuro Re-Ed        EO/EC Firm  3x 30\" EC  3x30\" EC 2x30\" EC  Blaze Pod Focused Reaction   2x30\" FT    EO/EC Foam   3x 30\" EO   Airex  3x30\" EO airex 3x30\" EO airex 2x30\" EC Airex    Tandem Stance         Tandem Walk   2x at bar  NV 2 laps 2 laps    Side Step   2 laps on foam  2 laps on foam 2 laps on foam 2 laps on foam    Jhony Walk   3 laps 3 hurdles  2 laps, 3 hurdles 2 laps, 3 hurdles 2 laps, 3 hurdles           Education  S/S, POC HEP       There Ex         Active warm up  NuStep L1 5'  Nustep 5' lv 1 Nustep 5' lv 1 NuStep L1 5'   Standing Hip 3-way   2x10 ea BL  HEP     Heel Raise   20x  HEP     Hip Abd Iso    20x3\" 20x3\" 20x3\"    Hip Add Iso   20x3\" 20x3\" 20x3\"    LAQ    10 ea 2x10 ea                   Ther Act             Squat   2x10  2x10 2x10 2x10    Step up         Lunge                                     Modalities                                                "

## 2024-03-21 ENCOUNTER — OFFICE VISIT (OUTPATIENT)
Dept: PHYSICAL THERAPY | Facility: CLINIC | Age: 84
End: 2024-03-21
Payer: MEDICARE

## 2024-03-21 DIAGNOSIS — R26.89 BALANCE PROBLEM: Primary | ICD-10-CM

## 2024-03-21 DIAGNOSIS — Z74.09 IMPAIRED FUNCTIONAL MOBILITY, BALANCE, GAIT, AND ENDURANCE: ICD-10-CM

## 2024-03-21 PROCEDURE — 97112 NEUROMUSCULAR REEDUCATION: CPT

## 2024-03-21 PROCEDURE — 97110 THERAPEUTIC EXERCISES: CPT

## 2024-03-21 NOTE — PROGRESS NOTES
"Daily Note     Today's date: 3/21/2024  Patient name: Sandra Peña  : 1940  MRN: [id number]  Referring provider: Ramin De Paz DO  Dx:   Encounter Diagnosis     ICD-10-CM    1. Balance problem  R26.89       2. Impaired functional mobility, balance, gait, and endurance  Z74.09                      Subjective: Pt reports no new complaints.       Objective: See treatment diary below      Assessment: Tolerated treatment well. Patient exhibited good technique with therapeutic exercises and would benefit from continued PT.  Improved speed/accuracy noted w/ blaze pod focused reaction activity today. Min rest needed b/t activities.  Pt notes mild LE fatigue following session.       Plan: Progress treatment as tolerated.       Diagnosis: Balance Disorder    Precautions: Hx of Falls, Anxiety    POC: 3/5-   Authorization: [facility]    Access Code:  [email or web address]   Visit Count 6 2 3 4 5   Manuals 3/21 3/7  3/12 3/15 3/19                           Neuro Re-Ed        EO/EC Firm Blaze pod focused reaction 2x30\" FT (best-14) 3x 30\" EC  3x30\" EC 2x30\" EC  Blaze Pod Focused Reaction   2x30\" FT    EO/EC Foam  2x30\" EC air ex 3x 30\" EO   Airex  3x30\" EO airex 3x30\" EO airex 2x30\" EC Airex    Tandem Stance         Tandem Walk  2 laps 2x at bar  NV 2 laps 2 laps    Side Step  2 laps on foam 2 laps on foam  2 laps on foam 2 laps on foam 2 laps on foam    Jhony Walk  2 laps, 3 hurdles 3 laps 3 hurdles  2 laps, 3 hurdles 2 laps, 3 hurdles 2 laps, 3 hurdles           Education  S/S, POC HEP       There Ex         Active warm up Nustep 5' lv 1 NuStep L1 5'  Nustep 5' lv 1 Nustep 5' lv 1 NuStep L1 5'   Standing Hip 3-way   2x10 ea BL  HEP     Heel Raise   20x  HEP     Hip Abd Iso  20x3\"  20x3\" 20x3\" 20x3\"    Hip Add Iso 20x3\"  20x3\" 20x3\" 20x3\"    LAQ 2x10 ea   10 ea 2x10 ea                   Ther Act             Squat  2x10 2x10  2x10 2x10 2x10    Step up         Lunge                                     Modalities                       "

## 2024-03-25 ENCOUNTER — OFFICE VISIT (OUTPATIENT)
Dept: PSYCHIATRY | Facility: CLINIC | Age: 84
End: 2024-03-25
Payer: MEDICARE

## 2024-03-25 DIAGNOSIS — F45.1 SOMATIC SYMPTOM DISORDER: ICD-10-CM

## 2024-03-25 DIAGNOSIS — F33.1 MAJOR DEPRESSIVE DISORDER, RECURRENT, MODERATE (HCC): ICD-10-CM

## 2024-03-25 DIAGNOSIS — F41.1 GENERALIZED ANXIETY DISORDER: ICD-10-CM

## 2024-03-25 PROCEDURE — 99214 OFFICE O/P EST MOD 30 MIN: CPT

## 2024-03-25 RX ORDER — RISPERIDONE 0.25 MG/1
TABLET ORAL
Qty: 90 TABLET | Refills: 1 | Status: SHIPPED | OUTPATIENT
Start: 2024-03-25 | End: 2024-05-24

## 2024-03-25 RX ORDER — FLUOXETINE HYDROCHLORIDE 40 MG/1
40 CAPSULE ORAL DAILY
Qty: 30 CAPSULE | Refills: 1 | Status: SHIPPED | OUTPATIENT
Start: 2024-03-25 | End: 2024-05-24

## 2024-03-25 RX ORDER — CLONAZEPAM 0.5 MG/1
0.25 TABLET ORAL 3 TIMES DAILY
Qty: 45 TABLET | Refills: 1 | Status: SHIPPED | OUTPATIENT
Start: 2024-03-25 | End: 2024-05-24

## 2024-03-25 RX ORDER — FLUOXETINE HYDROCHLORIDE 20 MG/1
20 CAPSULE ORAL DAILY
Qty: 30 CAPSULE | Refills: 1 | Status: SHIPPED | OUTPATIENT
Start: 2024-03-25 | End: 2024-05-24

## 2024-03-25 NOTE — PSYCH
Psychiatric Progress Note: Medication Management    [facility]    Name and Date of Birth:  Sandra Peña 83 y.o. 1940 MRN: [id number]    Date of Visit: March 25, 2024    Reason for Visit: Follow-up visit regarding medication management     ASSESSMENT & PLAN     Sandra Peña is a 83 y.o. female, ,  and presently living daughter, son-in-law, 3 grandkids; retired teacher; with prior psychiatric diagnoses of MDD, JONAH; no past suicide attempts (0); 1 past psychiatric hospitalizations (for anxiety); with suicide risk factors including medical problems, anxiety and age (50+); and medical history including DM2, HTN, DVT hx, OA, HLD, TIA, status post rotator cuff surgery who was personally seen and evaluated at the [facility] outpatient clinic for follow-up regarding medication management.     Patient reports that things are going not so good with depressive and anxiety symptoms that she feels are difficult. She reports particularly the anxiety and being more limited from what she could previously do. Per son that is present with her, he feels she is doing well and being active but worrying about things that she does not need to (such as laundry for the grandkinds, etc.). He notes she is making food for herself and doing more things than he would expect for someone her age. Daughter, Janay, was called on the phone as well who had some concerns with sluggishness in the past. Given that patient still has anxiety and goal of decreasing klonopin and wanting to reduce polypharmacy as well as possible sedation, will space out klonpin to TID dosing but at lower doses. Additionally, goal will be to lower risperdal gradually. Minor lip tremor was noted on the bottom lip that appears to be benign and possible essential tremor in nature. It is not always present and patient was assessed for cogwheel rigidity/parkinsonism which was not  present. Will also order a repreat ECG given last recording showed Qtc prolongation but was after a fall. No acute concerns for suicidality, homicidality, hallucinations.    DSM-5 Diagnoses:   Anxiety  MDD  Obsessive thoughts r/o OCD    Treatment Recommendations/Precautions:  Notable Changes  Decrease and space out klonopin 0.25mg TID for anxiety but to reduce medications  Repeat ECG given last reading showed Qtc prolongation  Other Treatment recommendations  Continue Prozac 60mg for anxiety and depression symptoms  Interaction CYP2D6 with prozac may be increasing effects of risperdal  Continue Risperdal 0.25 QD and 0.50mg HS for obsessive thoughts  Consider decreasing in the future  Work on handouts emailed (facts vs feelings & automatic thoughts); work on mindfulness  Medication management every 1-3 months  Aware of 24 hour and weekend coverage for urgent situations accessed by calling [facility] main practice number    Medications Risks/Benefits    Risks, benefits, and possible side effects of medications explained to Sandra and she verbalizes understanding and agreement for treatment. including:   PARQ was completed for the class of SSRIs including transient anxiety, insomnia or sedation, headaches, constipation or diarrhea; and longer-standing sexual side effects, bleeding risk (especially with NSAIDs), and weight gain; as well as suicidal thoughts in patients under 25 years old, serotonin syndrome, and induction of eligio. Potential for discontinuation syndrome (flu-like symptoms, insomnia, nausea, sensory disturbances, and headache) was also discussed.   PARQ was completed for second generation antipsychotic medication including sedation, GI distress, dizziness, risk of metabolic syndrome, EPS (akathisia, TD, etc), rare NMS, orthostatic hypotension, cardiovascular risks such as QT prolongation, increased prolactin, and others.  Risks of taking benzodiazepines were discussed, including  risk of sedation and respiratory depression, particularly when combined with alcohol, opioids, or other central nervous system-depressants. Addiction potential, withdrawal seizures with abrupt discontinuation, and other potential adverse effects were discussed. The patient was instructed not to drive or operate machinery while taking benzodiazepines.     Controlled Medication Discussion:   Sandra has been filling controlled prescriptions on time as prescribed according to [place] Prescription Drug Monitoring Program    Treatment Plan:  Completed and signed during the session: Not applicable - Treatment Plan not due at this session. Next due treatment plan date is: August 5, 2024      Psychotherapy Provided:   Individual psychotherapy provided: Yes  Counseling was provided during the session today for 20 minutes.     Suicide/Homicide Risk Assessment:  Risk of Harm to Self:  The following ratings are based on assessment at the time of the interview and review of records  Demographic risk factors include: ,  status, elderly (75 or older)   Historical Risk Factors include: chronic psychiatric problems, history of depression, history of anxiety  Recent Specific Risk Factors include: diagnosis of depression, current depressive symptoms, current anxiety symptoms  Protective Factors: no current suicidal ideation, being a parent, compliant with medications, compliant with mental health treatment, having a desire to be alive, having a desire to live, having a sense of purpose or meaning in life, having pets, personal beliefs, personal beliefs about the meaning and value of life, Anglican beliefs discouraging suicide, resiliency, restricted access to lethal means, stable living environment, supportive family  Weapons: none. The following steps have been taken to ensure weapons are properly secured: not applicable  Based on today's assessment, Sandra presents the following risk of harm to self:  minimal    Risk of Harm to Others:  The following ratings are based on assessment at the time of the interview and review of records  Demographic Risk Factors include: none.  Historical Risk Factors include: none.  Recent Specific Risk Factors include: multiple stressors, social difficulties.  Protective Factors: no current homicidal ideation, being a parent, compliant with medications, compliant with mental health treatment, no substance use problems, personal beliefs, Voodoo beliefs, resilience, restricted access to lethal means, safe and stable living environment, supportive family  Weapons: none. The following steps have been taken to ensure weapons are properly secured: not applicable  Based on today's assessment, Sandra presents the following risk of harm to others: minimal    The following interventions are recommended: no intervention changes needed. Although patient's acute lethality risk is low, long-term/chronic lethality risk is mildly elevated in the presence of mood and anxiety symptoms. At the current moment, Sandra is future-oriented, forward-thinking, and demonstrates ability to act in a self-preserving manner as evidenced by volitionally presenting to today's visit, seeking treatment, suggesting a will and desire to live. At this juncture, inpatient hospitalization is not currently warranted. To mitigate future risk, patient should adhere to the recommendations of this writer, avoid alcohol/illicit substance use, utilize community-based resources and familiar support and prioritize mental health treatment.     Based on today's assessment and clinical criteria, Sandra Peña contracts for safety and is not an imminent risk of harm to self or others. Outpatient level of care is deemed appropriate at this present time. Sandra understands that if they are no longer able to contract for safety, they need to call/contact the outpatient office including this writer, call/contact crisis and/orattend to  "the nearest Emergency Department for immediate evaluation.    SUBJECTIVE     Chief Complaint: sometimes feels my heart is pounding in my chest.     Sandra Peña ,  and presently living daughter, son-in-law, 3 grandkids; retired teacher; with prior psychiatric diagnoses of MDD, JONAH; no past suicide attempts (0); 1 past psychiatric hospitalizations (for anxiety); with suicide risk factors including medical problems, anxiety and age (50+); and medical history including DM2, HTN, DVT hx, OA, HLD, TIA, status post rotator cuff surgery who was personally seen and evaluated at the [facility] outpatient clinic for follow-up regarding medication management.     At the time of last visit, pt had reported continues to struggle with depressive and anxiety symptoms. She has been copmliant with her medications but felt the prozac increase did not make any improvements in symptoms. She did note that energy has been low and daughter voiced wanting to reduce polypharmacy concerns. Given that she is on a starting dose of gabapentin, agreed to discontinue this medications. Notably, prozac increase may increase levels of risperdal so will continue to monitor or consider reduction in the future. Additionally discussed, possibly going down on klonopin in the future though she has been on it for >5 years it would need to be gradual. Regarding the risperdal which could cause sedation, previously going down on this medication made her thinking worse and would prefer not reducing it. During the visit, therapy was conducted and handouts were sent for patient to work on with her therapist and at home. No acute concerns for suicidality, homicidality, hallucinations. No further medication changes other than gabapentin at this time.    Sandra states that since their previous outpatient psychiatric appointment with this writer, patient reports things are \"not so good.\" She notes it seems to have calmed down " "but has been anxious and worried about falling. She is worried about daughter coming home and stopped reading or coloring. She went on a tangent about her scalp and concerns with falling in the shower. She also mentions not being compliant with the shampoo. She does admit that she did not follow with the prescriptions that were supposed to help her.    Patient has been going to PT for balance. Son was noticing that the lip trembling has been occurring but does not believe it is there all the time. She notes that the heart pounding occurs because of her anxiety. She notes that may occur two times in a day and now is occurring much less frequently.    She notes feeling down often and that there are more bad days more than the positive days. Sleeping has been good and appetite has been okay. She feels energy has been tiring. She notes not doing as much reading and colorings - things that she typically does. She denies any suicidal both passive and active. Denies any thoughts of homicidal ideations and hallucinations. Anxiety still seems to be present and worrying about past and future as well as still upset with  .     Mauri (son) feels that memory does not seem to be an issue. She remembers who she talks with or what she did she seems to answer correctly. He also mentions she is \"tired\" but is up and down the stairs a lot and doing a lot of cleaning. He notes she keeps busy. He notes that she takes it upon herself to do a lot and is worried things won't get down like clean, laundry, etc. He feels that she does more than the typical person her age. She is able to go to the diners and eat well and is overall impressed with what she can accomplish. He notes she can make her own meals without issues. He feels she doesn't give herself enough credit.     Regarding medications she has been consistent with the medications.    Presently, patient adamantly denies suicidal/homicidal ideation in addition to " thoughts of self-injury, citing family & Druze as deterrents against self-harm; contracts for safety, see above for risk assessment. At conclusion of evaluation, patient is amenable to the recommendations of this writer including: medications as prescribed, attending routine appointments.  Also, patient is amenable to calling/contacting the outpatient office including this writer if any acute adverse effects of their medication regimen arise in addition to any comments or concerns pertaining to their psychiatric management.  Patient is amenable to calling/contacting crisis and/or attending to the nearest emergency department if their clinical condition deteriorates to assure their safety and stability, stating that they are able to appropriately confide in their Family (daughter) & good friend regarding their psychiatric state.      Current Rating Scores:   None completed today.    PSYCHIATRIC REVIEW OF SYSTEMS     Unchanged information from this writer's previous assessment is copied; information that has changed is bolded.    Sleep change: no  Interest change: no  Interests include: read (books), knitting  Guilt/Hopelessness/helplessness/worthlessness: yes, guilt (giving daughter stress)  Energy change: yes, decreased  Concentration/Attention change: yes, decreased  Appetite change: yes, decreased   Weight changes & timeframe: no  Psychomotor agitation/retardation: no  Somatic symptoms: yes  Suicidal ideation: no  Homicidal ideation: no  Zaira/hypomania: no     Anxiety/panic attack: yes  JONAH symptoms: difficulty concentrating, fatigue, insomnia, irritable and restlessness/keyed up  Panic Disorder symptoms: no symptoms suggestive of panic disorder  Obsessive/compulsive symptoms: obsessive thoughts without compulsions  Eating Disorder symptoms: no historical or current eating disorder. no binge eating disorder; no anorexia nervosa. no symptoms of bulimia; does not stopped eating when trying to lose weight when  younger     Auditory hallucinations: no  Visual hallucinations: no  Other perceptual disturbances: no  Delusional thinking: no    REVIEW OF SYSTEMS     Constitutional negative   ENT negative   Cardiovascular negative   Respiratory negative   Gastrointestinal negative   Genitourinary negative   Musculoskeletal negative   Integumentary negative   Neurological negative   Endocrine negative   Other Symptoms none, all other systems are negative     HISTORICAL INFORMATION     History Review: The following portions of the patient's history were reviewed and updated as appropriate: allergies, current medications, past family history, past medical history, past social history, past surgical history, and problem list.    Unchanged information from this writer's previous assessment is copied; information that has changed is bolded.    Family History   Problem Relation Age of Onset    Depression Mother         w/anxiety    Diabetes Mother         Mellitus    Breast cancer Mother     Hypertension Mother     No Known Problems Father     Depression Sister         w/anxiety    Heart disease Sister         Cardiac Disorder    Breast cancer Sister     Hypertension Sister     Diabetes Brother         Mellitus    Alcohol abuse Son        Additional fam hx:   Family hx of psychiatric diagnosis: yes, 2 sisters (depression & anxiety), Mom (Depression & anxiety)  Family hx of suicide: no  Family Hx of drug abuse: Son with alcohol abuse  Family Hx of medical diagnosis: yes, as listed above Mom (DM, breast cancer, HTN), sister (heart issues, breast cancer, HTN), brother (DM)     Past Psychiatric History:   Previous diagnosis: MDD, Anxiety  Previous inpatient psychiatric admissions: 1 prior admission for 3 weeks at [facility] in 2013 (panic attack after surgery).  Present/previous outpatient psychiatrist: saw psychiatrists in [facility]; Dr. Rae & Dr. Momin prior to transfer.  Present/previous therapy/psychotherapy: Dr. Cross for  past 3 years.  History of suicidal attempts/gestures: Denies.  Self-injurious behavior/high-risk behavior: no.  History of violence/aggressive behaviors: No.  Other Services: patient denies     Psychiatric medication trial:   Antidepressants  Effexor, Zoloft, Prozac, Lexapro  Antipsychotics  Abilify (Dizzy), Risperdal  Mood stabilizers  N/A  Sedative hypnotics  Klonopin  Others  Buspar, Neurontin     Substance Abuse History:  Nicotine use (cigarettes & vape): Denies  Caffeine use: 1 cup day Decafe  Alcohol use: Denies  Marijuana use: Denies  Other substances: Denies     Longest clean time: N/A  Previous inpatient/outpatient substance abuse rehabilitation: No.     Patient denies previous legal actions or arrests related to substance intoxication including prior DWIs/DUIs. Sandra does not apear under the influence or withdrawal of any psychoactive substance throughout today's examination.      I have assessed this patient for substance use within the past 12 months.     Social History:  Born/Raise: [place], [place] - moved to [place]  when begun living with family  Early life/developmental: Denies a history of milestone/developmental delay. Denies a history of in-utero exposure to toxins/illicit substances.   Family: 2 brother(s) & 2 sister(s), raised by parents  Education: Bachelor of Arts - Teaching  Learning Disabilities: There is no documented history of IEP or need for special education.  Occupational History: retired teacher  Congregation Affiliation: Scientology  Marital history:  since  -   from cancer  Children: yes, 2 (1 daughter & 1 son), 3 grandkids  Living arrangement: daughter, father-in-law, son-in-law, 3 grandkids in [place], 2 dogs, guinea pig  Support system: good support system - daughter, therapist   Hx: no  Legal Hx: no  Access to firearms: patient denies. Sandra Peña has no history of arrests or violence pertaining to use of a deadly weapon.      Traumatic History:   Abuse:  none is reported  Other Traumatic Events: Denies  Flashbacks/Nightmares: no     Past Medical History:  Hx of seizures: no  Hx of concussions & ongoing symptoms: no    OBJECTIVE     Vital signs in last 24 hours:  There were no vitals filed for this visit.    Laboratory Results: I have personally reviewed all pertinent laboratory/tests results  Recent Labs (last 4 months):   Admission on 02/16/2024, Discharged on 02/16/2024   Component Date Value    WBC 02/16/2024 9.72     RBC 02/16/2024 3.90     Hemoglobin 02/16/2024 11.4 (L)     Hematocrit 02/16/2024 35.4     MCV 02/16/2024 91     MCH 02/16/2024 29.2     MCHC 02/16/2024 32.2     RDW 02/16/2024 13.5     MPV 02/16/2024 10.2     Platelets 02/16/2024 212     nRBC 02/16/2024 0     Neutrophils Relative 02/16/2024 89 (H)     Immature Grans % 02/16/2024 0     Lymphocytes Relative 02/16/2024 6 (L)     Monocytes Relative 02/16/2024 3 (L)     Eosinophils Relative 02/16/2024 1     Basophils Relative 02/16/2024 1     Neutrophils Absolute 02/16/2024 8.71 (H)     Absolute Immature Grans 02/16/2024 0.03     Absolute Lymphocytes 02/16/2024 0.60     Absolute Monocytes 02/16/2024 0.28     Eosinophils Absolute 02/16/2024 0.05     Basophils Absolute 02/16/2024 0.05     Sodium 02/16/2024 137     Potassium 02/16/2024 4.0     Chloride 02/16/2024 103     CO2 02/16/2024 24     ANION GAP 02/16/2024 10     BUN 02/16/2024 27 (H)     Creatinine 02/16/2024 0.96     Glucose 02/16/2024 137     Calcium 02/16/2024 9.5     eGFR 02/16/2024 54    Appointment on 02/12/2024   Component Date Value    WBC 02/12/2024 4.86     RBC 02/12/2024 3.80 (L)     Hemoglobin 02/12/2024 10.9 (L)     Hematocrit 02/12/2024 34.7 (L)     MCV 02/12/2024 91     MCH 02/12/2024 28.7     MCHC 02/12/2024 31.4     RDW 02/12/2024 13.3     MPV 02/12/2024 10.9     Platelets 02/12/2024 242     nRBC 02/12/2024 0     Neutrophils Relative 02/12/2024 63     Immature Grans % 02/12/2024 0     Lymphocytes Relative 02/12/2024 26     Monocytes  "Relative 02/12/2024 6     Eosinophils Relative 02/12/2024 4     Basophils Relative 02/12/2024 1     Neutrophils Absolute 02/12/2024 3.10     Absolute Immature Grans 02/12/2024 0.01     Absolute Lymphocytes 02/12/2024 1.25     Absolute Monocytes 02/12/2024 0.27     Eosinophils Absolute 02/12/2024 0.18     Basophils Absolute 02/12/2024 0.05     Sodium 02/12/2024 139     Potassium 02/12/2024 4.3     Chloride 02/12/2024 103     CO2 02/12/2024 28     ANION GAP 02/12/2024 8     BUN 02/12/2024 31 (H)     Creatinine 02/12/2024 0.93     Glucose, Fasting 02/12/2024 99     Calcium 02/12/2024 9.6     AST 02/12/2024 17     ALT 02/12/2024 14     Alkaline Phosphatase 02/12/2024 52     Total Protein 02/12/2024 6.6     Albumin 02/12/2024 4.0     Total Bilirubin 02/12/2024 0.60     eGFR 02/12/2024 56     Hemoglobin A1C 02/12/2024 7.0 (H)     EAG 02/12/2024 154     Cholesterol 02/12/2024 135     Triglycerides 02/12/2024 89     HDL, Direct 02/12/2024 56     LDL Calculated 02/12/2024 61     Non-HDL-Chol (CHOL-HDL) 02/12/2024 79     Creatinine, Ur 02/12/2024 81.1     Albumin,U,Random 02/12/2024 12.9     Albumin Creat Ratio 02/12/2024 16     TSH 3RD GENERATON 02/12/2024 1.649    Admission on 02/01/2024, Discharged on 02/01/2024   Component Date Value    Ventricular Rate 02/01/2024 88     Atrial Rate 02/01/2024 88     [place] Interval 02/01/2024 158     QRSD Interval 02/01/2024 60     QT Interval 02/01/2024 474     QTC Interval 02/01/2024 573     P Axis 02/01/2024 23     QRS Axis 02/01/2024 8     T Wave [place] 02/01/2024 53        Mental Status Evaluation:    Appearance:  age appropriate, casually dressed,resolving cut on left brow   Behavior:  cooperative, calm   Motor: no abnormal movements   Speech:  Slower rate,   Mood:  \"Not so good\"   Affect:  constricted   Thought Process:  tangential   Thought Content: {negative thoughts, somatic focused   Perceptual disturbances: no auditory hallucinations, no visual hallucinations   Risk Potential: " Suicidal ideation - None at present  Homicidal ideation - None at present  Potential for aggression - Not at present   Cognition: oriented to self and situation, appears to be of average intelligence, and cognition not formally tested   Insight:  fair   Judgment: fair     Note Share:   This note was shared with patient.    Visit Time  Start: 10:50. Stop: 12:05.  I spent 75 minutes directly with the patient during this visit    Ede Antony DO 03/25/24

## 2024-03-25 NOTE — PATIENT INSTRUCTIONS
Notable Changes  Decrease and space out klonopin 0.25mg TID for anxiety but to reduce medications  Get new ECG to monitor Qtc prolongation  Try mindfulness & increase interactions with others  Other Treatment recommendations  Continue Prozac 60mg for anxiety and depression symptoms  Continue Risperdal 0.25 QD and 0.50mg HS for obsessive thoughts    Please present for your previously scheduled appointment approximately 15 minutes prior to appointment time. If you are running late or are unable to attend your appointment, please call our [place] office at [phone] or our [place] office at [phone] if applicable to notify the office of your absence.    If you are in psychological crisis including not limited to suicidal/homicidal thoughts or thoughts of self-injury, do not hesitate to call/contact your County Crisis hotline (see below) or attend to the nearest emergency department.  [facility] Crisis: [phone]  [facility] Crisis: [phone]  [facility] Crisis: [phone]  [facility] Crisis: [phone]  [facility] Crisis: [phone]  [facility] Crisis: [phone]  [facility] Crisis: [phone]  National Suicide Prevention Hotline: 1-550.790.5448

## 2024-03-26 ENCOUNTER — OFFICE VISIT (OUTPATIENT)
Dept: PHYSICAL THERAPY | Facility: CLINIC | Age: 84
End: 2024-03-26
Payer: MEDICARE

## 2024-03-26 DIAGNOSIS — Z74.09 IMPAIRED FUNCTIONAL MOBILITY, BALANCE, GAIT, AND ENDURANCE: ICD-10-CM

## 2024-03-26 DIAGNOSIS — R26.89 BALANCE PROBLEM: Primary | ICD-10-CM

## 2024-03-26 PROCEDURE — 97112 NEUROMUSCULAR REEDUCATION: CPT | Performed by: PHYSICAL THERAPIST

## 2024-03-26 PROCEDURE — 97110 THERAPEUTIC EXERCISES: CPT | Performed by: PHYSICAL THERAPIST

## 2024-03-26 NOTE — PROGRESS NOTES
"Daily Note     Today's date: 3/26/2024  Patient name: Sandra Peña  : 1940  MRN: [id number]  Referring provider: Ramin De Paz DO  Dx:   Encounter Diagnosis     ICD-10-CM    1. Balance problem  R26.89       2. Impaired functional mobility, balance, gait, and endurance  Z74.09           Start Time: 1100  Stop Time: 1142  Total time in clinic (min): 42 minutes    Subjective: Pt with no new concerns noted at this time.       Objective: See treatment diary below      Assessment: Tolerated treatment well. Continued with current POC with good tolerance from the patient. Added additional Blaze Pod exercises to promote balance and reaction time. Close supervision required for balance exercises, however, no LOB or translation outside ESTER noted. No pain reported with exercises. Minimal fatigue noted post tx. Continue to progress as tolerated. Patient would benefit from continued PT      Plan: Continue per plan of care.      Diagnosis: Balance Disorder    Precautions: Hx of Falls, Anxiety    POC: 3/5-   Authorization: [facility]    Access Code:  [email or web address]   Visit Count 6 7  4 5   Manuals 3/21 3/26  3/15 3/19                           Neuro Re-Ed        EO/EC Firm Blaze pod focused reaction 2x30\" FT (best-14) Blaze Pod   Focused Reaction   2x 30\" ea   (Best: 18)   2x30\" EC  Blaze Pod Focused Reaction   2x30\" FT    EO/EC Foam  2x30\" EC air ex   3x30\" EO airex 2x30\" EC Airex    Blaze Pod Around the World Challenge    3x firm surface   (Best: 25)   2x on airex   (Best: 24)   30\" ea       Tandem Walk  2 laps 2 laps   2 laps 2 laps    Side Step  2 laps on foam 2 laps on foam   2 laps on foam 2 laps on foam    Jhony Walk  2 laps, 3 hurdles 2 laps, 3 hurdles   2 laps, 3 hurdles 2 laps, 3 hurdles           Education         There Ex         Active warm up Nustep 5' lv 1 NuStep L1 5'   Nustep 5' lv 1 NuStep L1 5'   Hip Abd Iso  20x3\" 20x3\"   20x3\" 20x3\"    Hip Add Iso 20x3\" 20x3\"   20x3\" 20x3\"    LAQ 2x10 ea 2x10 ea   10 ea " 2x10 ea                 Ther Act           Squat  2x10 2x10   2x10 2x10    Step up         Lunge                                     Modalities

## 2024-03-28 ENCOUNTER — OFFICE VISIT (OUTPATIENT)
Dept: PHYSICAL THERAPY | Facility: CLINIC | Age: 84
End: 2024-03-28
Payer: MEDICARE

## 2024-03-28 DIAGNOSIS — R26.89 BALANCE PROBLEM: Primary | ICD-10-CM

## 2024-03-28 DIAGNOSIS — Z74.09 IMPAIRED FUNCTIONAL MOBILITY, BALANCE, GAIT, AND ENDURANCE: ICD-10-CM

## 2024-03-28 PROCEDURE — 97112 NEUROMUSCULAR REEDUCATION: CPT | Performed by: PHYSICAL THERAPIST

## 2024-03-28 PROCEDURE — 97110 THERAPEUTIC EXERCISES: CPT | Performed by: PHYSICAL THERAPIST

## 2024-03-28 NOTE — PROGRESS NOTES
"Daily Note     Today's date: 3/28/2024  Patient name: Sandra Peña  : 1940  MRN: [id number]  Referring provider: Ramin De Paz DO  Dx:   Encounter Diagnosis     ICD-10-CM    1. Balance problem  R26.89       2. Impaired functional mobility, balance, gait, and endurance  Z74.09           Start Time: 1112  Stop Time: 1152  Total time in clinic (min): 40 minutes    Subjective: Pt with no new concerns noted at this time.       Objective: See treatment diary below      Assessment: Tolerated treatment well. Continued with current POC focusing on improving balance. Pt responds well to balance exercises utilizing Blaze Pods and continues to improve scores. No significant fatigue noted post tx. Progress as tolerated. Patient would benefit from continued PT      Plan: Continue per plan of care.      Diagnosis: Balance Disorder    Precautions: Hx of Falls, Anxiety    POC: 3/5-   Authorization: [facility]    Access Code:  [email or web address]   Visit Count 6 7 8  5   Manuals 3/21 3/26 3/28   3/19                           Neuro Re-Ed        EO/EC Firm Blaze pod focused reaction 2x30\" FT (best-14) Blaze Pod   Focused Reaction   2x 30\" ea   (Best: 18)    Blaze Pod Focused Reaction   2x30\" FT    EO/EC Foam  2x30\" EC air ex    2x30\" EC Airex    Blaze Pod Around the World Challenge    3x firm surface   (Best: 25)   2x on airex   (Best: 24)   30\" ea  3x firm surface   (Best: 32)   2x on airex   (Best: 27)   30\" ea      Blaze Pod Around the World Challenge    3x on long foam reaching outside ESTER   30\" ea      Blaze Pod Around the World Challenge    4 pods on floor w/ foot taps   2x 30\" ea   (Best: 29)      Tandem Walk  2 laps 2 laps  2 laps   2 laps    Side Step  2 laps on foam 2 laps on foam    2 laps on foam    Jhony Walk  2 laps, 3 hurdles 2 laps, 3 hurdles    2 laps, 3 hurdles           Education         There Ex         Active warm up Nustep 5' lv 1 NuStep L1 5'  NuStep L1 5'   NuStep L1 5'   Hip Abd Iso  20x3\" 20x3\"  20x3\"  20x3\"  " "  Hip Add Iso 20x3\" 20x3\"  20x3\"   20x3\"    LAQ 2x10 ea 2x10 ea  2x10 ea   2x10 ea                Ther Act          Squat  2x10 2x10  2x10 to chair   2x10    Step up         Lunge                                    Modalities                                                        "

## 2024-04-01 ENCOUNTER — OFFICE VISIT (OUTPATIENT)
Dept: PHYSICAL THERAPY | Facility: CLINIC | Age: 84
End: 2024-04-01
Payer: MEDICARE

## 2024-04-01 DIAGNOSIS — R26.89 BALANCE PROBLEM: Primary | ICD-10-CM

## 2024-04-01 DIAGNOSIS — Z74.09 IMPAIRED FUNCTIONAL MOBILITY, BALANCE, GAIT, AND ENDURANCE: ICD-10-CM

## 2024-04-01 PROCEDURE — 97110 THERAPEUTIC EXERCISES: CPT

## 2024-04-01 PROCEDURE — 97112 NEUROMUSCULAR REEDUCATION: CPT

## 2024-04-01 NOTE — PROGRESS NOTES
"Daily Note     Today's date: 2024  Patient name: Sandra Peña  : 1940  MRN: [id number]  Referring provider: Ramin De Paz DO  Dx:   Encounter Diagnosis     ICD-10-CM    1. Balance problem  R26.89       2. Impaired functional mobility, balance, gait, and endurance  Z74.09                      Subjective: Pt reports no adverse effects following last session.  Pt states she was feeling a \"little light headed this morning\" but admits she \"gets that from time to time\".      Objective: See treatment diary below      Assessment: Tolerated treatment well. Patient exhibited good technique with therapeutic exercises and would benefit from continued PT.  No lightheadedness noted w/ standing or sitting activities today. Good tolerance to addition of sidestepping over hurdles.  Mild LE muscle fatigue noted post tx.       Plan: Continue per plan of care.      Diagnosis: Balance Disorder    Precautions: Hx of Falls, Anxiety    POC: 3/5-   Authorization: [facility]    Access Code:  [email or web address]   Visit Count 6 7 8 9 5   Manuals 3/21 3/26 3/28  4/1 3/19                           Neuro Re-Ed        EO/EC Firm Blaze pod focused reaction 2x30\" FT (best-14) Blaze Pod   Focused Reaction   2x 30\" ea   (Best: 18)    Blaze Pod Focused Reaction   2x30\" FT    EO/EC Foam  2x30\" EC air ex    2x30\" EC Airex    Blaze Pod Around the World Challenge    3x firm surface   (Best: 25)   2x on airex   (Best: 24)   30\" ea  3x firm surface   (Best: 32)   2x on airex   (Best: 27)   30\" ea  3x firm surface (best: 29  )  2x on airex (best: 21)  30\" ea    Blaze Pod Around the World Challenge    3x on long foam reaching outside ESTER   30\" ea  NV    Blaze Pod Around the World Challenge    4 pods on floor w/ foot taps   2x 30\" ea   (Best: 29)      Tandem Walk  2 laps 2 laps  2 laps  2 laps 2 laps    Side Step  2 laps on foam 2 laps on foam   2 laps on foam 2 laps on foam    Jhony Walk  2 laps, 3 hurdles 2 laps, 3 hurdles   2 laps, 3 hurdles (fwd & lat) " "2 laps, 3 hurdles           Education         There Ex         Active warm up Nustep 5' lv 1 NuStep L1 5'  NuStep L1 5'  Nustep 5' lv 1 NuStep L1 5'   Hip Abd Iso  20x3\" 20x3\"  20x3\" 20x3\" 20x3\"    Hip Add Iso 20x3\" 20x3\"  20x3\"  20x3\" 20x3\"    LAQ 2x10 ea 2x10 ea  2x10 ea  2x10 ea 2x10 ea                Ther Act          Squat  2x10 2x10  2x10 to chair  2x10  to chair 2x10    Step up         Lunge                                    Modalities                                                          "

## 2024-04-03 ENCOUNTER — TELEPHONE (OUTPATIENT)
Dept: PSYCHIATRY | Facility: CLINIC | Age: 84
End: 2024-04-03

## 2024-04-03 NOTE — TELEPHONE ENCOUNTER
Mamta called and asked about medication Prozac and clonazepam.  Her chart has something different then what they are taking.  If provider could call daughter back to discuses.  She is taking prozac 40mg qd and clonazepam 0.75 mg TID.

## 2024-04-04 ENCOUNTER — OFFICE VISIT (OUTPATIENT)
Dept: PHYSICAL THERAPY | Facility: CLINIC | Age: 84
End: 2024-04-04
Payer: MEDICARE

## 2024-04-04 DIAGNOSIS — R26.89 BALANCE PROBLEM: Primary | ICD-10-CM

## 2024-04-04 DIAGNOSIS — Z74.09 IMPAIRED FUNCTIONAL MOBILITY, BALANCE, GAIT, AND ENDURANCE: ICD-10-CM

## 2024-04-04 PROCEDURE — 97110 THERAPEUTIC EXERCISES: CPT | Performed by: PHYSICAL THERAPIST

## 2024-04-04 PROCEDURE — 97112 NEUROMUSCULAR REEDUCATION: CPT | Performed by: PHYSICAL THERAPIST

## 2024-04-04 NOTE — TELEPHONE ENCOUNTER
Clarified medications with daughter - Janay at 9:15AM.     Prozac was 40mg as previously discussed and not the 60mg because when patient was at higher dose was having lightheaded and stomach issues. Removed the 20mg from the chart so it would not be refilled and keeping the 40mg on file.    Additionally, clarified that Klonopin would be 0.25mg TID (half a pill three times a day).     Voiced understanding of the medication regimen and no further concerns or complaints noted at this time.

## 2024-04-04 NOTE — PROGRESS NOTES
"Daily Note     Today's date: 2024  Patient name: Sandra Peña  : 1940  MRN: [id number]  Referring provider: Ramin De Paz DO  Dx:   Encounter Diagnosis     ICD-10-CM    1. Balance problem  R26.89       2. Impaired functional mobility, balance, gait, and endurance  Z74.09           Start Time: 1116  Stop Time: 1157  Total time in clinic (min): 41 minutes    Subjective: Pt with no new concerns noted at this time.       Objective: See treatment diary below      Assessment: Tolerated treatment well. Continued with current POC focused on balance and LE strength with good tolerance from the patient. Pt responds well to Blaze Pod exercises showing improved performance session to session. Minimal fatigue noted post tx consistent with exercises performed. Continue to progress as tolerated. Patient would benefit from continued PT      Plan: Continue per plan of care.      Diagnosis: Balance Disorder    Precautions: Hx of Falls, Anxiety    POC: 3/5-   Authorization: [facility]    Access Code:  [email or web address]   Visit Count 6 7 8 9 10   Manuals 3/21 3/26 3/28  4/1 4/4                            Neuro Re-Ed        EO/EC Firm Blaze pod focused reaction 2x30\" FT (best-14) Blaze Pod   Focused Reaction   2x 30\" ea   (Best: 18)       EO/EC Foam  2x30\" EC air ex       Blaze Pod Around the World Challenge    3x firm surface   (Best: 25)   2x on airex   (Best: 24)   30\" ea  3x firm surface   (Best: 32)   2x on airex   (Best: 27)   30\" ea  3x firm surface (best: 29  )  2x on airex (best: 21)  30\" ea 3x firm surface   (Best: 35)   2x on airex   (Best: 30)   30\" ea    Blaze Pod Around the World Challenge    3x on long foam reaching outside ESTER   30\" ea  [place] 2x on long foam reaching outside ESTER  30\" ea   (Best: 14)    Blaze Pod Around the World Challenge    4 pods on floor w/ foot taps   2x 30\" ea   (Best: 29)   4 pods on floor (2 on ea side) on airex   2x30\" ea   (Best: 25)    Tandem Walk  2 laps 2 laps  2 laps  2 laps 2 laps    Side " "Step  2 laps on foam 2 laps on foam   2 laps on foam    Jhony Walk  2 laps, 3 hurdles 2 laps, 3 hurdles   2 laps, 3 hurdles (fwd & lat) 2 laps, 3 hurdles  (Fwd & Lat)            Education         There Ex         Active warm up Nustep 5' lv 1 NuStep L1 5'  NuStep L1 5'  Nustep 5' lv 1 NuStep L1 5'   Hip Abd Iso  20x3\" 20x3\"  20x3\" 20x3\" 20x3\"    Hip Add Iso 20x3\" 20x3\"  20x3\"  20x3\" 20x3\"    LAQ 2x10 ea 2x10 ea  2x10 ea  2x10 ea 2x10 ea               Ther Act         Squat  2x10 2x10  2x10 to chair  2x10  to chair 2x10 to chair   Step up         Lunge                                    Modalities                                                            "

## 2024-04-08 ENCOUNTER — OFFICE VISIT (OUTPATIENT)
Dept: PHYSICAL THERAPY | Facility: CLINIC | Age: 84
End: 2024-04-08
Payer: MEDICARE

## 2024-04-08 DIAGNOSIS — R26.89 BALANCE PROBLEM: Primary | ICD-10-CM

## 2024-04-08 DIAGNOSIS — E78.2 MIXED HYPERLIPIDEMIA: ICD-10-CM

## 2024-04-08 DIAGNOSIS — Z74.09 IMPAIRED FUNCTIONAL MOBILITY, BALANCE, GAIT, AND ENDURANCE: ICD-10-CM

## 2024-04-08 PROCEDURE — 97112 NEUROMUSCULAR REEDUCATION: CPT | Performed by: PHYSICAL THERAPIST

## 2024-04-08 PROCEDURE — 97110 THERAPEUTIC EXERCISES: CPT | Performed by: PHYSICAL THERAPIST

## 2024-04-08 RX ORDER — ATORVASTATIN CALCIUM 10 MG/1
10 TABLET, FILM COATED ORAL DAILY
Qty: 90 TABLET | Refills: 0 | Status: SHIPPED | OUTPATIENT
Start: 2024-04-08

## 2024-04-08 NOTE — PROGRESS NOTES
"Daily Note     Today's date: 2024  Patient name: Sandra Peña  : 1940  MRN: [id number]  Referring provider: Ramin De Paz DO  Dx:   Encounter Diagnosis     ICD-10-CM    1. Balance problem  R26.89       2. Impaired functional mobility, balance, gait, and endurance  Z74.09           Start Time: 1115  Stop Time: 1145  Total time in clinic (min): 30 minutes    Subjective: Pt reports she feels a little fatigued today due to waking up earlier than usual.       Objective: See treatment diary below      Assessment: Tolerated treatment well. Continued with current POC with good tolerance from the patient. Decreased scores with blaze pod exercises, likely due to fatigue. No pain reported with exercises. Close supervision required with standing balance exercises, however, no LOB. Progress as tolerated. Patient would benefit from continued PT      Plan: Continue per plan of care.      Diagnosis: Balance Disorder    Precautions: Hx of Falls, Anxiety    POC: 3/5-   Authorization: [facility]    Access Code:  [email or web address]   Visit Count 11  8 9 10   Manuals 4/8  3/28  4/1 4/4                            Neuro Re-Ed        EO/EC Firm        EO/EC Foam         Blaze Pod Around the World Challenge   3x firm surface   (Best: 32)   2x on airex   (Best: 25)   30\" ea   3x firm surface   (Best: 32)   2x on airex   (Best: 27)   30\" ea  3x firm surface (best: 29  )  2x on airex (best: 21)  30\" ea 3x firm surface   (Best: 35)   2x on airex   (Best: 30)   30\" ea    Blaze Pod Around the World Challenge  2x on long foam reaching outside ESTER  30\" ea   (Best: 15)   3x on long foam reaching outside ESTER   30\" ea  [place] 2x on long foam reaching outside ESTER  30\" ea   (Best: 14)    Blaze Pod Around the World Challenge  4 pods on floor (2 on ea side) on airex   2x30\" ea   (Best: 24)   4 pods on floor w/ foot taps   2x 30\" ea   (Best: 29)   4 pods on floor (2 on ea side) on airex   2x30\" ea   (Best: 25)    Tandem Walk  2 laps   2 laps  2 laps 2 laps  " "  Side Step     2 laps on foam    Jhony Walk  2 laps, 3 hurdles   (Fwd & Lat)    2 laps, 3 hurdles (fwd & lat) 2 laps, 3 hurdles  (Fwd & Lat)            Education         There Ex         Active warm up NuStep L1 5'   NuStep L1 5'  Nustep 5' lv 1 NuStep L1 5'   Hip Abd Iso  20x3\"  20x3\" 20x3\" 20x3\"    Hip Add Iso 20x3\"  20x3\"  20x3\" 20x3\"    LAQ 2x10 ea   2x10 ea  2x10 ea 2x10 ea              Ther Act        Squat  2x10 to chair  2x10 to chair  2x10  to chair 2x10 to chair   Step up         Lunge                                Modalities                                                          "

## 2024-04-10 ENCOUNTER — APPOINTMENT (EMERGENCY)
Dept: RADIOLOGY | Facility: HOSPITAL | Age: 84
End: 2024-04-10
Payer: MEDICARE

## 2024-04-10 ENCOUNTER — APPOINTMENT (OUTPATIENT)
Dept: RADIOLOGY | Facility: HOSPITAL | Age: 84
End: 2024-04-10
Payer: MEDICARE

## 2024-04-10 ENCOUNTER — HOSPITAL ENCOUNTER (EMERGENCY)
Facility: HOSPITAL | Age: 84
Discharge: HOME | End: 2024-04-10
Attending: EMERGENCY MEDICINE
Payer: MEDICARE

## 2024-04-10 ENCOUNTER — TELEPHONE (OUTPATIENT)
Dept: PSYCHIATRY | Facility: CLINIC | Age: 84
End: 2024-04-10

## 2024-04-10 VITALS
HEART RATE: 87 BPM | DIASTOLIC BLOOD PRESSURE: 66 MMHG | RESPIRATION RATE: 16 BRPM | TEMPERATURE: 98.1 F | OXYGEN SATURATION: 98 % | SYSTOLIC BLOOD PRESSURE: 145 MMHG

## 2024-04-10 DIAGNOSIS — R53.83 FATIGUE: ICD-10-CM

## 2024-04-10 DIAGNOSIS — R42 LIGHTHEADEDNESS: ICD-10-CM

## 2024-04-10 DIAGNOSIS — F41.9 ANXIETY: Primary | ICD-10-CM

## 2024-04-10 LAB
2HR DELTA HS TROPONIN: 1 NG/L
ALBUMIN SERPL BCP-MCNC: 4.2 G/DL (ref 3.5–5)
ALP SERPL-CCNC: 53 U/L (ref 34–104)
ALT SERPL W P-5'-P-CCNC: 11 U/L (ref 7–52)
ANION GAP SERPL CALCULATED.3IONS-SCNC: 8 MMOL/L (ref 4–13)
AST SERPL W P-5'-P-CCNC: 15 U/L (ref 13–39)
ATRIAL RATE: 88 BPM
BASOPHILS # BLD AUTO: 0.06 THOUSANDS/ÂΜL (ref 0–0.1)
BASOPHILS NFR BLD AUTO: 1 % (ref 0–1)
BILIRUB SERPL-MCNC: 0.49 MG/DL (ref 0.2–1)
BILIRUB UR QL STRIP: NEGATIVE
BUN SERPL-MCNC: 26 MG/DL (ref 5–25)
CALCIUM SERPL-MCNC: 9.7 MG/DL (ref 8.4–10.2)
CARDIAC TROPONIN I PNL SERPL HS: 11 NG/L
CARDIAC TROPONIN I PNL SERPL HS: 12 NG/L
CHLORIDE SERPL-SCNC: 103 MMOL/L (ref 96–108)
CLARITY UR: CLEAR
CO2 SERPL-SCNC: 24 MMOL/L (ref 21–32)
COLOR UR: COLORLESS
CREAT SERPL-MCNC: 0.93 MG/DL (ref 0.6–1.3)
EOSINOPHIL # BLD AUTO: 0.23 THOUSAND/ÂΜL (ref 0–0.61)
EOSINOPHIL NFR BLD AUTO: 4 % (ref 0–6)
ERYTHROCYTE [DISTWIDTH] IN BLOOD BY AUTOMATED COUNT: 13.2 % (ref 11.6–15.1)
GFR SERPL CREATININE-BSD FRML MDRD: 56 ML/MIN/1.73SQ M
GLUCOSE SERPL-MCNC: 99 MG/DL (ref 65–140)
GLUCOSE UR STRIP-MCNC: NEGATIVE MG/DL
HCT VFR BLD AUTO: 37.7 % (ref 34.8–46.1)
HGB BLD-MCNC: 11.7 G/DL (ref 11.5–15.4)
HGB UR QL STRIP.AUTO: NEGATIVE
IMM GRANULOCYTES # BLD AUTO: 0.02 THOUSAND/UL (ref 0–0.2)
IMM GRANULOCYTES NFR BLD AUTO: 0 % (ref 0–2)
KETONES UR STRIP-MCNC: NEGATIVE MG/DL
LEUKOCYTE ESTERASE UR QL STRIP: NEGATIVE
LYMPHOCYTES # BLD AUTO: 1.18 THOUSANDS/ÂΜL (ref 0.6–4.47)
LYMPHOCYTES NFR BLD AUTO: 19 % (ref 14–44)
MCH RBC QN AUTO: 28.6 PG (ref 26.8–34.3)
MCHC RBC AUTO-ENTMCNC: 31 G/DL (ref 31.4–37.4)
MCV RBC AUTO: 92 FL (ref 82–98)
MONOCYTES # BLD AUTO: 0.41 THOUSAND/ÂΜL (ref 0.17–1.22)
MONOCYTES NFR BLD AUTO: 6 % (ref 4–12)
NEUTROPHILS # BLD AUTO: 4.47 THOUSANDS/ÂΜL (ref 1.85–7.62)
NEUTS SEG NFR BLD AUTO: 70 % (ref 43–75)
NITRITE UR QL STRIP: NEGATIVE
NRBC BLD AUTO-RTO: 0 /100 WBCS
P AXIS: 51 DEGREES
PH UR STRIP.AUTO: 5.5 [PH]
PLATELET # BLD AUTO: 203 THOUSANDS/UL (ref 149–390)
PMV BLD AUTO: 10.4 FL (ref 8.9–12.7)
POTASSIUM SERPL-SCNC: 4.3 MMOL/L (ref 3.5–5.3)
PR INTERVAL: 166 MS
PROT SERPL-MCNC: 6.9 G/DL (ref 6.4–8.4)
PROT UR STRIP-MCNC: NEGATIVE MG/DL
QRS AXIS: 19 DEGREES
QRSD INTERVAL: 64 MS
QT INTERVAL: 372 MS
QTC INTERVAL: 450 MS
RBC # BLD AUTO: 4.09 MILLION/UL (ref 3.81–5.12)
SODIUM SERPL-SCNC: 135 MMOL/L (ref 135–147)
SP GR UR STRIP.AUTO: 1 (ref 1–1.03)
T WAVE AXIS: 49 DEGREES
UROBILINOGEN UR STRIP-ACNC: <2 MG/DL
VENTRICULAR RATE: 88 BPM
WBC # BLD AUTO: 6.37 THOUSAND/UL (ref 4.31–10.16)

## 2024-04-10 PROCEDURE — 93005 ELECTROCARDIOGRAM TRACING: CPT

## 2024-04-10 PROCEDURE — 99284 EMERGENCY DEPT VISIT MOD MDM: CPT

## 2024-04-10 PROCEDURE — 71045 X-RAY EXAM CHEST 1 VIEW: CPT

## 2024-04-10 PROCEDURE — 36415 COLL VENOUS BLD VENIPUNCTURE: CPT

## 2024-04-10 PROCEDURE — 93010 ELECTROCARDIOGRAM REPORT: CPT | Performed by: INTERNAL MEDICINE

## 2024-04-10 PROCEDURE — 84484 ASSAY OF TROPONIN QUANT: CPT | Performed by: EMERGENCY MEDICINE

## 2024-04-10 PROCEDURE — 81003 URINALYSIS AUTO W/O SCOPE: CPT

## 2024-04-10 PROCEDURE — 80053 COMPREHEN METABOLIC PANEL: CPT | Performed by: EMERGENCY MEDICINE

## 2024-04-10 PROCEDURE — 99285 EMERGENCY DEPT VISIT HI MDM: CPT | Performed by: EMERGENCY MEDICINE

## 2024-04-10 PROCEDURE — 85025 COMPLETE CBC W/AUTO DIFF WBC: CPT | Performed by: EMERGENCY MEDICINE

## 2024-04-10 NOTE — ED PROVIDER NOTES
History  Chief Complaint   Patient presents with    Dizziness     Pt reports dizziness and anxiety when standing up for the past 2 months.      HPI Pt is an 82 y/o female presenting to the ED with anxiety exacerbation regarding chronic constipation and chronic lightheadedness which has caused to have some difficulty with ambulation. Pt has had some recent titration on her psych meds and has not been drinking enough water due to polyuria.  No chest pain, shortness of breath, headache, N/V/D, rash, fevers, chills, increased frequency of falls, urinary incontinence, dysuria, back pain. Son states that pt feels better and less anxious after being social, going to lunch. Has safety precautions at home - lives with daughter but feels lonely during the day while daughter is at work.    Prior to Admission Medications   Prescriptions Last Dose Informant Patient Reported? Taking?   FLUoxetine (PROzac) 40 MG capsule   No No   Sig: Take 1 capsule (40 mg total) by mouth daily Combine with your 20mg for a total of 60 daily   aspirin 81 MG tablet  Self Yes No   Sig: Take 81 mg by mouth daily   atorvastatin (LIPITOR) 10 mg tablet   No No   Sig: Take 1 tablet (10 mg total) by mouth daily   clonazePAM (KlonoPIN) 0.5 mg tablet   No No   Sig: Take 0.5 tablets (0.25 mg total) by mouth 3 (three) times a day   docusate sodium (COLACE) 100 mg capsule  Self Yes No   Sig: Take 100 mg by mouth 2 (two) times a day   fluocinonide (LIDEX) 0.05 % external solution   No No   Sig: Scalp solution- apply to affected area once a day as needed   ketoconazole (NIZORAL) 2 % shampoo   No No   Sig: Apply 1 Application topically 4 (four) times a week   ketoconazole (NIZORAL) 2 % shampoo   No No   Sig: To the scalp leave on for 5 minutes then rinse off completely. Once a week   linaCLOtide 145 MCG CAPS   No No   Sig: Take 1 capsule (145 mcg total) by mouth in the morning   lisinopril-hydrochlorothiazide (PRINZIDE,ZESTORETIC) 10-12.5 MG per tablet   No No    Sig: Take 1 tablet by mouth daily   metFORMIN (GLUCOPHAGE) 500 mg tablet   No No   Sig: Take 1 tablet (500 mg total) by mouth 2 (two) times a day with meals   mometasone (ELOCON) 0.1 % lotion   No No   Sig: Apply topically daily   omeprazole (PriLOSEC) 20 mg delayed release capsule   No No   Sig: Take 1 capsule (20 mg total) by mouth daily   polyethylene glycol (MIRALAX) 17 g packet   No No   Sig: Take 17 g by mouth daily for 7 days   risperiDONE (RisperDAL) 0.25 mg tablet   No No   Sig: Take 1 tablet (0.25 mg total) by mouth daily AND 2 tablets (0.5 mg total) daily at bedtime.   tamsulosin (FLOMAX) 0.4 mg   No No   Sig: Take 1 capsule (0.4 mg total) by mouth daily with dinner   triamcinolone (KENALOG) 0.1 % cream   No No   Sig: Apply under the breasts twice a day up to 2 weeks as needed for redness or itching      Facility-Administered Medications: None       Past Medical History:   Diagnosis Date    Colon polyps     Diabetes mellitus (HCC)     Dizziness     Last assessed: 8/31/15    DVT (deep venous thrombosis) (HCC)     Dysuria     Hematuria 2022    Hyperlipidemia     Hypertension     Hypertensive urgency 10/22/2021    Insomnia     Ureterolithiasis 2020       Past Surgical History:   Procedure Laterality Date     SECTION      1964 son, 1968 daughter    COLONOSCOPY      FL RETROGRADE PYELOGRAM  2020    NC COLONOSCOPY FLX DX W/COLLJ SPEC WHEN PFRMD N/A 3/27/2017    Procedure: COLONOSCOPY;  Surgeon: Gold Francis MD;  Location: [facility];  Service: Gastroenterology    NC CYSTO/URETERO W/LITHOTRIPSY &INDWELL STENT INSRT Right 2020    Procedure: CYSTOSCOPY URETEROSCOPY WITH LITHOTRIPSY HOLMIUM LASER, RETROGRADE PYELOGRAM AND INSERTION STENT URETERAL; EXCISION OF BLADDER TUMOR;  Surgeon: Edwin Love MD;  Location: [facility];  Service: Urology    ROTATOR CUFF REPAIR Left     Last assessed: 3/29/15    TONSILLECTOMY         Family History   Problem Relation Age of Onset    Depression  Mother         w/anxiety    Diabetes Mother         Mellitus    Breast cancer Mother     Hypertension Mother     No Known Problems Father     Depression Sister         w/anxiety    Heart disease Sister         Cardiac Disorder    Breast cancer Sister     Hypertension Sister     Diabetes Brother         Mellitus    Alcohol abuse Son      I have reviewed and agree with the history as documented.    E-Cigarette/Vaping    E-Cigarette Use Never User      E-Cigarette/Vaping Substances    Nicotine No     THC No     CBD No     Flavoring No     Other No     Unknown No      Social History     Tobacco Use    Smoking status: Never    Smokeless tobacco: Never   Vaping Use    Vaping status: Never Used   Substance Use Topics    Alcohol use: Never    Drug use: No        Review of Systems   Constitutional:  Positive for fatigue.   Gastrointestinal:  Positive for constipation (chronic).   Neurological:  Positive for light-headedness (chronic).   Psychiatric/Behavioral:  The patient is nervous/anxious.    All other systems reviewed and are negative.      Physical Exam  ED Triage Vitals [04/10/24 1230]   Temperature Pulse Respirations Blood Pressure SpO2   98.1 °F (36.7 °C) 88 16 159/75 97 %      Temp Source Heart Rate Source Patient Position - Orthostatic VS BP Location FiO2 (%)   Oral Monitor Sitting Right arm --      Pain Score       --             Orthostatic Vital Signs  Vitals:    04/10/24 1230 04/10/24 1400   BP: 159/75 145/66   Pulse: 88 87   Patient Position - Orthostatic VS: Sitting Sitting       Physical Exam  Vitals and nursing note reviewed.   Constitutional:       General: She is in acute distress (anxious).      Appearance: She is not ill-appearing, toxic-appearing or diaphoretic.   HENT:      Head: Normocephalic and atraumatic.      Right Ear: Tympanic membrane, ear canal and external ear normal.      Left Ear: Tympanic membrane, ear canal and external ear normal.      Nose: Nose normal.      Mouth/Throat:      Mouth:  Mucous membranes are moist.      Pharynx: Oropharynx is clear.   Eyes:      Extraocular Movements: Extraocular movements intact.      Conjunctiva/sclera: Conjunctivae normal.      Pupils: Pupils are equal, round, and reactive to light.   Cardiovascular:      Rate and Rhythm: Normal rate and regular rhythm.      Pulses: Normal pulses.      Heart sounds: Normal heart sounds.   Pulmonary:      Effort: Pulmonary effort is normal. No respiratory distress.      Breath sounds: Normal breath sounds. No stridor. No wheezing, rhonchi or rales.   Chest:      Chest wall: No tenderness.   Abdominal:      General: Bowel sounds are normal. There is no distension.      Palpations: Abdomen is soft. There is no mass.      Tenderness: There is no abdominal tenderness. There is no right CVA tenderness, left CVA tenderness, guarding or rebound.      Hernia: No hernia is present.   Musculoskeletal:         General: No swelling, tenderness, deformity or signs of injury. Normal range of motion.      Cervical back: Normal range of motion and neck supple. No rigidity or tenderness.      Right lower leg: No edema.      Left lower leg: No edema.   Skin:     General: Skin is warm and dry.      Coloration: Skin is not jaundiced or pale.      Findings: No bruising, erythema, lesion or rash.   Neurological:      General: No focal deficit present.      Mental Status: She is alert and oriented to person, place, and time. Mental status is at baseline.      Cranial Nerves: No cranial nerve deficit.      Sensory: No sensory deficit.      Motor: No weakness.      Coordination: Coordination normal.   Psychiatric:         Behavior: Behavior normal.         ED Medications  Medications - No data to display    Diagnostic Studies  Results Reviewed       Procedure Component Value Units Date/Time    HS Troponin I 2hr [[id number]]  (Normal) Collected: 04/10/24 1507    Lab Status: Final result Specimen: Blood from Arm, Right Updated: 04/10/24 1541     hs TnI 2hr  12 ng/L      Delta 2hr hsTnI 1 ng/L     UA w Reflex to Microscopic w Reflex to Culture [[id number]] Collected: 04/10/24 1510    Lab Status: Final result Specimen: Urine, Clean Catch Updated: 04/10/24 1523     Color, UA Colorless     Clarity, UA Clear     Specific Gravity, UA 1.004     pH, UA 5.5     Leukocytes, UA Negative     Nitrite, UA Negative     Protein, UA Negative mg/dl      Glucose, UA Negative mg/dl      Ketones, UA Negative mg/dl      Urobilinogen, UA <2.0 mg/dl      Bilirubin, UA Negative     Occult Blood, UA Negative    HS Troponin 0hr (reflex protocol) [[id number]]  (Normal) Collected: 04/10/24 1245    Lab Status: Final result Specimen: Blood from Arm, Right Updated: 04/10/24 1315     hs TnI 0hr 11 ng/L     Comprehensive metabolic panel [[id number]]  (Abnormal) Collected: 04/10/24 1245    Lab Status: Final result Specimen: Blood from Arm, Right Updated: 04/10/24 1307     Sodium 135 mmol/L      Potassium 4.3 mmol/L      Chloride 103 mmol/L      CO2 24 mmol/L      ANION GAP 8 mmol/L      BUN 26 mg/dL      Creatinine 0.93 mg/dL      Glucose 99 mg/dL      Calcium 9.7 mg/dL      AST 15 U/L      ALT 11 U/L      Alkaline Phosphatase 53 U/L      Total Protein 6.9 g/dL      Albumin 4.2 g/dL      Total Bilirubin 0.49 mg/dL      eGFR 56 ml/min/1.73sq m     Narrative:      National Kidney Disease Foundation guidelines for Chronic Kidney Disease (CKD):     Stage 1 with normal or high GFR (GFR > 90 mL/min/1.73 square meters)    Stage 2 Mild CKD (GFR = 60-89 mL/min/1.73 square meters)    Stage 3A Moderate CKD (GFR = 45-59 mL/min/1.73 square meters)    Stage 3B Moderate CKD (GFR = 30-44 mL/min/1.73 square meters)    Stage 4 Severe CKD (GFR = 15-29 mL/min/1.73 square meters)    Stage 5 End Stage CKD (GFR <15 mL/min/1.73 square meters)  Note: GFR calculation is accurate only with a steady state creatinine    CBC and differential [[id number]]  (Abnormal) Collected: 04/10/24 1245    Lab Status: Final result Specimen: Blood  from Arm, Right Updated: 04/10/24 1256     WBC 6.37 Thousand/uL      RBC 4.09 Million/uL      Hemoglobin 11.7 g/dL      Hematocrit 37.7 %      MCV 92 fL      MCH 28.6 pg      MCHC 31.0 g/dL      RDW 13.2 %      MPV 10.4 fL      Platelets 203 Thousands/uL      nRBC 0 /100 WBCs      Segmented % 70 %      Immature Grans % 0 %      Lymphocytes % 19 %      Monocytes % 6 %      Eosinophils Relative 4 %      Basophils Relative 1 %      Absolute Neutrophils 4.47 Thousands/µL      Absolute Immature Grans 0.02 Thousand/uL      Absolute Lymphocytes 1.18 Thousands/µL      Absolute Monocytes 0.41 Thousand/µL      Eosinophils Absolute 0.23 Thousand/µL      Basophils Absolute 0.06 Thousands/µL                    XR chest 1 view portable   ED Interpretation by Dru Briones DO (04/10 1447)   No acute cardiopulmonary disease.      Final Result by Kelvin Morris MD (04/11 0910)      No acute cardiopulmonary disease.            Workstation performed: [id number]               Procedures  ECG 12 Lead Documentation Only    Date/Time: 4/10/2024 12:41 PM    Performed by: Dru Briones DO  Authorized by: Dru Briones DO    Indications / Diagnosis:  Anxious  ECG reviewed by me, the ED Provider: yes    Patient location:  ED  Previous ECG:     Previous ECG:  Compared to current    Comparison ECG info:  88    Similarity:  No change    Comparison to cardiac monitor: Yes    Interpretation:     Interpretation: normal    Rate:     ECG rate assessment: normal    Rhythm:     Rhythm: sinus rhythm    Ectopy:     Ectopy: none    QRS:     QRS axis:  Normal    QRS intervals:  Normal  Conduction:     Conduction: normal    ST segments:     ST segments:  Normal  T waves:     T waves: normal    Comments:      Normal EKG.        ED Course       Outpatient services provided by [employer] for day programs for Sandra to socialize. Hx of difficult to control anxiety that is made worse when daughter is at work. Medications  concerning due to age but possibly necessary due extent of anxiety. She feels better after talking with family and friends. Labs grossly negative for acute process. Son will bring her back if she develops any new/ worsening sxs. No SI/HI. She will also try to better hydrate at home. Hemodynamically stable, no acute distress.                                Medical Decision Making  DDx including but not limited to: metabolic abnormality, cardiac abnormality, thyroid disease, intracranial process, adverse reaction; doubt infectious process.   DDx including but not limited to: anxiety, panic attack, substance abuse, depression; doubt suicidal ideation or metabolic abnormality or cardiac etiology or PE.     82 y/o female presenting to the ED with anxiety exacerbation regarding chronic constipation and chronic lightheadedness which has caused to have some difficulty with ambulation. Pt has had some recent titration on her psych meds and has not been drinking enough water due to polyuria.  No chest pain, shortness of breath, headache, N/V/D, rash, fevers, chills, increased frequency of falls, urinary incontinence, dysuria, back pain. Son states that pt feels better and less anxious after being social, going to lunch. Has safety precautions at home - lives with daughter but feels lonely during the day while daughter is at work.    Outpatient services provided by crisis for day programs for Sandra to socialize. Hx of difficult to control anxiety that is made worse when daughter is at work. Medications concerning due to age but possibly necessary due extent of anxiety. She feels better after talking with family and friends. Labs grossly negative for acute process. Son will bring her back if she develops any new/ worsening sxs. No SI/HI. She will also try to better hydrate at home. Hemodynamically stable, no acute distress.    Amount and/or Complexity of Data Reviewed  Labs: ordered.  Radiology: ordered and independent  interpretation performed.          Disposition  Final diagnoses:   Lightheadedness   Anxiety   Fatigue     Time reflects when diagnosis was documented in both MDM as applicable and the Disposition within this note       Time User Action Codes Description Comment    4/10/2024  3:44 PM Anselmo Dru MICHELLE Add [R42] Dizziness     4/10/2024  3:44 PM Anselmo Dru MICHELLE Remove [R42] Dizziness     4/10/2024  3:44 PM Briones, Dru MICHELLE Add [R42] Lightheadedness     4/10/2024  3:47 PM Anselmo Dru MICHELLE Add [F41.9] Anxiety     4/10/2024  3:47 PM Anselmo Dru MICHELLE Modify [R42] Lightheadedness     4/10/2024  3:47 PM Anselmo Dru MICHELLE Modify [F41.9] Anxiety     4/10/2024  3:51 PM Anselmo Dru MICHELLE Add [R53.83] Fatigue           ED Disposition       ED Disposition   Discharge    Condition   Stable    Date/Time   Wed Apr 10, 2024  3:44 PM    Comment   Sandra Peña discharge to home/self care.                   Follow-up Information    None         Discharge Medication List as of 4/10/2024  3:51 PM        CONTINUE these medications which have NOT CHANGED    Details   aspirin 81 MG tablet Take 81 mg by mouth daily, Historical Med      atorvastatin (LIPITOR) 10 mg tablet Take 1 tablet (10 mg total) by mouth daily, Starting Mon 4/8/2024, Normal      clonazePAM (KlonoPIN) 0.5 mg tablet Take 0.5 tablets (0.25 mg total) by mouth 3 (three) times a day, Starting Mon 3/25/2024, Until Fri 5/24/2024, Normal      docusate sodium (COLACE) 100 mg capsule Take 100 mg by mouth 2 (two) times a day, Historical Med      fluocinonide (LIDEX) 0.05 % external solution Scalp solution- apply to affected area once a day as needed, Normal      FLUoxetine (PROzac) 40 MG capsule Take 1 capsule (40 mg total) by mouth daily Combine with your 20mg for a total of 60 daily, Starting Mon 3/25/2024, Until Fri 5/24/2024, Normal      !! ketoconazole (NIZORAL) 2 % shampoo Apply 1 Application topically 4 (four) times a week, Starting Tue 10/17/2023,  Normal      !! ketoconazole (NIZORAL) 2 % shampoo To the scalp leave on for 5 minutes then rinse off completely. Once a week, Normal      linaCLOtide 145 MCG CAPS Take 1 capsule (145 mcg total) by mouth in the morning, Starting Thu 6/8/2023, Normal      lisinopril-hydrochlorothiazide (PRINZIDE,ZESTORETIC) 10-12.5 MG per tablet Take 1 tablet by mouth daily, Starting Mon 10/16/2023, Normal      metFORMIN (GLUCOPHAGE) 500 mg tablet Take 1 tablet (500 mg total) by mouth 2 (two) times a day with meals, Starting Tue 1/2/2024, Normal      mometasone (ELOCON) 0.1 % lotion Apply topically daily, Starting Fri 11/24/2023, Normal      omeprazole (PriLOSEC) 20 mg delayed release capsule Take 1 capsule (20 mg total) by mouth daily, Starting Thu 11/16/2023, Normal      polyethylene glycol (MIRALAX) 17 g packet Take 17 g by mouth daily for 7 days, Starting Fri 2/16/2024, Until Fri 2/23/2024, Normal      risperiDONE (RisperDAL) 0.25 mg tablet Multiple Dosages:Starting Mon 3/25/2024, Until Fri 5/24/2024 at 2359Take 1 tablet (0.25 mg total) by mouth daily AND 2 tablets (0.5 mg total) daily at bedtime., Normal      tamsulosin (FLOMAX) 0.4 mg Take 1 capsule (0.4 mg total) by mouth daily with dinner, Starting Wed 6/14/2023, Normal      triamcinolone (KENALOG) 0.1 % cream Apply under the breasts twice a day up to 2 weeks as needed for redness or itching, Normal       !! - Potential duplicate medications found. Please discuss with provider.        No discharge procedures on file.    PDMP Review         Value Time User    PDMP Reviewed  Yes 4/4/2024  9:19 AM Ede Antony DO             ED Provider  Attending physically available and evaluated Sandra Peña. I managed the patient along with the ED Attending.    Electronically Signed by           Dru Briones DO  04/13/24 0040

## 2024-04-10 NOTE — ED ATTENDING ATTESTATION
4/10/2024  I, Mathew Howard MD, saw and evaluated the patient. I have discussed the patient with the resident/non-physician practitioner and agree with the resident's/non-physician practitioner's findings, Plan of Care, and MDM as documented in the resident's/non-physician practitioner's note, except where noted. All available labs and Radiology studies were reviewed.  I was present for key portions of any procedure(s) performed by the resident/non-physician practitioner and I was immediately available to provide assistance.       At this point I agree with the current assessment done in the Emergency Department.  I have conducted an independent evaluation of this patient a history and physical is as follows: Patient accompanied by family.  Essentially, patient is looking for help with her anxiety.  She reports multiple medical symptoms all of which been present for years.  She is concerned that her anxiety is contributing.  She denies any suicidal or homicidal ideation.  She reports some urinary frequency.  Family is concerned that patient is lonely and does not interact with people her same age.  Interested in social work evaluation.    EKG, labs unremarkable.  Patient with no acute medical symptoms that would warrant hospitalization or additional advanced imaging or testing in the ER.    No SI or HI that would warrant psychiatric admission    Patient was seen by crisis worker in ED and offered outpatient resources.     Results Reviewed       Procedure Component Value Units Date/Time    HS Troponin I 2hr [[id number]]  (Normal) Collected: 04/10/24 1507    Lab Status: Final result Specimen: Blood from Arm, Right Updated: 04/10/24 1541     hs TnI 2hr 12 ng/L      Delta 2hr hsTnI 1 ng/L     UA w Reflex to Microscopic w Reflex to Culture [[id number]] Collected: 04/10/24 1510    Lab Status: Final result Specimen: Urine, Clean Catch Updated: 04/10/24 1523     Color, UA Colorless     Clarity, UA Clear     Specific  Gravity, UA 1.004     pH, UA 5.5     Leukocytes, UA Negative     Nitrite, UA Negative     Protein, UA Negative mg/dl      Glucose, UA Negative mg/dl      Ketones, UA Negative mg/dl      Urobilinogen, UA <2.0 mg/dl      Bilirubin, UA Negative     Occult Blood, UA Negative    HS Troponin I 4hr [[id number]]     Lab Status: No result Specimen: Blood     HS Troponin 0hr (reflex protocol) [[id number]]  (Normal) Collected: 04/10/24 1245    Lab Status: Final result Specimen: Blood from Arm, Right Updated: 04/10/24 1315     hs TnI 0hr 11 ng/L     Comprehensive metabolic panel [[id number]]  (Abnormal) Collected: 04/10/24 1245    Lab Status: Final result Specimen: Blood from Arm, Right Updated: 04/10/24 1307     Sodium 135 mmol/L      Potassium 4.3 mmol/L      Chloride 103 mmol/L      CO2 24 mmol/L      ANION GAP 8 mmol/L      BUN 26 mg/dL      Creatinine 0.93 mg/dL      Glucose 99 mg/dL      Calcium 9.7 mg/dL      AST 15 U/L      ALT 11 U/L      Alkaline Phosphatase 53 U/L      Total Protein 6.9 g/dL      Albumin 4.2 g/dL      Total Bilirubin 0.49 mg/dL      eGFR 56 ml/min/1.73sq m     Narrative:      National Kidney Disease Foundation guidelines for Chronic Kidney Disease (CKD):     Stage 1 with normal or high GFR (GFR > 90 mL/min/1.73 square meters)    Stage 2 Mild CKD (GFR = 60-89 mL/min/1.73 square meters)    Stage 3A Moderate CKD (GFR = 45-59 mL/min/1.73 square meters)    Stage 3B Moderate CKD (GFR = 30-44 mL/min/1.73 square meters)    Stage 4 Severe CKD (GFR = 15-29 mL/min/1.73 square meters)    Stage 5 End Stage CKD (GFR <15 mL/min/1.73 square meters)  Note: GFR calculation is accurate only with a steady state creatinine    CBC and differential [[id number]]  (Abnormal) Collected: 04/10/24 1245    Lab Status: Final result Specimen: Blood from Arm, Right Updated: 04/10/24 1256     WBC 6.37 Thousand/uL      RBC 4.09 Million/uL      Hemoglobin 11.7 g/dL      Hematocrit 37.7 %      MCV 92 fL      MCH 28.6 pg      MCHC 31.0 g/dL       RDW 13.2 %      MPV 10.4 fL      Platelets 203 Thousands/uL      nRBC 0 /100 WBCs      Segmented % 70 %      Immature Grans % 0 %      Lymphocytes % 19 %      Monocytes % 6 %      Eosinophils Relative 4 %      Basophils Relative 1 %      Absolute Neutrophils 4.47 Thousands/µL      Absolute Immature Grans 0.02 Thousand/uL      Absolute Lymphocytes 1.18 Thousands/µL      Absolute Monocytes 0.41 Thousand/µL      Eosinophils Absolute 0.23 Thousand/µL      Basophils Absolute 0.06 Thousands/µL           XR chest 1 view portable   ED Interpretation by Dru Briones DO (04/10 1067)   No acute cardiopulmonary disease.            ED Course         Critical Care Time  Procedures

## 2024-04-10 NOTE — DISCHARGE INSTRUCTIONS
Call number provided by  for day program resources in order to socialize as tolerated. Increase hydration with at least 2 more 8 ounce glasses of water a day. Continue physical therapy, continue following up with psychiatry for medication management. Please return to the ED with new/worsening symptoms including chest pain, shortness of breath, falls, fevers, nausea, vomiting, other pain, etc.

## 2024-04-10 NOTE — TELEPHONE ENCOUNTER
Patient daughter called and stated that patient is in the ED and the providers stated that she is on to many medication.  Daughter was wondering if they could have a discuss about this.

## 2024-04-11 ENCOUNTER — EVALUATION (OUTPATIENT)
Dept: PHYSICAL THERAPY | Facility: CLINIC | Age: 84
End: 2024-04-11
Payer: MEDICARE

## 2024-04-11 DIAGNOSIS — Z74.09 IMPAIRED FUNCTIONAL MOBILITY, BALANCE, GAIT, AND ENDURANCE: ICD-10-CM

## 2024-04-11 DIAGNOSIS — R26.89 BALANCE PROBLEM: Primary | ICD-10-CM

## 2024-04-11 PROCEDURE — 97110 THERAPEUTIC EXERCISES: CPT | Performed by: PHYSICAL THERAPIST

## 2024-04-11 PROCEDURE — 97112 NEUROMUSCULAR REEDUCATION: CPT | Performed by: PHYSICAL THERAPIST

## 2024-04-11 NOTE — TELEPHONE ENCOUNTER
Spoke with family. Daughter (Janay) noted that patient has been complaining of feeling dizzy getting out of the car or getting up. Son brought her to the hospital for an evaluation. After evaluation, there were no concerns medically that could be found. After discussion with providers upon her checkup, discussed possibilities of trying to be on less medications if possible. Daughter and son want to make a push for a reduction of medications if possible.     Klonopin being spread out has been helpful. Janay wanted to see if grogginess in the morning could still be occurring from medications. She will check if patient has been taking the risperdal correctly if it has been 0.5 or 0.25mg. It was agreed that she would go from 0.5 -> 0.25mg if she was taking it incorrectly or if she is taking 0.25mg -> 0.125mg and provider would send in smaller tablets.     Daughter understood and was able to repeat the plan, and understands to reach out if there are concerns as well as bring patient to the hospital if any emergencies. Provider has upcoming visit with patient to reassess and make further adjustments as needed.     All questions and concerns answered at this time.

## 2024-04-11 NOTE — PROGRESS NOTES
Progress Note     Today's date: 2024  Patient name: Sandra Peña  : 1940  MRN: [id number]  Referring provider: Ramin De Paz DO  Dx:   Encounter Diagnosis     ICD-10-CM    1. Balance problem  R26.89       2. Impaired functional mobility, balance, gait, and endurance  Z74.09           Start Time: 1115  Stop Time: 1155  Total time in clinic (min): 40 minutes    Subjective: The patient presents for re-evaluation today. The patient reports improvement in symptoms since beginning skilled physical therapy. The patient reports 0/10 pain at it's worst over the past 24 hours, and reports 50% improvement in overall condition since beginning formal PT care. Pt reports she has noticed some more stability when moving and standing, but continues to have some instances of lightheadedness at times. No falls reported since starting PT. No worsening of symptoms or function since starting PT. The patient's chief remaining concern is continuing to improve balance and stability.        Objective:     Strength/Myotome Testing     Left Wrist/Hand      (2nd hand position)     Trial 1: 18    Trial 2: 18    Trial 3: 20      Right Wrist/Hand      (2nd hand position)     Trial 1:  20    Trial 2:  20    Trial 3:  20      Left Hip   Planes of Motion   Flexion: 4+  Extension: 4+  Abduction: 4+  Adduction: 4+    Right Hip   Planes of Motion   Flexion: 4+  Extension: 4+/  Abduction: 4+  Adduction: 4+    Left Knee   Flexion: 4+  Extension: 4+    Right Knee   Flexion: 4+  Extension: 4+    Left Ankle/Foot   Dorsiflexion: 4+  Plantar flexion: 4+    Right Ankle/Foot   Dorsiflexion: 4+  Plantar flexion: 4+    Additional Strength Details  No pain reported with resisted MMT         Functional outcomes   5x sit to stand: 26.52 seconds  2 minute walk test: 317 feet  TU.39 seconds  Functional outcome comment: Use of armrests to ascend and descend with 5xSTS testing       Assessment: Sandra Peña is a pleasant 83 y.o. female who  "has been receiving PT intervention for impaired functional mobility, balance, and gait. The patient has demonstrated increased strength, improved body mechanics, improved gait mechanics , increased activity tolerance, and improved balance  since beginning treatment.      Primary remaining impairments include:    1)  Impaired dynamic stabilization and control     2)  Decreased activity tolerance       Goals  Patient will be independent with home exercise program.   Patient will be able to manage symptoms independently.     Short Term Goals 2-4 wks   1. Pt will be independent with initial HEP -MET  2. Pt will decrease TUG score by 3 seconds -MET  3. Pt will improve deficient mm strength by 1/2 grade -MET    Long Term Goals 6-8 wks  1. Pt will increase 2MWT by >50 feet -MET  2. Pt will decrease 5xSTS by 5-7 seconds -MET  3. Pt will improve FOTO score to greater than expected by d/c -MET    Updated Goals to be achieved in 4-6 wks   Pt will improve LE mm strength to 5/5  Pt will decrease 5xSTS score by 3-5 seconds   Pt will decrease TUG score to <10 seconds       Plan  Patient would benefit from: skilled physical therapy  Planned modality interventions: Modalities PRN.  Planned therapy interventions: activity modification, manual therapy, neuromuscular re-education, patient education, therapeutic activities, therapeutic exercise, graded activity, home exercise program, behavior modification, self care and balance  Frequency: 2x week  Duration in weeks: 6  Plan of Care expiration date: 5/23/24  Treatment plan discussed with: patient         Diagnosis: Balance Disorder    Precautions: Hx of Falls, Anxiety    POC: 4/11-5/23   Authorization: ROD    Access Code:  [email or web address]   Visit Count 11 12  9 10   Manuals 4/8 4/11 4/1 4/4                            Neuro Re-Ed        EO/EC Firm        EO/EC Foam         Blaze Pod Around [employer] [place] Challenge   3x firm surface   (Best: 32)   2x on airex   (Best: 25)   30\" ea  2x firm surface " "  (Best: 25)   2x on airex   (Best: 27)   30\" ea   3x firm surface (best: 29  )  2x on airex (best: 21)  30\" ea 3x firm surface   (Best: 35)   2x on airex   (Best: 30)   30\" ea    Blaze Pod Around the [place] Challenge  2x on long foam reaching outside ESTER  30\" ea   (Best: 15)  2x on long foam reaching outside ESTER  30\" ea   (Best: 12)   NV 2x on long foam reaching outside ESTER  30\" ea   (Best: 14)    Blaze Pod Around the [place] Challenge  4 pods on floor (2 on ea side) on airex   2x30\" ea   (Best: 24)     4 pods on floor (2 on ea side) on airex   2x30\" ea   (Best: 25)    Tandem Walk  2 laps    2 laps 2 laps    Side Step     2 laps on foam    Jhony Walk  2 laps, 3 hurdles   (Fwd & Lat)    2 laps, 3 hurdles (fwd & lat) 2 laps, 3 hurdles  (Fwd & Lat)            Education   R/A and testing      There Ex         Active warm up NuStep L1 5'  NuStep L1 5'   Nustep 5' lv 1 NuStep L1 5'   Hip Abd Iso  20x3\" 20x3\"   20x3\" 20x3\"    Hip Add Iso 20x3\" 20x3\"   20x3\" 20x3\"    LAQ 2x10 ea  2x10 ea   2x10 ea 2x10 ea              Ther Act        Squat  2x10 to chair   2x10  to chair 2x10 to chair   Step up         Lunge                               Modalities                                                            "

## 2024-04-24 ENCOUNTER — OFFICE VISIT (OUTPATIENT)
Dept: GASTROENTEROLOGY | Facility: AMBULARY SURGERY CENTER | Age: 84
End: 2024-04-24
Payer: MEDICARE

## 2024-04-24 VITALS
SYSTOLIC BLOOD PRESSURE: 134 MMHG | WEIGHT: 115 LBS | DIASTOLIC BLOOD PRESSURE: 74 MMHG | OXYGEN SATURATION: 98 % | HEART RATE: 96 BPM | BODY MASS INDEX: 25.87 KG/M2 | HEIGHT: 56 IN

## 2024-04-24 DIAGNOSIS — K59.00 CONSTIPATION, UNSPECIFIED CONSTIPATION TYPE: Primary | ICD-10-CM

## 2024-04-24 DIAGNOSIS — R13.10 DYSPHAGIA: ICD-10-CM

## 2024-04-24 PROCEDURE — 99214 OFFICE O/P EST MOD 30 MIN: CPT | Performed by: INTERNAL MEDICINE

## 2024-04-24 NOTE — PROGRESS NOTES
Follow-up Note -  [facility]  Sandra Peña 1940 female         ASSESSMENT & PLAN:    Constipation  Patient with history of chronic constipation and patient's daughter reports that her problems are also mostly related to OCD and anxiety.  They tried increasing the Linzess that she could not tolerate.  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  Advised to take MiraLAX and stool softeners regularly.    -High-fiber diet and fiber supplements    -Increase oral hydration    -Discussed about colonoscopy but they are refusing because of patient's age.      Reason: Follow-up    HPI:  Ms. Flores was brought in by her daughter.  She was seen in the office 3 years ago because of chronic constipation.  She has been on Linzess 145 mcg.  She has bowel movements once every couple of days but she is very obsessive about having a bowel movement every day and complaining.  She was seen in the ER couple of months ago which showed hard stool in the rectum with some stercoral proctitis.  They tried increasing the dose of Linzess that she could not tolerate.  Good appetite, no recent weight loss.  Denies any blood or mucus in the stool.  No abdominal pain, nausea or vomiting.    Last colonoscopy was in 2017 which showed long tortuous colon    Chaperon: Ms. Dailey    REVIEW OF SYSTEMS: Review of Systems   Constitutional:  Negative for activity change, appetite change, chills, diaphoresis, fatigue, fever and unexpected weight change.   HENT:  Negative for ear discharge, ear pain, facial swelling, hearing loss, nosebleeds, sore throat, tinnitus and voice change.    Eyes:  Negative for pain, discharge, redness, itching and visual disturbance.   Respiratory:  Negative for apnea, cough, chest tightness, shortness of breath and wheezing.    Cardiovascular:  Negative for chest pain and palpitations.   Gastrointestinal:         As noted in HPI    Endocrine: Negative for cold intolerance, heat intolerance and polyuria.   Genitourinary:  Negative for difficulty urinating, dysuria, flank pain, hematuria and urgency.   Musculoskeletal:  Negative for arthralgias, back pain, gait problem, joint swelling and myalgias.   Skin:  Negative for rash and wound.   Neurological:  Negative for dizziness, tremors, seizures, speech difficulty, light-headedness, numbness and headaches.   Hematological:  Negative for adenopathy. Does not bruise/bleed easily.   Psychiatric/Behavioral:  Negative for agitation, behavioral problems and confusion. The patient is not nervous/anxious.         Past Medical History:   Diagnosis Date    Colon polyps     Diabetes mellitus (HCC)     Dizziness     Last assessed: 8/31/15    DVT (deep venous thrombosis) ([facility])     Dysuria     Hematuria 2022    Hyperlipidemia     Hypertension     Hypertensive urgency 10/22/2021    Insomnia     Ureterolithiasis 2020      Past Surgical History:   Procedure Laterality Date     SECTION      1964 son, 1968 daughter    COLONOSCOPY      FL RETROGRADE PYELOGRAM  2020    ID COLONOSCOPY FLX DX W/COLLJ SPEC WHEN PFRMD N/A 3/27/2017    Procedure: COLONOSCOPY;  Surgeon: Gold Francis MD;  Location: [facility];  Service: Gastroenterology    ID CYSTO/URETERO W/LITHOTRIPSY &INDWELL STENT INSRT Right 2020    Procedure: CYSTOSCOPY URETEROSCOPY WITH LITHOTRIPSY HOLMIUM LASER, RETROGRADE PYELOGRAM AND INSERTION STENT URETERAL; EXCISION OF BLADDER TUMOR;  Surgeon: Edwin Love MD;  Location: [facility];  Service: Urology    ROTATOR CUFF REPAIR Left     Last assessed: 3/29/15    TONSILLECTOMY       Social History     Socioeconomic History    Marital status:      Spouse name: Not on file    Number of children: Not on file    Years of education: Not on file    Highest education level: Not on file   Occupational History    Not on file   Tobacco Use    Smoking status: Never    Smokeless tobacco:  Never   Vaping Use    Vaping status: Never Used   Substance and Sexual Activity    Alcohol use: Never    Drug use: No    Sexual activity: Not on file   Other Topics Concern    Not on file   Social History Narrative    Lack of exercise     Social Determinants of Health     Financial Resource Strain: Low Risk  (10/16/2023)    Overall Financial Resource Strain (CARDIA)     Difficulty of Paying Living Expenses: Not very hard   Food Insecurity: Not on file   Transportation Needs: No Transportation Needs (10/16/2023)    PRAPARE - Transportation     Lack of Transportation (Medical): No     Lack of Transportation (Non-Medical): No   Physical Activity: Not on file   Stress: Not on file   Social Connections: Not on file   Intimate Partner Violence: Not on file   Housing Stability: Not on file     Family History   Problem Relation Age of Onset    Depression Mother         w/anxiety    Diabetes Mother         Mellitus    Breast cancer Mother     Hypertension Mother     No Known Problems Father     Depression Sister         w/anxiety    Heart disease Sister         Cardiac Disorder    Breast cancer Sister     Hypertension Sister     Diabetes Brother         Mellitus    Alcohol abuse Son      Patient has no known allergies.  Current Outpatient Medications   Medication Sig Dispense Refill    aspirin 81 MG tablet Take 81 mg by mouth daily      atorvastatin (LIPITOR) 10 mg tablet Take 1 tablet (10 mg total) by mouth daily 90 tablet 0    clonazePAM (KlonoPIN) 0.5 mg tablet Take 0.5 tablets (0.25 mg total) by mouth 3 (three) times a day 45 tablet 1    docusate sodium (COLACE) 100 mg capsule Take 100 mg by mouth 2 (two) times a day      fluocinonide (LIDEX) 0.05 % external solution Scalp solution- apply to affected area once a day as needed 60 mL 2    FLUoxetine (PROzac) 40 MG capsule Take 1 capsule (40 mg total) by mouth daily Combine with your 20mg for a total of 60 daily 30 capsule 1    ketoconazole (NIZORAL) 2 % shampoo To the  "scalp leave on for 5 minutes then rinse off completely. Once a week 120 mL 6    linaCLOtide 145 MCG CAPS Take 1 capsule (145 mcg total) by mouth in the morning 90 capsule 3    lisinopril-hydrochlorothiazide (PRINZIDE,ZESTORETIC) 10-12.5 MG per tablet Take 1 tablet by mouth daily 90 tablet 3    metFORMIN (GLUCOPHAGE) 500 mg tablet Take 1 tablet (500 mg total) by mouth 2 (two) times a day with meals 180 tablet 1    mometasone (ELOCON) 0.1 % lotion Apply topically daily 60 mL 0    risperiDONE (RisperDAL) 0.25 mg tablet Take 1 tablet (0.25 mg total) by mouth daily AND 2 tablets (0.5 mg total) daily at bedtime. 90 tablet 1    triamcinolone (KENALOG) 0.1 % cream Apply under the breasts twice a day up to 2 weeks as needed for redness or itching 80 g 1    ketoconazole (NIZORAL) 2 % shampoo Apply 1 Application topically 4 (four) times a week 120 mL 1    omeprazole (PriLOSEC) 20 mg delayed release capsule Take 1 capsule (20 mg total) by mouth daily 30 capsule 0    polyethylene glycol (MIRALAX) 17 g packet Take 17 g by mouth daily for 7 days 119 g 0    tamsulosin (FLOMAX) 0.4 mg Take 1 capsule (0.4 mg total) by mouth daily with dinner 30 capsule 0     No current facility-administered medications for this visit.       Blood pressure 134/74, pulse 96, height 4' 8\" (1.422 m), weight 52.2 kg (115 lb), SpO2 98%.    PHYSICAL EXAM: Physical Exam  Constitutional:       Appearance: Normal appearance. She is well-developed.   HENT:      Head: Normocephalic and atraumatic.      Nose: Nose normal.   Eyes:      Conjunctiva/sclera: Conjunctivae normal.   Neck:      Thyroid: No thyromegaly.      Vascular: No JVD.      Trachea: No tracheal deviation.   Cardiovascular:      Rate and Rhythm: Normal rate and regular rhythm.      Heart sounds: Normal heart sounds. No murmur heard.     No friction rub. No gallop.   Pulmonary:      Effort: Pulmonary effort is normal. No respiratory distress.      Breath sounds: Normal breath sounds. No wheezing or " rales.   Abdominal:      General: Bowel sounds are normal. There is no distension.      Palpations: Abdomen is soft. There is no mass.      Tenderness: There is no abdominal tenderness. There is no guarding.      Hernia: No hernia is present.   Musculoskeletal:         General: No tenderness or deformity.      Cervical back: Neck supple.      Right lower leg: No edema.      Left lower leg: No edema.   Lymphadenopathy:      Cervical: No cervical adenopathy.   Skin:     General: Skin is warm and dry.      Findings: No erythema or rash.   Neurological:      Mental Status: She is alert and oriented to person, place, and time.   Psychiatric:         Mood and Affect: Mood normal.         Behavior: Behavior normal.         Thought Content: Thought content normal.          Lab Results   Component Value Date    WBC 6.37 04/10/2024    HGB 11.7 04/10/2024    HCT 37.7 04/10/2024    MCV 92 04/10/2024     04/10/2024     Lab Results   Component Value Date    GLUCOSE 134 03/17/2015    CALCIUM 9.7 04/10/2024     02/11/2017    K 4.3 04/10/2024    CO2 24 04/10/2024     04/10/2024    BUN 26 (H) 04/10/2024    CREATININE 0.93 04/10/2024     Lab Results   Component Value Date    ALT 11 04/10/2024    AST 15 04/10/2024    ALKPHOS 53 04/10/2024    BILITOT 0.7 02/11/2017     Lab Results   Component Value Date    INR 1.01 04/20/2022    INR 1.01 05/21/2020    INR 1.51 (H) 02/28/2015    PROTIME 13.3 04/20/2022    PROTIME 12.7 05/21/2020    PROTIME 17.3 (H) 02/28/2015       XR chest 1 view portable    Result Date: 4/11/2024  Impression: No acute cardiopulmonary disease. Workstation performed: [id number]

## 2024-04-24 NOTE — ASSESSMENT & PLAN NOTE
Patient with history of chronic constipation and patient's daughter reports that her problems are also mostly related to OCD and anxiety.  They tried increasing the Linzess that she could not tolerate.  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  Advised to take MiraLAX and stool softeners regularly.    -High-fiber diet and fiber supplements    -Increase oral hydration    -Discussed about colonoscopy but they are refusing because of patient's age.

## 2024-05-02 ENCOUNTER — OFFICE VISIT (OUTPATIENT)
Dept: PSYCHIATRY | Facility: CLINIC | Age: 84
End: 2024-05-02

## 2024-05-02 DIAGNOSIS — F45.1 SOMATIC SYMPTOM DISORDER: ICD-10-CM

## 2024-05-02 DIAGNOSIS — F41.1 GENERALIZED ANXIETY DISORDER: ICD-10-CM

## 2024-05-02 DIAGNOSIS — F33.1 MAJOR DEPRESSIVE DISORDER, RECURRENT, MODERATE (HCC): ICD-10-CM

## 2024-05-02 RX ORDER — HYDROXYZINE HYDROCHLORIDE 10 MG/1
10 TABLET, FILM COATED ORAL EVERY 8 HOURS PRN
Qty: 20 TABLET | Refills: 1 | Status: SHIPPED | OUTPATIENT
Start: 2024-05-02

## 2024-05-02 RX ORDER — RISPERIDONE 0.25 MG/1
0.25 TABLET ORAL
Qty: 30 TABLET | Refills: 1 | Status: SHIPPED | OUTPATIENT
Start: 2024-05-02 | End: 2024-07-01

## 2024-05-02 RX ORDER — CLONAZEPAM 0.5 MG/1
0.25 TABLET ORAL 3 TIMES DAILY
Qty: 45 TABLET | Refills: 1 | Status: SHIPPED | OUTPATIENT
Start: 2024-05-27 | End: 2024-07-26

## 2024-05-02 RX ORDER — FLUOXETINE HYDROCHLORIDE 40 MG/1
40 CAPSULE ORAL DAILY
Qty: 30 CAPSULE | Refills: 1 | Status: SHIPPED | OUTPATIENT
Start: 2024-05-02 | End: 2024-07-01

## 2024-05-02 NOTE — PSYCH
"    Psychiatric Progress Note: Medication Management    [facility]    Name and Date of Birth:  Sandra Peña 83 y.o. 1940 MRN: [id number]    Date of Visit: May 2, 2024    Reason for Visit: Follow-up visit regarding medication management     ASSESSMENT & PLAN     Sandra Peña is a 83 y.o. female, ,  and presently living daughter, son-in-law, 3 grandkids; retired teacher; with prior psychiatric diagnoses of MDD, JONAH; no past suicide attempts (0); 1 past psychiatric hospitalizations (for anxiety); with suicide risk factors including medical problems, anxiety and age (50+); and medical history including DM2, HTN, DVT hx, OA, HLD, TIA, status post rotator cuff surgery who was personally seen and evaluated at the [facility] outpatient clinic for follow-up regarding medication management.     EKG reviewed with normalized Qtc.    Patient notes things \"could be better.\" She feels that there are still difficulties with her \"negative thoughts\" and that she still has many worries. She is present with her daughter who mentions that it has been frustrating trying to help her through her worries. She continues to go to therapy and has been taking the medications with the gradual reduction of her risperdal. The decrease in klonopin at last visit seems to be going well. They had to change the nighttime timing to after dinner a little earlier for better coverage which has been working. The goal of reducing polypharmacy and coming off risperdal while monitoring symptoms is still the goal decreasing down to just 0.25mg at night. Given concerns for braekthrough anxiety discussed atarax only as needed. She was also referred for older adult anxiety group to help address ongoing anxiety symptoms. Daughter noted the lip tremor seemed to improve at one point then stopped, but has returned. Provider noticed it consistent throughout visit and " appears unchanged from last visit. No acute concerns for suicidality, homicidality, hallucinations.    DSM-5 Diagnoses:   Anxiety  MDD  Obsessive thoughts r/o OCD    Treatment Recommendations/Precautions:  Notable Changes  Discontinued morning risperdal (done via phone call previously) and decrease night risperdal to 0.25mg HS  Create a calendar to help with tasks - time blocking method to help reduce worrying about what else she has to do later or forgetting  Referral placed for older adult (60+) anxiety group - handout provided & therapist contacted  Begin atarax 10mg PRN for breakthrough anxiety  Caution anticholinergic and Qtc prolongation hx (though last EKG showed normalization)  Other Treatment recommendations  Continue klonopin 0.25mg TID for anxiety  Continue Prozac 40mg for anxiety and depression symptoms  Interaction CYP2D6 with prozac may be increasing effects of Risperdal  Medication management every 1-3 months  Aware of 24 hour and weekend coverage for urgent situations accessed by calling [facility] main practice number    Medications Risks/Benefits    Risks, benefits, and possible side effects of medications explained to Sandra and she verbalizes understanding and agreement for treatment. including:   PARQ was completed for hydroxyzine including risk of arrhythmia with doses >100mg/day, drowsiness and other anticholinergic effects, seizure risk, headaches, nausea, potential for drug interactions, and others.  Risks of taking benzodiazepines were discussed, including risk of sedation and respiratory depression, particularly when combined with alcohol, opioids, or other central nervous system-depressants. Addiction potential, withdrawal seizures with abrupt discontinuation, and other potential adverse effects were discussed. The patient was instructed not to drive or operate machinery while taking benzodiazepines.   PARQ was completed for the class of SSRIs including transient  anxiety, insomnia or sedation, headaches, constipation or diarrhea; and longer-standing sexual side effects, bleeding risk (especially with NSAIDs), and weight gain; as well as suicidal thoughts in patients under 25 years old, serotonin syndrome, and induction of eligio. Potential for discontinuation syndrome (flu-like symptoms, insomnia, nausea, sensory disturbances, and headache) was also discussed.   PARQ was completed for second generation antipsychotic medication including sedation, GI distress, dizziness, risk of metabolic syndrome, EPS (akathisia, TD, etc), rare NMS, orthostatic hypotension, cardiovascular risks such as QT prolongation, increased prolactin, and others.    Controlled Medication Discussion:   Sandra has been filling controlled prescriptions on time as prescribed according to [place] Prescription Drug Monitoring Program    Treatment Plan:  Completed and signed during the session: Not applicable - Treatment Plan not due at this session. Next due treatment plan date is: August 5, 2024     Psychotherapy Provided:   Individual psychotherapy provided: Yes  Counseling was provided during the session today for 25 minutes.     Suicide/Homicide Risk Assessment:  Risk of Harm to Self:  The following ratings are based on assessment at the time of the interview and review of records  Demographic risk factors include: ,  status, elderly (75 or older)   Historical Risk Factors include: chronic psychiatric problems, history of depression, history of anxiety  Recent Specific Risk Factors include: diagnosis of depression, current depressive symptoms, current anxiety symptoms  Protective Factors: no current suicidal ideation, being a parent, compliant with medications, compliant with mental health treatment, having a desire to be alive, having a desire to live, having a sense of purpose or meaning in life, having pets, personal beliefs, personal beliefs about the meaning and value of life,  Mormon beliefs discouraging suicide, resiliency, restricted access to lethal means, stable living environment, supportive family  Weapons: none. The following steps have been taken to ensure weapons are properly secured: not applicable  Based on today's assessment, Sandra presents the following risk of harm to self: minimal    Risk of Harm to Others:  The following ratings are based on assessment at the time of the interview and review of records  Demographic Risk Factors include: none.  Historical Risk Factors include: none.  Recent Specific Risk Factors include: multiple stressors, social difficulties.  Protective Factors: no current homicidal ideation, being a parent, compliant with medications, compliant with mental health treatment, no substance use problems, personal beliefs, Mormon beliefs, resilience, restricted access to lethal means, safe and stable living environment, supportive family  Weapons: none. The following steps have been taken to ensure weapons are properly secured: not applicable  Based on today's assessment, Sandra presents the following risk of harm to others: minimal    The following interventions are recommended: no intervention changes needed. Although patient's acute lethality risk is low, long-term/chronic lethality risk is mildly elevated in the presence of anxiety and depressive symptoms. At the current moment, Sandra is future-oriented, forward-thinking, and demonstrates ability to act in a self-preserving manner as evidenced by volitionally presenting to today's visit, seeking treatment, suggesting a will and desire to live. At this juncture, inpatient hospitalization is not currently warranted. To mitigate future risk, patient should adhere to the recommendations of this writer, avoid alcohol/illicit substance use, utilize community-based resources and familiar support and prioritize mental health treatment.     Based on today's assessment and clinical criteria, Sandra Peña  contracts for safety and is not an imminent risk of harm to self or others. Outpatient level of care is deemed appropriate at this present time. Sandra understands that if they are no longer able to contract for safety, they need to call/contact the outpatient office including this writer, call/contact crisis and/orattend to the nearest Emergency Department for immediate evaluation.    SUBJECTIVE     Chief Complaint: Negative thoughts on my mind.     Sandra Peña ,  and presently living daughter, son-in-law, 3 grandkids; retired teacher; with prior psychiatric diagnoses of MDD, JONAH; no past suicide attempts (0); 1 past psychiatric hospitalizations (for anxiety); with suicide risk factors including medical problems, anxiety and age (50+); and medical history including DM2, HTN, DVT hx, OA, HLD, TIA, status post rotator cuff surgery who was personally seen and evaluated at the [facility] outpatient clinic for follow-up regarding medication management.     At the time of last visit, pt had reported that things are going not so good with depressive and anxiety symptoms that she feels are difficult. She reports particularly the anxiety and being more limited from what she could previously do. Per son that is present with her, he feels she is doing well and being active but worrying about things that she does not need to (such as laundry for the grandkinds, etc.). He notes she is making food for herself and doing more things than he would expect for someone her age. Daughter, Janay, was called on the phone as well who had some concerns with sluggishness in the past. Given that patient still has anxiety and goal of decreasing klonopin and wanting to reduce polypharmacy as well as possible sedation, will space out klonpin to TID dosing but at lower doses. Additionally, goal will be to lower risperdal gradually. Minor lip tremor was noted on the bottom lip that appears to be benign and  "possible essential tremor in nature. It is not always present and patient was assessed for cogwheel rigidity/parkinsonism which was not present. Will also order a repreat ECG given last recording showed Qtc prolongation but was after a fall. No acute concerns for suicidality, homicidality, hallucinations.    Sandra states that since their previous outpatient psychiatric appointment with this writer, \"could do better.\" She notes shaking this morning not sure if she was nervous but also wanting to come to the visit. She notes she reads her book in the afternoon but feels she should move around more because of fear of constipation. She talks about her concerns of things that are on her mind and \"negative thoughts on my mind.\"     Regarding depressive symptoms she notes it is still present. She notes that she gets up at least once in the night to use the bathroom. Sometimes has some difficulty falling back asleep but not always. She notes still eating but sometimes worries about what to eat. Notes energy still feels tired. She feels chores can make her tired, but it takes a lot out of her though she does a lot. Regarding suicide she notes no passive or active suicidal ideations. Regarding homicidal ideations she denies any as well as hallucinations.     She was having issues with nighttime anxiety after dinner, so took klonopin earlier than nighttime which has been effective. She denies any side effects to medications. She describes     Presently, patient adamantly denies suicidal/homicidal ideation in addition to thoughts of self-injury, citing family & Episcopalian as deterrents against self-harm; contracts for safety, see above for risk assessment. At conclusion of evaluation, patient is amenable to the recommendations of this writer including: medications as prescribed, attending routine appointments.  Also, patient is amenable to calling/contacting the outpatient office including this writer if any acute adverse effects " of their medication regimen arise in addition to any comments or concerns pertaining to their psychiatric management.  Patient is amenable to calling/contacting crisis and/or attending to the nearest emergency department if their clinical condition deteriorates to assure their safety and stability, stating that they are able to appropriately confide in their Family (daughter) & good friend regarding their psychiatric state.    Current Rating Scores:   None completed today.    PSYCHIATRIC REVIEW OF SYSTEMS     Unchanged information from this writer's previous assessment is copied; information that has changed is bolded.    Sleep change: no  Interest change: no  Interests include: read (books), knitting  Guilt/Hopelessness/helplessness/worthlessness: yes, guilt (giving daughter stress)  Energy change: yes, decreased  Concentration/Attention change: yes, decreased  Appetite change: yes, decreased   Weight changes & timeframe: no  Psychomotor agitation/retardation: no  Somatic symptoms: yes  Suicidal ideation: no  Homicidal ideation: no  Zaira/hypomania: no     Anxiety/panic attack: yes  JONAH symptoms: difficulty concentrating, fatigue, insomnia, irritable and restlessness/keyed up  Panic Disorder symptoms: no symptoms suggestive of panic disorder  Obsessive/compulsive symptoms: obsessive thoughts without compulsions  Eating Disorder symptoms: no historical or current eating disorder. no binge eating disorder; no anorexia nervosa. no symptoms of bulimia; does not stopped eating when trying to lose weight when younger     Auditory hallucinations: no  Visual hallucinations: no  Other perceptual disturbances: no  Delusional thinking: no    REVIEW OF SYSTEMS     Constitutional negative   ENT negative   Cardiovascular negative   Respiratory negative   Gastrointestinal negative   Genitourinary negative   Musculoskeletal negative   Integumentary negative   Neurological negative   Endocrine negative   Other Symptoms none, all  other systems are negative     HISTORICAL INFORMATION     History Review: The following portions of the patient's history were reviewed and updated as appropriate: allergies, current medications, past family history, past medical history, past social history, past surgical history, and problem list.    Unchanged information from this writer's previous assessment is copied; information that has changed is bolded.    Family History   Problem Relation Age of Onset    Depression Mother         w/anxiety    Diabetes Mother         Mellitus    Breast cancer Mother     Hypertension Mother     No Known Problems Father     Depression Sister         w/anxiety    Heart disease Sister         Cardiac Disorder    Breast cancer Sister     Hypertension Sister     Diabetes Brother         Mellitus    Alcohol abuse Son        Additional fam hx:   Family hx of psychiatric diagnosis: yes, 2 sisters (depression & anxiety), Mom (Depression & anxiety)  Family hx of suicide: no  Family Hx of drug abuse: Son with alcohol abuse  Family Hx of medical diagnosis: yes, as listed above Mom (DM, breast cancer, HTN), sister (heart issues, breast cancer, HTN), brother (DM)     Past Psychiatric History:   Previous diagnosis: MDD, Anxiety  Previous inpatient psychiatric admissions: 1 prior admission for 3 weeks at [facility] in 2013 (panic attack after surgery).  Present/previous outpatient psychiatrist: saw psychiatrists in [facility]; Dr. Rae & Dr. Momin prior to transfer.  Present/previous therapy/psychotherapy: Dr. Cross for past 3 years.  History of suicidal attempts/gestures: Denies.  Self-injurious behavior/high-risk behavior: no.  History of violence/aggressive behaviors: No.  Other Services: patient denies     Psychiatric medication trial:   Antidepressants  Effexor, Zoloft, Prozac, Lexapro  Antipsychotics  Abilify (Dizzy), Risperdal  Mood stabilizers  N/A  Sedative hypnotics  Klonopin  Others  Buspar, Neurontin     Substance Abuse  History:  Nicotine use (cigarettes & vape): Denies  Caffeine use: 1 cup day Decafe  Alcohol use: Denies  Marijuana use: Denies  Other substances: Denies     Longest clean time: N/A  Previous inpatient/outpatient substance abuse rehabilitation: No.     Patient denies previous legal actions or arrests related to substance intoxication including prior DWIs/DUIs. Sandra does not apear under the influence or withdrawal of any psychoactive substance throughout today's examination.      I have assessed this patient for substance use within the past 12 months.     Social History:  Born/Raise: [place], [place] - moved to [place]  when begun living with family  Early life/developmental: Denies a history of milestone/developmental delay. Denies a history of in-utero exposure to toxins/illicit substances.   Family: 2 brother(s) & 2 sister(s), raised by parents  Education: Bachelor of Arts - Teaching  Learning Disabilities: There is no documented history of IEP or need for special education.  Occupational History: retired teacher  Pentecostalism Affiliation: Confucianist  Marital history:  since  -   from cancer  Children: yes, 2 (1 daughter & 1 son), 3 grandkids  Living arrangement: daughter, father-in-law, son-in-law, 3 grandkids in [place], 2 dogs, guinea pig  Support system: good support system - daughter, therapist   Hx: no  Legal Hx: no  Access to firearms: patient denies. Sandra Peña has no history of arrests or violence pertaining to use of a deadly weapon.      Traumatic History:   Abuse: none is reported  Other Traumatic Events: Denies  Flashbacks/Nightmares: no     Past Medical History:  Hx of seizures: no  Hx of concussions & ongoing symptoms: no    OBJECTIVE     Vital signs in last 24 hours:  There were no vitals filed for this visit.    Laboratory Results: I have personally reviewed all pertinent laboratory/tests results  Recent Labs (last 4 months):   Admission on 04/10/2024, Discharged on  04/10/2024   Component Date Value    WBC 04/10/2024 6.37     RBC 04/10/2024 4.09     Hemoglobin 04/10/2024 11.7     Hematocrit 04/10/2024 37.7     MCV 04/10/2024 92     MCH 04/10/2024 28.6     MCHC 04/10/2024 31.0 (L)     RDW 04/10/2024 13.2     MPV 04/10/2024 10.4     Platelets 04/10/2024 203     nRBC 04/10/2024 0     Segmented % 04/10/2024 70     Immature Grans % 04/10/2024 0     Lymphocytes % 04/10/2024 19     Monocytes % 04/10/2024 6     Eosinophils Relative 04/10/2024 4     Basophils Relative 04/10/2024 1     Absolute Neutrophils 04/10/2024 4.47     Absolute Immature Grans 04/10/2024 0.02     Absolute Lymphocytes 04/10/2024 1.18     Absolute Monocytes 04/10/2024 0.41     Eosinophils Absolute 04/10/2024 0.23     Basophils Absolute 04/10/2024 0.06     Sodium 04/10/2024 135     Potassium 04/10/2024 4.3     Chloride 04/10/2024 103     CO2 04/10/2024 24     ANION GAP 04/10/2024 8     BUN 04/10/2024 26 (H)     Creatinine 04/10/2024 0.93     Glucose 04/10/2024 99     Calcium 04/10/2024 9.7     AST 04/10/2024 15     ALT 04/10/2024 11     Alkaline Phosphatase 04/10/2024 53     Total Protein 04/10/2024 6.9     Albumin 04/10/2024 4.2     Total Bilirubin 04/10/2024 0.49     eGFR 04/10/2024 56     hs TnI 0hr 04/10/2024 11     Ventricular Rate 04/10/2024 88     Atrial Rate 04/10/2024 88     MA Interval 04/10/2024 166     QRSD Interval 04/10/2024 64     QT Interval 04/10/2024 372     QTC Interval 04/10/2024 450     P Axis 04/10/2024 51     QRS [place] 04/10/2024 19     T Wave Axis 04/10/2024 49     hs TnI 2hr 04/10/2024 12     Delta 2hr hsTnI 04/10/2024 1     Color, UA 04/10/2024 Colorless     Clarity, UA 04/10/2024 Clear     Specific Gravity, UA 04/10/2024 1.004     pH, UA 04/10/2024 5.5     Leukocytes, UA 04/10/2024 Negative     Nitrite, UA 04/10/2024 Negative     Protein, UA 04/10/2024 Negative     Glucose, UA 04/10/2024 Negative     Ketones, UA 04/10/2024 Negative     Urobilinogen, UA 04/10/2024 <2.0     Bilirubin, UA  04/10/2024 Negative     Occult Blood, UA 04/10/2024 Negative    Admission on 02/16/2024, Discharged on 02/16/2024   Component Date Value    WBC 02/16/2024 9.72     RBC 02/16/2024 3.90     Hemoglobin 02/16/2024 11.4 (L)     Hematocrit 02/16/2024 35.4     MCV 02/16/2024 91     MCH 02/16/2024 29.2     MCHC 02/16/2024 32.2     RDW 02/16/2024 13.5     MPV 02/16/2024 10.2     Platelets 02/16/2024 212     nRBC 02/16/2024 0     Segmented % 02/16/2024 89 (H)     Immature Grans % 02/16/2024 0     Lymphocytes % 02/16/2024 6 (L)     Monocytes % 02/16/2024 3 (L)     Eosinophils Relative 02/16/2024 1     Basophils Relative 02/16/2024 1     Absolute Neutrophils 02/16/2024 8.71 (H)     Absolute Immature Grans 02/16/2024 0.03     Absolute Lymphocytes 02/16/2024 0.60     Absolute Monocytes 02/16/2024 0.28     Eosinophils Absolute 02/16/2024 0.05     Basophils Absolute 02/16/2024 0.05     Sodium 02/16/2024 137     Potassium 02/16/2024 4.0     Chloride 02/16/2024 103     CO2 02/16/2024 24     ANION GAP 02/16/2024 10     BUN 02/16/2024 27 (H)     Creatinine 02/16/2024 0.96     Glucose 02/16/2024 137     Calcium 02/16/2024 9.5     eGFR 02/16/2024 54    Appointment on 02/12/2024   Component Date Value    WBC 02/12/2024 4.86     RBC 02/12/2024 3.80 (L)     Hemoglobin 02/12/2024 10.9 (L)     Hematocrit 02/12/2024 34.7 (L)     MCV 02/12/2024 91     MCH 02/12/2024 28.7     MCHC 02/12/2024 31.4     RDW 02/12/2024 13.3     MPV 02/12/2024 10.9     Platelets 02/12/2024 242     nRBC 02/12/2024 0     Segmented % 02/12/2024 63     Immature Grans % 02/12/2024 0     Lymphocytes % 02/12/2024 26     Monocytes % 02/12/2024 6     Eosinophils Relative 02/12/2024 4     Basophils Relative 02/12/2024 1     Absolute Neutrophils 02/12/2024 3.10     Absolute Immature Grans 02/12/2024 0.01     Absolute Lymphocytes 02/12/2024 1.25     Absolute Monocytes 02/12/2024 0.27     Eosinophils Absolute 02/12/2024 0.18     Basophils Absolute 02/12/2024 0.05     Sodium  "02/12/2024 139     Potassium 02/12/2024 4.3     Chloride 02/12/2024 103     CO2 02/12/2024 28     ANION GAP 02/12/2024 8     BUN 02/12/2024 31 (H)     Creatinine 02/12/2024 0.93     Glucose, Fasting 02/12/2024 99     Calcium 02/12/2024 9.6     AST 02/12/2024 17     ALT 02/12/2024 14     Alkaline Phosphatase 02/12/2024 52     Total Protein 02/12/2024 6.6     Albumin 02/12/2024 4.0     Total Bilirubin 02/12/2024 0.60     eGFR 02/12/2024 56     Hemoglobin A1C 02/12/2024 7.0 (H)     EAG 02/12/2024 154     Cholesterol 02/12/2024 135     Triglycerides 02/12/2024 89     HDL, Direct 02/12/2024 56     LDL Calculated 02/12/2024 61     Non-HDL-Chol (CHOL-HDL) 02/12/2024 79     Creatinine, Ur 02/12/2024 81.1     Albumin,U,Random 02/12/2024 12.9     Albumin Creat Ratio 02/12/2024 16     TSH 3RD GENERATON 02/12/2024 1.649    Admission on 02/01/2024, Discharged on 02/01/2024   Component Date Value    Ventricular Rate 02/01/2024 88     Atrial Rate 02/01/2024 88     [place] Interval 02/01/2024 158     QRSD Interval 02/01/2024 60     QT Interval 02/01/2024 474     QTC Interval 02/01/2024 573     P Axis 02/01/2024 23     QRS Axis 02/01/2024 8     T Wave [place] 02/01/2024 53        Mental Status Evaluation:    Appearance:  age appropriate, casually dressed, glasses   Behavior:  cooperative, calm   Motor: Minor lip tremor   Speech:  decreased volume, decreased rate   Mood:  \"Could do better\"   Affect:  constricted   Thought Process:  perseverative   Thought Content: {no overt delusions, intrusive thoughts, ruminating thoughts   Perceptual disturbances: no auditory hallucinations, no visual hallucinations   Risk Potential: Suicidal ideation - None at present  Homicidal ideation - None at present  Potential for aggression - Not at present   Cognition: oriented to self and situation, appears to be of average intelligence, and cognition not formally tested   Insight:  fair   Judgment: fair     Note Share:   This note was shared with " patient.    Visit Time  Start: 10:40. Stop: 12:00.  I spent 80 minutes directly with the patient during this visit    Ede Antony DO 05/02/24

## 2024-05-02 NOTE — PATIENT INSTRUCTIONS
Notable Treatment Changes  Discontinue morning risperdal and decrease night risperdal to 0.25mg at night  Create a calendar to help with tasks - time blocking method to help reduce worrying about what else she has to do later or forgetting  Referral placed for older adult (60+) anxiety group - handout provided & therapist contacted  Begin atarax 10mg as needed for breakthrough anxiety  Other Treatment recommendations  Continue klonopin 0.25mg three times a day  Continue Prozac 40mg daily    Please present for your previously scheduled appointment approximately 15 minutes prior to appointment time. If you are running late or are unable to attend your appointment, please call our [place] office at [phone] or our [place] office at [phone] if applicable to notify the office of your absence.    If you are in psychological crisis including not limited to suicidal/homicidal thoughts or thoughts of self-injury, do not hesitate to call/contact your County Crisis hotline (see below) or attend to the nearest emergency department.  [facility] Crisis: [phone]  [facility] Crisis: [phone]  [facility] Crisis: [phone]  [facility] Crisis: [phone]  [facility] Crisis: [phone]  [facility] Crisis: [phone]  [facility] Crisis: [phone]  [place] Suicide Prevention Hotline: [phone]

## 2024-05-03 ENCOUNTER — OFFICE VISIT (OUTPATIENT)
Dept: FAMILY MEDICINE CLINIC | Facility: CLINIC | Age: 84
End: 2024-05-03
Payer: MEDICARE

## 2024-05-03 VITALS
SYSTOLIC BLOOD PRESSURE: 126 MMHG | OXYGEN SATURATION: 98 % | HEART RATE: 88 BPM | RESPIRATION RATE: 16 BRPM | DIASTOLIC BLOOD PRESSURE: 74 MMHG | HEIGHT: 56 IN | BODY MASS INDEX: 25.87 KG/M2 | WEIGHT: 115 LBS

## 2024-05-03 DIAGNOSIS — S01.112D LACERATION OF LEFT EYEBROW, SUBSEQUENT ENCOUNTER: Primary | ICD-10-CM

## 2024-05-03 PROBLEM — S01.112A LACERATION OF LEFT EYEBROW: Status: ACTIVE | Noted: 2024-05-03

## 2024-05-03 PROCEDURE — 99213 OFFICE O/P EST LOW 20 MIN: CPT | Performed by: FAMILY MEDICINE

## 2024-05-03 PROCEDURE — G2211 COMPLEX E/M VISIT ADD ON: HCPCS | Performed by: FAMILY MEDICINE

## 2024-05-03 NOTE — ASSESSMENT & PLAN NOTE
- There was old Dermabond glue that had blood in it from the original episode of laceration following fall February 1.  3 months later the Dermabond glue is still present    -I peeled off the old glue along with dried blood within the glue to reveal a beautifully healed eyebrow laceration.  No further follow-up is needed

## 2024-05-03 NOTE — PROGRESS NOTES
Subjective:      Patient ID: Sandra Peña is a 83 y.o. female.    83-year-old female presents to the office with her daughter for reevaluation of left eyebrow laceration.  Patient had documented fall in February and was seen on  at ER after having fall and sustaining laceration above her left eyebrow.  Closure was performed using Dermabond in the ER.  No pain.  This is 3 months later and she states that it still looks the same and somebody had made mention of it.  Patient does not usually shower.  She only takes baths.  Daughter thinks that it is well-healed        Past Medical History:   Diagnosis Date   • Colon polyps    • Diabetes mellitus (HCC)    • Dizziness     Last assessed: 8/31/15   • DVT (deep venous thrombosis) (HCC)    • Dysuria    • Hematuria 2022   • Hyperlipidemia    • Hypertension    • Hypertensive urgency 10/22/2021   • Insomnia    • Ureterolithiasis 2020       Family History   Problem Relation Age of Onset   • Depression Mother         w/anxiety   • Diabetes Mother         Mellitus   • Breast cancer Mother    • Hypertension Mother    • No Known Problems Father    • Depression Sister         w/anxiety   • Heart disease Sister         Cardiac Disorder   • Breast cancer Sister    • Hypertension Sister    • Diabetes Brother         Mellitus   • Alcohol abuse Son        Past Surgical History:   Procedure Laterality Date   •  SECTION       son,  daughter   • COLONOSCOPY     • FL RETROGRADE PYELOGRAM  2020   • SC COLONOSCOPY FLX DX W/COLLJ SPEC WHEN PFRMD N/A 3/27/2017    Procedure: COLONOSCOPY;  Surgeon: Gold Francis MD;  Location: [facility];  Service: Gastroenterology   • SC CYSTO/URETERO W/LITHOTRIPSY &INDWELL STENT INSRT Right 2020    Procedure: CYSTOSCOPY URETEROSCOPY WITH LITHOTRIPSY HOLMIUM LASER, RETROGRADE PYELOGRAM AND INSERTION STENT URETERAL; EXCISION OF BLADDER TUMOR;  Surgeon: Edwin Love MD;  Location: [facility];  Service: Urology    • ROTATOR CUFF REPAIR Left     Last assessed: 3/29/15   • TONSILLECTOMY          reports that she has never smoked. She has never used smokeless tobacco. She reports that she does not drink alcohol and does not use drugs.      Current Outpatient Medications:   •  aspirin 81 MG tablet, Take 81 mg by mouth daily, Disp: , Rfl:   •  atorvastatin (LIPITOR) 10 mg tablet, Take 1 tablet (10 mg total) by mouth daily, Disp: 90 tablet, Rfl: 0  •  [START ON 5/27/2024] clonazePAM (KlonoPIN) 0.5 mg tablet, Take 0.5 tablets (0.25 mg total) by mouth 3 (three) times a day Do not start before May 27, 2024., Disp: 45 tablet, Rfl: 1  •  docusate sodium (COLACE) 100 mg capsule, Take 100 mg by mouth 2 (two) times a day, Disp: , Rfl:   •  fluocinonide (LIDEX) 0.05 % external solution, Scalp solution- apply to affected area once a day as needed, Disp: 60 mL, Rfl: 2  •  FLUoxetine (PROzac) 40 MG capsule, Take 1 capsule (40 mg total) by mouth daily Combine with your 20mg for a total of 60 daily, Disp: 30 capsule, Rfl: 1  •  hydrOXYzine HCL (ATARAX) 10 mg tablet, Take 1 tablet (10 mg total) by mouth every 8 (eight) hours as needed for anxiety for up to 40 doses, Disp: 20 tablet, Rfl: 1  •  ketoconazole (NIZORAL) 2 % shampoo, To the scalp leave on for 5 minutes then rinse off completely. Once a week, Disp: 120 mL, Rfl: 6  •  linaCLOtide 145 MCG CAPS, Take 1 capsule (145 mcg total) by mouth in the morning, Disp: 90 capsule, Rfl: 3  •  lisinopril-hydrochlorothiazide (PRINZIDE,ZESTORETIC) 10-12.5 MG per tablet, Take 1 tablet by mouth daily, Disp: 90 tablet, Rfl: 3  •  metFORMIN (GLUCOPHAGE) 500 mg tablet, Take 1 tablet (500 mg total) by mouth 2 (two) times a day with meals, Disp: 180 tablet, Rfl: 1  •  mometasone (ELOCON) 0.1 % lotion, Apply topically daily, Disp: 60 mL, Rfl: 0  •  risperiDONE (RisperDAL) 0.25 mg tablet, Take 1 tablet (0.25 mg total) by mouth daily at bedtime, Disp: 30 tablet, Rfl: 1  •  triamcinolone (KENALOG) 0.1 % cream,  "Apply under the breasts twice a day up to 2 weeks as needed for redness or itching, Disp: 80 g, Rfl: 1    The following portions of the patient's history were reviewed and updated as appropriate: allergies, current medications, past family history, past medical history, past social history, past surgical history and problem list.    Review of Systems   Eyes:         Reevaluation of laceration the left eyebrow           Objective:    /74   Pulse 88   Resp 16   Ht 4' 8\" (1.422 m)   Wt 52.2 kg (115 lb)   SpO2 98%   BMI 25.78 kg/m²      Physical Exam  Vitals and nursing note reviewed.   Constitutional:       Appearance: Normal appearance.   Eyes:     Neurological:      Mental Status: She is alert.           Recent Results (from the past 1008 hour(s))   ECG 12 lead    Collection Time: 04/10/24 12:41 PM   Result Value Ref Range    Ventricular Rate 88 BPM    Atrial Rate 88 BPM    OK Interval 166 ms    QRSD Interval 64 ms    QT Interval 372 ms    QTC Interval 450 ms    P Axis 51 degrees    QRS Axis 19 degrees    T Wave Axis 49 degrees   CBC and differential    Collection Time: 04/10/24 12:45 PM   Result Value Ref Range    WBC 6.37 4.31 - 10.16 Thousand/uL    RBC 4.09 3.81 - 5.12 Million/uL    Hemoglobin 11.7 11.5 - 15.4 g/dL    Hematocrit 37.7 34.8 - 46.1 %    MCV 92 82 - 98 fL    MCH 28.6 26.8 - 34.3 pg    MCHC 31.0 (L) 31.4 - 37.4 g/dL    RDW 13.2 11.6 - 15.1 %    MPV 10.4 8.9 - 12.7 fL    Platelets 203 149 - 390 Thousands/uL    nRBC 0 /100 WBCs    Segmented % 70 43 - 75 %    Immature Grans % 0 0 - 2 %    Lymphocytes % 19 14 - 44 %    Monocytes % 6 4 - 12 %    Eosinophils Relative 4 0 - 6 %    Basophils Relative 1 0 - 1 %    Absolute Neutrophils 4.47 1.85 - 7.62 Thousands/µL    Absolute Immature Grans 0.02 0.00 - 0.20 Thousand/uL    Absolute Lymphocytes 1.18 0.60 - 4.47 Thousands/µL    Absolute Monocytes 0.41 0.17 - 1.22 Thousand/µL    Eosinophils Absolute 0.23 0.00 - 0.61 Thousand/µL    Basophils Absolute " "0.06 0.00 - 0.10 Thousands/µL   Comprehensive metabolic panel    Collection Time: 04/10/24 12:45 PM   Result Value Ref Range    Sodium 135 135 - 147 mmol/L    Potassium 4.3 3.5 - 5.3 mmol/L    Chloride 103 96 - 108 mmol/L    CO2 24 21 - 32 mmol/L    ANION GAP 8 4 - 13 mmol/L    BUN 26 (H) 5 - 25 mg/dL    Creatinine 0.93 0.60 - 1.30 mg/dL    Glucose 99 65 - 140 mg/dL    Calcium 9.7 8.4 - 10.2 mg/dL    AST 15 13 - 39 U/L    ALT 11 7 - 52 U/L    Alkaline Phosphatase 53 34 - 104 U/L    Total Protein 6.9 6.4 - 8.4 g/dL    Albumin 4.2 3.5 - 5.0 g/dL    Total Bilirubin 0.49 0.20 - 1.00 mg/dL    eGFR 56 ml/min/1.73sq m   HS Troponin 0hr (reflex protocol)    Collection Time: 04/10/24 12:45 PM   Result Value Ref Range    hs TnI 0hr 11 \"Refer to ACS Flowchart\"- see link ng/L   HS Troponin I 2hr    Collection Time: 04/10/24  3:07 PM   Result Value Ref Range    hs TnI 2hr 12 \"Refer to ACS Flowchart\"- see link ng/L    Delta 2hr hsTnI 1 <20 ng/L   UA w Reflex to Microscopic w Reflex to Culture    Collection Time: 04/10/24  3:10 PM    Specimen: Urine, Clean Catch   Result Value Ref Range    Color, UA Colorless     Clarity, UA Clear     Specific Gravity, UA 1.004 1.003 - 1.030    pH, UA 5.5 4.5, 5.0, 5.5, 6.0, 6.5, 7.0, 7.5, 8.0    Leukocytes, UA Negative Negative    Nitrite, UA Negative Negative    Protein, UA Negative Negative mg/dl    Glucose, UA Negative Negative mg/dl    Ketones, UA Negative Negative mg/dl    Urobilinogen, UA <2.0 <2.0 mg/dl mg/dl    Bilirubin, UA Negative Negative    Occult Blood, UA Negative Negative       Assessment/Plan:    Laceration of left eyebrow  - There was old Dermabond glue that had blood in it from the original episode of laceration following fall February 1.  3 months later the Dermabond glue is still present    -I peeled off the old glue along with dried blood within the glue to reveal a beautifully healed eyebrow laceration.  No further follow-up is needed        Problem List Items Addressed " This Visit        Eye    Laceration of left eyebrow - Primary     - There was old Dermabond glue that had blood in it from the original episode of laceration following fall February 1.  3 months later the Dermabond glue is still present    -I peeled off the old glue along with dried blood within the glue to reveal a beautifully healed eyebrow laceration.  No further follow-up is needed

## 2024-05-29 ENCOUNTER — TELEPHONE (OUTPATIENT)
Dept: BEHAVIORAL/MENTAL HEALTH CLINIC | Facility: CLINIC | Age: 84
End: 2024-05-29

## 2024-05-29 NOTE — TELEPHONE ENCOUNTER
[id number]-spoke with Janay to discuss Sandra returning to group for older adults.  She said that they are still looking for things for her to do, and she would like mom to try the group again, to see if there are other people in the group that she might click with, and if she still does not like it, at least she tried.  Will schedule Sandra for June 13th group

## 2024-06-02 PROBLEM — S01.112A LACERATION OF LEFT EYEBROW: Status: RESOLVED | Noted: 2024-05-03 | Resolved: 2024-06-02

## 2024-06-09 ENCOUNTER — HOSPITAL ENCOUNTER (EMERGENCY)
Facility: HOSPITAL | Age: 84
Discharge: HOME | End: 2024-06-09
Attending: EMERGENCY MEDICINE | Admitting: EMERGENCY MEDICINE
Payer: MEDICARE

## 2024-06-09 ENCOUNTER — APPOINTMENT (EMERGENCY)
Dept: RADIOLOGY | Facility: HOSPITAL | Age: 84
End: 2024-06-09
Payer: MEDICARE

## 2024-06-09 VITALS
OXYGEN SATURATION: 99 % | HEART RATE: 79 BPM | WEIGHT: 114.64 LBS | SYSTOLIC BLOOD PRESSURE: 196 MMHG | TEMPERATURE: 97.6 F | BODY MASS INDEX: 25.7 KG/M2 | DIASTOLIC BLOOD PRESSURE: 87 MMHG | RESPIRATION RATE: 18 BRPM

## 2024-06-09 DIAGNOSIS — K59.00 CONSTIPATION: Primary | ICD-10-CM

## 2024-06-09 PROCEDURE — 99284 EMERGENCY DEPT VISIT MOD MDM: CPT | Performed by: EMERGENCY MEDICINE

## 2024-06-09 PROCEDURE — 99283 EMERGENCY DEPT VISIT LOW MDM: CPT

## 2024-06-09 PROCEDURE — 74018 RADEX ABDOMEN 1 VIEW: CPT

## 2024-06-09 RX ORDER — MINERAL OIL 100 G/100G
1 OIL RECTAL ONCE
Status: COMPLETED | OUTPATIENT
Start: 2024-06-09 | End: 2024-06-09

## 2024-06-09 RX ORDER — POLYETHYLENE GLYCOL 3350 17 G/17G
17 POWDER, FOR SOLUTION ORAL ONCE
Status: COMPLETED | OUTPATIENT
Start: 2024-06-09 | End: 2024-06-09

## 2024-06-09 RX ADMIN — POLYETHYLENE GLYCOL 3350 17 G: 17 POWDER, FOR SOLUTION ORAL at 21:33

## 2024-06-09 RX ADMIN — MINERAL OIL 1 ENEMA: 118 ENEMA RECTAL at 20:47

## 2024-06-10 ENCOUNTER — TELEMEDICINE (OUTPATIENT)
Dept: PSYCHIATRY | Facility: CLINIC | Age: 84
End: 2024-06-10
Payer: MEDICARE

## 2024-06-10 DIAGNOSIS — F41.1 GENERALIZED ANXIETY DISORDER: ICD-10-CM

## 2024-06-10 DIAGNOSIS — F45.1 SOMATIC SYMPTOM DISORDER: ICD-10-CM

## 2024-06-10 DIAGNOSIS — F33.1 MAJOR DEPRESSIVE DISORDER, RECURRENT, MODERATE (HCC): ICD-10-CM

## 2024-06-10 PROCEDURE — 99214 OFFICE O/P EST MOD 30 MIN: CPT

## 2024-06-10 RX ORDER — FLUOXETINE HYDROCHLORIDE 40 MG/1
40 CAPSULE ORAL DAILY
Qty: 30 CAPSULE | Refills: 2 | Status: SHIPPED | OUTPATIENT
Start: 2024-06-10 | End: 2024-09-08

## 2024-06-10 RX ORDER — CLONAZEPAM 0.5 MG/1
0.25 TABLET ORAL 2 TIMES DAILY
Qty: 30 TABLET | Refills: 2 | Status: SHIPPED | OUTPATIENT
Start: 2024-06-10 | End: 2024-09-08

## 2024-06-10 RX ORDER — RISPERIDONE 0.25 MG/1
0.25 TABLET ORAL 2 TIMES DAILY
Qty: 60 TABLET | Refills: 2 | Status: SHIPPED | OUTPATIENT
Start: 2024-06-10 | End: 2024-09-08

## 2024-06-10 RX ORDER — HYDROXYZINE HYDROCHLORIDE 10 MG/1
10 TABLET, FILM COATED ORAL EVERY 8 HOURS PRN
Qty: 20 TABLET | Refills: 1 | Status: SHIPPED | OUTPATIENT
Start: 2024-06-10

## 2024-06-10 NOTE — PSYCH
Psychiatric Progress Note: Medication Management    [facility]    Name and Date of Birth:  Sandra Peña 83 y.o. 1940 MRN: [id number]    Date of Visit: Madisyn 10, 2024    Reason for Visit: Follow-up visit regarding medication management     Virtual Visit Disclaimer & Required Documentation  TeleMed provider: Arpan Mera. and Dr. Dillon Leiva. My office door is closed. No one else is in the room. The patient is located in [place], where I hold an active license. Sandra Peña understands that this is a telemedicine visit and that the visit is being conducted through Epic Embedded platform. Patient is aware that Virtual Care Services could be limited without vital signs or the ability to perform a full hands-on physical exam. Patient is aware this is a billable service. The patient agrees to participate and understands they can discontinue the visit at any time.        ASSESSMENT & PLAN     Sandra Peña is a 83 y.o. female, ,  and presently living daughter, son-in-law, 3 grandkids; retired teacher; with prior psychiatric diagnoses of MDD, JONAH; no past suicide attempts (0); 1 past psychiatric hospitalizations (for anxiety); with suicide risk factors including medical problems, anxiety and age (50+); and medical history including DM2, HTN, DVT hx, OA, HLD, TIA, status post rotator cuff surgery who was personally seen and evaluated at the [facility] outpatient clinic for follow-up regarding medication management.     Patient recounts her incident yesterday where she was in the hospital for constipation. She was received an enema to help clear out her bowels, and when she returned home noticed some blood. She was worried she was bleeidng and awoke her daughter, but in the morning there were no issues with blood. Daughter notes incidents like these continue to persist and sometimes interfere with her relationship with patient.  Discussions about placement or additional help was brought up that they have been exploring. They both continue to want to be on less medications if possible, and was agreeable to further decrease klonopin gradually which may also assist with patient's concerns with fatigue during the day. Trintellix was discussed as there were concerns for memory. She plans to see a geriatric specialist as well as attend referred older adult anxiety group in the upcoming week. No acute concerns for suicidality, homicidality, hallucinations.    DSM-5 Diagnoses:   Anxiety  MDD  Obsessive thoughts r/o OCD    Treatment Recommendations/Precautions:  Notable Changes  Decrease Klonopin 0.25mg BID for anxiety  Attend older adult (60+) anxiety group planning to attend this month  Other Treatment recommendations  Continue risperdal 0.25mg BID for obsessive thoughts (tried to discontinue twice but patient worsened)  Continue atarax 10mg PRN for breakthrough anxiety  Caution anticholinergic and Qtc prolongation hx (though last EKG showed normalization)  Continue Prozac 40mg for anxiety and depression symptoms  Interaction CYP2D6 with prozac may be increasing effects of Risperdal  Consider trintellix if concerns for memory  Medication management every 1-3 months  Aware of 24 hour and weekend coverage for urgent situations accessed by calling [facility] main practice number    Medications Risks/Benefits    Risks, benefits, and possible side effects of medications explained to Sandra and she verbalizes understanding and agreement for treatment. including:   Risks of taking benzodiazepines were discussed, including risk of sedation and respiratory depression, particularly when combined with alcohol, opioids, or other central nervous system-depressants. Addiction potential, withdrawal seizures with abrupt discontinuation, and other potential adverse effects were discussed. The patient was instructed not to drive or operate  machinery while taking benzodiazepines.   PARQ was completed for second generation antipsychotic medication including sedation, GI distress, dizziness, risk of metabolic syndrome, EPS (akathisia, TD, etc), rare NMS, orthostatic hypotension, cardiovascular risks such as QT prolongation, increased prolactin, and others.  PARQ was completed for hydroxyzine including risk of arrhythmia with doses >100mg/day, drowsiness and other anticholinergic effects, seizure risk, headaches, nausea, potential for drug interactions, and others.  PARQ was completed for the class of SSRIs including transient anxiety, insomnia or sedation, headaches, constipation or diarrhea; and longer-standing sexual side effects, bleeding risk (especially with NSAIDs), and weight gain; as well as suicidal thoughts in patients under 25 years old, serotonin syndrome, and induction of eligio. Potential for discontinuation syndrome (flu-like symptoms, insomnia, nausea, sensory disturbances, and headache) was also discussed.      Controlled Medication Discussion:   Sandra has been filling controlled prescriptions on time as prescribed according to [place] Prescription Drug Monitoring Program    Treatment Plan:  Completed and signed during the session: Not applicable - Treatment Plan not due at this session. Next due treatment plan date is: August 5, 2024      Psychotherapy Provided:   Individual psychotherapy provided: Yes  Counseling was provided during the session today for 25 minutes.     Suicide/Homicide Risk Assessment:  Risk of Harm to Self:  The following ratings are based on assessment at the time of the interview and review of records  Demographic risk factors include: ,  status, elderly (75 or older)   Historical Risk Factors include: chronic psychiatric problems, history of depression, history of anxiety  Recent Specific Risk Factors include: diagnosis of depression, current depressive symptoms, current anxiety  symptoms  Protective Factors: no current suicidal ideation, being a parent, compliant with medications, compliant with mental health treatment, having a desire to be alive, having a desire to live, having a sense of purpose or meaning in life, having pets, personal beliefs, personal beliefs about the meaning and value of life, Islam beliefs discouraging suicide, resiliency, restricted access to lethal means, stable living environment, supportive family  Weapons: none. The following steps have been taken to ensure weapons are properly secured: not applicable  Based on today's assessment, Sandra presents the following risk of harm to self: minimal    Risk of Harm to Others:  The following ratings are based on assessment at the time of the interview and review of records  Demographic Risk Factors include: none.  Historical Risk Factors include: none.  Recent Specific Risk Factors include: multiple stressors, social difficulties.  Protective Factors: no current homicidal ideation, being a parent, compliant with medications, compliant with mental health treatment, no substance use problems, personal beliefs, Islam beliefs, resilience, restricted access to lethal means, safe and stable living environment, supportive family  Weapons: none. The following steps have been taken to ensure weapons are properly secured: not applicable  Based on today's assessment, Sandra presents the following risk of harm to others: minimal    The following interventions are recommended: no intervention changes needed. Although patient's acute lethality risk is low, long-term/chronic lethality risk is mildly elevated in the presence of mood and anxiety symptoms. At the current moment, Sandra is future-oriented, forward-thinking, and demonstrates ability to act in a self-preserving manner as evidenced by volitionally presenting to today's visit, seeking treatment, suggesting a will and desire to live. At this juncture, inpatient  "hospitalization is not currently warranted. To mitigate future risk, patient should adhere to the recommendations of this writer, avoid alcohol/illicit substance use, utilize community-based resources and familiar support and prioritize mental health treatment.     Based on today's assessment and clinical criteria, Sandra Peña contracts for safety and is not an imminent risk of harm to self or others. Outpatient level of care is deemed appropriate at this present time. Sandra understands that if they are no longer able to contract for safety, they need to call/contact the outpatient office including this writer, call/contact crisis and/orattend to the nearest Emergency Department for immediate evaluation.    SUBJECTIVE     Chief Complaint: What to do about placement.     Sandra Peña ,  and presently living daughter, son-in-law, 3 grandkids; retired teacher; with prior psychiatric diagnoses of MDD, JONAH; no past suicide attempts (0); 1 past psychiatric hospitalizations (for anxiety); with suicide risk factors including medical problems, anxiety and age (50+); and medical history including DM2, HTN, DVT hx, OA, HLD, TIA, status post rotator cuff surgery who was personally seen and evaluated at the [facility] outpatient clinic for follow-up regarding medication management.     At the time of last visit, pt had reported things \"could be better.\" She feels that there are still difficulties with her \"negative thoughts\" and that she still has many worries. She is present with her daughter who mentions that it has been frustrating trying to help her through her worries. She continues to go to therapy and has been taking the medications with the gradual reduction of her risperdal. The decrease in klonopin at last visit seems to be going well. They had to change the nighttime timing to after dinner a little earlier for better coverage which has been working. The goal of reducing " "polypharmacy and coming off risperdal while monitoring symptoms is still the goal decreasing down to just 0.25mg at night. Given concerns for braekthrough anxiety discussed atarax only as needed. She was also referred for older adult anxiety group to help address ongoing anxiety symptoms. Daughter noted the lip tremor seemed to improve at one point then stopped, but has returned. Provider noticed it consistent throughout visit and appears unchanged from last visit. No acute concerns for suicidality, homicidality, hallucinations.     Sandra states that since their previous outpatient psychiatric appointment with this writer, reports feeling \"okay.\" She notes having an \"episode\" last night. She was unable to poop leading to constipation and stomach pain. She received an enema and Xray as well as miralax. She has a history of hemorrhoids and had some blood but was worried she was bleeding. She had no issues this morning and without any blood so is now less worried. During granddaughter's graduation, alexandria was having anxiety over concerns with bleeding. Daughter was getting frustrated and wanted to discuss options of placement. Discussed home health aid and senior facility as well as patient's interest which she is unsure of. Son brought patient to a senior event but notes it was not run very well to be engaging for her. They are still looking into a senior bible group.    Patient reports that eating has been more or less consistent. She notes feeling tired. She reports it sometimes upsets her because she cannot do \"half of what she used to do.\" Per daughter, when patient is distracted there are no issues, but if she's not distracted she gets down on herself and then her behaviors change.  Patient notes that she has started to feel her memory has been getting worse. She recounts forgetting words when her lovely had a timer set as well as when her daughter left for work and returned. She denies any homicidal ideations as " "well as hallucinations.    Daughter notes trying to get her off the risperidone but patient was getting much worse. She was \"uncontrollably anxious\" requiring atarax constantly and having difficulties without the risperidone BID. She notes since restarting it at risperdal 0.25mg she seems to be more calm and less obsessive thinking. There was interests to try and continue to decrease medications and agreed to taper klonopin down gradually also for concerns of tiredness. No side effects were noted from medications.    Presently, patient adamantly denies suicidal/homicidal ideation in addition to thoughts of self-injury, citing family & [employer] as deterrents against self-harm; contracts for safety, see above for risk assessment. At conclusion of evaluation, patient is amenable to the recommendations of this writer including: medications as prescribed, attending routine appointments. Also, patient is amenable to calling/contacting the outpatient office including this writer if any acute adverse effects of their medication regimen arise in addition to any comments or concerns pertaining to their psychiatric management. Patient is amenable to calling/contacting crisis and/or attending to the nearest emergency department if their clinical condition deteriorates to assure their safety and stability, stating that they are able to appropriately confide in their Family (daughter) & good friend regarding their psychiatric state.     Current Rating Scores:   None completed today.    PSYCHIATRIC REVIEW OF SYSTEMS     Unchanged information from this writer's previous assessment is copied; information that has changed is bolded.    Sleep change: no  Interest change: no  Interests include: read (books), knitting  Guilt/Hopelessness/helplessness/worthlessness: yes, guilt (giving daughter stress)  Energy change: yes, decreased  Concentration/Attention change: yes, decreased  Appetite change: yes, decreased   Weight changes & " timeframe: no  Psychomotor agitation/retardation: no  Somatic symptoms: yes  Suicidal ideation: no  Homicidal ideation: no  Zaira/hypomania: no     Anxiety/panic attack: yes  JONAH symptoms: difficulty concentrating, fatigue, insomnia, irritable and restlessness/keyed up  Panic Disorder symptoms: no symptoms suggestive of panic disorder  Obsessive/compulsive symptoms: obsessive thoughts without compulsions  Eating Disorder symptoms: no historical or current eating disorder. no binge eating disorder; no anorexia nervosa. no symptoms of bulimia; does not stopped eating when trying to lose weight when younger     Auditory hallucinations: no  Visual hallucinations: no  Other perceptual disturbances: no  Delusional thinking: no    REVIEW OF SYSTEMS     Constitutional negative   ENT negative   Cardiovascular negative   Respiratory negative   Gastrointestinal negative   Genitourinary negative   Musculoskeletal negative   Integumentary negative   Neurological negative   Endocrine negative   Other Symptoms none, all other systems are negative     HISTORICAL INFORMATION     History Review: The following portions of the patient's history were reviewed and updated as appropriate: allergies, current medications, past family history, past medical history, past social history, past surgical history, and problem list.    Unchanged information from this writer's previous assessment is copied; information that has changed is bolded.    Family History   Problem Relation Age of Onset    Depression Mother         w/anxiety    Diabetes Mother         Mellitus    Breast cancer Mother     Hypertension Mother     No Known Problems Father     Depression Sister         w/anxiety    Heart disease Sister         Cardiac Disorder    Breast cancer Sister     Hypertension Sister     Diabetes Brother         Mellitus    Alcohol abuse Son      Additional fam hx:   Family hx of psychiatric diagnosis: yes, 2 sisters (depression & anxiety), Mom  (Depression & anxiety)  Family hx of suicide: no  Family Hx of drug abuse: Son with alcohol abuse  Family Hx of medical diagnosis: yes, as listed above Mom (DM, breast cancer, HTN), sister (heart issues, breast cancer, HTN), brother (DM)    Past Psychiatric History:   Previous diagnosis: MDD, Anxiety  Previous inpatient psychiatric admissions: 1 prior admission for 3 weeks at [facility] in 2013 (panic attack after surgery).  Present/previous outpatient psychiatrist: saw psychiatrists in [place] growing up; Dr. Rae & Dr. Momin prior to transfer.  Present/previous therapy/psychotherapy: Dr. Cross for past 3 years.  History of suicidal attempts/gestures: Denies.  Self-injurious behavior/high-risk behavior: no.  History of violence/aggressive behaviors: No.  Other Services: patient denies    Psychiatric medication trial:   Antidepressants  Effexor, Zoloft, Prozac, Lexapro  Antipsychotics  Abilify (Dizzy), Risperdal  Mood stabilizers  N/A  Sedative hypnotics  Klonopin  Others  Buspar, Neurontin     Substance Abuse History:  Nicotine use (cigarettes & vape): Denies  Caffeine use: 1 cup day Decafe  Alcohol use: Denies  Marijuana use: Denies  Other substances: Denies     Longest clean time: N/A  Previous inpatient/outpatient substance abuse rehabilitation: No.     Patient denies previous legal actions or arrests related to substance intoxication including prior DWIs/DUIs. Sandra does not apear under the influence or withdrawal of any psychoactive substance throughout today's examination.      I have assessed this patient for substance use within the past 12 months.     Social History:  Born/Raise: [place], [place] - moved to [place] 2012 when begun living with family  Early life/developmental: Denies a history of milestone/developmental delay. Denies a history of in-utero exposure to toxins/illicit substances.   Family: 2 brother(s) & 2 sister(s), raised by parents  Education: Bachelor of Arts - Teaching  Learning Disabilities:  There is no documented history of IEP or need for special education.  Occupational History: retired teacher  Pent[employer]al Affiliation: Anglican  Marital history:  since 2006 -   from cancer  Children: yes, 2 (1 daughter & 1 son), 3 grandkids  Living arrangement: daughter, father-in-law, son-in-law, 3 grandkids in [place], 2 dogs, guinea pig  Support system: good support system - daughter, therapist   Hx: no  Legal Hx: no  Access to firearms: patient denies. Sandra Peña has no history of arrests or violence pertaining to use of a deadly weapon.      Traumatic History:   Abuse: none is reported  Other Traumatic Events: Denies  Flashbacks/Nightmares: no     Past Medical History:  Hx of seizures: no  Hx of concussions & ongoing symptoms: no    OBJECTIVE     Vital signs in last 24 hours:  There were no vitals filed for this visit.    Laboratory Results: I have personally reviewed all pertinent laboratory/tests results  Recent Labs (last 4 months):   Admission on 04/10/2024, Discharged on 04/10/2024   Component Date Value    WBC 04/10/2024 6.37     RBC 04/10/2024 4.09     Hemoglobin 04/10/2024 11.7     Hematocrit 04/10/2024 37.7     MCV 04/10/2024 92     MCH 04/10/2024 28.6     MCHC 04/10/2024 31.0 (L)     RDW 04/10/2024 13.2     MPV 04/10/2024 10.4     Platelets 04/10/2024 203     nRBC 04/10/2024 0     Segmented % 04/10/2024 70     Immature Grans % 04/10/2024 0     Lymphocytes % 04/10/2024 19     Monocytes % 04/10/2024 6     Eosinophils Relative 04/10/2024 4     Basophils Relative 04/10/2024 1     Absolute Neutrophils 04/10/2024 4.47     Absolute Immature Grans 04/10/2024 0.02     Absolute Lymphocytes 04/10/2024 1.18     Absolute Monocytes 04/10/2024 0.41     Eosinophils Absolute 04/10/2024 0.23     Basophils Absolute 04/10/2024 0.06     Sodium 04/10/2024 135     Potassium 04/10/2024 4.3     Chloride 04/10/2024 103     CO2 04/10/2024 24     ANION GAP 04/10/2024 8     BUN 04/10/2024 26 (H)      Creatinine 04/10/2024 0.93     Glucose 04/10/2024 99     Calcium 04/10/2024 9.7     AST 04/10/2024 15     ALT 04/10/2024 11     Alkaline Phosphatase 04/10/2024 53     Total Protein 04/10/2024 6.9     Albumin 04/10/2024 4.2     Total Bilirubin 04/10/2024 0.49     eGFR 04/10/2024 56     hs TnI 0hr 04/10/2024 11     Ventricular Rate 04/10/2024 88     Atrial Rate 04/10/2024 88     ME Interval 04/10/2024 166     QRSD Interval 04/10/2024 64     QT Interval 04/10/2024 372     QTC Interval 04/10/2024 450     P Axis 04/10/2024 51     QRS Nisula 04/10/2024 19     T Wave Axis 04/10/2024 49     hs TnI 2hr 04/10/2024 12     Delta 2hr hsTnI 04/10/2024 1     Color, UA 04/10/2024 Colorless     Clarity, UA 04/10/2024 Clear     Specific Gravity, UA 04/10/2024 1.004     pH, UA 04/10/2024 5.5     Leukocytes, UA 04/10/2024 Negative     Nitrite, UA 04/10/2024 Negative     Protein, UA 04/10/2024 Negative     Glucose, UA 04/10/2024 Negative     Ketones, UA 04/10/2024 Negative     Urobilinogen, UA 04/10/2024 <2.0     Bilirubin, UA 04/10/2024 Negative     Occult Blood, UA 04/10/2024 Negative    Admission on 02/16/2024, Discharged on 02/16/2024   Component Date Value    WBC 02/16/2024 9.72     RBC 02/16/2024 3.90     Hemoglobin 02/16/2024 11.4 (L)     Hematocrit 02/16/2024 35.4     MCV 02/16/2024 91     MCH 02/16/2024 29.2     MCHC 02/16/2024 32.2     RDW 02/16/2024 13.5     MPV 02/16/2024 10.2     Platelets 02/16/2024 212     nRBC 02/16/2024 0     Segmented % 02/16/2024 89 (H)     Immature Grans % 02/16/2024 0     Lymphocytes % 02/16/2024 6 (L)     Monocytes % 02/16/2024 3 (L)     Eosinophils Relative 02/16/2024 1     Basophils Relative 02/16/2024 1     Absolute Neutrophils 02/16/2024 8.71 (H)     Absolute Immature Grans 02/16/2024 0.03     Absolute Lymphocytes 02/16/2024 0.60     Absolute Monocytes 02/16/2024 0.28     Eosinophils Absolute 02/16/2024 0.05     Basophils Absolute 02/16/2024 0.05     Sodium 02/16/2024 137     Potassium 02/16/2024  4.0     Chloride 02/16/2024 103     CO2 02/16/2024 24     ANION GAP 02/16/2024 10     BUN 02/16/2024 27 (H)     Creatinine 02/16/2024 0.96     Glucose 02/16/2024 137     Calcium 02/16/2024 9.5     eGFR 02/16/2024 54    Appointment on 02/12/2024   Component Date Value    WBC 02/12/2024 4.86     RBC 02/12/2024 3.80 (L)     Hemoglobin 02/12/2024 10.9 (L)     Hematocrit 02/12/2024 34.7 (L)     MCV 02/12/2024 91     MCH 02/12/2024 28.7     MCHC 02/12/2024 31.4     RDW 02/12/2024 13.3     MPV 02/12/2024 10.9     Platelets 02/12/2024 242     nRBC 02/12/2024 0     Segmented % 02/12/2024 63     Immature Grans % 02/12/2024 0     Lymphocytes % 02/12/2024 26     Monocytes % 02/12/2024 6     Eosinophils Relative 02/12/2024 4     Basophils Relative 02/12/2024 1     Absolute Neutrophils 02/12/2024 3.10     Absolute Immature Grans 02/12/2024 0.01     Absolute Lymphocytes 02/12/2024 1.25     Absolute Monocytes 02/12/2024 0.27     Eosinophils Absolute 02/12/2024 0.18     Basophils Absolute 02/12/2024 0.05     Sodium 02/12/2024 139     Potassium 02/12/2024 4.3     Chloride 02/12/2024 103     CO2 02/12/2024 28     ANION GAP 02/12/2024 8     BUN 02/12/2024 31 (H)     Creatinine 02/12/2024 0.93     Glucose, Fasting 02/12/2024 99     Calcium 02/12/2024 9.6     AST 02/12/2024 17     ALT 02/12/2024 14     Alkaline Phosphatase 02/12/2024 52     Total Protein 02/12/2024 6.6     Albumin 02/12/2024 4.0     Total Bilirubin 02/12/2024 0.60     eGFR 02/12/2024 56     Hemoglobin A1C 02/12/2024 7.0 (H)     EAG 02/12/2024 154     Cholesterol 02/12/2024 135     Triglycerides 02/12/2024 89     HDL, Direct 02/12/2024 56     LDL Calculated 02/12/2024 61     Non-HDL-Chol (CHOL-HDL) 02/12/2024 79     Creatinine, Ur 02/12/2024 81.1     Albumin,U,Random 02/12/2024 12.9     Albumin Creat Ratio 02/12/2024 16     TSH 3RD GENERATON 02/12/2024 1.649        Mental Status Evaluation:    Appearance:  age appropriate, casually dressed, glasses   Behavior:  mildly  "anxious otherwise overall calm   Motor: Minimal lip tremor   Speech:  Softer volume, slower rate   Mood:  \"Okay\"   Affect:  constricted   Thought Process:  Somatically focused   Thought Content: {no overt delusions   Perceptual disturbances: no auditory hallucinations, no visual hallucinations   Risk Potential: Suicidal ideation - None at present  Homicidal ideation - None at present  Potential for aggression - Not at present   Cognition: oriented to self and situation and cognition not formally tested   Insight:  fair   Judgment: fair     Note Share:   This note was shared with patient.    Visit Time  Start: 11:02. Stop: 12:02.  I spent 60 minutes directly with the patient during this visit    Ede Antony DO 06/10/24    "

## 2024-06-10 NOTE — PATIENT INSTRUCTIONS
Notable Changes  Decrease Klonopin 0.25mg twice a day  Attend older adult anxiety group  Other Treatment recommendations  Continue risperdal 0.25mg twice a day  Continue atarax 10mg as needed for breakthrough anxiety  Continue Prozac 40mg daily  Consider Trintellix in the future as possibility    Please present for your previously scheduled appointment approximately 15 minutes prior to appointment time. If you are running late or are unable to attend your appointment, please call our [place] office at [phone] or our [place] office at [phone] if applicable to notify the office of your absence.    If you are in psychological crisis including not limited to suicidal/homicidal thoughts or thoughts of self-injury, do not hesitate to call/contact your County Crisis hotline (see below) or attend to the nearest emergency department.  [facility] Crisis: [phone]  [facility] Crisis: [phone]  [facility] Crisis: [phone]  [facility] Crisis: [phone]  [facility] Crisis: [phone]  [facility] Crisis: [phone]  [facility] Crisis: [phone]  National Suicide Prevention Hotline: 1-968.571.1796

## 2024-06-10 NOTE — ED PROVIDER NOTES
History  Chief Complaint   Patient presents with    Constipation     Pt comes to ED for constipation x3 days. Tried suppositories and fleets enema at home without success. +rectal pain     83 year old Female with extensive PMH including HTN, HLD, DM, and constipation who presents to the ED for constipation that has been ongoing for the past 2 days. Patient states that she last had a normal bowel movement on around Thursday or Friday and is having rectal pain. Patient endorses the feeling of having gone to the bathroom however, nothing has come out. She denies any recent changes in diet however, admits to not taking her Miralax as much as she should. She does take her Colace every night. Patient also mentions that she was recently treated for possible sinus infection with prednisone and amoxicillin. Prior to arrival, patient tried a fleet enema and a suppository with no improvement in her symptoms. She took no pain medications prior to arrival but took her atarax for anxiety. Denies chest pain, SOB, cough, abdominal pain, n/v/d, fever, chills, dizziness, lightheadedness, HA, dysuria, hematuria, hematochezia, or melena.               Prior to Admission Medications   Prescriptions Last Dose Informant Patient Reported? Taking?   aspirin 81 MG tablet  Self Yes No   Sig: Take 81 mg by mouth daily   atorvastatin (LIPITOR) 10 mg tablet   No No   Sig: Take 1 tablet (10 mg total) by mouth daily   docusate sodium (COLACE) 100 mg capsule  Self Yes No   Sig: Take 100 mg by mouth 2 (two) times a day   fluocinonide (LIDEX) 0.05 % external solution   No No   Sig: Scalp solution- apply to affected area once a day as needed   ketoconazole (NIZORAL) 2 % shampoo   No No   Sig: To the scalp leave on for 5 minutes then rinse off completely. Once a week   linaCLOtide 145 MCG CAPS   No No   Sig: Take 1 capsule (145 mcg total) by mouth in the morning   lisinopril-hydrochlorothiazide (PRINZIDE,ZESTORETIC) 10-12.5 MG per tablet   No No   Sig:  Take 1 tablet by mouth daily   metFORMIN (GLUCOPHAGE) 500 mg tablet   No No   Sig: Take 1 tablet (500 mg total) by mouth 2 (two) times a day with meals   mometasone (ELOCON) 0.1 % lotion   No No   Sig: Apply topically daily   triamcinolone (KENALOG) 0.1 % cream   No No   Sig: Apply under the breasts twice a day up to 2 weeks as needed for redness or itching      Facility-Administered Medications: None       Past Medical History:   Diagnosis Date    Colon polyps     Diabetes mellitus (HCC)     Dizziness     Last assessed: 8/31/15    DVT (deep venous thrombosis) (HCC)     Dysuria     Hematuria 2022    Hyperlipidemia     Hypertension     Hypertensive urgency 10/22/2021    Insomnia     Ureterolithiasis 2020       Past Surgical History:   Procedure Laterality Date     SECTION      1964 son, 1968 daughter    COLONOSCOPY      FL RETROGRADE PYELOGRAM  2020    NH COLONOSCOPY FLX DX W/COLLJ SPEC WHEN PFRMD N/A 3/27/2017    Procedure: COLONOSCOPY;  Surgeon: Gold Francis MD;  Location: [facility];  Service: Gastroenterology    NH CYSTO/URETERO W/LITHOTRIPSY &INDWELL STENT INSRT Right 2020    Procedure: CYSTOSCOPY URETEROSCOPY WITH LITHOTRIPSY HOLMIUM LASER, RETROGRADE PYELOGRAM AND INSERTION STENT URETERAL; EXCISION OF BLADDER TUMOR;  Surgeon: Edwin Love MD;  Location: [facility];  Service: Urology    ROTATOR CUFF REPAIR Left     Last assessed: 3/29/15    TONSILLECTOMY         Family History   Problem Relation Age of Onset    Depression Mother         w/anxiety    Diabetes Mother         Mellitus    Breast cancer Mother     Hypertension Mother     No Known Problems Father     Depression Sister         w/anxiety    Heart disease Sister         Cardiac Disorder    Breast cancer Sister     Hypertension Sister     Diabetes Brother         Mellitus    Alcohol abuse Son      I have reviewed and agree with the history as documented.    E-Cigarette/Vaping    E-Cigarette Use Never User       E-Cigarette/Vaping Substances    Nicotine No     THC No     CBD No     Flavoring No     Other No     Unknown No      Social History     Tobacco Use    Smoking status: Never    Smokeless tobacco: Never   Vaping Use    Vaping status: Never Used   Substance Use Topics    Alcohol use: Never    Drug use: No        Review of Systems   Constitutional:  Negative for appetite change, chills, diaphoresis, fatigue and fever.   HENT:  Negative for congestion, ear pain, postnasal drip, rhinorrhea, sore throat and trouble swallowing.    Eyes:  Negative for pain and visual disturbance.   Respiratory:  Negative for cough and shortness of breath.    Cardiovascular:  Negative for chest pain and palpitations.   Gastrointestinal:  Positive for constipation and rectal pain. Negative for abdominal pain, diarrhea, nausea and vomiting.   Genitourinary:  Negative for decreased urine volume, dysuria and hematuria.   Musculoskeletal:  Negative for arthralgias and back pain.   Skin:  Negative for color change and rash.   Neurological:  Negative for dizziness, seizures, syncope, weakness, light-headedness and headaches.   All other systems reviewed and are negative.      Physical Exam  ED Triage Vitals [06/09/24 1929]   Temperature Pulse Respirations Blood Pressure SpO2   97.6 °F (36.4 °C) 94 18 (!) 190/86 99 %      Temp Source Heart Rate Source Patient Position - Orthostatic VS BP Location FiO2 (%)   Oral Monitor -- -- --      Pain Score       5             Orthostatic Vital Signs  Vitals:    06/09/24 1929 06/09/24 2030 06/09/24 2200   BP: (!) 190/86 (!) 175/74 (!) 196/87   Pulse: 94 88 79       Physical Exam  Vitals and nursing note reviewed.   Constitutional:       General: She is in acute distress.      Appearance: Normal appearance. She is normal weight.   HENT:      Head: Normocephalic and atraumatic.      Right Ear: External ear normal.      Left Ear: External ear normal.      Nose: Nose normal.      Mouth/Throat:      Pharynx:  Oropharynx is clear.   Eyes:      Conjunctiva/sclera: Conjunctivae normal.   Cardiovascular:      Rate and Rhythm: Normal rate and regular rhythm.      Pulses: Normal pulses.      Heart sounds: Normal heart sounds.      Comments: RRR with +S1 and S2, no murmurs appreciated on exam. Peripheral pulses intact.    Pulmonary:      Effort: Pulmonary effort is normal. No respiratory distress.      Breath sounds: Normal breath sounds. No wheezing, rhonchi or rales.      Comments: CTABL with no abnormal lung sounds such as wheezes or rales appreciated on exam.     Abdominal:      General: Abdomen is flat. Bowel sounds are normal. There is no distension.      Palpations: Abdomen is soft.      Tenderness: There is no abdominal tenderness.      Comments: Soft, non tender, normo-active bowel sounds. Without rigidity, guarding, or distension.     Genitourinary:     Comments: Large external hemorrhoid noted on examination with RN Alina as a chaperone. Upon digital rectal exam, significant amount of semi-hard stool noted within the rectal vault. At that time, manual fecal disimpaction was performed with moderate amount of stool removed. No gross blood noted.  Musculoskeletal:         General: Normal range of motion.      Cervical back: Normal range of motion.   Skin:     General: Skin is warm and dry.   Neurological:      General: No focal deficit present.      Mental Status: She is alert and oriented to person, place, and time. Mental status is at baseline.      Comments: CN grossly intact on visualization. No focal neurologic deficits noted on exam.  5/5 strength in all extremities. Neurovascularly intact with normal sensation and motor function.             ED Medications  Medications   mineral oil enema 1 enema (1 enema Rectal Given 6/9/24 2047)   polyethylene glycol (MIRALAX) packet 17 g (17 g Oral Given 6/9/24 2133)       Diagnostic Studies  Results Reviewed       None                   XR abdomen 1 view kub   Final Result by  Ralph Castellano MD (06/10 0939)      Large colonic stool burden.               Workstation performed: [id number]               Procedures  Procedures      ED Course                                       Medical Decision Making  83 year old Female with extensive PMH including HTN, HLD, DM, and constipation who presented to the ED for constipation that has been ongoing for the past 2 days. Patient's imaging was obtained and reviewed by the ED provider showing significant amount of stool within the colon. Upon examination at bedside, the patient was noted to have a significant amount of semi-hard stool requiring manual fecal disimpaction with CURTIS Fuller at bedside as a chaperone. While in the emergency department, the patient was given a mineral oil enema, soap suds enema, and 17 g of polyethylene glycol for her symptoms. After the treatment administration, the patient was able to have a bowel movement with resolution of her symptoms. Through shared decision making between the patient and the provider, the patient was planned for discharge. Patient was advised to follow up outpatient with her PCP as well as to take her Colace and Miralax as recommended. Patient was also instructed to return to the ED if her symptoms worsen including but not limited to increased rectal pain, abdominal pain, fever, chills, nausea or vomiting, or changes in her behavior.    Amount and/or Complexity of Data Reviewed  Radiology: ordered.    Risk  OTC drugs.          Disposition  Final diagnoses:   Constipation     Time reflects when diagnosis was documented in both MDM as applicable and the Disposition within this note       Time User Action Codes Description Comment    6/9/2024 10:13 PM Abram Dubose Add [K59.00] Constipation           ED Disposition       ED Disposition   Discharge    Condition   Stable    Date/Time   Sun Jun 9, 2024 10:13 PM    Comment   Sandra Peña discharge to home/self care.                   Follow-up  Information       Follow up With Specialties Details Why Contact Info Additional Information    Ramin De Paz, DO Family Medicine Call  As needed [address]  [facility] [id number] [phone]       [facility] Emergency Department Emergency Medicine Go to  If symptoms worsen [address] [facility] [address]  [facility] [id number] [phone] [facility] Emergency Department, [address] [facility], [place], [place], [address]            Discharge Medication List as of 6/9/2024 10:14 PM        CONTINUE these medications which have NOT CHANGED    Details   aspirin 81 MG tablet Take 81 mg by mouth daily, Historical Med      atorvastatin (LIPITOR) 10 mg tablet Take 1 tablet (10 mg total) by mouth daily, Starting Mon 4/8/2024, Normal      docusate sodium (COLACE) 100 mg capsule Take 100 mg by mouth 2 (two) times a day, Historical Med      fluocinonide (LIDEX) 0.05 % external solution Scalp solution- apply to affected area once a day as needed, Normal      ketoconazole (NIZORAL) 2 % shampoo To the scalp leave on for 5 minutes then rinse off completely. Once a week, Normal      linaCLOtide 145 MCG CAPS Take 1 capsule (145 mcg total) by mouth in the morning, Starting Thu 6/8/2023, Normal      lisinopril-hydrochlorothiazide (PRINZIDE,ZESTORETIC) 10-12.5 MG per tablet Take 1 tablet by mouth daily, Starting Mon 10/16/2023, Normal      metFORMIN (GLUCOPHAGE) 500 mg tablet Take 1 tablet (500 mg total) by mouth 2 (two) times a day with meals, Starting Tue 1/2/2024, Normal      mometasone (ELOCON) 0.1 % lotion Apply topically daily, Starting Fri 11/24/2023, Normal      triamcinolone (KENALOG) 0.1 % cream Apply under the breasts twice a day up to 2 weeks as needed for redness or itching, Normal      clonazePAM (KlonoPIN) 0.5 mg tablet Take 0.5 tablets (0.25 mg total) by mouth 3 (three) times a day Do not start before May 27, 2024., Starting Mon 5/27/2024, Until Fri 7/26/2024, Normal       FLUoxetine (PROzac) 40 MG capsule Take 1 capsule (40 mg total) by mouth daily Combine with your 20mg for a total of 60 daily, Starting Thu 5/2/2024, Until Mon 7/1/2024, Normal      hydrOXYzine HCL (ATARAX) 10 mg tablet Take 1 tablet (10 mg total) by mouth every 8 (eight) hours as needed for anxiety for up to 40 doses, Starting u 5/2/2024, Normal      risperiDONE (RisperDAL) 0.25 mg tablet Take 1 tablet (0.25 mg total) by mouth daily at bedtime, Starting u 5/2/2024, Until Mon 7/1/2024, Normal           No discharge procedures on file.    PDMP Review         Value Time User    PDMP Reviewed  Yes 6/10/2024  9:30 AM Ede Antony DO             ED Provider  Attending physically available and evaluated Sandra Peña. I managed the patient along with the ED Attending.    Electronically Signed by           Abram Dubose MD  06/11/24 2724

## 2024-06-10 NOTE — ED ATTENDING ATTESTATION
6/9/2024  I, Josh Bhandari MD, saw and evaluated the patient. I have discussed the patient with the resident/non-physician practitioner and agree with the resident's/non-physician practitioner's findings, Plan of Care, and MDM as documented in the resident's/non-physician practitioner's note, except where noted. All available labs and Radiology studies were reviewed.  I was present for key portions of any procedure(s) performed by the resident/non-physician practitioner and I was immediately available to provide assistance.       At this point I agree with the current assessment done in the Emergency Department.  I have conducted an independent evaluation of this patient a history and physical is as follows:    83-year-old female presented for evaluation of constipation for the last 2 days.  Feels like she needs to have a bowel movement.  Having rectal pressure but unable to go.  Has been using Colace as well as trying a suppository and Fleet enema without improvement this evening.    ED Course  ED Course as of 06/09/24 2219   Sun Jun 09, 2024 2102 KUB shows large amount of colonic stool.  Normal gas pattern.   2218 Patient produced large bowel movement after manual disimpaction, MiraLAX, mineral oil and soapsuds enemas.  She reports significant symptomatic improvement.  Stable for discharge home.  Encouraged daily MiraLAX until producing regular bowel movements.  Can reduce frequency of MiraLAX at that point every other day depending on bowel movement frequency.         Critical Care Time  Procedures

## 2024-06-12 DIAGNOSIS — K59.04 CHRONIC IDIOPATHIC CONSTIPATION: ICD-10-CM

## 2024-06-13 ENCOUNTER — OFFICE VISIT (OUTPATIENT)
Dept: BEHAVIORAL/MENTAL HEALTH CLINIC | Facility: CLINIC | Age: 84
End: 2024-06-13

## 2024-06-13 DIAGNOSIS — F41.1 GENERALIZED ANXIETY DISORDER: ICD-10-CM

## 2024-06-13 DIAGNOSIS — F45.21 ILLNESS ANXIETY DISORDER: ICD-10-CM

## 2024-06-13 DIAGNOSIS — F33.1 MAJOR DEPRESSIVE DISORDER, RECURRENT, MODERATE (HCC): Primary | ICD-10-CM

## 2024-06-13 NOTE — PSYCH
Behavioral Health Psychotherapy Group Progress Note    Psychotherapy Provided: Group Therapy    1. Major depressive disorder, recurrent, moderate (HCC)        2. Generalized anxiety disorder        3. Illness anxiety disorder            Goals addressed in session: Goal 1     Group Name: Older Adult Anxiety and Depression    Topic(s) covered: anxiety, trauma stored in the body, and coping skills    Skill(s) covered: understanding where negative self-talk comes from, thought reframing to help ease anxiety and self-criticism    Group summary:  Group discussed life events that have contributed to anxiety and depression, particularly medical concerns and physical limitations, discussing the difficulty in accepting that they cannot do things they used to be able to do now that they are older. They discussed past traumas, how those traumas manifest today in their bodies (physical symptoms), and their responses to triggers based on past events. Group members were very supportive of each others' stories, sharing different ways to cope and things that have helped them in the past. Concept of death was brought up, specifically the fear of death, which will be topic of discussion for next group.    Data: Sandra attended today's group. She shared that initially she was hesitant about participating in the group, but once she heard that so many others also suffered from depression and anxiety, she said that she was happy she decided to attend.  She shared that she also struggles with health anxiety, and will think that any shannon on her body is a sign of cancer, for example.  She was otherwise quiet throughout the discussion, but appeared attentive and invested in the discussion    Substance Abuse was not addressed during this session. If the client is diagnosed with a co-occurring substance use disorder, please indicate any changes in the frequency or amount of use: n/a. Stage of change for addressing substance use diagnoses: No  "substance use/Not applicable    ASSESSMENT:  Sandra appeared  with a Anxious mood. Her affect is Normal range and intensity, which is congruent, with his mood and the content of the session. She appeared to remained quiet throughout the group and interacted appropriately with and was supportive of the other group members.     Sandra Peña presents with a lowrisk of suicide,minimalrisk of self-harm, and minimal risk of harm to others.    For any risk assessment that surpasses a \"low\" rating, a safety plan must be developed.    A safety plan was indicated: no  If yes, describe in detail n/a    PLAN: Sandra will work on positive coping skills to help ease anxiety and depression symptoms, and will try to reframe negative self-talk to more positive. The next group is scheduled for Thursday, June 27th and will cover the topic of anxiety, depression and coping skills.    Behavioral Health Treatment Plan and Discharge Planning: Sandra Peña is aware of and agrees to continue to work on their treatment plan. They have identified and are working toward their discharge goals. yes    Visit start and stop times:    06/13/24  Start Time: 1400  Stop Time: 1503  Total Visit Time: 63 minutes      "

## 2024-06-27 ENCOUNTER — OFFICE VISIT (OUTPATIENT)
Dept: BEHAVIORAL/MENTAL HEALTH CLINIC | Facility: CLINIC | Age: 84
End: 2024-06-27
Payer: MEDICARE

## 2024-06-27 DIAGNOSIS — F41.1 GENERALIZED ANXIETY DISORDER: ICD-10-CM

## 2024-06-27 DIAGNOSIS — F45.21 ILLNESS ANXIETY DISORDER: ICD-10-CM

## 2024-06-27 DIAGNOSIS — F33.1 MAJOR DEPRESSIVE DISORDER, RECURRENT, MODERATE (HCC): Primary | ICD-10-CM

## 2024-06-27 PROCEDURE — 90853 GROUP PSYCHOTHERAPY: CPT | Performed by: PSYCHIATRY & NEUROLOGY

## 2024-06-27 NOTE — PSYCH
"Behavioral Health Psychotherapy Group Progress Note    Psychotherapy Provided: Group Therapy    1. Major depressive disorder, recurrent, moderate (HCC)        2. Generalized anxiety disorder        3. Illness anxiety disorder            Goals addressed in session: Goal 1     Group Name: Older Adult Anxiety and Depression    Topic(s) covered: Anxiety, positive thinking, self-advocacy    Skill(s) covered: shifting negative thinking to positive, assertive communication    Group summary:  Group members shared stressful events in their lives from the past couple of weeks since last group, and talked about how they seem to focus on negativity.  Group discussed the ways that their thoughts turn negative, and processed how they might reframe those thoughts to more positive.  Group also discussed obstacles in their lives such as uncooperative family members, isolation, missing loved ones, etc, and how they can advocate for their needs to be met in assertive ways instead of just accepting the current situation that is causing them distress, anxiety and depression.     Data: Sandra attended today's group. She talked about the difficult living situation she is in with her daughter and family, and said that 'I'm always in trouble,\" and \"I can't do anything right in her eyes.\" She said that her kids are talking about her moving into assisted living, but she does not really want to do that because she does not like to be alone.  She said that she gets anxious when she is in the house without her daughter and family.  Group members provided her with support, suggestions for ways to address her concerns with her daughter, and encouraged her to advocate for herself with her medical professionals as well.    Substance Abuse was not addressed during this session. If the client is diagnosed with a co-occurring substance use disorder, please indicate any changes in the frequency or amount of use: n/a. Stage of change for addressing " "substance use diagnoses: No substance use/Not applicable    ASSESSMENT:  Sandra appeared  with a Dysthymic mood. Her affect is Normal range and intensity and Tearful, which is congruent, with his mood and the content of the session. She appeared to actively participated in the group and interacted appropriately with and was supportive of the other group members.     Sandra Peña presents with a lowrisk of suicide,minimalrisk of self-harm, and minimal risk of harm to others.    For any risk assessment that surpasses a \"low\" rating, a safety plan must be developed.    A safety plan was indicated: no  If yes, describe in detail n/a    PLAN: Sandra will work on advocating for her needs with her daughter. The next group is scheduled for Thursday, July 25th and will cover the topic of anxiety, depression and coping skills.    Behavioral Health Treatment Plan and Discharge Planning: Sandra Peña is aware of and agrees to continue to work on their treatment plan. They have identified and are working toward their discharge goals. yes    Visit start and stop times:    06/27/24  Start Time: 1400  Stop Time: 1503  Total Visit Time: 63 minutes      "

## 2024-06-30 DIAGNOSIS — E11.9 TYPE 2 DIABETES MELLITUS WITHOUT COMPLICATION, WITHOUT LONG-TERM CURRENT USE OF INSULIN (HCC): ICD-10-CM

## 2024-07-08 DIAGNOSIS — E78.2 MIXED HYPERLIPIDEMIA: ICD-10-CM

## 2024-07-08 RX ORDER — ATORVASTATIN CALCIUM 10 MG/1
10 TABLET, FILM COATED ORAL DAILY
Qty: 90 TABLET | Refills: 1 | Status: SHIPPED | OUTPATIENT
Start: 2024-07-08

## 2024-07-18 ENCOUNTER — TELEPHONE (OUTPATIENT)
Age: 84
End: 2024-07-18

## 2024-07-18 NOTE — TELEPHONE ENCOUNTER
Writer called and LVM to call office back to discuss either making MARIZA with resident or provider in office.

## 2024-07-25 ENCOUNTER — OFFICE VISIT (OUTPATIENT)
Dept: BEHAVIORAL/MENTAL HEALTH CLINIC | Facility: CLINIC | Age: 84
End: 2024-07-25
Payer: MEDICARE

## 2024-07-25 DIAGNOSIS — F33.1 MAJOR DEPRESSIVE DISORDER, RECURRENT, MODERATE (HCC): Primary | ICD-10-CM

## 2024-07-25 DIAGNOSIS — F45.21 ILLNESS ANXIETY DISORDER: ICD-10-CM

## 2024-07-25 DIAGNOSIS — F41.1 GENERALIZED ANXIETY DISORDER: ICD-10-CM

## 2024-07-25 PROCEDURE — 90853 GROUP PSYCHOTHERAPY: CPT | Performed by: PSYCHIATRY & NEUROLOGY

## 2024-07-25 NOTE — PSYCH
Behavioral Health Psychotherapy Group Progress Note    Psychotherapy Provided: Group Therapy    1. Major depressive disorder, recurrent, moderate (HCC)        2. Generalized anxiety disorder        3. Illness anxiety disorder            Goals addressed in session: Goal 1     Group Name: Older Adult Anxiety and Depression    Topic(s) covered: anxiety, coping skills    Skill(s) covered: grounding, assertive communication    Group summary:  Group discussed events of the past month since last group session, and how those events have either increased their anxiety or have helped.  Group shared some anxiety reduction strategies that they use that they find helpful, and learned the 47636 grounding exercise to help ease panic and anxiety attacks.  The group processed home life and difficult relationships and how those feed their anxiety, sense of self-worth, and how to cope.    Data: Sandra attended today's group. She actively participated in discussion, sharing that her home situation has not improved as her daughter is still very critical of her and dismissive of Sandra's concerns and feelings.  She shared that she often feels like a burden, and does not feel that she can talk to her daughter about anything because she just gets angry.  Group provided her with significant support, encouraging her to express herself to her daughter and her doctor, and to consider options for alternatives to staying alone in the house when the family goes away, or other permanent living options like assisted living      Substance Abuse was not addressed during this session. If the client is diagnosed with a co-occurring substance use disorder, please indicate any changes in the frequency or amount of use: n/a. Stage of change for addressing substance use diagnoses: No substance use/Not applicable    ASSESSMENT:  Sandra appeared  with a Anxious and Depressed mood. Her affect is Constricted, which is congruent, with her mood and the content of  "the session. She appeared to actively participated in the group and interacted appropriately with and was supportive of the other group members.     Sandra Peña presents with a minimalrisk of suicide,minimalrisk of self-harm, and minimal risk of harm to others.    For any risk assessment that surpasses a \"low\" rating, a safety plan must be developed.    A safety plan was indicated: no  If yes, describe in detail n/a    PLAN: Sandra will work on assertive communication with daughter and her doctor (who seems to not have the time for her concerns per Sandra). The next group is scheduled for August 8th and will cover the topic of anxiety, depression and coping skills.    Behavioral Health Treatment Plan and Discharge Planning: Sandra Peña is aware of and agrees to continue to work on their treatment plan. They have identified and are working toward their discharge goals. yes    Visit start and stop times:    07/25/24  Start Time: 1359  Stop Time: 1501  Total Visit Time: 62 minutes      "

## 2024-07-29 ENCOUNTER — OFFICE VISIT (OUTPATIENT)
Dept: PSYCHIATRY | Facility: CLINIC | Age: 84
End: 2024-07-29
Payer: MEDICARE

## 2024-07-29 VITALS — HEART RATE: 87 BPM | SYSTOLIC BLOOD PRESSURE: 161 MMHG | DIASTOLIC BLOOD PRESSURE: 79 MMHG

## 2024-07-29 DIAGNOSIS — F45.21 ILLNESS ANXIETY DISORDER: ICD-10-CM

## 2024-07-29 DIAGNOSIS — F33.0 MAJOR DEPRESSIVE DISORDER, RECURRENT EPISODE, MILD (HCC): Primary | ICD-10-CM

## 2024-07-29 PROCEDURE — 90792 PSYCH DIAG EVAL W/MED SRVCS: CPT

## 2024-07-29 RX ORDER — CLONAZEPAM 0.5 MG/1
0.25 TABLET ORAL
Qty: 14 TABLET | Refills: 0 | Status: SHIPPED | OUTPATIENT
Start: 2024-07-29 | End: 2024-08-26

## 2024-07-29 NOTE — PSYCH
" PSYCHIATRIC EVALUATION     [facility] - PSYCHIATRIC ASSOCIATES    Name and Date of Birth:  Sandra Peña 83 y.o. 1940 MRN: [id number]    Date of Visit: July 29, 2024    Reason for visit: Full psychiatric intake assessment for medication management     Chief Complaint: \"waiting for God to take me\"    HPI     Sandra Peña is 83-years-old, a retired , , living with her daughter and daughter's , their three children, and daughter's father-in-law, and history of one hospitalization in 2013 for anxiety. Can't form a whole sentence. Loss of some naming of less important people and places. She thinks this has been going on for \"quite a while.\" To compensate, she tries to write down what she anticipates she might need to say.     She expresses health anxiety, like noticing small things and fearing it could be cancer. She feels her family doctor can't be bothered by her health anxiety.  She reports things go through her mind about anything that happens to her. She reports she has always had a preoccupation with her physical health. Patient's daughter reports she does not always following medical instructions despite her concerns. Daughter reports the patient's anxiety causes frustration in daughter who yells at the patient. Thinking about late  more. Though for \"quite a while.\" The memories are good, but she misses him. She feels she has trouble smiling and is waiting for God to take her. Likely this worsening occurred two years ago when daughter's father-in-law moved in and disrupted the household dynamics.    She reports recurring distressing, dreams of getting lost. Not related to traumatic events. Tired from moment she wakes up. Fatigue seems to have onset with cognitive problems. Paying attention to reading is becoming more difficult. Distracted by health anxiety. She feels \"terrible\" for making her daughter's life miserable. She reports she \"doesn't " "know what to do with herself.\" Primary emotion is \"always, unhappy.\"    At this time constipation is managed with docusate and polyethylene glycol.    Current Rating Scores:     [facility] Mental Status Exam (SLUMS)    Sandra Peña     83 y.o.    Education level:  High School?    yes     Less than High School?   no  For the questions below, yes is correct response, no is incorrect.    1: What day of the week is it?  yes    1  2: What year is it?   yes    1  3: What state are we in?  yes    1    Total points: 3 / 3.    Please remember these 5 objects. I will ask about them later.   Apple Table Anjana    Tie House    5: You have $100 and you go to the store and buy a dozen apples for $3, and a tricycle for $20.   How much did you spend?   1    1   How much do you have left? 2    2    Total points: 3 / 3.    6: Please name as many animals as you can in 1 minute   Number named:  9   0: 0-4 1: 5-9 2: 10-14   3: 15+    Total points: 1 / 3.    7: What were the five objects I asked you to remember?    Number remembered: 3    1 point for each correct object remembered    8: I am going to give you a series of numbers. I want you to give them to me backwards,    Example: If I say 42, you say 24.   0: 87  1: 649  1: 8537 Points: 1    9:  This is a clock face. Please put hour markers and the time at ten minutes to eleven o'clock.       2: Hour markers okay       2: Time correct.    Total points: 4 / 4.    10: Place an X in the triangle.  (Square Triangle Rectangle)    1  Which figure is largest?      1    Total points: 1 / 2.    11: I am going to tell you a story. Please listen carefully because afterwards I'm going to ask you some questions about it.   Zuly was a very succesfull . 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 She then met Deniz., a devistatingly handsome man. She  him  and had three children. They live in [place]. When they were teenagers, she             went back to work. She and Deniz " lived happily ever after.     2: What was the female's name? 2: What work did she do?   2: When did she go back to work? 2: What state did she live in?    Total points: 8 / 8.    Final Total:  24 / 30            Psychiatric Review Of Systems:    Appetite: alright, maybe little reduction  Insomnia/sleeplessness: [id number]/[id number], hard time falling asleep  Anhedonia/lack of interest: denies  Somatic symptoms: yes  Anxiety/panic attack: yes  Zaira/hypomania: no  Hopelessness/helplessness/worthlessness: no  Self-injurious behavior/high-risk behavior: no  Suicidal ideation: denies  Homicidal ideation: denies  Auditory hallucinations: denies  Visual hallucinations: denies  Delusional thinking: no  Obsessive/compulsive symptoms: possible    Review Of Systems:    Constitutional negative   ENT negative   Cardiovascular negative   Respiratory negative   Gastrointestinal negative   Genitourinary negative   Musculoskeletal negative   Integumentary skin lesions   Neurological unstable gait   Endocrine negative   Other Symptoms none, all other systems are negative       Past Psychiatric History:     Past psychiatric diagnoses:   Depression, anxiety  Inpatient psychiatric admissions:   Once in 2013 following a surgery, sounded like extreme anxiety  Prior outpatient psychiatric treatment:   Started seeing a psychiatrist when first   Past/current psychotherapy:   active  History of suicidal attempts/gestures:   denies  Psychotropic medication trials:   Venlafaxine, sertraline, fluoxetine, escitalopram, aripiprazole (dizziness), risperidone, clonazepam, buspirone, gabapentin      Substance Abuse History:    Denies all history of substance use    Family Psychiatric History:     Family History   Problem Relation Age of Onset    Depression Mother         w/anxiety    Diabetes Mother         Mellitus    Breast cancer Mother     Hypertension Mother     No Known Problems Father     Depression Sister         w/anxiety    Heart disease  Sister         Cardiac Disorder    Breast cancer Sister     Hypertension Sister     Diabetes Brother         Mellitus    Alcohol abuse Son            Social History:    Developmental: nothing of note  Education: Bachelor degree  Marital history:  in 2006  Children: 2  Living arrangement, social support: as above, has been with daughter for 17 years  Occupational History: was a   Baptism Affiliation: has a Worship ghanshyam  Access to firearms: denies      Traumatic History:     denies    Past Medical History:    Past Medical History:   Diagnosis Date    Colon polyps     Diabetes mellitus (HCC)     Dizziness     Last assessed: 8/31/15    DVT (deep venous thrombosis) (HCC)     Dysuria     Hematuria 2022    Hyperlipidemia     Hypertension     Hypertensive urgency 10/22/2021    Insomnia     Ureterolithiasis 2020        Past Surgical History:   Procedure Laterality Date     SECTION      1964 son, 1968 daughter    COLONOSCOPY      FL RETROGRADE PYELOGRAM  2020    RI COLONOSCOPY FLX DX W/COLLJ SPEC WHEN PFRMD N/A 3/27/2017    Procedure: COLONOSCOPY;  Surgeon: Gold Francis MD;  Location: [facility];  Service: Gastroenterology    RI CYSTO/URETERO W/LITHOTRIPSY &INDWELL STENT INSRT Right 2020    Procedure: CYSTOSCOPY URETEROSCOPY WITH LITHOTRIPSY HOLMIUM LASER, RETROGRADE PYELOGRAM AND INSERTION STENT URETERAL; EXCISION OF BLADDER TUMOR;  Surgeon: Edwin Love MD;  Location: [facility];  Service: Urology    ROTATOR CUFF REPAIR Left     Last assessed: 3/29/15    TONSILLECTOMY       No Known Allergies    History Review:    The following portions of the patient's history were reviewed and updated as appropriate: allergies, current medications, past family history, past medical history, past social history, past surgical history, and problem list.    OBJECTIVE:    Vital signs in last 24 hours:    Vitals:    24 1041   BP: 161/79   BP Location: Left arm   Patient Position:  "Sitting   Pulse: 87       Mental Status Evaluation:    Appearance Red sweater, white floral shirt under with stain, black paints, short grey hair, brown purse  Occasional eye contact   Behavior Still, cooperative   Speech Fluency improves as time progresses   Mood \"Always sad; unhappy\"   Affect Distant, constricted, smiles rarely   Thought Processes Logical and linear   Thought Content May have obsessional thinking    No overt delusions  Denies hallucinations  Denies suicidal ideations   Cognition See above for SLUMS Examination report   Insight Moderate to fair   Judgement Moderate to fair       Laboratory Results: I have personally reviewed all pertinent laboratory/tests results    Admission on 04/10/2024, Discharged on 04/10/2024   Component Date Value Ref Range Status    WBC 04/10/2024 6.37  4.31 - 10.16 Thousand/uL Final    RBC 04/10/2024 4.09  3.81 - 5.12 Million/uL Final    Hemoglobin 04/10/2024 11.7  11.5 - 15.4 g/dL Final    Hematocrit 04/10/2024 37.7  34.8 - 46.1 % Final    MCV 04/10/2024 92  82 - 98 fL Final    MCH 04/10/2024 28.6  26.8 - 34.3 pg Final    MCHC 04/10/2024 31.0 (L)  31.4 - 37.4 g/dL Final    RDW 04/10/2024 13.2  11.6 - 15.1 % Final    MPV 04/10/2024 10.4  8.9 - 12.7 fL Final    Platelets 04/10/2024 203  149 - 390 Thousands/uL Final    nRBC 04/10/2024 0  /100 WBCs Final    Segmented % 04/10/2024 70  43 - 75 % Final    Immature Grans % 04/10/2024 0  0 - 2 % Final    Lymphocytes % 04/10/2024 19  14 - 44 % Final    Monocytes % 04/10/2024 6  4 - 12 % Final    Eosinophils Relative 04/10/2024 4  0 - 6 % Final    Basophils Relative 04/10/2024 1  0 - 1 % Final    Absolute Neutrophils 04/10/2024 4.47  1.85 - 7.62 Thousands/µL Final    Absolute Immature Grans 04/10/2024 0.02  0.00 - 0.20 Thousand/uL Final    Absolute Lymphocytes 04/10/2024 1.18  0.60 - 4.47 Thousands/µL Final    Absolute Monocytes 04/10/2024 0.41  0.17 - 1.22 Thousand/µL Final    Eosinophils Absolute 04/10/2024 0.23  0.00 - 0.61 " "Thousand/µL Final    Basophils Absolute 04/10/2024 0.06  0.00 - 0.10 Thousands/µL Final    Sodium 04/10/2024 135  135 - 147 mmol/L Final    Potassium 04/10/2024 4.3  3.5 - 5.3 mmol/L Final    Chloride 04/10/2024 103  96 - 108 mmol/L Final    CO2 04/10/2024 24  21 - 32 mmol/L Final    ANION GAP 04/10/2024 8  4 - 13 mmol/L Final    BUN 04/10/2024 26 (H)  5 - 25 mg/dL Final    Creatinine 04/10/2024 0.93  0.60 - 1.30 mg/dL Final    Standardized to IDMS reference method    Glucose 04/10/2024 99  65 - 140 mg/dL Final    If the patient is fasting, the ADA then defines impaired fasting glucose as > 100 mg/dL and diabetes as > or equal to 123 mg/dL.    Calcium 04/10/2024 9.7  8.4 - 10.2 mg/dL Final    AST 04/10/2024 15  13 - 39 U/L Final    ALT 04/10/2024 11  7 - 52 U/L Final    Specimen collection should occur prior to Sulfasalazine administration due to the potential for falsely depressed results.     Alkaline Phosphatase 04/10/2024 53  34 - 104 U/L Final    Total Protein 04/10/2024 6.9  6.4 - 8.4 g/dL Final    Albumin 04/10/2024 4.2  3.5 - 5.0 g/dL Final    Total Bilirubin 04/10/2024 0.49  0.20 - 1.00 mg/dL Final    Use of this assay is not recommended for patients undergoing treatment with eltrombopag due to the potential for falsely elevated results.  N-acetyl-p-benzoquinone imine (metabolite of Acetaminophen) will generate erroneously low results in samples for patients that have taken an overdose of Acetaminophen.    eGFR 04/10/2024 56  ml/min/1.73sq m Final    hs TnI 0hr 04/10/2024 11  \"Refer to ACS Flowchart\"- see link ng/L Final    Comment:                                              Initial (time 0) result  If >=50 ng/L, Myocardial injury suggested ;  Type of myocardial injury and treatment strategy  to be determined.  If 5-49 ng/L, a delta result at 2 hours and or 4 hours will be needed to further evaluate.  If <4 ng/L, and chest pain has been >3 hours since onset, patient may qualify for discharge based on the " "HEART score in the ED.  If <5 ng/L and <3hours since onset of chest pain, a delta result at 2 hours will be needed to further evaluate.    HS Troponin 99th Percentile URL of a Health Population=12 ng/L with a 95% Confidence Interval of 8-18 ng/L.    Second Troponin (time 2 hours)  If calculated delta >= 20 ng/L,  Myocardial injury suggested ; Type of myocardial injury and treatment strategy to be determined.  If 5-49 ng/L and the calculated delta is 5-19 ng/L, consult medical service for evaluation.  Continue evaluation for ischemia on ecg and other possible etiology and repeat hs troponin at 4 hours.  If delta                            is <5 ng/L at 2 hours, consider discharge based on risk stratification via the HEART score (if in ED), or [place] risk score in IP/Observation.    HS Troponin 99th Percentile URL of a Health Population=12 ng/L with a 95% Confidence Interval of 8-18 ng/L.    Ventricular Rate 04/10/2024 88  BPM Final    Atrial Rate 04/10/2024 88  BPM Final    [place] Interval 04/10/2024 166  ms Final    QRSD Interval 04/10/2024 64  ms Final    QT Interval 04/10/2024 372  ms Final    QTC Interval 04/10/2024 450  ms Final    P Axis 04/10/2024 51  degrees Final    QRS [place] 04/10/2024 19  degrees Final    T Wave [place] 04/10/2024 49  degrees Final    hs TnI 2hr 04/10/2024 12  \"Refer to ACS Flowchart\"- see link ng/L Final    Comment:                                              Initial (time 0) result  If >=50 ng/L, Myocardial injury suggested ;  Type of myocardial injury and treatment strategy  to be determined.  If 5-49 ng/L, a delta result at 2 hours and or 4 hours will be needed to further evaluate.  If <4 ng/L, and chest pain has been >3 hours since onset, patient may qualify for discharge based on the HEART score in the ED.  If <5 ng/L and <3hours since onset of chest pain, a delta result at 2 hours will be needed to further evaluate.    HS Troponin 99th Percentile URL of a Health Population=12 ng/L with a 95% " Confidence Interval of 8-18 ng/L.    Second Troponin (time 2 hours)  If calculated delta >= 20 ng/L,  Myocardial injury suggested ; Type of myocardial injury and treatment strategy to be determined.  If 5-49 ng/L and the calculated delta is 5-19 ng/L, consult medical service for evaluation.  Continue evaluation for ischemia on ecg and other possible etiology and repeat hs troponin at 4 hours.  If delta                            is <5 ng/L at 2 hours, consider discharge based on risk stratification via the HEART score (if in ED), or [place] risk score in IP/Observation.    HS Troponin 99th Percentile URL of a Health Population=12 ng/L with a 95% Confidence Interval of 8-18 ng/L.    Delta 2hr hsTnI 04/10/2024 1  <20 ng/L Final    Color, UA 04/10/2024 Colorless   Final    Clarity, UA 04/10/2024 Clear   Final    Specific Gravity, UA 04/10/2024 1.004  1.003 - 1.030 Final    pH, UA 04/10/2024 5.5  4.5, 5.0, 5.5, 6.0, 6.5, 7.0, 7.5, 8.0 Final    Leukocytes, UA 04/10/2024 Negative  Negative Final    Nitrite, UA 04/10/2024 Negative  Negative Final    Protein, UA 04/10/2024 Negative  Negative mg/dl Final    Glucose, UA 04/10/2024 Negative  Negative mg/dl Final    Ketones, UA 04/10/2024 Negative  Negative mg/dl Final    Urobilinogen, UA 04/10/2024 <2.0  <2.0 mg/dl mg/dl Final    Bilirubin, UA 04/10/2024 Negative  Negative Final    Occult Blood, UA 04/10/2024 Negative  Negative Final   Admission on 02/16/2024, Discharged on 02/16/2024   Component Date Value Ref Range Status    WBC 02/16/2024 9.72  4.31 - 10.16 Thousand/uL Final    RBC 02/16/2024 3.90  3.81 - 5.12 Million/uL Final    Hemoglobin 02/16/2024 11.4 (L)  11.5 - 15.4 g/dL Final    Hematocrit 02/16/2024 35.4  34.8 - 46.1 % Final    MCV 02/16/2024 91  82 - 98 fL Final    MCH 02/16/2024 29.2  26.8 - 34.3 pg Final    MCHC 02/16/2024 32.2  31.4 - 37.4 g/dL Final    RDW 02/16/2024 13.5  11.6 - 15.1 % Final    MPV 02/16/2024 10.2  8.9 - 12.7 fL Final    Platelets 02/16/2024 212   149 - 390 Thousands/uL Final    nRBC 02/16/2024 0  /100 WBCs Final    Segmented % 02/16/2024 89 (H)  43 - 75 % Final    Immature Grans % 02/16/2024 0  0 - 2 % Final    Lymphocytes % 02/16/2024 6 (L)  14 - 44 % Final    Monocytes % 02/16/2024 3 (L)  4 - 12 % Final    Eosinophils Relative 02/16/2024 1  0 - 6 % Final    Basophils Relative 02/16/2024 1  0 - 1 % Final    Absolute Neutrophils 02/16/2024 8.71 (H)  1.85 - 7.62 Thousands/µL Final    Absolute Immature Grans 02/16/2024 0.03  0.00 - 0.20 Thousand/uL Final    Absolute Lymphocytes 02/16/2024 0.60  0.60 - 4.47 Thousands/µL Final    Absolute Monocytes 02/16/2024 0.28  0.17 - 1.22 Thousand/µL Final    Eosinophils Absolute 02/16/2024 0.05  0.00 - 0.61 Thousand/µL Final    Basophils Absolute 02/16/2024 0.05  0.00 - 0.10 Thousands/µL Final    Sodium 02/16/2024 137  135 - 147 mmol/L Final    Potassium 02/16/2024 4.0  3.5 - 5.3 mmol/L Final    Chloride 02/16/2024 103  96 - 108 mmol/L Final    CO2 02/16/2024 24  21 - 32 mmol/L Final    ANION GAP 02/16/2024 10  mmol/L Final    BUN 02/16/2024 27 (H)  5 - 25 mg/dL Final    Creatinine 02/16/2024 0.96  0.60 - 1.30 mg/dL Final    Standardized to IDMS reference method    Glucose 02/16/2024 137  65 - 140 mg/dL Final    If the patient is fasting, the ADA then defines impaired fasting glucose as > 100 mg/dL and diabetes as > or equal to 123 mg/dL.    Calcium 02/16/2024 9.5  8.4 - 10.2 mg/dL Final    eGFR 02/16/2024 54  ml/min/1.73sq m Final   Appointment on 02/12/2024   Component Date Value Ref Range Status    WBC 02/12/2024 4.86  4.31 - 10.16 Thousand/uL Final    RBC 02/12/2024 3.80 (L)  3.81 - 5.12 Million/uL Final    Hemoglobin 02/12/2024 10.9 (L)  11.5 - 15.4 g/dL Final    Hematocrit 02/12/2024 34.7 (L)  34.8 - 46.1 % Final    MCV 02/12/2024 91  82 - 98 fL Final    MCH 02/12/2024 28.7  26.8 - 34.3 pg Final    MCHC 02/12/2024 31.4  31.4 - 37.4 g/dL Final    RDW 02/12/2024 13.3  11.6 - 15.1 % Final    MPV 02/12/2024 10.9  8.9 -  12.7 fL Final    Platelets 02/12/2024 242  149 - 390 Thousands/uL Final    nRBC 02/12/2024 0  /100 WBCs Final    Segmented % 02/12/2024 63  43 - 75 % Final    Immature Grans % 02/12/2024 0  0 - 2 % Final    Lymphocytes % 02/12/2024 26  14 - 44 % Final    Monocytes % 02/12/2024 6  4 - 12 % Final    Eosinophils Relative 02/12/2024 4  0 - 6 % Final    Basophils Relative 02/12/2024 1  0 - 1 % Final    Absolute Neutrophils 02/12/2024 3.10  1.85 - 7.62 Thousands/µL Final    Absolute Immature Grans 02/12/2024 0.01  0.00 - 0.20 Thousand/uL Final    Absolute Lymphocytes 02/12/2024 1.25  0.60 - 4.47 Thousands/µL Final    Absolute Monocytes 02/12/2024 0.27  0.17 - 1.22 Thousand/µL Final    Eosinophils Absolute 02/12/2024 0.18  0.00 - 0.61 Thousand/µL Final    Basophils Absolute 02/12/2024 0.05  0.00 - 0.10 Thousands/µL Final    Sodium 02/12/2024 139  135 - 147 mmol/L Final    Potassium 02/12/2024 4.3  3.5 - 5.3 mmol/L Final    Chloride 02/12/2024 103  96 - 108 mmol/L Final    CO2 02/12/2024 28  21 - 32 mmol/L Final    ANION GAP 02/12/2024 8  mmol/L Final    BUN 02/12/2024 31 (H)  5 - 25 mg/dL Final    Creatinine 02/12/2024 0.93  0.60 - 1.30 mg/dL Final    Standardized to IDMS reference method    Glucose, Fasting 02/12/2024 99  65 - 99 mg/dL Final    Calcium 02/12/2024 9.6  8.4 - 10.2 mg/dL Final    AST 02/12/2024 17  13 - 39 U/L Final    ALT 02/12/2024 14  7 - 52 U/L Final    Specimen collection should occur prior to Sulfasalazine administration due to the potential for falsely depressed results.     Alkaline Phosphatase 02/12/2024 52  34 - 104 U/L Final    Total Protein 02/12/2024 6.6  6.4 - 8.4 g/dL Final    Albumin 02/12/2024 4.0  3.5 - 5.0 g/dL Final    Total Bilirubin 02/12/2024 0.60  0.20 - 1.00 mg/dL Final    Use of this assay is not recommended for patients undergoing treatment with eltrombopag due to the potential for falsely elevated results.  N-acetyl-p-benzoquinone imine (metabolite of Acetaminophen) will generate  erroneously low results in samples for patients that have taken an overdose of Acetaminophen.    eGFR 02/12/2024 56  ml/min/1.73sq m Final    Hemoglobin A1C 02/12/2024 7.0 (H)  Normal 4.0-5.6%; PreDiabetic 5.7-6.4%; Diabetic >=6.5%; Glycemic control for adults with diabetes <7.0% % Final    EAG 02/12/2024 154  mg/dl Final    Cholesterol 02/12/2024 135  See Comment mg/dL Final    Cholesterol:         Pediatric <18 Years        Desirable          <170 mg/dL      Borderline High    170-199 mg/dL      High               >=200 mg/dL        Adult >=18 Years            Desirable         <200 mg/dL      Borderline High   200-239 mg/dL      High              >239 mg/dL      Triglycerides 02/12/2024 89  See Comment mg/dL Final    Triglyceride:     0-9Y            <75mg/dL     10Y-17Y         <90 mg/dL       >=18Y     Normal          <150 mg/dL     Borderline High 150-199 mg/dL     High            200-499 mg/dL        Very High       >499 mg/dL    Specimen collection should occur prior to Metamizole administration due to the potential for falsely depressed results.    HDL, Direct 02/12/2024 56  >=50 mg/dL Final    LDL Calculated 02/12/2024 61  0 - 100 mg/dL Final    LDL Cholesterol:     Optimal           <100 mg/dl     Near Optimal      100-129 mg/dl     Above Optimal       Borderline High 130-159 mg/dl       High            160-189 mg/dl       Very High       >189 mg/dl         This screening LDL is a calculated result.   It does not have the accuracy of the Direct Measured LDL in the monitoring of patients with hyperlipidemia and/or statin therapy.   Direct Measure LDL (SEG591) must be ordered separately in these patients.    Non-HDL-Chol (CHOL-HDL) 02/12/2024 79  mg/dl Final    Creatinine, Ur 02/12/2024 81.1  Reference range not established. mg/dL Final    Albumin,U,Random 02/12/2024 12.9  <20.0 mg/L Final    Albumin Creat Ratio 02/12/2024 16  0 - 30 mg/g creatinine Final    TSH 3RD GENERATON 02/12/2024 1.649  0.450 - 4.500  uIU/mL Final    The recommended reference ranges for TSH during pregnancy are as follows:   First trimester 0.100 to 2.500 uIU/mL   Second trimester  0.200 to 3.000 uIU/mL   Third trimester 0.300 to 3.000 uIU/m    Note: Normal ranges may not apply to patients who are transgender, non-binary, or whose legal sex, sex at birth, and gender identity differ.  Adult TSH (3rd generation) reference range follows the recommended guidelines of the American [employer] Association, January, 2020.   Admission on 02/01/2024, Discharged on 02/01/2024   Component Date Value Ref Range Status    Ventricular Rate 02/01/2024 88  BPM Final    Atrial Rate 02/01/2024 88  BPM Final    RI Interval 02/01/2024 158  ms Final    QRSD Interval 02/01/2024 60  ms Final    QT Interval 02/01/2024 474  ms Final    QTC Interval 02/01/2024 573  ms Final    P Axis 02/01/2024 23  degrees Final    QRS [place] 02/01/2024 8  degrees Final    T Wave [place] 02/01/2024 53  degrees Final       Suicide/Homicide Risk Assessment:    Risk of Harm to Self:  The following ratings are based on assessment at the time of the interview and review of records  Demographic risk factors include:  status, age: over 50 or older  Historical Risk Factors include: none  Recent Specific Risk Factors include: current depressive symptoms, current anxiety symptoms, passive death wishes  Protective Factors: no current suicidal ideation, being a parent, compliant with medications, effective coping skills, having a desire to be alive, Restoration beliefs discouraging suicide, supportive family  Weapons: none. The following steps have been taken to ensure weapons are properly secured: not applicable  Based on today's assessment, Sandra presents the following risk of harm to self: minimal    Risk of Harm to Others:  The following ratings are based on assessment at the time of the interview and review of records  Demographic Risk Factors include: none.  Historical Risk Factors include:  none.  Recent Specific Risk Factors include: none.  Protective Factors: no current homicidal ideation, being a parent, compliant with medications, effective coping skills, having a desire to be alive, Yazidi beliefs discouraging suicide, supportive family  Weapons: none. The following steps have been taken to ensure weapons are properly secured: not applicable,   Based on today's assessment, Sandra presents the following risk of harm to others: none          Assessment/Plan:   Sandra Peña is 83-years-old, a retired , , living with her daughter and daughter's , their three children, and daughter's father-in-law, and history of one hospitalization in 2013 for anxiety.  Depressive symptoms are in the mild range.  Symptoms are consistent with diagnosis of illness anxiety disorder.  She exhibits obsessional thinking.  She has significant external stressor with living situation.  Cognitive screening suggests need for additional testing to rule out cognitive impairment.  The patient and family reports she has difficulty hearing however has not had this tested and does not wear hearing aid; it is explained that this can mimic cognitive problems however also worsen cognition.    DSM-5 Diagnoses:     1. Major depressive disorder, recurrent episode, mild (HCC)    2. Illness anxiety disorder        Treatment Recommendations/Precautions:  Reduce clonazepam to 0.25 mg nightly for anxiety; will evaluate and consider discontinuation  Continue fluoxetine 40 mg daily anxious and depressive symptoms, may consider increase in future  Continue hydroxyzine 10 mg every 8 hours as needed for anxiety  Continue risperidone 0.25 mg twice daily for augmentation for obsessional thinking  Continue psychotherapy  Referral to neurology for cognitive concerns  Referral to PT/OT to assess for appropriate level of care and complaint of imbalance  Risks/benefits/alternatives to treatment discussed, including a myriad of  potential adverse medication side effects, to which Sandra voiced understanding and consented fully to treatment.   Medication management follow-up in 4 weeks  Aware of 24 hour and weekend coverage for urgent situations accessed by calling [facility] main practice number      Controlled Medication Discussion:     Sandra has been filling controlled prescriptions on time as prescribed according to [place] Prescription Drug Monitoring Program    Treatment Plan:    Completed and signed during the session: Yes - with Sandra      Visit Time    Visit Start Time: 1030  Visit Stop Time: 1210  Total Visit Duration: 100 minutes    López Gutierrez MD   07/29/24

## 2024-07-29 NOTE — BH TREATMENT PLAN
TREATMENT PLAN - Medication Management        [facility]    Name and Date of Birth:  Sandra Linla 83 y.o. 1940  Date of Treatment Plan: July 29, 2024  Diagnosis/Diagnoses:  anxiety    Strengths/Personal Resources for Self-Care: supportive family, taking medications as prescribed, [employer] affiliation    Area/Areas of need: anxiety    Long Term Goal: alleviate anxiety  Target Date: 6 months - January 29, 2025  Person/Persons responsible for completion of goal: Sandra and López Gutierrez MD     Short Term Objective (s) - How will we reach this goal?:   Take medications as prescribed  Attend psychiatry appointments regularly  Follow up with medical providers  Continue psychotherapy regularly  Target Date: 6 months - January 29, 2025  Person/Persons Responsible for Completion of Goal: Sandra     Progress Towards Goals: Continuing treatment    Treatment Modality: medication management every 1-3 months as needed, continue psychotherapy, and follow up with PCP  Review due 180 days from date of this plan: January 25, 2025   Expected length of service: Ongoing treatment    My physician and I have developed this plan together, and I agree to work on the goals and objectives. I understand the treatment goals that were developed for my treatment.    The treatment plan was created between López Gutierrez MD and Sandra Peña on 07/29/24 at 11:59 AM.

## 2024-08-08 ENCOUNTER — TELEPHONE (OUTPATIENT)
Dept: PSYCHIATRY | Facility: CLINIC | Age: 84
End: 2024-08-08

## 2024-08-08 NOTE — TELEPHONE ENCOUNTER
Writer contacted patient to inform her today's apt has been cancelled due to provider being out of office. Next apt 8/22

## 2024-08-15 ENCOUNTER — TELEPHONE (OUTPATIENT)
Age: 84
End: 2024-08-15

## 2024-08-15 DIAGNOSIS — G31.84 MILD NEUROCOGNITIVE DISORDER: Primary | ICD-10-CM

## 2024-08-15 NOTE — TELEPHONE ENCOUNTER
Patient's daughter is interested in her mom seeing a Neurologist but there is a long wait list. Her PCP suggested that maybe she can see a Neuro psych instead. She is wondering if that is something Dr. Gutierrez could set up for her. Please give the daughter-Janay- a call if so. Her # is [phone].

## 2024-08-16 ENCOUNTER — TELEPHONE (OUTPATIENT)
Age: 84
End: 2024-08-16

## 2024-08-16 NOTE — TELEPHONE ENCOUNTER
Patients daughter Janay called in to get patient established with senior care. Unable to schedule due to decision tree. Please advise

## 2024-08-16 NOTE — TELEPHONE ENCOUNTER
BRANDON: Sharee at Dr. Stan Toledo's office  wanted to verify if pt was still active with PCP and verify the address for the practice

## 2024-08-19 NOTE — TELEPHONE ENCOUNTER
Spoke with neuropsychology here. They recommended the patient go to [facility] for evaluation. The patient should also make and keep a neurology appointment, even if there is a wait.

## 2024-08-19 NOTE — TELEPHONE ENCOUNTER
Writer called patient daughter to discuss what she needs for her Neuro appt.  She stated as of right now she does not need anything for the appt that is on 8/26.  Writer told patient that if Dmitriy needs to speak with them or they need to spoke with us office needs an ANNE on file.  Daughter will try to come in office for next appt to get ANNE signed.

## 2024-08-23 ENCOUNTER — OFFICE VISIT (OUTPATIENT)
Dept: PSYCHIATRY | Facility: CLINIC | Age: 84
End: 2024-08-23
Payer: MEDICARE

## 2024-08-23 VITALS
HEART RATE: 89 BPM | SYSTOLIC BLOOD PRESSURE: 149 MMHG | WEIGHT: 113.4 LBS | DIASTOLIC BLOOD PRESSURE: 74 MMHG | BODY MASS INDEX: 25.42 KG/M2

## 2024-08-23 DIAGNOSIS — F45.21 ILLNESS ANXIETY DISORDER: Primary | ICD-10-CM

## 2024-08-23 PROCEDURE — 99214 OFFICE O/P EST MOD 30 MIN: CPT

## 2024-08-23 PROCEDURE — 90833 PSYTX W PT W E/M 30 MIN: CPT

## 2024-08-23 RX ORDER — RISPERIDONE 0.25 MG/1
0.25 TABLET ORAL 2 TIMES DAILY
Qty: 60 TABLET | Refills: 0 | Status: ON HOLD | OUTPATIENT
Start: 2024-08-23

## 2024-08-23 RX ORDER — HYDROXYZINE HYDROCHLORIDE 10 MG/1
10 TABLET, FILM COATED ORAL 2 TIMES DAILY PRN
Qty: 20 TABLET | Refills: 1 | Status: ON HOLD | OUTPATIENT
Start: 2024-08-23

## 2024-08-23 NOTE — PSYCH
MEDICATION MANAGEMENT NOTE        [facility]      Name and Date of Birth:  Sandra Peña 83 y.o. 1940 MRN: [id number]    Date of Visit: August 23, 2024    Reason for Visit: Follow-up visit regarding medication management     _____________________________    Assessment & Plan   Sandra Peña is 83-years-old, a retired , , living with her daughter and daughter's , their three children, and daughter's father-in-law, and history of one hospitalization in 2013 for anxiety.     DSM-5 Diagnoses/Visit Diagnoses:     1. Illness anxiety disorder      Rule-out MDD, JONAH, OCD     Treatment Recommendations/Precautions:  Discontinue clonazepam  Increase fluoxetine to 60 mg QAM with breakfast for obsessive, anxious, and depressive symptoms  Reduce hydroxyzine to 10 mg BID PRN for anxiety; plan to reduce further and discontinue  Continue risperidone 0.25 mg BID for adjunct for obsessional symptoms  Labs most recently obtained , reviewed.   Follow up in 2 weeks for brief medication management then another 2 weeks for regular appointment  Upcoming geriatrics and neuropsychology appointments  Provided resource to daughter for caretaker of elder with IAD  Enquire of CM for home visiting options  Follow up with PCP for medical issues and ongoing care  Aware of 24 hour and weekend coverage for urgent situations accessed by calling [facility] main practice number  Risks/benefits/alternatives to treatment discussed, including a myriad of potential adverse medication side effects, to which Sandra voiced understanding and consented fully to treatment.       Individual psychotherapy provided: Yes  Counseling was provided during the session today for 30 minutes.     Treatment Plan:     Completed and signed during the session: Not applicable - Treatment Plan not due at this  "session        _____________________________________________    History of Present Illness     Chief Complaint: \"crying\"    SUBJECTIVE:    Sandra Peña is 83-years-old, a retired , , living with her daughter and daughter's , their three children, and daughter's father-in-law, and history of one hospitalization in 2013 for anxiety.  This Monday they are going to see a \"senior care specialist\" geriatrician and then a neuropsychologist on 9/10. They visited [facility] to figure out solutions, possible interim, to spend day but sleep at daughter's home. They have set up a system of accompaniment with her. Daughter is trying to take her out to do things. She continues to report word loss. She talks to her best friend and brother. They are agreeable with clonazepam discontinuation. She continues to report disturbing dreams. Atarax PRN increasing. Crying everyday, feels scared, not sadness. Something being wrong with me.    Past few months, bottom lip quiver. Possible over a year.        Psychiatric Review Of Systems:  Unchanged information from this writer's previous assessment is copied and italicized; information that has changed is bolded.    Appetite: no  Adverse eating: no  Weight changes: no  Insomnia/sleeplessness: no  Fatigue/anergy: no  Anhedonia/lack of interest: no  Attention/concentration: no  Psychomotor agitation/retardation: no  Somatic symptoms: yes  Anxiety/panic attack: yes  Zaira/hypomania: no  Hopelessness/helplessness/worthlessness: no  Self-injurious behavior/high-risk behavior: no  Suicidal ideation: no  Homicidal ideation: no  Auditory hallucinations: no  Visual hallucinations: no  Other perceptual disturbances: no  Delusional thinking: no  Obsessive/compulsive symptoms: yes    Review Of Systems:      Constitutional negative   ENT dry mouth   Cardiovascular negative   Respiratory negative   Gastrointestinal constipation   Genitourinary frequent urination "   Musculoskeletal negative   Integumentary skin rash   Neurological paresthesias   Endocrine negative   Other Symptoms none, all other systems are negative     Objective    OBJECTIVE:     Visit Vitals  /74   Pulse 89   Wt 51.4 kg (113 lb 6.4 oz)   BMI 25.42 kg/m²   OB Status Postmenopausal   Smoking Status Never   BSA 1.39 m²      Wt Readings from Last 6 Encounters:   24 51.4 kg (113 lb 6.4 oz)   24 52 kg (114 lb 10.2 oz)   24 52.2 kg (115 lb)   24 52.2 kg (115 lb)   24 52.6 kg (116 lb)   24 51.7 kg (114 lb)        Past Medical History:   Diagnosis Date    Colon polyps     Diabetes mellitus (HCC)     Dizziness     Last assessed: 8/31/15    DVT (deep venous thrombosis) (HCC)     Dysuria     Hematuria 2022    Hyperlipidemia     Hypertension     Hypertensive urgency 10/22/2021    Insomnia     Ureterolithiasis 2020      Past Surgical History:   Procedure Laterality Date     SECTION      1964 son, 1968 daughter    COLONOSCOPY      FL RETROGRADE PYELOGRAM  2020    TX COLONOSCOPY FLX DX W/COLLJ SPEC WHEN PFRMD N/A 3/27/2017    Procedure: COLONOSCOPY;  Surgeon: Gold Francis MD;  Location: [facility];  Service: Gastroenterology    TX CYSTO/URETERO W/LITHOTRIPSY &INDWELL STENT INSRT Right 2020    Procedure: CYSTOSCOPY URETEROSCOPY WITH LITHOTRIPSY HOLMIUM LASER, RETROGRADE PYELOGRAM AND INSERTION STENT URETERAL; EXCISION OF BLADDER TUMOR;  Surgeon: Edwin Love MD;  Location: [facility];  Service: Urology    ROTATOR CUFF REPAIR Left     Last assessed: 3/29/15    TONSILLECTOMY         Meds/Allergies    No Known Allergies  Current Outpatient Medications   Medication Instructions    aspirin 81 mg, Oral, Daily    atorvastatin (LIPITOR) 10 mg, Oral, Daily    docusate sodium (COLACE) 100 mg, Oral, 2 times daily    fluocinonide (LIDEX) 0.05 % external solution Scalp solution- apply to affected area once a day as needed    FLUoxetine (PROZAC) 60 mg, Oral,  "Daily with breakfast    hydrOXYzine HCL (ATARAX) 10 mg, Oral, 2 times daily PRN    ketoconazole (NIZORAL) 2 % shampoo To the scalp leave on for 5 minutes then rinse off completely. Once a week    linaCLOtide 145 mcg, Oral, Daily    lisinopril-hydrochlorothiazide (PRINZIDE,ZESTORETIC) 10-12.5 MG per tablet 1 tablet, Oral, Daily    metFORMIN (GLUCOPHAGE) 500 mg, Oral, 2 times daily with meals    mometasone (ELOCON) 0.1 % lotion Topical, Daily    risperiDONE (RISPERDAL) 0.25 mg, Oral, 2 times daily    triamcinolone (KENALOG) 0.1 % cream Apply under the breasts twice a day up to 2 weeks as needed for redness or itching           Mental Status Exam:    Appearance Floral blouse with sweater, short curly dark grey hair, petite  Good eye contact   Behavior Lip and tongue tremor slight noted and completed AIMS  Pleasant, cooperative   Speech Normal rate and volume, hesitant, halting at times   Mood \"Not too good\"   Affect Anxious, full range, tearful at times   Thought Processes Logical and linear   Thought Content Obsessions    No overt delusions   Denies hallucinations  Denies suicidal ideations   Cognition Awake and alert  Oriented and memory grossly intact  Concentrates on questions   Insight fair   Judgement fair     Laboratory Results: I have personally reviewed all pertinent laboratory/tests results    Admission on 04/10/2024, Discharged on 04/10/2024   Component Date Value Ref Range Status    WBC 04/10/2024 6.37  4.31 - 10.16 Thousand/uL Final    RBC 04/10/2024 4.09  3.81 - 5.12 Million/uL Final    Hemoglobin 04/10/2024 11.7  11.5 - 15.4 g/dL Final    Hematocrit 04/10/2024 37.7  34.8 - 46.1 % Final    MCV 04/10/2024 92  82 - 98 fL Final    MCH 04/10/2024 28.6  26.8 - 34.3 pg Final    MCHC 04/10/2024 31.0 (L)  31.4 - 37.4 g/dL Final    RDW 04/10/2024 13.2  11.6 - 15.1 % Final    MPV 04/10/2024 10.4  8.9 - 12.7 fL Final    Platelets 04/10/2024 203  149 - 390 Thousands/uL Final    nRBC 04/10/2024 0  /100 WBCs Final    " Segmented % 04/10/2024 70  43 - 75 % Final    Immature Grans % 04/10/2024 0  0 - 2 % Final    Lymphocytes % 04/10/2024 19  14 - 44 % Final    Monocytes % 04/10/2024 6  4 - 12 % Final    Eosinophils Relative 04/10/2024 4  0 - 6 % Final    Basophils Relative 04/10/2024 1  0 - 1 % Final    Absolute Neutrophils 04/10/2024 4.47  1.85 - 7.62 Thousands/µL Final    Absolute Immature Grans 04/10/2024 0.02  0.00 - 0.20 Thousand/uL Final    Absolute Lymphocytes 04/10/2024 1.18  0.60 - 4.47 Thousands/µL Final    Absolute Monocytes 04/10/2024 0.41  0.17 - 1.22 Thousand/µL Final    Eosinophils Absolute 04/10/2024 0.23  0.00 - 0.61 Thousand/µL Final    Basophils Absolute 04/10/2024 0.06  0.00 - 0.10 Thousands/µL Final    Sodium 04/10/2024 135  135 - 147 mmol/L Final    Potassium 04/10/2024 4.3  3.5 - 5.3 mmol/L Final    Chloride 04/10/2024 103  96 - 108 mmol/L Final    CO2 04/10/2024 24  21 - 32 mmol/L Final    ANION GAP 04/10/2024 8  4 - 13 mmol/L Final    BUN 04/10/2024 26 (H)  5 - 25 mg/dL Final    Creatinine 04/10/2024 0.93  0.60 - 1.30 mg/dL Final    Standardized to IDMS reference method    Glucose 04/10/2024 99  65 - 140 mg/dL Final    If the patient is fasting, the ADA then defines impaired fasting glucose as > 100 mg/dL and diabetes as > or equal to 123 mg/dL.    Calcium 04/10/2024 9.7  8.4 - 10.2 mg/dL Final    AST 04/10/2024 15  13 - 39 U/L Final    ALT 04/10/2024 11  7 - 52 U/L Final    Specimen collection should occur prior to Sulfasalazine administration due to the potential for falsely depressed results.     Alkaline Phosphatase 04/10/2024 53  34 - 104 U/L Final    Total Protein 04/10/2024 6.9  6.4 - 8.4 g/dL Final    Albumin 04/10/2024 4.2  3.5 - 5.0 g/dL Final    Total Bilirubin 04/10/2024 0.49  0.20 - 1.00 mg/dL Final    Use of this assay is not recommended for patients undergoing treatment with eltrombopag due to the potential for falsely elevated results.  N-acetyl-p-benzoquinone imine (metabolite of  "Acetaminophen) will generate erroneously low results in samples for patients that have taken an overdose of Acetaminophen.    eGFR 04/10/2024 56  ml/min/1.73sq m Final    hs TnI 0hr 04/10/2024 11  \"Refer to ACS Flowchart\"- see link ng/L Final    Comment:                                              Initial (time 0) result  If >=50 ng/L, Myocardial injury suggested ;  Type of myocardial injury and treatment strategy  to be determined.  If 5-49 ng/L, a delta result at 2 hours and or 4 hours will be needed to further evaluate.  If <4 ng/L, and chest pain has been >3 hours since onset, patient may qualify for discharge based on the HEART score in the ED.  If <5 ng/L and <3hours since onset of chest pain, a delta result at 2 hours will be needed to further evaluate.    HS Troponin 99th Percentile URL of a Health Population=12 ng/L with a 95% Confidence Interval of 8-18 ng/L.    Second Troponin (time 2 hours)  If calculated delta >= 20 ng/L,  Myocardial injury suggested ; Type of myocardial injury and treatment strategy to be determined.  If 5-49 ng/L and the calculated delta is 5-19 ng/L, consult medical service for evaluation.  Continue evaluation for ischemia on ecg and other possible etiology and repeat hs troponin at 4 hours.  If delta                            is <5 ng/L at 2 hours, consider discharge based on risk stratification via the HEART score (if in ED), or [place] risk score in IP/Observation.    HS Troponin 99th Percentile URL of a Health Population=12 ng/L with a 95% Confidence Interval of 8-18 ng/L.    Ventricular Rate 04/10/2024 88  BPM Final    Atrial Rate 04/10/2024 88  BPM Final    CO Interval 04/10/2024 166  ms Final    QRSD Interval 04/10/2024 64  ms Final    QT Interval 04/10/2024 372  ms Final    QTC Interval 04/10/2024 450  ms Final    P Axis 04/10/2024 51  degrees Final    QRS [place] 04/10/2024 19  degrees Final    T Wave [place] 04/10/2024 49  degrees Final    hs TnI 2hr 04/10/2024 12  \"Refer to ACS " "Flowchart\"- see link ng/L Final    Comment:                                              Initial (time 0) result  If >=50 ng/L, Myocardial injury suggested ;  Type of myocardial injury and treatment strategy  to be determined.  If 5-49 ng/L, a delta result at 2 hours and or 4 hours will be needed to further evaluate.  If <4 ng/L, and chest pain has been >3 hours since onset, patient may qualify for discharge based on the HEART score in the ED.  If <5 ng/L and <3hours since onset of chest pain, a delta result at 2 hours will be needed to further evaluate.    HS Troponin 99th Percentile URL of a Health Population=12 ng/L with a 95% Confidence Interval of 8-18 ng/L.    Second Troponin (time 2 hours)  If calculated delta >= 20 ng/L,  Myocardial injury suggested ; Type of myocardial injury and treatment strategy to be determined.  If 5-49 ng/L and the calculated delta is 5-19 ng/L, consult medical service for evaluation.  Continue evaluation for ischemia on ecg and other possible etiology and repeat hs troponin at 4 hours.  If delta                            is <5 ng/L at 2 hours, consider discharge based on risk stratification via the HEART score (if in ED), or [place] risk score in IP/Observation.    HS Troponin 99th Percentile URL of a Health Population=12 ng/L with a 95% Confidence Interval of 8-18 ng/L.    Delta 2hr hsTnI 04/10/2024 1  <20 ng/L Final    Color, UA 04/10/2024 Colorless   Final    Clarity, UA 04/10/2024 Clear   Final    Specific Gravity, UA 04/10/2024 1.004  1.003 - 1.030 Final    pH, UA 04/10/2024 5.5  4.5, 5.0, 5.5, 6.0, 6.5, 7.0, 7.5, 8.0 Final    Leukocytes, UA 04/10/2024 Negative  Negative Final    Nitrite, UA 04/10/2024 Negative  Negative Final    Protein, UA 04/10/2024 Negative  Negative mg/dl Final    Glucose, UA 04/10/2024 Negative  Negative mg/dl Final    Ketones, UA 04/10/2024 Negative  Negative mg/dl Final    Urobilinogen, UA 04/10/2024 <2.0  <2.0 mg/dl mg/dl Final    Bilirubin, UA 04/10/2024 " Negative  Negative Final    Occult Blood, UA 04/10/2024 Negative  Negative Final             ___________________________________    History Review: The following portions of the patient's history were reviewed and updated as appropriate: allergies, current medications, past family history, past medical history, past social history, past surgical history, and problem list.    Unchanged information from this writer's previous assessment is copied and italicized; information that has changed is bolded.    Past Psychiatric History:      Past psychiatric diagnoses:   Depression, anxiety  Inpatient psychiatric admissions:   Once in 2013 following a surgery, sounded like extreme anxiety  Prior outpatient psychiatric treatment:   Started seeing a psychiatrist when first   Past/current psychotherapy:   active  History of suicidal attempts/gestures:   denies  Psychotropic medication trials:   Venlafaxine, sertraline, fluoxetine, escitalopram, aripiprazole (dizziness), risperidone, clonazepam, buspirone, gabapentin        Substance Abuse History:     Denies all history of substance use     Family Psychiatric History:      Family History         Family History   Problem Relation Age of Onset    Depression Mother           w/anxiety    Diabetes Mother           Mellitus    Breast cancer Mother      Hypertension Mother      No Known Problems Father      Depression Sister           w/anxiety    Heart disease Sister           Cardiac Disorder    Breast cancer Sister      Hypertension Sister      Diabetes Brother           Mellitus    Alcohol abuse Son                    Social History:     Developmental: nothing of note  Education: Bachelor degree  Marital history:  in 2006  Children: 2  Living arrangement, social support: as above, has been with daughter for 17 years  Occupational History: was a   L[employer] Affiliation: has a [employer] ghanshyam  Access to firearms: denies        Traumatic History:       monse.  ___________________________________      Visit Time    Visit Start Time: 1013  Visit Stop Time: 1135  Total Visit Duration: 82 minutes    López Gutierrez MD   08/23/24

## 2024-08-26 ENCOUNTER — TELEPHONE (OUTPATIENT)
Dept: PSYCHIATRY | Facility: CLINIC | Age: 84
End: 2024-08-26

## 2024-08-26 ENCOUNTER — TELEPHONE (OUTPATIENT)
Age: 84
End: 2024-08-26

## 2024-08-26 ENCOUNTER — OFFICE VISIT (OUTPATIENT)
Age: 84
End: 2024-08-26
Payer: MEDICARE

## 2024-08-26 VITALS
HEART RATE: 83 BPM | BODY MASS INDEX: 23.47 KG/M2 | OXYGEN SATURATION: 98 % | WEIGHT: 111.8 LBS | TEMPERATURE: 98.1 F | SYSTOLIC BLOOD PRESSURE: 168 MMHG | DIASTOLIC BLOOD PRESSURE: 68 MMHG | HEIGHT: 58 IN

## 2024-08-26 DIAGNOSIS — G47.09 OTHER INSOMNIA: ICD-10-CM

## 2024-08-26 DIAGNOSIS — E78.2 MIXED HYPERLIPIDEMIA: ICD-10-CM

## 2024-08-26 DIAGNOSIS — R42 DIZZINESS: Primary | ICD-10-CM

## 2024-08-26 DIAGNOSIS — Z13.0 SCREENING FOR ENDOCRINE, NUTRITIONAL, METABOLIC AND IMMUNITY DISORDER: ICD-10-CM

## 2024-08-26 DIAGNOSIS — R41.3 MEMORY LOSS: Primary | ICD-10-CM

## 2024-08-26 DIAGNOSIS — F33.0 MAJOR DEPRESSIVE DISORDER, RECURRENT EPISODE, MILD (HCC): ICD-10-CM

## 2024-08-26 DIAGNOSIS — N39.41 URGE INCONTINENCE OF URINE: ICD-10-CM

## 2024-08-26 DIAGNOSIS — I10 PRIMARY HYPERTENSION: ICD-10-CM

## 2024-08-26 DIAGNOSIS — R54 FRAILTY SYNDROME IN GERIATRIC PATIENT: ICD-10-CM

## 2024-08-26 DIAGNOSIS — E11.9 TYPE 2 DIABETES MELLITUS WITHOUT COMPLICATION, WITHOUT LONG-TERM CURRENT USE OF INSULIN (HCC): ICD-10-CM

## 2024-08-26 DIAGNOSIS — N18.31 STAGE 3A CHRONIC KIDNEY DISEASE (HCC): ICD-10-CM

## 2024-08-26 DIAGNOSIS — Z13.228 SCREENING FOR ENDOCRINE, NUTRITIONAL, METABOLIC AND IMMUNITY DISORDER: ICD-10-CM

## 2024-08-26 DIAGNOSIS — K59.00 CONSTIPATION, UNSPECIFIED CONSTIPATION TYPE: ICD-10-CM

## 2024-08-26 DIAGNOSIS — Z71.89 ADVANCED CARE PLANNING/COUNSELING DISCUSSION: ICD-10-CM

## 2024-08-26 DIAGNOSIS — F45.21 ILLNESS ANXIETY DISORDER: ICD-10-CM

## 2024-08-26 DIAGNOSIS — R26.2 AMBULATORY DYSFUNCTION: ICD-10-CM

## 2024-08-26 DIAGNOSIS — Z13.21 SCREENING FOR ENDOCRINE, NUTRITIONAL, METABOLIC AND IMMUNITY DISORDER: ICD-10-CM

## 2024-08-26 DIAGNOSIS — Z13.29 SCREENING FOR ENDOCRINE, NUTRITIONAL, METABOLIC AND IMMUNITY DISORDER: ICD-10-CM

## 2024-08-26 PROBLEM — N18.30 STAGE 3 CHRONIC KIDNEY DISEASE, UNSPECIFIED WHETHER STAGE 3A OR 3B CKD (HCC): Status: ACTIVE | Noted: 2024-08-26

## 2024-08-26 PROCEDURE — 99205 OFFICE O/P NEW HI 60 MIN: CPT | Performed by: INTERNAL MEDICINE

## 2024-08-26 NOTE — PROGRESS NOTES
[facility]  Consultation  [address], [address]  [place] [address]   [phone]      NAME: Sandra Peña  AGE: 83 y.o. SEX: female    DATE OF ENCOUNTER: 8/26/2024    Assessment and Plan   Dementia with Anxiety  Memory loss   MoCA today 21/30 with deficits in visuospatial/executive, naming, attention, subtraction, language, abstraction, and delayed recall. Most pronounced deficits in attention. GDS 14/15.   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 Patient's longstanding anxiety and depression also likely contributing to symptoms.  Stage:FAST stage III  Patient also plans to have neuropsychiatric evaluation 9/10/2024  No need for further brain imaging at this time, memory labs ordered and pending  Patient advised to remain active physically, mentally and socially  PT/OT/speech therapy ordered and recommended to assist in this goal  Patient does not drive, and is encouraged to continue to not drive. Discussed with patient and caregiver.   Patient is independent with ADLs and dependent with IADLs  TUG 14 seconds. Patient feels somewhat unsteady and fearful of falling. PT ordered  Pharmacologic and nonpharmacologic management will be discussed at care conference  Medical chronic conditions under control and continue follow up with PCP, Psychiatry, therapist and gastroenterologist  Decision-making: At this time of the visit, it is my opinion that the patient is able to make her own medical decisions.  Care conference in 4-6 weeks to discuss diagnosis, prognosis and management    2. Hypertension  /68 in office. Patient on lisinopril/hydrochlorothiazide 10/12.5 mg daily.   Continue medication, follow up with PCP  With ambulatory dysfunction, would use caution in increasing dose due to potential for orthostatic hypotension and increased fall risk    3. Type 2 SM without  long term use of insulin  Metformin 500 mg BID with meals, most recent A1c 7.0  Adequate at this time, caution with increasing dose due to potential for hypoglycemia.   Follow up with PCP    4. Insomnia  Recommend follow up with PCP  Risperdal potentially can help with sleep in PM  Recommend starting melatonin 3 mg prior to bed, may consider increase at next visit.     5. Mixed hyperlipidemia  Most recent lipid panel reviewed  Continue atorvastatin 10 mg  Follow up with PCP    6. Major Depressive Disorder  Follows with psychiatry and therapist  GDS 14/15, no suicidal ideation  Currently prescribed Risperdal 0.25 mg BID and prozac 60 mg   With recent increase in dose of prozac from 40 mg to 60 mg, patient reported dizziness  Encourage follow up with psychiatry to titrate dosages of medications  Encourage continuing to see therapist weekly  Follow up after neuropsychiatric evaluation    7. Constipation  Current regimen includes linzess 145 mcg, colase 100 mg and miralax daily  Follow up with gastroenterologist and PCP  May contribute to urinary incontinence if not well controlled.     8. Ambulatory Dysfunction  PT/OT ordered  Encourage use of cane if recommended  TUG 14  Physical therapy to increase muscle strength and gait stability    9. ACP planning/counseling discussion  Discussed during visit  Encourage follow up with elder law , list provided    10. Urge incontinence of urine  Patient currently not using diapers  Encourage timed bathroom breaks, management of constipation  Would consider OT for pelvic exercise  Follow up with PCP    11. Stage 3a CKD  Most recent eGFR 56  Maintain adequate hydration  Avoid nephrotoxic agents  Follow up with PCP  Labs including CMP and urine albumin:creatinine pending    Orders Placed This Encounter   Procedures    Neurotrax    Vitamin B12/Folate, Serum Panel    Ambulatory Referral to Physical Therapy    Ambulatory Referral to Speech Therapy    Ambulatory Referral to  Occupational Therapy     - Counseling Documentation: patient was counseled regarding: diagnostic results and patient and family education    Chief Complaint     Chief Complaint   Patient presents with    Geriatric Evaluation     New Patient      Patient is here for a geriatric assessment with memory issues as a concern     History of Present Illness     We had the pleasure of evaluating Sandra Peña who is a 83 y.o. female  in Geriatric today. Memory issues were first noticed by caregiver. They have been present for years, but have acutely worsened over the last 6 months, and include emotional lability, short term memory loss and word finding difficulties. Symptoms mainly affect short term memory loss, and have worsened.      Sandra Peña reports she does not  drive, does not get lost in familiar settings, and has not had recent citations or accidents. She does not manage her own affairs such as balancing her checkbook, paying bills, and managing investments and does  have any difficulties with handling these financial affairs. She has noticed changes in her own mood or personality and has been treated in the past for depression and anxiety. She has chronic hearing and vision issues for which she refuses hearing aids and wears glasses. She has issues with waking up during the night in the early morning. During this time, she often needs to use the bathroom and struggles with anxiety. She has difficulty falling back asleep secondary to this anxiety. They do not have a living will and do not have an appointed POA.     Family members accompanying the patient today include Janay (daughter). They report the patients behaviors include the following: Family Notes Behaviors such as increasing anxiety, word finding difficulty, short term memory issues and excessive crying.    The following portions of the patient's history were reviewed and updated as appropriate: allergies, current medications, past family history,  past medical history, past social history, past surgical history and problem list.    FUNCTIONAL STATUS:  ADLs  Does patient require assistance with:  Bathing: No  Dressing: No  Transferring: No  Continence: No - although is incontinent of urine 2x times a day  Toileting: No  Feeding: No     IADLs  Dose patient require assistance with:  Telephone: No  Shopping: Yes  Food Preparation: Yes  Housekeeping: Yes  Laundry: Yes  Transportation: Yes  Medications: No - daughter checks that medications are correct in organizer, patient takes them of her own accord and is aware of what medications need to be taken  Finances: Yes     NEUROPSYCH SYMPTOMS:  Does patient get angry or hostile?  Resist care from others? No  Does patient see or hear things that no one else can see or hear? No  Does patient act impatient and cranky? Does mood frequently change for no apparent reason? Increasing frequency, occasionally  Does patient act suspicious without good reason (example: believes that others are stealing from him or her, or planning to harm him or her in some way)? No  Does patient less interested in his or her usual activities or in the activities and plans of others? No  Does patient have trouble sleeping at night? Yes  Dose patient have abnormal movements while asleep? No      SAFETY:  Hearing and vision issue: Yes, wears glasses refuses hearing aid  Any gait or balance disorder: Yes, has cane but does not use  Uses: cane or walker - not currently  Any falls in the last year: No  Any history of wandering: No  Are there firearms or guns in the home: No  Does patient drive: No  Any driving accidents or citations in the last year: N/A  Any concerns about patient's ability to drive: N/A     ACP REVIEW:  Does patient have POA: No  Does patient have a Living will: No  Any legal assistance needed for healthcare planning?: No    Review of Systems     Review of Systems   Constitutional:  Negative for fever.   HENT:          +dry mouth  "at times   Respiratory:  Negative for cough and shortness of breath.    Cardiovascular:  Negative for chest pain.   Gastrointestinal:  Positive for constipation. Negative for abdominal pain and diarrhea.   Genitourinary:  Positive for urgency. Negative for dysuria.   Skin:  Negative for rash.   Neurological:  Positive for light-headedness and headaches.   Psychiatric/Behavioral:  Positive for confusion. Negative for self-injury and suicidal ideas. The patient is nervous/anxious.      Objective     /68 (BP Location: Left arm, Patient Position: Sitting, Cuff Size: Standard)   Pulse 83   Temp 98.1 °F (36.7 °C) (Temporal)   Ht 4' 9.6\" (1.463 m)   Wt 50.7 kg (111 lb 12.8 oz)   SpO2 98%   BMI 23.69 kg/m²     Physical Exam  Constitutional: Sandra is oriented to person, place, and time. She appears well-developed and well-nourished.   HENT:   Head: Normocephalic.   Right Ear: External ear normal. TM normal.  Left Ear: External ear normal. TM normal.  Nose: Nose normal.   Mouth/Throat: Oropharynx is clear and moist. No oropharyngeal exudate.   Eyes: Pupils are equal, round, and reactive to light. Conjunctivae and EOM are normal. No scleral icterus.   Neck: Normal range of motion. Neck supple. No JVD present. No tracheal deviation present. No thyromegaly present.   Cardiovascular: Normal rate, regular rhythm and normal heart sounds. Exam reveals no gallop and no friction rub. No murmur heard.  Pulmonary/Chest: Effort normal and breath sounds normal. No stridor. No respiratory distress. She has no wheezes. She has no rales. She exhibits no tenderness.   Abdominal: Soft. Bowel sounds are normal. She exhibits no distension. There is no tenderness. There is no rebound and no guarding.   Musculoskeletal: Normal range of motion. She exhibits no edema, tenderness or deformity. TU sec  Lymphadenopathy: She has no cervical adenopathy.   Neurological: She is alert and oriented to person, place, and time. She displays " normal reflexes. No cranial nerve deficit or sensory deficit. She exhibits normal muscle tone. MoCA: 21/30  Skin: Skin is warm. No rash noted. No erythema. No pallor.   Psychiatric: She has a normal mood and affect. Her behavior is normal. Judgment and thought content normal. GDS: 14/15    Pertinent Laboratory/Diagnostic Studies:  Ordered, labs pending including tsh, lipid, A1c, cmp, cbc and B12. Recent A1c 7.0, CBC wnl, lipid profile wnl, eGFR 56, crt 0.93, BUN 26 otherwise BMP wnl     I have personally reviewed MRI brain from 12/23/2021 and CT head from 2/1/2024 which revealed moderate chronic angiopathic change and volume loss

## 2024-08-26 NOTE — PROGRESS NOTES
[facility]  Consultation  [address], [address]  [place] [address] [phone]      NAME: Sandra Peña  AGE: 83 y.o. SEX: female    DATE OF ENCOUNTER: 8/26/2024    Assessment and Plan     {Assess/Plan SmartLinks:28858}    No orders of the defined types were placed in this encounter.      {HKDISCUSSION:82063}    Chief Complaint     Chief Complaint   Patient presents with   • Geriatric Evaluation     New Patient        History of Present Illness     HPI    We had the pleasure of evaluating Sandra Peña who is a 83 y.o. female  in Geriatric consultation today. Memory issues were first noticed by{Patient/Family/Caregiver:833571}. They have been present since {0 - 10:34993} {Time; day/wk/mo/yr(s):6021}, and include  {ED MH CD_MEMORY:59989}. Symptoms mainly affect {ED MH CD_MEMORY:84376}, and have {progression:8044730234}.      Sandra Peña reports *** {Do or Do Not:44032}  drive, {Do or Do Not:06857} get lost in familiar settings, and {Has has not 2:80187} had recent citations or accidents. *** {Do or Do Not:20140} manage *** own affairs such as balancing *** checkbook, paying bills, and managing investments and {Do or Do Not:77193} have any difficulties with handling these financial affairs. *** {Do or Do Not:44518} notice changes in *** own mood or personality and {Has has not 2:88470} been treated in the past for depression. *** {Do or Do Not:77836} note any hearing or vision issues and {Do or Do Not:04955} report any sleep issues. They {Do or Do Not:30351} have a living will and {Do or Do Not:24093} have an appointed POA.     Family members accompanying the patient today include (***). They report the patients behaviors include the following: Family Notes Behaviors {memory prob:61291}.    The following portions of the patient's history were reviewed and updated as appropriate: allergies, current medications, past family history, past medical history, past social history, past  "surgical history and problem list.    Review of Systems     Review of Systems      Objective     /68 (BP Location: Left arm, Patient Position: Sitting, Cuff Size: Standard)   Pulse 83   Temp 98.1 °F (36.7 °C) (Temporal)   Ht 4' 9.6\" (1.463 m)   Wt 50.7 kg (111 lb 12.8 oz)   SpO2 98%   BMI 23.69 kg/m²     Physical Exam    Pertinent Laboratory/Diagnostic Studies:  {HKAMBLABLINKS:72785}  {YGTYLH4JZ:07187}    Current Medications     Current Outpatient Medications:   •  aspirin 81 MG tablet, Take 81 mg by mouth daily, Disp: , Rfl:   •  atorvastatin (LIPITOR) 10 mg tablet, Take 1 tablet (10 mg total) by mouth daily, Disp: 90 tablet, Rfl: 1  •  docusate sodium (COLACE) 100 mg capsule, Take 100 mg by mouth 2 (two) times a day, Disp: , Rfl:   •  fluocinonide (LIDEX) 0.05 % external solution, Scalp solution- apply to affected area once a day as needed, Disp: 60 mL, Rfl: 2  •  FLUoxetine (PROzac) 20 mg capsule, Take 3 capsules (60 mg total) by mouth daily with breakfast, Disp: 90 capsule, Rfl: 0  •  hydrOXYzine HCL (ATARAX) 10 mg tablet, Take 1 tablet (10 mg total) by mouth 2 (two) times a day as needed for anxiety for up to 40 doses, Disp: 20 tablet, Rfl: 1  •  ketoconazole (NIZORAL) 2 % shampoo, To the scalp leave on for 5 minutes then rinse off completely. Once a week, Disp: 120 mL, Rfl: 6  •  linaCLOtide 145 MCG CAPS, Take 1 capsule (145 mcg total) by mouth in the morning, Disp: 90 capsule, Rfl: 1  •  lisinopril-hydrochlorothiazide (PRINZIDE,ZESTORETIC) 10-12.5 MG per tablet, Take 1 tablet by mouth daily, Disp: 90 tablet, Rfl: 3  •  metFORMIN (GLUCOPHAGE) 500 mg tablet, Take 1 tablet (500 mg total) by mouth 2 (two) times a day with meals, Disp: 180 tablet, Rfl: 1  •  mometasone (ELOCON) 0.1 % lotion, Apply topically daily, Disp: 60 mL, Rfl: 0  •  risperiDONE (RisperDAL) 0.25 mg tablet, Take 1 tablet (0.25 mg total) by mouth 2 (two) times a day, Disp: 60 tablet, Rfl: 0  •  triamcinolone (KENALOG) 0.1 % cream, " Apply under the breasts twice a day up to 2 weeks as needed for redness or itching, Disp: 80 g, Rfl: 1    Name: Sandra Peña  : 1940  MRN: [id number]  DOS: 2024    Diagnoses:   Diagnosis ICD-10-CM Associated Orders   1. Memory loss  R41.3       2. Primary hypertension  I10       3. Type 2 diabetes mellitus without complication, without long-term current use of insulin (HCC)  E11.9       4. Major depressive disorder, recurrent episode, mild (HCC)  F33.0       5. Other insomnia  G47.09       6. Mixed hyperlipidemia  E78.2       7. Constipation, unspecified constipation type  K59.00       8. Ambulatory dysfunction  R26.2       9. Frailty syndrome in geriatric patient  R54       10. Advanced care planning/counseling discussion  Z71.89       11. Urge incontinence of urine  N39.41

## 2024-08-26 NOTE — TELEPHONE ENCOUNTER
Called and spoke with daughter to inform 10/28/24 with Dr. Zhu was cancelled due to provider being out of the office. Patient's daughter was okay with that  because the patient would actually like to stay with Dr. Izaguirre until they can't anymore. Patient likes him very much.

## 2024-08-26 NOTE — PROGRESS NOTES
[facility]  [address], [address]  [place] [address]  [phone]    Social Work Intake    Sandra Peña presents today for a geriatric assessment, accompanied by daughter-Janay.     Social/Family History   Marital status:     Does patient have children? Yes How Many? 1 daughter & 1 son  (If yes, where do the children live?) lives with daughter & son close by  Family members assisting patient at home: daughter   Are any relationships causing problems right now: dgt's BEN moved in and it was difficult for patient  Who is available to provide care in case of illness or crisis (please specify relation to patient / level of care able to provide)? Daughter        Employment and Education   Education: Highest Level of Education: Master's Degree    Currently Employed: No    Retired: Yes                        Date of FCI? Around 63 years old    Type of employment:    : No       Lifestyle/Community Services   Clubs and Organizations: No   Major life events in past two years (ex: FCI, death in family, major illness etc.): No    Have you ever used a home care agency, meal delivery service, or respite care?: No      Financial   Total Monthly income: $2600     Source of income: Social security/Pensions   Meet current needs? Yes     Funds available for home care? No    Funds available for nursing home? No    Do you rent or own your home? Does not own a home      End-Of-Life Checklist   Have wishes or desires for end-of-life care been discussed? No directives, discussed obtaining POA & living will      For all patients, we encourage seeing a  to establish a Financial Power of , a Health Care Power of , and an Advanced Directive (living will). Particularly for patients experiencing memory concerns, we strongly recommend that this is completed as soon as possible.       Safety Assessment   Is the patient still driving? No    Is the  "patient taking medications as prescribed? Yes     Is there a system in place for medication management? She fills a pillbox  Are firearms or weapons in the home? No  Recommendations per [employer]: removing them from the home, keep ammunition stored separately, encourage patient to consider \"gifting\" them, if necessary, ask local law enforcement for assistance in removing the firearms from the home. The family may receive compensation from a gun buy-back program.    Does the patient live alone? Lives with daughter, JEANIE, 3 grandchildren, & dgt's BEN  What is the layout of the home? Multistory home   Do you feel comfortable leaving the person home alone? Yes    Have you noticed any burned pans or other signs of issues with the stove or other appliances? No   Do you have any concerns about the person’s cooking or eating habits? No   Are there working smoke detectors and fire extinguishers in the home? Yes    Have you thought about when it will no longer be safe for the patient to live alone? Remain in the home, and try bringing in care if needed.      MoCA score: 21/30  Geriatric Depression Scale (GDS) score: 14/15.   "

## 2024-08-26 NOTE — TELEPHONE ENCOUNTER
Pts daughter called to discuss medication with provider. The increase in medication is making the patient very dizzy. Pt is requesting a call back [phone]

## 2024-08-27 ENCOUNTER — TELEPHONE (OUTPATIENT)
Age: 84
End: 2024-08-27

## 2024-08-27 ENCOUNTER — HOSPITAL ENCOUNTER (EMERGENCY)
Facility: HOSPITAL | Age: 84
End: 2024-08-28
Attending: EMERGENCY MEDICINE
Payer: MEDICARE

## 2024-08-27 DIAGNOSIS — F32.A ANXIETY AND DEPRESSION: Primary | ICD-10-CM

## 2024-08-27 DIAGNOSIS — F41.9 ANXIETY AND DEPRESSION: Primary | ICD-10-CM

## 2024-08-27 DIAGNOSIS — Z00.8 MEDICAL CLEARANCE FOR PSYCHIATRIC ADMISSION: ICD-10-CM

## 2024-08-27 LAB
ALBUMIN SERPL BCG-MCNC: 4 G/DL (ref 3.5–5)
ALP SERPL-CCNC: 50 U/L (ref 34–104)
ALT SERPL W P-5'-P-CCNC: 7 U/L (ref 7–52)
AMPHETAMINES SERPL QL SCN: NEGATIVE
ANION GAP SERPL CALCULATED.3IONS-SCNC: 7 MMOL/L (ref 4–13)
AST SERPL W P-5'-P-CCNC: 12 U/L (ref 13–39)
BARBITURATES UR QL: NEGATIVE
BASOPHILS # BLD AUTO: 0.04 THOUSANDS/ÂΜL (ref 0–0.1)
BASOPHILS NFR BLD AUTO: 1 % (ref 0–1)
BENZODIAZ UR QL: NEGATIVE
BILIRUB SERPL-MCNC: 0.48 MG/DL (ref 0.2–1)
BILIRUB UR QL STRIP: NEGATIVE
BUN SERPL-MCNC: 26 MG/DL (ref 5–25)
CALCIUM SERPL-MCNC: 9.4 MG/DL (ref 8.4–10.2)
CHLORIDE SERPL-SCNC: 102 MMOL/L (ref 96–108)
CLARITY UR: CLEAR
CO2 SERPL-SCNC: 27 MMOL/L (ref 21–32)
COCAINE UR QL: NEGATIVE
COLOR UR: COLORLESS
CREAT SERPL-MCNC: 1.05 MG/DL (ref 0.6–1.3)
EOSINOPHIL # BLD AUTO: 0.12 THOUSAND/ÂΜL (ref 0–0.61)
EOSINOPHIL NFR BLD AUTO: 2 % (ref 0–6)
ERYTHROCYTE [DISTWIDTH] IN BLOOD BY AUTOMATED COUNT: 13.2 % (ref 11.6–15.1)
ETHANOL EXG-MCNC: 0 MG/DL
FENTANYL UR QL SCN: NEGATIVE
GFR SERPL CREATININE-BSD FRML MDRD: 48 ML/MIN/1.73SQ M
GLUCOSE SERPL-MCNC: 122 MG/DL (ref 65–140)
GLUCOSE UR STRIP-MCNC: NEGATIVE MG/DL
HCT VFR BLD AUTO: 33.5 % (ref 34.8–46.1)
HGB BLD-MCNC: 11 G/DL (ref 11.5–15.4)
HGB UR QL STRIP.AUTO: NEGATIVE
HYDROCODONE UR QL SCN: NEGATIVE
IMM GRANULOCYTES # BLD AUTO: 0.02 THOUSAND/UL (ref 0–0.2)
IMM GRANULOCYTES NFR BLD AUTO: 0 % (ref 0–2)
KETONES UR STRIP-MCNC: NEGATIVE MG/DL
LEUKOCYTE ESTERASE UR QL STRIP: NEGATIVE
LYMPHOCYTES # BLD AUTO: 1.46 THOUSANDS/ÂΜL (ref 0.6–4.47)
LYMPHOCYTES NFR BLD AUTO: 24 % (ref 14–44)
MCH RBC QN AUTO: 29.3 PG (ref 26.8–34.3)
MCHC RBC AUTO-ENTMCNC: 32.8 G/DL (ref 31.4–37.4)
MCV RBC AUTO: 89 FL (ref 82–98)
METHADONE UR QL: NEGATIVE
MONOCYTES # BLD AUTO: 0.43 THOUSAND/ÂΜL (ref 0.17–1.22)
MONOCYTES NFR BLD AUTO: 7 % (ref 4–12)
NEUTROPHILS # BLD AUTO: 4.08 THOUSANDS/ÂΜL (ref 1.85–7.62)
NEUTS SEG NFR BLD AUTO: 66 % (ref 43–75)
NITRITE UR QL STRIP: NEGATIVE
NRBC BLD AUTO-RTO: 0 /100 WBCS
OPIATES UR QL SCN: NEGATIVE
OXYCODONE+OXYMORPHONE UR QL SCN: NEGATIVE
PCP UR QL: NEGATIVE
PH UR STRIP.AUTO: 5 [PH]
PLATELET # BLD AUTO: 207 THOUSANDS/UL (ref 149–390)
PMV BLD AUTO: 10.2 FL (ref 8.9–12.7)
POTASSIUM SERPL-SCNC: 3.9 MMOL/L (ref 3.5–5.3)
PROT SERPL-MCNC: 6.4 G/DL (ref 6.4–8.4)
PROT UR STRIP-MCNC: NEGATIVE MG/DL
RBC # BLD AUTO: 3.75 MILLION/UL (ref 3.81–5.12)
SODIUM SERPL-SCNC: 136 MMOL/L (ref 135–147)
SP GR UR STRIP.AUTO: 1.01 (ref 1–1.03)
THC UR QL: NEGATIVE
TSH SERPL DL<=0.05 MIU/L-ACNC: 1.41 UIU/ML (ref 0.45–4.5)
UROBILINOGEN UR STRIP-ACNC: <2 MG/DL
WBC # BLD AUTO: 6.15 THOUSAND/UL (ref 4.31–10.16)

## 2024-08-27 PROCEDURE — 81003 URINALYSIS AUTO W/O SCOPE: CPT | Performed by: EMERGENCY MEDICINE

## 2024-08-27 PROCEDURE — 85025 COMPLETE CBC W/AUTO DIFF WBC: CPT | Performed by: EMERGENCY MEDICINE

## 2024-08-27 PROCEDURE — 80307 DRUG TEST PRSMV CHEM ANLYZR: CPT | Performed by: EMERGENCY MEDICINE

## 2024-08-27 PROCEDURE — 80053 COMPREHEN METABOLIC PANEL: CPT | Performed by: EMERGENCY MEDICINE

## 2024-08-27 PROCEDURE — 99285 EMERGENCY DEPT VISIT HI MDM: CPT

## 2024-08-27 PROCEDURE — 93005 ELECTROCARDIOGRAM TRACING: CPT

## 2024-08-27 PROCEDURE — 82075 ASSAY OF BREATH ETHANOL: CPT | Performed by: EMERGENCY MEDICINE

## 2024-08-27 PROCEDURE — 99284 EMERGENCY DEPT VISIT MOD MDM: CPT

## 2024-08-27 PROCEDURE — 36415 COLL VENOUS BLD VENIPUNCTURE: CPT

## 2024-08-27 PROCEDURE — 84443 ASSAY THYROID STIM HORMONE: CPT | Performed by: EMERGENCY MEDICINE

## 2024-08-27 RX ORDER — RISPERIDONE 0.25 MG/1
0.25 TABLET ORAL DAILY
Status: DISCONTINUED | OUTPATIENT
Start: 2024-08-28 | End: 2024-08-28

## 2024-08-27 RX ORDER — DOCUSATE SODIUM 100 MG/1
100 CAPSULE, LIQUID FILLED ORAL ONCE
Status: COMPLETED | OUTPATIENT
Start: 2024-08-27 | End: 2024-08-27

## 2024-08-27 RX ORDER — HYDROXYZINE HYDROCHLORIDE 10 MG/1
10 TABLET, FILM COATED ORAL ONCE
Status: COMPLETED | OUTPATIENT
Start: 2024-08-27 | End: 2024-08-27

## 2024-08-27 RX ADMIN — DOCUSATE SODIUM 100 MG: 100 CAPSULE, LIQUID FILLED ORAL at 22:44

## 2024-08-27 RX ADMIN — HYDROXYZINE HYDROCHLORIDE 10 MG: 10 TABLET ORAL at 22:44

## 2024-08-27 NOTE — ED PROVIDER NOTES
History  Chief Complaint   Patient presents with    Anxiety     Pt family states pt is having increased anxiety and depression d/t being tested for memory loss. States pt is now having dry mouth.     Sandra Peña is a 83 y.o. female with a PMH of anxiety and depression presenting to the ED on August 27, 2024 with anxiety. Patient is accompanied by her daughter who endorses that patient's anxiety and depression have been increasing over the last few months.  She has been getting treatment for anxiety as an outpatient and takes 40 mg of Prozac, they attempted to increase her dose of Prozac however patient did not tolerate this and is continuing on her 40 mg.  Patient also gets Atarax as needed when she has panic attacks however she and her daughter both endorse that they have been taking it essentially around-the-clock as she is unable to cope without.  Patient denies thoughts of self-harm or suicidal thoughts.  No homicidal ideations.  Patient denies drug or alcohol use.  Patient has no chest pain or shortness of breath or any other complaints at this time.  She states that her mouth gets dry and she sometimes gets some numbness into her hands and feet when she is feeling particularly anxious.  Patient also will complain of a dry mouth and her tongue sticking to the roof of her mouth when this occurs.         Anxiety  Presenting symptoms: no self-mutilation and no suicidal thoughts    Associated symptoms: anxiety    Associated symptoms: no abdominal pain and no chest pain        Prior to Admission Medications   Prescriptions Last Dose Informant Patient Reported? Taking?   FLUoxetine (PROzac) 20 mg capsule   No No   Sig: Take 2 capsules (40 mg total) by mouth daily with breakfast   aspirin 81 MG tablet  Self Yes No   Sig: Take 81 mg by mouth daily   atorvastatin (LIPITOR) 10 mg tablet   No No   Sig: Take 1 tablet (10 mg total) by mouth daily   docusate sodium (COLACE) 100 mg capsule  Self Yes No   Sig: Take 100 mg  by mouth 2 (two) times a day   fluocinonide (LIDEX) 0.05 % external solution   No No   Sig: Scalp solution- apply to affected area once a day as needed   hydrOXYzine HCL (ATARAX) 10 mg tablet   No No   Sig: Take 1 tablet (10 mg total) by mouth 2 (two) times a day as needed for anxiety for up to 40 doses   ketoconazole (NIZORAL) 2 % shampoo   No No   Sig: To the scalp leave on for 5 minutes then rinse off completely. Once a week   linaCLOtide 145 MCG CAPS   No No   Sig: Take 1 capsule (145 mcg total) by mouth in the morning   lisinopril-hydrochlorothiazide (PRINZIDE,ZESTORETIC) 10-12.5 MG per tablet   No No   Sig: Take 1 tablet by mouth daily   metFORMIN (GLUCOPHAGE) 500 mg tablet   No No   Sig: Take 1 tablet (500 mg total) by mouth 2 (two) times a day with meals   mometasone (ELOCON) 0.1 % lotion   No No   Sig: Apply topically daily   risperiDONE (RisperDAL) 0.25 mg tablet   No No   Sig: Take 1 tablet (0.25 mg total) by mouth 2 (two) times a day   triamcinolone (KENALOG) 0.1 % cream   No No   Sig: Apply under the breasts twice a day up to 2 weeks as needed for redness or itching      Facility-Administered Medications: None       Past Medical History:   Diagnosis Date    Colon polyps     Diabetes mellitus (HCC)     Dizziness     Last assessed: 8/31/15    DVT (deep venous thrombosis) ([facility])     Dysuria     Hematuria 2022    Hyperlipidemia     Hypertension     Hypertensive urgency 10/22/2021    Insomnia     Ureterolithiasis 2020       Past Surgical History:   Procedure Laterality Date     SECTION      1964 son, 1968 daughter    COLONOSCOPY      FL RETROGRADE PYELOGRAM  2020    ME COLONOSCOPY FLX DX W/COLLJ SPEC WHEN PFRMD N/A 3/27/2017    Procedure: COLONOSCOPY;  Surgeon: Gold Francis MD;  Location: [facility];  Service: Gastroenterology    ME CYSTO/URETERO W/LITHOTRIPSY &INDWELL STENT INSRT Right 2020    Procedure: CYSTOSCOPY URETEROSCOPY WITH LITHOTRIPSY HOLMIUM LASER, RETROGRADE PYELOGRAM  AND INSERTION STENT URETERAL; EXCISION OF BLADDER TUMOR;  Surgeon: Edwin Love MD;  Location: [facility];  Service: Urology    ROTATOR CUFF REPAIR Left     Last assessed: 3/29/15    TONSILLECTOMY         Family History   Problem Relation Age of Onset    Depression Mother         w/anxiety    Diabetes Mother         Mellitus    Breast cancer Mother     Hypertension Mother     No Known Problems Father     Depression Sister         w/anxiety    Heart disease Sister         Cardiac Disorder    Breast cancer Sister     Hypertension Sister     Diabetes Brother         Mellitus    Alcohol abuse Son      I have reviewed and agree with the history as documented.    E-Cigarette/Vaping    E-Cigarette Use Never User      E-Cigarette/Vaping Substances    Nicotine No     THC No     CBD No     Flavoring No     Other No     Unknown No      Social History     Tobacco Use    Smoking status: Never    Smokeless tobacco: Never   Vaping Use    Vaping status: Never Used   Substance Use Topics    Alcohol use: Never    Drug use: No       Review of Systems   Constitutional:  Negative for chills and fever.   HENT:  Negative for congestion, ear pain and sore throat.    Eyes:  Negative for visual disturbance.   Respiratory:  Negative for cough and shortness of breath.    Cardiovascular:  Negative for chest pain and palpitations.   Gastrointestinal:  Negative for abdominal pain, nausea and vomiting.   Genitourinary:  Negative for difficulty urinating, dysuria, frequency, hematuria and urgency.   Skin:  Negative for color change and rash.   Neurological:  Negative for dizziness, seizures, syncope and light-headedness.   Psychiatric/Behavioral:  Positive for sleep disturbance. Negative for self-injury and suicidal ideas. The patient is nervous/anxious.    All other systems reviewed and are negative.      Physical Exam  Physical Exam  Vitals and nursing note reviewed.   Constitutional:       General: She is not in acute distress.      Appearance: Normal appearance. She is normal weight. She is not ill-appearing, toxic-appearing or diaphoretic.      Comments: Patient is very anxious and tearful at the time of my exam   HENT:      Head: Normocephalic and atraumatic.      Right Ear: External ear normal.      Left Ear: External ear normal.      Nose: Nose normal. No congestion or rhinorrhea.      Mouth/Throat:      Mouth: Mucous membranes are moist.   Eyes:      General: No scleral icterus.        Right eye: No discharge.         Left eye: No discharge.      Extraocular Movements: Extraocular movements intact.      Conjunctiva/sclera: Conjunctivae normal.   Cardiovascular:      Rate and Rhythm: Normal rate and regular rhythm.      Heart sounds: Normal heart sounds. No murmur heard.     No friction rub. No gallop.   Pulmonary:      Effort: Pulmonary effort is normal. No respiratory distress.      Breath sounds: Normal breath sounds. No wheezing, rhonchi or rales.   Abdominal:      General: Abdomen is flat. Bowel sounds are normal. There is no distension.      Palpations: Abdomen is soft.      Tenderness: There is no abdominal tenderness. There is no guarding or rebound.   Musculoskeletal:         General: No signs of injury. Normal range of motion.      Cervical back: Normal range of motion and neck supple. No rigidity.      Right lower leg: No edema.      Left lower leg: No edema.   Skin:     General: Skin is warm.      Capillary Refill: Capillary refill takes less than 2 seconds.      Coloration: Skin is not pale.      Findings: No erythema.   Neurological:      General: No focal deficit present.      Mental Status: She is alert and oriented to person, place, and time. Mental status is at baseline.      GCS: GCS eye subscore is 4. GCS verbal subscore is 5. GCS motor subscore is 6.      Cranial Nerves: Cranial nerves 2-12 are intact. No cranial nerve deficit, dysarthria or facial asymmetry.      Sensory: Sensation is intact.      Motor: No weakness.       Gait: Gait is intact. Gait normal.   Psychiatric:         Mood and Affect: Mood is anxious.         Speech: Speech is delayed.         Behavior: Behavior is withdrawn.         Thought Content: Thought content does not include homicidal or suicidal ideation. Thought content does not include homicidal or suicidal plan.         Vital Signs  ED Triage Vitals [08/27/24 1838]   Temperature Pulse Respirations Blood Pressure SpO2   98 °F (36.7 °C) 88 18 (!) 188/82 97 %      Temp Source Heart Rate Source Patient Position - Orthostatic VS BP Location FiO2 (%)   Oral Monitor Lying Right arm --      Pain Score       --           Vitals:    08/27/24 1838 08/27/24 2247 08/28/24 0338   BP: (!) 188/82 (!) 185/81 (!) 171/80   Pulse: 88 87 83   Patient Position - Orthostatic VS: Lying  Lying         Visual Acuity      ED Medications  Medications   metFORMIN (GLUCOPHAGE) tablet 500 mg (0 mg Oral Hold 8/27/24 2225)   risperiDONE (RisperDAL) tablet 0.25 mg (has no administration in time range)   aspirin chewable tablet 81 mg (has no administration in time range)   FLUoxetine (PROzac) capsule 40 mg (has no administration in time range)   metFORMIN (GLUCOPHAGE) tablet 500 mg (has no administration in time range)   atorvastatin (LIPITOR) tablet 10 mg (has no administration in time range)   lisinopril (ZESTRIL) tablet 10 mg (has no administration in time range)   hydroCHLOROthiazide tablet 12.5 mg (has no administration in time range)   risperiDONE (RisperDAL) tablet 0.25 mg (has no administration in time range)   docusate sodium (COLACE) capsule 100 mg (has no administration in time range)   hydrOXYzine HCL (ATARAX) tablet 10 mg (10 mg Oral Given 8/27/24 2244)   docusate sodium (COLACE) capsule 100 mg (100 mg Oral Given 8/27/24 2244)   hydrOXYzine HCL (ATARAX) tablet 10 mg (10 mg Oral Given 8/28/24 0312)       Diagnostic Studies  Results Reviewed       Procedure Component Value Units Date/Time    UA w Reflex to Microscopic w Reflex to  Culture [[id number]] Collected: 08/27/24 1936    Lab Status: Final result Specimen: Urine, Clean Catch Updated: 08/27/24 2025     Color, UA Colorless     Clarity, UA Clear     Specific Gravity, UA 1.008     pH, UA 5.0     Leukocytes, UA Negative     Nitrite, UA Negative     Protein, UA Negative mg/dl      Glucose, UA Negative mg/dl      Ketones, UA Negative mg/dl      Urobilinogen, UA <2.0 mg/dl      Bilirubin, UA Negative     Occult Blood, UA Negative    Rapid drug screen, urine [[id number]]  (Normal) Collected: 08/27/24 1936    Lab Status: Final result Specimen: Urine, Clean Catch Updated: 08/27/24 2011     Amph/Meth UR Negative     Barbiturate Ur Negative     Benzodiazepine Urine Negative     Cocaine Urine Negative     Methadone Urine Negative     Opiate Urine Negative     PCP Ur Negative     THC Urine Negative     Oxycodone Urine Negative     Fentanyl Urine Negative     HYDROCODONE URINE Negative    Narrative:      FOR MEDICAL PURPOSES ONLY.   IF CONFIRMATION NEEDED PLEASE CONTACT THE LAB WITHIN 5 DAYS.    Drug Screen Cutoff Levels:  AMPHETAMINE/METHAMPHETAMINES  1000 ng/mL  BARBITURATES     200 ng/mL  BENZODIAZEPINES     200 ng/mL  COCAINE      300 ng/mL  METHADONE      300 ng/mL  OPIATES      300 ng/mL  PHENCYCLIDINE     25 ng/mL  THC       50 ng/mL  OXYCODONE      100 ng/mL  FENTANYL      5 ng/mL  HYDROCODONE     300 ng/mL    TSH [[id number]]  (Normal) Collected: 08/27/24 1925    Lab Status: Final result Specimen: Blood from Arm, Left Updated: 08/27/24 2003     TSH 3RD GENERATON 1.408 uIU/mL     Comprehensive metabolic panel [[id number]]  (Abnormal) Collected: 08/27/24 1925    Lab Status: Final result Specimen: Blood from Arm, Left Updated: 08/27/24 1948     Sodium 136 mmol/L      Potassium 3.9 mmol/L      Chloride 102 mmol/L      CO2 27 mmol/L      ANION GAP 7 mmol/L      BUN 26 mg/dL      Creatinine 1.05 mg/dL      Glucose 122 mg/dL      Calcium 9.4 mg/dL      AST 12 U/L      ALT 7 U/L      Alkaline Phosphatase  50 U/L      Total Protein 6.4 g/dL      Albumin 4.0 g/dL      Total Bilirubin 0.48 mg/dL      eGFR 48 ml/min/1.73sq m     Narrative:      National [employer] guidelines for Chronic Kidney Disease (CKD):     Stage 1 with normal or high GFR (GFR > 90 mL/min/1.73 square meters)    Stage 2 Mild CKD (GFR = 60-89 mL/min/1.73 square meters)    Stage 3A Moderate CKD (GFR = 45-59 mL/min/1.73 square meters)    Stage 3B Moderate CKD (GFR = 30-44 mL/min/1.73 square meters)    Stage 4 Severe CKD (GFR = 15-29 mL/min/1.73 square meters)    Stage 5 End Stage CKD (GFR <15 mL/min/1.73 square meters)  Note: GFR calculation is accurate only with a steady state creatinine    CBC and differential [[id number]]  (Abnormal) Collected: 08/27/24 1925    Lab Status: Final result Specimen: Blood from Arm, Left Updated: 08/27/24 1948     WBC 6.15 Thousand/uL      RBC 3.75 Million/uL      Hemoglobin 11.0 g/dL      Hematocrit 33.5 %      MCV 89 fL      MCH 29.3 pg      MCHC 32.8 g/dL      RDW 13.2 %      MPV 10.2 fL      Platelets 207 Thousands/uL      nRBC 0 /100 WBCs      Segmented % 66 %      Immature Grans % 0 %      Lymphocytes % 24 %      Monocytes % 7 %      Eosinophils Relative 2 %      Basophils Relative 1 %      Absolute Neutrophils 4.08 Thousands/µL      Absolute Immature Grans 0.02 Thousand/uL      Absolute Lymphocytes 1.46 Thousands/µL      Absolute Monocytes 0.43 Thousand/µL      Eosinophils Absolute 0.12 Thousand/µL      Basophils Absolute 0.04 Thousands/µL     POCT alcohol breath test [[id number]]  (Normal) Resulted: 08/27/24 1919    Lab Status: Final result Updated: 08/27/24 1919     EXTBreath Alcohol 0.000                   No orders to display              Procedures  ECG 12 Lead Documentation Only    Date/Time: 8/27/2024 7:13 PM    Performed by: Negrita Medina PA-C  Authorized by: Negrita Medina PA-C    Indications / Diagnosis:  Anxiety  ECG reviewed by me, the ED Provider: yes    Patient location:   ED  Previous ECG:     Previous ECG:  Compared to current    Comparison ECG info:  4/10/24    Similarity:  Changes noted (minimal criteria for LVH)    Comparison to cardiac monitor: Yes    Interpretation:     Interpretation: normal    Rate:     ECG rate:  77    ECG rate assessment: normal    Rhythm:     Rhythm: sinus rhythm    Ectopy:     Ectopy: none    QRS:     QRS axis:  Normal    QRS intervals:  Normal  Conduction:     Conduction: normal    ST segments:     ST segments:  Normal  T waves:     T waves: normal    Other findings:     Other findings: LVH             ED Course  ED Course as of 08/28/24 0618   Tue Aug 27, 2024   2026 Patient is medically cleared for evaluation by crisis and inpatient psychiatric care.    [id number] signed by Dr. Nur   2228 Nighttime medications are ordered.   Wed Aug 28, 2024   0236 Patient asking for something for anxiety, will give another 10 of atarax                                 SBIRT 22yo+      Flowsheet Row Most Recent Value   Initial Alcohol Screen: US AUDIT-C     1. How often do you have a drink containing alcohol? 0 Filed at: 08/27/2024 2124   2. How many drinks containing alcohol do you have on a typical day you are drinking?  0 Filed at: 08/27/2024 2124   3a. Male UNDER 65: How often do you have five or more drinks on one occasion? 0 Filed at: 08/27/2024 2124   3b. FEMALE Any Age, or MALE 65+: How often do you have 4 or more drinks on one occassion? 0 Filed at: 08/27/2024 2124   Audit-C Score 0 Filed at: 08/27/2024 2124   ELROY: How many times in the past year have you...    Used an illegal drug or used a prescription medication for non-medical reasons? Never Filed at: 08/27/2024 2124                      Medical Decision Making  Patient seen and examined noted to have anxiety and depression.  Patient is coming in with her daughter who endorses that her anxiety and depression have become very disruptive at home and patient is no longer having a mix of good and bad  "days and only having bad days where she is very tearful and anxious.  She is using her Atarax around-the-clock and her PCP attempted to increase her Prozac however patient wasnt able to tolerate this.  Patient is interested in inpatient psychiatric care.  Medically cleared patient here to speak with crisis and for inpatient psychiatric stay.  Patient signed a 201.  Patient resting comfortably and nighttime and morning medications were ordered by myself.  Patient signed out to Dr. Marcela Howard while waiting time for transfer.    Differential diagnosis includes but is not limited to anxiety, panic attack, substance abuse, depression; doubt suicidal ideation or metabolic abnormality or cardiac etiology or PE.      Patient's labs notable for: Unremarkable    EKG: was interpreted by myself as above.     Patient appears well, is nontoxic appearing, she expresses understanding and agrees with plan of care at this time.  In light of this patient would benefit from inpatient psychiatric care.    All labs reviewed and utilized in the medical decision making process (if labs were ordered). Portions of the record may have been created with voice recognition software.  Occasional wrong word or \"sound a like\" substitutions may have occurred due to the inherent limitations of voice recognition software.  Read the chart carefully and recognize, using context, where substitutions have occurred.      Amount and/or Complexity of Data Reviewed  Labs: ordered.    Risk  OTC drugs.  Prescription drug management.  Decision regarding hospitalization.                 Disposition  Final diagnoses:   Anxiety and depression     Time reflects when diagnosis was documented in both MDM as applicable and the Disposition within this note       Time User Action Codes Description Comment    8/27/2024  9:47 PM Negrita Medina Add [F41.9,  F32.A] Anxiety and depression           ED Disposition       ED Disposition   Transfer to Behavioral Health    " Condition   --    Date/Time   Tue Aug 27, 2024 3663    Comment   Sandra Peña should be transferred out to a behavioral health facility and has been medically cleared.                MD Documentation      Flowsheet Row Most Recent Value   Sending MD Dr.Stephaine Nur          Follow-up Information    None         Patient's Medications   Discharge Prescriptions    No medications on file       No discharge procedures on file.    PDMP Review         Value Time User    PDMP Reviewed  Yes 7/29/2024  2:05 PM López Gutierrez MD            ED Provider  Electronically Signed by             Negrita Medina PA-C  08/28/24 0619

## 2024-08-27 NOTE — TELEPHONE ENCOUNTER
Sandra Peña and/or patient's daughter requested a call back to discuss switching patient from prozac to Lexapro. Daughter would like to get providers opinion and recommendation on this.    They can be reached at P# [phone].       Thank you.

## 2024-08-27 NOTE — LETTER
Formerly [facility], [facility] EMERGENCY DEPARTMENT   [facility] [address]  Dept: [phone]      EMTALA TRANSFER CONSENT    NAME Sandra NAIR 1940                              MRN [id number]    I have been informed of my rights regarding examination, treatment, and transfer   by Dr. Aicha Howard MD    Benefits: Specialized equipment and/or services available at the receiving facility (Include comment)________________________, Continuity of care, Other benefits (Include comment)_______________________ (inpatient mental health 201)    Risks: Potential for delay in receiving treatment, Possible worsening of condition or death during transfer, Potential deterioration of medical condition, Increased discomfort during transfer      Consent for Transfer:  I acknowledge that my medical condition has been evaluated and explained to me by the emergency department physician or other qualified medical person and/or my attending physician, who has recommended that I be transferred to the service of  Accepting Physician: Dr. Hensley at Accepting Facility Name, City & State : [address] The above potential benefits of such transfer, the potential risks associated with such transfer, and the probable risks of not being transferred have been explained to me, and I fully understand them.  The doctor has explained that, in my case, the benefits of transfer outweigh the risks.  I agree to be transferred.      I authorize the performance of emergency medical procedures and treatments upon me in both transit and upon arrival at the receiving facility.  Additionally, I authorize the release of any and all medical records to the receiving facility and request they be transported with me, if possible.  I understand that the safest mode of transportation during a medical emergency is an ambulance and that the Hospital advocates the use of this mode of transport.  Risks of traveling to the receiving facility by car, including absence of medical control, life sustaining equipment, such as oxygen, and medical personnel has been explained to me and I fully understand them.      (TEMI CORRECT BOX BELOW)  [  ]  I consent to the stated transfer and to be transported by ambulance/helicopter.  [  ]  I consent to the stated transfer, but refuse transportation by ambulance and accept full responsibility for my transportation by car.  I understand the risks of non-ambulance transfers and I exonerate the Hospital and its staff from any deterioration in my condition that results from this refusal.    X___________________________________________    DATE  24  TIME________  Signature of patient or legally responsible individual signing on patient behalf           RELATIONSHIP TO PATIENT_________________________          Provider Certification    NAME Sandra Peña                                        [facility] 1940                              MRN [id number]    A medical screening exam was performed on the above named patient.  Based on the examination:    Condition Necessitating Transfer The primary encounter diagnosis was Anxiety and depression. A diagnosis of Medical clearance for psychiatric admission was also pertinent to this visit.    Patient Condition: The patient has been stabilized such that within reasonable medical probability, no material deterioration of the patient condition or the condition of the unborn child(shawnee) is likely to result from the transfer    Reason for Transfer: Level of Care needed not available at this facility, No bed available at level of patient's needs, Other (Include comment)____________________ (inpatient mental health 201)    Transfer Requirements: [facility] [place] [facility]   Space available and qualified personnel available for treatment as acknowledged by Crisis  Agreed to accept transfer and to provide appropriate medical treatment as acknowledged  by       Dr. Hensley  Appropriate medical records of the examination and treatment of the patient are provided at the time of transfer   STAFF INITIAL WHEN COMPLETED _______  Transfer will be performed by qualified personnel from [facility] stretcher van  and appropriate transfer equipment as required, including the use of necessary and appropriate life support measures.    Provider Certification: I have examined the patient and explained the following risks and benefits of being transferred/refusing transfer to the patient/family:  General risk, such as traffic hazards, adverse weather conditions, rough terrain or turbulence, possible failure of equipment (including vehicle or aircraft), or consequences of actions of persons outside the control of the transport personnel, Unanticipated needs of medical equipment and personnel during transport, Risk of worsening condition, The possibility of a transport vehicle being unavailable, The patient is stable for psychiatric transfer because they are medically stable, and is protected from harming him/herself or others during transport      Based on these reasonable risks and benefits to the patient and/or the unborn child(shawnee), and based upon the information available at the time of the patient’s examination, I certify that the medical benefits reasonably to be expected from the provision of appropriate medical treatments at another medical facility outweigh the increasing risks, if any, to the individual’s medical condition, and in the case of labor to the unborn child, from effecting the transfer.    X____________________________________________ DATE 08/28/24        TIME_______      ORIGINAL - SEND TO MEDICAL RECORDS   COPY - SEND WITH PATIENT DURING TRANSFER

## 2024-08-27 NOTE — ED NOTES
"Patient and patients daughter requesting crisis services. Reports previous inpatient admission. Daughter endorses pt unable to care for herself. Minimally eating, not drinking, unable to distinguish real versus fake \"crisis\" anymore. Daughter does not feel she is able to care for pt in current mental state.      Alysa Zhang, RN  08/27/24 7769    "

## 2024-08-27 NOTE — LETTER
"Section I - General Information    Name of Patient: Sandra Peña                 : 1940    Medicare #:____________________  Transport Date: 24 (PCS is valid for round trips on this date and for all repetitive trips in the 60-day range as noted below.)  [facility] EMERGENCY DEPARTMENT                                                         Destination:________________________________________________  Is the pt's stay covered under Medicare Part A (PPS/DRG)     (_) YES  (_) NO  Closest appropriate facility?  (_) YES  (_) NO  If no, why is transport to more distant facility required?________________________  If hosp-hosp transfer, describe services needed at 2nd facility not available at 1st facility? _________________________________  If hospice pt, is this transport related to pt's terminal illness? (_) YES (_) NO Describe____________________________________    Section II - Medical Necessity Questionnaire  Ambulance transportation is medically necessary only if other means of transport are contraindicated or would be potentially harmful to the patient. To meet this requirement, the patient must either be \"bed confined\" or suffer from a condition such that transport by means other than ambulance is contraindicated by the patient's condition. The following questions must be answered by the medical professional signing below for this form to be valid:    1)  Describe the MEDICAL CONDITION (physical and/or mental) of this patient AT THE TIME OF AMBULANCE TRANSPORT that requires the patient to be transported in an ambulance and why transport by other means is contraindicated by the patient's condition:__________________________________________________________________________________________________    2) Is the patient \"bed confined\" as defined below?     (_) YES  (_) NO  To be \"be confined\" the patient must satisfy all three of the following conditions: (1) unable to get up " from bed without Assistance; AND (2) unable to ambulate; AND (3) unable to sit in a chair or wheelchair.    3) Can this patient safely be transported by car or wheelchair van (i.e., seated during transport without a medical attendant or monitoring)?   (_) YES  (_) NO    4) In addition to completing questions 1-3 above, please check any of the following conditions that apply*:  *Note: supporting documentation for any boxes checked must be maintained in the patient's medical records  (_)Contractures   (_)Non-Healed Fractures  (_)Patient is confused (_)Patient is comatose (_)Moderate/severe pain on movement (_)Danger to self/others  (_)IV meds/fluids required (_)Patient is combative(_)Need or possible need for restraints (_)DVT requires elevation of lower extremity  (_)Medical attendant required (_)Requires oxygen-unable to self administer (_)Special handling/isolation/infection control precautions required (_)Unable to tolerate seated position for time needed to transport (_)Hemodynamic monitoring required en route (_)Unable to sit in a chair or wheelchair due to decubitus ulcers or other wounds (_)Cardiac monitoring required en route (_)Morbid obesity requires additional personnel/equipment to safely handle patient (_)Orthopedic device (backboard, halo, pins, traction, brace, wedge, etc,) requiring special handling during transport (_)Other(specify)_______________________________________________    Section III - Signature of Physician or Healthcare Professional    I certify that the above information is accurate based on my evaluation of this patient, and that the medical necessity provisions of 42 .40(e)(1) are met, requiring that this patient be transported by ambulance. I understand this information will be used by the Centers for Medicare and Medicaid Services (CMS) to support the determination of medical necessity for ambulance services. I represent that I am the beneficiary’s attending physician; or an  employee of the beneficiary’s attending physician, or the hospital or facility where the beneficiary is being treated and from which the beneficiary is being transported; that I have personal knowledge of the beneficiary’s condition at the time of transport; and that I meet all Medicare regulations and applicable State licensure laws for the credential indicated.     (_) If this box is checked, I also certify that the patient is physically or mentally incapable of signing the ambulance service's claim and that the institution with which I am affiliated has furnished care, services, or assistance to the patient.  My signature below is made on behalf of the patient pursuant to 42 CFR §424.36(b)(4). In accordance with 42 CFR §424.37, the specific reason(s) that the patient is physically or mentally incapable of signing the claim form is as follows: _________________________________________________________________________________________________________      Signature of Physician* or Healthcare Professional______________________________________________________________  Signature Date 08/28/24 (For scheduled repetitive transports, this form is not valid for transports performed more than 60 days after this date)    Printed Name & Credentials of Physician or Healthcare Professional (MD, DO, RN, etc.)________________________________  *Form must be signed by patient's attending physician for scheduled, repetitive transports. For non-repetitive, unscheduled ambulance transports, if unable to obtain the signature of the attending physician, any of the following may sign (choose appropriate option below)  (_) Physician Assistant   (_)  Clinical Nurse Specialist    (_)  Licensed Practical Nurse    (_)    (_)  Nurse Practitioner     (_)  Registered Nurse                (_)                         (_) Discharge Planner

## 2024-08-27 NOTE — Clinical Note
Sandra LILLIAM Peña should be transferred out to a behavioral health facility and has been medically cleared.

## 2024-08-28 ENCOUNTER — HOSPITAL ENCOUNTER (INPATIENT)
Facility: HOSPITAL | Age: 84
LOS: 7 days | Discharge: HOME | DRG: 880 | End: 2024-09-04
Attending: STUDENT IN AN ORGANIZED HEALTH CARE EDUCATION/TRAINING PROGRAM | Admitting: STUDENT IN AN ORGANIZED HEALTH CARE EDUCATION/TRAINING PROGRAM
Payer: MEDICARE

## 2024-08-28 VITALS
DIASTOLIC BLOOD PRESSURE: 77 MMHG | SYSTOLIC BLOOD PRESSURE: 180 MMHG | BODY MASS INDEX: 23.69 KG/M2 | OXYGEN SATURATION: 97 % | TEMPERATURE: 98 F | HEART RATE: 86 BPM | RESPIRATION RATE: 18 BRPM | WEIGHT: 111.77 LBS

## 2024-08-28 DIAGNOSIS — E55.9 VITAMIN D DEFICIENCY: ICD-10-CM

## 2024-08-28 DIAGNOSIS — E53.8 B12 DEFICIENCY: ICD-10-CM

## 2024-08-28 DIAGNOSIS — F45.21 ILLNESS ANXIETY DISORDER: ICD-10-CM

## 2024-08-28 DIAGNOSIS — F33.0 MAJOR DEPRESSIVE DISORDER, RECURRENT EPISODE, MILD (HCC): Primary | ICD-10-CM

## 2024-08-28 DIAGNOSIS — F41.1 GENERALIZED ANXIETY DISORDER: ICD-10-CM

## 2024-08-28 DIAGNOSIS — F03.90 MAJOR NEUROCOGNITIVE DISORDER (HCC): ICD-10-CM

## 2024-08-28 DIAGNOSIS — Z00.8 MEDICAL CLEARANCE FOR PSYCHIATRIC ADMISSION: ICD-10-CM

## 2024-08-28 DIAGNOSIS — F51.01 PRIMARY INSOMNIA: ICD-10-CM

## 2024-08-28 PROBLEM — D64.9 ANEMIA: Status: ACTIVE | Noted: 2024-08-28

## 2024-08-28 LAB
ATRIAL RATE: 77 BPM
P AXIS: 50 DEGREES
PR INTERVAL: 166 MS
QRS AXIS: -1 DEGREES
QRSD INTERVAL: 74 MS
QT INTERVAL: 358 MS
QTC INTERVAL: 405 MS
T WAVE AXIS: 23 DEGREES
VENTRICULAR RATE: 77 BPM

## 2024-08-28 PROCEDURE — 93010 ELECTROCARDIOGRAM REPORT: CPT | Performed by: INTERNAL MEDICINE

## 2024-08-28 RX ORDER — LORAZEPAM 1 MG/1
1 TABLET ORAL
Status: DISCONTINUED | OUTPATIENT
Start: 2024-08-28 | End: 2024-09-04 | Stop reason: HOSPADM

## 2024-08-28 RX ORDER — LISINOPRIL 10 MG/1
10 TABLET ORAL DAILY
Status: DISCONTINUED | OUTPATIENT
Start: 2024-08-29 | End: 2024-09-04 | Stop reason: HOSPADM

## 2024-08-28 RX ORDER — ACETAMINOPHEN 325 MG/1
975 TABLET ORAL EVERY 6 HOURS PRN
Status: DISCONTINUED | OUTPATIENT
Start: 2024-08-28 | End: 2024-09-04 | Stop reason: HOSPADM

## 2024-08-28 RX ORDER — AMOXICILLIN 250 MG
1 CAPSULE ORAL DAILY PRN
Status: CANCELLED | OUTPATIENT
Start: 2024-08-28

## 2024-08-28 RX ORDER — RISPERIDONE 1 MG/1
1 TABLET ORAL
Status: DISCONTINUED | OUTPATIENT
Start: 2024-08-28 | End: 2024-09-04 | Stop reason: HOSPADM

## 2024-08-28 RX ORDER — HYDROXYZINE HYDROCHLORIDE 50 MG/1
50 TABLET, FILM COATED ORAL
Status: DISCONTINUED | OUTPATIENT
Start: 2024-08-28 | End: 2024-09-04 | Stop reason: HOSPADM

## 2024-08-28 RX ORDER — HALOPERIDOL 5 MG/ML
5 INJECTION INTRAMUSCULAR
Status: DISCONTINUED | OUTPATIENT
Start: 2024-08-28 | End: 2024-09-04 | Stop reason: HOSPADM

## 2024-08-28 RX ORDER — TRAZODONE HYDROCHLORIDE 50 MG/1
50 TABLET, FILM COATED ORAL
Status: CANCELLED | OUTPATIENT
Start: 2024-08-28

## 2024-08-28 RX ORDER — DOCUSATE SODIUM 100 MG/1
100 CAPSULE, LIQUID FILLED ORAL
Status: DISCONTINUED | OUTPATIENT
Start: 2024-08-28 | End: 2024-08-28 | Stop reason: HOSPADM

## 2024-08-28 RX ORDER — HYDROXYZINE HYDROCHLORIDE 10 MG/1
10 TABLET, FILM COATED ORAL ONCE
Status: COMPLETED | OUTPATIENT
Start: 2024-08-28 | End: 2024-08-28

## 2024-08-28 RX ORDER — POLYETHYLENE GLYCOL 3350 17 G/17G
17 POWDER, FOR SOLUTION ORAL DAILY PRN
Status: CANCELLED | OUTPATIENT
Start: 2024-08-28

## 2024-08-28 RX ORDER — ACETAMINOPHEN 325 MG/1
650 TABLET ORAL EVERY 4 HOURS PRN
Status: CANCELLED | OUTPATIENT
Start: 2024-08-28

## 2024-08-28 RX ORDER — RISPERIDONE 0.5 MG/1
0.5 TABLET ORAL
Status: DISCONTINUED | OUTPATIENT
Start: 2024-08-28 | End: 2024-09-04 | Stop reason: HOSPADM

## 2024-08-28 RX ORDER — RISPERIDONE 1 MG/1
1 TABLET ORAL
Status: CANCELLED | OUTPATIENT
Start: 2024-08-28

## 2024-08-28 RX ORDER — CLONAZEPAM 0.5 MG/1
0.25 TABLET ORAL
COMMUNITY
End: 2024-09-04

## 2024-08-28 RX ORDER — ATORVASTATIN CALCIUM 10 MG/1
10 TABLET, FILM COATED ORAL
Status: DISCONTINUED | OUTPATIENT
Start: 2024-08-28 | End: 2024-08-28 | Stop reason: HOSPADM

## 2024-08-28 RX ORDER — LORAZEPAM 2 MG/ML
1 INJECTION INTRAMUSCULAR
Status: CANCELLED | OUTPATIENT
Start: 2024-08-28

## 2024-08-28 RX ORDER — RISPERIDONE 0.25 MG/1
0.25 TABLET ORAL
Status: CANCELLED | OUTPATIENT
Start: 2024-08-28

## 2024-08-28 RX ORDER — RISPERIDONE 0.25 MG/1
0.5 TABLET ORAL
Status: CANCELLED | OUTPATIENT
Start: 2024-08-28

## 2024-08-28 RX ORDER — ASPIRIN 81 MG/1
81 TABLET, CHEWABLE ORAL DAILY
Status: DISCONTINUED | OUTPATIENT
Start: 2024-08-28 | End: 2024-09-04 | Stop reason: HOSPADM

## 2024-08-28 RX ORDER — FLUOXETINE 10 MG/1
40 CAPSULE ORAL
Status: CANCELLED | OUTPATIENT
Start: 2024-08-29

## 2024-08-28 RX ORDER — POLYETHYLENE GLYCOL 3350 17 G/17G
17 POWDER, FOR SOLUTION ORAL DAILY PRN
Status: DISCONTINUED | OUTPATIENT
Start: 2024-08-28 | End: 2024-09-02

## 2024-08-28 RX ORDER — ACETAMINOPHEN 325 MG/1
975 TABLET ORAL EVERY 6 HOURS PRN
Status: CANCELLED | OUTPATIENT
Start: 2024-08-28

## 2024-08-28 RX ORDER — ASPIRIN 81 MG/1
81 TABLET, CHEWABLE ORAL
Status: DISCONTINUED | OUTPATIENT
Start: 2024-08-28 | End: 2024-08-28 | Stop reason: HOSPADM

## 2024-08-28 RX ORDER — HYDROXYZINE HYDROCHLORIDE 25 MG/1
50 TABLET, FILM COATED ORAL
Status: CANCELLED | OUTPATIENT
Start: 2024-08-28

## 2024-08-28 RX ORDER — RISPERIDONE 0.25 MG/1
0.25 TABLET ORAL
Status: CANCELLED | OUTPATIENT
Start: 2024-08-29

## 2024-08-28 RX ORDER — ACETAMINOPHEN 325 MG/1
650 TABLET ORAL EVERY 4 HOURS PRN
Status: DISCONTINUED | OUTPATIENT
Start: 2024-08-28 | End: 2024-09-04 | Stop reason: HOSPADM

## 2024-08-28 RX ORDER — RISPERIDONE 0.25 MG/1
0.25 TABLET ORAL
Status: DISCONTINUED | OUTPATIENT
Start: 2024-08-28 | End: 2024-09-04 | Stop reason: HOSPADM

## 2024-08-28 RX ORDER — LORAZEPAM 2 MG/ML
1 INJECTION INTRAMUSCULAR
Status: DISCONTINUED | OUTPATIENT
Start: 2024-08-28 | End: 2024-09-04 | Stop reason: HOSPADM

## 2024-08-28 RX ORDER — AMOXICILLIN 250 MG
1 CAPSULE ORAL DAILY PRN
Status: DISCONTINUED | OUTPATIENT
Start: 2024-08-28 | End: 2024-09-04 | Stop reason: HOSPADM

## 2024-08-28 RX ORDER — TRAZODONE HYDROCHLORIDE 50 MG/1
50 TABLET, FILM COATED ORAL
Status: DISCONTINUED | OUTPATIENT
Start: 2024-08-28 | End: 2024-09-04 | Stop reason: HOSPADM

## 2024-08-28 RX ORDER — RISPERIDONE 0.25 MG/1
0.25 TABLET ORAL
Status: DISCONTINUED | OUTPATIENT
Start: 2024-08-28 | End: 2024-08-28 | Stop reason: HOSPADM

## 2024-08-28 RX ORDER — HYDROXYZINE HYDROCHLORIDE 25 MG/1
25 TABLET, FILM COATED ORAL
Status: DISCONTINUED | OUTPATIENT
Start: 2024-08-28 | End: 2024-09-04 | Stop reason: HOSPADM

## 2024-08-28 RX ORDER — RISPERIDONE 0.25 MG/1
0.25 TABLET ORAL
Status: DISCONTINUED | OUTPATIENT
Start: 2024-08-29 | End: 2024-08-29

## 2024-08-28 RX ORDER — LANOLIN ALCOHOL/MO/W.PET/CERES
3 CREAM (GRAM) TOPICAL
Status: DISCONTINUED | OUTPATIENT
Start: 2024-08-28 | End: 2024-09-04 | Stop reason: HOSPADM

## 2024-08-28 RX ORDER — HALOPERIDOL 5 MG/ML
5 INJECTION INTRAMUSCULAR
Status: CANCELLED | OUTPATIENT
Start: 2024-08-28

## 2024-08-28 RX ORDER — HYDROCHLOROTHIAZIDE 12.5 MG/1
12.5 TABLET ORAL
Status: DISCONTINUED | OUTPATIENT
Start: 2024-08-28 | End: 2024-08-28 | Stop reason: HOSPADM

## 2024-08-28 RX ORDER — DOCUSATE SODIUM 100 MG/1
100 CAPSULE, LIQUID FILLED ORAL 2 TIMES DAILY
Status: DISCONTINUED | OUTPATIENT
Start: 2024-08-28 | End: 2024-09-04 | Stop reason: HOSPADM

## 2024-08-28 RX ORDER — ATORVASTATIN CALCIUM 10 MG/1
10 TABLET, FILM COATED ORAL
Status: DISCONTINUED | OUTPATIENT
Start: 2024-08-28 | End: 2024-09-04 | Stop reason: HOSPADM

## 2024-08-28 RX ORDER — LANOLIN ALCOHOL/MO/W.PET/CERES
3 CREAM (GRAM) TOPICAL
Status: CANCELLED | OUTPATIENT
Start: 2024-08-28

## 2024-08-28 RX ORDER — LISINOPRIL 10 MG/1
10 TABLET ORAL
Status: DISCONTINUED | OUTPATIENT
Start: 2024-08-28 | End: 2024-08-28 | Stop reason: HOSPADM

## 2024-08-28 RX ORDER — HYDROXYZINE HYDROCHLORIDE 25 MG/1
25 TABLET, FILM COATED ORAL
Status: CANCELLED | OUTPATIENT
Start: 2024-08-28

## 2024-08-28 RX ORDER — FLUOXETINE 10 MG/1
40 CAPSULE ORAL
Status: DISCONTINUED | OUTPATIENT
Start: 2024-08-28 | End: 2024-08-28 | Stop reason: HOSPADM

## 2024-08-28 RX ORDER — CLONAZEPAM 0.5 MG/1
0.25 TABLET ORAL
Status: DISCONTINUED | OUTPATIENT
Start: 2024-08-28 | End: 2024-08-29

## 2024-08-28 RX ORDER — LORAZEPAM 1 MG/1
1 TABLET ORAL
Status: CANCELLED | OUTPATIENT
Start: 2024-08-28

## 2024-08-28 RX ADMIN — HYDROXYZINE HYDROCHLORIDE 10 MG: 10 TABLET ORAL at 11:29

## 2024-08-28 RX ADMIN — HYDROXYZINE HYDROCHLORIDE 10 MG: 10 TABLET ORAL at 03:12

## 2024-08-28 RX ADMIN — LISINOPRIL 10 MG: 10 TABLET ORAL at 07:12

## 2024-08-28 RX ADMIN — ATORVASTATIN CALCIUM 10 MG: 10 TABLET, FILM COATED ORAL at 07:11

## 2024-08-28 RX ADMIN — FLUOXETINE 40 MG: 10 CAPSULE ORAL at 07:39

## 2024-08-28 RX ADMIN — CLONAZEPAM 0.25 MG: 0.5 TABLET ORAL at 21:24

## 2024-08-28 RX ADMIN — METFORMIN HYDROCHLORIDE 500 MG: 500 TABLET ORAL at 07:09

## 2024-08-28 RX ADMIN — RISPERIDONE 0.25 MG: 0.25 TABLET, FILM COATED ORAL at 07:10

## 2024-08-28 RX ADMIN — ASPIRIN 81 MG 81 MG: 81 TABLET ORAL at 07:10

## 2024-08-28 RX ADMIN — HYDROXYZINE HYDROCHLORIDE 25 MG: 25 TABLET ORAL at 15:59

## 2024-08-28 RX ADMIN — HYDROCHLOROTHIAZIDE 12.5 MG: 12.5 TABLET ORAL at 07:14

## 2024-08-28 RX ADMIN — Medication 3 MG: at 21:24

## 2024-08-28 RX ADMIN — DOCUSATE SODIUM 100 MG: 100 CAPSULE, LIQUID FILLED ORAL at 07:10

## 2024-08-28 NOTE — ASSESSMENT & PLAN NOTE
Lab Results   Component Value Date    EGFR 48 08/27/2024    EGFR 56 04/10/2024    EGFR 54 02/16/2024    CREATININE 1.05 08/27/2024    CREATININE 0.93 04/10/2024    CREATININE 0.96 02/16/2024   Cr stable   Limit nephrotoxic agents and hypotension

## 2024-08-28 NOTE — ED NOTES
Crisis met with patient and daughter.  Discussed current plan and care needs.  Patient is independent with ambulation, feeding, toileting, and capable of self-care.  Has been depressed and anxious to the point that she is not initiating same, and is relying on family for basic needs.  Patient reports taking Metformin, but DM is diet controlled with no insulin.  HTN is treated with medications.  She reports that she is anxious this morning.  Somewhat bowel preoccupied.  States she feels as though she has to use the bathroom and her stomach is making noises, but she does not produce a BM.  Denies constipation and reports that she is producing BM, just not every time she feels she needs to.  Family and patient would prefer in-network placement, but if there is no availability, they would consider alternate options and/or Case Management consult for in-home and community supports.  They understand a Psychiatric Consult would also likely be ordered for clearance for discharge if they request diversion from inpatient admission.  Referral was emailed to Intake.  All [facility] requirements complete.

## 2024-08-28 NOTE — ED CARE HANDOFF
Emergency Department Sign Out Note        Care of patient assumed from Negrita Medina PA-C at 0615. Please see prior note for full history, physical exam, and MDM up to this point.     In brief, patient is an 84 y/o female with history of anxiety and depression with prior inpatient stay 15 years ago. Patient takes 40 mg of prozac daily and atarax as needed for anxiety. Presents with worsening anxiety and depression. Family reports that she is tearful most of the time. Failing to participate in ADLs. Attempts were made to increase her dose of prozac on an outpatient basis but the patient did not tolerate the dosage increase. Denied SI/HI to prior provider.     Patient has been medically cleared by prior provider and signed a 201 for inpatient psychiatric placement.                     Patient re-evaluated- reports mild anxiety, requesting additional dose of atarax- can next be administered at 1130. No physical complaints.     Patient accepted for transfer to [facility].            Procedures  Medical Decision Making  Amount and/or Complexity of Data Reviewed  Labs: ordered.    Risk  OTC drugs.  Prescription drug management.  Decision regarding hospitalization.            Disposition  Final diagnoses:   Anxiety and depression     Time reflects when diagnosis was documented in both MDM as applicable and the Disposition within this note       Time User Action Codes Description Comment    8/27/2024  9:47 PM Negrita Medina Add [F41.9,  F32.A] Anxiety and depression     8/28/2024 11:24 AM Marjorie Barroso Add [Z00.8] Medical clearance for psychiatric admission           ED Disposition       ED Disposition   Transfer to Another Facility-In Network    Condition   --    Date/Time   Wed Aug 28, 2024 12:50 PM    Comment   Sandra Peña should be transferred out to a behavioral health facility and has been medically cleared.                MD Documentation      Flowsheet Row Most Recent Value   Patient  Condition The patient has been stabilized such that within reasonable medical probability, no material deterioration of the patient condition or the condition of the unborn child(shawnee) is likely to result from the transfer   Reason for Transfer Level of Care needed not available at this facility, No bed available at level of patient's needs, Other (Include comment)____________________  [inpatient mental health 201]   Benefits of Transfer Specialized equipment and/or services available at the receiving facility (Include comment)________________________, Continuity of care, Other benefits (Include comment)_______________________  [inpatient mental health 201]   Risks of Transfer Potential for delay in receiving treatment, Possible worsening of condition or death during transfer, Potential deterioration of medical condition, Increased discomfort during transfer   Accepting Physician Dr. Hensley   Accepting Facility Name, [address]    (Name & Tel number) Crisis   Transported by (Company and Unit #) [employer] [facility]   Provider Certification General risk, such as traffic hazards, adverse weather conditions, rough terrain or turbulence, possible failure of equipment (including vehicle or aircraft), or consequences of actions of persons outside the control of the transport personnel, Unanticipated needs of medical equipment and personnel during transport, Risk of worsening condition, The possibility of a transport vehicle being unavailable, The patient is stable for psychiatric transfer because they are medically stable, and is protected from harming him/herself or others during transport          RN Documentation      Flowsheet Row Most Recent Value   Accepting Facility Name, [facility] & [address]   Bed Assignment Per RN    (Name & Tel number) Crisis   Report Given to RN to RN   Medications Reviewed with Next Provider of Service Yes    Transport Mode Other (Comment)  [[facility] nathan]   Transported by (Company and Unit #) [employer] [facility]   Level of Care Other (Comment)  [stretcher jac]   Copies of Medical Records Sent History and Physical, Orders, Progress note, Transfer form, Nursing note, Labs, Med Rec form, Other (comment)  [201]   Patient Belongings Disposition Sent with patient   Transfer Date 08/28/24   Transfer Time 1300          Follow-up Information    None       Discharge Medication List as of 8/28/2024  1:01 PM        CONTINUE these medications which have NOT CHANGED    Details   aspirin 81 MG tablet Take 81 mg by mouth daily, Historical Med      atorvastatin (LIPITOR) 10 mg tablet Take 1 tablet (10 mg total) by mouth daily, Starting Mon 7/8/2024, Normal      docusate sodium (COLACE) 100 mg capsule Take 100 mg by mouth 2 (two) times a day, Historical Med      fluocinonide (LIDEX) 0.05 % external solution Scalp solution- apply to affected area once a day as needed, Normal      FLUoxetine (PROzac) 20 mg capsule Take 2 capsules (40 mg total) by mouth daily with breakfast, Starting Mon 8/26/2024, Fill Later      hydrOXYzine HCL (ATARAX) 10 mg tablet Take 1 tablet (10 mg total) by mouth 2 (two) times a day as needed for anxiety for up to 40 doses, Starting Fri 8/23/2024, Fill Later      ketoconazole (NIZORAL) 2 % shampoo To the scalp leave on for 5 minutes then rinse off completely. Once a week, Normal      linaCLOtide 145 MCG CAPS Take 1 capsule (145 mcg total) by mouth in the morning, Starting Wed 6/12/2024, Normal      lisinopril-hydrochlorothiazide (PRINZIDE,ZESTORETIC) 10-12.5 MG per tablet Take 1 tablet by mouth daily, Starting Mon 10/16/2023, Normal      metFORMIN (GLUCOPHAGE) 500 mg tablet Take 1 tablet (500 mg total) by mouth 2 (two) times a day with meals, Starting Mon 7/1/2024, Normal      mometasone (ELOCON) 0.1 % lotion Apply topically daily, Starting Fri 11/24/2023, Normal      risperiDONE (RisperDAL) 0.25 mg tablet Take  1 tablet (0.25 mg total) by mouth 2 (two) times a day, Starting Fri 8/23/2024, Normal      triamcinolone (KENALOG) 0.1 % cream Apply under the breasts twice a day up to 2 weeks as needed for redness or itching, Normal           No discharge procedures on file.       ED Provider  Electronically Signed by     Aicha Howard MD  08/28/24 2581

## 2024-08-28 NOTE — CONSULTS
"[facility]  Consult  Name: Sandra Peña 83 y.o. female I MRN: [id number]  Unit/Bed#: [facility] 655-01 I Date of Admission: 8/28/2024   Date of Service: 8/29/2024 I Hospital Day: 1    Inpatient consult for Medical Clearance for  patient  Consult performed by: PATIENCE Ardon  Consult ordered by: PATIENCE Parker        Assessment & Plan   Medical clearance for psychiatric admission  Assessment & Plan  ED labs: CBC, CMP, TSH acceptable   Folate, B12, Vitamin D 25 hydroxy labs pending  Vitals stable   UA unremarkable   UDS negative   EKG reveals NSR, 77 bpm   Patient is medically cleared for admission to [facility] and treatment of underlying psychiatric illness based on available results  Please contact [facility]IM with any questions or concerns    Primary hypertension  Assessment & Plan  BP stable  Continue Lisinopril   HCTZ on hold until admission labs result     Anemia  Assessment & Plan  HGB stable at baseline near 11  Follow-up iron panel, B12, and folate     Mixed hyperlipidemia  Assessment & Plan  Continue Lipitor     Type 2 diabetes mellitus without complication, without long-term current use of insulin (HCC)  Assessment & Plan  Lab Results   Component Value Date    HGBA1C 7.0 (H) 02/12/2024       No results for input(s): \"POCGLU\" in the last 72 hours.    Blood Sugar Average: Last 72 hrs:  Takes Metformin 500 mg BID at home  Will continue with Metformin 500 mg daily for now     Stage 3 chronic kidney disease, unspecified whether stage 3a or 3b CKD (HCC)  Assessment & Plan  Lab Results   Component Value Date    EGFR 48 08/27/2024    EGFR 56 04/10/2024    EGFR 54 02/16/2024    CREATININE 1.05 08/27/2024    CREATININE 0.93 04/10/2024    CREATININE 0.96 02/16/2024   Cr stable   Limit nephrotoxic agents and hypotension     Major depressive disorder, recurrent episode, mild (HCC)  Assessment & Plan  Ad[facility]  Management per primary service     Illness anxiety " disorder  Assessment & Plan  Admitted to [facility]  Management per primary service     * Generalized anxiety disorder  Assessment & Plan  Admitted to [facility]  Management per primary service              Recommendations for Discharge:  SLIM will sign off - please call with questions or concerns.  Follow up with PCP upon discharge.     Counseling / Coordination of Care Time: 30 minutes.  Greater than 50% of total time spent on patient counseling and coordination of care.    Collaboration of Care: Were Recommendations Directly Discussed with Primary Treatment Team? - Yes     History of Present Illness:    Sandra Peña is a 83 y.o. female with a PMH including dementia, depression, anxiety, HTN, T2DM, HLD, constipation, ambulatory dysfunction, urge incontinence, CKD3 who is originally admitted to the psychiatric service due to worsening anxiety and depression. We are consulted for medical clearance for psychiatric hospitalization and medical management. Patient reported her anxiety has been interfering with her daily living. She follows an outpatient psychiatrist and has been trying to make medication changes. Patient was seen by Crisis. She signed a 201 and was admitted to [facility]. Currently, she is calm and cooperative. She offers no complaints. She is resting in bed.     Review of Systems:    Review of Systems   Constitutional:  Negative for appetite change and chills.   HENT:  Negative for congestion and sore throat.    Eyes:  Negative for visual disturbance.   Respiratory:  Negative for cough and shortness of breath.    Cardiovascular:  Negative for chest pain.   Gastrointestinal:  Negative for abdominal pain, constipation, diarrhea, nausea and vomiting.   Genitourinary:  Negative for difficulty urinating.   Musculoskeletal:  Negative for gait problem.   Skin:  Negative for wound.   Neurological:  Negative for dizziness, light-headedness and headaches.       Past Medical and Surgical History:     Past Medical History:    Diagnosis Date    Anxiety     Colon polyps     Depression     Diabetes mellitus (HCC)     Dizziness     Last assessed: 8/31/15    DVT (deep venous thrombosis) ([facility])     Dysuria     Hematuria 2022    Hyperlipidemia     Hypertension     Hypertensive urgency 10/22/2021    Insomnia     Panic attack     Ureterolithiasis 2020       Past Surgical History:   Procedure Laterality Date     SECTION      1964 son, 1968 daughter    COLONOSCOPY      FL RETROGRADE PYELOGRAM  2020    AL COLONOSCOPY FLX DX W/COLLJ SPEC WHEN PFRMD N/A 3/27/2017    Procedure: COLONOSCOPY;  Surgeon: Gold Francis MD;  Location: [facility];  Service: Gastroenterology    AL CYSTO/URETERO W/LITHOTRIPSY &INDWELL STENT INSRT Right 2020    Procedure: CYSTOSCOPY URETEROSCOPY WITH LITHOTRIPSY HOLMIUM LASER, RETROGRADE PYELOGRAM AND INSERTION STENT URETERAL; EXCISION OF BLADDER TUMOR;  Surgeon: Edwin Love MD;  Location: [facility];  Service: Urology    ROTATOR CUFF REPAIR Left     Last assessed: 3/29/15    TONSILLECTOMY         Meds/Allergies:    all medications and allergies reviewed    Allergies: No Known Allergies    Social History:     Marital Status:     Substance Use History:   Social History     Substance and Sexual Activity   Alcohol Use Never     Social History     Tobacco Use   Smoking Status Never   Smokeless Tobacco Never     Social History     Substance and Sexual Activity   Drug Use No       Family History:    Family History   Problem Relation Age of Onset    Depression Mother         w/anxiety    Diabetes Mother         Mellitus    Breast cancer Mother     Hypertension Mother     No Known Problems Father     Depression Sister         w/anxiety    Heart disease Sister         Cardiac Disorder    Breast cancer Sister     Hypertension Sister     Diabetes Brother         Mellitus    Alcohol abuse Son        Physical Exam:     Vitals:   Blood Pressure: 143/67 (24 0731)  Pulse: 83 (24  "0731)  Temperature: 98.4 °F (36.9 °C) (08/29/24 0731)  Temp Source: Temporal (08/29/24 0731)  Respirations: 18 (08/29/24 0731)  Height: 4' 9\" (144.8 cm) (08/28/24 1357)  Weight - Scale: 47.9 kg (105 lb 8 oz) (105.4 pounds) (08/28/24 1357)  SpO2: 98 % (08/29/24 0731)    Physical Exam  Vitals and nursing note reviewed.   Constitutional:       General: She is not in acute distress.     Appearance: She is not toxic-appearing or diaphoretic.   HENT:      Head: Normocephalic.      Mouth/Throat:      Mouth: Mucous membranes are moist.   Eyes:      Conjunctiva/sclera: Conjunctivae normal.   Cardiovascular:      Rate and Rhythm: Normal rate.   Pulmonary:      Effort: Pulmonary effort is normal.      Breath sounds: Normal breath sounds.   Abdominal:      General: Bowel sounds are normal.      Palpations: Abdomen is soft.   Musculoskeletal:         General: Normal range of motion.      Cervical back: Normal range of motion.      Right lower leg: No edema.      Left lower leg: No edema.   Skin:     General: Skin is warm and dry.      Capillary Refill: Capillary refill takes less than 2 seconds.   Neurological:      Mental Status: She is alert and oriented to person, place, and time. Mental status is at baseline.      Comments: Forgetful    Psychiatric:         Mood and Affect: Mood is anxious.         Speech: Speech normal.         Behavior: Behavior is cooperative.         Additional Data:     Lab Results:     Results from last 7 days   Lab Units 08/27/24 1925   WBC Thousand/uL 6.15   HEMOGLOBIN g/dL 11.0*   HEMATOCRIT % 33.5*   PLATELETS Thousands/uL 207   SEGS PCT % 66   LYMPHO PCT % 24   MONO PCT % 7   EOS PCT % 2     Results from last 7 days   Lab Units 08/27/24  1925   SODIUM mmol/L 136   POTASSIUM mmol/L 3.9   CHLORIDE mmol/L 102   CO2 mmol/L 27   BUN mg/dL 26*   CREATININE mg/dL 1.05   ANION GAP mmol/L 7   CALCIUM mg/dL 9.4   ALBUMIN g/dL 4.0   TOTAL BILIRUBIN mg/dL 0.48   ALK PHOS U/L 50   ALT U/L 7   AST U/L 12* "   GLUCOSE RANDOM mg/dL 122             Lab Results   Component Value Date/Time    HGBA1C 7.0 (H) 02/12/2024 08:29 AM    HGBA1C 6.5 (H) 10/11/2023 08:00 AM    HGBA1C 6.5 (H) 06/02/2023 07:57 AM    HGBA1C 6.3 (H) 08/27/2022 08:47 AM    HGBA1C 6.5 (H) 12/15/2021 09:03 AM    HGBA1C 6.6 (H) 06/14/2021 08:12 AM               Imaging: I have personally reviewed pertinent reports.      No orders to display       EKG, Pathology, and Other Studies Reviewed on Admission:   EKG: see above documentation    ** Please Note: This note has been constructed using a voice recognition system. **

## 2024-08-28 NOTE — PLAN OF CARE
Problem: Alteration in Thoughts and Perception  Goal: Verbalize thoughts and feelings  Description: Interventions:  - Promote a nonjudgmental and trusting relationship with the patient through active listening and therapeutic communication  - Assess patient's level of functioning, behavior and potential for risk  - Engage patient in 1 on 1 interactions  - Encourage patient to express fears, feelings, frustrations, and discuss symptoms    - Jeff patient to reality, help patient recognize reality-based thinking   - Administer medications as ordered and assess for potential side effects  - Provide the patient education related to the signs and symptoms of the illness and desired effects of prescribed medications  Outcome: Progressing  Goal: Agree to be compliant with medication regime, as prescribed and report medication side effects  Description: Interventions:  - Offer appropriate PRN medication and supervise ingestion; conduct AIMS, as needed   Outcome: Progressing  Goal: Attend and participate in unit activities, including therapeutic, recreational, and educational groups  Description: Interventions:  -Encourage Visitation and family involvement in care  Outcome: Progressing  Goal: Recognize dysfunctional thoughts, communicate reality-based thoughts at the time of discharge  Description: Interventions:  - Provide medication and psycho-education to assist patient in compliance and developing insight into his/her illness   Outcome: Progressing  Goal: Complete daily ADLs, including personal hygiene independently, as able  Description: Interventions:  - Observe, teach, and assist patient with ADLS  - Monitor and promote a balance of rest/activity, with adequate nutrition and elimination   Outcome: Progressing     Problem: Ineffective Coping  Goal: Demonstrates healthy coping skills  Outcome: Progressing  Goal: Patient/Family participate in treatment and DC plans  Description: Interventions:  - Provide therapeutic  environment  Outcome: Progressing  Goal: Patient/Family verbalizes awareness of resources  Outcome: Progressing     Problem: Depression  Goal: Treatment Goal: Demonstrate behavioral control of depressive symptoms, verbalize feelings of improved mood/affect, and adopt new coping skills prior to discharge  Outcome: Progressing  Goal: Verbalize thoughts and feelings  Description: Interventions:  - Assess and re-assess patient's level of risk   - Engage patient in 1:1 interactions, daily, for a minimum of 15 minutes   - Encourage patient to express feelings, fears, frustrations, hopes   Outcome: Progressing  Goal: Refrain from harming self  Description: Interventions:  - Monitor patient closely, per order   - Supervise medication ingestion, monitor effects and side effects   Outcome: Progressing  Goal: Refrain from isolation  Description: Interventions:  - Develop a trusting relationship   - Encourage socialization   Outcome: Progressing  Goal: Refrain from self-neglect  Outcome: Progressing  Goal: Attend and participate in unit activities, including therapeutic, recreational, and educational groups  Description: Interventions:  - Provide therapeutic and educational activities daily, encourage attendance and participation, and document same in the medical record   Outcome: Progressing  Goal: Complete daily ADLs, including personal hygiene independently, as able  Description: Interventions:  - Observe, teach, and assist patient with ADLS  -  Monitor and promote a balance of rest/activity, with adequate nutrition and elimination   Outcome: Progressing     Problem: Anxiety  Goal: Anxiety is at manageable level  Description: Interventions:  - Assess and monitor patient's anxiety level.   - Monitor for signs and symptoms (heart palpitations, chest pain, shortness of breath, headaches, nausea, feeling jumpy, restlessness, irritable, apprehensive).   - Collaborate with interdisciplinary team and initiate plan and interventions  as ordered.  - [place] patient to unit/surroundings  - Explain treatment plan  - Encourage participation in care  - Encourage verbalization of concerns/fears  - Identify coping mechanisms  - Assist in developing anxiety-reducing skills  - Administer/offer alternative therapies  - Limit or eliminate stimulants  Outcome: Progressing

## 2024-08-28 NOTE — ED NOTES
Limited discharges anticipated for today in-network.  List of alternate options was prepared and presented to patient (daughter stepped out).  Advised patient of list and encouraged her to have her daughter review options upon return and reach out for Crisis as needed with questions or concerns.  At this time, patient / family requests we defer consideration of alternative options until in-network placement options are entirely exhausted for the day.   normal appearance , without tenderness upon palpation , no deformities , trachea midline

## 2024-08-28 NOTE — ASSESSMENT & PLAN NOTE
Admission labs: CBC, CMP, TSH acceptable   Folate, B12, Vitamin D 25 hydroxy labs pending  Vitals stable   UA unremarkable   UDS positive for   EKG reveals NSR, 77 bpm   Patient is medically cleared for admission to [facility] and treatment of underlying psychiatric illness based on available results  Please contact [facility]IM with any questions or concerns

## 2024-08-28 NOTE — ASSESSMENT & PLAN NOTE
"Lab Results   Component Value Date    HGBA1C 7.0 (H) 02/12/2024       No results for input(s): \"POCGLU\" in the last 72 hours.    Blood Sugar Average: Last 72 hrs:  Takes Metformin 500 mg BID at home  Will continue with Metformin 500 mg daily for now   "

## 2024-08-28 NOTE — ED NOTES
Patient and family advised of final disposition and plan.  All questions and concerns addressed.  Patient is anxious about family not being permitted on the Unit. Support and explanation offered.  Unit details and contact number provided.  All parties voiced support and understanding.  EMTALA completed.  Transfer packet prepared with copies and on patient chart.  ED MD and RN advised.

## 2024-08-28 NOTE — ED NOTES
[facility].  Patient is accepted by Dr. Hensley with orders by Marjorie Pierson.     Transportation is arranged with [facility].     Transportation is scheduled for 1300 with [facility].  Intake aware.  Transfer packet prepared.     Patient may go to the floor pending requested  time of 1300.  Family requests BLS as opposed to CTS due to age related concerns and comfort / security.          Nurse report is to be called to [phone] prior to patient transfer.

## 2024-08-28 NOTE — NURSING NOTE
Patient arrived via stretcher from [facility] ED under 201 status. Patient stated her anxiety has been uncontrolled and feeling very depressed. Wakes up in a state of panic most day and is not sleeping well. Also has been very tearful and has asked daughter to take to her to the emergency room at once a week and will change her mind on the way to hospital. Does have out patient psychiatry service and reported Prozac was increased to 60 mg but  was experiencing dizziness and was put back on 40 mg. Currently denies  SI/HI. Resides with her daughter and also list son as a support system. Oriented to unit and will monitor for safety and support.

## 2024-08-28 NOTE — ED NOTES
Anticipated review at [facility] per Intake.  Patient and family updated and voiced support.

## 2024-08-28 NOTE — ED NOTES
"Intake / safety assessment was completed with patient and patient's daughter who is here with patient.  Patient's daughter is very supportive.      Patient presents to the ED due to worsening anxiety / depression. Patient reports increased panic attacks. Patient reports that her anxiety has been \"crippling\" where she can no longer perform activities (helping with household chores i.e the laundry) or doing things that she used to find enjoyable. According to patient she will often wake up feeling anxious which effects her whole day. Patient reports being prescribed prozac and risperidone by her psychiatrist which she takes as prescribed. Lately patient notes that the medication has not been helpful. Recently patient reports that her prozac was increased but she did not tolerate the increased dosage. In addition patient reports taking ativan as needed for the panic attacks; however patient and her daughter both endorse that they patient has been taking it essentially around-the-clock as she is unable to cope without it. Patient reports decreased sleep and appetite.     Patient denies any suicidal or homicidal ideation or hallucinations. Patient did report that she thought she was seeing things in the past, but they turned out to be nothing. Patient reports one inpatient hospitalization in the past which was about 10 years ago due to increased anxiety which she reports was following a surgery.    Patient's family is very supportive. Patient lives with her daughter and her daughter's family. Patient also has the support of her son. Patient is alert and oriented. Patient has insight and her judgement is good . Patient states she just wants to feel like herself again and be able to enjoy the things that she used to enjoy without feeling anxious. Patient's daughter reports that patient often wakes up very anxious and is tearful throughout the day. Patient is currently being followed by [facility]. Patient denied " any use of alcohol or substances.     Patient and family requesting inpatient treatment.  Patient has some anxiety related to being hospitalized, but feels that this is what she needs to help her feel better and to adjust her medication.      in agreement with inpatient.  201 reviewed which patient and signed.  Rights reviewed.      Patient and family prefer in network bed.

## 2024-08-28 NOTE — TREATMENT TEAM
"   08/28/24 1556   Spann Anxiety Scale   Anxious Mood 2   Tension 2   Fears 3   Insomnia 0   Intellectual 0   Depressed Mood 0   Somatic Complaints: Muscular 0   Somatic Complaints: Sensory 0   Cardiovascular Symptoms 0   Respiratory Symptoms 0   Gastrointestinal Symptoms 0   Genitourinary Symptoms 0   Autonomic Symptoms 2   Behavior at Interview 3   Spann Anxiety Score 12     Pt was tearful, anxious and stated that \"her room mate told her a horrible story\". Prn Atarax 25 mg give and will follow up effectiveness.    "

## 2024-08-29 ENCOUNTER — APPOINTMENT (INPATIENT)
Dept: CT IMAGING | Facility: HOSPITAL | Age: 84
DRG: 880 | End: 2024-08-29
Payer: MEDICARE

## 2024-08-29 PROBLEM — F03.90 MAJOR NEUROCOGNITIVE DISORDER (HCC): Status: ACTIVE | Noted: 2024-08-29

## 2024-08-29 PROCEDURE — 97163 PT EVAL HIGH COMPLEX 45 MIN: CPT

## 2024-08-29 PROCEDURE — 70450 CT HEAD/BRAIN W/O DYE: CPT

## 2024-08-29 PROCEDURE — 99223 1ST HOSP IP/OBS HIGH 75: CPT | Performed by: STUDENT IN AN ORGANIZED HEALTH CARE EDUCATION/TRAINING PROGRAM

## 2024-08-29 PROCEDURE — 99222 1ST HOSP IP/OBS MODERATE 55: CPT | Performed by: NURSE PRACTITIONER

## 2024-08-29 RX ORDER — QUETIAPINE FUMARATE 25 MG/1
12.5 TABLET, FILM COATED ORAL
Status: DISCONTINUED | OUTPATIENT
Start: 2024-08-29 | End: 2024-08-30

## 2024-08-29 RX ORDER — DONEPEZIL HYDROCHLORIDE 5 MG/1
5 TABLET, FILM COATED ORAL
Status: DISCONTINUED | OUTPATIENT
Start: 2024-08-29 | End: 2024-09-04 | Stop reason: HOSPADM

## 2024-08-29 RX ADMIN — QUETIAPINE FUMARATE 12.5 MG: 25 TABLET ORAL at 21:06

## 2024-08-29 RX ADMIN — DOCUSATE SODIUM 100 MG: 100 CAPSULE, LIQUID FILLED ORAL at 08:18

## 2024-08-29 RX ADMIN — ASPIRIN 81 MG CHEWABLE TABLET 81 MG: 81 TABLET CHEWABLE at 08:18

## 2024-08-29 RX ADMIN — DOCUSATE SODIUM 100 MG: 100 CAPSULE, LIQUID FILLED ORAL at 17:08

## 2024-08-29 RX ADMIN — DONEPEZIL HYDROCHLORIDE 5 MG: 5 TABLET, FILM COATED ORAL at 21:06

## 2024-08-29 RX ADMIN — RISPERIDONE 0.25 MG: 0.25 TABLET, FILM COATED ORAL at 08:18

## 2024-08-29 RX ADMIN — Medication 3 MG: at 21:06

## 2024-08-29 RX ADMIN — METFORMIN HYDROCHLORIDE 500 MG: 500 TABLET ORAL at 08:18

## 2024-08-29 RX ADMIN — ATORVASTATIN CALCIUM 10 MG: 10 TABLET, FILM COATED ORAL at 17:08

## 2024-08-29 RX ADMIN — LISINOPRIL 10 MG: 10 TABLET ORAL at 08:18

## 2024-08-29 RX ADMIN — HYDROXYZINE HYDROCHLORIDE 25 MG: 25 TABLET ORAL at 05:23

## 2024-08-29 RX ADMIN — FLUOXETINE HYDROCHLORIDE 40 MG: 20 CAPSULE ORAL at 08:18

## 2024-08-29 NOTE — TREATMENT TEAM
"Pt completed admission self assessment. (See copy in chart)  Pt signed.  Reviewed group schedule and encouraged attendance when able. Pt stated biggest stressor leading to admission \"anxiety..as. soon as I open my eyes my heart starts pounding.  I see a poor day and not looking to move forward to it..  I want to be there for my Grandchildren.\"  (Pt also expressed concern about \"improving concentration and memory\" stating that she could not remember her daughter's last name yesterday and it took longer than normal to eventually remember.  Pt enjoys reading fiction and tennis.  Encouraged pt to speak with Dr and make needs known.           08/29/24 1045   Activity/Group Checklist   Group Admission/Discharge   Attendance Attended   Attendance Duration (min) 16-30   Interactions Interacted appropriately   Affect/Mood Blunted/flat   Goals Achieved Identified feelings;Identified triggers;Able to listen to others;Discussed coping strategies;Discussed self-esteem issues;Able to self-disclose;Able to recieve feedback       "

## 2024-08-29 NOTE — NURSING NOTE
Pt anxious with constricted affect.  Appetite fair.  Attended group.  VSS.  Ambulates with slow, steady gait.  Monitored for safety and support.

## 2024-08-29 NOTE — PLAN OF CARE
Pt attended group and able to self reflect.     Problem: Alteration in Thoughts and Perception  Goal: Attend and participate in unit activities, including therapeutic, recreational, and educational groups  Description: Interventions:  -Encourage Visitation and family involvement in care  Outcome: Progressing

## 2024-08-29 NOTE — CASE MANAGEMENT
Case Management Assessment    Patient name Sandra Peña  Location /-01 MRN [id number]  : 1940 Date 2024       Current Admission Date: 2024  Current Admission Diagnosis:Generalized anxiety disorder   Patient Active Problem List    Diagnosis Date Noted Date Diagnosed    Major neurocognitive disorder (HCC) 2024     Anemia 2024     Stage 3 chronic kidney disease, unspecified whether stage 3a or 3b CKD (HCC) 2024     Constipation 2024     Major depressive disorder, recurrent episode, mild (HCC) 2024     Dry scalp 10/24/2022     Balance problem 2022     Uric acid kidney stone 2022     Illness anxiety disorder 2022     Generalized anxiety disorder 2022     Mild neurocognitive disorder 2022     Chronic idiopathic constipation 2020     Generalized osteoarthritis 2020     Medical clearance for psychiatric admission 2019     Change in bowel habits 2018     Insomnia 2014     Adenomatous polyp of colon 2013     Primary hypertension 10/02/2013     Type 2 diabetes mellitus without complication, without long-term current use of insulin ([facility]) 10/02/2013     Mixed hyperlipidemia 10/02/2013       LOS (days): 1  Geometric Mean LOS (GMLOS) (days):   Days to GMLOS:     OBJECTIVE:    Risk of Unplanned Readmission Score: 20.04         Current admission status: Inpatient Psych  Referral Reason: Psych    Preferred Pharmacy:   [facility] [id number]  [address] [address]  Phone: [phone] Fax: [phone]    Primary Care Provider: Ramin De Paz DO    Primary Insurance: MEDICARE  Secondary Insurance: AARP    ASSESSMENT:  Active Health Care Proxies    There are no active Health Care Proxies on file.                 Readmission Root Cause  30 Day Readmission: No    Patient Information  Mental Status: Alert  Primary Caregiver: Family              Patient  Information Continued  Income Source: SSI/SSD  Current Status:: 201         Means of Transportation  Means of Transport to Appts:: Family transport      Social Determinants of Health (SDOH)      Flowsheet Row Most Recent Value   Housing Stability    In the last 12 months, was there a time when you were not able to pay the mortgage or rent on time? N   In the past 12 months, how many times have you moved where you were living? 0   At any time in the past 12 months, were you homeless or living in a shelter (including now)? N   Transportation Needs    In the past 12 months, has lack of transportation kept you from medical appointments or from getting medications? no   In the past 12 months, has lack of transportation kept you from meetings, work, or from getting things needed for daily living? No   Food Insecurity    Within the past 12 months, you worried that your food would run out before you got the money to buy more. Never true   Within the past 12 months, the food you bought just didn't last and you didn't have money to get more. Never true   Utilities    In the past 12 months has the electric, gas, oil, or water company threatened to shut off services in your home? No        Biopsychosocial Assessment  Older Adult Behavioral Health Unit  Social Work Case Management Note  Paola Connelly [facility]    Summary:   CM met with the patient to initiate assessment. Reviewed the admission and the role of CM. 83 y.o.  female, , living w/ her daughter, son in law and three grandkids, retired teacher, w/ PPH of MDD, JONAH, illness anxiety disorder and insomnia, one prior psychiatric admission in 2013 (extreme anxiety following a surgery), no prior SA, no h/o self-injurious behavior, f/u at [facility] with Dr. Gutierrez, who presented to the ED on 8/27/24 due to increased anxiety and depression. The patient signed 201 and got admitted to the inpatient psychiatry unit 6B for further psychiatric stabilization.    Sandra is calm,  "cooperative and well related, dressed in hospital attire, w/ involuntary movements around her mouth, good eye contact, anxious mood, constricted affect, talking in normal tone, volume and amount, somatically preoccupied with ruminations, fair insight and judgement and often tearful throughout the assessment. She reported that at home she has crying spells that she cannot control and does not know why she is crying.     Patient Name: Sandra Peña    Address: [address] [place] [address] [address] [place] [place] [address]    Phone Number: [phone]    Pharmacy: [facility] [address] [[id number]]    Insurance: Medicare A & B; AARP    /Age: 83 y.o. - 1940    Admission Date: 24    Diagnosis/D&A: Major depressive disorder, recurrent episode, mild    County: [place]    Commitment: 201     Admitted from: [facility] ED    POA/Guardian: denies    Living Situation: lives with daughter, son in law, daughters father in law, and three grandkids    Access to firearms: denies    Presenting Problem: Per [facility] ED crisis worker, Jenise Colón, “Intake / safety assessment was completed with patient and patient's daughter who is here with patient.  Patient's daughter is very supportive.  Patient presents to the ED due to worsening anxiety / depression. Patient reports increased panic attacks. Patient reports that her anxiety has been \"crippling\" where she can no longer perform activities (helping with household chores i.e the laundry) or doing things that she used to find enjoyable. According to patient she will often wake up feeling anxious which effects her whole day. Patient reports being prescribed prozac and risperidone by her psychiatrist which she takes as prescribed. Lately patient notes that the medication has not been helpful. Recently patient reports that her prozac was increased but she did not tolerate the increased dosage. In addition patient reports taking ativan as needed for the panic " attacks; however patient and her daughter both endorse that they patient has been taking it essentially around-the-clock as she is unable to cope without it. Patient reports decreased sleep and appetite. Patient denies any suicidal or homicidal ideation or hallucinations. Patient did report that she thought she was seeing things in the past, but they turned out to be nothing. Patient reports one inpatient hospitalization in the past which was about 10 years ago due to increased anxiety which she reports was following a surgery.Patient's family is very supportive. Patient lives with her daughter and her daughter's family. Patient also has the support of her son. Patient is alert and oriented. Patient has insight and her judgement is good . Patient states she just wants to feel like herself again and be able to enjoy the things that she used to enjoy without feeling anxious. Patient's daughter reports that patient often wakes up very anxious and is tearful throughout the day. Patient is currently being followed by [facility]. Patient denied any use of alcohol or substances. Patient and family requesting inpatient treatment.  Patient has some anxiety related to being hospitalized, but feels that this is what she needs to help her feel better and to adjust her medication.  in agreement with inpatient.  201 reviewed which patient and signed.  Rights reviewed.  Patient and family prefer in network bed.”      Triggers for Hospitalization: anxiety, depression, wakes up with heart pounding, can't breathe, crying spells    Signs, symptoms, decompensation pattern: crying    Previous psychiatric treatment: reports 10 years ago psych admission    Psychiatrist: [facility] Douglas Gutierrez    Therapist: GEMMA Mack for group and Shagufta Cross for individual    ACT/ICM/CPS/WRT/SC: none    PCP: [facility]  history: denies    History of physical aggression/violence: denies    History of self-harming behaviors, suicide attempts: denies    Current family, children, significant relationships: 2 children, 3 grandchildren    Childhood/Adolescent history: none reported    Cultural/Spiritual/Worldview considerations: Cheondoism    Legal Issues (past/present): denies    : no  Education: BA education from [employer]  Employment/Vocational (past/present): 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  Transportation: family transport    Financial/Benefit Information/Rep-Payee: SSI and pension    Medical history: see medical chart    Substance Use/Tobacco Use: denies  UDS/Identified Substance(s) used: negative    Trauma/Abuse/Losses: denies    Coping Skills: deal with it or talk to daughter    Strengths/Supports/Protective Factors: supportive family    Barriers/Limitations in Recovery: lack of understanding with her anxiety    Patient identified goals for treatment: get rid of her anxiety and depression    ANNE's obtained: Ramin Peña (son) [phone]; Janay Mckeon (daughter) [phone]; [facility]; [facility]; [facility]     Discharge Disposition: referral for outpatient medication management with a psychiatrist, referral for outpatient psychotherapy, return to previous living arrangement

## 2024-08-29 NOTE — NURSING NOTE
3 attempts(2 by BHT and 1 by RN) for labs without success. Patient will be encouraged to increase fluids today.

## 2024-08-29 NOTE — NURSING NOTE
Patient visible and pleasant. Patient seen tearful after CT scan with some disorganized speech. Patient is med compliant. Denies psych s/s. Patient VSS. Continual safety checks ongoing

## 2024-08-29 NOTE — PROGRESS NOTES
08/29/24 1452   Referral Data   Referral Reason Psych   County Information   County of Residence [place]   Readmission Root Cause   30 Day Readmission No   Patient Information   Mental Status Alert   Primary Caregiver Family   Support System Immediate family   Mormon/Cultural Requests Cheondoism   Legal Information   Tx Plan Signed Yes   Current Status: 201   Legal Issues denies   Health Care Proxy Appointed No   Activities of Daily Living Prior to Admission   Functional Status Independent   Assistive Device No device needed   Living Arrangement Lives with someone  (Lives with family)   Ambulation Independent   Access to Firearms   Access to Firearms No   Income Information   Income Source SSI/SSD   Means of Transportation   Means of Transport to Appts: Family transport

## 2024-08-29 NOTE — PROGRESS NOTES
Pt attended group and able to self express when prompted.      08/29/24 1000   Activity/Group Checklist   Group Other (Comment)  (Community Meeting: group rules and respect)   Attendance Attended   Attendance Duration (min) 31-45   Interactions Other (Comment)  (anxious)   Affect/Mood Blunted/flat   Goals Achieved Identified feelings;Identified triggers;Identified relapse prevention strategies;Able to listen to others;Able to engage in interactions;Able to reflect/comment on own behavior;Able to manage/cope with feelings;Able to self-disclose;Discussed self-esteem issues;Discussed coping strategies

## 2024-08-29 NOTE — PROGRESS NOTES
08/29/24 1330   Activity/Group Checklist   Group Life Skills  (task 52 things to try once in life.)   Attendance Attended  (was excused by nurse mid session for procedure.)   Attendance Duration (min) 31-45   Interactions Interacted appropriately  (soft spoken and was able to connect with the task on life experinces yet only spoke when directly prompted.)   Affect/Mood Constricted   Goals Achieved Able to listen to others;Able to engage in interactions;Able to self-disclose

## 2024-08-29 NOTE — TREATMENT PLAN
TREATMENT PLAN REVIEW - Behavioral Health Sandra Peña 83 y.o. 1940 female MRN: [id number]    [facility] 6B [facility] Room / Bed: [facility] 655/[facility] 655-01 Encounter: [id number]          Admit Date/Time:  8/28/2024  1:36 PM    Treatment Team:   MD Neha Bradshaw CRNP Stephanie Jean-Louis Terry L Trittenbach, COTA Michael Micek, RN Stacie Searle, [facility]    Diagnosis: Principal Problem:    Generalized anxiety disorder  Active Problems:    Illness anxiety disorder    Major neurocognitive disorder (HCC)    Major depressive disorder, recurrent episode, mild (HCC)    Primary hypertension    Type 2 diabetes mellitus without complication, without long-term current use of insulin (HCC)    Mixed hyperlipidemia    Medical clearance for psychiatric admission    Stage 3 chronic kidney disease, unspecified whether stage 3a or 3b CKD (HCC)    Anemia      Patient Strengths/Assets: cooperative, communication skills, family ties, patient is on a voluntary commitment    Patient Barriers/Limitations: difficulty adapting, impaired cognition, limited support system, poor reasoning ability    Short Term Goals: decrease in depressive symptoms, decrease in anxiety symptoms, ability to stay safe on the unit, ability to stay free of restraints, improvement in ability to express basic needs, improvement in insight, improvement in reality testing, improvement in reasoning ability, improvement in self care, sleep improvement, mood stabilization, increase in group attendance    Long Term Goals: improvement in depression, improvement in anxiety, stabilization of mood, free of suicidal thoughts, free of homicidal thoughts, no self abusive behavior, improvement in reality testing, improved insight, able to express basic needs, acceptance of need for psychiatric medications, adequate sleep, adequate appetite, appropriate interaction with peers, stable living arrangements upon discharge,  establishment of family support upon discharge    Progress Towards Goals: starting psychiatric medications as prescribed    Recommended Treatment: medication management, patient medication education, group therapy, milieu therapy, continued Behavioral Health psychiatric evaluation/assessment process    Treatment Frequency: daily medication monitoring, group and milieu therapy daily, monitoring through interdisciplinary rounds, monitoring through weekly patient care conferences    Expected Discharge Date:  14 days    Discharge Plan: referral for outpatient medication management with a psychiatrist, referral for outpatient psychotherapy, return to previous living arrangement    Treatment Plan Created/Updated By: Nohemi Hensley MD

## 2024-08-29 NOTE — CMS CERTIFICATION NOTE
Certification: Based upon physical, mental and social evaluations, I certify that inpatient psychiatric services are medically necessary for this patient for a duration of 21 midnights for the treatment of Generalized anxiety disorder  Available alternative community resources do not meet the patient's mental health care needs.  I further attest that an established written individualized plan of care has been implemented and is outlined in the patient's medical records.

## 2024-08-29 NOTE — ASSESSMENT & PLAN NOTE
Continue Lipitor    Klisyri Pregnancy And Lactation Text: It is unknown if this medication can harm a developing fetus or if it is excreted in breast milk.

## 2024-08-29 NOTE — ASSESSMENT & PLAN NOTE
ED labs: CBC, CMP, TSH acceptable   Folate, B12, Vitamin D 25 hydroxy labs pending  Vitals stable   UA unremarkable   UDS negative   EKG reveals NSR, 77 bpm   Patient is medically cleared for admission to BHU and treatment of underlying psychiatric illness based on available results  Please contact [facility] with any questions or concerns

## 2024-08-29 NOTE — H&P
Psychiatric Evaluation - Behavioral Health     Identification Data:Sandra Peña 83 y.o. female MRN: [id number]  Unit/Bed#: [facility] 655-01 Encounter: [id number]    Assessment & Plan     Principal Problem:    Generalized anxiety disorder  Active Problems:    Illness anxiety disorder    Major neurocognitive disorder (HCC)    Major depressive disorder, recurrent episode, mild (HCC)    Primary hypertension    Type 2 diabetes mellitus without complication, without long-term current use of insulin (HCC)    Mixed hyperlipidemia    Medical clearance for psychiatric admission    Stage 3 chronic kidney disease, unspecified whether stage 3a or 3b CKD (HCC)    Anemia    Plan:   Risks, benefits and possible side effects of Medications:   Risks, benefits, and possible side effects of medications explained to patient and patient verbalizes understanding.       - Encourage early mobility and having a structured day  - Provide frequent re-orientation, and cognitive stimulation  - Ensure assistive devices are in proper working order (eye-glasses, hearing aids)  - Encourage adequate hydration, nutrition and monitor bowel movements  - Maintain sleep-wake cycle: Uninterrupted sleep time; low-level lighting at night  - Fall precaution  - f/u SLIM recs regarding the medical problems   - f/u labs  - Continue medication titration and treatment plan; adjust medication to optimize treatment response and as clinically indicated.     Scheduled medications:  Current Facility-Administered Medications   Medication Dose Route Frequency Provider Last Rate    acetaminophen  650 mg Oral Q4H PRN PATIENCE Parker      acetaminophen  650 mg Oral Q4H PRN PATIENCE Parker      acetaminophen  975 mg Oral Q6H PRN PATIENCE Parker      aspirin  81 mg Oral Daily PATIENCE Ardon      atorvastatin  10 mg Oral Daily With Dinner PATIENCE Ardon      docusate sodium  100 mg Oral BID PATIENCE Ardon      donepezil   5 mg Oral HS Nohemi Hensley MD      [START ON 8/30/2024] FLUoxetine  50 mg Oral Daily With Breakfast Nohemi Hensley MD      haloperidol lactate  5 mg Intramuscular Q4H PRN Max 4/day Marjorie Barroso, CRNP      hydrOXYzine HCL  25 mg Oral Q6H PRN Max 4/day Marjorie Barroso, CRNP      hydrOXYzine HCL  50 mg Oral Q6H PRN Max 4/day Marjorie Barroso, CRNP      lisinopril  10 mg Oral Daily Neha Sandra Bearden, CRNP      LORazepam  1 mg Intramuscular Q6H PRN Max 3/day aMrjorie Barroso, CRNP      LORazepam  1 mg Oral Q6H PRN Max 3/day Marjorie Barroso, CRNP      melatonin  3 mg Oral HS Marjorie Barroso, CRNP      metFORMIN  500 mg Oral Daily With Breakfast Neha Bearden, CRNP      polyethylene glycol  17 g Oral Daily PRN Marjorie Barroso, CRNP      QUEtiapine  12.5 mg Oral HS Nohemi Hensley MD      risperiDONE  0.25 mg Oral Q4H PRN Max 6/day Marjorie Barroso, CRNP      risperiDONE  0.5 mg Oral Q4H PRN Max 3/day Marjorie Barroso, CRNP      risperiDONE  1 mg Oral Q2H PRN Max 3/day Marjorie Barroso, CRNP      senna-docusate sodium  1 tablet Oral Daily PRN Marjorie Barroso, CRNP      traZODone  50 mg Oral HS PRN Marjorie Barroso, CRNP          PRN:    acetaminophen    acetaminophen    acetaminophen    haloperidol lactate    hydrOXYzine HCL    hydrOXYzine HCL    LORazepam    LORazepam    polyethylene glycol    risperiDONE    risperiDONE    risperiDONE    senna-docusate sodium    traZODone    Diet:       Diet Orders   (From admission, onward)                 Start     Ordered    08/28/24 1412  Diet Regular; Regular House  Diet effective now        References:    Adult Nutrition Support Algorithm    RD Therapeutic Diet Order Protocol   Question Answer Comment   Diet Type Regular    Regular Regular House    RD to adjust diet per protocol? Yes        08/28/24 1411                     - Observation: routine            - VS: as per unit protocol  - Legal status: 201  - Psychoeducation (benefits and potential risks) discussed,  "importance of compliance with the psychiatric treatment reiterated, and the patient verbalized understanding of the matter  - Encourage group attendance and milieu therapy   - The pt was educated and agreed to verbalize any suicidal thoughts, frustrations or concerns to the nursing staff, immediately.   - Dispo: To be determined         Chief Complaint: \"I committed myself to take care of my anxiety and sadness.\"    History of Present Illness     Sandra Peña is a 83 y.o.  female, , domiciled w/ her daughter, son in law and three grandkids, retired teacher, w/ PMH of DM, HLD, DVT, CKD, anemia, OA, TIA and s/p rotator cuff surgery and PPH of MDD, JONAH, illness anxiety disorder and insomnia, one prior psychiatric admission in 2013 (extreme anxiety following a surgery), no prior SA, no h/o self-injurious behavior, f/u at [facility] with Dr. Gutierrez, on Prozac 40 mg daily, Risperdal 0.25 mg daily, Klonopin 0.25 mg nightly and Atarax PRN who presented to the ED on 8/27/24 due to increased anxiety and depression. The patient signed 201 and got admitted to the inpatient psychiatry unit 6B for further psychiatric stabilization.      The patient was visited on the unit; chart reviewed. Presented calm, cooperative and well related, dressed in hospital attire, w/ involuntary movements around his mouth, good eye contact, anxious mood, constricted affect, talking in normal tone, volume and amount, somatically preoccupied with ruminations, fair insight and judgement. She stated: \"I committed myself to take care of my anxiety and sadness.\" Noted that she has been more anxious and depressed over past couple of months, and after talked with his psychiatrist, reportedly Prozac was increased from 40 mg to 60 mg but she felt \"dizzy\" and continued on 40 mg daily. She reported feeling anxious about \"different parts my [her] body\" mentioned her \"dry scalp\" and \"bump\" in her chest pointing to her sternal notch. She reported " "decreased appetite and energy with anhedonia, reportedly found it hard to do the chores at home, and has not been reading or doing puzzles as she used to. She reported difficulties with her memory, not being able to recall her daughter's last name when filling out the papers or not recalling the name of some people she knows. She noted that she sleeps in her grand-daughter's room, and usually goes to bed around 9-9:30 PM but wakes up ar east x2/night to go to the bathroom and then has difficulty falling back asleep and then gets out of the bed around 8 AM and feels tired during the day. Strongly denied SI/HI, intent or plan upon direct inquiry at this time. Denied A/VH. No manic sxs, paranoid ideations or fixed delusions were elicited.  Denied history of eating disorder or OCD, but reported some OCD traits as putting things in certain order.     Given the decline in SLUMS score (15/30 as checked today comparing to 24/30 as checked in outpatient office by Dr. Gutierrez on 7/29/24), delirium w/u including Head CT and labs ordered. Prozac is being uptitrated to 50 mg daily and Risperdal and Klonopin were discontinued. The patient is being started on Seroquel 12.5 mg nightly and Aricept 5 mg nightly; doses to be adjusted as indicated. May consider adding BuSpar or Neurontin vs. Cross titrating to Pristiq as was planned by the outpatient psychiatrist.     Psychiatric Review Of Systems:  Pertinent items are noted in HPI; all others negative      Historical Information     Past Psychiatric History:   Past Inpatient Psychiatric Treatment:   One past inpatient psychiatric admission in 2013  Past Outpatient Psychiatric Treatment:    Currently in outpatient psychiatric treatment with a psychiatrist Dr. Gutierrez at [facility]  Past Suicide Attempts: no  Past Violent Behavior: no  Past Psychiatric Medication Trials: multiple psychiatric medication trials including: \"Venlafaxine, sertraline, fluoxetine, escitalopram, aripiprazole " "(dizziness), risperidone, clonazepam, buspirone, gabapentin\" as per Dr. Gutierrez's note on 7/29/24    Substance Abuse History:  Denied smoking cigarettes, binge drinking alcohol or other illicit substance use.    Social History     Substance and Sexual Activity   Alcohol Use Never     Social History     Substance and Sexual Activity   Drug Use No         Family Psychiatric History:   Family History   Problem Relation Age of Onset    Depression Mother         w/anxiety    Diabetes Mother         Mellitus    Breast cancer Mother     Hypertension Mother     No Known Problems Father     Depression Sister         w/anxiety    Heart disease Sister         Cardiac Disorder    Breast cancer Sister     Hypertension Sister     Diabetes Brother         Mellitus    Alcohol abuse Son        Social History:  Social History     Socioeconomic History    Marital status:      Spouse name: Not on file    Number of children: Not on file    Years of education: Not on file    Highest education level: Not on file   Occupational History    Not on file   Tobacco Use    Smoking status: Never    Smokeless tobacco: Never   Vaping Use    Vaping status: Never Used   Substance and Sexual Activity    Alcohol use: Never    Drug use: No    Sexual activity: Not on file   Other Topics Concern    Not on file   Social History Narrative    Lack of exercise     Social Determinants of Health     Financial Resource Strain: Low Risk  (10/16/2023)    Overall Financial Resource Strain (CARDIA)     Difficulty of Paying Living Expenses: Not very hard   Food Insecurity: Not on file   Transportation Needs: No Transportation Needs (10/16/2023)    PRAPARE - Transportation     Lack of Transportation (Medical): No     Lack of Transportation (Non-Medical): No   Physical Activity: Not on file   Stress: Not on file   Social Connections: Not on file   Intimate Partner Violence: Not on file   Housing Stability: Not on file       Developmental:  Education:  Bachelor's " degree  Marital history:   Children: two children and three grand-children  Living arrangement, social support: daughter, son in law, three grandchildren and her daughter's father in law  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)  Access to firearms: denied    Traumatic History:   Abuse:none is reported  Other Traumatic Events:  N/A    Past Medical History:   Diagnosis Date    Anxiety     Colon polyps     Depression     Diabetes mellitus (HCC)     Dizziness     Last assessed: 8/31/15    DVT (deep venous thrombosis) (HCC)     Dysuria     Hematuria 04/21/2022    Hyperlipidemia     Hypertension     Hypertensive urgency 10/22/2021    Insomnia     Panic attack     Ureterolithiasis 05/22/2020       Medical Review Of Systems:  Pertinent items are noted in HPI; all others negative    Meds/Allergies   all current active meds have been reviewed, current meds:   Current Facility-Administered Medications   Medication Dose Route Frequency    acetaminophen (TYLENOL) tablet 650 mg  650 mg Oral Q4H PRN    acetaminophen (TYLENOL) tablet 650 mg  650 mg Oral Q4H PRN    acetaminophen (TYLENOL) tablet 975 mg  975 mg Oral Q6H PRN    aspirin chewable tablet 81 mg  81 mg Oral Daily    atorvastatin (LIPITOR) tablet 10 mg  10 mg Oral Daily With Dinner    docusate sodium (COLACE) capsule 100 mg  100 mg Oral BID    donepezil (ARICEPT) tablet 5 mg  5 mg Oral HS    [START ON 8/30/2024] FLUoxetine (PROzac) capsule 50 mg  50 mg Oral Daily With Breakfast    haloperidol lactate (HALDOL) injection 5 mg  5 mg Intramuscular Q4H PRN Max 4/day    hydrOXYzine HCL (ATARAX) tablet 25 mg  25 mg Oral Q6H PRN Max 4/day    hydrOXYzine HCL (ATARAX) tablet 50 mg  50 mg Oral Q6H PRN Max 4/day    lisinopril (ZESTRIL) tablet 10 mg  10 mg Oral Daily    LORazepam (ATIVAN) injection 1 mg  1 mg Intramuscular Q6H PRN Max 3/day    LORazepam (ATIVAN) tablet 1 mg  1 mg Oral Q6H PRN Max 3/day    melatonin tablet 3 mg  3 mg Oral HS    metFORMIN (GLUCOPHAGE)  tablet 500 mg  500 mg Oral Daily With Breakfast    polyethylene glycol (MIRALAX) packet 17 g  17 g Oral Daily PRN    QUEtiapine (SEROquel) tablet 12.5 mg  12.5 mg Oral HS    risperiDONE (RisperDAL) tablet 0.25 mg  0.25 mg Oral Q4H PRN Max 6/day    risperiDONE (RisperDAL) tablet 0.5 mg  0.5 mg Oral Q4H PRN Max 3/day    risperiDONE (RisperDAL) tablet 1 mg  1 mg Oral Q2H PRN Max 3/day    senna-docusate sodium (SENOKOT S) 8.6-50 mg per tablet 1 tablet  1 tablet Oral Daily PRN    traZODone (DESYREL) tablet 50 mg  50 mg Oral HS PRN   , and PTA meds:   Prior to Admission Medications   Prescriptions Last Dose Informant Patient Reported? Taking?   FLUoxetine (PROzac) 20 mg capsule   No Yes   Sig: Take 2 capsules (40 mg total) by mouth daily with breakfast   aspirin 81 MG tablet  Self Yes No   Sig: Take 81 mg by mouth daily   atorvastatin (LIPITOR) 10 mg tablet   No Yes   Sig: Take 1 tablet (10 mg total) by mouth daily   clonazePAM (KlonoPIN) 0.5 mg tablet   Yes Yes   Sig: Take 0.25 mg by mouth daily at bedtime   docusate sodium (COLACE) 100 mg capsule  Self Yes No   Sig: Take 100 mg by mouth 2 (two) times a day   fluocinonide (LIDEX) 0.05 % external solution   No No   Sig: Scalp solution- apply to affected area once a day as needed   hydrOXYzine HCL (ATARAX) 10 mg tablet   No Yes   Sig: Take 1 tablet (10 mg total) by mouth 2 (two) times a day as needed for anxiety for up to 40 doses   ketoconazole (NIZORAL) 2 % shampoo   No No   Sig: To the scalp leave on for 5 minutes then rinse off completely. Once a week   linaCLOtide 145 MCG CAPS   No Yes   Sig: Take 1 capsule (145 mcg total) by mouth in the morning   lisinopril-hydrochlorothiazide (PRINZIDE,ZESTORETIC) 10-12.5 MG per tablet   No Yes   Sig: Take 1 tablet by mouth daily   metFORMIN (GLUCOPHAGE) 500 mg tablet   No Yes   Sig: Take 1 tablet (500 mg total) by mouth 2 (two) times a day with meals   mometasone (ELOCON) 0.1 % lotion   No No   Sig: Apply topically daily    risperiDONE (RisperDAL) 0.25 mg tablet   No Yes   Sig: Take 1 tablet (0.25 mg total) by mouth 2 (two) times a day   triamcinolone (KENALOG) 0.1 % cream   No No   Sig: Apply under the breasts twice a day up to 2 weeks as needed for redness or itching      Facility-Administered Medications: None     No Known Allergies    Objective      Mental Status Evaluation:  Appearance and attitude: appeared as stated age, cooperative and attentive, dressed in hospital attire, with good hygiene  Eye contact: good  Motor Function: intact gait, No PMA/PMR, involuntary movements around the mouth visible in evaluation  Gait/station: slow  Speech: normal for rate, rhythm, volume, and latency with decreased amount  Language: No overt abnormality  Mood/affect: anxious / Affect was constricted but reactive, mood congruent  Thought Processes: slowing of thoughts, ruminating  Thought content: denied suicidal ideations or homicidal ideations, no overt delusions elicited, somatic preoccupation, obsessive thoughts, negative thinking, ruminations  Associations: perseverative  Perceptual disturbances: denies Auditory/Visual/Tactile Hallucinations  Orientation: oriented to time, person, place and to the situational context  Cognitive Function: intact  Memory: impaired recall; intact long-term - SLUMS: 15/30 (see the media)  Intellect: average  Fund of knowledge: diminished  Impulse control: good  Insight/judgment: fair/fair    Lab Results: I have personally reviewed pertinent lab results.        WBC   Date Value Ref Range Status   08/27/2024 6.15 4.31 - 10.16 Thousand/uL Final   02/11/2017 5.1 3.8 - 10.8 Thousand/uL Final     WBC, UA   Date Value Ref Range Status   02/12/2024 None Seen None Seen, 1-2 /hpf Final   03/16/2015 20-30 (A) None Seen,2-4 /hpf Final     MCV   Date Value Ref Range Status   08/27/2024 89 82 - 98 fL Final   08/27/2022 89.4 80.0 - 100.0 fL Final   02/11/2017 87.8 80.0 - 100.0 fL Final     Lab Results   Component Value Date  "   CREAT 99 02/11/2017    BUN 26 (H) 08/27/2024    SODIUM 136 08/27/2024    CO2 27 08/27/2024     Lab Results   Component Value Date    ALKPHOS 50 08/27/2024     No results found for: \"CPK\", \"CKMB\"  Lab Results   Component Value Date    TSH 2.32 01/09/2023     INR   Date Value Ref Range Status   04/20/2022 1.01 0.84 - 1.19 Final   05/21/2020 1.01 0.84 - 1.19 Final   02/28/2015 1.51 (H) 0.86 - 1.16 Final     Comment:     The above 2 analytes were performed by Robin  [address] [place], [place] [address] [address]     02/27/2015 1.23 (H) 0.86 - 1.16 Final     Comment:     The above 2 analytes [facility] [place] [address]       No results found for: \"APTT\"  No results found for: \"PHENO\"  Sodium   Date Value Ref Range Status   08/27/2024 136 135 - 147 mmol/L Final   01/09/2023 140 135 - 146 mmol/L Final     BUN   Date Value Ref Range Status   08/27/2024 26 (H) 5 - 25 mg/dL Final   01/09/2023 26 (H) 7 - 25 mg/dL Final     SL AMB BUN/CREATININE RATIO   Date Value Ref Range Status   01/09/2023 32 (H) 6 - 22 (calc) Final     Creatinine   Date Value Ref Range Status   08/27/2024 1.05 0.60 - 1.30 mg/dL Final     Comment:     Standardized to IDMS reference method   02/11/2017 0.73 0.60 - 0.93 mg/dL Final     Comment:     Result Comment: For patients >49 years of age, the reference limit  for Creatinine is approximately 13% higher for people  identified as -American.       Creatinine, Ur   Date Value Ref Range Status   02/12/2024 81.1 Reference range not established. mg/dL Final   12/04/2014 107.3 mg/dL Final     TSH   Date Value Ref Range Status   01/09/2023 2.32 0.40 - 4.50 mIU/L Final   09/10/2020 1.510 0.450 - 4.500 uIU/mL Final     TSH 3RD GENERATON   Date Value Ref Range Status   08/27/2024 1.408 0.450 - 4.500 uIU/mL Final     Comment:     The recommended reference ranges for TSH during pregnancy are as follows:   First trimester 0.100 to 2.500 uIU/mL   Second trimester  0.200 to 3.000 " "uIU/mL   Third trimester 0.300 to 3.000 uIU/m    Note: Normal ranges may not apply to patients who are transgender, non-binary, or whose legal sex, sex at birth, and gender identity differ.  Adult TSH (3rd generation) reference range follows the recommended guidelines of the American Thyroid Association, January, 2020.   02/11/2017 1.40 0.40 - 4.50 mIU/L Final     Comment:       Performed at: [facility]  JUANA RING MD, FCAP  [address] [place] [address]       TSH W/RFX TO FREE T4   Date Value Ref Range Status   08/27/2022 2.02 0.40 - 4.50 mIU/L Final     WBC   Date Value Ref Range Status   08/27/2024 6.15 4.31 - 10.16 Thousand/uL Final   02/11/2017 5.1 3.8 - 10.8 Thousand/uL Final     WBC, UA   Date Value Ref Range Status   02/12/2024 None Seen None Seen, 1-2 /hpf Final   03/16/2015 20-30 (A) None Seen,2-4 /hpf Final     No components found for: \"B12\"  No results found for: \"FOLATE\"  Lab Results   Component Value Date    RPR Non-Reactive (q 03/08/2015         Imaging Studies: Reviewed.  No orders to display       EKG, Pathology, and Other Studies: Reviewed. and Head CT Ordered    Code Status:Full code    Patient Strengths/Assets: cooperative, communication skills, family ties, patient is on a voluntary commitment    Patient Barriers/Limitations: difficulty adapting, impaired cognition, limited support system    Suicide/Homicide Risk Assessment:    Risk of Harm to Self:   Nursing Suicide Risk Assessment Last 24 hours: C-SSRS Risk (Since Last Contact)  Calculated C-SSRS Risk Score (Since Last Contact): No Risk Indicated  Current Specific Risk Factors include: current depressive symptoms, current anxiety symptoms, health problems  Protective Factors: no current suicidal ideation, ability to communicate with staff on the unit, able to contract for safety on the unit  Based on today's assessment, Sandra presents the following risk of harm to self: low    Risk of Harm to " Others:  Nursing Homicide Risk Assessment: Violence Risk to Others: Denies within past 6 months  Current Specific Risk Factors include: none  Protective Factors: no current homicidal ideation  Based on today's assessment, Sandra presents the following risk of harm to others: low    The following interventions are recommended: behavioral checks every 7 minutes, continued hospitalization on locked unit    Next of Kin  Extended Emergency Contact Information  Primary Emergency Contact: MikeBrittanyi  Address: [address] [place] [address]           DONTA ESCAMILLA [address] [facility]  Home Phone: [phone]  Mobile Phone: [phone]  Relation: Daughter  Secondary Emergency Contact: Davidcarlos alaRamin  Mobile Phone: [phone]  Relation: Son    Nohemi Hensley MD    Board Certified Diplomate of American Board of Psychiatry and Neurology  Attending Psychiatrist   [facility]    This note was completed in part utilizing Dragon dictation Software. Grammatical, translation, syntax errors, random word insertions, spelling mistakes, and incomplete sentences may be an occasional consequence of this system secondary to software limitations with voice recognition, ambient noise, and hardware issues. If you have any questions or concerns about the content, text, or information contained within the body of this dictation, please contact the provider for clarification.

## 2024-08-29 NOTE — PLAN OF CARE
Problem: Alteration in Thoughts and Perception  Goal: Agree to be compliant with medication regime, as prescribed and report medication side effects  Description: Interventions:  - Offer appropriate PRN medication and supervise ingestion; conduct AIMS, as needed   Outcome: Progressing     Problem: Depression  Goal: Refrain from isolation  Description: Interventions:  - Develop a trusting relationship   - Encourage socialization   Outcome: Progressing  Goal: Refrain from self-neglect  Outcome: Progressing

## 2024-08-29 NOTE — NURSING NOTE
"Patient is alert and visible in the milieu. Pleasant and able to make needs known. Patient is tearful and states \" I didn't realized this would be so strict\". Patient stated she had glasses she hadn't received back but they were in her room. Patient was also shown where her clothes are. Patient was provided emotional support. Reports anxiety and depression, denies all other psych s/s. Medication compliant and cooperative with care. Safety precautions maintained.  "

## 2024-08-29 NOTE — TREATMENT TEAM
08/29/24 0524   Spann Anxiety Scale   Anxious Mood 2   Tension 2   Fears 2   Insomnia 2   Intellectual 2   Depressed Mood 2   Somatic Complaints: Muscular 1   Somatic Complaints: Sensory 1   Cardiovascular Symptoms 0   Respiratory Symptoms 0   Gastrointestinal Symptoms 0   Genitourinary Symptoms 0   Autonomic Symptoms 0   Behavior at Interview 3   Spann Anxiety Score 17     Patient had am labs so she was awaken from sleep. Patient then became anxious as she thought it was a dream and she was unsure why and where she was at this time. Patient expressed fear. Medication administered after emotional support provided. Will monitor.

## 2024-08-29 NOTE — PROGRESS NOTES
08/29/24 1456   Team Meeting   Meeting Type Tx Team Meeting   Initial Conference Date 08/29/24   Next Conference Date 09/26/24   Team Members Present   Team Members Present Physician;Nurse;   Physician Team Member Dr Hensley   Nursing Team Member Sherie   Care Management Team Member Paola   Patient/Family Present   Patient Present Yes   Patient's Family Present No   OTHER   Team Meeting - Additional Comments Met with the patient to go over her treatment plan. Goals discussed were to have a decrease in depressive symptoms, decrease in anxiety symptoms, ability to stay safe on the unit, ability to stay free of restraints, improvement in ability to express basic needs, improvement in insight, improvement in reality testing, improvement in reasoning ability, improvement in self care, sleep improvement, mood stabilization, and an increase in group attendance. This can be attained through medication management, group/milieu therapy. Sandra agrees with her treatment plan and signed.

## 2024-08-29 NOTE — PROGRESS NOTES
08/29/24 0824   Team Meeting   Meeting Type Daily Rounds   Initial Conference Date 08/29/24   Next Conference Date 08/30/24   Team Members Present   Team Members Present Physician;Nurse;;   Physician Team Member SAYRA Hart   Nursing Team Member Negrita   Care Management Team Member Paola   Social Work Team Member Mellisa   Patient/Family Present   Patient Present No   Patient's Family Present No     201, reports anx, increase of prozac and did not tolerate well, utilizing atarax at home around the clock, atarax last night and this am effective, up throughout night, [facility], discharge pending med changes

## 2024-08-30 LAB
25(OH)D3 SERPL-MCNC: 40.4 NG/ML (ref 30–100)
ALBUMIN SERPL BCG-MCNC: 3.8 G/DL (ref 3.5–5)
ALP SERPL-CCNC: 43 U/L (ref 34–104)
ALT SERPL W P-5'-P-CCNC: 6 U/L (ref 7–52)
ANION GAP SERPL CALCULATED.3IONS-SCNC: 10 MMOL/L (ref 4–13)
AST SERPL W P-5'-P-CCNC: 12 U/L (ref 13–39)
ATRIAL RATE: 83 BPM
BASOPHILS # BLD AUTO: 0.03 THOUSANDS/ÂΜL (ref 0–0.1)
BASOPHILS NFR BLD AUTO: 1 % (ref 0–1)
BILIRUB SERPL-MCNC: 0.53 MG/DL (ref 0.2–1)
BUN SERPL-MCNC: 25 MG/DL (ref 5–25)
CALCIUM SERPL-MCNC: 9.3 MG/DL (ref 8.4–10.2)
CHLORIDE SERPL-SCNC: 100 MMOL/L (ref 96–108)
CO2 SERPL-SCNC: 26 MMOL/L (ref 21–32)
CREAT SERPL-MCNC: 0.98 MG/DL (ref 0.6–1.3)
EOSINOPHIL # BLD AUTO: 0.14 THOUSAND/ÂΜL (ref 0–0.61)
EOSINOPHIL NFR BLD AUTO: 2 % (ref 0–6)
ERYTHROCYTE [DISTWIDTH] IN BLOOD BY AUTOMATED COUNT: 13.1 % (ref 11.6–15.1)
FERRITIN SERPL-MCNC: 29 NG/ML (ref 11–307)
FOLATE SERPL-MCNC: >22.3 NG/ML
GFR SERPL CREATININE-BSD FRML MDRD: 52 ML/MIN/1.73SQ M
GLUCOSE P FAST SERPL-MCNC: 136 MG/DL (ref 65–99)
GLUCOSE SERPL-MCNC: 136 MG/DL (ref 65–140)
HCT VFR BLD AUTO: 34.4 % (ref 34.8–46.1)
HGB BLD-MCNC: 11 G/DL (ref 11.5–15.4)
HIV 1+2 AB+HIV1 P24 AG SERPL QL IA: NORMAL
HIV 2 AB SERPL QL IA: NORMAL
HIV1 AB SERPL QL IA: NORMAL
HIV1 P24 AG SERPL QL IA: NORMAL
IMM GRANULOCYTES # BLD AUTO: 0.01 THOUSAND/UL (ref 0–0.2)
IMM GRANULOCYTES NFR BLD AUTO: 0 % (ref 0–2)
IRON SATN MFR SERPL: 23 % (ref 15–50)
IRON SERPL-MCNC: 58 UG/DL (ref 50–212)
LYMPHOCYTES # BLD AUTO: 1.26 THOUSANDS/ÂΜL (ref 0.6–4.47)
LYMPHOCYTES NFR BLD AUTO: 22 % (ref 14–44)
MAGNESIUM SERPL-MCNC: 2.1 MG/DL (ref 1.9–2.7)
MCH RBC QN AUTO: 29.2 PG (ref 26.8–34.3)
MCHC RBC AUTO-ENTMCNC: 32 G/DL (ref 31.4–37.4)
MCV RBC AUTO: 91 FL (ref 82–98)
MONOCYTES # BLD AUTO: 0.43 THOUSAND/ÂΜL (ref 0.17–1.22)
MONOCYTES NFR BLD AUTO: 7 % (ref 4–12)
NEUTROPHILS # BLD AUTO: 3.95 THOUSANDS/ÂΜL (ref 1.85–7.62)
NEUTS SEG NFR BLD AUTO: 68 % (ref 43–75)
NRBC BLD AUTO-RTO: 0 /100 WBCS
P AXIS: 61 DEGREES
PHOSPHATE SERPL-MCNC: 4 MG/DL (ref 2.3–4.1)
PLATELET # BLD AUTO: 207 THOUSANDS/UL (ref 149–390)
PMV BLD AUTO: 10.9 FL (ref 8.9–12.7)
POTASSIUM SERPL-SCNC: 3.8 MMOL/L (ref 3.5–5.3)
PR INTERVAL: 170 MS
PROT SERPL-MCNC: 6.1 G/DL (ref 6.4–8.4)
QRS AXIS: 3 DEGREES
QRSD INTERVAL: 74 MS
QT INTERVAL: 370 MS
QTC INTERVAL: 434 MS
RBC # BLD AUTO: 3.77 MILLION/UL (ref 3.81–5.12)
SODIUM SERPL-SCNC: 136 MMOL/L (ref 135–147)
T WAVE AXIS: 51 DEGREES
TIBC SERPL-MCNC: 257 UG/DL (ref 250–450)
TREPONEMA PALLIDUM IGG+IGM AB [PRESENCE] IN SERUM OR PLASMA BY IMMUNOASSAY: NORMAL
TSH SERPL DL<=0.05 MIU/L-ACNC: 1.19 UIU/ML (ref 0.45–4.5)
UIBC SERPL-MCNC: 199 UG/DL (ref 155–355)
VENTRICULAR RATE: 83 BPM
VIT B12 SERPL-MCNC: 437 PG/ML (ref 180–914)
WBC # BLD AUTO: 5.82 THOUSAND/UL (ref 4.31–10.16)

## 2024-08-30 PROCEDURE — 82746 ASSAY OF FOLIC ACID SERUM: CPT

## 2024-08-30 PROCEDURE — 84100 ASSAY OF PHOSPHORUS: CPT

## 2024-08-30 PROCEDURE — 82607 VITAMIN B-12: CPT

## 2024-08-30 PROCEDURE — 84443 ASSAY THYROID STIM HORMONE: CPT

## 2024-08-30 PROCEDURE — 93005 ELECTROCARDIOGRAM TRACING: CPT

## 2024-08-30 PROCEDURE — 83550 IRON BINDING TEST: CPT | Performed by: STUDENT IN AN ORGANIZED HEALTH CARE EDUCATION/TRAINING PROGRAM

## 2024-08-30 PROCEDURE — 93010 ELECTROCARDIOGRAM REPORT: CPT | Performed by: STUDENT IN AN ORGANIZED HEALTH CARE EDUCATION/TRAINING PROGRAM

## 2024-08-30 PROCEDURE — 83540 ASSAY OF IRON: CPT | Performed by: STUDENT IN AN ORGANIZED HEALTH CARE EDUCATION/TRAINING PROGRAM

## 2024-08-30 PROCEDURE — 85025 COMPLETE CBC W/AUTO DIFF WBC: CPT

## 2024-08-30 PROCEDURE — 80053 COMPREHEN METABOLIC PANEL: CPT

## 2024-08-30 PROCEDURE — 82728 ASSAY OF FERRITIN: CPT | Performed by: STUDENT IN AN ORGANIZED HEALTH CARE EDUCATION/TRAINING PROGRAM

## 2024-08-30 PROCEDURE — 99232 SBSQ HOSP IP/OBS MODERATE 35: CPT | Performed by: STUDENT IN AN ORGANIZED HEALTH CARE EDUCATION/TRAINING PROGRAM

## 2024-08-30 PROCEDURE — 87389 HIV-1 AG W/HIV-1&-2 AB AG IA: CPT | Performed by: STUDENT IN AN ORGANIZED HEALTH CARE EDUCATION/TRAINING PROGRAM

## 2024-08-30 PROCEDURE — 82306 VITAMIN D 25 HYDROXY: CPT

## 2024-08-30 PROCEDURE — 83735 ASSAY OF MAGNESIUM: CPT

## 2024-08-30 PROCEDURE — 86780 TREPONEMA PALLIDUM: CPT | Performed by: STUDENT IN AN ORGANIZED HEALTH CARE EDUCATION/TRAINING PROGRAM

## 2024-08-30 RX ORDER — QUETIAPINE FUMARATE 50 MG/1
50 TABLET, FILM COATED ORAL
Status: DISCONTINUED | OUTPATIENT
Start: 2024-08-30 | End: 2024-08-30

## 2024-08-30 RX ORDER — GABAPENTIN 100 MG/1
100 CAPSULE ORAL 3 TIMES DAILY
Status: DISCONTINUED | OUTPATIENT
Start: 2024-08-30 | End: 2024-09-01

## 2024-08-30 RX ORDER — QUETIAPINE FUMARATE 25 MG/1
25 TABLET, FILM COATED ORAL
Status: DISCONTINUED | OUTPATIENT
Start: 2024-08-30 | End: 2024-09-01

## 2024-08-30 RX ORDER — HYDROCHLOROTHIAZIDE 12.5 MG/1
12.5 TABLET ORAL DAILY
Status: DISCONTINUED | OUTPATIENT
Start: 2024-08-31 | End: 2024-09-04 | Stop reason: HOSPADM

## 2024-08-30 RX ORDER — QUETIAPINE FUMARATE 25 MG/1
25 TABLET, FILM COATED ORAL
Status: DISCONTINUED | OUTPATIENT
Start: 2024-08-30 | End: 2024-08-30

## 2024-08-30 RX ADMIN — FLUOXETINE HYDROCHLORIDE 50 MG: 20 CAPSULE ORAL at 10:02

## 2024-08-30 RX ADMIN — LISINOPRIL 10 MG: 10 TABLET ORAL at 08:28

## 2024-08-30 RX ADMIN — GABAPENTIN 100 MG: 100 CAPSULE ORAL at 21:28

## 2024-08-30 RX ADMIN — DONEPEZIL HYDROCHLORIDE 5 MG: 5 TABLET, FILM COATED ORAL at 21:28

## 2024-08-30 RX ADMIN — ASPIRIN 81 MG CHEWABLE TABLET 81 MG: 81 TABLET CHEWABLE at 08:39

## 2024-08-30 RX ADMIN — GABAPENTIN 100 MG: 100 CAPSULE ORAL at 17:22

## 2024-08-30 RX ADMIN — Medication 3 MG: at 21:28

## 2024-08-30 RX ADMIN — DOCUSATE SODIUM 100 MG: 100 CAPSULE, LIQUID FILLED ORAL at 17:19

## 2024-08-30 RX ADMIN — TRAZODONE HYDROCHLORIDE 50 MG: 50 TABLET ORAL at 00:55

## 2024-08-30 RX ADMIN — DOCUSATE SODIUM 100 MG: 100 CAPSULE, LIQUID FILLED ORAL at 08:38

## 2024-08-30 RX ADMIN — QUETIAPINE FUMARATE 25 MG: 25 TABLET ORAL at 21:28

## 2024-08-30 RX ADMIN — METFORMIN HYDROCHLORIDE 500 MG: 500 TABLET ORAL at 08:39

## 2024-08-30 RX ADMIN — ATORVASTATIN CALCIUM 10 MG: 10 TABLET, FILM COATED ORAL at 17:19

## 2024-08-30 RX ADMIN — HYDROXYZINE HYDROCHLORIDE 25 MG: 25 TABLET ORAL at 10:03

## 2024-08-30 RX ADMIN — GABAPENTIN 100 MG: 100 CAPSULE ORAL at 13:20

## 2024-08-30 NOTE — NURSING NOTE
Sandra is visible in the milieu. Sandra interacts with staff and select peers. Sandra denies all except anxiety which she reports is worse because she is here. Sandra declined a prn at this time. Sandra is medication compliant. Seroquel was started this pm and education was provided, will need reinforcement. Emotional support provided. Will continue to monitor and support during treatment.

## 2024-08-30 NOTE — NURSING NOTE
Pt anxious and tearful, accepted Atarax 25 mg po prn with some positive effect.  Appetite poor.  VSS.  Attended group.  Pt's room changed to a more quiet area of the unit.  Pt responded well to humor.  Med-compliant.  Ambulating with slow, steady gait.  Monitored for safety and support.

## 2024-08-30 NOTE — PHYSICAL THERAPY NOTE
Physical Therapy Evaluation    Patient's Name: Sandra Peña    Admitting Diagnosis  Major depressive disorder, single episode, unspecified [F32.9]  Major depressive disorder [F32.9]    Problem List  Patient Active Problem List   Diagnosis    Adenomatous polyp of colon    Primary hypertension    Type 2 diabetes mellitus without complication, without long-term current use of insulin (HCC)    Mixed hyperlipidemia    Insomnia    Change in bowel habits    Medical clearance for psychiatric admission    Generalized osteoarthritis    Chronic idiopathic constipation    Illness anxiety disorder    Generalized anxiety disorder    Mild neurocognitive disorder    Uric acid kidney stone    Balance problem    Dry scalp    Major depressive disorder, recurrent episode, mild (HCC)    Constipation    Stage 3 chronic kidney disease, unspecified whether stage 3a or 3b CKD (HCC)    Anemia    Major neurocognitive disorder (HCC)       Past Medical History  Past Medical History:   Diagnosis Date    Anxiety     Colon polyps     Depression     Diabetes mellitus (HCC)     Dizziness     Last assessed: 8/31/15    DVT (deep venous thrombosis) ([facility])     Dysuria     Hematuria 2022    Hyperlipidemia     Hypertension     Hypertensive urgency 10/22/2021    Insomnia     Panic attack     Ureterolithiasis 2020       Past Surgical History  Past Surgical History:   Procedure Laterality Date     SECTION      1964 son, 1968 daughter    COLONOSCOPY      FL RETROGRADE PYELOGRAM  2020    MD COLONOSCOPY FLX DX W/COLLJ SPEC WHEN PFRMD N/A 3/27/2017    Procedure: COLONOSCOPY;  Surgeon: Gold Francis MD;  Location: [facility];  Service: Gastroenterology    MD CYSTO/URETERO W/LITHOTRIPSY &INDWELL STENT INSRT Right 2020    Procedure: CYSTOSCOPY URETEROSCOPY WITH LITHOTRIPSY HOLMIUM LASER, RETROGRADE PYELOGRAM AND INSERTION STENT URETERAL; EXCISION OF BLADDER TUMOR;  Surgeon: Edwin Love MD;  Location: [facility];  Service:  Urology    ROTATOR CUFF REPAIR Left     Last assessed: 3/29/15    TONSILLECTOMY         Recent Imaging  CT head wo contrast   Final Result by E. Alec Schoenberger, MD (08/29 1500)      No acute intracranial abnormality. Stable chronic microangiopathy.                  Workstation performed: [id number]             Recent Vital Signs  Vitals:    08/29/24 0731 08/29/24 1533 08/29/24 2014 08/30/24 0745   BP: 143/67 149/66 125/74 154/67   BP Location: Right arm Left arm Left arm Right arm   Pulse: 83 88 80 83   Resp: 18 18 18 16   Temp: 98.4 °F (36.9 °C) (!) 97.4 °F (36.3 °C) 97.5 °F (36.4 °C) (!) 97 °F (36.1 °C)   TempSrc: Temporal Temporal Temporal Temporal   SpO2: 98% 98% 98% 96%   Weight:       Height:            08/29/24 1145   PT Last Visit   PT Visit Date 08/29/24   Note Type   Note type Evaluation   Pain Assessment   Pain Assessment Tool 0-10   Pain Score No Pain   Restrictions/Precautions   Weight Bearing Precautions Per Order No   Home Living   Type of Home House   Home Layout One level;Stairs to enter with rails;Performs ADLs on one level   Bathroom Shower/Tub Tub/shower unit   Bathroom Toilet Standard   Bathroom Accessibility Accessible   Prior Function   Level of [place] Independent with ADLs;Independent with functional mobility   Lives With Family   Receives Help From Family   Falls in the last 6 months 0   General   Family/Caregiver Present No   Cognition   Overall Cognitive Status WFL   Arousal/Participation Alert   Orientation Level Oriented X4   Memory Within functional limits   Following Commands Follows all commands and directions without difficulty   RLE Assessment   RLE Assessment   (3+/5)   LLE Assessment   LLE Assessment   (3+/5)   Coordination   Movements are Fluid and Coordinated 0   Coordination and Movement Description mild coodination deficit   Sensation X   Light Touch   RLE Light Touch Impaired   RLE Light Touch Comments tinlging in the LEs   LLE Light Touch Impaired   LLE Light Touch  Comments tingling in the LES   Bed Mobility   Supine to Sit 7  Independent   Sit to Supine 7  Independent   Transfers   Sit to Stand 7  Independent   Stand to Sit 7  Independent   Ambulation/Elevation   Gait pattern Step through pattern;Decreased toe off;Decreased heel strike;Decreased foot clearance   Gait Assistance 7  Independent   Assistive Device None   Distance 250ft   Balance   Static Sitting Fair +   Dynamic Sitting Fair +   Static Standing Fair   Dynamic Standing Fair -   Ambulatory Fair -   Endurance Deficit   Endurance Deficit Yes   Endurance Deficit Description reduced from baseline   Activity Tolerance   Activity Tolerance Patient tolerated treatment well   Medical Staff Made Aware spoke to CM   Nurse Made Aware spoke to RN   Assessment   Prognosis Good   Problem List Decreased strength;Decreased endurance;Impaired balance;Decreased mobility;Decreased coordination;Impaired sensation   Barriers to Discharge Inaccessible home environment;Decreased caregiver support   Goals   Patient Goals to go home   Discharge Recommendation   Rehab Resource Intensity Level, PT No post-acute rehabilitation needs   AM-PAC Basic Mobility Inpatient   Turning in Flat Bed Without Bedrails 4   Lying on Back to Sitting on Edge of Flat Bed Without Bedrails 4   Moving Bed to Chair 4   Standing Up From Chair Using Arms 4   Walk in Room 4   Climb 3-5 Stairs With Railing 4   Basic Mobility Inpatient Raw Score 24   Basic Mobility Standardized Score 57.68   [facility] Highest Level Of Mobility   -[facility] Goal 8: Walk 250 feet or more   -HL Achieved 8: Walk 250 feet ot more   End of Consult   Patient Position at End of Consult Bedside chair;All needs within reach       ASSESSMENT                                                                                                                     Sandra Peña is a 83 y.o. female admitted to [facility] on 8/28/2024 for Generalized anxiety disorder. Pt  has a past medical history of  Anxiety, Colon polyps, Depression, Diabetes mellitus (HCC), Dizziness, DVT (deep venous thrombosis) (HCC), Dysuria, Hematuria (04/21/2022), Hyperlipidemia, Hypertension, Hypertensive urgency (10/22/2021), Insomnia, Panic attack, and Ureterolithiasis (05/22/2020).. PT was consulted and pt was seen on 8/30/2024 for mobility assessment and d/c planning.  Impairments limiting pt at this time include impaired balance, decreased endurance, decreased coordination, decreased sensation, and decreased strength. Pt is currently functioning at a independent level for bed mobility, independent level for transfers, independent level for ambulation with no assistive device. The patient's AM-PAC Basic Mobility Inpatient Short Form Raw Score is 24. A Raw score of greater than 16 suggests the patient may benefit from discharge to home. Please also refer to the recommendation of the Physical Therapist for safe discharge planning.    Recommendations                                                                                                                DME: None    Discharge Disposition:  Home with no needs      Lawrence Berrios PT, DPT

## 2024-08-30 NOTE — TREATMENT TEAM
08/30/24 0900   Team Meeting   Meeting Type Daily Rounds   Team Members Present   Team Members Present Physician;Nurse;;Other (Discipline and Name)   Physician Team Member Shellie Barroso Jayne   Nursing Team Member ROCHELLE Valenzuela   Care Management Team Member ROCHELLE Hilton   Social Work Team Member RENALDO Meyer   Patient/Family Present   Patient Present No   Patient's Family Present No     Patient attending group, increased medications yesterday: Prozac and Seroquel.  Endorses anxiety and depression due to being here.  Patient became tearful with nurse when talking about it.  Atarax offered and refused by patient.  Prn Trazodone given at 2355 minimal effect patient continued to have disturbed sleep.  CT scan was WNL and EKG completed this am.

## 2024-08-30 NOTE — NURSING NOTE
Patient awake to use BR and then was reporting not being able to sleep. Roommate keeps talking with patient. Both encouraged to rest without talking. Trazodone administered as per order. Will monitor.

## 2024-08-30 NOTE — PROGRESS NOTES
Pt attended ALL groups. Blunt affect, anxious, tearful and needed prompting.  Pt indicated she wants to be home (stimulation of unit overwhelming)  Pt did acknowledge that her and her daughter argue and would like that to change.  Pt restless in afternoon stating she is cold.  Pt using blanket and Nursing aware.      08/30/24 1000   Activity/Group Checklist   Group Other (Comment)  ( Recovery: Guiding principles)   Attendance Attended   Attendance Duration (min) 31-45   Interactions Other (Comment)  (tearful)   Affect/Mood Blunted/flat   Goals Achieved Identified feelings;Identified triggers;Able to listen to others;Able to engage in interactions;Able to self-disclose;Able to recieve feedback

## 2024-08-30 NOTE — PROGRESS NOTES
08/30/24 1100   Activity/Group Checklist   Group Exercise  (seated musical stretches)   Attendance Attended   Attendance Duration (min) 16-30   Interactions Interacted appropriately  (was excused briefly to meet with unit staff.Sat away from peers yet did focus well to task throughout the session.)   Affect/Mood Calm   Goals Achieved Able to engage in interactions;Able to listen to others

## 2024-08-30 NOTE — PROGRESS NOTES
Progress Note - Behavioral Health   Sandra Peña 83 y.o. female MRN: [id number]  Unit/Bed#: [facility] 644-01 Encounter: [id number]    Patient was seen today for continuation of care, records reviewed and patient was discussed with the morning case review team.    Sandra was seen today for psychiatric follow-up.  On assessment today, Sandra was tearful and anxious.  Expressing intrusive and negative thoughts about unit, patient is encouraged to use coping skills to assist with symptoms.  Delirium workup completed, head CT resulted as negative.  Will continue to wait results of HIV and syphilis testing.  Prozac currently prescribed at 50 mg daily for mood /OCD traits and Seroquel increased to 25 mg as a mood adjunct, we will also introduce gabapentin 100 mg 3 times daily for anxiety.  No other medication change be made at this time.  Daughter Janay ( # [phone]) contacted and updated about current plan of care and is in agreement with medication changes.  Tentative discharge later next week with follow-up services.    Sandra denies acute suicidal/self-harm ideation/intent/plan upon direct inquiry at this time.  Sandra remains behaviorally appropriate, no agitation or aggression noted on exam or in report.  Sandra also denies HI/AH/VH, and does not appear overtly manic.  No overt delusions or paranoia are verbalized. Impulse control is intact.  Sandra remains adherent to her current psychotropic medication regimen and denies any side effects from medications, as well as none noted on exam.    Vitals:  Vitals:    08/30/24 0745   BP: 154/67   Pulse: 83   Resp: 16   Temp: (!) 97 °F (36.1 °C)   SpO2: 96%       Laboratory Results:  I have personally reviewed all pertinent laboratory/tests results.    Psychiatric Review of Systems:  Behavior over the last 24 hours:  unchanged.   Sleep: good  Appetite: good  Medication side effects: none  ROS: no complaints, denies shortness of breath or chest pain and all other systems are  negative for acute changes    Mental Status Evaluation:  Appearance:  dressed appropriately, adequate grooming   Behavior:  cooperative, calm, guarded   Speech:  normal rate and volume   Mood:  anxious, tearful   Affect:  constricted, tearful   Thought Process:  goal directed, linear, perseverative   Thought Content:  mild paranoia, obsessive thoughts, negative thoughts, intrusive thoughts, ruminating thoughts   Perceptual Disturbances: denies auditory hallucinations when asked, does not appear responding to internal stimuli   Risk Potential: Suicidal ideation - None  Homicidal ideation - None  Potential for aggression - No   Memory:  short term memory mildly impaired, long term memory mildly impaired   Sensorium  person and place      Consciousness:  alert and awake   Attention: attention span and concentration are age appropriate   Insight:  limited   Judgment: limited   Gait/Station: normal gait/station   Motor Activity: no abnormal movements   Progress Toward Goals:   Sandra is progressing towards goals of inpatient psychiatric treatment by continued medication compliance and is attending therapeutic modalities on the milieu. However, the patient continues to require inpatient psychiatric hospitalization for continued medication management and titration to optimize symptom reduction, improve sleep hygiene, and demonstrate adequate self-care.    Assessment & Plan   Principal Problem:    Generalized anxiety disorder  Active Problems:    Primary hypertension    Type 2 diabetes mellitus without complication, without long-term current use of insulin (HCC)    Mixed hyperlipidemia    Medical clearance for psychiatric admission    Illness anxiety disorder    Major depressive disorder, recurrent episode, mild (HCC)    Stage 3 chronic kidney disease, unspecified whether stage 3a or 3b CKD (HCC)    Anemia    Major neurocognitive disorder (HCC)      Recommended Treatment: Treatment plan and medication changes discussed and  per the attending physician the plan is:    1.Continue with group therapy, milieu therapy and occupational therapy  2.Behavioral Health checks every 7 minutes  3.Continue frequent safety checks and vitals per unit protocol  4.Continue with SLIM medical management as indicated  5.Continue with current medication regimen  6.Will review labs in the a.m.  7.Disposition Planning: Discharge planning and efforts remain ongoing    Behavioral Health Medications: all current active meds have been reviewed and continue current psychiatric medications.  Current Facility-Administered Medications   Medication Dose Route Frequency Provider Last Rate    acetaminophen  650 mg Oral Q4H PRN Marjorie Barroso, CRNP      acetaminophen  650 mg Oral Q4H PRN Marjorie Barroso, CRNP      acetaminophen  975 mg Oral Q6H PRN Marjorie Barroso, CRNP      aspirin  81 mg Oral Daily Neha Sandra Bearden, SUZYNP      atorvastatin  10 mg Oral Daily With Dinner Neha Sandra Bearden, SUZYNP      docusate sodium  100 mg Oral BID Nehaharini Bearden, SUZYNP      donepezil  5 mg Oral HS Nohemi Hensley MD      FLUoxetine  50 mg Oral Daily With Breakfast Nohemi Hensley MD      haloperidol lactate  5 mg Intramuscular Q4H PRN Max 4/day Marjorie Barroso, CRNP      hydrOXYzine HCL  25 mg Oral Q6H PRN Max 4/day Marjorie Barroso, CRNP      hydrOXYzine HCL  50 mg Oral Q6H PRN Max 4/day Marjorie Barroso, PATIENCE      lisinopril  10 mg Oral Daily Neha Sandra Bearden, CRNP      LORazepam  1 mg Intramuscular Q6H PRN Max 3/day Marjorie Barroso, CRNP      LORazepam  1 mg Oral Q6H PRN Max 3/day Marjorie Barroso, PATIENCE      melatonin  3 mg Oral HS Marjorie Barroso, SUZYNP      metFORMIN  500 mg Oral Daily With Breakfast Neha Bearden, CRNP      polyethylene glycol  17 g Oral Daily PRN Marjorie Barroso, CRNP      QUEtiapine  25 mg Oral HS Marjorie Barroso, CRNP      risperiDONE  0.25 mg Oral Q4H PRN Max 6/day Marjorie Barroso, CRNP      risperiDONE  0.5 mg Oral Q4H PRN Max 3/day  PATIENCE Parker      risperiDONE  1 mg Oral Q2H PRN Max 3/day PATIENCE Parker      senna-docusate sodium  1 tablet Oral Daily PRN PATIENCE Parker      traZODone  50 mg Oral HS PRN PATIENCE Parker         Risks / Benefits of Treatment:  Risks, benefits, and possible side effects of medications explained to patient and patient verbalizes understanding and agreement for treatment.    Counseling / Coordination of Care:  Patient's progress reviewed with nursing staff.  Medications, treatment progress and treatment plan reviewed with patient.  Supportive counseling provided to the patient.    Total floor/unit time spent today 25 minutes. Greater than 50% of total time was spent with the patient and / or family counseling and / or coordination of care. A description of the counseling / coordination of care: medication education, treatment plan, supportive therapy.    This note was completed in part utilizing Dragon dictation Software. Grammatical, translation, syntax errors, random word insertions, spelling mistakes, and incomplete sentences may be an occasional consequence of this system secondary to software limitations with voice recognition, ambient noise, and hardware issues. If you have any questions or concerns about the content, text, or information contained within the body of this dictation, please contact the provider for clarification

## 2024-08-30 NOTE — PLAN OF CARE
Problem: Alteration in Thoughts and Perception  Goal: Verbalize thoughts and feelings  Description: Interventions:  - Promote a nonjudgmental and trusting relationship with the patient through active listening and therapeutic communication  - Assess patient's level of functioning, behavior and potential for risk  - Engage patient in 1 on 1 interactions  - Encourage patient to express fears, feelings, frustrations, and discuss symptoms    - [place] patient to reality, help patient recognize reality-based thinking   - Administer medications as ordered and assess for potential side effects  - Provide the patient education related to the signs and symptoms of the illness and desired effects of prescribed medications  Outcome: Progressing  Goal: Agree to be compliant with medication regime, as prescribed and report medication side effects  Description: Interventions:  - Offer appropriate PRN medication and supervise ingestion; conduct AIMS, as needed   Outcome: Progressing  Goal: Complete daily ADLs, including personal hygiene independently, as able  Description: Interventions:  - Observe, teach, and assist patient with ADLS  - Monitor and promote a balance of rest/activity, with adequate nutrition and elimination   Outcome: Progressing     Problem: Ineffective Coping  Goal: Demonstrates healthy coping skills  Outcome: Progressing     Problem: Depression  Goal: Refrain from harming self  Description: Interventions:  - Monitor patient closely, per order   - Supervise medication ingestion, monitor effects and side effects   Outcome: Progressing  Goal: Refrain from isolation  Description: Interventions:  - Develop a trusting relationship   - Encourage socialization   Outcome: Progressing  Goal: Complete daily ADLs, including personal hygiene independently, as able  Description: Interventions:  - Observe, teach, and assist patient with ADLS  -  Monitor and promote a balance of rest/activity, with adequate nutrition and  elimination   Outcome: Progressing     Problem: Anxiety  Goal: Anxiety is at manageable level  Description: Interventions:  - Assess and monitor patient's anxiety level.   - Monitor for signs and symptoms (heart palpitations, chest pain, shortness of breath, headaches, nausea, feeling jumpy, restlessness, irritable, apprehensive).   - Collaborate with interdisciplinary team and initiate plan and interventions as ordered.  - [place] patient to unit/surroundings  - Explain treatment plan  - Encourage participation in care  - Encourage verbalization of concerns/fears  - Identify coping mechanisms  - Assist in developing anxiety-reducing skills  - Administer/offer alternative therapies  - Limit or eliminate stimulants  Outcome: Progressing

## 2024-08-31 PROCEDURE — 99232 SBSQ HOSP IP/OBS MODERATE 35: CPT | Performed by: STUDENT IN AN ORGANIZED HEALTH CARE EDUCATION/TRAINING PROGRAM

## 2024-08-31 RX ADMIN — Medication 2000 UNITS: at 08:21

## 2024-08-31 RX ADMIN — DOCUSATE SODIUM 100 MG: 100 CAPSULE, LIQUID FILLED ORAL at 17:09

## 2024-08-31 RX ADMIN — DOCUSATE SODIUM 100 MG: 100 CAPSULE, LIQUID FILLED ORAL at 08:21

## 2024-08-31 RX ADMIN — Medication 3 MG: at 21:16

## 2024-08-31 RX ADMIN — ASPIRIN 81 MG CHEWABLE TABLET 81 MG: 81 TABLET CHEWABLE at 08:21

## 2024-08-31 RX ADMIN — CYANOCOBALAMIN TAB 1000 MCG 1000 MCG: 1000 TAB at 08:21

## 2024-08-31 RX ADMIN — LISINOPRIL 10 MG: 10 TABLET ORAL at 08:22

## 2024-08-31 RX ADMIN — DONEPEZIL HYDROCHLORIDE 5 MG: 5 TABLET, FILM COATED ORAL at 21:16

## 2024-08-31 RX ADMIN — GABAPENTIN 100 MG: 100 CAPSULE ORAL at 08:21

## 2024-08-31 RX ADMIN — FLUOXETINE HYDROCHLORIDE 50 MG: 20 CAPSULE ORAL at 08:21

## 2024-08-31 RX ADMIN — HYDROCHLOROTHIAZIDE 12.5 MG: 12.5 TABLET ORAL at 08:21

## 2024-08-31 RX ADMIN — METFORMIN HYDROCHLORIDE 500 MG: 500 TABLET ORAL at 08:21

## 2024-08-31 RX ADMIN — ATORVASTATIN CALCIUM 10 MG: 10 TABLET, FILM COATED ORAL at 16:14

## 2024-08-31 RX ADMIN — HYDROXYZINE HYDROCHLORIDE 50 MG: 50 TABLET, FILM COATED ORAL at 16:14

## 2024-08-31 RX ADMIN — GABAPENTIN 100 MG: 100 CAPSULE ORAL at 21:16

## 2024-08-31 RX ADMIN — GABAPENTIN 100 MG: 100 CAPSULE ORAL at 16:14

## 2024-08-31 RX ADMIN — QUETIAPINE FUMARATE 25 MG: 25 TABLET ORAL at 21:16

## 2024-08-31 NOTE — NURSING NOTE
Patient is visible on the unit. She is tearful, sad, and reports missing her family. Patient asks to go home. Reassurance and support provided. Patient endorses depression but denies all other psych s/s. She is medication compliant. Encouraged to inform staff of any needs or concerns.

## 2024-08-31 NOTE — NURSING NOTE
"Patient approached the nurse's station and was very tearful reporting that she was confusing dream with reality and had a dream that she was back home with her family. She asked to call her daughter and begged her daughter to come take her home. Patient's daughter then asked to speak to this writer and stated \"If she's crying there she might as well be crying at home.\" The patient's daughter and the patient were reassured and encouraged to trust the treatment plan. Patient was given PRN Atarax 50 mg at 1614 for moderate anxiety.   "

## 2024-08-31 NOTE — NURSING NOTE
PRN Atarax effective. Patient is sitting calmly in the dayroom and is in emotional control at this time.

## 2024-08-31 NOTE — NURSING NOTE
Pt cooperative with noted anxiety and fearfulness of bad dreams at HS. Pt spoke with daughter on phone requesting to move back to her old room. After speaking with pt and daughter;pt informed nurse and daughter she will stay where she is. Pt upset that she cannot distinguish between reality and dreams when she is woken up at night. Medication and meal compliant. Denies SI/HI. Q 7 minute checks maintained.

## 2024-08-31 NOTE — PLAN OF CARE
Problem: Alteration in Thoughts and Perception  Goal: Verbalize thoughts and feelings  Description: Interventions:  - Promote a nonjudgmental and trusting relationship with the patient through active listening and therapeutic communication  - Assess patient's level of functioning, behavior and potential for risk  - Engage patient in 1 on 1 interactions  - Encourage patient to express fears, feelings, frustrations, and discuss symptoms    - [place] patient to reality, help patient recognize reality-based thinking   - Administer medications as ordered and assess for potential side effects  - Provide the patient education related to the signs and symptoms of the illness and desired effects of prescribed medications  Outcome: Progressing     Problem: Alteration in Thoughts and Perception  Goal: Agree to be compliant with medication regime, as prescribed and report medication side effects  Description: Interventions:  - Offer appropriate PRN medication and supervise ingestion; conduct AIMS, as needed   Outcome: Progressing     Problem: Alteration in Thoughts and Perception  Goal: Attend and participate in unit activities, including therapeutic, recreational, and educational groups  Description: Interventions:  -Encourage Visitation and family involvement in care  Outcome: Progressing     Problem: Alteration in Thoughts and Perception  Goal: Recognize dysfunctional thoughts, communicate reality-based thoughts at the time of discharge  Description: Interventions:  - Provide medication and psycho-education to assist patient in compliance and developing insight into his/her illness   Outcome: Progressing     Problem: Alteration in Thoughts and Perception  Goal: Complete daily ADLs, including personal hygiene independently, as able  Description: Interventions:  - Observe, teach, and assist patient with ADLS  - Monitor and promote a balance of rest/activity, with adequate nutrition and elimination   Outcome: Progressing      Problem: Ineffective Coping  Goal: Demonstrates healthy coping skills  Outcome: Progressing     Problem: Ineffective Coping  Goal: Patient/Family participate in treatment and DC plans  Description: Interventions:  - Provide therapeutic environment  Outcome: Progressing       Problem: Depression  Goal: Verbalize thoughts and feelings  Description: Interventions:  - Assess and re-assess patient's level of risk   - Engage patient in 1:1 interactions, daily, for a minimum of 15 minutes   - Encourage patient to express feelings, fears, frustrations, hopes   Outcome: Progressing     Problem: Depression  Goal: Refrain from harming self  Description: Interventions:  - Monitor patient closely, per order   - Supervise medication ingestion, monitor effects and side effects   Outcome: Progressing     Problem: Depression  Goal: Refrain from self-neglect  Outcome: Progressing     Problem: Depression  Goal: Refrain from isolation  Description: Interventions:  - Develop a trusting relationship   - Encourage socialization   Outcome: Progressing

## 2024-09-01 PROCEDURE — 99232 SBSQ HOSP IP/OBS MODERATE 35: CPT | Performed by: STUDENT IN AN ORGANIZED HEALTH CARE EDUCATION/TRAINING PROGRAM

## 2024-09-01 RX ORDER — QUETIAPINE FUMARATE 50 MG/1
50 TABLET, FILM COATED ORAL
Status: DISCONTINUED | OUTPATIENT
Start: 2024-09-01 | End: 2024-09-04 | Stop reason: HOSPADM

## 2024-09-01 RX ORDER — GABAPENTIN 100 MG/1
200 CAPSULE ORAL 3 TIMES DAILY
Status: DISCONTINUED | OUTPATIENT
Start: 2024-09-01 | End: 2024-09-04 | Stop reason: HOSPADM

## 2024-09-01 RX ADMIN — HYDROCHLOROTHIAZIDE 12.5 MG: 12.5 TABLET ORAL at 08:19

## 2024-09-01 RX ADMIN — FLUOXETINE HYDROCHLORIDE 50 MG: 20 CAPSULE ORAL at 08:19

## 2024-09-01 RX ADMIN — Medication 3 MG: at 21:16

## 2024-09-01 RX ADMIN — DONEPEZIL HYDROCHLORIDE 5 MG: 5 TABLET, FILM COATED ORAL at 21:16

## 2024-09-01 RX ADMIN — GABAPENTIN 200 MG: 100 CAPSULE ORAL at 21:16

## 2024-09-01 RX ADMIN — LISINOPRIL 10 MG: 10 TABLET ORAL at 08:19

## 2024-09-01 RX ADMIN — GABAPENTIN 100 MG: 100 CAPSULE ORAL at 08:18

## 2024-09-01 RX ADMIN — Medication 2000 UNITS: at 08:19

## 2024-09-01 RX ADMIN — CYANOCOBALAMIN TAB 1000 MCG 1000 MCG: 1000 TAB at 08:19

## 2024-09-01 RX ADMIN — METFORMIN HYDROCHLORIDE 500 MG: 500 TABLET ORAL at 08:18

## 2024-09-01 RX ADMIN — DOCUSATE SODIUM 100 MG: 100 CAPSULE, LIQUID FILLED ORAL at 17:04

## 2024-09-01 RX ADMIN — GABAPENTIN 200 MG: 100 CAPSULE ORAL at 17:04

## 2024-09-01 RX ADMIN — ATORVASTATIN CALCIUM 10 MG: 10 TABLET, FILM COATED ORAL at 17:04

## 2024-09-01 RX ADMIN — ASPIRIN 81 MG CHEWABLE TABLET 81 MG: 81 TABLET CHEWABLE at 08:19

## 2024-09-01 RX ADMIN — QUETIAPINE FUMARATE 50 MG: 50 TABLET ORAL at 21:16

## 2024-09-01 RX ADMIN — DOCUSATE SODIUM 100 MG: 100 CAPSULE, LIQUID FILLED ORAL at 08:19

## 2024-09-01 RX ADMIN — TRAZODONE HYDROCHLORIDE 50 MG: 50 TABLET ORAL at 01:27

## 2024-09-01 NOTE — NURSING NOTE
Patient is visible on the unit and social with select peers. She is in more emotional control today but endorses anxiety and depression. She seeks out peers for conversation throughout the day. Patient is medication compliant. Encouraged to inform staff of any needs or concerns.

## 2024-09-01 NOTE — NURSING NOTE
"Pt present on the unit and withdrawn to room. Pt interacts minimally with peers. Reported anxiety as \"better\". Medication compliant. Q7 mins checks maintained.   "

## 2024-09-01 NOTE — PROGRESS NOTES
Progress Note - Behavioral Health   Sandra Peña 83 y.o. female MRN: [id number]  Unit/Bed#: [facility] 644-01 Encounter: [id number]    Assessment & Plan   Principal Problem:    Generalized anxiety disorder  Active Problems:    Illness anxiety disorder    Major neurocognitive disorder (HCC)    Major depressive disorder, recurrent episode, mild (HCC)    Primary hypertension    Type 2 diabetes mellitus without complication, without long-term current use of insulin (HCC)    Mixed hyperlipidemia    Medical clearance for psychiatric admission    Stage 3 chronic kidney disease, unspecified whether stage 3a or 3b CKD (HCC)    Anemia      Plan:    - Encourage early mobility and having a structured day  - Provide frequent re-orientation, and cognitive stimulation  - Ensure assistive devices are in proper working order (eye-glasses, hearing aids)  - Encourage adequate hydration, nutrition and monitor bowel movements  - Maintain sleep-wake cycle: Uninterrupted sleep time; low-level lighting at night  - Fall precaution  - f/u SLIM recs regarding the medical problems   - Continue medication titration and treatment plan; adjust medication to optimize treatment response and as clinically indicated. .     Scheduled medications:  Current Facility-Administered Medications   Medication Dose Route Frequency Provider Last Rate    acetaminophen  650 mg Oral Q4H PRN PATIENCE Parker      acetaminophen  650 mg Oral Q4H PRN PATIENCE Parker      acetaminophen  975 mg Oral Q6H PRN PATIENCE Parker      aspirin  81 mg Oral Daily PATIENCE Ardon      atorvastatin  10 mg Oral Daily With Dinner PATIENCE Ardon      Cholecalciferol  2,000 Units Oral Daily PATIENCE Anne      cyanocobalamin  1,000 mcg Oral Daily PATIENCE Anne      docusate sodium  100 mg Oral BID PATIENCE Ardon      donepezil  5 mg Oral HS Nohemi Hensley MD      FLUoxetine  50 mg Oral Daily With Breakfast  "Nohemi Hensley MD      gabapentin  200 mg Oral TID Nohemi Hensley MD      haloperidol lactate  5 mg Intramuscular Q4H PRN Max 4/day Marjorieaureliano Barroso, CRNP      hydroCHLOROthiazide  12.5 mg Oral Daily Debby Wild, CRNP      hydrOXYzine HCL  25 mg Oral Q6H PRN Max 4/day Marjorie Dharmesh, CRNP      hydrOXYzine HCL  50 mg Oral Q6H PRN Max 4/day Marjorieaureliano Barroso, CRNP      lisinopril  10 mg Oral Daily Neha Sandra Bearden, CRNP      LORazepam  1 mg Intramuscular Q6H PRN Max 3/day Marjorieaureliano Barroso, CRNP      LORazepam  1 mg Oral Q6H PRN Max 3/day Marjorieaureliano Barroso, CRNP      melatonin  3 mg Oral HS Marjorieaureliano Barroso, CRNP      metFORMIN  500 mg Oral Daily With Breakfast Neha Bearden, CRNP      polyethylene glycol  17 g Oral Daily PRN Marjorie Barroso, CRNP      QUEtiapine  50 mg Oral HS Nohemi Hensley MD      risperiDONE  0.25 mg Oral Q4H PRN Max 6/day Marjorieaureliano Barroso, CRNP      risperiDONE  0.5 mg Oral Q4H PRN Max 3/day Marjorieaureliano Barroso, CRNP      risperiDONE  1 mg Oral Q2H PRN Max 3/day Marjorieaureliano Barroso, CRNP      senna-docusate sodium  1 tablet Oral Daily PRN Marjorieaureliano Barroso, CRNP      traZODone  50 mg Oral HS PRN Marjorieaureliano Barroso, CRNP        PRN:    acetaminophen    acetaminophen    acetaminophen    haloperidol lactate    hydrOXYzine HCL    hydrOXYzine HCL    LORazepam    LORazepam    polyethylene glycol    risperiDONE    risperiDONE    risperiDONE    senna-docusate sodium    traZODone    - Observation: routine            - VS: as per unit protocol  - Diet: Regular diet  - Encourage group attendance and milieu therapy  - Dispo: To be determined     Subjective     Patient was visited on unit for continuing care; chart reviewed and discussed with multidisciplinary treatment team.  Endorsed better mood and less anxiety sxs, but continues to have negative thinking and ruminating thoughts. Remains preoccupied with somatic sxs and getting discharged with negative thought process, and noted: \"it's all negative " "here\", c/o temperature of the water while taking a shower, other peers and staff. She acknowledged that she has the tendency to ruminate on certain stressors (somatic sxs at home and situational stressors in the hospital). Reported initial insomnia last night due to negative ruminations and slept better after took PRN meds.  Patient's emotions were validated, specific labeled praise provided and CBT techniques utilized and recommended to use mindfulness and the coping skills more effectively. The patient was receptive. Denied A/VH currently.  Denied SI/HI, intent or plan upon direct inquiry at this time.    Patient continues to be intermittently visible in the milieu and interacts with select staff and peers. Received Trazodone 50 mg po PRN at 0127 for insomnia.    Patient accepted all offered medications and no adverse effects of medications noted or reported. Prozac recently increased to 50 mg daily, and Seroquel is being uptitrated to 50 mg po nightly tonight. Neurontin increased to 200 mg TID; doses to be adjusted as indicated.    Objective    Current Mental Status Evaluation:  Appearance and attitude: appeared as stated age, casually dressed, with good hygiene  Eye contact: fair  Motor Function: within normal limits, No PMA/PMR  Gait/station: slow  Speech: normal for rate, rhythm, volume, and latency with decreased amount  Language: No overt abnormality  Mood/affect: anxious / Affect was constricted, mood-congruent  Thought Processes: perseverative  Thought content: denied suicidal ideations or homicidal ideations, no overt delusions elicited, obsessive thoughts, negative thinking, ruminations  Associations: perseverative  Perceptual disturbances: denies Auditory/Visual/Tactile Hallucinations  Orientation: oriented to time, person, place and to the situational context  Cognitive Function: intact  Memory: impaired recall; intact long-term  Intellect: average  Fund of knowledge: diminished  Impulse control: " good  Insight/judgment: limited/limited            Vital signs in last 24 hours:    Temp:  [97.7 °F (36.5 °C)-98.1 °F (36.7 °C)] 98.1 °F (36.7 °C)  HR:  [81-89] 81  Resp:  [16-18] 16  BP: (117-166)/(56-73) 166/73    Laboratory results: I have personally reviewed all pertinent laboratory/tests results    Results from the past 24 hours: No results found for this or any previous visit (from the past 24 hour(s)).    Progress Toward Goals: slight improvement    Next of Kin  Extended Emergency Contact Information  Primary Emergency Contact: Mike,Janay  Address: [address] [place] [address]           DON[place] ESCAMILLA [address] [place]  Home Phone: [phone]  Mobile Phone: [phone]  Relation: Daughter  Secondary Emergency Contact: DelialaRamin  Mobile Phone: [phone]  Relation: Son      Counseling / Coordination of Care  Patient's progress discussed with staff in treatment team meeting.  Medications, treatment progress and treatment plan reviewed with patient.  Medication changes discussed with patient.  Medication education provided to patient.  Supportive therapy provided to patient.  Cognitive techniques utilized during the session.  Reassurance and supportive therapy provided.  Reoriented to reality and reassured.  Encouraged participation in milieu and group therapy on the unit.  Crisis/safety plan discussed with patient.  Discharge plan discussed with patient.       Nohemi Hensley MD  Attending Psychiatrist   [facility]       This note was completed in part utilizing Dragon dictation Software. Grammatical, translation, syntax errors, random word insertions, spelling mistakes, and incomplete sentences may be an occasional consequence of this system secondary to software limitations with voice recognition, ambient noise, and hardware issues. If you have any questions or concerns about the content, text, or information contained within the body of this dictation, please contact  the provider for clarification.

## 2024-09-01 NOTE — PLAN OF CARE
Problem: Alteration in Thoughts and Perception  Goal: Verbalize thoughts and feelings  Description: Interventions:  - Promote a nonjudgmental and trusting relationship with the patient through active listening and therapeutic communication  - Assess patient's level of functioning, behavior and potential for risk  - Engage patient in 1 on 1 interactions  - Encourage patient to express fears, feelings, frustrations, and discuss symptoms    - [place] patient to reality, help patient recognize reality-based thinking   - Administer medications as ordered and assess for potential side effects  - Provide the patient education related to the signs and symptoms of the illness and desired effects of prescribed medications  Outcome: Progressing  Goal: Agree to be compliant with medication regime, as prescribed and report medication side effects  Description: Interventions:  - Offer appropriate PRN medication and supervise ingestion; conduct AIMS, as needed   Outcome: Progressing  Goal: Attend and participate in unit activities, including therapeutic, recreational, and educational groups  Description: Interventions:  -Encourage Visitation and family involvement in care  Outcome: Progressing  Goal: Recognize dysfunctional thoughts, communicate reality-based thoughts at the time of discharge  Description: Interventions:  - Provide medication and psycho-education to assist patient in compliance and developing insight into his/her illness   Outcome: Progressing  Goal: Complete daily ADLs, including personal hygiene independently, as able  Description: Interventions:  - Observe, teach, and assist patient with ADLS  - Monitor and promote a balance of rest/activity, with adequate nutrition and elimination   Outcome: Progressing     Problem: Depression  Goal: Treatment Goal: Demonstrate behavioral control of depressive symptoms, verbalize feelings of improved mood/affect, and adopt new coping skills prior to discharge  Outcome:  Progressing  Goal: Verbalize thoughts and feelings  Description: Interventions:  - Assess and re-assess patient's level of risk   - Engage patient in 1:1 interactions, daily, for a minimum of 15 minutes   - Encourage patient to express feelings, fears, frustrations, hopes   Outcome: Progressing  Goal: Refrain from harming self  Description: Interventions:  - Monitor patient closely, per order   - Supervise medication ingestion, monitor effects and side effects   Outcome: Progressing  Goal: Refrain from isolation  Description: Interventions:  - Develop a trusting relationship   - Encourage socialization   Outcome: Progressing  Goal: Refrain from self-neglect  Outcome: Progressing  Goal: Attend and participate in unit activities, including therapeutic, recreational, and educational groups  Description: Interventions:  - Provide therapeutic and educational activities daily, encourage attendance and participation, and document same in the medical record   Outcome: Progressing  Goal: Complete daily ADLs, including personal hygiene independently, as able  Description: Interventions:  - Observe, teach, and assist patient with ADLS  -  Monitor and promote a balance of rest/activity, with adequate nutrition and elimination   Outcome: Progressing     Problem: Anxiety  Goal: Anxiety is at manageable level  Description: Interventions:  - Assess and monitor patient's anxiety level.   - Monitor for signs and symptoms (heart palpitations, chest pain, shortness of breath, headaches, nausea, feeling jumpy, restlessness, irritable, apprehensive).   - Collaborate with interdisciplinary team and initiate plan and interventions as ordered.  - [place] patient to unit/surroundings  - Explain treatment plan  - Encourage participation in care  - Encourage verbalization of concerns/fears  - Identify coping mechanisms  - Assist in developing anxiety-reducing skills  - Administer/offer alternative therapies  - Limit or eliminate  stimulants  Outcome: Progressing

## 2024-09-02 PROCEDURE — 99232 SBSQ HOSP IP/OBS MODERATE 35: CPT | Performed by: STUDENT IN AN ORGANIZED HEALTH CARE EDUCATION/TRAINING PROGRAM

## 2024-09-02 RX ORDER — POLYETHYLENE GLYCOL 3350 17 G/17G
17 POWDER, FOR SOLUTION ORAL DAILY PRN
Status: DISCONTINUED | OUTPATIENT
Start: 2024-09-02 | End: 2024-09-04 | Stop reason: HOSPADM

## 2024-09-02 RX ADMIN — DONEPEZIL HYDROCHLORIDE 5 MG: 5 TABLET, FILM COATED ORAL at 21:11

## 2024-09-02 RX ADMIN — HYDROCHLOROTHIAZIDE 12.5 MG: 12.5 TABLET ORAL at 08:21

## 2024-09-02 RX ADMIN — DOCUSATE SODIUM 100 MG: 100 CAPSULE, LIQUID FILLED ORAL at 08:41

## 2024-09-02 RX ADMIN — FLUOXETINE HYDROCHLORIDE 50 MG: 20 CAPSULE ORAL at 08:41

## 2024-09-02 RX ADMIN — METFORMIN HYDROCHLORIDE 500 MG: 500 TABLET ORAL at 08:42

## 2024-09-02 RX ADMIN — Medication 2000 UNITS: at 08:42

## 2024-09-02 RX ADMIN — GABAPENTIN 200 MG: 100 CAPSULE ORAL at 08:41

## 2024-09-02 RX ADMIN — ASPIRIN 81 MG CHEWABLE TABLET 81 MG: 81 TABLET CHEWABLE at 08:41

## 2024-09-02 RX ADMIN — CYANOCOBALAMIN TAB 1000 MCG 1000 MCG: 1000 TAB at 08:41

## 2024-09-02 RX ADMIN — ATORVASTATIN CALCIUM 10 MG: 10 TABLET, FILM COATED ORAL at 17:14

## 2024-09-02 RX ADMIN — Medication 3 MG: at 21:11

## 2024-09-02 RX ADMIN — GABAPENTIN 200 MG: 100 CAPSULE ORAL at 21:11

## 2024-09-02 RX ADMIN — DOCUSATE SODIUM 100 MG: 100 CAPSULE, LIQUID FILLED ORAL at 17:13

## 2024-09-02 RX ADMIN — QUETIAPINE FUMARATE 50 MG: 50 TABLET ORAL at 21:11

## 2024-09-02 RX ADMIN — LISINOPRIL 10 MG: 10 TABLET ORAL at 08:21

## 2024-09-02 RX ADMIN — GABAPENTIN 200 MG: 100 CAPSULE ORAL at 17:14

## 2024-09-02 NOTE — NURSING NOTE
"Pt alert and visible on the unit. Endorsed anxiety. Stated, \"I was awake because of the alarm. I wanted a bell. Not that\". Explained how the bed alarms function and she stated, \"I don't want one. Every time I moved it went off\". Denies depression. Soft spoken, pleasant an cooperative. Gait steady. Appetite 75% for meals. Eye contact is fair. Will continue to monitor and maintain q 7 min checks.   "

## 2024-09-02 NOTE — PLAN OF CARE
Problem: Alteration in Thoughts and Perception  Goal: Verbalize thoughts and feelings  Description: Interventions:  - Promote a nonjudgmental and trusting relationship with the patient through active listening and therapeutic communication  - Assess patient's level of functioning, behavior and potential for risk  - Engage patient in 1 on 1 interactions  - Encourage patient to express fears, feelings, frustrations, and discuss symptoms    - [place] patient to reality, help patient recognize reality-based thinking   - Administer medications as ordered and assess for potential side effects  - Provide the patient education related to the signs and symptoms of the illness and desired effects of prescribed medications  Outcome: Progressing  Goal: Agree to be compliant with medication regime, as prescribed and report medication side effects  Description: Interventions:  - Offer appropriate PRN medication and supervise ingestion; conduct AIMS, as needed   Outcome: Progressing  Goal: Attend and participate in unit activities, including therapeutic, recreational, and educational groups  Description: Interventions:  -Encourage Visitation and family involvement in care  Outcome: Progressing  Goal: Recognize dysfunctional thoughts, communicate reality-based thoughts at the time of discharge  Description: Interventions:  - Provide medication and psycho-education to assist patient in compliance and developing insight into his/her illness   Outcome: Progressing  Goal: Complete daily ADLs, including personal hygiene independently, as able  Description: Interventions:  - Observe, teach, and assist patient with ADLS  - Monitor and promote a balance of rest/activity, with adequate nutrition and elimination   Outcome: Progressing     Problem: Depression  Goal: Treatment Goal: Demonstrate behavioral control of depressive symptoms, verbalize feelings of improved mood/affect, and adopt new coping skills prior to discharge  Outcome:  Progressing  Goal: Verbalize thoughts and feelings  Description: Interventions:  - Assess and re-assess patient's level of risk   - Engage patient in 1:1 interactions, daily, for a minimum of 15 minutes   - Encourage patient to express feelings, fears, frustrations, hopes   Outcome: Progressing  Goal: Refrain from harming self  Description: Interventions:  - Monitor patient closely, per order   - Supervise medication ingestion, monitor effects and side effects   Outcome: Progressing  Goal: Refrain from isolation  Description: Interventions:  - Develop a trusting relationship   - Encourage socialization   Outcome: Progressing  Goal: Refrain from self-neglect  Outcome: Progressing  Goal: Attend and participate in unit activities, including therapeutic, recreational, and educational groups  Description: Interventions:  - Provide therapeutic and educational activities daily, encourage attendance and participation, and document same in the medical record   Outcome: Progressing  Goal: Complete daily ADLs, including personal hygiene independently, as able  Description: Interventions:  - Observe, teach, and assist patient with ADLS  -  Monitor and promote a balance of rest/activity, with adequate nutrition and elimination   Outcome: Progressing     Problem: Anxiety  Goal: Anxiety is at manageable level  Description: Interventions:  - Assess and monitor patient's anxiety level.   - Monitor for signs and symptoms (heart palpitations, chest pain, shortness of breath, headaches, nausea, feeling jumpy, restlessness, irritable, apprehensive).   - Collaborate with interdisciplinary team and initiate plan and interventions as ordered.  - [place] patient to unit/surroundings  - Explain treatment plan  - Encourage participation in care  - Encourage verbalization of concerns/fears  - Identify coping mechanisms  - Assist in developing anxiety-reducing skills  - Administer/offer alternative therapies  - Limit or eliminate  stimulants  Outcome: Progressing

## 2024-09-02 NOTE — PROGRESS NOTES
Progress Note - Behavioral Health   Sandra Peña 83 y.o. female MRN: [id number]  Unit/Bed#: [facility] 644-01 Encounter: [id number]    Assessment & Plan   Principal Problem:    Generalized anxiety disorder  Active Problems:    Illness anxiety disorder    Major neurocognitive disorder (HCC)    Major depressive disorder, recurrent episode, mild (HCC)    Primary hypertension    Type 2 diabetes mellitus without complication, without long-term current use of insulin (HCC)    Mixed hyperlipidemia    Medical clearance for psychiatric admission    Stage 3 chronic kidney disease, unspecified whether stage 3a or 3b CKD (HCC)    Anemia      Plan:    - Encourage early mobility and having a structured day  - Provide frequent re-orientation, and cognitive stimulation  - Ensure assistive devices are in proper working order (eye-glasses, hearing aids)  - Encourage adequate hydration, nutrition and monitor bowel movements  - Maintain sleep-wake cycle: Uninterrupted sleep time; low-level lighting at night  - Fall precaution  - f/u SLIM recs regarding the medical problems   - Continue medication titration and treatment plan; adjust medication to optimize treatment response and as clinically indicated. .     Scheduled medications:  Current Facility-Administered Medications   Medication Dose Route Frequency Provider Last Rate    acetaminophen  650 mg Oral Q4H PRN PATIENCE Parker      acetaminophen  650 mg Oral Q4H PRN PATIENCE Parker      acetaminophen  975 mg Oral Q6H PRN PATIENCE Parker      aspirin  81 mg Oral Daily PATIENCE Ardon      atorvastatin  10 mg Oral Daily With Dinner PATIENCE Ardon      Cholecalciferol  2,000 Units Oral Daily PATIENCE Anne      cyanocobalamin  1,000 mcg Oral Daily PATIENCE Anne      docusate sodium  100 mg Oral BID PATIENCE Ardon      donepezil  5 mg Oral HS Nohemi Hensley MD      FLUoxetine  50 mg Oral Daily With Breakfast  Nohemi Hensley MD      gabapentin  200 mg Oral TID Nohemi Hensley MD      haloperidol lactate  5 mg Intramuscular Q4H PRN Max 4/day Marjorieaureliano Barroso, CRNP      hydroCHLOROthiazide  12.5 mg Oral Daily Debby Wild, CRNP      hydrOXYzine HCL  25 mg Oral Q6H PRN Max 4/day Marjorie Dharmesh, CRNP      hydrOXYzine HCL  50 mg Oral Q6H PRN Max 4/day Marjorieaureliano Barroso, CRNP      lisinopril  10 mg Oral Daily Neha Sandra Bearden, CRNP      LORazepam  1 mg Intramuscular Q6H PRN Max 3/day Marjorieaureliano Barroso, CRNP      LORazepam  1 mg Oral Q6H PRN Max 3/day Marjorieaureliano Barroso, CRNP      melatonin  3 mg Oral HS Marjorieaureliano Barroso, CRNP      metFORMIN  500 mg Oral Daily With Breakfast Neha Bearden, CRNP      polyethylene glycol  17 g Oral Daily PRN Marjorie Barroso, CRNP      QUEtiapine  50 mg Oral HS Nohemi Hensley MD      risperiDONE  0.25 mg Oral Q4H PRN Max 6/day Marjorieaureliano Barroso, CRNP      risperiDONE  0.5 mg Oral Q4H PRN Max 3/day Marjorieaureliano Barroso, CRNP      risperiDONE  1 mg Oral Q2H PRN Max 3/day Marjorieaureliano Barroso, CRNP      senna-docusate sodium  1 tablet Oral Daily PRN Marjorieaureliano Barroso, CRNP      traZODone  50 mg Oral HS PRN Marjorie Barroso, CRNP        PRN:    acetaminophen    acetaminophen    acetaminophen    haloperidol lactate    hydrOXYzine HCL    hydrOXYzine HCL    LORazepam    LORazepam    polyethylene glycol    risperiDONE    risperiDONE    risperiDONE    senna-docusate sodium    traZODone    - Observation: routine            - VS: as per unit protocol  - Diet: Regular diet  - Encourage group attendance and milieu therapy  - Dispo: To be determined     Subjective     Patient was visited on unit for continuing care; chart reviewed and discussed with the nursing staff.  On approach, the patient was calmer, more pleasant and superficially cooperative. Endorsed better mood and less anxiety sxs, but continues to have negative thinking and ruminating thoughts. She appeared to be focused on bed alarm last night, stated:  "\"the alarm went off several times and now it seems everybody hates me\". However, she was able to list positive changes as a part of therapy assignment (as discussed yesterday) and was happy about it. She continues to be preoccupied with going home. No problem initiating and maintaining sleep.  Denied A/VH currently.  Denied SI/HI, intent or plan upon direct inquiry at this time.    Patient continues to be visible in the milieu and interacts with staff and peers. No reports of aggression or self-injurious behavior on unit. No PRN medications used in the past 24 hours.    Patient accepted all offered medications and no adverse effects of medications noted or reported. Prozac was uptitrated to 50 mg and is being increased to 60 mg tomorrow, Seroquel increased to 50 mg nightly yesterday and Neurontin increased to 200 mg TID; doses to be adjusted as indicated.    Objective    Current Mental Status Evaluation:  Appearance and attitude: appeared as stated age, casually dressed, with good hygiene  Eye contact: good  Motor Function: within normal limits, intact gait, No PMA/PMR  Gait/station: slow  Speech: normal for rate, rhythm, volume, latency, amount  Language: No overt abnormality  Mood/affect: less anxious / Affect was constricted but reactive, mood congruent, brighter  Thought Processes: ruminating  Thought content: denied suicidal ideations or homicidal ideations, no overt delusions elicited, obsessive thoughts, negative thinking, ruminations  Associations: concrete associations  Perceptual disturbances: denies Auditory/Visual/Tactile Hallucinations  Orientation: oriented to time, person, place and to the situational context  Cognitive Function: intact  Memory: impaired recall; intact long-term  Intellect: average  Fund of knowledge: diminished  Impulse control: good  Insight/judgment: fair/fair            Vital signs in last 24 hours:    Temp:  [97.5 °F (36.4 °C)-98.3 °F (36.8 °C)] 98 °F (36.7 °C)  HR:  [77-87] " 78  Resp:  [17-18] 17  BP: (123-137)/(57-61) 137/61    Laboratory results: I have personally reviewed all pertinent laboratory/tests results        Progress Toward Goals: making slow improvement    Next of Kin  Extended Emergency Contact Information  Primary Emergency Contact: Janay Mckeon  Address: [address]           [place] [address] [place]  Home Phone: [phone]  Mobile Phone: [phone]  Relation: Daughter  Secondary Emergency Contact: Davidcarlos aRamin moses  Mobile Phone: [phone]  Relation: Son      Counseling / Coordination of Care  Patient's progress discussed with staff in treatment team meeting.  Medications, treatment progress and treatment plan reviewed with patient.  Medication changes discussed with patient.  Medication education provided to patient.  Supportive therapy provided to patient.  Cognitive techniques utilized during the session.  Reassurance and supportive therapy provided.  Reoriented to reality and reassured.  Encouraged participation in milieu and group therapy on the unit.  Crisis/safety plan discussed with patient.  Discharge plan discussed with patient.       Nohemi Hensley MD  Attending Psychiatrist   [facility]       This note was completed in part utilizing Dragon dictation Software. Grammatical, translation, syntax errors, random word insertions, spelling mistakes, and incomplete sentences may be an occasional consequence of this system secondary to software limitations with voice recognition, ambient noise, and hardware issues. If you have any questions or concerns about the content, text, or information contained within the body of this dictation, please contact the provider for clarification.

## 2024-09-03 ENCOUNTER — TELEPHONE (OUTPATIENT)
Dept: PSYCHIATRY | Facility: CLINIC | Age: 84
End: 2024-09-03

## 2024-09-03 PROBLEM — Z00.8 MEDICAL CLEARANCE FOR PSYCHIATRIC ADMISSION: Status: RESOLVED | Noted: 2019-11-01 | Resolved: 2024-09-03

## 2024-09-03 PROCEDURE — 99232 SBSQ HOSP IP/OBS MODERATE 35: CPT | Performed by: STUDENT IN AN ORGANIZED HEALTH CARE EDUCATION/TRAINING PROGRAM

## 2024-09-03 RX ORDER — HYDROXYZINE HYDROCHLORIDE 10 MG/1
10 TABLET, FILM COATED ORAL 2 TIMES DAILY PRN
Qty: 20 TABLET | Refills: 0 | Status: SHIPPED | OUTPATIENT
Start: 2024-09-03

## 2024-09-03 RX ORDER — QUETIAPINE FUMARATE 50 MG/1
50 TABLET, FILM COATED ORAL
Qty: 30 TABLET | Refills: 0 | Status: SHIPPED | OUTPATIENT
Start: 2024-09-03 | End: 2024-10-03

## 2024-09-03 RX ORDER — LANOLIN ALCOHOL/MO/W.PET/CERES
3 CREAM (GRAM) TOPICAL
Qty: 30 TABLET | Refills: 0 | Status: SHIPPED | OUTPATIENT
Start: 2024-09-03 | End: 2024-10-03

## 2024-09-03 RX ORDER — GABAPENTIN 100 MG/1
200 CAPSULE ORAL 3 TIMES DAILY
Qty: 180 CAPSULE | Refills: 0 | Status: SHIPPED | OUTPATIENT
Start: 2024-09-03 | End: 2024-09-12

## 2024-09-03 RX ORDER — DONEPEZIL HYDROCHLORIDE 5 MG/1
5 TABLET, FILM COATED ORAL
Qty: 30 TABLET | Refills: 0 | Status: SHIPPED | OUTPATIENT
Start: 2024-09-03 | End: 2024-09-12 | Stop reason: SINTOL

## 2024-09-03 RX ADMIN — CYANOCOBALAMIN TAB 1000 MCG 1000 MCG: 1000 TAB at 08:46

## 2024-09-03 RX ADMIN — METFORMIN HYDROCHLORIDE 500 MG: 500 TABLET ORAL at 08:45

## 2024-09-03 RX ADMIN — FLUOXETINE HYDROCHLORIDE 60 MG: 20 CAPSULE ORAL at 08:46

## 2024-09-03 RX ADMIN — LISINOPRIL 10 MG: 10 TABLET ORAL at 08:38

## 2024-09-03 RX ADMIN — GABAPENTIN 200 MG: 100 CAPSULE ORAL at 17:17

## 2024-09-03 RX ADMIN — ATORVASTATIN CALCIUM 10 MG: 10 TABLET, FILM COATED ORAL at 17:17

## 2024-09-03 RX ADMIN — DONEPEZIL HYDROCHLORIDE 5 MG: 5 TABLET, FILM COATED ORAL at 21:10

## 2024-09-03 RX ADMIN — DOCUSATE SODIUM 100 MG: 100 CAPSULE, LIQUID FILLED ORAL at 17:17

## 2024-09-03 RX ADMIN — Medication 3 MG: at 21:10

## 2024-09-03 RX ADMIN — GABAPENTIN 200 MG: 100 CAPSULE ORAL at 08:46

## 2024-09-03 RX ADMIN — DOCUSATE SODIUM 100 MG: 100 CAPSULE, LIQUID FILLED ORAL at 08:46

## 2024-09-03 RX ADMIN — HYDROCHLOROTHIAZIDE 12.5 MG: 12.5 TABLET ORAL at 08:40

## 2024-09-03 RX ADMIN — QUETIAPINE FUMARATE 50 MG: 50 TABLET ORAL at 21:10

## 2024-09-03 RX ADMIN — ASPIRIN 81 MG CHEWABLE TABLET 81 MG: 81 TABLET CHEWABLE at 08:45

## 2024-09-03 RX ADMIN — GABAPENTIN 200 MG: 100 CAPSULE ORAL at 21:10

## 2024-09-03 RX ADMIN — Medication 2000 UNITS: at 08:46

## 2024-09-03 NOTE — PROGRESS NOTES
Progress Note - Behavioral Health   Sandra Peña 83 y.o. female MRN: [id number]  Unit/Bed#: [facility] 644-01 Encounter: [id number]    Assessment & Plan   Principal Problem:    Generalized anxiety disorder  Active Problems:    Illness anxiety disorder    Major neurocognitive disorder (HCC)    Major depressive disorder, recurrent episode, mild (HCC)    Primary hypertension    Type 2 diabetes mellitus without complication, without long-term current use of insulin (HCC)    Mixed hyperlipidemia    Stage 3 chronic kidney disease, unspecified whether stage 3a or 3b CKD (HCC)    Anemia      Plan:    - Encourage early mobility and having a structured day  - Provide frequent re-orientation, and cognitive stimulation  - Ensure assistive devices are in proper working order (eye-glasses, hearing aids)  - Encourage adequate hydration, nutrition and monitor bowel movements  - Maintain sleep-wake cycle: Uninterrupted sleep time; low-level lighting at night  - Fall precaution  - f/u SLIM recs regarding the medical problems   - Continue medication titration and treatment plan; adjust medication to optimize treatment response and as clinically indicated. .     Scheduled medications:  Current Facility-Administered Medications   Medication Dose Route Frequency Provider Last Rate    acetaminophen  650 mg Oral Q4H PRN PATIENCE Parker      acetaminophen  650 mg Oral Q4H PRN PATIENCE Parker      acetaminophen  975 mg Oral Q6H PRN PATIENCE Parker      aspirin  81 mg Oral Daily PATIENCE Ardon      atorvastatin  10 mg Oral Daily With Dinner PATIENCE Ardon      Cholecalciferol  2,000 Units Oral Daily PATIENCE Anne      cyanocobalamin  1,000 mcg Oral Daily PATIENCE Anne      docusate sodium  100 mg Oral BID PATIENCE Ardon      donepezil  5 mg Oral HS Nohemi Hensley MD      FLUoxetine  60 mg Oral Daily With Breakfast Nohemi Henlsey MD      gabapentin  200 mg Oral TID  Nohemi Hensley MD      haloperidol lactate  5 mg Intramuscular Q4H PRN Max 4/day Marjorie Dharmesh, CRNP      hydroCHLOROthiazide  12.5 mg Oral Daily Debby Wild, CRNP      hydrOXYzine HCL  25 mg Oral Q6H PRN Max 4/day Marjorie Dharmesh, CRNP      hydrOXYzine HCL  50 mg Oral Q6H PRN Max 4/day Marjorie Dharmesh, CRNP      lisinopril  10 mg Oral Daily Neha Oconnellmarcia Bearden, CRNP      LORazepam  1 mg Intramuscular Q6H PRN Max 3/day Marjorie Dharmesh, CRNP      LORazepam  1 mg Oral Q6H PRN Max 3/day Marjorie Dharmesh, CRNP      melatonin  3 mg Oral HS Marjorie Dharmesh, CRNP      metFORMIN  500 mg Oral Daily With Breakfast Neha Bearden, CRNP      polyethylene glycol  17 g Oral Daily PRN Marjorie Dharmesh, CRNP      QUEtiapine  50 mg Oral HS Nohemi Hensley MD      risperiDONE  0.25 mg Oral Q4H PRN Max 6/day Marjorieaureliano Barroso, CRNP      risperiDONE  0.5 mg Oral Q4H PRN Max 3/day Marjorieaureliano Barroso, CRNP      risperiDONE  1 mg Oral Q2H PRN Max 3/day Marjorie Dharmesh, CRNP      senna-docusate sodium  1 tablet Oral Daily PRN Marjorie Dharmesh, CRNP      traZODone  50 mg Oral HS PRN Marjorieaureliano Barroso, CRNP        PRN:    acetaminophen    acetaminophen    acetaminophen    haloperidol lactate    hydrOXYzine HCL    hydrOXYzine HCL    LORazepam    LORazepam    polyethylene glycol    risperiDONE    risperiDONE    risperiDONE    senna-docusate sodium    traZODone    - Observation: routine            - VS: as per unit protocol  - Diet: Regular diet  - Encourage group attendance and milieu therapy  - Dispo: To be determined     Subjective     Patient was visited on unit for continuing care; chart reviewed and discussed with multidisciplinary treatment team.  On approach, the patient was calm and cooperative. Endorsed better mood and less anxiety sxs, but continues to have negative thinking and ruminating thoughts. Remains preoccupied with somatic sxs. Supportive psychotherapy provided and CBT techniques utilized; common cognitive  distortions were reviewed with the patient with emphasis on functional coping skills, using mindfulness and relaxation techniques. The patient was receptive and was able to acknowledge the improvements. Denied any changes in appetite, and energy level. No problem initiating and maintaining sleep.  Denied A/VH currently.  Denied SI/HI, intent or plan upon direct inquiry at this time.      Patient continues to be selectively interactive with staff and peers. No reports of aggression or self-injurious behavior on unit. No PRN medications used in the past 24 hours.    Patient accepted all offered medications and reported feeling better. No adverse effects of medications noted or reported. Prozac increased to 60 mg po daily today and Seroquel increased to 50 mg nightly on 9/1/24 and Neurontin to 200 mg TID on 9/2/24. I contacted the outpatient psychiatrist, Dr. Gutierrez and kept him updated on medication adjustments and the treatment / dispo plan. Tentative discharge tomorrow.     Objective    Current Mental Status Evaluation:  Appearance and attitude: appeared as stated age, casually dressed, with good hygiene  Eye contact: good  Motor Function: within normal limits, No PMA/PMR  Gait/station: Not observed  Speech: talking in soft tone with normal latency and decreased amount  Language: No overt abnormality  Mood/affect: less anxious, less depressed / Affect was constricted but reactive, mood congruent  Thought Processes: ruminating  Thought content: denied suicidal ideations or homicidal ideations, no overt delusions elicited, somatic preoccupation, obsessive thoughts, negative thinking, ruminations  Associations: perseverative  Perceptual disturbances: denies Auditory/Visual/Tactile Hallucinations  Orientation: oriented to time, person, place and to the situational context  Cognitive Function: intact  Memory: impaired recall; intact long-term  Intellect: average  Fund of knowledge: diminished  Impulse control:  good  Insight/judgment:  improving /fair            Vital signs in last 24 hours:    Temp:  [97.6 °F (36.4 °C)-98.4 °F (36.9 °C)] 98.4 °F (36.9 °C)  HR:  [80-89] 80  Resp:  [15-18] 15  BP: (120-138)/(60-70) 138/70    Laboratory results: I have personally reviewed all pertinent laboratory/tests results    Results from the past 24 hours: No results found for this or any previous visit (from the past 24 hour(s)).    Progress Toward Goals: continues to improve    Next of Kin  Extended Emergency Contact Information  Primary Emergency Contact: Janay Mckeon  Address: [address] [place] [address]           [place] [address] [place]  Home Phone: [phone]  Mobile Phone: [phone]  Relation: Daughter  Secondary Emergency Contact: Davidcarlos aRamin moses  Mobile Phone: [phone]  Relation: Son      Counseling / Coordination of Care  Patient's progress discussed with staff in treatment team meeting.  Medications, treatment progress and treatment plan reviewed with patient.  Medication changes discussed with patient.  Medication education provided to patient.  Supportive therapy provided to patient.  Cognitive techniques utilized during the session.  Reassurance and supportive therapy provided.  Reoriented to reality and reassured.  Encouraged participation in milieu and group therapy on the unit.  Crisis/safety plan discussed with patient.  Discharge plan discussed with patient.       Nohemi Hensley MD  Attending Psychiatrist   [facility]       This note was completed in part utilizing Dragon dictation Software. Grammatical, translation, syntax errors, random word insertions, spelling mistakes, and incomplete sentences may be an occasional consequence of this system secondary to software limitations with voice recognition, ambient noise, and hardware issues. If you have any questions or concerns about the content, text, or information contained within the body of this dictation, please  contact the provider for clarification.

## 2024-09-03 NOTE — TREATMENT TEAM
"Pt was unable to comlete 2nd page due to anxiety. 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     09/03/24 1330   Activity/Group Checklist   Group Admission/Discharge   Attendance Attended   Attendance Duration (min) 31-45   Interactions Other (Comment)  (anxious)   Affect/Mood Incongruent;Other (Comment)  (unable to complete, anxious about memory)   Goals Achieved Identified feelings;Identified triggers;Identified relapse prevention strategies;Able to listen to others;Able to engage in interactions;Identified resources and support systems;Identified distorted thoughts/beliefs;Discussed coping strategies;Discussed discharge plans;Able to self-disclose;Able to recieve feedback          "

## 2024-09-03 NOTE — PROGRESS NOTES
09/03/24 1156   Team Meeting   Meeting Type Tx Team Meeting   Initial Conference Date 09/03/24   Team Members Present   Team Members Present Physician;Nurse;;Occupational Therapist   Physician Team Member Shellie/Osmin/Dharmesh   Nursing Team Member Negrita   Care Management Team Member Anderson   Social Work Team Member Mellisa   Patient/Family Present   Patient Present No   Patient's Family Present No     Patient continues to endorse anxiety and depression. She is taking medications as prescribed and has been calm and cooperative with care. Discharge is pending for tomorrow.

## 2024-09-03 NOTE — PROGRESS NOTES
Pt attended groups.  Pt noted anxiety symptoms improved but continues to have some lingering.      09/03/24 1000   Activity/Group Checklist   Group Other (Comment)  (Community meeting: focus and acceptance)   Attendance Attended;Other (Comment)  (late)   Attendance Duration (min) 31-45   Interactions Interacted appropriately   Affect/Mood Other (Comment)  (anxious)   Goals Achieved Identified feelings;Discussed coping strategies;Able to engage in interactions;Able to listen to others;Able to manage/cope with feelings;Able to recieve feedback;Identified relapse prevention strategies;Identified triggers

## 2024-09-03 NOTE — BH TRANSITION RECORD
Contact Information: If you have any questions, concerns, pended studies, tests and/or procedures, or emergencies regarding your inpatient behavioral health visit. Please contact [facility] [phone] and ask to speak to a , nurse or physician. A contact is available 24 hours/ 7 days a week at this number.     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  - Major Imaging Studies: Head CT on 8/29/24: No acute intracranial abnormality. Stable chronic microangiopathy.    Pending Studies (From admission, onward)      None          Please follow up on the above pending studies with your PCP and/or referring provider.

## 2024-09-03 NOTE — NURSING NOTE
Patient was visible in the milieu with minimal peer interaction. Denies all psych s/s but stated she is home sick. Patient was counseled. No behaviors noted. No c/o pain. Had 50% for dinner. Took her medications. Safety checks ongoing.

## 2024-09-03 NOTE — NURSING NOTE
Patient was visible in the milieu with minimal peer interaction while watching tv. Denies all psych s/s. No behaviors noted. No c/o pain. Had 100% for dinner. Took her medications. Discharged tomorrow. Safety checks ongoing.

## 2024-09-03 NOTE — PROGRESS NOTES
09/03/24 1100 09/03/24 1300   Activity/Group Checklist   Group Other (Comment)  (focus group art task.) Pet therapy   Attendance Attended Attended   Attendance Duration (min) 46-60 16-30   Interactions Other (Comment)  (Pt. asked of clarification when not fully understanding the drawing instructions.Pt. had more success when provided with visual prompts.) Interacted appropriately   Affect/Mood Calm;Appropriate;Normal range  (engaged well when provided with some initial assistance for direction.) Appropriate;Calm;Normal range   Goals Achieved Able to listen to others;Able to engage in interactions Able to engage in interactions

## 2024-09-03 NOTE — DISCHARGE INSTR - OTHER ORDERS
You are being discharged [address] [place] [place] [address].    Triggers you have identified during your hospitalization that led to your admission includes a distressed mood, and anxiety that forms into panic attacks. Coping skills you have identified during your hospitalization includes talking to your daughter and dealing with it. If you are unable to deal with your distressed mood alone please contact [facility]. If that is not effective and you continue to have a distressed mood, are feeling overwhelmed, and/or in crisis please contact [facility] Crisis at [phone], dial 911 or go to the nearest emergency center.     [facility] Warm Line: [phone] available 24 hours a day, 7 days a week.    A warm line is a phone service for people who are looking for support, engagement, and hope during non-emergency mental health struggles or distress. A warm line is staffed by peers who have personal or lived experience with mental health disorders and who can listen, share, and offer a sense of recovery. A warm line is different from a crisis line or hotline, which are intended for emergency situations    National Suicide Hotline: 530.616.8099    Crisis Text Line: 063577      Roopa, or Amaris, our Behavioral Health Nurse Navigators, will be calling you after your discharge, on the phone number that you provided.  They will be available as an additional support, if needed.     If you wish to speak with one of them, you may contact Roopa at [phone] or Amaris at [phone].

## 2024-09-03 NOTE — PLAN OF CARE

## 2024-09-03 NOTE — TELEPHONE ENCOUNTER
Received call from Julieta at [facility] regarding interaction between Quetiapine and Risperidone.  States that combination increases risk for QT prolongation.  Wants confirmation that provider is aware, will be monitoring patient and ok to fill.    Will refer to Dr Hensley for review.

## 2024-09-04 VITALS
DIASTOLIC BLOOD PRESSURE: 69 MMHG | BODY MASS INDEX: 22.87 KG/M2 | RESPIRATION RATE: 19 BRPM | TEMPERATURE: 99.2 F | HEART RATE: 72 BPM | SYSTOLIC BLOOD PRESSURE: 153 MMHG | HEIGHT: 57 IN | WEIGHT: 106 LBS | OXYGEN SATURATION: 97 %

## 2024-09-04 PROCEDURE — 99238 HOSP IP/OBS DSCHRG MGMT 30/<: CPT | Performed by: STUDENT IN AN ORGANIZED HEALTH CARE EDUCATION/TRAINING PROGRAM

## 2024-09-04 RX ADMIN — LISINOPRIL 10 MG: 10 TABLET ORAL at 08:22

## 2024-09-04 RX ADMIN — HYDROCHLOROTHIAZIDE 12.5 MG: 12.5 TABLET ORAL at 08:22

## 2024-09-04 RX ADMIN — ASPIRIN 81 MG CHEWABLE TABLET 81 MG: 81 TABLET CHEWABLE at 08:57

## 2024-09-04 RX ADMIN — Medication 2000 UNITS: at 08:57

## 2024-09-04 RX ADMIN — FLUOXETINE HYDROCHLORIDE 60 MG: 20 CAPSULE ORAL at 07:44

## 2024-09-04 RX ADMIN — GABAPENTIN 200 MG: 100 CAPSULE ORAL at 08:57

## 2024-09-04 RX ADMIN — DOCUSATE SODIUM 100 MG: 100 CAPSULE, LIQUID FILLED ORAL at 08:57

## 2024-09-04 RX ADMIN — METFORMIN HYDROCHLORIDE 500 MG: 500 TABLET ORAL at 07:44

## 2024-09-04 RX ADMIN — CYANOCOBALAMIN TAB 1000 MCG 1000 MCG: 1000 TAB at 08:57

## 2024-09-04 NOTE — DISCHARGE SUMMARY
"Discharge Summary - Behavioral Health   Sandra Peña 83 y.o. female MRN: [id number]  Unit/Bed#: [facility] 644-01 Encounter: [id number]     Admission Date:   Admission Orders (From admission, onward)       Ordered        08/28/24 1411  ED TO DIFFERENT CAMPUS Critical access hospital UNIT or INPATIENT MEDICAL UNIT to Critical access hospital UNIT (using Discharge Readmit Navigator) - Admit Patient to IP Behavioral Health Unit  Once                                Discharge Date: 09/04/24     Attending Psychiatrist: No att. providers found    Reason for Admission/HPI:   History of Present Illness as per Nohemi eHnsley MD on 8/29/24    \"Sandra Peña is a 83 y.o.  female, , domiciled w/ her daughter, son in law and three grandkids, retired teacher, w/ PMH of DM, HLD, DVT, CKD, anemia, OA, TIA and s/p rotator cuff surgery and PPH of MDD, JONAH, illness anxiety disorder and insomnia, one prior psychiatric admission in 2013 (extreme anxiety following a surgery), no prior SA, no h/o self-injurious behavior, f/u at [facility] with Dr. Gutierrez, on Prozac 40 mg daily, Risperdal 0.25 mg daily, Klonopin 0.25 mg nightly and Atarax PRN who presented to the ED on 8/27/24 due to increased anxiety and depression. The patient signed 201 and got admitted to the inpatient psychiatry unit 6B for further psychiatric stabilization.       The patient was visited on the unit; chart reviewed. Presented calm, cooperative and well related, dressed in hospital attire, w/ involuntary movements around his mouth, good eye contact, anxious mood, constricted affect, talking in normal tone, volume and amount, somatically preoccupied with ruminations, fair insight and judgement. She stated: \"I committed myself to take care of my anxiety and sadness.\" Noted that she has been more anxious and depressed over past couple of months, and after talked with his psychiatrist, reportedly Prozac was increased from 40 mg to 60 mg but she felt \"dizzy\" and continued on 40 mg daily. She reported " "feeling anxious about \"different parts my [her] body\" mentioned her \"dry scalp\" and \"bump\" in her chest pointing to her sternal notch. She reported decreased appetite and energy with anhedonia, reportedly found it hard to do the chores at home, and has not been reading or doing puzzles as she used to. She reported difficulties with her memory, not being able to recall her daughter's last name when filling out the papers or not recalling the name of some people she knows. She noted that she sleeps in her grand-daughter's room, and usually goes to bed around 9-9:30 PM but wakes up ar east x2/night to go to the bathroom and then has difficulty falling back asleep and then gets out of the bed around 8 AM and feels tired during the day. Strongly denied SI/HI, intent or plan upon direct inquiry at this time. Denied A/VH. No manic sxs, paranoid ideations or fixed delusions were elicited.  Denied history of eating disorder or OCD, but reported some OCD traits as putting things in certain order.\"    Hospital Course:   The patient was admitted to the inpatient psychiatric unit and started on every 15 minutes precautions. A treatment plan was formed with focus on pharmacotherapy and milieu therapy, group therapy and individual psychotherapy when indicated. Psychiatric medications were titrated over the hospital stay and milieu therapy was utilized. Given the decline in SLUMS score (15/30 as checked on 8/29/24 comparing to 24/30 as checked in outpatient office by Dr. Gutierrez on 7/29/24), delirium w/u including Head CT and labs ordered which were unremarkable for acute changes. Prozac uptitrated to 50 mg daily and subsequently to 60 mg daily. Risperdal and Klonopin were discontinued. The patient was started on Seroquel 12.5 mg nightly and gradually uptitrated to 50 mg nightly. Also started on Aricept 5 mg nightly; doses to be adjusted as indicated. The patient was also started on Neurontin and dose uptitrated to 200 mg TID. " Patient's symptoms improved gradually over the hospital course. At the end of treatment the patient was doing well. Mood was stable at the time of discharge. The patient denied suicidal ideation, intent or plan at the time of discharge and denied homicidal ideation, plan or intent at the time of discharge. There was no overt psychosis at the time of discharge.  There were no signs or symptoms of PTSD or panic attacks. Sleep and appetite were improved. The patient was tolerating medications and was not reporting any significant side effects at the time of discharge.    Patient was discharged on Prozac 60 mg daily, Neurontin 200 mg TID, Seroquel 50 mg nightly and Aricept 5 mg nightly    The patient manifested improvement in mood and psychotic symptoms during inpatient treatment. The patient has been compliant with treatment plan; no side-effect noted or reported.  Denies any vegetative symptoms with improved self-care. At present, as discussed with the team, the patient has maximized treatment at the acute inpatient setting.  The patient can continue treatment at the outpatient setting.  At present,the patient does not pose any acute apparent danger to self or others.  Denied suicidal or homicidal ideation, intent or plan upon direct inquiry at this time; no episode of agitation or self-injurious behavior in the unit.  The patient has been informed of medication regimen and treatment follow-up schedule, reiterated the importance of medication and outpatient follow-up adherence and verbalized understanding of the matter. The patient agrees with current treatment plan.  Family member advised to supervise medication administration and agreed.  The patient will continue outpatient psychiatric f/u with Dr. Izaguirre and will benefit from individual psychotherapy.    Mental Status at time of Discharge:   Appearance and attitude: appeared as stated age, cooperative and attentive, casually dressed, with good hygiene  Eye  "contact: good  Motor Function: within normal limits, No PMA/PMR  Gait/station: slow  Speech: normal for rate, rhythm, volume, and latency with decreased amount  Language: No overt abnormality  Mood/affect: \"good\" / Affect was constricted but reactive, mood congruent, brighter  Thought Processes: sequential and goal-directed  Thought content: denied suicidal ideations or homicidal ideations, no overt delusions elicited, negative thoughts  Associations: concrete associations  Perceptual disturbances: denies Auditory/Visual/Tactile Hallucinations  Orientation: oriented to time, person, place and to the situational context  Cognitive Function: intact  Memory: impaired recall; intact long-term  Intellect: average  Fund of knowledge: diminished  Impulse control: good  Insight/judgment: fair/fair          Discharge Diagnosis:   Principal Problem:    Generalized anxiety disorder  Active Problems:    Illness anxiety disorder    Major neurocognitive disorder (HCC)    Major depressive disorder, recurrent episode, mild (HCC)    Primary hypertension    Type 2 diabetes mellitus without complication, without long-term current use of insulin (HCC)    Mixed hyperlipidemia    Stage 3 chronic kidney disease, unspecified whether stage 3a or 3b CKD (HCC)    Anemia      Medical Problems       Resolved Problems  Date Reviewed: 9/4/2024            Resolved    Medical clearance for psychiatric admission 9/3/2024     Resolved by  Nohemi Hensley MD              Discharge Medications:  See after visit summary for reconciled discharge medications provided to patient and family.      Discharge instructions/Information to patient and family:   See after visit summary for information provided to patient and family.      Provisions for Follow-Up Care:  See after visit summary for information related to follow-up care and any pertinent home health orders.      Discharge Statement   I spent 30 minutes discharging the patient. This time was spent on " the day of discharge. I had direct contact with the patient on the day of discharge. Additional documentation is required if more than 30 minutes were spent on discharge.

## 2024-09-04 NOTE — PROGRESS NOTES
09/04/24 0817   Team Meeting   Meeting Type Daily Rounds   Initial Conference Date 09/04/24   Next Conference Date 09/05/24   Team Members Present   Team Members Present Physician;Nurse;;   Physician Team Member Dr Hensley, SAYRA Mehta   Nursing Team Member Negrita Dailey   Care Management Team Member Paola   Social Work Team Member Mellisa   Patient/Family Present   Patient Present No   Patient's Family Present No     Attending groups, slept, visible, discharge today at 11 am, daughter will .

## 2024-09-04 NOTE — NURSING NOTE
Sandra is isolative to her room. Sandra interacts with staff and her roommate. Sandra denies all psych s/s and states she feels ready for d/c. Sandra is medication compliant. Will monitor and support during treatment.

## 2024-09-04 NOTE — PLAN OF CARE
Problem: Alteration in Thoughts and Perception  Goal: Verbalize thoughts and feelings  Description: Interventions:  - Promote a nonjudgmental and trusting relationship with the patient through active listening and therapeutic communication  - Assess patient's level of functioning, behavior and potential for risk  - Engage patient in 1 on 1 interactions  - Encourage patient to express fears, feelings, frustrations, and discuss symptoms    - [place] patient to reality, help patient recognize reality-based thinking   - Administer medications as ordered and assess for potential side effects  - Provide the patient education related to the signs and symptoms of the illness and desired effects of prescribed medications  Outcome: Progressing  Goal: Agree to be compliant with medication regime, as prescribed and report medication side effects  Description: Interventions:  - Offer appropriate PRN medication and supervise ingestion; conduct AIMS, as needed   Outcome: Progressing  Goal: Complete daily ADLs, including personal hygiene independently, as able  Description: Interventions:  - Observe, teach, and assist patient with ADLS  - Monitor and promote a balance of rest/activity, with adequate nutrition and elimination   Outcome: Progressing     Problem: Depression  Goal: Refrain from harming self  Description: Interventions:  - Monitor patient closely, per order   - Supervise medication ingestion, monitor effects and side effects   Outcome: Progressing     Problem: Anxiety  Goal: Anxiety is at manageable level  Description: Interventions:  - Assess and monitor patient's anxiety level.   - Monitor for signs and symptoms (heart palpitations, chest pain, shortness of breath, headaches, nausea, feeling jumpy, restlessness, irritable, apprehensive).   - Collaborate with interdisciplinary team and initiate plan and interventions as ordered.  - [place] patient to unit/surroundings  - Explain treatment plan  - Encourage  participation in care  - Encourage verbalization of concerns/fears  - Identify coping mechanisms  - Assist in developing anxiety-reducing skills  - Administer/offer alternative therapies  - Limit or eliminate stimulants  Outcome: Progressing

## 2024-09-04 NOTE — NURSING NOTE
Patient discharged to home with daughter picking her up.  All personal belongings returned to patient. No valuables were here in the hospital. Patient discharged to home without difficulty.

## 2024-09-04 NOTE — PLAN OF CARE
Problem: Alteration in Thoughts and Perception  Goal: Verbalize thoughts and feelings  Description: Interventions:  - Promote a nonjudgmental and trusting relationship with the patient through active listening and therapeutic communication  - Assess patient's level of functioning, behavior and potential for risk  - Engage patient in 1 on 1 interactions  - Encourage patient to express fears, feelings, frustrations, and discuss symptoms    - [place] patient to reality, help patient recognize reality-based thinking   - Administer medications as ordered and assess for potential side effects  - Provide the patient education related to the signs and symptoms of the illness and desired effects of prescribed medications  Outcome: Adequate for Discharge  Goal: Agree to be compliant with medication regime, as prescribed and report medication side effects  Description: Interventions:  - Offer appropriate PRN medication and supervise ingestion; conduct AIMS, as needed   Outcome: Adequate for Discharge  Goal: Attend and participate in unit activities, including therapeutic, recreational, and educational groups  Description: Interventions:  -Encourage Visitation and family involvement in care  Outcome: Adequate for Discharge  Goal: Recognize dysfunctional thoughts, communicate reality-based thoughts at the time of discharge  Description: Interventions:  - Provide medication and psycho-education to assist patient in compliance and developing insight into his/her illness   Outcome: Adequate for Discharge  Goal: Complete daily ADLs, including personal hygiene independently, as able  Description: Interventions:  - Observe, teach, and assist patient with ADLS  - Monitor and promote a balance of rest/activity, with adequate nutrition and elimination   Outcome: Adequate for Discharge     Problem: Ineffective Coping  Goal: Demonstrates healthy coping skills  Outcome: Adequate for Discharge  Goal: Patient/Family participate in treatment  and DC plans  Description: Interventions:  - Provide therapeutic environment  Outcome: Adequate for Discharge  Goal: Patient/Family verbalizes awareness of resources  Outcome: Adequate for Discharge     Problem: Depression  Goal: Treatment Goal: Demonstrate behavioral control of depressive symptoms, verbalize feelings of improved mood/affect, and adopt new coping skills prior to discharge  Outcome: Adequate for Discharge  Goal: Verbalize thoughts and feelings  Description: Interventions:  - Assess and re-assess patient's level of risk   - Engage patient in 1:1 interactions, daily, for a minimum of 15 minutes   - Encourage patient to express feelings, fears, frustrations, hopes   Outcome: Adequate for Discharge  Goal: Refrain from harming self  Description: Interventions:  - Monitor patient closely, per order   - Supervise medication ingestion, monitor effects and side effects   Outcome: Adequate for Discharge  Goal: Refrain from isolation  Description: Interventions:  - Develop a trusting relationship   - Encourage socialization   Outcome: Adequate for Discharge  Goal: Refrain from self-neglect  Outcome: Adequate for Discharge  Goal: Attend and participate in unit activities, including therapeutic, recreational, and educational groups  Description: Interventions:  - Provide therapeutic and educational activities daily, encourage attendance and participation, and document same in the medical record   Outcome: Adequate for Discharge  Goal: Complete daily ADLs, including personal hygiene independently, as able  Description: Interventions:  - Observe, teach, and assist patient with ADLS  -  Monitor and promote a balance of rest/activity, with adequate nutrition and elimination   Outcome: Adequate for Discharge     Problem: Anxiety  Goal: Anxiety is at manageable level  Description: Interventions:  - Assess and monitor patient's anxiety level.   - Monitor for signs and symptoms (heart palpitations, chest pain, shortness  of breath, headaches, nausea, feeling jumpy, restlessness, irritable, apprehensive).   - Collaborate with interdisciplinary team and initiate plan and interventions as ordered.  - [place] patient to unit/surroundings  - Explain treatment plan  - Encourage participation in care  - Encourage verbalization of concerns/fears  - Identify coping mechanisms  - Assist in developing anxiety-reducing skills  - Administer/offer alternative therapies  - Limit or eliminate stimulants  Outcome: Adequate for Discharge

## 2024-09-04 NOTE — NURSING NOTE
Patient is medication and meal compliant. No complaints of pain or discomfort. Patient denies symptoms of depression or anxiety. Patient is being discharged today at 1100 with her daughter picking her up.  No new issues or concerns noted at this time.

## 2024-09-04 NOTE — CASE MANAGEMENT
Case Management Discharge Planning Note    Patient name Sandra Peña  [id number]  : 1940 Date 2024       Current Admission Date: 2024  Current Admission Diagnosis:Generalized anxiety disorder   Patient Active Problem List    Diagnosis Date Noted Date Diagnosed    Major neurocognitive disorder (HCC) 2024     Anemia 2024     Stage 3 chronic kidney disease, unspecified whether stage 3a or 3b CKD (HCC) 2024     Constipation 2024     Major depressive disorder, recurrent episode, mild (HCC) 2024     Dry scalp 10/24/2022     Balance problem 2022     Uric acid kidney stone 2022     Illness anxiety disorder 2022     Generalized anxiety disorder 2022     Mild neurocognitive disorder 2022     Chronic idiopathic constipation 2020     Generalized osteoarthritis 2020     Change in bowel habits 2018     Insomnia 2014     Adenomatous polyp of colon 2013     Primary hypertension 10/02/2013     Type 2 diabetes mellitus without complication, without long-term current use of insulin (HCC) 10/02/2013     Mixed hyperlipidemia 10/02/2013       LOS (days): 7  Geometric Mean LOS (GMLOS) (days):   Days to GMLOS:     OBJECTIVE:  Risk of Unplanned Readmission Score: 19.65         Current admission status: Inpatient Psych   Preferred Pharmacy:   [facility] [address] - [address]  [address] [address]  Phone: [phone] Fax: [phone]    Primary Care Provider: Ramin De Paz DO    Primary Insurance: MEDICARE  Secondary Insurance: [facility]    DISCHARGE DETAILS:    Discharge planning discussed with:: Patient and daughter  Freedom of Choice: Yes                   Contacts  Patient Contacts: Janay Mckeon (daughter)  Relationship to Patient:: Family  Contact Method: Phone  Phone Number: [phone]  Reason/Outcome: Continuity of Care, Emergency Contact, Discharge  Planning              Other Referral/Resources/Interventions Provided:  Referrals Provided:: Crisis Hotline    Would you like to participate in our [facility] service program?  : No - Declined                                        IMM Given (Date):: 09/04/24  IMM Given to:: Patient          CM met with PT for PT check in. PT was pleasant in conversation, reviewed discharge plan along with follow up care and supports, PT in agreement with all. PT denies si/hi/ah/vh anxiety and depression. PT expressed gratitude stating she feels better. Reviewed IMM, PT declined right to appeal, feels she is ready for discharge and signed.     Aftercare:  SLPA: 9/23/24 @ 11:15 am with Dr Gutierrez for medication management

## 2024-09-04 NOTE — PROGRESS NOTES
09/04/24 1048   Discharge Planning   Living Arrangements Lives w/ Children;Lives w/ Family members   Support Systems Children;Self;Psychiatrist;Therapist   Type of Current Residence Private residence   Current Home Care Services No   Other Referral/Resources/Interventions Provided:   Referrals Provided: Crisis Hotline   Discharge Communications   Discharge planning discussed with: Patient and daughter   Freedom of Choice Yes   IMM Given (Date): 09/04/24   IMM Given to: Patient   Contacts   Patient Contacts Janay Mckeon (daughter)   Relationship to Patient: Family   Contact Method Phone   Phone Number [phone]   Reason/Outcome Continuity of Care;Emergency Contact;Discharge Planning   [facility]   Would you like to participate in our [facility] service program?   No - Declined

## 2024-09-06 ENCOUNTER — TELEPHONE (OUTPATIENT)
Age: 84
End: 2024-09-06

## 2024-09-06 NOTE — TELEPHONE ENCOUNTER
Pt's daughter returned phone call. Writer tried contacting nurses line, no answer. Writer advised that a message would be sent for a call back. Daughter states that she's available anytime, today.

## 2024-09-06 NOTE — TELEPHONE ENCOUNTER
LMTRC for Janay to clarify what medication she was concerned about as it was not put in the original message.

## 2024-09-06 NOTE — TELEPHONE ENCOUNTER
Patient daughter calling regarding medication dosing that was given in the ER. She stated that its making patient way too sedated and wanted Dr. De Paz to decrease dosing until she follows up with psychiatry.  Marianela Parker     [Symptom and Test Evaluation] : symptom and test evaluation [FreeTextEntry1] : Is a 68-year-old woman who presents today for evaluation of palpitations.  She has had on 2 or 3 occasions in the last 2 weeks palpitations for several minutes.  No associated chest discomfort shortness of breath dizziness or syncope.  The patient has no significant OBSTRUCTIVE coronary disease by cath in 2021 although she does have a history of a remote stent.  She has a history of normal left ventricular systolic function.

## 2024-09-06 NOTE — TELEPHONE ENCOUNTER
Writer called patient daughter and she wanted to let provider know that patient was just released from the hospital and is concerned about the meds that she is on.  Writer told daughter that provider is on vacation until Tues but will send a message to the covering provider and nurses. Daughter is saying that patient is having a twitching in her arm and was wondering it that was the meds.

## 2024-09-06 NOTE — TELEPHONE ENCOUNTER
LM on Janay's VM informing her that I was returning her call.  Requested that she call back for further review.

## 2024-09-06 NOTE — TELEPHONE ENCOUNTER
Patients daughter called office requesting to speak to MR for residents. Writer tried to warm transfer call but unable to connect. Please contact daughter back at [phone]

## 2024-09-06 NOTE — TELEPHONE ENCOUNTER
Spoke to Janay regarding Sandra's medication.  States she spoke to Manisha, inpatient nurse and Dr Hensley gave her instructions for her Gabapentin since her mom was too groggy.  He instructed her to hold morning dose of Gabapentin, take 1 capsule in the afternoon, only if needed, and take 2 capsules at bedtime.  Janay states her mother has been having nightmares and is afraid to go to sleep at night because of them.  She asked if it could be from the Gabapentin.   She has also just started on Aricept and Seroquel.  Can 1 of these be causing her nightmares.  She is requesting provider recommendation.    Will refer to Dr Gutierrez for review.

## 2024-09-10 ENCOUNTER — TELEPHONE (OUTPATIENT)
Age: 84
End: 2024-09-10

## 2024-09-11 ENCOUNTER — TELEPHONE (OUTPATIENT)
Age: 84
End: 2024-09-11

## 2024-09-11 NOTE — TELEPHONE ENCOUNTER
Patients daughter called seeking to obtain an earlier appt. For the patient to have her meds adjusted, Writer was able to warm transfer her to the resident line for assistance.

## 2024-09-12 ENCOUNTER — OFFICE VISIT (OUTPATIENT)
Dept: PSYCHIATRY | Facility: CLINIC | Age: 84
End: 2024-09-12
Payer: MEDICARE

## 2024-09-12 VITALS — HEART RATE: 88 BPM | SYSTOLIC BLOOD PRESSURE: 129 MMHG | DIASTOLIC BLOOD PRESSURE: 68 MMHG

## 2024-09-12 DIAGNOSIS — F45.21 ILLNESS ANXIETY DISORDER: Primary | ICD-10-CM

## 2024-09-12 PROCEDURE — 90833 PSYTX W PT W E/M 30 MIN: CPT

## 2024-09-12 PROCEDURE — 99214 OFFICE O/P EST MOD 30 MIN: CPT

## 2024-09-12 RX ORDER — GABAPENTIN 100 MG/1
100 CAPSULE ORAL
Qty: 30 CAPSULE | Refills: 0 | Status: SHIPPED | OUTPATIENT
Start: 2024-09-12 | End: 2024-09-17

## 2024-09-12 NOTE — PSYCH
MEDICATION MANAGEMENT NOTE        [facility]      Name and Date of Birth:  Sandra Peña 83 y.o. 1940 MRN: [id number]    Date of Visit: September 12, 2024    Reason for Visit: Follow-up visit regarding medication management     _____________________________    Assessment & Plan   Sandra Peña is 83-years-old, a retired , , living with her daughter and daughter's , their three children, and daughter's father-in-law, and history of one hospitalization in 2013 for anxiety.     Patient recently discharged from hospital. Patient and her daughter report improvements as below, however sleep has now become her major concern. Objectively, the patient's affect and speech have improved and obsessional thinking also seems better. Improvements likely from fluoxetine increase. What patient describes sounds most like vivid dreaming, rather than true perceptual disturbances. The dreams themselves are not disturbing or frightening. Her preoccupation and fear that she is hallucinating is consistent with IAD. Less likely, illusions and hypnopompic/hypnagogic hallucinations can be considered. She also reports loss of appetite. Donepezil can be associated with vivid dreaming and anorexia. The patient and her daughter report that she is lethargic during the day, however this has improved with reduction of gabapentin dose; also non-restorative sleep is likely a significant contributor. They are in the process of moving the patient to [facility]; this is expected to be a positive change as the patient will have greater social stimulation.    Will trial discontinuation of donepezil and evaluate for sleep improvement. If no improvement, can consider quetiapine as the cause and trial a reduction. Also may discontinue melatonin as this is also associated with vivid dreaming/nightmares. At this time, this is preferable to the addition of an additional  medication such as a hypnotic or anxiolytic agent due to the risks of sedation and polypharmacy in the elderly, also given concern for possible neurocognitive disorder. They report that the patient infrequently uses PRN hydroxyzine. Can re-evaluate need in future; also if neurocognitive disorder diagnosis is given.    There is also possible new/worse tic/tremor in possibly both UE, possibly worse on left. Considering re-referral to neurology following results of neuropsychological testing. Will continue to monitor this. These movements were not observed during the interview.    Labs and imaging from hospitalization are stable.    DSM-5 Diagnoses/Visit Diagnoses:     1. Illness anxiety disorder      Rule-out neurocognitive disorder  Rule-out MDD, JONAH, OCD    Treatment Recommendations/Precautions:  Discontinue donepezil  Continue fluoxetine 60 mg QAM for obsessions, anxiety, and depressive symptoms  Reduce gabapentin to 100 mg QHS for sleep and anxiety  Continue hydroxyzine 10 mg BID PRN for anxiety  Continue melatonin 3 mg QHS for sleep  Continue quetiapine 50 mg QHS for adjunctive treatment of obsessional thinking  Patient will discuss continued need to take vitamin B12 and D supplementation given normal levels with PCP  Neuropsychological testing scheduled in 6 weeks to evaluate for neurocognitive disorder  Neuropsychologist recommended as well:  White noise/background noise at night  Taking evening medication earlier before bed  Risks/benefits/alternatives to treatment discussed, including a myriad of potential adverse medication side effects, to which Sandra voiced understanding and consented fully to treatment.   Labs most recently obtained , reviewed.   Follow up in 2 weeks for medication management  Follow up with PCP for medical issues and ongoing care  Aware of 24 hour and weekend coverage for urgent situations accessed by calling [facility] main practice number    Individual  "psychotherapy provided: Counseling was provided during the session today for 30 minutes.     Treatment Plan:     Completed and signed during the session: Not applicable - Treatment Plan not due at this session    _____________________________________________    History of Present Illness     Chief Complaint: \"dreams\"    SUBJECTIVE:    At this time the biggest issue is sleep. This began in the hospital. She is awake all night. Also since this time, she has reported hearing things and seeing people; however she is unclear if she is awake and hallucinating or if she is dreaming. This confusion causes her to be extremely frightened. There has been some sleep-talking; this has happened in the past as well, however. The dreams themselves, however are not frightening. Also report little to no appetite. She is drowsy and lethargic during the day. She is restless and scared to sleep at night due to the dreams, alleged hallucinations. She also reports cold sweats that are new.    Frequent headache, however possibly due to poor hydration.  New onset left UE \"tic\" movement.    There has been significant improvement. Not crying, more in control of emotions, able to express her concerns, not as much of a loss of words.    Gabapentin they are concerned was too much.    Psychiatric Review Of Systems:  Unchanged information from this writer's previous assessment is copied and italicized; information that has changed is bolded.    Appetite: yes  Adverse eating: no  Weight changes: no  Insomnia/sleeplessness: yes  Fatigue/anergy: yes  Anhedonia/lack of interest: no  Attention/concentration: no  Psychomotor agitation/retardation: no  Somatic symptoms: yes  Anxiety/panic attack: yes  Zaira/hypomania: no  Hopelessness/helplessness/worthlessness: no  Self-injurious behavior/high-risk behavior: no  Suicidal ideation: no  Homicidal ideation: no  Auditory hallucinations: no  Visual hallucinations: no  Other perceptual disturbances: " no  Delusional thinking: no  Obsessive/compulsive symptoms: yes    Review Of Systems:      Constitutional low energy   ENT negative   Cardiovascular negative   Respiratory negative   Gastrointestinal negative   Genitourinary negative   Musculoskeletal negative   Integumentary negative   Neurological negative   Endocrine negative   Other Symptoms none, all other systems are negative     Objective    OBJECTIVE:     Visit Vitals  /68   Pulse 88   OB Status Postmenopausal   Smoking Status Never      Wt Readings from Last 6 Encounters:   24 48.1 kg (106 lb)   24 50.7 kg (111 lb 12.4 oz)   24 50.7 kg (111 lb 12.8 oz)   24 51.4 kg (113 lb 6.4 oz)   24 52 kg (114 lb 10.2 oz)   24 52.2 kg (115 lb)        Past Medical History:   Diagnosis Date    Anxiety     Colon polyps     Depression     Diabetes mellitus (HCC)     Dizziness     Last assessed: 8/31/15    DVT (deep venous thrombosis) ([facility])     Dysuria     Hematuria 2022    Hyperlipidemia     Hypertension     Hypertensive urgency 10/22/2021    Insomnia     Panic attack     Ureterolithiasis 2020      Past Surgical History:   Procedure Laterality Date     SECTION      1964 son, 1968 daughter    COLONOSCOPY      FL RETROGRADE PYELOGRAM  2020    HI COLONOSCOPY FLX DX W/COLLJ SPEC WHEN PFRMD N/A 3/27/2017    Procedure: COLONOSCOPY;  Surgeon: Gold Francis MD;  Location: [facility];  Service: Gastroenterology    HI CYSTO/URETERO W/LITHOTRIPSY &INDWELL STENT INSRT Right 2020    Procedure: CYSTOSCOPY URETEROSCOPY WITH LITHOTRIPSY HOLMIUM LASER, RETROGRADE PYELOGRAM AND INSERTION STENT URETERAL; EXCISION OF BLADDER TUMOR;  Surgeon: Edwin Love MD;  Location: [facility];  Service: Urology    ROTATOR CUFF REPAIR Left     Last assessed: 3/29/15    TONSILLECTOMY         Meds/Allergies    No Known Allergies  Current Outpatient Medications   Medication Instructions    aspirin 81 mg, Oral, Daily    atorvastatin  "(LIPITOR) 10 mg, Oral, Daily    Cholecalciferol (VITAMIN D3) 2,000 Units, Oral, Daily    cyanocobalamin (VITAMIN B-12) 1,000 mcg, Oral, Daily    docusate sodium (COLACE) 100 mg, Oral, 2 times daily    fluocinonide (LIDEX) 0.05 % external solution Scalp solution- apply to affected area once a day as needed    FLUoxetine (PROZAC) 60 mg, Oral, Daily with breakfast    gabapentin (NEURONTIN) 100 mg, Oral, Daily at bedtime    hydrOXYzine HCL (ATARAX) 10 mg, Oral, 2 times daily PRN    ketoconazole (NIZORAL) 2 % shampoo To the scalp leave on for 5 minutes then rinse off completely. Once a week    linaCLOtide 145 mcg, Oral, Daily    lisinopril-hydrochlorothiazide (PRINZIDE,ZESTORETIC) 10-12.5 MG per tablet 1 tablet, Oral, Daily    melatonin 3 mg, Oral, Daily at bedtime    metFORMIN (GLUCOPHAGE) 500 mg, Oral, 2 times daily with meals    mometasone (ELOCON) 0.1 % lotion Topical, Daily    QUEtiapine (SEROQUEL) 50 mg, Oral, Daily at bedtime    triamcinolone (KENALOG) 0.1 % cream Apply under the breasts twice a day up to 2 weeks as needed for redness or itching           Mental Status Exam:    Appearance Petite, casual attire, sweater, curly grey hair  Good eye contact   Behavior/motor Calm and cooperative, gait is slow but steady  Some lower lip quiver noted   Speech/language Normal rate and volume, fluency seems slightly improved   Mood \"Not too good\"   Affect Hernández range than previous, appropriate   Thought process Logical and linear, ruminating   Thought content No overt delusions, Denies hallucinations, Denies suicidal ideations   Cognition Awake and alert, oriented and memory grossly intact, and concentrates on questions   Insight/judgment Insight: fair  Judgment: fair     Laboratory Results: I have personally reviewed all pertinent laboratory/tests results    Admission on 08/28/2024, Discharged on 09/04/2024   Component Date Value Ref Range Status    Sodium 08/30/2024 136  135 - 147 mmol/L Final    Potassium 08/30/2024 " 3.8  3.5 - 5.3 mmol/L Final    Chloride 08/30/2024 100  96 - 108 mmol/L Final    CO2 08/30/2024 26  21 - 32 mmol/L Final    ANION GAP 08/30/2024 10  4 - 13 mmol/L Final    BUN 08/30/2024 25  5 - 25 mg/dL Final    Creatinine 08/30/2024 0.98  0.60 - 1.30 mg/dL Final    Standardized to IDMS reference method    Glucose 08/30/2024 136  65 - 140 mg/dL Final    If the patient is fasting, the ADA then defines impaired fasting glucose as > 100 mg/dL and diabetes as > or equal to 123 mg/dL.    Glucose, Fasting 08/30/2024 136 (H)  65 - 99 mg/dL Final    Calcium 08/30/2024 9.3  8.4 - 10.2 mg/dL Final    AST 08/30/2024 12 (L)  13 - 39 U/L Final    ALT 08/30/2024 6 (L)  7 - 52 U/L Final    Specimen collection should occur prior to Sulfasalazine administration due to the potential for falsely depressed results.     Alkaline Phosphatase 08/30/2024 43  34 - 104 U/L Final    Total Protein 08/30/2024 6.1 (L)  6.4 - 8.4 g/dL Final    Albumin 08/30/2024 3.8  3.5 - 5.0 g/dL Final    Total Bilirubin 08/30/2024 0.53  0.20 - 1.00 mg/dL Final    Use of this assay is not recommended for patients undergoing treatment with eltrombopag due to the potential for falsely elevated results.  N-acetyl-p-benzoquinone imine (metabolite of Acetaminophen) will generate erroneously low results in samples for patients that have taken an overdose of Acetaminophen.    eGFR 08/30/2024 52  ml/min/1.73sq m Final    Magnesium 08/30/2024 2.1  1.9 - 2.7 mg/dL Final    Phosphorus 08/30/2024 4.0  2.3 - 4.1 mg/dL Final    WBC 08/30/2024 5.82  4.31 - 10.16 Thousand/uL Final    RBC 08/30/2024 3.77 (L)  3.81 - 5.12 Million/uL Final    Hemoglobin 08/30/2024 11.0 (L)  11.5 - 15.4 g/dL Final    Hematocrit 08/30/2024 34.4 (L)  34.8 - 46.1 % Final    MCV 08/30/2024 91  82 - 98 fL Final    MCH 08/30/2024 29.2  26.8 - 34.3 pg Final    MCHC 08/30/2024 32.0  31.4 - 37.4 g/dL Final    RDW 08/30/2024 13.1  11.6 - 15.1 % Final    MPV 08/30/2024 10.9  8.9 - 12.7 fL Final    Platelets  08/30/2024 207  149 - 390 Thousands/uL Final    nRBC 08/30/2024 0  /100 WBCs Final    Segmented % 08/30/2024 68  43 - 75 % Final    Immature Grans % 08/30/2024 0  0 - 2 % Final    Lymphocytes % 08/30/2024 22  14 - 44 % Final    Monocytes % 08/30/2024 7  4 - 12 % Final    Eosinophils Relative 08/30/2024 2  0 - 6 % Final    Basophils Relative 08/30/2024 1  0 - 1 % Final    Absolute Neutrophils 08/30/2024 3.95  1.85 - 7.62 Thousands/µL Final    Absolute Immature Grans 08/30/2024 0.01  0.00 - 0.20 Thousand/uL Final    Absolute Lymphocytes 08/30/2024 1.26  0.60 - 4.47 Thousands/µL Final    Absolute Monocytes 08/30/2024 0.43  0.17 - 1.22 Thousand/µL Final    Eosinophils Absolute 08/30/2024 0.14  0.00 - 0.61 Thousand/µL Final    Basophils Absolute 08/30/2024 0.03  0.00 - 0.10 Thousands/µL Final    TSH 3RD GENERATON 08/30/2024 1.191  0.450 - 4.500 uIU/mL Final    The recommended reference ranges for TSH during pregnancy are as follows:   First trimester 0.100 to 2.500 uIU/mL   Second trimester  0.200 to 3.000 uIU/mL   Third trimester 0.300 to 3.000 uIU/m    Note: Normal ranges may not apply to patients who are transgender, non-binary, or whose legal sex, sex at birth, and gender identity differ.  Adult TSH (3rd generation) reference range follows the recommended guidelines of the American Thyroid Association, January, 2020.    Vitamin B-12 08/30/2024 437  180 - 914 pg/mL Final    Folate 08/30/2024 >22.3  >5.9 ng/mL Final    The World Health Organization has determined deficient folate concentrations are considered to be <4.0 ng/mL.    Vit D, 25-Hydroxy 08/30/2024 40.4  30.0 - 100.0 ng/mL Final    Vitamin D guidelines established by Clinical Guidelines Subcommittee  of the Endocrine Society Task Force, 2011    Deficiency <20ng/ml   Insufficiency 20-30ng/ml   Sufficient  ng/ml     Iron Saturation 08/30/2024 23  15 - 50 % Final    TIBC 08/30/2024 257  250 - 450 ug/dL Final    Iron 08/30/2024 58  50 - 212 ug/dL Final     Patients treated with metal-binding drugs (ie. Deferoxamine) may have depressed iron values.    UIBC 08/30/2024 199  155 - 355 ug/dL Final    Ferritin 08/30/2024 29  11 - 307 ng/mL Final    Syphilis Total Antibody 08/30/2024 Non-reactive  Non-Reactive Final    No serological evidence of infection with T. pallidum.  Early or incubating syphilis infection cannot be excluded.  Consider repeat testing based on clinical suspicion.    HIV-1 p24 Antigen 08/30/2024 Non-Reactive  Non-Reactive Final    HIV-1 Antibody 08/30/2024 Non-Reactive  Non-Reactive Final    HIV-2 Antibody 08/30/2024 Non-Reactive  Non-Reactive Final    HIV Ag-Ab 5th Gen 08/30/2024 Non-Reactive  Non-Reactive Final    A Non-Reactive test result does not preclude the possibility of exposure or infection with HIV-1 and/or HIV-2.  Non-Reactive results can occur if the quantity of marker present is below the detection limits or is not present during the stage of disease in which a sample is collected. Repeat testing should be considered where there is clinical suspicion of infection.       Ventricular Rate 08/30/2024 83  BPM Final    Atrial Rate 08/30/2024 83  BPM Final    OR Interval 08/30/2024 170  ms Final    QRSD Interval 08/30/2024 74  ms Final    QT Interval 08/30/2024 370  ms Final    QTC Interval 08/30/2024 434  ms Final    P Axis 08/30/2024 61  degrees Final    QRS Pandora 08/30/2024 3  degrees Final    T Wave Pandora 08/30/2024 51  degrees Final   Admission on 08/27/2024, Discharged on 08/28/2024   Component Date Value Ref Range Status    WBC 08/27/2024 6.15  4.31 - 10.16 Thousand/uL Final    RBC 08/27/2024 3.75 (L)  3.81 - 5.12 Million/uL Final    Hemoglobin 08/27/2024 11.0 (L)  11.5 - 15.4 g/dL Final    Hematocrit 08/27/2024 33.5 (L)  34.8 - 46.1 % Final    MCV 08/27/2024 89  82 - 98 fL Final    MCH 08/27/2024 29.3  26.8 - 34.3 pg Final    MCHC 08/27/2024 32.8  31.4 - 37.4 g/dL Final    RDW 08/27/2024 13.2  11.6 - 15.1 % Final    MPV 08/27/2024 10.2  8.9  - 12.7 fL Final    Platelets 08/27/2024 207  149 - 390 Thousands/uL Final    nRBC 08/27/2024 0  /100 WBCs Final    Segmented % 08/27/2024 66  43 - 75 % Final    Immature Grans % 08/27/2024 0  0 - 2 % Final    Lymphocytes % 08/27/2024 24  14 - 44 % Final    Monocytes % 08/27/2024 7  4 - 12 % Final    Eosinophils Relative 08/27/2024 2  0 - 6 % Final    Basophils Relative 08/27/2024 1  0 - 1 % Final    Absolute Neutrophils 08/27/2024 4.08  1.85 - 7.62 Thousands/µL Final    Absolute Immature Grans 08/27/2024 0.02  0.00 - 0.20 Thousand/uL Final    Absolute Lymphocytes 08/27/2024 1.46  0.60 - 4.47 Thousands/µL Final    Absolute Monocytes 08/27/2024 0.43  0.17 - 1.22 Thousand/µL Final    Eosinophils Absolute 08/27/2024 0.12  0.00 - 0.61 Thousand/µL Final    Basophils Absolute 08/27/2024 0.04  0.00 - 0.10 Thousands/µL Final    Sodium 08/27/2024 136  135 - 147 mmol/L Final    Potassium 08/27/2024 3.9  3.5 - 5.3 mmol/L Final    Chloride 08/27/2024 102  96 - 108 mmol/L Final    CO2 08/27/2024 27  21 - 32 mmol/L Final    ANION GAP 08/27/2024 7  4 - 13 mmol/L Final    BUN 08/27/2024 26 (H)  5 - 25 mg/dL Final    Creatinine 08/27/2024 1.05  0.60 - 1.30 mg/dL Final    Standardized to IDMS reference method    Glucose 08/27/2024 122  65 - 140 mg/dL Final    If the patient is fasting, the ADA then defines impaired fasting glucose as > 100 mg/dL and diabetes as > or equal to 123 mg/dL.    Calcium 08/27/2024 9.4  8.4 - 10.2 mg/dL Final    AST 08/27/2024 12 (L)  13 - 39 U/L Final    ALT 08/27/2024 7  7 - 52 U/L Final    Specimen collection should occur prior to Sulfasalazine administration due to the potential for falsely depressed results.     Alkaline Phosphatase 08/27/2024 50  34 - 104 U/L Final    Total Protein 08/27/2024 6.4  6.4 - 8.4 g/dL Final    Albumin 08/27/2024 4.0  3.5 - 5.0 g/dL Final    Total Bilirubin 08/27/2024 0.48  0.20 - 1.00 mg/dL Final    Use of this assay is not recommended for patients undergoing treatment with  eltrombopag due to the potential for falsely elevated results.  N-acetyl-p-benzoquinone imine (metabolite of Acetaminophen) will generate erroneously low results in samples for patients that have taken an overdose of Acetaminophen.    eGFR 08/27/2024 48  ml/min/1.73sq m Final    TSH 3RD GENERATON 08/27/2024 1.408  0.450 - 4.500 uIU/mL Final    The recommended reference ranges for TSH during pregnancy are as follows:   First trimester 0.100 to 2.500 uIU/mL   Second trimester  0.200 to 3.000 uIU/mL   Third trimester 0.300 to 3.000 uIU/m    Note: Normal ranges may not apply to patients who are transgender, non-binary, or whose legal sex, sex at birth, and gender identity differ.  Adult TSH (3rd generation) reference range follows the recommended guidelines of the American Thyroid Association, January, 2020.    Amph/Meth UR 08/27/2024 Negative  Negative Final    Barbiturate Ur 08/27/2024 Negative  Negative Final    Benzodiazepine Urine 08/27/2024 Negative  Negative Final    Cocaine Urine 08/27/2024 Negative  Negative Final    Methadone Urine 08/27/2024 Negative  Negative Final    Opiate Urine 08/27/2024 Negative  Negative Final    PCP Ur 08/27/2024 Negative  Negative Final    THC Urine 08/27/2024 Negative  Negative Final    Oxycodone Urine 08/27/2024 Negative  Negative Final    Fentanyl Urine 08/27/2024 Negative  Negative Final    HYDROCODONE URINE 08/27/2024 Negative  Negative Final    EXTBreath Alcohol 08/27/2024 0.000   Final    Color, UA 08/27/2024 Colorless   Final    Clarity, UA 08/27/2024 Clear   Final    Specific Gravity, UA 08/27/2024 1.008  1.003 - 1.030 Final    pH, UA 08/27/2024 5.0  4.5, 5.0, 5.5, 6.0, 6.5, 7.0, 7.5, 8.0 Final    Leukocytes, UA 08/27/2024 Negative  Negative Final    Nitrite, UA 08/27/2024 Negative  Negative Final    Protein, UA 08/27/2024 Negative  Negative mg/dl Final    Glucose, UA 08/27/2024 Negative  Negative mg/dl Final    Ketones, UA 08/27/2024 Negative  Negative mg/dl Final     Urobilinogen, UA 08/27/2024 <2.0  <2.0 mg/dl mg/dl Final    Bilirubin, UA 08/27/2024 Negative  Negative Final    Occult Blood, UA 08/27/2024 Negative  Negative Final    Ventricular Rate 08/27/2024 77  BPM Final    Atrial Rate 08/27/2024 77  BPM Final    [place] Interval 08/27/2024 166  ms Final    QRSD Interval 08/27/2024 74  ms Final    QT Interval 08/27/2024 358  ms Final    QTC Interval 08/27/2024 405  ms Final    P Axis 08/27/2024 50  degrees Final    QRS Axis 08/27/2024 -1  degrees Final    T Wave Axis 08/27/2024 23  degrees Final   Admission on 04/10/2024, Discharged on 04/10/2024   Component Date Value Ref Range Status    WBC 04/10/2024 6.37  4.31 - 10.16 Thousand/uL Final    RBC 04/10/2024 4.09  3.81 - 5.12 Million/uL Final    Hemoglobin 04/10/2024 11.7  11.5 - 15.4 g/dL Final    Hematocrit 04/10/2024 37.7  34.8 - 46.1 % Final    MCV 04/10/2024 92  82 - 98 fL Final    MCH 04/10/2024 28.6  26.8 - 34.3 pg Final    MCHC 04/10/2024 31.0 (L)  31.4 - 37.4 g/dL Final    RDW 04/10/2024 13.2  11.6 - 15.1 % Final    MPV 04/10/2024 10.4  8.9 - 12.7 fL Final    Platelets 04/10/2024 203  149 - 390 Thousands/uL Final    nRBC 04/10/2024 0  /100 WBCs Final    Segmented % 04/10/2024 70  43 - 75 % Final    Immature Grans % 04/10/2024 0  0 - 2 % Final    Lymphocytes % 04/10/2024 19  14 - 44 % Final    Monocytes % 04/10/2024 6  4 - 12 % Final    Eosinophils Relative 04/10/2024 4  0 - 6 % Final    Basophils Relative 04/10/2024 1  0 - 1 % Final    Absolute Neutrophils 04/10/2024 4.47  1.85 - 7.62 Thousands/µL Final    Absolute Immature Grans 04/10/2024 0.02  0.00 - 0.20 Thousand/uL Final    Absolute Lymphocytes 04/10/2024 1.18  0.60 - 4.47 Thousands/µL Final    Absolute Monocytes 04/10/2024 0.41  0.17 - 1.22 Thousand/µL Final    Eosinophils Absolute 04/10/2024 0.23  0.00 - 0.61 Thousand/µL Final    Basophils Absolute 04/10/2024 0.06  0.00 - 0.10 Thousands/µL Final    Sodium 04/10/2024 135  135 - 147 mmol/L Final    Potassium  "04/10/2024 4.3  3.5 - 5.3 mmol/L Final    Chloride 04/10/2024 103  96 - 108 mmol/L Final    CO2 04/10/2024 24  21 - 32 mmol/L Final    ANION GAP 04/10/2024 8  4 - 13 mmol/L Final    BUN 04/10/2024 26 (H)  5 - 25 mg/dL Final    Creatinine 04/10/2024 0.93  0.60 - 1.30 mg/dL Final    Standardized to IDMS reference method    Glucose 04/10/2024 99  65 - 140 mg/dL Final    If the patient is fasting, the ADA then defines impaired fasting glucose as > 100 mg/dL and diabetes as > or equal to 123 mg/dL.    Calcium 04/10/2024 9.7  8.4 - 10.2 mg/dL Final    AST 04/10/2024 15  13 - 39 U/L Final    ALT 04/10/2024 11  7 - 52 U/L Final    Specimen collection should occur prior to Sulfasalazine administration due to the potential for falsely depressed results.     Alkaline Phosphatase 04/10/2024 53  34 - 104 U/L Final    Total Protein 04/10/2024 6.9  6.4 - 8.4 g/dL Final    Albumin 04/10/2024 4.2  3.5 - 5.0 g/dL Final    Total Bilirubin 04/10/2024 0.49  0.20 - 1.00 mg/dL Final    Use of this assay is not recommended for patients undergoing treatment with eltrombopag due to the potential for falsely elevated results.  N-acetyl-p-benzoquinone imine (metabolite of Acetaminophen) will generate erroneously low results in samples for patients that have taken an overdose of Acetaminophen.    eGFR 04/10/2024 56  ml/min/1.73sq m Final    hs TnI 0hr 04/10/2024 11  \"Refer to ACS Flowchart\"- see link ng/L Final    Comment:                                              Initial (time 0) result  If >=50 ng/L, Myocardial injury suggested ;  Type of myocardial injury and treatment strategy  to be determined.  If 5-49 ng/L, a delta result at 2 hours and or 4 hours will be needed to further evaluate.  If <4 ng/L, and chest pain has been >3 hours since onset, patient may qualify for discharge based on the HEART score in the ED.  If <5 ng/L and <3hours since onset of chest pain, a delta result at 2 hours will be needed to further evaluate.    HS Troponin " "99th Percentile URL of a Health Population=12 ng/L with a 95% Confidence Interval of 8-18 ng/L.    Second Troponin (time 2 hours)  If calculated delta >= 20 ng/L,  Myocardial injury suggested ; Type of myocardial injury and treatment strategy to be determined.  If 5-49 ng/L and the calculated delta is 5-19 ng/L, consult medical service for evaluation.  Continue evaluation for ischemia on ecg and other possible etiology and repeat hs troponin at 4 hours.  If delta                            is <5 ng/L at 2 hours, consider discharge based on risk stratification via the HEART score (if in ED), or [place] risk score in IP/Observation.    HS Troponin 99th Percentile URL of a Health Population=12 ng/L with a 95% Confidence Interval of 8-18 ng/L.    Ventricular Rate 04/10/2024 88  BPM Final    Atrial Rate 04/10/2024 88  BPM Final    UT Interval 04/10/2024 166  ms Final    QRSD Interval 04/10/2024 64  ms Final    QT Interval 04/10/2024 372  ms Final    QTC Interval 04/10/2024 450  ms Final    P Axis 04/10/2024 51  degrees Final    QRS [place] 04/10/2024 19  degrees Final    T Wave [place] 04/10/2024 49  degrees Final    hs TnI 2hr 04/10/2024 12  \"Refer to ACS Flowchart\"- see link ng/L Final    Comment:                                              Initial (time 0) result  If >=50 ng/L, Myocardial injury suggested ;  Type of myocardial injury and treatment strategy  to be determined.  If 5-49 ng/L, a delta result at 2 hours and or 4 hours will be needed to further evaluate.  If <4 ng/L, and chest pain has been >3 hours since onset, patient may qualify for discharge based on the HEART score in the ED.  If <5 ng/L and <3hours since onset of chest pain, a delta result at 2 hours will be needed to further evaluate.    HS Troponin 99th Percentile URL of a Health Population=12 ng/L with a 95% Confidence Interval of 8-18 ng/L.    Second Troponin (time 2 hours)  If calculated delta >= 20 ng/L,  Myocardial injury suggested ; Type of myocardial " injury and treatment strategy to be determined.  If 5-49 ng/L and the calculated delta is 5-19 ng/L, consult medical service for evaluation.  Continue evaluation for ischemia on ecg and other possible etiology and repeat hs troponin at 4 hours.  If delta                            is <5 ng/L at 2 hours, consider discharge based on risk stratification via the HEART score (if in ED), or [place] risk score in IP/Observation.    HS Troponin 99th Percentile URL of a Health Population=12 ng/L with a 95% Confidence Interval of 8-18 ng/L.    Delta 2hr hsTnI 04/10/2024 1  <20 ng/L Final    Color, UA 04/10/2024 Colorless   Final    Clarity, UA 04/10/2024 Clear   Final    Specific Gravity, UA 04/10/2024 1.004  1.003 - 1.030 Final    pH, UA 04/10/2024 5.5  4.5, 5.0, 5.5, 6.0, 6.5, 7.0, 7.5, 8.0 Final    Leukocytes, UA 04/10/2024 Negative  Negative Final    Nitrite, UA 04/10/2024 Negative  Negative Final    Protein, UA 04/10/2024 Negative  Negative mg/dl Final    Glucose, UA 04/10/2024 Negative  Negative mg/dl Final    Ketones, UA 04/10/2024 Negative  Negative mg/dl Final    Urobilinogen, UA 04/10/2024 <2.0  <2.0 mg/dl mg/dl Final    Bilirubin, UA 04/10/2024 Negative  Negative Final    Occult Blood, UA 04/10/2024 Negative  Negative Final             ___________________________________    History Review: The following portions of the patient's history were reviewed and updated as appropriate: allergies, current medications, past family history, past medical history, past social history, past surgical history, and problem list.    Unchanged information from this writer's previous assessment is copied and italicized; information that has changed is bolded.    Past Psychiatric History:      Past psychiatric diagnoses:   Depression, anxiety  Inpatient psychiatric admissions:   Once in 2013 following a surgery, sounded like extreme anxiety  Prior outpatient psychiatric treatment:   Started seeing a psychiatrist when first    Past/current psychotherapy:   active  History of suicidal attempts/gestures:   denies  Psychotropic medication trials:   Venlafaxine, sertraline, fluoxetine, escitalopram, aripiprazole (dizziness), risperidone, clonazepam, buspirone, gabapentin        Substance Abuse History:     Denies all history of substance use     Family Psychiatric History:      Family History             Family History   Problem Relation Age of Onset    Depression Mother           w/anxiety    Diabetes Mother           Mellitus    Breast cancer Mother      Hypertension Mother      No Known Problems Father      Depression Sister           w/anxiety    Heart disease Sister           Cardiac Disorder    Breast cancer Sister      Hypertension Sister      Diabetes Brother           Mellitus    Alcohol abuse Son                    Social History:     Developmental: nothing of note  Education: Bachelor degree  Marital history:  in 2006  Children: 2  Living arrangement, social support: as above, has been with daughter for 17 years  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  ___________________________________      Visit Time    Visit Start Time: 1510  Visit Stop Time: 1630  Total Visit Duration: 80 minutes    López Gutierrez MD   09/12/24

## 2024-09-13 ENCOUNTER — OFFICE VISIT (OUTPATIENT)
Dept: FAMILY MEDICINE CLINIC | Facility: CLINIC | Age: 84
End: 2024-09-13

## 2024-09-13 ENCOUNTER — TELEPHONE (OUTPATIENT)
Age: 84
End: 2024-09-13

## 2024-09-13 VITALS
HEIGHT: 57 IN | SYSTOLIC BLOOD PRESSURE: 128 MMHG | HEART RATE: 82 BPM | WEIGHT: 106 LBS | DIASTOLIC BLOOD PRESSURE: 74 MMHG | BODY MASS INDEX: 22.87 KG/M2 | OXYGEN SATURATION: 98 % | RESPIRATION RATE: 16 BRPM

## 2024-09-13 DIAGNOSIS — E11.9 TYPE 2 DIABETES MELLITUS WITHOUT COMPLICATION, WITHOUT LONG-TERM CURRENT USE OF INSULIN (HCC): ICD-10-CM

## 2024-09-13 DIAGNOSIS — N18.30 STAGE 3 CHRONIC KIDNEY DISEASE, UNSPECIFIED WHETHER STAGE 3A OR 3B CKD (HCC): ICD-10-CM

## 2024-09-13 DIAGNOSIS — F33.0 MAJOR DEPRESSIVE DISORDER, RECURRENT EPISODE, MILD (HCC): ICD-10-CM

## 2024-09-13 DIAGNOSIS — I10 PRIMARY HYPERTENSION: Primary | ICD-10-CM

## 2024-09-13 DIAGNOSIS — E78.2 MIXED HYPERLIPIDEMIA: ICD-10-CM

## 2024-09-13 DIAGNOSIS — F03.90 MAJOR NEUROCOGNITIVE DISORDER (HCC): ICD-10-CM

## 2024-09-13 DIAGNOSIS — K59.04 CHRONIC IDIOPATHIC CONSTIPATION: ICD-10-CM

## 2024-09-13 DIAGNOSIS — Z74.1 NEED FOR ASSISTANCE WITH PERSONAL CARE: ICD-10-CM

## 2024-09-13 PROBLEM — K59.00 CONSTIPATION: Status: RESOLVED | Noted: 2024-04-24 | Resolved: 2024-09-13

## 2024-09-13 PROBLEM — R26.89 BALANCE PROBLEM: Status: RESOLVED | Noted: 2022-08-24 | Resolved: 2024-09-13

## 2024-09-13 PROBLEM — G31.84 MILD NEUROCOGNITIVE DISORDER: Status: RESOLVED | Noted: 2022-02-01 | Resolved: 2024-09-13

## 2024-09-13 PROBLEM — D64.9 ANEMIA: Status: RESOLVED | Noted: 2024-08-28 | Resolved: 2024-09-13

## 2024-09-13 NOTE — PROGRESS NOTES
Subjective:      Patient ID: Sandra Peña is a 83 y.o. female.    83-year-old female presents with her daughter whom she lives with for follow-up of chronic conditions and review of recent hospitalizations including recent psychiatric hospitalization.  Patient has been following up with psychiatry and they have been trying to make adjustments to her medication.  Dose of Prozac was increased.  They have made adjustments to her dose of gabapentin.  Stopped giving Aricept at bedtime.  Still having episodes of insomnia.  Scheduled to follow-up with psychiatry in 2 weeks.  Daughter has finally made determination to have patient go to skilled nursing facility for personal care.  Patient has been living at home with her daughter and her family for the past 12 years.  Has always intermittently had issues with depression, anxiety, insomnia.   patient does have history of diabetes, hypertension, hyperlipidemia but all of her medical conditions have been very well-controlled over the years.  Mental health has always been a struggle.        Past Medical History:   Diagnosis Date    Anxiety     Colon polyps     Depression     Diabetes mellitus (HCC)     Dizziness     Last assessed: 8/31/15    DVT (deep venous thrombosis) ([facility])     Dysuria     Hematuria 2022    Hyperlipidemia     Hypertension     Hypertensive urgency 10/22/2021    Insomnia     Panic attack     Ureterolithiasis 2020       Family History   Problem Relation Age of Onset    Depression Mother         w/anxiety    Diabetes Mother         Mellitus    Breast cancer Mother     Hypertension Mother     No Known Problems Father     Depression Sister         w/anxiety    Heart disease Sister         Cardiac Disorder    Breast cancer Sister     Hypertension Sister     Diabetes Brother         Mellitus    Alcohol abuse Son        Past Surgical History:   Procedure Laterality Date     SECTION       son,  daughter    COLONOSCOPY      FL RETROGRADE  PYELOGRAM  5/22/2020    FL COLONOSCOPY FLX DX W/COLLJ SPEC WHEN PFRMD N/A 3/27/2017    Procedure: COLONOSCOPY;  Surgeon: Gold Francis MD;  Location: [facility];  Service: Gastroenterology    FL CYSTO/URETERO W/LITHOTRIPSY &INDWELL STENT INSRT Right 5/22/2020    Procedure: CYSTOSCOPY URETEROSCOPY WITH LITHOTRIPSY HOLMIUM LASER, RETROGRADE PYELOGRAM AND INSERTION STENT URETERAL; EXCISION OF BLADDER TUMOR;  Surgeon: Edwin Love MD;  Location: [facility];  Service: Urology    ROTATOR CUFF REPAIR Left     Last assessed: 3/29/15    TONSILLECTOMY          reports that she has never smoked. She has never used smokeless tobacco. She reports that she does not drink alcohol and does not use drugs.      Current Outpatient Medications:     aspirin 81 MG tablet, Take 81 mg by mouth daily, Disp: , Rfl:     atorvastatin (LIPITOR) 10 mg tablet, Take 1 tablet (10 mg total) by mouth daily, Disp: 90 tablet, Rfl: 1    Cholecalciferol (VITAMIN D3) 1,000 units tablet, Take 2 tablets (2,000 Units total) by mouth daily for 30 doses, Disp: 60 tablet, Rfl: 0    cyanocobalamin (VITAMIN B-12) 1000 MCG tablet, Take 1 tablet (1,000 mcg total) by mouth daily, Disp: 30 tablet, Rfl: 0    docusate sodium (COLACE) 100 mg capsule, Take 100 mg by mouth 2 (two) times a day, Disp: , Rfl:     FLUoxetine (PROzac) 20 mg capsule, Take 3 capsules (60 mg total) by mouth daily with breakfast, Disp: 90 capsule, Rfl: 0    gabapentin (NEURONTIN) 100 mg capsule, Take 1 capsule (100 mg total) by mouth daily at bedtime, Disp: 30 capsule, Rfl: 0    hydrOXYzine HCL (ATARAX) 10 mg tablet, Take 1 tablet (10 mg total) by mouth 2 (two) times a day as needed for anxiety for up to 40 doses, Disp: 20 tablet, Rfl: 0    linaCLOtide 145 MCG CAPS, Take 1 capsule (145 mcg total) by mouth in the morning, Disp: 90 capsule, Rfl: 1    lisinopril-hydrochlorothiazide (PRINZIDE,ZESTORETIC) 10-12.5 MG per tablet, Take 1 tablet by mouth daily, Disp: 90 tablet, Rfl: 3    melatonin 3  "mg, Take 1 tablet (3 mg total) by mouth daily at bedtime, Disp: 30 tablet, Rfl: 0    metFORMIN (GLUCOPHAGE) 500 mg tablet, Take 1 tablet (500 mg total) by mouth 2 (two) times a day with meals, Disp: 180 tablet, Rfl: 1    QUEtiapine (SEROquel) 50 mg tablet, Take 1 tablet (50 mg total) by mouth daily at bedtime, Disp: 30 tablet, Rfl: 0    The following portions of the patient's history were reviewed and updated as appropriate: allergies, current medications, past family history, past medical history, past social history, past surgical history and problem list.    Review of Systems   Constitutional:  Negative for fatigue.   HENT: Negative.     Eyes: Negative.    Respiratory: Negative.     Cardiovascular: Negative.    Gastrointestinal:  Negative for constipation.   Endocrine: Negative.    Genitourinary: Negative.    Musculoskeletal:  Negative for gait problem.   Skin: Negative.         Dryness of her scalp   Allergic/Immunologic: Negative.    Neurological:  Negative for light-headedness.   Hematological: Negative.    Psychiatric/Behavioral:  Positive for sleep disturbance (Insomnia with nightmares). The patient is nervous/anxious.            Objective:    /74   Pulse 82   Resp 16   Ht 4' 9\" (1.448 m)   Wt 48.1 kg (106 lb)   SpO2 98%   BMI 22.94 kg/m²      Physical Exam  Vitals and nursing note reviewed.   Constitutional:       General: She is not in acute distress.     Appearance: Normal appearance. She is well-developed and normal weight. She is not diaphoretic.   HENT:      Head: Normocephalic and atraumatic.      Nose: Nose normal.      Mouth/Throat:      Mouth: Mucous membranes are moist.      Pharynx: Oropharynx is clear.   Eyes:      Conjunctiva/sclera: Conjunctivae normal.      Pupils: Pupils are equal, round, and reactive to light.   Cardiovascular:      Rate and Rhythm: Normal rate and regular rhythm.      Heart sounds: Normal heart sounds. No murmur heard.  Pulmonary:      Effort: Pulmonary effort " is normal.      Breath sounds: Normal breath sounds.   Abdominal:      General: Bowel sounds are normal.      Palpations: Abdomen is soft.   Musculoskeletal:         General: Normal range of motion.      Cervical back: Normal range of motion and neck supple.      Right lower leg: No edema.      Left lower leg: No edema.   Skin:     General: Skin is warm and dry.      Findings: No rash.   Neurological:      General: No focal deficit present.      Mental Status: She is alert and oriented to person, place, and time. Mental status is at baseline.      Deep Tendon Reflexes: Reflexes are normal and symmetric.   Psychiatric:         Mood and Affect: Mood is depressed.         Behavior: Behavior normal.         Thought Content: Thought content normal.         Judgment: Judgment normal.           Recent Results (from the past 1008 hour(s))   ECG 12 lead    Collection Time: 08/27/24  7:13 PM   Result Value Ref Range    Ventricular Rate 77 BPM    Atrial Rate 77 BPM    AR Interval 166 ms    QRSD Interval 74 ms    QT Interval 358 ms    QTC Interval 405 ms    P Axis 50 degrees    QRS Axis -1 degrees    T Wave Axis 23 degrees   POCT alcohol breath test    Collection Time: 08/27/24  7:19 PM   Result Value Ref Range    EXTBreath Alcohol 0.000    CBC and differential    Collection Time: 08/27/24  7:25 PM   Result Value Ref Range    WBC 6.15 4.31 - 10.16 Thousand/uL    RBC 3.75 (L) 3.81 - 5.12 Million/uL    Hemoglobin 11.0 (L) 11.5 - 15.4 g/dL    Hematocrit 33.5 (L) 34.8 - 46.1 %    MCV 89 82 - 98 fL    MCH 29.3 26.8 - 34.3 pg    MCHC 32.8 31.4 - 37.4 g/dL    RDW 13.2 11.6 - 15.1 %    MPV 10.2 8.9 - 12.7 fL    Platelets 207 149 - 390 Thousands/uL    nRBC 0 /100 WBCs    Segmented % 66 43 - 75 %    Immature Grans % 0 0 - 2 %    Lymphocytes % 24 14 - 44 %    Monocytes % 7 4 - 12 %    Eosinophils Relative 2 0 - 6 %    Basophils Relative 1 0 - 1 %    Absolute Neutrophils 4.08 1.85 - 7.62 Thousands/µL    Absolute Immature Grans 0.02 0.00 -  0.20 Thousand/uL    Absolute Lymphocytes 1.46 0.60 - 4.47 Thousands/µL    Absolute Monocytes 0.43 0.17 - 1.22 Thousand/µL    Eosinophils Absolute 0.12 0.00 - 0.61 Thousand/µL    Basophils Absolute 0.04 0.00 - 0.10 Thousands/µL   Comprehensive metabolic panel    Collection Time: 08/27/24  7:25 PM   Result Value Ref Range    Sodium 136 135 - 147 mmol/L    Potassium 3.9 3.5 - 5.3 mmol/L    Chloride 102 96 - 108 mmol/L    CO2 27 21 - 32 mmol/L    ANION GAP 7 4 - 13 mmol/L    BUN 26 (H) 5 - 25 mg/dL    Creatinine 1.05 0.60 - 1.30 mg/dL    Glucose 122 65 - 140 mg/dL    Calcium 9.4 8.4 - 10.2 mg/dL    AST 12 (L) 13 - 39 U/L    ALT 7 7 - 52 U/L    Alkaline Phosphatase 50 34 - 104 U/L    Total Protein 6.4 6.4 - 8.4 g/dL    Albumin 4.0 3.5 - 5.0 g/dL    Total Bilirubin 0.48 0.20 - 1.00 mg/dL    eGFR 48 ml/min/1.73sq m   TSH    Collection Time: 08/27/24  7:25 PM   Result Value Ref Range    TSH 3RD GENERATON 1.408 0.450 - 4.500 uIU/mL   Rapid drug screen, urine    Collection Time: 08/27/24  7:36 PM   Result Value Ref Range    Amph/Meth UR Negative Negative    Barbiturate Ur Negative Negative    Benzodiazepine Urine Negative Negative    Cocaine Urine Negative Negative    Methadone Urine Negative Negative    Opiate Urine Negative Negative    PCP Ur Negative Negative    THC Urine Negative Negative    Oxycodone Urine Negative Negative    Fentanyl Urine Negative Negative    HYDROCODONE URINE Negative Negative   UA w Reflex to Microscopic w Reflex to Culture    Collection Time: 08/27/24  7:36 PM    Specimen: Urine, Clean Catch   Result Value Ref Range    Color, UA Colorless     Clarity, UA Clear     Specific Gravity, UA 1.008 1.003 - 1.030    pH, UA 5.0 4.5, 5.0, 5.5, 6.0, 6.5, 7.0, 7.5, 8.0    Leukocytes, UA Negative Negative    Nitrite, UA Negative Negative    Protein, UA Negative Negative mg/dl    Glucose, UA Negative Negative mg/dl    Ketones, UA Negative Negative mg/dl    Urobilinogen, UA <2.0 <2.0 mg/dl mg/dl    Bilirubin, UA  Negative Negative    Occult Blood, UA Negative Negative   ECG 12 lead    Collection Time: 08/30/24  4:04 AM   Result Value Ref Range    Ventricular Rate 83 BPM    Atrial Rate 83 BPM    TX Interval 170 ms    QRSD Interval 74 ms    QT Interval 370 ms    QTC Interval 434 ms    P Axis 61 degrees    QRS Axis 3 degrees    T Wave Axis 51 degrees   Comprehensive metabolic panel    Collection Time: 08/30/24  5:13 AM   Result Value Ref Range    Sodium 136 135 - 147 mmol/L    Potassium 3.8 3.5 - 5.3 mmol/L    Chloride 100 96 - 108 mmol/L    CO2 26 21 - 32 mmol/L    ANION GAP 10 4 - 13 mmol/L    BUN 25 5 - 25 mg/dL    Creatinine 0.98 0.60 - 1.30 mg/dL    Glucose 136 65 - 140 mg/dL    Glucose, Fasting 136 (H) 65 - 99 mg/dL    Calcium 9.3 8.4 - 10.2 mg/dL    AST 12 (L) 13 - 39 U/L    ALT 6 (L) 7 - 52 U/L    Alkaline Phosphatase 43 34 - 104 U/L    Total Protein 6.1 (L) 6.4 - 8.4 g/dL    Albumin 3.8 3.5 - 5.0 g/dL    Total Bilirubin 0.53 0.20 - 1.00 mg/dL    eGFR 52 ml/min/1.73sq m   Magnesium    Collection Time: 08/30/24  5:13 AM   Result Value Ref Range    Magnesium 2.1 1.9 - 2.7 mg/dL   Phosphorus    Collection Time: 08/30/24  5:13 AM   Result Value Ref Range    Phosphorus 4.0 2.3 - 4.1 mg/dL   CBC and differential    Collection Time: 08/30/24  5:13 AM   Result Value Ref Range    WBC 5.82 4.31 - 10.16 Thousand/uL    RBC 3.77 (L) 3.81 - 5.12 Million/uL    Hemoglobin 11.0 (L) 11.5 - 15.4 g/dL    Hematocrit 34.4 (L) 34.8 - 46.1 %    MCV 91 82 - 98 fL    MCH 29.2 26.8 - 34.3 pg    MCHC 32.0 31.4 - 37.4 g/dL    RDW 13.1 11.6 - 15.1 %    MPV 10.9 8.9 - 12.7 fL    Platelets 207 149 - 390 Thousands/uL    nRBC 0 /100 WBCs    Segmented % 68 43 - 75 %    Immature Grans % 0 0 - 2 %    Lymphocytes % 22 14 - 44 %    Monocytes % 7 4 - 12 %    Eosinophils Relative 2 0 - 6 %    Basophils Relative 1 0 - 1 %    Absolute Neutrophils 3.95 1.85 - 7.62 Thousands/µL    Absolute Immature Grans 0.01 0.00 - 0.20 Thousand/uL    Absolute Lymphocytes 1.26  0.60 - 4.47 Thousands/µL    Absolute Monocytes 0.43 0.17 - 1.22 Thousand/µL    Eosinophils Absolute 0.14 0.00 - 0.61 Thousand/µL    Basophils Absolute 0.03 0.00 - 0.10 Thousands/µL   TSH, 3rd generation with Free T4 reflex    Collection Time: 08/30/24  5:13 AM   Result Value Ref Range    TSH 3RD GENERATON 1.191 0.450 - 4.500 uIU/mL   Vitamin B12    Collection Time: 08/30/24  5:13 AM   Result Value Ref Range    Vitamin B-12 437 180 - 914 pg/mL   Folate    Collection Time: 08/30/24  5:13 AM   Result Value Ref Range    Folate >22.3 >5.9 ng/mL   Vitamin D 25 hydroxy    Collection Time: 08/30/24  5:13 AM   Result Value Ref Range    Vit D, 25-Hydroxy 40.4 30.0 - 100.0 ng/mL   TIBC Panel (incl. Iron, TIBC, % Iron Saturation)    Collection Time: 08/30/24  5:13 AM   Result Value Ref Range    Iron Saturation 23 15 - 50 %    TIBC 257 250 - 450 ug/dL    Iron 58 50 - 212 ug/dL    UIBC 199 155 - 355 ug/dL   Ferritin    Collection Time: 08/30/24  5:13 AM   Result Value Ref Range    Ferritin 29 11 - 307 ng/mL   Total Syphilis (IgG/IgM) Screening    Collection Time: 08/30/24  5:13 AM   Result Value Ref Range    Syphilis Total Antibody Non-reactive Non-Reactive   HIV 1/2 AG/AB w Reflex [facility] for 2 yr old and above    Collection Time: 08/30/24  5:13 AM   Result Value Ref Range    HIV-1 p24 Antigen Non-Reactive Non-Reactive    HIV-1 Antibody Non-Reactive Non-Reactive    HIV-2 Antibody Non-Reactive Non-Reactive    HIV Ag-Ab 5th Gen Non-Reactive Non-Reactive       Assessment/Plan:    Need for assistance with personal care  - Forms filled out for face-to-face evaluation    I do believe that the patient would do extremely well in personal care home where she has other peers slimmer to her age.  We have had these discussion multiple times over the years and finally making the realization that she is unable to cope at home    Primary hypertension  Patient remains on lisinopril/hydrochlorothiazide.  Hypertension is very  well-controlled    Chronic idiopathic constipation  Patient remains on Linzess 145 mcg once daily as well as Colace 100 mg twice daily.  This has been an effective regimen for maintaining adequate bowel movements.  She does need to make certain that she is drinking fluids during the course of the day    Type 2 diabetes mellitus without complication, without long-term current use of insulin ([facility])    Lab Results   Component Value Date    HGBA1C 7.0 (H) 02/12/2024     Last hemoglobin A1c 7.0% which is goal based upon her age.  Patient remains on metformin 500 mg twice daily.  Will be due for repeat diabetic parameters.  She does have active order.  Continue on current dose of metformin    Major neurocognitive disorder (HCC)  Patient was placed on Aricept 5 mg but did not do very well with Aricept.  She has seen neuropsychiatry as well as psychiatry.  No underlying medical condition as a cause    Stage 3 chronic kidney disease, unspecified whether stage 3a or 3b CKD ([facility])  Lab Results   Component Value Date    EGFR 52 08/30/2024    EGFR 48 08/27/2024    EGFR 56 04/10/2024    CREATININE 0.98 08/30/2024    CREATININE 1.05 08/27/2024    CREATININE 0.93 04/10/2024     Try to avoid NSAIDs and dehydration.  This is diagnosis based upon age.  Her creatinine is actually normal and has always been normal    Major depressive disorder, recurrent episode, mild (HCC)  Patient is following up with psychiatry.  At the present time she is on Prozac 60 mg once daily, gabapentin 100 mg at bedtime and Seroquel 50 mg at bedtime    Follow-up with psychiatry as scheduled    Mixed hyperlipidemia  Cholesterol remains very well-controlled on atorvastatin 10 mg once daily.  Continue same dosage.  Repeat lipid panel has been ordered          Problem List Items Addressed This Visit          Cardiovascular and Mediastinum    Primary hypertension - Primary     Patient remains on lisinopril/hydrochlorothiazide.  Hypertension is very well-controlled             Digestive    Chronic idiopathic constipation     Patient remains on Linzess 145 mcg once daily as well as Colace 100 mg twice daily.  This has been an effective regimen for maintaining adequate bowel movements.  She does need to make certain that she is drinking fluids during the course of the day            Endocrine    Type 2 diabetes mellitus without complication, without long-term current use of insulin ([facility])       Lab Results   Component Value Date    HGBA1C 7.0 (H) 02/12/2024     Last hemoglobin A1c 7.0% which is goal based upon her age.  Patient remains on metformin 500 mg twice daily.  Will be due for repeat diabetic parameters.  She does have active order.  Continue on current dose of metformin            Nervous and Auditory    Major neurocognitive disorder (HCC)     Patient was placed on Aricept 5 mg but did not do very well with Aricept.  She has seen neuropsychiatry as well as psychiatry.  No underlying medical condition as a cause            Genitourinary    Stage 3 chronic kidney disease, unspecified whether stage 3a or 3b CKD ([facility])     Lab Results   Component Value Date    EGFR 52 08/30/2024    EGFR 48 08/27/2024    EGFR 56 04/10/2024    CREATININE 0.98 08/30/2024    CREATININE 1.05 08/27/2024    CREATININE 0.93 04/10/2024     Try to avoid NSAIDs and dehydration.  This is diagnosis based upon age.  Her creatinine is actually normal and has always been normal            Behavioral Health    Major depressive disorder, recurrent episode, mild (HCC)     Patient is following up with psychiatry.  At the present time she is on Prozac 60 mg once daily, gabapentin 100 mg at bedtime and Seroquel 50 mg at bedtime    Follow-up with psychiatry as scheduled            Other    Mixed hyperlipidemia     Cholesterol remains very well-controlled on atorvastatin 10 mg once daily.  Continue same dosage.  Repeat lipid panel has been ordered         Need for assistance with personal care     - Forms filled out  for face-to-face evaluation    I do believe that the patient would do extremely well in personal care home where she has other peers slimmer to her age.  We have had these discussion multiple times over the years and finally making the realization that she is unable to cope at home

## 2024-09-13 NOTE — ASSESSMENT & PLAN NOTE
Cholesterol remains very well-controlled on atorvastatin 10 mg once daily.  Continue same dosage.  Repeat lipid panel has been ordered

## 2024-09-13 NOTE — ASSESSMENT & PLAN NOTE
- Forms filled out for face-to-face evaluation    I do believe that the patient would do extremely well in personal care home where she has other peers slimmer to her age.  We have had these discussion multiple times over the years and finally making the realization that she is unable to cope at home

## 2024-09-13 NOTE — ASSESSMENT & PLAN NOTE
Lab Results   Component Value Date    HGBA1C 7.0 (H) 02/12/2024     Last hemoglobin A1c 7.0% which is goal based upon her age.  Patient remains on metformin 500 mg twice daily.  Will be due for repeat diabetic parameters.  She does have active order.  Continue on current dose of metformin

## 2024-09-13 NOTE — ASSESSMENT & PLAN NOTE
Patient is following up with psychiatry.  At the present time she is on Prozac 60 mg once daily, gabapentin 100 mg at bedtime and Seroquel 50 mg at bedtime    Follow-up with psychiatry as scheduled

## 2024-09-13 NOTE — ASSESSMENT & PLAN NOTE
Patient was placed on Aricept 5 mg but did not do very well with Aricept.  She has seen neuropsychiatry as well as psychiatry.  No underlying medical condition as a cause

## 2024-09-13 NOTE — ASSESSMENT & PLAN NOTE
Lab Results   Component Value Date    EGFR 52 08/30/2024    EGFR 48 08/27/2024    EGFR 56 04/10/2024    CREATININE 0.98 08/30/2024    CREATININE 1.05 08/27/2024    CREATININE 0.93 04/10/2024     Try to avoid NSAIDs and dehydration.  This is diagnosis based upon age.  Her creatinine is actually normal and has always been normal

## 2024-09-13 NOTE — ASSESSMENT & PLAN NOTE
Patient remains on Linzess 145 mcg once daily as well as Colace 100 mg twice daily.  This has been an effective regimen for maintaining adequate bowel movements.  She does need to make certain that she is drinking fluids during the course of the day

## 2024-09-13 NOTE — TELEPHONE ENCOUNTER
Lianna from [facility] called to say that they received paperwork from the office but the med list that was sent was not signed and it needs to be signed. Please, refax to [phone].

## 2024-09-15 ENCOUNTER — HOSPITAL ENCOUNTER (EMERGENCY)
Facility: HOSPITAL | Age: 84
Discharge: HOME | End: 2024-09-15
Attending: EMERGENCY MEDICINE
Payer: MEDICARE

## 2024-09-15 ENCOUNTER — APPOINTMENT (EMERGENCY)
Dept: RADIOLOGY | Facility: HOSPITAL | Age: 84
End: 2024-09-15
Payer: MEDICARE

## 2024-09-15 VITALS
DIASTOLIC BLOOD PRESSURE: 66 MMHG | RESPIRATION RATE: 18 BRPM | SYSTOLIC BLOOD PRESSURE: 147 MMHG | HEART RATE: 80 BPM | TEMPERATURE: 97.3 F | OXYGEN SATURATION: 98 %

## 2024-09-15 DIAGNOSIS — R11.0 NAUSEA: Primary | ICD-10-CM

## 2024-09-15 DIAGNOSIS — E86.9 VOLUME DEPLETION: ICD-10-CM

## 2024-09-15 LAB
2HR DELTA HS TROPONIN: -3 NG/L
ALBUMIN SERPL BCG-MCNC: 4.1 G/DL (ref 3.5–5)
ALP SERPL-CCNC: 49 U/L (ref 34–104)
ALT SERPL W P-5'-P-CCNC: 9 U/L (ref 7–52)
ANION GAP SERPL CALCULATED.3IONS-SCNC: 10 MMOL/L (ref 4–13)
AST SERPL W P-5'-P-CCNC: 15 U/L (ref 13–39)
ATRIAL RATE: 84 BPM
BACTERIA UR QL AUTO: ABNORMAL /HPF
BASOPHILS # BLD AUTO: 0.05 THOUSANDS/ΜL (ref 0–0.1)
BASOPHILS NFR BLD AUTO: 1 % (ref 0–1)
BILIRUB SERPL-MCNC: 0.47 MG/DL (ref 0.2–1)
BILIRUB UR QL STRIP: NEGATIVE
BUN SERPL-MCNC: 27 MG/DL (ref 5–25)
CALCIUM SERPL-MCNC: 9.6 MG/DL (ref 8.4–10.2)
CARDIAC TROPONIN I PNL SERPL HS: 14 NG/L
CARDIAC TROPONIN I PNL SERPL HS: 17 NG/L
CHLORIDE SERPL-SCNC: 101 MMOL/L (ref 96–108)
CLARITY UR: CLEAR
CO2 SERPL-SCNC: 25 MMOL/L (ref 21–32)
COLOR UR: ABNORMAL
CREAT SERPL-MCNC: 1.05 MG/DL (ref 0.6–1.3)
EOSINOPHIL # BLD AUTO: 0.14 THOUSAND/ΜL (ref 0–0.61)
EOSINOPHIL NFR BLD AUTO: 2 % (ref 0–6)
ERYTHROCYTE [DISTWIDTH] IN BLOOD BY AUTOMATED COUNT: 12.8 % (ref 11.6–15.1)
GFR SERPL CREATININE-BSD FRML MDRD: 48 ML/MIN/1.73SQ M
GLUCOSE SERPL-MCNC: 105 MG/DL (ref 65–140)
GLUCOSE UR STRIP-MCNC: NEGATIVE MG/DL
HCT VFR BLD AUTO: 37.1 % (ref 34.8–46.1)
HGB BLD-MCNC: 11.8 G/DL (ref 11.5–15.4)
HGB UR QL STRIP.AUTO: ABNORMAL
IMM GRANULOCYTES # BLD AUTO: 0.02 THOUSAND/UL (ref 0–0.2)
IMM GRANULOCYTES NFR BLD AUTO: 0 % (ref 0–2)
KETONES UR STRIP-MCNC: NEGATIVE MG/DL
LEUKOCYTE ESTERASE UR QL STRIP: NEGATIVE
LIPASE SERPL-CCNC: 45 U/L (ref 11–82)
LYMPHOCYTES # BLD AUTO: 1.41 THOUSANDS/ΜL (ref 0.6–4.47)
LYMPHOCYTES NFR BLD AUTO: 19 % (ref 14–44)
MCH RBC QN AUTO: 28.6 PG (ref 26.8–34.3)
MCHC RBC AUTO-ENTMCNC: 31.8 G/DL (ref 31.4–37.4)
MCV RBC AUTO: 90 FL (ref 82–98)
MONOCYTES # BLD AUTO: 0.4 THOUSAND/ΜL (ref 0.17–1.22)
MONOCYTES NFR BLD AUTO: 5 % (ref 4–12)
NEUTROPHILS # BLD AUTO: 5.59 THOUSANDS/ΜL (ref 1.85–7.62)
NEUTS SEG NFR BLD AUTO: 73 % (ref 43–75)
NITRITE UR QL STRIP: NEGATIVE
NON-SQ EPI CELLS URNS QL MICRO: ABNORMAL /HPF
NRBC BLD AUTO-RTO: 0 /100 WBCS
P AXIS: 55 DEGREES
PH UR STRIP.AUTO: 5 [PH]
PLATELET # BLD AUTO: 218 THOUSANDS/UL (ref 149–390)
PMV BLD AUTO: 10.9 FL (ref 8.9–12.7)
POTASSIUM SERPL-SCNC: 4 MMOL/L (ref 3.5–5.3)
PR INTERVAL: 162 MS
PROT SERPL-MCNC: 6.7 G/DL (ref 6.4–8.4)
PROT UR STRIP-MCNC: NEGATIVE MG/DL
QRS AXIS: 6 DEGREES
QRSD INTERVAL: 68 MS
QT INTERVAL: 348 MS
QTC INTERVAL: 411 MS
RBC # BLD AUTO: 4.13 MILLION/UL (ref 3.81–5.12)
RBC #/AREA URNS AUTO: ABNORMAL /HPF
SODIUM SERPL-SCNC: 136 MMOL/L (ref 135–147)
SP GR UR STRIP.AUTO: 1.02 (ref 1–1.03)
T WAVE AXIS: 39 DEGREES
UROBILINOGEN UR STRIP-ACNC: <2 MG/DL
VENTRICULAR RATE: 84 BPM
WBC # BLD AUTO: 7.61 THOUSAND/UL (ref 4.31–10.16)
WBC #/AREA URNS AUTO: ABNORMAL /HPF

## 2024-09-15 PROCEDURE — 96375 TX/PRO/DX INJ NEW DRUG ADDON: CPT

## 2024-09-15 PROCEDURE — 96361 HYDRATE IV INFUSION ADD-ON: CPT

## 2024-09-15 PROCEDURE — 80053 COMPREHEN METABOLIC PANEL: CPT | Performed by: EMERGENCY MEDICINE

## 2024-09-15 PROCEDURE — 93010 ELECTROCARDIOGRAM REPORT: CPT | Performed by: INTERNAL MEDICINE

## 2024-09-15 PROCEDURE — 99285 EMERGENCY DEPT VISIT HI MDM: CPT | Performed by: EMERGENCY MEDICINE

## 2024-09-15 PROCEDURE — 83690 ASSAY OF LIPASE: CPT | Performed by: EMERGENCY MEDICINE

## 2024-09-15 PROCEDURE — 85025 COMPLETE CBC W/AUTO DIFF WBC: CPT | Performed by: EMERGENCY MEDICINE

## 2024-09-15 PROCEDURE — 36415 COLL VENOUS BLD VENIPUNCTURE: CPT | Performed by: EMERGENCY MEDICINE

## 2024-09-15 PROCEDURE — 96374 THER/PROPH/DIAG INJ IV PUSH: CPT

## 2024-09-15 PROCEDURE — 84484 ASSAY OF TROPONIN QUANT: CPT | Performed by: EMERGENCY MEDICINE

## 2024-09-15 PROCEDURE — 93005 ELECTROCARDIOGRAM TRACING: CPT

## 2024-09-15 PROCEDURE — 99283 EMERGENCY DEPT VISIT LOW MDM: CPT

## 2024-09-15 PROCEDURE — 71045 X-RAY EXAM CHEST 1 VIEW: CPT

## 2024-09-15 PROCEDURE — 81001 URINALYSIS AUTO W/SCOPE: CPT | Performed by: EMERGENCY MEDICINE

## 2024-09-15 RX ORDER — ONDANSETRON 4 MG/1
4 TABLET, ORALLY DISINTEGRATING ORAL ONCE
Status: COMPLETED | OUTPATIENT
Start: 2024-09-15 | End: 2024-09-15

## 2024-09-15 RX ORDER — PANTOPRAZOLE SODIUM 40 MG/10ML
40 INJECTION, POWDER, LYOPHILIZED, FOR SOLUTION INTRAVENOUS ONCE
Status: COMPLETED | OUTPATIENT
Start: 2024-09-15 | End: 2024-09-15

## 2024-09-15 RX ORDER — ONDANSETRON 2 MG/ML
4 INJECTION INTRAMUSCULAR; INTRAVENOUS ONCE
Status: COMPLETED | OUTPATIENT
Start: 2024-09-15 | End: 2024-09-15

## 2024-09-15 RX ORDER — ONDANSETRON 4 MG/1
4 TABLET, ORALLY DISINTEGRATING ORAL EVERY 8 HOURS PRN
Qty: 20 TABLET | Refills: 0 | Status: ON HOLD | OUTPATIENT
Start: 2024-09-15

## 2024-09-15 RX ADMIN — SODIUM CHLORIDE 500 ML: 0.9 INJECTION, SOLUTION INTRAVENOUS at 14:46

## 2024-09-15 RX ADMIN — PANTOPRAZOLE SODIUM 40 MG: 40 INJECTION, POWDER, FOR SOLUTION INTRAVENOUS at 14:46

## 2024-09-15 RX ADMIN — ONDANSETRON 4 MG: 2 INJECTION INTRAMUSCULAR; INTRAVENOUS at 14:45

## 2024-09-15 RX ADMIN — ONDANSETRON 4 MG: 4 TABLET, ORALLY DISINTEGRATING ORAL at 16:39

## 2024-09-15 NOTE — ED PROVIDER NOTES
"1. Nausea    2. Volume depletion      ED Disposition       ED Disposition   Discharge    Condition   Stable    Date/Time   Sun Sep 15, 2024  4:37 PM    Comment   Sandra Peña discharge to home/self care.                   Assessment & Plan       Medical Decision Making  Patient with a history of psychiatric disease and multiple medication changes recently presents with nausea.  Possible side effect of medications versus GERD versus gastritis versus peptic ulcer disease. Do not suspect acute surgical process including but not limited to acute cholecystitis, acute appendicitis, SBO, mesenteric ischemia, AAA, vascular dissection, vascular occlusion, perforated viscus. Do not suspect intrathoracic cause of abdominal pain.  EKG and delta troponin do not indicate acute ischemia.  Patient received antiemetics, PPI and had significant improvement.  She tolerated fluids and food in the emergency department.  Her level of alertness increased significantly.  Labs do not indicate significant volume depletion.  No evidence of infectious or metabolic process.  Patient and daughter are comfortable returning home.  Recommend follow-up with PCP and return to ED if symptoms worsen or persist.    Portions of the above record have been created with voice recognition software.  Occasional wrong word or \"sound alike\" substitutions may have occurred due to the inherent limitations of voice recognition software.  Read the chart carefully and recognize, using context, where substitutions may have occurred.      Problems Addressed:  Nausea:     Details: Symptoms improved in the emergency department and she is now tolerating p.o. Will prescribe Zofran to use as needed for nausea at home.  Patient will follow-up with PCP and return to ED sooner if symptoms worsen.  Volume depletion:     Details: Patient received IV fluids in the emergency departments and feels much better.  Daughter is comfortable with discharge and I do not feel that " hospitalization is indicated at this time.    Amount and/or Complexity of Data Reviewed  Labs: ordered.  Radiology: ordered and independent interpretation performed.  ECG/medicine tests: ordered and independent interpretation performed.    Risk  Prescription drug management.                  ED Course as of 09/15/24 1658   Sun Sep 15, 2024   1444 BUN slightly elevated however creatinine is near baseline.  No GIGI.   1517 Patient states that nausea has improved.  Will p.o. challenge.   1638 Patient looks much more alert and daughter agrees.  She thinks that the IV fluids really helped.  She is comfortable taking patient home and calling PCP tomorrow.  Will prescribe Zofran to use as needed.       Medications   pantoprazole (PROTONIX) injection 40 mg (40 mg Intravenous Given 9/15/24 1446)   ondansetron (ZOFRAN) injection 4 mg (4 mg Intravenous Given 9/15/24 1445)   sodium chloride 0.9 % bolus 500 mL (500 mL Intravenous New Bag 9/15/24 1446)   ondansetron (ZOFRAN-ODT) dispersible tablet 4 mg (4 mg Oral Given 9/15/24 1639)       History of Present Illness       Patient is an 83-year-old female with a history of major depressive disorder, generalized anxiety disorder, hypertension diabetes who presents with nausea.  Daughter states that she was recently hospitalized at [facility] for major depression and anxiety.  She was discharged on 9/4/24.  Daughter states that she had multiple medication changes at that time and it seems as though her nausea and appetite worsened after her hospitalization.  Daughter states that she has never had a great appetite but it has worsened.  She does have a history of nausea but that has also gotten worse since discharge.  Patient has complained of intermittent headaches as well.  Daughter felt as though she was dehydrated and brought her to the emergency department today.  Patient states that she has intermittent abdominal discomfort which she attributes to the nausea.  She denies any  abdominal pain at this time.  She also denies chest pain, shortness of breath, fever, chills, diarrhea.  She has mild baseline constipation which is unchanged.  Patient states that she only had a small piece of [place] toast today.      History provided by:  Patient and relative        Review of Systems   Constitutional:  Positive for appetite change. Negative for chills and fever.   HENT:  Negative for ear pain and sore throat.    Eyes:  Negative for pain and visual disturbance.   Respiratory:  Negative for cough and shortness of breath.    Cardiovascular:  Negative for chest pain and palpitations.   Gastrointestinal:  Positive for nausea. Negative for abdominal pain and vomiting.   Genitourinary:  Negative for dysuria and hematuria.   Musculoskeletal:  Negative for arthralgias and back pain.   Skin:  Negative for color change and rash.   Neurological:  Positive for weakness (generalized) and headaches. Negative for seizures and syncope.   All other systems reviewed and are negative.          Objective     ED Triage Vitals   Temperature Pulse Blood Pressure Respirations SpO2 Patient Position - Orthostatic VS   09/15/24 1336 09/15/24 1335 09/15/24 1330 09/15/24 1330 09/15/24 1335 09/15/24 1330   (!) 97.3 °F (36.3 °C) 84 149/65 16 99 % Sitting      Temp Source Heart Rate Source BP Location FiO2 (%) Pain Score    09/15/24 1336 09/15/24 1330 09/15/24 1330 -- 09/15/24 1330    Oral Monitor Left arm  No Pain        Physical Exam  Vitals and nursing note reviewed.   Constitutional:       General: She is not in acute distress.     Appearance: She is well-developed.   HENT:      Head: Normocephalic and atraumatic.      Mouth/Throat:      Mouth: Mucous membranes are dry.   Eyes:      Conjunctiva/sclera: Conjunctivae normal.   Cardiovascular:      Rate and Rhythm: Normal rate and regular rhythm.      Heart sounds: No murmur heard.  Pulmonary:      Effort: Pulmonary effort is normal. No respiratory distress.      Breath sounds:  Normal breath sounds.   Abdominal:      Palpations: Abdomen is soft.      Tenderness: There is no abdominal tenderness.   Musculoskeletal:         General: No swelling.      Cervical back: Neck supple.   Skin:     General: Skin is warm and dry.      Capillary Refill: Capillary refill takes less than 2 seconds.   Neurological:      Mental Status: She is alert.   Psychiatric:         Mood and Affect: Affect is flat.         Speech: Speech normal.         Behavior: Behavior normal.         Thought Content: Thought content does not include homicidal or suicidal ideation.         Labs Reviewed   COMPREHENSIVE METABOLIC PANEL - Abnormal       Result Value    Sodium 136      Potassium 4.0      Chloride 101      CO2 25      ANION GAP 10      BUN 27 (*)     Creatinine 1.05      Glucose 105      Calcium 9.6      AST 15      ALT 9      Alkaline Phosphatase 49      Total Protein 6.7      Albumin 4.1      Total Bilirubin 0.47      eGFR 48      Narrative:     National Kidney Disease Foundation guidelines for Chronic Kidney Disease (CKD):     Stage 1 with normal or high GFR (GFR > 90 mL/min/1.73 square meters)    Stage 2 Mild CKD (GFR = 60-89 mL/min/1.73 square meters)    Stage 3A Moderate CKD (GFR = 45-59 mL/min/1.73 square meters)    Stage 3B Moderate CKD (GFR = 30-44 mL/min/1.73 square meters)    Stage 4 Severe CKD (GFR = 15-29 mL/min/1.73 square meters)    Stage 5 End Stage CKD (GFR <15 mL/min/1.73 square meters)  Note: GFR calculation is accurate only with a steady state creatinine   URINALYSIS WITH REFLEX TO SCOPE - Abnormal    Color, UA Light Yellow      Clarity, UA Clear      Specific Gravity, UA 1.019      pH, UA 5.0      Leukocytes, UA Negative      Nitrite, UA Negative      Protein, UA Negative      Glucose, UA Negative      Ketones, UA Negative      Urobilinogen, UA <2.0      Bilirubin, UA Negative      Occult Blood, UA Small (*)    URINE MICROSCOPIC - Abnormal    RBC, UA 10-20 (*)     WBC, UA None Seen      Epithelial  Cells Occasional      Bacteria, UA None Seen     LIPASE - Normal    Lipase 45     HS TROPONIN I 0HR - Normal    hs TnI 0hr 17     HS TROPONIN I 2HR - Normal    hs TnI 2hr 14      Delta 2hr hsTnI -3     CBC AND DIFFERENTIAL    WBC 7.61      RBC 4.13      Hemoglobin 11.8      Hematocrit 37.1      MCV 90      MCH 28.6      MCHC 31.8      RDW 12.8      MPV 10.9      Platelets 218      nRBC 0      Segmented % 73      Immature Grans % 0      Lymphocytes % 19      Monocytes % 5      Eosinophils Relative 2      Basophils Relative 1      Absolute Neutrophils 5.59      Absolute Immature Grans 0.02      Absolute Lymphocytes 1.41      Absolute Monocytes 0.40      Eosinophils Absolute 0.14      Basophils Absolute 0.05     HS TROPONIN I 4HR     XR chest 1 view portable   ED Interpretation by Nathan Allen DO (09/15 5148)   Borderline cardiomegaly.  No infiltrates.          ECG 12 Lead Documentation Only    Date/Time: 9/15/2024 2:45 PM    Performed by: Nathan Allen DO  Authorized by: Nathan Allen DO    ECG reviewed by me, the ED Provider: yes    Patient location:  ED  Previous ECG:     Previous ECG:  Compared to current    Similarity:  No change    Comparison to cardiac monitor: Yes    Comments:      Normal sinus rhythm at a rate of 84 bpm.  Normal intervals.  Normal axis.  Normal QRS.  Nonspecific ST-T wave abnormalities.  Similar to previous from 8/30/2024.         Nathan Allen DO  09/15/24 [id number]

## 2024-09-17 ENCOUNTER — TELEPHONE (OUTPATIENT)
Age: 84
End: 2024-09-17

## 2024-09-17 DIAGNOSIS — F41.1 GENERALIZED ANXIETY DISORDER: ICD-10-CM

## 2024-09-17 DIAGNOSIS — F45.21 ILLNESS ANXIETY DISORDER: Primary | ICD-10-CM

## 2024-09-17 DIAGNOSIS — F33.0 MAJOR DEPRESSIVE DISORDER, RECURRENT EPISODE, MILD (HCC): ICD-10-CM

## 2024-09-17 RX ORDER — MIRTAZAPINE 15 MG/1
15 TABLET, FILM COATED ORAL
Qty: 30 TABLET | Refills: 0 | Status: ON HOLD | OUTPATIENT
Start: 2024-09-17

## 2024-09-17 NOTE — TELEPHONE ENCOUNTER
Patient experiencing nausea; treated in ED with fluid repletion and antiemetic. Continues to experience problematic anxiety around bedtime. Fluoxetine a possible cause of GI upset and anxiety. Mirtazapine can add sleep, stimulate appetite, and provide additional antidepressant effect    Reduce fluoxetine to 40 mg QAM  Discontinue gabapentin  Begin mirtazapine 15 mg QHS    Labs reviewed  Counselled to take medication and vitamins with food to reduce possible GI upset

## 2024-09-23 ENCOUNTER — HOSPITAL ENCOUNTER (EMERGENCY)
Facility: HOSPITAL | Age: 84
Discharge: HOME | End: 2024-09-23
Attending: EMERGENCY MEDICINE
Payer: MEDICARE

## 2024-09-23 ENCOUNTER — RA CDI HCC (OUTPATIENT)
Dept: OTHER | Facility: HOSPITAL | Age: 84
End: 2024-09-23

## 2024-09-23 ENCOUNTER — OFFICE VISIT (OUTPATIENT)
Dept: PSYCHIATRY | Facility: CLINIC | Age: 84
End: 2024-09-23
Payer: MEDICARE

## 2024-09-23 VITALS
HEART RATE: 86 BPM | TEMPERATURE: 98.9 F | RESPIRATION RATE: 20 BRPM | SYSTOLIC BLOOD PRESSURE: 160 MMHG | OXYGEN SATURATION: 96 % | DIASTOLIC BLOOD PRESSURE: 82 MMHG

## 2024-09-23 VITALS
BODY MASS INDEX: 23.09 KG/M2 | SYSTOLIC BLOOD PRESSURE: 106 MMHG | HEART RATE: 85 BPM | WEIGHT: 106.7 LBS | DIASTOLIC BLOOD PRESSURE: 67 MMHG

## 2024-09-23 DIAGNOSIS — F45.21 ILLNESS ANXIETY DISORDER: Primary | ICD-10-CM

## 2024-09-23 DIAGNOSIS — F33.0 MAJOR DEPRESSIVE DISORDER, RECURRENT EPISODE, MILD (HCC): ICD-10-CM

## 2024-09-23 DIAGNOSIS — R51.9 HEADACHE: Primary | ICD-10-CM

## 2024-09-23 LAB
ANION GAP SERPL CALCULATED.3IONS-SCNC: 8 MMOL/L (ref 4–13)
BASOPHILS # BLD AUTO: 0.05 THOUSANDS/ΜL (ref 0–0.1)
BASOPHILS NFR BLD AUTO: 1 % (ref 0–1)
BUN SERPL-MCNC: 37 MG/DL (ref 5–25)
CALCIUM SERPL-MCNC: 9.6 MG/DL (ref 8.4–10.2)
CHLORIDE SERPL-SCNC: 104 MMOL/L (ref 96–108)
CO2 SERPL-SCNC: 25 MMOL/L (ref 21–32)
CREAT SERPL-MCNC: 1.18 MG/DL (ref 0.6–1.3)
EOSINOPHIL # BLD AUTO: 0.17 THOUSAND/ΜL (ref 0–0.61)
EOSINOPHIL NFR BLD AUTO: 3 % (ref 0–6)
ERYTHROCYTE [DISTWIDTH] IN BLOOD BY AUTOMATED COUNT: 12.7 % (ref 11.6–15.1)
GFR SERPL CREATININE-BSD FRML MDRD: 42 ML/MIN/1.73SQ M
GLUCOSE SERPL-MCNC: 109 MG/DL (ref 65–140)
HCT VFR BLD AUTO: 37.8 % (ref 34.8–46.1)
HGB BLD-MCNC: 12.3 G/DL (ref 11.5–15.4)
IMM GRANULOCYTES # BLD AUTO: 0.02 THOUSAND/UL (ref 0–0.2)
IMM GRANULOCYTES NFR BLD AUTO: 0 % (ref 0–2)
LYMPHOCYTES # BLD AUTO: 1.44 THOUSANDS/ΜL (ref 0.6–4.47)
LYMPHOCYTES NFR BLD AUTO: 24 % (ref 14–44)
MCH RBC QN AUTO: 29.2 PG (ref 26.8–34.3)
MCHC RBC AUTO-ENTMCNC: 32.5 G/DL (ref 31.4–37.4)
MCV RBC AUTO: 90 FL (ref 82–98)
MONOCYTES # BLD AUTO: 0.35 THOUSAND/ΜL (ref 0.17–1.22)
MONOCYTES NFR BLD AUTO: 6 % (ref 4–12)
NEUTROPHILS # BLD AUTO: 4.11 THOUSANDS/ΜL (ref 1.85–7.62)
NEUTS SEG NFR BLD AUTO: 66 % (ref 43–75)
NRBC BLD AUTO-RTO: 0 /100 WBCS
PLATELET # BLD AUTO: 197 THOUSANDS/UL (ref 149–390)
PMV BLD AUTO: 10.7 FL (ref 8.9–12.7)
POTASSIUM SERPL-SCNC: 5 MMOL/L (ref 3.5–5.3)
RBC # BLD AUTO: 4.21 MILLION/UL (ref 3.81–5.12)
SODIUM SERPL-SCNC: 137 MMOL/L (ref 135–147)
WBC # BLD AUTO: 6.14 THOUSAND/UL (ref 4.31–10.16)

## 2024-09-23 PROCEDURE — 36415 COLL VENOUS BLD VENIPUNCTURE: CPT

## 2024-09-23 PROCEDURE — 85025 COMPLETE CBC W/AUTO DIFF WBC: CPT | Performed by: EMERGENCY MEDICINE

## 2024-09-23 PROCEDURE — 96365 THER/PROPH/DIAG IV INF INIT: CPT

## 2024-09-23 PROCEDURE — 80048 BASIC METABOLIC PNL TOTAL CA: CPT | Performed by: EMERGENCY MEDICINE

## 2024-09-23 PROCEDURE — 96361 HYDRATE IV INFUSION ADD-ON: CPT

## 2024-09-23 PROCEDURE — 96375 TX/PRO/DX INJ NEW DRUG ADDON: CPT

## 2024-09-23 PROCEDURE — 93005 ELECTROCARDIOGRAM TRACING: CPT

## 2024-09-23 PROCEDURE — 99214 OFFICE O/P EST MOD 30 MIN: CPT

## 2024-09-23 PROCEDURE — 90833 PSYTX W PT W E/M 30 MIN: CPT

## 2024-09-23 PROCEDURE — 99284 EMERGENCY DEPT VISIT MOD MDM: CPT

## 2024-09-23 PROCEDURE — 99284 EMERGENCY DEPT VISIT MOD MDM: CPT | Performed by: EMERGENCY MEDICINE

## 2024-09-23 RX ORDER — SODIUM CHLORIDE 9 MG/ML
100 INJECTION, SOLUTION INTRAVENOUS ONCE
Status: COMPLETED | OUTPATIENT
Start: 2024-09-23 | End: 2024-09-23

## 2024-09-23 RX ORDER — METOCLOPRAMIDE HYDROCHLORIDE 5 MG/ML
10 INJECTION INTRAMUSCULAR; INTRAVENOUS EVERY 8 HOURS
Status: DISCONTINUED | OUTPATIENT
Start: 2024-09-23 | End: 2024-09-23 | Stop reason: HOSPADM

## 2024-09-23 RX ORDER — DIPHENHYDRAMINE HYDROCHLORIDE 50 MG/ML
25 INJECTION INTRAMUSCULAR; INTRAVENOUS EVERY 8 HOURS
Status: DISCONTINUED | OUTPATIENT
Start: 2024-09-23 | End: 2024-09-23 | Stop reason: HOSPADM

## 2024-09-23 RX ORDER — QUETIAPINE FUMARATE 50 MG/1
25 TABLET, FILM COATED ORAL
Qty: 15 TABLET | Refills: 0 | Status: ON HOLD | OUTPATIENT
Start: 2024-09-23 | End: 2024-10-23

## 2024-09-23 RX ORDER — DEXAMETHASONE SODIUM PHOSPHATE 10 MG/ML
10 INJECTION, SOLUTION INTRAMUSCULAR; INTRAVENOUS ONCE
Status: COMPLETED | OUTPATIENT
Start: 2024-09-23 | End: 2024-09-23

## 2024-09-23 RX ORDER — DIHYDROERGOTAMINE MESYLATE 1 MG/ML
1 INJECTION, SOLUTION INTRAMUSCULAR; INTRAVENOUS; SUBCUTANEOUS ONCE
Status: COMPLETED | OUTPATIENT
Start: 2024-09-23 | End: 2024-09-23

## 2024-09-23 RX ORDER — MAGNESIUM SULFATE HEPTAHYDRATE 40 MG/ML
2 INJECTION, SOLUTION INTRAVENOUS EVERY 24 HOURS
Status: DISCONTINUED | OUTPATIENT
Start: 2024-09-23 | End: 2024-09-23 | Stop reason: HOSPADM

## 2024-09-23 RX ADMIN — MAGNESIUM SULFATE HEPTAHYDRATE 2 G: 40 INJECTION, SOLUTION INTRAVENOUS at 16:04

## 2024-09-23 RX ADMIN — SODIUM CHLORIDE 100 ML/HR: 0.9 INJECTION, SOLUTION INTRAVENOUS at 15:34

## 2024-09-23 RX ADMIN — DEXAMETHASONE SODIUM PHOSPHATE 10 MG: 10 INJECTION INTRAMUSCULAR; INTRAVENOUS at 15:36

## 2024-09-23 RX ADMIN — METOCLOPRAMIDE 10 MG: 5 INJECTION, SOLUTION INTRAMUSCULAR; INTRAVENOUS at 15:56

## 2024-09-23 RX ADMIN — DIPHENHYDRAMINE HYDROCHLORIDE 25 MG: 50 INJECTION, SOLUTION INTRAMUSCULAR; INTRAVENOUS at 15:56

## 2024-09-23 RX ADMIN — DIHYDROERGOTAMINE MESYLATE 1 MG: 1 INJECTION, SOLUTION INTRAMUSCULAR; INTRAVENOUS; SUBCUTANEOUS at 15:58

## 2024-09-23 NOTE — ED NOTES
Two attempts made to secure an IV, only blood work was obtained and sent to lab.     Zhanna Franklin RN  09/23/24 4070

## 2024-09-23 NOTE — ASSESSMENT & PLAN NOTE
Decrease fluoxetine to 20 mg QAM for depressive and anxious symptoms; plan to taper and discontinue  Continue hydroxyzine 10 mg BID PRN for anxiety; evaluate efficacy and continued need for this  Continue melatonin 3 mg QHS for sleep; can trial holding and observe effect on dreaming  Continue mirtazapine 15 mg QHS for antidepressant adjunct  Decrease quetiapine to 25 mg QHS for antidepressant adjunct  Begin dream journal  Continue psychotherapy  Advised to discuss possibility of migraine, need for PPI, and starting to take nutritional supplement (Ensure)  with family medicine and geriatrics    Orders:    FLUoxetine (PROzac) 20 mg capsule; Take 1 capsule (20 mg total) by mouth daily with breakfast    QUEtiapine (SEROquel) 50 mg tablet; Take 0.5 tablets (25 mg total) by mouth daily at bedtime

## 2024-09-23 NOTE — ASSESSMENT & PLAN NOTE
Patient and daughter report no change from previous. Patient's vivid dreaming disturbs her though she reports no overt nightmares. Consider if melatonin may be exacerbating this. Report of hearing her granddaughter's doll cry in the middle of the night likely a pseudo-hallucination due to obsessional thinking. Will trial reduction of quetiapine and fluoxetine with hope to improve anxiety and fatigue during the day. Tentative plan to slowly taper fluoxetine and discontinue, switching; vortioxetine and fluvoxamine are possible options; favourable for obsessive symptoms and for use in the elderly. Or trialling a return to escitalopram (she took [id number] with apparently good effect, though with loss of efficacy). There is concern now with waning self-care, such as showering and brushing teeth.    She reports multiple weeks of frontal headache, possibly worse on right side, and nausea; could be concerning for status migrainosus. This has been unresponsive to ibuprofen. Nausea and dysgeusia possibly due to GERD - received PPI in recent ED visit with good response; may need longer-term PPI therapy.    Decrease fluoxetine to 20 mg QAM for depressive and anxious symptoms; plan to taper and discontinue  Continue hydroxyzine 10 mg BID PRN for anxiety; evaluate efficacy and continued need for this  Continue melatonin 3 mg QHS for sleep; can trial holding and observe effect on dreaming  Continue mirtazapine 15 mg QHS for antidepressant adjunct  Decrease quetiapine to 25 mg QHS for antidepressant adjunct  Begin dream journal  Continue psychotherapy  Advised to discuss possibility of migraine, need for PPI, and starting to take nutritional supplement (Ensure)  with family medicine and geriatrics    Orders:    FLUoxetine (PROzac) 20 mg capsule; Take 1 capsule (20 mg total) by mouth daily with breakfast

## 2024-09-23 NOTE — ED PROVIDER NOTES
1. Headache      ED Disposition       ED Disposition   Discharge    Condition   Stable    Date/Time   Mon Sep 23, 2024  5:34 PM    Comment   Sandra Peña discharge to home/self care.                   Assessment & Plan       Medical Decision Making  Sandra is an 83-year-old female with a past medical history of Anxiety, depression, HLD, CKD stage III, diabetes, constipation, HTN, and OA who presents to the emergency department due to headache & nausea.    DDx includes but is not limited to: migraine, medication side effect, do not suspect ICH or brain mass at this time    See ED course for MDM    Results shared with patient. Return precautions given and patient expresses understanding and is comfortable with discharge.       Amount and/or Complexity of Data Reviewed  Labs: ordered.    Risk  Prescription drug management.                ED Course as of 09/23/24 1839   Mon Sep 23, 2024   1701 Patient reports improvement in headache, will let fluids finish up and then reassess   1838 Patient reports that headache is continued to improve, though it is still mildly present.  Results shared, patient is comfortable discharge.  Recommended follow-up with primary care.       Medications   metoclopramide (REGLAN) injection 10 mg (10 mg Intravenous Given 9/23/24 1556)   diphenhydrAMINE (BENADRYL) injection 25 mg (25 mg Intravenous Given 9/23/24 1556)   magnesium sulfate 2 g/50 mL IVPB (premix) 2 g (0 g Intravenous Stopped 9/23/24 1650)   dexamethasone (PF) (DECADRON) injection 10 mg (10 mg Intravenous Given 9/23/24 1536)   sodium chloride 0.9 % infusion (100 mL/hr Intravenous New Bag 9/23/24 1534)   dihydroergotamine (DHE) injection 1 mg (1 mg Intravenous Given 9/23/24 1558)       History of Present Illness       Sandra is an 83-year-old female with a past medical history of Anxiety, depression, HLD, CKD stage III, diabetes, constipation, HTN, and OA who presents to the emergency department due to headache & nausea.  History  obtained in conjunction with the patient and her family member.  They report that the patient has had an ongoing headache for 3 days now.  They called into the primary care office, who recommended that they come in for a migraine cocktail.  They report that she has been getting more frequent headaches lately since she had medication adjustments done while she was inpatient several weeks ago.  They been working with the primary care physician to decrease the patient's SSRIs as they currently believe that is the cause of her headaches and nausea.  Overall the family reports that the patient has had decreased food and fluid intake due to her ongoing nausea.    Any vision changes, chest pain, trauma, recent falls, palpitations, difficulty breathing, or any other complaints at this time.        Review of Systems   Constitutional:         As per HPI           Objective     ED Triage Vitals   Temperature Pulse Blood Pressure Respirations SpO2 Patient Position - Orthostatic VS   09/23/24 1315 09/23/24 1315 09/23/24 1315 09/23/24 1315 09/23/24 1315 09/23/24 1315   98.9 °F (37.2 °C) 84 129/61 18 98 % Sitting      Temp Source Heart Rate Source BP Location FiO2 (%) Pain Score    09/23/24 1315 09/23/24 1500 09/23/24 1315 -- 09/23/24 1558    Oral Monitor Right arm  8        Physical Exam  Vitals and nursing note reviewed.   Constitutional:       Appearance: Normal appearance.   HENT:      Head: Normocephalic and atraumatic.      Right Ear: External ear normal.      Left Ear: External ear normal.      Nose: Nose normal.      Mouth/Throat:      Mouth: Mucous membranes are moist.      Pharynx: Oropharynx is clear.   Eyes:      Extraocular Movements: Extraocular movements intact.      Conjunctiva/sclera: Conjunctivae normal.   Cardiovascular:      Rate and Rhythm: Normal rate and regular rhythm.      Heart sounds: Normal heart sounds.   Pulmonary:      Effort: Pulmonary effort is normal.      Breath sounds: Normal breath sounds.    Abdominal:      General: Abdomen is flat.   Musculoskeletal:         General: Normal range of motion.      Cervical back: Normal range of motion and neck supple.   Skin:     General: Skin is warm and dry.   Neurological:      General: No focal deficit present.      Mental Status: She is alert and oriented to person, place, and time.      Cranial Nerves: No cranial nerve deficit.   Psychiatric:         Mood and Affect: Mood normal.         Behavior: Behavior normal.         Labs Reviewed   BASIC METABOLIC PANEL - Abnormal       Result Value    Sodium 137      Potassium 5.0      Chloride 104      CO2 25      ANION GAP 8      BUN 37 (*)     Creatinine 1.18      Glucose 109      Calcium 9.6      eGFR 42      Narrative:     National Kidney Disease Foundation guidelines for Chronic Kidney Disease (CKD):     Stage 1 with normal or high GFR (GFR > 90 mL/min/1.73 square meters)    Stage 2 Mild CKD (GFR = 60-89 mL/min/1.73 square meters)    Stage 3A Moderate CKD (GFR = 45-59 mL/min/1.73 square meters)    Stage 3B Moderate CKD (GFR = 30-44 mL/min/1.73 square meters)    Stage 4 Severe CKD (GFR = 15-29 mL/min/1.73 square meters)    Stage 5 End Stage CKD (GFR <15 mL/min/1.73 square meters)  Note: GFR calculation is accurate only with a steady state creatinine   CBC AND DIFFERENTIAL    WBC 6.14      RBC 4.21      Hemoglobin 12.3      Hematocrit 37.8      MCV 90      MCH 29.2      MCHC 32.5      RDW 12.7      MPV 10.7      Platelets 197      nRBC 0      Segmented % 66      Immature Grans % 0      Lymphocytes % 24      Monocytes % 6      Eosinophils Relative 3      Basophils Relative 1      Absolute Neutrophils 4.11      Absolute Immature Grans 0.02      Absolute Lymphocytes 1.44      Absolute Monocytes 0.35      Eosinophils Absolute 0.17      Basophils Absolute 0.05       No orders to display       Procedures    ED Medication and Procedure Management   Prior to Admission Medications   Prescriptions Last Dose Informant Patient  Reported? Taking?   Cholecalciferol (VITAMIN D3) 1,000 units tablet   No No   Sig: Take 2 tablets (2,000 Units total) by mouth daily for 30 doses   FLUoxetine (PROzac) 20 mg capsule   No No   Sig: Take 1 capsule (20 mg total) by mouth daily with breakfast   QUEtiapine (SEROquel) 50 mg tablet   No No   Sig: Take 0.5 tablets (25 mg total) by mouth daily at bedtime   aspirin 81 MG tablet  Self Yes No   Sig: Take 81 mg by mouth daily   atorvastatin (LIPITOR) 10 mg tablet   No No   Sig: Take 1 tablet (10 mg total) by mouth daily   cyanocobalamin (VITAMIN B-12) 1000 MCG tablet   No No   Sig: Take 1 tablet (1,000 mcg total) by mouth daily   docusate sodium (COLACE) 100 mg capsule  Self Yes No   Sig: Take 100 mg by mouth 2 (two) times a day   hydrOXYzine HCL (ATARAX) 10 mg tablet   No No   Sig: Take 1 tablet (10 mg total) by mouth 2 (two) times a day as needed for anxiety for up to 40 doses   linaCLOtide 145 MCG CAPS   No No   Sig: Take 1 capsule (145 mcg total) by mouth in the morning   lisinopril-hydrochlorothiazide (PRINZIDE,ZESTORETIC) 10-12.5 MG per tablet   No No   Sig: Take 1 tablet by mouth daily   melatonin 3 mg   No No   Sig: Take 1 tablet (3 mg total) by mouth daily at bedtime   metFORMIN (GLUCOPHAGE) 500 mg tablet   No No   Sig: Take 1 tablet (500 mg total) by mouth 2 (two) times a day with meals   mirtazapine (REMERON) 15 mg tablet   No No   Sig: Take 1 tablet (15 mg total) by mouth daily at bedtime   ondansetron (ZOFRAN-ODT) 4 mg disintegrating tablet   No No   Sig: Take 1 tablet (4 mg total) by mouth every 8 (eight) hours as needed for nausea      Facility-Administered Medications: None     Patient's Medications   Discharge Prescriptions    No medications on file     No discharge procedures on file.     Enma Meyer MD  09/23/24 [id number]

## 2024-09-23 NOTE — DISCHARGE INSTRUCTIONS
Recommend following up with your primary care physician in order to continue altering your medication regimen to see if that is potentially causing the symptoms.  The attached paperwork has return precautions, but overall if anything changes significantly and you become concerned please feel free to return to the emergency department for further workup.

## 2024-09-23 NOTE — ED ATTENDING ATTESTATION
9/23/2024  I, Mumtaz Owusu MD, saw and evaluated the patient. I have discussed the patient with the resident/non-physician practitioner and agree with the resident's/non-physician practitioner's findings, Plan of Care, and MDM as documented in the resident's/non-physician practitioner's note, except where noted. All available labs and Radiology studies were reviewed.  I was present for key portions of any procedure(s) performed by the resident/non-physician practitioner and I was immediately available to provide assistance.       At this point I agree with the current assessment done in the Emergency Department.  I have conducted an independent evaluation of this patient a history and physical is as follows:    S:  Chief Complaint   Patient presents with    Headache     Pt reports headache, nausea x1 week, loss of appetite     Sandra is an 83 y.o. female brought in by her daughter for evaluation of a headache that has been present for a week.  No fevers or chills.  There has been nausea but no vomiting.  She does report a loss of appetite as well.  She denies any recent trauma.  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      O:  ED Triage Vitals   Temperature Pulse Respirations Blood Pressure SpO2   09/23/24 1315 09/23/24 1315 09/23/24 1315 09/23/24 1315 09/23/24 1315   98.9 °F (37.2 °C) 84 18 129/61 98 %      Temp Source Heart Rate Source Patient Position - Orthostatic VS BP Location FiO2 (%)   09/23/24 1315 09/23/24 1500 09/23/24 1315 09/23/24 1315 --   Oral Monitor Sitting Right arm       Pain Score       09/23/24 1558       8         Physical Exam  Vitals and nursing note reviewed.   Constitutional:       General: She is in acute distress (mild).      Appearance: She is well-developed.   HENT:      Head: Normocephalic and atraumatic.   Eyes:      Pupils: Pupils are equal, round, and reactive to light.   Neck:      Vascular: No JVD.   Cardiovascular:      Rate  and Rhythm: Normal rate and regular rhythm.      Heart sounds: Normal heart sounds. No murmur heard.     No friction rub. No gallop.   Pulmonary:      Effort: Pulmonary effort is normal. No respiratory distress.      Breath sounds: Normal breath sounds. No wheezing or rales.   Chest:      Chest wall: No tenderness.   Musculoskeletal:         General: No tenderness. Normal range of motion.      Cervical back: Normal range of motion.   Skin:     General: Skin is warm and dry.   Neurological:      General: No focal deficit present.      Mental Status: She is alert and oriented to person, place, and time. Mental status is at baseline.   Psychiatric:         Behavior: Behavior normal.         Thought Content: Thought content normal.         Judgment: Judgment normal.         A/P:  Background: 83 y.o. female presents with headache, nausea    Differential DX includes but is not limited to: migraine, tension headache, chronic daily headache, doubt cluster headache, doubt temporal arteritis, doubt ICH, doubt neoplasm    Plan: migraine cocktail, re-evaluation, possible CT head    ED Course     Labs Reviewed   BASIC METABOLIC PANEL - Abnormal       Result Value Ref Range Status    Sodium 137  135 - 147 mmol/L Final    Potassium 5.0  3.5 - 5.3 mmol/L Final    Comment: Moderately Hemolyzed:Results may be affected.    Chloride 104  96 - 108 mmol/L Final    CO2 25  21 - 32 mmol/L Final    ANION GAP 8  4 - 13 mmol/L Final    BUN 37 (*) 5 - 25 mg/dL Final    Creatinine 1.18  0.60 - 1.30 mg/dL Final    Comment: Standardized to IDMS reference method    Glucose 109  65 - 140 mg/dL Final    Comment: If the patient is fasting, the ADA then defines impaired fasting glucose as > 100 mg/dL and diabetes as > or equal to 123 mg/dL.    Calcium 9.6  8.4 - 10.2 mg/dL Final    eGFR 42  ml/min/1.73sq m Final    Narrative:     [employer] guidelines for Chronic Kidney Disease (CKD):     Stage 1 with normal or high GFR (GFR >  90 mL/min/1.73 square meters)    Stage 2 Mild CKD (GFR = 60-89 mL/min/1.73 square meters)    Stage 3A Moderate CKD (GFR = 45-59 mL/min/1.73 square meters)    Stage 3B Moderate CKD (GFR = 30-44 mL/min/1.73 square meters)    Stage 4 Severe CKD (GFR = 15-29 mL/min/1.73 square meters)    Stage 5 End Stage CKD (GFR <15 mL/min/1.73 square meters)  Note: GFR calculation is accurate only with a steady state creatinine   CBC AND DIFFERENTIAL    WBC 6.14  4.31 - 10.16 Thousand/uL Final    RBC 4.21  3.81 - 5.12 Million/uL Final    Hemoglobin 12.3  11.5 - 15.4 g/dL Final    Hematocrit 37.8  34.8 - 46.1 % Final    MCV 90  82 - 98 fL Final    MCH 29.2  26.8 - 34.3 pg Final    MCHC 32.5  31.4 - 37.4 g/dL Final    RDW 12.7  11.6 - 15.1 % Final    MPV 10.7  8.9 - 12.7 fL Final    Platelets 197  149 - 390 Thousands/uL Final    nRBC 0  /100 WBCs Final    Segmented % 66  43 - 75 % Final    Immature Grans % 0  0 - 2 % Final    Lymphocytes % 24  14 - 44 % Final    Monocytes % 6  4 - 12 % Final    Eosinophils Relative 3  0 - 6 % Final    Basophils Relative 1  0 - 1 % Final    Absolute Neutrophils 4.11  1.85 - 7.62 Thousands/µL Final    Absolute Immature Grans 0.02  0.00 - 0.20 Thousand/uL Final    Absolute Lymphocytes 1.44  0.60 - 4.47 Thousands/µL Final    Absolute Monocytes 0.35  0.17 - 1.22 Thousand/µL Final    Eosinophils Absolute 0.17  0.00 - 0.61 Thousand/µL Final    Basophils Absolute 0.05  0.00 - 0.10 Thousands/µL Final     No orders to display     Medications   metoclopramide (REGLAN) injection 10 mg (10 mg Intravenous Given 9/23/24 1556)   diphenhydrAMINE (BENADRYL) injection 25 mg (25 mg Intravenous Given 9/23/24 1556)   magnesium sulfate 2 g/50 mL IVPB (premix) 2 g (0 g Intravenous Stopped 9/23/24 1650)   dexamethasone (PF) (DECADRON) injection 10 mg (10 mg Intravenous Given 9/23/24 1536)   sodium chloride 0.9 % infusion (100 mL/hr Intravenous New Bag 9/23/24 8454)   dihydroergotamine (DHE) injection 1 mg (1 mg Intravenous  Given 9/23/24 1558)         Critical Care Time  Procedures    Time reflects when diagnosis was documented in both MDM as applicable and the Disposition within this note       Time User Action Codes Description Comment    9/23/2024  5:34 PM Enma Meyer Add [R51.9] Headache           ED Disposition       ED Disposition   Discharge    Condition   Stable    Date/Time   Mon Sep 23, 2024  5:34 PM    Comment   Sandra Peña discharge to home/self care.                   Follow-up Information       Follow up With Specialties Details Why Contact Info    Ramin De Paz,  Family Medicine Schedule an appointment as soon as possible for a visit in 1 week  [address]  [facility] [address] [phone]

## 2024-09-23 NOTE — PSYCH
MEDICATION MANAGEMENT NOTE        [facility]      Name and Date of Birth:  Sandra Peña 83 y.o. 1940 MRN: [id number]    Date of Visit: September 23, 2024    Reason for Visit: Follow-up visit regarding medication management     _____________________________    Assessment & Plan  Illness anxiety disorder    Patient and daughter report no change from previous. Patient's vivid dreaming disturbs her though she reports no overt nightmares. Consider if melatonin may be exacerbating this. Report of hearing her granddaughter's doll cry in the middle of the night likely a pseudo-hallucination due to obsessional thinking. Will trial reduction of quetiapine and fluoxetine with hope to improve anxiety and fatigue during the day. Tentative plan to slowly taper fluoxetine and discontinue, switching; vortioxetine and fluvoxamine are possible options; favourable for obsessive symptoms and for use in the elderly. Or trialling a return to escitalopram (she took [id number] with apparently good effect, though with loss of efficacy). There is concern now with waning self-care, such as showering and brushing teeth.    She reports multiple weeks of frontal headache, possibly worse on right side, and nausea; could be concerning for status migrainosus. This has been unresponsive to ibuprofen. Nausea and dysgeusia possibly due to GERD - received PPI in recent ED visit with good response; may need longer-term PPI therapy.    Decrease fluoxetine to 20 mg QAM for depressive and anxious symptoms; plan to taper and discontinue  Continue hydroxyzine 10 mg BID PRN for anxiety; evaluate efficacy and continued need for this  Continue melatonin 3 mg QHS for sleep; can trial holding and observe effect on dreaming  Continue mirtazapine 15 mg QHS for antidepressant adjunct  Decrease quetiapine to 25 mg QHS for antidepressant adjunct  Begin dream journal  Continue psychotherapy  Advised to discuss  possibility of migraine, need for PPI, and starting to take nutritional supplement (Ensure)  with family medicine and geriatrics    Orders:    FLUoxetine (PROzac) 20 mg capsule; Take 1 capsule (20 mg total) by mouth daily with breakfast    Major depressive disorder, recurrent episode, mild (HCC)    Decrease fluoxetine to 20 mg QAM for depressive and anxious symptoms; plan to taper and discontinue  Continue hydroxyzine 10 mg BID PRN for anxiety; evaluate efficacy and continued need for this  Continue melatonin 3 mg QHS for sleep; can trial holding and observe effect on dreaming  Continue mirtazapine 15 mg QHS for antidepressant adjunct  Decrease quetiapine to 25 mg QHS for antidepressant adjunct  Begin dream journal  Continue psychotherapy  Advised to discuss possibility of migraine, need for PPI, and starting to take nutritional supplement (Ensure)  with family medicine and geriatrics    Orders:    FLUoxetine (PROzac) 20 mg capsule; Take 1 capsule (20 mg total) by mouth daily with breakfast    QUEtiapine (SEROquel) 50 mg tablet; Take 0.5 tablets (25 mg total) by mouth daily at bedtime             Treatment Recommendations/Precautions:  Risks/benefits/alternatives to treatment discussed, including a myriad of potential adverse medication side effects, to which Sandra voiced understanding and consented fully to treatment.   Labs most recently obtained , reviewed.   Follow up in 2 weeks for medication management  Follow up with PCP for medical issues and ongoing care  Aware of 24 hour and weekend coverage for urgent situations accessed by calling [facility] main practice number    Individual psychotherapy provided: Counseling was provided during the session today for 20 minutes.     Treatment Plan:     Completed and signed during the session: Not applicable - Treatment Plan not due at this session    _____________________________________________    History of Present Illness     Chief Complaint:  "\"I need help\"    SUBJECTIVE:    Shortly after coming home from hospital, though not immediately, she began feeling nausea and headache all day everyday as well as \"sour taste in her mouth.\" Headache is in the front, possible worse on right, and is throbbing. Appetite is very poor. Also recently she has lost interest in showering and brushing her teeth. Not interested in doing other things. Patient reports she dreads going to sleep due to dreams. Some are nightmares but not all. She is scared of her room and scared of bedtime. She thinks she maybe has heard one of her granddaughter's dolls make a doll-like crying. Her daughter notes that she often will see the patient asleep, however. She has difficulty waking up in the morning and is tired all day.            Psychiatric Review Of Systems:  Unchanged information from this writer's previous assessment is copied and italicized; information that has changed is bolded.    Appetite: yes  Adverse eating: no  Weight changes: no  Insomnia/sleeplessness: yes  Fatigue/anergy: yes  Anhedonia/lack of interest: no  Attention/concentration: no  Psychomotor agitation/retardation: no  Somatic symptoms: yes  Anxiety/panic attack: yes  Zaira/hypomania: no  Hopelessness/helplessness/worthlessness: no  Self-injurious behavior/high-risk behavior: no  Suicidal ideation: no  Homicidal ideation: no  Auditory hallucinations: no  Visual hallucinations: no  Other perceptual disturbances: no  Delusional thinking: no  Obsessive/compulsive symptoms: yes    Review Of Systems:      Constitutional feeling tired   ENT negative   Cardiovascular negative   Respiratory negative   Gastrointestinal nausea   Genitourinary negative   Musculoskeletal negative   Integumentary negative   Neurological headache   Endocrine negative   Other Symptoms none, all other systems are negative     Objective    OBJECTIVE:     Visit Vitals  OB Status Postmenopausal   Smoking Status Never      Wt Readings from Last 6 " Encounters:   24 48.1 kg (106 lb)   24 48.1 kg (106 lb)   24 50.7 kg (111 lb 12.4 oz)   24 50.7 kg (111 lb 12.8 oz)   24 51.4 kg (113 lb 6.4 oz)   24 52 kg (114 lb 10.2 oz)        Past Medical History:   Diagnosis Date    Anxiety     Colon polyps     Depression     Diabetes mellitus (HCC)     Dizziness     Last assessed: 8/31/15    DVT (deep venous thrombosis) (HCC)     Dysuria     Hematuria 2022    Hyperlipidemia     Hypertension     Hypertensive urgency 10/22/2021    Insomnia     Panic attack     Ureterolithiasis 2020      Past Surgical History:   Procedure Laterality Date     SECTION      1964 son, 1968 daughter    COLONOSCOPY      FL RETROGRADE PYELOGRAM  2020    NC COLONOSCOPY FLX DX W/COLLJ SPEC WHEN PFRMD N/A 3/27/2017    Procedure: COLONOSCOPY;  Surgeon: Gold Francis MD;  Location: [facility];  Service: Gastroenterology    NC CYSTO/URETERO W/LITHOTRIPSY &INDWELL STENT INSRT Right 2020    Procedure: CYSTOSCOPY URETEROSCOPY WITH LITHOTRIPSY HOLMIUM LASER, RETROGRADE PYELOGRAM AND INSERTION STENT URETERAL; EXCISION OF BLADDER TUMOR;  Surgeon: Edwin Love MD;  Location: [facility];  Service: Urology    ROTATOR CUFF REPAIR Left     Last assessed: 3/29/15    TONSILLECTOMY         Meds/Allergies    No Known Allergies  Current Outpatient Medications   Medication Instructions    aspirin 81 mg, Oral, Daily    atorvastatin (LIPITOR) 10 mg, Oral, Daily    Cholecalciferol (VITAMIN D3) 2,000 Units, Oral, Daily    cyanocobalamin (VITAMIN B-12) 1,000 mcg, Oral, Daily    docusate sodium (COLACE) 100 mg, Oral, 2 times daily    FLUoxetine (PROZAC) 40 mg, Oral, Daily with breakfast    hydrOXYzine HCL (ATARAX) 10 mg, Oral, 2 times daily PRN    linaCLOtide 145 mcg, Oral, Daily    lisinopril-hydrochlorothiazide (PRINZIDE,ZESTORETIC) 10-12.5 MG per tablet 1 tablet, Oral, Daily    melatonin 3 mg, Oral, Daily at bedtime    metFORMIN (GLUCOPHAGE) 500 mg, Oral, 2  "times daily with meals    mirtazapine (REMERON) 15 mg, Oral, Daily at bedtime    ondansetron (ZOFRAN-ODT) 4 mg, Oral, Every 8 hours PRN    QUEtiapine (SEROQUEL) 50 mg, Oral, Daily at bedtime           Mental Status Exam:    Appearance Petite, grey curly hair, grey jacket  Good eye contact   Behavior/motor Calm and cooperative   Speech/language Normal rate and volume, halting at times   Mood \"Not good\"   Affect Anxious, full range, tearful at times   Thought process Logical and linear, ruminating   Thought content Obsessions    No overt delusions, Denies hallucinations, Denies suicidal ideations   Cognition Awake and alert  Oriented and memory grossly intact  Inattentive at times   Insight/judgment Insight: moderate to fair  Judgment: moderate     Laboratory Results: I have personally reviewed all pertinent laboratory/tests results    Admission on 09/15/2024, Discharged on 09/15/2024   Component Date Value Ref Range Status    Ventricular Rate 09/15/2024 84  BPM Final    Atrial Rate 09/15/2024 84  BPM Final    AL Interval 09/15/2024 162  ms Final    QRSD Interval 09/15/2024 68  ms Final    QT Interval 09/15/2024 348  ms Final    QTC Interval 09/15/2024 411  ms Final    P Axis 09/15/2024 55  degrees Final    QRS Axis 09/15/2024 6  degrees Final    T Wave [place] 09/15/2024 39  degrees Final    WBC 09/15/2024 7.61  4.31 - 10.16 Thousand/uL Final    RBC 09/15/2024 4.13  3.81 - 5.12 Million/uL Final    Hemoglobin 09/15/2024 11.8  11.5 - 15.4 g/dL Final    Hematocrit 09/15/2024 37.1  34.8 - 46.1 % Final    MCV 09/15/2024 90  82 - 98 fL Final    MCH 09/15/2024 28.6  26.8 - 34.3 pg Final    MCHC 09/15/2024 31.8  31.4 - 37.4 g/dL Final    RDW 09/15/2024 12.8  11.6 - 15.1 % Final    MPV 09/15/2024 10.9  8.9 - 12.7 fL Final    Platelets 09/15/2024 218  149 - 390 Thousands/uL Final    nRBC 09/15/2024 0  /100 WBCs Final    Segmented % 09/15/2024 73  43 - 75 % Final    Immature Grans % 09/15/2024 0  0 - 2 % Final    Lymphocytes % " 09/15/2024 19  14 - 44 % Final    Monocytes % 09/15/2024 5  4 - 12 % Final    Eosinophils Relative 09/15/2024 2  0 - 6 % Final    Basophils Relative 09/15/2024 1  0 - 1 % Final    Absolute Neutrophils 09/15/2024 5.59  1.85 - 7.62 Thousands/µL Final    Absolute Immature Grans 09/15/2024 0.02  0.00 - 0.20 Thousand/uL Final    Absolute Lymphocytes 09/15/2024 1.41  0.60 - 4.47 Thousands/µL Final    Absolute Monocytes 09/15/2024 0.40  0.17 - 1.22 Thousand/µL Final    Eosinophils Absolute 09/15/2024 0.14  0.00 - 0.61 Thousand/µL Final    Basophils Absolute 09/15/2024 0.05  0.00 - 0.10 Thousands/µL Final    Sodium 09/15/2024 136  135 - 147 mmol/L Final    Potassium 09/15/2024 4.0  3.5 - 5.3 mmol/L Final    Chloride 09/15/2024 101  96 - 108 mmol/L Final    CO2 09/15/2024 25  21 - 32 mmol/L Final    ANION GAP 09/15/2024 10  4 - 13 mmol/L Final    BUN 09/15/2024 27 (H)  5 - 25 mg/dL Final    Creatinine 09/15/2024 1.05  0.60 - 1.30 mg/dL Final    Standardized to IDMS reference method    Glucose 09/15/2024 105  65 - 140 mg/dL Final    If the patient is fasting, the ADA then defines impaired fasting glucose as > 100 mg/dL and diabetes as > or equal to 123 mg/dL.    Calcium 09/15/2024 9.6  8.4 - 10.2 mg/dL Final    AST 09/15/2024 15  13 - 39 U/L Final    ALT 09/15/2024 9  7 - 52 U/L Final    Specimen collection should occur prior to Sulfasalazine administration due to the potential for falsely depressed results.     Alkaline Phosphatase 09/15/2024 49  34 - 104 U/L Final    Total Protein 09/15/2024 6.7  6.4 - 8.4 g/dL Final    Albumin 09/15/2024 4.1  3.5 - 5.0 g/dL Final    Total Bilirubin 09/15/2024 0.47  0.20 - 1.00 mg/dL Final    Use of this assay is not recommended for patients undergoing treatment with eltrombopag due to the potential for falsely elevated results.  N-acetyl-p-benzoquinone imine (metabolite of Acetaminophen) will generate erroneously low results in samples for patients that have taken an overdose of  "Acetaminophen.    eGFR 09/15/2024 48  ml/min/1.73sq m Final    Lipase 09/15/2024 45  11 - 82 u/L Final    hs TnI 0hr 09/15/2024 17  \"Refer to ACS Flowchart\"- see link ng/L Final    Comment:                                              Initial (time 0) result  If >=50 ng/L, Myocardial injury suggested ;  Type of myocardial injury and treatment strategy  to be determined.  If 5-49 ng/L, a delta result at 2 hours and or 4 hours will be needed to further evaluate.  If <4 ng/L, and chest pain has been >3 hours since onset, patient may qualify for discharge based on the HEART score in the ED.  If <5 ng/L and <3hours since onset of chest pain, a delta result at 2 hours will be needed to further evaluate.    HS Troponin 99th Percentile URL of a Health Population=12 ng/L with a 95% Confidence Interval of 8-18 ng/L.    Second Troponin (time 2 hours)  If calculated delta >= 20 ng/L,  Myocardial injury suggested ; Type of myocardial injury and treatment strategy to be determined.  If 5-49 ng/L and the calculated delta is 5-19 ng/L, consult medical service for evaluation.  Continue evaluation for ischemia on ecg and other possible etiology and repeat hs troponin at 4 hours.  If delta                            is <5 ng/L at 2 hours, consider discharge based on risk stratification via the HEART score (if in ED), or [place] risk score in IP/Observation.    HS Troponin 99th Percentile URL of a Health Population=12 ng/L with a 95% Confidence Interval of 8-18 ng/L.    Color, UA 09/15/2024 Light Yellow   Final    Clarity, UA 09/15/2024 Clear   Final    Specific Gravity, UA 09/15/2024 1.019  1.003 - 1.030 Final    pH, UA 09/15/2024 5.0  4.5, 5.0, 5.5, 6.0, 6.5, 7.0, 7.5, 8.0 Final    Leukocytes, UA 09/15/2024 Negative  Negative Final    Nitrite, UA 09/15/2024 Negative  Negative Final    Protein, UA 09/15/2024 Negative  Negative mg/dl Final    Glucose, UA 09/15/2024 Negative  Negative mg/dl Final    Ketones, UA 09/15/2024 Negative  " "Negative mg/dl Final    Urobilinogen, UA 09/15/2024 <2.0  <2.0 mg/dl mg/dl Final    Bilirubin, UA 09/15/2024 Negative  Negative Final    Occult Blood, UA 09/15/2024 Small (A)  Negative Final    hs TnI 2hr 09/15/2024 14  \"Refer to ACS Flowchart\"- see link ng/L Final    Comment:                                              Initial (time 0) result  If >=50 ng/L, Myocardial injury suggested ;  Type of myocardial injury and treatment strategy  to be determined.  If 5-49 ng/L, a delta result at 2 hours and or 4 hours will be needed to further evaluate.  If <4 ng/L, and chest pain has been >3 hours since onset, patient may qualify for discharge based on the HEART score in the ED.  If <5 ng/L and <3hours since onset of chest pain, a delta result at 2 hours will be needed to further evaluate.    HS Troponin 99th Percentile URL of a Health Population=12 ng/L with a 95% Confidence Interval of 8-18 ng/L.    Second Troponin (time 2 hours)  If calculated delta >= 20 ng/L,  Myocardial injury suggested ; Type of myocardial injury and treatment strategy to be determined.  If 5-49 ng/L and the calculated delta is 5-19 ng/L, consult medical service for evaluation.  Continue evaluation for ischemia on ecg and other possible etiology and repeat hs troponin at 4 hours.  If delta                            is <5 ng/L at 2 hours, consider discharge based on risk stratification via the HEART score (if in ED), or [place] risk score in IP/Observation.    HS Troponin 99th Percentile URL of a Health Population=12 ng/L with a 95% Confidence Interval of 8-18 ng/L.    Delta 2hr hsTnI 09/15/2024 -3  <20 ng/L Final    RBC, UA 09/15/2024 10-20 (A)  None Seen, 1-2 /hpf Final    WBC, UA 09/15/2024 None Seen  None Seen, 1-2 /hpf Final    Epithelial Cells 09/15/2024 Occasional  None Seen, Occasional /hpf Final    Bacteria, UA 09/15/2024 None Seen  None Seen, Occasional /hpf Final   Admission on 08/28/2024, Discharged on 09/04/2024   Component Date Value Ref " Range Status    Sodium 08/30/2024 136  135 - 147 mmol/L Final    Potassium 08/30/2024 3.8  3.5 - 5.3 mmol/L Final    Chloride 08/30/2024 100  96 - 108 mmol/L Final    CO2 08/30/2024 26  21 - 32 mmol/L Final    ANION GAP 08/30/2024 10  4 - 13 mmol/L Final    BUN 08/30/2024 25  5 - 25 mg/dL Final    Creatinine 08/30/2024 0.98  0.60 - 1.30 mg/dL Final    Standardized to IDMS reference method    Glucose 08/30/2024 136  65 - 140 mg/dL Final    If the patient is fasting, the ADA then defines impaired fasting glucose as > 100 mg/dL and diabetes as > or equal to 123 mg/dL.    Glucose, Fasting 08/30/2024 136 (H)  65 - 99 mg/dL Final    Calcium 08/30/2024 9.3  8.4 - 10.2 mg/dL Final    AST 08/30/2024 12 (L)  13 - 39 U/L Final    ALT 08/30/2024 6 (L)  7 - 52 U/L Final    Specimen collection should occur prior to Sulfasalazine administration due to the potential for falsely depressed results.     Alkaline Phosphatase 08/30/2024 43  34 - 104 U/L Final    Total Protein 08/30/2024 6.1 (L)  6.4 - 8.4 g/dL Final    Albumin 08/30/2024 3.8  3.5 - 5.0 g/dL Final    Total Bilirubin 08/30/2024 0.53  0.20 - 1.00 mg/dL Final    Use of this assay is not recommended for patients undergoing treatment with eltrombopag due to the potential for falsely elevated results.  N-acetyl-p-benzoquinone imine (metabolite of Acetaminophen) will generate erroneously low results in samples for patients that have taken an overdose of Acetaminophen.    eGFR 08/30/2024 52  ml/min/1.73sq m Final    Magnesium 08/30/2024 2.1  1.9 - 2.7 mg/dL Final    Phosphorus 08/30/2024 4.0  2.3 - 4.1 mg/dL Final    WBC 08/30/2024 5.82  4.31 - 10.16 Thousand/uL Final    RBC 08/30/2024 3.77 (L)  3.81 - 5.12 Million/uL Final    Hemoglobin 08/30/2024 11.0 (L)  11.5 - 15.4 g/dL Final    Hematocrit 08/30/2024 34.4 (L)  34.8 - 46.1 % Final    MCV 08/30/2024 91  82 - 98 fL Final    MCH 08/30/2024 29.2  26.8 - 34.3 pg Final    MCHC 08/30/2024 32.0  31.4 - 37.4 g/dL Final    RDW  08/30/2024 13.1  11.6 - 15.1 % Final    MPV 08/30/2024 10.9  8.9 - 12.7 fL Final    Platelets 08/30/2024 207  149 - 390 Thousands/uL Final    nRBC 08/30/2024 0  /100 WBCs Final    Segmented % 08/30/2024 68  43 - 75 % Final    Immature Grans % 08/30/2024 0  0 - 2 % Final    Lymphocytes % 08/30/2024 22  14 - 44 % Final    Monocytes % 08/30/2024 7  4 - 12 % Final    Eosinophils Relative 08/30/2024 2  0 - 6 % Final    Basophils Relative 08/30/2024 1  0 - 1 % Final    Absolute Neutrophils 08/30/2024 3.95  1.85 - 7.62 Thousands/µL Final    Absolute Immature Grans 08/30/2024 0.01  0.00 - 0.20 Thousand/uL Final    Absolute Lymphocytes 08/30/2024 1.26  0.60 - 4.47 Thousands/µL Final    Absolute Monocytes 08/30/2024 0.43  0.17 - 1.22 Thousand/µL Final    Eosinophils Absolute 08/30/2024 0.14  0.00 - 0.61 Thousand/µL Final    Basophils Absolute 08/30/2024 0.03  0.00 - 0.10 Thousands/µL Final    TSH 3RD GENERATON 08/30/2024 1.191  0.450 - 4.500 uIU/mL Final    The recommended reference ranges for TSH during pregnancy are as follows:   First trimester 0.100 to 2.500 uIU/mL   Second trimester  0.200 to 3.000 uIU/mL   Third trimester 0.300 to 3.000 uIU/m    Note: Normal ranges may not apply to patients who are transgender, non-binary, or whose legal sex, sex at birth, and gender identity differ.  Adult TSH (3rd generation) reference range follows the recommended guidelines of the American Thyroid Association, January, 2020.    Vitamin B-12 08/30/2024 437  180 - 914 pg/mL Final    Folate 08/30/2024 >22.3  >5.9 ng/mL Final    The World Health Organization has determined deficient folate concentrations are considered to be <4.0 ng/mL.    Vit D, 25-Hydroxy 08/30/2024 40.4  30.0 - 100.0 ng/mL Final    Vitamin D guidelines established by Clinical Guidelines Subcommittee  of the Endocrine Society Task Force, 2011    Deficiency <20ng/ml   Insufficiency 20-30ng/ml   Sufficient  ng/ml     Iron Saturation 08/30/2024 23  15 - 50 % Final     TIBC 08/30/2024 257  250 - 450 ug/dL Final    Iron 08/30/2024 58  50 - 212 ug/dL Final    Patients treated with metal-binding drugs (ie. Deferoxamine) may have depressed iron values.    UIBC 08/30/2024 199  155 - 355 ug/dL Final    Ferritin 08/30/2024 29  11 - 307 ng/mL Final    Syphilis Total Antibody 08/30/2024 Non-reactive  Non-Reactive Final    No serological evidence of infection with T. pallidum.  Early or incubating syphilis infection cannot be excluded.  Consider repeat testing based on clinical suspicion.    HIV-1 p24 Antigen 08/30/2024 Non-Reactive  Non-Reactive Final    HIV-1 Antibody 08/30/2024 Non-Reactive  Non-Reactive Final    HIV-2 Antibody 08/30/2024 Non-Reactive  Non-Reactive Final    HIV Ag-Ab 5th Gen 08/30/2024 Non-Reactive  Non-Reactive Final    A Non-Reactive test result does not preclude the possibility of exposure or infection with HIV-1 and/or HIV-2.  Non-Reactive results can occur if the quantity of marker present is below the detection limits or is not present during the stage of disease in which a sample is collected. Repeat testing should be considered where there is clinical suspicion of infection.       Ventricular Rate 08/30/2024 83  BPM Final    Atrial Rate 08/30/2024 83  BPM Final    OR Interval 08/30/2024 170  ms Final    QRSD Interval 08/30/2024 74  ms Final    QT Interval 08/30/2024 370  ms Final    QTC Interval 08/30/2024 434  ms Final    P Axis 08/30/2024 61  degrees Final    QRS [place] 08/30/2024 3  degrees Final    T Wave [place] 08/30/2024 51  degrees Final   Admission on 08/27/2024, Discharged on 08/28/2024   Component Date Value Ref Range Status    WBC 08/27/2024 6.15  4.31 - 10.16 Thousand/uL Final    RBC 08/27/2024 3.75 (L)  3.81 - 5.12 Million/uL Final    Hemoglobin 08/27/2024 11.0 (L)  11.5 - 15.4 g/dL Final    Hematocrit 08/27/2024 33.5 (L)  34.8 - 46.1 % Final    MCV 08/27/2024 89  82 - 98 fL Final    MCH 08/27/2024 29.3  26.8 - 34.3 pg Final    [facility] 08/27/2024 32.8   31.4 - 37.4 g/dL Final    RDW 08/27/2024 13.2  11.6 - 15.1 % Final    MPV 08/27/2024 10.2  8.9 - 12.7 fL Final    Platelets 08/27/2024 207  149 - 390 Thousands/uL Final    nRBC 08/27/2024 0  /100 WBCs Final    Segmented % 08/27/2024 66  43 - 75 % Final    Immature Grans % 08/27/2024 0  0 - 2 % Final    Lymphocytes % 08/27/2024 24  14 - 44 % Final    Monocytes % 08/27/2024 7  4 - 12 % Final    Eosinophils Relative 08/27/2024 2  0 - 6 % Final    Basophils Relative 08/27/2024 1  0 - 1 % Final    Absolute Neutrophils 08/27/2024 4.08  1.85 - 7.62 Thousands/µL Final    Absolute Immature Grans 08/27/2024 0.02  0.00 - 0.20 Thousand/uL Final    Absolute Lymphocytes 08/27/2024 1.46  0.60 - 4.47 Thousands/µL Final    Absolute Monocytes 08/27/2024 0.43  0.17 - 1.22 Thousand/µL Final    Eosinophils Absolute 08/27/2024 0.12  0.00 - 0.61 Thousand/µL Final    Basophils Absolute 08/27/2024 0.04  0.00 - 0.10 Thousands/µL Final    Sodium 08/27/2024 136  135 - 147 mmol/L Final    Potassium 08/27/2024 3.9  3.5 - 5.3 mmol/L Final    Chloride 08/27/2024 102  96 - 108 mmol/L Final    CO2 08/27/2024 27  21 - 32 mmol/L Final    ANION GAP 08/27/2024 7  4 - 13 mmol/L Final    BUN 08/27/2024 26 (H)  5 - 25 mg/dL Final    Creatinine 08/27/2024 1.05  0.60 - 1.30 mg/dL Final    Standardized to IDMS reference method    Glucose 08/27/2024 122  65 - 140 mg/dL Final    If the patient is fasting, the ADA then defines impaired fasting glucose as > 100 mg/dL and diabetes as > or equal to 123 mg/dL.    Calcium 08/27/2024 9.4  8.4 - 10.2 mg/dL Final    AST 08/27/2024 12 (L)  13 - 39 U/L Final    ALT 08/27/2024 7  7 - 52 U/L Final    Specimen collection should occur prior to Sulfasalazine administration due to the potential for falsely depressed results.     Alkaline Phosphatase 08/27/2024 50  34 - 104 U/L Final    Total Protein 08/27/2024 6.4  6.4 - 8.4 g/dL Final    Albumin 08/27/2024 4.0  3.5 - 5.0 g/dL Final    Total Bilirubin 08/27/2024 0.48  0.20 -  1.00 mg/dL Final    Use of this assay is not recommended for patients undergoing treatment with eltrombopag due to the potential for falsely elevated results.  N-acetyl-p-benzoquinone imine (metabolite of Acetaminophen) will generate erroneously low results in samples for patients that have taken an overdose of Acetaminophen.    eGFR 08/27/2024 48  ml/min/1.73sq m Final    TSH 3RD GENERATON 08/27/2024 1.408  0.450 - 4.500 uIU/mL Final    The recommended reference ranges for TSH during pregnancy are as follows:   First trimester 0.100 to 2.500 uIU/mL   Second trimester  0.200 to 3.000 uIU/mL   Third trimester 0.300 to 3.000 uIU/m    Note: Normal ranges may not apply to patients who are transgender, non-binary, or whose legal sex, sex at birth, and gender identity differ.  Adult TSH (3rd generation) reference range follows the recommended guidelines of the [employer], January, 2020.    Amph/Meth UR 08/27/2024 Negative  Negative Final    Barbiturate Ur 08/27/2024 Negative  Negative Final    Benzodiazepine Urine 08/27/2024 Negative  Negative Final    Cocaine Urine 08/27/2024 Negative  Negative Final    Methadone Urine 08/27/2024 Negative  Negative Final    Opiate Urine 08/27/2024 Negative  Negative Final    PCP Ur 08/27/2024 Negative  Negative Final    THC Urine 08/27/2024 Negative  Negative Final    Oxycodone Urine 08/27/2024 Negative  Negative Final    Fentanyl Urine 08/27/2024 Negative  Negative Final    HYDROCODONE URINE 08/27/2024 Negative  Negative Final    EXTBreath Alcohol 08/27/2024 0.000   Final    Color, UA 08/27/2024 Colorless   Final    Clarity, UA 08/27/2024 Clear   Final    Specific Gravity, UA 08/27/2024 1.008  1.003 - 1.030 Final    pH, UA 08/27/2024 5.0  4.5, 5.0, 5.5, 6.0, 6.5, 7.0, 7.5, 8.0 Final    Leukocytes, UA 08/27/2024 Negative  Negative Final    Nitrite, UA 08/27/2024 Negative  Negative Final    Protein, UA 08/27/2024 Negative  Negative mg/dl Final    Glucose, UA 08/27/2024  Negative  Negative mg/dl Final    Ketones, UA 08/27/2024 Negative  Negative mg/dl Final    Urobilinogen, UA 08/27/2024 <2.0  <2.0 mg/dl mg/dl Final    Bilirubin, UA 08/27/2024 Negative  Negative Final    Occult Blood, UA 08/27/2024 Negative  Negative Final    Ventricular Rate 08/27/2024 77  BPM Final    Atrial Rate 08/27/2024 77  BPM Final    NM Interval 08/27/2024 166  ms Final    QRSD Interval 08/27/2024 74  ms Final    QT Interval 08/27/2024 358  ms Final    QTC Interval 08/27/2024 405  ms Final    P Axis 08/27/2024 50  degrees Final    QRS Axis 08/27/2024 -1  degrees Final    T Wave Axis 08/27/2024 23  degrees Final   Admission on 04/10/2024, Discharged on 04/10/2024   Component Date Value Ref Range Status    WBC 04/10/2024 6.37  4.31 - 10.16 Thousand/uL Final    RBC 04/10/2024 4.09  3.81 - 5.12 Million/uL Final    Hemoglobin 04/10/2024 11.7  11.5 - 15.4 g/dL Final    Hematocrit 04/10/2024 37.7  34.8 - 46.1 % Final    MCV 04/10/2024 92  82 - 98 fL Final    MCH 04/10/2024 28.6  26.8 - 34.3 pg Final    MCHC 04/10/2024 31.0 (L)  31.4 - 37.4 g/dL Final    RDW 04/10/2024 13.2  11.6 - 15.1 % Final    MPV 04/10/2024 10.4  8.9 - 12.7 fL Final    Platelets 04/10/2024 203  149 - 390 Thousands/uL Final    nRBC 04/10/2024 0  /100 WBCs Final    Segmented % 04/10/2024 70  43 - 75 % Final    Immature Grans % 04/10/2024 0  0 - 2 % Final    Lymphocytes % 04/10/2024 19  14 - 44 % Final    Monocytes % 04/10/2024 6  4 - 12 % Final    Eosinophils Relative 04/10/2024 4  0 - 6 % Final    Basophils Relative 04/10/2024 1  0 - 1 % Final    Absolute Neutrophils 04/10/2024 4.47  1.85 - 7.62 Thousands/µL Final    Absolute Immature Grans 04/10/2024 0.02  0.00 - 0.20 Thousand/uL Final    Absolute Lymphocytes 04/10/2024 1.18  0.60 - 4.47 Thousands/µL Final    Absolute Monocytes 04/10/2024 0.41  0.17 - 1.22 Thousand/µL Final    Eosinophils Absolute 04/10/2024 0.23  0.00 - 0.61 Thousand/µL Final    Basophils Absolute 04/10/2024 0.06  0.00 - 0.10  "Thousands/µL Final    Sodium 04/10/2024 135  135 - 147 mmol/L Final    Potassium 04/10/2024 4.3  3.5 - 5.3 mmol/L Final    Chloride 04/10/2024 103  96 - 108 mmol/L Final    CO2 04/10/2024 24  21 - 32 mmol/L Final    ANION GAP 04/10/2024 8  4 - 13 mmol/L Final    BUN 04/10/2024 26 (H)  5 - 25 mg/dL Final    Creatinine 04/10/2024 0.93  0.60 - 1.30 mg/dL Final    Standardized to IDMS reference method    Glucose 04/10/2024 99  65 - 140 mg/dL Final    If the patient is fasting, the ADA then defines impaired fasting glucose as > 100 mg/dL and diabetes as > or equal to 123 mg/dL.    Calcium 04/10/2024 9.7  8.4 - 10.2 mg/dL Final    AST 04/10/2024 15  13 - 39 U/L Final    ALT 04/10/2024 11  7 - 52 U/L Final    Specimen collection should occur prior to Sulfasalazine administration due to the potential for falsely depressed results.     Alkaline Phosphatase 04/10/2024 53  34 - 104 U/L Final    Total Protein 04/10/2024 6.9  6.4 - 8.4 g/dL Final    Albumin 04/10/2024 4.2  3.5 - 5.0 g/dL Final    Total Bilirubin 04/10/2024 0.49  0.20 - 1.00 mg/dL Final    Use of this assay is not recommended for patients undergoing treatment with eltrombopag due to the potential for falsely elevated results.  N-acetyl-p-benzoquinone imine (metabolite of Acetaminophen) will generate erroneously low results in samples for patients that have taken an overdose of Acetaminophen.    eGFR 04/10/2024 56  ml/min/1.73sq m Final    hs TnI 0hr 04/10/2024 11  \"Refer to ACS Flowchart\"- see link ng/L Final    Comment:                                              Initial (time 0) result  If >=50 ng/L, Myocardial injury suggested ;  Type of myocardial injury and treatment strategy  to be determined.  If 5-49 ng/L, a delta result at 2 hours and or 4 hours will be needed to further evaluate.  If <4 ng/L, and chest pain has been >3 hours since onset, patient may qualify for discharge based on the HEART score in the ED.  If <5 ng/L and <3hours since onset of chest " "pain, a delta result at 2 hours will be needed to further evaluate.    HS Troponin 99th Percentile URL of a Health Population=12 ng/L with a 95% Confidence Interval of 8-18 ng/L.    Second Troponin (time 2 hours)  If calculated delta >= 20 ng/L,  Myocardial injury suggested ; Type of myocardial injury and treatment strategy to be determined.  If 5-49 ng/L and the calculated delta is 5-19 ng/L, consult medical service for evaluation.  Continue evaluation for ischemia on ecg and other possible etiology and repeat hs troponin at 4 hours.  If delta                            is <5 ng/L at 2 hours, consider discharge based on risk stratification via the HEART score (if in ED), or [place] risk score in IP/Observation.    HS Troponin 99th Percentile URL of a Health Population=12 ng/L with a 95% Confidence Interval of 8-18 ng/L.    Ventricular Rate 04/10/2024 88  BPM Final    Atrial Rate 04/10/2024 88  BPM Final    NE Interval 04/10/2024 166  ms Final    QRSD Interval 04/10/2024 64  ms Final    QT Interval 04/10/2024 372  ms Final    QTC Interval 04/10/2024 450  ms Final    P Axis 04/10/2024 51  degrees Final    QRS [place] 04/10/2024 19  degrees Final    T Wave [place] 04/10/2024 49  degrees Final    hs TnI 2hr 04/10/2024 12  \"Refer to ACS Flowchart\"- see link ng/L Final    Comment:                                              Initial (time 0) result  If >=50 ng/L, Myocardial injury suggested ;  Type of myocardial injury and treatment strategy  to be determined.  If 5-49 ng/L, a delta result at 2 hours and or 4 hours will be needed to further evaluate.  If <4 ng/L, and chest pain has been >3 hours since onset, patient may qualify for discharge based on the HEART score in the ED.  If <5 ng/L and <3hours since onset of chest pain, a delta result at 2 hours will be needed to further evaluate.    HS Troponin 99th Percentile URL of a Health Population=12 ng/L with a 95% Confidence Interval of 8-18 ng/L.    Second Troponin (time 2 " hours)  If calculated delta >= 20 ng/L,  Myocardial injury suggested ; Type of myocardial injury and treatment strategy to be determined.  If 5-49 ng/L and the calculated delta is 5-19 ng/L, consult medical service for evaluation.  Continue evaluation for ischemia on ecg and other possible etiology and repeat hs troponin at 4 hours.  If delta                            is <5 ng/L at 2 hours, consider discharge based on risk stratification via the HEART score (if in ED), or [place] risk score in IP/Observation.    HS Troponin 99th Percentile URL of a Health Population=12 ng/L with a 95% Confidence Interval of 8-18 ng/L.    Delta 2hr hsTnI 04/10/2024 1  <20 ng/L Final    Color, UA 04/10/2024 Colorless   Final    Clarity, UA 04/10/2024 Clear   Final    Specific Gravity, UA 04/10/2024 1.004  1.003 - 1.030 Final    pH, UA 04/10/2024 5.5  4.5, 5.0, 5.5, 6.0, 6.5, 7.0, 7.5, 8.0 Final    Leukocytes, UA 04/10/2024 Negative  Negative Final    Nitrite, UA 04/10/2024 Negative  Negative Final    Protein, UA 04/10/2024 Negative  Negative mg/dl Final    Glucose, UA 04/10/2024 Negative  Negative mg/dl Final    Ketones, UA 04/10/2024 Negative  Negative mg/dl Final    Urobilinogen, UA 04/10/2024 <2.0  <2.0 mg/dl mg/dl Final    Bilirubin, UA 04/10/2024 Negative  Negative Final    Occult Blood, UA 04/10/2024 Negative  Negative Final             ___________________________________    History Review: The following portions of the patient's history were reviewed and updated as appropriate: allergies, current medications, past family history, past medical history, past social history, past surgical history, and problem list.    Unchanged information from this writer's previous assessment is copied and italicized; information that has changed is bolded.    Past Psychiatric History:      Past psychiatric diagnoses:   Depression, anxiety  Inpatient psychiatric admissions:   8/2024 2013 following a surgery, sounded like extreme anxiety  Prior  outpatient psychiatric treatment:   Started seeing a psychiatrist when first   Past/current psychotherapy:   active  History of suicidal attempts/gestures:   denies  Psychotropic medication trials:   Venlafaxine, sertraline, fluoxetine, escitalopram, aripiprazole (dizziness), risperidone, clonazepam, buspirone, gabapentin        Substance Abuse History:     Denies all history of substance use     Family Psychiatric History:      Family History             Family History   Problem Relation Age of Onset    Depression Mother           w/anxiety    Diabetes Mother           Mellitus    Breast cancer Mother      Hypertension Mother      No Known Problems Father      Depression Sister           w/anxiety    Heart disease Sister           Cardiac Disorder    Breast cancer Sister      Hypertension Sister      Diabetes Brother           Mellitus    Alcohol abuse Son                    Social History:     Developmental: nothing of note  Education: Bachelor degree  Marital history:  in 2006  Children: 2  Living arrangement, social support: as above, has been with daughter for 17 years  Occupational History: was a   [employer] Affiliation: has a Zoroastrianism ghanshyam  Access to firearms: denies        Traumatic History:      denies.  ___________________________________      Visit Time    Visit Start Time: 1115  Visit Stop Time: 1220  Total Visit Duration: 65 minutes    López Gutierrez MD   09/23/24

## 2024-09-24 ENCOUNTER — VBI (OUTPATIENT)
Dept: FAMILY MEDICINE CLINIC | Facility: CLINIC | Age: 84
End: 2024-09-24

## 2024-09-24 DIAGNOSIS — Z91.89 HIGH RISK FOR READMISSION: ICD-10-CM

## 2024-09-24 DIAGNOSIS — Z71.89 COORDINATION OF COMPLEX CARE: Primary | ICD-10-CM

## 2024-09-24 NOTE — TELEPHONE ENCOUNTER
09/24/24 11:30 AM    Patient contacted post ED visit, first outreach attempt made. Message was left for patient to return a call to the VBI Department at [facility]: Phone [phone].    Thank you.  Noemi Pineda MA  [facility]

## 2024-09-25 ENCOUNTER — PATIENT OUTREACH (OUTPATIENT)
Dept: CASE MANAGEMENT | Facility: OTHER | Age: 84
End: 2024-09-25

## 2024-09-25 NOTE — PROGRESS NOTES
Spoke with patients daughter Janay, reports her mom is doing okay Still has a little bit of headache. Did see geriatrician yesterday. Will see PCP next week on 10/1/24  No questions or concerns.

## 2024-09-25 NOTE — TELEPHONE ENCOUNTER
09/25/24 11:04 AM    Patient contacted post ED visit, VBI department spoke with patient/caregiver and outreach was successful.    Thank you.  Angeline Campbell MA  [facility]

## 2024-09-26 LAB
ATRIAL RATE: 85 BPM
P AXIS: 39 DEGREES
PR INTERVAL: 160 MS
QRS AXIS: -2 DEGREES
QRSD INTERVAL: 72 MS
QT INTERVAL: 348 MS
QTC INTERVAL: 414 MS
T WAVE AXIS: 13 DEGREES
VENTRICULAR RATE: 85 BPM

## 2024-09-26 PROCEDURE — 93010 ELECTROCARDIOGRAM REPORT: CPT | Performed by: INTERNAL MEDICINE

## 2024-09-28 ENCOUNTER — HOSPITAL ENCOUNTER (INPATIENT)
Facility: HOSPITAL | Age: 84
LOS: 1 days | Discharge: HOME HEALTH | DRG: 641 | End: 2024-09-30
Attending: EMERGENCY MEDICINE | Admitting: INTERNAL MEDICINE
Payer: MEDICARE

## 2024-09-28 DIAGNOSIS — F32.A ANXIETY AND DEPRESSION: ICD-10-CM

## 2024-09-28 DIAGNOSIS — K59.04 CHRONIC IDIOPATHIC CONSTIPATION: ICD-10-CM

## 2024-09-28 DIAGNOSIS — F32.A DEPRESSION: ICD-10-CM

## 2024-09-28 DIAGNOSIS — E46 MALNUTRITION (HCC): ICD-10-CM

## 2024-09-28 DIAGNOSIS — Z74.1 NEED FOR ASSISTANCE WITH PERSONAL CARE: ICD-10-CM

## 2024-09-28 DIAGNOSIS — K59.00 CONSTIPATION: ICD-10-CM

## 2024-09-28 DIAGNOSIS — R63.8 DECREASED ORAL INTAKE: ICD-10-CM

## 2024-09-28 DIAGNOSIS — R51.9 HEADACHE: ICD-10-CM

## 2024-09-28 DIAGNOSIS — R62.7 FAILURE TO THRIVE IN ADULT: Primary | ICD-10-CM

## 2024-09-28 DIAGNOSIS — F41.9 ANXIETY AND DEPRESSION: ICD-10-CM

## 2024-09-28 DIAGNOSIS — Z91.89 AT RISK FOR POLYPHARMACY: ICD-10-CM

## 2024-09-28 LAB
BASOPHILS # BLD AUTO: 0.04 THOUSANDS/ΜL (ref 0–0.1)
BASOPHILS NFR BLD AUTO: 1 % (ref 0–1)
EOSINOPHIL # BLD AUTO: 0.24 THOUSAND/ΜL (ref 0–0.61)
EOSINOPHIL NFR BLD AUTO: 3 % (ref 0–6)
ERYTHROCYTE [DISTWIDTH] IN BLOOD BY AUTOMATED COUNT: 12.7 % (ref 11.6–15.1)
HCT VFR BLD AUTO: 35.3 % (ref 34.8–46.1)
HGB BLD-MCNC: 11.2 G/DL (ref 11.5–15.4)
IMM GRANULOCYTES # BLD AUTO: 0.02 THOUSAND/UL (ref 0–0.2)
IMM GRANULOCYTES NFR BLD AUTO: 0 % (ref 0–2)
LYMPHOCYTES # BLD AUTO: 1.77 THOUSANDS/ΜL (ref 0.6–4.47)
LYMPHOCYTES NFR BLD AUTO: 25 % (ref 14–44)
MCH RBC QN AUTO: 28.7 PG (ref 26.8–34.3)
MCHC RBC AUTO-ENTMCNC: 31.7 G/DL (ref 31.4–37.4)
MCV RBC AUTO: 91 FL (ref 82–98)
MONOCYTES # BLD AUTO: 0.41 THOUSAND/ΜL (ref 0.17–1.22)
MONOCYTES NFR BLD AUTO: 6 % (ref 4–12)
NEUTROPHILS # BLD AUTO: 4.73 THOUSANDS/ΜL (ref 1.85–7.62)
NEUTS SEG NFR BLD AUTO: 65 % (ref 43–75)
NRBC BLD AUTO-RTO: 0 /100 WBCS
PLATELET # BLD AUTO: 223 THOUSANDS/UL (ref 149–390)
PMV BLD AUTO: 10.4 FL (ref 8.9–12.7)
RBC # BLD AUTO: 3.9 MILLION/UL (ref 3.81–5.12)
WBC # BLD AUTO: 7.21 THOUSAND/UL (ref 4.31–10.16)

## 2024-09-28 PROCEDURE — 96361 HYDRATE IV INFUSION ADD-ON: CPT

## 2024-09-28 PROCEDURE — 96375 TX/PRO/DX INJ NEW DRUG ADDON: CPT

## 2024-09-28 PROCEDURE — 80053 COMPREHEN METABOLIC PANEL: CPT

## 2024-09-28 PROCEDURE — 85025 COMPLETE CBC W/AUTO DIFF WBC: CPT

## 2024-09-28 PROCEDURE — 36415 COLL VENOUS BLD VENIPUNCTURE: CPT

## 2024-09-28 PROCEDURE — 84100 ASSAY OF PHOSPHORUS: CPT

## 2024-09-28 PROCEDURE — 99284 EMERGENCY DEPT VISIT MOD MDM: CPT

## 2024-09-28 PROCEDURE — 83735 ASSAY OF MAGNESIUM: CPT

## 2024-09-28 PROCEDURE — 99285 EMERGENCY DEPT VISIT HI MDM: CPT | Performed by: EMERGENCY MEDICINE

## 2024-09-28 RX ORDER — ONDANSETRON 2 MG/ML
4 INJECTION INTRAMUSCULAR; INTRAVENOUS ONCE
Status: COMPLETED | OUTPATIENT
Start: 2024-09-29 | End: 2024-09-28

## 2024-09-28 RX ADMIN — ONDANSETRON 4 MG: 2 INJECTION INTRAMUSCULAR; INTRAVENOUS at 23:52

## 2024-09-28 RX ADMIN — SODIUM CHLORIDE 1000 ML: 0.9 INJECTION, SOLUTION INTRAVENOUS at 23:52

## 2024-09-29 PROBLEM — R73.09 ELEVATED HEMOGLOBIN A1C: Status: ACTIVE | Noted: 2024-09-29

## 2024-09-29 PROBLEM — R62.7 FAILURE TO THRIVE IN ADULT: Status: ACTIVE | Noted: 2024-09-29

## 2024-09-29 PROBLEM — E87.8 ELECTROLYTE ABNORMALITY: Status: ACTIVE | Noted: 2024-09-29

## 2024-09-29 PROBLEM — F41.9 ANXIETY AND DEPRESSION: Status: ACTIVE | Noted: 2024-09-29

## 2024-09-29 PROBLEM — F32.A ANXIETY AND DEPRESSION: Status: ACTIVE | Noted: 2024-09-29

## 2024-09-29 PROBLEM — R73.09 ELEVATED HEMOGLOBIN A1C: Status: RESOLVED | Noted: 2024-09-29 | Resolved: 2024-09-29

## 2024-09-29 LAB
ALBUMIN SERPL BCG-MCNC: 3.7 G/DL (ref 3.5–5)
ALP SERPL-CCNC: 46 U/L (ref 34–104)
ALT SERPL W P-5'-P-CCNC: 7 U/L (ref 7–52)
ANION GAP SERPL CALCULATED.3IONS-SCNC: 8 MMOL/L (ref 4–13)
ANION GAP SERPL CALCULATED.3IONS-SCNC: 9 MMOL/L (ref 4–13)
AST SERPL W P-5'-P-CCNC: 13 U/L (ref 13–39)
ATRIAL RATE: 300 BPM
BASOPHILS # BLD AUTO: 0.03 THOUSANDS/ÂΜL (ref 0–0.1)
BASOPHILS NFR BLD AUTO: 0 % (ref 0–1)
BILIRUB SERPL-MCNC: 0.35 MG/DL (ref 0.2–1)
BUN SERPL-MCNC: 24 MG/DL (ref 5–25)
BUN SERPL-MCNC: 27 MG/DL (ref 5–25)
CALCIUM SERPL-MCNC: 8.6 MG/DL (ref 8.4–10.2)
CALCIUM SERPL-MCNC: 9.1 MG/DL (ref 8.4–10.2)
CHLORIDE SERPL-SCNC: 104 MMOL/L (ref 96–108)
CHLORIDE SERPL-SCNC: 107 MMOL/L (ref 96–108)
CO2 SERPL-SCNC: 24 MMOL/L (ref 21–32)
CO2 SERPL-SCNC: 24 MMOL/L (ref 21–32)
CREAT SERPL-MCNC: 0.98 MG/DL (ref 0.6–1.3)
CREAT SERPL-MCNC: 1.14 MG/DL (ref 0.6–1.3)
EOSINOPHIL # BLD AUTO: 0.13 THOUSAND/ÂΜL (ref 0–0.61)
EOSINOPHIL NFR BLD AUTO: 1 % (ref 0–6)
ERYTHROCYTE [DISTWIDTH] IN BLOOD BY AUTOMATED COUNT: 12.8 % (ref 11.6–15.1)
EST. AVERAGE GLUCOSE BLD GHB EST-MCNC: 146 MG/DL
GFR SERPL CREATININE-BSD FRML MDRD: 43 ML/MIN/1.73SQ M
GFR SERPL CREATININE-BSD FRML MDRD: 52 ML/MIN/1.73SQ M
GLUCOSE SERPL-MCNC: 113 MG/DL (ref 65–140)
GLUCOSE SERPL-MCNC: 137 MG/DL (ref 65–140)
GLUCOSE SERPL-MCNC: 138 MG/DL (ref 65–140)
GLUCOSE SERPL-MCNC: 165 MG/DL (ref 65–140)
GLUCOSE SERPL-MCNC: 169 MG/DL (ref 65–140)
GLUCOSE SERPL-MCNC: 216 MG/DL (ref 65–140)
HBA1C MFR BLD: 6.7 %
HCT VFR BLD AUTO: 34.7 % (ref 34.8–46.1)
HGB BLD-MCNC: 11 G/DL (ref 11.5–15.4)
IMM GRANULOCYTES # BLD AUTO: 0.04 THOUSAND/UL (ref 0–0.2)
IMM GRANULOCYTES NFR BLD AUTO: 0 % (ref 0–2)
LYMPHOCYTES # BLD AUTO: 0.79 THOUSANDS/ÂΜL (ref 0.6–4.47)
LYMPHOCYTES NFR BLD AUTO: 8 % (ref 14–44)
MAGNESIUM SERPL-MCNC: 1.8 MG/DL (ref 1.9–2.7)
MAGNESIUM SERPL-MCNC: 2.1 MG/DL (ref 1.9–2.7)
MCH RBC QN AUTO: 28.8 PG (ref 26.8–34.3)
MCHC RBC AUTO-ENTMCNC: 31.7 G/DL (ref 31.4–37.4)
MCV RBC AUTO: 91 FL (ref 82–98)
MONOCYTES # BLD AUTO: 0.47 THOUSAND/ÂΜL (ref 0.17–1.22)
MONOCYTES NFR BLD AUTO: 5 % (ref 4–12)
NEUTROPHILS # BLD AUTO: 8.04 THOUSANDS/ÂΜL (ref 1.85–7.62)
NEUTS SEG NFR BLD AUTO: 86 % (ref 43–75)
NRBC BLD AUTO-RTO: 0 /100 WBCS
PHOSPHATE SERPL-MCNC: 3.3 MG/DL (ref 2.3–4.1)
PLATELET # BLD AUTO: 191 THOUSANDS/UL (ref 149–390)
PMV BLD AUTO: 10.7 FL (ref 8.9–12.7)
POTASSIUM SERPL-SCNC: 3.4 MMOL/L (ref 3.5–5.3)
POTASSIUM SERPL-SCNC: 3.9 MMOL/L (ref 3.5–5.3)
PROT SERPL-MCNC: 6 G/DL (ref 6.4–8.4)
QRS AXIS: -10 DEGREES
QRSD INTERVAL: 78 MS
QT INTERVAL: 380 MS
QTC INTERVAL: 435 MS
RBC # BLD AUTO: 3.82 MILLION/UL (ref 3.81–5.12)
SODIUM SERPL-SCNC: 136 MMOL/L (ref 135–147)
SODIUM SERPL-SCNC: 140 MMOL/L (ref 135–147)
T WAVE AXIS: 2 DEGREES
VENTRICULAR RATE: 79 BPM
WBC # BLD AUTO: 9.5 THOUSAND/UL (ref 4.31–10.16)

## 2024-09-29 PROCEDURE — 96365 THER/PROPH/DIAG IV INF INIT: CPT

## 2024-09-29 PROCEDURE — 93010 ELECTROCARDIOGRAM REPORT: CPT | Performed by: INTERNAL MEDICINE

## 2024-09-29 PROCEDURE — 83735 ASSAY OF MAGNESIUM: CPT

## 2024-09-29 PROCEDURE — 83036 HEMOGLOBIN GLYCOSYLATED A1C: CPT

## 2024-09-29 PROCEDURE — 85025 COMPLETE CBC W/AUTO DIFF WBC: CPT

## 2024-09-29 PROCEDURE — 99223 1ST HOSP IP/OBS HIGH 75: CPT | Performed by: INTERNAL MEDICINE

## 2024-09-29 PROCEDURE — 80048 BASIC METABOLIC PNL TOTAL CA: CPT

## 2024-09-29 PROCEDURE — 99222 1ST HOSP IP/OBS MODERATE 55: CPT | Performed by: STUDENT IN AN ORGANIZED HEALTH CARE EDUCATION/TRAINING PROGRAM

## 2024-09-29 PROCEDURE — 93005 ELECTROCARDIOGRAM TRACING: CPT

## 2024-09-29 PROCEDURE — 82948 REAGENT STRIP/BLOOD GLUCOSE: CPT

## 2024-09-29 RX ORDER — ENOXAPARIN SODIUM 100 MG/ML
40 INJECTION SUBCUTANEOUS DAILY
Status: DISCONTINUED | OUTPATIENT
Start: 2024-09-29 | End: 2024-09-29 | Stop reason: DRUGHIGH

## 2024-09-29 RX ORDER — ATORVASTATIN CALCIUM 10 MG/1
10 TABLET, FILM COATED ORAL DAILY
Status: DISCONTINUED | OUTPATIENT
Start: 2024-09-29 | End: 2024-09-30 | Stop reason: HOSPADM

## 2024-09-29 RX ORDER — HYDROXYZINE HYDROCHLORIDE 10 MG/1
10 TABLET, FILM COATED ORAL 2 TIMES DAILY PRN
Status: DISCONTINUED | OUTPATIENT
Start: 2024-09-29 | End: 2024-09-30 | Stop reason: HOSPADM

## 2024-09-29 RX ORDER — QUETIAPINE FUMARATE 25 MG/1
25 TABLET, FILM COATED ORAL
Status: DISCONTINUED | OUTPATIENT
Start: 2024-09-29 | End: 2024-09-29

## 2024-09-29 RX ORDER — ASPIRIN 81 MG/1
81 TABLET, CHEWABLE ORAL DAILY
Status: DISCONTINUED | OUTPATIENT
Start: 2024-09-29 | End: 2024-09-30 | Stop reason: HOSPADM

## 2024-09-29 RX ORDER — HYDROCHLOROTHIAZIDE 12.5 MG/1
12.5 TABLET ORAL DAILY
Status: DISCONTINUED | OUTPATIENT
Start: 2024-09-29 | End: 2024-09-30 | Stop reason: HOSPADM

## 2024-09-29 RX ORDER — LANOLIN ALCOHOL/MO/W.PET/CERES
3 CREAM (GRAM) TOPICAL
Status: DISCONTINUED | OUTPATIENT
Start: 2024-09-29 | End: 2024-09-29

## 2024-09-29 RX ORDER — MIRTAZAPINE 15 MG/1
15 TABLET, FILM COATED ORAL
Status: DISCONTINUED | OUTPATIENT
Start: 2024-09-29 | End: 2024-09-30 | Stop reason: HOSPADM

## 2024-09-29 RX ORDER — SODIUM PHOSPHATE,MONO-DIBASIC 19G-7G/118
1 ENEMA (ML) RECTAL ONCE
Status: COMPLETED | OUTPATIENT
Start: 2024-09-29 | End: 2024-09-29

## 2024-09-29 RX ORDER — LUBIPROSTONE 24 UG/1
24 CAPSULE ORAL 2 TIMES DAILY
Status: DISCONTINUED | OUTPATIENT
Start: 2024-09-29 | End: 2024-09-30 | Stop reason: HOSPADM

## 2024-09-29 RX ORDER — LISINOPRIL 10 MG/1
10 TABLET ORAL DAILY
Status: DISCONTINUED | OUTPATIENT
Start: 2024-09-29 | End: 2024-09-30 | Stop reason: HOSPADM

## 2024-09-29 RX ORDER — INSULIN LISPRO 100 [IU]/ML
1-5 INJECTION, SOLUTION INTRAVENOUS; SUBCUTANEOUS
Status: DISCONTINUED | OUTPATIENT
Start: 2024-09-29 | End: 2024-09-30 | Stop reason: HOSPADM

## 2024-09-29 RX ORDER — ONDANSETRON 4 MG/1
4 TABLET, ORALLY DISINTEGRATING ORAL EVERY 8 HOURS PRN
Status: DISCONTINUED | OUTPATIENT
Start: 2024-09-29 | End: 2024-09-30 | Stop reason: HOSPADM

## 2024-09-29 RX ORDER — MAGNESIUM SULFATE HEPTAHYDRATE 40 MG/ML
2 INJECTION, SOLUTION INTRAVENOUS ONCE
Status: COMPLETED | OUTPATIENT
Start: 2024-09-29 | End: 2024-09-29

## 2024-09-29 RX ORDER — DOCUSATE SODIUM 100 MG/1
100 CAPSULE, LIQUID FILLED ORAL 2 TIMES DAILY
Status: DISCONTINUED | OUTPATIENT
Start: 2024-09-29 | End: 2024-09-30 | Stop reason: HOSPADM

## 2024-09-29 RX ORDER — POTASSIUM CHLORIDE 1500 MG/1
20 TABLET, EXTENDED RELEASE ORAL ONCE
Status: COMPLETED | OUTPATIENT
Start: 2024-09-29 | End: 2024-09-29

## 2024-09-29 RX ORDER — INSULIN LISPRO 100 [IU]/ML
1-5 INJECTION, SOLUTION INTRAVENOUS; SUBCUTANEOUS
Status: DISCONTINUED | OUTPATIENT
Start: 2024-09-29 | End: 2024-09-29

## 2024-09-29 RX ORDER — ENOXAPARIN SODIUM 100 MG/ML
30 INJECTION SUBCUTANEOUS DAILY
Status: DISCONTINUED | OUTPATIENT
Start: 2024-09-29 | End: 2024-09-30 | Stop reason: HOSPADM

## 2024-09-29 RX ADMIN — HYDROCHLOROTHIAZIDE 12.5 MG: 12.5 TABLET ORAL at 09:37

## 2024-09-29 RX ADMIN — FLUOXETINE HYDROCHLORIDE 20 MG: 20 CAPSULE ORAL at 07:34

## 2024-09-29 RX ADMIN — MAGNESIUM SULFATE HEPTAHYDRATE 2 G: 40 INJECTION, SOLUTION INTRAVENOUS at 00:45

## 2024-09-29 RX ADMIN — SODIUM PHOSPHATE, DIBASIC AND SODIUM PHOSPHATE, MONOBASIC 1 ENEMA: 7; 19 ENEMA RECTAL at 00:51

## 2024-09-29 RX ADMIN — HYDROXYZINE HYDROCHLORIDE 10 MG: 10 TABLET ORAL at 21:21

## 2024-09-29 RX ADMIN — DOCUSATE SODIUM 100 MG: 100 CAPSULE, LIQUID FILLED ORAL at 09:38

## 2024-09-29 RX ADMIN — LISINOPRIL 10 MG: 10 TABLET ORAL at 09:37

## 2024-09-29 RX ADMIN — MIRTAZAPINE 15 MG: 15 TABLET, FILM COATED ORAL at 21:21

## 2024-09-29 RX ADMIN — INSULIN LISPRO 1 UNITS: 100 INJECTION, SOLUTION INTRAVENOUS; SUBCUTANEOUS at 17:11

## 2024-09-29 RX ADMIN — ASPIRIN 81 MG 81 MG: 81 TABLET ORAL at 09:38

## 2024-09-29 RX ADMIN — POTASSIUM CHLORIDE 20 MEQ: 1500 TABLET, EXTENDED RELEASE ORAL at 12:17

## 2024-09-29 RX ADMIN — SODIUM CHLORIDE 500 ML: 0.9 INJECTION, SOLUTION INTRAVENOUS at 00:08

## 2024-09-29 RX ADMIN — DOCUSATE SODIUM 100 MG: 100 CAPSULE, LIQUID FILLED ORAL at 17:11

## 2024-09-29 RX ADMIN — Medication 2000 UNITS: at 09:37

## 2024-09-29 RX ADMIN — LUBIPROSTONE 24 MCG: 24 CAPSULE, GELATIN COATED ORAL at 09:38

## 2024-09-29 RX ADMIN — ATORVASTATIN CALCIUM 10 MG: 10 TABLET, FILM COATED ORAL at 09:38

## 2024-09-29 RX ADMIN — HYDROXYZINE HYDROCHLORIDE 10 MG: 10 TABLET ORAL at 07:34

## 2024-09-29 RX ADMIN — ENOXAPARIN SODIUM 30 MG: 30 INJECTION SUBCUTANEOUS at 09:37

## 2024-09-29 NOTE — ED NOTES
Dr James digitally disimpacted patient for a moderate amount of hard brown stool, pt tolerated this well.      Jennie Payton RN  09/28/24 [id number]

## 2024-09-29 NOTE — ASSESSMENT & PLAN NOTE
Patient presents with approximately 2 weeks of headache, nausea, generalized weakness, and constipation  Patient has food aversion indicating that she feels nauseous when she smells certain foods  She typically has oatmeal in the morning with soup in the afternoon with intermittent snacking for dinner  She indicates feeling more weak and lethargic over the last 2 weeks along with sleeping more than usual  She has had 3 admissions over the last month approximately for similar issues  Labs generally unremarkable other than and a magnesium of 1.8  Recently had TSH, and vitamin D checked her last admission about 2 weeks ago which were normal  Indicates is compliance with her home anxiety and depression medication    Plan:  Consult to nutrition, appreciate recommendations   Consider patient's mood disorder vs medical condition causing symptoms   Continue mirtazapine 15 mg daily at bedtime to increase appetite in addition to mood augmentation

## 2024-09-29 NOTE — ASSESSMENT & PLAN NOTE
Patient has had constipation over the last 2 weeks  Cannot remember when her last bowel movement was but claims it should be about a week ago  Had approximately 2 bowel movements since admission with digital disimpaction and 2x enema placement    Plan:  Continue home Colace 100 mg twice daily  Consider stool laxatives if needed   Continue Lubiprostone 24 mcg twice daily

## 2024-09-29 NOTE — ASSESSMENT & PLAN NOTE
Magnesium 1.8 on admission  Was replenished    Plan:  Monitor daily BMP and magnesium replenish as needed

## 2024-09-29 NOTE — ED ATTENDING ATTESTATION
9/28/2024  IMathew MD, saw and evaluated the patient. I have discussed the patient with the resident/non-physician practitioner and agree with the resident's/non-physician practitioner's findings, Plan of Care, and MDM as documented in the resident's/non-physician practitioner's note, except where noted. All available labs and Radiology studies were reviewed.  I was present for key portions of any procedure(s) performed by the resident/non-physician practitioner and I was immediately available to provide assistance.       At this point I agree with the current assessment done in the Emergency Department.  I have conducted an independent evaluation of this patient a history and physical is as follows:see h and p above- agree  with er resident tx plan / dispo     ED Course  ED Course as of 09/29/24 0432   Sat Sep 28, 2024   2325 -er md note- recent albs- head ct- er chart- notes all reviewed by er md López Sep 29, 2024   0024 Er md note- 12 lead ecg reviewed and compared by er md -  nsr rate of 79- lvh -- no ischemia/ injury  compared to previous ecg 9/23/24- no sign changes-    0025 Er md note- pt seen and thoroughly evaluated by er md- case d/w er resident -- 83 yr female  with inpt psych hosp earlier in month - placed on new meds- and have been gradually taking pt off meds--   since new meds- pt with constant  headache-- behind eyes-- nto worse  with no other symptoms- pt does c/o nausea constantly since psych d/c with no appetitie--    pt has long hx of constipation was on miralax-- unknown last bp-- no abd pain- no vomitus- no gu/gyn comps- no skin breakdown - avss- htn- pulse ox 99 % on ra- interpretation is normal- no intervention- flat affect- rrr s1/s2-cta-b/soft nt/nd- equal bilateral radial/dp pulses- no ble edema/calf tenderness/asym/ erythema- normal non focal neuro exam- normal cn exam- normal bue/ble strength/sensation          Critical Care Time  Procedures

## 2024-09-29 NOTE — ASSESSMENT & PLAN NOTE
Patient has history of anxiety and depression  She denies any thoughts of self-harm, or harming others  Both she and her daughter indicate compliance with medications    Plan:  Continue Prozac 20 mg once daily  Continue Seroquel 25 mg once nightly

## 2024-09-29 NOTE — CASE MANAGEMENT
Case Management Assessment & Discharge Planning Note    Patient name Sandra Peña  Location W /W -01 MRN [id number]  : 1940 Date 2024       Current Admission Date: 2024  Current Admission Diagnosis:Failure to thrive in adult   Patient Active Problem List    Diagnosis Date Noted Date Diagnosed    Failure to thrive in adult 2024     Anxiety and depression 2024     Electrolyte abnormality 2024     Need for assistance with personal care 2024     Major neurocognitive disorder (HCC) 2024     Anemia 2024     Stage 3 chronic kidney disease, unspecified whether stage 3a or 3b CKD (HCC) 2024     Constipation 2024     Major depressive disorder, recurrent episode, mild (HCC) 2024     Dry scalp 10/24/2022     Uric acid kidney stone 2022     Illness anxiety disorder 2022     Generalized anxiety disorder 2022     Chronic idiopathic constipation 2020     Generalized osteoarthritis 2020     Change in bowel habits 2018     Insomnia 2014     Adenomatous polyp of colon 2013     Primary hypertension 10/02/2013     Type 2 diabetes mellitus without complication, without long-term current use of insulin (HCC) 10/02/2013     Mixed hyperlipidemia 10/02/2013       LOS (days): 0  Geometric Mean LOS (GMLOS) (days):   Days to GMLOS:     OBJECTIVE:  PATIENT READMITTED TO HOSPITAL  Risk of Unplanned Readmission Score: 28.2         Current admission status: Inpatient       Preferred Pharmacy:   [id number] - [place] - [address]  [address] [place] [address]  Phone: [phone] Fax: [phone]    Primary Care Provider: Ramin De Paz DO    Primary Insurance: MEDICARE  Secondary Insurance: AARP    ASSESSMENT:  Active Health Care Proxies    There are no active Health Care Proxies on file.                      Patient Information  Admitted from:: Home  Mental Status: Alert  During  Assessment patient was accompanied by: Not accompanied during assessment  Assessment information provided by:: Patient  Primary Caregiver: Self  Support Systems: Children, Self, Psychiatrist, Therapist  County of Residence: [place]  What city do you live in?: [place]  Home entry access options. Select all that apply.: Stairs  Number of steps to enter home.: 2  Do the steps have railings?: No  Type of Current Residence: 2 story home  Upon entering residence, is there a bedroom on the main floor (no further steps)?: No  A bedroom is located on the following floor levels of residence (select all that apply):: 2nd Floor  Upon entering residence, is there a bathroom on the main floor (no further steps)?: Yes  Number of steps to 2nd floor from main floor: One Flight  Living Arrangements: Lives w/ Children, Lives w/ Family members    Activities of Daily Living Prior to Admission  Functional Status: Independent  Completes ADLs independently?: Yes  Ambulates independently?: Yes  Does patient use assisted devices?: No  Does patient currently own DME?: No  Does patient have a history of Outpatient Therapy (PT/OT)?: Yes  Does the patient have a history of Short-Term Rehab?: No  Does patient have a history of HHC?: Yes  Does patient currently have HHC?: No    Current Home Health Care  Home Health Agency Name:: Other (TBD)    Patient Information Continued  Income Source: Pension/care home  Does patient have prescription coverage?: Yes  Does patient receive dialysis treatments?: No  Does patient have a history of substance abuse?: No  Does patient have a history of Mental Health Diagnosis?: Yes (Anxiety and depression)  Is patient receiving treatment for mental health?: Yes (Sees a psychiatrist)         Means of Transportation  Means of Transport to Appts:: Family transport      Social Determinants of Health (SDOH)      Flowsheet Row Most Recent Value   Housing Stability    In the last 12 months, was there a time when you  were not able to pay the mortgage or rent on time? N   At any time in the past 12 months, were you homeless or living in a shelter (including now)? N   Transportation Needs    In the past 12 months, has lack of transportation kept you from medical appointments or from getting medications? no   In the past 12 months, has lack of transportation kept you from meetings, work, or from getting things needed for daily living? No   Food Insecurity    Within the past 12 months, you worried that your food would run out before you got the money to buy more. Never true   Within the past 12 months, the food you bought just didn't last and you didn't have money to get more. Never true   Utilities    In the past 12 months has the electric, gas, oil, or water company threatened to shut off services in your home? No            DISCHARGE DETAILS:    Discharge planning discussed with:: Patient and daughter  Freedom of Choice: Yes  Comments - Freedom of Choice: Cm met with patient and daughter to review CM role. Patient from home and independent at baseline. Daughter will sometimes assist in shower if needed. Patient has history of outpatient therapy but is interested in HHC for PT/OT and speech. Patient in agreement with  blanket referral for HHC in [facility]. CM to follow for choice. Patient and daughter deny any other needs at this time.  CM contacted family/caregiver?: Yes  Were Treatment Team discharge recommendations reviewed with patient/caregiver?: Yes  Did patient/caregiver verbalize understanding of patient care needs?: Yes  Were patient/caregiver advised of the risks associated with not following Treatment Team discharge recommendations?: Yes    Contacts  Patient Contacts: Janay Sarah Ann (daughter)  Relationship to Patient:: Family  Contact Method: In Person  Reason/Outcome: Continuity of Care, Emergency Contact, Discharge Planning    Requested Home Health Care         Is the patient interested in HHC at discharge?: Yes  Home  Health Discipline requested:: Occupational Therapy, Physical Therapy, Speech Language Pathology  Home Health Agency Name:: Other (TBD)  Home Health Follow-Up Provider:: PCP  Home Health Services Needed:: Strengthening/Theraputic Exercises to Improve Function, Gait/ADL Training, Evaluate Functional Status and Safety  Homebound Criteria Met:: Requires the Assistance of Another Person for Safe Ambulation or to Leave the Home  Supporting Clincal Findings:: Fatigues Easliy in Short Distances, Limited Endurance         Other Referral/Resources/Interventions Provided:  Interventions: C  Referral Comments: C- [place] referral in [place]

## 2024-09-29 NOTE — H&P
H&P - Hospitalist   Name: aSndra Peña 83 y.o. female I MRN: [id number]  Unit/Bed#: W -01 I Date of Admission: 9/28/2024   Date of Service: 9/29/2024 I Hospital Day: 0     Assessment & Plan  Failure to thrive in adult  Patient presents with approximately 2 weeks of headache, nausea, generalized weakness, and constipation  Patient has food aversion indicating that she feels nauseous when she smells certain foods  She typically has oatmeal in the morning with soup in the afternoon with intermittent snacking for dinner  She indicates feeling more weak and lethargic over the last 2 weeks along with sleeping more than usual  She has had 3 admissions over the last month approximately for similar issues  Labs generally unremarkable other than and a magnesium of 1.8  Recently had TSH, and vitamin D checked her last admission about 2 weeks ago which were normal  Indicates is compliance with her home anxiety and depression medication    Plan:  Goals of care discussion  Continue mirtazapine 15 mg daily at bedtime  Constipation  Patient has had constipation over the last 2 weeks  Cannot remember when her last bowel movement was but claims it should be about a week ago  Had approximately 2 bowel movements since admission with digital disimpaction and enema placement    Plan:  Continue home Colace 100 mg twice daily  Continue Lubiprostone 24 mcg twice daily     Anxiety and depression  Patient has history of anxiety and depression  She denies any thoughts of self-harm, or harming others  Both she and her daughter indicate compliance with medications    Plan:  Continue Prozac 20 mg once daily  Continue Seroquel 25 mg once nightly  Electrolyte abnormality  Magnesium 1.8 on admission  Was replenished    Plan:  Monitor daily BMP and magnesium replenish as needed    VTE Pharmacologic Prophylaxis: VTE Score: 3 Moderate Risk (Score 3-4) - Pharmacological DVT Prophylaxis Ordered: enoxaparin (Lovenox).  Code Status: Level 3 - DNAR  and DNI discussed with patient  Discussion with family: Updated  (daughter) at bedside.    Anticipated Length of Stay: Patient will be admitted on an inpatient basis with an anticipated length of stay of greater than 2 midnights secondary to failure to thrive.    History of Present Illness   Chief Complaint: Generalized weakness, fatigue, nausea, constipation    Sandra Peña is a 83 y.o. female with a PMH of anxiety and depression, T2DM, CKD stage III, and chronic constipation who presents with generalized weakness, nausea, constipation, and headache for approximately 2 weeks in duration.  Patient has had approximately 3 admissions for similar issues over the last month.  She indicates that she is experiencing food aversion feeling nauseous with the smell of food.  There is no vomiting.  She is eating less lately usually eating oatmeal in the morning, soup for lunch, and intermittent snacking for dinner.  According to the daughter this is not usual for her.  She has been constipated for about 2 weeks, she cannot remember when her last bowel movement was however says it may be about a week ago.  She is passing gas.  She denies any abdominal pain, vomiting, or diarrhea.  She indicates increased generalized weakness and lethargy sleeping longer in the morning than usual.  Her headache starts on one side of the head, then migrates to the other side, and describes this as a throbbing pain.  There are no visual or auditory changes with this headache.  She indicates she is not drinking water throughout the day.    She denies experiencing any fever or chills.  She denies any chest pain or shortness of breath.  She denies any thoughts of self-harm, or harming others.  She indicates compliance with her home medications.     Review of Systems   Constitutional:  Positive for activity change, appetite change and fatigue. Negative for chills and fever.   HENT:  Negative for congestion and rhinorrhea.   "  Respiratory:  Negative for cough, chest tightness and shortness of breath.    Cardiovascular:  Negative for chest pain and palpitations.   Gastrointestinal:  Positive for abdominal distention. Negative for abdominal pain, diarrhea, nausea and vomiting.   Endocrine: Negative for polyphagia and polyuria.   Genitourinary:  Negative for dysuria and frequency.   Musculoskeletal:  Negative for gait problem and myalgias.   Neurological:  Positive for weakness and headaches. Negative for dizziness, syncope and numbness.       I have reviewed the patient's PMH, PSH, Social History, Family History, Meds, and Allergies  Social History:  Marital Status:    Occupation: Retired  Patient Pre-hospital Living Situation: Home  Patient Pre-hospital Level of Mobility: walks  Patient Pre-hospital Diet Restrictions: None    Objective     Vitals:   Blood Pressure: 120/68 (09/29/24 0237)  Pulse: 89 (09/29/24 0237)  Temperature: 97.5 °F (36.4 °C) (09/29/24 0237)  Temp Source: Oral (09/28/24 2302)  Respirations: 16 (09/29/24 0237)  Height: 4' 9\" (144.8 cm) (09/29/24 0237)  Weight - Scale: 49.4 kg (109 lb) (09/29/24 0237)  SpO2: 96 % (09/29/24 0237)    Physical Exam  Constitutional:       General: She is not in acute distress.     Appearance: Normal appearance.   HENT:      Mouth/Throat:      Mouth: Mucous membranes are moist.      Pharynx: Oropharynx is clear.   Eyes:      Extraocular Movements: Extraocular movements intact.      Pupils: Pupils are equal, round, and reactive to light.   Cardiovascular:      Rate and Rhythm: Normal rate and regular rhythm.      Pulses: Normal pulses.      Heart sounds: Normal heart sounds.   Pulmonary:      Effort: Pulmonary effort is normal. No respiratory distress.      Breath sounds: Normal breath sounds. No wheezing.   Abdominal:      General: Bowel sounds are normal. There is distension.      Palpations: Abdomen is soft.      Tenderness: There is no abdominal tenderness.   Musculoskeletal:      " Right lower leg: No edema.      Left lower leg: No edema.   Skin:     General: Skin is warm and dry.      Capillary Refill: Capillary refill takes less than 2 seconds.   Neurological:      General: No focal deficit present.      Mental Status: She is oriented to person, place, and time.      Sensory: No sensory deficit.      Motor: No weakness.   Psychiatric:         Mood and Affect: Mood normal.         Behavior: Behavior normal.         Lines/Drains:  Lines/Drains/Airways       Active Status       None                        Additional Data:   Lab Results: I have reviewed the following results: CBC/BMP:   .     09/28/24  2351   WBC 7.21   HGB 11.2*   HCT 35.3      SODIUM 136   K 3.9      CO2 24   BUN 27*   CREATININE 1.14   GLUC 216*   MG 1.8*   PHOS 3.3      Results from last 7 days   Lab Units 09/28/24  2351   WBC Thousand/uL 7.21   HEMOGLOBIN g/dL 11.2*   HEMATOCRIT % 35.3   PLATELETS Thousands/uL 223   SEGS PCT % 65   LYMPHO PCT % 25   MONO PCT % 6   EOS PCT % 3     Results from last 7 days   Lab Units 09/28/24  2351   SODIUM mmol/L 136   POTASSIUM mmol/L 3.9   CHLORIDE mmol/L 104   CO2 mmol/L 24   BUN mg/dL 27*   CREATININE mg/dL 1.14   ANION GAP mmol/L 8   CALCIUM mg/dL 9.1   ALBUMIN g/dL 3.7   TOTAL BILIRUBIN mg/dL 0.35   ALK PHOS U/L 46   ALT U/L 7   AST U/L 13   GLUCOSE RANDOM mg/dL 216*             Lab Results   Component Value Date    HGBA1C 7.0 (H) 02/12/2024    HGBA1C 6.5 (H) 10/11/2023    HGBA1C 6.5 (H) 06/02/2023           Imaging Review: Reviewed radiology reports from this admission including: chest xray.  Other Studies: EKG was reviewed.     Administrative Statements   I have spent a total time of 45 minutes in caring for this patient on the day of the visit/encounter including Diagnostic results, Prognosis, Risks and benefits of tx options, Instructions for management, Patient and family education, Importance of tx compliance, Risk factor reductions, Impressions, Counseling /  Coordination of care, Documenting in the medical record, Reviewing / ordering tests, medicine, procedures  , Obtaining or reviewing history  , and Communicating with other healthcare professionals .    ** Please Note: This note has been constructed using a voice recognition system. **

## 2024-09-29 NOTE — ED NOTES
Soap suds enema completed at this time. Patient felt the urge to move bowels approx 7-8 minutes after enema administration. Patient passed very minimal stool. Provider made aware.      Norma John RN  09/29/24 0054

## 2024-09-29 NOTE — ASSESSMENT & PLAN NOTE
Patient presents with approximately 2 weeks of headache, nausea, generalized weakness, and constipation  Patient has food aversion indicating that she feels nauseous when she smells certain foods  She typically has oatmeal in the morning with soup in the afternoon with intermittent snacking for dinner  She indicates feeling more weak and lethargic over the last 2 weeks along with sleeping more than usual  She has had 3 admissions over the last month approximately for similar issues  Labs generally unremarkable other than and a magnesium of 1.8  Recently had TSH, and vitamin D checked her last admission about 2 weeks ago which were normal  Indicates is compliance with her home anxiety and depression medication    Plan:  Goals of care discussion  Continue mirtazapine 15 mg daily at bedtime

## 2024-09-29 NOTE — ASSESSMENT & PLAN NOTE
Patient has had constipation over the last 2 weeks  Cannot remember when her last bowel movement was but claims it should be about a week ago  Had approximately 2 bowel movements since admission with digital disimpaction and enema placement    Plan:  Continue home Colace 100 mg twice daily  Continue Lubiprostone 24 mcg twice daily

## 2024-09-29 NOTE — TELEMEDICINE
" Tele-Consultation - Behavioral Health   Name: Sandra Peña 83 y.o. female I MRN: [id number]  Unit/Bed#: W -01 I Date of Admission: 9/28/2024   Date of Service: 9/29/2024 I Hospital Day: 0   Inpatient consult to Psychiatry  Consult performed by: Suyapa Patterson MD  Consult ordered by: Emelina Medellin DO        Physician Requesting Evaluation: Leilani Hsu*   Reason for Evaluation / Principal Problem: depression and anxiety    Assessment & Plan      Collaboration of Care: Were Recommendations Directly Discussed with Primary Treatment Team? - Yes    Major Depressive Disorder  Anxiety Disorder  Insomnia disorder - unspecified   R/o anxiety disorder and depression due to substance induction (reduction in ssri)  R/o mood disorder 2/2 GMC (failure to thrive)  R/o serotonin syndrome (mild) and less likely  R/o delayed benzodiazepine withdrawal and mood disorder - less likely    Treatment Plan:  Planned Medication Changes:    Informed LPN Asad Wilson and attending on board, Dr. Levy  # headache, hot flashes, nausea and \"not feeling good\" possibly 2/2 medication side effect vs effect of constipation vs some delayed physiologic dependence for benzodiazepine  Recommend c/w current psychotropic medication regiment except for the following  Discontinue quetiapine 25 mg qhs. Patient and family determine her side effect symptoms initiated after this was added and she has not experienced any improvement in mood  Consider holding melatonin which can cause vivid dreams at night in some patients.   Consider TMS for depression to reduce polypharmacy with impact of the drugs to drug interaction and side effect profile on patient  Monitor mood and for AE  Patient was encouraged to stay hydrated.   Other supportive therapies provided.     Risks, benefits, and treatment alternatives discussed with the patient. The patient verbalized understanding of the information. The patient prefers NOT to take the medication, at " "this time.   Patient and family reported understanding and agreement with plan at this time.     Patient is not suicidal, homicidal, or grossly psychotic. Patient does not meet criteria for commitment, at this time.   No psychiatric 1:1 indicated  Patient considered psychiatrically cleared  C/w Dr. Gutierrez and psychotherpy within 1 week when discharged.    Medical management as per primary care team    Please re-consult if needed    Thank you for allowing us to take part in this patient’s care.  Suyapa Patterson MD [facility]ATE  Adult and Geriatric Psychiatrist Covering  [facility]      Risks / Benefits of Treatment:  Risks, benefits, and possible side effects of medications explained to patient and patient verbalizes understanding.      History of Present Illness    Reason for Consult: depression and anxiety  Patient is a 83 y.o. female with a history of Major Depressive Disorder, Anxiety Disorder, and and unspecified psychosis vs hypnogogic hallucinations  who  was admitted to the medical service on 9/28/2024 due to FTT and consitpation. Psychiatric consultation was requested due to anxiety and depression. At the time, patient receiving fluoxetine 20 mg PO daily, mirtazapine 15 mg PO qhs for depression, quetiapine 25 mg PO nightly and melatonin 3 mg PO at bedtime.     Per RN and Dr. Levy, patient has been depressed and anxious appearing.    Patient's daughter Kamryn was at bedside. On initial psychiatric consultation Sandra stated she was unable to hear and deferred to her daughter for most of her history. Her daughter stated that patient has been struggling with depression for a long time and recently had multiple changes. Despite a past psychiatric hospitalization, patient \"didn't have much benefit. She always wanted to come home and she didn't really like it. The meds she was on took a toll on her\". Stated patient was on gabapentin and quetiapine, as well as prozac. Gabapentin was discontinued due to side effects, " "ataxic gait and burred vision. Patient continued feeling down and depressed and anxious. \"Not really wanting to eat. Not wanting to drink. Bad taste in her mouth\". Patient continues to complain of nausea and headache. Stated \"she woke up this morning very anxious. The same as when she is home\". Stated patient reports feeling \"hot and sweating. I'm very anxious\". Determined feeling hot, and sweaty \"is new\". Stated in the past patient was also on risperidone. Stated \"it will work well with the prozac because of emotional days. Lots of crying which has gotten better but now it's almost like more of the depression than anxiety. Does not want to get up in the morning. Never feels good\". Determined seroquel was on board \"for sleep\". Stated at night when sleeping, patient would \"hear things\". Stated mirtazapine was started \"on Monday\". Determined Ms. Peña has been on prozac \"for a while\". Has a history of being on klonopin which was administered three times a day. H/o being buspirone which was dc'd after a while \"because it was ineffective. Determined klonopin TID was discontinued about 3-4 months ago after a gradual dose reduction. Stated patient was on klonopin for many years but \"that stopped well before any of this started\". Patient and daughter determined hydroxyzine was provided about 2 months ago. Stated they provide it as a standing dose and \"sometimes it works sometimes it doesn't work\". Patient reported feeling \"anxiety as soon as I wake up. I can't function. My heart starts to pound\". Patient however notes she has felt a little better since moving her bowels.  At home, her daughter informed they have a HHA that visits 3 times a week. Spends 9am - 3:30pm with patient.   Medication counseling and education provided. Patient and family reported understanding and agreement.     Psychiatric Review Of Systems:  Patient endorsed depressed mood and other symptoms of depression such as poor sleep, poor appetite, " guilt, anhedonia, decrease concentration, decrease in energy level and tiredness, hopelessness, helplessness.     Patient  denies symptoms of eligio including but not limited to persistently elevated and/or euphoric mood, grandiosity, decrease need for sleep and increased energy, flight of ideas, increased goal-directed activities such as hypersexual behavior, spending too much money when don't need to, reckless behavior at this time. Patient denied any previous manic episodes in the lifetime.     Psychotic sx as above.    Patient endorsed sx of anxiety including excessive worry and somatic symptoms manifesting, feeling keyed up.    Patient denies symptoms of PTSD, OCD, panic disorders at this time.     Patient vehemently denied SI/HI with intent, plan and preparation.     Historical Information   Past Psychiatric History:     Dx: MDD, JONAH  Past psych hospitalizations: 8 years ago after a surgical procedure; and 2013 for 1-2 weeks at [place]  Outpatient: Dr. Gutierrez -psychiatrist; Dr. Kailyn Adams once weekly  Suicide attempts:  patient denied, none reported  Access to firearms:  patient denied, none reported    PSYCHOTROPIC MEDICATIONS (current):  See chart below  H/o effexor  See HPI    SUBSTANCE USE HISTORY:   Alcohol patient denied, none reported  Tobacco  patient denied, none reported  Illicit drug use:  patient denied, none reported    MEDICAL HISTORY:  Patient denies Hx of TBI and seizures    FAMILY PSYCHIATRIC HISTORY: siblings with depression and anxiety    Social History:     Developmental: nothing of note  Education: Bachelor degree  Marital history:  in 2006  Children: 2  Living arrangement, social support: as above, has been with daughter for 17 years  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      Substance Abuse History:  E-Cigarette/Vaping    E-Cigarette Use Never User        E-Cigarette/Vaping Substances    Nicotine No     THC No     CBD No     Flavoring No     Other No     Unknown No        Social History       Tobacco History       Smoking Status  Never      Smokeless Tobacco Use  Never              Alcohol History       Alcohol Use Status  Never              Drug Use       Drug Use Status  No              Sexual Activity       Sexually Active  Not Asked Birth Control/Protection  Post-menopausal              Activities of Daily Living    Not Asked                 Additional Substance Use Detail       Questions Responses    Problems Due to Past Use of Alcohol? No    Problems Due to Past Use of Substances? No    Substance Use Assessment Denies substance use within the past 12 months    Alcohol Use Frequency Denies use in past 12 months    Cannabis frequency Never used    Comment:  Never used on 8/28/2024     Heroin Frequency Denies use in past 12 months    Cocaine frequency Never used    Comment:  Never used on 8/28/2024     Crack Cocaine Frequency Denies use in past 12 months    Methamphetamine Frequency Denies use in past 12 months    Narcotic Frequency Denies use in past 12 months    Benzodiazepine Frequency Denies use in past 12 months    Amphetamine frequency Denies use in past 12 months    Barbituate Frequency Denies use use in past 12 months    Inhalant frequency Never used    Comment:  Never used on 8/28/2024     Hallucinogen frequency Never used    Comment:  Never used on 8/28/2024     Ecstasy frequency Never used    Comment:  Never used on 8/28/2024     Other drug frequency Never used    Comment:  Never used on 8/28/2024     Opiate frequency Denies use in past 12 months    Last reviewed by Norma Griffith RN on 9/28/2024          I have assessed this patient for substance use within the past 12 months      I have reviewed the patient's PMH, PSH, Social History, Family History, Meds, and Allergies    Objective   Temp:  [97.5 °F (36.4 °C)-98 °F (36.7 °C)] 98 °F (36.7 °C)  HR:   [81-95] 95  Resp:  [14-18] 14  BP: (120-166)/(62-81) 124/62    Intake/Output Summary (Last 24 hours) at 9/29/2024 1507  Last data filed at 9/29/2024 1300  Gross per 24 hour   Intake 220 ml   Output --   Net 220 ml       Mental Status Evaluation:    Appearance, appears stated age, some difficulty with hearing  Speech fluent, soft volume  Behavior cooperative  Mood “ yes depressed. ”  Affect congruent, appropriate  Thought Process coherent  Thought Content denied SI/HI w/ i/p  Perceptions no AVH endorsed or reported  Orientation/Attention AAOx4  Memory intact  Insight questionable  Judgment questionable    Nutrition Assessment (completed by Staff):  , Pain Screening: Pain Assessment  Pain Assessment Tool: 0-10  Pain Score: 0  Pain Location/Orientation: Orientation: Left, Location: Head, and Suicide Risk Assessment: Not applicable    Danger to self-assessment  1.Wish to be Dead: No  2.Suicidal Thoughts: No  3.Suicidal Thoughts with Method: No  4.Suicidal Intent without Plan: No  5.Suicidal Intent with Plan: No  6.Done, Started, or Prepared to End Life: No    Danger to Others Assessment  1.Thoughts of harming others: No  2.Thoughts of killing others: No  3.DTO thoughts without Plan: No  4.DTO Intention with Plan: No  5.DTO Intention with Plan & Means: No  6.DTO Intention with Plan, Means, and Time: No  7.[place] Warning Required: No    Patient Strengths/Assets: family ties, strong ghanshyam, supportive family/friends, well educated  Patient Barriers/Limitations: poor insight, poor self-care      Lab Results: I have reviewed the following results: CBC/BMP:   .     09/28/24  2351 09/29/24  0502   WBC 7.21 9.50   HGB 11.2* 11.0*   HCT 35.3 34.7*    191   SODIUM 136 140   K 3.9 3.4*    107   CO2 24 24   BUN 27* 24   CREATININE 1.14 0.98   GLUC 216* 137   MG 1.8* 2.1   PHOS 3.3  --     , Creatinine Clearance: Estimated Creatinine Clearance: 30.1 mL/min (by C-G formula based on SCr of 0.98 mg/dL)., LFTs:   .      "09/28/24  2351   AST 13   ALT 7   ALB 3.7   TBILI 0.35   ALKPHOS 46    , Vitamins B1, B6, B12, A, and D: No results found for: \"B1\", \"THYROGLB\", \"RNEHVOHF12\", \"[id number]\", TSH:   , Drug Screen:   Labs in last 72 hours:   Recent Labs     09/28/24  2351 09/29/24  0502   WBC 7.21 9.50   RBC 3.90 3.82   HGB 11.2* 11.0*   HCT 35.3 34.7*    191   RDW 12.7 12.8   NEUTROABS 4.73 8.04*   SODIUM 136 140   K 3.9 3.4*    107   CO2 24 24   BUN 27* 24   CREATININE 1.14 0.98   GLUC 216* 137   CALCIUM 9.1 8.6   AST 13  --    ALT 7  --    ALKPHOS 46  --    TP 6.0*  --    ALB 3.7  --    TBILI 0.35  --         Administrative Statements   I have spent a total time of 60 minutes + in caring for this patient on the day of the visit/encounter including  see below .    performing mental status exam; counseling and coordination of care; obtaining or reviewing history; documenting in the medical record; reviewing/ordering tests, medications or procedures; communicating with other healthcare professionals and discussing with patient's family/caregivers.        VIRTUAL CARE DOCUMENTATION:     1. This service was provided via Telemedicine using Teams Virtual Rounding      2. Parties in the room with patient during teleconsult Family member: daughter Kamryn     3. Confidentiality My office door was closed     4. Participants No one else was in the room    5. Patient acknowledged consent and understanding of privacy and security of the  Telemedicine consult. I informed the patient that I have reviewed their record in Epic and presented the opportunity for them to ask any questions regarding the visit today.  The patient agreed to participate.    6. I have spent a total time of 60 + minutes in caring for this patient on the day of the visit/encounter including see above     "

## 2024-09-29 NOTE — ASSESSMENT & PLAN NOTE
Patient has history of anxiety and depression  She denies any thoughts of self-harm, or harming others  Both she and her daughter indicate compliance with medications    Plan:  Consult psych, appreciate recommendations, cleared by psych, see consult note   Per psych, patient currently not endorsing SI or HI, no continued observation indicated    Continue Prozac 20 mg once daily, as patient's psychiatrist recently decreased dose   Continue Mirtazapine 15 mg nightly  Discontinue Seroquel 25 mg once nightly per psych recommendations   Discontinue Melatonin 3 mg nightly per psych recommendations   Continue Hydroxyzine 10 mg BID as needed for anxiety   Follow up within one week of discharge with outpatient psychiatrist

## 2024-09-29 NOTE — PLAN OF CARE
Problem: Potential for Falls  Goal: Patient will remain free of falls  Description: INTERVENTIONS:  - Educate patient/family on patient safety including physical limitations  - Instruct patient to call for assistance with activity   - Consult OT/PT to assist with strengthening/mobility   - Keep Call bell within reach  - Keep bed low and locked with side rails adjusted as appropriate  - Keep care items and personal belongings within reach  - Initiate and maintain comfort rounds  - Make Fall Risk Sign visible to staff  - Apply yellow socks and bracelet for high fall risk patients  - Consider moving patient to room near nurses station  Outcome: Progressing     Problem: GASTROINTESTINAL - ADULT  Goal: Minimal or absence of nausea and/or vomiting  Description: INTERVENTIONS:  - Administer IV fluids if ordered to ensure adequate hydration  - Maintain NPO status until nausea and vomiting are resolved  - Nasogastric tube if ordered  - Administer ordered antiemetic medications as needed  - Provide nonpharmacologic comfort measures as appropriate  - Advance diet as tolerated, if ordered  - Consider nutrition services referral to assist patient with adequate nutrition and appropriate food choices  Outcome: Progressing  Goal: Maintains or returns to baseline bowel function  Description: INTERVENTIONS:  - Assess bowel function  - Encourage oral fluids to ensure adequate hydration  - Administer IV fluids if ordered to ensure adequate hydration  - Administer ordered medications as needed  - Encourage mobilization and activity  - Consider nutritional services referral to assist patient with adequate nutrition and appropriate food choices  Outcome: Progressing  Goal: Maintains adequate nutritional intake  Description: INTERVENTIONS:  - Monitor percentage of each meal consumed  - Identify factors contributing to decreased intake, treat as appropriate  - Assist with meals as needed  - Monitor I&O, weight, and lab values if  indicated  - Obtain nutrition services referral as needed  Outcome: Progressing     Problem: PAIN - ADULT  Goal: Verbalizes/displays adequate comfort level or baseline comfort level  Description: Interventions:  - Encourage patient to monitor pain and request assistance  - Assess pain using appropriate pain scale  - Administer analgesics based on type and severity of pain and evaluate response  - Implement non-pharmacological measures as appropriate and evaluate response  - Consider cultural and social influences on pain and pain management  - Notify physician/advanced practitioner if interventions unsuccessful or patient reports new pain  Outcome: Progressing     Problem: INFECTION - ADULT  Goal: Absence or prevention of progression during hospitalization  Description: INTERVENTIONS:  - Assess and monitor for signs and symptoms of infection  - Monitor lab/diagnostic results  - Monitor all insertion sites, i.e. indwelling lines, tubes, and drains  - Monitor endotracheal if appropriate and nasal secretions for changes in amount and color  - [place] appropriate cooling/warming therapies per order  - Administer medications as ordered  - Instruct and encourage patient and family to use good hand hygiene technique  - Identify and instruct in appropriate isolation precautions for identified infection/condition  Outcome: Progressing     Problem: SAFETY ADULT  Goal: Patient will remain free of falls  Description: INTERVENTIONS:  - Educate patient/family on patient safety including physical limitations  - Instruct patient to call for assistance with activity   - Consult OT/PT to assist with strengthening/mobility   - Keep Call bell within reach  - Keep bed low and locked with side rails adjusted as appropriate  - Keep care items and personal belongings within reach  - Initiate and maintain comfort rounds  - Make Fall Risk Sign visible to staff  - Apply yellow socks and bracelet for high fall risk patients  - Consider  moving patient to room near nurses station  Outcome: Progressing  Goal: Maintain or return to baseline ADL function  Description: INTERVENTIONS:  -  Assess patient's ability to carry out ADLs; assess patient's baseline for ADL function and identify physical deficits which impact ability to perform ADLs (bathing, care of mouth/teeth, toileting, grooming, dressing, etc.)  - Assess/evaluate cause of self-care deficits   - Assess range of motion  - Assess patient's mobility; develop plan if impaired  - Assess patient's need for assistive devices and provide as appropriate  - Encourage maximum independence but intervene and supervise when necessary  - Involve family in performance of ADLs  - Assess for home care needs following discharge   - Consider OT consult to assist with ADL evaluation and planning for discharge  - Provide patient education as appropriate  Outcome: Progressing  Goal: Maintains/Returns to pre admission functional level  Description: INTERVENTIONS:  - Perform AM-PAC 6 Click Basic Mobility/ Daily Activity assessment daily.  - Set and communicate daily mobility goal to care team and patient/family/caregiver.   - Collaborate with rehabilitation services on mobility goals if consulted  - Out of bed for toileting  - Record patient progress and toleration of activity level   Outcome: Progressing     Problem: DISCHARGE PLANNING  Goal: Discharge to home or other facility with appropriate resources  Description: INTERVENTIONS:  - Identify barriers to discharge w/patient and caregiver  - Arrange for needed discharge resources and transportation as appropriate  - Identify discharge learning needs (meds, wound care, etc.)  - Arrange for interpretive services to assist at discharge as needed  - Refer to Case Management Department for coordinating discharge planning if the patient needs post-hospital services based on physician/advanced practitioner order or complex needs related to functional status, cognitive  ability, or social support system  Outcome: Progressing     Problem: Knowledge Deficit  Goal: Patient/family/caregiver demonstrates understanding of disease process, treatment plan, medications, and discharge instructions  Description: Complete learning assessment and assess knowledge base.  Interventions:  - Provide teaching at level of understanding  - Provide teaching via preferred learning methods  Outcome: Progressing

## 2024-09-29 NOTE — PLAN OF CARE
Problem: Potential for Falls  Goal: Patient will remain free of falls  Description: INTERVENTIONS:  - Educate patient/family on patient safety including physical limitations  - Instruct patient to call for assistance with activity   - Consult OT/PT to assist with strengthening/mobility   - Keep Call bell within reach  - Keep bed low and locked with side rails adjusted as appropriate  - Keep care items and personal belongings within reach  - Initiate and maintain comfort rounds  - Make Fall Risk Sign visible to staff  - Offer Toileting every 4 Hours, in advance of need  - Initiate/Maintain bed alarm  - Obtain necessary fall risk management equipment:   - Apply yellow socks and bracelet for high fall risk patients  - Consider moving patient to room near nurses station  Outcome: Progressing     Problem: GASTROINTESTINAL - ADULT  Goal: Minimal or absence of nausea and/or vomiting  Description: INTERVENTIONS:  - Administer IV fluids if ordered to ensure adequate hydration  - Maintain NPO status until nausea and vomiting are resolved  - Nasogastric tube if ordered  - Administer ordered antiemetic medications as needed  - Provide nonpharmacologic comfort measures as appropriate  - Advance diet as tolerated, if ordered  - Consider nutrition services referral to assist patient with adequate nutrition and appropriate food choices  Outcome: Progressing  Goal: Maintains or returns to baseline bowel function  Description: INTERVENTIONS:  - Assess bowel function  - Encourage oral fluids to ensure adequate hydration  - Administer IV fluids if ordered to ensure adequate hydration  - Administer ordered medications as needed  - Encourage mobilization and activity  - Consider nutritional services referral to assist patient with adequate nutrition and appropriate food choices  Outcome: Progressing  Goal: Maintains adequate nutritional intake  Description: INTERVENTIONS:  - Monitor percentage of each meal consumed  - Identify factors  contributing to decreased intake, treat as appropriate  - Assist with meals as needed  - Monitor I&O, weight, and lab values if indicated  - Obtain nutrition services referral as needed  Outcome: Progressing     Problem: PAIN - ADULT  Goal: Verbalizes/displays adequate comfort level or baseline comfort level  Description: Interventions:  - Encourage patient to monitor pain and request assistance  - Assess pain using appropriate pain scale  - Administer analgesics based on type and severity of pain and evaluate response  - Implement non-pharmacological measures as appropriate and evaluate response  - Consider cultural and social influences on pain and pain management  - Notify physician/advanced practitioner if interventions unsuccessful or patient reports new pain  Outcome: Progressing     Problem: INFECTION - ADULT  Goal: Absence or prevention of progression during hospitalization  Description: INTERVENTIONS:  - Assess and monitor for signs and symptoms of infection  - Monitor lab/diagnostic results  - Monitor all insertion sites, i.e. indwelling lines, tubes, and drains  - Monitor endotracheal if appropriate and nasal secretions for changes in amount and color  - [place] appropriate cooling/warming therapies per order  - Administer medications as ordered  - Instruct and encourage patient and family to use good hand hygiene technique  - Identify and instruct in appropriate isolation precautions for identified infection/condition  Outcome: Progressing

## 2024-09-29 NOTE — ASSESSMENT & PLAN NOTE
Lab Results   Component Value Date    HGBA1C 6.7 (H) 09/29/2024       Recent Labs     09/29/24  0657 09/29/24  1003 09/29/24  1556   POCGLU 138 165* 169*       Blood Sugar Average: Last 72 hrs:  (P) [id number]  Elevated HA1C at 6.6 on this admission     Plan:   Follow up outpatient with PCP   Continue to monitor blood glucose and SSI

## 2024-09-29 NOTE — ASSESSMENT & PLAN NOTE
Mild normocytic anemia with normal RDW  Hemoglobin 11, hematocrit 34.7 with no other abnormalities on CBC  Last month, and anemia workup was done by the PCP  Iron panel, levels of folate, and vitamin B12 normal  Encouraged increased oral intake and hydration to patient   No reported or noted bleeding on her  Occasional anemia this past year   Plan:   Monitor CBC, encourage fluids, and adequate nutrition

## 2024-09-29 NOTE — ED NOTES
Fleet enema administered to patient at 0051. Patient felt the urge to move bowels approx 8 minutes after enema administration. Patient passed a larger amount of stool than with the soap suds enema. Provider made aware.      Norma John RN  09/29/24 0128

## 2024-09-29 NOTE — ASSESSMENT & PLAN NOTE
Magnesium 1.8 on admission, replenished  Potassium 3.4 on 9/29, replenished     Plan:  Monitor daily BMP and magnesium replenish as needed

## 2024-09-29 NOTE — ED PROVIDER NOTES
Final diagnoses:   Constipation   Headache   Failure to thrive in adult   Depression   At risk for polypharmacy     ED Disposition       ED Disposition   Admit    Condition   Stable    Date/Time   Sun Sep 29, 2024  1:42 AM    Comment   Case was discussed with Dr. Gregory and the patient's admission status was agreed to be Admission Status: inpatient status to the service of Dr. Levy .               Assessment & Plan       Medical Decision Making  Patient presents with:  Headache: Pt presenting with headache, nausea and constipation. She is unable to remember the last time she had a bowel movement. Pt has not been eating and drinking well.     Patient seen and examined noted to have abdominal distention that was tight with no tenderness, small external hemorrhoids, large hard stool present in the rectal vault, unremarkable neuro exam.      Differential diagnosis includes but is not limited to failure to thrive, electrolyte abnormality, cardiac arrhythmia, anemia, chronic constipation with failure to take stool softeners, depression.      Patient's labs notable for: magnesium of 1.8, glucose of 216. Unremarkable phosphorus, CBC  and EKG: interpreted by myself as above.     Patient was manually disimpacted and tolerated it well. Able to remove a fist size amount of hard stool. Patient was given multiple enema's and wasn't able to produce a bowel movement. Patient requested no more enemas or disimpactions until tomorrow.     Discussed patient's case with Dr. Gregory regarding admission who accepted the patient for  Dr. Levy for further evaluation and management.    Patient was rx'd as below       Amount and/or Complexity of Data Reviewed  Labs: ordered.    Risk  OTC drugs.  Prescription drug management.  Decision regarding hospitalization.        ED Course as of 09/29/24 0406   Sat Sep 28, 2024   2347 Manual disimpaction, removed approximately a fist size amount of hard stool.       Medications   ondansetron (ZOFRAN)  injection 4 mg (4 mg Intravenous Given 24 5582)   sodium chloride 0.9 % bolus 500 mL (500 mL Intravenous New Bag 24 0008)   magnesium sulfate 2 g/50 mL IVPB (premix) 2 g (2 g Intravenous New Bag 24 0045)   sodium phosphate-biphosphate (FLEET) enema 1 enema (1 enema Rectal Given 24 0051)       ED Risk Strat Scores                           SBIRT 22yo+      Flowsheet Row Most Recent Value   Initial Alcohol Screen: US AUDIT-C     1. How often do you have a drink containing alcohol? 0 Filed at: 2024   2. How many drinks containing alcohol do you have on a typical day you are drinking?  0 Filed at: 2024   3a. Male UNDER 65: How often do you have five or more drinks on one occasion? 0 Filed at: 2024   3b. FEMALE Any Age, or MALE 65+: How often do you have 4 or more drinks on one occassion? 0 Filed at: 2024   Audit-C Score 0 Filed at: 2024   ELROY: How many times in the past year have you...    Used an illegal drug or used a prescription medication for non-medical reasons? Never Filed at: 2024                            History of Present Illness       Chief Complaint   Patient presents with    Headache     Pt presenting with headache, nausea and constipation. She is unable to remember the last time she had a bowel movement. Pt has not been eating and drinking well.     Constipation       Past Medical History:   Diagnosis Date    Anxiety     Colon polyps     Depression     Diabetes mellitus (HCC)     Dizziness     Last assessed: 8/31/15    DVT (deep venous thrombosis) ([facility])     Dysuria     Hematuria 2022    Hyperlipidemia     Hypertension     Hypertensive urgency 10/22/2021    Insomnia     Panic attack     Ureterolithiasis 2020      Past Surgical History:   Procedure Laterality Date     SECTION      1964 son, 1968 daughter    COLONOSCOPY      FL RETROGRADE PYELOGRAM  2020    LA COLONOSCOPY FLX DX W/COLLJ SPEC WHEN  PFRMD N/A 3/27/2017    Procedure: COLONOSCOPY;  Surgeon: Gold Francis MD;  Location: [facility];  Service: Gastroenterology    VT CYSTO/URETERO W/LITHOTRIPSY &INDWELL STENT INSRT Right 5/22/2020    Procedure: CYSTOSCOPY URETEROSCOPY WITH LITHOTRIPSY HOLMIUM LASER, RETROGRADE PYELOGRAM AND INSERTION STENT URETERAL; EXCISION OF BLADDER TUMOR;  Surgeon: Edwin Love MD;  Location: [facility];  Service: Urology    ROTATOR CUFF REPAIR Left     Last assessed: 3/29/15    TONSILLECTOMY        Family History   Problem Relation Age of Onset    Depression Mother         w/anxiety    Diabetes Mother         Mellitus    Breast cancer Mother     Hypertension Mother     No Known Problems Father     Depression Sister         w/anxiety    Heart disease Sister         Cardiac Disorder    Breast cancer Sister     Hypertension Sister     Diabetes Brother         Mellitus    Alcohol abuse Son       Social History     Tobacco Use    Smoking status: Never    Smokeless tobacco: Never   Vaping Use    Vaping status: Never Used   Substance Use Topics    Alcohol use: Never    Drug use: No      E-Cigarette/Vaping    E-Cigarette Use Never User       E-Cigarette/Vaping Substances    Nicotine No     THC No     CBD No     Flavoring No     Other No     Unknown No       I have reviewed and agree with the history as documented.     Sandra Peña is a 83 y.o. female     They presented to the emergency department on September 28, 2024. Patient presents with:  Band like distribution headache, constipation, generalized weakness,nausea, and decreased appetite for the last week. PMH includes type 2 DM, generalized anxiety disorder, CKD, major depressive disorder, chronic constipation requiring multiple manual disimpactions, external hemorrhoids.  Patient is supposed to take daily MiraLAX and Dulcolax and Senokot but has only taken 1 dose of the Dulcolax and Senokot since onset of symptoms.  Patient has not taken any of the MiraLAX.  Patient does not  remember the last time she had a bowel movement, was the for sure left, was a week and a half ago when she had a disimpaction. Patient has had multiple ed visits for her constipation and headache. Patient states that she is still passing gas. Patient denies fever, vision changes, chest pain, shortness of breath, cough, abdominal pain, dysuria, polyuria, hematuria, diarrhea, rash, or any other complaints at this time.                 Review of Systems   Constitutional:  Negative for chills and fever.   HENT:  Negative for ear pain and sore throat.    Eyes:  Negative for pain and visual disturbance.   Respiratory:  Negative for cough and shortness of breath.    Cardiovascular:  Negative for chest pain and palpitations.   Gastrointestinal:  Positive for constipation and nausea. Negative for abdominal pain, diarrhea and vomiting.   Genitourinary:  Negative for dysuria and hematuria.   Musculoskeletal:  Negative for arthralgias and back pain.   Skin:  Negative for color change and rash.   Neurological:  Negative for seizures and syncope.   All other systems reviewed and are negative.          Objective       ED Triage Vitals   Temperature Pulse Blood Pressure Respirations SpO2 Patient Position - Orthostatic VS   09/28/24 2302 09/28/24 2302 09/28/24 2302 09/28/24 2302 09/28/24 2302 09/28/24 2302   97.9 °F (36.6 °C) 90 166/81 18 99 % Sitting      Temp Source Heart Rate Source BP Location FiO2 (%) Pain Score    09/28/24 2302 09/29/24 0137 09/28/24 2302 -- 09/29/24 0237    Oral Monitor Right arm  3      Vitals      Date and Time Temp Pulse SpO2 Resp BP Pain Score FACES Pain Rating User   09/29/24 0237 -- -- -- -- -- 3 -- BJ   09/29/24 0237 -- -- -- -- 120/68 -- -- KT   09/29/24 0237 97.5 °F (36.4 °C) 89 96 % 16 -- -- -- DII   09/29/24 0137 -- 81 98 % 18 140/63 -- -- EG   09/28/24 2302 97.9 °F (36.6 °C) 90 99 % 18 166/81 -- -- EB            Physical Exam  Vitals and nursing note reviewed.   Constitutional:       General: She  is not in acute distress.     Appearance: She is well-developed.   HENT:      Head: Normocephalic and atraumatic.   Eyes:      Conjunctiva/sclera: Conjunctivae normal.   Cardiovascular:      Rate and Rhythm: Normal rate and regular rhythm.      Pulses: Normal pulses.      Heart sounds: Normal heart sounds. No murmur heard.     No friction rub. No gallop.   Pulmonary:      Effort: Pulmonary effort is normal. No respiratory distress.      Breath sounds: Normal breath sounds. No stridor. No wheezing, rhonchi or rales.   Abdominal:      General: There is distension.      Palpations: Abdomen is soft. There is no mass.      Tenderness: There is no abdominal tenderness. There is no guarding or rebound.      Hernia: No hernia is present.   Musculoskeletal:         General: No swelling.      Cervical back: Neck supple.   Skin:     General: Skin is warm and dry.      Capillary Refill: Capillary refill takes less than 2 seconds.   Neurological:      Mental Status: She is alert.      Comments: A&Ox4. Face symmetric, Tongue midline. CN II-XII intact. 5/5 strength in the bilateral upper and lower extremities with intact sensation to light touch throughout. Normal Finger-to-nose, Heel-to-shin, and Rapid alternating movements bilaterally. No pronator drift. Normal speech.    Psychiatric:         Mood and Affect: Mood normal.         Results Reviewed       Procedure Component Value Units Date/Time    Comprehensive metabolic panel [[id number]]  (Abnormal) Collected: 09/28/24 3381    Lab Status: Final result Specimen: Blood from Arm, Right Updated: 09/29/24 0016     Sodium 136 mmol/L      Potassium 3.9 mmol/L      Chloride 104 mmol/L      CO2 24 mmol/L      ANION GAP 8 mmol/L      BUN 27 mg/dL      Creatinine 1.14 mg/dL      Glucose 216 mg/dL      Calcium 9.1 mg/dL      AST 13 U/L      ALT 7 U/L      Alkaline Phosphatase 46 U/L      Total Protein 6.0 g/dL      Albumin 3.7 g/dL      Total Bilirubin 0.35 mg/dL      eGFR 43 ml/min/1.73sq  m     Narrative:      National Kid[employer] Foundation guidelines for Chronic Kidney Disease (CKD):     Stage 1 with normal or high GFR (GFR > 90 mL/min/1.73 square meters)    Stage 2 Mild CKD (GFR = 60-89 mL/min/1.73 square meters)    Stage 3A Moderate CKD (GFR = 45-59 mL/min/1.73 square meters)    Stage 3B Moderate CKD (GFR = 30-44 mL/min/1.73 square meters)    Stage 4 Severe CKD (GFR = 15-29 mL/min/1.73 square meters)    Stage 5 End Stage CKD (GFR <15 mL/min/1.73 square meters)  Note: GFR calculation is accurate only with a steady state creatinine    Magnesium [[id number]]  (Abnormal) Collected: 09/28/24 2351    Lab Status: Final result Specimen: Blood from Arm, Right Updated: 09/29/24 0016     Magnesium 1.8 mg/dL     Phosphorus [[id number]]  (Normal) Collected: 09/28/24 2351    Lab Status: Final result Specimen: Blood from Arm, Right Updated: 09/29/24 0016     Phosphorus 3.3 mg/dL     CBC and differential [[id number]]  (Abnormal) Collected: 09/28/24 2351    Lab Status: Final result Specimen: Blood from Arm, Right Updated: 09/28/24 2358     WBC 7.21 Thousand/uL      RBC 3.90 Million/uL      Hemoglobin 11.2 g/dL      Hematocrit 35.3 %      MCV 91 fL      MCH 28.7 pg      MCHC 31.7 g/dL      RDW 12.7 %      MPV 10.4 fL      Platelets 223 Thousands/uL      nRBC 0 /100 WBCs      Segmented % 65 %      Immature Grans % 0 %      Lymphocytes % 25 %      Monocytes % 6 %      Eosinophils Relative 3 %      Basophils Relative 1 %      Absolute Neutrophils 4.73 Thousands/µL      Absolute Immature Grans 0.02 Thousand/uL      Absolute Lymphocytes 1.77 Thousands/µL      Absolute Monocytes 0.41 Thousand/µL      Eosinophils Absolute 0.24 Thousand/µL      Basophils Absolute 0.04 Thousands/µL     UA w Reflex to Microscopic w Reflex to Culture [[id number]]     Lab Status: No result Specimen: Urine             No orders to display       ECG 12 Lead Documentation Only    Date/Time: 9/29/2024 12:23 AM    Performed by: Lyndon James  MD  Authorized by: Lyndon James MD    Indications / Diagnosis:  Failure to thrive  ECG reviewed by me, the ED Provider: yes    Patient location:  ED  Previous ECG:     Previous ECG:  Compared to current    Comparison ECG info:  23rd september 2024 13:20:55    Similarity:  No change  Interpretation:     Interpretation: abnormal    Rate:     ECG rate:  79    ECG rate assessment: normal    Rhythm:     Rhythm: sinus rhythm    Ectopy:     Ectopy: none    QRS:     QRS axis:  Normal    QRS intervals:  Normal  Conduction:     Conduction: normal    ST segments:     ST segments:  Non-specific    Elevation:  III, aVF and V6  T waves:     T waves: normal    Other findings:     Other findings: LVH        ED Medication and Procedure Management   Prior to Admission Medications   Prescriptions Last Dose Informant Patient Reported? Taking?   Cholecalciferol (VITAMIN D3) 1,000 units tablet   No No   Sig: Take 2 tablets (2,000 Units total) by mouth daily for 30 doses   FLUoxetine (PROzac) 20 mg capsule   No No   Sig: Take 1 capsule (20 mg total) by mouth daily with breakfast   QUEtiapine (SEROquel) 50 mg tablet   No No   Sig: Take 0.5 tablets (25 mg total) by mouth daily at bedtime   aspirin 81 MG tablet  Self Yes No   Sig: Take 81 mg by mouth daily   atorvastatin (LIPITOR) 10 mg tablet   No No   Sig: Take 1 tablet (10 mg total) by mouth daily   cyanocobalamin (VITAMIN B-12) 1000 MCG tablet   No No   Sig: Take 1 tablet (1,000 mcg total) by mouth daily   docusate sodium (COLACE) 100 mg capsule  Self Yes No   Sig: Take 100 mg by mouth 2 (two) times a day   hydrOXYzine HCL (ATARAX) 10 mg tablet   No No   Sig: Take 1 tablet (10 mg total) by mouth 2 (two) times a day as needed for anxiety for up to 40 doses   linaCLOtide 145 MCG CAPS   No No   Sig: Take 1 capsule (145 mcg total) by mouth in the morning   lisinopril-hydrochlorothiazide (PRINZIDE,ZESTORETIC) 10-12.5 MG per tablet   No No   Sig: Take 1 tablet by mouth daily   melatonin 3 mg    No No   Sig: Take 1 tablet (3 mg total) by mouth daily at bedtime   metFORMIN (GLUCOPHAGE) 500 mg tablet   No No   Sig: Take 1 tablet (500 mg total) by mouth 2 (two) times a day with meals   mirtazapine (REMERON) 15 mg tablet   No No   Sig: Take 1 tablet (15 mg total) by mouth daily at bedtime   ondansetron (ZOFRAN-ODT) 4 mg disintegrating tablet   No No   Sig: Take 1 tablet (4 mg total) by mouth every 8 (eight) hours as needed for nausea      Facility-Administered Medications: None     Current Discharge Medication List        CONTINUE these medications which have NOT CHANGED    Details   aspirin 81 MG tablet Take 81 mg by mouth daily      atorvastatin (LIPITOR) 10 mg tablet Take 1 tablet (10 mg total) by mouth daily  Qty: 90 tablet, Refills: 1    Associated Diagnoses: Mixed hyperlipidemia      Cholecalciferol (VITAMIN D3) 1,000 units tablet Take 2 tablets (2,000 Units total) by mouth daily for 30 doses  Qty: 60 tablet, Refills: 0    Associated Diagnoses: Vitamin D deficiency      cyanocobalamin (VITAMIN B-12) 1000 MCG tablet Take 1 tablet (1,000 mcg total) by mouth daily  Qty: 30 tablet, Refills: 0    Associated Diagnoses: B12 deficiency      docusate sodium (COLACE) 100 mg capsule Take 100 mg by mouth 2 (two) times a day      FLUoxetine (PROzac) 20 mg capsule Take 1 capsule (20 mg total) by mouth daily with breakfast  Qty: 30 capsule, Refills: 0    Associated Diagnoses: Illness anxiety disorder; Major depressive disorder, recurrent episode, mild (HCC)      hydrOXYzine HCL (ATARAX) 10 mg tablet Take 1 tablet (10 mg total) by mouth 2 (two) times a day as needed for anxiety for up to 40 doses  Qty: 20 tablet, Refills: 0    Associated Diagnoses: Illness anxiety disorder      linaCLOtide 145 MCG CAPS Take 1 capsule (145 mcg total) by mouth in the morning  Qty: 90 capsule, Refills: 1    Associated Diagnoses: Chronic idiopathic constipation      lisinopril-hydrochlorothiazide (PRINZIDE,ZESTORETIC) 10-12.5 MG per tablet  Take 1 tablet by mouth daily  Qty: 90 tablet, Refills: 3    Associated Diagnoses: Benign essential hypertension      melatonin 3 mg Take 1 tablet (3 mg total) by mouth daily at bedtime  Qty: 30 tablet, Refills: 0    Associated Diagnoses: Primary insomnia      metFORMIN (GLUCOPHAGE) 500 mg tablet Take 1 tablet (500 mg total) by mouth 2 (two) times a day with meals  Qty: 180 tablet, Refills: 1    Associated Diagnoses: Type 2 diabetes mellitus without complication, without long-term current use of insulin (HCC)      mirtazapine (REMERON) 15 mg tablet Take 1 tablet (15 mg total) by mouth daily at bedtime  Qty: 30 tablet, Refills: 0    Associated Diagnoses: Illness anxiety disorder; Major depressive disorder, recurrent episode, mild (HCC); Generalized anxiety disorder      ondansetron (ZOFRAN-ODT) 4 mg disintegrating tablet Take 1 tablet (4 mg total) by mouth every 8 (eight) hours as needed for nausea  Qty: 20 tablet, Refills: 0    Associated Diagnoses: Nausea      QUEtiapine (SEROquel) 50 mg tablet Take 0.5 tablets (25 mg total) by mouth daily at bedtime  Qty: 15 tablet, Refills: 0    Associated Diagnoses: Major depressive disorder, recurrent episode, mild (HCC)           No discharge procedures on file.  ED SEPSIS DOCUMENTATION   Time reflects when diagnosis was documented in both MDM as applicable and the Disposition within this note       Time User Action Codes Description Comment    9/29/2024  1:41 AM Lyndon James Add [R62.51] Failure to thrive (child)     9/29/2024  1:41 AM Lyndon James Add [K59.00] Constipation     9/29/2024  1:41 AM Lyndon James Add [R51.9] Headache     9/29/2024  1:41 AM Lyndon James Modify [K59.00] Constipation     9/29/2024  1:41 AM Lyndon James Remove [R62.51] Failure to thrive (child)     9/29/2024  1:41 AM Lyndon James Add [R62.7] Failure to thrive in adult     9/29/2024  1:41 AM Lyndon James Modify [K59.00] Constipation     9/29/2024  1:41 AM Lyndon James Modify [R62.7] Failure to thrive in adult      9/29/2024  1:42 AM Lyndon James [F32.A] Depression     9/29/2024  4:04 AM Mathew Jeong [Z91.89] At risk for polypharmacy                  Lyndon James MD  09/29/24 0406

## 2024-09-30 ENCOUNTER — TRANSITIONAL CARE MANAGEMENT (OUTPATIENT)
Dept: FAMILY MEDICINE CLINIC | Facility: CLINIC | Age: 84
End: 2024-09-30

## 2024-09-30 ENCOUNTER — TELEPHONE (OUTPATIENT)
Age: 84
End: 2024-09-30

## 2024-09-30 VITALS
WEIGHT: 109 LBS | BODY MASS INDEX: 23.51 KG/M2 | HEIGHT: 57 IN | OXYGEN SATURATION: 96 % | HEART RATE: 88 BPM | SYSTOLIC BLOOD PRESSURE: 169 MMHG | DIASTOLIC BLOOD PRESSURE: 81 MMHG | RESPIRATION RATE: 18 BRPM | TEMPERATURE: 98 F

## 2024-09-30 PROBLEM — E87.8 ELECTROLYTE ABNORMALITY: Status: RESOLVED | Noted: 2024-09-29 | Resolved: 2024-09-30

## 2024-09-30 LAB
ANION GAP SERPL CALCULATED.3IONS-SCNC: 7 MMOL/L (ref 4–13)
BUN SERPL-MCNC: 19 MG/DL (ref 5–25)
CALCIUM SERPL-MCNC: 8.9 MG/DL (ref 8.4–10.2)
CHLORIDE SERPL-SCNC: 107 MMOL/L (ref 96–108)
CO2 SERPL-SCNC: 24 MMOL/L (ref 21–32)
CREAT SERPL-MCNC: 0.96 MG/DL (ref 0.6–1.3)
ERYTHROCYTE [DISTWIDTH] IN BLOOD BY AUTOMATED COUNT: 12.9 % (ref 11.6–15.1)
GFR SERPL CREATININE-BSD FRML MDRD: 54 ML/MIN/1.73SQ M
GLUCOSE SERPL-MCNC: 121 MG/DL (ref 65–140)
GLUCOSE SERPL-MCNC: 126 MG/DL (ref 65–140)
GLUCOSE SERPL-MCNC: 140 MG/DL (ref 65–140)
HCT VFR BLD AUTO: 34 % (ref 34.8–46.1)
HGB BLD-MCNC: 10.7 G/DL (ref 11.5–15.4)
MCH RBC QN AUTO: 28.3 PG (ref 26.8–34.3)
MCHC RBC AUTO-ENTMCNC: 31.5 G/DL (ref 31.4–37.4)
MCV RBC AUTO: 90 FL (ref 82–98)
PLATELET # BLD AUTO: 171 THOUSANDS/UL (ref 149–390)
PMV BLD AUTO: 10.6 FL (ref 8.9–12.7)
POTASSIUM SERPL-SCNC: 4.1 MMOL/L (ref 3.5–5.3)
RBC # BLD AUTO: 3.78 MILLION/UL (ref 3.81–5.12)
SODIUM SERPL-SCNC: 138 MMOL/L (ref 135–147)
WBC # BLD AUTO: 6.3 THOUSAND/UL (ref 4.31–10.16)

## 2024-09-30 PROCEDURE — 99239 HOSP IP/OBS DSCHRG MGMT >30: CPT | Performed by: INTERNAL MEDICINE

## 2024-09-30 PROCEDURE — 80048 BASIC METABOLIC PNL TOTAL CA: CPT | Performed by: INTERNAL MEDICINE

## 2024-09-30 PROCEDURE — 85027 COMPLETE CBC AUTOMATED: CPT | Performed by: INTERNAL MEDICINE

## 2024-09-30 PROCEDURE — 82948 REAGENT STRIP/BLOOD GLUCOSE: CPT

## 2024-09-30 RX ADMIN — HYDROXYZINE HYDROCHLORIDE 10 MG: 10 TABLET ORAL at 11:56

## 2024-09-30 RX ADMIN — ASPIRIN 81 MG 81 MG: 81 TABLET ORAL at 08:35

## 2024-09-30 RX ADMIN — HYDROCHLOROTHIAZIDE 12.5 MG: 12.5 TABLET ORAL at 08:35

## 2024-09-30 RX ADMIN — LUBIPROSTONE 24 MCG: 24 CAPSULE, GELATIN COATED ORAL at 08:36

## 2024-09-30 RX ADMIN — ATORVASTATIN CALCIUM 10 MG: 10 TABLET, FILM COATED ORAL at 08:35

## 2024-09-30 RX ADMIN — Medication 2000 UNITS: at 08:36

## 2024-09-30 RX ADMIN — LISINOPRIL 10 MG: 10 TABLET ORAL at 08:35

## 2024-09-30 RX ADMIN — FLUOXETINE HYDROCHLORIDE 20 MG: 20 CAPSULE ORAL at 08:38

## 2024-09-30 RX ADMIN — ENOXAPARIN SODIUM 30 MG: 30 INJECTION SUBCUTANEOUS at 08:35

## 2024-09-30 RX ADMIN — DOCUSATE SODIUM 100 MG: 100 CAPSULE, LIQUID FILLED ORAL at 08:36

## 2024-09-30 NOTE — DISCHARGE INSTR - AVS FIRST PAGE
Dear Sandra Peña,     It was our pleasure to care for you here at [facility].  It is our hope that we were always able to exceed the expected standards for your care during your stay.  You were hospitalized due to failure to thrive.  You were cared for on the 4th floor by Garland Najera MD under the service of Leilani Lion with the [facility] who covers for your primary care physician (PCP), Ramin De Paz DO, while you were hospitalized.  If you have any questions or concerns related to this hospitalization, you may contact us at .  For follow up as well as any medication refills, we recommend that you follow up with your primary care physician.  A registered nurse will reach out to you by phone within a few days after your discharge to answer any additional questions that you may have after going home.  However, at this time we provide for you here, the most important instructions / recommendations at discharge:     Notable Medication Adjustments -   As per psychiatry recommendations, please stop taking quetiapine at bedtime as it may be contributing to your symptoms.  As per psychiatry recommendations, please stop taking melatonin at bedtime as it may contribute to vivid dreams/nightmares.  You may continue taking fluoxetine 20 mg one tablet, once daily.  You may continue taking mirtazapine 15mg one tablet, once daily.  There were no other medication changes made at this time.   Testing Required after Discharge -   None  Important follow up information -   Please follow up with your primary care provider within one week of discharge.  Please follow up with psychiatry within one week of discharge.  Other Instructions -   Please follow up with your primary care provider given your concerns of being on a diuretic while having poor oral intake.  We recommend to hold off on taking your metformin, as it may be contributing to  gastrointestinal symptoms, please discuss with your primary care provider if it is necessary to restart.    Please review this entire after visit summary as additional general instructions including medication list, appointments, activity, diet, any pertinent wound care, and other additional recommendations from your care team that may be provided for you.      Sincerely,     Garland Najera MD

## 2024-09-30 NOTE — ASSESSMENT & PLAN NOTE
Lab Results   Component Value Date    HGBA1C 6.7 (H) 09/29/2024       Recent Labs     09/29/24  1556 09/29/24  2044 09/30/24  0744 09/30/24  1150   POCGLU 169* 113 121 140       Blood Sugar Average: Last 72 hrs:  (P) 141

## 2024-09-30 NOTE — DISCHARGE SUMMARY
Discharge Summary - Hospitalist   Name: Sandra Peña 83 y.o. female I MRN: [id number]  Unit/Bed#: W -01 I Date of Admission: 9/28/2024   Date of Service: 9/30/2024 I Hospital Day: 1     Assessment & Plan  Failure to thrive in adult  Patient presents with approximately 2 weeks of headache, nausea, generalized weakness, and constipation  Patient has food aversion indicating that she feels nauseous when she smells certain foods  She typically has oatmeal in the morning with soup in the afternoon with intermittent snacking for dinner  She indicates feeling more weak and lethargic over the last 2 weeks along with sleeping more than usual  She has had 3 admissions over the last month approximately for similar issues  Labs generally unremarkable other than and a magnesium of 1.8  Recently had TSH, and vitamin D checked her last admission about 2 weeks ago which were normal  Indicates is compliance with her home anxiety and depression medication    Plan:  Continue mirtazapine 15 mg daily at bedtime  Recommended holding metoprolol at discharge.  Follow nutritionist recommendations  Patient will follow up with PCP within one week of discharge  Constipation  Patient has had constipation over the last 2 weeks  Cannot remember when her last bowel movement was but claims it should be about a week ago  Had approximately 2 bowel movements since admission with digital disimpaction and enema placement    Plan:  Continue home Colace 100 mg twice daily  Continue Lubiprostone 24 mcg twice daily     Anxiety and depression  Patient has history of anxiety and depression  She denies any thoughts of self-harm, or harming others  Both she and her daughter indicate compliance with medications    Plan:  Continue Prozac 20 mg once daily  Will follow up with psychiatry outpatient  Anemia  Patient has history of anemia, outpatient workup showed normal iron panel, iron 58, b12 wnll  Likely 2/2 to inadequate nutritional intake    Plan  Follow  nutritionist recommendations  F/u outpatient with PCP  Type 2 diabetes mellitus without complication, without long-term current use of insulin (HCC)  Lab Results   Component Value Date    HGBA1C 6.7 (H) 09/29/2024       Recent Labs     09/29/24  1556 09/29/24  2044 09/30/24  0744 09/30/24  1150   POCGLU 169* 113 121 140       Blood Sugar Average: Last 72 hrs:  (P) 141    Primary hypertension       Medical Problems       Resolved Problems  Date Reviewed: 9/30/2024            Resolved    Electrolyte abnormality 9/30/2024     Resolved by  Leilani Levy MD    Elevated hemoglobin A1c 9/29/2024     Resolved by  Brooke Mendelson, DO    Overview Signed 9/29/2024  6:47 PM by Brooke Mendelson, DO     Elevated HA1C at 6.6 on this admission     Plan:   Follow up outpatient with PCP   Continue to monitor blood glucose and SSI              Discharging Physician / Practitioner: Garland Najera MD  PCP: Ramin De Paz DO  Admission Date:   Admission Orders (From admission, onward)       Ordered        09/29/24 0142  INPATIENT ADMISSION  Once                          Discharge Date: 09/30/24    Consultations During Hospital Stay:  Psychiatry    Procedures Performed:   none    Significant Findings / Test Results:   none    Incidental Findings:   none     Test Results Pending at Discharge (will require follow up):   None      Outpatient Tests Requested:  none    Complications:  none    Reason for Admission: failure to thrive    Hospital Course:   Sandra Peña is a 83 y.o. female patient who originally presented to the hospital on 9/28/2024 due to a 2-week history of headache, nausea, generalized weakness, constipation.  Patient also had food aversion indicating that she felt nauseous when smelling certain foods.  She has had 3 admissions over the last month approximately for similar issues.  Patient was found to be constipated in the emergency room, and was disimpacted, and given enemas.  Patient had good bowel  movements following disimpaction.  She also received lubiprostone, and Colace throughout her stay.  Patient has a history of depression, anxiety, it was thought that her food aversion symptoms may be psychological in nature.  Psychiatry was consulted, they recommended discontinuation of quetiapine 25 mg as it may be contributing to her symptoms, and holding melatonin given that the patient was having vivid dreams.  Patient was recommended to increase her oral intake, and stay hydrated.  Nutritional services were also consulted who had extensive conversation with the patient and her daughter and gave their recommendations.  At the time of discharge, it was recommended to patient to hold her metformin as it may contributing to her GI symptoms and to reassess with PCP whether to restart it.      The patient, initially admitted to the hospital as inpatient, was discharged earlier than expected given the following: Improvement of constipation, psychiatric medication adjustment.  Please see above list of diagnoses and related plan for additional information.     Condition at Discharge: good    Discharge Day Visit / Exam:   * Please refer to separate progress note for these details *    Discussion with Family: Updated  (daughter) at bedside.    Discharge instructions/Information to patient and family:   See after visit summary for information provided to patient and family.      Provisions for Follow-Up Care:  See after visit summary for information related to follow-up care and any pertinent home health orders.      Mobility at time of Discharge:   Basic Mobility Inpatient Raw Score: 23  JH-HLM Goal: 7: Walk 25 feet or more  JH-HLM Achieved: 7: Walk 25 feet or more  HLM Goal achieved. Continue to encourage appropriate mobility.     Disposition:   Home    Planned Readmission: None    Discharge Medications:  See after visit summary for reconciled discharge medications provided to patient and/or family.       Administrative Statements   Discharge Statement:  I have spent a total time of 45 minutes in caring for this patient on the day of the visit/encounter.     **Please Note: This note may have been constructed using a voice recognition system**

## 2024-09-30 NOTE — PLAN OF CARE
Problem: Potential for Falls  Goal: Patient will remain free of falls  Description: INTERVENTIONS:  - Educate patient/family on patient safety including physical limitations  - Instruct patient to call for assistance with activity   - Consult OT/PT to assist with strengthening/mobility   - Keep Call bell within reach  - Keep bed low and locked with side rails adjusted as appropriate  - Keep care items and personal belongings within reach  - Initiate and maintain comfort rounds  - Make Fall Risk Sign visible to staff  - Offer Toileting every 4 Hours, in advance of need  - Initiate/Maintain bed/chair alarm  - Obtain necessary fall risk management equipment:   - Apply yellow socks and bracelet for high fall risk patients  - Consider moving patient to room near nurses station  Outcome: Progressing     Problem: GASTROINTESTINAL - ADULT  Goal: Minimal or absence of nausea and/or vomiting  Description: INTERVENTIONS:  - Administer IV fluids if ordered to ensure adequate hydration  - Maintain NPO status until nausea and vomiting are resolved  - Nasogastric tube if ordered  - Administer ordered antiemetic medications as needed  - Provide nonpharmacologic comfort measures as appropriate  - Advance diet as tolerated, if ordered  - Consider nutrition services referral to assist patient with adequate nutrition and appropriate food choices  Outcome: Progressing  Goal: Maintains or returns to baseline bowel function  Description: INTERVENTIONS:  - Assess bowel function  - Encourage oral fluids to ensure adequate hydration  - Administer IV fluids if ordered to ensure adequate hydration  - Administer ordered medications as needed  - Encourage mobilization and activity  - Consider nutritional services referral to assist patient with adequate nutrition and appropriate food choices  Outcome: Progressing  Goal: Maintains adequate nutritional intake  Description: INTERVENTIONS:  - Monitor percentage of each meal consumed  - Identify  factors contributing to decreased intake, treat as appropriate  - Assist with meals as needed  - Monitor I&O, weight, and lab values if indicated  - Obtain nutrition services referral as needed  Outcome: Progressing     Problem: PAIN - ADULT  Goal: Verbalizes/displays adequate comfort level or baseline comfort level  Description: Interventions:  - Encourage patient to monitor pain and request assistance  - Assess pain using appropriate pain scale  - Administer analgesics based on type and severity of pain and evaluate response  - Implement non-pharmacological measures as appropriate and evaluate response  - Consider cultural and social influences on pain and pain management  - Notify physician/advanced practitioner if interventions unsuccessful or patient reports new pain  Outcome: Progressing     Problem: INFECTION - ADULT  Goal: Absence or prevention of progression during hospitalization  Description: INTERVENTIONS:  - Assess and monitor for signs and symptoms of infection  - Monitor lab/diagnostic results  - Monitor all insertion sites, i.e. indwelling lines, tubes, and drains  - Monitor endotracheal if appropriate and nasal secretions for changes in amount and color  - [place] appropriate cooling/warming therapies per order  - Administer medications as ordered  - Instruct and encourage patient and family to use good hand hygiene technique  - Identify and instruct in appropriate isolation precautions for identified infection/condition  Outcome: Progressing

## 2024-09-30 NOTE — ASSESSMENT & PLAN NOTE
Patient has history of anxiety and depression  She denies any thoughts of self-harm, or harming others  Both she and her daughter indicate compliance with medications    Plan:  Continue Prozac 20 mg once daily

## 2024-09-30 NOTE — ASSESSMENT & PLAN NOTE
Patient has history of anemia, outpatient workup showed normal iron panel, iron 58, b12 wnll  Likely 2/2 to inadequate nutritional intake    Plan  Follow nutritionist recommendations  F/u outpatient with PCP

## 2024-09-30 NOTE — ASSESSMENT & PLAN NOTE
Patient has history of anxiety and depression  She denies any thoughts of self-harm, or harming others  Both she and her daughter indicate compliance with medications    Plan:  Continue Prozac 20 mg once daily  Will follow up with psychiatry outpatient

## 2024-09-30 NOTE — CASE MANAGEMENT
Case Management Discharge Planning Note    Patient name Sandra Peña  Location W /W -01 MRN [id number]  : 1940 Date 2024       Current Admission Date: 2024  Current Admission Diagnosis:Failure to thrive in adult   Patient Active Problem List    Diagnosis Date Noted Date Diagnosed    Failure to thrive in adult 2024     Anxiety and depression 2024     Need for assistance with personal care 2024     Major neurocognitive disorder (HCC) 2024     Anemia 2024     Stage 3 chronic kidney disease, unspecified whether stage 3a or 3b CKD (HCC) 2024     Constipation 2024     Major depressive disorder, recurrent episode, mild (HCC) 2024     Dry scalp 10/24/2022     Uric acid kidney stone 2022     Illness anxiety disorder 2022     Generalized anxiety disorder 2022     Chronic idiopathic constipation 2020     Generalized osteoarthritis 2020     Change in bowel habits 2018     Insomnia 2014     Adenomatous polyp of colon 2013     Primary hypertension 10/02/2013     Type 2 diabetes mellitus without complication, without long-term current use of insulin (HCC) 10/02/2013     Mixed hyperlipidemia 10/02/2013       LOS (days): 1  Geometric Mean LOS (GMLOS) (days): 2.6  Days to GMLOS:1.1     OBJECTIVE:  Risk of Unplanned Readmission Score: 24.37         Current admission status: Inpatient   Preferred Pharmacy:   [facility] [address] [address] - [address]  [address] [address]  Phone: [phone] Fax: [phone]    Primary Care Provider: Ramin De Paz DO    Primary Insurance: MEDICARE  Secondary Insurance: AARP    DISCHARGE DETAILS:    Discharge planning discussed with:: Patient and daughter     Comments - Freedom of Choice: Patient choice list for HH shared with patient and daughter. After review, [facility]  CM contacted family/caregiver?:  Yes  Were Treatment Team discharge recommendations reviewed with patient/caregiver?: Yes  Did patient/caregiver verbalize understanding of patient care needs?: Yes       Contacts  Patient Contacts: Janay Mckeon (daughter)  Relationship to Patient:: Family  Contact Method: In Person  Reason/Outcome: Discharge Planning    Requested Home Health Care         Is the patient interested in [facility] at discharge?: Yes  Home Health Discipline requested:: Occupational Therapy, Physical Therapy, Speech Language Pathology  Home Health Agency Name:: [facility] External Referral Reason (only applicable if external [facility] name selected): Scheduling access issues  Home Health Follow-Up Provider:: PCP  Home Health Services Needed:: Evaluate Functional Status and Safety, Gait/ADL Training, Strengthening/Theraputic Exercises to Improve Function  Homebound Criteria Met:: Requires the Assistance of Another Person for Safe Ambulation or to Leave the Home, Uses an Assist Device (i.e. cane, walker, etc)  Supporting Clincal Findings:: Dyspnea with Exertion, Limited Endurance, Fatigues Easliy in Short Distances         Other Referral/Resources/Interventions Provided:  Interventions: C  Referral Comments: [facility] reserved

## 2024-09-30 NOTE — TELEPHONE ENCOUNTER
After reading the discharge summary, I do not see need for physical, occupational or speech therapy.  I am quite certain that patient and daughter would not be agreeable to home health.  She was supposed to be going into [facility]

## 2024-09-30 NOTE — PROGRESS NOTES
Progress Note - Hospitalist   Name: Sandra Peña 83 y.o. female I MRN: [id number]  Unit/Bed#: W -01 I Date of Admission: 9/28/2024   Date of Service: 9/30/2024 I Hospital Day: 1    Assessment & Plan  Failure to thrive in adult  Patient presents with approximately 2 weeks of headache, nausea, generalized weakness, and constipation  Patient has food aversion indicating that she feels nauseous when she smells certain foods  She typically has oatmeal in the morning with soup in the afternoon with intermittent snacking for dinner  She indicates feeling more weak and lethargic over the last 2 weeks along with sleeping more than usual  She has had 3 admissions over the last month approximately for similar issues  Labs generally unremarkable other than and a magnesium of 1.8  Recently had TSH, and vitamin D checked her last admission about 2 weeks ago which were normal  Indicates is compliance with her home anxiety and depression medication    Plan:  Goals of care discussion  Continue mirtazapine 15 mg daily at bedtime  Constipation  Patient has had constipation over the last 2 weeks  Cannot remember when her last bowel movement was but claims it should be about a week ago  Had approximately 2 bowel movements since admission with digital disimpaction and enema placement    Plan:  Continue home Colace 100 mg twice daily  Continue Lubiprostone 24 mcg twice daily     Anxiety and depression  Patient has history of anxiety and depression  She denies any thoughts of self-harm, or harming others  Both she and her daughter indicate compliance with medications    Plan:  Continue Prozac 20 mg once daily  Anemia    Type 2 diabetes mellitus without complication, without long-term current use of insulin ([facility])  Lab Results   Component Value Date    HGBA1C 6.7 (H) 09/29/2024       Recent Labs     09/29/24  1556 09/29/24  2044 09/30/24  0744 09/30/24  1150   POCGLU 169* 113 121 140       Blood Sugar Average: Last 72 hrs:  (P)  141    Primary hypertension      VTE Pharmacologic Prophylaxis: VTE Score: 3 Moderate Risk (Score 3-4) - Pharmacological DVT Prophylaxis Ordered: enoxaparin (Lovenox).    Mobility:   Basic Mobility Inpatient Raw Score: 23  JH-HLM Goal: 7: Walk 25 feet or more  JH-HLM Achieved: 7: Walk 25 feet or more  JH-HLM Goal achieved. Continue to encourage appropriate mobility.    Patient Centered Rounds: I performed bedside rounds with nursing staff today.   Discussions with Specialists or Other Care Team Provider: Psychiatry, Nutritionist    Education and Discussions with Family / Patient: Updated  (daughter) at bedside.    Current Length of Stay: 1 day(s)  Current Patient Status: Inpatient   Certification Statement:   Discharge Plan: Anticipate discharge later today or tomorrow to home.    Code Status: Level 3 - DNAR and DNI    Subjective   Patient seen and evaluated yesterday. Patient states is overall feeling well, she states she was able to eat some of her dinner yesterday night, but no breakfast yet this morning. Her last bowel movement was yesterday morning but patient states she does not feel constipated. She denies any abdominal pain, n/v. Patient states she slept well overnight.     Objective     Vitals:   Temp (24hrs), Av.7 °F (36.5 °C), Min:97.1 °F (36.2 °C), Max:98.1 °F (36.7 °C)    Temp:  [97.1 °F (36.2 °C)-98.1 °F (36.7 °C)] 98 °F (36.7 °C)  HR:  [82-88] 88  Resp:  [18] 18  BP: (124-169)/(62-81) 169/81  SpO2:  [93 %-96 %] 96 %  Body mass index is 23.59 kg/m².     Input and Output Summary (last 24 hours):     Intake/Output Summary (Last 24 hours) at 2024 1401  Last data filed at 2024 0946  Gross per 24 hour   Intake 660 ml   Output --   Net 660 ml       Physical Exam  Constitutional:       General: She is not in acute distress.     Appearance: She is not ill-appearing.   Cardiovascular:      Rate and Rhythm: Normal rate and regular rhythm.   Pulmonary:      Effort: No respiratory  distress.      Breath sounds: No wheezing, rhonchi or rales.   Abdominal:      General: There is no distension.      Palpations: Abdomen is soft.      Tenderness: There is no abdominal tenderness.   Musculoskeletal:      Right lower leg: No edema.      Left lower leg: No edema.   Skin:     General: Skin is warm.   Neurological:      Mental Status: She is oriented to person, place, and time.          Lines/Drains:  Lines/Drains/Airways       Active Status       None                            Lab Results: I have reviewed the following results:   Results from last 7 days   Lab Units 09/30/24  0602 09/29/24  0502   WBC Thousand/uL 6.30 9.50   HEMOGLOBIN g/dL 10.7* 11.0*   HEMATOCRIT % 34.0* 34.7*   PLATELETS Thousands/uL 171 191   SEGS PCT %  --  86*   LYMPHO PCT %  --  8*   MONO PCT %  --  5   EOS PCT %  --  1     Results from last 7 days   Lab Units 09/30/24  0602 09/29/24  0502 09/28/24  2351   SODIUM mmol/L 138   < > 136   POTASSIUM mmol/L 4.1   < > 3.9   CHLORIDE mmol/L 107   < > 104   CO2 mmol/L 24   < > 24   BUN mg/dL 19   < > 27*   CREATININE mg/dL 0.96   < > 1.14   ANION GAP mmol/L 7   < > 8   CALCIUM mg/dL 8.9   < > 9.1   ALBUMIN g/dL  --   --  3.7   TOTAL BILIRUBIN mg/dL  --   --  0.35   ALK PHOS U/L  --   --  46   ALT U/L  --   --  7   AST U/L  --   --  13   GLUCOSE RANDOM mg/dL 126   < > 216*    < > = values in this interval not displayed.         Results from last 7 days   Lab Units 09/30/24  1150 09/30/24  0744 09/29/24  2044 09/29/24  1556 09/29/24  1003 09/29/24  0657   POC GLUCOSE mg/dl 140 121 113 169* 165* 138     Results from last 7 days   Lab Units 09/29/24  0502   HEMOGLOBIN A1C % 6.7*           Recent Cultures (last 7 days):         Imaging Review: No pertinent imaging studies reviewed.  Other Studies: No additional pertinent studies reviewed.    Last 24 Hours Medication List:     Current Facility-Administered Medications:     aspirin chewable tablet 81 mg, Daily    atorvastatin (LIPITOR) tablet  10 mg, Daily    Cholecalciferol (VITAMIN D3) tablet 2,000 Units, Daily    docusate sodium (COLACE) capsule 100 mg, BID    enoxaparin (LOVENOX) subcutaneous injection 30 mg, Daily    FLUoxetine (PROzac) capsule 20 mg, Daily With Breakfast    lisinopril (ZESTRIL) tablet 10 mg, Daily **AND** hydroCHLOROthiazide tablet 12.5 mg, Daily    hydrOXYzine HCL (ATARAX) tablet 10 mg, BID PRN    insulin lispro (HumALOG/ADMELOG) 100 units/mL subcutaneous injection 1-5 Units, 4x Daily (AC & HS) **AND** Fingerstick Glucose (POCT), 4x Daily AC and at bedtime    lubiprostone (AMITIZA) capsule 24 mcg, BID    mirtazapine (REMERON) tablet 15 mg, HS    ondansetron (ZOFRAN-ODT) dispersible tablet 4 mg, Q8H PRN    Administrative Statements   Today, Patient Was Seen By: Garland Najera MD    **Please Note: This note may have been constructed using a voice recognition system.**

## 2024-09-30 NOTE — TELEPHONE ENCOUNTER
Judy called and stated patient is getting discharged today from [facility]. Judy calling to verify if provider will sign off on home care orders for physical ,occupational and speech therapy. Please advise.

## 2024-09-30 NOTE — PLAN OF CARE
Problem: Potential for Falls  Goal: Patient will remain free of falls  Description: INTERVENTIONS:  - Educate patient/family on patient safety including physical limitations  - Instruct patient to call for assistance with activity   - Consult OT/PT to assist with strengthening/mobility   - Keep Call bell within reach  - Keep bed low and locked with side rails adjusted as appropriate  - Keep care items and personal belongings within reach  - Initiate and maintain comfort rounds  - Make Fall Risk Sign visible to staff  - Apply yellow socks and bracelet for high fall risk patients  - Consider moving patient to room near nurses station  Outcome: Progressing     Problem: GASTROINTESTINAL - ADULT  Goal: Minimal or absence of nausea and/or vomiting  Description: INTERVENTIONS:  - Administer IV fluids if ordered to ensure adequate hydration  - Maintain NPO status until nausea and vomiting are resolved  - Nasogastric tube if ordered  - Administer ordered antiemetic medications as needed  - Provide nonpharmacologic comfort measures as appropriate  - Advance diet as tolerated, if ordered  - Consider nutrition services referral to assist patient with adequate nutrition and appropriate food choices  Outcome: Progressing  Goal: Maintains or returns to baseline bowel function  Description: INTERVENTIONS:  - Assess bowel function  - Encourage oral fluids to ensure adequate hydration  - Administer IV fluids if ordered to ensure adequate hydration  - Administer ordered medications as needed  - Encourage mobilization and activity  - Consider nutritional services referral to assist patient with adequate nutrition and appropriate food choices  Outcome: Progressing  Goal: Maintains adequate nutritional intake  Description: INTERVENTIONS:  - Monitor percentage of each meal consumed  - Identify factors contributing to decreased intake, treat as appropriate  - Assist with meals as needed  - Monitor I&O, weight, and lab values if  indicated  - Obtain nutrition services referral as needed  Outcome: Progressing     Problem: PAIN - ADULT  Goal: Verbalizes/displays adequate comfort level or baseline comfort level  Description: Interventions:  - Encourage patient to monitor pain and request assistance  - Assess pain using appropriate pain scale  - Administer analgesics based on type and severity of pain and evaluate response  - Implement non-pharmacological measures as appropriate and evaluate response  - Consider cultural and social influences on pain and pain management  - Notify physician/advanced practitioner if interventions unsuccessful or patient reports new pain  Outcome: Progressing     Problem: INFECTION - ADULT  Goal: Absence or prevention of progression during hospitalization  Description: INTERVENTIONS:  - Assess and monitor for signs and symptoms of infection  - Monitor lab/diagnostic results  - Monitor all insertion sites, i.e. indwelling lines, tubes, and drains  - Monitor endotracheal if appropriate and nasal secretions for changes in amount and color  - [place] appropriate cooling/warming therapies per order  - Administer medications as ordered  - Instruct and encourage patient and family to use good hand hygiene technique  - Identify and instruct in appropriate isolation precautions for identified infection/condition  Outcome: Progressing     Problem: SAFETY ADULT  Goal: Patient will remain free of falls  Description: INTERVENTIONS:  - Educate patient/family on patient safety including physical limitations  - Instruct patient to call for assistance with activity   - Consult OT/PT to assist with strengthening/mobility   - Keep Call bell within reach  - Keep bed low and locked with side rails adjusted as appropriate  - Keep care items and personal belongings within reach  - Initiate and maintain comfort rounds  - Make Fall Risk Sign visible to staff  - Apply yellow socks and bracelet for high fall risk patients  - Consider  moving patient to room near nurses station  Outcome: Progressing  Goal: Maintain or return to baseline ADL function  Description: INTERVENTIONS:  - Educate patient/family on patient safety including physical limitations  - Instruct patient to call for assistance with activity   - Consult OT/PT to assist with strengthening/mobility   - Keep Call bell within reach  - Keep bed low and locked with side rails adjusted as appropriate  - Keep care items and personal belongings within reach  - Initiate and maintain comfort rounds  - Make Fall Risk Sign visible to staff  - Apply yellow socks and bracelet for high fall risk patients  - Consider moving patient to room near nurses station  Outcome: Progressing  Goal: Maintains/Returns to pre admission functional level  Description: INTERVENTIONS:  -  Assess patient's ability to carry out ADLs; assess patient's baseline for ADL function and identify physical deficits which impact ability to perform ADLs (bathing, care of mouth/teeth, toileting, grooming, dressing, etc.)  - Assess/evaluate cause of self-care deficits   - Assess range of motion  - Assess patient's mobility; develop plan if impaired  - Assess patient's need for assistive devices and provide as appropriate  - Encourage maximum independence but intervene and supervise when necessary  - Involve family in performance of ADLs  - Assess for home care needs following discharge   - Consider OT consult to assist with ADL evaluation and planning for discharge  - Provide patient education as appropriate  Outcome: Progressing     Problem: DISCHARGE PLANNING  Goal: Discharge to home or other facility with appropriate resources  Description: INTERVENTIONS:  - Identify barriers to discharge w/patient and caregiver  - Arrange for needed discharge resources and transportation as appropriate  - Identify discharge learning needs (meds, wound care, etc.)  - Arrange for interpretive services to assist at discharge as needed  - Refer to  Case Management Department for coordinating discharge planning if the patient needs post-hospital services based on physician/advanced practitioner order or complex needs related to functional status, cognitive ability, or social support system  Outcome: Progressing     Problem: Knowledge Deficit  Goal: Patient/family/caregiver demonstrates understanding of disease process, treatment plan, medications, and discharge instructions  Description: Complete learning assessment and assess knowledge base.  Interventions:  - Provide teaching at level of understanding  - Provide teaching via preferred learning methods  Outcome: Progressing

## 2024-09-30 NOTE — ASSESSMENT & PLAN NOTE
Patient presents with approximately 2 weeks of headache, nausea, generalized weakness, and constipation  Patient has food aversion indicating that she feels nauseous when she smells certain foods  She typically has oatmeal in the morning with soup in the afternoon with intermittent snacking for dinner  She indicates feeling more weak and lethargic over the last 2 weeks along with sleeping more than usual  She has had 3 admissions over the last month approximately for similar issues  Labs generally unremarkable other than and a magnesium of 1.8  Recently had TSH, and vitamin D checked her last admission about 2 weeks ago which were normal  Indicates is compliance with her home anxiety and depression medication    Plan:  Continue mirtazapine 15 mg daily at bedtime  Recommended holding metoprolol at discharge.  Follow nutritionist recommendations  Patient will follow up with PCP within one week of discharge

## 2024-10-01 ENCOUNTER — TELEPHONE (OUTPATIENT)
Age: 84
End: 2024-10-01

## 2024-10-01 DIAGNOSIS — F41.1 GENERALIZED ANXIETY DISORDER: ICD-10-CM

## 2024-10-01 DIAGNOSIS — F45.21 ILLNESS ANXIETY DISORDER: ICD-10-CM

## 2024-10-01 DIAGNOSIS — F33.0 MAJOR DEPRESSIVE DISORDER, RECURRENT EPISODE, MILD (HCC): ICD-10-CM

## 2024-10-01 NOTE — TELEPHONE ENCOUNTER
Spoke to daughter.  They are only looking for physical therapy and speech therapy.  She is not having difficulty swallowing however she has had decreased appetite.  I will sign those orders

## 2024-10-01 NOTE — TELEPHONE ENCOUNTER
Nurse spoke with Sandra's daughter Janay ([phone]) - per Janay, Sandra was admitted on Saturday night to [facility] r/t SOB and failure to thrive, was d/c yesterday to home. Today Sandra is very anxious, over thinking, sleeping OK but did wet the bed last night and per Janay - that is very unusual. Janay states Atarax is not helping  - asking if anything else can be prescribed?         Janay states during the admission both Seroquel and melatonin were discontinued.      Please advise.

## 2024-10-01 NOTE — TELEPHONE ENCOUNTER
Patient daughter called office stating that patient was admitted and now currently home but still not functioning right. Daughter wants to know if there anything else patient can get prescribed until she is seen so daughter doesn't have to take her back to the hospital. Daughter can be reached at [phone]

## 2024-10-01 NOTE — TELEPHONE ENCOUNTER
Per judy from [facility] stating that patient is not going to [facility] . Patient is currently home and Judy would like office to confirm with daughter what kind of care she would agree too and if Dr. De Paz agrees would he sign off on this.?    Marianela Parker

## 2024-10-01 NOTE — TELEPHONE ENCOUNTER
Judy from [facility] called informed Dr De Paz will sign orders.

## 2024-10-02 ENCOUNTER — TELEPHONE (OUTPATIENT)
Age: 84
End: 2024-10-02

## 2024-10-02 RX ORDER — MIRTAZAPINE 15 MG/1
7.5 TABLET, FILM COATED ORAL
Qty: 30 TABLET | Refills: 0 | Status: SHIPPED | OUTPATIENT
Start: 2024-10-02 | End: 2024-10-07

## 2024-10-02 RX ORDER — TRAZODONE HYDROCHLORIDE 50 MG/1
25 TABLET, FILM COATED ORAL
Qty: 15 TABLET | Refills: 0 | Status: SHIPPED | OUTPATIENT
Start: 2024-10-02

## 2024-10-02 NOTE — TELEPHONE ENCOUNTER
Discussed with patient's daughter. At this time will reduce mirtazapine to 7.5 and begin trazodone 25 to try to regulate sleep. Will discuss further at appointment.

## 2024-10-02 NOTE — TELEPHONE ENCOUNTER
Asad with [facility] called states started home care with patient 10/2/2024. Just got out of hospital. Would like a verbal order for a nurse to go in help patient with her psych issues/anxiety/depression. Asad can be reached at [phone]

## 2024-10-03 ENCOUNTER — HOSPITAL ENCOUNTER (INPATIENT)
Facility: HOSPITAL | Age: 84
LOS: 1 days | Discharge: HOME HEALTH | DRG: 390 | End: 2024-10-05
Attending: EMERGENCY MEDICINE | Admitting: HOSPITALIST
Payer: MEDICARE

## 2024-10-03 DIAGNOSIS — R63.0 DECREASED APPETITE: ICD-10-CM

## 2024-10-03 DIAGNOSIS — K59.04 CHRONIC IDIOPATHIC CONSTIPATION: ICD-10-CM

## 2024-10-03 DIAGNOSIS — K52.89 STERCORAL COLITIS: Primary | ICD-10-CM

## 2024-10-03 PROCEDURE — 99285 EMERGENCY DEPT VISIT HI MDM: CPT | Performed by: EMERGENCY MEDICINE

## 2024-10-03 PROCEDURE — 99284 EMERGENCY DEPT VISIT MOD MDM: CPT

## 2024-10-03 RX ORDER — ONDANSETRON 2 MG/ML
4 INJECTION INTRAMUSCULAR; INTRAVENOUS ONCE
Status: COMPLETED | OUTPATIENT
Start: 2024-10-04 | End: 2024-10-04

## 2024-10-03 RX ORDER — LIDOCAINE HYDROCHLORIDE 20 MG/ML
15 SOLUTION OROPHARYNGEAL ONCE
Status: COMPLETED | OUTPATIENT
Start: 2024-10-04 | End: 2024-10-04

## 2024-10-03 NOTE — TELEPHONE ENCOUNTER
Know, she has a psychiatrist.  If psychiatrist would like for a nurse to go into her home to provide psychiatric services, he can

## 2024-10-03 NOTE — LETTER
Thank you for allowing us to participate in the care of your patient, Sandra Peña, who was hospitalized from 10/03/24 through 10/6/2024 with the admitting diagnosis of Constipation.      Medication Changes:  Use glycerin suppositories scheduled every other day, can use daily as needed if she does have abdominal pain due to constipation   Take metamucil  daily.   Continue to use Miralax 17g daily.   Continue Linaclotide daily   Can take Lactulose 20mg twice per day as needed is she has not had a bowel movement in 3 days  Start taking Protonix 40mg daily  Stop Metoformin    Outpatient testing recommended:  Follow up with Dr. Gutierrez for mood medication changes  Follow up with dentist for cavities     If you have any additional questions or would like to discuss further, please feel free to contact me.    Surekha Warner, DO  [facility], Hospitalist  [phone]

## 2024-10-04 ENCOUNTER — APPOINTMENT (EMERGENCY)
Dept: CT IMAGING | Facility: HOSPITAL | Age: 84
DRG: 390 | End: 2024-10-04
Payer: MEDICARE

## 2024-10-04 LAB
ALBUMIN SERPL BCG-MCNC: 3.8 G/DL (ref 3.5–5)
ALP SERPL-CCNC: 58 U/L (ref 34–104)
ALT SERPL W P-5'-P-CCNC: 20 U/L (ref 7–52)
ANION GAP SERPL CALCULATED.3IONS-SCNC: 7 MMOL/L (ref 4–13)
ANION GAP SERPL CALCULATED.3IONS-SCNC: 7 MMOL/L (ref 4–13)
AST SERPL W P-5'-P-CCNC: 28 U/L (ref 13–39)
BASOPHILS # BLD AUTO: 0.04 THOUSANDS/ΜL (ref 0–0.1)
BASOPHILS # BLD AUTO: 0.05 THOUSANDS/ΜL (ref 0–0.1)
BASOPHILS NFR BLD AUTO: 1 % (ref 0–1)
BASOPHILS NFR BLD AUTO: 1 % (ref 0–1)
BILIRUB SERPL-MCNC: 0.32 MG/DL (ref 0.2–1)
BUN SERPL-MCNC: 29 MG/DL (ref 5–25)
BUN SERPL-MCNC: 34 MG/DL (ref 5–25)
CALCIUM SERPL-MCNC: 8.7 MG/DL (ref 8.4–10.2)
CALCIUM SERPL-MCNC: 9.3 MG/DL (ref 8.4–10.2)
CHLORIDE SERPL-SCNC: 102 MMOL/L (ref 96–108)
CHLORIDE SERPL-SCNC: 103 MMOL/L (ref 96–108)
CO2 SERPL-SCNC: 24 MMOL/L (ref 21–32)
CO2 SERPL-SCNC: 28 MMOL/L (ref 21–32)
CREAT SERPL-MCNC: 1.05 MG/DL (ref 0.6–1.3)
CREAT SERPL-MCNC: 1.15 MG/DL (ref 0.6–1.3)
EOSINOPHIL # BLD AUTO: 0.12 THOUSAND/ΜL (ref 0–0.61)
EOSINOPHIL # BLD AUTO: 0.18 THOUSAND/ΜL (ref 0–0.61)
EOSINOPHIL NFR BLD AUTO: 2 % (ref 0–6)
EOSINOPHIL NFR BLD AUTO: 3 % (ref 0–6)
ERYTHROCYTE [DISTWIDTH] IN BLOOD BY AUTOMATED COUNT: 12.7 % (ref 11.6–15.1)
ERYTHROCYTE [DISTWIDTH] IN BLOOD BY AUTOMATED COUNT: 12.9 % (ref 11.6–15.1)
GFR SERPL CREATININE-BSD FRML MDRD: 43 ML/MIN/1.73SQ M
GFR SERPL CREATININE-BSD FRML MDRD: 48 ML/MIN/1.73SQ M
GLUCOSE SERPL-MCNC: 124 MG/DL (ref 65–140)
GLUCOSE SERPL-MCNC: 131 MG/DL (ref 65–140)
GLUCOSE SERPL-MCNC: 137 MG/DL (ref 65–140)
GLUCOSE SERPL-MCNC: 153 MG/DL (ref 65–140)
GLUCOSE SERPL-MCNC: 168 MG/DL (ref 65–140)
HCT VFR BLD AUTO: 35.2 % (ref 34.8–46.1)
HCT VFR BLD AUTO: 36.7 % (ref 34.8–46.1)
HGB BLD-MCNC: 11 G/DL (ref 11.5–15.4)
HGB BLD-MCNC: 11.3 G/DL (ref 11.5–15.4)
IMM GRANULOCYTES # BLD AUTO: 0.02 THOUSAND/UL (ref 0–0.2)
IMM GRANULOCYTES # BLD AUTO: 0.03 THOUSAND/UL (ref 0–0.2)
IMM GRANULOCYTES NFR BLD AUTO: 0 % (ref 0–2)
IMM GRANULOCYTES NFR BLD AUTO: 0 % (ref 0–2)
LACTATE SERPL-SCNC: 2 MMOL/L (ref 0.5–2)
LACTATE SERPL-SCNC: 2.2 MMOL/L (ref 0.5–2)
LACTATE SERPL-SCNC: 2.2 MMOL/L (ref 0.5–2)
LYMPHOCYTES # BLD AUTO: 1.42 THOUSANDS/ΜL (ref 0.6–4.47)
LYMPHOCYTES # BLD AUTO: 1.85 THOUSANDS/ΜL (ref 0.6–4.47)
LYMPHOCYTES NFR BLD AUTO: 18 % (ref 14–44)
LYMPHOCYTES NFR BLD AUTO: 32 % (ref 14–44)
MCH RBC QN AUTO: 28.9 PG (ref 26.8–34.3)
MCH RBC QN AUTO: 29 PG (ref 26.8–34.3)
MCHC RBC AUTO-ENTMCNC: 30 G/DL (ref 31.4–37.4)
MCHC RBC AUTO-ENTMCNC: 32.1 G/DL (ref 31.4–37.4)
MCV RBC AUTO: 90 FL (ref 82–98)
MCV RBC AUTO: 96 FL (ref 82–98)
MONOCYTES # BLD AUTO: 0.42 THOUSAND/ΜL (ref 0.17–1.22)
MONOCYTES # BLD AUTO: 0.48 THOUSAND/ΜL (ref 0.17–1.22)
MONOCYTES NFR BLD AUTO: 5 % (ref 4–12)
MONOCYTES NFR BLD AUTO: 8 % (ref 4–12)
NEUTROPHILS # BLD AUTO: 3.21 THOUSANDS/ΜL (ref 1.85–7.62)
NEUTROPHILS # BLD AUTO: 5.74 THOUSANDS/ΜL (ref 1.85–7.62)
NEUTS SEG NFR BLD AUTO: 56 % (ref 43–75)
NEUTS SEG NFR BLD AUTO: 74 % (ref 43–75)
NRBC BLD AUTO-RTO: 0 /100 WBCS
NRBC BLD AUTO-RTO: 0 /100 WBCS
PLATELET # BLD AUTO: 221 THOUSANDS/UL (ref 149–390)
PLATELET # BLD AUTO: 256 THOUSANDS/UL (ref 149–390)
PMV BLD AUTO: 10.4 FL (ref 8.9–12.7)
PMV BLD AUTO: 10.9 FL (ref 8.9–12.7)
POTASSIUM SERPL-SCNC: 4.4 MMOL/L (ref 3.5–5.3)
POTASSIUM SERPL-SCNC: 4.5 MMOL/L (ref 3.5–5.3)
PROT SERPL-MCNC: 6.4 G/DL (ref 6.4–8.4)
RBC # BLD AUTO: 3.81 MILLION/UL (ref 3.81–5.12)
RBC # BLD AUTO: 3.9 MILLION/UL (ref 3.81–5.12)
SODIUM SERPL-SCNC: 134 MMOL/L (ref 135–147)
SODIUM SERPL-SCNC: 137 MMOL/L (ref 135–147)
WBC # BLD AUTO: 5.78 THOUSAND/UL (ref 4.31–10.16)
WBC # BLD AUTO: 7.78 THOUSAND/UL (ref 4.31–10.16)

## 2024-10-04 PROCEDURE — 36415 COLL VENOUS BLD VENIPUNCTURE: CPT | Performed by: EMERGENCY MEDICINE

## 2024-10-04 PROCEDURE — 99222 1ST HOSP IP/OBS MODERATE 55: CPT | Performed by: FAMILY MEDICINE

## 2024-10-04 PROCEDURE — 85025 COMPLETE CBC W/AUTO DIFF WBC: CPT | Performed by: EMERGENCY MEDICINE

## 2024-10-04 PROCEDURE — 96365 THER/PROPH/DIAG IV INF INIT: CPT

## 2024-10-04 PROCEDURE — 96375 TX/PRO/DX INJ NEW DRUG ADDON: CPT

## 2024-10-04 PROCEDURE — 80048 BASIC METABOLIC PNL TOTAL CA: CPT

## 2024-10-04 PROCEDURE — 85025 COMPLETE CBC W/AUTO DIFF WBC: CPT

## 2024-10-04 PROCEDURE — 83605 ASSAY OF LACTIC ACID: CPT | Performed by: EMERGENCY MEDICINE

## 2024-10-04 PROCEDURE — 74177 CT ABD & PELVIS W/CONTRAST: CPT

## 2024-10-04 PROCEDURE — 83605 ASSAY OF LACTIC ACID: CPT

## 2024-10-04 PROCEDURE — 80053 COMPREHEN METABOLIC PANEL: CPT | Performed by: EMERGENCY MEDICINE

## 2024-10-04 PROCEDURE — 82948 REAGENT STRIP/BLOOD GLUCOSE: CPT

## 2024-10-04 RX ORDER — LACTULOSE 10 G/15ML
10 SOLUTION ORAL 2 TIMES DAILY
Status: DISCONTINUED | OUTPATIENT
Start: 2024-10-04 | End: 2024-10-05 | Stop reason: HOSPADM

## 2024-10-04 RX ORDER — POLYETHYLENE GLYCOL 3350 17 G/17G
17 POWDER, FOR SOLUTION ORAL 2 TIMES DAILY
Status: DISCONTINUED | OUTPATIENT
Start: 2024-10-04 | End: 2024-10-05 | Stop reason: HOSPADM

## 2024-10-04 RX ORDER — DOCUSATE SODIUM 100 MG/1
100 CAPSULE, LIQUID FILLED ORAL 2 TIMES DAILY
Status: DISCONTINUED | OUTPATIENT
Start: 2024-10-04 | End: 2024-10-05 | Stop reason: HOSPADM

## 2024-10-04 RX ORDER — BISACODYL 10 MG
10 SUPPOSITORY, RECTAL RECTAL DAILY
Status: DISCONTINUED | OUTPATIENT
Start: 2024-10-04 | End: 2024-10-04

## 2024-10-04 RX ORDER — ACETAMINOPHEN 325 MG/1
975 TABLET ORAL EVERY 6 HOURS PRN
Status: DISCONTINUED | OUTPATIENT
Start: 2024-10-04 | End: 2024-10-05 | Stop reason: HOSPADM

## 2024-10-04 RX ORDER — SODIUM CHLORIDE, SODIUM GLUCONATE, SODIUM ACETATE, POTASSIUM CHLORIDE, MAGNESIUM CHLORIDE, SODIUM PHOSPHATE, DIBASIC, AND POTASSIUM PHOSPHATE .53; .5; .37; .037; .03; .012; .00082 G/100ML; G/100ML; G/100ML; G/100ML; G/100ML; G/100ML; G/100ML
75 INJECTION, SOLUTION INTRAVENOUS CONTINUOUS
Status: DISPENSED | OUTPATIENT
Start: 2024-10-04 | End: 2024-10-05

## 2024-10-04 RX ORDER — METRONIDAZOLE 500 MG/100ML
500 INJECTION, SOLUTION INTRAVENOUS EVERY 8 HOURS
Status: DISCONTINUED | OUTPATIENT
Start: 2024-10-04 | End: 2024-10-05

## 2024-10-04 RX ORDER — POLYETHYLENE GLYCOL 3350 17 G/17G
17 POWDER, FOR SOLUTION ORAL DAILY
Status: DISCONTINUED | OUTPATIENT
Start: 2024-10-04 | End: 2024-10-04

## 2024-10-04 RX ORDER — LABETALOL HYDROCHLORIDE 5 MG/ML
10 INJECTION, SOLUTION INTRAVENOUS EVERY 4 HOURS PRN
Status: DISCONTINUED | OUTPATIENT
Start: 2024-10-04 | End: 2024-10-05 | Stop reason: HOSPADM

## 2024-10-04 RX ORDER — ENOXAPARIN SODIUM 100 MG/ML
30 INJECTION SUBCUTANEOUS DAILY
Status: DISCONTINUED | OUTPATIENT
Start: 2024-10-04 | End: 2024-10-05 | Stop reason: HOSPADM

## 2024-10-04 RX ORDER — INSULIN LISPRO 100 [IU]/ML
1-5 INJECTION, SOLUTION INTRAVENOUS; SUBCUTANEOUS
Status: DISCONTINUED | OUTPATIENT
Start: 2024-10-04 | End: 2024-10-05 | Stop reason: HOSPADM

## 2024-10-04 RX ORDER — HYDROCHLOROTHIAZIDE 12.5 MG/1
12.5 TABLET ORAL DAILY
Status: DISCONTINUED | OUTPATIENT
Start: 2024-10-04 | End: 2024-10-05 | Stop reason: HOSPADM

## 2024-10-04 RX ORDER — MIRTAZAPINE 15 MG/1
7.5 TABLET, FILM COATED ORAL
Status: DISCONTINUED | OUTPATIENT
Start: 2024-10-04 | End: 2024-10-05 | Stop reason: HOSPADM

## 2024-10-04 RX ORDER — LACTULOSE 10 G/15ML
20 SOLUTION ORAL 2 TIMES DAILY
Status: DISCONTINUED | OUTPATIENT
Start: 2024-10-04 | End: 2024-10-04

## 2024-10-04 RX ORDER — TRAZODONE HYDROCHLORIDE 50 MG/1
25 TABLET, FILM COATED ORAL
Status: DISCONTINUED | OUTPATIENT
Start: 2024-10-04 | End: 2024-10-05 | Stop reason: HOSPADM

## 2024-10-04 RX ORDER — ASPIRIN 81 MG/1
81 TABLET, CHEWABLE ORAL DAILY
Status: DISCONTINUED | OUTPATIENT
Start: 2024-10-04 | End: 2024-10-05 | Stop reason: HOSPADM

## 2024-10-04 RX ORDER — BISACODYL 10 MG
10 SUPPOSITORY, RECTAL RECTAL DAILY PRN
Status: DISCONTINUED | OUTPATIENT
Start: 2024-10-04 | End: 2024-10-05 | Stop reason: HOSPADM

## 2024-10-04 RX ORDER — SENNOSIDES 8.6 MG
1 TABLET ORAL
Status: DISCONTINUED | OUTPATIENT
Start: 2024-10-04 | End: 2024-10-05 | Stop reason: HOSPADM

## 2024-10-04 RX ORDER — HYDROXYZINE HYDROCHLORIDE 10 MG/1
10 TABLET, FILM COATED ORAL 2 TIMES DAILY PRN
Status: DISCONTINUED | OUTPATIENT
Start: 2024-10-04 | End: 2024-10-05 | Stop reason: HOSPADM

## 2024-10-04 RX ORDER — LUBIPROSTONE 24 UG/1
24 CAPSULE ORAL 2 TIMES DAILY
Status: DISCONTINUED | OUTPATIENT
Start: 2024-10-04 | End: 2024-10-05 | Stop reason: HOSPADM

## 2024-10-04 RX ORDER — LISINOPRIL 10 MG/1
10 TABLET ORAL DAILY
Status: DISCONTINUED | OUTPATIENT
Start: 2024-10-04 | End: 2024-10-05 | Stop reason: HOSPADM

## 2024-10-04 RX ORDER — ATORVASTATIN CALCIUM 10 MG/1
10 TABLET, FILM COATED ORAL DAILY
Status: DISCONTINUED | OUTPATIENT
Start: 2024-10-04 | End: 2024-10-05 | Stop reason: HOSPADM

## 2024-10-04 RX ORDER — ONDANSETRON 4 MG/1
4 TABLET, ORALLY DISINTEGRATING ORAL EVERY 8 HOURS PRN
Status: DISCONTINUED | OUTPATIENT
Start: 2024-10-04 | End: 2024-10-05 | Stop reason: HOSPADM

## 2024-10-04 RX ADMIN — Medication 1 PACKET: at 13:09

## 2024-10-04 RX ADMIN — LISINOPRIL 10 MG: 10 TABLET ORAL at 09:57

## 2024-10-04 RX ADMIN — ACETAMINOPHEN 975 MG: 325 TABLET ORAL at 21:02

## 2024-10-04 RX ADMIN — ENOXAPARIN SODIUM 30 MG: 30 INJECTION SUBCUTANEOUS at 10:12

## 2024-10-04 RX ADMIN — METRONIDAZOLE 500 MG: 500 INJECTION, SOLUTION INTRAVENOUS at 17:28

## 2024-10-04 RX ADMIN — LUBIPROSTONE 24 MCG: 24 CAPSULE, GELATIN COATED ORAL at 10:08

## 2024-10-04 RX ADMIN — SODIUM CHLORIDE, SODIUM GLUCONATE, SODIUM ACETATE, POTASSIUM CHLORIDE, MAGNESIUM CHLORIDE, SODIUM PHOSPHATE, DIBASIC, AND POTASSIUM PHOSPHATE 75 ML/HR: .53; .5; .37; .037; .03; .012; .00082 INJECTION, SOLUTION INTRAVENOUS at 03:31

## 2024-10-04 RX ADMIN — ASPIRIN 81 MG 81 MG: 81 TABLET ORAL at 09:57

## 2024-10-04 RX ADMIN — METRONIDAZOLE 500 MG: 500 INJECTION, SOLUTION INTRAVENOUS at 10:13

## 2024-10-04 RX ADMIN — LUBIPROSTONE 24 MCG: 24 CAPSULE, GELATIN COATED ORAL at 21:06

## 2024-10-04 RX ADMIN — SODIUM CHLORIDE 500 ML: 0.9 INJECTION, SOLUTION INTRAVENOUS at 00:12

## 2024-10-04 RX ADMIN — HYDROCHLOROTHIAZIDE 12.5 MG: 12.5 TABLET ORAL at 09:57

## 2024-10-04 RX ADMIN — LACTULOSE 10 G: 20 SOLUTION ORAL at 17:29

## 2024-10-04 RX ADMIN — SENNOSIDES 8.6 MG: 8.6 TABLET, FILM COATED ORAL at 03:30

## 2024-10-04 RX ADMIN — BISACODYL 10 MG: 10 SUPPOSITORY RECTAL at 09:57

## 2024-10-04 RX ADMIN — ONDANSETRON 4 MG: 2 INJECTION INTRAMUSCULAR; INTRAVENOUS at 00:12

## 2024-10-04 RX ADMIN — SENNOSIDES 8.6 MG: 8.6 TABLET, FILM COATED ORAL at 21:06

## 2024-10-04 RX ADMIN — CEFTRIAXONE 1000 MG: 2 INJECTION, POWDER, FOR SOLUTION INTRAMUSCULAR; INTRAVENOUS at 11:33

## 2024-10-04 RX ADMIN — FLUOXETINE 20 MG: 20 CAPSULE ORAL at 09:57

## 2024-10-04 RX ADMIN — MIRTAZAPINE 7.5 MG: 15 TABLET, FILM COATED ORAL at 21:02

## 2024-10-04 RX ADMIN — LUBIPROSTONE 24 MCG: 24 CAPSULE, GELATIN COATED ORAL at 03:30

## 2024-10-04 RX ADMIN — IOHEXOL 80 ML: 350 INJECTION, SOLUTION INTRAVENOUS at 00:34

## 2024-10-04 RX ADMIN — ONDANSETRON 4 MG: 4 TABLET, ORALLY DISINTEGRATING ORAL at 16:36

## 2024-10-04 RX ADMIN — POLYETHYLENE GLYCOL 3350 17 G: 17 POWDER, FOR SOLUTION ORAL at 09:58

## 2024-10-04 RX ADMIN — LACTULOSE 10 G: 20 SOLUTION ORAL at 09:57

## 2024-10-04 RX ADMIN — INSULIN LISPRO 1 UNITS: 100 INJECTION, SOLUTION INTRAVENOUS; SUBCUTANEOUS at 11:35

## 2024-10-04 RX ADMIN — ATORVASTATIN CALCIUM 10 MG: 10 TABLET, FILM COATED ORAL at 09:57

## 2024-10-04 RX ADMIN — LIDOCAINE HYDROCHLORIDE 15 ML: 20 SOLUTION ORAL at 01:39

## 2024-10-04 RX ADMIN — DOCUSATE SODIUM 100 MG: 100 CAPSULE, LIQUID FILLED ORAL at 17:29

## 2024-10-04 RX ADMIN — DOCUSATE SODIUM 100 MG: 100 CAPSULE, LIQUID FILLED ORAL at 09:57

## 2024-10-04 RX ADMIN — TRAZODONE HYDROCHLORIDE 25 MG: 50 TABLET ORAL at 21:03

## 2024-10-04 RX ADMIN — Medication 2000 UNITS: at 09:57

## 2024-10-04 RX ADMIN — POLYETHYLENE GLYCOL 3350 17 G: 17 POWDER, FOR SOLUTION ORAL at 17:28

## 2024-10-04 RX ADMIN — Medication 730 MG: at 00:35

## 2024-10-04 RX ADMIN — HYDROXYZINE HYDROCHLORIDE 10 MG: 10 TABLET ORAL at 21:02

## 2024-10-04 RX ADMIN — ACETAMINOPHEN 975 MG: 325 TABLET ORAL at 11:56

## 2024-10-04 NOTE — H&P
H&P - Hospitalist   Name: Sandra Peña 83 y.o. female I MRN: [id number]  Unit/Bed#: ED-24 I Date of Admission: 10/3/2024   Date of Service: 10/4/2024 I Hospital Day: 0     Assessment & Plan  Constipation  83-year-old female with Hx of T2DM, CKD St. III, Anxiety, Depression, food aversion, and chronic constipation presents to the ED with constipation. Patient has had 4 admissions over the poast 1.5 months for constipation or failure to thrive, the most recent from 9/28-9/30. Patient's last BM was on 9/30 during her last admission, during which she received multiple enemas and escalation of bowel care. Patient has been taking colace, linzess, and miralax daily since discharge, also took milk of magnesia earlier today without results. Patient also feels as though she has not been eating or drinking as much as she should over the past month, partially due to the chronic constipation, but also an aversion to food. On arrival to the ED patient was hypertensive at 194/84 and noted to have elevated lactic acid at 2.2. CT AP performed showed rectal fecal impaction with mild inflammatory changes about the rectum suggesting stercoral colitis.     Home med: Colace 100 mg BID, Linzess 145 mcg QD, Miralax, Milk of Magnesia    Patient has history of chronic constipation with poor oral intake which is likely exacerbating the constipation. CT AP shows stercoral colitis. Elevated lactic acid likely iso dehydration, but Ddx enterocolitis, IBD, or colonic mass.    Plan:  Continue colace 100 mg BID  Start Lubiprostone 24 mcg BID  Senna 1 tab QD  Miralax 17g QD  Soap suds enema  Consider escalating to lactulose enema if no results  Lactulose 10 g BID  Can increase as needed  Encourage increased oral intake of fluids  Primary hypertension  Home med: Lisinpril-HCTZ 10-12.5 mg QD  Blood Pressure: 136/61    Plan:  Lisinopril 10 mg QD  HCTZ 12.5 mg QD  Labetalol 10 mg prn if SBP >180  Type 2 diabetes mellitus without complication, without  "long-term current use of insulin (HCC)  Lab Results   Component Value Date    HGBA1C 6.7 (H) 09/29/2024       No results for input(s): \"POCGLU\" in the last 72 hours.    Blood Sugar Average: Last 72 hrs:  Home Med: Metformin 500 mg BID    Plan:  Hold metformin  SSI, algorithm 1  Mixed hyperlipidemia  Home med: atorvastatin 10 mg    .Plan:  Atorvastatin 10 mg QD  Anxiety and depression  Mood stable.  Home meds: Mirtazapine 7.5 mg QD, Trazodone 25 mg QHS, Fluoxetine 20 mg QD. Hydroxyzine 10 mg prn    Plan:  Mirtazapine 7.5 mg QD  Trazodone 25 mg QHS  Fluoxetine 20 mg QD  Hydroxyzine 10 mg BID prn      VTE Pharmacologic Prophylaxis: VTE Score: 6 High Risk (Score >/= 5) - Pharmacological DVT Prophylaxis Ordered: enoxaparin (Lovenox). Sequential Compression Devices Ordered.  Code Status: Level 3 - DNAR and DNI   Discussion with family: Updated  (daughter) at bedside.    Anticipated Length of Stay: Patient will be admitted on an inpatient basis with an anticipated length of stay of greater than 2 midnights secondary to intractable constipation.    History of Present Illness   Chief Complaint: Krystyna Peña is a 83 y.o. female with a PMH of T2DM, CKD St. III, Anxiety, Depression, food aversion, and chronic constipation who presents with constipation. Patient was recently admitted from 9/28-9/30 for this same issue, had 2 BM during that admission. Also had 3 additional admissions prior to the last within the past 1.5 months. She has not had a BM since she was admitted, also endorses overall poor oral intake. Patient states that she has been taking colace, lizness, and miralax daily without success, also took milk of magnesia today prior to coming into the ED. On arrival to ED patient was hypertensive at 194/84, initial labs showed elevated lactic acid at 2.2 and BUN 34. Patient received a NS 500ml bolus and zofran.     On my examination of the patient she was resting comfortably in bed, her " daughter was at bedside. She states that she has been able to pass some gas today. Denies abdominal pain, rectal bleeding, fever, chest pain, or shortness of breath.    Review of Systems   Gastrointestinal:  Positive for constipation and nausea. Negative for abdominal pain and anal bleeding.       Historical Information   Past Medical History:   Diagnosis Date    Anxiety     Colon polyps     Depression     Diabetes mellitus (HCC)     Dizziness     Last assessed: 8/31/15    DVT (deep venous thrombosis) ([facility])     Dysuria     Hematuria 2022    Hyperlipidemia     Hypertension     Hypertensive urgency 10/22/2021    Insomnia     Panic attack     Ureterolithiasis 2020     Past Surgical History:   Procedure Laterality Date     SECTION      1964 son, 1968 daughter    COLONOSCOPY      FL RETROGRADE PYELOGRAM  2020    NC COLONOSCOPY FLX DX W/COLLJ SPEC WHEN PFRMD N/A 3/27/2017    Procedure: COLONOSCOPY;  Surgeon: Gold Francis MD;  Location: [facility];  Service: Gastroenterology    NC CYSTO/URETERO W/LITHOTRIPSY &INDWELL STENT INSRT Right 2020    Procedure: CYSTOSCOPY URETEROSCOPY WITH LITHOTRIPSY HOLMIUM LASER, RETROGRADE PYELOGRAM AND INSERTION STENT URETERAL; EXCISION OF BLADDER TUMOR;  Surgeon: Edwin Love MD;  Location: [facility];  Service: Urology    ROTATOR CUFF REPAIR Left     Last assessed: 3/29/15    TONSILLECTOMY       Social History     Tobacco Use    Smoking status: Never    Smokeless tobacco: Never   Vaping Use    Vaping status: Never Used   Substance and Sexual Activity    Alcohol use: Never    Drug use: No    Sexual activity: Not on file     E-Cigarette/Vaping    E-Cigarette Use Never User      E-Cigarette/Vaping Substances    Nicotine No     THC No     CBD No     Flavoring No     Other No     Unknown No      Family History   Problem Relation Age of Onset    Depression Mother         w/anxiety    Diabetes Mother         Mellitus    Breast cancer Mother     Hypertension  Mother     No Known Problems Father     Depression Sister         w/anxiety    Heart disease Sister         Cardiac Disorder    Breast cancer Sister     Hypertension Sister     Diabetes Brother         Mellitus    Alcohol abuse Son      Social History:  Marital Status:    Occupation: Retired  Patient Pre-hospital Living Situation: Home  Patient Pre-hospital Level of Mobility: walks    Objective :  Temp:  [98.5 °F (36.9 °C)] 98.5 °F (36.9 °C)  HR:  [72-91] 74  BP: (133-194)/(60-84) 136/61  Resp:  [16-20] 16  SpO2:  [96 %-99 %] 96 %  O2 Device: None (Room air)    Physical Exam  Vitals and nursing note reviewed.   Constitutional:       General: She is not in acute distress.     Appearance: She is well-developed.   HENT:      Head: Normocephalic and atraumatic.   Eyes:      Extraocular Movements: Extraocular movements intact.      Conjunctiva/sclera: Conjunctivae normal.      Pupils: Pupils are equal, round, and reactive to light.   Cardiovascular:      Rate and Rhythm: Normal rate and regular rhythm.      Heart sounds: No murmur heard.  Pulmonary:      Effort: Pulmonary effort is normal. No respiratory distress.      Breath sounds: Normal breath sounds.   Abdominal:      General: There is no distension.      Palpations: Abdomen is soft. There is no mass.      Tenderness: There is no abdominal tenderness. There is no guarding or rebound.      Hernia: No hernia is present.      Comments: Hyperactive bowel sounds   Musculoskeletal:         General: No swelling.      Cervical back: Neck supple.   Skin:     General: Skin is warm and dry.      Capillary Refill: Capillary refill takes less than 2 seconds.   Neurological:      General: No focal deficit present.      Mental Status: She is alert and oriented to person, place, and time.   Psychiatric:         Mood and Affect: Mood normal.         Lines/Drains:            Lab Results: I have reviewed the following results:  Results from last 7 days   Lab Units  10/04/24  0015   WBC Thousand/uL 5.78   HEMOGLOBIN g/dL 11.3*   HEMATOCRIT % 35.2   PLATELETS Thousands/uL 256   SEGS PCT % 56   LYMPHO PCT % 32   MONO PCT % 8   EOS PCT % 3     Results from last 7 days   Lab Units 10/04/24  0015   SODIUM mmol/L 137   POTASSIUM mmol/L 4.4   CHLORIDE mmol/L 102   CO2 mmol/L 28   BUN mg/dL 34*   CREATININE mg/dL 1.15   ANION GAP mmol/L 7   CALCIUM mg/dL 9.3   ALBUMIN g/dL 3.8   TOTAL BILIRUBIN mg/dL 0.32   ALK PHOS U/L 58   ALT U/L 20   AST U/L 28   GLUCOSE RANDOM mg/dL 153*         Results from last 7 days   Lab Units 09/30/24  1150 09/30/24  0744 09/29/24  2044 09/29/24  1556 09/29/24  1003 09/29/24  0657   POC GLUCOSE mg/dl 140 121 113 169* 165* 138     Lab Results   Component Value Date    HGBA1C 6.7 (H) 09/29/2024    HGBA1C 7.0 (H) 02/12/2024    HGBA1C 6.5 (H) 10/11/2023     Results from last 7 days   Lab Units 10/04/24  0015   LACTIC ACID mmol/L 2.2*       Imaging Results Review: I reviewed radiology reports from this admission including:  .  CT abdomen pelvis with contrast    Result Date: 10/4/2024  Impression: Rectal fecal impaction with mild inflammatory changes about the rectum suggesting stercoral colitis. 1.3 cm enhancing soft tissue nodule in the right labia majora. Correlate with physical examination findings. The study was marked in EPIC for immediate notification. Workstation performed: [employer]       No Chest XR results available for this patient.     ** Please Note: This note has been constructed using a voice recognition system. **

## 2024-10-04 NOTE — ASSESSMENT & PLAN NOTE
"Lab Results   Component Value Date    HGBA1C 6.7 (H) 09/29/2024       No results for input(s): \"POCGLU\" in the last 72 hours.    Blood Sugar Average: Last 72 hrs:  Home Med: Metformin 500 mg BID    Plan:  Hold metformin  SSI, algorithm 1  "

## 2024-10-04 NOTE — ASSESSMENT & PLAN NOTE
83-year-old female with Hx of T2DM, CKD St. III, Anxiety, Depression, food aversion, and chronic constipation presents to the ED with constipation. Patient has had 4 admissions over the poast 1.5 months for constipation or failure to thrive, the most recent from 9/28-9/30. Patient's last BM was on 9/30 during her last admission, during which she received multiple enemas and escalation of bowel care. Patient has been taking colace, linzess, and miralax daily since discharge, also took milk of magnesia earlier today without results. Patient also feels as though she has not been eating or drinking as much as she should over the past month, partially due to the chronic constipation, but also an aversion to food. On arrival to the ED patient was hypertensive at 194/84 and noted to have elevated lactic acid at 2.2. CT AP performed showed rectal fecal impaction with mild inflammatory changes about the rectum suggesting stercoral colitis.     Home med: Colace 100 mg BID, Linzess 145 mcg QD, Miralax, Milk of Magnesia    Patient has history of chronic constipation with poor oral intake which is likely exacerbating the constipation. CT AP shows stercoral colitis. Elevated lactic acid likely iso dehydration, but Ddx enterocolitis, IBD, or colonic mass.    Plan:  Continue colace 100 mg BID  Start Lubiprostone 24 mcg BID  Senna 1 tab QD  Miralax 17g QD  Soap suds enema  Consider escalating to lactulose enema if no results  Lactulose 10 g BID  Can increase as needed  Encourage increased oral intake of fluids

## 2024-10-04 NOTE — PROGRESS NOTES
Patient:  VENICE MORGAN    MRN:  [id number]    Aidin Request ID:  [id number]    Level of care reserved:  Home Health Agency    Partner Reserved:  [facility], [place] [address] [phone]    Clinical needs requested:    Geography searched:  [id number]    Start of Service:    Request sent:  12:39pm EDT on 10/4/2024 by Vickey Jamison    Partner reserved:  2:47pm EDT on 10/4/2024 by Vickey Jamison    Choice list shared:  2:47pm EDT on 10/4/2024 by Vickey Jamison

## 2024-10-04 NOTE — CASE MANAGEMENT
Case Management Assessment & Discharge Planning Note    Patient name Sandra Peña  Location W /W -01 MRN [id number]  : 1940 Date 10/4/2024       Current Admission Date: 10/3/2024  Current Admission Diagnosis:Constipation   Patient Active Problem List    Diagnosis Date Noted Date Diagnosed    Failure to thrive in adult 2024     Anxiety and depression 2024     Need for assistance with personal care 2024     Major neurocognitive disorder (HCC) 2024     Anemia 2024     Stage 3 chronic kidney disease, unspecified whether stage 3a or 3b CKD (HCC) 2024     Constipation 2024     Major depressive disorder, recurrent episode, mild (HCC) 2024     Dry scalp 10/24/2022     Uric acid kidney stone 2022     Illness anxiety disorder 2022     Generalized anxiety disorder 2022     Chronic idiopathic constipation 2020     Generalized osteoarthritis 2020     Change in bowel habits 2018     Insomnia 2014     Adenomatous polyp of colon 2013     Primary hypertension 10/02/2013     Type 2 diabetes mellitus without complication, without long-term current use of insulin (HCC) 10/02/2013     Mixed hyperlipidemia 10/02/2013       LOS (days): 0  Geometric Mean LOS (GMLOS) (days): 2.5  Days to GMLOS:2.1     OBJECTIVE:  PATIENT READMITTED TO HOSPITAL  Risk of Unplanned Readmission Score: 28.22         Current admission status: Inpatient       Preferred Pharmacy:   [id number]  DON[place] Galarza - [address]  [address] [address]  Phone: [phone] Fax: [phone]    Primary Care Provider: Ramin De Paz DO    Primary Insurance: MEDICARE  Secondary Insurance: AARP    ASSESSMENT:  Active Health Care Proxies    There are no active Health Care Proxies on file.          Readmission Root Cause  30 Day Readmission: No    Patient Information  Admitted from:: Home  Mental Status: Alert  During Assessment  patient was accompanied by: Not accompanied during assessment  Assessment information provided by:: Patient  Primary Caregiver: Self  Support Systems: Daughter, Children, Family members, Therapist, Psychiatrist  County of Residence: [place]  What city do you live in?: [place]  Home entry access options. Select all that apply.: Stairs  Number of steps to enter home.: 2  Do the steps have railings?: No  Type of Current Residence: 2 story home  Upon entering residence, is there a bedroom on the main floor (no further steps)?: No  A bedroom is located on the following floor levels of residence (select all that apply):: 2nd Floor  Upon entering residence, is there a bathroom on the main floor (no further steps)?: Yes  Number of steps to 2nd floor from main floor: One Flight  Living Arrangements: Lives w/ Children, Lives w/ Family members    Activities of Daily Living Prior to Admission  Functional Status: Independent  Completes ADLs independently?: Yes  Ambulates independently?: Yes  Does patient use assisted devices?: No  Does patient currently own DME?: No  Does patient have a history of Outpatient Therapy (PT/OT)?: Yes  Does patient have a history of HHC?: Yes  Does patient currently have HHC?: Yes    Current Home Health Care  Type of Current Home Care Services: Home OT, Home PT, Home ST  Home Health Agency Name:: [facility]  Current Home Health Follow-Up Provider:: PCP    Patient Information Continued  Income Source: SSI/SSD  Does patient have prescription coverage?: Yes  Does patient receive dialysis treatments?: No  Does patient have a history of substance abuse?: No  Does patient have a history of Mental Health Diagnosis?: Yes  Is patient receiving treatment for mental health?: Yes  Has patient received inpatient treatment related to mental health in the last 2 years?: Yes     Means of Transportation  Means of Transport to Appts:: Family transport      Social Determinants of Health (SDOH)      Flowsheet Row  Most Recent Value   Housing Stability    In the last 12 months, was there a time when you were not able to pay the mortgage or rent on time? N   At any time in the past 12 months, were you homeless or living in a shelter (including now)? N   Transportation Needs    In the past 12 months, has lack of transportation kept you from medical appointments or from getting medications? no   In the past 12 months, has lack of transportation kept you from meetings, work, or from getting things needed for daily living? No   Food Insecurity    Within the past 12 months, you worried that your food would run out before you got the money to buy more. Never true   Within the past 12 months, the food you bought just didn't last and you didn't have money to get more. Never true   Utilities    In the past 12 months has the electric, gas, oil, or water company threatened to shut off services in your home? No            DISCHARGE DETAILS:       Requested Home Health Care         Is the patient interested in C at discharge?: Yes  Home Health Discipline requested:: Occupational Therapy, Physical Therapy, Speech Language Pathology  [facility] External Referral Reason (only applicable if external HHA name selected): Patient has established relationship with provider  Home Health Follow-Up Provider:: PCP  Home Health Services Needed:: Other (comment), Strengthening/Theraputic Exercises to Improve Function, Gait/ADL Training, Evaluate Functional Status and Safety, Diabetes Management  Homebound Criteria Met:: Uses an Assist Device (i.e. cane, walker, etc), Requires the Assistance of Another Person for Safe Ambulation or to Leave the Home  Supporting Clincal Findings:: Limited Endurance    DME Referral Provided  Referral made for DME?: No    Other Referral/Resources/Interventions Provided:  Interventions: HHC  Referral Comments: [facility] referral has been [facility] to [facility].  CM will continue to follow to be sure  they are able to accept pt back.  Contact information has been placed on AVS.            Discharge Destination Plan:: Home with Home Health Care

## 2024-10-04 NOTE — ASSESSMENT & PLAN NOTE
Mood stable.  Home meds: Mirtazapine 7.5 mg QD, Trazodone 25 mg QHS, Fluoxetine 20 mg QD. Hydroxyzine 10 mg prn    Plan:  Mirtazapine 7.5 mg QD  Trazodone 25 mg QHS  Fluoxetine 20 mg QD  Hydroxyzine 10 mg BID prn

## 2024-10-04 NOTE — QUICK NOTE
83 y.o. female with a PMH of T2DM (HbgA1c 6.7), CKD III, Anxiety, Depression, food aversion, and chronic constipation who presents with constipation.     Patient was recently admitted from 9/28-9/30 for failure to thrive and constipation. Reports last BM was during hospital stay, has not had a bowel movement at home. Reports poor PO intake at home.     On arrival to ED patient was hypertensive at 194/84, initial labs showed elevated lactic acid at 2.2 and BUN 34. Patient received a NS 500ml bolus, zofran, and soap briana enema.     Overnight, patient had small bowel movement. This morning, patient denied abdominal pain, nausea, or vomiting.     Lab Results   Component Value Date    WBC 7.78 10/04/2024    HGB 11.0 (L) 10/04/2024    HCT 36.7 10/04/2024    MCV 96 10/04/2024     10/04/2024     Lab Results   Component Value Date     02/11/2017    SODIUM 134 (L) 10/04/2024    K 4.5 10/04/2024     10/04/2024    CO2 24 10/04/2024    ANIONGAP 10 03/17/2015    AGAP 7 10/04/2024    BUN 29 (H) 10/04/2024    CREATININE 1.05 10/04/2024    GLUC 137 10/04/2024    GLUF 136 (H) 08/30/2024    CALCIUM 8.7 10/04/2024    AST 28 10/04/2024    ALT 20 10/04/2024    ALKPHOS 58 10/04/2024    PROT 6.5 02/11/2017    TP 6.4 10/04/2024    BILITOT 0.7 02/11/2017    TBILI 0.32 10/04/2024    EGFR 48 10/04/2024     Recent Labs     10/04/24  0015 10/04/24  0235 10/04/24  0609   LACTICACID 2.2* 2.2* 2.0        CT A/P: Rectal fecal impaction with mild inflammatory changes about the rectum suggesting stercoral colitis. 1.3 cm enhancing soft tissue nodule in the right labia majora    10/21 Echo: G1DD, EF 60%, left atrium moderately dialted    Plan:  Start ceftriaxone/flagyl for Sterocoral colitis   Miralax BID  Colace BID  Lubiprostone BID  Senna qhs  Lactulose 10mg BID  Metamucil daily   PT/OT    Anxiety/depression:  Mirtazapine 7.5 mg QD, Trazodone 25 mg QHS, Fluoxetine 20 mg QD. Hydroxyzine 10 mg prn    HTN: Lisinpril-HCTZ 10-12.5 mg  QD        Talked to daughter, Janay.  Patient lives with daughter.  At baseline is able to ambulate without a walker or rollator.  At baseline she denies patient being unsteady, but notes that she has not been very mobile.  Reports that she will states she is tired and will stay on the couch for majority of the day.  She has been encouraging her to eat at home, however she has limited foods that she enjoys to eat.  She says that the foods tastes sour and that she cannot tolerate the foods and then it makes her nauseous.  She does enjoy Posta.  Notes decreased fiber intake, decreased fruits and vegetable intake.  Will often eat Jell-O or pudding's.  Has been eating oatmeal for breakfast.  Will often not want to eat lunch, however her daughter encourages her to eat something small.  And then will have something small for dinner.    She has been following with Dr. Conti through [facility] psychiatry and has weekly therapy sessions.  After stopping Seroquel, she noticed worsening word finding.  But denies decline in cognitive state, states that she typically knows what is going on.    Daughter is amendable to rehab for patient.  She is concerned about an early discharge for patient and that she might bounce back to the emergency department.

## 2024-10-04 NOTE — ASSESSMENT & PLAN NOTE
Home med: Lisinpril-HCTZ 10-12.5 mg QD  Blood Pressure: 136/61    Plan:  Lisinopril 10 mg QD  HCTZ 12.5 mg QD  Labetalol 10 mg prn if SBP >180

## 2024-10-05 VITALS
HEART RATE: 71 BPM | DIASTOLIC BLOOD PRESSURE: 76 MMHG | RESPIRATION RATE: 16 BRPM | BODY MASS INDEX: 23.16 KG/M2 | HEIGHT: 57 IN | SYSTOLIC BLOOD PRESSURE: 158 MMHG | TEMPERATURE: 97.7 F | WEIGHT: 107.36 LBS | OXYGEN SATURATION: 98 %

## 2024-10-05 LAB
ALBUMIN SERPL BCG-MCNC: 3.1 G/DL (ref 3.5–5)
ALP SERPL-CCNC: 48 U/L (ref 34–104)
ALT SERPL W P-5'-P-CCNC: 11 U/L (ref 7–52)
ANION GAP SERPL CALCULATED.3IONS-SCNC: 5 MMOL/L (ref 4–13)
AST SERPL W P-5'-P-CCNC: 11 U/L (ref 13–39)
BASOPHILS # BLD AUTO: 0.03 THOUSANDS/ΜL (ref 0–0.1)
BASOPHILS NFR BLD AUTO: 1 % (ref 0–1)
BILIRUB SERPL-MCNC: 0.31 MG/DL (ref 0.2–1)
BUN SERPL-MCNC: 20 MG/DL (ref 5–25)
CALCIUM ALBUM COR SERPL-MCNC: 9.4 MG/DL (ref 8.3–10.1)
CALCIUM SERPL-MCNC: 8.7 MG/DL (ref 8.4–10.2)
CHLORIDE SERPL-SCNC: 106 MMOL/L (ref 96–108)
CO2 SERPL-SCNC: 28 MMOL/L (ref 21–32)
CREAT SERPL-MCNC: 1.04 MG/DL (ref 0.6–1.3)
EOSINOPHIL # BLD AUTO: 0.19 THOUSAND/ΜL (ref 0–0.61)
EOSINOPHIL NFR BLD AUTO: 4 % (ref 0–6)
ERYTHROCYTE [DISTWIDTH] IN BLOOD BY AUTOMATED COUNT: 12.7 % (ref 11.6–15.1)
GFR SERPL CREATININE-BSD FRML MDRD: 49 ML/MIN/1.73SQ M
GLUCOSE SERPL-MCNC: 105 MG/DL (ref 65–140)
GLUCOSE SERPL-MCNC: 106 MG/DL (ref 65–140)
GLUCOSE SERPL-MCNC: 132 MG/DL (ref 65–140)
GLUCOSE SERPL-MCNC: 187 MG/DL (ref 65–140)
HCT VFR BLD AUTO: 29.5 % (ref 34.8–46.1)
HGB BLD-MCNC: 9.8 G/DL (ref 11.5–15.4)
IMM GRANULOCYTES # BLD AUTO: 0.02 THOUSAND/UL (ref 0–0.2)
IMM GRANULOCYTES NFR BLD AUTO: 0 % (ref 0–2)
INR PPP: 1.04 (ref 0.85–1.19)
LYMPHOCYTES # BLD AUTO: 1.61 THOUSANDS/ΜL (ref 0.6–4.47)
LYMPHOCYTES NFR BLD AUTO: 31 % (ref 14–44)
MCH RBC QN AUTO: 30 PG (ref 26.8–34.3)
MCHC RBC AUTO-ENTMCNC: 33.2 G/DL (ref 31.4–37.4)
MCV RBC AUTO: 90 FL (ref 82–98)
MONOCYTES # BLD AUTO: 0.33 THOUSAND/ΜL (ref 0.17–1.22)
MONOCYTES NFR BLD AUTO: 6 % (ref 4–12)
NEUTROPHILS # BLD AUTO: 2.97 THOUSANDS/ΜL (ref 1.85–7.62)
NEUTS SEG NFR BLD AUTO: 58 % (ref 43–75)
NRBC BLD AUTO-RTO: 0 /100 WBCS
PLATELET # BLD AUTO: 210 THOUSANDS/UL (ref 149–390)
PMV BLD AUTO: 10.7 FL (ref 8.9–12.7)
POTASSIUM SERPL-SCNC: 4.1 MMOL/L (ref 3.5–5.3)
PROT SERPL-MCNC: 5.1 G/DL (ref 6.4–8.4)
PROTHROMBIN TIME: 14.3 SECONDS (ref 12.3–15)
RBC # BLD AUTO: 3.27 MILLION/UL (ref 3.81–5.12)
SODIUM SERPL-SCNC: 139 MMOL/L (ref 135–147)
TSH SERPL DL<=0.05 MIU/L-ACNC: 0.96 UIU/ML (ref 0.45–4.5)
WBC # BLD AUTO: 5.15 THOUSAND/UL (ref 4.31–10.16)

## 2024-10-05 PROCEDURE — 85025 COMPLETE CBC W/AUTO DIFF WBC: CPT

## 2024-10-05 PROCEDURE — 84443 ASSAY THYROID STIM HORMONE: CPT

## 2024-10-05 PROCEDURE — 85610 PROTHROMBIN TIME: CPT

## 2024-10-05 PROCEDURE — 80053 COMPREHEN METABOLIC PANEL: CPT

## 2024-10-05 PROCEDURE — 82948 REAGENT STRIP/BLOOD GLUCOSE: CPT

## 2024-10-05 PROCEDURE — 99238 HOSP IP/OBS DSCHRG MGMT 30/<: CPT | Performed by: FAMILY MEDICINE

## 2024-10-05 PROCEDURE — 97163 PT EVAL HIGH COMPLEX 45 MIN: CPT

## 2024-10-05 PROCEDURE — 97116 GAIT TRAINING THERAPY: CPT

## 2024-10-05 RX ORDER — PANTOPRAZOLE SODIUM 40 MG/1
40 TABLET, DELAYED RELEASE ORAL
Status: DISCONTINUED | OUTPATIENT
Start: 2024-10-05 | End: 2024-10-05 | Stop reason: HOSPADM

## 2024-10-05 RX ORDER — LACTULOSE 10 G/15ML
10 SOLUTION ORAL 2 TIMES DAILY
Qty: 240 ML | Refills: 0 | Status: SHIPPED | OUTPATIENT
Start: 2024-10-05 | End: 2024-10-14

## 2024-10-05 RX ORDER — MAG HYDROX/ALUMINUM HYD/SIMETH 400-400-40
SUSPENSION, ORAL (FINAL DOSE FORM) ORAL
Qty: 50 SUPPOSITORY | Refills: 0 | Status: SHIPPED | OUTPATIENT
Start: 2024-10-05

## 2024-10-05 RX ORDER — PANTOPRAZOLE SODIUM 40 MG/1
40 TABLET, DELAYED RELEASE ORAL
Qty: 30 TABLET | Refills: 0 | Status: SHIPPED | OUTPATIENT
Start: 2024-10-06

## 2024-10-05 RX ORDER — POLYETHYLENE GLYCOL 3350 17 G/17G
17 POWDER, FOR SOLUTION ORAL DAILY
Qty: 30 EACH | Refills: 3 | Status: SHIPPED | OUTPATIENT
Start: 2024-10-05

## 2024-10-05 RX ADMIN — METRONIDAZOLE 500 MG: 500 INJECTION, SOLUTION INTRAVENOUS at 09:06

## 2024-10-05 RX ADMIN — INSULIN LISPRO 1 UNITS: 100 INJECTION, SOLUTION INTRAVENOUS; SUBCUTANEOUS at 17:40

## 2024-10-05 RX ADMIN — DOCUSATE SODIUM 100 MG: 100 CAPSULE, LIQUID FILLED ORAL at 17:41

## 2024-10-05 RX ADMIN — ATORVASTATIN CALCIUM 10 MG: 10 TABLET, FILM COATED ORAL at 08:57

## 2024-10-05 RX ADMIN — METRONIDAZOLE 500 MG: 500 INJECTION, SOLUTION INTRAVENOUS at 02:27

## 2024-10-05 RX ADMIN — Medication 2000 UNITS: at 08:56

## 2024-10-05 RX ADMIN — LISINOPRIL 10 MG: 10 TABLET ORAL at 08:57

## 2024-10-05 RX ADMIN — POLYETHYLENE GLYCOL 3350 17 G: 17 POWDER, FOR SOLUTION ORAL at 17:41

## 2024-10-05 RX ADMIN — FLUOXETINE 20 MG: 20 CAPSULE ORAL at 09:00

## 2024-10-05 RX ADMIN — HYDROCHLOROTHIAZIDE 12.5 MG: 12.5 TABLET ORAL at 08:57

## 2024-10-05 RX ADMIN — LUBIPROSTONE 24 MCG: 24 CAPSULE, GELATIN COATED ORAL at 08:58

## 2024-10-05 RX ADMIN — ASPIRIN 81 MG 81 MG: 81 TABLET ORAL at 08:57

## 2024-10-05 RX ADMIN — PANTOPRAZOLE SODIUM 40 MG: 40 TABLET, DELAYED RELEASE ORAL at 08:57

## 2024-10-05 RX ADMIN — ONDANSETRON 4 MG: 4 TABLET, ORALLY DISINTEGRATING ORAL at 11:36

## 2024-10-05 RX ADMIN — LACTULOSE 10 G: 20 SOLUTION ORAL at 17:41

## 2024-10-05 RX ADMIN — DOCUSATE SODIUM 100 MG: 100 CAPSULE, LIQUID FILLED ORAL at 08:57

## 2024-10-05 RX ADMIN — LACTULOSE 10 G: 20 SOLUTION ORAL at 08:56

## 2024-10-05 RX ADMIN — POLYETHYLENE GLYCOL 3350 17 G: 17 POWDER, FOR SOLUTION ORAL at 08:57

## 2024-10-05 RX ADMIN — Medication 1 PACKET: at 08:56

## 2024-10-05 RX ADMIN — ENOXAPARIN SODIUM 30 MG: 30 INJECTION SUBCUTANEOUS at 08:56

## 2024-10-05 RX ADMIN — SODIUM CHLORIDE, SODIUM GLUCONATE, SODIUM ACETATE, POTASSIUM CHLORIDE, MAGNESIUM CHLORIDE, SODIUM PHOSPHATE, DIBASIC, AND POTASSIUM PHOSPHATE 75 ML/HR: .53; .5; .37; .037; .03; .012; .00082 INJECTION, SOLUTION INTRAVENOUS at 07:21

## 2024-10-05 RX ADMIN — CEFTRIAXONE 1000 MG: 2 INJECTION, POWDER, FOR SOLUTION INTRAMUSCULAR; INTRAVENOUS at 10:19

## 2024-10-05 NOTE — PLAN OF CARE
Problem: Potential for Falls  Goal: Patient will remain free of falls  Description: INTERVENTIONS:  - Educate patient/family on patient safety including physical limitations  - Instruct patient to call for assistance with activity   - Consult OT/PT to assist with strengthening/mobility   - Keep Call bell within reach  - Keep bed low and locked with side rails adjusted as appropriate  - Keep care items and personal belongings within reach  - Initiate and maintain comfort rounds  - Make Fall Risk Sign visible to staff  - Offer Toileting every  Hours, in advance of need  - Initiate/Maintain alarm  - Obtain necessary fall risk management equipment:  - Apply yellow socks and bracelet for high fall risk patients  - Consider moving patient to room near nurses station  Outcome: Progressing     Problem: Prexisting or High Potential for Compromised Skin Integrity  Goal: Skin integrity is maintained or improved  Description: INTERVENTIONS:  - Identify patients at risk for skin breakdown  - Assess and monitor skin integrity  - Assess and monitor nutrition and hydration status  - Monitor labs   - Assess for incontinence   - Turn and reposition patient  - Assist with mobility/ambulation  - Relieve pressure over bony prominences  - Avoid friction and shearing  - Provide appropriate hygiene as needed including keeping skin clean and dry  - Evaluate need for skin moisturizer/barrier cream  - Collaborate with interdisciplinary team   - Patient/family teaching  - Consider wound care consult   Outcome: Progressing

## 2024-10-05 NOTE — ASSESSMENT & PLAN NOTE
Home med: Lisinpril-HCTZ 10-12.5 mg QD  Blood Pressure: 113/54    Plan:  Lisinopril 10 mg QD  HCTZ 12.5 mg QD  Labetalol 10 mg prn if SBP >180

## 2024-10-05 NOTE — ASSESSMENT & PLAN NOTE
Lab Results   Component Value Date    HGBA1C 6.7 (H) 09/29/2024       Recent Labs     10/04/24  0800 10/04/24  1104 10/04/24  1730   POCGLU 124 168* 131       Blood Sugar Average: Last 72 hrs:  (P) 141Home Med: Metformin 500 mg BID    Plan:  Hold metformin  SSI, algorithm 1

## 2024-10-05 NOTE — DISCHARGE INSTR - AVS FIRST PAGE
Dear Sandra Peña,     It was our pleasure to care for you here at [facility].  It is our hope that we were always able to exceed the expected standards for your care during your stay.  You were hospitalized due to constipation.  You were cared for on the medical floor by Surekha Warner DO under the service of Shagufta Zuñiga MD with the [facility] who covers for your primary care physician (PCP), Ramin De Paz DO, while you were hospitalized.  If you have any questions or concerns related to this hospitalization, you may contact us at .  For follow up as well as any medication refills, we recommend that you follow up with your primary care physician.  A registered nurse will reach out to you by phone within a few days after your discharge to answer any additional questions that you may have after going home.  However, at this time we provide for you here, the most important instructions / recommendations at discharge:     Notable Medication Adjustments -   Use glycerin suppositories scheduled every other day, can use daily as needed if she does have abdominal pain due to constipation   Take metamucil  daily.   Continue to use Miralax 17g daily. If she starts to have loose stools, you can give give the Miralax every other day.  Continue Linaclotide daily   Can take Lactulose 20mg twice per day as needed is she has not had a bowel movement in 3 days  Start taking Protonix 40mg daily  Hold Metformin until seeing your PCP.  Testing Required after Discharge -   None  ** Please contact your PCP to request testing orders for any of the testing recommended here **  Important follow up information -   Follow-up with Dr. Gutierrez on Monday  Please follow-up with dentist outpatient  Please follow-up with your PCP within 1 week  Please follow-up with geriatrics team outpatient  Follow-up with GI outpatient  Other Instructions -   Continue to work on  bowel training with the medications as listed above  Continue to encourage food and snack throughout the day, remember eating can help tame the stomach acid from producing the sour taste in her mouth.  Continue nutrition supplements daily, can continue to alternate through different nutrition supplements that she might enjoy  Continue to go for daily walks to help stimulate the bowel  Can use Tums as needed to help control acid in mouth  Highly encourage dental care at home at least twice a day and follow-up with dentist outpatient  Please review this entire after visit summary as additional general instructions including medication list, appointments, activity, diet, any pertinent wound care, and other additional recommendations from your care team that may be provided for you.      Sincerely,     Surekha Warner, DO

## 2024-10-05 NOTE — PLAN OF CARE
Problem: PHYSICAL THERAPY ADULT  Goal: Performs mobility at highest level of function for planned discharge setting.  See evaluation for individualized goals.  Description: Treatment/Interventions: Functional transfer training, LE strengthening/ROM, Elevations, Therapeutic exercise, Endurance training, Cognitive reorientation, Patient/family training, Equipment eval/education, Gait training, Bed mobility  Equipment Recommended: Walker       See flowsheet documentation for full assessment, interventions and recommendations.  Note:    Problem List: Decreased strength, Decreased endurance, Impaired balance, Decreased mobility, Decreased safety awareness, Impaired sensation  Assessment: Pt presents with constipation. Pt was recently admitted from 9/28/24-9/30/24 for this same issue, had 2 BM during that admission. Also had 3 additional admissions prior to the last within the past 1.5 months. Dx: constipation, HTN, and DM. order placed for PT eval and tx, w/ activity order of up and out of bed as tolerated. pt presents w/ comorbidities of DM, CKD, DVT, hyperlipidemia, panic attack, anxiety, depression, and chronic constipation and personal factors of advanced age, living in 2 story house, stair(s) to enter home, and readmissions to the hospital. pt presents w/ weakness, decreased endurance, impaired balance, gait deviations, altered sensation, decreased safety awareness, and fall risk. these impairments are evident in findings from physical examination (weakness and altered sensation), mobility assessment (need for standby to min assist w/ all phases of mobility when usually mobilizing independently, tolerance to only 50 feet of ambulation, and need for cueing for mobility technique), and Barthel Index: 55/100. pt needed input for task focus and mobility technique/safety. pt is at risk for falls due to physical and safety awareness deficits. pt's clinical presentation is unstable/unpredictable (evident in poor blood  pressure control, need for standby to min assist w/ all phases of mobility when usually mobilizing independently, tolerance to only 50 feet of ambulation, and need for input for task focus and mobility technique). pt needs inpatient PT tx to improve mobility deficits and progress mobility training as appropriate.        Rehab Resource Intensity Level, PT: III (Minimum Resource Intensity)    See flowsheet documentation for full assessment.

## 2024-10-05 NOTE — ED PROVIDER NOTES
Final diagnoses:   Stercoral colitis   Decreased appetite     ED Disposition       ED Disposition   Admit    Condition   Stable    Date/Time   Fri Oct 4, 2024  1:58 AM    Comment   Case was discussed with Dr. Gregory and the patient's admission status was agreed to be Admission Status: inpatient status to the service of Dr. Meyer .               Assessment & Plan       Medical Decision Making  Amount and/or Complexity of Data Reviewed  Labs: ordered. Decision-making details documented in ED Course.  Radiology: ordered.    Risk  OTC drugs.  Prescription drug management.  Decision regarding hospitalization.        ED Course as of 10/05/24 0027   Fri Oct 04, 2024   0139 Study results reviewed with patient and daughter at bedside.  Showed images from CT scan revealing stercoral colitis.  Anticipate combination of oral medications as well as enema will be needed to alleviate stool burden.       Medications   aspirin chewable tablet 81 mg (has no administration in time range)   atorvastatin (LIPITOR) tablet 10 mg (has no administration in time range)   Cholecalciferol (VITAMIN D3) tablet 2,000 Units (has no administration in time range)   docusate sodium (COLACE) capsule 100 mg (has no administration in time range)   FLUoxetine (PROzac) capsule 20 mg (has no administration in time range)   hydrOXYzine HCL (ATARAX) tablet 10 mg (has no administration in time range)   lubiprostone (AMITIZA) capsule 24 mcg (24 mcg Oral Given 10/4/24 0330)   lisinopril (ZESTRIL) tablet 10 mg (has no administration in time range)     And   hydroCHLOROthiazide tablet 12.5 mg (has no administration in time range)   mirtazapine (REMERON) tablet 7.5 mg (has no administration in time range)   ondansetron (ZOFRAN-ODT) dispersible tablet 4 mg (has no administration in time range)   traZODone (DESYREL) tablet 25 mg (has no administration in time range)   multi-electrolyte (PLASMALYTE-A/ISOLYTE-S PH 7.4) IV solution (75 mL/hr Intravenous New Bag  10/4/24 0331)   acetaminophen (TYLENOL) tablet 975 mg (has no administration in time range)   enoxaparin (LOVENOX) subcutaneous injection 30 mg (has no administration in time range)   labetalol (NORMODYNE) injection 10 mg (has no administration in time range)   insulin lispro (HumALOG/ADMELOG) 100 units/mL subcutaneous injection 1-5 Units (has no administration in time range)   lactulose (CHRONULAC) oral solution 10 g (has no administration in time range)   senna (SENOKOT) tablet 8.6 mg (8.6 mg Oral Given 10/4/24 0330)   polyethylene glycol (MIRALAX) packet 17 g (has no administration in time range)   Lidocaine Viscous HCl (XYLOCAINE) 2 % mucosal solution 15 mL (15 mL Swish & Swallow Given by Other 10/4/24 0139)   sodium chloride 0.9 % bolus 500 mL (0 mL Intravenous Stopped 10/4/24 0140)   acetaminophen (Ofirmev) IVPB 730 mg (0 mg Intravenous Stopped 10/4/24 0140)   ondansetron (ZOFRAN) injection 4 mg (4 mg Intravenous Given 10/4/24 0012)   iohexol (OMNIPAQUE) 350 MG/ML injection (MULTI-DOSE) 80 mL (80 mL Intravenous Given 10/4/24 0034)       ED Risk Strat Scores                           SBIRT 20yo+      Flowsheet Row Most Recent Value   Initial Alcohol Screen: US AUDIT-C     1. How often do you have a drink containing alcohol? 0 Filed at: 10/04/2024 0226   2. How many drinks containing alcohol do you have on a typical day you are drinking?  0 Filed at: 10/04/2024 0226   3a. Male UNDER 65: How often do you have five or more drinks on one occasion? 0 Filed at: 10/04/2024 0226   3b. FEMALE Any Age, or MALE 65+: How often do you have 4 or more drinks on one occassion? 0 Filed at: 10/04/2024 0226   Audit-C Score 0 Filed at: 10/04/2024 0226   ELROY: How many times in the past year have you...    Used an illegal drug or used a prescription medication for non-medical reasons? Never Filed at: 10/04/2024 0226                            History of Present Illness       Chief Complaint   Patient presents with    Constipation      Pt comes from home via EMS for constipation. Pt was here 1 wk ago for same issue and hasn't had a BM since she left after being disimpacted. Pt c/o butt pain       Past Medical History:   Diagnosis Date    Anxiety     Colon polyps     Depression     Diabetes mellitus (HCC)     Dizziness     Last assessed: 8/31/15    DVT (deep venous thrombosis) ([facility])     Dysuria     Hematuria 2022    Hyperlipidemia     Hypertension     Hypertensive urgency 10/22/2021    Insomnia     Panic attack     Ureterolithiasis 2020      Past Surgical History:   Procedure Laterality Date     SECTION      1964 son, 1968 daughter    COLONOSCOPY      FL RETROGRADE PYELOGRAM  2020    IA COLONOSCOPY FLX DX W/COLLJ SPEC WHEN PFRMD N/A 3/27/2017    Procedure: COLONOSCOPY;  Surgeon: Gold Francis MD;  Location: [facility];  Service: Gastroenterology    IA CYSTO/URETERO W/LITHOTRIPSY &INDWELL STENT INSRT Right 2020    Procedure: CYSTOSCOPY URETEROSCOPY WITH LITHOTRIPSY HOLMIUM LASER, RETROGRADE PYELOGRAM AND INSERTION STENT URETERAL; EXCISION OF BLADDER TUMOR;  Surgeon: Edwin Love MD;  Location: [facility];  Service: Urology    ROTATOR CUFF REPAIR Left     Last assessed: 3/29/15    TONSILLECTOMY        Family History   Problem Relation Age of Onset    Depression Mother         w/anxiety    Diabetes Mother         Mellitus    Breast cancer Mother     Hypertension Mother     No Known Problems Father     Depression Sister         w/anxiety    Heart disease Sister         Cardiac Disorder    Breast cancer Sister     Hypertension Sister     Diabetes Brother         Mellitus    Alcohol abuse Son       Social History     Tobacco Use    Smoking status: Never    Smokeless tobacco: Never   Vaping Use    Vaping status: Never Used   Substance Use Topics    Alcohol use: Never    Drug use: No      E-Cigarette/Vaping    E-Cigarette Use Never User       E-Cigarette/Vaping Substances    Nicotine No     THC No     CBD No      Flavoring No     Other No     Unknown No       I have reviewed and agree with the history as documented.     Patient is an 83-year-old female presents to the emergency department from home with her daughter.  Her daughter provides much of the history.  Patient relays that has been close to a week since she has had a bowel movement.  She appreciates intense rectal pain and inability to pass the stool.  Daughter notes that she was recently hospitalized and underwent disimpaction while here.  Stool was extremely firm.  She does have a history of constipation and is compliant with twice daily Colace and Linzess.  No blood has been appreciated per rectum.  She does not have abdominal pain.  Appetite and intake however have been markedly reduced.  Patient does appreciate nausea.  There has been no vomiting.  No fevers.  She has not noticed any difficulty with urination and does believe she is emptying her bladder adequately.  No frequency, urgency or dysuria.  Daughter expresses concern that her mother likely has a very large amount of retained stool as has occurred on some other occasions for which she required combination of oral medications and enema.  Abdominal surgical history significant for remote  section.  No history of bowel obstructions.      Chart briefly reviewed.  Most recent imaging of the abdomen was early in the year at which time she was appreciated to have stercoral colitis.        Review of Systems   Genitourinary:         Denies any decrease sensation in genitalia.   All other systems reviewed and are negative.          Objective       ED Triage Vitals   Temperature Pulse Blood Pressure Respirations SpO2 Patient Position - Orthostatic VS   10/03/24 2322 10/03/24 2322 10/03/24 2322 10/03/24 2322 10/03/24 2322 10/04/24 0757   98.5 °F (36.9 °C) 87 (!) 194/84 18 98 % Sitting      Temp Source Heart Rate Source BP Location FiO2 (%) Pain Score    10/03/24 2322 10/03/24 2322 10/03/24 2322 -- 10/04/24  0754    Oral Monitor Right arm  6      Vitals      Date and Time Temp Pulse SpO2 Resp BP Pain Score FACES Pain Rating User   10/04/24 2300 -- -- -- -- -- No Pain -- GEORGES   10/04/24 2152 -- -- -- 16 -- -- -- TP   10/04/24 2152 97.9 °F (36.6 °C) 78 96 % -- 113/54 -- -- DII   10/04/24 1516 -- -- -- 18 -- -- -- TP   10/04/24 1516 98.2 °F (36.8 °C) 80 96 % -- 121/56 -- -- DII   10/04/24 1156 -- -- -- -- -- 6 -- EDWARD   10/04/24 0957 -- -- -- -- 124/57 -- -- EDWARD   10/04/24 0757 -- -- -- 16 -- -- -- EDWARD   10/04/24 0757 98 °F (36.7 °C) 76 98 % -- 141/71 -- -- DII   10/04/24 0757 98 °F (36.7 °C) 77 97 % -- 141/71 -- -- DII   10/04/24 0754 -- -- -- -- -- 6 -- EDWARD   10/04/24 0723 97.6 °F (36.4 °C) 76 97 % 16 170/73 -- -- LD   10/04/24 0630 -- 68 98 % 18 133/63 -- -- KB   10/04/24 0614 -- 81 98 % 18 165/81 -- -- KB   10/04/24 0430 -- 69 97 % 16 151/70 -- -- KB   10/04/24 0230 -- 74 96 % 16 136/61 -- -- KB   10/04/24 0200 -- 72 96 % 16 133/60 -- -- KB   10/04/24 0151 -- 76 98 % 18 152/66 -- -- KB   10/04/24 0000 -- 88 99 % 20 166/74 -- -- KB   10/03/24 2330 -- 91 98 % 18 183/79 -- -- KB   10/03/24 2322 98.5 °F (36.9 °C) 87 98 % 18 194/84 -- -- KB            Physical Exam  Vitals and nursing note reviewed.   Constitutional:       Appearance: She is ill-appearing.      Comments: Pale and uncomfortable/malaised appearing   HENT:      Mouth/Throat:      Comments: Mucous membranes slightly dry.  Eyes:      Extraocular Movements: Extraocular movements intact.      Conjunctiva/sclera: Conjunctivae normal.   Cardiovascular:      Rate and Rhythm: Normal rate and regular rhythm.   Pulmonary:      Effort: Pulmonary effort is normal.      Breath sounds: Normal breath sounds.   Abdominal:      General: Bowel sounds are normal. There is distension (Tympanic).      Tenderness: There is abdominal tenderness (Mild, lower abdominal). There is no right CVA tenderness, left CVA tenderness or guarding.   Musculoskeletal:         General: Normal range of  motion.      Comments: +2 PT pulses.  No midline or lateral low back tenderness.   Skin:     General: Skin is warm and dry.   Neurological:      Mental Status: She is alert and oriented to person, place, and time.   Psychiatric:         Mood and Affect: Mood normal.         Behavior: Behavior normal.         Thought Content: Thought content normal.         Results Reviewed       Procedure Component Value Units Date/Time    CBC and differential [[id number]]  (Abnormal) Collected: 10/04/24 0609    Lab Status: Final result Specimen: Blood from Arm, Left Updated: 10/04/24 0723     WBC 7.78 Thousand/uL      RBC 3.81 Million/uL      Hemoglobin 11.0 g/dL      Hematocrit 36.7 %      MCV 96 fL      MCH 28.9 pg      MCHC 30.0 g/dL      RDW 12.7 %      MPV 10.4 fL      Platelets 221 Thousands/uL      nRBC 0 /100 WBCs      Segmented % 74 %      Immature Grans % 0 %      Lymphocytes % 18 %      Monocytes % 5 %      Eosinophils Relative 2 %      Basophils Relative 1 %      Absolute Neutrophils 5.74 Thousands/µL      Absolute Immature Grans 0.03 Thousand/uL      Absolute Lymphocytes 1.42 Thousands/µL      Absolute Monocytes 0.42 Thousand/µL      Eosinophils Absolute 0.12 Thousand/µL      Basophils Absolute 0.05 Thousands/µL     Lactic acid, plasma (w/reflex if result > 2.0) [[id number]]  (Normal) Collected: 10/04/24 0609    Lab Status: Final result Specimen: Blood from Arm, Left Updated: 10/04/24 0648     LACTIC ACID 2.0 mmol/L     Narrative:      Result may be elevated if tourniquet was used during collection.    Basic metabolic panel [[id number]]  (Abnormal) Collected: 10/04/24 0609    Lab Status: Final result Specimen: Blood from Arm, Left Updated: 10/04/24 0644     Sodium 134 mmol/L      Potassium 4.5 mmol/L      Chloride 103 mmol/L      CO2 24 mmol/L      ANION GAP 7 mmol/L      BUN 29 mg/dL      Creatinine 1.05 mg/dL      Glucose 137 mg/dL      Calcium 8.7 mg/dL      eGFR 48 ml/min/1.73sq m     Narrative:      National Kidney  Disease Foundation guidelines for Chronic Kidney Disease (CKD):     Stage 1 with normal or high GFR (GFR > 90 mL/min/1.73 square meters)    Stage 2 Mild CKD (GFR = 60-89 mL/min/1.73 square meters)    Stage 3A Moderate CKD (GFR = 45-59 mL/min/1.73 square meters)    Stage 3B Moderate CKD (GFR = 30-44 mL/min/1.73 square meters)    Stage 4 Severe CKD (GFR = 15-29 mL/min/1.73 square meters)    Stage 5 End Stage CKD (GFR <15 mL/min/1.73 square meters)  Note: GFR calculation is accurate only with a steady state creatinine    Lactic acid 2 Hours [[id number]]  (Abnormal) Collected: 10/04/24 0235    Lab Status: Final result Specimen: Blood from Arm, Left Updated: 10/04/24 0300     LACTIC ACID 2.2 mmol/L     Narrative:      Result may be elevated if tourniquet was used during collection.    Lactic acid, plasma (w/reflex if result > 2.0) [[id number]]  (Abnormal) Collected: 10/04/24 0015    Lab Status: Final result Specimen: Blood from Arm, Left Updated: 10/04/24 0058     LACTIC ACID 2.2 mmol/L     Narrative:      Result may be elevated if tourniquet was used during collection.    Comprehensive metabolic panel [[id number]]  (Abnormal) Collected: 10/04/24 0015    Lab Status: Final result Specimen: Blood from Arm, Left Updated: 10/04/24 0050     Sodium 137 mmol/L      Potassium 4.4 mmol/L      Chloride 102 mmol/L      CO2 28 mmol/L      ANION GAP 7 mmol/L      BUN 34 mg/dL      Creatinine 1.15 mg/dL      Glucose 153 mg/dL      Calcium 9.3 mg/dL      AST 28 U/L      ALT 20 U/L      Alkaline Phosphatase 58 U/L      Total Protein 6.4 g/dL      Albumin 3.8 g/dL      Total Bilirubin 0.32 mg/dL      eGFR 43 ml/min/1.73sq m     Narrative:      National Kidney Disease Foundation guidelines for Chronic Kidney Disease (CKD):     Stage 1 with normal or high GFR (GFR > 90 mL/min/1.73 square meters)    Stage 2 Mild CKD (GFR = 60-89 mL/min/1.73 square meters)    Stage 3A Moderate CKD (GFR = 45-59 mL/min/1.73 square meters)    Stage 3B Moderate CKD  (GFR = 30-44 mL/min/1.73 square meters)    Stage 4 Severe CKD (GFR = 15-29 mL/min/1.73 square meters)    Stage 5 End Stage CKD (GFR <15 mL/min/1.73 square meters)  Note: GFR calculation is accurate only with a steady state creatinine    CBC and differential [[id number]]  (Abnormal) Collected: 10/04/24 0015    Lab Status: Final result Specimen: Blood from Arm, Left Updated: 10/04/24 0028     WBC 5.78 Thousand/uL      RBC 3.90 Million/uL      Hemoglobin 11.3 g/dL      Hematocrit 35.2 %      MCV 90 fL      MCH 29.0 pg      MCHC 32.1 g/dL      RDW 12.9 %      MPV 10.9 fL      Platelets 256 Thousands/uL      nRBC 0 /100 WBCs      Segmented % 56 %      Immature Grans % 0 %      Lymphocytes % 32 %      Monocytes % 8 %      Eosinophils Relative 3 %      Basophils Relative 1 %      Absolute Neutrophils 3.21 Thousands/µL      Absolute Immature Grans 0.02 Thousand/uL      Absolute Lymphocytes 1.85 Thousands/µL      Absolute Monocytes 0.48 Thousand/µL      Eosinophils Absolute 0.18 Thousand/µL      Basophils Absolute 0.04 Thousands/µL             CT abdomen pelvis with contrast   Final Interpretation by Clay Sharp DO (10/04 0059)      Rectal fecal impaction with mild inflammatory changes about the rectum suggesting stercoral colitis.      1.3 cm enhancing soft tissue nodule in the right labia majora. Correlate with physical examination findings.         The study was marked in EPIC for immediate notification.      Workstation performed: [id number]             Procedures    ED Medication and Procedure Management   Prior to Admission Medications   Prescriptions Last Dose Informant Patient Reported? Taking?   Cholecalciferol (VITAMIN D3) 1,000 units tablet 10/3/2024 Self No Yes   Sig: Take 2 tablets (2,000 Units total) by mouth daily for 30 doses   FLUoxetine (PROzac) 20 mg capsule 10/3/2024 Self No Yes   Sig: Take 1 capsule (20 mg total) by mouth daily with breakfast   aspirin 81 MG tablet 10/3/2024 Self Yes Yes   Sig: Take 81  mg by mouth daily   atorvastatin (LIPITOR) 10 mg tablet 10/3/2024 Self No Yes   Sig: Take 1 tablet (10 mg total) by mouth daily   cyanocobalamin (VITAMIN B-12) 1000 MCG tablet Not Taking Self No No   Sig: Take 1 tablet (1,000 mcg total) by mouth daily   Patient not taking: Reported on 10/4/2024   docusate sodium (COLACE) 100 mg capsule 10/3/2024 Self Yes Yes   Sig: Take 100 mg by mouth 2 (two) times a day   hydrOXYzine HCL (ATARAX) 10 mg tablet Past Week Self No Yes   Sig: Take 1 tablet (10 mg total) by mouth 2 (two) times a day as needed for anxiety for up to 40 doses   linaCLOtide 145 MCG CAPS 10/3/2024 Self No Yes   Sig: Take 1 capsule (145 mcg total) by mouth in the morning   lisinopril-hydrochlorothiazide (PRINZIDE,ZESTORETIC) 10-12.5 MG per tablet 10/3/2024 Self No Yes   Sig: Take 1 tablet by mouth daily   metFORMIN (GLUCOPHAGE) 500 mg tablet 10/3/2024 Self Yes Yes   Sig: Take 500 mg by mouth 2 (two) times a day with meals   mirtazapine (REMERON) 15 mg tablet 10/3/2024 Self No Yes   Sig: Take 0.5 tablets (7.5 mg total) by mouth daily at bedtime   ondansetron (ZOFRAN-ODT) 4 mg disintegrating tablet Past Week Self No Yes   Sig: Take 1 tablet (4 mg total) by mouth every 8 (eight) hours as needed for nausea   traZODone (DESYREL) 50 mg tablet 10/3/2024 Self No Yes   Sig: Take 0.5 tablets (25 mg total) by mouth daily at bedtime      Facility-Administered Medications: None     Current Discharge Medication List        CONTINUE these medications which have NOT CHANGED    Details   aspirin 81 MG tablet Take 81 mg by mouth daily      atorvastatin (LIPITOR) 10 mg tablet Take 1 tablet (10 mg total) by mouth daily  Qty: 90 tablet, Refills: 1    Associated Diagnoses: Mixed hyperlipidemia      Cholecalciferol (VITAMIN D3) 1,000 units tablet Take 2 tablets (2,000 Units total) by mouth daily for 30 doses  Qty: 60 tablet, Refills: 0    Associated Diagnoses: Vitamin D deficiency      docusate sodium (COLACE) 100 mg capsule Take  100 mg by mouth 2 (two) times a day      FLUoxetine (PROzac) 20 mg capsule Take 1 capsule (20 mg total) by mouth daily with breakfast  Qty: 30 capsule, Refills: 0    Associated Diagnoses: Illness anxiety disorder; Major depressive disorder, recurrent episode, mild (HCC)      hydrOXYzine HCL (ATARAX) 10 mg tablet Take 1 tablet (10 mg total) by mouth 2 (two) times a day as needed for anxiety for up to 40 doses  Qty: 20 tablet, Refills: 0    Associated Diagnoses: Illness anxiety disorder      linaCLOtide 145 MCG CAPS Take 1 capsule (145 mcg total) by mouth in the morning  Qty: 90 capsule, Refills: 1    Associated Diagnoses: Chronic idiopathic constipation      lisinopril-hydrochlorothiazide (PRINZIDE,ZESTORETIC) 10-12.5 MG per tablet Take 1 tablet by mouth daily  Qty: 90 tablet, Refills: 3    Associated Diagnoses: Benign essential hypertension      metFORMIN (GLUCOPHAGE) 500 mg tablet Take 500 mg by mouth 2 (two) times a day with meals      mirtazapine (REMERON) 15 mg tablet Take 0.5 tablets (7.5 mg total) by mouth daily at bedtime  Qty: 30 tablet, Refills: 0    Associated Diagnoses: Illness anxiety disorder; Major depressive disorder, recurrent episode, mild (HCC); Generalized anxiety disorder      ondansetron (ZOFRAN-ODT) 4 mg disintegrating tablet Take 1 tablet (4 mg total) by mouth every 8 (eight) hours as needed for nausea  Qty: 20 tablet, Refills: 0    Associated Diagnoses: Nausea      traZODone (DESYREL) 50 mg tablet Take 0.5 tablets (25 mg total) by mouth daily at bedtime  Qty: 15 tablet, Refills: 0    Associated Diagnoses: Illness anxiety disorder      cyanocobalamin (VITAMIN B-12) 1000 MCG tablet Take 1 tablet (1,000 mcg total) by mouth daily  Qty: 30 tablet, Refills: 0    Associated Diagnoses: B12 deficiency           No discharge procedures on file.  ED SEPSIS DOCUMENTATION   Time reflects when diagnosis was documented in both MDM as applicable and the Disposition within this note       Time User Action  Codes Description Comment    10/4/2024  1:58 AM Kelsey Nur Add [K52.89] Stercoral colitis     10/4/2024  1:58 AM Kelsey Nur Add [R63.0] Decreased appetite                  Kelsey Nur MD  10/05/24 0027     WDL

## 2024-10-05 NOTE — PHYSICAL THERAPY NOTE
PHYSICAL THERAPY EVALUATION NOTE    Patient Name: Sandra Peña  Today's Date: 10/5/2024  AGE:   83 y.o.  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]    Past Medical History:   Diagnosis Date    Anxiety     Colon polyps     Depression     Diabetes mellitus (HCC)     Dizziness     Last assessed: 8/31/15    DVT (deep venous thrombosis) ([facility])     Dysuria     Hematuria 04/21/2022    Hyperlipidemia     Hypertension     Hypertensive urgency 10/22/2021    Insomnia     Panic attack     Ureterolithiasis 05/22/2020     Length Of Stay: 1  PHYSICAL THERAPY EVALUATION :    10/05/24 0934   PT Last Visit   PT Visit Date 10/05/24   Pain Assessment   Pain Assessment Tool 0-10   Pain Score No Pain   Restrictions/Precautions   Other Precautions Cognitive;Chair Alarm;Bed Alarm;Multiple lines;Fall Risk  ([place])   Home Living   Type of Home House   Home Layout Two level;1/2 bath on main level;Bed/bath upstairs;Other (Comment)  (2 MAYRA)   Additional Comments lives w/ family. ambulates w/o device. no DME. independent w/ ADLs and shares IADLs. no falls in last 6 months.   General   Additional Pertinent History 10/4/24 at 7:23 blodo pressure was 170/73. 10/3/24 at 23:22 BP was 194/84.   Family/Caregiver Present No   Cognition   Arousal/Participation Alert   Orientation Level Oriented to person;Other (Comment)  (pt was identified w/ full name, birth date)   Following Commands Follows one step commands with increased time or repetition   Comments room air resting pulse ox 96% and 67 BPM, active 92% and 81 BPM. supine blood pressure 125/74.   Subjective   Subjective pt seen supine in bed. agreed to PT eval. denied pain. states feeling tired and having intermittent lightheadedness. occasional input was needed for task focus.   RUE Assessment   RUE Assessment WFL   LUE Assessment   LUE Assessment WFL    RLE Assessment   RLE Assessment WFL  (3+ to 4-/5)   LLE Assessment   LLE Assessment WFL  (3+ to 4-/5)   Vision-Basic Assessment   Current Vision Wears glasses all the time   Coordination   Sensation X  (light touch impaired bilateral feet)   Bed Mobility   Supine to Sit 5  Supervision   Additional items HOB elevated;Bedrails;Increased time required   Transfers   Sit to Stand 5  Supervision   Additional items Increased time required   Stand to Sit 5  Supervision  (poorly controlled descent to sitting surface)   Additional items Verbal cues  (for body positioning, safety)   Ambulation/Elevation   Gait pattern Forward Flexion;Excessively slow;Shuffling   Gait Assistance 4  Minimal assist   Additional items Assist x 1;Verbal cues  (for posture, full step length)   Assistive Device None   Distance 50 feet.  (additional ambulation not possible due to fatigue)   Stair Management Assistance Not tested  (due to limited ambulation tolerance, safety concern)   Balance   Static Sitting Good   Dynamic Sitting Fair   Static Standing Fair   Ambulatory Poor +   Activity Tolerance   Activity Tolerance Patient limited by fatigue   Nurse Made Aware spoke to Michelle Arauz CM   Assessment   Problem List Decreased strength;Decreased endurance;Impaired balance;Decreased mobility;Decreased safety awareness;Impaired sensation   Assessment Pt presents with constipation. Pt was recently admitted from 9/28/24-9/30/24 for this same issue, had 2 BM during that admission. Also had 3 additional admissions prior to the last within the past 1.5 months. Dx: constipation, HTN, and DM. order placed for PT eval and tx, w/ activity order of up and out of bed as tolerated. pt presents w/ comorbidities of DM, CKD, DVT, hyperlipidemia, panic attack, anxiety, depression, and chronic constipation and personal factors of advanced age, living in 2 story house, stair(s) to enter home, and readmissions to the hospital. pt presents w/ weakness, decreased  endurance, impaired balance, gait deviations, altered sensation, decreased safety awareness, and fall risk. these impairments are evident in findings from physical examination (weakness and altered sensation), mobility assessment (need for standby to min assist w/ all phases of mobility when usually mobilizing independently, tolerance to only 50 feet of ambulation, and need for cueing for mobility technique), and Barthel Index: 55/100. pt needed input for task focus and mobility technique/safety. pt is at risk for falls due to physical and safety awareness deficits. pt's clinical presentation is unstable/unpredictable (evident in poor blood pressure control, need for standby to min assist w/ all phases of mobility when usually mobilizing independently, tolerance to only 50 feet of ambulation, and need for input for task focus and mobility technique). pt needs inpatient PT tx to improve mobility deficits and progress mobility training as appropriate.   Goals   Patient Goals I want to go home   STG Expiration Date 10/15/24   Short Term Goal #1 pt will: Increase bilateral LE strength 1/2 grade to facilitate independent mobility, Perform bed mobility independently to increase level of independence, Perform all transfers independently to improve independence, Ambulate 300 ft. with least restrictive assistive device independently w/o LOB to improve functional independence, Navigate 10 stair(s) independently with unilateral handrail to facilitate return to previous living environment, Increase ambulatory balance 1 grade to decrease risk for falls, Complete exercise program independently to increase strength and endurance, Tolerate 3 hr OOB to faciliate upright tolerance, Improve Barthel Index score to 75 or greater to facilitate independence, and Complete Timed Up and Go or Comfortable Gait Speed to further assess mobility and monitor progress   PT Treatment Day 1   Plan   Treatment/Interventions Functional transfer  training;LE strengthening/ROM;Elevations;Therapeutic exercise;Endurance training;Cognitive reorientation;Patient/family training;Equipment eval/education;Gait training;Bed mobility   PT Frequency 3-5x/wk   Discharge Recommendation   Rehab Resource Intensity Level, PT III (Minimum Resource Intensity)   Equipment Recommended Walker   Walker Package Recommended Wheeled walker   Change/add to Walker Package? No   Additional Comments (S)  OT consult would benefit pt to address cognition and safety awareness   Additional Comments 2 (S)  Gerontology consult would benefit pt to address aging related issues   AM-PAC Basic Mobility Inpatient   Turning in Flat Bed Without Bedrails 4   Lying on Back to Sitting on Edge of Flat Bed Without Bedrails 3   Moving Bed to Chair 3   Standing Up From Chair Using Arms 3   Walk in Room 3   Climb 3-5 Stairs With Railing 1   Basic Mobility Inpatient Raw Score 17   Basic Mobility Standardized Score 39.67   [facility] Highest Level Of Mobility   -HL Goal 5: Stand one or more mins   -HL Achieved 7: Walk 25 feet or more   Barthel Index   Feeding 10   Bathing 0   Grooming Score 5   Dressing Score 5   Bladder Score 10   Bowels Score 10   Toilet Use Score 5   Transfers (Bed/Chair) Score 10   Mobility (Level Surface) Score 0   Stairs Score 0   Barthel Index Score 55   Additional Treatment Session   Start Time 0934   End Time 0944   Treatment Assessment Pt agreed to participate in PT intervention. Therapist introduced roller walker use w/ ambulation to improve gait stability.  Sit <---> stand transfer w/ supervision. ambulated 90 feet w/ roller walker w/ supervision. Additional ambulation was not possible due to fatigue. Pt was noted to have improvement w/ use of roller walker w/ increased ambulation distance and decreased level of assist to maintain safety. continued inpatient PT tx is indicated to reduce fall risk.   Equipment Use roller walker   Additional Treatment Day 1   End of Consult    Patient Position at End of Consult Bedside chair;Bed/Chair alarm activated;All needs within reach     The patient's AM-PAC Basic Mobility Inpatient Short Form Raw Score is 17. A Raw score of greater than 16 suggests the patient may benefit from discharge to home. Please also refer to the recommendation of the Physical Therapist for safe discharge planning.    Skilled PT recommended while in hospital and upon DC to progress pt toward treatment goals.     German Adams, PT

## 2024-10-05 NOTE — DISCHARGE INSTRUCTIONS
"Patient Education     Acid reflux and GERD in adults   The Basics   Written by the doctors and editors at [facility]   What is acid reflux? -- Acid reflux is when the acid that is normally in the stomach backs up into the esophagus (figure 1). The esophagus is the tube that carries food from the mouth to the stomach.  When acid reflux causes bothersome symptoms or damage, doctors call it \"gastroesophageal reflux disease\" (\"GERD\").  What are the symptoms of acid reflux? -- The most common symptoms are:   Heartburn, which is a burning feeling in the chest   Regurgitation, which is when acid and undigested food flow back into your throat or mouth  Other symptoms might include:   Stomach or chest pain   Trouble swallowing   Raspy voice or sore throat   Unexplained cough   Nausea or vomiting  Is there anything I can do on my own to feel better? -- Yes. You might feel better if you:   Lose weight (if you are overweight).   Raise the head of your bed by 6 to 8 inches - You can do this by putting blocks of wood or rubber under 2 legs of the bed or a foam wedge under the mattress. It is not enough to sleep with your head raised on pillows.   Avoid foods that make your symptoms worse - For some people, these include coffee, chocolate, alcohol, peppermint, and fatty foods. It might help to write down what you ate before having reflux. This can help you figure out if a food is causing your problem.   Stop smoking, if you smoke. Your doctor or nurse can help you try to quit.   Avoid late meals - Lying down with a full stomach can make reflux worse. Try to plan meals for at least 2 to 3 hours before bedtime.   Avoid tight clothing - Some people feel better if they wear comfortable clothing that does not squeeze the stomach area.  How is acid reflux treated? -- There are a few main types of medicines that can help with the symptoms of acid reflux. The most common are antacids, histamine blockers, and proton pump inhibitors (table " "1). All of these medicines work by reducing or blocking stomach acid. But they each do that in a different way.   For mild symptoms, antacids can help, but they work only for a short time. Histamine blockers are stronger and last longer than antacids. You can buy antacids and most histamine blockers without a prescription.   For frequent and more severe symptoms, proton pump inhibitors are the most effective medicines. Some of these medicines are sold without a prescription. But there are other versions that your doctor can prescribe.  Sometimes, medicines cost less if you get them with a doctor's prescription. Other times, non-prescription medicines cost less. If you are worried about cost, ask your pharmacist about ways to pay less for your medicines.  When should I call the doctor? -- While pain or burning in the chest can be a symptom of acid reflux, it can also be a symptom of something more serious like a heart problem. Call for emergency help right away (in the [place] and [place], call 9-1-1) if you think that you might be having a heart attack.  Symptoms of a heart attack might include:   Severe chest pain, pressure, or discomfort with:   Breathing trouble, sweating, upset stomach, or cold and clammy skin   Pain in your arms, back, or jaw   Worse pain with activity like walking up stairs   Fast or irregular heartbeat   Feeling dizzy, faint, or weak  Some people can manage their acid reflux on their own by changing their habits or taking non-prescription medicines. Call your doctor for advice if:   Your symptoms are severe or last a long time.   You cannot seem to control your symptoms.   You have had symptoms for many years.  You should also see a doctor or nurse right away if you:   Have trouble swallowing, or feel as though food gets \"stuck\" on the way down   Lose weight when you are not trying to   Have chest pain   Choke when you eat   Vomit blood, or have bowel movements that are red, black, or look like " "tar  What if my child or teen has acid reflux? -- If your child or teen has acid reflux, take them to see a doctor or nurse. Do not give your child medicines to treat acid reflux without talking to a doctor or nurse.  In children, acid reflux can be caused by a number of problems. Have a doctor or nurse check for these problems before trying any treatments.  All topics are updated as new evidence becomes available and our peer review process is complete.  This topic retrieved from [employer] on: Mar 29, 2024.  Topic 47280 Version 15.0  Release: 32.2.4 - C32.87  © 2024 UpToDate, Inc. and/or its affiliates. All rights reserved.  figure 1: Acid reflux     Acid reflux is when the acid that is normally in the stomach backs up into the esophagus. This can happen if a muscle called the \"lower esophageal sphincter\" relaxes too much.  Graphic 420730 Version 1.0  table 1: Medicines that reduce or block stomach acid  Medicine type  Medicine name examples    Antacids* Calcium carbonate (sample brand names: Maalox, Tums)    Aluminum hydroxide, magnesium hydroxide, and simethicone (sample brand name: Mylanta)   Surface agents Sucralfate (brand name: Carafate)   Histamine blockers¶  Famotidine (brand name: Pepcid)    Cimetidine (brand name: Tagamet)   Proton pump inhibitors Omeprazole (brand name: Prilosec)    Esomeprazole (brand name: Nexium)    Pantoprazole (brand name: Protonix)    Lansoprazole (brand name: Prevacid)    Dexlansoprazole (brand name: Dexilant)    Rabeprazole (brand name: AcipHex)   * Some antacids contain aspirin, which can increase the risk of internal bleeding. Examples of antacids with aspirin include Dunia-[place], Medi-[place], and Neutralin. But there are others, too, so it's important to check labels. Talk to your doctor or nurse before taking any medicines that contain aspirin.  ¶ Another histamine blocker, ranitidine (brand name: Zantac), stopped being sold in 2020. That's because it was found to sometimes " contain a substance that could increase a person's risk of cancer if they took a lot of it over time. If you have any of this medicine in your home, stop taking it and throw away any that is left over. Ask your doctor or nurse about what other medicine you should use instead.  Graphic 67067 Version 15.0  Consumer Information Use and Disclaimer   Disclaimer: This generalized information is a limited summary of diagnosis, treatment, and/or medication information. It is not meant to be comprehensive and should be used as a tool to help the user understand and/or assess potential diagnostic and treatment options. It does NOT include all information about conditions, treatments, medications, side effects, or risks that may apply to a specific patient. It is not intended to be medical advice or a substitute for the medical advice, diagnosis, or treatment of a health care provider based on the health care provider's examination and assessment of a patient's specific and unique circumstances. Patients must speak with a health care provider for complete information about their health, medical questions, and treatment options, including any risks or benefits regarding use of medications. This information does not endorse any treatments or medications as safe, effective, or approved for treating a specific patient. UpToDate, Inc. and its affiliates disclaim any warranty or liability relating to this information or the use thereof.The use of this information is governed by the Terms of Use, available at https://www.woltersCellNovouwer.com/en/know/clinical-effectiveness-terms. 2024© UpToDate, Inc. and its affiliates and/or licensors. All rights reserved.  Copyright   © 2024 UpToDate, Inc. and/or its affiliates. All rights reserved.

## 2024-10-06 NOTE — ASSESSMENT & PLAN NOTE
Presented with constipation, last bowel movement was during last admission on 09/30  History of 4 admissions over the past 1.5 months for constipation or failure to thrive, the most recent from 9/28-9/30  CT AP performed showed rectal fecal impaction with mild inflammatory changes about the rectum suggesting stercoral colitis  Had a bowel movement on 10/03    Plan:  Use glycerin suppositories scheduled every other day, can use daily as needed if she does have abdominal pain due to constipation   Take metamucil  daily.   Continue to use Miralax 17g daily. If she starts to have loose stools, you can give give the Miralax every other day.  Continue Linaclotide daily   Can take Lactulose 20mg twice per day as needed is she has not had a bowel movement in 3 days  Start taking Protonix 40mg daily  Hold Metformin until seeing your PCP.  Encourage increased oral intake of fluids  Follow up with Dr. Forrester 10/07 for medication changes related to mood  Follow up with dentist for cavity care

## 2024-10-06 NOTE — ASSESSMENT & PLAN NOTE
Lab Results   Component Value Date    HGBA1C 6.7 (H) 09/29/2024       Recent Labs     10/04/24  1730 10/05/24  0722 10/05/24  1107 10/05/24  1645   POCGLU 131 105 132 187*       Blood Sugar Average: Last 72 hrs:  (P) [id number] Med: Metformin 500 mg BID    Plan:  Stop metformin

## 2024-10-06 NOTE — DISCHARGE SUMMARY
Discharge Summary - Hospitalist   Name: Sandra Peña 83 y.o. female I MRN: [id number]  Unit/Bed#: W -01 I Date of Admission: 10/3/2024   Date of Service: 10/6/2024 I Hospital Day: 1     Assessment & Plan  Constipation  Presented with constipation, last bowel movement was during last admission on 09/30  History of 4 admissions over the past 1.5 months for constipation or failure to thrive, the most recent from 9/28-9/30  CT AP performed showed rectal fecal impaction with mild inflammatory changes about the rectum suggesting stercoral colitis  Had a bowel movement on 10/03    Plan:  Use glycerin suppositories scheduled every other day, can use daily as needed if she does have abdominal pain due to constipation   Take metamucil  daily.   Continue to use Miralax 17g daily. If she starts to have loose stools, you can give give the Miralax every other day.  Continue Linaclotide daily   Can take Lactulose 20mg twice per day as needed is she has not had a bowel movement in 3 days  Start taking Protonix 40mg daily  Hold Metformin until seeing your PCP.  Encourage increased oral intake of fluids  Follow up with Dr. Forrester 10/07 for medication changes related to mood  Follow up with dentist for cavity care  Primary hypertension  Home med: Lisinpril-HCTZ 10-12.5 mg QD  Blood Pressure: 158/76    Plan:  Lisinopril-HCTZ 10-12.5 mg QD  Type 2 diabetes mellitus without complication, without long-term current use of insulin ([facility])  Lab Results   Component Value Date    HGBA1C 6.7 (H) 09/29/2024       Recent Labs     10/04/24  1730 10/05/24  0722 10/05/24  1107 10/05/24  1645   POCGLU 131 105 132 187*       Blood Sugar Average: Last 72 hrs:  (P) [id number] Med: Metformin 500 mg BID    Plan:  Stop metformin  Mixed hyperlipidemia  Home med: atorvastatin 10 mg    .Plan:  Atorvastatin 10 mg QD, consider discontinuing by PCP   Anxiety and depression  Mood stable.  Home meds: Mirtazapine 7.5 mg QD, Trazodone 25 mg QHS,  Fluoxetine 20 mg QD. Hydroxyzine 10 mg prn    Plan:  Mirtazapine 7.5 mg QD  Trazodone 25 mg QHS  Fluoxetine 20 mg QD  Hydroxyzine 10 mg BID prn     Medical Problems       Resolved Problems  Date Reviewed: 9/30/2024   None       Discharging Physician / Practitioner: Surekha Warner DO  PCP: Ramin De Paz DO  Admission Date:   Admission Orders (From admission, onward)       Ordered        10/04/24 0201  INPATIENT ADMISSION  Once                          Discharge Date: 10/06/24    Consultations During Hospital Stay:  None    Procedures Performed:   None    Significant Findings / Test Results:   CT AP performed showed rectal fecal impaction with mild inflammatory changes about the rectum suggesting stercoral colitis    Incidental Findings:   None    Test Results Pending at Discharge (will require follow up):   None     Outpatient Tests Requested:  None    Complications:  None    Reason for Admission: Constipation    Hospital Course:   Sandra Peña is a 83 y.o. female with Hx of T2DM, CKD III, Anxiety, Depression, food aversion, and chronic constipation who originally presented to the hospital on 10/3/2024 due to constipation.  She was recently admitted from 9/28-9/30 for constipation and was noted to have 2 BM during that admission, however returned after not having a bowel movement since admission. CT AP performed showed rectal fecal impaction with mild inflammatory changes about the rectum suggesting stercoral colitis, started on IV Abx. She was given an aggressive bowel regimen including: Miralax BID, Colace BID, Lupiprostone BID, Senna qhs, Lactulose 10mg, soaps enema, dulcolax suppository, with a successful small and very Large bowel movement. She continued to endorse sour/bad taste in her mouth as the reason she was unable to eat, and therefore she was started on Protonix daily. Patient's mouth was evaluated, and no abscesses were seen however poor dentition and cavities were noted throughout the  "mouth. Patient's daughter has an appointment with the dentist scheduled on discharged. She was noted to have a flat affect and depressed mood, suspect food aversion may also be related to depression and anxiety, she is scheduled for medication changes on 10/07 with Dr. Gutierrez.     She was discharged on an aggressive bowl regimen at home:     Use glycerin suppositories scheduled every other day, can use daily as needed if she does have abdominal pain due to constipation   Take metamucil  daily.   Continue to use Miralax 17g daily.   Continue Linaclotide daily   Can take Lactulose 20mg twice per day as needed is she has not had a bowel movement in 3 days  Start taking Protonix 40mg daily    Metformin was held on discharge due to hemoglobin A1c of 6.7.    Please see above list of diagnoses and related plan for additional information.     Condition at Discharge: good    Discharge Day Visit / Exam:   Subjective:  No overnight events. Patient evaluated at bedside. Denies any abdominal pain. Noted to have a large bowel movement yesterday.    Vitals: Blood Pressure: 158/76 (10/05/24 0726)  Pulse: 71 (10/05/24 0726)  Temperature: 97.7 °F (36.5 °C) (10/05/24 0726)  Temp Source: Oral (10/04/24 2152)  Respirations: 16 (10/04/24 2152)  Height: 4' 9.01\" (144.8 cm) (10/04/24 0757)  Weight - Scale: 48.7 kg (107 lb 5.8 oz) (10/04/24 0757)  SpO2: 98 % (10/05/24 0726)  Physical Exam  Vitals and nursing note reviewed.   Constitutional:       General: She is not in acute distress.     Appearance: She is well-developed.   HENT:      Head: Normocephalic and atraumatic.   Eyes:      Conjunctiva/sclera: Conjunctivae normal.   Cardiovascular:      Rate and Rhythm: Normal rate and regular rhythm.      Heart sounds: No murmur heard.  Pulmonary:      Effort: Pulmonary effort is normal. No respiratory distress.      Breath sounds: Normal breath sounds. No stridor. No wheezing, rhonchi or rales.   Abdominal:      General: There is no distension. "      Palpations: Abdomen is soft. There is no mass.      Tenderness: There is no abdominal tenderness. There is no guarding.      Hernia: No hernia is present.   Musculoskeletal:         General: No swelling.      Cervical back: Neck supple.      Right lower leg: No edema.      Left lower leg: No edema.   Skin:     General: Skin is warm and dry.      Capillary Refill: Capillary refill takes less than 2 seconds.   Neurological:      Mental Status: She is alert.   Psychiatric:         Mood and Affect: Mood is depressed. Affect is flat.          Discussion with Family: Updated  (daughter) at bedside.    Discharge instructions/Information to patient and family:   See after visit summary for information provided to patient and family.      Provisions for Follow-Up Care:  See after visit summary for information related to follow-up care and any pertinent home health orders.      Mobility at time of Discharge:   Basic Mobility Inpatient Raw Score: 17  JH-HLM Goal: 5: Stand one or more mins  JH-HLM Achieved: 6: Walk 10 steps or more  HLM Goal NOT achieved. Continue to encourage mobility in post discharge setting.     Disposition:   Home with [facility] Services (Reminder: Complete face to face encounter)    Planned Readmission: No    Discharge Medications:  See after visit summary for reconciled discharge medications provided to patient and/or family.      **Please Note: This note may have been constructed using a voice recognition system**

## 2024-10-06 NOTE — ASSESSMENT & PLAN NOTE
Home med: Lisinpril-HCTZ 10-12.5 mg QD  Blood Pressure: 158/76    Plan:  Lisinopril-HCTZ 10-12.5 mg QD

## 2024-10-07 ENCOUNTER — TELEPHONE (OUTPATIENT)
Age: 84
End: 2024-10-07

## 2024-10-07 ENCOUNTER — OFFICE VISIT (OUTPATIENT)
Dept: PSYCHIATRY | Facility: CLINIC | Age: 84
End: 2024-10-07
Payer: MEDICARE

## 2024-10-07 ENCOUNTER — TRANSITIONAL CARE MANAGEMENT (OUTPATIENT)
Dept: FAMILY MEDICINE CLINIC | Facility: CLINIC | Age: 84
End: 2024-10-07

## 2024-10-07 DIAGNOSIS — F45.21 ILLNESS ANXIETY DISORDER: Primary | ICD-10-CM

## 2024-10-07 DIAGNOSIS — F33.0 MAJOR DEPRESSIVE DISORDER, RECURRENT EPISODE, MILD (HCC): ICD-10-CM

## 2024-10-07 PROCEDURE — 99215 OFFICE O/P EST HI 40 MIN: CPT

## 2024-10-07 PROCEDURE — 90833 PSYTX W PT W E/M 30 MIN: CPT

## 2024-10-07 NOTE — PSYCH
"MEDICATION MANAGEMENT NOTE        [facility]      Name and Date of Birth:  Sandra Peña 83 y.o. 1940 MRN: [id number]    Date of Visit: October 7, 2024    Reason for Visit: Follow-up visit regarding medication management     _____________________________    Assessment & Plan  Illness anxiety disorder  Rule-out OCD    Over the weekend patient was medically admitted for two days for constipation. She continues to exhibit somatization as a main defence and displays other regressive behavior. The patient seems to be contending with anxiety around her own mortality. May also consider if constipation has a functional component.    Sleep seems to have improved, however there is concern for sedation in the mornings. Will discontinue mirtazapine. If this continues, change trazodone to PRN. Due to adverse effects experienced on fluoxetine at higher dose necessary for obsessional thinking, and multiple other failed medications, switching to vortioxetine for better profile in elderly and potential \"pro-cognitive\" benefit.    For future, can consider augmentation with aripiprazole. Also desvenlafaxine, flovoxamine.    Discussed option of partial hospitalization program. The patient would benefit from this for establishing routine and structure and the socialization component, as well as learning coping skills directed for illness anxiety. Continued psychoeducation for family would be beneficial. They will also participate in medication management.    Discontinue fluoxetine  Discontinue mirtazapine  Continue trazodone 25 mg QHS  Begin vortioxetine 5 mg QD for one week then increased to 10 mg QD; can increase in future  PARQ for Trintellix (Vortioxetine) including suicidality and worse depression, manic induction, serotonin syndrome and SIADH, visual affects, bleeding, N/V/C/D, xerostomia, sexual side effects, drug interactions and others.  Referral to partial " "hospitalization program  Resume group therapy      Orders:    vortioxetine (TRINTELLIX) 10 MG tablet; Take 0.5 tablets (5 mg total) by mouth daily for 7 days, THEN 1 tablet (10 mg total) daily for 22 days.    Ambulatory Referral to [facility] or Partial Psych Program; Future    Major depressive disorder, recurrent episode, mild (HCC)    Orders:    vortioxetine (TRINTELLIX) 10 MG tablet; Take 0.5 tablets (5 mg total) by mouth daily for 7 days, THEN 1 tablet (10 mg total) daily for 22 days.    Ambulatory Referral to [facility] or Partial Psych Program; Future             Treatment Recommendations/Precautions:  Labs most recently obtained , reviewed.   Follow up in 4 weeks for medication management  Follow up with PCP for medical issues and ongoing care  Aware of 24 hour and weekend coverage for urgent situations accessed by calling [facility] main practice number    Individual psychotherapy provided: Counseling was provided during the session today for 30 minutes.     Treatment Plan:     Completed and signed during the session: Not applicable - Treatment Plan not due at this session    _____________________________________________    History of Present Illness     Chief Complaint: \"there's something wrong with me\"    SUBJECTIVE:    Last bowel movement two days ago. Planning to do enema today. She reports significant dysgeusia; dental appointment is tomorrow. Time in the hospital was anxiety-provoking. They treated the constipation. Appetite is still reduced, likely due to poor appetite and constipation. She developed obsessional thoughts in the hospital about losing her nursing call button and a male nurse giving her an injection, related to when her daughter left the hospital.    Patient reports she is sleeping adequately. However having fatigue during the day, in the morning, difficulty getting out of bed. They report she has fallen out of her routine. They report significant dependence on " "her daughter. Severe anxiety about \"missed\" medical problems. There was one possible panic attack recently; these are not common.      Psychiatric Review Of Systems:  Unchanged information from this writer's previous assessment is copied and italicized; information that has changed is bolded.       Appetite: yes  Adverse eating: no  Weight changes: no  Insomnia/sleeplessness: no  Fatigue/anergy: yes  Anhedonia/lack of interest: no  Attention/concentration: no  Psychomotor agitation/retardation: no  Somatic symptoms: yes  Anxiety/panic attack: yes  Zaira/hypomania: no  Hopelessness/helplessness/worthlessness: no  Self-injurious behavior/high-risk behavior: no  Suicidal ideation: no  Homicidal ideation: no  Auditory hallucinations: no  Visual hallucinations: no  Other perceptual disturbances: no  Delusional thinking: no  Obsessive/compulsive symptoms: yes    Review Of Systems:      Constitutional negative   ENT negative   Cardiovascular negative   Respiratory negative   Gastrointestinal Dysgeusia, constipation   Genitourinary negative   Musculoskeletal negative   Integumentary negative   Neurological negative   Endocrine negative   Other Symptoms none, all other systems are negative     Objective    OBJECTIVE:     Visit Vitals  OB Status Postmenopausal   Smoking Status Never      Wt Readings from Last 6 Encounters:   10/04/24 48.7 kg (107 lb 5.8 oz)   09/29/24 49.4 kg (109 lb)   09/23/24 48.4 kg (106 lb 11.2 oz)   09/13/24 48.1 kg (106 lb)   09/01/24 48.1 kg (106 lb)   08/27/24 50.7 kg (111 lb 12.4 oz)        Past Medical History:   Diagnosis Date    Anxiety     Colon polyps     Depression     Diabetes mellitus (HCC)     Dizziness     Last assessed: 8/31/15    DVT (deep venous thrombosis) ([facility])     Dysuria     Hematuria 04/21/2022    Hyperlipidemia     Hypertension     Hypertensive urgency 10/22/2021    Insomnia     Panic attack     Ureterolithiasis 05/22/2020      Past Surgical History:   Procedure Laterality Date    "  SECTION      1964 son, 1968 daughter    COLONOSCOPY      FL RETROGRADE PYELOGRAM  2020    GA COLONOSCOPY FLX DX W/COLLJ SPEC WHEN PFRMD N/A 3/27/2017    Procedure: COLONOSCOPY;  Surgeon: Gold Francis MD;  Location: [facility];  Service: Gastroenterology    GA CYSTO/URETERO W/LITHOTRIPSY &INDWELL STENT INSRT Right 2020    Procedure: CYSTOSCOPY URETEROSCOPY WITH LITHOTRIPSY HOLMIUM LASER, RETROGRADE PYELOGRAM AND INSERTION STENT URETERAL; EXCISION OF BLADDER TUMOR;  Surgeon: Edwin Love MD;  Location: [facility];  Service: Urology    ROTATOR CUFF REPAIR Left     Last assessed: 3/29/15    TONSILLECTOMY         Meds/Allergies    No Known Allergies  Current Outpatient Medications   Medication Instructions    aspirin 81 mg, Oral, Daily    atorvastatin (LIPITOR) 10 mg, Oral, Daily    Cholecalciferol (VITAMIN D3) 2,000 Units, Oral, Daily    cyanocobalamin (VITAMIN B-12) 1,000 mcg, Oral, Daily    glycerin adult 2 g suppository Please use glycerin suppository every other day.  If patient has rectal or abdominal pain, can use glycerin suppository daily    hydrOXYzine HCL (ATARAX) 10 mg, Oral, 2 times daily PRN    lactulose (CHRONULAC) 10 g, Oral, 2 times daily    linaCLOtide 145 mcg, Oral, Daily    lisinopril-hydrochlorothiazide (PRINZIDE,ZESTORETIC) 10-12.5 MG per tablet 1 tablet, Oral, Daily    ondansetron (ZOFRAN-ODT) 4 mg, Oral, Every 8 hours PRN    pantoprazole (PROTONIX) 40 mg, Oral, Daily (early morning)    polyethylene glycol (MIRALAX) 17 g, Oral, Daily    psyllium (METAMUCIL) packet 1 packet, Oral, Daily    traZODone (DESYREL) 25 mg, Oral, Daily at bedtime    vortioxetine (TRINTELLIX) 10 MG tablet Take 0.5 tablets (5 mg total) by mouth daily for 7 days, THEN 1 tablet (10 mg total) daily for 22 days.           Mental Status Exam:    Appearance Petite, curly grey hair, floral blouse, malou jacket  Good eye contact   Behavior/motor Calm and cooperative   Speech/language Normal rate and volume  "  Mood \"Not good\"   Affect Anxious, dysphoric, with full range   Thought process Logical, ruminating   Thought content Obsessions    No overt delusions, Denies hallucinations, Denies suicidal ideations   Cognition Awake and alert, oriented and memory grossly intact, and concentrates on questions   Insight/judgment Insight: fair  Judgment: moderate     Laboratory Results: I have personally reviewed all pertinent laboratory/tests results    Admission on 10/03/2024, Discharged on 10/05/2024   Component Date Value Ref Range Status    WBC 10/04/2024 5.78  4.31 - 10.16 Thousand/uL Final    RBC 10/04/2024 3.90  3.81 - 5.12 Million/uL Final    Hemoglobin 10/04/2024 11.3 (L)  11.5 - 15.4 g/dL Final    Hematocrit 10/04/2024 35.2  34.8 - 46.1 % Final    MCV 10/04/2024 90  82 - 98 fL Final    MCH 10/04/2024 29.0  26.8 - 34.3 pg Final    MCHC 10/04/2024 32.1  31.4 - 37.4 g/dL Final    RDW 10/04/2024 12.9  11.6 - 15.1 % Final    MPV 10/04/2024 10.9  8.9 - 12.7 fL Final    Platelets 10/04/2024 256  149 - 390 Thousands/uL Final    nRBC 10/04/2024 0  /100 WBCs Final    Segmented % 10/04/2024 56  43 - 75 % Final    Immature Grans % 10/04/2024 0  0 - 2 % Final    Lymphocytes % 10/04/2024 32  14 - 44 % Final    Monocytes % 10/04/2024 8  4 - 12 % Final    Eosinophils Relative 10/04/2024 3  0 - 6 % Final    Basophils Relative 10/04/2024 1  0 - 1 % Final    Absolute Neutrophils 10/04/2024 3.21  1.85 - 7.62 Thousands/µL Final    Absolute Immature Grans 10/04/2024 0.02  0.00 - 0.20 Thousand/uL Final    Absolute Lymphocytes 10/04/2024 1.85  0.60 - 4.47 Thousands/µL Final    Absolute Monocytes 10/04/2024 0.48  0.17 - 1.22 Thousand/µL Final    Eosinophils Absolute 10/04/2024 0.18  0.00 - 0.61 Thousand/µL Final    Basophils Absolute 10/04/2024 0.04  0.00 - 0.10 Thousands/µL Final    Sodium 10/04/2024 137  135 - 147 mmol/L Final    Potassium 10/04/2024 4.4  3.5 - 5.3 mmol/L Final    Chloride 10/04/2024 102  96 - 108 mmol/L Final    CO2 " 10/04/2024 28  21 - 32 mmol/L Final    ANION GAP 10/04/2024 7  4 - 13 mmol/L Final    BUN 10/04/2024 34 (H)  5 - 25 mg/dL Final    Creatinine 10/04/2024 1.15  0.60 - 1.30 mg/dL Final    Standardized to IDMS reference method    Glucose 10/04/2024 153 (H)  65 - 140 mg/dL Final    If the patient is fasting, the ADA then defines impaired fasting glucose as > 100 mg/dL and diabetes as > or equal to 123 mg/dL.    Calcium 10/04/2024 9.3  8.4 - 10.2 mg/dL Final    AST 10/04/2024 28  13 - 39 U/L Final    ALT 10/04/2024 20  7 - 52 U/L Final    Specimen collection should occur prior to Sulfasalazine administration due to the potential for falsely depressed results.     Alkaline Phosphatase 10/04/2024 58  34 - 104 U/L Final    Total Protein 10/04/2024 6.4  6.4 - 8.4 g/dL Final    Albumin 10/04/2024 3.8  3.5 - 5.0 g/dL Final    Total Bilirubin 10/04/2024 0.32  0.20 - 1.00 mg/dL Final    Use of this assay is not recommended for patients undergoing treatment with eltrombopag due to the potential for falsely elevated results.  N-acetyl-p-benzoquinone imine (metabolite of Acetaminophen) will generate erroneously low results in samples for patients that have taken an overdose of Acetaminophen.    eGFR 10/04/2024 43  ml/min/1.73sq m Final    LACTIC ACID 10/04/2024 2.2 (H)  0.5 - 2.0 mmol/L Final    LACTIC ACID 10/04/2024 2.2 (H)  0.5 - 2.0 mmol/L Final    WBC 10/04/2024 7.78  4.31 - 10.16 Thousand/uL Final    RBC 10/04/2024 3.81  3.81 - 5.12 Million/uL Final    Hemoglobin 10/04/2024 11.0 (L)  11.5 - 15.4 g/dL Final    Hematocrit 10/04/2024 36.7  34.8 - 46.1 % Final    MCV 10/04/2024 96  82 - 98 fL Final    short sample    MCH 10/04/2024 28.9  26.8 - 34.3 pg Final    MCHC 10/04/2024 30.0 (L)  31.4 - 37.4 g/dL Final    RDW 10/04/2024 12.7  11.6 - 15.1 % Final    MPV 10/04/2024 10.4  8.9 - 12.7 fL Final    Platelets 10/04/2024 221  149 - 390 Thousands/uL Final    nRBC 10/04/2024 0  /100 WBCs Final    Segmented % 10/04/2024 74  43 - 75 %  Final    Immature Grans % 10/04/2024 0  0 - 2 % Final    Lymphocytes % 10/04/2024 18  14 - 44 % Final    Monocytes % 10/04/2024 5  4 - 12 % Final    Eosinophils Relative 10/04/2024 2  0 - 6 % Final    Basophils Relative 10/04/2024 1  0 - 1 % Final    Absolute Neutrophils 10/04/2024 5.74  1.85 - 7.62 Thousands/µL Final    Absolute Immature Grans 10/04/2024 0.03  0.00 - 0.20 Thousand/uL Final    Absolute Lymphocytes 10/04/2024 1.42  0.60 - 4.47 Thousands/µL Final    Absolute Monocytes 10/04/2024 0.42  0.17 - 1.22 Thousand/µL Final    Eosinophils Absolute 10/04/2024 0.12  0.00 - 0.61 Thousand/µL Final    Basophils Absolute 10/04/2024 0.05  0.00 - 0.10 Thousands/µL Final    Sodium 10/04/2024 134 (L)  135 - 147 mmol/L Final    Potassium 10/04/2024 4.5  3.5 - 5.3 mmol/L Final    Chloride 10/04/2024 103  96 - 108 mmol/L Final    CO2 10/04/2024 24  21 - 32 mmol/L Final    ANION GAP 10/04/2024 7  4 - 13 mmol/L Final    BUN 10/04/2024 29 (H)  5 - 25 mg/dL Final    Creatinine 10/04/2024 1.05  0.60 - 1.30 mg/dL Final    Standardized to IDMS reference method    Glucose 10/04/2024 137  65 - 140 mg/dL Final    If the patient is fasting, the ADA then defines impaired fasting glucose as > 100 mg/dL and diabetes as > or equal to 123 mg/dL.    Calcium 10/04/2024 8.7  8.4 - 10.2 mg/dL Final    eGFR 10/04/2024 48  ml/min/1.73sq m Final    LACTIC ACID 10/04/2024 2.0  0.5 - 2.0 mmol/L Final    POC Glucose 10/04/2024 124  65 - 140 mg/dl Final    POC Glucose 10/04/2024 168 (H)  65 - 140 mg/dl Final    POC Glucose 10/04/2024 131  65 - 140 mg/dl Final    TSH 3RD GENERATON 10/05/2024 0.957  0.450 - 4.500 uIU/mL Final    The recommended reference ranges for TSH during pregnancy are as follows:   First trimester 0.100 to 2.500 uIU/mL   Second trimester  0.200 to 3.000 uIU/mL   Third trimester 0.300 to 3.000 uIU/m    Note: Normal ranges may not apply to patients who are transgender, non-binary, or whose legal sex, sex at birth, and gender  identity differ.  Adult TSH (3rd generation) reference range follows the recommended guidelines of the [employer], January, 2020.    Protime 10/05/2024 14.3  12.3 - 15.0 seconds Final    INR 10/05/2024 1.04  0.85 - 1.19 Final    WBC 10/05/2024 5.15  4.31 - 10.16 Thousand/uL Final    RBC 10/05/2024 3.27 (L)  3.81 - 5.12 Million/uL Final    Hemoglobin 10/05/2024 9.8 (L)  11.5 - 15.4 g/dL Final    Hematocrit 10/05/2024 29.5 (L)  34.8 - 46.1 % Final    MCV 10/05/2024 90  82 - 98 fL Final    Results verified by repeat    MCH 10/05/2024 30.0  26.8 - 34.3 pg Final    MCHC 10/05/2024 33.2  31.4 - 37.4 g/dL Final    RDW 10/05/2024 12.7  11.6 - 15.1 % Final    MPV 10/05/2024 10.7  8.9 - 12.7 fL Final    Platelets 10/05/2024 210  149 - 390 Thousands/uL Final    nRBC 10/05/2024 0  /100 WBCs Final    Segmented % 10/05/2024 58  43 - 75 % Final    Immature Grans % 10/05/2024 0  0 - 2 % Final    Lymphocytes % 10/05/2024 31  14 - 44 % Final    Monocytes % 10/05/2024 6  4 - 12 % Final    Eosinophils Relative 10/05/2024 4  0 - 6 % Final    Basophils Relative 10/05/2024 1  0 - 1 % Final    Absolute Neutrophils 10/05/2024 2.97  1.85 - 7.62 Thousands/µL Final    Absolute Immature Grans 10/05/2024 0.02  0.00 - 0.20 Thousand/uL Final    Absolute Lymphocytes 10/05/2024 1.61  0.60 - 4.47 Thousands/µL Final    Absolute Monocytes 10/05/2024 0.33  0.17 - 1.22 Thousand/µL Final    Eosinophils Absolute 10/05/2024 0.19  0.00 - 0.61 Thousand/µL Final    Basophils Absolute 10/05/2024 0.03  0.00 - 0.10 Thousands/µL Final    Sodium 10/05/2024 139  135 - 147 mmol/L Final    Potassium 10/05/2024 4.1  3.5 - 5.3 mmol/L Final    Chloride 10/05/2024 106  96 - 108 mmol/L Final    CO2 10/05/2024 28  21 - 32 mmol/L Final    ANION GAP 10/05/2024 5  4 - 13 mmol/L Final    BUN 10/05/2024 20  5 - 25 mg/dL Final    Creatinine 10/05/2024 1.04  0.60 - 1.30 mg/dL Final    Standardized to IDMS reference method    Glucose 10/05/2024 106  65 - 140 mg/dL  Final    If the patient is fasting, the ADA then defines impaired fasting glucose as > 100 mg/dL and diabetes as > or equal to 123 mg/dL.    Calcium 10/05/2024 8.7  8.4 - 10.2 mg/dL Final    Corrected Calcium 10/05/2024 9.4  8.3 - 10.1 mg/dL Final    AST 10/05/2024 11 (L)  13 - 39 U/L Final    ALT 10/05/2024 11  7 - 52 U/L Final    Specimen collection should occur prior to Sulfasalazine administration due to the potential for falsely depressed results.     Alkaline Phosphatase 10/05/2024 48  34 - 104 U/L Final    Total Protein 10/05/2024 5.1 (L)  6.4 - 8.4 g/dL Final    Albumin 10/05/2024 3.1 (L)  3.5 - 5.0 g/dL Final    Total Bilirubin 10/05/2024 0.31  0.20 - 1.00 mg/dL Final    Use of this assay is not recommended for patients undergoing treatment with eltrombopag due to the potential for falsely elevated results.  N-acetyl-p-benzoquinone imine (metabolite of Acetaminophen) will generate erroneously low results in samples for patients that have taken an overdose of Acetaminophen.    eGFR 10/05/2024 49  ml/min/1.73sq m Final    POC Glucose 10/05/2024 105  65 - 140 mg/dl Final    POC Glucose 10/05/2024 132  65 - 140 mg/dl Final    POC Glucose 10/05/2024 187 (H)  65 - 140 mg/dl Final   Admission on 09/28/2024, Discharged on 09/30/2024   Component Date Value Ref Range Status    WBC 09/28/2024 7.21  4.31 - 10.16 Thousand/uL Final    RBC 09/28/2024 3.90  3.81 - 5.12 Million/uL Final    Hemoglobin 09/28/2024 11.2 (L)  11.5 - 15.4 g/dL Final    Hematocrit 09/28/2024 35.3  34.8 - 46.1 % Final    MCV 09/28/2024 91  82 - 98 fL Final    MCH 09/28/2024 28.7  26.8 - 34.3 pg Final    MCHC 09/28/2024 31.7  31.4 - 37.4 g/dL Final    RDW 09/28/2024 12.7  11.6 - 15.1 % Final    MPV 09/28/2024 10.4  8.9 - 12.7 fL Final    Platelets 09/28/2024 223  149 - 390 Thousands/uL Final    nRBC 09/28/2024 0  /100 WBCs Final    Segmented % 09/28/2024 65  43 - 75 % Final    Immature Grans % 09/28/2024 0  0 - 2 % Final    Lymphocytes % 09/28/2024  25  14 - 44 % Final    Monocytes % 09/28/2024 6  4 - 12 % Final    Eosinophils Relative 09/28/2024 3  0 - 6 % Final    Basophils Relative 09/28/2024 1  0 - 1 % Final    Absolute Neutrophils 09/28/2024 4.73  1.85 - 7.62 Thousands/µL Final    Absolute Immature Grans 09/28/2024 0.02  0.00 - 0.20 Thousand/uL Final    Absolute Lymphocytes 09/28/2024 1.77  0.60 - 4.47 Thousands/µL Final    Absolute Monocytes 09/28/2024 0.41  0.17 - 1.22 Thousand/µL Final    Eosinophils Absolute 09/28/2024 0.24  0.00 - 0.61 Thousand/µL Final    Basophils Absolute 09/28/2024 0.04  0.00 - 0.10 Thousands/µL Final    Sodium 09/28/2024 136  135 - 147 mmol/L Final    Potassium 09/28/2024 3.9  3.5 - 5.3 mmol/L Final    Chloride 09/28/2024 104  96 - 108 mmol/L Final    CO2 09/28/2024 24  21 - 32 mmol/L Final    ANION GAP 09/28/2024 8  4 - 13 mmol/L Final    BUN 09/28/2024 27 (H)  5 - 25 mg/dL Final    Creatinine 09/28/2024 1.14  0.60 - 1.30 mg/dL Final    Standardized to IDMS reference method    Glucose 09/28/2024 216 (H)  65 - 140 mg/dL Final    If the patient is fasting, the ADA then defines impaired fasting glucose as > 100 mg/dL and diabetes as > or equal to 123 mg/dL.    Calcium 09/28/2024 9.1  8.4 - 10.2 mg/dL Final    AST 09/28/2024 13  13 - 39 U/L Final    ALT 09/28/2024 7  7 - 52 U/L Final    Specimen collection should occur prior to Sulfasalazine administration due to the potential for falsely depressed results.     Alkaline Phosphatase 09/28/2024 46  34 - 104 U/L Final    Total Protein 09/28/2024 6.0 (L)  6.4 - 8.4 g/dL Final    Albumin 09/28/2024 3.7  3.5 - 5.0 g/dL Final    Total Bilirubin 09/28/2024 0.35  0.20 - 1.00 mg/dL Final    Use of this assay is not recommended for patients undergoing treatment with eltrombopag due to the potential for falsely elevated results.  N-acetyl-p-benzoquinone imine (metabolite of Acetaminophen) will generate erroneously low results in samples for patients that have taken an overdose of Acetaminophen.     eGFR 09/28/2024 43  ml/min/1.73sq m Final    Magnesium 09/28/2024 1.8 (L)  1.9 - 2.7 mg/dL Final    Phosphorus 09/28/2024 3.3  2.3 - 4.1 mg/dL Final    Ventricular Rate 09/29/2024 79  BPM Final    Atrial Rate 09/29/2024 300  BPM Final    QRSD Interval 09/29/2024 78  ms Final    QT Interval 09/29/2024 380  ms Final    QTC Interval 09/29/2024 435  ms Final    QRS Axis 09/29/2024 -10  degrees Final    T Wave [place] 09/29/2024 2  degrees Final    Hemoglobin A1C 09/29/2024 6.7 (H)  Normal 4.0-5.6%; PreDiabetic 5.7-6.4%; Diabetic >=6.5%; Glycemic control for adults with diabetes <7.0% % Final    EAG 09/29/2024 146  mg/dl Final    WBC 09/29/2024 9.50  4.31 - 10.16 Thousand/uL Final    RBC 09/29/2024 3.82  3.81 - 5.12 Million/uL Final    Hemoglobin 09/29/2024 11.0 (L)  11.5 - 15.4 g/dL Final    Hematocrit 09/29/2024 34.7 (L)  34.8 - 46.1 % Final    MCV 09/29/2024 91  82 - 98 fL Final    MCH 09/29/2024 28.8  26.8 - 34.3 pg Final    MCHC 09/29/2024 31.7  31.4 - 37.4 g/dL Final    RDW 09/29/2024 12.8  11.6 - 15.1 % Final    MPV 09/29/2024 10.7  8.9 - 12.7 fL Final    Platelets 09/29/2024 191  149 - 390 Thousands/uL Final    nRBC 09/29/2024 0  /100 WBCs Final    Segmented % 09/29/2024 86 (H)  43 - 75 % Final    Immature Grans % 09/29/2024 0  0 - 2 % Final    Lymphocytes % 09/29/2024 8 (L)  14 - 44 % Final    Monocytes % 09/29/2024 5  4 - 12 % Final    Eosinophils Relative 09/29/2024 1  0 - 6 % Final    Basophils Relative 09/29/2024 0  0 - 1 % Final    Absolute Neutrophils 09/29/2024 8.04 (H)  1.85 - 7.62 Thousands/µL Final    Absolute Immature Grans 09/29/2024 0.04  0.00 - 0.20 Thousand/uL Final    Absolute Lymphocytes 09/29/2024 0.79  0.60 - 4.47 Thousands/µL Final    Absolute Monocytes 09/29/2024 0.47  0.17 - 1.22 Thousand/µL Final    Eosinophils Absolute 09/29/2024 0.13  0.00 - 0.61 Thousand/µL Final    Basophils Absolute 09/29/2024 0.03  0.00 - 0.10 Thousands/µL Final    Sodium 09/29/2024 140  135 - 147 mmol/L Final     Potassium 09/29/2024 3.4 (L)  3.5 - 5.3 mmol/L Final    Chloride 09/29/2024 107  96 - 108 mmol/L Final    CO2 09/29/2024 24  21 - 32 mmol/L Final    ANION GAP 09/29/2024 9  4 - 13 mmol/L Final    BUN 09/29/2024 24  5 - 25 mg/dL Final    Creatinine 09/29/2024 0.98  0.60 - 1.30 mg/dL Final    Standardized to IDMS reference method    Glucose 09/29/2024 137  65 - 140 mg/dL Final    If the patient is fasting, the ADA then defines impaired fasting glucose as > 100 mg/dL and diabetes as > or equal to 123 mg/dL.    Calcium 09/29/2024 8.6  8.4 - 10.2 mg/dL Final    eGFR 09/29/2024 52  ml/min/1.73sq m Final    Magnesium 09/29/2024 2.1  1.9 - 2.7 mg/dL Final    POC Glucose 09/29/2024 138  65 - 140 mg/dl Final    POC Glucose 09/29/2024 165 (H)  65 - 140 mg/dl Final    POC Glucose 09/29/2024 169 (H)  65 - 140 mg/dl Final    POC Glucose 09/29/2024 113  65 - 140 mg/dl Final    WBC 09/30/2024 6.30  4.31 - 10.16 Thousand/uL Final    RBC 09/30/2024 3.78 (L)  3.81 - 5.12 Million/uL Final    Hemoglobin 09/30/2024 10.7 (L)  11.5 - 15.4 g/dL Final    Hematocrit 09/30/2024 34.0 (L)  34.8 - 46.1 % Final    MCV 09/30/2024 90  82 - 98 fL Final    MCH 09/30/2024 28.3  26.8 - 34.3 pg Final    MCHC 09/30/2024 31.5  31.4 - 37.4 g/dL Final    RDW 09/30/2024 12.9  11.6 - 15.1 % Final    Platelets 09/30/2024 171  149 - 390 Thousands/uL Final    MPV 09/30/2024 10.6  8.9 - 12.7 fL Final    Sodium 09/30/2024 138  135 - 147 mmol/L Final    Potassium 09/30/2024 4.1  3.5 - 5.3 mmol/L Final    Chloride 09/30/2024 107  96 - 108 mmol/L Final    CO2 09/30/2024 24  21 - 32 mmol/L Final    ANION GAP 09/30/2024 7  4 - 13 mmol/L Final    BUN 09/30/2024 19  5 - 25 mg/dL Final    Creatinine 09/30/2024 0.96  0.60 - 1.30 mg/dL Final    Standardized to IDMS reference method    Glucose 09/30/2024 126  65 - 140 mg/dL Final    If the patient is fasting, the ADA then defines impaired fasting glucose as > 100 mg/dL and diabetes as > or equal to 123 mg/dL.    Calcium  09/30/2024 8.9  8.4 - 10.2 mg/dL Final    eGFR 09/30/2024 54  ml/min/1.73sq m Final    POC Glucose 09/30/2024 121  65 - 140 mg/dl Final    POC Glucose 09/30/2024 140  65 - 140 mg/dl Final   Admission on 09/23/2024, Discharged on 09/23/2024   Component Date Value Ref Range Status    Ventricular Rate 09/23/2024 85  BPM Final    Atrial Rate 09/23/2024 85  BPM Final    SC Interval 09/23/2024 160  ms Final    QRSD Interval 09/23/2024 72  ms Final    QT Interval 09/23/2024 348  ms Final    QTC Interval 09/23/2024 414  ms Final    P Axis 09/23/2024 39  degrees Final    QRS Axis 09/23/2024 -2  degrees Final    T Wave Axis 09/23/2024 13  degrees Final    WBC 09/23/2024 6.14  4.31 - 10.16 Thousand/uL Final    RBC 09/23/2024 4.21  3.81 - 5.12 Million/uL Final    Hemoglobin 09/23/2024 12.3  11.5 - 15.4 g/dL Final    Hematocrit 09/23/2024 37.8  34.8 - 46.1 % Final    MCV 09/23/2024 90  82 - 98 fL Final    MCH 09/23/2024 29.2  26.8 - 34.3 pg Final    MCHC 09/23/2024 32.5  31.4 - 37.4 g/dL Final    RDW 09/23/2024 12.7  11.6 - 15.1 % Final    MPV 09/23/2024 10.7  8.9 - 12.7 fL Final    Platelets 09/23/2024 197  149 - 390 Thousands/uL Final    nRBC 09/23/2024 0  /100 WBCs Final    Segmented % 09/23/2024 66  43 - 75 % Final    Immature Grans % 09/23/2024 0  0 - 2 % Final    Lymphocytes % 09/23/2024 24  14 - 44 % Final    Monocytes % 09/23/2024 6  4 - 12 % Final    Eosinophils Relative 09/23/2024 3  0 - 6 % Final    Basophils Relative 09/23/2024 1  0 - 1 % Final    Absolute Neutrophils 09/23/2024 4.11  1.85 - 7.62 Thousands/µL Final    Absolute Immature Grans 09/23/2024 0.02  0.00 - 0.20 Thousand/uL Final    Absolute Lymphocytes 09/23/2024 1.44  0.60 - 4.47 Thousands/µL Final    Absolute Monocytes 09/23/2024 0.35  0.17 - 1.22 Thousand/µL Final    Eosinophils Absolute 09/23/2024 0.17  0.00 - 0.61 Thousand/µL Final    Basophils Absolute 09/23/2024 0.05  0.00 - 0.10 Thousands/µL Final    Sodium 09/23/2024 137  135 - 147 mmol/L Final     Potassium 09/23/2024 5.0  3.5 - 5.3 mmol/L Final    Moderately Hemolyzed:Results may be affected.    Chloride 09/23/2024 104  96 - 108 mmol/L Final    CO2 09/23/2024 25  21 - 32 mmol/L Final    ANION GAP 09/23/2024 8  4 - 13 mmol/L Final    BUN 09/23/2024 37 (H)  5 - 25 mg/dL Final    Creatinine 09/23/2024 1.18  0.60 - 1.30 mg/dL Final    Standardized to IDMS reference method    Glucose 09/23/2024 109  65 - 140 mg/dL Final    If the patient is fasting, the ADA then defines impaired fasting glucose as > 100 mg/dL and diabetes as > or equal to 123 mg/dL.    Calcium 09/23/2024 9.6  8.4 - 10.2 mg/dL Final    eGFR 09/23/2024 42  ml/min/1.73sq m Final   Admission on 09/15/2024, Discharged on 09/15/2024   Component Date Value Ref Range Status    Ventricular Rate 09/15/2024 84  BPM Final    Atrial Rate 09/15/2024 84  BPM Final    MD Interval 09/15/2024 162  ms Final    QRSD Interval 09/15/2024 68  ms Final    QT Interval 09/15/2024 348  ms Final    QTC Interval 09/15/2024 411  ms Final    P Axis 09/15/2024 55  degrees Final    QRS Axis 09/15/2024 6  degrees Final    T Wave [place] 09/15/2024 39  degrees Final    WBC 09/15/2024 7.61  4.31 - 10.16 Thousand/uL Final    RBC 09/15/2024 4.13  3.81 - 5.12 Million/uL Final    Hemoglobin 09/15/2024 11.8  11.5 - 15.4 g/dL Final    Hematocrit 09/15/2024 37.1  34.8 - 46.1 % Final    MCV 09/15/2024 90  82 - 98 fL Final    MCH 09/15/2024 28.6  26.8 - 34.3 pg Final    MCHC 09/15/2024 31.8  31.4 - 37.4 g/dL Final    RDW 09/15/2024 12.8  11.6 - 15.1 % Final    MPV 09/15/2024 10.9  8.9 - 12.7 fL Final    Platelets 09/15/2024 218  149 - 390 Thousands/uL Final    nRBC 09/15/2024 0  /100 WBCs Final    Segmented % 09/15/2024 73  43 - 75 % Final    Immature Grans % 09/15/2024 0  0 - 2 % Final    Lymphocytes % 09/15/2024 19  14 - 44 % Final    Monocytes % 09/15/2024 5  4 - 12 % Final    Eosinophils Relative 09/15/2024 2  0 - 6 % Final    Basophils Relative 09/15/2024 1  0 - 1 % Final    Absolute  "Neutrophils 09/15/2024 5.59  1.85 - 7.62 Thousands/µL Final    Absolute Immature Grans 09/15/2024 0.02  0.00 - 0.20 Thousand/uL Final    Absolute Lymphocytes 09/15/2024 1.41  0.60 - 4.47 Thousands/µL Final    Absolute Monocytes 09/15/2024 0.40  0.17 - 1.22 Thousand/µL Final    Eosinophils Absolute 09/15/2024 0.14  0.00 - 0.61 Thousand/µL Final    Basophils Absolute 09/15/2024 0.05  0.00 - 0.10 Thousands/µL Final    Sodium 09/15/2024 136  135 - 147 mmol/L Final    Potassium 09/15/2024 4.0  3.5 - 5.3 mmol/L Final    Chloride 09/15/2024 101  96 - 108 mmol/L Final    CO2 09/15/2024 25  21 - 32 mmol/L Final    ANION GAP 09/15/2024 10  4 - 13 mmol/L Final    BUN 09/15/2024 27 (H)  5 - 25 mg/dL Final    Creatinine 09/15/2024 1.05  0.60 - 1.30 mg/dL Final    Standardized to IDMS reference method    Glucose 09/15/2024 105  65 - 140 mg/dL Final    If the patient is fasting, the ADA then defines impaired fasting glucose as > 100 mg/dL and diabetes as > or equal to 123 mg/dL.    Calcium 09/15/2024 9.6  8.4 - 10.2 mg/dL Final    AST 09/15/2024 15  13 - 39 U/L Final    ALT 09/15/2024 9  7 - 52 U/L Final    Specimen collection should occur prior to Sulfasalazine administration due to the potential for falsely depressed results.     Alkaline Phosphatase 09/15/2024 49  34 - 104 U/L Final    Total Protein 09/15/2024 6.7  6.4 - 8.4 g/dL Final    Albumin 09/15/2024 4.1  3.5 - 5.0 g/dL Final    Total Bilirubin 09/15/2024 0.47  0.20 - 1.00 mg/dL Final    Use of this assay is not recommended for patients undergoing treatment with eltrombopag due to the potential for falsely elevated results.  N-acetyl-p-benzoquinone imine (metabolite of Acetaminophen) will generate erroneously low results in samples for patients that have taken an overdose of Acetaminophen.    eGFR 09/15/2024 48  ml/min/1.73sq m Final    Lipase 09/15/2024 45  11 - 82 u/L Final    hs TnI 0hr 09/15/2024 17  \"Refer to ACS Flowchart\"- see link ng/L Final    Comment:           "                                    Initial (time 0) result  If >=50 ng/L, Myocardial injury suggested ;  Type of myocardial injury and treatment strategy  to be determined.  If 5-49 ng/L, a delta result at 2 hours and or 4 hours will be needed to further evaluate.  If <4 ng/L, and chest pain has been >3 hours since onset, patient may qualify for discharge based on the HEART score in the ED.  If <5 ng/L and <3hours since onset of chest pain, a delta result at 2 hours will be needed to further evaluate.    HS Troponin 99th Percentile URL of a Health Population=12 ng/L with a 95% Confidence Interval of 8-18 ng/L.    Second Troponin (time 2 hours)  If calculated delta >= 20 ng/L,  Myocardial injury suggested ; Type of myocardial injury and treatment strategy to be determined.  If 5-49 ng/L and the calculated delta is 5-19 ng/L, consult medical service for evaluation.  Continue evaluation for ischemia on ecg and other possible etiology and repeat hs troponin at 4 hours.  If delta                            is <5 ng/L at 2 hours, consider discharge based on risk stratification via the HEART score (if in ED), or [place] risk score in IP/Observation.    HS Troponin 99th Percentile URL of a Health Population=12 ng/L with a 95% Confidence Interval of 8-18 ng/L.    Color, UA 09/15/2024 Light Yellow   Final    Clarity, UA 09/15/2024 Clear   Final    Specific Gravity, UA 09/15/2024 1.019  1.003 - 1.030 Final    pH, UA 09/15/2024 5.0  4.5, 5.0, 5.5, 6.0, 6.5, 7.0, 7.5, 8.0 Final    Leukocytes, UA 09/15/2024 Negative  Negative Final    Nitrite, UA 09/15/2024 Negative  Negative Final    Protein, UA 09/15/2024 Negative  Negative mg/dl Final    Glucose, UA 09/15/2024 Negative  Negative mg/dl Final    Ketones, UA 09/15/2024 Negative  Negative mg/dl Final    Urobilinogen, UA 09/15/2024 <2.0  <2.0 mg/dl mg/dl Final    Bilirubin, UA 09/15/2024 Negative  Negative Final    Occult Blood, UA 09/15/2024 Small (A)  Negative Final    hs TnI 2hr  "09/15/2024 14  \"Refer to ACS Flowchart\"- see link ng/L Final    Comment:                                              Initial (time 0) result  If >=50 ng/L, Myocardial injury suggested ;  Type of myocardial injury and treatment strategy  to be determined.  If 5-49 ng/L, a delta result at 2 hours and or 4 hours will be needed to further evaluate.  If <4 ng/L, and chest pain has been >3 hours since onset, patient may qualify for discharge based on the HEART score in the ED.  If <5 ng/L and <3hours since onset of chest pain, a delta result at 2 hours will be needed to further evaluate.    HS Troponin 99th Percentile URL of a Health Population=12 ng/L with a 95% Confidence Interval of 8-18 ng/L.    Second Troponin (time 2 hours)  If calculated delta >= 20 ng/L,  Myocardial injury suggested ; Type of myocardial injury and treatment strategy to be determined.  If 5-49 ng/L and the calculated delta is 5-19 ng/L, consult medical service for evaluation.  Continue evaluation for ischemia on ecg and other possible etiology and repeat hs troponin at 4 hours.  If delta                            is <5 ng/L at 2 hours, consider discharge based on risk stratification via the HEART score (if in ED), or [place] risk score in IP/Observation.    HS Troponin 99th Percentile URL of a Health Population=12 ng/L with a 95% Confidence Interval of 8-18 ng/L.    Delta 2hr hsTnI 09/15/2024 -3  <20 ng/L Final    RBC, UA 09/15/2024 10-20 (A)  None Seen, 1-2 /hpf Final    WBC, UA 09/15/2024 None Seen  None Seen, 1-2 /hpf Final    Epithelial Cells 09/15/2024 Occasional  None Seen, Occasional /hpf Final    Bacteria, UA 09/15/2024 None Seen  None Seen, Occasional /hpf Final   Admission on 08/28/2024, Discharged on 09/04/2024   Component Date Value Ref Range Status    Sodium 08/30/2024 136  135 - 147 mmol/L Final    Potassium 08/30/2024 3.8  3.5 - 5.3 mmol/L Final    Chloride 08/30/2024 100  96 - 108 mmol/L Final    CO2 08/30/2024 26  21 - 32 mmol/L " Final    ANION GAP 08/30/2024 10  4 - 13 mmol/L Final    BUN 08/30/2024 25  5 - 25 mg/dL Final    Creatinine 08/30/2024 0.98  0.60 - 1.30 mg/dL Final    Standardized to IDMS reference method    Glucose 08/30/2024 136  65 - 140 mg/dL Final    If the patient is fasting, the ADA then defines impaired fasting glucose as > 100 mg/dL and diabetes as > or equal to 123 mg/dL.    Glucose, Fasting 08/30/2024 136 (H)  65 - 99 mg/dL Final    Calcium 08/30/2024 9.3  8.4 - 10.2 mg/dL Final    AST 08/30/2024 12 (L)  13 - 39 U/L Final    ALT 08/30/2024 6 (L)  7 - 52 U/L Final    Specimen collection should occur prior to Sulfasalazine administration due to the potential for falsely depressed results.     Alkaline Phosphatase 08/30/2024 43  34 - 104 U/L Final    Total Protein 08/30/2024 6.1 (L)  6.4 - 8.4 g/dL Final    Albumin 08/30/2024 3.8  3.5 - 5.0 g/dL Final    Total Bilirubin 08/30/2024 0.53  0.20 - 1.00 mg/dL Final    Use of this assay is not recommended for patients undergoing treatment with eltrombopag due to the potential for falsely elevated results.  N-acetyl-p-benzoquinone imine (metabolite of Acetaminophen) will generate erroneously low results in samples for patients that have taken an overdose of Acetaminophen.    eGFR 08/30/2024 52  ml/min/1.73sq m Final    Magnesium 08/30/2024 2.1  1.9 - 2.7 mg/dL Final    Phosphorus 08/30/2024 4.0  2.3 - 4.1 mg/dL Final    WBC 08/30/2024 5.82  4.31 - 10.16 Thousand/uL Final    RBC 08/30/2024 3.77 (L)  3.81 - 5.12 Million/uL Final    Hemoglobin 08/30/2024 11.0 (L)  11.5 - 15.4 g/dL Final    Hematocrit 08/30/2024 34.4 (L)  34.8 - 46.1 % Final    MCV 08/30/2024 91  82 - 98 fL Final    MCH 08/30/2024 29.2  26.8 - 34.3 pg Final    MCHC 08/30/2024 32.0  31.4 - 37.4 g/dL Final    RDW 08/30/2024 13.1  11.6 - 15.1 % Final    MPV 08/30/2024 10.9  8.9 - 12.7 fL Final    Platelets 08/30/2024 207  149 - 390 Thousands/uL Final    nRBC 08/30/2024 0  /100 WBCs Final    Segmented % 08/30/2024 68   43 - 75 % Final    Immature Grans % 08/30/2024 0  0 - 2 % Final    Lymphocytes % 08/30/2024 22  14 - 44 % Final    Monocytes % 08/30/2024 7  4 - 12 % Final    Eosinophils Relative 08/30/2024 2  0 - 6 % Final    Basophils Relative 08/30/2024 1  0 - 1 % Final    Absolute Neutrophils 08/30/2024 3.95  1.85 - 7.62 Thousands/µL Final    Absolute Immature Grans 08/30/2024 0.01  0.00 - 0.20 Thousand/uL Final    Absolute Lymphocytes 08/30/2024 1.26  0.60 - 4.47 Thousands/µL Final    Absolute Monocytes 08/30/2024 0.43  0.17 - 1.22 Thousand/µL Final    Eosinophils Absolute 08/30/2024 0.14  0.00 - 0.61 Thousand/µL Final    Basophils Absolute 08/30/2024 0.03  0.00 - 0.10 Thousands/µL Final    TSH 3RD GENERATON 08/30/2024 1.191  0.450 - 4.500 uIU/mL Final    The recommended reference ranges for TSH during pregnancy are as follows:   First trimester 0.100 to 2.500 uIU/mL   Second trimester  0.200 to 3.000 uIU/mL   Third trimester 0.300 to 3.000 uIU/m    Note: Normal ranges may not apply to patients who are transgender, non-binary, or whose legal sex, sex at birth, and gender identity differ.  Adult TSH (3rd generation) reference range follows the recommended guidelines of the American Thyroid Association, January, 2020.    Vitamin B-12 08/30/2024 437  180 - 914 pg/mL Final    Folate 08/30/2024 >22.3  >5.9 ng/mL Final    The World Health Organization has determined deficient folate concentrations are considered to be <4.0 ng/mL.    Vit D, 25-Hydroxy 08/30/2024 40.4  30.0 - 100.0 ng/mL Final    Vitamin D guidelines established by Clinical Guidelines Subcommittee  of the Endocrine Society Task Force, 2011    Deficiency <20ng/ml   Insufficiency 20-30ng/ml   Sufficient  ng/ml     Iron Saturation 08/30/2024 23  15 - 50 % Final    TIBC 08/30/2024 257  250 - 450 ug/dL Final    Iron 08/30/2024 58  50 - 212 ug/dL Final    Patients treated with metal-binding drugs (ie. Deferoxamine) may have depressed iron values.    UIBC 08/30/2024  199  155 - 355 ug/dL Final    Ferritin 08/30/2024 29  11 - 307 ng/mL Final    Syphilis Total Antibody 08/30/2024 Non-reactive  Non-Reactive Final    No serological evidence of infection with T. pallidum.  Early or incubating syphilis infection cannot be excluded.  Consider repeat testing based on clinical suspicion.    HIV-1 p24 Antigen 08/30/2024 Non-Reactive  Non-Reactive Final    HIV-1 Antibody 08/30/2024 Non-Reactive  Non-Reactive Final    HIV-2 Antibody 08/30/2024 Non-Reactive  Non-Reactive Final    HIV Ag-Ab 5th Gen 08/30/2024 Non-Reactive  Non-Reactive Final    A Non-Reactive test result does not preclude the possibility of exposure or infection with HIV-1 and/or HIV-2.  Non-Reactive results can occur if the quantity of marker present is below the detection limits or is not present during the stage of disease in which a sample is collected. Repeat testing should be considered where there is clinical suspicion of infection.       Ventricular Rate 08/30/2024 83  BPM Final    Atrial Rate 08/30/2024 83  BPM Final    ME Interval 08/30/2024 170  ms Final    QRSD Interval 08/30/2024 74  ms Final    QT Interval 08/30/2024 370  ms Final    QTC Interval 08/30/2024 434  ms Final    P Axis 08/30/2024 61  degrees Final    QRS [place] 08/30/2024 3  degrees Final    T Wave [place] 08/30/2024 51  degrees Final   Admission on 08/27/2024, Discharged on 08/28/2024   Component Date Value Ref Range Status    WBC 08/27/2024 6.15  4.31 - 10.16 Thousand/uL Final    RBC 08/27/2024 3.75 (L)  3.81 - 5.12 Million/uL Final    Hemoglobin 08/27/2024 11.0 (L)  11.5 - 15.4 g/dL Final    Hematocrit 08/27/2024 33.5 (L)  34.8 - 46.1 % Final    MCV 08/27/2024 89  82 - 98 fL Final    MCH 08/27/2024 29.3  26.8 - 34.3 pg Final    MCHC 08/27/2024 32.8  31.4 - 37.4 g/dL Final    RDW 08/27/2024 13.2  11.6 - 15.1 % Final    MPV 08/27/2024 10.2  8.9 - 12.7 fL Final    Platelets 08/27/2024 207  149 - 390 Thousands/uL Final    nRBC 08/27/2024 0  /100 WBCs Final     Segmented % 08/27/2024 66  43 - 75 % Final    Immature Grans % 08/27/2024 0  0 - 2 % Final    Lymphocytes % 08/27/2024 24  14 - 44 % Final    Monocytes % 08/27/2024 7  4 - 12 % Final    Eosinophils Relative 08/27/2024 2  0 - 6 % Final    Basophils Relative 08/27/2024 1  0 - 1 % Final    Absolute Neutrophils 08/27/2024 4.08  1.85 - 7.62 Thousands/µL Final    Absolute Immature Grans 08/27/2024 0.02  0.00 - 0.20 Thousand/uL Final    Absolute Lymphocytes 08/27/2024 1.46  0.60 - 4.47 Thousands/µL Final    Absolute Monocytes 08/27/2024 0.43  0.17 - 1.22 Thousand/µL Final    Eosinophils Absolute 08/27/2024 0.12  0.00 - 0.61 Thousand/µL Final    Basophils Absolute 08/27/2024 0.04  0.00 - 0.10 Thousands/µL Final    Sodium 08/27/2024 136  135 - 147 mmol/L Final    Potassium 08/27/2024 3.9  3.5 - 5.3 mmol/L Final    Chloride 08/27/2024 102  96 - 108 mmol/L Final    CO2 08/27/2024 27  21 - 32 mmol/L Final    ANION GAP 08/27/2024 7  4 - 13 mmol/L Final    BUN 08/27/2024 26 (H)  5 - 25 mg/dL Final    Creatinine 08/27/2024 1.05  0.60 - 1.30 mg/dL Final    Standardized to IDMS reference method    Glucose 08/27/2024 122  65 - 140 mg/dL Final    If the patient is fasting, the ADA then defines impaired fasting glucose as > 100 mg/dL and diabetes as > or equal to 123 mg/dL.    Calcium 08/27/2024 9.4  8.4 - 10.2 mg/dL Final    AST 08/27/2024 12 (L)  13 - 39 U/L Final    ALT 08/27/2024 7  7 - 52 U/L Final    Specimen collection should occur prior to Sulfasalazine administration due to the potential for falsely depressed results.     Alkaline Phosphatase 08/27/2024 50  34 - 104 U/L Final    Total Protein 08/27/2024 6.4  6.4 - 8.4 g/dL Final    Albumin 08/27/2024 4.0  3.5 - 5.0 g/dL Final    Total Bilirubin 08/27/2024 0.48  0.20 - 1.00 mg/dL Final    Use of this assay is not recommended for patients undergoing treatment with eltrombopag due to the potential for falsely elevated results.  N-acetyl-p-benzoquinone imine (metabolite of  Acetaminophen) will generate erroneously low results in samples for patients that have taken an overdose of Acetaminophen.    eGFR 08/27/2024 48  ml/min/1.73sq m Final    TSH 3RD GENERATON 08/27/2024 1.408  0.450 - 4.500 uIU/mL Final    The recommended reference ranges for TSH during pregnancy are as follows:   First trimester 0.100 to 2.500 uIU/mL   Second trimester  0.200 to 3.000 uIU/mL   Third trimester 0.300 to 3.000 uIU/m    Note: Normal ranges may not apply to patients who are transgender, non-binary, or whose legal sex, sex at birth, and gender identity differ.  Adult TSH (3rd generation) reference range follows the recommended guidelines of the American Thyroid Association, January, 2020.    Amph/Meth UR 08/27/2024 Negative  Negative Final    Barbiturate Ur 08/27/2024 Negative  Negative Final    Benzodiazepine Urine 08/27/2024 Negative  Negative Final    Cocaine Urine 08/27/2024 Negative  Negative Final    Methadone Urine 08/27/2024 Negative  Negative Final    Opiate Urine 08/27/2024 Negative  Negative Final    PCP Ur 08/27/2024 Negative  Negative Final    THC Urine 08/27/2024 Negative  Negative Final    Oxycodone Urine 08/27/2024 Negative  Negative Final    Fentanyl Urine 08/27/2024 Negative  Negative Final    HYDROCODONE URINE 08/27/2024 Negative  Negative Final    EXTBreath Alcohol 08/27/2024 0.000   Final    Color, UA 08/27/2024 Colorless   Final    Clarity, UA 08/27/2024 Clear   Final    Specific Gravity, UA 08/27/2024 1.008  1.003 - 1.030 Final    pH, UA 08/27/2024 5.0  4.5, 5.0, 5.5, 6.0, 6.5, 7.0, 7.5, 8.0 Final    Leukocytes, UA 08/27/2024 Negative  Negative Final    Nitrite, UA 08/27/2024 Negative  Negative Final    Protein, UA 08/27/2024 Negative  Negative mg/dl Final    Glucose, UA 08/27/2024 Negative  Negative mg/dl Final    Ketones, UA 08/27/2024 Negative  Negative mg/dl Final    Urobilinogen, UA 08/27/2024 <2.0  <2.0 mg/dl mg/dl Final    Bilirubin, UA 08/27/2024 Negative  Negative Final     Occult Blood, UA 08/27/2024 Negative  Negative Final    Ventricular Rate 08/27/2024 77  BPM Final    Atrial Rate 08/27/2024 77  BPM Final    HI Interval 08/27/2024 166  ms Final    QRSD Interval 08/27/2024 74  ms Final    QT Interval 08/27/2024 358  ms Final    QTC Interval 08/27/2024 405  ms Final    P Axis 08/27/2024 50  degrees Final    QRS Axis 08/27/2024 -1  degrees Final    T Wave Axis 08/27/2024 23  degrees Final   Admission on 04/10/2024, Discharged on 04/10/2024   Component Date Value Ref Range Status    WBC 04/10/2024 6.37  4.31 - 10.16 Thousand/uL Final    RBC 04/10/2024 4.09  3.81 - 5.12 Million/uL Final    Hemoglobin 04/10/2024 11.7  11.5 - 15.4 g/dL Final    Hematocrit 04/10/2024 37.7  34.8 - 46.1 % Final    MCV 04/10/2024 92  82 - 98 fL Final    MCH 04/10/2024 28.6  26.8 - 34.3 pg Final    MCHC 04/10/2024 31.0 (L)  31.4 - 37.4 g/dL Final    RDW 04/10/2024 13.2  11.6 - 15.1 % Final    MPV 04/10/2024 10.4  8.9 - 12.7 fL Final    Platelets 04/10/2024 203  149 - 390 Thousands/uL Final    nRBC 04/10/2024 0  /100 WBCs Final    Segmented % 04/10/2024 70  43 - 75 % Final    Immature Grans % 04/10/2024 0  0 - 2 % Final    Lymphocytes % 04/10/2024 19  14 - 44 % Final    Monocytes % 04/10/2024 6  4 - 12 % Final    Eosinophils Relative 04/10/2024 4  0 - 6 % Final    Basophils Relative 04/10/2024 1  0 - 1 % Final    Absolute Neutrophils 04/10/2024 4.47  1.85 - 7.62 Thousands/µL Final    Absolute Immature Grans 04/10/2024 0.02  0.00 - 0.20 Thousand/uL Final    Absolute Lymphocytes 04/10/2024 1.18  0.60 - 4.47 Thousands/µL Final    Absolute Monocytes 04/10/2024 0.41  0.17 - 1.22 Thousand/µL Final    Eosinophils Absolute 04/10/2024 0.23  0.00 - 0.61 Thousand/µL Final    Basophils Absolute 04/10/2024 0.06  0.00 - 0.10 Thousands/µL Final    Sodium 04/10/2024 135  135 - 147 mmol/L Final    Potassium 04/10/2024 4.3  3.5 - 5.3 mmol/L Final    Chloride 04/10/2024 103  96 - 108 mmol/L Final    CO2 04/10/2024 24  21 - 32  "mmol/L Final    ANION GAP 04/10/2024 8  4 - 13 mmol/L Final    BUN 04/10/2024 26 (H)  5 - 25 mg/dL Final    Creatinine 04/10/2024 0.93  0.60 - 1.30 mg/dL Final    Standardized to IDMS reference method    Glucose 04/10/2024 99  65 - 140 mg/dL Final    If the patient is fasting, the ADA then defines impaired fasting glucose as > 100 mg/dL and diabetes as > or equal to 123 mg/dL.    Calcium 04/10/2024 9.7  8.4 - 10.2 mg/dL Final    AST 04/10/2024 15  13 - 39 U/L Final    ALT 04/10/2024 11  7 - 52 U/L Final    Specimen collection should occur prior to Sulfasalazine administration due to the potential for falsely depressed results.     Alkaline Phosphatase 04/10/2024 53  34 - 104 U/L Final    Total Protein 04/10/2024 6.9  6.4 - 8.4 g/dL Final    Albumin 04/10/2024 4.2  3.5 - 5.0 g/dL Final    Total Bilirubin 04/10/2024 0.49  0.20 - 1.00 mg/dL Final    Use of this assay is not recommended for patients undergoing treatment with eltrombopag due to the potential for falsely elevated results.  N-acetyl-p-benzoquinone imine (metabolite of Acetaminophen) will generate erroneously low results in samples for patients that have taken an overdose of Acetaminophen.    eGFR 04/10/2024 56  ml/min/1.73sq m Final    hs TnI 0hr 04/10/2024 11  \"Refer to ACS Flowchart\"- see link ng/L Final    Comment:                                              Initial (time 0) result  If >=50 ng/L, Myocardial injury suggested ;  Type of myocardial injury and treatment strategy  to be determined.  If 5-49 ng/L, a delta result at 2 hours and or 4 hours will be needed to further evaluate.  If <4 ng/L, and chest pain has been >3 hours since onset, patient may qualify for discharge based on the HEART score in the ED.  If <5 ng/L and <3hours since onset of chest pain, a delta result at 2 hours will be needed to further evaluate.    HS Troponin 99th Percentile URL of a Health Population=12 ng/L with a 95% Confidence Interval of 8-18 ng/L.    Second Troponin " "(time 2 hours)  If calculated delta >= 20 ng/L,  Myocardial injury suggested ; Type of myocardial injury and treatment strategy to be determined.  If 5-49 ng/L and the calculated delta is 5-19 ng/L, consult medical service for evaluation.  Continue evaluation for ischemia on ecg and other possible etiology and repeat hs troponin at 4 hours.  If delta                            is <5 ng/L at 2 hours, consider discharge based on risk stratification via the HEART score (if in ED), or [place] risk score in IP/Observation.    HS Troponin 99th Percentile URL of a Health Population=12 ng/L with a 95% Confidence Interval of 8-18 ng/L.    Ventricular Rate 04/10/2024 88  BPM Final    Atrial Rate 04/10/2024 88  BPM Final    NC Interval 04/10/2024 166  ms Final    QRSD Interval 04/10/2024 64  ms Final    QT Interval 04/10/2024 372  ms Final    QTC Interval 04/10/2024 450  ms Final    P Axis 04/10/2024 51  degrees Final    QRS [place] 04/10/2024 19  degrees Final    T Wave [place] 04/10/2024 49  degrees Final    hs TnI 2hr 04/10/2024 12  \"Refer to ACS Flowchart\"- see link ng/L Final    Comment:                                              Initial (time 0) result  If >=50 ng/L, Myocardial injury suggested ;  Type of myocardial injury and treatment strategy  to be determined.  If 5-49 ng/L, a delta result at 2 hours and or 4 hours will be needed to further evaluate.  If <4 ng/L, and chest pain has been >3 hours since onset, patient may qualify for discharge based on the HEART score in the ED.  If <5 ng/L and <3hours since onset of chest pain, a delta result at 2 hours will be needed to further evaluate.    HS Troponin 99th Percentile URL of a Health Population=12 ng/L with a 95% Confidence Interval of 8-18 ng/L.    Second Troponin (time 2 hours)  If calculated delta >= 20 ng/L,  Myocardial injury suggested ; Type of myocardial injury and treatment strategy to be determined.  If 5-49 ng/L and the calculated delta is 5-19 ng/L, consult " medical service for evaluation.  Continue evaluation for ischemia on ecg and other possible etiology and repeat hs troponin at 4 hours.  If delta                            is <5 ng/L at 2 hours, consider discharge based on risk stratification via the HEART score (if in ED), or [place] risk score in IP/Observation.    HS Troponin 99th Percentile URL of a Health Population=12 ng/L with a 95% Confidence Interval of 8-18 ng/L.    Delta 2hr hsTnI 04/10/2024 1  <20 ng/L Final    Color, UA 04/10/2024 Colorless   Final    Clarity, UA 04/10/2024 Clear   Final    Specific Gravity, UA 04/10/2024 1.004  1.003 - 1.030 Final    pH, UA 04/10/2024 5.5  4.5, 5.0, 5.5, 6.0, 6.5, 7.0, 7.5, 8.0 Final    Leukocytes, UA 04/10/2024 Negative  Negative Final    Nitrite, UA 04/10/2024 Negative  Negative Final    Protein, UA 04/10/2024 Negative  Negative mg/dl Final    Glucose, UA 04/10/2024 Negative  Negative mg/dl Final    Ketones, UA 04/10/2024 Negative  Negative mg/dl Final    Urobilinogen, UA 04/10/2024 <2.0  <2.0 mg/dl mg/dl Final    Bilirubin, UA 04/10/2024 Negative  Negative Final    Occult Blood, UA 04/10/2024 Negative  Negative Final             ___________________________________    History Review: The following portions of the patient's history were reviewed and updated as appropriate: allergies, current medications, past family history, past medical history, past social history, past surgical history, and problem list.    Unchanged information from this writer's previous assessment is copied and italicized; information that has changed is bolded.    Past Psychiatric History:      Past psychiatric diagnoses:   Depression, anxiety  Inpatient psychiatric admissions:   8/2024 2013 following a surgery, sounded like extreme anxiety  Prior outpatient psychiatric treatment:   Started seeing a psychiatrist when first   Past/current psychotherapy:   active  History of suicidal attempts/gestures:   denies  Psychotropic medication  trials:   Venlafaxine, sertraline, fluoxetine, escitalopram, aripiprazole (dizziness), risperidone, clonazepam, buspirone, gabapentin        Substance Abuse History:     Denies all history of substance use     Family Psychiatric History:      Family History             Family History   Problem Relation Age of Onset    Depression Mother           w/anxiety    Diabetes Mother           Mellitus    Breast cancer Mother      Hypertension Mother      No Known Problems Father      Depression Sister           w/anxiety    Heart disease Sister           Cardiac Disorder    Breast cancer Sister      Hypertension Sister      Diabetes Brother           Mellitus    Alcohol abuse Son                    Social History:     Developmental: nothing of note  Education: Bachelor degree  Marital history:  in 2006  Children: 2  Living arrangement, social support: as above, has been with daughter for 17 years  Occupational History: was a   Denominational Affiliation: has a Zoroastrian ghanshyam  Access to firearms: denies        Traumatic History:      denies.  ___________________________________      Visit Time    Visit Start Time: 1410  Visit Stop Time: 1515  Total Visit Duration:  65 minutes    López Gutierrez MD   10/07/24

## 2024-10-07 NOTE — ASSESSMENT & PLAN NOTE
"Rule-out OCD    Over the weekend patient was medically admitted for two days for constipation. She continues to exhibit somatization as a main defence and displays other regressive behavior. The patient seems to be contending with anxiety around her own mortality. May also consider if constipation has a functional component.    Sleep seems to have improved, however there is concern for sedation in the mornings. Will discontinue mirtazapine. If this continues, change trazodone to PRN. Due to adverse effects experienced on fluoxetine at higher dose necessary for obsessional thinking, and multiple other failed medications, switching to vortioxetine for better profile in elderly and potential \"pro-cognitive\" benefit.    For future, can consider augmentation with aripiprazole. Also desvenlafaxine, flovoxamine.    Discussed option of partial hospitalization program. The patient would benefit from this for establishing routine and structure and the socialization component, as well as learning coping skills directed for illness anxiety. Continued psychoeducation for family would be beneficial. They will also participate in medication management.    Discontinue fluoxetine  Discontinue mirtazapine  Continue trazodone 25 mg QHS  Begin vortioxetine 5 mg QD for one week then increased to 10 mg QD; can increase in future  PARQ for Trintellix (Vortioxetine) including suicidality and worse depression, manic induction, serotonin syndrome and SIADH, visual affects, bleeding, N/V/C/D, xerostomia, sexual side effects, drug interactions and others.  Referral to partial hospitalization program  Resume group therapy      Orders:    vortioxetine (TRINTELLIX) 10 MG tablet; Take 0.5 tablets (5 mg total) by mouth daily for 7 days, THEN 1 tablet (10 mg total) daily for 22 days.    Ambulatory Referral to [facility] or Partial Psych Program; Future    "

## 2024-10-07 NOTE — TELEPHONE ENCOUNTER
Asad with [facility] called states patient was in hospital 10/3-10/5 for constipation. [id number] was ordered but would also like Nursing. Can take a verbal [phone]

## 2024-10-07 NOTE — ASSESSMENT & PLAN NOTE
Orders:    vortioxetine (TRINTELLIX) 10 MG tablet; Take 0.5 tablets (5 mg total) by mouth daily for 7 days, THEN 1 tablet (10 mg total) daily for 22 days.    Ambulatory Referral to [facility] or [facility] Psych Program; Future

## 2024-10-08 ENCOUNTER — TELEPHONE (OUTPATIENT)
Dept: PSYCHOLOGY | Facility: CLINIC | Age: 84
End: 2024-10-08

## 2024-10-08 NOTE — TELEPHONE ENCOUNTER
Krysten from [facility] calling to advise Dr. De Paz they are adding on Nursing for once a week for the next 4 weeks, BRANDON

## 2024-10-09 ENCOUNTER — TELEPHONE (OUTPATIENT)
Age: 84
End: 2024-10-09

## 2024-10-09 DIAGNOSIS — F45.21 ILLNESS ANXIETY DISORDER: Primary | ICD-10-CM

## 2024-10-09 NOTE — TELEPHONE ENCOUNTER
Patient's daughter stated that she gave her mother a piece of vortioxetine (TRINTELLIX) 10 MG tablet and she became very anxious. She also said that it is hard to cut the pill because of the shape, and the pharmacy is asking if she can be prescribed 0.5 mg instead.

## 2024-10-10 ENCOUNTER — OFFICE VISIT (OUTPATIENT)
Dept: FAMILY MEDICINE CLINIC | Facility: CLINIC | Age: 84
End: 2024-10-10
Payer: MEDICARE

## 2024-10-10 VITALS
OXYGEN SATURATION: 98 % | RESPIRATION RATE: 16 BRPM | BODY MASS INDEX: 23.51 KG/M2 | TEMPERATURE: 97.6 F | DIASTOLIC BLOOD PRESSURE: 68 MMHG | SYSTOLIC BLOOD PRESSURE: 144 MMHG | HEIGHT: 57 IN | WEIGHT: 109 LBS | HEART RATE: 74 BPM

## 2024-10-10 DIAGNOSIS — K59.04 CHRONIC IDIOPATHIC CONSTIPATION: ICD-10-CM

## 2024-10-10 DIAGNOSIS — Z23 ENCOUNTER FOR IMMUNIZATION: ICD-10-CM

## 2024-10-10 DIAGNOSIS — R43.8 BAD ORAL TASTE: Primary | ICD-10-CM

## 2024-10-10 DIAGNOSIS — I10 PRIMARY HYPERTENSION: ICD-10-CM

## 2024-10-10 DIAGNOSIS — R11.0 NAUSEA: ICD-10-CM

## 2024-10-10 DIAGNOSIS — F41.9 ANXIETY AND DEPRESSION: ICD-10-CM

## 2024-10-10 DIAGNOSIS — E11.9 TYPE 2 DIABETES MELLITUS WITHOUT COMPLICATION, WITHOUT LONG-TERM CURRENT USE OF INSULIN (HCC): ICD-10-CM

## 2024-10-10 DIAGNOSIS — F32.A ANXIETY AND DEPRESSION: ICD-10-CM

## 2024-10-10 PROCEDURE — 90662 IIV NO PRSV INCREASED AG IM: CPT | Performed by: FAMILY MEDICINE

## 2024-10-10 PROCEDURE — G0008 ADMIN INFLUENZA VIRUS VAC: HCPCS | Performed by: FAMILY MEDICINE

## 2024-10-10 PROCEDURE — 99495 TRANSJ CARE MGMT MOD F2F 14D: CPT | Performed by: FAMILY MEDICINE

## 2024-10-10 RX ORDER — ONDANSETRON 4 MG/1
4 TABLET, ORALLY DISINTEGRATING ORAL EVERY 8 HOURS PRN
Qty: 20 TABLET | Refills: 0 | Status: SHIPPED | OUTPATIENT
Start: 2024-10-10

## 2024-10-10 NOTE — ASSESSMENT & PLAN NOTE
Lab Results   Component Value Date    HGBA1C 6.7 (H) 09/29/2024     No, do not stop her metformin.  Metformin will not contribute to constipation.  Metformin will actually help with her bowel movements.  Diabetes has been ideally controlled.  Advised daughter not to stop metformin and hopefully no hospitalist will touch my prescriptions for the patient.  If she goes in for constipation, address the constipation

## 2024-10-10 NOTE — ASSESSMENT & PLAN NOTE
There is an extensive bowel regimen that she is now on including MiraLAX, Metamucil, Linzess, lactulose.  She had a bowel movement yesterday.  Reassured her that she does not have to have a bowel movement every single day.    Trazodone was new for her as of August when much of her constipation issues resurface.  Constipation is a side effect of trazodone

## 2024-10-10 NOTE — PROGRESS NOTES
"  Subjective:      Patient ID: Sandra Peña is a 83 y.o. female.    TCM Call     Date and time call was made  10/7/2024  3:14 PM    Hospital care reviewed  Records reviewed    Patient was hospitialized at  [facility]    Date of Admission  10/03/24    Date of discharge  10/05/24    Diagnosis  Constipation    Disposition  Home    Were the patients medications reviewed and updated  Yes    Current Symptoms  None      TCM Call     Post hospital issues  None    Should patient be enrolled in anticoag monitoring?  No    Scheduled for follow up?  Yes    Patients specialists  Urologist    Psychiatrist name  SEVERIANO SHORT    Urologist name  AMINA SCHAFFER    Referrals needed  [facility]    Did you obtain your prescribed medications  Yes    Do you need help managing your prescriptions or medications  No    Why type of assitance do you need  Patient's daughter helps with   medications    Is transportation to your appointment needed  No    Specify why  daughter drives patient to appointments    I have advised the patient to call PCP with any new or worsening symptoms    Daniella Atkins MA    Living Arrangements  Family members    Support System  Family    The type of support provided  Emotional; Financial    Do you have social support  Yes, as much as I need    Are you recieving any outpatient services  No    Are you recieving home care services  No    Are you using any community resources  No    Current waiver services  No    Have you fallen in the last 12 months  No    Interperter language line needed  No    Counseling  Patient      Presents with her daughter for TCM visit following hospitalization at [facility] for episode of constipation and fecal impaction.  She does have gastroenterologist.  Below is discharge summary as per Dr. Warner:    \"Hospital Course:   Sandra Peña is a 83 y.o. female with Hx of T2DM, CKD III, Anxiety, Depression, food aversion, and chronic constipation who originally presented " "to the hospital on 10/3/2024 due to constipation.  She was recently admitted from 9/28-9/30 for constipation and was noted to have 2 BM during that admission, however returned after not having a bowel movement since admission. CT AP performed showed rectal fecal impaction with mild inflammatory changes about the rectum suggesting stercoral colitis, started on IV Abx. She was given an aggressive bowel regimen including: Miralax BID, Colace BID, Lupiprostone BID, Senna qhs, Lactulose 10mg, soaps enema, dulcolax suppository, with a successful small and very Large bowel movement. She continued to endorse sour/bad taste in her mouth as the reason she was unable to eat, and therefore she was started on Protonix daily. Patient's mouth was evaluated, and no abscesses were seen however poor dentition and cavities were noted throughout the mouth. Patient's daughter has an appointment with the dentist scheduled on discharged. She was noted to have a flat affect and depressed mood, suspect food aversion may also be related to depression and anxiety, she is scheduled for medication changes on 10/07 with Dr. Gutierrez.      She was discharged on an aggressive bowl regimen at home:      · Use glycerin suppositories scheduled every other day, can use daily as needed if she does have abdominal pain due to constipation   · Take metamucil  daily.   · Continue to use Miralax 17g daily.   · Continue Linaclotide daily   · Can take Lactulose 20mg twice per day as needed is she has not had a bowel movement in 3 days  · Start taking Protonix 40mg daily     Metformin was held on discharge due to hemoglobin A1c of 6.7.     Please see above list of diagnoses and related plan for additional information. \"    Daughter is trying to get her involved in senior activities.  They eventually decided that she was not going to go into assisted living facility.  She primarily complains of a sour/foul taste in her mouth.  Not when she is chewing all foods.  " She did have dental examination.  Though she has numerous fillings, she does not have any dental caries or decay.  They have been trying to use mouthwash.  While she was in the hospital they tried to discontinue her metformin and her atorvastatin even though she was in the hospital for constipation            Past Medical History:   Diagnosis Date    Anxiety     Colon polyps     Depression     Diabetes mellitus (HCC)     Dizziness     Last assessed: 8/31/15    DVT (deep venous thrombosis) ([facility])     Dysuria     Hematuria 2022    Hyperlipidemia     Hypertension     Hypertensive urgency 10/22/2021    Insomnia     Panic attack     Ureterolithiasis 2020       Family History   Problem Relation Age of Onset    Depression Mother         w/anxiety    Diabetes Mother         Mellitus    Breast cancer Mother     Hypertension Mother     No Known Problems Father     Depression Sister         w/anxiety    Heart disease Sister         Cardiac Disorder    Breast cancer Sister     Hypertension Sister     Diabetes Brother         Mellitus    Alcohol abuse Son        Past Surgical History:   Procedure Laterality Date     SECTION      1964 son, 1968 daughter    COLONOSCOPY      FL RETROGRADE PYELOGRAM  2020    NE COLONOSCOPY FLX DX W/COLLJ SPEC WHEN PFRMD N/A 3/27/2017    Procedure: COLONOSCOPY;  Surgeon: Gold Francis MD;  Location: [facility];  Service: Gastroenterology    NE CYSTO/URETERO W/LITHOTRIPSY &INDWELL STENT INSRT Right 2020    Procedure: CYSTOSCOPY URETEROSCOPY WITH LITHOTRIPSY HOLMIUM LASER, RETROGRADE PYELOGRAM AND INSERTION STENT URETERAL; EXCISION OF BLADDER TUMOR;  Surgeon: Edwin Love MD;  Location: [facility];  Service: Urology    ROTATOR CUFF REPAIR Left     Last assessed: 3/29/15    TONSILLECTOMY          reports that she has never smoked. She has never used smokeless tobacco. She reports that she does not drink alcohol and does not use drugs.      Current Outpatient Medications:      aspirin 81 MG tablet, Take 81 mg by mouth daily, Disp: , Rfl:     atorvastatin (LIPITOR) 10 mg tablet, Take 1 tablet (10 mg total) by mouth daily, Disp: 90 tablet, Rfl: 1    glycerin adult 2 g suppository, Please use glycerin suppository every other day.  If patient has rectal or abdominal pain, can use glycerin suppository daily, Disp: 50 suppository, Rfl: 0    hydrOXYzine HCL (ATARAX) 10 mg tablet, Take 1 tablet (10 mg total) by mouth 2 (two) times a day as needed for anxiety for up to 40 doses, Disp: 20 tablet, Rfl: 0    lactulose (CHRONULAC) 10 g/15 mL solution, Take 15 mL (10 g total) by mouth 2 (two) times a day, Disp: 240 mL, Rfl: 0    linaCLOtide 145 MCG CAPS, Take 1 capsule (145 mcg total) by mouth in the morning, Disp: 90 capsule, Rfl: 1    lisinopril-hydrochlorothiazide (PRINZIDE,ZESTORETIC) 10-12.5 MG per tablet, Take 1 tablet by mouth daily, Disp: 90 tablet, Rfl: 3    metFORMIN (GLUCOPHAGE) 500 mg tablet, Take 500 mg by mouth 2 (two) times a day with meals, Disp: , Rfl:     ondansetron (ZOFRAN-ODT) 4 mg disintegrating tablet, Take 1 tablet (4 mg total) by mouth every 8 (eight) hours as needed for nausea, Disp: 20 tablet, Rfl: 0    pantoprazole (PROTONIX) 40 mg tablet, Take 1 tablet (40 mg total) by mouth daily in the early morning, Disp: 30 tablet, Rfl: 0    polyethylene glycol (MIRALAX) 17 g packet, Take 17 g by mouth daily, Disp: 30 each, Rfl: 3    psyllium (METAMUCIL) packet, Take 1 packet by mouth daily, Disp: 54 packet, Rfl: 3    traZODone (DESYREL) 50 mg tablet, Take 0.5 tablets (25 mg total) by mouth daily at bedtime, Disp: 15 tablet, Rfl: 0    vortioxetine (TRINTELLIX) 10 MG tablet, Take 0.5 tablets (5 mg total) by mouth daily for 7 days, THEN 1 tablet (10 mg total) daily for 22 days., Disp: 26 tablet, Rfl: 0    Vortioxetine HBr (TRINTELLIX) 5 MG tablet, Take 1 tablet (5 mg total) by mouth daily for 7 days Then increase to 10 mg daily, Disp: 7 tablet, Rfl: 0    Cholecalciferol (VITAMIN D3)  "1,000 units tablet, Take 2 tablets (2,000 Units total) by mouth daily for 30 doses, Disp: 60 tablet, Rfl: 0    cyanocobalamin (VITAMIN B-12) 1000 MCG tablet, Take 1 tablet (1,000 mcg total) by mouth daily (Patient not taking: Reported on 10/4/2024), Disp: 30 tablet, Rfl: 0    The following portions of the patient's history were reviewed and updated as appropriate: allergies, current medications, past family history, past medical history, past social history, past surgical history and problem list.    Review of Systems   Constitutional:  Positive for appetite change. Negative for fatigue.   HENT: Negative.          Sour/foul taste in her mouth   Eyes: Negative.    Respiratory: Negative.     Cardiovascular: Negative.    Gastrointestinal:  Negative for constipation (Had bowel movement yesterday).   Endocrine: Negative.    Genitourinary: Negative.    Musculoskeletal:  Negative for gait problem.   Skin: Negative.         Dryness of her scalp   Allergic/Immunologic: Negative.    Neurological:  Negative for light-headedness.   Hematological: Negative.    Psychiatric/Behavioral:  Positive for dysphoric mood and sleep disturbance (Insomnia). The patient is nervous/anxious.            Objective:    /68   Pulse 74   Temp 97.6 °F (36.4 °C) (Tympanic)   Resp 16   Ht 4' 9\" (1.448 m)   Wt 49.4 kg (109 lb)   SpO2 98%   BMI 23.59 kg/m²      Physical Exam  Vitals and nursing note reviewed.   Constitutional:       General: She is not in acute distress.     Appearance: Normal appearance. She is well-developed and normal weight. She is not diaphoretic.   HENT:      Head: Normocephalic and atraumatic.      Nose: Nose normal.      Mouth/Throat:      Mouth: Mucous membranes are moist.      Pharynx: Oropharynx is clear.      Comments: No dental caries.  No halitosis  Eyes:      Conjunctiva/sclera: Conjunctivae normal.      Pupils: Pupils are equal, round, and reactive to light.   Cardiovascular:      Rate and Rhythm: Normal " rate and regular rhythm.      Heart sounds: Normal heart sounds. No murmur heard.  Pulmonary:      Effort: Pulmonary effort is normal.      Breath sounds: Normal breath sounds.   Abdominal:      General: Bowel sounds are normal. There is no distension.      Palpations: Abdomen is soft. There is no mass.      Tenderness: There is no abdominal tenderness.   Musculoskeletal:         General: Normal range of motion.      Cervical back: Normal range of motion and neck supple.      Right lower leg: No edema.      Left lower leg: No edema.   Skin:     General: Skin is warm and dry.      Findings: No rash.   Neurological:      General: No focal deficit present.      Mental Status: She is alert and oriented to person, place, and time. Mental status is at baseline.      Deep Tendon Reflexes: Reflexes are normal and symmetric.   Psychiatric:         Attention and Perception: Attention and perception normal.         Mood and Affect: Mood is depressed. Mood is not anxious. Affect is flat.         Behavior: Behavior normal.         Thought Content: Thought content normal.         Judgment: Judgment normal.           Recent Results (from the past 1008 hour(s))   ECG 12 lead    Collection Time: 08/30/24  4:04 AM   Result Value Ref Range    Ventricular Rate 83 BPM    Atrial Rate 83 BPM    MS Interval 170 ms    QRSD Interval 74 ms    QT Interval 370 ms    QTC Interval 434 ms    P Axis 61 degrees    QRS Axis 3 degrees    T Wave Axis 51 degrees   Comprehensive metabolic panel    Collection Time: 08/30/24  5:13 AM   Result Value Ref Range    Sodium 136 135 - 147 mmol/L    Potassium 3.8 3.5 - 5.3 mmol/L    Chloride 100 96 - 108 mmol/L    CO2 26 21 - 32 mmol/L    ANION GAP 10 4 - 13 mmol/L    BUN 25 5 - 25 mg/dL    Creatinine 0.98 0.60 - 1.30 mg/dL    Glucose 136 65 - 140 mg/dL    Glucose, Fasting 136 (H) 65 - 99 mg/dL    Calcium 9.3 8.4 - 10.2 mg/dL    AST 12 (L) 13 - 39 U/L    ALT 6 (L) 7 - 52 U/L    Alkaline Phosphatase 43 34 - 104 U/L     Total Protein 6.1 (L) 6.4 - 8.4 g/dL    Albumin 3.8 3.5 - 5.0 g/dL    Total Bilirubin 0.53 0.20 - 1.00 mg/dL    eGFR 52 ml/min/1.73sq m   Magnesium    Collection Time: 08/30/24  5:13 AM   Result Value Ref Range    Magnesium 2.1 1.9 - 2.7 mg/dL   Phosphorus    Collection Time: 08/30/24  5:13 AM   Result Value Ref Range    Phosphorus 4.0 2.3 - 4.1 mg/dL   CBC and differential    Collection Time: 08/30/24  5:13 AM   Result Value Ref Range    WBC 5.82 4.31 - 10.16 Thousand/uL    RBC 3.77 (L) 3.81 - 5.12 Million/uL    Hemoglobin 11.0 (L) 11.5 - 15.4 g/dL    Hematocrit 34.4 (L) 34.8 - 46.1 %    MCV 91 82 - 98 fL    MCH 29.2 26.8 - 34.3 pg    MCHC 32.0 31.4 - 37.4 g/dL    RDW 13.1 11.6 - 15.1 %    MPV 10.9 8.9 - 12.7 fL    Platelets 207 149 - 390 Thousands/uL    nRBC 0 /100 WBCs    Segmented % 68 43 - 75 %    Immature Grans % 0 0 - 2 %    Lymphocytes % 22 14 - 44 %    Monocytes % 7 4 - 12 %    Eosinophils Relative 2 0 - 6 %    Basophils Relative 1 0 - 1 %    Absolute Neutrophils 3.95 1.85 - 7.62 Thousands/µL    Absolute Immature Grans 0.01 0.00 - 0.20 Thousand/uL    Absolute Lymphocytes 1.26 0.60 - 4.47 Thousands/µL    Absolute Monocytes 0.43 0.17 - 1.22 Thousand/µL    Eosinophils Absolute 0.14 0.00 - 0.61 Thousand/µL    Basophils Absolute 0.03 0.00 - 0.10 Thousands/µL   TSH, 3rd generation with Free T4 reflex    Collection Time: 08/30/24  5:13 AM   Result Value Ref Range    TSH 3RD GENERATON 1.191 0.450 - 4.500 uIU/mL   Vitamin B12    Collection Time: 08/30/24  5:13 AM   Result Value Ref Range    Vitamin B-12 437 180 - 914 pg/mL   Folate    Collection Time: 08/30/24  5:13 AM   Result Value Ref Range    Folate >22.3 >5.9 ng/mL   Vitamin D 25 hydroxy    Collection Time: 08/30/24  5:13 AM   Result Value Ref Range    Vit D, 25-Hydroxy 40.4 30.0 - 100.0 ng/mL   TIBC Panel (incl. Iron, TIBC, % Iron Saturation)    Collection Time: 08/30/24  5:13 AM   Result Value Ref Range    Iron Saturation 23 15 - 50 %    TIBC 257 250 - 450  ug/dL    Iron 58 50 - 212 ug/dL    UIBC 199 155 - 355 ug/dL   Ferritin    Collection Time: 08/30/24  5:13 AM   Result Value Ref Range    Ferritin 29 11 - 307 ng/mL   Total Syphilis (IgG/IgM) Screening    Collection Time: 08/30/24  5:13 AM   Result Value Ref Range    Syphilis Total Antibody Non-reactive Non-Reactive   HIV 1/2 AG/AB w Reflex [facility] for 2 yr old and above    Collection Time: 08/30/24  5:13 AM   Result Value Ref Range    HIV-1 p24 Antigen Non-Reactive Non-Reactive    HIV-1 Antibody Non-Reactive Non-Reactive    HIV-2 Antibody Non-Reactive Non-Reactive    HIV Ag-Ab 5th Gen Non-Reactive Non-Reactive   ECG 12 lead    Collection Time: 09/15/24  1:35 PM   Result Value Ref Range    Ventricular Rate 84 BPM    Atrial Rate 84 BPM    PA Interval 162 ms    QRSD Interval 68 ms    QT Interval 348 ms    QTC Interval 411 ms    P [place] 55 degrees    QRS Axis 6 degrees    T Wave Axis 39 degrees   CBC and differential    Collection Time: 09/15/24  2:11 PM   Result Value Ref Range    WBC 7.61 4.31 - 10.16 Thousand/uL    RBC 4.13 3.81 - 5.12 Million/uL    Hemoglobin 11.8 11.5 - 15.4 g/dL    Hematocrit 37.1 34.8 - 46.1 %    MCV 90 82 - 98 fL    MCH 28.6 26.8 - 34.3 pg    MCHC 31.8 31.4 - 37.4 g/dL    RDW 12.8 11.6 - 15.1 %    MPV 10.9 8.9 - 12.7 fL    Platelets 218 149 - 390 Thousands/uL    nRBC 0 /100 WBCs    Segmented % 73 43 - 75 %    Immature Grans % 0 0 - 2 %    Lymphocytes % 19 14 - 44 %    Monocytes % 5 4 - 12 %    Eosinophils Relative 2 0 - 6 %    Basophils Relative 1 0 - 1 %    Absolute Neutrophils 5.59 1.85 - 7.62 Thousands/µL    Absolute Immature Grans 0.02 0.00 - 0.20 Thousand/uL    Absolute Lymphocytes 1.41 0.60 - 4.47 Thousands/µL    Absolute Monocytes 0.40 0.17 - 1.22 Thousand/µL    Eosinophils Absolute 0.14 0.00 - 0.61 Thousand/µL    Basophils Absolute 0.05 0.00 - 0.10 Thousands/µL   Comprehensive metabolic panel    Collection Time: 09/15/24  2:11 PM   Result Value Ref Range    Sodium 136 135 - 147 mmol/L     "Potassium 4.0 3.5 - 5.3 mmol/L    Chloride 101 96 - 108 mmol/L    CO2 25 21 - 32 mmol/L    ANION GAP 10 4 - 13 mmol/L    BUN 27 (H) 5 - 25 mg/dL    Creatinine 1.05 0.60 - 1.30 mg/dL    Glucose 105 65 - 140 mg/dL    Calcium 9.6 8.4 - 10.2 mg/dL    AST 15 13 - 39 U/L    ALT 9 7 - 52 U/L    Alkaline Phosphatase 49 34 - 104 U/L    Total Protein 6.7 6.4 - 8.4 g/dL    Albumin 4.1 3.5 - 5.0 g/dL    Total Bilirubin 0.47 0.20 - 1.00 mg/dL    eGFR 48 ml/min/1.73sq m   Lipase    Collection Time: 09/15/24  2:11 PM   Result Value Ref Range    Lipase 45 11 - 82 u/L   HS Troponin 0hr (reflex protocol)    Collection Time: 09/15/24  2:11 PM   Result Value Ref Range    hs TnI 0hr 17 \"Refer to ACS Flowchart\"- see link ng/L   UA (URINE) with reflex to Scope    Collection Time: 09/15/24  2:45 PM   Result Value Ref Range    Color, UA Light Yellow     Clarity, UA Clear     Specific Gravity, UA 1.019 1.003 - 1.030    pH, UA 5.0 4.5, 5.0, 5.5, 6.0, 6.5, 7.0, 7.5, 8.0    Leukocytes, UA Negative Negative    Nitrite, UA Negative Negative    Protein, UA Negative Negative mg/dl    Glucose, UA Negative Negative mg/dl    Ketones, UA Negative Negative mg/dl    Urobilinogen, UA <2.0 <2.0 mg/dl mg/dl    Bilirubin, UA Negative Negative    Occult Blood, UA Small (A) Negative   Urine Microscopic    Collection Time: 09/15/24  2:45 PM   Result Value Ref Range    RBC, UA 10-20 (A) None Seen, 1-2 /hpf    WBC, UA None Seen None Seen, 1-2 /hpf    Epithelial Cells Occasional None Seen, Occasional /hpf    Bacteria, UA None Seen None Seen, Occasional /hpf   HS Troponin I 2hr    Collection Time: 09/15/24  4:03 PM   Result Value Ref Range    hs TnI 2hr 14 \"Refer to ACS Flowchart\"- see link ng/L    Delta 2hr hsTnI -3 <20 ng/L   ECG 12 lead    Collection Time: 09/23/24  1:20 PM   Result Value Ref Range    Ventricular Rate 85 BPM    Atrial Rate 85 BPM    OH Interval 160 ms    QRSD Interval 72 ms    QT Interval 348 ms    QTC Interval 414 ms    P [place] 39 degrees    QRS " Axis -2 degrees    T Wave Axis 13 degrees   CBC and differential    Collection Time: 09/23/24  2:41 PM   Result Value Ref Range    WBC 6.14 4.31 - 10.16 Thousand/uL    RBC 4.21 3.81 - 5.12 Million/uL    Hemoglobin 12.3 11.5 - 15.4 g/dL    Hematocrit 37.8 34.8 - 46.1 %    MCV 90 82 - 98 fL    MCH 29.2 26.8 - 34.3 pg    MCHC 32.5 31.4 - 37.4 g/dL    RDW 12.7 11.6 - 15.1 %    MPV 10.7 8.9 - 12.7 fL    Platelets 197 149 - 390 Thousands/uL    nRBC 0 /100 WBCs    Segmented % 66 43 - 75 %    Immature Grans % 0 0 - 2 %    Lymphocytes % 24 14 - 44 %    Monocytes % 6 4 - 12 %    Eosinophils Relative 3 0 - 6 %    Basophils Relative 1 0 - 1 %    Absolute Neutrophils 4.11 1.85 - 7.62 Thousands/µL    Absolute Immature Grans 0.02 0.00 - 0.20 Thousand/uL    Absolute Lymphocytes 1.44 0.60 - 4.47 Thousands/µL    Absolute Monocytes 0.35 0.17 - 1.22 Thousand/µL    Eosinophils Absolute 0.17 0.00 - 0.61 Thousand/µL    Basophils Absolute 0.05 0.00 - 0.10 Thousands/µL   Basic metabolic panel    Collection Time: 09/23/24  2:41 PM   Result Value Ref Range    Sodium 137 135 - 147 mmol/L    Potassium 5.0 3.5 - 5.3 mmol/L    Chloride 104 96 - 108 mmol/L    CO2 25 21 - 32 mmol/L    ANION GAP 8 4 - 13 mmol/L    BUN 37 (H) 5 - 25 mg/dL    Creatinine 1.18 0.60 - 1.30 mg/dL    Glucose 109 65 - 140 mg/dL    Calcium 9.6 8.4 - 10.2 mg/dL    eGFR 42 ml/min/1.73sq m   CBC and differential    Collection Time: 09/28/24 11:51 PM   Result Value Ref Range    WBC 7.21 4.31 - 10.16 Thousand/uL    RBC 3.90 3.81 - 5.12 Million/uL    Hemoglobin 11.2 (L) 11.5 - 15.4 g/dL    Hematocrit 35.3 34.8 - 46.1 %    MCV 91 82 - 98 fL    MCH 28.7 26.8 - 34.3 pg    MCHC 31.7 31.4 - 37.4 g/dL    RDW 12.7 11.6 - 15.1 %    MPV 10.4 8.9 - 12.7 fL    Platelets 223 149 - 390 Thousands/uL    nRBC 0 /100 WBCs    Segmented % 65 43 - 75 %    Immature Grans % 0 0 - 2 %    Lymphocytes % 25 14 - 44 %    Monocytes % 6 4 - 12 %    Eosinophils Relative 3 0 - 6 %    Basophils Relative 1 0 - 1  %    Absolute Neutrophils 4.73 1.85 - 7.62 Thousands/µL    Absolute Immature Grans 0.02 0.00 - 0.20 Thousand/uL    Absolute Lymphocytes 1.77 0.60 - 4.47 Thousands/µL    Absolute Monocytes 0.41 0.17 - 1.22 Thousand/µL    Eosinophils Absolute 0.24 0.00 - 0.61 Thousand/µL    Basophils Absolute 0.04 0.00 - 0.10 Thousands/µL   Comprehensive metabolic panel    Collection Time: 09/28/24 11:51 PM   Result Value Ref Range    Sodium 136 135 - 147 mmol/L    Potassium 3.9 3.5 - 5.3 mmol/L    Chloride 104 96 - 108 mmol/L    CO2 24 21 - 32 mmol/L    ANION GAP 8 4 - 13 mmol/L    BUN 27 (H) 5 - 25 mg/dL    Creatinine 1.14 0.60 - 1.30 mg/dL    Glucose 216 (H) 65 - 140 mg/dL    Calcium 9.1 8.4 - 10.2 mg/dL    AST 13 13 - 39 U/L    ALT 7 7 - 52 U/L    Alkaline Phosphatase 46 34 - 104 U/L    Total Protein 6.0 (L) 6.4 - 8.4 g/dL    Albumin 3.7 3.5 - 5.0 g/dL    Total Bilirubin 0.35 0.20 - 1.00 mg/dL    eGFR 43 ml/min/1.73sq m   Magnesium    Collection Time: 09/28/24 11:51 PM   Result Value Ref Range    Magnesium 1.8 (L) 1.9 - 2.7 mg/dL   Phosphorus    Collection Time: 09/28/24 11:51 PM   Result Value Ref Range    Phosphorus 3.3 2.3 - 4.1 mg/dL   ECG 12 lead    Collection Time: 09/29/24 12:18 AM   Result Value Ref Range    Ventricular Rate 79 BPM    Atrial Rate 300 BPM    CA Interval  ms    QRSD Interval 78 ms    QT Interval 380 ms    QTC Interval 435 ms    P Axis  degrees    QRS Axis -10 degrees    T Wave Axis 2 degrees   Hemoglobin A1c w/EAG Estimation (Prechecked if no A1C within 90 days)    Collection Time: 09/29/24  5:02 AM   Result Value Ref Range    Hemoglobin A1C 6.7 (H) Normal 4.0-5.6%; PreDiabetic 5.7-6.4%; Diabetic >=6.5%; Glycemic control for adults with diabetes <7.0% %     mg/dl   CBC and differential    Collection Time: 09/29/24  5:02 AM   Result Value Ref Range    WBC 9.50 4.31 - 10.16 Thousand/uL    RBC 3.82 3.81 - 5.12 Million/uL    Hemoglobin 11.0 (L) 11.5 - 15.4 g/dL    Hematocrit 34.7 (L) 34.8 - 46.1 %    MCV  91 82 - 98 fL    MCH 28.8 26.8 - 34.3 pg    MCHC 31.7 31.4 - 37.4 g/dL    RDW 12.8 11.6 - 15.1 %    MPV 10.7 8.9 - 12.7 fL    Platelets 191 149 - 390 Thousands/uL    nRBC 0 /100 WBCs    Segmented % 86 (H) 43 - 75 %    Immature Grans % 0 0 - 2 %    Lymphocytes % 8 (L) 14 - 44 %    Monocytes % 5 4 - 12 %    Eosinophils Relative 1 0 - 6 %    Basophils Relative 0 0 - 1 %    Absolute Neutrophils 8.04 (H) 1.85 - 7.62 Thousands/µL    Absolute Immature Grans 0.04 0.00 - 0.20 Thousand/uL    Absolute Lymphocytes 0.79 0.60 - 4.47 Thousands/µL    Absolute Monocytes 0.47 0.17 - 1.22 Thousand/µL    Eosinophils Absolute 0.13 0.00 - 0.61 Thousand/µL    Basophils Absolute 0.03 0.00 - 0.10 Thousands/µL   Basic metabolic panel    Collection Time: 09/29/24  5:02 AM   Result Value Ref Range    Sodium 140 135 - 147 mmol/L    Potassium 3.4 (L) 3.5 - 5.3 mmol/L    Chloride 107 96 - 108 mmol/L    CO2 24 21 - 32 mmol/L    ANION GAP 9 4 - 13 mmol/L    BUN 24 5 - 25 mg/dL    Creatinine 0.98 0.60 - 1.30 mg/dL    Glucose 137 65 - 140 mg/dL    Calcium 8.6 8.4 - 10.2 mg/dL    eGFR 52 ml/min/1.73sq m   Magnesium    Collection Time: 09/29/24  5:02 AM   Result Value Ref Range    Magnesium 2.1 1.9 - 2.7 mg/dL   Fingerstick Glucose (POCT)    Collection Time: 09/29/24  6:57 AM   Result Value Ref Range    POC Glucose 138 65 - 140 mg/dl   Fingerstick Glucose (POCT)    Collection Time: 09/29/24 10:03 AM   Result Value Ref Range    POC Glucose 165 (H) 65 - 140 mg/dl   Fingerstick Glucose (POCT)    Collection Time: 09/29/24  3:56 PM   Result Value Ref Range    POC Glucose 169 (H) 65 - 140 mg/dl   Fingerstick Glucose (POCT)    Collection Time: 09/29/24  8:44 PM   Result Value Ref Range    POC Glucose 113 65 - 140 mg/dl   CBC    Collection Time: 09/30/24  6:02 AM   Result Value Ref Range    WBC 6.30 4.31 - 10.16 Thousand/uL    RBC 3.78 (L) 3.81 - 5.12 Million/uL    Hemoglobin 10.7 (L) 11.5 - 15.4 g/dL    Hematocrit 34.0 (L) 34.8 - 46.1 %    MCV 90 82 - 98 fL     MCH 28.3 26.8 - 34.3 pg    MCHC 31.5 31.4 - 37.4 g/dL    RDW 12.9 11.6 - 15.1 %    Platelets 171 149 - 390 Thousands/uL    MPV 10.6 8.9 - 12.7 fL   Basic metabolic panel    Collection Time: 09/30/24  6:02 AM   Result Value Ref Range    Sodium 138 135 - 147 mmol/L    Potassium 4.1 3.5 - 5.3 mmol/L    Chloride 107 96 - 108 mmol/L    CO2 24 21 - 32 mmol/L    ANION GAP 7 4 - 13 mmol/L    BUN 19 5 - 25 mg/dL    Creatinine 0.96 0.60 - 1.30 mg/dL    Glucose 126 65 - 140 mg/dL    Calcium 8.9 8.4 - 10.2 mg/dL    eGFR 54 ml/min/1.73sq m   Fingerstick Glucose (POCT)    Collection Time: 09/30/24  7:44 AM   Result Value Ref Range    POC Glucose 121 65 - 140 mg/dl   Fingerstick Glucose (POCT)    Collection Time: 09/30/24 11:50 AM   Result Value Ref Range    POC Glucose 140 65 - 140 mg/dl   CBC and differential    Collection Time: 10/04/24 12:15 AM   Result Value Ref Range    WBC 5.78 4.31 - 10.16 Thousand/uL    RBC 3.90 3.81 - 5.12 Million/uL    Hemoglobin 11.3 (L) 11.5 - 15.4 g/dL    Hematocrit 35.2 34.8 - 46.1 %    MCV 90 82 - 98 fL    MCH 29.0 26.8 - 34.3 pg    MCHC 32.1 31.4 - 37.4 g/dL    RDW 12.9 11.6 - 15.1 %    MPV 10.9 8.9 - 12.7 fL    Platelets 256 149 - 390 Thousands/uL    nRBC 0 /100 WBCs    Segmented % 56 43 - 75 %    Immature Grans % 0 0 - 2 %    Lymphocytes % 32 14 - 44 %    Monocytes % 8 4 - 12 %    Eosinophils Relative 3 0 - 6 %    Basophils Relative 1 0 - 1 %    Absolute Neutrophils 3.21 1.85 - 7.62 Thousands/µL    Absolute Immature Grans 0.02 0.00 - 0.20 Thousand/uL    Absolute Lymphocytes 1.85 0.60 - 4.47 Thousands/µL    Absolute Monocytes 0.48 0.17 - 1.22 Thousand/µL    Eosinophils Absolute 0.18 0.00 - 0.61 Thousand/µL    Basophils Absolute 0.04 0.00 - 0.10 Thousands/µL   Comprehensive metabolic panel    Collection Time: 10/04/24 12:15 AM   Result Value Ref Range    Sodium 137 135 - 147 mmol/L    Potassium 4.4 3.5 - 5.3 mmol/L    Chloride 102 96 - 108 mmol/L    CO2 28 21 - 32 mmol/L    ANION GAP 7 4 - 13  mmol/L    BUN 34 (H) 5 - 25 mg/dL    Creatinine 1.15 0.60 - 1.30 mg/dL    Glucose 153 (H) 65 - 140 mg/dL    Calcium 9.3 8.4 - 10.2 mg/dL    AST 28 13 - 39 U/L    ALT 20 7 - 52 U/L    Alkaline Phosphatase 58 34 - 104 U/L    Total Protein 6.4 6.4 - 8.4 g/dL    Albumin 3.8 3.5 - 5.0 g/dL    Total Bilirubin 0.32 0.20 - 1.00 mg/dL    eGFR 43 ml/min/1.73sq m   Lactic acid, plasma (w/reflex if result > 2.0)    Collection Time: 10/04/24 12:15 AM   Result Value Ref Range    LACTIC ACID 2.2 (H) 0.5 - 2.0 mmol/L   Lactic acid 2 Hours    Collection Time: 10/04/24  2:35 AM   Result Value Ref Range    LACTIC ACID 2.2 (H) 0.5 - 2.0 mmol/L   CBC and differential    Collection Time: 10/04/24  6:09 AM   Result Value Ref Range    WBC 7.78 4.31 - 10.16 Thousand/uL    RBC 3.81 3.81 - 5.12 Million/uL    Hemoglobin 11.0 (L) 11.5 - 15.4 g/dL    Hematocrit 36.7 34.8 - 46.1 %    MCV 96 82 - 98 fL    MCH 28.9 26.8 - 34.3 pg    MCHC 30.0 (L) 31.4 - 37.4 g/dL    RDW 12.7 11.6 - 15.1 %    MPV 10.4 8.9 - 12.7 fL    Platelets 221 149 - 390 Thousands/uL    nRBC 0 /100 WBCs    Segmented % 74 43 - 75 %    Immature Grans % 0 0 - 2 %    Lymphocytes % 18 14 - 44 %    Monocytes % 5 4 - 12 %    Eosinophils Relative 2 0 - 6 %    Basophils Relative 1 0 - 1 %    Absolute Neutrophils 5.74 1.85 - 7.62 Thousands/µL    Absolute Immature Grans 0.03 0.00 - 0.20 Thousand/uL    Absolute Lymphocytes 1.42 0.60 - 4.47 Thousands/µL    Absolute Monocytes 0.42 0.17 - 1.22 Thousand/µL    Eosinophils Absolute 0.12 0.00 - 0.61 Thousand/µL    Basophils Absolute 0.05 0.00 - 0.10 Thousands/µL   Basic metabolic panel    Collection Time: 10/04/24  6:09 AM   Result Value Ref Range    Sodium 134 (L) 135 - 147 mmol/L    Potassium 4.5 3.5 - 5.3 mmol/L    Chloride 103 96 - 108 mmol/L    CO2 24 21 - 32 mmol/L    ANION GAP 7 4 - 13 mmol/L    BUN 29 (H) 5 - 25 mg/dL    Creatinine 1.05 0.60 - 1.30 mg/dL    Glucose 137 65 - 140 mg/dL    Calcium 8.7 8.4 - 10.2 mg/dL    eGFR 48 ml/min/1.73sq  m   Lactic acid, plasma (w/reflex if result > 2.0)    Collection Time: 10/04/24  6:09 AM   Result Value Ref Range    LACTIC ACID 2.0 0.5 - 2.0 mmol/L   Fingerstick Glucose (POCT)    Collection Time: 10/04/24  8:00 AM   Result Value Ref Range    POC Glucose 124 65 - 140 mg/dl   Fingerstick Glucose (POCT)    Collection Time: 10/04/24 11:04 AM   Result Value Ref Range    POC Glucose 168 (H) 65 - 140 mg/dl   Fingerstick Glucose (POCT)    Collection Time: 10/04/24  5:30 PM   Result Value Ref Range    POC Glucose 131 65 - 140 mg/dl   TSH, 3rd generation with Free T4 reflex    Collection Time: 10/05/24  4:51 AM   Result Value Ref Range    TSH 3RD GENERATON 0.957 0.450 - 4.500 uIU/mL   Protime-INR    Collection Time: 10/05/24  4:51 AM   Result Value Ref Range    Protime 14.3 12.3 - 15.0 seconds    INR 1.04 0.85 - 1.19   CBC and differential    Collection Time: 10/05/24  4:51 AM   Result Value Ref Range    WBC 5.15 4.31 - 10.16 Thousand/uL    RBC 3.27 (L) 3.81 - 5.12 Million/uL    Hemoglobin 9.8 (L) 11.5 - 15.4 g/dL    Hematocrit 29.5 (L) 34.8 - 46.1 %    MCV 90 82 - 98 fL    MCH 30.0 26.8 - 34.3 pg    MCHC 33.2 31.4 - 37.4 g/dL    RDW 12.7 11.6 - 15.1 %    MPV 10.7 8.9 - 12.7 fL    Platelets 210 149 - 390 Thousands/uL    nRBC 0 /100 WBCs    Segmented % 58 43 - 75 %    Immature Grans % 0 0 - 2 %    Lymphocytes % 31 14 - 44 %    Monocytes % 6 4 - 12 %    Eosinophils Relative 4 0 - 6 %    Basophils Relative 1 0 - 1 %    Absolute Neutrophils 2.97 1.85 - 7.62 Thousands/µL    Absolute Immature Grans 0.02 0.00 - 0.20 Thousand/uL    Absolute Lymphocytes 1.61 0.60 - 4.47 Thousands/µL    Absolute Monocytes 0.33 0.17 - 1.22 Thousand/µL    Eosinophils Absolute 0.19 0.00 - 0.61 Thousand/µL    Basophils Absolute 0.03 0.00 - 0.10 Thousands/µL   Comprehensive metabolic panel    Collection Time: 10/05/24  4:51 AM   Result Value Ref Range    Sodium 139 135 - 147 mmol/L    Potassium 4.1 3.5 - 5.3 mmol/L    Chloride 106 96 - 108 mmol/L    CO2  28 21 - 32 mmol/L    ANION GAP 5 4 - 13 mmol/L    BUN 20 5 - 25 mg/dL    Creatinine 1.04 0.60 - 1.30 mg/dL    Glucose 106 65 - 140 mg/dL    Calcium 8.7 8.4 - 10.2 mg/dL    Corrected Calcium 9.4 8.3 - 10.1 mg/dL    AST 11 (L) 13 - 39 U/L    ALT 11 7 - 52 U/L    Alkaline Phosphatase 48 34 - 104 U/L    Total Protein 5.1 (L) 6.4 - 8.4 g/dL    Albumin 3.1 (L) 3.5 - 5.0 g/dL    Total Bilirubin 0.31 0.20 - 1.00 mg/dL    eGFR 49 ml/min/1.73sq m   Fingerstick Glucose (POCT)    Collection Time: 10/05/24  7:22 AM   Result Value Ref Range    POC Glucose 105 65 - 140 mg/dl   Fingerstick Glucose (POCT)    Collection Time: 10/05/24 11:07 AM   Result Value Ref Range    POC Glucose 132 65 - 140 mg/dl   Fingerstick Glucose (POCT)    Collection Time: 10/05/24  4:45 PM   Result Value Ref Range    POC Glucose 187 (H) 65 - 140 mg/dl       Assessment/Plan:    Primary hypertension  Hypertension remains adequately controlled on lisinopril/hydrochlorothiazide.  Continue same    Chronic idiopathic constipation  There is an extensive bowel regimen that she is now on including MiraLAX, Metamucil, Linzess, lactulose.  She had a bowel movement yesterday.  Reassured her that she does not have to have a bowel movement every single day.    Trazodone was new for her as of August when much of her constipation issues resurface.  Constipation is a side effect of trazodone    Type 2 diabetes mellitus without complication, without long-term current use of insulin ([facility])    Lab Results   Component Value Date    HGBA1C 6.7 (H) 09/29/2024     No, do not stop her metformin.  Metformin will not contribute to constipation.  Metformin will actually help with her bowel movements.  Diabetes has been ideally controlled.  Advised daughter not to stop metformin and hopefully no hospitalist will touch my prescriptions for the patient.  If she goes in for constipation, address the constipation    Anxiety and depression  Needs follow-up with psychiatry.  Recently started  on Trintellix    Bad oral taste  No dental caries.  Had examination and cleaning with dentist.  Altered taste is a common side effect of trazodone.  Daughter actually felt that she did better while she was on Seroquel.  Advised to follow-up with psychiatrist in regards to possible discontinuation of trazodone          Problem List Items Addressed This Visit          Cardiovascular and Mediastinum    Primary hypertension     Hypertension remains adequately controlled on lisinopril/hydrochlorothiazide.  Continue same            Digestive    Chronic idiopathic constipation     There is an extensive bowel regimen that she is now on including MiraLAX, Metamucil, Linzess, lactulose.  She had a bowel movement yesterday.  Reassured her that she does not have to have a bowel movement every single day.    Trazodone was new for her as of August when much of her constipation issues resurface.  Constipation is a side effect of trazodone            Endocrine    Type 2 diabetes mellitus without complication, without long-term current use of insulin ([facility])       Lab Results   Component Value Date    HGBA1C 6.7 (H) 09/29/2024     No, do not stop her metformin.  Metformin will not contribute to constipation.  Metformin will actually help with her bowel movements.  Diabetes has been ideally controlled.  Advised daughter not to stop metformin and hopefully no hospitalist will touch my prescriptions for the patient.  If she goes in for constipation, address the constipation         Relevant Medications    metFORMIN (GLUCOPHAGE) 500 mg tablet       Behavioral Health    Anxiety and depression     Needs follow-up with psychiatry.  Recently started on Trintellix            Neurology/Sleep    Bad oral taste - Primary     No dental caries.  Had examination and cleaning with dentist.  Altered taste is a common side effect of trazodone.  Daughter actually felt that she did better while she was on Seroquel.  Advised to follow-up with psychiatrist  in regards to possible discontinuation of trazodone          Other Visit Diagnoses       Encounter for immunization        Relevant Orders    influenza vaccine, high-dose, PF 0.5 mL (Fluzone High Dose) (Completed)

## 2024-10-10 NOTE — ASSESSMENT & PLAN NOTE
No dental caries.  Had examination and cleaning with dentist.  Altered taste is a common side effect of trazodone.  Daughter actually felt that she did better while she was on Seroquel.  Advised to follow-up with psychiatrist in regards to possible discontinuation of trazodone

## 2024-10-11 ENCOUNTER — TELEPHONE (OUTPATIENT)
Age: 84
End: 2024-10-11

## 2024-10-11 NOTE — TELEPHONE ENCOUNTER
Daughter of patient called in to update the patients  provider and request a call back.    Daughter states patient saw her primary care physician yesterday and spoke about stoping Trazodone due to nausea, sour taste, and possible other side effects. They would like to see about start the Seroquel at night again. Patient would need the prescription for Seroquel written and sent in to the [facility] in [place] on [address].

## 2024-10-11 NOTE — TELEPHONE ENCOUNTER
Returned call to patient's daughter. Discussed concern. Trazodone can cause nausea and dysgeusia, however upon record review, patient was noted to have been complaining of these problems as of 9/23, and trazodone was not begun until 10/2 (except for possibly while inpatient in August, however she was not discharged on this). Therefore that trazodone is the cause is unlikely. Additionally, explained that quetiapine is a neuroleptic, not to be used as a primary hypnotic agent due to the significant adverse effects it will cause, particularly in the context of the patient's age. Reiterated that vortioxetine will need 10-12 weeks at a therapeutic dose to provide a maximum effect that can be evaluated. Patient's daughter expresses understanding.    Will also refer to group therapies and [place] at this time

## 2024-10-13 DIAGNOSIS — K52.89 STERCORAL COLITIS: ICD-10-CM

## 2024-10-13 DIAGNOSIS — K59.04 CHRONIC IDIOPATHIC CONSTIPATION: ICD-10-CM

## 2024-10-14 RX ORDER — LACTULOSE 10 G/15ML
SOLUTION ORAL
Qty: 240 ML | Refills: 0 | Status: SHIPPED | OUTPATIENT
Start: 2024-10-14

## 2024-10-15 ENCOUNTER — PATIENT MESSAGE (OUTPATIENT)
Dept: FAMILY MEDICINE CLINIC | Facility: CLINIC | Age: 84
End: 2024-10-15

## 2024-10-15 NOTE — TELEPHONE ENCOUNTER
Patient daughter called and wanted to let provider know that she is give her mother Trintellix 5mg and patient is vomiting yesterday and today.  She is not sure if it is because of the medication.

## 2024-10-16 ENCOUNTER — TELEPHONE (OUTPATIENT)
Age: 84
End: 2024-10-16

## 2024-10-16 NOTE — TELEPHONE ENCOUNTER
Daughter of patient called in requesting a provider call back to discuss patient care. Daughter of patient also requested a sooner appointment for the patient to see their Behavioral Health Medication Management Provider as soon as tomorrow, 1016/24    Writer stated provider will be made aware of the call back request and that the sooner appointment would need to be looked into by the resident .  Writer tried to transfer the call to the resident  but was unsuccessful and stated a message would be sent to them to reach back out at their earliest convenience for further assistance.    The best telephone number to reach back out with is, Daughter, Janay Mckeon, [phone]

## 2024-10-17 ENCOUNTER — HOSPITAL ENCOUNTER (EMERGENCY)
Facility: HOSPITAL | Age: 84
Discharge: HOME | End: 2024-10-17
Attending: EMERGENCY MEDICINE
Payer: MEDICARE

## 2024-10-17 ENCOUNTER — APPOINTMENT (EMERGENCY)
Dept: CT IMAGING | Facility: HOSPITAL | Age: 84
End: 2024-10-17
Payer: MEDICARE

## 2024-10-17 ENCOUNTER — APPOINTMENT (EMERGENCY)
Dept: RADIOLOGY | Facility: HOSPITAL | Age: 84
End: 2024-10-17
Payer: MEDICARE

## 2024-10-17 VITALS
WEIGHT: 110.01 LBS | TEMPERATURE: 98.9 F | HEART RATE: 86 BPM | DIASTOLIC BLOOD PRESSURE: 76 MMHG | RESPIRATION RATE: 20 BRPM | SYSTOLIC BLOOD PRESSURE: 167 MMHG | OXYGEN SATURATION: 98 % | BODY MASS INDEX: 23.81 KG/M2

## 2024-10-17 DIAGNOSIS — R19.4 CHANGE IN BOWEL HABITS: Primary | ICD-10-CM

## 2024-10-17 DIAGNOSIS — R43.9 SMELL OR TASTE SENSATION DISTURBANCE: ICD-10-CM

## 2024-10-17 DIAGNOSIS — I10 BENIGN ESSENTIAL HYPERTENSION: ICD-10-CM

## 2024-10-17 DIAGNOSIS — K59.00 CONSTIPATION: ICD-10-CM

## 2024-10-17 LAB
ALBUMIN SERPL BCG-MCNC: 3.6 G/DL (ref 3.5–5)
ALP SERPL-CCNC: 45 U/L (ref 34–104)
ALT SERPL W P-5'-P-CCNC: 8 U/L (ref 7–52)
ANION GAP SERPL CALCULATED.3IONS-SCNC: 7 MMOL/L (ref 4–13)
AST SERPL W P-5'-P-CCNC: 11 U/L (ref 13–39)
BASOPHILS # BLD AUTO: 0.04 THOUSANDS/ΜL (ref 0–0.1)
BASOPHILS NFR BLD AUTO: 1 % (ref 0–1)
BILIRUB SERPL-MCNC: 0.68 MG/DL (ref 0.2–1)
BILIRUB UR QL STRIP: NEGATIVE
BUN SERPL-MCNC: 21 MG/DL (ref 5–25)
CALCIUM SERPL-MCNC: 8.9 MG/DL (ref 8.4–10.2)
CHLORIDE SERPL-SCNC: 105 MMOL/L (ref 96–108)
CLARITY UR: CLEAR
CO2 SERPL-SCNC: 27 MMOL/L (ref 21–32)
COLOR UR: NORMAL
CREAT SERPL-MCNC: 0.98 MG/DL (ref 0.6–1.3)
EOSINOPHIL # BLD AUTO: 0.08 THOUSAND/ΜL (ref 0–0.61)
EOSINOPHIL NFR BLD AUTO: 1 % (ref 0–6)
ERYTHROCYTE [DISTWIDTH] IN BLOOD BY AUTOMATED COUNT: 13.3 % (ref 11.6–15.1)
FLUAV RNA RESP QL NAA+PROBE: NEGATIVE
FLUBV RNA RESP QL NAA+PROBE: NEGATIVE
GFR SERPL CREATININE-BSD FRML MDRD: 52 ML/MIN/1.73SQ M
GLUCOSE SERPL-MCNC: 121 MG/DL (ref 65–140)
GLUCOSE UR STRIP-MCNC: NEGATIVE MG/DL
HCT VFR BLD AUTO: 34.9 % (ref 34.8–46.1)
HGB BLD-MCNC: 11.3 G/DL (ref 11.5–15.4)
HGB UR QL STRIP.AUTO: NEGATIVE
IMM GRANULOCYTES # BLD AUTO: 0.02 THOUSAND/UL (ref 0–0.2)
IMM GRANULOCYTES NFR BLD AUTO: 0 % (ref 0–2)
KETONES UR STRIP-MCNC: NEGATIVE MG/DL
LEUKOCYTE ESTERASE UR QL STRIP: NEGATIVE
LYMPHOCYTES # BLD AUTO: 1.38 THOUSANDS/ΜL (ref 0.6–4.47)
LYMPHOCYTES NFR BLD AUTO: 24 % (ref 14–44)
MAGNESIUM SERPL-MCNC: 1.8 MG/DL (ref 1.9–2.7)
MCH RBC QN AUTO: 29.1 PG (ref 26.8–34.3)
MCHC RBC AUTO-ENTMCNC: 32.4 G/DL (ref 31.4–37.4)
MCV RBC AUTO: 90 FL (ref 82–98)
MONOCYTES # BLD AUTO: 0.36 THOUSAND/ΜL (ref 0.17–1.22)
MONOCYTES NFR BLD AUTO: 6 % (ref 4–12)
NEUTROPHILS # BLD AUTO: 3.94 THOUSANDS/ΜL (ref 1.85–7.62)
NEUTS SEG NFR BLD AUTO: 68 % (ref 43–75)
NITRITE UR QL STRIP: NEGATIVE
NRBC BLD AUTO-RTO: 0 /100 WBCS
PH UR STRIP.AUTO: 7 [PH]
PLATELET # BLD AUTO: 230 THOUSANDS/UL (ref 149–390)
PMV BLD AUTO: 10.8 FL (ref 8.9–12.7)
POTASSIUM SERPL-SCNC: 3.8 MMOL/L (ref 3.5–5.3)
PROT SERPL-MCNC: 5.8 G/DL (ref 6.4–8.4)
PROT UR STRIP-MCNC: NEGATIVE MG/DL
RBC # BLD AUTO: 3.88 MILLION/UL (ref 3.81–5.12)
RSV RNA RESP QL NAA+PROBE: NEGATIVE
SARS-COV-2 RNA RESP QL NAA+PROBE: NEGATIVE
SODIUM SERPL-SCNC: 139 MMOL/L (ref 135–147)
SP GR UR STRIP.AUTO: 1.01 (ref 1–1.03)
UROBILINOGEN UR STRIP-ACNC: <2 MG/DL
WBC # BLD AUTO: 5.82 THOUSAND/UL (ref 4.31–10.16)

## 2024-10-17 PROCEDURE — 74177 CT ABD & PELVIS W/CONTRAST: CPT

## 2024-10-17 PROCEDURE — 99285 EMERGENCY DEPT VISIT HI MDM: CPT

## 2024-10-17 PROCEDURE — 80053 COMPREHEN METABOLIC PANEL: CPT | Performed by: EMERGENCY MEDICINE

## 2024-10-17 PROCEDURE — 96375 TX/PRO/DX INJ NEW DRUG ADDON: CPT

## 2024-10-17 PROCEDURE — 99285 EMERGENCY DEPT VISIT HI MDM: CPT | Performed by: EMERGENCY MEDICINE

## 2024-10-17 PROCEDURE — 96365 THER/PROPH/DIAG IV INF INIT: CPT

## 2024-10-17 PROCEDURE — 93005 ELECTROCARDIOGRAM TRACING: CPT

## 2024-10-17 PROCEDURE — 0241U HB NFCT DS VIR RESP RNA 4 TRGT: CPT | Performed by: EMERGENCY MEDICINE

## 2024-10-17 PROCEDURE — 36415 COLL VENOUS BLD VENIPUNCTURE: CPT | Performed by: EMERGENCY MEDICINE

## 2024-10-17 PROCEDURE — 83735 ASSAY OF MAGNESIUM: CPT | Performed by: EMERGENCY MEDICINE

## 2024-10-17 PROCEDURE — 81003 URINALYSIS AUTO W/O SCOPE: CPT | Performed by: EMERGENCY MEDICINE

## 2024-10-17 PROCEDURE — 85025 COMPLETE CBC W/AUTO DIFF WBC: CPT | Performed by: EMERGENCY MEDICINE

## 2024-10-17 PROCEDURE — 96361 HYDRATE IV INFUSION ADD-ON: CPT

## 2024-10-17 PROCEDURE — 71045 X-RAY EXAM CHEST 1 VIEW: CPT

## 2024-10-17 RX ORDER — ONDANSETRON 2 MG/ML
4 INJECTION INTRAMUSCULAR; INTRAVENOUS ONCE
Status: COMPLETED | OUTPATIENT
Start: 2024-10-17 | End: 2024-10-17

## 2024-10-17 RX ORDER — MAGNESIUM SULFATE HEPTAHYDRATE 40 MG/ML
2 INJECTION, SOLUTION INTRAVENOUS ONCE
Status: COMPLETED | OUTPATIENT
Start: 2024-10-17 | End: 2024-10-17

## 2024-10-17 RX ORDER — AMOXICILLIN 250 MG
1 CAPSULE ORAL DAILY PRN
Qty: 20 TABLET | Refills: 0 | Status: SHIPPED | OUTPATIENT
Start: 2024-10-17

## 2024-10-17 RX ADMIN — ONDANSETRON 4 MG: 2 INJECTION INTRAMUSCULAR; INTRAVENOUS at 09:42

## 2024-10-17 RX ADMIN — IOHEXOL 70 ML: 350 INJECTION, SOLUTION INTRAVENOUS at 12:13

## 2024-10-17 RX ADMIN — MAGNESIUM SULFATE HEPTAHYDRATE 2 G: 40 INJECTION, SOLUTION INTRAVENOUS at 11:21

## 2024-10-17 RX ADMIN — SODIUM CHLORIDE 500 ML: 0.9 INJECTION, SOLUTION INTRAVENOUS at 09:42

## 2024-10-17 NOTE — ED PROVIDER NOTES
Time reflects when diagnosis was documented in both MDM as applicable and the Disposition within this note       Time User Action Codes Description Comment    10/17/2024  1:47 PM Crescencio Silva [R19.4] Change in bowel habits     10/17/2024  1:55 PM Crescencio Silva [K59.00] Constipation     10/17/2024  1:56 PM Crescencio Silva [R43.9] Smell or taste sensation disturbance           ED Disposition       ED Disposition   Discharge    Condition   Stable    Date/Time   Thu Oct 17, 2024  1:55 PM    Comment   Sandra Peña discharge to home/self care.                   Assessment & Plan       Medical Decision Making  83-year-old female presenting to the emergency department with dyspepsia and continuing foul taste which is without clear medical indication at this time and may be psychiatric in nature as a diagnosis of exclusion.  CBC, CMP, UA, CT of the abdomen pelvis without acute pathology.  Patient with chronic constipation so unlikely that this is causing her current dyspepsia however reviewed approach with daughter which includes my addition of senna docusate, continuation of daily MiraLAX, and as last resort as needed lactulose which is already provided to them.  Daughter notes she is going to revisit medications with psychiatrist in the morning with consideration to medication to keep patient calm and sleeping throughout the night. Reviewed all findings both relevant and incidental with the caregiver at bedside. Caregiver verbalized understanding of findings, all return to ED precautions, and agreed to review today's findings with the primary care provider. Pt non-toxic appearing upon discharge.       Amount and/or Complexity of Data Reviewed  Independent Historian: guardian  External Data Reviewed: notes.  Labs: ordered.  Radiology: ordered and independent interpretation performed.    Risk  OTC drugs.  Prescription drug management.             Medications   ondansetron (ZOFRAN) injection 4 mg (4 mg Intravenous  Given 10/17/24 0942)   sodium chloride 0.9 % bolus 500 mL (0 mL Intravenous Stopped 10/17/24 1121)   magnesium sulfate 2 g/50 mL IVPB (premix) 2 g (0 g Intravenous Stopped 10/17/24 1221)   iohexol (OMNIPAQUE) 350 MG/ML injection (MULTI-DOSE) 70 mL (70 mL Intravenous Given 10/17/24 1213)       ED Risk Strat Scores                                               History of Present Illness       Chief Complaint   Patient presents with    Vomiting     Vomiting for a few days, denies diarrhea.  Reports for months she has a foul taste when eating foods and has seen the PCP for that.  Patient also having weakness       Past Medical History:   Diagnosis Date    Anxiety     Colon polyps     Depression     Diabetes mellitus (HCC)     Dizziness     Last assessed: 8/31/15    DVT (deep venous thrombosis) (HCC)     Dysuria     Hematuria 2022    Hyperlipidemia     Hypertension     Hypertensive urgency 10/22/2021    Insomnia     Panic attack     Ureterolithiasis 2020      Past Surgical History:   Procedure Laterality Date     SECTION      1964 son, 1968 daughter    COLONOSCOPY      FL RETROGRADE PYELOGRAM  2020    ID COLONOSCOPY FLX DX W/COLLJ SPEC WHEN PFRMD N/A 3/27/2017    Procedure: COLONOSCOPY;  Surgeon: Gold Francis MD;  Location: [facility];  Service: Gastroenterology    ID CYSTO/URETERO W/LITHOTRIPSY &INDWELL STENT INSRT Right 2020    Procedure: CYSTOSCOPY URETEROSCOPY WITH LITHOTRIPSY HOLMIUM LASER, RETROGRADE PYELOGRAM AND INSERTION STENT URETERAL; EXCISION OF BLADDER TUMOR;  Surgeon: Edwin Love MD;  Location: [facility];  Service: Urology    ROTATOR CUFF REPAIR Left     Last assessed: 3/29/15    TONSILLECTOMY        Family History   Problem Relation Age of Onset    Depression Mother         w/anxiety    Diabetes Mother         Mellitus    Breast cancer Mother     Hypertension Mother     No Known Problems Father     Depression Sister         w/anxiety    Heart disease Sister          "Cardiac Disorder    Breast cancer Sister     Hypertension Sister     Diabetes Brother         Mellitus    Alcohol abuse Son       Social History     Tobacco Use    Smoking status: Never    Smokeless tobacco: Never   Vaping Use    Vaping status: Never Used   Substance Use Topics    Alcohol use: Never    Drug use: No      E-Cigarette/Vaping    E-Cigarette Use Never User       E-Cigarette/Vaping Substances    Nicotine No     THC No     CBD No     Flavoring No     Other No     Unknown No       I have reviewed and agree with the history as documented.     83-year-old female, past medical history per chart, presenting to the emergency department with acute on chronic problems related to decreased p.o. intake in the home secondary to now chronic \"foul taste\" with some episodes of retching over the last few days.  Daughter bedside notes no large episodes of productive vomitus.  Does note that impart the signs and symptoms are concerning for patient's previous evolving mental health.  Does have appointment with psychiatrist tomorrow morning.  Patient additionally was up multiple times about the evening stating that she felt as though there was a ghost following her.  Daughter notes that this has sometimes occurred before but has been worsening over the last few weeks to months.  Psychiatric medications have been titrated the last few weeks time.  Patient denies SI, HI, hallucinations.  Patient states she is unclear if the ghosts were secondary to a dream but it felt real at the time.  Daughter notes that patient does not place her self in any danger secondary to the aforementioned appreciation of ghosts and that episode may have been secondary to patient being taken off of her trazodone by the daughter.  Patient denies any fever, chills, active abdominal pain, change in bowel or bladder.      Vomiting  Associated symptoms: no abdominal pain, no arthralgias, no chills, no cough, no fever and no sore throat        Review of " Systems   Constitutional:  Negative for chills and fever.   HENT:  Negative for ear pain and sore throat.    Eyes:  Negative for pain and visual disturbance.   Respiratory:  Negative for cough and shortness of breath.    Cardiovascular:  Negative for chest pain and palpitations.   Gastrointestinal:  Positive for nausea and vomiting. Negative for abdominal pain.   Genitourinary:  Negative for dysuria and hematuria.   Musculoskeletal:  Negative for arthralgias and back pain.   Skin:  Negative for color change and rash.   Neurological:  Negative for seizures and syncope.   Psychiatric/Behavioral:  Positive for hallucinations and sleep disturbance.    All other systems reviewed and are negative.          Objective       ED Triage Vitals   Temperature Pulse Blood Pressure Respirations SpO2 Patient Position - Orthostatic VS   10/17/24 0915 10/17/24 0915 10/17/24 0915 10/17/24 0915 10/17/24 0915 10/17/24 1038   98.9 °F (37.2 °C) 80 (!) 204/91 20 99 % Lying      Temp Source Heart Rate Source BP Location FiO2 (%) Pain Score    10/17/24 0915 10/17/24 1100 10/17/24 1038 -- --    Oral Monitor Right arm        Vitals      Date and Time Temp Pulse SpO2 Resp BP Pain Score FACES Pain Rating User   10/17/24 1300 -- 86 98 % -- 167/76 -- --    10/17/24 1200 -- 79 99 % -- 164/74 -- --    10/17/24 1100 -- 77 100 % -- 168/77 -- --    10/17/24 1048 -- 77 99 % -- 189/79 -- -- [facility]   10/17/24 1038 -- -- -- -- 168/72 -- -- TS   10/17/24 1033 -- 72 98 % -- 188/79 -- -- [facility]   10/17/24 1003 -- 73 100 % -- 195/79 -- -- [facility]   10/17/24 0933 -- 76 99 % -- 189/79 -- -- [facility]   10/17/24 0915 98.9 °F (37.2 °C) 80 99 % 20 204/91 -- -- EMR            Physical Exam  Vitals and nursing note reviewed.   Constitutional:       General: She is not in acute distress.     Appearance: She is well-developed. She is not ill-appearing.   HENT:      Head: Normocephalic and atraumatic.      Nose: Nose normal.   Eyes:      Conjunctiva/sclera: Conjunctivae normal.    Cardiovascular:      Rate and Rhythm: Normal rate and regular rhythm.      Heart sounds: No murmur heard.  Pulmonary:      Effort: Pulmonary effort is normal. No respiratory distress.      Breath sounds: Normal breath sounds.   Abdominal:      General: Abdomen is flat.      Palpations: Abdomen is soft.      Tenderness: There is no abdominal tenderness.   Musculoskeletal:         General: No swelling. Normal range of motion.      Cervical back: Normal range of motion and neck supple.   Skin:     General: Skin is warm and dry.      Capillary Refill: Capillary refill takes less than 2 seconds.   Neurological:      General: No focal deficit present.      Mental Status: She is alert and oriented to person, place, and time.   Psychiatric:         Mood and Affect: Mood is depressed.      Comments: Negative SI, HI, active hallucinations. Somewhat with introspection and that goes on her present and could been a part of a dream that scared her beyond typical.         Results Reviewed       Procedure Component Value Units Date/Time    UA w Reflex to Microscopic w Reflex to Culture [[id number]] Collected: 10/17/24 1059    Lab Status: Final result Specimen: Urine, Clean Catch Updated: 10/17/24 1107     Color, UA Light Yellow     Clarity, UA Clear     Specific Gravity, UA 1.012     pH, UA 7.0     Leukocytes, UA Negative     Nitrite, UA Negative     Protein, UA Negative mg/dl      Glucose, UA Negative mg/dl      Ketones, UA Negative mg/dl      Urobilinogen, UA <2.0 mg/dl      Bilirubin, UA Negative     Occult Blood, UA Negative    Comprehensive metabolic panel [[id number]]  (Abnormal) Collected: 10/17/24 1008    Lab Status: Final result Specimen: Blood from Arm, Left Updated: 10/17/24 1039     Sodium 139 mmol/L      Potassium 3.8 mmol/L      Chloride 105 mmol/L      CO2 27 mmol/L      ANION GAP 7 mmol/L      BUN 21 mg/dL      Creatinine 0.98 mg/dL      Glucose 121 mg/dL      Calcium 8.9 mg/dL      AST 11 U/L      ALT 8 U/L       Alkaline Phosphatase 45 U/L      Total Protein 5.8 g/dL      Albumin 3.6 g/dL      Total Bilirubin 0.68 mg/dL      eGFR 52 ml/min/1.73sq m     Narrative:      National Kid[employer] guidelines for Chronic Kidney Disease (CKD):     Stage 1 with normal or high GFR (GFR > 90 mL/min/1.73 square meters)    Stage 2 Mild CKD (GFR = 60-89 mL/min/1.73 square meters)    Stage 3A Moderate CKD (GFR = 45-59 mL/min/1.73 square meters)    Stage 3B Moderate CKD (GFR = 30-44 mL/min/1.73 square meters)    Stage 4 Severe CKD (GFR = 15-29 mL/min/1.73 square meters)    Stage 5 End Stage CKD (GFR <15 mL/min/1.73 square meters)  Note: GFR calculation is accurate only with a steady state creatinine    Magnesium [[id number]]  (Abnormal) Collected: 10/17/24 1008    Lab Status: Final result Specimen: Blood from Arm, Left Updated: 10/17/24 1039     Magnesium 1.8 mg/dL     COVID19, Influenza A/B, RSV PCR, [facility] [[id number]]  (Normal) Collected: 10/17/24 0934    Lab Status: Final result Specimen: Nares from Nose Updated: 10/17/24 1027     SARS-CoV-2 Negative     INFLUENZA A PCR Negative     INFLUENZA B PCR Negative     RSV PCR Negative    Narrative:      This test has been performed using the CoV-2/Flu/RSV plus assay on the [employer] GeneXpert platform. This test has been validated by the  and verified by the performing laboratory.     This test is designed to amplify and detect the following: nucleocapsid (N), envelope (E), and RNA-dependent RNA polymerase (RdRP) genes of the SARS-CoV-2 genome; matrix (M), basic polymerase (PB2), and acidic protein (PA) segments of the influenza A genome; matrix (M) and non-structural protein (NS) segments of the influenza B genome, and the nucleocapsid genes of RSV A and RSV B.     Positive results are indicative of the presence of Flu A, Flu B, RSV, and/or SARS-CoV-2 RNA. Positive results for SARS-CoV-2 or suspected novel influenza should be reported to state, local, or federal health  departments according to local reporting requirements.      All results should be assessed in conjunction with clinical presentation and other laboratory markers for clinical management.     FOR PEDIATRIC PATIENTS - copy/paste COVID Guidelines URL to browser: [email or web address]       CBC and differential [[id number]]  (Abnormal) Collected: 10/17/24 0934    Lab Status: Final result Specimen: Blood from Arm, Left Updated: 10/17/24 0942     WBC 5.82 Thousand/uL      RBC 3.88 Million/uL      Hemoglobin 11.3 g/dL      Hematocrit 34.9 %      MCV 90 fL      MCH 29.1 pg      MCHC 32.4 g/dL      RDW 13.3 %      MPV 10.8 fL      Platelets 230 Thousands/uL      nRBC 0 /100 WBCs      Segmented % 68 %      Immature Grans % 0 %      Lymphocytes % 24 %      Monocytes % 6 %      Eosinophils Relative 1 %      Basophils Relative 1 %      Absolute Neutrophils 3.94 Thousands/µL      Absolute Immature Grans 0.02 Thousand/uL      Absolute Lymphocytes 1.38 Thousands/µL      Absolute Monocytes 0.36 Thousand/µL      Eosinophils Absolute 0.08 Thousand/µL      Basophils Absolute 0.04 Thousands/µL             CT abdomen pelvis with contrast   Final Interpretation by Ralph Castellano MD (10/17 5052)      No acute abnormality identified in the lower chest, abdomen or pelvis.   Moderate diffuse partially formed colonic stool burden.   Stomach and small bowel unremarkable without evidence of obstruction.      Redemonstrated 1.3 cm enhancing soft tissue nodularity in the right labia majora.   Clinical correlation advised.      5 mm cystic-appearing lesion within the pancreatic tail.   As this has been stable for 4 years dating back to May 2020 exam, in a patient of this age no specific follow-up recommended.         The study was marked in EPIC for immediate notification.         Workstation performed: [id number]         XR chest 1 view portable   ED Interpretation by Crescencio Silva DO (10/17  7929)   No acute change      Final Interpretation by Geoff Uerna MD (10/17 7882)      No acute cardiopulmonary disease.            Workstation performed: [id number]             Procedures    ED Medication and Procedure Management   Prior to Admission Medications   Prescriptions Last Dose Informant Patient Reported? Taking?   Cholecalciferol (VITAMIN D3) 1,000 units tablet  Self No No   Sig: Take 2 tablets (2,000 Units total) by mouth daily for 30 doses   Constulose 10 GM/15ML solution   No No   Sig: TAKE 15 ML BY MOUTH TWO TIMES A DAY   Vortioxetine HBr (TRINTELLIX) 5 MG tablet   No No   Sig: Take 1 tablet (5 mg total) by mouth daily for 7 days Then increase to 10 mg daily   aspirin 81 MG tablet  Self Yes No   Sig: Take 81 mg by mouth daily   atorvastatin (LIPITOR) 10 mg tablet  Self No No   Sig: Take 1 tablet (10 mg total) by mouth daily   cyanocobalamin (VITAMIN B-12) 1000 MCG tablet  Self No No   Sig: Take 1 tablet (1,000 mcg total) by mouth daily   Patient not taking: Reported on 10/4/2024   glycerin adult 2 g suppository   No No   Sig: Please use glycerin suppository every other day.  If patient has rectal or abdominal pain, can use glycerin suppository daily   hydrOXYzine HCL (ATARAX) 10 mg tablet  Self No No   Sig: Take 1 tablet (10 mg total) by mouth 2 (two) times a day as needed for anxiety for up to 40 doses   linaCLOtide 145 MCG CAPS  Self No No   Sig: Take 1 capsule (145 mcg total) by mouth in the morning   lisinopril-hydrochlorothiazide (PRINZIDE,ZESTORETIC) 10-12.5 MG per tablet  Self No No   Sig: Take 1 tablet by mouth daily   metFORMIN (GLUCOPHAGE) 500 mg tablet   Yes No   Sig: Take 500 mg by mouth 2 (two) times a day with meals   ondansetron (ZOFRAN-ODT) 4 mg disintegrating tablet   No No   Sig: Take 1 tablet (4 mg total) by mouth every 8 (eight) hours as needed for nausea   pantoprazole (PROTONIX) 40 mg tablet   No No   Sig: Take 1 tablet (40 mg total) by mouth daily in the early morning    polyethylene glycol (MIRALAX) 17 g packet   No No   Sig: Take 17 g by mouth daily   psyllium (METAMUCIL) packet   No No   Sig: Take 1 packet by mouth daily   traZODone (DESYREL) 50 mg tablet  Self No No   Sig: Take 0.5 tablets (25 mg total) by mouth daily at bedtime   vortioxetine (TRINTELLIX) 10 MG tablet   No No   Sig: Take 0.5 tablets (5 mg total) by mouth daily for 7 days, THEN 1 tablet (10 mg total) daily for 22 days.      Facility-Administered Medications: None     Discharge Medication List as of 10/17/2024  2:27 PM        START taking these medications    Details   senna-docusate sodium (SENOKOT S) 8.6-50 mg per tablet Take 1 tablet by mouth daily as needed for constipation, Starting u 10/17/2024, Normal           CONTINUE these medications which have NOT CHANGED    Details   aspirin 81 MG tablet Take 81 mg by mouth daily, Historical Med      atorvastatin (LIPITOR) 10 mg tablet Take 1 tablet (10 mg total) by mouth daily, Starting Mon 7/8/2024, Normal      Cholecalciferol (VITAMIN D3) 1,000 units tablet Take 2 tablets (2,000 Units total) by mouth daily for 30 doses, Starting Wed 9/4/2024, Until Fri 10/4/2024, Normal      Constulose 10 GM/15ML solution TAKE 15 ML BY MOUTH TWO TIMES A DAY, Normal      cyanocobalamin (VITAMIN B-12) 1000 MCG tablet Take 1 tablet (1,000 mcg total) by mouth daily, Starting Wed 9/4/2024, Until Fri 10/4/2024, Normal      glycerin adult 2 g suppository Please use glycerin suppository every other day.  If patient has rectal or abdominal pain, can use glycerin suppository daily, Normal      hydrOXYzine HCL (ATARAX) 10 mg tablet Take 1 tablet (10 mg total) by mouth 2 (two) times a day as needed for anxiety for up to 40 doses, Starting Tue 9/3/2024, Normal      linaCLOtide 145 MCG CAPS Take 1 capsule (145 mcg total) by mouth in the morning, Starting Wed 6/12/2024, Normal      lisinopril-hydrochlorothiazide (PRINZIDE,ZESTORETIC) 10-12.5 MG per tablet Take 1 tablet by mouth daily,  Starting Mon 10/16/2023, Normal      metFORMIN (GLUCOPHAGE) 500 mg tablet Take 500 mg by mouth 2 (two) times a day with meals, Starting Mon 10/7/2024, Historical Med      ondansetron (ZOFRAN-ODT) 4 mg disintegrating tablet Take 1 tablet (4 mg total) by mouth every 8 (eight) hours as needed for nausea, Starting Thu 10/10/2024, Normal      pantoprazole (PROTONIX) 40 mg tablet Take 1 tablet (40 mg total) by mouth daily in the early morning, Starting Sun 10/6/2024, Normal      polyethylene glycol (MIRALAX) 17 g packet Take 17 g by mouth daily, Starting Sat 10/5/2024, Normal      psyllium (METAMUCIL) packet Take 1 packet by mouth daily, Starting Sun 10/6/2024, Print      traZODone (DESYREL) 50 mg tablet Take 0.5 tablets (25 mg total) by mouth daily at bedtime, Starting Wed 10/2/2024, Normal      vortioxetine (TRINTELLIX) 10 MG tablet Multiple Dosages:Starting Mon 10/7/2024, Until Sun 10/13/2024 at 2359, THEN Starting Mon 10/14/2024, Until Mon 11/4/2024 at 2359Take 0.5 tablets (5 mg total) by mouth daily for 7 days, THEN 1 tablet (10 mg total) daily for 22 days., Normal           No discharge procedures on file.  ED SEPSIS DOCUMENTATION   Time reflects when diagnosis was documented in both MDM as applicable and the Disposition within this note       Time User Action Codes Description Comment    10/17/2024  1:47 PM Crescencio Silva [R19.4] Change in bowel habits     10/17/2024  1:55 PM Crescencio Silva [K59.00] Constipation     10/17/2024  1:56 PM Crescencio Silva [R43.9] Smell or taste sensation disturbance                  Crescencio Silva DO  10/17/24 3099

## 2024-10-18 ENCOUNTER — OFFICE VISIT (OUTPATIENT)
Dept: PSYCHIATRY | Facility: CLINIC | Age: 84
End: 2024-10-18
Payer: MEDICARE

## 2024-10-18 DIAGNOSIS — F45.21 ILLNESS ANXIETY DISORDER: Primary | ICD-10-CM

## 2024-10-18 LAB
ATRIAL RATE: 79 BPM
P AXIS: 52 DEGREES
PR INTERVAL: 166 MS
QRS AXIS: 1 DEGREES
QRSD INTERVAL: 72 MS
QT INTERVAL: 358 MS
QTC INTERVAL: 410 MS
T WAVE AXIS: 42 DEGREES
VENTRICULAR RATE: 79 BPM

## 2024-10-18 PROCEDURE — 90833 PSYTX W PT W E/M 30 MIN: CPT

## 2024-10-18 PROCEDURE — 93010 ELECTROCARDIOGRAM REPORT: CPT | Performed by: INTERNAL MEDICINE

## 2024-10-18 PROCEDURE — 99214 OFFICE O/P EST MOD 30 MIN: CPT

## 2024-10-18 RX ORDER — LISINOPRIL AND HYDROCHLOROTHIAZIDE 10; 12.5 MG/1; MG/1
1 TABLET ORAL DAILY
Qty: 90 TABLET | Refills: 1 | Status: SHIPPED | OUTPATIENT
Start: 2024-10-18

## 2024-10-18 RX ORDER — ESCITALOPRAM OXALATE 5 MG/1
TABLET ORAL
Qty: 25 TABLET | Refills: 0 | Status: SHIPPED | OUTPATIENT
Start: 2024-10-18 | End: 2024-11-15

## 2024-10-18 RX ORDER — LORAZEPAM 0.5 MG/1
0.5 TABLET ORAL 2 TIMES DAILY PRN
Qty: 15 TABLET | Refills: 0 | Status: SHIPPED | OUTPATIENT
Start: 2024-10-18 | End: 2024-10-28

## 2024-10-18 RX ORDER — TRAZODONE HYDROCHLORIDE 50 MG/1
25-50 TABLET, FILM COATED ORAL
Qty: 15 TABLET | Refills: 0 | Status: SHIPPED | OUTPATIENT
Start: 2024-10-18

## 2024-10-18 NOTE — PSYCH
MEDICATION MANAGEMENT NOTE        [facility]      Name and Date of Birth:  Sandra Peña 83 y.o. 1940 MRN: [id number]    Date of Visit: October 18, 2024    Reason for Visit: Follow-up visit regarding medication management     _____________________________    Assessment & Plan  Illness anxiety disorder    Patient continues to exhibit somatizing and obsessional symptoms with distress and daughter exhibits distress with these symptoms as well. Concerning emesis, this is likely due to a combination of medication-induced GI distress and somatic symptoms. Vortioxetine is known to cause GI distress. In addition to this , they report that vortioxetine is cost prohibitive. For this reason it is determined that switching to another maintenance agent is preferable. She had been on escitalopram for multiple years in the past with good response and was discontinued some years ago (specifics are no available in record) however seem to suggest due to loss of efficacy. Given knowledge of previous toleration, this will be retrialled.     Discussion of risks and benefits of benzodiazepine therapy for severe anxiety is discussed. The patient had been on clonazepam for multiple years and was recently stopped about three months ago. At this time it is determined that an addition of as needed anxiolytic for severe distress would benefit the patient. There is either her daughter or caretaker present at all times. Lorazepam is chosen as given her age and reduced pharmacokinetics, a medication with a longer half-life risks building plasma level. At this time, planning to use this temporarily until the patient is stable on a therapeutic dose of escitalopram (8-12 weeks).    Discontinue vortioxetine  Begin escitalopram 2.5 mg QD for 7 days then increased to 5 mg QD for anxiety and depressive symptoms  Begin lorazepam 0.5 mg BID PRN for severe anxiety  Discussed with patient the risks of  "sedation, respiratory depression, impairment in judgment and motor function. Depression, mood changes, GI changes, and others discussed.             Continue trazodone 25-50 mg QHS PRN for sleep  Do not take both trazodone and lorazepam together at night  Continue psychotherapy    Orders:    traZODone (DESYREL) 50 mg tablet; Take 0.5-1 tablets (25-50 mg total) by mouth daily at bedtime as needed for sleep Do not take if also taking lorazepam    escitalopram (LEXAPRO) 5 mg tablet; Take 0.5 tablets (2.5 mg total) by mouth daily for 7 days, THEN 1 tablet (5 mg total) daily for 21 days.    LORazepam (Ativan) 0.5 mg tablet; Take 1 tablet (0.5 mg total) by mouth 2 (two) times a day as needed (severe anxiety) for up to 10 days Do not take at night if also taking trazodone             Treatment Recommendations/Precautions:  Risks/benefits/alternatives to treatment discussed, including a myriad of potential adverse medication side effects, to which Sandra voiced understanding and consented fully to treatment.   Labs most recently obtained , reviewed.   Follow up in 1 week for medication management  Follow up with PCP for medical issues and ongoing care  Aware of 24 hour and weekend coverage for urgent situations accessed by calling [facility] main practice number    Individual psychotherapy provided: Counseling was provided during the session today for 45 minutes.     Treatment Plan:     Completed and signed during the session: Not applicable - Treatment Plan not due at this session    _____________________________________________    History of Present Illness     Chief Complaint: \"I feel lousy\"    SUBJECTIVE:    Patient and daughter report no improvement from previous. She continues to express severe anxiety about multiple physical symptoms despite medical reassurance and has continued to be dependent on daughter for emotional reassurance at all times. She has had some episodes of emesis and continues " to report poor appetite. They have decided for this time they will not be looking to have her move into an institutional environment. She sleeps well with trazodone, however she reports dreams that are not nightmares, however are inexplicable and cause her anxiety and she wakes up in distress.        Psychiatric Review Of Systems:  Unchanged information from this writer's previous assessment is copied and italicized; information that has changed is bolded.    Appetite: yes  Adverse eating: no  Weight changes: no  Insomnia/sleeplessness: no  Fatigue/anergy: yes  Anhedonia/lack of interest: no  Attention/concentration: no  Psychomotor agitation/retardation: no  Somatic symptoms: yes  Anxiety/panic attack: yes  Zaira/hypomania: no  Hopelessness/helplessness/worthlessness: no  Self-injurious behavior/high-risk behavior: no  Suicidal ideation: no  Homicidal ideation: no  Auditory hallucinations: no  Visual hallucinations: no  Other perceptual disturbances: no  Delusional thinking: no  Obsessive/compulsive symptoms: yes    Review Of Systems:      Constitutional negative   ENT negative   Cardiovascular negative   Respiratory negative   Gastrointestinal nausea   Genitourinary negative   Musculoskeletal negative   Integumentary negative   Neurological negative   Endocrine negative   Other Symptoms none, all other systems are negative     Objective    OBJECTIVE:     Visit Vitals  OB Status Postmenopausal   Smoking Status Never      Wt Readings from Last 6 Encounters:   10/17/24 49.9 kg (110 lb 0.2 oz)   10/10/24 49.4 kg (109 lb)   10/04/24 48.7 kg (107 lb 5.8 oz)   09/29/24 49.4 kg (109 lb)   09/23/24 48.4 kg (106 lb 11.2 oz)   09/13/24 48.1 kg (106 lb)        Past Medical History:   Diagnosis Date    Anxiety     Colon polyps     Depression     Diabetes mellitus (HCC)     Dizziness     Last assessed: 8/31/15    DVT (deep venous thrombosis) ([facility])     Dysuria     Hematuria 04/21/2022    Hyperlipidemia     Hypertension      Hypertensive urgency 10/22/2021    Insomnia     Panic attack     Ureterolithiasis 2020      Past Surgical History:   Procedure Laterality Date     SECTION      1964 son, 1968 daughter    COLONOSCOPY      FL RETROGRADE PYELOGRAM  2020    RI COLONOSCOPY FLX DX W/COLLJ SPEC WHEN PFRMD N/A 3/27/2017    Procedure: COLONOSCOPY;  Surgeon: Gold Francis MD;  Location: [facility];  Service: Gastroenterology    RI CYSTO/URETERO W/LITHOTRIPSY &INDWELL STENT INSRT Right 2020    Procedure: CYSTOSCOPY URETEROSCOPY WITH LITHOTRIPSY HOLMIUM LASER, RETROGRADE PYELOGRAM AND INSERTION STENT URETERAL; EXCISION OF BLADDER TUMOR;  Surgeon: Edwin Love MD;  Location: [facility];  Service: Urology    ROTATOR CUFF REPAIR Left     Last assessed: 3/29/15    TONSILLECTOMY         Meds/Allergies    No Known Allergies  Current Outpatient Medications   Medication Instructions    aspirin 81 mg, Oral, Daily    atorvastatin (LIPITOR) 10 mg, Oral, Daily    Cholecalciferol (VITAMIN D3) 2,000 Units, Oral, Daily    Constulose 10 GM/15ML solution TAKE 15 ML BY MOUTH TWO TIMES A DAY    cyanocobalamin (VITAMIN B-12) 1,000 mcg, Oral, Daily    glycerin adult 2 g suppository Please use glycerin suppository every other day.  If patient has rectal or abdominal pain, can use glycerin suppository daily    hydrOXYzine HCL (ATARAX) 10 mg, Oral, 2 times daily PRN    linaCLOtide 145 mcg, Oral, Daily    lisinopril-hydrochlorothiazide (PRINZIDE,ZESTORETIC) 10-12.5 MG per tablet 1 tablet, Oral, Daily    metFORMIN (GLUCOPHAGE) 500 mg, Oral, 2 times daily with meals    ondansetron (ZOFRAN-ODT) 4 mg, Oral, Every 8 hours PRN    pantoprazole (PROTONIX) 40 mg, Oral, Daily (early morning)    polyethylene glycol (MIRALAX) 17 g, Oral, Daily    psyllium (METAMUCIL) packet 1 packet, Oral, Daily    senna-docusate sodium (SENOKOT S) 8.6-50 mg per tablet 1 tablet, Oral, Daily PRN    traZODone (DESYREL) 25 mg, Oral, Daily at bedtime    vortioxetine  "(TRINTELLIX) 10 MG tablet Take 0.5 tablets (5 mg total) by mouth daily for 7 days, THEN 1 tablet (10 mg total) daily for 22 days.    Vortioxetine HBr (TRINTELLIX) 5 mg, Oral, Daily, Then increase to 10 mg daily           Mental Status Exam:    Appearance Petite, short curly grey hair, blue malou jacket, good eye contact   Behavior/motor Calm and cooperative   Speech/language Normal rate and volume   Mood \"lousy\"   Affect Anxious and dysphoric, tearful, full range, improves over time   Thought process Logical and linear, ruminating   Thought content Obsessions    No overt delusions, Denies hallucinations, Denies suicidal ideations   Cognition Awake and alert, oriented and memory grossly intact, and concentrates on questions   Insight/judgment Insight: moderate  Judgment: moderate     Laboratory Results: I have personally reviewed all pertinent laboratory/tests results    Admission on 10/17/2024, Discharged on 10/17/2024   Component Date Value Ref Range Status    Sodium 10/17/2024 139  135 - 147 mmol/L Final    Potassium 10/17/2024 3.8  3.5 - 5.3 mmol/L Final    Chloride 10/17/2024 105  96 - 108 mmol/L Final    CO2 10/17/2024 27  21 - 32 mmol/L Final    ANION GAP 10/17/2024 7  4 - 13 mmol/L Final    BUN 10/17/2024 21  5 - 25 mg/dL Final    Creatinine 10/17/2024 0.98  0.60 - 1.30 mg/dL Final    Standardized to IDMS reference method    Glucose 10/17/2024 121  65 - 140 mg/dL Final    If the patient is fasting, the ADA then defines impaired fasting glucose as > 100 mg/dL and diabetes as > or equal to 123 mg/dL.    Calcium 10/17/2024 8.9  8.4 - 10.2 mg/dL Final    AST 10/17/2024 11 (L)  13 - 39 U/L Final    ALT 10/17/2024 8  7 - 52 U/L Final    Specimen collection should occur prior to Sulfasalazine administration due to the potential for falsely depressed results.     Alkaline Phosphatase 10/17/2024 45  34 - 104 U/L Final    Total Protein 10/17/2024 5.8 (L)  6.4 - 8.4 g/dL Final    Albumin 10/17/2024 3.6  3.5 - 5.0 g/dL " Final    Total Bilirubin 10/17/2024 0.68  0.20 - 1.00 mg/dL Final    Use of this assay is not recommended for patients undergoing treatment with eltrombopag due to the potential for falsely elevated results.  N-acetyl-p-benzoquinone imine (metabolite of Acetaminophen) will generate erroneously low results in samples for patients that have taken an overdose of Acetaminophen.    eGFR 10/17/2024 52  ml/min/1.73sq m Final    WBC 10/17/2024 5.82  4.31 - 10.16 Thousand/uL Final    RBC 10/17/2024 3.88  3.81 - 5.12 Million/uL Final    Hemoglobin 10/17/2024 11.3 (L)  11.5 - 15.4 g/dL Final    Hematocrit 10/17/2024 34.9  34.8 - 46.1 % Final    MCV 10/17/2024 90  82 - 98 fL Final    MCH 10/17/2024 29.1  26.8 - 34.3 pg Final    MCHC 10/17/2024 32.4  31.4 - 37.4 g/dL Final    RDW 10/17/2024 13.3  11.6 - 15.1 % Final    MPV 10/17/2024 10.8  8.9 - 12.7 fL Final    Platelets 10/17/2024 230  149 - 390 Thousands/uL Final    nRBC 10/17/2024 0  /100 WBCs Final    Segmented % 10/17/2024 68  43 - 75 % Final    Immature Grans % 10/17/2024 0  0 - 2 % Final    Lymphocytes % 10/17/2024 24  14 - 44 % Final    Monocytes % 10/17/2024 6  4 - 12 % Final    Eosinophils Relative 10/17/2024 1  0 - 6 % Final    Basophils Relative 10/17/2024 1  0 - 1 % Final    Absolute Neutrophils 10/17/2024 3.94  1.85 - 7.62 Thousands/µL Final    Absolute Immature Grans 10/17/2024 0.02  0.00 - 0.20 Thousand/uL Final    Absolute Lymphocytes 10/17/2024 1.38  0.60 - 4.47 Thousands/µL Final    Absolute Monocytes 10/17/2024 0.36  0.17 - 1.22 Thousand/µL Final    Eosinophils Absolute 10/17/2024 0.08  0.00 - 0.61 Thousand/µL Final    Basophils Absolute 10/17/2024 0.04  0.00 - 0.10 Thousands/µL Final    Color, UA 10/17/2024 Light Yellow   Final    Clarity, UA 10/17/2024 Clear   Final    Specific Gravity, UA 10/17/2024 1.012  1.003 - 1.030 Final    pH, UA 10/17/2024 7.0  4.5, 5.0, 5.5, 6.0, 6.5, 7.0, 7.5, 8.0 Final    Leukocytes, UA 10/17/2024 Negative  Negative Final     Nitrite, UA 10/17/2024 Negative  Negative Final    Protein, UA 10/17/2024 Negative  Negative mg/dl Final    Glucose, UA 10/17/2024 Negative  Negative mg/dl Final    Ketones, UA 10/17/2024 Negative  Negative mg/dl Final    Urobilinogen, UA 10/17/2024 <2.0  <2.0 mg/dl mg/dl Final    Bilirubin, UA 10/17/2024 Negative  Negative Final    Occult Blood, UA 10/17/2024 Negative  Negative Final    Magnesium 10/17/2024 1.8 (L)  1.9 - 2.7 mg/dL Final    SARS-CoV-2 10/17/2024 Negative  Negative Final         INFLUENZA A PCR 10/17/2024 Negative  Negative Final         INFLUENZA B PCR 10/17/2024 Negative  Negative Final         RSV PCR 10/17/2024 Negative  Negative Final         Ventricular Rate 10/17/2024 79  BPM Incomplete    Atrial Rate 10/17/2024 79  BPM Incomplete    DC Interval 10/17/2024 166  ms Incomplete    QRSD Interval 10/17/2024 72  ms Incomplete    QT Interval 10/17/2024 358  ms Incomplete    QTC Interval 10/17/2024 410  ms Incomplete    P Axis 10/17/2024 52  degrees Incomplete    QRS Axis 10/17/2024 1  degrees Incomplete    T Wave Axis 10/17/2024 42  degrees Incomplete   Admission on 10/03/2024, Discharged on 10/05/2024   Component Date Value Ref Range Status    WBC 10/04/2024 5.78  4.31 - 10.16 Thousand/uL Final    RBC 10/04/2024 3.90  3.81 - 5.12 Million/uL Final    Hemoglobin 10/04/2024 11.3 (L)  11.5 - 15.4 g/dL Final    Hematocrit 10/04/2024 35.2  34.8 - 46.1 % Final    MCV 10/04/2024 90  82 - 98 fL Final    MCH 10/04/2024 29.0  26.8 - 34.3 pg Final    MCHC 10/04/2024 32.1  31.4 - 37.4 g/dL Final    RDW 10/04/2024 12.9  11.6 - 15.1 % Final    MPV 10/04/2024 10.9  8.9 - 12.7 fL Final    Platelets 10/04/2024 256  149 - 390 Thousands/uL Final    nRBC 10/04/2024 0  /100 WBCs Final    Segmented % 10/04/2024 56  43 - 75 % Final    Immature Grans % 10/04/2024 0  0 - 2 % Final    Lymphocytes % 10/04/2024 32  14 - 44 % Final    Monocytes % 10/04/2024 8  4 - 12 % Final    Eosinophils Relative 10/04/2024 3  0 - 6 % Final     Basophils Relative 10/04/2024 1  0 - 1 % Final    Absolute Neutrophils 10/04/2024 3.21  1.85 - 7.62 Thousands/µL Final    Absolute Immature Grans 10/04/2024 0.02  0.00 - 0.20 Thousand/uL Final    Absolute Lymphocytes 10/04/2024 1.85  0.60 - 4.47 Thousands/µL Final    Absolute Monocytes 10/04/2024 0.48  0.17 - 1.22 Thousand/µL Final    Eosinophils Absolute 10/04/2024 0.18  0.00 - 0.61 Thousand/µL Final    Basophils Absolute 10/04/2024 0.04  0.00 - 0.10 Thousands/µL Final    Sodium 10/04/2024 137  135 - 147 mmol/L Final    Potassium 10/04/2024 4.4  3.5 - 5.3 mmol/L Final    Chloride 10/04/2024 102  96 - 108 mmol/L Final    CO2 10/04/2024 28  21 - 32 mmol/L Final    ANION GAP 10/04/2024 7  4 - 13 mmol/L Final    BUN 10/04/2024 34 (H)  5 - 25 mg/dL Final    Creatinine 10/04/2024 1.15  0.60 - 1.30 mg/dL Final    Standardized to IDMS reference method    Glucose 10/04/2024 153 (H)  65 - 140 mg/dL Final    If the patient is fasting, the ADA then defines impaired fasting glucose as > 100 mg/dL and diabetes as > or equal to 123 mg/dL.    Calcium 10/04/2024 9.3  8.4 - 10.2 mg/dL Final    AST 10/04/2024 28  13 - 39 U/L Final    ALT 10/04/2024 20  7 - 52 U/L Final    Specimen collection should occur prior to Sulfasalazine administration due to the potential for falsely depressed results.     Alkaline Phosphatase 10/04/2024 58  34 - 104 U/L Final    Total Protein 10/04/2024 6.4  6.4 - 8.4 g/dL Final    Albumin 10/04/2024 3.8  3.5 - 5.0 g/dL Final    Total Bilirubin 10/04/2024 0.32  0.20 - 1.00 mg/dL Final    Use of this assay is not recommended for patients undergoing treatment with eltrombopag due to the potential for falsely elevated results.  N-acetyl-p-benzoquinone imine (metabolite of Acetaminophen) will generate erroneously low results in samples for patients that have taken an overdose of Acetaminophen.    eGFR 10/04/2024 43  ml/min/1.73sq m Final    LACTIC ACID 10/04/2024 2.2 (H)  0.5 - 2.0 mmol/L Final    LACTIC ACID  10/04/2024 2.2 (H)  0.5 - 2.0 mmol/L Final    WBC 10/04/2024 7.78  4.31 - 10.16 Thousand/uL Final    RBC 10/04/2024 3.81  3.81 - 5.12 Million/uL Final    Hemoglobin 10/04/2024 11.0 (L)  11.5 - 15.4 g/dL Final    Hematocrit 10/04/2024 36.7  34.8 - 46.1 % Final    MCV 10/04/2024 96  82 - 98 fL Final    short sample    MCH 10/04/2024 28.9  26.8 - 34.3 pg Final    MCHC 10/04/2024 30.0 (L)  31.4 - 37.4 g/dL Final    RDW 10/04/2024 12.7  11.6 - 15.1 % Final    MPV 10/04/2024 10.4  8.9 - 12.7 fL Final    Platelets 10/04/2024 221  149 - 390 Thousands/uL Final    nRBC 10/04/2024 0  /100 WBCs Final    Segmented % 10/04/2024 74  43 - 75 % Final    Immature Grans % 10/04/2024 0  0 - 2 % Final    Lymphocytes % 10/04/2024 18  14 - 44 % Final    Monocytes % 10/04/2024 5  4 - 12 % Final    Eosinophils Relative 10/04/2024 2  0 - 6 % Final    Basophils Relative 10/04/2024 1  0 - 1 % Final    Absolute Neutrophils 10/04/2024 5.74  1.85 - 7.62 Thousands/µL Final    Absolute Immature Grans 10/04/2024 0.03  0.00 - 0.20 Thousand/uL Final    Absolute Lymphocytes 10/04/2024 1.42  0.60 - 4.47 Thousands/µL Final    Absolute Monocytes 10/04/2024 0.42  0.17 - 1.22 Thousand/µL Final    Eosinophils Absolute 10/04/2024 0.12  0.00 - 0.61 Thousand/µL Final    Basophils Absolute 10/04/2024 0.05  0.00 - 0.10 Thousands/µL Final    Sodium 10/04/2024 134 (L)  135 - 147 mmol/L Final    Potassium 10/04/2024 4.5  3.5 - 5.3 mmol/L Final    Chloride 10/04/2024 103  96 - 108 mmol/L Final    CO2 10/04/2024 24  21 - 32 mmol/L Final    ANION GAP 10/04/2024 7  4 - 13 mmol/L Final    BUN 10/04/2024 29 (H)  5 - 25 mg/dL Final    Creatinine 10/04/2024 1.05  0.60 - 1.30 mg/dL Final    Standardized to IDMS reference method    Glucose 10/04/2024 137  65 - 140 mg/dL Final    If the patient is fasting, the ADA then defines impaired fasting glucose as > 100 mg/dL and diabetes as > or equal to 123 mg/dL.    Calcium 10/04/2024 8.7  8.4 - 10.2 mg/dL Final    eGFR 10/04/2024 48   ml/min/1.73sq m Final    LACTIC ACID 10/04/2024 2.0  0.5 - 2.0 mmol/L Final    POC Glucose 10/04/2024 124  65 - 140 mg/dl Final    POC Glucose 10/04/2024 168 (H)  65 - 140 mg/dl Final    POC Glucose 10/04/2024 131  65 - 140 mg/dl Final    TSH 3RD GENERATON 10/05/2024 0.957  0.450 - 4.500 uIU/mL Final    The recommended reference ranges for TSH during pregnancy are as follows:   First trimester 0.100 to 2.500 uIU/mL   Second trimester  0.200 to 3.000 uIU/mL   Third trimester 0.300 to 3.000 uIU/m    Note: Normal ranges may not apply to patients who are transgender, non-binary, or whose legal sex, sex at birth, and gender identity differ.  Adult TSH (3rd generation) reference range follows the recommended guidelines of the American [employer] Association, January, 2020.    Protime 10/05/2024 14.3  12.3 - 15.0 seconds Final    INR 10/05/2024 1.04  0.85 - 1.19 Final    WBC 10/05/2024 5.15  4.31 - 10.16 Thousand/uL Final    RBC 10/05/2024 3.27 (L)  3.81 - 5.12 Million/uL Final    Hemoglobin 10/05/2024 9.8 (L)  11.5 - 15.4 g/dL Final    Hematocrit 10/05/2024 29.5 (L)  34.8 - 46.1 % Final    MCV 10/05/2024 90  82 - 98 fL Final    Results verified by repeat    MCH 10/05/2024 30.0  26.8 - 34.3 pg Final    MCHC 10/05/2024 33.2  31.4 - 37.4 g/dL Final    RDW 10/05/2024 12.7  11.6 - 15.1 % Final    MPV 10/05/2024 10.7  8.9 - 12.7 fL Final    Platelets 10/05/2024 210  149 - 390 Thousands/uL Final    nRBC 10/05/2024 0  /100 WBCs Final    Segmented % 10/05/2024 58  43 - 75 % Final    Immature Grans % 10/05/2024 0  0 - 2 % Final    Lymphocytes % 10/05/2024 31  14 - 44 % Final    Monocytes % 10/05/2024 6  4 - 12 % Final    Eosinophils Relative 10/05/2024 4  0 - 6 % Final    Basophils Relative 10/05/2024 1  0 - 1 % Final    Absolute Neutrophils 10/05/2024 2.97  1.85 - 7.62 Thousands/µL Final    Absolute Immature Grans 10/05/2024 0.02  0.00 - 0.20 Thousand/uL Final    Absolute Lymphocytes 10/05/2024 1.61  0.60 - 4.47 Thousands/µL Final     Absolute Monocytes 10/05/2024 0.33  0.17 - 1.22 Thousand/µL Final    Eosinophils Absolute 10/05/2024 0.19  0.00 - 0.61 Thousand/µL Final    Basophils Absolute 10/05/2024 0.03  0.00 - 0.10 Thousands/µL Final    Sodium 10/05/2024 139  135 - 147 mmol/L Final    Potassium 10/05/2024 4.1  3.5 - 5.3 mmol/L Final    Chloride 10/05/2024 106  96 - 108 mmol/L Final    CO2 10/05/2024 28  21 - 32 mmol/L Final    ANION GAP 10/05/2024 5  4 - 13 mmol/L Final    BUN 10/05/2024 20  5 - 25 mg/dL Final    Creatinine 10/05/2024 1.04  0.60 - 1.30 mg/dL Final    Standardized to IDMS reference method    Glucose 10/05/2024 106  65 - 140 mg/dL Final    If the patient is fasting, the ADA then defines impaired fasting glucose as > 100 mg/dL and diabetes as > or equal to 123 mg/dL.    Calcium 10/05/2024 8.7  8.4 - 10.2 mg/dL Final    Corrected Calcium 10/05/2024 9.4  8.3 - 10.1 mg/dL Final    AST 10/05/2024 11 (L)  13 - 39 U/L Final    ALT 10/05/2024 11  7 - 52 U/L Final    Specimen collection should occur prior to Sulfasalazine administration due to the potential for falsely depressed results.     Alkaline Phosphatase 10/05/2024 48  34 - 104 U/L Final    Total Protein 10/05/2024 5.1 (L)  6.4 - 8.4 g/dL Final    Albumin 10/05/2024 3.1 (L)  3.5 - 5.0 g/dL Final    Total Bilirubin 10/05/2024 0.31  0.20 - 1.00 mg/dL Final    Use of this assay is not recommended for patients undergoing treatment with eltrombopag due to the potential for falsely elevated results.  N-acetyl-p-benzoquinone imine (metabolite of Acetaminophen) will generate erroneously low results in samples for patients that have taken an overdose of Acetaminophen.    eGFR 10/05/2024 49  ml/min/1.73sq m Final    POC Glucose 10/05/2024 105  65 - 140 mg/dl Final    POC Glucose 10/05/2024 132  65 - 140 mg/dl Final    POC Glucose 10/05/2024 187 (H)  65 - 140 mg/dl Final   Admission on 09/28/2024, Discharged on 09/30/2024   Component Date Value Ref Range Status    WBC 09/28/2024 7.21   4.31 - 10.16 Thousand/uL Final    RBC 09/28/2024 3.90  3.81 - 5.12 Million/uL Final    Hemoglobin 09/28/2024 11.2 (L)  11.5 - 15.4 g/dL Final    Hematocrit 09/28/2024 35.3  34.8 - 46.1 % Final    MCV 09/28/2024 91  82 - 98 fL Final    MCH 09/28/2024 28.7  26.8 - 34.3 pg Final    MCHC 09/28/2024 31.7  31.4 - 37.4 g/dL Final    RDW 09/28/2024 12.7  11.6 - 15.1 % Final    MPV 09/28/2024 10.4  8.9 - 12.7 fL Final    Platelets 09/28/2024 223  149 - 390 Thousands/uL Final    nRBC 09/28/2024 0  /100 WBCs Final    Segmented % 09/28/2024 65  43 - 75 % Final    Immature Grans % 09/28/2024 0  0 - 2 % Final    Lymphocytes % 09/28/2024 25  14 - 44 % Final    Monocytes % 09/28/2024 6  4 - 12 % Final    Eosinophils Relative 09/28/2024 3  0 - 6 % Final    Basophils Relative 09/28/2024 1  0 - 1 % Final    Absolute Neutrophils 09/28/2024 4.73  1.85 - 7.62 Thousands/µL Final    Absolute Immature Grans 09/28/2024 0.02  0.00 - 0.20 Thousand/uL Final    Absolute Lymphocytes 09/28/2024 1.77  0.60 - 4.47 Thousands/µL Final    Absolute Monocytes 09/28/2024 0.41  0.17 - 1.22 Thousand/µL Final    Eosinophils Absolute 09/28/2024 0.24  0.00 - 0.61 Thousand/µL Final    Basophils Absolute 09/28/2024 0.04  0.00 - 0.10 Thousands/µL Final    Sodium 09/28/2024 136  135 - 147 mmol/L Final    Potassium 09/28/2024 3.9  3.5 - 5.3 mmol/L Final    Chloride 09/28/2024 104  96 - 108 mmol/L Final    CO2 09/28/2024 24  21 - 32 mmol/L Final    ANION GAP 09/28/2024 8  4 - 13 mmol/L Final    BUN 09/28/2024 27 (H)  5 - 25 mg/dL Final    Creatinine 09/28/2024 1.14  0.60 - 1.30 mg/dL Final    Standardized to IDMS reference method    Glucose 09/28/2024 216 (H)  65 - 140 mg/dL Final    If the patient is fasting, the ADA then defines impaired fasting glucose as > 100 mg/dL and diabetes as > or equal to 123 mg/dL.    Calcium 09/28/2024 9.1  8.4 - 10.2 mg/dL Final    AST 09/28/2024 13  13 - 39 U/L Final    ALT 09/28/2024 7  7 - 52 U/L Final    Specimen collection should  occur prior to Sulfasalazine administration due to the potential for falsely depressed results.     Alkaline Phosphatase 09/28/2024 46  34 - 104 U/L Final    Total Protein 09/28/2024 6.0 (L)  6.4 - 8.4 g/dL Final    Albumin 09/28/2024 3.7  3.5 - 5.0 g/dL Final    Total Bilirubin 09/28/2024 0.35  0.20 - 1.00 mg/dL Final    Use of this assay is not recommended for patients undergoing treatment with eltrombopag due to the potential for falsely elevated results.  N-acetyl-p-benzoquinone imine (metabolite of Acetaminophen) will generate erroneously low results in samples for patients that have taken an overdose of Acetaminophen.    eGFR 09/28/2024 43  ml/min/1.73sq m Final    Magnesium 09/28/2024 1.8 (L)  1.9 - 2.7 mg/dL Final    Phosphorus 09/28/2024 3.3  2.3 - 4.1 mg/dL Final    Ventricular Rate 09/29/2024 79  BPM Final    Atrial Rate 09/29/2024 300  BPM Final    QRSD Interval 09/29/2024 78  ms Final    QT Interval 09/29/2024 380  ms Final    QTC Interval 09/29/2024 435  ms Final    QRS Axis 09/29/2024 -10  degrees Final    T Wave [place] 09/29/2024 2  degrees Final    Hemoglobin A1C 09/29/2024 6.7 (H)  Normal 4.0-5.6%; PreDiabetic 5.7-6.4%; Diabetic >=6.5%; Glycemic control for adults with diabetes <7.0% % Final    EAG 09/29/2024 146  mg/dl Final    WBC 09/29/2024 9.50  4.31 - 10.16 Thousand/uL Final    RBC 09/29/2024 3.82  3.81 - 5.12 Million/uL Final    Hemoglobin 09/29/2024 11.0 (L)  11.5 - 15.4 g/dL Final    Hematocrit 09/29/2024 34.7 (L)  34.8 - 46.1 % Final    MCV 09/29/2024 91  82 - 98 fL Final    MCH 09/29/2024 28.8  26.8 - 34.3 pg Final    MCHC 09/29/2024 31.7  31.4 - 37.4 g/dL Final    RDW 09/29/2024 12.8  11.6 - 15.1 % Final    MPV 09/29/2024 10.7  8.9 - 12.7 fL Final    Platelets 09/29/2024 191  149 - 390 Thousands/uL Final    nRBC 09/29/2024 0  /100 WBCs Final    Segmented % 09/29/2024 86 (H)  43 - 75 % Final    Immature Grans % 09/29/2024 0  0 - 2 % Final    Lymphocytes % 09/29/2024 8 (L)  14 - 44 % Final     Monocytes % 09/29/2024 5  4 - 12 % Final    Eosinophils Relative 09/29/2024 1  0 - 6 % Final    Basophils Relative 09/29/2024 0  0 - 1 % Final    Absolute Neutrophils 09/29/2024 8.04 (H)  1.85 - 7.62 Thousands/µL Final    Absolute Immature Grans 09/29/2024 0.04  0.00 - 0.20 Thousand/uL Final    Absolute Lymphocytes 09/29/2024 0.79  0.60 - 4.47 Thousands/µL Final    Absolute Monocytes 09/29/2024 0.47  0.17 - 1.22 Thousand/µL Final    Eosinophils Absolute 09/29/2024 0.13  0.00 - 0.61 Thousand/µL Final    Basophils Absolute 09/29/2024 0.03  0.00 - 0.10 Thousands/µL Final    Sodium 09/29/2024 140  135 - 147 mmol/L Final    Potassium 09/29/2024 3.4 (L)  3.5 - 5.3 mmol/L Final    Chloride 09/29/2024 107  96 - 108 mmol/L Final    CO2 09/29/2024 24  21 - 32 mmol/L Final    ANION GAP 09/29/2024 9  4 - 13 mmol/L Final    BUN 09/29/2024 24  5 - 25 mg/dL Final    Creatinine 09/29/2024 0.98  0.60 - 1.30 mg/dL Final    Standardized to IDMS reference method    Glucose 09/29/2024 137  65 - 140 mg/dL Final    If the patient is fasting, the ADA then defines impaired fasting glucose as > 100 mg/dL and diabetes as > or equal to 123 mg/dL.    Calcium 09/29/2024 8.6  8.4 - 10.2 mg/dL Final    eGFR 09/29/2024 52  ml/min/1.73sq m Final    Magnesium 09/29/2024 2.1  1.9 - 2.7 mg/dL Final    POC Glucose 09/29/2024 138  65 - 140 mg/dl Final    POC Glucose 09/29/2024 165 (H)  65 - 140 mg/dl Final    POC Glucose 09/29/2024 169 (H)  65 - 140 mg/dl Final    POC Glucose 09/29/2024 113  65 - 140 mg/dl Final    WBC 09/30/2024 6.30  4.31 - 10.16 Thousand/uL Final    RBC 09/30/2024 3.78 (L)  3.81 - 5.12 Million/uL Final    Hemoglobin 09/30/2024 10.7 (L)  11.5 - 15.4 g/dL Final    Hematocrit 09/30/2024 34.0 (L)  34.8 - 46.1 % Final    MCV 09/30/2024 90  82 - 98 fL Final    MCH 09/30/2024 28.3  26.8 - 34.3 pg Final    MCHC 09/30/2024 31.5  31.4 - 37.4 g/dL Final    RDW 09/30/2024 12.9  11.6 - 15.1 % Final    Platelets 09/30/2024 171  149 - 390  Thousands/uL Final    MPV 09/30/2024 10.6  8.9 - 12.7 fL Final    Sodium 09/30/2024 138  135 - 147 mmol/L Final    Potassium 09/30/2024 4.1  3.5 - 5.3 mmol/L Final    Chloride 09/30/2024 107  96 - 108 mmol/L Final    CO2 09/30/2024 24  21 - 32 mmol/L Final    ANION GAP 09/30/2024 7  4 - 13 mmol/L Final    BUN 09/30/2024 19  5 - 25 mg/dL Final    Creatinine 09/30/2024 0.96  0.60 - 1.30 mg/dL Final    Standardized to IDMS reference method    Glucose 09/30/2024 126  65 - 140 mg/dL Final    If the patient is fasting, the ADA then defines impaired fasting glucose as > 100 mg/dL and diabetes as > or equal to 123 mg/dL.    Calcium 09/30/2024 8.9  8.4 - 10.2 mg/dL Final    eGFR 09/30/2024 54  ml/min/1.73sq m Final    POC Glucose 09/30/2024 121  65 - 140 mg/dl Final    POC Glucose 09/30/2024 140  65 - 140 mg/dl Final   Admission on 09/23/2024, Discharged on 09/23/2024   Component Date Value Ref Range Status    Ventricular Rate 09/23/2024 85  BPM Final    Atrial Rate 09/23/2024 85  BPM Final    [place] Interval 09/23/2024 160  ms Final    QRSD Interval 09/23/2024 72  ms Final    QT Interval 09/23/2024 348  ms Final    QTC Interval 09/23/2024 414  ms Final    P Axis 09/23/2024 39  degrees Final    QRS Axis 09/23/2024 -2  degrees Final    T Wave [place] 09/23/2024 13  degrees Final    WBC 09/23/2024 6.14  4.31 - 10.16 Thousand/uL Final    RBC 09/23/2024 4.21  3.81 - 5.12 Million/uL Final    Hemoglobin 09/23/2024 12.3  11.5 - 15.4 g/dL Final    Hematocrit 09/23/2024 37.8  34.8 - 46.1 % Final    MCV 09/23/2024 90  82 - 98 fL Final    MCH 09/23/2024 29.2  26.8 - 34.3 pg Final    MCHC 09/23/2024 32.5  31.4 - 37.4 g/dL Final    RDW 09/23/2024 12.7  11.6 - 15.1 % Final    MPV 09/23/2024 10.7  8.9 - 12.7 fL Final    Platelets 09/23/2024 197  149 - 390 Thousands/uL Final    nRBC 09/23/2024 0  /100 WBCs Final    Segmented % 09/23/2024 66  43 - 75 % Final    Immature Grans % 09/23/2024 0  0 - 2 % Final    Lymphocytes % 09/23/2024 24  14 - 44 %  Final    Monocytes % 09/23/2024 6  4 - 12 % Final    Eosinophils Relative 09/23/2024 3  0 - 6 % Final    Basophils Relative 09/23/2024 1  0 - 1 % Final    Absolute Neutrophils 09/23/2024 4.11  1.85 - 7.62 Thousands/µL Final    Absolute Immature Grans 09/23/2024 0.02  0.00 - 0.20 Thousand/uL Final    Absolute Lymphocytes 09/23/2024 1.44  0.60 - 4.47 Thousands/µL Final    Absolute Monocytes 09/23/2024 0.35  0.17 - 1.22 Thousand/µL Final    Eosinophils Absolute 09/23/2024 0.17  0.00 - 0.61 Thousand/µL Final    Basophils Absolute 09/23/2024 0.05  0.00 - 0.10 Thousands/µL Final    Sodium 09/23/2024 137  135 - 147 mmol/L Final    Potassium 09/23/2024 5.0  3.5 - 5.3 mmol/L Final    Moderately Hemolyzed:Results may be affected.    Chloride 09/23/2024 104  96 - 108 mmol/L Final    CO2 09/23/2024 25  21 - 32 mmol/L Final    ANION GAP 09/23/2024 8  4 - 13 mmol/L Final    BUN 09/23/2024 37 (H)  5 - 25 mg/dL Final    Creatinine 09/23/2024 1.18  0.60 - 1.30 mg/dL Final    Standardized to IDMS reference method    Glucose 09/23/2024 109  65 - 140 mg/dL Final    If the patient is fasting, the ADA then defines impaired fasting glucose as > 100 mg/dL and diabetes as > or equal to 123 mg/dL.    Calcium 09/23/2024 9.6  8.4 - 10.2 mg/dL Final    eGFR 09/23/2024 42  ml/min/1.73sq m Final   Admission on 09/15/2024, Discharged on 09/15/2024   Component Date Value Ref Range Status    Ventricular Rate 09/15/2024 84  BPM Final    Atrial Rate 09/15/2024 84  BPM Final    [place] Interval 09/15/2024 162  ms Final    QRSD Interval 09/15/2024 68  ms Final    QT Interval 09/15/2024 348  ms Final    QTC Interval 09/15/2024 411  ms Final    P Axis 09/15/2024 55  degrees Final    QRS Axis 09/15/2024 6  degrees Final    T Wave [place] 09/15/2024 39  degrees Final    WBC 09/15/2024 7.61  4.31 - 10.16 Thousand/uL Final    RBC 09/15/2024 4.13  3.81 - 5.12 Million/uL Final    Hemoglobin 09/15/2024 11.8  11.5 - 15.4 g/dL Final    Hematocrit 09/15/2024 37.1  34.8 -  46.1 % Final    MCV 09/15/2024 90  82 - 98 fL Final    MCH 09/15/2024 28.6  26.8 - 34.3 pg Final    MCHC 09/15/2024 31.8  31.4 - 37.4 g/dL Final    RDW 09/15/2024 12.8  11.6 - 15.1 % Final    MPV 09/15/2024 10.9  8.9 - 12.7 fL Final    Platelets 09/15/2024 218  149 - 390 Thousands/uL Final    nRBC 09/15/2024 0  /100 WBCs Final    Segmented % 09/15/2024 73  43 - 75 % Final    Immature Grans % 09/15/2024 0  0 - 2 % Final    Lymphocytes % 09/15/2024 19  14 - 44 % Final    Monocytes % 09/15/2024 5  4 - 12 % Final    Eosinophils Relative 09/15/2024 2  0 - 6 % Final    Basophils Relative 09/15/2024 1  0 - 1 % Final    Absolute Neutrophils 09/15/2024 5.59  1.85 - 7.62 Thousands/µL Final    Absolute Immature Grans 09/15/2024 0.02  0.00 - 0.20 Thousand/uL Final    Absolute Lymphocytes 09/15/2024 1.41  0.60 - 4.47 Thousands/µL Final    Absolute Monocytes 09/15/2024 0.40  0.17 - 1.22 Thousand/µL Final    Eosinophils Absolute 09/15/2024 0.14  0.00 - 0.61 Thousand/µL Final    Basophils Absolute 09/15/2024 0.05  0.00 - 0.10 Thousands/µL Final    Sodium 09/15/2024 136  135 - 147 mmol/L Final    Potassium 09/15/2024 4.0  3.5 - 5.3 mmol/L Final    Chloride 09/15/2024 101  96 - 108 mmol/L Final    CO2 09/15/2024 25  21 - 32 mmol/L Final    ANION GAP 09/15/2024 10  4 - 13 mmol/L Final    BUN 09/15/2024 27 (H)  5 - 25 mg/dL Final    Creatinine 09/15/2024 1.05  0.60 - 1.30 mg/dL Final    Standardized to IDMS reference method    Glucose 09/15/2024 105  65 - 140 mg/dL Final    If the patient is fasting, the ADA then defines impaired fasting glucose as > 100 mg/dL and diabetes as > or equal to 123 mg/dL.    Calcium 09/15/2024 9.6  8.4 - 10.2 mg/dL Final    AST 09/15/2024 15  13 - 39 U/L Final    ALT 09/15/2024 9  7 - 52 U/L Final    Specimen collection should occur prior to Sulfasalazine administration due to the potential for falsely depressed results.     Alkaline Phosphatase 09/15/2024 49  34 - 104 U/L Final    Total Protein 09/15/2024  "6.7  6.4 - 8.4 g/dL Final    Albumin 09/15/2024 4.1  3.5 - 5.0 g/dL Final    Total Bilirubin 09/15/2024 0.47  0.20 - 1.00 mg/dL Final    Use of this assay is not recommended for patients undergoing treatment with eltrombopag due to the potential for falsely elevated results.  N-acetyl-p-benzoquinone imine (metabolite of Acetaminophen) will generate erroneously low results in samples for patients that have taken an overdose of Acetaminophen.    eGFR 09/15/2024 48  ml/min/1.73sq m Final    Lipase 09/15/2024 45  11 - 82 u/L Final    hs TnI 0hr 09/15/2024 17  \"Refer to ACS Flowchart\"- see link ng/L Final    Comment:                                              Initial (time 0) result  If >=50 ng/L, Myocardial injury suggested ;  Type of myocardial injury and treatment strategy  to be determined.  If 5-49 ng/L, a delta result at 2 hours and or 4 hours will be needed to further evaluate.  If <4 ng/L, and chest pain has been >3 hours since onset, patient may qualify for discharge based on the HEART score in the ED.  If <5 ng/L and <3hours since onset of chest pain, a delta result at 2 hours will be needed to further evaluate.    HS Troponin 99th Percentile URL of a Health Population=12 ng/L with a 95% Confidence Interval of 8-18 ng/L.    Second Troponin (time 2 hours)  If calculated delta >= 20 ng/L,  Myocardial injury suggested ; Type of myocardial injury and treatment strategy to be determined.  If 5-49 ng/L and the calculated delta is 5-19 ng/L, consult medical service for evaluation.  Continue evaluation for ischemia on ecg and other possible etiology and repeat hs troponin at 4 hours.  If delta                            is <5 ng/L at 2 hours, consider discharge based on risk stratification via the HEART score (if in ED), or [place] risk score in IP/Observation.    HS Troponin 99th Percentile URL of a Health Population=12 ng/L with a 95% Confidence Interval of 8-18 ng/L.    Color, UA 09/15/2024 Light Yellow   Final    " "Clarity, UA 09/15/2024 Clear   Final    Specific Gravity, UA 09/15/2024 1.019  1.003 - 1.030 Final    pH, UA 09/15/2024 5.0  4.5, 5.0, 5.5, 6.0, 6.5, 7.0, 7.5, 8.0 Final    Leukocytes, UA 09/15/2024 Negative  Negative Final    Nitrite, UA 09/15/2024 Negative  Negative Final    Protein, UA 09/15/2024 Negative  Negative mg/dl Final    Glucose, UA 09/15/2024 Negative  Negative mg/dl Final    Ketones, UA 09/15/2024 Negative  Negative mg/dl Final    Urobilinogen, UA 09/15/2024 <2.0  <2.0 mg/dl mg/dl Final    Bilirubin, UA 09/15/2024 Negative  Negative Final    Occult Blood, UA 09/15/2024 Small (A)  Negative Final    hs TnI 2hr 09/15/2024 14  \"Refer to ACS Flowchart\"- see link ng/L Final    Comment:                                              Initial (time 0) result  If >=50 ng/L, Myocardial injury suggested ;  Type of myocardial injury and treatment strategy  to be determined.  If 5-49 ng/L, a delta result at 2 hours and or 4 hours will be needed to further evaluate.  If <4 ng/L, and chest pain has been >3 hours since onset, patient may qualify for discharge based on the HEART score in the ED.  If <5 ng/L and <3hours since onset of chest pain, a delta result at 2 hours will be needed to further evaluate.    HS Troponin 99th Percentile URL of a Health Population=12 ng/L with a 95% Confidence Interval of 8-18 ng/L.    Second Troponin (time 2 hours)  If calculated delta >= 20 ng/L,  Myocardial injury suggested ; Type of myocardial injury and treatment strategy to be determined.  If 5-49 ng/L and the calculated delta is 5-19 ng/L, consult medical service for evaluation.  Continue evaluation for ischemia on ecg and other possible etiology and repeat hs troponin at 4 hours.  If delta                            is <5 ng/L at 2 hours, consider discharge based on risk stratification via the HEART score (if in ED), or [place] risk score in IP/Observation.    HS Troponin 99th Percentile URL of a Health Population=12 ng/L with a 95% " Confidence Interval of 8-18 ng/L.    Delta 2hr hsTnI 09/15/2024 -3  <20 ng/L Final    RBC, UA 09/15/2024 10-20 (A)  None Seen, 1-2 /hpf Final    WBC, UA 09/15/2024 None Seen  None Seen, 1-2 /hpf Final    Epithelial Cells 09/15/2024 Occasional  None Seen, Occasional /hpf Final    Bacteria, UA 09/15/2024 None Seen  None Seen, Occasional /hpf Final   Admission on 08/28/2024, Discharged on 09/04/2024   Component Date Value Ref Range Status    Sodium 08/30/2024 136  135 - 147 mmol/L Final    Potassium 08/30/2024 3.8  3.5 - 5.3 mmol/L Final    Chloride 08/30/2024 100  96 - 108 mmol/L Final    CO2 08/30/2024 26  21 - 32 mmol/L Final    ANION GAP 08/30/2024 10  4 - 13 mmol/L Final    BUN 08/30/2024 25  5 - 25 mg/dL Final    Creatinine 08/30/2024 0.98  0.60 - 1.30 mg/dL Final    Standardized to IDMS reference method    Glucose 08/30/2024 136  65 - 140 mg/dL Final    If the patient is fasting, the ADA then defines impaired fasting glucose as > 100 mg/dL and diabetes as > or equal to 123 mg/dL.    Glucose, Fasting 08/30/2024 136 (H)  65 - 99 mg/dL Final    Calcium 08/30/2024 9.3  8.4 - 10.2 mg/dL Final    AST 08/30/2024 12 (L)  13 - 39 U/L Final    ALT 08/30/2024 6 (L)  7 - 52 U/L Final    Specimen collection should occur prior to Sulfasalazine administration due to the potential for falsely depressed results.     Alkaline Phosphatase 08/30/2024 43  34 - 104 U/L Final    Total Protein 08/30/2024 6.1 (L)  6.4 - 8.4 g/dL Final    Albumin 08/30/2024 3.8  3.5 - 5.0 g/dL Final    Total Bilirubin 08/30/2024 0.53  0.20 - 1.00 mg/dL Final    Use of this assay is not recommended for patients undergoing treatment with eltrombopag due to the potential for falsely elevated results.  N-acetyl-p-benzoquinone imine (metabolite of Acetaminophen) will generate erroneously low results in samples for patients that have taken an overdose of Acetaminophen.    eGFR 08/30/2024 52  ml/min/1.73sq m Final    Magnesium 08/30/2024 2.1  1.9 - 2.7 mg/dL  Final    Phosphorus 08/30/2024 4.0  2.3 - 4.1 mg/dL Final    WBC 08/30/2024 5.82  4.31 - 10.16 Thousand/uL Final    RBC 08/30/2024 3.77 (L)  3.81 - 5.12 Million/uL Final    Hemoglobin 08/30/2024 11.0 (L)  11.5 - 15.4 g/dL Final    Hematocrit 08/30/2024 34.4 (L)  34.8 - 46.1 % Final    MCV 08/30/2024 91  82 - 98 fL Final    MCH 08/30/2024 29.2  26.8 - 34.3 pg Final    MCHC 08/30/2024 32.0  31.4 - 37.4 g/dL Final    RDW 08/30/2024 13.1  11.6 - 15.1 % Final    MPV 08/30/2024 10.9  8.9 - 12.7 fL Final    Platelets 08/30/2024 207  149 - 390 Thousands/uL Final    nRBC 08/30/2024 0  /100 WBCs Final    Segmented % 08/30/2024 68  43 - 75 % Final    Immature Grans % 08/30/2024 0  0 - 2 % Final    Lymphocytes % 08/30/2024 22  14 - 44 % Final    Monocytes % 08/30/2024 7  4 - 12 % Final    Eosinophils Relative 08/30/2024 2  0 - 6 % Final    Basophils Relative 08/30/2024 1  0 - 1 % Final    Absolute Neutrophils 08/30/2024 3.95  1.85 - 7.62 Thousands/µL Final    Absolute Immature Grans 08/30/2024 0.01  0.00 - 0.20 Thousand/uL Final    Absolute Lymphocytes 08/30/2024 1.26  0.60 - 4.47 Thousands/µL Final    Absolute Monocytes 08/30/2024 0.43  0.17 - 1.22 Thousand/µL Final    Eosinophils Absolute 08/30/2024 0.14  0.00 - 0.61 Thousand/µL Final    Basophils Absolute 08/30/2024 0.03  0.00 - 0.10 Thousands/µL Final    TSH 3RD GENERATON 08/30/2024 1.191  0.450 - 4.500 uIU/mL Final    The recommended reference ranges for TSH during pregnancy are as follows:   First trimester 0.100 to 2.500 uIU/mL   Second trimester  0.200 to 3.000 uIU/mL   Third trimester 0.300 to 3.000 uIU/m    Note: Normal ranges may not apply to patients who are transgender, non-binary, or whose legal sex, sex at birth, and gender identity differ.  Adult TSH (3rd generation) reference range follows the recommended guidelines of the American Thyroid Association, January, 2020.    Vitamin B-12 08/30/2024 437  180 - 914 pg/mL Final    Folate 08/30/2024 >22.3  >5.9 ng/mL  Final    The World Health Organization has determined deficient folate concentrations are considered to be <4.0 ng/mL.    Vit D, 25-Hydroxy 08/30/2024 40.4  30.0 - 100.0 ng/mL Final    Vitamin D guidelines established by Clinical Guidelines Subcommittee  of the Endocrine Society Task Force, 2011    Deficiency <20ng/ml   Insufficiency 20-30ng/ml   Sufficient  ng/ml     Iron Saturation 08/30/2024 23  15 - 50 % Final    TIBC 08/30/2024 257  250 - 450 ug/dL Final    Iron 08/30/2024 58  50 - 212 ug/dL Final    Patients treated with metal-binding drugs (ie. Deferoxamine) may have depressed iron values.    UIBC 08/30/2024 199  155 - 355 ug/dL Final    Ferritin 08/30/2024 29  11 - 307 ng/mL Final    Syphilis Total Antibody 08/30/2024 Non-reactive  Non-Reactive Final    No serological evidence of infection with T. pallidum.  Early or incubating syphilis infection cannot be excluded.  Consider repeat testing based on clinical suspicion.    HIV-1 p24 Antigen 08/30/2024 Non-Reactive  Non-Reactive Final    HIV-1 Antibody 08/30/2024 Non-Reactive  Non-Reactive Final    HIV-2 Antibody 08/30/2024 Non-Reactive  Non-Reactive Final    HIV Ag-Ab 5th Gen 08/30/2024 Non-Reactive  Non-Reactive Final    A Non-Reactive test result does not preclude the possibility of exposure or infection with HIV-1 and/or HIV-2.  Non-Reactive results can occur if the quantity of marker present is below the detection limits or is not present during the stage of disease in which a sample is collected. Repeat testing should be considered where there is clinical suspicion of infection.       Ventricular Rate 08/30/2024 83  BPM Final    Atrial Rate 08/30/2024 83  BPM Final    [place] Interval 08/30/2024 170  ms Final    QRSD Interval 08/30/2024 74  ms Final    QT Interval 08/30/2024 370  ms Final    QTC Interval 08/30/2024 434  ms Final    P Axis 08/30/2024 61  degrees Final    QRS [place] 08/30/2024 3  degrees Final    T Wave [place] 08/30/2024 51  degrees Final    Admission on 08/27/2024, Discharged on 08/28/2024   Component Date Value Ref Range Status    WBC 08/27/2024 6.15  4.31 - 10.16 Thousand/uL Final    RBC 08/27/2024 3.75 (L)  3.81 - 5.12 Million/uL Final    Hemoglobin 08/27/2024 11.0 (L)  11.5 - 15.4 g/dL Final    Hematocrit 08/27/2024 33.5 (L)  34.8 - 46.1 % Final    MCV 08/27/2024 89  82 - 98 fL Final    MCH 08/27/2024 29.3  26.8 - 34.3 pg Final    MCHC 08/27/2024 32.8  31.4 - 37.4 g/dL Final    RDW 08/27/2024 13.2  11.6 - 15.1 % Final    MPV 08/27/2024 10.2  8.9 - 12.7 fL Final    Platelets 08/27/2024 207  149 - 390 Thousands/uL Final    nRBC 08/27/2024 0  /100 WBCs Final    Segmented % 08/27/2024 66  43 - 75 % Final    Immature Grans % 08/27/2024 0  0 - 2 % Final    Lymphocytes % 08/27/2024 24  14 - 44 % Final    Monocytes % 08/27/2024 7  4 - 12 % Final    Eosinophils Relative 08/27/2024 2  0 - 6 % Final    Basophils Relative 08/27/2024 1  0 - 1 % Final    Absolute Neutrophils 08/27/2024 4.08  1.85 - 7.62 Thousands/µL Final    Absolute Immature Grans 08/27/2024 0.02  0.00 - 0.20 Thousand/uL Final    Absolute Lymphocytes 08/27/2024 1.46  0.60 - 4.47 Thousands/µL Final    Absolute Monocytes 08/27/2024 0.43  0.17 - 1.22 Thousand/µL Final    Eosinophils Absolute 08/27/2024 0.12  0.00 - 0.61 Thousand/µL Final    Basophils Absolute 08/27/2024 0.04  0.00 - 0.10 Thousands/µL Final    Sodium 08/27/2024 136  135 - 147 mmol/L Final    Potassium 08/27/2024 3.9  3.5 - 5.3 mmol/L Final    Chloride 08/27/2024 102  96 - 108 mmol/L Final    CO2 08/27/2024 27  21 - 32 mmol/L Final    ANION GAP 08/27/2024 7  4 - 13 mmol/L Final    BUN 08/27/2024 26 (H)  5 - 25 mg/dL Final    Creatinine 08/27/2024 1.05  0.60 - 1.30 mg/dL Final    Standardized to IDMS reference method    Glucose 08/27/2024 122  65 - 140 mg/dL Final    If the patient is fasting, the ADA then defines impaired fasting glucose as > 100 mg/dL and diabetes as > or equal to 123 mg/dL.    Calcium 08/27/2024 9.4  8.4 - 10.2  mg/dL Final    AST 08/27/2024 12 (L)  13 - 39 U/L Final    ALT 08/27/2024 7  7 - 52 U/L Final    Specimen collection should occur prior to Sulfasalazine administration due to the potential for falsely depressed results.     Alkaline Phosphatase 08/27/2024 50  34 - 104 U/L Final    Total Protein 08/27/2024 6.4  6.4 - 8.4 g/dL Final    Albumin 08/27/2024 4.0  3.5 - 5.0 g/dL Final    Total Bilirubin 08/27/2024 0.48  0.20 - 1.00 mg/dL Final    Use of this assay is not recommended for patients undergoing treatment with eltrombopag due to the potential for falsely elevated results.  N-acetyl-p-benzoquinone imine (metabolite of Acetaminophen) will generate erroneously low results in samples for patients that have taken an overdose of Acetaminophen.    eGFR 08/27/2024 48  ml/min/1.73sq m Final    TSH 3RD GENERATON 08/27/2024 1.408  0.450 - 4.500 uIU/mL Final    The recommended reference ranges for TSH during pregnancy are as follows:   First trimester 0.100 to 2.500 uIU/mL   Second trimester  0.200 to 3.000 uIU/mL   Third trimester 0.300 to 3.000 uIU/m    Note: Normal ranges may not apply to patients who are transgender, non-binary, or whose legal sex, sex at birth, and gender identity differ.  Adult TSH (3rd generation) reference range follows the recommended guidelines of the American Thyroid Association, January, 2020.    Amph/Meth UR 08/27/2024 Negative  Negative Final    Barbiturate Ur 08/27/2024 Negative  Negative Final    Benzodiazepine Urine 08/27/2024 Negative  Negative Final    Cocaine Urine 08/27/2024 Negative  Negative Final    Methadone Urine 08/27/2024 Negative  Negative Final    Opiate Urine 08/27/2024 Negative  Negative Final    PCP Ur 08/27/2024 Negative  Negative Final    THC Urine 08/27/2024 Negative  Negative Final    Oxycodone Urine 08/27/2024 Negative  Negative Final    Fentanyl Urine 08/27/2024 Negative  Negative Final    HYDROCODONE URINE 08/27/2024 Negative  Negative Final    EXTBreath Alcohol  08/27/2024 0.000   Final    Color, UA 08/27/2024 Colorless   Final    Clarity, UA 08/27/2024 Clear   Final    Specific Gravity, UA 08/27/2024 1.008  1.003 - 1.030 Final    pH, UA 08/27/2024 5.0  4.5, 5.0, 5.5, 6.0, 6.5, 7.0, 7.5, 8.0 Final    Leukocytes, UA 08/27/2024 Negative  Negative Final    Nitrite, UA 08/27/2024 Negative  Negative Final    Protein, UA 08/27/2024 Negative  Negative mg/dl Final    Glucose, UA 08/27/2024 Negative  Negative mg/dl Final    Ketones, UA 08/27/2024 Negative  Negative mg/dl Final    Urobilinogen, UA 08/27/2024 <2.0  <2.0 mg/dl mg/dl Final    Bilirubin, UA 08/27/2024 Negative  Negative Final    Occult Blood, UA 08/27/2024 Negative  Negative Final    Ventricular Rate 08/27/2024 77  BPM Final    Atrial Rate 08/27/2024 77  BPM Final    TX Interval 08/27/2024 166  ms Final    QRSD Interval 08/27/2024 74  ms Final    QT Interval 08/27/2024 358  ms Final    QTC Interval 08/27/2024 405  ms Final    P Axis 08/27/2024 50  degrees Final    QRS Axis 08/27/2024 -1  degrees Final    T Wave Axis 08/27/2024 23  degrees Final             ___________________________________    History Review: The following portions of the patient's history were reviewed and updated as appropriate: allergies, current medications, past family history, past medical history, past social history, past surgical history, and problem list.    Unchanged information from this writer's previous assessment is copied and italicized; information that has changed is bolded.    Past Psychiatric History:      Past psychiatric diagnoses:   Depression, anxiety  Inpatient psychiatric admissions:   8/2024 2013 following a surgery, sounded like extreme anxiety  Prior outpatient psychiatric treatment:   Started seeing a psychiatrist when first   Past/current psychotherapy:   active  History of suicidal attempts/gestures:   denies  Psychotropic medication trials:   Venlafaxine, sertraline, fluoxetine, escitalopram, aripiprazole  (dizziness), risperidone, clonazepam, buspirone, gabapentin        Substance Abuse History:     Denies all history of substance use     Family Psychiatric History:      Family History             Family History   Problem Relation Age of Onset    Depression Mother           w/anxiety    Diabetes Mother           Mellitus    Breast cancer Mother      Hypertension Mother      No Known Problems Father      Depression Sister           w/anxiety    Heart disease Sister           Cardiac Disorder    Breast cancer Sister      Hypertension Sister      Diabetes Brother           Mellitus    Alcohol abuse Son                    Social History:     Developmental: nothing of note  Education: Bachelor degree  Marital history:  in 2006  Children: 2  Living arrangement, social support: as above, has been with daughter for 17 years  Occupational History: was a   [place] Affiliation: has a [employer] ghanshyam  Access to firearms: denies        Traumatic History:      denies.  ___________________________________      Visit Time    Visit Start Time: 1000  Visit Stop Time: 1130  Total Visit Duration:  90 minutes    López Gutierrez MD   10/18/24

## 2024-10-18 NOTE — ASSESSMENT & PLAN NOTE
Patient continues to exhibit somatizing and obsessional symptoms with distress and daughter exhibits distress with these symptoms as well. Concerning emesis, this is likely due to a combination of medication-induced GI distress and somatic symptoms. Vortioxetine is known to cause GI distress. In addition to this , they report that vortioxetine is cost prohibitive. For this reason it is determined that switching to another maintenance agent is preferable. She had been on escitalopram for multiple years in the past with good response and was discontinued some years ago (specifics are no available in record) however seem to suggest due to loss of efficacy. Given knowledge of previous toleration, this will be retrialled.     Discussion of risks and benefits of benzodiazepine therapy for severe anxiety is discussed. The patient had been on clonazepam for multiple years and was recently stopped about three months ago. At this time it is determined that an addition of as needed anxiolytic for severe distress would benefit the patient. There is either her daughter or caretaker present at all times. Lorazepam is chosen as given her age and reduced pharmacokinetics, a medication with a longer half-life risks building plasma level. At this time, planning to use this temporarily until the patient is stable on a therapeutic dose of escitalopram (8-12 weeks).    Discontinue vortioxetine  Begin escitalopram 2.5 mg QD for 7 days then increased to 5 mg QD for anxiety and depressive symptoms  Begin lorazepam 0.5 mg BID PRN for severe anxiety  Discussed with patient the risks of sedation, respiratory depression, impairment in judgment and motor function. Depression, mood changes, GI changes, and others discussed.             Continue trazodone 25-50 mg QHS PRN for sleep  Do not take both trazodone and lorazepam together at night  Continue psychotherapy    Orders:    traZODone (DESYREL) 50 mg tablet; Take 0.5-1 tablets (25-50 mg  total) by mouth daily at bedtime as needed for sleep Do not take if also taking lorazepam    escitalopram (LEXAPRO) 5 mg tablet; Take 0.5 tablets (2.5 mg total) by mouth daily for 7 days, THEN 1 tablet (5 mg total) daily for 21 days.    LORazepam (Ativan) 0.5 mg tablet; Take 1 tablet (0.5 mg total) by mouth 2 (two) times a day as needed (severe anxiety) for up to 10 days Do not take at night if also taking trazodone

## 2024-10-21 ENCOUNTER — EVALUATION (OUTPATIENT)
Dept: PHYSICAL THERAPY | Facility: CLINIC | Age: 84
End: 2024-10-21
Payer: MEDICARE

## 2024-10-21 DIAGNOSIS — R26.2 AMBULATORY DYSFUNCTION: ICD-10-CM

## 2024-10-21 DIAGNOSIS — R26.89 BALANCE DISORDER: Primary | ICD-10-CM

## 2024-10-21 DIAGNOSIS — R53.1 GENERALIZED WEAKNESS: ICD-10-CM

## 2024-10-21 PROCEDURE — 97110 THERAPEUTIC EXERCISES: CPT | Performed by: PHYSICAL THERAPIST

## 2024-10-21 PROCEDURE — 97161 PT EVAL LOW COMPLEX 20 MIN: CPT | Performed by: PHYSICAL THERAPIST

## 2024-10-21 NOTE — PROGRESS NOTES
PT Evaluation     Today's date: 10/21/2024  Patient name: Sandra Peña  : 1940  MRN: [id number]  Referring provider: Jose Canada,*  Dx:   Encounter Diagnosis     ICD-10-CM    1. Balance disorder  R26.89       2. Generalized weakness  R53.1 Ambulatory Referral to Speech Therapy                     Assessment  Impairments: activity intolerance, impaired balance, impaired physical strength, lacks appropriate home exercise program and safety issue    Assessment details: Sandra Peña is a 83 y.o. female who presents with chronic gait and balance dysfunction. Patient's current impairments include decreased LE strength and poor balance on uneven surfaces as well as with EC. Functionally, patient is limited in her ability to confidently and safely ambulate in the community. Patient would benefit from skilled physical therapy to address the impairments, improve their level of function, and to improve their overall quality of life.    Understanding of Dx/Px/POC: good     Prognosis: fair    Goals  Short Term Goals 2-4 wks   1. Pt will be independent with initial HEP   2. Pt will decrease TUG score by 3 seconds   3. Pt will improve deficient mm strength by 1/2 grade      Long Term Goals 6-8 wks  1. Pt will increase 2MWT by >50 feet   2. Pt will decrease 5xSTS by 5-7 seconds   3. Pt will improve FOTO score to greater than expected by d/c      Plan    Planned therapy interventions: manual therapy, therapeutic activities, therapeutic exercise, home exercise program, balance and balance/weight bearing training    Frequency: 2x week  Duration in weeks: 6  Plan of Care beginning date: 10/21/2024  Plan of Care expiration date: 2024  Treatment plan discussed with: patient and family      Subjective Evaluation    History of Present Illness  Mechanism of injury: History: Pt presents for hx of falls as well as decrease endurance and generalized weakness. She does not currently use a cane. Lives with her  daughter, Janay.     Pt lives in 2 story house and only goes up/down them once a day and isn't home alone. She is most hesitant with walking on uneven surfaces.     Difficulty with: getting in/out of the car, walking outside of the house, getting in/out of bed,   Sleeping: always has a little difficulty but no pain   Social support: lives with Janay ro  Hobbies/occupation: retired teacher   No regular exercise.   Imaging: none     Goals: be able to visit her son at his new house   Pain  No pain reported          Objective  Left Hip   Planes of Motion   Flexion: 4-  Extension: 4  Abduction: 4  Adduction: 4+     Right Hip   Planes of Motion   Flexion: 4-  Extension: 4  Abduction: 4  Adduction: 4+     Left Knee   Flexion: 4  Extension: 4     Right Knee   Flexion: 4  Extension: 4     Left Ankle/Foot   Dorsiflexion: 4  Plantar flexion: 4     Right Ankle/Foot   Dorsiflexion: 4  Plantar flexion: 4        Balance Testing   Modified Clinical Test of Sensory Interaction of Balance  (MCTSIB) (seconds): IE:  Firm EO: 30s min sway  Firm EC:  10s  Foam EO: 17s mod-max sway   Foam EC: scomplete              Mobility & Endurance Testing   5x Sit to Stand (seconds)  >15 seconds greater risk for falls (elderly)     MCID: 2.3 seconds IE: 21s, no use of hands    Timed Up-and-Go (seconds)  >13.5 seconds greater risk for falls    IE: 23   2 Minute Walk Test    IE: 150 ft                 Precautions: hx of falls, anxiety     POC: 10/21-12/2    Authorization: [facility]    1:1 with PT: 1115-12   Visit Count 1 2 3 4 5   Date:  10/21       Manuals                                Neuro Re-Ed        Side step HEP       Standing hip abd HEP       Standing hip ext HEP       Blaze pod focused reaction         Blaze Pod [employer] [place] Challenge (firm surface and Airex)        Tandem walk        Side step        Jhony stepovers        EO/EC Firm  NV       EO/EC foam  NV              There Ex         Active warm up  NuStep      LAQ HEP       Seated  marching HEP       Standing 3 way hip                                                Ther Act             Sit to stand        Step ups                                                             Education

## 2024-10-28 ENCOUNTER — TELEPHONE (OUTPATIENT)
Dept: BEHAVIORAL/MENTAL HEALTH CLINIC | Facility: CLINIC | Age: 84
End: 2024-10-28

## 2024-10-28 ENCOUNTER — APPOINTMENT (OUTPATIENT)
Dept: PHYSICAL THERAPY | Facility: CLINIC | Age: 84
End: 2024-10-28
Payer: MEDICARE

## 2024-10-28 ENCOUNTER — TELEMEDICINE (OUTPATIENT)
Dept: PSYCHIATRY | Facility: CLINIC | Age: 84
End: 2024-10-28
Payer: MEDICARE

## 2024-10-28 ENCOUNTER — TELEPHONE (OUTPATIENT)
Age: 84
End: 2024-10-28

## 2024-10-28 DIAGNOSIS — E78.2 MIXED HYPERLIPIDEMIA: ICD-10-CM

## 2024-10-28 DIAGNOSIS — F33.1 MAJOR DEPRESSIVE DISORDER, RECURRENT EPISODE, MODERATE (HCC): ICD-10-CM

## 2024-10-28 DIAGNOSIS — K59.04 CHRONIC IDIOPATHIC CONSTIPATION: ICD-10-CM

## 2024-10-28 DIAGNOSIS — F45.21 ILLNESS ANXIETY DISORDER: ICD-10-CM

## 2024-10-28 DIAGNOSIS — F45.21 ILLNESS ANXIETY DISORDER: Primary | ICD-10-CM

## 2024-10-28 PROBLEM — F33.0 MAJOR DEPRESSIVE DISORDER, RECURRENT EPISODE, MILD (HCC): Status: RESOLVED | Noted: 2024-02-27 | Resolved: 2024-10-28

## 2024-10-28 PROCEDURE — 99214 OFFICE O/P EST MOD 30 MIN: CPT

## 2024-10-28 RX ORDER — LORAZEPAM 0.5 MG/1
0.5 TABLET ORAL 3 TIMES DAILY
Qty: 90 TABLET | Refills: 0 | Status: SHIPPED | OUTPATIENT
Start: 2024-10-28 | End: 2024-11-04

## 2024-10-28 RX ORDER — LORAZEPAM 0.5 MG/1
0.5 TABLET ORAL 2 TIMES DAILY PRN
Qty: 15 TABLET | Refills: 0 | OUTPATIENT
Start: 2024-10-28 | End: 2024-11-07

## 2024-10-28 NOTE — PSYCH
Virtual Regular Visit    Verification of patient location:    Patient is located at Home in the following state in which I hold an active license [place]      Assessment/Plan:    Problem List Items Addressed This Visit       Illness anxiety disorder - Primary           Orders:    LORazepam (Ativan) 0.5 mg tablet; Take 1 tablet (0.5 mg total) by mouth 3 (three) times a day           Relevant Medications    LORazepam (Ativan) 0.5 mg tablet    Major depressive disorder, recurrent episode, moderate (HCC)                   Relevant Medications    LORazepam (Ativan) 0.5 mg tablet                Reason for visit is No chief complaint on file.       Encounter provider López Gutierrez MD      Recent Visits  No visits were found meeting these conditions.  Showing recent visits within past 7 days and meeting all other requirements  Today's Visits  Date Type Provider Dept   10/28/24 Telemedicine López Gutierrez MD  [facility]   Showing today's visits and meeting all other requirements  Future Appointments  No visits were found meeting these conditions.  Showing future appointments within next 150 days and meeting all other requirements       The patient was identified by name and date of birth. Sandra Peña was informed that this is a telemedicine visit and that the visit is being conducted throughthe [employer] platform. She agrees to proceed..  My office door was closed. No one else was in the room.  She acknowledged consent and understanding of privacy and security of the video platform. The patient has agreed to participate and understands they can discontinue the visit at any time.    Patient is aware this is a billable service.         MEDICATION MANAGEMENT NOTE        [facility]      Name and Date of Birth:  Sandra Peña 83 y.o. 1940 MRN: [id number]    Date of Visit: October 28, 2024    Reason for Visit: Follow-up visit regarding  "medication management     _____________________________    Assessment & Plan  Illness anxiety disorder    Patient continues to be in distress due to depressive and anxious symptoms due to obsessive thinking. She continues to somatise, now with preoccupation over \"bad taste\" with no apparent medical cause. She reports significant loss of appetite and frequent nausea. Patient and daughter report lorazepam has been helpful for reducing distress, however the effect goes away by the afternoon with return of symptoms. For this reason at this time, the degree of the patient's distress and resistance to numerous treatments indicates the use of a benzodiazepine and outweigh the risks in the elderly which were explained. Until she is more stable, then medication can be reduced.    Continue escitalopram 5 mg QD for anxiety and depression; plan to increase next week  Increase lorazepam to 0.5 mg TID for severe anxiety  Discussed with patient the risks of sedation, respiratory depression, impairment in judgment and motor function. Depression, mood changes, GI changes, and others discussed.  Discontinue trazodone  Referral for TMS for treatment-resistance  Continue individual and group psychotherapies    Orders:    LORazepam (Ativan) 0.5 mg tablet; Take 1 tablet (0.5 mg total) by mouth 3 (three) times a day    Major depressive disorder, recurrent episode, moderate (HCC)                  Treatment Recommendations/Precautions:  Risks/benefits/alternatives to treatment discussed, including a myriad of potential adverse medication side effects, to which Sandra voiced understanding and consented fully to treatment.   Labs most recently obtained , reviewed.   Follow up in 1 week for medication management  Follow up with PCP for medical issues and ongoing care  Aware of 24 hour and weekend coverage for urgent situations accessed by calling [facility] main practice number        Treatment Plan:     Completed and " "signed during the session: Not applicable - Treatment Plan not due at this session    _____________________________________________    History of Present Illness     Chief Complaint: \"I don't want to live like this anymore\"    SUBJECTIVE:    Patient and daughter report she is using lorazepam daily. Starting in morning. Continues to wake up upset, palpitations, feels need to go to hospital. Not been using trazodone as Ativan is effective.  Time between doses distress returns. Effect will diminish in afternoon. Patient finds reassurance knowing that medication is available when needed. Daughter feels lorazepam helps patient to be more vocal and organize her thoughts. She continues to be preoccupied with \"bad taste\". Frequent crying episodes and dependence on daughter persist. Loss of appetite persists. Sleep is stable. Patient reports lorazepam helps her to feel \"calmer.\"            Psychiatric Review Of Systems:  Unchanged information from this writer's previous assessment is copied and italicized; information that has changed is bolded.    Appetite: yes  Adverse eating: no  Weight changes: no  Insomnia/sleeplessness: no  Fatigue/anergy: yes  Anhedonia/lack of interest: no  Attention/concentration: no  Psychomotor agitation/retardation: no  Somatic symptoms: yes  Anxiety/panic attack: yes  Zaira/hypomania: no  Hopelessness/helplessness/worthlessness: no  Self-injurious behavior/high-risk behavior: no  Suicidal ideation: no  Homicidal ideation: no  Auditory hallucinations: no  Visual hallucinations: no  Other perceptual disturbances: no  Delusional thinking: no  Obsessive/compulsive symptoms: yes    Review Of Systems:      Constitutional negative   ENT negative   Cardiovascular negative   Respiratory negative   Gastrointestinal dysgeusia   Genitourinary negative   Musculoskeletal negative   Integumentary negative   Neurological negative   Endocrine negative   Other Symptoms none, all other systems are negative "     Objective    OBJECTIVE:     Visit Vitals  OB Status Postmenopausal   Smoking Status Never      Wt Readings from Last 6 Encounters:   10/17/24 49.9 kg (110 lb 0.2 oz)   10/10/24 49.4 kg (109 lb)   10/04/24 48.7 kg (107 lb 5.8 oz)   24 49.4 kg (109 lb)   24 48.4 kg (106 lb 11.2 oz)   24 48.1 kg (106 lb)        Past Medical History:   Diagnosis Date    Anxiety     Colon polyps     Depression     Diabetes mellitus (HCC)     Dizziness     Last assessed: 8/31/15    DVT (deep venous thrombosis) (HCC)     Dysuria     Hematuria 2022    Hyperlipidemia     Hypertension     Hypertensive urgency 10/22/2021    Insomnia     Panic attack     Ureterolithiasis 2020      Past Surgical History:   Procedure Laterality Date     SECTION      1964 son, 1968 daughter    COLONOSCOPY      FL RETROGRADE PYELOGRAM  2020    AL COLONOSCOPY FLX DX W/COLLJ SPEC WHEN PFRMD N/A 3/27/2017    Procedure: COLONOSCOPY;  Surgeon: Gold Francis MD;  Location: [facility];  Service: Gastroenterology    AL CYSTO/URETERO W/LITHOTRIPSY &INDWELL STENT INSRT Right 2020    Procedure: CYSTOSCOPY URETEROSCOPY WITH LITHOTRIPSY HOLMIUM LASER, RETROGRADE PYELOGRAM AND INSERTION STENT URETERAL; EXCISION OF BLADDER TUMOR;  Surgeon: Edwin Love MD;  Location: [facility];  Service: Urology    ROTATOR CUFF REPAIR Left     Last assessed: 3/29/15    TONSILLECTOMY         Meds/Allergies    No Known Allergies  Current Outpatient Medications   Medication Instructions    aspirin 81 mg, Oral, Daily    atorvastatin (LIPITOR) 10 mg, Oral, Daily    Cholecalciferol (VITAMIN D3) 2,000 Units, Oral, Daily    Constulose 10 GM/15ML solution TAKE 15 ML BY MOUTH TWO TIMES A DAY    escitalopram (LEXAPRO) 5 mg tablet Take 0.5 tablets (2.5 mg total) by mouth daily for 7 days, THEN 1 tablet (5 mg total) daily for 21 days.    glycerin adult 2 g suppository Please use glycerin suppository every other day.  If patient has rectal or abdominal  "pain, can use glycerin suppository daily    hydrOXYzine HCL (ATARAX) 10 mg, Oral, 2 times daily PRN    linaCLOtide 145 mcg, Oral, Daily    lisinopril-hydrochlorothiazide (PRINZIDE,ZESTORETIC) 10-12.5 MG per tablet 1 tablet, Oral, Daily    LORazepam (ATIVAN) 0.5 mg, Oral, 3 times daily    metFORMIN (GLUCOPHAGE) 500 mg, Oral, 2 times daily with meals    ondansetron (ZOFRAN-ODT) 4 mg, Oral, Every 8 hours PRN    pantoprazole (PROTONIX) 40 mg, Oral, Daily (early morning)    polyethylene glycol (MIRALAX) 17 g, Oral, Daily    psyllium (METAMUCIL) packet 1 packet, Oral, Daily    senna-docusate sodium (SENOKOT S) 8.6-50 mg per tablet 1 tablet, Oral, Daily PRN           Mental Status Exam:    Appearance Short grey curly hair, sweater, appears tired, good eye contact   Behavior/motor Calm and cooperative   Speech/language Normal rate and volume   Mood \"Not too good\"   Affect Distressed and tearful, later becomes calmer and more appropriate, full range   Thought process Ruminating, logical   Thought content Obsessions    No overt delusions, Denies hallucinations, Denies suicidal ideations   Cognition Awake and alert, oriented and memory grossly intact, and concentrates on questions   Insight/judgment Insight: fair  Judgment: moderate     Laboratory Results: I have personally reviewed all pertinent laboratory/tests results    Admission on 10/17/2024, Discharged on 10/17/2024   Component Date Value Ref Range Status    Sodium 10/17/2024 139  135 - 147 mmol/L Final    Potassium 10/17/2024 3.8  3.5 - 5.3 mmol/L Final    Chloride 10/17/2024 105  96 - 108 mmol/L Final    CO2 10/17/2024 27  21 - 32 mmol/L Final    ANION GAP 10/17/2024 7  4 - 13 mmol/L Final    BUN 10/17/2024 21  5 - 25 mg/dL Final    Creatinine 10/17/2024 0.98  0.60 - 1.30 mg/dL Final    Standardized to IDMS reference method    Glucose 10/17/2024 121  65 - 140 mg/dL Final    If the patient is fasting, the ADA then defines impaired fasting glucose as > 100 mg/dL and " diabetes as > or equal to 123 mg/dL.    Calcium 10/17/2024 8.9  8.4 - 10.2 mg/dL Final    AST 10/17/2024 11 (L)  13 - 39 U/L Final    ALT 10/17/2024 8  7 - 52 U/L Final    Specimen collection should occur prior to Sulfasalazine administration due to the potential for falsely depressed results.     Alkaline Phosphatase 10/17/2024 45  34 - 104 U/L Final    Total Protein 10/17/2024 5.8 (L)  6.4 - 8.4 g/dL Final    Albumin 10/17/2024 3.6  3.5 - 5.0 g/dL Final    Total Bilirubin 10/17/2024 0.68  0.20 - 1.00 mg/dL Final    Use of this assay is not recommended for patients undergoing treatment with eltrombopag due to the potential for falsely elevated results.  N-acetyl-p-benzoquinone imine (metabolite of Acetaminophen) will generate erroneously low results in samples for patients that have taken an overdose of Acetaminophen.    eGFR 10/17/2024 52  ml/min/1.73sq m Final    WBC 10/17/2024 5.82  4.31 - 10.16 Thousand/uL Final    RBC 10/17/2024 3.88  3.81 - 5.12 Million/uL Final    Hemoglobin 10/17/2024 11.3 (L)  11.5 - 15.4 g/dL Final    Hematocrit 10/17/2024 34.9  34.8 - 46.1 % Final    MCV 10/17/2024 90  82 - 98 fL Final    MCH 10/17/2024 29.1  26.8 - 34.3 pg Final    MCHC 10/17/2024 32.4  31.4 - 37.4 g/dL Final    RDW 10/17/2024 13.3  11.6 - 15.1 % Final    MPV 10/17/2024 10.8  8.9 - 12.7 fL Final    Platelets 10/17/2024 230  149 - 390 Thousands/uL Final    nRBC 10/17/2024 0  /100 WBCs Final    Segmented % 10/17/2024 68  43 - 75 % Final    Immature Grans % 10/17/2024 0  0 - 2 % Final    Lymphocytes % 10/17/2024 24  14 - 44 % Final    Monocytes % 10/17/2024 6  4 - 12 % Final    Eosinophils Relative 10/17/2024 1  0 - 6 % Final    Basophils Relative 10/17/2024 1  0 - 1 % Final    Absolute Neutrophils 10/17/2024 3.94  1.85 - 7.62 Thousands/µL Final    Absolute Immature Grans 10/17/2024 0.02  0.00 - 0.20 Thousand/uL Final    Absolute Lymphocytes 10/17/2024 1.38  0.60 - 4.47 Thousands/µL Final    Absolute Monocytes 10/17/2024  0.36  0.17 - 1.22 Thousand/µL Final    Eosinophils Absolute 10/17/2024 0.08  0.00 - 0.61 Thousand/µL Final    Basophils Absolute 10/17/2024 0.04  0.00 - 0.10 Thousands/µL Final    Color, UA 10/17/2024 Light Yellow   Final    Clarity, UA 10/17/2024 Clear   Final    Specific Gravity, UA 10/17/2024 1.012  1.003 - 1.030 Final    pH, UA 10/17/2024 7.0  4.5, 5.0, 5.5, 6.0, 6.5, 7.0, 7.5, 8.0 Final    Leukocytes, UA 10/17/2024 Negative  Negative Final    Nitrite, UA 10/17/2024 Negative  Negative Final    Protein, UA 10/17/2024 Negative  Negative mg/dl Final    Glucose, UA 10/17/2024 Negative  Negative mg/dl Final    Ketones, UA 10/17/2024 Negative  Negative mg/dl Final    Urobilinogen, UA 10/17/2024 <2.0  <2.0 mg/dl mg/dl Final    Bilirubin, UA 10/17/2024 Negative  Negative Final    Occult Blood, UA 10/17/2024 Negative  Negative Final    Magnesium 10/17/2024 1.8 (L)  1.9 - 2.7 mg/dL Final    SARS-CoV-2 10/17/2024 Negative  Negative Final         INFLUENZA A PCR 10/17/2024 Negative  Negative Final         INFLUENZA B PCR 10/17/2024 Negative  Negative Final         RSV PCR 10/17/2024 Negative  Negative Final         Ventricular Rate 10/17/2024 79  BPM Final    Atrial Rate 10/17/2024 79  BPM Final    CA Interval 10/17/2024 166  ms Final    QRSD Interval 10/17/2024 72  ms Final    QT Interval 10/17/2024 358  ms Final    QTC Interval 10/17/2024 410  ms Final    P Axis 10/17/2024 52  degrees Final    QRS Axis 10/17/2024 1  degrees Final    T Wave Axis 10/17/2024 42  degrees Final   Admission on 10/03/2024, Discharged on 10/05/2024   Component Date Value Ref Range Status    WBC 10/04/2024 5.78  4.31 - 10.16 Thousand/uL Final    RBC 10/04/2024 3.90  3.81 - 5.12 Million/uL Final    Hemoglobin 10/04/2024 11.3 (L)  11.5 - 15.4 g/dL Final    Hematocrit 10/04/2024 35.2  34.8 - 46.1 % Final    MCV 10/04/2024 90  82 - 98 fL Final    MCH 10/04/2024 29.0  26.8 - 34.3 pg Final    MCHC 10/04/2024 32.1  31.4 - 37.4 g/dL Final    RDW  10/04/2024 12.9  11.6 - 15.1 % Final    MPV 10/04/2024 10.9  8.9 - 12.7 fL Final    Platelets 10/04/2024 256  149 - 390 Thousands/uL Final    nRBC 10/04/2024 0  /100 WBCs Final    Segmented % 10/04/2024 56  43 - 75 % Final    Immature Grans % 10/04/2024 0  0 - 2 % Final    Lymphocytes % 10/04/2024 32  14 - 44 % Final    Monocytes % 10/04/2024 8  4 - 12 % Final    Eosinophils Relative 10/04/2024 3  0 - 6 % Final    Basophils Relative 10/04/2024 1  0 - 1 % Final    Absolute Neutrophils 10/04/2024 3.21  1.85 - 7.62 Thousands/µL Final    Absolute Immature Grans 10/04/2024 0.02  0.00 - 0.20 Thousand/uL Final    Absolute Lymphocytes 10/04/2024 1.85  0.60 - 4.47 Thousands/µL Final    Absolute Monocytes 10/04/2024 0.48  0.17 - 1.22 Thousand/µL Final    Eosinophils Absolute 10/04/2024 0.18  0.00 - 0.61 Thousand/µL Final    Basophils Absolute 10/04/2024 0.04  0.00 - 0.10 Thousands/µL Final    Sodium 10/04/2024 137  135 - 147 mmol/L Final    Potassium 10/04/2024 4.4  3.5 - 5.3 mmol/L Final    Chloride 10/04/2024 102  96 - 108 mmol/L Final    CO2 10/04/2024 28  21 - 32 mmol/L Final    ANION GAP 10/04/2024 7  4 - 13 mmol/L Final    BUN 10/04/2024 34 (H)  5 - 25 mg/dL Final    Creatinine 10/04/2024 1.15  0.60 - 1.30 mg/dL Final    Standardized to IDMS reference method    Glucose 10/04/2024 153 (H)  65 - 140 mg/dL Final    If the patient is fasting, the ADA then defines impaired fasting glucose as > 100 mg/dL and diabetes as > or equal to 123 mg/dL.    Calcium 10/04/2024 9.3  8.4 - 10.2 mg/dL Final    AST 10/04/2024 28  13 - 39 U/L Final    ALT 10/04/2024 20  7 - 52 U/L Final    Specimen collection should occur prior to Sulfasalazine administration due to the potential for falsely depressed results.     Alkaline Phosphatase 10/04/2024 58  34 - 104 U/L Final    Total Protein 10/04/2024 6.4  6.4 - 8.4 g/dL Final    Albumin 10/04/2024 3.8  3.5 - 5.0 g/dL Final    Total Bilirubin 10/04/2024 0.32  0.20 - 1.00 mg/dL Final    Use of this  assay is not recommended for patients undergoing treatment with eltrombopag due to the potential for falsely elevated results.  N-acetyl-p-benzoquinone imine (metabolite of Acetaminophen) will generate erroneously low results in samples for patients that have taken an overdose of Acetaminophen.    eGFR 10/04/2024 43  ml/min/1.73sq m Final    LACTIC ACID 10/04/2024 2.2 (H)  0.5 - 2.0 mmol/L Final    LACTIC ACID 10/04/2024 2.2 (H)  0.5 - 2.0 mmol/L Final    WBC 10/04/2024 7.78  4.31 - 10.16 Thousand/uL Final    RBC 10/04/2024 3.81  3.81 - 5.12 Million/uL Final    Hemoglobin 10/04/2024 11.0 (L)  11.5 - 15.4 g/dL Final    Hematocrit 10/04/2024 36.7  34.8 - 46.1 % Final    MCV 10/04/2024 96  82 - 98 fL Final    short sample    MCH 10/04/2024 28.9  26.8 - 34.3 pg Final    MCHC 10/04/2024 30.0 (L)  31.4 - 37.4 g/dL Final    RDW 10/04/2024 12.7  11.6 - 15.1 % Final    MPV 10/04/2024 10.4  8.9 - 12.7 fL Final    Platelets 10/04/2024 221  149 - 390 Thousands/uL Final    nRBC 10/04/2024 0  /100 WBCs Final    Segmented % 10/04/2024 74  43 - 75 % Final    Immature Grans % 10/04/2024 0  0 - 2 % Final    Lymphocytes % 10/04/2024 18  14 - 44 % Final    Monocytes % 10/04/2024 5  4 - 12 % Final    Eosinophils Relative 10/04/2024 2  0 - 6 % Final    Basophils Relative 10/04/2024 1  0 - 1 % Final    Absolute Neutrophils 10/04/2024 5.74  1.85 - 7.62 Thousands/µL Final    Absolute Immature Grans 10/04/2024 0.03  0.00 - 0.20 Thousand/uL Final    Absolute Lymphocytes 10/04/2024 1.42  0.60 - 4.47 Thousands/µL Final    Absolute Monocytes 10/04/2024 0.42  0.17 - 1.22 Thousand/µL Final    Eosinophils Absolute 10/04/2024 0.12  0.00 - 0.61 Thousand/µL Final    Basophils Absolute 10/04/2024 0.05  0.00 - 0.10 Thousands/µL Final    Sodium 10/04/2024 134 (L)  135 - 147 mmol/L Final    Potassium 10/04/2024 4.5  3.5 - 5.3 mmol/L Final    Chloride 10/04/2024 103  96 - 108 mmol/L Final    CO2 10/04/2024 24  21 - 32 mmol/L Final    ANION GAP 10/04/2024 7   4 - 13 mmol/L Final    BUN 10/04/2024 29 (H)  5 - 25 mg/dL Final    Creatinine 10/04/2024 1.05  0.60 - 1.30 mg/dL Final    Standardized to IDMS reference method    Glucose 10/04/2024 137  65 - 140 mg/dL Final    If the patient is fasting, the ADA then defines impaired fasting glucose as > 100 mg/dL and diabetes as > or equal to 123 mg/dL.    Calcium 10/04/2024 8.7  8.4 - 10.2 mg/dL Final    eGFR 10/04/2024 48  ml/min/1.73sq m Final    LACTIC ACID 10/04/2024 2.0  0.5 - 2.0 mmol/L Final    POC Glucose 10/04/2024 124  65 - 140 mg/dl Final    POC Glucose 10/04/2024 168 (H)  65 - 140 mg/dl Final    POC Glucose 10/04/2024 131  65 - 140 mg/dl Final    TSH 3RD GENERATON 10/05/2024 0.957  0.450 - 4.500 uIU/mL Final    The recommended reference ranges for TSH during pregnancy are as follows:   First trimester 0.100 to 2.500 uIU/mL   Second trimester  0.200 to 3.000 uIU/mL   Third trimester 0.300 to 3.000 uIU/m    Note: Normal ranges may not apply to patients who are transgender, non-binary, or whose legal sex, sex at birth, and gender identity differ.  Adult TSH (3rd generation) reference range follows the recommended guidelines of the American Thy[employer] Association, January, 2020.    Protime 10/05/2024 14.3  12.3 - 15.0 seconds Final    INR 10/05/2024 1.04  0.85 - 1.19 Final    WBC 10/05/2024 5.15  4.31 - 10.16 Thousand/uL Final    RBC 10/05/2024 3.27 (L)  3.81 - 5.12 Million/uL Final    Hemoglobin 10/05/2024 9.8 (L)  11.5 - 15.4 g/dL Final    Hematocrit 10/05/2024 29.5 (L)  34.8 - 46.1 % Final    MCV 10/05/2024 90  82 - 98 fL Final    Results verified by repeat    MCH 10/05/2024 30.0  26.8 - 34.3 pg Final    MCHC 10/05/2024 33.2  31.4 - 37.4 g/dL Final    RDW 10/05/2024 12.7  11.6 - 15.1 % Final    MPV 10/05/2024 10.7  8.9 - 12.7 fL Final    Platelets 10/05/2024 210  149 - 390 Thousands/uL Final    nRBC 10/05/2024 0  /100 WBCs Final    Segmented % 10/05/2024 58  43 - 75 % Final    Immature Grans % 10/05/2024 0  0 - 2 % Final     Lymphocytes % 10/05/2024 31  14 - 44 % Final    Monocytes % 10/05/2024 6  4 - 12 % Final    Eosinophils Relative 10/05/2024 4  0 - 6 % Final    Basophils Relative 10/05/2024 1  0 - 1 % Final    Absolute Neutrophils 10/05/2024 2.97  1.85 - 7.62 Thousands/µL Final    Absolute Immature Grans 10/05/2024 0.02  0.00 - 0.20 Thousand/uL Final    Absolute Lymphocytes 10/05/2024 1.61  0.60 - 4.47 Thousands/µL Final    Absolute Monocytes 10/05/2024 0.33  0.17 - 1.22 Thousand/µL Final    Eosinophils Absolute 10/05/2024 0.19  0.00 - 0.61 Thousand/µL Final    Basophils Absolute 10/05/2024 0.03  0.00 - 0.10 Thousands/µL Final    Sodium 10/05/2024 139  135 - 147 mmol/L Final    Potassium 10/05/2024 4.1  3.5 - 5.3 mmol/L Final    Chloride 10/05/2024 106  96 - 108 mmol/L Final    CO2 10/05/2024 28  21 - 32 mmol/L Final    ANION GAP 10/05/2024 5  4 - 13 mmol/L Final    BUN 10/05/2024 20  5 - 25 mg/dL Final    Creatinine 10/05/2024 1.04  0.60 - 1.30 mg/dL Final    Standardized to IDMS reference method    Glucose 10/05/2024 106  65 - 140 mg/dL Final    If the patient is fasting, the ADA then defines impaired fasting glucose as > 100 mg/dL and diabetes as > or equal to 123 mg/dL.    Calcium 10/05/2024 8.7  8.4 - 10.2 mg/dL Final    Corrected Calcium 10/05/2024 9.4  8.3 - 10.1 mg/dL Final    AST 10/05/2024 11 (L)  13 - 39 U/L Final    ALT 10/05/2024 11  7 - 52 U/L Final    Specimen collection should occur prior to Sulfasalazine administration due to the potential for falsely depressed results.     Alkaline Phosphatase 10/05/2024 48  34 - 104 U/L Final    Total Protein 10/05/2024 5.1 (L)  6.4 - 8.4 g/dL Final    Albumin 10/05/2024 3.1 (L)  3.5 - 5.0 g/dL Final    Total Bilirubin 10/05/2024 0.31  0.20 - 1.00 mg/dL Final    Use of this assay is not recommended for patients undergoing treatment with eltrombopag due to the potential for falsely elevated results.  N-acetyl-p-benzoquinone imine (metabolite of Acetaminophen) will generate  erroneously low results in samples for patients that have taken an overdose of Acetaminophen.    eGFR 10/05/2024 49  ml/min/1.73sq m Final    POC Glucose 10/05/2024 105  65 - 140 mg/dl Final    POC Glucose 10/05/2024 132  65 - 140 mg/dl Final    POC Glucose 10/05/2024 187 (H)  65 - 140 mg/dl Final   Admission on 09/28/2024, Discharged on 09/30/2024   Component Date Value Ref Range Status    WBC 09/28/2024 7.21  4.31 - 10.16 Thousand/uL Final    RBC 09/28/2024 3.90  3.81 - 5.12 Million/uL Final    Hemoglobin 09/28/2024 11.2 (L)  11.5 - 15.4 g/dL Final    Hematocrit 09/28/2024 35.3  34.8 - 46.1 % Final    MCV 09/28/2024 91  82 - 98 fL Final    MCH 09/28/2024 28.7  26.8 - 34.3 pg Final    MCHC 09/28/2024 31.7  31.4 - 37.4 g/dL Final    RDW 09/28/2024 12.7  11.6 - 15.1 % Final    MPV 09/28/2024 10.4  8.9 - 12.7 fL Final    Platelets 09/28/2024 223  149 - 390 Thousands/uL Final    nRBC 09/28/2024 0  /100 WBCs Final    Segmented % 09/28/2024 65  43 - 75 % Final    Immature Grans % 09/28/2024 0  0 - 2 % Final    Lymphocytes % 09/28/2024 25  14 - 44 % Final    Monocytes % 09/28/2024 6  4 - 12 % Final    Eosinophils Relative 09/28/2024 3  0 - 6 % Final    Basophils Relative 09/28/2024 1  0 - 1 % Final    Absolute Neutrophils 09/28/2024 4.73  1.85 - 7.62 Thousands/µL Final    Absolute Immature Grans 09/28/2024 0.02  0.00 - 0.20 Thousand/uL Final    Absolute Lymphocytes 09/28/2024 1.77  0.60 - 4.47 Thousands/µL Final    Absolute Monocytes 09/28/2024 0.41  0.17 - 1.22 Thousand/µL Final    Eosinophils Absolute 09/28/2024 0.24  0.00 - 0.61 Thousand/µL Final    Basophils Absolute 09/28/2024 0.04  0.00 - 0.10 Thousands/µL Final    Sodium 09/28/2024 136  135 - 147 mmol/L Final    Potassium 09/28/2024 3.9  3.5 - 5.3 mmol/L Final    Chloride 09/28/2024 104  96 - 108 mmol/L Final    CO2 09/28/2024 24  21 - 32 mmol/L Final    ANION GAP 09/28/2024 8  4 - 13 mmol/L Final    BUN 09/28/2024 27 (H)  5 - 25 mg/dL Final    Creatinine  09/28/2024 1.14  0.60 - 1.30 mg/dL Final    Standardized to IDMS reference method    Glucose 09/28/2024 216 (H)  65 - 140 mg/dL Final    If the patient is fasting, the ADA then defines impaired fasting glucose as > 100 mg/dL and diabetes as > or equal to 123 mg/dL.    Calcium 09/28/2024 9.1  8.4 - 10.2 mg/dL Final    AST 09/28/2024 13  13 - 39 U/L Final    ALT 09/28/2024 7  7 - 52 U/L Final    Specimen collection should occur prior to Sulfasalazine administration due to the potential for falsely depressed results.     Alkaline Phosphatase 09/28/2024 46  34 - 104 U/L Final    Total Protein 09/28/2024 6.0 (L)  6.4 - 8.4 g/dL Final    Albumin 09/28/2024 3.7  3.5 - 5.0 g/dL Final    Total Bilirubin 09/28/2024 0.35  0.20 - 1.00 mg/dL Final    Use of this assay is not recommended for patients undergoing treatment with eltrombopag due to the potential for falsely elevated results.  N-acetyl-p-benzoquinone imine (metabolite of Acetaminophen) will generate erroneously low results in samples for patients that have taken an overdose of Acetaminophen.    eGFR 09/28/2024 43  ml/min/1.73sq m Final    Magnesium 09/28/2024 1.8 (L)  1.9 - 2.7 mg/dL Final    Phosphorus 09/28/2024 3.3  2.3 - 4.1 mg/dL Final    Ventricular Rate 09/29/2024 79  BPM Final    Atrial Rate 09/29/2024 300  BPM Final    QRSD Interval 09/29/2024 78  ms Final    QT Interval 09/29/2024 380  ms Final    QTC Interval 09/29/2024 435  ms Final    QRS Axis 09/29/2024 -10  degrees Final    T Wave [place] 09/29/2024 2  degrees Final    Hemoglobin A1C 09/29/2024 6.7 (H)  Normal 4.0-5.6%; PreDiabetic 5.7-6.4%; Diabetic >=6.5%; Glycemic control for adults with diabetes <7.0% % Final    EAG 09/29/2024 146  mg/dl Final    WBC 09/29/2024 9.50  4.31 - 10.16 Thousand/uL Final    RBC 09/29/2024 3.82  3.81 - 5.12 Million/uL Final    Hemoglobin 09/29/2024 11.0 (L)  11.5 - 15.4 g/dL Final    Hematocrit 09/29/2024 34.7 (L)  34.8 - 46.1 % Final    MCV 09/29/2024 91  82 - 98 fL Final     MCH 09/29/2024 28.8  26.8 - 34.3 pg Final    MCHC 09/29/2024 31.7  31.4 - 37.4 g/dL Final    RDW 09/29/2024 12.8  11.6 - 15.1 % Final    MPV 09/29/2024 10.7  8.9 - 12.7 fL Final    Platelets 09/29/2024 191  149 - 390 Thousands/uL Final    nRBC 09/29/2024 0  /100 WBCs Final    Segmented % 09/29/2024 86 (H)  43 - 75 % Final    Immature Grans % 09/29/2024 0  0 - 2 % Final    Lymphocytes % 09/29/2024 8 (L)  14 - 44 % Final    Monocytes % 09/29/2024 5  4 - 12 % Final    Eosinophils Relative 09/29/2024 1  0 - 6 % Final    Basophils Relative 09/29/2024 0  0 - 1 % Final    Absolute Neutrophils 09/29/2024 8.04 (H)  1.85 - 7.62 Thousands/µL Final    Absolute Immature Grans 09/29/2024 0.04  0.00 - 0.20 Thousand/uL Final    Absolute Lymphocytes 09/29/2024 0.79  0.60 - 4.47 Thousands/µL Final    Absolute Monocytes 09/29/2024 0.47  0.17 - 1.22 Thousand/µL Final    Eosinophils Absolute 09/29/2024 0.13  0.00 - 0.61 Thousand/µL Final    Basophils Absolute 09/29/2024 0.03  0.00 - 0.10 Thousands/µL Final    Sodium 09/29/2024 140  135 - 147 mmol/L Final    Potassium 09/29/2024 3.4 (L)  3.5 - 5.3 mmol/L Final    Chloride 09/29/2024 107  96 - 108 mmol/L Final    CO2 09/29/2024 24  21 - 32 mmol/L Final    ANION GAP 09/29/2024 9  4 - 13 mmol/L Final    BUN 09/29/2024 24  5 - 25 mg/dL Final    Creatinine 09/29/2024 0.98  0.60 - 1.30 mg/dL Final    Standardized to IDMS reference method    Glucose 09/29/2024 137  65 - 140 mg/dL Final    If the patient is fasting, the ADA then defines impaired fasting glucose as > 100 mg/dL and diabetes as > or equal to 123 mg/dL.    Calcium 09/29/2024 8.6  8.4 - 10.2 mg/dL Final    eGFR 09/29/2024 52  ml/min/1.73sq m Final    Magnesium 09/29/2024 2.1  1.9 - 2.7 mg/dL Final    POC Glucose 09/29/2024 138  65 - 140 mg/dl Final    POC Glucose 09/29/2024 165 (H)  65 - 140 mg/dl Final    POC Glucose 09/29/2024 169 (H)  65 - 140 mg/dl Final    POC Glucose 09/29/2024 113  65 - 140 mg/dl Final    WBC 09/30/2024 6.30   4.31 - 10.16 Thousand/uL Final    RBC 09/30/2024 3.78 (L)  3.81 - 5.12 Million/uL Final    Hemoglobin 09/30/2024 10.7 (L)  11.5 - 15.4 g/dL Final    Hematocrit 09/30/2024 34.0 (L)  34.8 - 46.1 % Final    MCV 09/30/2024 90  82 - 98 fL Final    MCH 09/30/2024 28.3  26.8 - 34.3 pg Final    MCHC 09/30/2024 31.5  31.4 - 37.4 g/dL Final    RDW 09/30/2024 12.9  11.6 - 15.1 % Final    Platelets 09/30/2024 171  149 - 390 Thousands/uL Final    MPV 09/30/2024 10.6  8.9 - 12.7 fL Final    Sodium 09/30/2024 138  135 - 147 mmol/L Final    Potassium 09/30/2024 4.1  3.5 - 5.3 mmol/L Final    Chloride 09/30/2024 107  96 - 108 mmol/L Final    CO2 09/30/2024 24  21 - 32 mmol/L Final    ANION GAP 09/30/2024 7  4 - 13 mmol/L Final    BUN 09/30/2024 19  5 - 25 mg/dL Final    Creatinine 09/30/2024 0.96  0.60 - 1.30 mg/dL Final    Standardized to IDMS reference method    Glucose 09/30/2024 126  65 - 140 mg/dL Final    If the patient is fasting, the ADA then defines impaired fasting glucose as > 100 mg/dL and diabetes as > or equal to 123 mg/dL.    Calcium 09/30/2024 8.9  8.4 - 10.2 mg/dL Final    eGFR 09/30/2024 54  ml/min/1.73sq m Final    POC Glucose 09/30/2024 121  65 - 140 mg/dl Final    POC Glucose 09/30/2024 140  65 - 140 mg/dl Final   Admission on 09/23/2024, Discharged on 09/23/2024   Component Date Value Ref Range Status    Ventricular Rate 09/23/2024 85  BPM Final    Atrial Rate 09/23/2024 85  BPM Final    [place] Interval 09/23/2024 160  ms Final    QRSD Interval 09/23/2024 72  ms Final    QT Interval 09/23/2024 348  ms Final    QTC Interval 09/23/2024 414  ms Final    P Axis 09/23/2024 39  degrees Final    QRS Axis 09/23/2024 -2  degrees Final    T Wave Axis 09/23/2024 13  degrees Final    WBC 09/23/2024 6.14  4.31 - 10.16 Thousand/uL Final    RBC 09/23/2024 4.21  3.81 - 5.12 Million/uL Final    Hemoglobin 09/23/2024 12.3  11.5 - 15.4 g/dL Final    Hematocrit 09/23/2024 37.8  34.8 - 46.1 % Final    MCV 09/23/2024 90  82 - 98 fL  Final    MCH 09/23/2024 29.2  26.8 - 34.3 pg Final    MCHC 09/23/2024 32.5  31.4 - 37.4 g/dL Final    RDW 09/23/2024 12.7  11.6 - 15.1 % Final    MPV 09/23/2024 10.7  8.9 - 12.7 fL Final    Platelets 09/23/2024 197  149 - 390 Thousands/uL Final    nRBC 09/23/2024 0  /100 WBCs Final    Segmented % 09/23/2024 66  43 - 75 % Final    Immature Grans % 09/23/2024 0  0 - 2 % Final    Lymphocytes % 09/23/2024 24  14 - 44 % Final    Monocytes % 09/23/2024 6  4 - 12 % Final    Eosinophils Relative 09/23/2024 3  0 - 6 % Final    Basophils Relative 09/23/2024 1  0 - 1 % Final    Absolute Neutrophils 09/23/2024 4.11  1.85 - 7.62 Thousands/µL Final    Absolute Immature Grans 09/23/2024 0.02  0.00 - 0.20 Thousand/uL Final    Absolute Lymphocytes 09/23/2024 1.44  0.60 - 4.47 Thousands/µL Final    Absolute Monocytes 09/23/2024 0.35  0.17 - 1.22 Thousand/µL Final    Eosinophils Absolute 09/23/2024 0.17  0.00 - 0.61 Thousand/µL Final    Basophils Absolute 09/23/2024 0.05  0.00 - 0.10 Thousands/µL Final    Sodium 09/23/2024 137  135 - 147 mmol/L Final    Potassium 09/23/2024 5.0  3.5 - 5.3 mmol/L Final    Moderately Hemolyzed:Results may be affected.    Chloride 09/23/2024 104  96 - 108 mmol/L Final    CO2 09/23/2024 25  21 - 32 mmol/L Final    ANION GAP 09/23/2024 8  4 - 13 mmol/L Final    BUN 09/23/2024 37 (H)  5 - 25 mg/dL Final    Creatinine 09/23/2024 1.18  0.60 - 1.30 mg/dL Final    Standardized to IDMS reference method    Glucose 09/23/2024 109  65 - 140 mg/dL Final    If the patient is fasting, the ADA then defines impaired fasting glucose as > 100 mg/dL and diabetes as > or equal to 123 mg/dL.    Calcium 09/23/2024 9.6  8.4 - 10.2 mg/dL Final    eGFR 09/23/2024 42  ml/min/1.73sq m Final   Admission on 09/15/2024, Discharged on 09/15/2024   Component Date Value Ref Range Status    Ventricular Rate 09/15/2024 84  BPM Final    Atrial Rate 09/15/2024 84  BPM Final    [place] Interval 09/15/2024 162  ms Final    QRSD Interval 09/15/2024  68  ms Final    QT Interval 09/15/2024 348  ms Final    QTC Interval 09/15/2024 411  ms Final    P Axis 09/15/2024 55  degrees Final    QRS Axis 09/15/2024 6  degrees Final    T Wave [place] 09/15/2024 39  degrees Final    WBC 09/15/2024 7.61  4.31 - 10.16 Thousand/uL Final    RBC 09/15/2024 4.13  3.81 - 5.12 Million/uL Final    Hemoglobin 09/15/2024 11.8  11.5 - 15.4 g/dL Final    Hematocrit 09/15/2024 37.1  34.8 - 46.1 % Final    MCV 09/15/2024 90  82 - 98 fL Final    MCH 09/15/2024 28.6  26.8 - 34.3 pg Final    MCHC 09/15/2024 31.8  31.4 - 37.4 g/dL Final    RDW 09/15/2024 12.8  11.6 - 15.1 % Final    MPV 09/15/2024 10.9  8.9 - 12.7 fL Final    Platelets 09/15/2024 218  149 - 390 Thousands/uL Final    nRBC 09/15/2024 0  /100 WBCs Final    Segmented % 09/15/2024 73  43 - 75 % Final    Immature Grans % 09/15/2024 0  0 - 2 % Final    Lymphocytes % 09/15/2024 19  14 - 44 % Final    Monocytes % 09/15/2024 5  4 - 12 % Final    Eosinophils Relative 09/15/2024 2  0 - 6 % Final    Basophils Relative 09/15/2024 1  0 - 1 % Final    Absolute Neutrophils 09/15/2024 5.59  1.85 - 7.62 Thousands/µL Final    Absolute Immature Grans 09/15/2024 0.02  0.00 - 0.20 Thousand/uL Final    Absolute Lymphocytes 09/15/2024 1.41  0.60 - 4.47 Thousands/µL Final    Absolute Monocytes 09/15/2024 0.40  0.17 - 1.22 Thousand/µL Final    Eosinophils Absolute 09/15/2024 0.14  0.00 - 0.61 Thousand/µL Final    Basophils Absolute 09/15/2024 0.05  0.00 - 0.10 Thousands/µL Final    Sodium 09/15/2024 136  135 - 147 mmol/L Final    Potassium 09/15/2024 4.0  3.5 - 5.3 mmol/L Final    Chloride 09/15/2024 101  96 - 108 mmol/L Final    CO2 09/15/2024 25  21 - 32 mmol/L Final    ANION GAP 09/15/2024 10  4 - 13 mmol/L Final    BUN 09/15/2024 27 (H)  5 - 25 mg/dL Final    Creatinine 09/15/2024 1.05  0.60 - 1.30 mg/dL Final    Standardized to IDMS reference method    Glucose 09/15/2024 105  65 - 140 mg/dL Final    If the patient is fasting, the ADA then defines  "impaired fasting glucose as > 100 mg/dL and diabetes as > or equal to 123 mg/dL.    Calcium 09/15/2024 9.6  8.4 - 10.2 mg/dL Final    AST 09/15/2024 15  13 - 39 U/L Final    ALT 09/15/2024 9  7 - 52 U/L Final    Specimen collection should occur prior to Sulfasalazine administration due to the potential for falsely depressed results.     Alkaline Phosphatase 09/15/2024 49  34 - 104 U/L Final    Total Protein 09/15/2024 6.7  6.4 - 8.4 g/dL Final    Albumin 09/15/2024 4.1  3.5 - 5.0 g/dL Final    Total Bilirubin 09/15/2024 0.47  0.20 - 1.00 mg/dL Final    Use of this assay is not recommended for patients undergoing treatment with eltrombopag due to the potential for falsely elevated results.  N-acetyl-p-benzoquinone imine (metabolite of Acetaminophen) will generate erroneously low results in samples for patients that have taken an overdose of Acetaminophen.    eGFR 09/15/2024 48  ml/min/1.73sq m Final    Lipase 09/15/2024 45  11 - 82 u/L Final    hs TnI 0hr 09/15/2024 17  \"Refer to ACS Flowchart\"- see link ng/L Final    Comment:                                              Initial (time 0) result  If >=50 ng/L, Myocardial injury suggested ;  Type of myocardial injury and treatment strategy  to be determined.  If 5-49 ng/L, a delta result at 2 hours and or 4 hours will be needed to further evaluate.  If <4 ng/L, and chest pain has been >3 hours since onset, patient may qualify for discharge based on the HEART score in the ED.  If <5 ng/L and <3hours since onset of chest pain, a delta result at 2 hours will be needed to further evaluate.    HS Troponin 99th Percentile URL of a Health Population=12 ng/L with a 95% Confidence Interval of 8-18 ng/L.    Second Troponin (time 2 hours)  If calculated delta >= 20 ng/L,  Myocardial injury suggested ; Type of myocardial injury and treatment strategy to be determined.  If 5-49 ng/L and the calculated delta is 5-19 ng/L, consult medical service for evaluation.  Continue evaluation " "for ischemia on ecg and other possible etiology and repeat hs troponin at 4 hours.  If delta                            is <5 ng/L at 2 hours, consider discharge based on risk stratification via the HEART score (if in ED), or [place] risk score in IP/Observation.    HS Troponin 99th Percentile URL of a Health Population=12 ng/L with a 95% Confidence Interval of 8-18 ng/L.    Color, UA 09/15/2024 Light Yellow   Final    Clarity, UA 09/15/2024 Clear   Final    Specific Gravity, UA 09/15/2024 1.019  1.003 - 1.030 Final    pH, UA 09/15/2024 5.0  4.5, 5.0, 5.5, 6.0, 6.5, 7.0, 7.5, 8.0 Final    Leukocytes, UA 09/15/2024 Negative  Negative Final    Nitrite, UA 09/15/2024 Negative  Negative Final    Protein, UA 09/15/2024 Negative  Negative mg/dl Final    Glucose, UA 09/15/2024 Negative  Negative mg/dl Final    Ketones, UA 09/15/2024 Negative  Negative mg/dl Final    Urobilinogen, UA 09/15/2024 <2.0  <2.0 mg/dl mg/dl Final    Bilirubin, UA 09/15/2024 Negative  Negative Final    Occult Blood, UA 09/15/2024 Small (A)  Negative Final    hs TnI 2hr 09/15/2024 14  \"Refer to ACS Flowchart\"- see link ng/L Final    Comment:                                              Initial (time 0) result  If >=50 ng/L, Myocardial injury suggested ;  Type of myocardial injury and treatment strategy  to be determined.  If 5-49 ng/L, a delta result at 2 hours and or 4 hours will be needed to further evaluate.  If <4 ng/L, and chest pain has been >3 hours since onset, patient may qualify for discharge based on the HEART score in the ED.  If <5 ng/L and <3hours since onset of chest pain, a delta result at 2 hours will be needed to further evaluate.    HS Troponin 99th Percentile URL of a Health Population=12 ng/L with a 95% Confidence Interval of 8-18 ng/L.    Second Troponin (time 2 hours)  If calculated delta >= 20 ng/L,  Myocardial injury suggested ; Type of myocardial injury and treatment strategy to be determined.  If 5-49 ng/L and the calculated " delta is 5-19 ng/L, consult medical service for evaluation.  Continue evaluation for ischemia on ecg and other possible etiology and repeat hs troponin at 4 hours.  If delta                            is <5 ng/L at 2 hours, consider discharge based on risk stratification via the HEART score (if in ED), or [place] risk score in IP/Observation.    HS Troponin 99th Percentile URL of a Health Population=12 ng/L with a 95% Confidence Interval of 8-18 ng/L.    Delta 2hr hsTnI 09/15/2024 -3  <20 ng/L Final    RBC, UA 09/15/2024 10-20 (A)  None Seen, 1-2 /hpf Final    WBC, UA 09/15/2024 None Seen  None Seen, 1-2 /hpf Final    Epithelial Cells 09/15/2024 Occasional  None Seen, Occasional /hpf Final    Bacteria, UA 09/15/2024 None Seen  None Seen, Occasional /hpf Final   Admission on 08/28/2024, Discharged on 09/04/2024   Component Date Value Ref Range Status    Sodium 08/30/2024 136  135 - 147 mmol/L Final    Potassium 08/30/2024 3.8  3.5 - 5.3 mmol/L Final    Chloride 08/30/2024 100  96 - 108 mmol/L Final    CO2 08/30/2024 26  21 - 32 mmol/L Final    ANION GAP 08/30/2024 10  4 - 13 mmol/L Final    BUN 08/30/2024 25  5 - 25 mg/dL Final    Creatinine 08/30/2024 0.98  0.60 - 1.30 mg/dL Final    Standardized to IDMS reference method    Glucose 08/30/2024 136  65 - 140 mg/dL Final    If the patient is fasting, the ADA then defines impaired fasting glucose as > 100 mg/dL and diabetes as > or equal to 123 mg/dL.    Glucose, Fasting 08/30/2024 136 (H)  65 - 99 mg/dL Final    Calcium 08/30/2024 9.3  8.4 - 10.2 mg/dL Final    AST 08/30/2024 12 (L)  13 - 39 U/L Final    ALT 08/30/2024 6 (L)  7 - 52 U/L Final    Specimen collection should occur prior to Sulfasalazine administration due to the potential for falsely depressed results.     Alkaline Phosphatase 08/30/2024 43  34 - 104 U/L Final    Total Protein 08/30/2024 6.1 (L)  6.4 - 8.4 g/dL Final    Albumin 08/30/2024 3.8  3.5 - 5.0 g/dL Final    Total Bilirubin 08/30/2024 0.53  0.20 -  1.00 mg/dL Final    Use of this assay is not recommended for patients undergoing treatment with eltrombopag due to the potential for falsely elevated results.  N-acetyl-p-benzoquinone imine (metabolite of Acetaminophen) will generate erroneously low results in samples for patients that have taken an overdose of Acetaminophen.    eGFR 08/30/2024 52  ml/min/1.73sq m Final    Magnesium 08/30/2024 2.1  1.9 - 2.7 mg/dL Final    Phosphorus 08/30/2024 4.0  2.3 - 4.1 mg/dL Final    WBC 08/30/2024 5.82  4.31 - 10.16 Thousand/uL Final    RBC 08/30/2024 3.77 (L)  3.81 - 5.12 Million/uL Final    Hemoglobin 08/30/2024 11.0 (L)  11.5 - 15.4 g/dL Final    Hematocrit 08/30/2024 34.4 (L)  34.8 - 46.1 % Final    MCV 08/30/2024 91  82 - 98 fL Final    MCH 08/30/2024 29.2  26.8 - 34.3 pg Final    MCHC 08/30/2024 32.0  31.4 - 37.4 g/dL Final    RDW 08/30/2024 13.1  11.6 - 15.1 % Final    MPV 08/30/2024 10.9  8.9 - 12.7 fL Final    Platelets 08/30/2024 207  149 - 390 Thousands/uL Final    nRBC 08/30/2024 0  /100 WBCs Final    Segmented % 08/30/2024 68  43 - 75 % Final    Immature Grans % 08/30/2024 0  0 - 2 % Final    Lymphocytes % 08/30/2024 22  14 - 44 % Final    Monocytes % 08/30/2024 7  4 - 12 % Final    Eosinophils Relative 08/30/2024 2  0 - 6 % Final    Basophils Relative 08/30/2024 1  0 - 1 % Final    Absolute Neutrophils 08/30/2024 3.95  1.85 - 7.62 Thousands/µL Final    Absolute Immature Grans 08/30/2024 0.01  0.00 - 0.20 Thousand/uL Final    Absolute Lymphocytes 08/30/2024 1.26  0.60 - 4.47 Thousands/µL Final    Absolute Monocytes 08/30/2024 0.43  0.17 - 1.22 Thousand/µL Final    Eosinophils Absolute 08/30/2024 0.14  0.00 - 0.61 Thousand/µL Final    Basophils Absolute 08/30/2024 0.03  0.00 - 0.10 Thousands/µL Final    TSH 3RD GENERATON 08/30/2024 1.191  0.450 - 4.500 uIU/mL Final    The recommended reference ranges for TSH during pregnancy are as follows:   First trimester 0.100 to 2.500 uIU/mL   Second trimester  0.200 to  3.000 uIU/mL   Third trimester 0.300 to 3.000 uIU/m    Note: Normal ranges may not apply to patients who are transgender, non-binary, or whose legal sex, sex at birth, and gender identity differ.  Adult TSH (3rd generation) reference range follows the recommended guidelines of the American Thyroid Association, January, 2020.    Vitamin B-12 08/30/2024 437  180 - 914 pg/mL Final    Folate 08/30/2024 >22.3  >5.9 ng/mL Final    The World Health Organization has determined deficient folate concentrations are considered to be <4.0 ng/mL.    Vit D, 25-Hydroxy 08/30/2024 40.4  30.0 - 100.0 ng/mL Final    Vitamin D guidelines established by Clinical Guidelines Subcommittee  of the Endocrine Society Task Force, 2011    Deficiency <20ng/ml   Insufficiency 20-30ng/ml   Sufficient  ng/ml     Iron Saturation 08/30/2024 23  15 - 50 % Final    TIBC 08/30/2024 257  250 - 450 ug/dL Final    Iron 08/30/2024 58  50 - 212 ug/dL Final    Patients treated with metal-binding drugs (ie. Deferoxamine) may have depressed iron values.    UIBC 08/30/2024 199  155 - 355 ug/dL Final    Ferritin 08/30/2024 29  11 - 307 ng/mL Final    Syphilis Total Antibody 08/30/2024 Non-reactive  Non-Reactive Final    No serological evidence of infection with T. pallidum.  Early or incubating syphilis infection cannot be excluded.  Consider repeat testing based on clinical suspicion.    HIV-1 p24 Antigen 08/30/2024 Non-Reactive  Non-Reactive Final    HIV-1 Antibody 08/30/2024 Non-Reactive  Non-Reactive Final    HIV-2 Antibody 08/30/2024 Non-Reactive  Non-Reactive Final    HIV Ag-Ab 5th Gen 08/30/2024 Non-Reactive  Non-Reactive Final    A Non-Reactive test result does not preclude the possibility of exposure or infection with HIV-1 and/or HIV-2.  Non-Reactive results can occur if the quantity of marker present is below the detection limits or is not present during the stage of disease in which a sample is collected. Repeat testing should be considered  where there is clinical suspicion of infection.       Ventricular Rate 08/30/2024 83  BPM Final    Atrial Rate 08/30/2024 83  BPM Final    [place] Interval 08/30/2024 170  ms Final    QRSD Interval 08/30/2024 74  ms Final    QT Interval 08/30/2024 370  ms Final    QTC Interval 08/30/2024 434  ms Final    P Axis 08/30/2024 61  degrees Final    QRS [place] 08/30/2024 3  degrees Final    T Wave [place] 08/30/2024 51  degrees Final   Admission on 08/27/2024, Discharged on 08/28/2024   Component Date Value Ref Range Status    WBC 08/27/2024 6.15  4.31 - 10.16 Thousand/uL Final    RBC 08/27/2024 3.75 (L)  3.81 - 5.12 Million/uL Final    Hemoglobin 08/27/2024 11.0 (L)  11.5 - 15.4 g/dL Final    Hematocrit 08/27/2024 33.5 (L)  34.8 - 46.1 % Final    MCV 08/27/2024 89  82 - 98 fL Final    MCH 08/27/2024 29.3  26.8 - 34.3 pg Final    MCHC 08/27/2024 32.8  31.4 - 37.4 g/dL Final    RDW 08/27/2024 13.2  11.6 - 15.1 % Final    MPV 08/27/2024 10.2  8.9 - 12.7 fL Final    Platelets 08/27/2024 207  149 - 390 Thousands/uL Final    nRBC 08/27/2024 0  /100 WBCs Final    Segmented % 08/27/2024 66  43 - 75 % Final    Immature Grans % 08/27/2024 0  0 - 2 % Final    Lymphocytes % 08/27/2024 24  14 - 44 % Final    Monocytes % 08/27/2024 7  4 - 12 % Final    Eosinophils Relative 08/27/2024 2  0 - 6 % Final    Basophils Relative 08/27/2024 1  0 - 1 % Final    Absolute Neutrophils 08/27/2024 4.08  1.85 - 7.62 Thousands/µL Final    Absolute Immature Grans 08/27/2024 0.02  0.00 - 0.20 Thousand/uL Final    Absolute Lymphocytes 08/27/2024 1.46  0.60 - 4.47 Thousands/µL Final    Absolute Monocytes 08/27/2024 0.43  0.17 - 1.22 Thousand/µL Final    Eosinophils Absolute 08/27/2024 0.12  0.00 - 0.61 Thousand/µL Final    Basophils Absolute 08/27/2024 0.04  0.00 - 0.10 Thousands/µL Final    Sodium 08/27/2024 136  135 - 147 mmol/L Final    Potassium 08/27/2024 3.9  3.5 - 5.3 mmol/L Final    Chloride 08/27/2024 102  96 - 108 mmol/L Final    CO2 08/27/2024 27  21 -  32 mmol/L Final    ANION GAP 08/27/2024 7  4 - 13 mmol/L Final    BUN 08/27/2024 26 (H)  5 - 25 mg/dL Final    Creatinine 08/27/2024 1.05  0.60 - 1.30 mg/dL Final    Standardized to IDMS reference method    Glucose 08/27/2024 122  65 - 140 mg/dL Final    If the patient is fasting, the ADA then defines impaired fasting glucose as > 100 mg/dL and diabetes as > or equal to 123 mg/dL.    Calcium 08/27/2024 9.4  8.4 - 10.2 mg/dL Final    AST 08/27/2024 12 (L)  13 - 39 U/L Final    ALT 08/27/2024 7  7 - 52 U/L Final    Specimen collection should occur prior to Sulfasalazine administration due to the potential for falsely depressed results.     Alkaline Phosphatase 08/27/2024 50  34 - 104 U/L Final    Total Protein 08/27/2024 6.4  6.4 - 8.4 g/dL Final    Albumin 08/27/2024 4.0  3.5 - 5.0 g/dL Final    Total Bilirubin 08/27/2024 0.48  0.20 - 1.00 mg/dL Final    Use of this assay is not recommended for patients undergoing treatment with eltrombopag due to the potential for falsely elevated results.  N-acetyl-p-benzoquinone imine (metabolite of Acetaminophen) will generate erroneously low results in samples for patients that have taken an overdose of Acetaminophen.    eGFR 08/27/2024 48  ml/min/1.73sq m Final    TSH 3RD GENERATON 08/27/2024 1.408  0.450 - 4.500 uIU/mL Final    The recommended reference ranges for TSH during pregnancy are as follows:   First trimester 0.100 to 2.500 uIU/mL   Second trimester  0.200 to 3.000 uIU/mL   Third trimester 0.300 to 3.000 uIU/m    Note: Normal ranges may not apply to patients who are transgender, non-binary, or whose legal sex, sex at birth, and gender identity differ.  Adult TSH (3rd generation) reference range follows the recommended guidelines of the American Thyroid Association, January, 2020.    Amph/Meth UR 08/27/2024 Negative  Negative Final    Barbiturate Ur 08/27/2024 Negative  Negative Final    Benzodiazepine Urine 08/27/2024 Negative  Negative Final    Cocaine Urine  08/27/2024 Negative  Negative Final    Methadone Urine 08/27/2024 Negative  Negative Final    Opiate Urine 08/27/2024 Negative  Negative Final    PCP Ur 08/27/2024 Negative  Negative Final    THC Urine 08/27/2024 Negative  Negative Final    Oxycodone Urine 08/27/2024 Negative  Negative Final    Fentanyl Urine 08/27/2024 Negative  Negative Final    HYDROCODONE URINE 08/27/2024 Negative  Negative Final    EXTBreath Alcohol 08/27/2024 0.000   Final    Color, UA 08/27/2024 Colorless   Final    Clarity, UA 08/27/2024 Clear   Final    Specific Gravity, UA 08/27/2024 1.008  1.003 - 1.030 Final    pH, UA 08/27/2024 5.0  4.5, 5.0, 5.5, 6.0, 6.5, 7.0, 7.5, 8.0 Final    Leukocytes, UA 08/27/2024 Negative  Negative Final    Nitrite, UA 08/27/2024 Negative  Negative Final    Protein, UA 08/27/2024 Negative  Negative mg/dl Final    Glucose, UA 08/27/2024 Negative  Negative mg/dl Final    Ketones, UA 08/27/2024 Negative  Negative mg/dl Final    Urobilinogen, UA 08/27/2024 <2.0  <2.0 mg/dl mg/dl Final    Bilirubin, UA 08/27/2024 Negative  Negative Final    Occult Blood, UA 08/27/2024 Negative  Negative Final    Ventricular Rate 08/27/2024 77  BPM Final    Atrial Rate 08/27/2024 77  BPM Final    GA Interval 08/27/2024 166  ms Final    QRSD Interval 08/27/2024 74  ms Final    QT Interval 08/27/2024 358  ms Final    QTC Interval 08/27/2024 405  ms Final    P Axis 08/27/2024 50  degrees Final    QRS Axis 08/27/2024 -1  degrees Final    T Wave Axis 08/27/2024 23  degrees Final             ___________________________________    History Review: The following portions of the patient's history were reviewed and updated as appropriate: allergies, current medications, past family history, past medical history, past social history, past surgical history, and problem list.    Unchanged information from this writer's previous assessment is copied and italicized; information that has changed is bolded.    Past Psychiatric History:      Past  psychiatric diagnoses:   Depression, anxiety  Inpatient psychiatric admissions:   8/2024 2013 following a surgery, sounded like extreme anxiety  Prior outpatient psychiatric treatment:   Started seeing a psychiatrist when first   Past/current psychotherapy:   active  History of suicidal attempts/gestures:   denies  Psychotropic medication trials:   Venlafaxine, sertraline, fluoxetine, escitalopram, aripiprazole (dizziness), risperidone, clonazepam, buspirone, gabapentin        Substance Abuse History:     Denies all history of substance use     Family Psychiatric History:      Family History             Family History   Problem Relation Age of Onset    Depression Mother           w/anxiety    Diabetes Mother           Mellitus    Breast cancer Mother      Hypertension Mother      No Known Problems Father      Depression Sister           w/anxiety    Heart disease Sister           Cardiac Disorder    Breast cancer Sister      Hypertension Sister      Diabetes Brother           Mellitus    Alcohol abuse Son                    Social History:     Developmental: nothing of note  Education: Bachelor degree  Marital history:  in 2006  Children: 2  Living arrangement, social support: as above, has been with daughter for 17 years  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  ___________________________________      Visit Time    Visit Start Time: 1410  Visit Stop Time: 1500  Total Visit Duration:  50 minutes    López Gutierrez MD   10/28/24

## 2024-10-28 NOTE — TELEPHONE ENCOUNTER
The patient's daughter contacted the office, requesting to schedule an appt. The writer transferred the call to the resident department for assistance.

## 2024-10-28 NOTE — TELEPHONE ENCOUNTER
Spoke with daughter, Janay (approved contact), with Ms. Peña present. We addressed group participation. Addressed reconnection with [facility] group, with additional support of this provider's similar group. We agreed MsLori Casey to try out this provider group which meets tomorrow; provider to outreach to Yasmine Mack/Dr. Gutierrez regarding reconnect to her group. We agreed to further assess interest/participation accordingly.

## 2024-10-28 NOTE — ASSESSMENT & PLAN NOTE
"  Patient continues to be in distress due to depressive and anxious symptoms due to obsessive thinking. She continues to somatise, now with preoccupation over \"bad taste\" with no apparent medical cause. She reports significant loss of appetite and frequent nausea. Patient and daughter report lorazepam has been helpful for reducing distress, however the effect goes away by the afternoon with return of symptoms. For this reason at this time, the degree of the patient's distress and resistance to numerous treatments indicates the use of a benzodiazepine and outweigh the risks in the elderly which were explained. Until she is more stable, then medication can be reduced.    Continue escitalopram 5 mg QD for anxiety and depression; plan to increase next week  Increase lorazepam to 0.5 mg TID for severe anxiety  Discussed with patient the risks of sedation, respiratory depression, impairment in judgment and motor function. Depression, mood changes, GI changes, and others discussed.  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

## 2024-10-29 ENCOUNTER — TELEPHONE (OUTPATIENT)
Age: 84
End: 2024-10-29

## 2024-10-29 NOTE — TELEPHONE ENCOUNTER
Patients daughter called and she would like to know if Dr. De Paz will please write an order for her mom to have Home Health.  She would appreciate it if you could recommend [facility],  she has had them before and liked them.    Ty

## 2024-10-30 DIAGNOSIS — Z74.1 NEED FOR ASSISTANCE WITH PERSONAL CARE: Primary | ICD-10-CM

## 2024-10-30 DIAGNOSIS — F03.90 MAJOR NEUROCOGNITIVE DISORDER (HCC): ICD-10-CM

## 2024-10-31 ENCOUNTER — OFFICE VISIT (OUTPATIENT)
Age: 84
End: 2024-10-31
Payer: MEDICARE

## 2024-10-31 ENCOUNTER — TELEPHONE (OUTPATIENT)
Age: 84
End: 2024-10-31

## 2024-10-31 DIAGNOSIS — R41.3 MEMORY LOSS: Primary | ICD-10-CM

## 2024-10-31 PROCEDURE — 96138 PSYCL/NRPSYC TECH 1ST: CPT | Performed by: FAMILY MEDICINE

## 2024-10-31 PROCEDURE — 96139 PSYCL/NRPSYC TST TECH EA: CPT | Performed by: FAMILY MEDICINE

## 2024-10-31 NOTE — PROGRESS NOTES
[address]  [facility] [address]  [phone]    Sandra Peña was here today for [employer] assisted test. It took the patient 55 minutes to complete.

## 2024-10-31 NOTE — TELEPHONE ENCOUNTER
MSW met with patient's daughter, Janay, during patient's office visit this day, as requested.  Janay requesting to speak with MSW to discuss Waiver Services and application process regarding same.  MSW made aware that Janay has already spoken with [facility] and was directed to B, and they have their initial evaluation set up for this week.  MSW expressed understanding of same and reviewed the application process, medical/financial requirements, document requirements and who to contact for any further questions regarding the Waiver application process.  Janay expressed appreciation for discussion this day.  Janay also reviewed that she is concerns that patient may just exceed income requirements, but is not certain.  MSW reviewed that should patient be denied for the program they will be given the right to appeal and expressed understanding of same.  Supportive discussion held and all questions addressed throughout visit and Janay satisfied with same.  MSW provided the following resources for additional support:   -Steps to Apply for Waiver/Resource List  -Caregiver Support Program    MSW will remain available as needed.

## 2024-11-04 ENCOUNTER — TELEMEDICINE (OUTPATIENT)
Dept: PSYCHIATRY | Facility: CLINIC | Age: 84
End: 2024-11-04
Payer: MEDICARE

## 2024-11-04 DIAGNOSIS — F45.21 ILLNESS ANXIETY DISORDER: Primary | ICD-10-CM

## 2024-11-04 DIAGNOSIS — F33.1 MAJOR DEPRESSIVE DISORDER, RECURRENT EPISODE, MODERATE (HCC): ICD-10-CM

## 2024-11-04 PROCEDURE — 90833 PSYTX W PT W E/M 30 MIN: CPT

## 2024-11-04 PROCEDURE — 99214 OFFICE O/P EST MOD 30 MIN: CPT

## 2024-11-04 RX ORDER — ESCITALOPRAM OXALATE 5 MG/1
7.5 TABLET ORAL DAILY
Qty: 11 TABLET | Refills: 0 | Status: SHIPPED | OUTPATIENT
Start: 2024-11-04 | End: 2024-11-11

## 2024-11-04 RX ORDER — ESCITALOPRAM OXALATE 10 MG/1
10 TABLET ORAL DAILY
Qty: 23 TABLET | Refills: 1 | Status: SHIPPED | OUTPATIENT
Start: 2024-11-12

## 2024-11-04 RX ORDER — LORAZEPAM 0.5 MG/1
TABLET ORAL
Qty: 90 TABLET | Refills: 0 | Status: SHIPPED | OUTPATIENT
Start: 2024-11-04 | End: 2024-11-11

## 2024-11-04 NOTE — ASSESSMENT & PLAN NOTE
Orders:    escitalopram (LEXAPRO) 5 mg tablet; Take 1.5 tablets (7.5 mg total) by mouth daily for 7 days    escitalopram (Lexapro) 10 mg tablet; Take 1 tablet (10 mg total) by mouth daily Do not start before November 12, 2024.

## 2024-11-04 NOTE — TELEPHONE ENCOUNTER
ROSALINAI: Pt's daughter Janay wanted to follow up in regards to medical forms that were sent from an agency on 10/30/24. Janay was told that the papers were signed and received by PCP. Janay will call bk with info on how to send signed form bk to [facility].

## 2024-11-04 NOTE — PSYCH
Virtual Regular Visit    Verification of patient location:    Patient is located at Home in the following state in which I hold an active license [place]      Assessment/Plan:    Problem List Items Addressed This Visit       Illness anxiety disorder - Primary       Patient continues to report somatic symptoms of dry mouth and bad taste that make eating more difficult as well as daytime fatigue. She is eating multiple small meals during the day. Also report continuing of crying spells, mostly in the morning. There seems to have been progress since last appointment until yesterday, possibly with worsening in anticipation of appointment today. Sleep may be improving somewhat as she is more motivated to wake-up on her own. She denies noticeable adverse effects of medication. Lorazepam subjectively helps with calmness and allows better articulation of speech.    As lorazepam has been helpful; it may be desirable that a larger dose (1 mg) be taken in the morning, then 0.5 mg in the evening, to attempt to break the cycle of distress, compared to current 0.5 TID dosing. Patient's daughter is with patient 24/7, as she has quit her job. Continue to weigh risks and benefits of benzodiazepine with patient and her daughter who understand risks. Continuing to cautiously increase escitalopram. Sodium was recently normal; can recheck after increase in dose.    Increase escitalopram to 7.5 mg QD for one week then 10 mg QD for anxiety and depression  Change lorazepam to 1 mg QAM and 0.5 mg QHS for anxiety  Discussed with patient the risks of sedation, respiratory depression, impairment in judgment and motor function. Depression, mood changes, GI changes, and others discussed.  Continue psychotherapy  Scheduled for neuropsychology  Na normal 10/17/2024  Fe and B12 normal 8/30/2024    Orders:    LORazepam (Ativan) 0.5 mg tablet; Take 2 tablets (1 mg total) by mouth every morning AND 1 tablet (0.5 mg total) every evening.    escitalopram  (LEXAPRO) 5 mg tablet; Take 1.5 tablets (7.5 mg total) by mouth daily for 7 days    escitalopram (Lexapro) 10 mg tablet; Take 1 tablet (10 mg total) by mouth daily Do not start before November 12, 2024.           Relevant Medications    LORazepam (Ativan) 0.5 mg tablet    escitalopram (LEXAPRO) 5 mg tablet    escitalopram (Lexapro) 10 mg tablet (Start on 11/12/2024)    Major depressive disorder, recurrent episode, moderate (HCC)       Orders:    escitalopram (LEXAPRO) 5 mg tablet; Take 1.5 tablets (7.5 mg total) by mouth daily for 7 days    escitalopram (Lexapro) 10 mg tablet; Take 1 tablet (10 mg total) by mouth daily Do not start before November 12, 2024.           Relevant Medications    LORazepam (Ativan) 0.5 mg tablet    escitalopram (LEXAPRO) 5 mg tablet    escitalopram (Lexapro) 10 mg tablet (Start on 11/12/2024)              Reason for visit is   Chief Complaint   Patient presents with    Virtual Regular Visit          Encounter provider López Gutierrez MD      Recent Visits  Date Type Provider Dept   10/28/24 Telemedicine López Gutierrez MD [facility]   Showing recent visits within past 7 days and meeting all other requirements  Today's Visits  Date Type Provider Dept   11/04/24 Telemedicine López Gutierrez MD [facility] [place]   Showing today's visits and meeting all other requirements  Future Appointments  No visits were found meeting these conditions.  Showing future appointments within next 150 days and meeting all other requirements       The patient was identified by name and date of birth. Sandra VYAS Deliala was informed that this is a telemedicine visit and that the visit is being conducted throughthe Epic Embedded platform. She agrees to proceed..  My office door was closed. No one else was in the room.  She acknowledged consent and understanding of privacy and security of the video platform. The patient has agreed to participate and understands they can  discontinue the visit at any time.    Patient is aware this is a billable service.         MEDICATION MANAGEMENT NOTE        [facility]      Name and Date of Birth:  Sandra Peña 83 y.o. 1940 MRN: [id number]    Date of Visit: November 4, 2024    Reason for Visit: Follow-up visit regarding medication management     _____________________________    Assessment & Plan  Illness anxiety disorder    Patient continues to report somatic symptoms of dry mouth and bad taste that make eating more difficult as well as daytime fatigue. She is eating multiple small meals during the day. Also report continuing of crying spells, mostly in the morning. There seems to have been progress since last appointment until yesterday, possibly with worsening in anticipation of appointment today. Sleep may be improving somewhat as she is more motivated to wake-up on her own. She denies noticeable adverse effects of medication. Lorazepam subjectively helps with calmness and allows better articulation of speech.    As lorazepam has been helpful; it may be desirable that a larger dose (1 mg) be taken in the morning, then 0.5 mg in the evening, to attempt to break the cycle of distress, compared to current 0.5 TID dosing. Patient's daughter is with patient 24/7, as she has quit her job. Continue to weigh risks and benefits of benzodiazepine with patient and her daughter who understand risks. Continuing to cautiously increase escitalopram. Sodium was recently normal; can recheck after increase in dose.    Increase escitalopram to 7.5 mg QD for one week then 10 mg QD for anxiety and depression  Change lorazepam to 1 mg QAM and 0.5 mg QHS for anxiety  Discussed with patient the risks of sedation, respiratory depression, impairment in judgment and motor function. Depression, mood changes, GI changes, and others discussed.  Continue psychotherapy  Scheduled for neuropsychology  Na normal  "10/17/2024  Fe and B12 normal 8/30/2024    Orders:    LORazepam (Ativan) 0.5 mg tablet; Take 2 tablets (1 mg total) by mouth every morning AND 1 tablet (0.5 mg total) every evening.    escitalopram (LEXAPRO) 5 mg tablet; Take 1.5 tablets (7.5 mg total) by mouth daily for 7 days    escitalopram (Lexapro) 10 mg tablet; Take 1 tablet (10 mg total) by mouth daily Do not start before November 12, 2024.    Major depressive disorder, recurrent episode, moderate (HCC)    Orders:    escitalopram (LEXAPRO) 5 mg tablet; Take 1.5 tablets (7.5 mg total) by mouth daily for 7 days    escitalopram (Lexapro) 10 mg tablet; Take 1 tablet (10 mg total) by mouth daily Do not start before November 12, 2024.             Treatment Recommendations/Precautions:  Risks/benefits/alternatives to treatment discussed, including a myriad of potential adverse medication side effects, to which Sandra voiced understanding and consented fully to treatment.   Labs most recently obtained , reviewed.   Follow up in 1 week for medication management  Follow up with PCP for medical issues and ongoing care  Aware of 24 hour and weekend coverage for urgent situations accessed by calling [facility] main practice number    Individual psychotherapy provided: Counseling was provided during the session today for 20 minutes.     Treatment Plan:     Completed and signed during the session: Not applicable - Treatment Plan not due at this session    _____________________________________________    History of Present Illness     Chief Complaint: \"I want my mouth to feel better\"    SUBJECTIVE:    Patient continues to report fatigue, dry mouth, bad taste, reduced appetite. Daughter reports patient was doing better until yesterday. They deny noticeable adverse effects of medication. Continues to have crying spells in morning. Preoccupation with bad taste and dry mouth. Patient reports fear of death. Lorazepam helps to calm down with subjective speech " improvement. Eating little meals. Sleep is ok.        Psychiatric Review Of Systems:  Unchanged information from this writer's previous assessment is copied and italicized; information that has changed is bolded.    Appetite: yes, improving possible  Adverse eating: no  Weight changes: no  Insomnia/sleeplessness: no  Fatigue/anergy: yes  Anhedonia/lack of interest: possible  Attention/concentration: possible  Psychomotor agitation/retardation: no  Somatic symptoms: yes  Anxiety/panic attack: yes  Zaira/hypomania: no  Hopelessness/helplessness/worthlessness: no  Self-injurious behavior/high-risk behavior: no  Suicidal ideation: no  Homicidal ideation: no  Auditory hallucinations: no  Visual hallucinations: no  Other perceptual disturbances: no  Delusional thinking: no  Obsessive/compulsive symptoms: yes    Review Of Systems:      Constitutional low energy   ENT as noted in HPI   Cardiovascular negative   Respiratory negative   Gastrointestinal negative   Genitourinary negative   Musculoskeletal negative   Integumentary negative   Neurological negative   Endocrine negative   Other Symptoms none, all other systems are negative     Objective    OBJECTIVE:     Visit Vitals  OB Status Postmenopausal   Smoking Status Never      Wt Readings from Last 6 Encounters:   10/17/24 49.9 kg (110 lb 0.2 oz)   10/10/24 49.4 kg (109 lb)   10/04/24 48.7 kg (107 lb 5.8 oz)   24 49.4 kg (109 lb)   24 48.4 kg (106 lb 11.2 oz)   24 48.1 kg (106 lb)        Past Medical History:   Diagnosis Date    Anxiety     Colon polyps     Depression     Diabetes mellitus (HCC)     Dizziness     Last assessed: 8/31/15    DVT (deep venous thrombosis) ([facility])     Dysuria     Hematuria 2022    Hyperlipidemia     Hypertension     Hypertensive urgency 10/22/2021    Insomnia     Panic attack     Ureterolithiasis 2020      Past Surgical History:   Procedure Laterality Date     SECTION      1964 son, 1968 daughter     COLONOSCOPY      FL RETROGRADE PYELOGRAM  5/22/2020    NH COLONOSCOPY FLX DX W/COLLJ SPEC WHEN PFRMD N/A 3/27/2017    Procedure: COLONOSCOPY;  Surgeon: Gold Francis MD;  Location: [facility];  Service: Gastroenterology    NH CYSTO/URETERO W/LITHOTRIPSY &INDWELL STENT INSRT Right 5/22/2020    Procedure: CYSTOSCOPY URETEROSCOPY WITH LITHOTRIPSY HOLMIUM LASER, RETROGRADE PYELOGRAM AND INSERTION STENT URETERAL; EXCISION OF BLADDER TUMOR;  Surgeon: Edwin Love MD;  Location: [facility];  Service: Urology    ROTATOR CUFF REPAIR Left     Last assessed: 3/29/15    TONSILLECTOMY         Meds/Allergies    No Known Allergies  Current Outpatient Medications   Medication Instructions    aspirin 81 mg, Oral, Daily    atorvastatin (LIPITOR) 10 mg, Oral, Daily    Cholecalciferol (VITAMIN D3) 2,000 Units, Oral, Daily    Constulose 10 GM/15ML solution TAKE 15 ML BY MOUTH TWO TIMES A DAY    escitalopram (LEXAPRO) 7.5 mg, Oral, Daily    [START ON 11/12/2024] escitalopram (LEXAPRO) 10 mg, Oral, Daily    glycerin adult 2 g suppository Please use glycerin suppository every other day.  If patient has rectal or abdominal pain, can use glycerin suppository daily    linaCLOtide 145 mcg, Oral, Daily    lisinopril-hydrochlorothiazide (PRINZIDE,ZESTORETIC) 10-12.5 MG per tablet 1 tablet, Oral, Daily    LORazepam (Ativan) 0.5 mg tablet Take 2 tablets (1 mg total) by mouth every morning AND 1 tablet (0.5 mg total) every evening.    metFORMIN (GLUCOPHAGE) 500 mg, Oral, 2 times daily with meals    ondansetron (ZOFRAN-ODT) 4 mg, Oral, Every 8 hours PRN    pantoprazole (PROTONIX) 40 mg, Oral, Daily (early morning)    polyethylene glycol (MIRALAX) 17 g, Oral, Daily    psyllium (METAMUCIL) packet 1 packet, Oral, Daily    senna-docusate sodium (SENOKOT S) 8.6-50 mg per tablet 1 tablet, Oral, Daily PRN           Mental Status Exam:    Appearance Casual attire, curly grey hair, good eye contact   Behavior/motor Calm and cooperative   Speech/language  "Normal volume, halting   Mood \"Not too good\"   Affect Dysphoric, full range   Thought process Logical and linear   Thought content Obsessive thinking    No overt delusions, Denies hallucinations, Denies suicidal ideations   Cognition Awake and alert, oriented and memory grossly intact, and concentrates on questions   Insight/judgment Insight: fair  Judgment: fair     Laboratory Results: I have personally reviewed all pertinent laboratory/tests results    Admission on 10/17/2024, Discharged on 10/17/2024   Component Date Value Ref Range Status    Sodium 10/17/2024 139  135 - 147 mmol/L Final    Potassium 10/17/2024 3.8  3.5 - 5.3 mmol/L Final    Chloride 10/17/2024 105  96 - 108 mmol/L Final    CO2 10/17/2024 27  21 - 32 mmol/L Final    ANION GAP 10/17/2024 7  4 - 13 mmol/L Final    BUN 10/17/2024 21  5 - 25 mg/dL Final    Creatinine 10/17/2024 0.98  0.60 - 1.30 mg/dL Final    Standardized to IDMS reference method    Glucose 10/17/2024 121  65 - 140 mg/dL Final    If the patient is fasting, the ADA then defines impaired fasting glucose as > 100 mg/dL and diabetes as > or equal to 123 mg/dL.    Calcium 10/17/2024 8.9  8.4 - 10.2 mg/dL Final    AST 10/17/2024 11 (L)  13 - 39 U/L Final    ALT 10/17/2024 8  7 - 52 U/L Final    Specimen collection should occur prior to Sulfasalazine administration due to the potential for falsely depressed results.     Alkaline Phosphatase 10/17/2024 45  34 - 104 U/L Final    Total Protein 10/17/2024 5.8 (L)  6.4 - 8.4 g/dL Final    Albumin 10/17/2024 3.6  3.5 - 5.0 g/dL Final    Total Bilirubin 10/17/2024 0.68  0.20 - 1.00 mg/dL Final    Use of this assay is not recommended for patients undergoing treatment with eltrombopag due to the potential for falsely elevated results.  N-acetyl-p-benzoquinone imine (metabolite of Acetaminophen) will generate erroneously low results in samples for patients that have taken an overdose of Acetaminophen.    eGFR 10/17/2024 52  ml/min/1.73sq m Final    " WBC 10/17/2024 5.82  4.31 - 10.16 Thousand/uL Final    RBC 10/17/2024 3.88  3.81 - 5.12 Million/uL Final    Hemoglobin 10/17/2024 11.3 (L)  11.5 - 15.4 g/dL Final    Hematocrit 10/17/2024 34.9  34.8 - 46.1 % Final    MCV 10/17/2024 90  82 - 98 fL Final    MCH 10/17/2024 29.1  26.8 - 34.3 pg Final    MCHC 10/17/2024 32.4  31.4 - 37.4 g/dL Final    RDW 10/17/2024 13.3  11.6 - 15.1 % Final    MPV 10/17/2024 10.8  8.9 - 12.7 fL Final    Platelets 10/17/2024 230  149 - 390 Thousands/uL Final    nRBC 10/17/2024 0  /100 WBCs Final    Segmented % 10/17/2024 68  43 - 75 % Final    Immature Grans % 10/17/2024 0  0 - 2 % Final    Lymphocytes % 10/17/2024 24  14 - 44 % Final    Monocytes % 10/17/2024 6  4 - 12 % Final    Eosinophils Relative 10/17/2024 1  0 - 6 % Final    Basophils Relative 10/17/2024 1  0 - 1 % Final    Absolute Neutrophils 10/17/2024 3.94  1.85 - 7.62 Thousands/µL Final    Absolute Immature Grans 10/17/2024 0.02  0.00 - 0.20 Thousand/uL Final    Absolute Lymphocytes 10/17/2024 1.38  0.60 - 4.47 Thousands/µL Final    Absolute Monocytes 10/17/2024 0.36  0.17 - 1.22 Thousand/µL Final    Eosinophils Absolute 10/17/2024 0.08  0.00 - 0.61 Thousand/µL Final    Basophils Absolute 10/17/2024 0.04  0.00 - 0.10 Thousands/µL Final    Color, UA 10/17/2024 Light Yellow   Final    Clarity, UA 10/17/2024 Clear   Final    Specific Gravity, UA 10/17/2024 1.012  1.003 - 1.030 Final    pH, UA 10/17/2024 7.0  4.5, 5.0, 5.5, 6.0, 6.5, 7.0, 7.5, 8.0 Final    Leukocytes, UA 10/17/2024 Negative  Negative Final    Nitrite, UA 10/17/2024 Negative  Negative Final    Protein, UA 10/17/2024 Negative  Negative mg/dl Final    Glucose, UA 10/17/2024 Negative  Negative mg/dl Final    Ketones, UA 10/17/2024 Negative  Negative mg/dl Final    Urobilinogen, UA 10/17/2024 <2.0  <2.0 mg/dl mg/dl Final    Bilirubin, UA 10/17/2024 Negative  Negative Final    Occult Blood, UA 10/17/2024 Negative  Negative Final    Magnesium 10/17/2024 1.8 (L)  1.9 -  2.7 mg/dL Final    SARS-CoV-2 10/17/2024 Negative  Negative Final         INFLUENZA A PCR 10/17/2024 Negative  Negative Final         INFLUENZA B PCR 10/17/2024 Negative  Negative Final         RSV PCR 10/17/2024 Negative  Negative Final         Ventricular Rate 10/17/2024 79  BPM Final    Atrial Rate 10/17/2024 79  BPM Final    [place] Interval 10/17/2024 166  ms Final    QRSD Interval 10/17/2024 72  ms Final    QT Interval 10/17/2024 358  ms Final    QTC Interval 10/17/2024 410  ms Final    P Axis 10/17/2024 52  degrees Final    QRS Axis 10/17/2024 1  degrees Final    T Wave Axis 10/17/2024 42  degrees Final   Admission on 10/03/2024, Discharged on 10/05/2024   Component Date Value Ref Range Status    WBC 10/04/2024 5.78  4.31 - 10.16 Thousand/uL Final    RBC 10/04/2024 3.90  3.81 - 5.12 Million/uL Final    Hemoglobin 10/04/2024 11.3 (L)  11.5 - 15.4 g/dL Final    Hematocrit 10/04/2024 35.2  34.8 - 46.1 % Final    MCV 10/04/2024 90  82 - 98 fL Final    MCH 10/04/2024 29.0  26.8 - 34.3 pg Final    MCHC 10/04/2024 32.1  31.4 - 37.4 g/dL Final    RDW 10/04/2024 12.9  11.6 - 15.1 % Final    MPV 10/04/2024 10.9  8.9 - 12.7 fL Final    Platelets 10/04/2024 256  149 - 390 Thousands/uL Final    nRBC 10/04/2024 0  /100 WBCs Final    Segmented % 10/04/2024 56  43 - 75 % Final    Immature Grans % 10/04/2024 0  0 - 2 % Final    Lymphocytes % 10/04/2024 32  14 - 44 % Final    Monocytes % 10/04/2024 8  4 - 12 % Final    Eosinophils Relative 10/04/2024 3  0 - 6 % Final    Basophils Relative 10/04/2024 1  0 - 1 % Final    Absolute Neutrophils 10/04/2024 3.21  1.85 - 7.62 Thousands/µL Final    Absolute Immature Grans 10/04/2024 0.02  0.00 - 0.20 Thousand/uL Final    Absolute Lymphocytes 10/04/2024 1.85  0.60 - 4.47 Thousands/µL Final    Absolute Monocytes 10/04/2024 0.48  0.17 - 1.22 Thousand/µL Final    Eosinophils Absolute 10/04/2024 0.18  0.00 - 0.61 Thousand/µL Final    Basophils Absolute 10/04/2024 0.04  0.00 - 0.10 Thousands/µL  Final    Sodium 10/04/2024 137  135 - 147 mmol/L Final    Potassium 10/04/2024 4.4  3.5 - 5.3 mmol/L Final    Chloride 10/04/2024 102  96 - 108 mmol/L Final    CO2 10/04/2024 28  21 - 32 mmol/L Final    ANION GAP 10/04/2024 7  4 - 13 mmol/L Final    BUN 10/04/2024 34 (H)  5 - 25 mg/dL Final    Creatinine 10/04/2024 1.15  0.60 - 1.30 mg/dL Final    Standardized to IDMS reference method    Glucose 10/04/2024 153 (H)  65 - 140 mg/dL Final    If the patient is fasting, the ADA then defines impaired fasting glucose as > 100 mg/dL and diabetes as > or equal to 123 mg/dL.    Calcium 10/04/2024 9.3  8.4 - 10.2 mg/dL Final    AST 10/04/2024 28  13 - 39 U/L Final    ALT 10/04/2024 20  7 - 52 U/L Final    Specimen collection should occur prior to Sulfasalazine administration due to the potential for falsely depressed results.     Alkaline Phosphatase 10/04/2024 58  34 - 104 U/L Final    Total Protein 10/04/2024 6.4  6.4 - 8.4 g/dL Final    Albumin 10/04/2024 3.8  3.5 - 5.0 g/dL Final    Total Bilirubin 10/04/2024 0.32  0.20 - 1.00 mg/dL Final    Use of this assay is not recommended for patients undergoing treatment with eltrombopag due to the potential for falsely elevated results.  N-acetyl-p-benzoquinone imine (metabolite of Acetaminophen) will generate erroneously low results in samples for patients that have taken an overdose of Acetaminophen.    eGFR 10/04/2024 43  ml/min/1.73sq m Final    LACTIC ACID 10/04/2024 2.2 (H)  0.5 - 2.0 mmol/L Final    LACTIC ACID 10/04/2024 2.2 (H)  0.5 - 2.0 mmol/L Final    WBC 10/04/2024 7.78  4.31 - 10.16 Thousand/uL Final    RBC 10/04/2024 3.81  3.81 - 5.12 Million/uL Final    Hemoglobin 10/04/2024 11.0 (L)  11.5 - 15.4 g/dL Final    Hematocrit 10/04/2024 36.7  34.8 - 46.1 % Final    MCV 10/04/2024 96  82 - 98 fL Final    short sample    MCH 10/04/2024 28.9  26.8 - 34.3 pg Final    MCHC 10/04/2024 30.0 (L)  31.4 - 37.4 g/dL Final    RDW 10/04/2024 12.7  11.6 - 15.1 % Final    MPV  10/04/2024 10.4  8.9 - 12.7 fL Final    Platelets 10/04/2024 221  149 - 390 Thousands/uL Final    nRBC 10/04/2024 0  /100 WBCs Final    Segmented % 10/04/2024 74  43 - 75 % Final    Immature Grans % 10/04/2024 0  0 - 2 % Final    Lymphocytes % 10/04/2024 18  14 - 44 % Final    Monocytes % 10/04/2024 5  4 - 12 % Final    Eosinophils Relative 10/04/2024 2  0 - 6 % Final    Basophils Relative 10/04/2024 1  0 - 1 % Final    Absolute Neutrophils 10/04/2024 5.74  1.85 - 7.62 Thousands/µL Final    Absolute Immature Grans 10/04/2024 0.03  0.00 - 0.20 Thousand/uL Final    Absolute Lymphocytes 10/04/2024 1.42  0.60 - 4.47 Thousands/µL Final    Absolute Monocytes 10/04/2024 0.42  0.17 - 1.22 Thousand/µL Final    Eosinophils Absolute 10/04/2024 0.12  0.00 - 0.61 Thousand/µL Final    Basophils Absolute 10/04/2024 0.05  0.00 - 0.10 Thousands/µL Final    Sodium 10/04/2024 134 (L)  135 - 147 mmol/L Final    Potassium 10/04/2024 4.5  3.5 - 5.3 mmol/L Final    Chloride 10/04/2024 103  96 - 108 mmol/L Final    CO2 10/04/2024 24  21 - 32 mmol/L Final    ANION GAP 10/04/2024 7  4 - 13 mmol/L Final    BUN 10/04/2024 29 (H)  5 - 25 mg/dL Final    Creatinine 10/04/2024 1.05  0.60 - 1.30 mg/dL Final    Standardized to IDMS reference method    Glucose 10/04/2024 137  65 - 140 mg/dL Final    If the patient is fasting, the ADA then defines impaired fasting glucose as > 100 mg/dL and diabetes as > or equal to 123 mg/dL.    Calcium 10/04/2024 8.7  8.4 - 10.2 mg/dL Final    eGFR 10/04/2024 48  ml/min/1.73sq m Final    LACTIC ACID 10/04/2024 2.0  0.5 - 2.0 mmol/L Final    POC Glucose 10/04/2024 124  65 - 140 mg/dl Final    POC Glucose 10/04/2024 168 (H)  65 - 140 mg/dl Final    POC Glucose 10/04/2024 131  65 - 140 mg/dl Final    TSH 3RD GENERATON 10/05/2024 0.957  0.450 - 4.500 uIU/mL Final    The recommended reference ranges for TSH during pregnancy are as follows:   First trimester 0.100 to 2.500 uIU/mL   Second trimester  0.200 to 3.000 uIU/mL    Third trimester 0.300 to 3.000 uIU/m    Note: Normal ranges may not apply to patients who are transgender, non-binary, or whose legal sex, sex at birth, and gender identity differ.  Adult TSH (3rd generation) reference range follows the recommended guidelines of the American [employer] Association, January, 2020.    Protime 10/05/2024 14.3  12.3 - 15.0 seconds Final    INR 10/05/2024 1.04  0.85 - 1.19 Final    WBC 10/05/2024 5.15  4.31 - 10.16 Thousand/uL Final    RBC 10/05/2024 3.27 (L)  3.81 - 5.12 Million/uL Final    Hemoglobin 10/05/2024 9.8 (L)  11.5 - 15.4 g/dL Final    Hematocrit 10/05/2024 29.5 (L)  34.8 - 46.1 % Final    MCV 10/05/2024 90  82 - 98 fL Final    Results verified by repeat    MCH 10/05/2024 30.0  26.8 - 34.3 pg Final    MCHC 10/05/2024 33.2  31.4 - 37.4 g/dL Final    RDW 10/05/2024 12.7  11.6 - 15.1 % Final    MPV 10/05/2024 10.7  8.9 - 12.7 fL Final    Platelets 10/05/2024 210  149 - 390 Thousands/uL Final    nRBC 10/05/2024 0  /100 WBCs Final    Segmented % 10/05/2024 58  43 - 75 % Final    Immature Grans % 10/05/2024 0  0 - 2 % Final    Lymphocytes % 10/05/2024 31  14 - 44 % Final    Monocytes % 10/05/2024 6  4 - 12 % Final    Eosinophils Relative 10/05/2024 4  0 - 6 % Final    Basophils Relative 10/05/2024 1  0 - 1 % Final    Absolute Neutrophils 10/05/2024 2.97  1.85 - 7.62 Thousands/µL Final    Absolute Immature Grans 10/05/2024 0.02  0.00 - 0.20 Thousand/uL Final    Absolute Lymphocytes 10/05/2024 1.61  0.60 - 4.47 Thousands/µL Final    Absolute Monocytes 10/05/2024 0.33  0.17 - 1.22 Thousand/µL Final    Eosinophils Absolute 10/05/2024 0.19  0.00 - 0.61 Thousand/µL Final    Basophils Absolute 10/05/2024 0.03  0.00 - 0.10 Thousands/µL Final    Sodium 10/05/2024 139  135 - 147 mmol/L Final    Potassium 10/05/2024 4.1  3.5 - 5.3 mmol/L Final    Chloride 10/05/2024 106  96 - 108 mmol/L Final    CO2 10/05/2024 28  21 - 32 mmol/L Final    ANION GAP 10/05/2024 5  4 - 13 mmol/L Final    BUN  10/05/2024 20  5 - 25 mg/dL Final    Creatinine 10/05/2024 1.04  0.60 - 1.30 mg/dL Final    Standardized to IDMS reference method    Glucose 10/05/2024 106  65 - 140 mg/dL Final    If the patient is fasting, the ADA then defines impaired fasting glucose as > 100 mg/dL and diabetes as > or equal to 123 mg/dL.    Calcium 10/05/2024 8.7  8.4 - 10.2 mg/dL Final    Corrected Calcium 10/05/2024 9.4  8.3 - 10.1 mg/dL Final    AST 10/05/2024 11 (L)  13 - 39 U/L Final    ALT 10/05/2024 11  7 - 52 U/L Final    Specimen collection should occur prior to Sulfasalazine administration due to the potential for falsely depressed results.     Alkaline Phosphatase 10/05/2024 48  34 - 104 U/L Final    Total Protein 10/05/2024 5.1 (L)  6.4 - 8.4 g/dL Final    Albumin 10/05/2024 3.1 (L)  3.5 - 5.0 g/dL Final    Total Bilirubin 10/05/2024 0.31  0.20 - 1.00 mg/dL Final    Use of this assay is not recommended for patients undergoing treatment with eltrombopag due to the potential for falsely elevated results.  N-acetyl-p-benzoquinone imine (metabolite of Acetaminophen) will generate erroneously low results in samples for patients that have taken an overdose of Acetaminophen.    eGFR 10/05/2024 49  ml/min/1.73sq m Final    POC Glucose 10/05/2024 105  65 - 140 mg/dl Final    POC Glucose 10/05/2024 132  65 - 140 mg/dl Final    POC Glucose 10/05/2024 187 (H)  65 - 140 mg/dl Final   Admission on 09/28/2024, Discharged on 09/30/2024   Component Date Value Ref Range Status    WBC 09/28/2024 7.21  4.31 - 10.16 Thousand/uL Final    RBC 09/28/2024 3.90  3.81 - 5.12 Million/uL Final    Hemoglobin 09/28/2024 11.2 (L)  11.5 - 15.4 g/dL Final    Hematocrit 09/28/2024 35.3  34.8 - 46.1 % Final    MCV 09/28/2024 91  82 - 98 fL Final    MCH 09/28/2024 28.7  26.8 - 34.3 pg Final    MCHC 09/28/2024 31.7  31.4 - 37.4 g/dL Final    RDW 09/28/2024 12.7  11.6 - 15.1 % Final    MPV 09/28/2024 10.4  8.9 - 12.7 fL Final    Platelets 09/28/2024 223  149 - 390  Thousands/uL Final    nRBC 09/28/2024 0  /100 WBCs Final    Segmented % 09/28/2024 65  43 - 75 % Final    Immature Grans % 09/28/2024 0  0 - 2 % Final    Lymphocytes % 09/28/2024 25  14 - 44 % Final    Monocytes % 09/28/2024 6  4 - 12 % Final    Eosinophils Relative 09/28/2024 3  0 - 6 % Final    Basophils Relative 09/28/2024 1  0 - 1 % Final    Absolute Neutrophils 09/28/2024 4.73  1.85 - 7.62 Thousands/µL Final    Absolute Immature Grans 09/28/2024 0.02  0.00 - 0.20 Thousand/uL Final    Absolute Lymphocytes 09/28/2024 1.77  0.60 - 4.47 Thousands/µL Final    Absolute Monocytes 09/28/2024 0.41  0.17 - 1.22 Thousand/µL Final    Eosinophils Absolute 09/28/2024 0.24  0.00 - 0.61 Thousand/µL Final    Basophils Absolute 09/28/2024 0.04  0.00 - 0.10 Thousands/µL Final    Sodium 09/28/2024 136  135 - 147 mmol/L Final    Potassium 09/28/2024 3.9  3.5 - 5.3 mmol/L Final    Chloride 09/28/2024 104  96 - 108 mmol/L Final    CO2 09/28/2024 24  21 - 32 mmol/L Final    ANION GAP 09/28/2024 8  4 - 13 mmol/L Final    BUN 09/28/2024 27 (H)  5 - 25 mg/dL Final    Creatinine 09/28/2024 1.14  0.60 - 1.30 mg/dL Final    Standardized to IDMS reference method    Glucose 09/28/2024 216 (H)  65 - 140 mg/dL Final    If the patient is fasting, the ADA then defines impaired fasting glucose as > 100 mg/dL and diabetes as > or equal to 123 mg/dL.    Calcium 09/28/2024 9.1  8.4 - 10.2 mg/dL Final    AST 09/28/2024 13  13 - 39 U/L Final    ALT 09/28/2024 7  7 - 52 U/L Final    Specimen collection should occur prior to Sulfasalazine administration due to the potential for falsely depressed results.     Alkaline Phosphatase 09/28/2024 46  34 - 104 U/L Final    Total Protein 09/28/2024 6.0 (L)  6.4 - 8.4 g/dL Final    Albumin 09/28/2024 3.7  3.5 - 5.0 g/dL Final    Total Bilirubin 09/28/2024 0.35  0.20 - 1.00 mg/dL Final    Use of this assay is not recommended for patients undergoing treatment with eltrombopag due to the potential for falsely  elevated results.  N-acetyl-p-benzoquinone imine (metabolite of Acetaminophen) will generate erroneously low results in samples for patients that have taken an overdose of Acetaminophen.    eGFR 09/28/2024 43  ml/min/1.73sq m Final    Magnesium 09/28/2024 1.8 (L)  1.9 - 2.7 mg/dL Final    Phosphorus 09/28/2024 3.3  2.3 - 4.1 mg/dL Final    Ventricular Rate 09/29/2024 79  BPM Final    Atrial Rate 09/29/2024 300  BPM Final    QRSD Interval 09/29/2024 78  ms Final    QT Interval 09/29/2024 380  ms Final    QTC Interval 09/29/2024 435  ms Final    QRS Axis 09/29/2024 -10  degrees Final    T Wave [place] 09/29/2024 2  degrees Final    Hemoglobin A1C 09/29/2024 6.7 (H)  Normal 4.0-5.6%; PreDiabetic 5.7-6.4%; Diabetic >=6.5%; Glycemic control for adults with diabetes <7.0% % Final    EAG 09/29/2024 146  mg/dl Final    WBC 09/29/2024 9.50  4.31 - 10.16 Thousand/uL Final    RBC 09/29/2024 3.82  3.81 - 5.12 Million/uL Final    Hemoglobin 09/29/2024 11.0 (L)  11.5 - 15.4 g/dL Final    Hematocrit 09/29/2024 34.7 (L)  34.8 - 46.1 % Final    MCV 09/29/2024 91  82 - 98 fL Final    MCH 09/29/2024 28.8  26.8 - 34.3 pg Final    MCHC 09/29/2024 31.7  31.4 - 37.4 g/dL Final    RDW 09/29/2024 12.8  11.6 - 15.1 % Final    MPV 09/29/2024 10.7  8.9 - 12.7 fL Final    Platelets 09/29/2024 191  149 - 390 Thousands/uL Final    nRBC 09/29/2024 0  /100 WBCs Final    Segmented % 09/29/2024 86 (H)  43 - 75 % Final    Immature Grans % 09/29/2024 0  0 - 2 % Final    Lymphocytes % 09/29/2024 8 (L)  14 - 44 % Final    Monocytes % 09/29/2024 5  4 - 12 % Final    Eosinophils Relative 09/29/2024 1  0 - 6 % Final    Basophils Relative 09/29/2024 0  0 - 1 % Final    Absolute Neutrophils 09/29/2024 8.04 (H)  1.85 - 7.62 Thousands/µL Final    Absolute Immature Grans 09/29/2024 0.04  0.00 - 0.20 Thousand/uL Final    Absolute Lymphocytes 09/29/2024 0.79  0.60 - 4.47 Thousands/µL Final    Absolute Monocytes 09/29/2024 0.47  0.17 - 1.22 Thousand/µL Final     Eosinophils Absolute 09/29/2024 0.13  0.00 - 0.61 Thousand/µL Final    Basophils Absolute 09/29/2024 0.03  0.00 - 0.10 Thousands/µL Final    Sodium 09/29/2024 140  135 - 147 mmol/L Final    Potassium 09/29/2024 3.4 (L)  3.5 - 5.3 mmol/L Final    Chloride 09/29/2024 107  96 - 108 mmol/L Final    CO2 09/29/2024 24  21 - 32 mmol/L Final    ANION GAP 09/29/2024 9  4 - 13 mmol/L Final    BUN 09/29/2024 24  5 - 25 mg/dL Final    Creatinine 09/29/2024 0.98  0.60 - 1.30 mg/dL Final    Standardized to IDMS reference method    Glucose 09/29/2024 137  65 - 140 mg/dL Final    If the patient is fasting, the ADA then defines impaired fasting glucose as > 100 mg/dL and diabetes as > or equal to 123 mg/dL.    Calcium 09/29/2024 8.6  8.4 - 10.2 mg/dL Final    eGFR 09/29/2024 52  ml/min/1.73sq m Final    Magnesium 09/29/2024 2.1  1.9 - 2.7 mg/dL Final    POC Glucose 09/29/2024 138  65 - 140 mg/dl Final    POC Glucose 09/29/2024 165 (H)  65 - 140 mg/dl Final    POC Glucose 09/29/2024 169 (H)  65 - 140 mg/dl Final    POC Glucose 09/29/2024 113  65 - 140 mg/dl Final    WBC 09/30/2024 6.30  4.31 - 10.16 Thousand/uL Final    RBC 09/30/2024 3.78 (L)  3.81 - 5.12 Million/uL Final    Hemoglobin 09/30/2024 10.7 (L)  11.5 - 15.4 g/dL Final    Hematocrit 09/30/2024 34.0 (L)  34.8 - 46.1 % Final    MCV 09/30/2024 90  82 - 98 fL Final    MCH 09/30/2024 28.3  26.8 - 34.3 pg Final    MCHC 09/30/2024 31.5  31.4 - 37.4 g/dL Final    RDW 09/30/2024 12.9  11.6 - 15.1 % Final    Platelets 09/30/2024 171  149 - 390 Thousands/uL Final    MPV 09/30/2024 10.6  8.9 - 12.7 fL Final    Sodium 09/30/2024 138  135 - 147 mmol/L Final    Potassium 09/30/2024 4.1  3.5 - 5.3 mmol/L Final    Chloride 09/30/2024 107  96 - 108 mmol/L Final    CO2 09/30/2024 24  21 - 32 mmol/L Final    ANION GAP 09/30/2024 7  4 - 13 mmol/L Final    BUN 09/30/2024 19  5 - 25 mg/dL Final    Creatinine 09/30/2024 0.96  0.60 - 1.30 mg/dL Final    Standardized to IDMS reference method     Glucose 09/30/2024 126  65 - 140 mg/dL Final    If the patient is fasting, the ADA then defines impaired fasting glucose as > 100 mg/dL and diabetes as > or equal to 123 mg/dL.    Calcium 09/30/2024 8.9  8.4 - 10.2 mg/dL Final    eGFR 09/30/2024 54  ml/min/1.73sq m Final    POC Glucose 09/30/2024 121  65 - 140 mg/dl Final    POC Glucose 09/30/2024 140  65 - 140 mg/dl Final   Admission on 09/23/2024, Discharged on 09/23/2024   Component Date Value Ref Range Status    Ventricular Rate 09/23/2024 85  BPM Final    Atrial Rate 09/23/2024 85  BPM Final    CT Interval 09/23/2024 160  ms Final    QRSD Interval 09/23/2024 72  ms Final    QT Interval 09/23/2024 348  ms Final    QTC Interval 09/23/2024 414  ms Final    P Axis 09/23/2024 39  degrees Final    QRS Axis 09/23/2024 -2  degrees Final    T Wave Axis 09/23/2024 13  degrees Final    WBC 09/23/2024 6.14  4.31 - 10.16 Thousand/uL Final    RBC 09/23/2024 4.21  3.81 - 5.12 Million/uL Final    Hemoglobin 09/23/2024 12.3  11.5 - 15.4 g/dL Final    Hematocrit 09/23/2024 37.8  34.8 - 46.1 % Final    MCV 09/23/2024 90  82 - 98 fL Final    MCH 09/23/2024 29.2  26.8 - 34.3 pg Final    MCHC 09/23/2024 32.5  31.4 - 37.4 g/dL Final    RDW 09/23/2024 12.7  11.6 - 15.1 % Final    MPV 09/23/2024 10.7  8.9 - 12.7 fL Final    Platelets 09/23/2024 197  149 - 390 Thousands/uL Final    nRBC 09/23/2024 0  /100 WBCs Final    Segmented % 09/23/2024 66  43 - 75 % Final    Immature Grans % 09/23/2024 0  0 - 2 % Final    Lymphocytes % 09/23/2024 24  14 - 44 % Final    Monocytes % 09/23/2024 6  4 - 12 % Final    Eosinophils Relative 09/23/2024 3  0 - 6 % Final    Basophils Relative 09/23/2024 1  0 - 1 % Final    Absolute Neutrophils 09/23/2024 4.11  1.85 - 7.62 Thousands/µL Final    Absolute Immature Grans 09/23/2024 0.02  0.00 - 0.20 Thousand/uL Final    Absolute Lymphocytes 09/23/2024 1.44  0.60 - 4.47 Thousands/µL Final    Absolute Monocytes 09/23/2024 0.35  0.17 - 1.22 Thousand/µL Final     Eosinophils Absolute 09/23/2024 0.17  0.00 - 0.61 Thousand/µL Final    Basophils Absolute 09/23/2024 0.05  0.00 - 0.10 Thousands/µL Final    Sodium 09/23/2024 137  135 - 147 mmol/L Final    Potassium 09/23/2024 5.0  3.5 - 5.3 mmol/L Final    Moderately Hemolyzed:Results may be affected.    Chloride 09/23/2024 104  96 - 108 mmol/L Final    CO2 09/23/2024 25  21 - 32 mmol/L Final    ANION GAP 09/23/2024 8  4 - 13 mmol/L Final    BUN 09/23/2024 37 (H)  5 - 25 mg/dL Final    Creatinine 09/23/2024 1.18  0.60 - 1.30 mg/dL Final    Standardized to IDMS reference method    Glucose 09/23/2024 109  65 - 140 mg/dL Final    If the patient is fasting, the ADA then defines impaired fasting glucose as > 100 mg/dL and diabetes as > or equal to 123 mg/dL.    Calcium 09/23/2024 9.6  8.4 - 10.2 mg/dL Final    eGFR 09/23/2024 42  ml/min/1.73sq m Final   Admission on 09/15/2024, Discharged on 09/15/2024   Component Date Value Ref Range Status    Ventricular Rate 09/15/2024 84  BPM Final    Atrial Rate 09/15/2024 84  BPM Final    VT Interval 09/15/2024 162  ms Final    QRSD Interval 09/15/2024 68  ms Final    QT Interval 09/15/2024 348  ms Final    QTC Interval 09/15/2024 411  ms Final    P Axis 09/15/2024 55  degrees Final    QRS Axis 09/15/2024 6  degrees Final    T Wave [place] 09/15/2024 39  degrees Final    WBC 09/15/2024 7.61  4.31 - 10.16 Thousand/uL Final    RBC 09/15/2024 4.13  3.81 - 5.12 Million/uL Final    Hemoglobin 09/15/2024 11.8  11.5 - 15.4 g/dL Final    Hematocrit 09/15/2024 37.1  34.8 - 46.1 % Final    MCV 09/15/2024 90  82 - 98 fL Final    MCH 09/15/2024 28.6  26.8 - 34.3 pg Final    MCHC 09/15/2024 31.8  31.4 - 37.4 g/dL Final    RDW 09/15/2024 12.8  11.6 - 15.1 % Final    MPV 09/15/2024 10.9  8.9 - 12.7 fL Final    Platelets 09/15/2024 218  149 - 390 Thousands/uL Final    nRBC 09/15/2024 0  /100 WBCs Final    Segmented % 09/15/2024 73  43 - 75 % Final    Immature Grans % 09/15/2024 0  0 - 2 % Final    Lymphocytes %  09/15/2024 19  14 - 44 % Final    Monocytes % 09/15/2024 5  4 - 12 % Final    Eosinophils Relative 09/15/2024 2  0 - 6 % Final    Basophils Relative 09/15/2024 1  0 - 1 % Final    Absolute Neutrophils 09/15/2024 5.59  1.85 - 7.62 Thousands/µL Final    Absolute Immature Grans 09/15/2024 0.02  0.00 - 0.20 Thousand/uL Final    Absolute Lymphocytes 09/15/2024 1.41  0.60 - 4.47 Thousands/µL Final    Absolute Monocytes 09/15/2024 0.40  0.17 - 1.22 Thousand/µL Final    Eosinophils Absolute 09/15/2024 0.14  0.00 - 0.61 Thousand/µL Final    Basophils Absolute 09/15/2024 0.05  0.00 - 0.10 Thousands/µL Final    Sodium 09/15/2024 136  135 - 147 mmol/L Final    Potassium 09/15/2024 4.0  3.5 - 5.3 mmol/L Final    Chloride 09/15/2024 101  96 - 108 mmol/L Final    CO2 09/15/2024 25  21 - 32 mmol/L Final    ANION GAP 09/15/2024 10  4 - 13 mmol/L Final    BUN 09/15/2024 27 (H)  5 - 25 mg/dL Final    Creatinine 09/15/2024 1.05  0.60 - 1.30 mg/dL Final    Standardized to IDMS reference method    Glucose 09/15/2024 105  65 - 140 mg/dL Final    If the patient is fasting, the ADA then defines impaired fasting glucose as > 100 mg/dL and diabetes as > or equal to 123 mg/dL.    Calcium 09/15/2024 9.6  8.4 - 10.2 mg/dL Final    AST 09/15/2024 15  13 - 39 U/L Final    ALT 09/15/2024 9  7 - 52 U/L Final    Specimen collection should occur prior to Sulfasalazine administration due to the potential for falsely depressed results.     Alkaline Phosphatase 09/15/2024 49  34 - 104 U/L Final    Total Protein 09/15/2024 6.7  6.4 - 8.4 g/dL Final    Albumin 09/15/2024 4.1  3.5 - 5.0 g/dL Final    Total Bilirubin 09/15/2024 0.47  0.20 - 1.00 mg/dL Final    Use of this assay is not recommended for patients undergoing treatment with eltrombopag due to the potential for falsely elevated results.  N-acetyl-p-benzoquinone imine (metabolite of Acetaminophen) will generate erroneously low results in samples for patients that have taken an overdose of  "Acetaminophen.    eGFR 09/15/2024 48  ml/min/1.73sq m Final    Lipase 09/15/2024 45  11 - 82 u/L Final    hs TnI 0hr 09/15/2024 17  \"Refer to ACS Flowchart\"- see link ng/L Final    Comment:                                              Initial (time 0) result  If >=50 ng/L, Myocardial injury suggested ;  Type of myocardial injury and treatment strategy  to be determined.  If 5-49 ng/L, a delta result at 2 hours and or 4 hours will be needed to further evaluate.  If <4 ng/L, and chest pain has been >3 hours since onset, patient may qualify for discharge based on the HEART score in the ED.  If <5 ng/L and <3hours since onset of chest pain, a delta result at 2 hours will be needed to further evaluate.    HS Troponin 99th Percentile URL of a Health Population=12 ng/L with a 95% Confidence Interval of 8-18 ng/L.    Second Troponin (time 2 hours)  If calculated delta >= 20 ng/L,  Myocardial injury suggested ; Type of myocardial injury and treatment strategy to be determined.  If 5-49 ng/L and the calculated delta is 5-19 ng/L, consult medical service for evaluation.  Continue evaluation for ischemia on ecg and other possible etiology and repeat hs troponin at 4 hours.  If delta                            is <5 ng/L at 2 hours, consider discharge based on risk stratification via the HEART score (if in ED), or [place] risk score in IP/Observation.    HS Troponin 99th Percentile URL of a Health Population=12 ng/L with a 95% Confidence Interval of 8-18 ng/L.    Color, UA 09/15/2024 Light Yellow   Final    Clarity, UA 09/15/2024 Clear   Final    Specific Gravity, UA 09/15/2024 1.019  1.003 - 1.030 Final    pH, UA 09/15/2024 5.0  4.5, 5.0, 5.5, 6.0, 6.5, 7.0, 7.5, 8.0 Final    Leukocytes, UA 09/15/2024 Negative  Negative Final    Nitrite, UA 09/15/2024 Negative  Negative Final    Protein, UA 09/15/2024 Negative  Negative mg/dl Final    Glucose, UA 09/15/2024 Negative  Negative mg/dl Final    Ketones, UA 09/15/2024 Negative  " "Negative mg/dl Final    Urobilinogen, UA 09/15/2024 <2.0  <2.0 mg/dl mg/dl Final    Bilirubin, UA 09/15/2024 Negative  Negative Final    Occult Blood, UA 09/15/2024 Small (A)  Negative Final    hs TnI 2hr 09/15/2024 14  \"Refer to ACS Flowchart\"- see link ng/L Final    Comment:                                              Initial (time 0) result  If >=50 ng/L, Myocardial injury suggested ;  Type of myocardial injury and treatment strategy  to be determined.  If 5-49 ng/L, a delta result at 2 hours and or 4 hours will be needed to further evaluate.  If <4 ng/L, and chest pain has been >3 hours since onset, patient may qualify for discharge based on the HEART score in the ED.  If <5 ng/L and <3hours since onset of chest pain, a delta result at 2 hours will be needed to further evaluate.    HS Troponin 99th Percentile URL of a Health Population=12 ng/L with a 95% Confidence Interval of 8-18 ng/L.    Second Troponin (time 2 hours)  If calculated delta >= 20 ng/L,  Myocardial injury suggested ; Type of myocardial injury and treatment strategy to be determined.  If 5-49 ng/L and the calculated delta is 5-19 ng/L, consult medical service for evaluation.  Continue evaluation for ischemia on ecg and other possible etiology and repeat hs troponin at 4 hours.  If delta                            is <5 ng/L at 2 hours, consider discharge based on risk stratification via the HEART score (if in ED), or [place] risk score in IP/Observation.    HS Troponin 99th Percentile URL of a Health Population=12 ng/L with a 95% Confidence Interval of 8-18 ng/L.    Delta 2hr hsTnI 09/15/2024 -3  <20 ng/L Final    RBC, UA 09/15/2024 10-20 (A)  None Seen, 1-2 /hpf Final    WBC, UA 09/15/2024 None Seen  None Seen, 1-2 /hpf Final    Epithelial Cells 09/15/2024 Occasional  None Seen, Occasional /hpf Final    Bacteria, UA 09/15/2024 None Seen  None Seen, Occasional /hpf Final   Admission on 08/28/2024, Discharged on 09/04/2024   Component Date Value Ref " Range Status    Sodium 08/30/2024 136  135 - 147 mmol/L Final    Potassium 08/30/2024 3.8  3.5 - 5.3 mmol/L Final    Chloride 08/30/2024 100  96 - 108 mmol/L Final    CO2 08/30/2024 26  21 - 32 mmol/L Final    ANION GAP 08/30/2024 10  4 - 13 mmol/L Final    BUN 08/30/2024 25  5 - 25 mg/dL Final    Creatinine 08/30/2024 0.98  0.60 - 1.30 mg/dL Final    Standardized to IDMS reference method    Glucose 08/30/2024 136  65 - 140 mg/dL Final    If the patient is fasting, the ADA then defines impaired fasting glucose as > 100 mg/dL and diabetes as > or equal to 123 mg/dL.    Glucose, Fasting 08/30/2024 136 (H)  65 - 99 mg/dL Final    Calcium 08/30/2024 9.3  8.4 - 10.2 mg/dL Final    AST 08/30/2024 12 (L)  13 - 39 U/L Final    ALT 08/30/2024 6 (L)  7 - 52 U/L Final    Specimen collection should occur prior to Sulfasalazine administration due to the potential for falsely depressed results.     Alkaline Phosphatase 08/30/2024 43  34 - 104 U/L Final    Total Protein 08/30/2024 6.1 (L)  6.4 - 8.4 g/dL Final    Albumin 08/30/2024 3.8  3.5 - 5.0 g/dL Final    Total Bilirubin 08/30/2024 0.53  0.20 - 1.00 mg/dL Final    Use of this assay is not recommended for patients undergoing treatment with eltrombopag due to the potential for falsely elevated results.  N-acetyl-p-benzoquinone imine (metabolite of Acetaminophen) will generate erroneously low results in samples for patients that have taken an overdose of Acetaminophen.    eGFR 08/30/2024 52  ml/min/1.73sq m Final    Magnesium 08/30/2024 2.1  1.9 - 2.7 mg/dL Final    Phosphorus 08/30/2024 4.0  2.3 - 4.1 mg/dL Final    WBC 08/30/2024 5.82  4.31 - 10.16 Thousand/uL Final    RBC 08/30/2024 3.77 (L)  3.81 - 5.12 Million/uL Final    Hemoglobin 08/30/2024 11.0 (L)  11.5 - 15.4 g/dL Final    Hematocrit 08/30/2024 34.4 (L)  34.8 - 46.1 % Final    MCV 08/30/2024 91  82 - 98 fL Final    MCH 08/30/2024 29.2  26.8 - 34.3 pg Final    MCHC 08/30/2024 32.0  31.4 - 37.4 g/dL Final    RDW  08/30/2024 13.1  11.6 - 15.1 % Final    MPV 08/30/2024 10.9  8.9 - 12.7 fL Final    Platelets 08/30/2024 207  149 - 390 Thousands/uL Final    nRBC 08/30/2024 0  /100 WBCs Final    Segmented % 08/30/2024 68  43 - 75 % Final    Immature Grans % 08/30/2024 0  0 - 2 % Final    Lymphocytes % 08/30/2024 22  14 - 44 % Final    Monocytes % 08/30/2024 7  4 - 12 % Final    Eosinophils Relative 08/30/2024 2  0 - 6 % Final    Basophils Relative 08/30/2024 1  0 - 1 % Final    Absolute Neutrophils 08/30/2024 3.95  1.85 - 7.62 Thousands/µL Final    Absolute Immature Grans 08/30/2024 0.01  0.00 - 0.20 Thousand/uL Final    Absolute Lymphocytes 08/30/2024 1.26  0.60 - 4.47 Thousands/µL Final    Absolute Monocytes 08/30/2024 0.43  0.17 - 1.22 Thousand/µL Final    Eosinophils Absolute 08/30/2024 0.14  0.00 - 0.61 Thousand/µL Final    Basophils Absolute 08/30/2024 0.03  0.00 - 0.10 Thousands/µL Final    TSH 3RD GENERATON 08/30/2024 1.191  0.450 - 4.500 uIU/mL Final    The recommended reference ranges for TSH during pregnancy are as follows:   First trimester 0.100 to 2.500 uIU/mL   Second trimester  0.200 to 3.000 uIU/mL   Third trimester 0.300 to 3.000 uIU/m    Note: Normal ranges may not apply to patients who are transgender, non-binary, or whose legal sex, sex at birth, and gender identity differ.  Adult TSH (3rd generation) reference range follows the recommended guidelines of the [employer], January, 2020.    Vitamin B-12 08/30/2024 437  180 - 914 pg/mL Final    Folate 08/30/2024 >22.3  >5.9 ng/mL Final    The World Health Organization has determined deficient folate concentrations are considered to be <4.0 ng/mL.    Vit D, 25-Hydroxy 08/30/2024 40.4  30.0 - 100.0 ng/mL Final    Vitamin D guidelines established by Clinical Guidelines Subcommittee  of the Endocrine Society Task Force, 2011    Deficiency <20ng/ml   Insufficiency 20-30ng/ml   Sufficient  ng/ml     Iron Saturation 08/30/2024 23  15 - 50 % Final     TIBC 08/30/2024 257  250 - 450 ug/dL Final    Iron 08/30/2024 58  50 - 212 ug/dL Final    Patients treated with metal-binding drugs (ie. Deferoxamine) may have depressed iron values.    UIBC 08/30/2024 199  155 - 355 ug/dL Final    Ferritin 08/30/2024 29  11 - 307 ng/mL Final    Syphilis Total Antibody 08/30/2024 Non-reactive  Non-Reactive Final    No serological evidence of infection with T. pallidum.  Early or incubating syphilis infection cannot be excluded.  Consider repeat testing based on clinical suspicion.    HIV-1 p24 Antigen 08/30/2024 Non-Reactive  Non-Reactive Final    HIV-1 Antibody 08/30/2024 Non-Reactive  Non-Reactive Final    HIV-2 Antibody 08/30/2024 Non-Reactive  Non-Reactive Final    HIV Ag-Ab 5th Gen 08/30/2024 Non-Reactive  Non-Reactive Final    A Non-Reactive test result does not preclude the possibility of exposure or infection with HIV-1 and/or HIV-2.  Non-Reactive results can occur if the quantity of marker present is below the detection limits or is not present during the stage of disease in which a sample is collected. Repeat testing should be considered where there is clinical suspicion of infection.       Ventricular Rate 08/30/2024 83  BPM Final    Atrial Rate 08/30/2024 83  BPM Final    OR Interval 08/30/2024 170  ms Final    QRSD Interval 08/30/2024 74  ms Final    QT Interval 08/30/2024 370  ms Final    QTC Interval 08/30/2024 434  ms Final    P Axis 08/30/2024 61  degrees Final    QRS [place] 08/30/2024 3  degrees Final    T Wave [place] 08/30/2024 51  degrees Final   Admission on 08/27/2024, Discharged on 08/28/2024   Component Date Value Ref Range Status    WBC 08/27/2024 6.15  4.31 - 10.16 Thousand/uL Final    RBC 08/27/2024 3.75 (L)  3.81 - 5.12 Million/uL Final    Hemoglobin 08/27/2024 11.0 (L)  11.5 - 15.4 g/dL Final    Hematocrit 08/27/2024 33.5 (L)  34.8 - 46.1 % Final    MCV 08/27/2024 89  82 - 98 fL Final    MCH 08/27/2024 29.3  26.8 - 34.3 pg Final    [facility] 08/27/2024 32.8   31.4 - 37.4 g/dL Final    RDW 08/27/2024 13.2  11.6 - 15.1 % Final    MPV 08/27/2024 10.2  8.9 - 12.7 fL Final    Platelets 08/27/2024 207  149 - 390 Thousands/uL Final    nRBC 08/27/2024 0  /100 WBCs Final    Segmented % 08/27/2024 66  43 - 75 % Final    Immature Grans % 08/27/2024 0  0 - 2 % Final    Lymphocytes % 08/27/2024 24  14 - 44 % Final    Monocytes % 08/27/2024 7  4 - 12 % Final    Eosinophils Relative 08/27/2024 2  0 - 6 % Final    Basophils Relative 08/27/2024 1  0 - 1 % Final    Absolute Neutrophils 08/27/2024 4.08  1.85 - 7.62 Thousands/µL Final    Absolute Immature Grans 08/27/2024 0.02  0.00 - 0.20 Thousand/uL Final    Absolute Lymphocytes 08/27/2024 1.46  0.60 - 4.47 Thousands/µL Final    Absolute Monocytes 08/27/2024 0.43  0.17 - 1.22 Thousand/µL Final    Eosinophils Absolute 08/27/2024 0.12  0.00 - 0.61 Thousand/µL Final    Basophils Absolute 08/27/2024 0.04  0.00 - 0.10 Thousands/µL Final    Sodium 08/27/2024 136  135 - 147 mmol/L Final    Potassium 08/27/2024 3.9  3.5 - 5.3 mmol/L Final    Chloride 08/27/2024 102  96 - 108 mmol/L Final    CO2 08/27/2024 27  21 - 32 mmol/L Final    ANION GAP 08/27/2024 7  4 - 13 mmol/L Final    BUN 08/27/2024 26 (H)  5 - 25 mg/dL Final    Creatinine 08/27/2024 1.05  0.60 - 1.30 mg/dL Final    Standardized to IDMS reference method    Glucose 08/27/2024 122  65 - 140 mg/dL Final    If the patient is fasting, the ADA then defines impaired fasting glucose as > 100 mg/dL and diabetes as > or equal to 123 mg/dL.    Calcium 08/27/2024 9.4  8.4 - 10.2 mg/dL Final    AST 08/27/2024 12 (L)  13 - 39 U/L Final    ALT 08/27/2024 7  7 - 52 U/L Final    Specimen collection should occur prior to Sulfasalazine administration due to the potential for falsely depressed results.     Alkaline Phosphatase 08/27/2024 50  34 - 104 U/L Final    Total Protein 08/27/2024 6.4  6.4 - 8.4 g/dL Final    Albumin 08/27/2024 4.0  3.5 - 5.0 g/dL Final    Total Bilirubin 08/27/2024 0.48  0.20 -  1.00 mg/dL Final    Use of this assay is not recommended for patients undergoing treatment with eltrombopag due to the potential for falsely elevated results.  N-acetyl-p-benzoquinone imine (metabolite of Acetaminophen) will generate erroneously low results in samples for patients that have taken an overdose of Acetaminophen.    eGFR 08/27/2024 48  ml/min/1.73sq m Final    TSH 3RD GENERATON 08/27/2024 1.408  0.450 - 4.500 uIU/mL Final    The recommended reference ranges for TSH during pregnancy are as follows:   First trimester 0.100 to 2.500 uIU/mL   Second trimester  0.200 to 3.000 uIU/mL   Third trimester 0.300 to 3.000 uIU/m    Note: Normal ranges may not apply to patients who are transgender, non-binary, or whose legal sex, sex at birth, and gender identity differ.  Adult TSH (3rd generation) reference range follows the recommended guidelines of the American Thyroid Association, January, 2020.    Amph/Meth UR 08/27/2024 Negative  Negative Final    Barbiturate Ur 08/27/2024 Negative  Negative Final    Benzodiazepine Urine 08/27/2024 Negative  Negative Final    Cocaine Urine 08/27/2024 Negative  Negative Final    Methadone Urine 08/27/2024 Negative  Negative Final    Opiate Urine 08/27/2024 Negative  Negative Final    PCP Ur 08/27/2024 Negative  Negative Final    THC Urine 08/27/2024 Negative  Negative Final    Oxycodone Urine 08/27/2024 Negative  Negative Final    Fentanyl Urine 08/27/2024 Negative  Negative Final    HYDROCODONE URINE 08/27/2024 Negative  Negative Final    EXTBreath Alcohol 08/27/2024 0.000   Final    Color, UA 08/27/2024 Colorless   Final    Clarity, UA 08/27/2024 Clear   Final    Specific Gravity, UA 08/27/2024 1.008  1.003 - 1.030 Final    pH, UA 08/27/2024 5.0  4.5, 5.0, 5.5, 6.0, 6.5, 7.0, 7.5, 8.0 Final    Leukocytes, UA 08/27/2024 Negative  Negative Final    Nitrite, UA 08/27/2024 Negative  Negative Final    Protein, UA 08/27/2024 Negative  Negative mg/dl Final    Glucose, UA 08/27/2024  Negative  Negative mg/dl Final    Ketones, UA 08/27/2024 Negative  Negative mg/dl Final    Urobilinogen, UA 08/27/2024 <2.0  <2.0 mg/dl mg/dl Final    Bilirubin, UA 08/27/2024 Negative  Negative Final    Occult Blood, UA 08/27/2024 Negative  Negative Final    Ventricular Rate 08/27/2024 77  BPM Final    Atrial Rate 08/27/2024 77  BPM Final    VA Interval 08/27/2024 166  ms Final    QRSD Interval 08/27/2024 74  ms Final    QT Interval 08/27/2024 358  ms Final    QTC Interval 08/27/2024 405  ms Final    P Axis 08/27/2024 50  degrees Final    QRS Axis 08/27/2024 -1  degrees Final    T Wave Axis 08/27/2024 23  degrees Final             ___________________________________    History Review: The following portions of the patient's history were reviewed and updated as appropriate: allergies, current medications, past family history, past medical history, past social history, past surgical history, and problem list.    Unchanged information from this writer's previous assessment is copied and italicized; information that has changed is bolded.    Past Psychiatric History:      Past psychiatric diagnoses:   Depression, anxiety  Inpatient psychiatric admissions:   8/2024 2013 following a surgery, sounded like extreme anxiety  Prior outpatient psychiatric treatment:   Started seeing a psychiatrist when first   Past/current psychotherapy:   active  History of suicidal attempts/gestures:   denies  Psychotropic medication trials:   Venlafaxine, sertraline, fluoxetine, escitalopram, aripiprazole (dizziness), risperidone, clonazepam, buspirone, gabapentin        Substance Abuse History:     Denies all history of substance use     Family Psychiatric History:      Family History             Family History   Problem Relation Age of Onset    Depression Mother           w/anxiety    Diabetes Mother           Mellitus    Breast cancer Mother      Hypertension Mother      No Known Problems Father      Depression Sister            w/anxiety    Heart disease Sister           Cardiac Disorder    Breast cancer Sister      Hypertension Sister      Diabetes Brother           Mellitus    Alcohol abuse Son                    Social History:     Developmental: nothing of note  Education: Bachelor degree  Marital history:  in 2006  Children: 2  Living arrangement, social support: as above, has been with daughter for 17 years  Occupational History: was a   Religion Affiliation: has a [employer] ghanshyam  Access to firearms: denies        Traumatic History:      denies.  ___________________________________      Visit Time    Visit Start Time: 1120  Visit Stop Time: 1220  Total Visit Duration:  60  minutes    López Gutierrez MD   11/04/24

## 2024-11-04 NOTE — ASSESSMENT & PLAN NOTE
Patient continues to report somatic symptoms of dry mouth and bad taste that make eating more difficult as well as daytime fatigue. She is eating multiple small meals during the day. Also report continuing of crying spells, mostly in the morning. There seems to have been progress since last appointment until yesterday, possibly with worsening in anticipation of appointment today. Sleep may be improving somewhat as she is more motivated to wake-up on her own. She denies noticeable adverse effects of medication. Lorazepam subjectively helps with calmness and allows better articulation of speech.    As lorazepam has been helpful; it may be desirable that a larger dose (1 mg) be taken in the morning, then 0.5 mg in the evening, to attempt to break the cycle of distress, compared to current 0.5 TID dosing. Patient's daughter is with patient 24/7, as she has quit her job. Continue to weigh risks and benefits of benzodiazepine with patient and her daughter who understand risks. Continuing to cautiously increase escitalopram. Sodium was recently normal; can recheck after increase in dose.    Increase escitalopram to 7.5 mg QD for one week then 10 mg QD for anxiety and depression  Change lorazepam to 1 mg QAM and 0.5 mg QHS for anxiety  Discussed with patient the risks of sedation, respiratory depression, impairment in judgment and motor function. Depression, mood changes, GI changes, and others discussed.  Continue psychotherapy  Scheduled for neuropsychology  Na normal 10/17/2024  Fe and B12 normal 8/30/2024    Orders:    LORazepam (Ativan) 0.5 mg tablet; Take 2 tablets (1 mg total) by mouth every morning AND 1 tablet (0.5 mg total) every evening.    escitalopram (LEXAPRO) 5 mg tablet; Take 1.5 tablets (7.5 mg total) by mouth daily for 7 days    escitalopram (Lexapro) 10 mg tablet; Take 1 tablet (10 mg total) by mouth daily Do not start before November 12, 2024.

## 2024-11-05 NOTE — TELEPHONE ENCOUNTER
Patients daughter is calling in to check on status of her mothers forms she needs completed by the provider.  She will fax and drop off a hard copy next business day    Physician certification form

## 2024-11-06 ENCOUNTER — TELEPHONE (OUTPATIENT)
Age: 84
End: 2024-11-06

## 2024-11-06 NOTE — TELEPHONE ENCOUNTER
Pt's daughter called in to confirm a fax was received. It was a physician certification form. She's requesting a call back.

## 2024-11-07 NOTE — TELEPHONE ENCOUNTER
Patient's daughter, Janay, called back and stated she was informed that the form was received but the length of care section will need to be completed before they can proceed.  Janay stated she assumes that the length of care should be long term.  She would like to know if this box can be checked and the form faxed again.  Please advise Janay once the form has been sent.  Thank you!

## 2024-11-08 ENCOUNTER — TELEPHONE (OUTPATIENT)
Age: 84
End: 2024-11-08

## 2024-11-08 NOTE — PROGRESS NOTES
[facility]  [address] [place] [address], [address]  [place] [address]  [phone]    Care Conference    NAME: Sandra Peña  AGE: 83 y.o. SEX: female  YOB: 1940  DATE OF ASSESSMENT: 08/26/2024  DATE OF CONFERENCE: 11/18/2024    Family Present: Janay (daughter)  Staff Present: Jose Canada MD    Patient / Family Goals of Care: Review cognitive decline and associated symptoms, discuss treatment options and care planning.     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  Most pronounced deficits in attention  Geriatric Depression Screen: 14/15  NeuroTrax (10/31/2024): Inconclusive  84.3 (global cognitive score)  91.7 (memory)   71.7 (executive function)   (attention)    (information processing speed)  100 (visual spatial)  100 (verbal function)   (motor skills)    Mild cognitive impairment VS early dementia, however patient's longstanding anxiety and depression also likely contributing to symptoms.  Evaluated by neuropsychologist who concluded a pattern of cognitive deficits consistent with either mild neurocognitive disorder or progression to mild dementia in addition to anxiety  Patient advised to remain active physically, mentally and socially  Continue OT/speech therapy   Engage in cognitively challenging exercises such as crosswords and puzzles  Maintain chronic conditions under control  Continue following with PCP and psychiatrist as well as psychotherapy  Pharmaceutical and  non-pharmaceutical interventions discussed. Risks and benefits of medications discussed at length.  Patient and daughter agreed on non-pharmacologic management and not to start medications at this time.   Decision-making capacity: At this time of the visit, it is my opinion that the patient is able to make her own medical decisions.   Staging: FAST stage III  Caregiver review : Patient is independent with ADLs and needs assistance with some IADLs.  Daughter is the primary caregiver, met with social service and community resources information provided  Driving safety: Patient does not drive, and is encouraged to continue to not drive.   Stabilize anxiety and repeat cognitive assessment in 3 months    Diagnostic Studies  Review of blood work: I have personally reviewed blood work including  TSH (10/5/24): 0.957  CMP: , K3.8, BUN 21, creatinine 0.  98 and GFR 52  CBC: WBC 5.8, Hb 11.3, HCT 34.9 and platelets 230  Review of previous imaging -   CT head without contrast on 8/29/2024  IMPRESSION:  No acute intracranial abnormality.  Stable chronic microangiopathic changes within the brain.  MRI brain without contrast (10/23/21)  IMPRESSION:  No acute infarction, intracranial hemorrhage or mass effect.  Moderate, chronic microangiopathy.    Physical Finding Impacting Function   Timed Up and Go Test: 14 seconds   Fall Risk: High   Activities of Daily Living: Independent   Instrumental Activities of Daily Living: Dependent    Encourage appropriate footwear at all times  Review fall risk prevention tips and adjust within the home environment as needed    Medications Reviewed   Medications seem appropriate for present conditions  Medical chronic conditions under control and continue follow up with PCP, Psychiatry, therapist and gastroenterologist   Check with PCP before using over the counter medications  Avoid over the counter medications that can affect cognition (e.g., Benadryl, Tylenol PM)   Avoid NSAIDs due to risk  of GI bleed and renal impairment    Other Findings   Overall health  BMI: 23.81 kg/m2  Maintain well-balanced diet  Continue following with primary care physician regularly  Hypertension  /68 in office. Patient on lisinopril/hydrochlorothiazide 10/12.5 mg daily.   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  Metformin 500 mg BID with meals,  Most recent A1c 6.7  Follow up with PCP   Mixed hyperlipidemia  Most recent lipid panel reviewed  Continue atorvastatin 10 mg  Follow up with PCP  Anxiety/major depressive disorder  Symptoms more controlled with current regimen  Scheduled for a follow-up with neuropsychologist in December  Continue following with psychiatry and therapist  GDS 14/15, no suicidal ideation  Currently on Lexapro 10 mg daily and Ativan 0.5 mg 3 times daily, managed by psychiatry  Encourage continuing to see therapist weekly  Insomnia   Currently on risperidone 0.5 mg at bedtime  Sleep hygiene discussed at length with patient and daughter  constipation  Current current regimen  Follow up with gastroenterologist and PCP  Ambulatory dysfunction  TUG 14  Patient is high fall risk  Encourage use of cane if recommended  Fall precautions  PT/OT referral to increase muscle strength and gait stability  ACP planning/counseling discussion  Patient does not have a living will, or POA  Encourage follow up with elder law , list provided  Urge incontinence of urine  Encourage timed bathroom breaks, management of constipation  Would consider OT for pelvic exercise  Follow up with PCP  Stage 3a CKD  Most recent eGFR 52  Maintain adequate hydration  Avoid nephrotoxic agents    Recommended Health Maintenance   Immunizations, if not contraindicated:   Influenza vaccine yearly  Pneumo vaccine every 5 years (65 years and over)  Shingles vaccine  COVID-19 vaccine    Social / Safety Concerns  Consider an [facility] for positive socialization, physical  exercise, cognitive stimulation and family respite  Consider a home care aide to assist with daily care needs  Stay in touch with family and friends  Recommend review of fall risk prevention tips  Recommend use of fall precautions including fall alert device  For wandering prevention: consider use of fall alert with GPS, SafeReturn bracelet, Project LifeSaver bracelet, video surveillance, or alarm systems  Consider assistance for medication administration such as blister packaging or use of an automated pill dispenser  Recommend contacting your local [facility] Agency on Aging for possible eligible programs such as OPTIONS, Caregiver Support Program, or WAIVER    Long Term Care Issues  Maintain updated advance directives and provide a copy to your primary care provider  Consider caregiver support groups and educational resources through the Alzheimer's Association; access Alzheimer's Association 24/7 Helpline at 1-296.539.5704  [facility] does offer a monthly caregiver support group. If interested, please speak with a .  Utilize reorientation and redirection as needed (dependent on situation)  Educational information provided  Recommended literature: 36 Hour Day, Untangling Alzheimer's, Learning to Speak Alzheimer's    Patient and family verbalized understanding of above care plan.    For care coordination purposes, this care plan will be shared with your primary care provider. With any questions, please contact our office at [phone].     Chief Complaint     Chief Complaint   Patient presents with    Care Conference      Patient is here today for family conference to discuss results and recommendations for memory loss.     History of Present Illness       Sandra Peña is a 84 y.o. y.o. female presents for family conference to review memory loss and associated symptoms, discuss treatment options and care planning.Patient's daughter was also in attendance. Initial  "comprehensive geriatric assessment completed on 8/26/2024;  please refer to initial consultation for full details and history. MoCA 21/30 at that time.   Patient was admitted to other adult behavioral unit on 8/29/2024 because of increased anxiety and was discharged on 9/4/24 after adjusting her medications.  Patient is currently on Lexapro, risperidone and Ativan.  She states that she feels good, less anxious and sleeps better at night.  Reports fair appetite and no recent falls.  The following portions of the patient's history were reviewed and updated as appropriate: allergies, current medications, past family history, past medical history, past social history, past surgical history and problem list.     Review of Systems     Constitutional: Negative for activity change.   HENT: Negative for hearing loss, trouble swallowing.    Eyes: Negative for visual disturbance.   Respiratory: Negative for choking and shortness of breath.    Gastrointestinal: Negative for nausea.   Genitourinary: Negative for dysuria and frequency.   Musculoskeletal: Negative for back pain and neck pain.   Neurological: Negative for dizziness, facial asymmetry, speech difficulty, weakness and light-headedness.   Psychiatric/Behavioral: Positive for anxiety     Objective     /70 (BP Location: Left arm, Patient Position: Sitting, Cuff Size: Standard)   Pulse 74   Temp 97.6 °F (36.4 °C) (Temporal)   Ht 4' 9\" (1.448 m)   Wt 49.9 kg (110 lb)   SpO2 96%   BMI 23.80 kg/m²     Physical Exam  Vitals and nursing note reviewed.   Constitutional:   General: Patient is not in acute distress.  Appearance: Normal appearance. Patient is well-developed. Patient is not diaphoretic.   HENT:   Head: Normocephalic.   Ears: External ear normal.   Nose: Nose normal.   Mouth/Throat: Oropharynx is clear and moist. No oropharyngeal exudate.   Eyes: Pupils are equal, round, and reactive to light. Conjunctivae and EOM are normal.   Neck: Normal range of " motion. Neck supple. No JVD present. No tracheal deviation present. No thyromegaly present.   Cardiovascular:   Rate and Rhythm: Normal rate.   Heart sounds: No murmur heard.  No friction rub. No gallop.   Pulmonary:   Effort: Pulmonary effort is normal. No respiratory distress.   Breath sounds: Normal breath sounds. No wheezing or rales.   Abdominal:   General: Bowel sounds are normal. There is no distension.   Palpations: Abdomen is soft.   Tenderness: There is no abdominal tenderness.   Musculoskeletal:   General: Normal range of motion, no edema, or tenderness.  Skin:  General: Skin is warm and dry.   Neurological:   General: No focal deficit present.   Mental Status: Patient is alert and oriented to person, place, and time. Mental status is at baseline.   Psychiatric:   Mood and Affect: Mood normal.   Behavior: Behavior normal.

## 2024-11-10 DIAGNOSIS — R19.4 CHANGE IN BOWEL HABITS: ICD-10-CM

## 2024-11-11 ENCOUNTER — TELEPHONE (OUTPATIENT)
Age: 84
End: 2024-11-11

## 2024-11-11 ENCOUNTER — TELEMEDICINE (OUTPATIENT)
Dept: PSYCHIATRY | Facility: CLINIC | Age: 84
End: 2024-11-11
Payer: MEDICARE

## 2024-11-11 DIAGNOSIS — Z13.29 SCREENING FOR ENDOCRINE, NUTRITIONAL, METABOLIC AND IMMUNITY DISORDER: ICD-10-CM

## 2024-11-11 DIAGNOSIS — F45.21 ILLNESS ANXIETY DISORDER: Primary | ICD-10-CM

## 2024-11-11 DIAGNOSIS — Z13.21 SCREENING FOR ENDOCRINE, NUTRITIONAL, METABOLIC AND IMMUNITY DISORDER: ICD-10-CM

## 2024-11-11 DIAGNOSIS — F33.1 MAJOR DEPRESSIVE DISORDER, RECURRENT EPISODE, MODERATE (HCC): ICD-10-CM

## 2024-11-11 DIAGNOSIS — Z13.228 SCREENING FOR ENDOCRINE, NUTRITIONAL, METABOLIC AND IMMUNITY DISORDER: ICD-10-CM

## 2024-11-11 DIAGNOSIS — Z13.0 SCREENING FOR ENDOCRINE, NUTRITIONAL, METABOLIC AND IMMUNITY DISORDER: ICD-10-CM

## 2024-11-11 PROCEDURE — 90833 PSYTX W PT W E/M 30 MIN: CPT

## 2024-11-11 PROCEDURE — 99214 OFFICE O/P EST MOD 30 MIN: CPT

## 2024-11-11 RX ORDER — RISPERIDONE 0.5 MG/1
0.5 TABLET ORAL
Qty: 30 TABLET | Refills: 1 | Status: SHIPPED | OUTPATIENT
Start: 2024-11-11

## 2024-11-11 RX ORDER — AMOXICILLIN 250 MG
1 CAPSULE ORAL DAILY PRN
Qty: 20 TABLET | Refills: 0 | OUTPATIENT
Start: 2024-11-11

## 2024-11-11 RX ORDER — LORAZEPAM 0.5 MG/1
0.5 TABLET ORAL 3 TIMES DAILY
Qty: 90 TABLET | Refills: 0 | Status: SHIPPED | OUTPATIENT
Start: 2024-11-11

## 2024-11-11 NOTE — TELEPHONE ENCOUNTER
Nhi called in requesting a bed side commode, and a rolling walking for Sandra. Could Dr Aguilar send a script for these as well. Thank you

## 2024-11-11 NOTE — TELEPHONE ENCOUNTER
Who is Nhi?  Will she use a rolling walker?  Have had face-to-face but I really do not see any need for that.  Will she use a bedside commode.  She shares a room with her granddaughter.  Kind of tough sharing a room with a teenager.  Might not go over well if she is having a bowel movement in the middle of the night.  Contact her daughter Janay to make certain that they would like to have those aids

## 2024-11-11 NOTE — TELEPHONE ENCOUNTER
Colace which is over-the-counter is fine to use.  Senna is a stimulant laxative.  Not recommended for long-term in geriatric patients.  She has MiraLAX, Linzess.  Recommend stool softener and high-fiber diet

## 2024-11-11 NOTE — ASSESSMENT & PLAN NOTE
Continue to have risk/benefit discussion of benzodiazepine use. 1 mg too sedating. 0.5 mg results in patient becoming lethargic, however at this time it is determined that the patient's level of distress is severe enough that continued temporary use of benzodiazepines is indicated until she is more stable on maintenance medication. Reviewed recent neuropsychology testing. This found evidence of minor vs major neurocognitive disorder/dementia; however significantly confounded by level of anxiety observed during testing. Neuropsychologist recommended patient be re-evaluated when anxiety is better controlled. 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 However there is an association between depression and dementia, that the onset of dementia in her case may be the cause of current difficult to treat depressive symptoms.    Some months ago during hospitalization risperidone was switched to quetiapine; no specific reason found in documentation, possibly inferred loss of effectiveness. Quetiapine has since been discontinued due to adverse effects. Risperidone can be used as augmentation for obsessive thinking in more difficult to treat cases. This can be retrialled. At this time, not planning to increase escitalopram beyond current dose of 10 mg due to risk of adverse effects especially in elderly female. Will monitor at this dose for an adequate trial period.    Normal ECG 10/17/2024    Continue escitalopram 10 mg QD for obsessive, depressive, and anxious symptoms  Return to lorazepam 0.5 mg TID for severe anxiety   Begin risperidone 0.5 mg QHS for augmentation for obsessive thinking  PARQ completed including sedation, agitation, akathisia, TD, dystonic reactions, NMS, EPS, GI distress, dizziness, risk of metabolic syndrome, orthostatic hypotension and cardiovascular risks such as QT prolongation, increased prolactin  CMP to measure sodium given risk of hyponatremia with escitalopram in elderly  female  Fasting glucose due to unclear compliance with metformin  Patient sees family doctor and geriatrician  Geriatrician follow-up 11/18  Neuropsychology follow-up 12/23  Continue individual psychotherapy  Continue group therapy    Orders:    LORazepam (Ativan) 0.5 mg tablet; Take 1 tablet (0.5 mg total) by mouth 3 (three) times a day    risperiDONE (RisperDAL) 0.5 mg tablet; Take 1 tablet (0.5 mg total) by mouth daily at bedtime    Comprehensive metabolic panel; Future

## 2024-11-11 NOTE — PSYCH
Virtual Regular Visit    Verification of patient location:    Patient is located at Home in the following state in which I hold an active license [place]      Assessment/Plan:    Problem List Items Addressed This Visit       Illness anxiety disorder - Primary    Relevant Medications    LORazepam (Ativan) 0.5 mg tablet    risperiDONE (RisperDAL) 0.5 mg tablet    Other Relevant Orders    Comprehensive metabolic panel    Major depressive disorder, recurrent episode, moderate (HCC)    Relevant Medications    LORazepam (Ativan) 0.5 mg tablet    risperiDONE (RisperDAL) 0.5 mg tablet    Other Relevant Orders    Comprehensive metabolic panel     Other Visit Diagnoses       Screening for endocrine, nutritional, metabolic and immunity disorder        Relevant Orders    Comprehensive metabolic panel    Glucose, fasting                   Reason for visit is   Chief Complaint   Patient presents with    Virtual Regular Visit          Encounter provider López Gutierrez MD      Recent Visits  Date Type Provider Dept   11/04/24 Telemedicine López Gutierrez MD [facility]   Showing recent visits within past 7 days and meeting all other requirements  Today's Visits  Date Type Provider Dept   11/11/24 Telemedicine López Gutierrez MD [facility] [place]   Showing today's visits and meeting all other requirements  Future Appointments  No visits were found meeting these conditions.  Showing future appointments within next 150 days and meeting all other requirements       The patient was identified by name and date of birth. Sandra VYAS Deliala was informed that this is a telemedicine visit and that the visit is being conducted throughthe Epic Embedded platform. She agrees to proceed..  My office door was closed. No one else was in the room.  She acknowledged consent and understanding of privacy and security of the video platform. The patient has agreed to participate and understands they can discontinue the  visit at any time.    Patient is aware this is a billable service.         MEDICATION MANAGEMENT NOTE        [facility]      Name and Date of Birth:  Sandra Peña 83 y.o. 1940 MRN: [id number]    Date of Visit: November 11, 2024    Reason for Visit: Follow-up visit regarding medication management     _____________________________    Assessment & Plan  Illness anxiety disorder    Continue to have risk/benefit discussion of benzodiazepine use. 1 mg too sedating. 0.5 mg results in patient becoming lethargic, however at this time it is determined that the patient's level of distress is severe enough that continued temporary use of benzodiazepines is indicated until she is more stable on maintenance medication. Reviewed recent neuropsychology testing. This found evidence of minor vs major neurocognitive disorder/dementia; however significantly confounded by level of anxiety observed during testing. Neuropsychologist recommended patient be re-evaluated when anxiety is better controlled. 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 However there is an association between depression and dementia, that the onset of dementia in her case may be the cause of current difficult to treat depressive symptoms.    Some months ago during hospitalization risperidone was switched to quetiapine; no specific reason found in documentation, possibly inferred loss of effectiveness. Quetiapine has since been discontinued due to adverse effects. Risperidone can be used as augmentation for obsessive thinking in more difficult to treat cases. This can be retrialled. At this time, not planning to increase escitalopram beyond current dose of 10 mg due to risk of adverse effects especially in elderly female. Will monitor at this dose for an adequate trial period.    Normal ECG 10/17/2024    Continue escitalopram 10 mg QD for obsessive, depressive, and anxious  "symptoms  Return to lorazepam 0.5 mg TID for severe anxiety   Begin risperidone 0.5 mg QHS for augmentation for obsessive thinking  PARQ completed including sedation, agitation, akathisia, TD, dystonic reactions, NMS, EPS, GI distress, dizziness, risk of metabolic syndrome, orthostatic hypotension and cardiovascular risks such as QT prolongation, increased prolactin  CMP to measure sodium given risk of hyponatremia with escitalopram in elderly female  Fasting glucose due to unclear compliance with metformin  Patient sees family doctor and geriatrician  Geriatrician follow-up 11/18  Neuropsychology follow-up 12/23  Continue individual psychotherapy  Continue group therapy    Orders:    LORazepam (Ativan) 0.5 mg tablet; Take 1 tablet (0.5 mg total) by mouth 3 (three) times a day    risperiDONE (RisperDAL) 0.5 mg tablet; Take 1 tablet (0.5 mg total) by mouth daily at bedtime    Comprehensive metabolic panel; Future    Major depressive disorder, recurrent episode, moderate (HCC)    Orders:    Comprehensive metabolic panel; Future    Screening for endocrine, nutritional, metabolic and immunity disorder    Orders:    Comprehensive metabolic panel; Future    Glucose, fasting; Future             Treatment Recommendations/Precautions:  Risks/benefits/alternatives to treatment discussed, including a myriad of potential adverse medication side effects, to which Sandra voiced understanding and consented fully to treatment.   Labs most recently obtained , reviewed.   Follow up in 1 weeks for medication management  Follow up with PCP for medical issues and ongoing care  Aware of 24 hour and weekend coverage for urgent situations accessed by calling [facility] main practice number    Individual psychotherapy provided: Counseling was provided during the session today for 20 minutes.         _____________________________________________    History of Present Illness     Chief Complaint: \"I don't want to live " "like this\"    SUBJECTIVE:    Patient initially smiling. Reports symptoms \"come and go.\" She reports pattern of waking up  and crying persists. Patient's daughter reports subjective improvement that the patient is not aware of. Upon trying 1 mg lorazepam they report it was too sedating. Conversation about other medications. She appears to be looking forward to her birthday.         Psychiatric Review Of Systems:  Unchanged information from this writer's previous assessment is copied and italicized; information that has changed is bolded.    Appetite: yes, improving possible  Adverse eating: no  Weight changes: no  Insomnia/sleeplessness: no  Fatigue/anergy: yes  Anhedonia/lack of interest: possible  Attention/concentration: possible  Psychomotor agitation/retardation: no  Somatic symptoms: yes  Anxiety/panic attack: yes  Zaira/hypomania: no  Hopelessness/helplessness/worthlessness: no  Self-injurious behavior/high-risk behavior: no  Suicidal ideation: no  Homicidal ideation: no  Auditory hallucinations: no  Visual hallucinations: no  Other perceptual disturbances: no  Delusional thinking: no  Obsessive/compulsive symptoms: yes    Review Of Systems:      Constitutional negative   ENT negative   Cardiovascular negative   Respiratory negative   Gastrointestinal dysgeusia   Genitourinary negative   Musculoskeletal negative   Integumentary negative   Neurological negative   Endocrine negative   Other Symptoms none, all other systems are negative     Objective    OBJECTIVE:     Visit Vitals  OB Status Postmenopausal   Smoking Status Never      Wt Readings from Last 6 Encounters:   10/17/24 49.9 kg (110 lb 0.2 oz)   10/10/24 49.4 kg (109 lb)   10/04/24 48.7 kg (107 lb 5.8 oz)   09/29/24 49.4 kg (109 lb)   09/23/24 48.4 kg (106 lb 11.2 oz)   09/13/24 48.1 kg (106 lb)        Past Medical History:   Diagnosis Date    Anxiety     Colon polyps     Depression     Diabetes mellitus (HCC)     Dizziness     Last assessed: 8/31/15    " DVT (deep venous thrombosis) ([facility])     Dysuria     Hematuria 2022    Hyperlipidemia     Hypertension     Hypertensive urgency 10/22/2021    Insomnia     Panic attack     Ureterolithiasis 2020      Past Surgical History:   Procedure Laterality Date     SECTION      1964 son, 1968 daughter    COLONOSCOPY      FL RETROGRADE PYELOGRAM  2020    KS COLONOSCOPY FLX DX W/COLLJ SPEC WHEN PFRMD N/A 3/27/2017    Procedure: COLONOSCOPY;  Surgeon: Gold Francis MD;  Location: [facility];  Service: Gastroenterology    KS CYSTO/URETERO W/LITHOTRIPSY &INDWELL STENT INSRT Right 2020    Procedure: CYSTOSCOPY URETEROSCOPY WITH LITHOTRIPSY HOLMIUM LASER, RETROGRADE PYELOGRAM AND INSERTION STENT URETERAL; EXCISION OF BLADDER TUMOR;  Surgeon: Edwin Love MD;  Location: [facility];  Service: Urology    ROTATOR CUFF REPAIR Left     Last assessed: 3/29/15    TONSILLECTOMY         Meds/Allergies    No Known Allergies  Current Outpatient Medications   Medication Instructions    aspirin 81 mg, Oral, Daily    atorvastatin (LIPITOR) 10 mg, Oral, Daily    Cholecalciferol (VITAMIN D3) 2,000 Units, Oral, Daily    Constulose 10 GM/15ML solution TAKE 15 ML BY MOUTH TWO TIMES A DAY    [START ON 2024] escitalopram (LEXAPRO) 10 mg, Oral, Daily    glycerin adult 2 g suppository Please use glycerin suppository every other day.  If patient has rectal or abdominal pain, can use glycerin suppository daily    linaCLOtide 145 mcg, Oral, Daily    lisinopril-hydrochlorothiazide (PRINZIDE,ZESTORETIC) 10-12.5 MG per tablet 1 tablet, Oral, Daily    LORazepam (ATIVAN) 0.5 mg, Oral, 3 times daily    metFORMIN (GLUCOPHAGE) 500 mg, Oral, 2 times daily with meals    ondansetron (ZOFRAN-ODT) 4 mg, Oral, Every 8 hours PRN    polyethylene glycol (MIRALAX) 17 g, Oral, Daily    psyllium (METAMUCIL) packet 1 packet, Oral, Daily    risperiDONE (RISPERDAL) 0.5 mg, Oral, Daily at bedtime    senna-docusate sodium (SENOKOT S) 8.6-50 mg per  "tablet 1 tablet, Oral, Daily PRN           Mental Status Exam:    Appearance Short white hair, casual attire, good eye contact   Behavior/motor Calm and cooperative   Speech/language Normal rate and volume   Mood \"Not too good\"   Affect Full range, tearful at times   Thought process Logical and linear   Thought content No overt delusions, Denies hallucinations, Denies suicidal ideations   Cognition Awake and alert, oriented and memory grossly intact, and concentrates on questions   Insight/judgment Insight: moderate  Judgment: moderate     Laboratory Results: I have personally reviewed all pertinent laboratory/tests results    Admission on 10/17/2024, Discharged on 10/17/2024   Component Date Value Ref Range Status    Sodium 10/17/2024 139  135 - 147 mmol/L Final    Potassium 10/17/2024 3.8  3.5 - 5.3 mmol/L Final    Chloride 10/17/2024 105  96 - 108 mmol/L Final    CO2 10/17/2024 27  21 - 32 mmol/L Final    ANION GAP 10/17/2024 7  4 - 13 mmol/L Final    BUN 10/17/2024 21  5 - 25 mg/dL Final    Creatinine 10/17/2024 0.98  0.60 - 1.30 mg/dL Final    Standardized to IDMS reference method    Glucose 10/17/2024 121  65 - 140 mg/dL Final    If the patient is fasting, the ADA then defines impaired fasting glucose as > 100 mg/dL and diabetes as > or equal to 123 mg/dL.    Calcium 10/17/2024 8.9  8.4 - 10.2 mg/dL Final    AST 10/17/2024 11 (L)  13 - 39 U/L Final    ALT 10/17/2024 8  7 - 52 U/L Final    Specimen collection should occur prior to Sulfasalazine administration due to the potential for falsely depressed results.     Alkaline Phosphatase 10/17/2024 45  34 - 104 U/L Final    Total Protein 10/17/2024 5.8 (L)  6.4 - 8.4 g/dL Final    Albumin 10/17/2024 3.6  3.5 - 5.0 g/dL Final    Total Bilirubin 10/17/2024 0.68  0.20 - 1.00 mg/dL Final    Use of this assay is not recommended for patients undergoing treatment with eltrombopag due to the potential for falsely elevated results.  N-acetyl-p-benzoquinone imine " (metabolite of Acetaminophen) will generate erroneously low results in samples for patients that have taken an overdose of Acetaminophen.    eGFR 10/17/2024 52  ml/min/1.73sq m Final    WBC 10/17/2024 5.82  4.31 - 10.16 Thousand/uL Final    RBC 10/17/2024 3.88  3.81 - 5.12 Million/uL Final    Hemoglobin 10/17/2024 11.3 (L)  11.5 - 15.4 g/dL Final    Hematocrit 10/17/2024 34.9  34.8 - 46.1 % Final    MCV 10/17/2024 90  82 - 98 fL Final    MCH 10/17/2024 29.1  26.8 - 34.3 pg Final    MCHC 10/17/2024 32.4  31.4 - 37.4 g/dL Final    RDW 10/17/2024 13.3  11.6 - 15.1 % Final    MPV 10/17/2024 10.8  8.9 - 12.7 fL Final    Platelets 10/17/2024 230  149 - 390 Thousands/uL Final    nRBC 10/17/2024 0  /100 WBCs Final    Segmented % 10/17/2024 68  43 - 75 % Final    Immature Grans % 10/17/2024 0  0 - 2 % Final    Lymphocytes % 10/17/2024 24  14 - 44 % Final    Monocytes % 10/17/2024 6  4 - 12 % Final    Eosinophils Relative 10/17/2024 1  0 - 6 % Final    Basophils Relative 10/17/2024 1  0 - 1 % Final    Absolute Neutrophils 10/17/2024 3.94  1.85 - 7.62 Thousands/µL Final    Absolute Immature Grans 10/17/2024 0.02  0.00 - 0.20 Thousand/uL Final    Absolute Lymphocytes 10/17/2024 1.38  0.60 - 4.47 Thousands/µL Final    Absolute Monocytes 10/17/2024 0.36  0.17 - 1.22 Thousand/µL Final    Eosinophils Absolute 10/17/2024 0.08  0.00 - 0.61 Thousand/µL Final    Basophils Absolute 10/17/2024 0.04  0.00 - 0.10 Thousands/µL Final    Color, UA 10/17/2024 Light Yellow   Final    Clarity, UA 10/17/2024 Clear   Final    Specific Gravity, UA 10/17/2024 1.012  1.003 - 1.030 Final    pH, UA 10/17/2024 7.0  4.5, 5.0, 5.5, 6.0, 6.5, 7.0, 7.5, 8.0 Final    Leukocytes, UA 10/17/2024 Negative  Negative Final    Nitrite, UA 10/17/2024 Negative  Negative Final    Protein, UA 10/17/2024 Negative  Negative mg/dl Final    Glucose, UA 10/17/2024 Negative  Negative mg/dl Final    Ketones, UA 10/17/2024 Negative  Negative mg/dl Final    Urobilinogen, UA  10/17/2024 <2.0  <2.0 mg/dl mg/dl Final    Bilirubin, UA 10/17/2024 Negative  Negative Final    Occult Blood, UA 10/17/2024 Negative  Negative Final    Magnesium 10/17/2024 1.8 (L)  1.9 - 2.7 mg/dL Final    SARS-CoV-2 10/17/2024 Negative  Negative Final         INFLUENZA A PCR 10/17/2024 Negative  Negative Final         INFLUENZA B PCR 10/17/2024 Negative  Negative Final         RSV PCR 10/17/2024 Negative  Negative Final         Ventricular Rate 10/17/2024 79  BPM Final    Atrial Rate 10/17/2024 79  BPM Final    ID Interval 10/17/2024 166  ms Final    QRSD Interval 10/17/2024 72  ms Final    QT Interval 10/17/2024 358  ms Final    QTC Interval 10/17/2024 410  ms Final    P Axis 10/17/2024 52  degrees Final    QRS Axis 10/17/2024 1  degrees Final    T Wave Axis 10/17/2024 42  degrees Final   Admission on 10/03/2024, Discharged on 10/05/2024   Component Date Value Ref Range Status    WBC 10/04/2024 5.78  4.31 - 10.16 Thousand/uL Final    RBC 10/04/2024 3.90  3.81 - 5.12 Million/uL Final    Hemoglobin 10/04/2024 11.3 (L)  11.5 - 15.4 g/dL Final    Hematocrit 10/04/2024 35.2  34.8 - 46.1 % Final    MCV 10/04/2024 90  82 - 98 fL Final    MCH 10/04/2024 29.0  26.8 - 34.3 pg Final    MCHC 10/04/2024 32.1  31.4 - 37.4 g/dL Final    RDW 10/04/2024 12.9  11.6 - 15.1 % Final    MPV 10/04/2024 10.9  8.9 - 12.7 fL Final    Platelets 10/04/2024 256  149 - 390 Thousands/uL Final    nRBC 10/04/2024 0  /100 WBCs Final    Segmented % 10/04/2024 56  43 - 75 % Final    Immature Grans % 10/04/2024 0  0 - 2 % Final    Lymphocytes % 10/04/2024 32  14 - 44 % Final    Monocytes % 10/04/2024 8  4 - 12 % Final    Eosinophils Relative 10/04/2024 3  0 - 6 % Final    Basophils Relative 10/04/2024 1  0 - 1 % Final    Absolute Neutrophils 10/04/2024 3.21  1.85 - 7.62 Thousands/µL Final    Absolute Immature Grans 10/04/2024 0.02  0.00 - 0.20 Thousand/uL Final    Absolute Lymphocytes 10/04/2024 1.85  0.60 - 4.47 Thousands/µL Final    Absolute  Monocytes 10/04/2024 0.48  0.17 - 1.22 Thousand/µL Final    Eosinophils Absolute 10/04/2024 0.18  0.00 - 0.61 Thousand/µL Final    Basophils Absolute 10/04/2024 0.04  0.00 - 0.10 Thousands/µL Final    Sodium 10/04/2024 137  135 - 147 mmol/L Final    Potassium 10/04/2024 4.4  3.5 - 5.3 mmol/L Final    Chloride 10/04/2024 102  96 - 108 mmol/L Final    CO2 10/04/2024 28  21 - 32 mmol/L Final    ANION GAP 10/04/2024 7  4 - 13 mmol/L Final    BUN 10/04/2024 34 (H)  5 - 25 mg/dL Final    Creatinine 10/04/2024 1.15  0.60 - 1.30 mg/dL Final    Standardized to IDMS reference method    Glucose 10/04/2024 153 (H)  65 - 140 mg/dL Final    If the patient is fasting, the ADA then defines impaired fasting glucose as > 100 mg/dL and diabetes as > or equal to 123 mg/dL.    Calcium 10/04/2024 9.3  8.4 - 10.2 mg/dL Final    AST 10/04/2024 28  13 - 39 U/L Final    ALT 10/04/2024 20  7 - 52 U/L Final    Specimen collection should occur prior to Sulfasalazine administration due to the potential for falsely depressed results.     Alkaline Phosphatase 10/04/2024 58  34 - 104 U/L Final    Total Protein 10/04/2024 6.4  6.4 - 8.4 g/dL Final    Albumin 10/04/2024 3.8  3.5 - 5.0 g/dL Final    Total Bilirubin 10/04/2024 0.32  0.20 - 1.00 mg/dL Final    Use of this assay is not recommended for patients undergoing treatment with eltrombopag due to the potential for falsely elevated results.  N-acetyl-p-benzoquinone imine (metabolite of Acetaminophen) will generate erroneously low results in samples for patients that have taken an overdose of Acetaminophen.    eGFR 10/04/2024 43  ml/min/1.73sq m Final    LACTIC ACID 10/04/2024 2.2 (H)  0.5 - 2.0 mmol/L Final    LACTIC ACID 10/04/2024 2.2 (H)  0.5 - 2.0 mmol/L Final    WBC 10/04/2024 7.78  4.31 - 10.16 Thousand/uL Final    RBC 10/04/2024 3.81  3.81 - 5.12 Million/uL Final    Hemoglobin 10/04/2024 11.0 (L)  11.5 - 15.4 g/dL Final    Hematocrit 10/04/2024 36.7  34.8 - 46.1 % Final    MCV 10/04/2024 96   82 - 98 fL Final    short sample    MCH 10/04/2024 28.9  26.8 - 34.3 pg Final    MCHC 10/04/2024 30.0 (L)  31.4 - 37.4 g/dL Final    RDW 10/04/2024 12.7  11.6 - 15.1 % Final    MPV 10/04/2024 10.4  8.9 - 12.7 fL Final    Platelets 10/04/2024 221  149 - 390 Thousands/uL Final    nRBC 10/04/2024 0  /100 WBCs Final    Segmented % 10/04/2024 74  43 - 75 % Final    Immature Grans % 10/04/2024 0  0 - 2 % Final    Lymphocytes % 10/04/2024 18  14 - 44 % Final    Monocytes % 10/04/2024 5  4 - 12 % Final    Eosinophils Relative 10/04/2024 2  0 - 6 % Final    Basophils Relative 10/04/2024 1  0 - 1 % Final    Absolute Neutrophils 10/04/2024 5.74  1.85 - 7.62 Thousands/µL Final    Absolute Immature Grans 10/04/2024 0.03  0.00 - 0.20 Thousand/uL Final    Absolute Lymphocytes 10/04/2024 1.42  0.60 - 4.47 Thousands/µL Final    Absolute Monocytes 10/04/2024 0.42  0.17 - 1.22 Thousand/µL Final    Eosinophils Absolute 10/04/2024 0.12  0.00 - 0.61 Thousand/µL Final    Basophils Absolute 10/04/2024 0.05  0.00 - 0.10 Thousands/µL Final    Sodium 10/04/2024 134 (L)  135 - 147 mmol/L Final    Potassium 10/04/2024 4.5  3.5 - 5.3 mmol/L Final    Chloride 10/04/2024 103  96 - 108 mmol/L Final    CO2 10/04/2024 24  21 - 32 mmol/L Final    ANION GAP 10/04/2024 7  4 - 13 mmol/L Final    BUN 10/04/2024 29 (H)  5 - 25 mg/dL Final    Creatinine 10/04/2024 1.05  0.60 - 1.30 mg/dL Final    Standardized to IDMS reference method    Glucose 10/04/2024 137  65 - 140 mg/dL Final    If the patient is fasting, the ADA then defines impaired fasting glucose as > 100 mg/dL and diabetes as > or equal to 123 mg/dL.    Calcium 10/04/2024 8.7  8.4 - 10.2 mg/dL Final    eGFR 10/04/2024 48  ml/min/1.73sq m Final    LACTIC ACID 10/04/2024 2.0  0.5 - 2.0 mmol/L Final    POC Glucose 10/04/2024 124  65 - 140 mg/dl Final    POC Glucose 10/04/2024 168 (H)  65 - 140 mg/dl Final    POC Glucose 10/04/2024 131  65 - 140 mg/dl Final    TSH 3RD GENERATON 10/05/2024 0.957   0.450 - 4.500 uIU/mL Final    The recommended reference ranges for TSH during pregnancy are as follows:   First trimester 0.100 to 2.500 uIU/mL   Second trimester  0.200 to 3.000 uIU/mL   Third trimester 0.300 to 3.000 uIU/m    Note: Normal ranges may not apply to patients who are transgender, non-binary, or whose legal sex, sex at birth, and gender identity differ.  Adult TSH (3rd generation) reference range follows the recommended guidelines of the American [employer] Association, January, 2020.    Protime 10/05/2024 14.3  12.3 - 15.0 seconds Final    INR 10/05/2024 1.04  0.85 - 1.19 Final    WBC 10/05/2024 5.15  4.31 - 10.16 Thousand/uL Final    RBC 10/05/2024 3.27 (L)  3.81 - 5.12 Million/uL Final    Hemoglobin 10/05/2024 9.8 (L)  11.5 - 15.4 g/dL Final    Hematocrit 10/05/2024 29.5 (L)  34.8 - 46.1 % Final    MCV 10/05/2024 90  82 - 98 fL Final    Results verified by repeat    MCH 10/05/2024 30.0  26.8 - 34.3 pg Final    MCHC 10/05/2024 33.2  31.4 - 37.4 g/dL Final    RDW 10/05/2024 12.7  11.6 - 15.1 % Final    MPV 10/05/2024 10.7  8.9 - 12.7 fL Final    Platelets 10/05/2024 210  149 - 390 Thousands/uL Final    nRBC 10/05/2024 0  /100 WBCs Final    Segmented % 10/05/2024 58  43 - 75 % Final    Immature Grans % 10/05/2024 0  0 - 2 % Final    Lymphocytes % 10/05/2024 31  14 - 44 % Final    Monocytes % 10/05/2024 6  4 - 12 % Final    Eosinophils Relative 10/05/2024 4  0 - 6 % Final    Basophils Relative 10/05/2024 1  0 - 1 % Final    Absolute Neutrophils 10/05/2024 2.97  1.85 - 7.62 Thousands/µL Final    Absolute Immature Grans 10/05/2024 0.02  0.00 - 0.20 Thousand/uL Final    Absolute Lymphocytes 10/05/2024 1.61  0.60 - 4.47 Thousands/µL Final    Absolute Monocytes 10/05/2024 0.33  0.17 - 1.22 Thousand/µL Final    Eosinophils Absolute 10/05/2024 0.19  0.00 - 0.61 Thousand/µL Final    Basophils Absolute 10/05/2024 0.03  0.00 - 0.10 Thousands/µL Final    Sodium 10/05/2024 139  135 - 147 mmol/L Final    Potassium  10/05/2024 4.1  3.5 - 5.3 mmol/L Final    Chloride 10/05/2024 106  96 - 108 mmol/L Final    CO2 10/05/2024 28  21 - 32 mmol/L Final    ANION GAP 10/05/2024 5  4 - 13 mmol/L Final    BUN 10/05/2024 20  5 - 25 mg/dL Final    Creatinine 10/05/2024 1.04  0.60 - 1.30 mg/dL Final    Standardized to IDMS reference method    Glucose 10/05/2024 106  65 - 140 mg/dL Final    If the patient is fasting, the ADA then defines impaired fasting glucose as > 100 mg/dL and diabetes as > or equal to 123 mg/dL.    Calcium 10/05/2024 8.7  8.4 - 10.2 mg/dL Final    Corrected Calcium 10/05/2024 9.4  8.3 - 10.1 mg/dL Final    AST 10/05/2024 11 (L)  13 - 39 U/L Final    ALT 10/05/2024 11  7 - 52 U/L Final    Specimen collection should occur prior to Sulfasalazine administration due to the potential for falsely depressed results.     Alkaline Phosphatase 10/05/2024 48  34 - 104 U/L Final    Total Protein 10/05/2024 5.1 (L)  6.4 - 8.4 g/dL Final    Albumin 10/05/2024 3.1 (L)  3.5 - 5.0 g/dL Final    Total Bilirubin 10/05/2024 0.31  0.20 - 1.00 mg/dL Final    Use of this assay is not recommended for patients undergoing treatment with eltrombopag due to the potential for falsely elevated results.  N-acetyl-p-benzoquinone imine (metabolite of Acetaminophen) will generate erroneously low results in samples for patients that have taken an overdose of Acetaminophen.    eGFR 10/05/2024 49  ml/min/1.73sq m Final    POC Glucose 10/05/2024 105  65 - 140 mg/dl Final    POC Glucose 10/05/2024 132  65 - 140 mg/dl Final    POC Glucose 10/05/2024 187 (H)  65 - 140 mg/dl Final   Admission on 09/28/2024, Discharged on 09/30/2024   Component Date Value Ref Range Status    WBC 09/28/2024 7.21  4.31 - 10.16 Thousand/uL Final    RBC 09/28/2024 3.90  3.81 - 5.12 Million/uL Final    Hemoglobin 09/28/2024 11.2 (L)  11.5 - 15.4 g/dL Final    Hematocrit 09/28/2024 35.3  34.8 - 46.1 % Final    MCV 09/28/2024 91  82 - 98 fL Final    MCH 09/28/2024 28.7  26.8 - 34.3 pg  Final    MCHC 09/28/2024 31.7  31.4 - 37.4 g/dL Final    RDW 09/28/2024 12.7  11.6 - 15.1 % Final    MPV 09/28/2024 10.4  8.9 - 12.7 fL Final    Platelets 09/28/2024 223  149 - 390 Thousands/uL Final    nRBC 09/28/2024 0  /100 WBCs Final    Segmented % 09/28/2024 65  43 - 75 % Final    Immature Grans % 09/28/2024 0  0 - 2 % Final    Lymphocytes % 09/28/2024 25  14 - 44 % Final    Monocytes % 09/28/2024 6  4 - 12 % Final    Eosinophils Relative 09/28/2024 3  0 - 6 % Final    Basophils Relative 09/28/2024 1  0 - 1 % Final    Absolute Neutrophils 09/28/2024 4.73  1.85 - 7.62 Thousands/µL Final    Absolute Immature Grans 09/28/2024 0.02  0.00 - 0.20 Thousand/uL Final    Absolute Lymphocytes 09/28/2024 1.77  0.60 - 4.47 Thousands/µL Final    Absolute Monocytes 09/28/2024 0.41  0.17 - 1.22 Thousand/µL Final    Eosinophils Absolute 09/28/2024 0.24  0.00 - 0.61 Thousand/µL Final    Basophils Absolute 09/28/2024 0.04  0.00 - 0.10 Thousands/µL Final    Sodium 09/28/2024 136  135 - 147 mmol/L Final    Potassium 09/28/2024 3.9  3.5 - 5.3 mmol/L Final    Chloride 09/28/2024 104  96 - 108 mmol/L Final    CO2 09/28/2024 24  21 - 32 mmol/L Final    ANION GAP 09/28/2024 8  4 - 13 mmol/L Final    BUN 09/28/2024 27 (H)  5 - 25 mg/dL Final    Creatinine 09/28/2024 1.14  0.60 - 1.30 mg/dL Final    Standardized to IDMS reference method    Glucose 09/28/2024 216 (H)  65 - 140 mg/dL Final    If the patient is fasting, the ADA then defines impaired fasting glucose as > 100 mg/dL and diabetes as > or equal to 123 mg/dL.    Calcium 09/28/2024 9.1  8.4 - 10.2 mg/dL Final    AST 09/28/2024 13  13 - 39 U/L Final    ALT 09/28/2024 7  7 - 52 U/L Final    Specimen collection should occur prior to Sulfasalazine administration due to the potential for falsely depressed results.     Alkaline Phosphatase 09/28/2024 46  34 - 104 U/L Final    Total Protein 09/28/2024 6.0 (L)  6.4 - 8.4 g/dL Final    Albumin 09/28/2024 3.7  3.5 - 5.0 g/dL Final    Total  Bilirubin 09/28/2024 0.35  0.20 - 1.00 mg/dL Final    Use of this assay is not recommended for patients undergoing treatment with eltrombopag due to the potential for falsely elevated results.  N-acetyl-p-benzoquinone imine (metabolite of Acetaminophen) will generate erroneously low results in samples for patients that have taken an overdose of Acetaminophen.    eGFR 09/28/2024 43  ml/min/1.73sq m Final    Magnesium 09/28/2024 1.8 (L)  1.9 - 2.7 mg/dL Final    Phosphorus 09/28/2024 3.3  2.3 - 4.1 mg/dL Final    Ventricular Rate 09/29/2024 79  BPM Final    Atrial Rate 09/29/2024 300  BPM Final    QRSD Interval 09/29/2024 78  ms Final    QT Interval 09/29/2024 380  ms Final    QTC Interval 09/29/2024 435  ms Final    QRS Axis 09/29/2024 -10  degrees Final    T Wave [place] 09/29/2024 2  degrees Final    Hemoglobin A1C 09/29/2024 6.7 (H)  Normal 4.0-5.6%; PreDiabetic 5.7-6.4%; Diabetic >=6.5%; Glycemic control for adults with diabetes <7.0% % Final    EAG 09/29/2024 146  mg/dl Final    WBC 09/29/2024 9.50  4.31 - 10.16 Thousand/uL Final    RBC 09/29/2024 3.82  3.81 - 5.12 Million/uL Final    Hemoglobin 09/29/2024 11.0 (L)  11.5 - 15.4 g/dL Final    Hematocrit 09/29/2024 34.7 (L)  34.8 - 46.1 % Final    MCV 09/29/2024 91  82 - 98 fL Final    MCH 09/29/2024 28.8  26.8 - 34.3 pg Final    MCHC 09/29/2024 31.7  31.4 - 37.4 g/dL Final    RDW 09/29/2024 12.8  11.6 - 15.1 % Final    MPV 09/29/2024 10.7  8.9 - 12.7 fL Final    Platelets 09/29/2024 191  149 - 390 Thousands/uL Final    nRBC 09/29/2024 0  /100 WBCs Final    Segmented % 09/29/2024 86 (H)  43 - 75 % Final    Immature Grans % 09/29/2024 0  0 - 2 % Final    Lymphocytes % 09/29/2024 8 (L)  14 - 44 % Final    Monocytes % 09/29/2024 5  4 - 12 % Final    Eosinophils Relative 09/29/2024 1  0 - 6 % Final    Basophils Relative 09/29/2024 0  0 - 1 % Final    Absolute Neutrophils 09/29/2024 8.04 (H)  1.85 - 7.62 Thousands/µL Final    Absolute Immature Grans 09/29/2024 0.04  0.00  - 0.20 Thousand/uL Final    Absolute Lymphocytes 09/29/2024 0.79  0.60 - 4.47 Thousands/µL Final    Absolute Monocytes 09/29/2024 0.47  0.17 - 1.22 Thousand/µL Final    Eosinophils Absolute 09/29/2024 0.13  0.00 - 0.61 Thousand/µL Final    Basophils Absolute 09/29/2024 0.03  0.00 - 0.10 Thousands/µL Final    Sodium 09/29/2024 140  135 - 147 mmol/L Final    Potassium 09/29/2024 3.4 (L)  3.5 - 5.3 mmol/L Final    Chloride 09/29/2024 107  96 - 108 mmol/L Final    CO2 09/29/2024 24  21 - 32 mmol/L Final    ANION GAP 09/29/2024 9  4 - 13 mmol/L Final    BUN 09/29/2024 24  5 - 25 mg/dL Final    Creatinine 09/29/2024 0.98  0.60 - 1.30 mg/dL Final    Standardized to IDMS reference method    Glucose 09/29/2024 137  65 - 140 mg/dL Final    If the patient is fasting, the ADA then defines impaired fasting glucose as > 100 mg/dL and diabetes as > or equal to 123 mg/dL.    Calcium 09/29/2024 8.6  8.4 - 10.2 mg/dL Final    eGFR 09/29/2024 52  ml/min/1.73sq m Final    Magnesium 09/29/2024 2.1  1.9 - 2.7 mg/dL Final    POC Glucose 09/29/2024 138  65 - 140 mg/dl Final    POC Glucose 09/29/2024 165 (H)  65 - 140 mg/dl Final    POC Glucose 09/29/2024 169 (H)  65 - 140 mg/dl Final    POC Glucose 09/29/2024 113  65 - 140 mg/dl Final    WBC 09/30/2024 6.30  4.31 - 10.16 Thousand/uL Final    RBC 09/30/2024 3.78 (L)  3.81 - 5.12 Million/uL Final    Hemoglobin 09/30/2024 10.7 (L)  11.5 - 15.4 g/dL Final    Hematocrit 09/30/2024 34.0 (L)  34.8 - 46.1 % Final    MCV 09/30/2024 90  82 - 98 fL Final    MCH 09/30/2024 28.3  26.8 - 34.3 pg Final    MCHC 09/30/2024 31.5  31.4 - 37.4 g/dL Final    RDW 09/30/2024 12.9  11.6 - 15.1 % Final    Platelets 09/30/2024 171  149 - 390 Thousands/uL Final    MPV 09/30/2024 10.6  8.9 - 12.7 fL Final    Sodium 09/30/2024 138  135 - 147 mmol/L Final    Potassium 09/30/2024 4.1  3.5 - 5.3 mmol/L Final    Chloride 09/30/2024 107  96 - 108 mmol/L Final    CO2 09/30/2024 24  21 - 32 mmol/L Final    ANION GAP  09/30/2024 7  4 - 13 mmol/L Final    BUN 09/30/2024 19  5 - 25 mg/dL Final    Creatinine 09/30/2024 0.96  0.60 - 1.30 mg/dL Final    Standardized to IDMS reference method    Glucose 09/30/2024 126  65 - 140 mg/dL Final    If the patient is fasting, the ADA then defines impaired fasting glucose as > 100 mg/dL and diabetes as > or equal to 123 mg/dL.    Calcium 09/30/2024 8.9  8.4 - 10.2 mg/dL Final    eGFR 09/30/2024 54  ml/min/1.73sq m Final    POC Glucose 09/30/2024 121  65 - 140 mg/dl Final    POC Glucose 09/30/2024 140  65 - 140 mg/dl Final   Admission on 09/23/2024, Discharged on 09/23/2024   Component Date Value Ref Range Status    Ventricular Rate 09/23/2024 85  BPM Final    Atrial Rate 09/23/2024 85  BPM Final    [place] Interval 09/23/2024 160  ms Final    QRSD Interval 09/23/2024 72  ms Final    QT Interval 09/23/2024 348  ms Final    QTC Interval 09/23/2024 414  ms Final    P Axis 09/23/2024 39  degrees Final    QRS Axis 09/23/2024 -2  degrees Final    T Wave Axis 09/23/2024 13  degrees Final    WBC 09/23/2024 6.14  4.31 - 10.16 Thousand/uL Final    RBC 09/23/2024 4.21  3.81 - 5.12 Million/uL Final    Hemoglobin 09/23/2024 12.3  11.5 - 15.4 g/dL Final    Hematocrit 09/23/2024 37.8  34.8 - 46.1 % Final    MCV 09/23/2024 90  82 - 98 fL Final    MCH 09/23/2024 29.2  26.8 - 34.3 pg Final    MCHC 09/23/2024 32.5  31.4 - 37.4 g/dL Final    RDW 09/23/2024 12.7  11.6 - 15.1 % Final    MPV 09/23/2024 10.7  8.9 - 12.7 fL Final    Platelets 09/23/2024 197  149 - 390 Thousands/uL Final    nRBC 09/23/2024 0  /100 WBCs Final    Segmented % 09/23/2024 66  43 - 75 % Final    Immature Grans % 09/23/2024 0  0 - 2 % Final    Lymphocytes % 09/23/2024 24  14 - 44 % Final    Monocytes % 09/23/2024 6  4 - 12 % Final    Eosinophils Relative 09/23/2024 3  0 - 6 % Final    Basophils Relative 09/23/2024 1  0 - 1 % Final    Absolute Neutrophils 09/23/2024 4.11  1.85 - 7.62 Thousands/µL Final    Absolute Immature Grans 09/23/2024 0.02   0.00 - 0.20 Thousand/uL Final    Absolute Lymphocytes 09/23/2024 1.44  0.60 - 4.47 Thousands/µL Final    Absolute Monocytes 09/23/2024 0.35  0.17 - 1.22 Thousand/µL Final    Eosinophils Absolute 09/23/2024 0.17  0.00 - 0.61 Thousand/µL Final    Basophils Absolute 09/23/2024 0.05  0.00 - 0.10 Thousands/µL Final    Sodium 09/23/2024 137  135 - 147 mmol/L Final    Potassium 09/23/2024 5.0  3.5 - 5.3 mmol/L Final    Moderately Hemolyzed:Results may be affected.    Chloride 09/23/2024 104  96 - 108 mmol/L Final    CO2 09/23/2024 25  21 - 32 mmol/L Final    ANION GAP 09/23/2024 8  4 - 13 mmol/L Final    BUN 09/23/2024 37 (H)  5 - 25 mg/dL Final    Creatinine 09/23/2024 1.18  0.60 - 1.30 mg/dL Final    Standardized to IDMS reference method    Glucose 09/23/2024 109  65 - 140 mg/dL Final    If the patient is fasting, the ADA then defines impaired fasting glucose as > 100 mg/dL and diabetes as > or equal to 123 mg/dL.    Calcium 09/23/2024 9.6  8.4 - 10.2 mg/dL Final    eGFR 09/23/2024 42  ml/min/1.73sq m Final   Admission on 09/15/2024, Discharged on 09/15/2024   Component Date Value Ref Range Status    Ventricular Rate 09/15/2024 84  BPM Final    Atrial Rate 09/15/2024 84  BPM Final    ID Interval 09/15/2024 162  ms Final    QRSD Interval 09/15/2024 68  ms Final    QT Interval 09/15/2024 348  ms Final    QTC Interval 09/15/2024 411  ms Final    P Axis 09/15/2024 55  degrees Final    QRS Axis 09/15/2024 6  degrees Final    T Wave [place] 09/15/2024 39  degrees Final    WBC 09/15/2024 7.61  4.31 - 10.16 Thousand/uL Final    RBC 09/15/2024 4.13  3.81 - 5.12 Million/uL Final    Hemoglobin 09/15/2024 11.8  11.5 - 15.4 g/dL Final    Hematocrit 09/15/2024 37.1  34.8 - 46.1 % Final    MCV 09/15/2024 90  82 - 98 fL Final    MCH 09/15/2024 28.6  26.8 - 34.3 pg Final    MCHC 09/15/2024 31.8  31.4 - 37.4 g/dL Final    RDW 09/15/2024 12.8  11.6 - 15.1 % Final    MPV 09/15/2024 10.9  8.9 - 12.7 fL Final    Platelets 09/15/2024 218  149 -  390 Thousands/uL Final    nRBC 09/15/2024 0  /100 WBCs Final    Segmented % 09/15/2024 73  43 - 75 % Final    Immature Grans % 09/15/2024 0  0 - 2 % Final    Lymphocytes % 09/15/2024 19  14 - 44 % Final    Monocytes % 09/15/2024 5  4 - 12 % Final    Eosinophils Relative 09/15/2024 2  0 - 6 % Final    Basophils Relative 09/15/2024 1  0 - 1 % Final    Absolute Neutrophils 09/15/2024 5.59  1.85 - 7.62 Thousands/µL Final    Absolute Immature Grans 09/15/2024 0.02  0.00 - 0.20 Thousand/uL Final    Absolute Lymphocytes 09/15/2024 1.41  0.60 - 4.47 Thousands/µL Final    Absolute Monocytes 09/15/2024 0.40  0.17 - 1.22 Thousand/µL Final    Eosinophils Absolute 09/15/2024 0.14  0.00 - 0.61 Thousand/µL Final    Basophils Absolute 09/15/2024 0.05  0.00 - 0.10 Thousands/µL Final    Sodium 09/15/2024 136  135 - 147 mmol/L Final    Potassium 09/15/2024 4.0  3.5 - 5.3 mmol/L Final    Chloride 09/15/2024 101  96 - 108 mmol/L Final    CO2 09/15/2024 25  21 - 32 mmol/L Final    ANION GAP 09/15/2024 10  4 - 13 mmol/L Final    BUN 09/15/2024 27 (H)  5 - 25 mg/dL Final    Creatinine 09/15/2024 1.05  0.60 - 1.30 mg/dL Final    Standardized to IDMS reference method    Glucose 09/15/2024 105  65 - 140 mg/dL Final    If the patient is fasting, the ADA then defines impaired fasting glucose as > 100 mg/dL and diabetes as > or equal to 123 mg/dL.    Calcium 09/15/2024 9.6  8.4 - 10.2 mg/dL Final    AST 09/15/2024 15  13 - 39 U/L Final    ALT 09/15/2024 9  7 - 52 U/L Final    Specimen collection should occur prior to Sulfasalazine administration due to the potential for falsely depressed results.     Alkaline Phosphatase 09/15/2024 49  34 - 104 U/L Final    Total Protein 09/15/2024 6.7  6.4 - 8.4 g/dL Final    Albumin 09/15/2024 4.1  3.5 - 5.0 g/dL Final    Total Bilirubin 09/15/2024 0.47  0.20 - 1.00 mg/dL Final    Use of this assay is not recommended for patients undergoing treatment with eltrombopag due to the potential for falsely elevated  "results.  N-acetyl-p-benzoquinone imine (metabolite of Acetaminophen) will generate erroneously low results in samples for patients that have taken an overdose of Acetaminophen.    eGFR 09/15/2024 48  ml/min/1.73sq m Final    Lipase 09/15/2024 45  11 - 82 u/L Final    hs TnI 0hr 09/15/2024 17  \"Refer to ACS Flowchart\"- see link ng/L Final    Comment:                                              Initial (time 0) result  If >=50 ng/L, Myocardial injury suggested ;  Type of myocardial injury and treatment strategy  to be determined.  If 5-49 ng/L, a delta result at 2 hours and or 4 hours will be needed to further evaluate.  If <4 ng/L, and chest pain has been >3 hours since onset, patient may qualify for discharge based on the HEART score in the ED.  If <5 ng/L and <3hours since onset of chest pain, a delta result at 2 hours will be needed to further evaluate.    HS Troponin 99th Percentile URL of a Health Population=12 ng/L with a 95% Confidence Interval of 8-18 ng/L.    Second Troponin (time 2 hours)  If calculated delta >= 20 ng/L,  Myocardial injury suggested ; Type of myocardial injury and treatment strategy to be determined.  If 5-49 ng/L and the calculated delta is 5-19 ng/L, consult medical service for evaluation.  Continue evaluation for ischemia on ecg and other possible etiology and repeat hs troponin at 4 hours.  If delta                            is <5 ng/L at 2 hours, consider discharge based on risk stratification via the HEART score (if in ED), or [place] risk score in IP/Observation.    HS Troponin 99th Percentile URL of a Health Population=12 ng/L with a 95% Confidence Interval of 8-18 ng/L.    Color, UA 09/15/2024 Light Yellow   Final    Clarity, UA 09/15/2024 Clear   Final    Specific Gravity, UA 09/15/2024 1.019  1.003 - 1.030 Final    pH, UA 09/15/2024 5.0  4.5, 5.0, 5.5, 6.0, 6.5, 7.0, 7.5, 8.0 Final    Leukocytes, UA 09/15/2024 Negative  Negative Final    Nitrite, UA 09/15/2024 Negative  Negative " "Final    Protein, UA 09/15/2024 Negative  Negative mg/dl Final    Glucose, UA 09/15/2024 Negative  Negative mg/dl Final    Ketones, UA 09/15/2024 Negative  Negative mg/dl Final    Urobilinogen, UA 09/15/2024 <2.0  <2.0 mg/dl mg/dl Final    Bilirubin, UA 09/15/2024 Negative  Negative Final    Occult Blood, UA 09/15/2024 Small (A)  Negative Final    hs TnI 2hr 09/15/2024 14  \"Refer to ACS Flowchart\"- see link ng/L Final    Comment:                                              Initial (time 0) result  If >=50 ng/L, Myocardial injury suggested ;  Type of myocardial injury and treatment strategy  to be determined.  If 5-49 ng/L, a delta result at 2 hours and or 4 hours will be needed to further evaluate.  If <4 ng/L, and chest pain has been >3 hours since onset, patient may qualify for discharge based on the HEART score in the ED.  If <5 ng/L and <3hours since onset of chest pain, a delta result at 2 hours will be needed to further evaluate.    HS Troponin 99th Percentile URL of a Health Population=12 ng/L with a 95% Confidence Interval of 8-18 ng/L.    Second Troponin (time 2 hours)  If calculated delta >= 20 ng/L,  Myocardial injury suggested ; Type of myocardial injury and treatment strategy to be determined.  If 5-49 ng/L and the calculated delta is 5-19 ng/L, consult medical service for evaluation.  Continue evaluation for ischemia on ecg and other possible etiology and repeat hs troponin at 4 hours.  If delta                            is <5 ng/L at 2 hours, consider discharge based on risk stratification via the HEART score (if in ED), or [place] risk score in IP/Observation.    HS Troponin 99th Percentile URL of a Health Population=12 ng/L with a 95% Confidence Interval of 8-18 ng/L.    Delta 2hr hsTnI 09/15/2024 -3  <20 ng/L Final    RBC, UA 09/15/2024 10-20 (A)  None Seen, 1-2 /hpf Final    WBC, UA 09/15/2024 None Seen  None Seen, 1-2 /hpf Final    Epithelial Cells 09/15/2024 Occasional  None Seen, Occasional /hpf " Final    Bacteria, UA 09/15/2024 None Seen  None Seen, Occasional /hpf Final   Admission on 08/28/2024, Discharged on 09/04/2024   Component Date Value Ref Range Status    Sodium 08/30/2024 136  135 - 147 mmol/L Final    Potassium 08/30/2024 3.8  3.5 - 5.3 mmol/L Final    Chloride 08/30/2024 100  96 - 108 mmol/L Final    CO2 08/30/2024 26  21 - 32 mmol/L Final    ANION GAP 08/30/2024 10  4 - 13 mmol/L Final    BUN 08/30/2024 25  5 - 25 mg/dL Final    Creatinine 08/30/2024 0.98  0.60 - 1.30 mg/dL Final    Standardized to IDMS reference method    Glucose 08/30/2024 136  65 - 140 mg/dL Final    If the patient is fasting, the ADA then defines impaired fasting glucose as > 100 mg/dL and diabetes as > or equal to 123 mg/dL.    Glucose, Fasting 08/30/2024 136 (H)  65 - 99 mg/dL Final    Calcium 08/30/2024 9.3  8.4 - 10.2 mg/dL Final    AST 08/30/2024 12 (L)  13 - 39 U/L Final    ALT 08/30/2024 6 (L)  7 - 52 U/L Final    Specimen collection should occur prior to Sulfasalazine administration due to the potential for falsely depressed results.     Alkaline Phosphatase 08/30/2024 43  34 - 104 U/L Final    Total Protein 08/30/2024 6.1 (L)  6.4 - 8.4 g/dL Final    Albumin 08/30/2024 3.8  3.5 - 5.0 g/dL Final    Total Bilirubin 08/30/2024 0.53  0.20 - 1.00 mg/dL Final    Use of this assay is not recommended for patients undergoing treatment with eltrombopag due to the potential for falsely elevated results.  N-acetyl-p-benzoquinone imine (metabolite of Acetaminophen) will generate erroneously low results in samples for patients that have taken an overdose of Acetaminophen.    eGFR 08/30/2024 52  ml/min/1.73sq m Final    Magnesium 08/30/2024 2.1  1.9 - 2.7 mg/dL Final    Phosphorus 08/30/2024 4.0  2.3 - 4.1 mg/dL Final    WBC 08/30/2024 5.82  4.31 - 10.16 Thousand/uL Final    RBC 08/30/2024 3.77 (L)  3.81 - 5.12 Million/uL Final    Hemoglobin 08/30/2024 11.0 (L)  11.5 - 15.4 g/dL Final    Hematocrit 08/30/2024 34.4 (L)  34.8 -  46.1 % Final    MCV 08/30/2024 91  82 - 98 fL Final    MCH 08/30/2024 29.2  26.8 - 34.3 pg Final    MCHC 08/30/2024 32.0  31.4 - 37.4 g/dL Final    RDW 08/30/2024 13.1  11.6 - 15.1 % Final    MPV 08/30/2024 10.9  8.9 - 12.7 fL Final    Platelets 08/30/2024 207  149 - 390 Thousands/uL Final    nRBC 08/30/2024 0  /100 WBCs Final    Segmented % 08/30/2024 68  43 - 75 % Final    Immature Grans % 08/30/2024 0  0 - 2 % Final    Lymphocytes % 08/30/2024 22  14 - 44 % Final    Monocytes % 08/30/2024 7  4 - 12 % Final    Eosinophils Relative 08/30/2024 2  0 - 6 % Final    Basophils Relative 08/30/2024 1  0 - 1 % Final    Absolute Neutrophils 08/30/2024 3.95  1.85 - 7.62 Thousands/µL Final    Absolute Immature Grans 08/30/2024 0.01  0.00 - 0.20 Thousand/uL Final    Absolute Lymphocytes 08/30/2024 1.26  0.60 - 4.47 Thousands/µL Final    Absolute Monocytes 08/30/2024 0.43  0.17 - 1.22 Thousand/µL Final    Eosinophils Absolute 08/30/2024 0.14  0.00 - 0.61 Thousand/µL Final    Basophils Absolute 08/30/2024 0.03  0.00 - 0.10 Thousands/µL Final    TSH 3RD GENERATON 08/30/2024 1.191  0.450 - 4.500 uIU/mL Final    The recommended reference ranges for TSH during pregnancy are as follows:   First trimester 0.100 to 2.500 uIU/mL   Second trimester  0.200 to 3.000 uIU/mL   Third trimester 0.300 to 3.000 uIU/m    Note: Normal ranges may not apply to patients who are transgender, non-binary, or whose legal sex, sex at birth, and gender identity differ.  Adult TSH (3rd generation) reference range follows the recommended guidelines of the [employer] Thy[employer], January, 2020.    Vitamin B-12 08/30/2024 437  180 - 914 pg/mL Final    Folate 08/30/2024 >22.3  >5.9 ng/mL Final    The World Health Organization has determined deficient folate concentrations are considered to be <4.0 ng/mL.    Vit D, 25-Hydroxy 08/30/2024 40.4  30.0 - 100.0 ng/mL Final    Vitamin D guidelines established by Clinical Guidelines Subcommittee  of the Endocrine  Society Task Force, 2011    Deficiency <20ng/ml   Insufficiency 20-30ng/ml   Sufficient  ng/ml     Iron Saturation 08/30/2024 23  15 - 50 % Final    TIBC 08/30/2024 257  250 - 450 ug/dL Final    Iron 08/30/2024 58  50 - 212 ug/dL Final    Patients treated with metal-binding drugs (ie. Deferoxamine) may have depressed iron values.    UIBC 08/30/2024 199  155 - 355 ug/dL Final    Ferritin 08/30/2024 29  11 - 307 ng/mL Final    Syphilis Total Antibody 08/30/2024 Non-reactive  Non-Reactive Final    No serological evidence of infection with T. pallidum.  Early or incubating syphilis infection cannot be excluded.  Consider repeat testing based on clinical suspicion.    HIV-1 p24 Antigen 08/30/2024 Non-Reactive  Non-Reactive Final    HIV-1 Antibody 08/30/2024 Non-Reactive  Non-Reactive Final    HIV-2 Antibody 08/30/2024 Non-Reactive  Non-Reactive Final    HIV Ag-Ab 5th Gen 08/30/2024 Non-Reactive  Non-Reactive Final    A Non-Reactive test result does not preclude the possibility of exposure or infection with HIV-1 and/or HIV-2.  Non-Reactive results can occur if the quantity of marker present is below the detection limits or is not present during the stage of disease in which a sample is collected. Repeat testing should be considered where there is clinical suspicion of infection.       Ventricular Rate 08/30/2024 83  BPM Final    Atrial Rate 08/30/2024 83  BPM Final    [place] Interval 08/30/2024 170  ms Final    QRSD Interval 08/30/2024 74  ms Final    QT Interval 08/30/2024 370  ms Final    QTC Interval 08/30/2024 434  ms Final    P Axis 08/30/2024 61  degrees Final    QRS [place] 08/30/2024 3  degrees Final    T Wave [place] 08/30/2024 51  degrees Final   Admission on 08/27/2024, Discharged on 08/28/2024   Component Date Value Ref Range Status    WBC 08/27/2024 6.15  4.31 - 10.16 Thousand/uL Final    RBC 08/27/2024 3.75 (L)  3.81 - 5.12 Million/uL Final    Hemoglobin 08/27/2024 11.0 (L)  11.5 - 15.4 g/dL Final    Hematocrit  08/27/2024 33.5 (L)  34.8 - 46.1 % Final    MCV 08/27/2024 89  82 - 98 fL Final    MCH 08/27/2024 29.3  26.8 - 34.3 pg Final    MCHC 08/27/2024 32.8  31.4 - 37.4 g/dL Final    RDW 08/27/2024 13.2  11.6 - 15.1 % Final    MPV 08/27/2024 10.2  8.9 - 12.7 fL Final    Platelets 08/27/2024 207  149 - 390 Thousands/uL Final    nRBC 08/27/2024 0  /100 WBCs Final    Segmented % 08/27/2024 66  43 - 75 % Final    Immature Grans % 08/27/2024 0  0 - 2 % Final    Lymphocytes % 08/27/2024 24  14 - 44 % Final    Monocytes % 08/27/2024 7  4 - 12 % Final    Eosinophils Relative 08/27/2024 2  0 - 6 % Final    Basophils Relative 08/27/2024 1  0 - 1 % Final    Absolute Neutrophils 08/27/2024 4.08  1.85 - 7.62 Thousands/µL Final    Absolute Immature Grans 08/27/2024 0.02  0.00 - 0.20 Thousand/uL Final    Absolute Lymphocytes 08/27/2024 1.46  0.60 - 4.47 Thousands/µL Final    Absolute Monocytes 08/27/2024 0.43  0.17 - 1.22 Thousand/µL Final    Eosinophils Absolute 08/27/2024 0.12  0.00 - 0.61 Thousand/µL Final    Basophils Absolute 08/27/2024 0.04  0.00 - 0.10 Thousands/µL Final    Sodium 08/27/2024 136  135 - 147 mmol/L Final    Potassium 08/27/2024 3.9  3.5 - 5.3 mmol/L Final    Chloride 08/27/2024 102  96 - 108 mmol/L Final    CO2 08/27/2024 27  21 - 32 mmol/L Final    ANION GAP 08/27/2024 7  4 - 13 mmol/L Final    BUN 08/27/2024 26 (H)  5 - 25 mg/dL Final    Creatinine 08/27/2024 1.05  0.60 - 1.30 mg/dL Final    Standardized to IDMS reference method    Glucose 08/27/2024 122  65 - 140 mg/dL Final    If the patient is fasting, the ADA then defines impaired fasting glucose as > 100 mg/dL and diabetes as > or equal to 123 mg/dL.    Calcium 08/27/2024 9.4  8.4 - 10.2 mg/dL Final    AST 08/27/2024 12 (L)  13 - 39 U/L Final    ALT 08/27/2024 7  7 - 52 U/L Final    Specimen collection should occur prior to Sulfasalazine administration due to the potential for falsely depressed results.     Alkaline Phosphatase 08/27/2024 50  34 - 104 U/L  Final    Total Protein 08/27/2024 6.4  6.4 - 8.4 g/dL Final    Albumin 08/27/2024 4.0  3.5 - 5.0 g/dL Final    Total Bilirubin 08/27/2024 0.48  0.20 - 1.00 mg/dL Final    Use of this assay is not recommended for patients undergoing treatment with eltrombopag due to the potential for falsely elevated results.  N-acetyl-p-benzoquinone imine (metabolite of Acetaminophen) will generate erroneously low results in samples for patients that have taken an overdose of Acetaminophen.    eGFR 08/27/2024 48  ml/min/1.73sq m Final    TSH 3RD GENERATON 08/27/2024 1.408  0.450 - 4.500 uIU/mL Final    The recommended reference ranges for TSH during pregnancy are as follows:   First trimester 0.100 to 2.500 uIU/mL   Second trimester  0.200 to 3.000 uIU/mL   Third trimester 0.300 to 3.000 uIU/m    Note: Normal ranges may not apply to patients who are transgender, non-binary, or whose legal sex, sex at birth, and gender identity differ.  Adult TSH (3rd generation) reference range follows the recommended guidelines of the [employer], January, 2020.    Amph/Meth UR 08/27/2024 Negative  Negative Final    Barbiturate Ur 08/27/2024 Negative  Negative Final    Benzodiazepine Urine 08/27/2024 Negative  Negative Final    Cocaine Urine 08/27/2024 Negative  Negative Final    Methadone Urine 08/27/2024 Negative  Negative Final    Opiate Urine 08/27/2024 Negative  Negative Final    PCP Ur 08/27/2024 Negative  Negative Final    THC Urine 08/27/2024 Negative  Negative Final    Oxycodone Urine 08/27/2024 Negative  Negative Final    Fentanyl Urine 08/27/2024 Negative  Negative Final    HYDROCODONE URINE 08/27/2024 Negative  Negative Final    EXTBreath Alcohol 08/27/2024 0.000   Final    Color, UA 08/27/2024 Colorless   Final    Clarity, UA 08/27/2024 Clear   Final    Specific Gravity, UA 08/27/2024 1.008  1.003 - 1.030 Final    pH, UA 08/27/2024 5.0  4.5, 5.0, 5.5, 6.0, 6.5, 7.0, 7.5, 8.0 Final    Leukocytes, UA 08/27/2024 Negative   Negative Final    Nitrite, UA 08/27/2024 Negative  Negative Final    Protein, UA 08/27/2024 Negative  Negative mg/dl Final    Glucose, UA 08/27/2024 Negative  Negative mg/dl Final    Ketones, UA 08/27/2024 Negative  Negative mg/dl Final    Urobilinogen, UA 08/27/2024 <2.0  <2.0 mg/dl mg/dl Final    Bilirubin, UA 08/27/2024 Negative  Negative Final    Occult Blood, UA 08/27/2024 Negative  Negative Final    Ventricular Rate 08/27/2024 77  BPM Final    Atrial Rate 08/27/2024 77  BPM Final    IL Interval 08/27/2024 166  ms Final    QRSD Interval 08/27/2024 74  ms Final    QT Interval 08/27/2024 358  ms Final    QTC Interval 08/27/2024 405  ms Final    P Axis 08/27/2024 50  degrees Final    QRS Axis 08/27/2024 -1  degrees Final    T Wave Axis 08/27/2024 23  degrees Final             ___________________________________    History Review: The following portions of the patient's history were reviewed and updated as appropriate: allergies, current medications, past family history, past medical history, past social history, past surgical history, and problem list.    Unchanged information from this writer's previous assessment is copied and italicized; information that has changed is bolded.    Past Psychiatric History:      Past psychiatric diagnoses:   Depression, anxiety  Inpatient psychiatric admissions:   8/2024 2013 following a surgery, sounded like extreme anxiety  Prior outpatient psychiatric treatment:   Started seeing a psychiatrist when first   Past/current psychotherapy:   active  History of suicidal attempts/gestures:   denies  Psychotropic medication trials:   Venlafaxine, sertraline, fluoxetine, escitalopram, aripiprazole (dizziness), risperidone, clonazepam, buspirone, gabapentin        Substance Abuse History:     Denies all history of substance use     Family Psychiatric History:      Family History             Family History   Problem Relation Age of Onset    Depression Mother           w/anxiety     Diabetes Mother           Mellitus    Breast cancer Mother      Hypertension Mother      No Known Problems Father      Depression Sister           w/anxiety    Heart disease Sister           Cardiac Disorder    Breast cancer Sister      Hypertension Sister      Diabetes Brother           Mellitus    Alcohol abuse Son                    Social History:     Developmental: nothing of note  Education: Bachelor degree  Marital history:  in 2006  Children: 2  Living arrangement, social support: as above, has been with daughter for 17 years  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  ___________________________________      Visit Time    Visit Start Time: 1100  Visit Stop Time: 1215  Total Visit Duration:  75 minutes    López Gutierrez MD   11/11/24

## 2024-11-11 NOTE — TELEPHONE ENCOUNTER
Sandra SZYMANSKI [facility] (supporting Ramin De Paz DO)         11/11/24 11:42 AM  Good morning Dr. De Paz,  My moms blood pressure has been low when checked the last few times by her caregivers in the home. I know she is still on the blood pressure medication but wanted to know your thoughts on stopping it. Her psychiatrist said the Ativan can have an effect on the blood pressure as well so that is perhaps why it is low. Her pressure on 11/8 was 120/60 and today was 100/54. I will continue to keep track. If you want me to bring her in I can do that as well. Thank you for your time!!  Janay

## 2024-11-12 ENCOUNTER — DOCUMENTATION (OUTPATIENT)
Dept: ADMINISTRATIVE | Facility: OTHER | Age: 84
End: 2024-11-12

## 2024-11-12 VITALS — SYSTOLIC BLOOD PRESSURE: 100 MMHG | DIASTOLIC BLOOD PRESSURE: 54 MMHG

## 2024-11-12 DIAGNOSIS — R19.4 CHANGE IN BOWEL HABITS: ICD-10-CM

## 2024-11-12 DIAGNOSIS — Z74.1 NEED FOR ASSISTANCE WITH PERSONAL CARE: Primary | ICD-10-CM

## 2024-11-12 NOTE — TELEPHONE ENCOUNTER
I spoke with Janay and she advised that Nhi is the occupational therapist.  Janay stated that Sandra is unsteady on her feet when Janay helps her dress in the morning and Nhi suggested the walker to help her steady herself.  The beside commode would be to place the rails over the toilet in the bathroom.  She advised that it is easer for her to maneuver since she has one in the upstairs bathroom as well.  She also sent a message to you regarding her BP and wants to know if she should stop the BP med an just monitor her readings?

## 2024-11-12 NOTE — PROGRESS NOTES
Hypertension  (Newest Message First)  View All Conversations on this Encounter  Aylin Verma MA routed conversation to Patient Reported TeamYesterday (12:31 PM)     Aylin Verma MAYesterday (12:31 PM)       Sandra Peña   to P [facility] (supporting Ramin De Paz DO)         11/11/24 11:42 AM  Good morning Dr. De Paz,  My moms blood pressure has been low when checked the last few times by her caregivers in the home. I know she is still on the blood pressure medication but wanted to know your thoughts on stopping it. Her psychiatrist said the Ativan can have an effect on the blood pressure as well so that is perhaps why it is low. Her pressure on 11/8 was 120/60 and today was 100/54. I will continue to keep track. If you want me to bring her in I can do that as well. Thank you for your time!!  Janay         Note        Recent Patient Communication     Last Update Description Specialty     Today home orders , order for home Family Medicine     [facility] Ema Gonzalez Closed     Yesterday Hypertension Family Medicine     [facility] Aylin Verma Closed     Yesterday Meds refused: 1 Family Medicine     [facility] Ramin Quach Closed     Yesterday Blood Pressure Low Family Medicine     [facility] Ramin Quach Open     4 days ago Form Family Medicine     [facility] Eusebia Saez Closed    11/12/24 4:02 PM    Patient Reported home blood pressure(s) documented in chart.    Thank you.  Aicha Land MA  [facility]

## 2024-11-13 ENCOUNTER — TELEPHONE (OUTPATIENT)
Dept: BEHAVIORAL/MENTAL HEALTH CLINIC | Facility: CLINIC | Age: 84
End: 2024-11-13

## 2024-11-13 PROBLEM — R54 AGE-RELATED PHYSICAL DEBILITY: Status: ACTIVE | Noted: 2024-11-13

## 2024-11-13 NOTE — TELEPHONE ENCOUNTER
Sent order for bedside commode and rolling walker to [employer] through [employer].  I would like for her to keep track of her blood pressure readings.  She has been on lisinopril/hydrochlorothiazide for at least well over 1 year.  Keep track of blood pressure readings and send a message with update in approximately 1 week

## 2024-11-13 NOTE — TELEPHONE ENCOUNTER
[id number] on Janay's voicemail offering group appointment for her mother Sandra.  Offered appointment for group tomorrow 11/14/2024 or starting 12/10/2024 (next group) if she cannot make tomorrow.  Requested call [facility] to confirm her interest.

## 2024-11-16 LAB
DME PARACHUTE DELIVERY DATE ACTUAL: NORMAL
DME PARACHUTE DELIVERY DATE REQUESTED: NORMAL
DME PARACHUTE ITEM DESCRIPTION: NORMAL
DME PARACHUTE ORDER STATUS: NORMAL
DME PARACHUTE SUPPLIER NAME: NORMAL
DME PARACHUTE SUPPLIER PHONE: NORMAL

## 2024-11-18 ENCOUNTER — TELEPHONE (OUTPATIENT)
Dept: BEHAVIORAL/MENTAL HEALTH CLINIC | Facility: CLINIC | Age: 84
End: 2024-11-18

## 2024-11-18 ENCOUNTER — OFFICE VISIT (OUTPATIENT)
Age: 84
End: 2024-11-18
Payer: MEDICARE

## 2024-11-18 VITALS
HEIGHT: 57 IN | OXYGEN SATURATION: 96 % | DIASTOLIC BLOOD PRESSURE: 70 MMHG | SYSTOLIC BLOOD PRESSURE: 140 MMHG | TEMPERATURE: 97.6 F | HEART RATE: 74 BPM | WEIGHT: 110 LBS | BODY MASS INDEX: 23.73 KG/M2

## 2024-11-18 DIAGNOSIS — Z71.89 ADVANCED CARE PLANNING/COUNSELING DISCUSSION: ICD-10-CM

## 2024-11-18 DIAGNOSIS — R26.2 AMBULATORY DYSFUNCTION: ICD-10-CM

## 2024-11-18 DIAGNOSIS — F41.1 GENERALIZED ANXIETY DISORDER: ICD-10-CM

## 2024-11-18 DIAGNOSIS — E78.2 MIXED HYPERLIPIDEMIA: ICD-10-CM

## 2024-11-18 DIAGNOSIS — E11.9 TYPE 2 DIABETES MELLITUS WITHOUT COMPLICATION, WITHOUT LONG-TERM CURRENT USE OF INSULIN (HCC): ICD-10-CM

## 2024-11-18 DIAGNOSIS — I10 PRIMARY HYPERTENSION: ICD-10-CM

## 2024-11-18 DIAGNOSIS — G47.09 OTHER INSOMNIA: ICD-10-CM

## 2024-11-18 DIAGNOSIS — K59.00 CONSTIPATION, UNSPECIFIED CONSTIPATION TYPE: ICD-10-CM

## 2024-11-18 DIAGNOSIS — G31.84 MILD COGNITIVE IMPAIRMENT: Primary | ICD-10-CM

## 2024-11-18 DIAGNOSIS — N18.30 STAGE 3 CHRONIC KIDNEY DISEASE, UNSPECIFIED WHETHER STAGE 3A OR 3B CKD (HCC): ICD-10-CM

## 2024-11-18 DIAGNOSIS — F33.1 MAJOR DEPRESSIVE DISORDER, RECURRENT EPISODE, MODERATE (HCC): ICD-10-CM

## 2024-11-18 PROCEDURE — 99483 ASSMT & CARE PLN PT COG IMP: CPT | Performed by: INTERNAL MEDICINE

## 2024-11-19 ENCOUNTER — OFFICE VISIT (OUTPATIENT)
Dept: PSYCHIATRY | Facility: CLINIC | Age: 84
End: 2024-11-19
Payer: MEDICARE

## 2024-11-19 DIAGNOSIS — F33.1 MAJOR DEPRESSIVE DISORDER, RECURRENT EPISODE, MODERATE (HCC): ICD-10-CM

## 2024-11-19 DIAGNOSIS — F45.21 ILLNESS ANXIETY DISORDER: Primary | ICD-10-CM

## 2024-11-19 PROCEDURE — 90833 PSYTX W PT W E/M 30 MIN: CPT

## 2024-11-19 PROCEDURE — 99213 OFFICE O/P EST LOW 20 MIN: CPT

## 2024-11-19 NOTE — ASSESSMENT & PLAN NOTE
"  The patient and her daughter report significant improvement following recent medication changes of increase to escitalopram dose of 10 mg, addition of risperidone, and addition of lorazepam. The crying episodes especially in the morning have resolved and she is now waking spontaneously with improved self-care. They also report improved appetite. They report that obsessive thinking is also improved, that the patient is able to better \"let go\" of somatic preoccupations.    Reviewed assessment by geriatrician. There is evidence of cognitive decline. Patient reports constipation and urinary incontinence are effectively managed. Reduced renal function noted; risperidone at current dose should not need to be adjusted or discontinued.    Benzodiazepine therapy benefits continue to outweigh risks and the patient and her daughter are aware of risks. The patient is supervised at all times whether by family or an aid.    Continue escitalopram 10 mg QD for depression, anxiety, and obsessive thinking  Continue lorazepam 0.5 mg TID for significant anxiety  Continue risperidone 0.5 mg QHS for augmentation for obsessive thinking  Continue psychotherapy  Fasting glucose and CMP pending         "

## 2024-11-19 NOTE — PSYCH
"MEDICATION MANAGEMENT NOTE        [facility]      Name and Date of Birth:  Sandra Peña 84 y.o. 1940 MRN: [id number]    Date of Visit: November 19, 2024    Reason for Visit: Follow-up visit regarding medication management     _____________________________    Assessment & Plan  Illness anxiety disorder    The patient and her daughter report significant improvement following recent medication changes of increase to escitalopram dose of 10 mg, addition of risperidone, and addition of lorazepam. The crying episodes especially in the morning have resolved and she is now waking spontaneously with improved self-care. They also report improved appetite. They report that obsessive thinking is also improved, that the patient is able to better \"let go\" of somatic preoccupations.    Reviewed assessment by geriatrician. There is evidence of cognitive decline. Patient reports constipation and urinary incontinence are effectively managed. Reduced renal function noted; risperidone at current dose should not need to be adjusted or discontinued.    Benzodiazepine therapy benefits continue to outweigh risks and the patient and her daughter are aware of risks. The patient is supervised at all times whether by family or an aid.    Continue escitalopram 10 mg QD for depression, anxiety, and obsessive thinking  Continue lorazepam 0.5 mg TID for significant anxiety  Continue risperidone 0.5 mg QHS for augmentation for obsessive thinking  Continue psychotherapy  Fasting glucose and CMP pending         Major depressive disorder, recurrent episode, moderate (HCC)                    Treatment Recommendations/Precautions:  Risks/benefits/alternatives to treatment discussed, including a myriad of potential adverse medication side effects, to which Sandra voiced understanding and consented fully to treatment.   Labs most recently obtained , reviewed.   Follow up in 1 week for medication " "management  Follow up with PCP for medical issues and ongoing care  Aware of 24 hour and weekend coverage for urgent situations accessed by calling [facility] main practice number    Individual psychotherapy provided: Counseling was provided during the session today for 30 minutes.     Treatment Plan:     Completed and signed during the session: Not applicable - Treatment Plan not due at this session    _____________________________________________    History of Present Illness     Chief Complaint: \"I'm not waking up crying\"    SUBJECTIVE:    The patient and daughter report they celebrated patient's birthday. She is more active around the house and waking up more easily. She is practicing cognitive exercises recommended by geriatrician. She is reading, knitting, and going outside. They have days of doing activities together. Appetite is improved.          Psychiatric Review Of Systems:  Unchanged information from this writer's previous assessment is copied and italicized; information that has changed is bolded.    Appetite: no  Adverse eating: no  Weight changes: no  Insomnia/sleeplessness: no  Fatigue/anergy: no  Anhedonia/lack of interest: no  Attention/concentration: no  Psychomotor agitation/retardation: no  Somatic symptoms: no  Anxiety/panic attack: yes  Zaira/hypomania: no  Hopelessness/helplessness/worthlessness: no  Self-injurious behavior/high-risk behavior: no  Suicidal ideation: no  Homicidal ideation: no  Auditory hallucinations: no  Visual hallucinations: no  Other perceptual disturbances: no  Delusional thinking: no  Obsessive/compulsive symptoms: yes    Review Of Systems:      Constitutional negative   ENT negative   Cardiovascular negative   Respiratory negative   Gastrointestinal negative   Genitourinary negative   Musculoskeletal negative   Integumentary negative   Neurological negative   Endocrine negative   Other Symptoms none, all other systems are negative     Objective  "   OBJECTIVE:     Visit Vitals  OB Status Postmenopausal   Smoking Status Never      Wt Readings from Last 6 Encounters:   24 49.9 kg (110 lb)   10/17/24 49.9 kg (110 lb 0.2 oz)   10/10/24 49.4 kg (109 lb)   10/04/24 48.7 kg (107 lb 5.8 oz)   24 49.4 kg (109 lb)   24 48.4 kg (106 lb 11.2 oz)        Past Medical History:   Diagnosis Date    Anxiety     Colon polyps     Depression     Diabetes mellitus (HCC)     Dizziness     Last assessed: 8/31/15    DVT (deep venous thrombosis) ([facility])     Dysuria     Hematuria 2022    Hyperlipidemia     Hypertension     Hypertensive urgency 10/22/2021    Insomnia     Panic attack     Ureterolithiasis 2020      Past Surgical History:   Procedure Laterality Date     SECTION      1964 son, 1968 daughter    COLONOSCOPY      FL RETROGRADE PYELOGRAM  2020    VT COLONOSCOPY FLX DX W/COLLJ SPEC WHEN PFRMD N/A 3/27/2017    Procedure: COLONOSCOPY;  Surgeon: Gold Francis MD;  Location: [facility];  Service: Gastroenterology    VT CYSTO/URETERO W/LITHOTRIPSY &INDWELL STENT INSRT Right 2020    Procedure: CYSTOSCOPY URETEROSCOPY WITH LITHOTRIPSY HOLMIUM LASER, RETROGRADE PYELOGRAM AND INSERTION STENT URETERAL; EXCISION OF BLADDER TUMOR;  Surgeon: Edwin Love MD;  Location: [facility];  Service: Urology    ROTATOR CUFF REPAIR Left     Last assessed: 3/29/15    TONSILLECTOMY         Meds/Allergies    No Known Allergies  Current Outpatient Medications   Medication Instructions    aspirin 81 mg, Daily    atorvastatin (LIPITOR) 10 mg, Oral, Daily    Cholecalciferol (VITAMIN D3) 2,000 Units, Oral, Daily    escitalopram (LEXAPRO) 10 mg, Oral, Daily    glycerin adult 2 g suppository Please use glycerin suppository every other day.  If patient has rectal or abdominal pain, can use glycerin suppository daily    linaCLOtide 145 mcg, Oral, Daily    lisinopril-hydrochlorothiazide (PRINZIDE,ZESTORETIC) 10-12.5 MG per tablet 1 tablet, Oral, Daily    LORazepam  "(ATIVAN) 0.5 mg, Oral, 3 times daily    metFORMIN (GLUCOPHAGE) 500 mg, 2 times daily with meals    polyethylene glycol (MIRALAX) 17 g, Oral, Daily    psyllium (METAMUCIL) packet 1 packet, Oral, Daily    risperiDONE (RISPERDAL) 0.5 mg, Oral, Daily at bedtime    senna-docusate sodium (SENOKOT S) 8.6-50 mg per tablet 1 tablet, Oral, Daily PRN           Mental Status Exam:    Appearance Black winter coat, black winter hat, petite, short curly grey hair, good eye contact   Behavior/motor Calm and cooperative   Speech/language Normal rate and volume, more fluent   Mood \"better\"   Affect euthymic, appropriate, and full range   Thought process Logical and linear   Thought content No overt delusions, Denies hallucinations, Denies suicidal ideations   Cognition Some word-finding difficulty and short/intermediate-term memory difficulty  Awake and alert  Oriented  Concentrates on questions   Insight/judgment Insight: good  Judgment: good     Laboratory Results: I have personally reviewed all pertinent laboratory/tests results    Orders Only on 11/13/2024   Component Date Value Ref Range Status    Supplier Name 11/13/2024 [employer]   Final-Edited    Supplier Phone Number 11/13/2024 [phone]   Final-Edited    Order Status 11/13/2024 Delivery Successful   Final-Edited    Delivery Request Date 11/13/2024 11/13/2024   Final-Edited    Date Delivered  11/13/2024 11/14/2024   Final-Edited    Item Description 11/13/2024 Wheeled Walker, Adult   Final-Edited    Qty: 1   Admission on 10/17/2024, Discharged on 10/17/2024   Component Date Value Ref Range Status    Sodium 10/17/2024 139  135 - 147 mmol/L Final    Potassium 10/17/2024 3.8  3.5 - 5.3 mmol/L Final    Chloride 10/17/2024 105  96 - 108 mmol/L Final    CO2 10/17/2024 27  21 - 32 mmol/L Final    ANION GAP 10/17/2024 7  4 - 13 mmol/L Final    BUN 10/17/2024 21  5 - 25 mg/dL Final    Creatinine 10/17/2024 0.98  0.60 - 1.30 mg/dL Final    Standardized to " IDMS reference method    Glucose 10/17/2024 121  65 - 140 mg/dL Final    If the patient is fasting, the ADA then defines impaired fasting glucose as > 100 mg/dL and diabetes as > or equal to 123 mg/dL.    Calcium 10/17/2024 8.9  8.4 - 10.2 mg/dL Final    AST 10/17/2024 11 (L)  13 - 39 U/L Final    ALT 10/17/2024 8  7 - 52 U/L Final    Specimen collection should occur prior to Sulfasalazine administration due to the potential for falsely depressed results.     Alkaline Phosphatase 10/17/2024 45  34 - 104 U/L Final    Total Protein 10/17/2024 5.8 (L)  6.4 - 8.4 g/dL Final    Albumin 10/17/2024 3.6  3.5 - 5.0 g/dL Final    Total Bilirubin 10/17/2024 0.68  0.20 - 1.00 mg/dL Final    Use of this assay is not recommended for patients undergoing treatment with eltrombopag due to the potential for falsely elevated results.  N-acetyl-p-benzoquinone imine (metabolite of Acetaminophen) will generate erroneously low results in samples for patients that have taken an overdose of Acetaminophen.    eGFR 10/17/2024 52  ml/min/1.73sq m Final    WBC 10/17/2024 5.82  4.31 - 10.16 Thousand/uL Final    RBC 10/17/2024 3.88  3.81 - 5.12 Million/uL Final    Hemoglobin 10/17/2024 11.3 (L)  11.5 - 15.4 g/dL Final    Hematocrit 10/17/2024 34.9  34.8 - 46.1 % Final    MCV 10/17/2024 90  82 - 98 fL Final    MCH 10/17/2024 29.1  26.8 - 34.3 pg Final    MCHC 10/17/2024 32.4  31.4 - 37.4 g/dL Final    RDW 10/17/2024 13.3  11.6 - 15.1 % Final    MPV 10/17/2024 10.8  8.9 - 12.7 fL Final    Platelets 10/17/2024 230  149 - 390 Thousands/uL Final    nRBC 10/17/2024 0  /100 WBCs Final    Segmented % 10/17/2024 68  43 - 75 % Final    Immature Grans % 10/17/2024 0  0 - 2 % Final    Lymphocytes % 10/17/2024 24  14 - 44 % Final    Monocytes % 10/17/2024 6  4 - 12 % Final    Eosinophils Relative 10/17/2024 1  0 - 6 % Final    Basophils Relative 10/17/2024 1  0 - 1 % Final    Absolute Neutrophils 10/17/2024 3.94  1.85 - 7.62 Thousands/µL Final    Absolute  Immature Grans 10/17/2024 0.02  0.00 - 0.20 Thousand/uL Final    Absolute Lymphocytes 10/17/2024 1.38  0.60 - 4.47 Thousands/µL Final    Absolute Monocytes 10/17/2024 0.36  0.17 - 1.22 Thousand/µL Final    Eosinophils Absolute 10/17/2024 0.08  0.00 - 0.61 Thousand/µL Final    Basophils Absolute 10/17/2024 0.04  0.00 - 0.10 Thousands/µL Final    Color, UA 10/17/2024 Light Yellow   Final    Clarity, UA 10/17/2024 Clear   Final    Specific Gravity, UA 10/17/2024 1.012  1.003 - 1.030 Final    pH, UA 10/17/2024 7.0  4.5, 5.0, 5.5, 6.0, 6.5, 7.0, 7.5, 8.0 Final    Leukocytes, UA 10/17/2024 Negative  Negative Final    Nitrite, UA 10/17/2024 Negative  Negative Final    Protein, UA 10/17/2024 Negative  Negative mg/dl Final    Glucose, UA 10/17/2024 Negative  Negative mg/dl Final    Ketones, UA 10/17/2024 Negative  Negative mg/dl Final    Urobilinogen, UA 10/17/2024 <2.0  <2.0 mg/dl mg/dl Final    Bilirubin, UA 10/17/2024 Negative  Negative Final    Occult Blood, UA 10/17/2024 Negative  Negative Final    Magnesium 10/17/2024 1.8 (L)  1.9 - 2.7 mg/dL Final    SARS-CoV-2 10/17/2024 Negative  Negative Final         INFLUENZA A PCR 10/17/2024 Negative  Negative Final         INFLUENZA B PCR 10/17/2024 Negative  Negative Final         RSV PCR 10/17/2024 Negative  Negative Final         Ventricular Rate 10/17/2024 79  BPM Final    Atrial Rate 10/17/2024 79  BPM Final    [place] Interval 10/17/2024 166  ms Final    QRSD Interval 10/17/2024 72  ms Final    QT Interval 10/17/2024 358  ms Final    QTC Interval 10/17/2024 410  ms Final    P Axis 10/17/2024 52  degrees Final    QRS Axis 10/17/2024 1  degrees Final    T Wave Axis 10/17/2024 42  degrees Final   Admission on 10/03/2024, Discharged on 10/05/2024   Component Date Value Ref Range Status    WBC 10/04/2024 5.78  4.31 - 10.16 Thousand/uL Final    RBC 10/04/2024 3.90  3.81 - 5.12 Million/uL Final    Hemoglobin 10/04/2024 11.3 (L)  11.5 - 15.4 g/dL Final    Hematocrit 10/04/2024 35.2   34.8 - 46.1 % Final    MCV 10/04/2024 90  82 - 98 fL Final    MCH 10/04/2024 29.0  26.8 - 34.3 pg Final    MCHC 10/04/2024 32.1  31.4 - 37.4 g/dL Final    RDW 10/04/2024 12.9  11.6 - 15.1 % Final    MPV 10/04/2024 10.9  8.9 - 12.7 fL Final    Platelets 10/04/2024 256  149 - 390 Thousands/uL Final    nRBC 10/04/2024 0  /100 WBCs Final    Segmented % 10/04/2024 56  43 - 75 % Final    Immature Grans % 10/04/2024 0  0 - 2 % Final    Lymphocytes % 10/04/2024 32  14 - 44 % Final    Monocytes % 10/04/2024 8  4 - 12 % Final    Eosinophils Relative 10/04/2024 3  0 - 6 % Final    Basophils Relative 10/04/2024 1  0 - 1 % Final    Absolute Neutrophils 10/04/2024 3.21  1.85 - 7.62 Thousands/µL Final    Absolute Immature Grans 10/04/2024 0.02  0.00 - 0.20 Thousand/uL Final    Absolute Lymphocytes 10/04/2024 1.85  0.60 - 4.47 Thousands/µL Final    Absolute Monocytes 10/04/2024 0.48  0.17 - 1.22 Thousand/µL Final    Eosinophils Absolute 10/04/2024 0.18  0.00 - 0.61 Thousand/µL Final    Basophils Absolute 10/04/2024 0.04  0.00 - 0.10 Thousands/µL Final    Sodium 10/04/2024 137  135 - 147 mmol/L Final    Potassium 10/04/2024 4.4  3.5 - 5.3 mmol/L Final    Chloride 10/04/2024 102  96 - 108 mmol/L Final    CO2 10/04/2024 28  21 - 32 mmol/L Final    ANION GAP 10/04/2024 7  4 - 13 mmol/L Final    BUN 10/04/2024 34 (H)  5 - 25 mg/dL Final    Creatinine 10/04/2024 1.15  0.60 - 1.30 mg/dL Final    Standardized to IDMS reference method    Glucose 10/04/2024 153 (H)  65 - 140 mg/dL Final    If the patient is fasting, the ADA then defines impaired fasting glucose as > 100 mg/dL and diabetes as > or equal to 123 mg/dL.    Calcium 10/04/2024 9.3  8.4 - 10.2 mg/dL Final    AST 10/04/2024 28  13 - 39 U/L Final    ALT 10/04/2024 20  7 - 52 U/L Final    Specimen collection should occur prior to Sulfasalazine administration due to the potential for falsely depressed results.     Alkaline Phosphatase 10/04/2024 58  34 - 104 U/L Final    Total Protein  10/04/2024 6.4  6.4 - 8.4 g/dL Final    Albumin 10/04/2024 3.8  3.5 - 5.0 g/dL Final    Total Bilirubin 10/04/2024 0.32  0.20 - 1.00 mg/dL Final    Use of this assay is not recommended for patients undergoing treatment with eltrombopag due to the potential for falsely elevated results.  N-acetyl-p-benzoquinone imine (metabolite of Acetaminophen) will generate erroneously low results in samples for patients that have taken an overdose of Acetaminophen.    eGFR 10/04/2024 43  ml/min/1.73sq m Final    LACTIC ACID 10/04/2024 2.2 (H)  0.5 - 2.0 mmol/L Final    LACTIC ACID 10/04/2024 2.2 (H)  0.5 - 2.0 mmol/L Final    WBC 10/04/2024 7.78  4.31 - 10.16 Thousand/uL Final    RBC 10/04/2024 3.81  3.81 - 5.12 Million/uL Final    Hemoglobin 10/04/2024 11.0 (L)  11.5 - 15.4 g/dL Final    Hematocrit 10/04/2024 36.7  34.8 - 46.1 % Final    MCV 10/04/2024 96  82 - 98 fL Final    short sample    MCH 10/04/2024 28.9  26.8 - 34.3 pg Final    MCHC 10/04/2024 30.0 (L)  31.4 - 37.4 g/dL Final    RDW 10/04/2024 12.7  11.6 - 15.1 % Final    MPV 10/04/2024 10.4  8.9 - 12.7 fL Final    Platelets 10/04/2024 221  149 - 390 Thousands/uL Final    nRBC 10/04/2024 0  /100 WBCs Final    Segmented % 10/04/2024 74  43 - 75 % Final    Immature Grans % 10/04/2024 0  0 - 2 % Final    Lymphocytes % 10/04/2024 18  14 - 44 % Final    Monocytes % 10/04/2024 5  4 - 12 % Final    Eosinophils Relative 10/04/2024 2  0 - 6 % Final    Basophils Relative 10/04/2024 1  0 - 1 % Final    Absolute Neutrophils 10/04/2024 5.74  1.85 - 7.62 Thousands/µL Final    Absolute Immature Grans 10/04/2024 0.03  0.00 - 0.20 Thousand/uL Final    Absolute Lymphocytes 10/04/2024 1.42  0.60 - 4.47 Thousands/µL Final    Absolute Monocytes 10/04/2024 0.42  0.17 - 1.22 Thousand/µL Final    Eosinophils Absolute 10/04/2024 0.12  0.00 - 0.61 Thousand/µL Final    Basophils Absolute 10/04/2024 0.05  0.00 - 0.10 Thousands/µL Final    Sodium 10/04/2024 134 (L)  135 - 147 mmol/L Final     Potassium 10/04/2024 4.5  3.5 - 5.3 mmol/L Final    Chloride 10/04/2024 103  96 - 108 mmol/L Final    CO2 10/04/2024 24  21 - 32 mmol/L Final    ANION GAP 10/04/2024 7  4 - 13 mmol/L Final    BUN 10/04/2024 29 (H)  5 - 25 mg/dL Final    Creatinine 10/04/2024 1.05  0.60 - 1.30 mg/dL Final    Standardized to IDMS reference method    Glucose 10/04/2024 137  65 - 140 mg/dL Final    If the patient is fasting, the ADA then defines impaired fasting glucose as > 100 mg/dL and diabetes as > or equal to 123 mg/dL.    Calcium 10/04/2024 8.7  8.4 - 10.2 mg/dL Final    eGFR 10/04/2024 48  ml/min/1.73sq m Final    LACTIC ACID 10/04/2024 2.0  0.5 - 2.0 mmol/L Final    POC Glucose 10/04/2024 124  65 - 140 mg/dl Final    POC Glucose 10/04/2024 168 (H)  65 - 140 mg/dl Final    POC Glucose 10/04/2024 131  65 - 140 mg/dl Final    TSH 3RD GENERATON 10/05/2024 0.957  0.450 - 4.500 uIU/mL Final    The recommended reference ranges for TSH during pregnancy are as follows:   First trimester 0.100 to 2.500 uIU/mL   Second trimester  0.200 to 3.000 uIU/mL   Third trimester 0.300 to 3.000 uIU/m    Note: Normal ranges may not apply to patients who are transgender, non-binary, or whose legal sex, sex at birth, and gender identity differ.  Adult TSH (3rd generation) reference range follows the recommended guidelines of the American [employer] Association, January, 2020.    Protime 10/05/2024 14.3  12.3 - 15.0 seconds Final    INR 10/05/2024 1.04  0.85 - 1.19 Final    WBC 10/05/2024 5.15  4.31 - 10.16 Thousand/uL Final    RBC 10/05/2024 3.27 (L)  3.81 - 5.12 Million/uL Final    Hemoglobin 10/05/2024 9.8 (L)  11.5 - 15.4 g/dL Final    Hematocrit 10/05/2024 29.5 (L)  34.8 - 46.1 % Final    MCV 10/05/2024 90  82 - 98 fL Final    Results verified by repeat    MCH 10/05/2024 30.0  26.8 - 34.3 pg Final    MCHC 10/05/2024 33.2  31.4 - 37.4 g/dL Final    RDW 10/05/2024 12.7  11.6 - 15.1 % Final    MPV 10/05/2024 10.7  8.9 - 12.7 fL Final    Platelets  10/05/2024 210  149 - 390 Thousands/uL Final    nRBC 10/05/2024 0  /100 WBCs Final    Segmented % 10/05/2024 58  43 - 75 % Final    Immature Grans % 10/05/2024 0  0 - 2 % Final    Lymphocytes % 10/05/2024 31  14 - 44 % Final    Monocytes % 10/05/2024 6  4 - 12 % Final    Eosinophils Relative 10/05/2024 4  0 - 6 % Final    Basophils Relative 10/05/2024 1  0 - 1 % Final    Absolute Neutrophils 10/05/2024 2.97  1.85 - 7.62 Thousands/µL Final    Absolute Immature Grans 10/05/2024 0.02  0.00 - 0.20 Thousand/uL Final    Absolute Lymphocytes 10/05/2024 1.61  0.60 - 4.47 Thousands/µL Final    Absolute Monocytes 10/05/2024 0.33  0.17 - 1.22 Thousand/µL Final    Eosinophils Absolute 10/05/2024 0.19  0.00 - 0.61 Thousand/µL Final    Basophils Absolute 10/05/2024 0.03  0.00 - 0.10 Thousands/µL Final    Sodium 10/05/2024 139  135 - 147 mmol/L Final    Potassium 10/05/2024 4.1  3.5 - 5.3 mmol/L Final    Chloride 10/05/2024 106  96 - 108 mmol/L Final    CO2 10/05/2024 28  21 - 32 mmol/L Final    ANION GAP 10/05/2024 5  4 - 13 mmol/L Final    BUN 10/05/2024 20  5 - 25 mg/dL Final    Creatinine 10/05/2024 1.04  0.60 - 1.30 mg/dL Final    Standardized to IDMS reference method    Glucose 10/05/2024 106  65 - 140 mg/dL Final    If the patient is fasting, the ADA then defines impaired fasting glucose as > 100 mg/dL and diabetes as > or equal to 123 mg/dL.    Calcium 10/05/2024 8.7  8.4 - 10.2 mg/dL Final    Corrected Calcium 10/05/2024 9.4  8.3 - 10.1 mg/dL Final    AST 10/05/2024 11 (L)  13 - 39 U/L Final    ALT 10/05/2024 11  7 - 52 U/L Final    Specimen collection should occur prior to Sulfasalazine administration due to the potential for falsely depressed results.     Alkaline Phosphatase 10/05/2024 48  34 - 104 U/L Final    Total Protein 10/05/2024 5.1 (L)  6.4 - 8.4 g/dL Final    Albumin 10/05/2024 3.1 (L)  3.5 - 5.0 g/dL Final    Total Bilirubin 10/05/2024 0.31  0.20 - 1.00 mg/dL Final    Use of this assay is not recommended for  patients undergoing treatment with eltrombopag due to the potential for falsely elevated results.  N-acetyl-p-benzoquinone imine (metabolite of Acetaminophen) will generate erroneously low results in samples for patients that have taken an overdose of Acetaminophen.    eGFR 10/05/2024 49  ml/min/1.73sq m Final    POC Glucose 10/05/2024 105  65 - 140 mg/dl Final    POC Glucose 10/05/2024 132  65 - 140 mg/dl Final    POC Glucose 10/05/2024 187 (H)  65 - 140 mg/dl Final   Admission on 09/28/2024, Discharged on 09/30/2024   Component Date Value Ref Range Status    WBC 09/28/2024 7.21  4.31 - 10.16 Thousand/uL Final    RBC 09/28/2024 3.90  3.81 - 5.12 Million/uL Final    Hemoglobin 09/28/2024 11.2 (L)  11.5 - 15.4 g/dL Final    Hematocrit 09/28/2024 35.3  34.8 - 46.1 % Final    MCV 09/28/2024 91  82 - 98 fL Final    MCH 09/28/2024 28.7  26.8 - 34.3 pg Final    MCHC 09/28/2024 31.7  31.4 - 37.4 g/dL Final    RDW 09/28/2024 12.7  11.6 - 15.1 % Final    MPV 09/28/2024 10.4  8.9 - 12.7 fL Final    Platelets 09/28/2024 223  149 - 390 Thousands/uL Final    nRBC 09/28/2024 0  /100 WBCs Final    Segmented % 09/28/2024 65  43 - 75 % Final    Immature Grans % 09/28/2024 0  0 - 2 % Final    Lymphocytes % 09/28/2024 25  14 - 44 % Final    Monocytes % 09/28/2024 6  4 - 12 % Final    Eosinophils Relative 09/28/2024 3  0 - 6 % Final    Basophils Relative 09/28/2024 1  0 - 1 % Final    Absolute Neutrophils 09/28/2024 4.73  1.85 - 7.62 Thousands/µL Final    Absolute Immature Grans 09/28/2024 0.02  0.00 - 0.20 Thousand/uL Final    Absolute Lymphocytes 09/28/2024 1.77  0.60 - 4.47 Thousands/µL Final    Absolute Monocytes 09/28/2024 0.41  0.17 - 1.22 Thousand/µL Final    Eosinophils Absolute 09/28/2024 0.24  0.00 - 0.61 Thousand/µL Final    Basophils Absolute 09/28/2024 0.04  0.00 - 0.10 Thousands/µL Final    Sodium 09/28/2024 136  135 - 147 mmol/L Final    Potassium 09/28/2024 3.9  3.5 - 5.3 mmol/L Final    Chloride 09/28/2024 104  96 -  108 mmol/L Final    CO2 09/28/2024 24  21 - 32 mmol/L Final    ANION GAP 09/28/2024 8  4 - 13 mmol/L Final    BUN 09/28/2024 27 (H)  5 - 25 mg/dL Final    Creatinine 09/28/2024 1.14  0.60 - 1.30 mg/dL Final    Standardized to IDMS reference method    Glucose 09/28/2024 216 (H)  65 - 140 mg/dL Final    If the patient is fasting, the ADA then defines impaired fasting glucose as > 100 mg/dL and diabetes as > or equal to 123 mg/dL.    Calcium 09/28/2024 9.1  8.4 - 10.2 mg/dL Final    AST 09/28/2024 13  13 - 39 U/L Final    ALT 09/28/2024 7  7 - 52 U/L Final    Specimen collection should occur prior to Sulfasalazine administration due to the potential for falsely depressed results.     Alkaline Phosphatase 09/28/2024 46  34 - 104 U/L Final    Total Protein 09/28/2024 6.0 (L)  6.4 - 8.4 g/dL Final    Albumin 09/28/2024 3.7  3.5 - 5.0 g/dL Final    Total Bilirubin 09/28/2024 0.35  0.20 - 1.00 mg/dL Final    Use of this assay is not recommended for patients undergoing treatment with eltrombopag due to the potential for falsely elevated results.  N-acetyl-p-benzoquinone imine (metabolite of Acetaminophen) will generate erroneously low results in samples for patients that have taken an overdose of Acetaminophen.    eGFR 09/28/2024 43  ml/min/1.73sq m Final    Magnesium 09/28/2024 1.8 (L)  1.9 - 2.7 mg/dL Final    Phosphorus 09/28/2024 3.3  2.3 - 4.1 mg/dL Final    Ventricular Rate 09/29/2024 79  BPM Final    Atrial Rate 09/29/2024 300  BPM Final    QRSD Interval 09/29/2024 78  ms Final    QT Interval 09/29/2024 380  ms Final    QTC Interval 09/29/2024 435  ms Final    QRS Axis 09/29/2024 -10  degrees Final    T Wave [place] 09/29/2024 2  degrees Final    Hemoglobin A1C 09/29/2024 6.7 (H)  Normal 4.0-5.6%; PreDiabetic 5.7-6.4%; Diabetic >=6.5%; Glycemic control for adults with diabetes <7.0% % Final    EAG 09/29/2024 146  mg/dl Final    WBC 09/29/2024 9.50  4.31 - 10.16 Thousand/uL Final    RBC 09/29/2024 3.82  3.81 - 5.12  Million/uL Final    Hemoglobin 09/29/2024 11.0 (L)  11.5 - 15.4 g/dL Final    Hematocrit 09/29/2024 34.7 (L)  34.8 - 46.1 % Final    MCV 09/29/2024 91  82 - 98 fL Final    MCH 09/29/2024 28.8  26.8 - 34.3 pg Final    MCHC 09/29/2024 31.7  31.4 - 37.4 g/dL Final    RDW 09/29/2024 12.8  11.6 - 15.1 % Final    MPV 09/29/2024 10.7  8.9 - 12.7 fL Final    Platelets 09/29/2024 191  149 - 390 Thousands/uL Final    nRBC 09/29/2024 0  /100 WBCs Final    Segmented % 09/29/2024 86 (H)  43 - 75 % Final    Immature Grans % 09/29/2024 0  0 - 2 % Final    Lymphocytes % 09/29/2024 8 (L)  14 - 44 % Final    Monocytes % 09/29/2024 5  4 - 12 % Final    Eosinophils Relative 09/29/2024 1  0 - 6 % Final    Basophils Relative 09/29/2024 0  0 - 1 % Final    Absolute Neutrophils 09/29/2024 8.04 (H)  1.85 - 7.62 Thousands/µL Final    Absolute Immature Grans 09/29/2024 0.04  0.00 - 0.20 Thousand/uL Final    Absolute Lymphocytes 09/29/2024 0.79  0.60 - 4.47 Thousands/µL Final    Absolute Monocytes 09/29/2024 0.47  0.17 - 1.22 Thousand/µL Final    Eosinophils Absolute 09/29/2024 0.13  0.00 - 0.61 Thousand/µL Final    Basophils Absolute 09/29/2024 0.03  0.00 - 0.10 Thousands/µL Final    Sodium 09/29/2024 140  135 - 147 mmol/L Final    Potassium 09/29/2024 3.4 (L)  3.5 - 5.3 mmol/L Final    Chloride 09/29/2024 107  96 - 108 mmol/L Final    CO2 09/29/2024 24  21 - 32 mmol/L Final    ANION GAP 09/29/2024 9  4 - 13 mmol/L Final    BUN 09/29/2024 24  5 - 25 mg/dL Final    Creatinine 09/29/2024 0.98  0.60 - 1.30 mg/dL Final    Standardized to IDMS reference method    Glucose 09/29/2024 137  65 - 140 mg/dL Final    If the patient is fasting, the ADA then defines impaired fasting glucose as > 100 mg/dL and diabetes as > or equal to 123 mg/dL.    Calcium 09/29/2024 8.6  8.4 - 10.2 mg/dL Final    eGFR 09/29/2024 52  ml/min/1.73sq m Final    Magnesium 09/29/2024 2.1  1.9 - 2.7 mg/dL Final    POC Glucose 09/29/2024 138  65 - 140 mg/dl Final    POC Glucose  09/29/2024 165 (H)  65 - 140 mg/dl Final    POC Glucose 09/29/2024 169 (H)  65 - 140 mg/dl Final    POC Glucose 09/29/2024 113  65 - 140 mg/dl Final    WBC 09/30/2024 6.30  4.31 - 10.16 Thousand/uL Final    RBC 09/30/2024 3.78 (L)  3.81 - 5.12 Million/uL Final    Hemoglobin 09/30/2024 10.7 (L)  11.5 - 15.4 g/dL Final    Hematocrit 09/30/2024 34.0 (L)  34.8 - 46.1 % Final    MCV 09/30/2024 90  82 - 98 fL Final    MCH 09/30/2024 28.3  26.8 - 34.3 pg Final    MCHC 09/30/2024 31.5  31.4 - 37.4 g/dL Final    RDW 09/30/2024 12.9  11.6 - 15.1 % Final    Platelets 09/30/2024 171  149 - 390 Thousands/uL Final    MPV 09/30/2024 10.6  8.9 - 12.7 fL Final    Sodium 09/30/2024 138  135 - 147 mmol/L Final    Potassium 09/30/2024 4.1  3.5 - 5.3 mmol/L Final    Chloride 09/30/2024 107  96 - 108 mmol/L Final    CO2 09/30/2024 24  21 - 32 mmol/L Final    ANION GAP 09/30/2024 7  4 - 13 mmol/L Final    BUN 09/30/2024 19  5 - 25 mg/dL Final    Creatinine 09/30/2024 0.96  0.60 - 1.30 mg/dL Final    Standardized to IDMS reference method    Glucose 09/30/2024 126  65 - 140 mg/dL Final    If the patient is fasting, the ADA then defines impaired fasting glucose as > 100 mg/dL and diabetes as > or equal to 123 mg/dL.    Calcium 09/30/2024 8.9  8.4 - 10.2 mg/dL Final    eGFR 09/30/2024 54  ml/min/1.73sq m Final    POC Glucose 09/30/2024 121  65 - 140 mg/dl Final    POC Glucose 09/30/2024 140  65 - 140 mg/dl Final   Admission on 09/23/2024, Discharged on 09/23/2024   Component Date Value Ref Range Status    Ventricular Rate 09/23/2024 85  BPM Final    Atrial Rate 09/23/2024 85  BPM Final    DC Interval 09/23/2024 160  ms Final    QRSD Interval 09/23/2024 72  ms Final    QT Interval 09/23/2024 348  ms Final    QTC Interval 09/23/2024 414  ms Final    P Axis 09/23/2024 39  degrees Final    QRS Axis 09/23/2024 -2  degrees Final    T Wave Axis 09/23/2024 13  degrees Final    WBC 09/23/2024 6.14  4.31 - 10.16 Thousand/uL Final    RBC 09/23/2024 4.21   3.81 - 5.12 Million/uL Final    Hemoglobin 09/23/2024 12.3  11.5 - 15.4 g/dL Final    Hematocrit 09/23/2024 37.8  34.8 - 46.1 % Final    MCV 09/23/2024 90  82 - 98 fL Final    MCH 09/23/2024 29.2  26.8 - 34.3 pg Final    MCHC 09/23/2024 32.5  31.4 - 37.4 g/dL Final    RDW 09/23/2024 12.7  11.6 - 15.1 % Final    MPV 09/23/2024 10.7  8.9 - 12.7 fL Final    Platelets 09/23/2024 197  149 - 390 Thousands/uL Final    nRBC 09/23/2024 0  /100 WBCs Final    Segmented % 09/23/2024 66  43 - 75 % Final    Immature Grans % 09/23/2024 0  0 - 2 % Final    Lymphocytes % 09/23/2024 24  14 - 44 % Final    Monocytes % 09/23/2024 6  4 - 12 % Final    Eosinophils Relative 09/23/2024 3  0 - 6 % Final    Basophils Relative 09/23/2024 1  0 - 1 % Final    Absolute Neutrophils 09/23/2024 4.11  1.85 - 7.62 Thousands/µL Final    Absolute Immature Grans 09/23/2024 0.02  0.00 - 0.20 Thousand/uL Final    Absolute Lymphocytes 09/23/2024 1.44  0.60 - 4.47 Thousands/µL Final    Absolute Monocytes 09/23/2024 0.35  0.17 - 1.22 Thousand/µL Final    Eosinophils Absolute 09/23/2024 0.17  0.00 - 0.61 Thousand/µL Final    Basophils Absolute 09/23/2024 0.05  0.00 - 0.10 Thousands/µL Final    Sodium 09/23/2024 137  135 - 147 mmol/L Final    Potassium 09/23/2024 5.0  3.5 - 5.3 mmol/L Final    Moderately Hemolyzed:Results may be affected.    Chloride 09/23/2024 104  96 - 108 mmol/L Final    CO2 09/23/2024 25  21 - 32 mmol/L Final    ANION GAP 09/23/2024 8  4 - 13 mmol/L Final    BUN 09/23/2024 37 (H)  5 - 25 mg/dL Final    Creatinine 09/23/2024 1.18  0.60 - 1.30 mg/dL Final    Standardized to IDMS reference method    Glucose 09/23/2024 109  65 - 140 mg/dL Final    If the patient is fasting, the ADA then defines impaired fasting glucose as > 100 mg/dL and diabetes as > or equal to 123 mg/dL.    Calcium 09/23/2024 9.6  8.4 - 10.2 mg/dL Final    eGFR 09/23/2024 42  ml/min/1.73sq m Final   Admission on 09/15/2024, Discharged on 09/15/2024   Component Date Value  Ref Range Status    Ventricular Rate 09/15/2024 84  BPM Final    Atrial Rate 09/15/2024 84  BPM Final    SD Interval 09/15/2024 162  ms Final    QRSD Interval 09/15/2024 68  ms Final    QT Interval 09/15/2024 348  ms Final    QTC Interval 09/15/2024 411  ms Final    P Axis 09/15/2024 55  degrees Final    QRS Axis 09/15/2024 6  degrees Final    T Wave Telephone 09/15/2024 39  degrees Final    WBC 09/15/2024 7.61  4.31 - 10.16 Thousand/uL Final    RBC 09/15/2024 4.13  3.81 - 5.12 Million/uL Final    Hemoglobin 09/15/2024 11.8  11.5 - 15.4 g/dL Final    Hematocrit 09/15/2024 37.1  34.8 - 46.1 % Final    MCV 09/15/2024 90  82 - 98 fL Final    MCH 09/15/2024 28.6  26.8 - 34.3 pg Final    MCHC 09/15/2024 31.8  31.4 - 37.4 g/dL Final    RDW 09/15/2024 12.8  11.6 - 15.1 % Final    MPV 09/15/2024 10.9  8.9 - 12.7 fL Final    Platelets 09/15/2024 218  149 - 390 Thousands/uL Final    nRBC 09/15/2024 0  /100 WBCs Final    Segmented % 09/15/2024 73  43 - 75 % Final    Immature Grans % 09/15/2024 0  0 - 2 % Final    Lymphocytes % 09/15/2024 19  14 - 44 % Final    Monocytes % 09/15/2024 5  4 - 12 % Final    Eosinophils Relative 09/15/2024 2  0 - 6 % Final    Basophils Relative 09/15/2024 1  0 - 1 % Final    Absolute Neutrophils 09/15/2024 5.59  1.85 - 7.62 Thousands/µL Final    Absolute Immature Grans 09/15/2024 0.02  0.00 - 0.20 Thousand/uL Final    Absolute Lymphocytes 09/15/2024 1.41  0.60 - 4.47 Thousands/µL Final    Absolute Monocytes 09/15/2024 0.40  0.17 - 1.22 Thousand/µL Final    Eosinophils Absolute 09/15/2024 0.14  0.00 - 0.61 Thousand/µL Final    Basophils Absolute 09/15/2024 0.05  0.00 - 0.10 Thousands/µL Final    Sodium 09/15/2024 136  135 - 147 mmol/L Final    Potassium 09/15/2024 4.0  3.5 - 5.3 mmol/L Final    Chloride 09/15/2024 101  96 - 108 mmol/L Final    CO2 09/15/2024 25  21 - 32 mmol/L Final    ANION GAP 09/15/2024 10  4 - 13 mmol/L Final    BUN 09/15/2024 27 (H)  5 - 25 mg/dL Final    Creatinine 09/15/2024 1.05   "0.60 - 1.30 mg/dL Final    Standardized to IDMS reference method    Glucose 09/15/2024 105  65 - 140 mg/dL Final    If the patient is fasting, the ADA then defines impaired fasting glucose as > 100 mg/dL and diabetes as > or equal to 123 mg/dL.    Calcium 09/15/2024 9.6  8.4 - 10.2 mg/dL Final    AST 09/15/2024 15  13 - 39 U/L Final    ALT 09/15/2024 9  7 - 52 U/L Final    Specimen collection should occur prior to Sulfasalazine administration due to the potential for falsely depressed results.     Alkaline Phosphatase 09/15/2024 49  34 - 104 U/L Final    Total Protein 09/15/2024 6.7  6.4 - 8.4 g/dL Final    Albumin 09/15/2024 4.1  3.5 - 5.0 g/dL Final    Total Bilirubin 09/15/2024 0.47  0.20 - 1.00 mg/dL Final    Use of this assay is not recommended for patients undergoing treatment with eltrombopag due to the potential for falsely elevated results.  N-acetyl-p-benzoquinone imine (metabolite of Acetaminophen) will generate erroneously low results in samples for patients that have taken an overdose of Acetaminophen.    eGFR 09/15/2024 48  ml/min/1.73sq m Final    Lipase 09/15/2024 45  11 - 82 u/L Final    hs TnI 0hr 09/15/2024 17  \"Refer to ACS Flowchart\"- see link ng/L Final    Comment:                                              Initial (time 0) result  If >=50 ng/L, Myocardial injury suggested ;  Type of myocardial injury and treatment strategy  to be determined.  If 5-49 ng/L, a delta result at 2 hours and or 4 hours will be needed to further evaluate.  If <4 ng/L, and chest pain has been >3 hours since onset, patient may qualify for discharge based on the HEART score in the ED.  If <5 ng/L and <3hours since onset of chest pain, a delta result at 2 hours will be needed to further evaluate.    HS Troponin 99th Percentile URL of a Health Population=12 ng/L with a 95% Confidence Interval of 8-18 ng/L.    Second Troponin (time 2 hours)  If calculated delta >= 20 ng/L,  Myocardial injury suggested ; Type of " "myocardial injury and treatment strategy to be determined.  If 5-49 ng/L and the calculated delta is 5-19 ng/L, consult medical service for evaluation.  Continue evaluation for ischemia on ecg and other possible etiology and repeat hs troponin at 4 hours.  If delta                            is <5 ng/L at 2 hours, consider discharge based on risk stratification via the HEART score (if in ED), or [place] risk score in IP/Observation.    HS Troponin 99th Percentile URL of a Health Population=12 ng/L with a 95% Confidence Interval of 8-18 ng/L.    Color, UA 09/15/2024 Light Yellow   Final    Clarity, UA 09/15/2024 Clear   Final    Specific Gravity, UA 09/15/2024 1.019  1.003 - 1.030 Final    pH, UA 09/15/2024 5.0  4.5, 5.0, 5.5, 6.0, 6.5, 7.0, 7.5, 8.0 Final    Leukocytes, UA 09/15/2024 Negative  Negative Final    Nitrite, UA 09/15/2024 Negative  Negative Final    Protein, UA 09/15/2024 Negative  Negative mg/dl Final    Glucose, UA 09/15/2024 Negative  Negative mg/dl Final    Ketones, UA 09/15/2024 Negative  Negative mg/dl Final    Urobilinogen, UA 09/15/2024 <2.0  <2.0 mg/dl mg/dl Final    Bilirubin, UA 09/15/2024 Negative  Negative Final    Occult Blood, UA 09/15/2024 Small (A)  Negative Final    hs TnI 2hr 09/15/2024 14  \"Refer to ACS Flowchart\"- see link ng/L Final    Comment:                                              Initial (time 0) result  If >=50 ng/L, Myocardial injury suggested ;  Type of myocardial injury and treatment strategy  to be determined.  If 5-49 ng/L, a delta result at 2 hours and or 4 hours will be needed to further evaluate.  If <4 ng/L, and chest pain has been >3 hours since onset, patient may qualify for discharge based on the HEART score in the ED.  If <5 ng/L and <3hours since onset of chest pain, a delta result at 2 hours will be needed to further evaluate.    HS Troponin 99th Percentile URL of a Health Population=12 ng/L with a 95% Confidence Interval of 8-18 ng/L.    Second Troponin (time " 2 hours)  If calculated delta >= 20 ng/L,  Myocardial injury suggested ; Type of myocardial injury and treatment strategy to be determined.  If 5-49 ng/L and the calculated delta is 5-19 ng/L, consult medical service for evaluation.  Continue evaluation for ischemia on ecg and other possible etiology and repeat hs troponin at 4 hours.  If delta                            is <5 ng/L at 2 hours, consider discharge based on risk stratification via the HEART score (if in ED), or [place] risk score in IP/Observation.    HS Troponin 99th Percentile URL of a Health Population=12 ng/L with a 95% Confidence Interval of 8-18 ng/L.    Delta 2hr hsTnI 09/15/2024 -3  <20 ng/L Final    RBC, UA 09/15/2024 10-20 (A)  None Seen, 1-2 /hpf Final    WBC, UA 09/15/2024 None Seen  None Seen, 1-2 /hpf Final    Epithelial Cells 09/15/2024 Occasional  None Seen, Occasional /hpf Final    Bacteria, UA 09/15/2024 None Seen  None Seen, Occasional /hpf Final   Admission on 08/28/2024, Discharged on 09/04/2024   Component Date Value Ref Range Status    Sodium 08/30/2024 136  135 - 147 mmol/L Final    Potassium 08/30/2024 3.8  3.5 - 5.3 mmol/L Final    Chloride 08/30/2024 100  96 - 108 mmol/L Final    CO2 08/30/2024 26  21 - 32 mmol/L Final    ANION GAP 08/30/2024 10  4 - 13 mmol/L Final    BUN 08/30/2024 25  5 - 25 mg/dL Final    Creatinine 08/30/2024 0.98  0.60 - 1.30 mg/dL Final    Standardized to IDMS reference method    Glucose 08/30/2024 136  65 - 140 mg/dL Final    If the patient is fasting, the ADA then defines impaired fasting glucose as > 100 mg/dL and diabetes as > or equal to 123 mg/dL.    Glucose, Fasting 08/30/2024 136 (H)  65 - 99 mg/dL Final    Calcium 08/30/2024 9.3  8.4 - 10.2 mg/dL Final    AST 08/30/2024 12 (L)  13 - 39 U/L Final    ALT 08/30/2024 6 (L)  7 - 52 U/L Final    Specimen collection should occur prior to Sulfasalazine administration due to the potential for falsely depressed results.     Alkaline Phosphatase 08/30/2024  43  34 - 104 U/L Final    Total Protein 08/30/2024 6.1 (L)  6.4 - 8.4 g/dL Final    Albumin 08/30/2024 3.8  3.5 - 5.0 g/dL Final    Total Bilirubin 08/30/2024 0.53  0.20 - 1.00 mg/dL Final    Use of this assay is not recommended for patients undergoing treatment with eltrombopag due to the potential for falsely elevated results.  N-acetyl-p-benzoquinone imine (metabolite of Acetaminophen) will generate erroneously low results in samples for patients that have taken an overdose of Acetaminophen.    eGFR 08/30/2024 52  ml/min/1.73sq m Final    Magnesium 08/30/2024 2.1  1.9 - 2.7 mg/dL Final    Phosphorus 08/30/2024 4.0  2.3 - 4.1 mg/dL Final    WBC 08/30/2024 5.82  4.31 - 10.16 Thousand/uL Final    RBC 08/30/2024 3.77 (L)  3.81 - 5.12 Million/uL Final    Hemoglobin 08/30/2024 11.0 (L)  11.5 - 15.4 g/dL Final    Hematocrit 08/30/2024 34.4 (L)  34.8 - 46.1 % Final    MCV 08/30/2024 91  82 - 98 fL Final    MCH 08/30/2024 29.2  26.8 - 34.3 pg Final    MCHC 08/30/2024 32.0  31.4 - 37.4 g/dL Final    RDW 08/30/2024 13.1  11.6 - 15.1 % Final    MPV 08/30/2024 10.9  8.9 - 12.7 fL Final    Platelets 08/30/2024 207  149 - 390 Thousands/uL Final    nRBC 08/30/2024 0  /100 WBCs Final    Segmented % 08/30/2024 68  43 - 75 % Final    Immature Grans % 08/30/2024 0  0 - 2 % Final    Lymphocytes % 08/30/2024 22  14 - 44 % Final    Monocytes % 08/30/2024 7  4 - 12 % Final    Eosinophils Relative 08/30/2024 2  0 - 6 % Final    Basophils Relative 08/30/2024 1  0 - 1 % Final    Absolute Neutrophils 08/30/2024 3.95  1.85 - 7.62 Thousands/µL Final    Absolute Immature Grans 08/30/2024 0.01  0.00 - 0.20 Thousand/uL Final    Absolute Lymphocytes 08/30/2024 1.26  0.60 - 4.47 Thousands/µL Final    Absolute Monocytes 08/30/2024 0.43  0.17 - 1.22 Thousand/µL Final    Eosinophils Absolute 08/30/2024 0.14  0.00 - 0.61 Thousand/µL Final    Basophils Absolute 08/30/2024 0.03  0.00 - 0.10 Thousands/µL Final    TSH 3RD GENERATON 08/30/2024 1.191  0.450  - 4.500 uIU/mL Final    The recommended reference ranges for TSH during pregnancy are as follows:   First trimester 0.100 to 2.500 uIU/mL   Second trimester  0.200 to 3.000 uIU/mL   Third trimester 0.300 to 3.000 uIU/m    Note: Normal ranges may not apply to patients who are transgender, non-binary, or whose legal sex, sex at birth, and gender identity differ.  Adult TSH (3rd generation) reference range follows the recommended guidelines of the American Thyroid Association, January, 2020.    Vitamin B-12 08/30/2024 437  180 - 914 pg/mL Final    Folate 08/30/2024 >22.3  >5.9 ng/mL Final    The World Health Organization has determined deficient folate concentrations are considered to be <4.0 ng/mL.    Vit D, 25-Hydroxy 08/30/2024 40.4  30.0 - 100.0 ng/mL Final    Vitamin D guidelines established by Clinical Guidelines Subcommittee  of the Endocrine Society Task Force, 2011    Deficiency <20ng/ml   Insufficiency 20-30ng/ml   Sufficient  ng/ml     Iron Saturation 08/30/2024 23  15 - 50 % Final    TIBC 08/30/2024 257  250 - 450 ug/dL Final    Iron 08/30/2024 58  50 - 212 ug/dL Final    Patients treated with metal-binding drugs (ie. Deferoxamine) may have depressed iron values.    UIBC 08/30/2024 199  155 - 355 ug/dL Final    Ferritin 08/30/2024 29  11 - 307 ng/mL Final    Syphilis Total Antibody 08/30/2024 Non-reactive  Non-Reactive Final    No serological evidence of infection with T. pallidum.  Early or incubating syphilis infection cannot be excluded.  Consider repeat testing based on clinical suspicion.    HIV-1 p24 Antigen 08/30/2024 Non-Reactive  Non-Reactive Final    HIV-1 Antibody 08/30/2024 Non-Reactive  Non-Reactive Final    HIV-2 Antibody 08/30/2024 Non-Reactive  Non-Reactive Final    HIV Ag-Ab 5th Gen 08/30/2024 Non-Reactive  Non-Reactive Final    A Non-Reactive test result does not preclude the possibility of exposure or infection with HIV-1 and/or HIV-2.  Non-Reactive results can occur if the quantity  of marker present is below the detection limits or is not present during the stage of disease in which a sample is collected. Repeat testing should be considered where there is clinical suspicion of infection.       Ventricular Rate 08/30/2024 83  BPM Final    Atrial Rate 08/30/2024 83  BPM Final    OR Interval 08/30/2024 170  ms Final    QRSD Interval 08/30/2024 74  ms Final    QT Interval 08/30/2024 370  ms Final    QTC Interval 08/30/2024 434  ms Final    P Axis 08/30/2024 61  degrees Final    QRS [place] 08/30/2024 3  degrees Final    T Wave [place] 08/30/2024 51  degrees Final   Admission on 08/27/2024, Discharged on 08/28/2024   Component Date Value Ref Range Status    WBC 08/27/2024 6.15  4.31 - 10.16 Thousand/uL Final    RBC 08/27/2024 3.75 (L)  3.81 - 5.12 Million/uL Final    Hemoglobin 08/27/2024 11.0 (L)  11.5 - 15.4 g/dL Final    Hematocrit 08/27/2024 33.5 (L)  34.8 - 46.1 % Final    MCV 08/27/2024 89  82 - 98 fL Final    MCH 08/27/2024 29.3  26.8 - 34.3 pg Final    MCHC 08/27/2024 32.8  31.4 - 37.4 g/dL Final    RDW 08/27/2024 13.2  11.6 - 15.1 % Final    MPV 08/27/2024 10.2  8.9 - 12.7 fL Final    Platelets 08/27/2024 207  149 - 390 Thousands/uL Final    nRBC 08/27/2024 0  /100 WBCs Final    Segmented % 08/27/2024 66  43 - 75 % Final    Immature Grans % 08/27/2024 0  0 - 2 % Final    Lymphocytes % 08/27/2024 24  14 - 44 % Final    Monocytes % 08/27/2024 7  4 - 12 % Final    Eosinophils Relative 08/27/2024 2  0 - 6 % Final    Basophils Relative 08/27/2024 1  0 - 1 % Final    Absolute Neutrophils 08/27/2024 4.08  1.85 - 7.62 Thousands/µL Final    Absolute Immature Grans 08/27/2024 0.02  0.00 - 0.20 Thousand/uL Final    Absolute Lymphocytes 08/27/2024 1.46  0.60 - 4.47 Thousands/µL Final    Absolute Monocytes 08/27/2024 0.43  0.17 - 1.22 Thousand/µL Final    Eosinophils Absolute 08/27/2024 0.12  0.00 - 0.61 Thousand/µL Final    Basophils Absolute 08/27/2024 0.04  0.00 - 0.10 Thousands/µL Final    Sodium  08/27/2024 136  135 - 147 mmol/L Final    Potassium 08/27/2024 3.9  3.5 - 5.3 mmol/L Final    Chloride 08/27/2024 102  96 - 108 mmol/L Final    CO2 08/27/2024 27  21 - 32 mmol/L Final    ANION GAP 08/27/2024 7  4 - 13 mmol/L Final    BUN 08/27/2024 26 (H)  5 - 25 mg/dL Final    Creatinine 08/27/2024 1.05  0.60 - 1.30 mg/dL Final    Standardized to IDMS reference method    Glucose 08/27/2024 122  65 - 140 mg/dL Final    If the patient is fasting, the ADA then defines impaired fasting glucose as > 100 mg/dL and diabetes as > or equal to 123 mg/dL.    Calcium 08/27/2024 9.4  8.4 - 10.2 mg/dL Final    AST 08/27/2024 12 (L)  13 - 39 U/L Final    ALT 08/27/2024 7  7 - 52 U/L Final    Specimen collection should occur prior to Sulfasalazine administration due to the potential for falsely depressed results.     Alkaline Phosphatase 08/27/2024 50  34 - 104 U/L Final    Total Protein 08/27/2024 6.4  6.4 - 8.4 g/dL Final    Albumin 08/27/2024 4.0  3.5 - 5.0 g/dL Final    Total Bilirubin 08/27/2024 0.48  0.20 - 1.00 mg/dL Final    Use of this assay is not recommended for patients undergoing treatment with eltrombopag due to the potential for falsely elevated results.  N-acetyl-p-benzoquinone imine (metabolite of Acetaminophen) will generate erroneously low results in samples for patients that have taken an overdose of Acetaminophen.    eGFR 08/27/2024 48  ml/min/1.73sq m Final    TSH 3RD GENERATON 08/27/2024 1.408  0.450 - 4.500 uIU/mL Final    The recommended reference ranges for TSH during pregnancy are as follows:   First trimester 0.100 to 2.500 uIU/mL   Second trimester  0.200 to 3.000 uIU/mL   Third trimester 0.300 to 3.000 uIU/m    Note: Normal ranges may not apply to patients who are transgender, non-binary, or whose legal sex, sex at birth, and gender identity differ.  Adult TSH (3rd generation) reference range follows the recommended guidelines of the American Thyroid Association, January, 2020.    Amph/Meth UR  08/27/2024 Negative  Negative Final    Barbiturate Ur 08/27/2024 Negative  Negative Final    Benzodiazepine Urine 08/27/2024 Negative  Negative Final    Cocaine Urine 08/27/2024 Negative  Negative Final    Methadone Urine 08/27/2024 Negative  Negative Final    Opiate Urine 08/27/2024 Negative  Negative Final    PCP Ur 08/27/2024 Negative  Negative Final    THC Urine 08/27/2024 Negative  Negative Final    Oxycodone Urine 08/27/2024 Negative  Negative Final    Fentanyl Urine 08/27/2024 Negative  Negative Final    HYDROCODONE URINE 08/27/2024 Negative  Negative Final    EXTBreath Alcohol 08/27/2024 0.000   Final    Color, UA 08/27/2024 Colorless   Final    Clarity, UA 08/27/2024 Clear   Final    Specific Gravity, UA 08/27/2024 1.008  1.003 - 1.030 Final    pH, UA 08/27/2024 5.0  4.5, 5.0, 5.5, 6.0, 6.5, 7.0, 7.5, 8.0 Final    Leukocytes, UA 08/27/2024 Negative  Negative Final    Nitrite, UA 08/27/2024 Negative  Negative Final    Protein, UA 08/27/2024 Negative  Negative mg/dl Final    Glucose, UA 08/27/2024 Negative  Negative mg/dl Final    Ketones, UA 08/27/2024 Negative  Negative mg/dl Final    Urobilinogen, UA 08/27/2024 <2.0  <2.0 mg/dl mg/dl Final    Bilirubin, UA 08/27/2024 Negative  Negative Final    Occult Blood, UA 08/27/2024 Negative  Negative Final    Ventricular Rate 08/27/2024 77  BPM Final    Atrial Rate 08/27/2024 77  BPM Final    NM Interval 08/27/2024 166  ms Final    QRSD Interval 08/27/2024 74  ms Final    QT Interval 08/27/2024 358  ms Final    QTC Interval 08/27/2024 405  ms Final    P Axis 08/27/2024 50  degrees Final    QRS Axis 08/27/2024 -1  degrees Final    T Wave Axis 08/27/2024 23  degrees Final             ___________________________________    History Review: The following portions of the patient's history were reviewed and updated as appropriate: allergies, current medications, past family history, past medical history, past social history, past surgical history, and problem  list.    Unchanged information from this writer's previous assessment is copied and italicized; information that has changed is bolded.    Past Psychiatric History:      Past psychiatric diagnoses:   Depression, anxiety  Inpatient psychiatric admissions:   8/2024 2013 following a surgery, sounded like extreme anxiety  Prior outpatient psychiatric treatment:   Started seeing a psychiatrist when first   Past/current psychotherapy:   active  History of suicidal attempts/gestures:   denies  Psychotropic medication trials:   Venlafaxine, sertraline, fluoxetine, escitalopram, aripiprazole (dizziness), risperidone, clonazepam, buspirone, gabapentin        Substance Abuse History:     Denies all history of substance use     Family Psychiatric History:      Family History             Family History   Problem Relation Age of Onset    Depression Mother           w/anxiety    Diabetes Mother           Mellitus    Breast cancer Mother      Hypertension Mother      No Known Problems Father      Depression Sister           w/anxiety    Heart disease Sister           Cardiac Disorder    Breast cancer Sister      Hypertension Sister      Diabetes Brother           Mellitus    Alcohol abuse Son                    Social History:     Developmental: nothing of note  Education: Bachelor degree  Marital history:  in 2006  Children: 2  Living arrangement, social support: as above, has been with daughter for 17 years  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  ___________________________________      Visit Time    Visit Start Time: 1145  Visit Stop Time: 1245  Total Visit Duration:  60 minutes    López Gutierrez MD   11/19/24

## 2024-11-26 DIAGNOSIS — F45.21 ILLNESS ANXIETY DISORDER: ICD-10-CM

## 2024-11-26 NOTE — TELEPHONE ENCOUNTER
Reason for call:   [x] Refill   [] Prior Auth  [] Other:     Office:   [] PCP/Provider -   [x] Specialty/Provider -     Medication: LORazepam (Ativan) 0.5 mg    Dose/Frequency: Take 1 tablet (0.5 mg total) by mouth 3 (three) times a day     Quantity: 90    Pharmacy: [place] [id number] [address]     Does the patient have enough for 3 days?   [] Yes   [x] No - Send as HP to POD

## 2024-11-27 DIAGNOSIS — F45.21 ILLNESS ANXIETY DISORDER: ICD-10-CM

## 2024-11-27 RX ORDER — LORAZEPAM 0.5 MG/1
0.5 TABLET ORAL 3 TIMES DAILY
Qty: 90 TABLET | Refills: 0 | Status: SHIPPED | OUTPATIENT
Start: 2024-11-27

## 2024-11-27 RX ORDER — LORAZEPAM 0.5 MG/1
0.5 TABLET ORAL 3 TIMES DAILY
Qty: 90 TABLET | Refills: 0 | OUTPATIENT
Start: 2024-11-27

## 2024-11-27 NOTE — TELEPHONE ENCOUNTER
10/28/2024 10/28/2024 LORazepam (Tablet) 90.0 30 0.5 MG [id number] Medicare 0 / 0 [place]      1 [id number] 10/18/2024 10/18/2024 LORazepam (Tablet) 15.0 7 0.5 MG [id number] Medicare 0 / 0 [place]    1 [id number] 07/29/2024 07/29/2024 clonazePAM (Tablet) 14.0 28 0.5 MG [id number]

## 2024-11-27 NOTE — TELEPHONE ENCOUNTER
Patient's daughter called to request a refill for her mother's LORazepam (Ativan) 0.5 mg tablet  advised a refill was requested on 11/26/24 and is pending approval. Patient verbalized understanding and is in agreement.

## 2024-12-03 ENCOUNTER — TELEMEDICINE (OUTPATIENT)
Dept: PSYCHIATRY | Facility: CLINIC | Age: 84
End: 2024-12-03
Payer: MEDICARE

## 2024-12-03 DIAGNOSIS — F33.1 MAJOR DEPRESSIVE DISORDER, RECURRENT EPISODE, MODERATE (HCC): ICD-10-CM

## 2024-12-03 DIAGNOSIS — F45.21 ILLNESS ANXIETY DISORDER: Primary | ICD-10-CM

## 2024-12-03 PROCEDURE — 90833 PSYTX W PT W E/M 30 MIN: CPT

## 2024-12-03 PROCEDURE — 99213 OFFICE O/P EST LOW 20 MIN: CPT

## 2024-12-03 NOTE — PSYCH
Virtual Regular Visit    Verification of patient location:    Patient is located at Home in the following state in which I hold an active license [place]      Assessment/Plan:    Problem List Items Addressed This Visit       Illness anxiety disorder - Primary    Major depressive disorder, recurrent episode, moderate (HCC)              Reason for visit is   Chief Complaint   Patient presents with    Virtual Regular Visit          Encounter provider López Gutierrez MD      Recent Visits  No visits were found meeting these conditions.  Showing recent visits within past 7 days and meeting all other requirements  Today's Visits  Date Type Provider Dept   12/03/24 Telemedicine López Gutierrez MD [facility]   Showing today's visits and meeting all other requirements  Future Appointments  No visits were found meeting these conditions.  Showing future appointments within next 150 days and meeting all other requirements       The patient was identified by name and date of birth. Sandra Peña was informed that this is a telemedicine visit and that the visit is being conducted throughthe Epic Embedded platform. She agrees to proceed..  My office door was closed. No one else was in the room.  She acknowledged consent and understanding of privacy and security of the video platform. The patient has agreed to participate and understands they can discontinue the visit at any time.    Patient is aware this is a billable service.         MEDICATION MANAGEMENT NOTE        [facility]      Name and Date of Birth:  Sandra Peña 84 y.o. 1940 MRN: [id number]    Date of Visit: December 3, 2024    Reason for Visit: Follow-up visit regarding medication management     _____________________________    Assessment & Plan  Illness anxiety disorder    Patient and daughter report stability of mood and anxiety with tolerable fluctuations. They are working on coping  "with obsessive personality traits. Sleep and appetite are stable. Somatic symptoms continue to seemingly correspond with anxiety. They are both in agreement with plan to continue medication without change.    Continue escitalopram 10 mg QD for depression, anxiety, and obsessive thinking  Continue lorazepam 0.5 mg TID for significant anxiety  Continue risperidone 0.5 mg QHS for augmentation for obsessive thinking  Continue psychotherapy  Fasting glucose and CMP pending         Major depressive disorder, recurrent episode, moderate (HCC)                  Treatment Recommendations/Precautions:  Risks/benefits/alternatives to treatment discussed, including a myriad of potential adverse medication side effects, to which Sandra voiced understanding and consented fully to treatment.   Labs most recently obtained , reviewed.   Follow up in 2 weeks for medication management  Follow up with PCP for medical issues and ongoing care  Aware of 24 hour and weekend coverage for urgent situations accessed by calling [facility] main practice number    Individual psychotherapy provided: Counseling was provided during the session today for 30 minutes.     Treatment Plan:     Completed and signed during the session: Not applicable - Treatment Plan not due at this session    _____________________________________________    History of Present Illness     Chief Complaint: \"rough morning\"    SUBJECTIVE:    Patient reports she had a difficult morning due to a nightmare overnight. Nightmares however are infrequent. She reports continuing to feel improvement. Appetite seems also to have been improving. Mood continues improvement. She continues to struggle with some obsessive thinking and anxiety.        Psychiatric Review Of Systems:  Unchanged information from this writer's previous assessment is copied and italicized; information that has changed is bolded.    Appetite: no  Adverse eating: no  Weight changes: " no  Insomnia/sleeplessness: no  Fatigue/anergy: no  Anhedonia/lack of interest: no  Attention/concentration: no  Psychomotor agitation/retardation: no  Somatic symptoms: no  Anxiety/panic attack: yes  Zaira/hypomania: no  Hopelessness/helplessness/worthlessness: no  Self-injurious behavior/high-risk behavior: no  Suicidal ideation: no  Homicidal ideation: no  Auditory hallucinations: no  Visual hallucinations: no  Other perceptual disturbances: no  Delusional thinking: no  Obsessive/compulsive symptoms: yes    Review Of Systems:      Constitutional negative   ENT negative   Cardiovascular negative   Respiratory negative   Gastrointestinal negative   Genitourinary negative   Musculoskeletal negative   Integumentary negative   Neurological negative   Endocrine negative   Other Symptoms none, all other systems are negative     Objective    OBJECTIVE:     Visit Vitals  OB Status Postmenopausal   Smoking Status Never      Wt Readings from Last 6 Encounters:   24 49.9 kg (110 lb)   10/17/24 49.9 kg (110 lb 0.2 oz)   10/10/24 49.4 kg (109 lb)   10/04/24 48.7 kg (107 lb 5.8 oz)   24 49.4 kg (109 lb)   24 48.4 kg (106 lb 11.2 oz)        Past Medical History:   Diagnosis Date    Anxiety     Colon polyps     Depression     Diabetes mellitus (HCC)     Dizziness     Last assessed: 8/31/15    DVT (deep venous thrombosis) (HCC)     Dysuria     Hematuria 2022    Hyperlipidemia     Hypertension     Hypertensive urgency 10/22/2021    Insomnia     Panic attack     Ureterolithiasis 2020      Past Surgical History:   Procedure Laterality Date     SECTION      1964 son, 1968 daughter    COLONOSCOPY      FL RETROGRADE PYELOGRAM  2020    [place] COLONOSCOPY FLX DX W/COLLJ SPEC WHEN PFRMD N/A 3/27/2017    Procedure: COLONOSCOPY;  Surgeon: Gold Francis MD;  Location: [facility];  Service: Gastroenterology    WA CYSTO/URETERO W/LITHOTRIPSY &INDWELL STENT INSRT Right 2020    Procedure: CYSTOSCOPY  "URETEROSCOPY WITH LITHOTRIPSY HOLMIUM LASER, RETROGRADE PYELOGRAM AND INSERTION STENT URETERAL; EXCISION OF BLADDER TUMOR;  Surgeon: Edwin Love MD;  Location: [facility];  Service: Urology    ROTATOR CUFF REPAIR Left     Last assessed: 3/29/15    TONSILLECTOMY         Meds/Allergies    No Known Allergies  Current Outpatient Medications   Medication Instructions    aspirin 81 mg, Daily    atorvastatin (LIPITOR) 10 mg, Oral, Daily    Cholecalciferol (VITAMIN D3) 2,000 Units, Oral, Daily    escitalopram (LEXAPRO) 10 mg, Oral, Daily    glycerin adult 2 g suppository Please use glycerin suppository every other day.  If patient has rectal or abdominal pain, can use glycerin suppository daily    linaCLOtide 145 mcg, Oral, Daily    lisinopril-hydrochlorothiazide (PRINZIDE,ZESTORETIC) 10-12.5 MG per tablet 1 tablet, Oral, Daily    LORazepam (ATIVAN) 0.5 mg, Oral, 3 times daily    metFORMIN (GLUCOPHAGE) 500 mg, 2 times daily with meals    polyethylene glycol (MIRALAX) 17 g, Oral, Daily    psyllium (METAMUCIL) packet 1 packet, Oral, Daily    risperiDONE (RISPERDAL) 0.5 mg, Oral, Daily at bedtime    senna-docusate sodium (SENOKOT S) 8.6-50 mg per tablet 1 tablet, Oral, Daily PRN           Mental Status Exam:    Appearance Glasses, short grey curly hair, pink sweater, good eye contact   Behavior/motor Calm and cooperative   Speech/language Normal rate and volume   Mood \"off\"   Affect Euthymic, full range, appropriate   Thought process Logical and linear   Thought content Obsessive   No overt delusions, Denies hallucinations, Denies suicidal ideations   Cognition Awake and alert, oriented and memory grossly intact, and concentrates on questions   Insight/judgment Insight: fair  Judgment: fair     Laboratory Results: I have personally reviewed all pertinent laboratory/tests results    Orders Only on 11/13/2024   Component Date Value Ref Range Status    Supplier Name 11/13/2024 [employer] [facility] [employer]   " Final-Edited    Supplier Phone Number 11/13/2024 [phone]   Final-Edited    Order Status 11/13/2024 Delivery Successful   Final-Edited    Delivery Request Date 11/13/2024 11/13/2024   Final-Edited    Date Delivered  11/13/2024 11/14/2024   Final-Edited    Item Description 11/13/2024 Wheelkaitlyn Delacruz, Adult   Final-Edited    Qty: 1   Admission on 10/17/2024, Discharged on 10/17/2024   Component Date Value Ref Range Status    Sodium 10/17/2024 139  135 - 147 mmol/L Final    Potassium 10/17/2024 3.8  3.5 - 5.3 mmol/L Final    Chloride 10/17/2024 105  96 - 108 mmol/L Final    CO2 10/17/2024 27  21 - 32 mmol/L Final    ANION GAP 10/17/2024 7  4 - 13 mmol/L Final    BUN 10/17/2024 21  5 - 25 mg/dL Final    Creatinine 10/17/2024 0.98  0.60 - 1.30 mg/dL Final    Standardized to IDMS reference method    Glucose 10/17/2024 121  65 - 140 mg/dL Final    If the patient is fasting, the ADA then defines impaired fasting glucose as > 100 mg/dL and diabetes as > or equal to 123 mg/dL.    Calcium 10/17/2024 8.9  8.4 - 10.2 mg/dL Final    AST 10/17/2024 11 (L)  13 - 39 U/L Final    ALT 10/17/2024 8  7 - 52 U/L Final    Specimen collection should occur prior to Sulfasalazine administration due to the potential for falsely depressed results.     Alkaline Phosphatase 10/17/2024 45  34 - 104 U/L Final    Total Protein 10/17/2024 5.8 (L)  6.4 - 8.4 g/dL Final    Albumin 10/17/2024 3.6  3.5 - 5.0 g/dL Final    Total Bilirubin 10/17/2024 0.68  0.20 - 1.00 mg/dL Final    Use of this assay is not recommended for patients undergoing treatment with eltrombopag due to the potential for falsely elevated results.  N-acetyl-p-benzoquinone imine (metabolite of Acetaminophen) will generate erroneously low results in samples for patients that have taken an overdose of Acetaminophen.    eGFR 10/17/2024 52  ml/min/1.73sq m Final    WBC 10/17/2024 5.82  4.31 - 10.16 Thousand/uL Final    RBC 10/17/2024 3.88  3.81 - 5.12 Million/uL Final    Hemoglobin  10/17/2024 11.3 (L)  11.5 - 15.4 g/dL Final    Hematocrit 10/17/2024 34.9  34.8 - 46.1 % Final    MCV 10/17/2024 90  82 - 98 fL Final    MCH 10/17/2024 29.1  26.8 - 34.3 pg Final    MCHC 10/17/2024 32.4  31.4 - 37.4 g/dL Final    RDW 10/17/2024 13.3  11.6 - 15.1 % Final    MPV 10/17/2024 10.8  8.9 - 12.7 fL Final    Platelets 10/17/2024 230  149 - 390 Thousands/uL Final    nRBC 10/17/2024 0  /100 WBCs Final    Segmented % 10/17/2024 68  43 - 75 % Final    Immature Grans % 10/17/2024 0  0 - 2 % Final    Lymphocytes % 10/17/2024 24  14 - 44 % Final    Monocytes % 10/17/2024 6  4 - 12 % Final    Eosinophils Relative 10/17/2024 1  0 - 6 % Final    Basophils Relative 10/17/2024 1  0 - 1 % Final    Absolute Neutrophils 10/17/2024 3.94  1.85 - 7.62 Thousands/µL Final    Absolute Immature Grans 10/17/2024 0.02  0.00 - 0.20 Thousand/uL Final    Absolute Lymphocytes 10/17/2024 1.38  0.60 - 4.47 Thousands/µL Final    Absolute Monocytes 10/17/2024 0.36  0.17 - 1.22 Thousand/µL Final    Eosinophils Absolute 10/17/2024 0.08  0.00 - 0.61 Thousand/µL Final    Basophils Absolute 10/17/2024 0.04  0.00 - 0.10 Thousands/µL Final    Color, UA 10/17/2024 Light Yellow   Final    Clarity, UA 10/17/2024 Clear   Final    Specific Gravity, UA 10/17/2024 1.012  1.003 - 1.030 Final    pH, UA 10/17/2024 7.0  4.5, 5.0, 5.5, 6.0, 6.5, 7.0, 7.5, 8.0 Final    Leukocytes, UA 10/17/2024 Negative  Negative Final    Nitrite, UA 10/17/2024 Negative  Negative Final    Protein, UA 10/17/2024 Negative  Negative mg/dl Final    Glucose, UA 10/17/2024 Negative  Negative mg/dl Final    Ketones, UA 10/17/2024 Negative  Negative mg/dl Final    Urobilinogen, UA 10/17/2024 <2.0  <2.0 mg/dl mg/dl Final    Bilirubin, UA 10/17/2024 Negative  Negative Final    Occult Blood, UA 10/17/2024 Negative  Negative Final    Magnesium 10/17/2024 1.8 (L)  1.9 - 2.7 mg/dL Final    SARS-CoV-2 10/17/2024 Negative  Negative Final         INFLUENZA A PCR 10/17/2024 Negative  Negative  Final         INFLUENZA B PCR 10/17/2024 Negative  Negative Final         RSV PCR 10/17/2024 Negative  Negative Final         Ventricular Rate 10/17/2024 79  BPM Final    Atrial Rate 10/17/2024 79  BPM Final    ND Interval 10/17/2024 166  ms Final    QRSD Interval 10/17/2024 72  ms Final    QT Interval 10/17/2024 358  ms Final    QTC Interval 10/17/2024 410  ms Final    P Axis 10/17/2024 52  degrees Final    QRS Axis 10/17/2024 1  degrees Final    T Wave Axis 10/17/2024 42  degrees Final   Admission on 10/03/2024, Discharged on 10/05/2024   Component Date Value Ref Range Status    WBC 10/04/2024 5.78  4.31 - 10.16 Thousand/uL Final    RBC 10/04/2024 3.90  3.81 - 5.12 Million/uL Final    Hemoglobin 10/04/2024 11.3 (L)  11.5 - 15.4 g/dL Final    Hematocrit 10/04/2024 35.2  34.8 - 46.1 % Final    MCV 10/04/2024 90  82 - 98 fL Final    MCH 10/04/2024 29.0  26.8 - 34.3 pg Final    MCHC 10/04/2024 32.1  31.4 - 37.4 g/dL Final    RDW 10/04/2024 12.9  11.6 - 15.1 % Final    MPV 10/04/2024 10.9  8.9 - 12.7 fL Final    Platelets 10/04/2024 256  149 - 390 Thousands/uL Final    nRBC 10/04/2024 0  /100 WBCs Final    Segmented % 10/04/2024 56  43 - 75 % Final    Immature Grans % 10/04/2024 0  0 - 2 % Final    Lymphocytes % 10/04/2024 32  14 - 44 % Final    Monocytes % 10/04/2024 8  4 - 12 % Final    Eosinophils Relative 10/04/2024 3  0 - 6 % Final    Basophils Relative 10/04/2024 1  0 - 1 % Final    Absolute Neutrophils 10/04/2024 3.21  1.85 - 7.62 Thousands/µL Final    Absolute Immature Grans 10/04/2024 0.02  0.00 - 0.20 Thousand/uL Final    Absolute Lymphocytes 10/04/2024 1.85  0.60 - 4.47 Thousands/µL Final    Absolute Monocytes 10/04/2024 0.48  0.17 - 1.22 Thousand/µL Final    Eosinophils Absolute 10/04/2024 0.18  0.00 - 0.61 Thousand/µL Final    Basophils Absolute 10/04/2024 0.04  0.00 - 0.10 Thousands/µL Final    Sodium 10/04/2024 137  135 - 147 mmol/L Final    Potassium 10/04/2024 4.4  3.5 - 5.3 mmol/L Final    Chloride  10/04/2024 102  96 - 108 mmol/L Final    CO2 10/04/2024 28  21 - 32 mmol/L Final    ANION GAP 10/04/2024 7  4 - 13 mmol/L Final    BUN 10/04/2024 34 (H)  5 - 25 mg/dL Final    Creatinine 10/04/2024 1.15  0.60 - 1.30 mg/dL Final    Standardized to IDMS reference method    Glucose 10/04/2024 153 (H)  65 - 140 mg/dL Final    If the patient is fasting, the ADA then defines impaired fasting glucose as > 100 mg/dL and diabetes as > or equal to 123 mg/dL.    Calcium 10/04/2024 9.3  8.4 - 10.2 mg/dL Final    AST 10/04/2024 28  13 - 39 U/L Final    ALT 10/04/2024 20  7 - 52 U/L Final    Specimen collection should occur prior to Sulfasalazine administration due to the potential for falsely depressed results.     Alkaline Phosphatase 10/04/2024 58  34 - 104 U/L Final    Total Protein 10/04/2024 6.4  6.4 - 8.4 g/dL Final    Albumin 10/04/2024 3.8  3.5 - 5.0 g/dL Final    Total Bilirubin 10/04/2024 0.32  0.20 - 1.00 mg/dL Final    Use of this assay is not recommended for patients undergoing treatment with eltrombopag due to the potential for falsely elevated results.  N-acetyl-p-benzoquinone imine (metabolite of Acetaminophen) will generate erroneously low results in samples for patients that have taken an overdose of Acetaminophen.    eGFR 10/04/2024 43  ml/min/1.73sq m Final    LACTIC ACID 10/04/2024 2.2 (H)  0.5 - 2.0 mmol/L Final    LACTIC ACID 10/04/2024 2.2 (H)  0.5 - 2.0 mmol/L Final    WBC 10/04/2024 7.78  4.31 - 10.16 Thousand/uL Final    RBC 10/04/2024 3.81  3.81 - 5.12 Million/uL Final    Hemoglobin 10/04/2024 11.0 (L)  11.5 - 15.4 g/dL Final    Hematocrit 10/04/2024 36.7  34.8 - 46.1 % Final    MCV 10/04/2024 96  82 - 98 fL Final    short sample    MCH 10/04/2024 28.9  26.8 - 34.3 pg Final    MCHC 10/04/2024 30.0 (L)  31.4 - 37.4 g/dL Final    RDW 10/04/2024 12.7  11.6 - 15.1 % Final    MPV 10/04/2024 10.4  8.9 - 12.7 fL Final    Platelets 10/04/2024 221  149 - 390 Thousands/uL Final    nRBC 10/04/2024 0  /100 WBCs  Final    Segmented % 10/04/2024 74  43 - 75 % Final    Immature Grans % 10/04/2024 0  0 - 2 % Final    Lymphocytes % 10/04/2024 18  14 - 44 % Final    Monocytes % 10/04/2024 5  4 - 12 % Final    Eosinophils Relative 10/04/2024 2  0 - 6 % Final    Basophils Relative 10/04/2024 1  0 - 1 % Final    Absolute Neutrophils 10/04/2024 5.74  1.85 - 7.62 Thousands/µL Final    Absolute Immature Grans 10/04/2024 0.03  0.00 - 0.20 Thousand/uL Final    Absolute Lymphocytes 10/04/2024 1.42  0.60 - 4.47 Thousands/µL Final    Absolute Monocytes 10/04/2024 0.42  0.17 - 1.22 Thousand/µL Final    Eosinophils Absolute 10/04/2024 0.12  0.00 - 0.61 Thousand/µL Final    Basophils Absolute 10/04/2024 0.05  0.00 - 0.10 Thousands/µL Final    Sodium 10/04/2024 134 (L)  135 - 147 mmol/L Final    Potassium 10/04/2024 4.5  3.5 - 5.3 mmol/L Final    Chloride 10/04/2024 103  96 - 108 mmol/L Final    CO2 10/04/2024 24  21 - 32 mmol/L Final    ANION GAP 10/04/2024 7  4 - 13 mmol/L Final    BUN 10/04/2024 29 (H)  5 - 25 mg/dL Final    Creatinine 10/04/2024 1.05  0.60 - 1.30 mg/dL Final    Standardized to IDMS reference method    Glucose 10/04/2024 137  65 - 140 mg/dL Final    If the patient is fasting, the ADA then defines impaired fasting glucose as > 100 mg/dL and diabetes as > or equal to 123 mg/dL.    Calcium 10/04/2024 8.7  8.4 - 10.2 mg/dL Final    eGFR 10/04/2024 48  ml/min/1.73sq m Final    LACTIC ACID 10/04/2024 2.0  0.5 - 2.0 mmol/L Final    POC Glucose 10/04/2024 124  65 - 140 mg/dl Final    POC Glucose 10/04/2024 168 (H)  65 - 140 mg/dl Final    POC Glucose 10/04/2024 131  65 - 140 mg/dl Final    TSH [address] [place] [address] 10/05/2024 0.957  0.450 - 4.500 uIU/mL Final    The recommended reference ranges for TSH during pregnancy are as follows:   First trimester 0.100 to 2.500 uIU/mL   Second trimester  0.200 to 3.000 uIU/mL   Third trimester 0.300 to 3.000 uIU/m    Note: Normal ranges may not apply to patients who are transgender, non-binary, or  whose legal sex, sex at birth, and gender identity differ.  Adult TSH (3rd generation) reference range follows the recommended guidelines of the American Thy[employer] Association, January, 2020.    Protime 10/05/2024 14.3  12.3 - 15.0 seconds Final    INR 10/05/2024 1.04  0.85 - 1.19 Final    WBC 10/05/2024 5.15  4.31 - 10.16 Thousand/uL Final    RBC 10/05/2024 3.27 (L)  3.81 - 5.12 Million/uL Final    Hemoglobin 10/05/2024 9.8 (L)  11.5 - 15.4 g/dL Final    Hematocrit 10/05/2024 29.5 (L)  34.8 - 46.1 % Final    MCV 10/05/2024 90  82 - 98 fL Final    Results verified by repeat    MCH 10/05/2024 30.0  26.8 - 34.3 pg Final    MCHC 10/05/2024 33.2  31.4 - 37.4 g/dL Final    RDW 10/05/2024 12.7  11.6 - 15.1 % Final    MPV 10/05/2024 10.7  8.9 - 12.7 fL Final    Platelets 10/05/2024 210  149 - 390 Thousands/uL Final    nRBC 10/05/2024 0  /100 WBCs Final    Segmented % 10/05/2024 58  43 - 75 % Final    Immature Grans % 10/05/2024 0  0 - 2 % Final    Lymphocytes % 10/05/2024 31  14 - 44 % Final    Monocytes % 10/05/2024 6  4 - 12 % Final    Eosinophils Relative 10/05/2024 4  0 - 6 % Final    Basophils Relative 10/05/2024 1  0 - 1 % Final    Absolute Neutrophils 10/05/2024 2.97  1.85 - 7.62 Thousands/µL Final    Absolute Immature Grans 10/05/2024 0.02  0.00 - 0.20 Thousand/uL Final    Absolute Lymphocytes 10/05/2024 1.61  0.60 - 4.47 Thousands/µL Final    Absolute Monocytes 10/05/2024 0.33  0.17 - 1.22 Thousand/µL Final    Eosinophils Absolute 10/05/2024 0.19  0.00 - 0.61 Thousand/µL Final    Basophils Absolute 10/05/2024 0.03  0.00 - 0.10 Thousands/µL Final    Sodium 10/05/2024 139  135 - 147 mmol/L Final    Potassium 10/05/2024 4.1  3.5 - 5.3 mmol/L Final    Chloride 10/05/2024 106  96 - 108 mmol/L Final    CO2 10/05/2024 28  21 - 32 mmol/L Final    ANION GAP 10/05/2024 5  4 - 13 mmol/L Final    BUN 10/05/2024 20  5 - 25 mg/dL Final    Creatinine 10/05/2024 1.04  0.60 - 1.30 mg/dL Final    Standardized to IDMS reference method     Glucose 10/05/2024 106  65 - 140 mg/dL Final    If the patient is fasting, the ADA then defines impaired fasting glucose as > 100 mg/dL and diabetes as > or equal to 123 mg/dL.    Calcium 10/05/2024 8.7  8.4 - 10.2 mg/dL Final    Corrected Calcium 10/05/2024 9.4  8.3 - 10.1 mg/dL Final    AST 10/05/2024 11 (L)  13 - 39 U/L Final    ALT 10/05/2024 11  7 - 52 U/L Final    Specimen collection should occur prior to Sulfasalazine administration due to the potential for falsely depressed results.     Alkaline Phosphatase 10/05/2024 48  34 - 104 U/L Final    Total Protein 10/05/2024 5.1 (L)  6.4 - 8.4 g/dL Final    Albumin 10/05/2024 3.1 (L)  3.5 - 5.0 g/dL Final    Total Bilirubin 10/05/2024 0.31  0.20 - 1.00 mg/dL Final    Use of this assay is not recommended for patients undergoing treatment with eltrombopag due to the potential for falsely elevated results.  N-acetyl-p-benzoquinone imine (metabolite of Acetaminophen) will generate erroneously low results in samples for patients that have taken an overdose of Acetaminophen.    eGFR 10/05/2024 49  ml/min/1.73sq m Final    POC Glucose 10/05/2024 105  65 - 140 mg/dl Final    POC Glucose 10/05/2024 132  65 - 140 mg/dl Final    POC Glucose 10/05/2024 187 (H)  65 - 140 mg/dl Final   Admission on 09/28/2024, Discharged on 09/30/2024   Component Date Value Ref Range Status    WBC 09/28/2024 7.21  4.31 - 10.16 Thousand/uL Final    RBC 09/28/2024 3.90  3.81 - 5.12 Million/uL Final    Hemoglobin 09/28/2024 11.2 (L)  11.5 - 15.4 g/dL Final    Hematocrit 09/28/2024 35.3  34.8 - 46.1 % Final    MCV 09/28/2024 91  82 - 98 fL Final    MCH 09/28/2024 28.7  26.8 - 34.3 pg Final    MCHC 09/28/2024 31.7  31.4 - 37.4 g/dL Final    RDW 09/28/2024 12.7  11.6 - 15.1 % Final    MPV 09/28/2024 10.4  8.9 - 12.7 fL Final    Platelets 09/28/2024 223  149 - 390 Thousands/uL Final    nRBC 09/28/2024 0  /100 WBCs Final    Segmented % 09/28/2024 65  43 - 75 % Final    Immature Grans % 09/28/2024 0   0 - 2 % Final    Lymphocytes % 09/28/2024 25  14 - 44 % Final    Monocytes % 09/28/2024 6  4 - 12 % Final    Eosinophils Relative 09/28/2024 3  0 - 6 % Final    Basophils Relative 09/28/2024 1  0 - 1 % Final    Absolute Neutrophils 09/28/2024 4.73  1.85 - 7.62 Thousands/µL Final    Absolute Immature Grans 09/28/2024 0.02  0.00 - 0.20 Thousand/uL Final    Absolute Lymphocytes 09/28/2024 1.77  0.60 - 4.47 Thousands/µL Final    Absolute Monocytes 09/28/2024 0.41  0.17 - 1.22 Thousand/µL Final    Eosinophils Absolute 09/28/2024 0.24  0.00 - 0.61 Thousand/µL Final    Basophils Absolute 09/28/2024 0.04  0.00 - 0.10 Thousands/µL Final    Sodium 09/28/2024 136  135 - 147 mmol/L Final    Potassium 09/28/2024 3.9  3.5 - 5.3 mmol/L Final    Chloride 09/28/2024 104  96 - 108 mmol/L Final    CO2 09/28/2024 24  21 - 32 mmol/L Final    ANION GAP 09/28/2024 8  4 - 13 mmol/L Final    BUN 09/28/2024 27 (H)  5 - 25 mg/dL Final    Creatinine 09/28/2024 1.14  0.60 - 1.30 mg/dL Final    Standardized to IDMS reference method    Glucose 09/28/2024 216 (H)  65 - 140 mg/dL Final    If the patient is fasting, the ADA then defines impaired fasting glucose as > 100 mg/dL and diabetes as > or equal to 123 mg/dL.    Calcium 09/28/2024 9.1  8.4 - 10.2 mg/dL Final    AST 09/28/2024 13  13 - 39 U/L Final    ALT 09/28/2024 7  7 - 52 U/L Final    Specimen collection should occur prior to Sulfasalazine administration due to the potential for falsely depressed results.     Alkaline Phosphatase 09/28/2024 46  34 - 104 U/L Final    Total Protein 09/28/2024 6.0 (L)  6.4 - 8.4 g/dL Final    Albumin 09/28/2024 3.7  3.5 - 5.0 g/dL Final    Total Bilirubin 09/28/2024 0.35  0.20 - 1.00 mg/dL Final    Use of this assay is not recommended for patients undergoing treatment with eltrombopag due to the potential for falsely elevated results.  N-acetyl-p-benzoquinone imine (metabolite of Acetaminophen) will generate erroneously low results in samples for patients  that have taken an overdose of Acetaminophen.    eGFR 09/28/2024 43  ml/min/1.73sq m Final    Magnesium 09/28/2024 1.8 (L)  1.9 - 2.7 mg/dL Final    Phosphorus 09/28/2024 3.3  2.3 - 4.1 mg/dL Final    Ventricular Rate 09/29/2024 79  BPM Final    Atrial Rate 09/29/2024 300  BPM Final    QRSD Interval 09/29/2024 78  ms Final    QT Interval 09/29/2024 380  ms Final    QTC Interval 09/29/2024 435  ms Final    QRS Axis 09/29/2024 -10  degrees Final    T Wave [place] 09/29/2024 2  degrees Final    Hemoglobin A1C 09/29/2024 6.7 (H)  Normal 4.0-5.6%; PreDiabetic 5.7-6.4%; Diabetic >=6.5%; Glycemic control for adults with diabetes <7.0% % Final    EAG 09/29/2024 146  mg/dl Final    WBC 09/29/2024 9.50  4.31 - 10.16 Thousand/uL Final    RBC 09/29/2024 3.82  3.81 - 5.12 Million/uL Final    Hemoglobin 09/29/2024 11.0 (L)  11.5 - 15.4 g/dL Final    Hematocrit 09/29/2024 34.7 (L)  34.8 - 46.1 % Final    MCV 09/29/2024 91  82 - 98 fL Final    MCH 09/29/2024 28.8  26.8 - 34.3 pg Final    MCHC 09/29/2024 31.7  31.4 - 37.4 g/dL Final    RDW 09/29/2024 12.8  11.6 - 15.1 % Final    MPV 09/29/2024 10.7  8.9 - 12.7 fL Final    Platelets 09/29/2024 191  149 - 390 Thousands/uL Final    nRBC 09/29/2024 0  /100 WBCs Final    Segmented % 09/29/2024 86 (H)  43 - 75 % Final    Immature Grans % 09/29/2024 0  0 - 2 % Final    Lymphocytes % 09/29/2024 8 (L)  14 - 44 % Final    Monocytes % 09/29/2024 5  4 - 12 % Final    Eosinophils Relative 09/29/2024 1  0 - 6 % Final    Basophils Relative 09/29/2024 0  0 - 1 % Final    Absolute Neutrophils 09/29/2024 8.04 (H)  1.85 - 7.62 Thousands/µL Final    Absolute Immature Grans 09/29/2024 0.04  0.00 - 0.20 Thousand/uL Final    Absolute Lymphocytes 09/29/2024 0.79  0.60 - 4.47 Thousands/µL Final    Absolute Monocytes 09/29/2024 0.47  0.17 - 1.22 Thousand/µL Final    Eosinophils Absolute 09/29/2024 0.13  0.00 - 0.61 Thousand/µL Final    Basophils Absolute 09/29/2024 0.03  0.00 - 0.10 Thousands/µL Final    Sodium  09/29/2024 140  135 - 147 mmol/L Final    Potassium 09/29/2024 3.4 (L)  3.5 - 5.3 mmol/L Final    Chloride 09/29/2024 107  96 - 108 mmol/L Final    CO2 09/29/2024 24  21 - 32 mmol/L Final    ANION GAP 09/29/2024 9  4 - 13 mmol/L Final    BUN 09/29/2024 24  5 - 25 mg/dL Final    Creatinine 09/29/2024 0.98  0.60 - 1.30 mg/dL Final    Standardized to IDMS reference method    Glucose 09/29/2024 137  65 - 140 mg/dL Final    If the patient is fasting, the ADA then defines impaired fasting glucose as > 100 mg/dL and diabetes as > or equal to 123 mg/dL.    Calcium 09/29/2024 8.6  8.4 - 10.2 mg/dL Final    eGFR 09/29/2024 52  ml/min/1.73sq m Final    Magnesium 09/29/2024 2.1  1.9 - 2.7 mg/dL Final    POC Glucose 09/29/2024 138  65 - 140 mg/dl Final    POC Glucose 09/29/2024 165 (H)  65 - 140 mg/dl Final    POC Glucose 09/29/2024 169 (H)  65 - 140 mg/dl Final    POC Glucose 09/29/2024 113  65 - 140 mg/dl Final    WBC 09/30/2024 6.30  4.31 - 10.16 Thousand/uL Final    RBC 09/30/2024 3.78 (L)  3.81 - 5.12 Million/uL Final    Hemoglobin 09/30/2024 10.7 (L)  11.5 - 15.4 g/dL Final    Hematocrit 09/30/2024 34.0 (L)  34.8 - 46.1 % Final    MCV 09/30/2024 90  82 - 98 fL Final    MCH 09/30/2024 28.3  26.8 - 34.3 pg Final    MCHC 09/30/2024 31.5  31.4 - 37.4 g/dL Final    RDW 09/30/2024 12.9  11.6 - 15.1 % Final    Platelets 09/30/2024 171  149 - 390 Thousands/uL Final    MPV 09/30/2024 10.6  8.9 - 12.7 fL Final    Sodium 09/30/2024 138  135 - 147 mmol/L Final    Potassium 09/30/2024 4.1  3.5 - 5.3 mmol/L Final    Chloride 09/30/2024 107  96 - 108 mmol/L Final    CO2 09/30/2024 24  21 - 32 mmol/L Final    ANION GAP 09/30/2024 7  4 - 13 mmol/L Final    BUN 09/30/2024 19  5 - 25 mg/dL Final    Creatinine 09/30/2024 0.96  0.60 - 1.30 mg/dL Final    Standardized to IDMS reference method    Glucose 09/30/2024 126  65 - 140 mg/dL Final    If the patient is fasting, the ADA then defines impaired fasting glucose as > 100 mg/dL and diabetes as  > or equal to 123 mg/dL.    Calcium 09/30/2024 8.9  8.4 - 10.2 mg/dL Final    eGFR 09/30/2024 54  ml/min/1.73sq m Final    POC Glucose 09/30/2024 121  65 - 140 mg/dl Final    POC Glucose 09/30/2024 140  65 - 140 mg/dl Final   Admission on 09/23/2024, Discharged on 09/23/2024   Component Date Value Ref Range Status    Ventricular Rate 09/23/2024 85  BPM Final    Atrial Rate 09/23/2024 85  BPM Final    [place] Interval 09/23/2024 160  ms Final    QRSD Interval 09/23/2024 72  ms Final    QT Interval 09/23/2024 348  ms Final    QTC Interval 09/23/2024 414  ms Final    P Axis 09/23/2024 39  degrees Final    QRS Axis 09/23/2024 -2  degrees Final    T Wave Axis 09/23/2024 13  degrees Final    WBC 09/23/2024 6.14  4.31 - 10.16 Thousand/uL Final    RBC 09/23/2024 4.21  3.81 - 5.12 Million/uL Final    Hemoglobin 09/23/2024 12.3  11.5 - 15.4 g/dL Final    Hematocrit 09/23/2024 37.8  34.8 - 46.1 % Final    MCV 09/23/2024 90  82 - 98 fL Final    MCH 09/23/2024 29.2  26.8 - 34.3 pg Final    MCHC 09/23/2024 32.5  31.4 - 37.4 g/dL Final    RDW 09/23/2024 12.7  11.6 - 15.1 % Final    MPV 09/23/2024 10.7  8.9 - 12.7 fL Final    Platelets 09/23/2024 197  149 - 390 Thousands/uL Final    nRBC 09/23/2024 0  /100 WBCs Final    Segmented % 09/23/2024 66  43 - 75 % Final    Immature Grans % 09/23/2024 0  0 - 2 % Final    Lymphocytes % 09/23/2024 24  14 - 44 % Final    Monocytes % 09/23/2024 6  4 - 12 % Final    Eosinophils Relative 09/23/2024 3  0 - 6 % Final    Basophils Relative 09/23/2024 1  0 - 1 % Final    Absolute Neutrophils 09/23/2024 4.11  1.85 - 7.62 Thousands/µL Final    Absolute Immature Grans 09/23/2024 0.02  0.00 - 0.20 Thousand/uL Final    Absolute Lymphocytes 09/23/2024 1.44  0.60 - 4.47 Thousands/µL Final    Absolute Monocytes 09/23/2024 0.35  0.17 - 1.22 Thousand/µL Final    Eosinophils Absolute 09/23/2024 0.17  0.00 - 0.61 Thousand/µL Final    Basophils Absolute 09/23/2024 0.05  0.00 - 0.10 Thousands/µL Final    Sodium  09/23/2024 137  135 - 147 mmol/L Final    Potassium 09/23/2024 5.0  3.5 - 5.3 mmol/L Final    Moderately Hemolyzed:Results may be affected.    Chloride 09/23/2024 104  96 - 108 mmol/L Final    CO2 09/23/2024 25  21 - 32 mmol/L Final    ANION GAP 09/23/2024 8  4 - 13 mmol/L Final    BUN 09/23/2024 37 (H)  5 - 25 mg/dL Final    Creatinine 09/23/2024 1.18  0.60 - 1.30 mg/dL Final    Standardized to IDMS reference method    Glucose 09/23/2024 109  65 - 140 mg/dL Final    If the patient is fasting, the ADA then defines impaired fasting glucose as > 100 mg/dL and diabetes as > or equal to 123 mg/dL.    Calcium 09/23/2024 9.6  8.4 - 10.2 mg/dL Final    eGFR 09/23/2024 42  ml/min/1.73sq m Final   Admission on 09/15/2024, Discharged on 09/15/2024   Component Date Value Ref Range Status    Ventricular Rate 09/15/2024 84  BPM Final    Atrial Rate 09/15/2024 84  BPM Final    [place] Interval 09/15/2024 162  ms Final    QRSD Interval 09/15/2024 68  ms Final    QT Interval 09/15/2024 348  ms Final    QTC Interval 09/15/2024 411  ms Final    P Axis 09/15/2024 55  degrees Final    QRS Axis 09/15/2024 6  degrees Final    T Wave [place] 09/15/2024 39  degrees Final    WBC 09/15/2024 7.61  4.31 - 10.16 Thousand/uL Final    RBC 09/15/2024 4.13  3.81 - 5.12 Million/uL Final    Hemoglobin 09/15/2024 11.8  11.5 - 15.4 g/dL Final    Hematocrit 09/15/2024 37.1  34.8 - 46.1 % Final    MCV 09/15/2024 90  82 - 98 fL Final    MCH 09/15/2024 28.6  26.8 - 34.3 pg Final    MCHC 09/15/2024 31.8  31.4 - 37.4 g/dL Final    RDW 09/15/2024 12.8  11.6 - 15.1 % Final    MPV 09/15/2024 10.9  8.9 - 12.7 fL Final    Platelets 09/15/2024 218  149 - 390 Thousands/uL Final    nRBC 09/15/2024 0  /100 WBCs Final    Segmented % 09/15/2024 73  43 - 75 % Final    Immature Grans % 09/15/2024 0  0 - 2 % Final    Lymphocytes % 09/15/2024 19  14 - 44 % Final    Monocytes % 09/15/2024 5  4 - 12 % Final    Eosinophils Relative 09/15/2024 2  0 - 6 % Final    Basophils Relative  "09/15/2024 1  0 - 1 % Final    Absolute Neutrophils 09/15/2024 5.59  1.85 - 7.62 Thousands/µL Final    Absolute Immature Grans 09/15/2024 0.02  0.00 - 0.20 Thousand/uL Final    Absolute Lymphocytes 09/15/2024 1.41  0.60 - 4.47 Thousands/µL Final    Absolute Monocytes 09/15/2024 0.40  0.17 - 1.22 Thousand/µL Final    Eosinophils Absolute 09/15/2024 0.14  0.00 - 0.61 Thousand/µL Final    Basophils Absolute 09/15/2024 0.05  0.00 - 0.10 Thousands/µL Final    Sodium 09/15/2024 136  135 - 147 mmol/L Final    Potassium 09/15/2024 4.0  3.5 - 5.3 mmol/L Final    Chloride 09/15/2024 101  96 - 108 mmol/L Final    CO2 09/15/2024 25  21 - 32 mmol/L Final    ANION GAP 09/15/2024 10  4 - 13 mmol/L Final    BUN 09/15/2024 27 (H)  5 - 25 mg/dL Final    Creatinine 09/15/2024 1.05  0.60 - 1.30 mg/dL Final    Standardized to IDMS reference method    Glucose 09/15/2024 105  65 - 140 mg/dL Final    If the patient is fasting, the ADA then defines impaired fasting glucose as > 100 mg/dL and diabetes as > or equal to 123 mg/dL.    Calcium 09/15/2024 9.6  8.4 - 10.2 mg/dL Final    AST 09/15/2024 15  13 - 39 U/L Final    ALT 09/15/2024 9  7 - 52 U/L Final    Specimen collection should occur prior to Sulfasalazine administration due to the potential for falsely depressed results.     Alkaline Phosphatase 09/15/2024 49  34 - 104 U/L Final    Total Protein 09/15/2024 6.7  6.4 - 8.4 g/dL Final    Albumin 09/15/2024 4.1  3.5 - 5.0 g/dL Final    Total Bilirubin 09/15/2024 0.47  0.20 - 1.00 mg/dL Final    Use of this assay is not recommended for patients undergoing treatment with eltrombopag due to the potential for falsely elevated results.  N-acetyl-p-benzoquinone imine (metabolite of Acetaminophen) will generate erroneously low results in samples for patients that have taken an overdose of Acetaminophen.    eGFR 09/15/2024 48  ml/min/1.73sq m Final    Lipase 09/15/2024 45  11 - 82 u/L Final    hs TnI 0hr 09/15/2024 17  \"Refer to ACS Flowchart\"- " see link ng/L Final    Comment:                                              Initial (time 0) result  If >=50 ng/L, Myocardial injury suggested ;  Type of myocardial injury and treatment strategy  to be determined.  If 5-49 ng/L, a delta result at 2 hours and or 4 hours will be needed to further evaluate.  If <4 ng/L, and chest pain has been >3 hours since onset, patient may qualify for discharge based on the HEART score in the ED.  If <5 ng/L and <3hours since onset of chest pain, a delta result at 2 hours will be needed to further evaluate.    HS Troponin 99th Percentile URL of a Health Population=12 ng/L with a 95% Confidence Interval of 8-18 ng/L.    Second Troponin (time 2 hours)  If calculated delta >= 20 ng/L,  Myocardial injury suggested ; Type of myocardial injury and treatment strategy to be determined.  If 5-49 ng/L and the calculated delta is 5-19 ng/L, consult medical service for evaluation.  Continue evaluation for ischemia on ecg and other possible etiology and repeat hs troponin at 4 hours.  If delta                            is <5 ng/L at 2 hours, consider discharge based on risk stratification via the HEART score (if in ED), or [place] risk score in IP/Observation.    HS Troponin 99th Percentile URL of a Health Population=12 ng/L with a 95% Confidence Interval of 8-18 ng/L.    Color, UA 09/15/2024 Light Yellow   Final    Clarity, UA 09/15/2024 Clear   Final    Specific Gravity, UA 09/15/2024 1.019  1.003 - 1.030 Final    pH, UA 09/15/2024 5.0  4.5, 5.0, 5.5, 6.0, 6.5, 7.0, 7.5, 8.0 Final    Leukocytes, UA 09/15/2024 Negative  Negative Final    Nitrite, UA 09/15/2024 Negative  Negative Final    Protein, UA 09/15/2024 Negative  Negative mg/dl Final    Glucose, UA 09/15/2024 Negative  Negative mg/dl Final    Ketones, UA 09/15/2024 Negative  Negative mg/dl Final    Urobilinogen, UA 09/15/2024 <2.0  <2.0 mg/dl mg/dl Final    Bilirubin, UA 09/15/2024 Negative  Negative Final    Occult Blood, UA 09/15/2024  "Small (A)  Negative Final    hs TnI 2hr 09/15/2024 14  \"Refer to ACS Flowchart\"- see link ng/L Final    Comment:                                              Initial (time 0) result  If >=50 ng/L, Myocardial injury suggested ;  Type of myocardial injury and treatment strategy  to be determined.  If 5-49 ng/L, a delta result at 2 hours and or 4 hours will be needed to further evaluate.  If <4 ng/L, and chest pain has been >3 hours since onset, patient may qualify for discharge based on the HEART score in the ED.  If <5 ng/L and <3hours since onset of chest pain, a delta result at 2 hours will be needed to further evaluate.    HS Troponin 99th Percentile URL of a Health Population=12 ng/L with a 95% Confidence Interval of 8-18 ng/L.    Second Troponin (time 2 hours)  If calculated delta >= 20 ng/L,  Myocardial injury suggested ; Type of myocardial injury and treatment strategy to be determined.  If 5-49 ng/L and the calculated delta is 5-19 ng/L, consult medical service for evaluation.  Continue evaluation for ischemia on ecg and other possible etiology and repeat hs troponin at 4 hours.  If delta                            is <5 ng/L at 2 hours, consider discharge based on risk stratification via the HEART score (if in ED), or [place] risk score in IP/Observation.    HS Troponin 99th Percentile URL of a Health Population=12 ng/L with a 95% Confidence Interval of 8-18 ng/L.    Delta 2hr hsTnI 09/15/2024 -3  <20 ng/L Final    RBC, UA 09/15/2024 10-20 (A)  None Seen, 1-2 /hpf Final    WBC, UA 09/15/2024 None Seen  None Seen, 1-2 /hpf Final    Epithelial Cells 09/15/2024 Occasional  None Seen, Occasional /hpf Final    Bacteria, UA 09/15/2024 None Seen  None Seen, Occasional /hpf Final   Admission on 08/28/2024, Discharged on 09/04/2024   Component Date Value Ref Range Status    Sodium 08/30/2024 136  135 - 147 mmol/L Final    Potassium 08/30/2024 3.8  3.5 - 5.3 mmol/L Final    Chloride 08/30/2024 100  96 - 108 mmol/L Final "    CO2 08/30/2024 26  21 - 32 mmol/L Final    ANION GAP 08/30/2024 10  4 - 13 mmol/L Final    BUN 08/30/2024 25  5 - 25 mg/dL Final    Creatinine 08/30/2024 0.98  0.60 - 1.30 mg/dL Final    Standardized to IDMS reference method    Glucose 08/30/2024 136  65 - 140 mg/dL Final    If the patient is fasting, the ADA then defines impaired fasting glucose as > 100 mg/dL and diabetes as > or equal to 123 mg/dL.    Glucose, Fasting 08/30/2024 136 (H)  65 - 99 mg/dL Final    Calcium 08/30/2024 9.3  8.4 - 10.2 mg/dL Final    AST 08/30/2024 12 (L)  13 - 39 U/L Final    ALT 08/30/2024 6 (L)  7 - 52 U/L Final    Specimen collection should occur prior to Sulfasalazine administration due to the potential for falsely depressed results.     Alkaline Phosphatase 08/30/2024 43  34 - 104 U/L Final    Total Protein 08/30/2024 6.1 (L)  6.4 - 8.4 g/dL Final    Albumin 08/30/2024 3.8  3.5 - 5.0 g/dL Final    Total Bilirubin 08/30/2024 0.53  0.20 - 1.00 mg/dL Final    Use of this assay is not recommended for patients undergoing treatment with eltrombopag due to the potential for falsely elevated results.  N-acetyl-p-benzoquinone imine (metabolite of Acetaminophen) will generate erroneously low results in samples for patients that have taken an overdose of Acetaminophen.    eGFR 08/30/2024 52  ml/min/1.73sq m Final    Magnesium 08/30/2024 2.1  1.9 - 2.7 mg/dL Final    Phosphorus 08/30/2024 4.0  2.3 - 4.1 mg/dL Final    WBC 08/30/2024 5.82  4.31 - 10.16 Thousand/uL Final    RBC 08/30/2024 3.77 (L)  3.81 - 5.12 Million/uL Final    Hemoglobin 08/30/2024 11.0 (L)  11.5 - 15.4 g/dL Final    Hematocrit 08/30/2024 34.4 (L)  34.8 - 46.1 % Final    MCV 08/30/2024 91  82 - 98 fL Final    MCH 08/30/2024 29.2  26.8 - 34.3 pg Final    MCHC 08/30/2024 32.0  31.4 - 37.4 g/dL Final    RDW 08/30/2024 13.1  11.6 - 15.1 % Final    MPV 08/30/2024 10.9  8.9 - 12.7 fL Final    Platelets 08/30/2024 207  149 - 390 Thousands/uL Final    nRBC 08/30/2024 0  /100 WBCs  Final    Segmented % 08/30/2024 68  43 - 75 % Final    Immature Grans % 08/30/2024 0  0 - 2 % Final    Lymphocytes % 08/30/2024 22  14 - 44 % Final    Monocytes % 08/30/2024 7  4 - 12 % Final    Eosinophils Relative 08/30/2024 2  0 - 6 % Final    Basophils Relative 08/30/2024 1  0 - 1 % Final    Absolute Neutrophils 08/30/2024 3.95  1.85 - 7.62 Thousands/µL Final    Absolute Immature Grans 08/30/2024 0.01  0.00 - 0.20 Thousand/uL Final    Absolute Lymphocytes 08/30/2024 1.26  0.60 - 4.47 Thousands/µL Final    Absolute Monocytes 08/30/2024 0.43  0.17 - 1.22 Thousand/µL Final    Eosinophils Absolute 08/30/2024 0.14  0.00 - 0.61 Thousand/µL Final    Basophils Absolute 08/30/2024 0.03  0.00 - 0.10 Thousands/µL Final    TSH 3RD GENERATON 08/30/2024 1.191  0.450 - 4.500 uIU/mL Final    The recommended reference ranges for TSH during pregnancy are as follows:   First trimester 0.100 to 2.500 uIU/mL   Second trimester  0.200 to 3.000 uIU/mL   Third trimester 0.300 to 3.000 uIU/m    Note: Normal ranges may not apply to patients who are transgender, non-binary, or whose legal sex, sex at birth, and gender identity differ.  Adult TSH (3rd generation) reference range follows the recommended guidelines of the American Thyroid Association, January, 2020.    Vitamin B-12 08/30/2024 437  180 - 914 pg/mL Final    Folate 08/30/2024 >22.3  >5.9 ng/mL Final    The World Health Organization has determined deficient folate concentrations are considered to be <4.0 ng/mL.    Vit D, 25-Hydroxy 08/30/2024 40.4  30.0 - 100.0 ng/mL Final    Vitamin D guidelines established by Clinical Guidelines Subcommittee  of the Endocrine Society Task Force, 2011    Deficiency <20ng/ml   Insufficiency 20-30ng/ml   Sufficient  ng/ml     Iron Saturation 08/30/2024 23  15 - 50 % Final    TIBC 08/30/2024 257  250 - 450 ug/dL Final    Iron 08/30/2024 58  50 - 212 ug/dL Final    Patients treated with metal-binding drugs (ie. Deferoxamine) may have  depressed iron values.    UIBC 08/30/2024 199  155 - 355 ug/dL Final    Ferritin 08/30/2024 29  11 - 307 ng/mL Final    Syphilis Total Antibody 08/30/2024 Non-reactive  Non-Reactive Final    No serological evidence of infection with T. pallidum.  Early or incubating syphilis infection cannot be excluded.  Consider repeat testing based on clinical suspicion.    HIV-1 p24 Antigen 08/30/2024 Non-Reactive  Non-Reactive Final    HIV-1 Antibody 08/30/2024 Non-Reactive  Non-Reactive Final    HIV-2 Antibody 08/30/2024 Non-Reactive  Non-Reactive Final    HIV Ag-Ab 5th Gen 08/30/2024 Non-Reactive  Non-Reactive Final    A Non-Reactive test result does not preclude the possibility of exposure or infection with HIV-1 and/or HIV-2.  Non-Reactive results can occur if the quantity of marker present is below the detection limits or is not present during the stage of disease in which a sample is collected. Repeat testing should be considered where there is clinical suspicion of infection.       Ventricular Rate 08/30/2024 83  BPM Final    Atrial Rate 08/30/2024 83  BPM Final    [place] Interval 08/30/2024 170  ms Final    QRSD Interval 08/30/2024 74  ms Final    QT Interval 08/30/2024 370  ms Final    QTC Interval 08/30/2024 434  ms Final    P Axis 08/30/2024 61  degrees Final    QRS [place] 08/30/2024 3  degrees Final    T Wave [place] 08/30/2024 51  degrees Final   Admission on 08/27/2024, Discharged on 08/28/2024   Component Date Value Ref Range Status    WBC 08/27/2024 6.15  4.31 - 10.16 Thousand/uL Final    RBC 08/27/2024 3.75 (L)  3.81 - 5.12 Million/uL Final    Hemoglobin 08/27/2024 11.0 (L)  11.5 - 15.4 g/dL Final    Hematocrit 08/27/2024 33.5 (L)  34.8 - 46.1 % Final    MCV 08/27/2024 89  82 - 98 fL Final    MCH 08/27/2024 29.3  26.8 - 34.3 pg Final    MCHC 08/27/2024 32.8  31.4 - 37.4 g/dL Final    RDW 08/27/2024 13.2  11.6 - 15.1 % Final    MPV 08/27/2024 10.2  8.9 - 12.7 fL Final    Platelets 08/27/2024 207  149 - 390 Thousands/uL  Final    nRBC 08/27/2024 0  /100 WBCs Final    Segmented % 08/27/2024 66  43 - 75 % Final    Immature Grans % 08/27/2024 0  0 - 2 % Final    Lymphocytes % 08/27/2024 24  14 - 44 % Final    Monocytes % 08/27/2024 7  4 - 12 % Final    Eosinophils Relative 08/27/2024 2  0 - 6 % Final    Basophils Relative 08/27/2024 1  0 - 1 % Final    Absolute Neutrophils 08/27/2024 4.08  1.85 - 7.62 Thousands/µL Final    Absolute Immature Grans 08/27/2024 0.02  0.00 - 0.20 Thousand/uL Final    Absolute Lymphocytes 08/27/2024 1.46  0.60 - 4.47 Thousands/µL Final    Absolute Monocytes 08/27/2024 0.43  0.17 - 1.22 Thousand/µL Final    Eosinophils Absolute 08/27/2024 0.12  0.00 - 0.61 Thousand/µL Final    Basophils Absolute 08/27/2024 0.04  0.00 - 0.10 Thousands/µL Final    Sodium 08/27/2024 136  135 - 147 mmol/L Final    Potassium 08/27/2024 3.9  3.5 - 5.3 mmol/L Final    Chloride 08/27/2024 102  96 - 108 mmol/L Final    CO2 08/27/2024 27  21 - 32 mmol/L Final    ANION GAP 08/27/2024 7  4 - 13 mmol/L Final    BUN 08/27/2024 26 (H)  5 - 25 mg/dL Final    Creatinine 08/27/2024 1.05  0.60 - 1.30 mg/dL Final    Standardized to IDMS reference method    Glucose 08/27/2024 122  65 - 140 mg/dL Final    If the patient is fasting, the ADA then defines impaired fasting glucose as > 100 mg/dL and diabetes as > or equal to 123 mg/dL.    Calcium 08/27/2024 9.4  8.4 - 10.2 mg/dL Final    AST 08/27/2024 12 (L)  13 - 39 U/L Final    ALT 08/27/2024 7  7 - 52 U/L Final    Specimen collection should occur prior to Sulfasalazine administration due to the potential for falsely depressed results.     Alkaline Phosphatase 08/27/2024 50  34 - 104 U/L Final    Total Protein 08/27/2024 6.4  6.4 - 8.4 g/dL Final    Albumin 08/27/2024 4.0  3.5 - 5.0 g/dL Final    Total Bilirubin 08/27/2024 0.48  0.20 - 1.00 mg/dL Final    Use of this assay is not recommended for patients undergoing treatment with eltrombopag due to the potential for falsely elevated  results.  N-acetyl-p-benzoquinone imine (metabolite of Acetaminophen) will generate erroneously low results in samples for patients that have taken an overdose of Acetaminophen.    eGFR 08/27/2024 48  ml/min/1.73sq m Final    TSH 3RD GENERATON 08/27/2024 1.408  0.450 - 4.500 uIU/mL Final    The recommended reference ranges for TSH during pregnancy are as follows:   First trimester 0.100 to 2.500 uIU/mL   Second trimester  0.200 to 3.000 uIU/mL   Third trimester 0.300 to 3.000 uIU/m    Note: Normal ranges may not apply to patients who are transgender, non-binary, or whose legal sex, sex at birth, and gender identity differ.  Adult TSH (3rd generation) reference range follows the recommended guidelines of the American Thyroid Association, January, 2020.    Amph/Meth UR 08/27/2024 Negative  Negative Final    Barbiturate Ur 08/27/2024 Negative  Negative Final    Benzodiazepine Urine 08/27/2024 Negative  Negative Final    Cocaine Urine 08/27/2024 Negative  Negative Final    Methadone Urine 08/27/2024 Negative  Negative Final    Opiate Urine 08/27/2024 Negative  Negative Final    PCP Ur 08/27/2024 Negative  Negative Final    THC Urine 08/27/2024 Negative  Negative Final    Oxycodone Urine 08/27/2024 Negative  Negative Final    Fentanyl Urine 08/27/2024 Negative  Negative Final    HYDROCODONE URINE 08/27/2024 Negative  Negative Final    EXTBreath Alcohol 08/27/2024 0.000   Final    Color, UA 08/27/2024 Colorless   Final    Clarity, UA 08/27/2024 Clear   Final    Specific Gravity, UA 08/27/2024 1.008  1.003 - 1.030 Final    pH, UA 08/27/2024 5.0  4.5, 5.0, 5.5, 6.0, 6.5, 7.0, 7.5, 8.0 Final    Leukocytes, UA 08/27/2024 Negative  Negative Final    Nitrite, UA 08/27/2024 Negative  Negative Final    Protein, UA 08/27/2024 Negative  Negative mg/dl Final    Glucose, UA 08/27/2024 Negative  Negative mg/dl Final    Ketones, UA 08/27/2024 Negative  Negative mg/dl Final    Urobilinogen, UA 08/27/2024 <2.0  <2.0 mg/dl mg/dl Final     Bilirubin, UA 08/27/2024 Negative  Negative Final    Occult Blood, UA 08/27/2024 Negative  Negative Final    Ventricular Rate 08/27/2024 77  BPM Final    Atrial Rate 08/27/2024 77  BPM Final    WI Interval 08/27/2024 166  ms Final    QRSD Interval 08/27/2024 74  ms Final    QT Interval 08/27/2024 358  ms Final    QTC Interval 08/27/2024 405  ms Final    P Axis 08/27/2024 50  degrees Final    QRS Axis 08/27/2024 -1  degrees Final    T Wave Axis 08/27/2024 23  degrees Final             ___________________________________    History Review: The following portions of the patient's history were reviewed and updated as appropriate: allergies, current medications, past family history, past medical history, past social history, past surgical history, and problem list.    Unchanged information from this writer's previous assessment is copied and italicized; information that has changed is bolded.    Past Psychiatric History:      Past psychiatric diagnoses:   Depression, anxiety  Inpatient psychiatric admissions:   8/2024 2013 following a surgery, sounded like extreme anxiety  Prior outpatient psychiatric treatment:   Started seeing a psychiatrist when first   Past/current psychotherapy:   active  History of suicidal attempts/gestures:   denies  Psychotropic medication trials:   Venlafaxine, sertraline, fluoxetine, escitalopram, aripiprazole (dizziness), risperidone, clonazepam, buspirone, gabapentin        Substance Abuse History:     Denies all history of substance use     Family Psychiatric History:      Family History             Family History   Problem Relation Age of Onset    Depression Mother           w/anxiety    Diabetes Mother           Mellitus    Breast cancer Mother      Hypertension Mother      No Known Problems Father      Depression Sister           w/anxiety    Heart disease Sister           Cardiac Disorder    Breast cancer Sister      Hypertension Sister      Diabetes Brother           Mellitus     Alcohol abuse Son                    Social History:     Developmental: nothing of note  Education: Bachelor degree  Marital history:  in 2006  Children: 2  Living arrangement, social support: as above, has been with daughter for 17 years  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  ___________________________________      Visit Time    Visit Start Time: 1158  Visit Stop Time: 1300  Total Visit Duration:  62 minutes    López Gutierrez MD   12/03/24

## 2024-12-03 NOTE — ASSESSMENT & PLAN NOTE
Patient and daughter report stability of mood and anxiety with tolerable fluctuations. 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 Sleep and appetite are stable. Somatic symptoms continue to seemingly correspond with anxiety. They are both in agreement with plan to continue medication without change.    Continue escitalopram 10 mg QD for depression, anxiety, and obsessive thinking  Continue lorazepam 0.5 mg TID for significant anxiety  Continue risperidone 0.5 mg QHS for augmentation for obsessive thinking  Continue psychotherapy  Fasting glucose and CMP pending

## 2024-12-11 ENCOUNTER — TELEPHONE (OUTPATIENT)
Dept: BEHAVIORAL/MENTAL HEALTH CLINIC | Facility: CLINIC | Age: 84
End: 2024-12-11

## 2024-12-11 NOTE — TELEPHONE ENCOUNTER
Call to Sandra Peña. JUAN on VM.  Requested to talk regarding scheduling/availability and follow-up for group therapy, noting several cancels, and provider initiating new year schedule.  Addressed plan to continue to include Ms. Davidchristian in scheduled group sessions in the meantime.  Explained avenue/limits for call back via  provider TEAMS or main office number.

## 2024-12-12 ENCOUNTER — APPOINTMENT (OUTPATIENT)
Dept: LAB | Facility: CLINIC | Age: 84
End: 2024-12-12
Payer: MEDICARE

## 2024-12-12 ENCOUNTER — RESULTS FOLLOW-UP (OUTPATIENT)
Dept: FAMILY MEDICINE CLINIC | Facility: CLINIC | Age: 84
End: 2024-12-12

## 2024-12-12 DIAGNOSIS — Z13.29 SCREENING FOR ENDOCRINE, NUTRITIONAL, METABOLIC AND IMMUNITY DISORDER: ICD-10-CM

## 2024-12-12 DIAGNOSIS — E78.2 MIXED HYPERLIPIDEMIA: ICD-10-CM

## 2024-12-12 DIAGNOSIS — F41.1 GENERALIZED ANXIETY DISORDER: ICD-10-CM

## 2024-12-12 DIAGNOSIS — F45.21 ILLNESS ANXIETY DISORDER: ICD-10-CM

## 2024-12-12 DIAGNOSIS — F33.1 MAJOR DEPRESSIVE DISORDER, RECURRENT EPISODE, MODERATE (HCC): ICD-10-CM

## 2024-12-12 DIAGNOSIS — R42 DIZZINESS: ICD-10-CM

## 2024-12-12 DIAGNOSIS — R41.3 MEMORY LOSS: ICD-10-CM

## 2024-12-12 DIAGNOSIS — E11.9 TYPE 2 DIABETES MELLITUS WITHOUT COMPLICATION, WITHOUT LONG-TERM CURRENT USE OF INSULIN (HCC): ICD-10-CM

## 2024-12-12 DIAGNOSIS — I10 PRIMARY HYPERTENSION: ICD-10-CM

## 2024-12-12 DIAGNOSIS — Z13.21 SCREENING FOR ENDOCRINE, NUTRITIONAL, METABOLIC AND IMMUNITY DISORDER: ICD-10-CM

## 2024-12-12 DIAGNOSIS — Z13.228 SCREENING FOR ENDOCRINE, NUTRITIONAL, METABOLIC AND IMMUNITY DISORDER: ICD-10-CM

## 2024-12-12 DIAGNOSIS — Z13.0 SCREENING FOR ENDOCRINE, NUTRITIONAL, METABOLIC AND IMMUNITY DISORDER: ICD-10-CM

## 2024-12-12 LAB
ALBUMIN SERPL BCG-MCNC: 3.7 G/DL (ref 3.5–5)
ALP SERPL-CCNC: 55 U/L (ref 34–104)
ALT SERPL W P-5'-P-CCNC: 7 U/L (ref 7–52)
ANION GAP SERPL CALCULATED.3IONS-SCNC: 6 MMOL/L (ref 4–13)
AST SERPL W P-5'-P-CCNC: 11 U/L (ref 13–39)
BASOPHILS # BLD AUTO: 0.03 THOUSANDS/ÂΜL (ref 0–0.1)
BASOPHILS NFR BLD AUTO: 1 % (ref 0–1)
BILIRUB SERPL-MCNC: 0.77 MG/DL (ref 0.2–1)
BUN SERPL-MCNC: 26 MG/DL (ref 5–25)
CALCIUM SERPL-MCNC: 9.3 MG/DL (ref 8.4–10.2)
CHLORIDE SERPL-SCNC: 106 MMOL/L (ref 96–108)
CHOLEST SERPL-MCNC: 128 MG/DL (ref ?–200)
CO2 SERPL-SCNC: 27 MMOL/L (ref 21–32)
CREAT SERPL-MCNC: 1.06 MG/DL (ref 0.6–1.3)
CREAT UR-MCNC: 58.6 MG/DL
EOSINOPHIL # BLD AUTO: 0.12 THOUSAND/ÂΜL (ref 0–0.61)
EOSINOPHIL NFR BLD AUTO: 3 % (ref 0–6)
ERYTHROCYTE [DISTWIDTH] IN BLOOD BY AUTOMATED COUNT: 13.9 % (ref 11.6–15.1)
EST. AVERAGE GLUCOSE BLD GHB EST-MCNC: 131 MG/DL
FOLATE SERPL-MCNC: 16.1 NG/ML
GFR SERPL CREATININE-BSD FRML MDRD: 47 ML/MIN/1.73SQ M
GLUCOSE P FAST SERPL-MCNC: 92 MG/DL (ref 65–99)
HBA1C MFR BLD: 6.2 %
HCT VFR BLD AUTO: 32 % (ref 34.8–46.1)
HDLC SERPL-MCNC: 55 MG/DL
HGB BLD-MCNC: 10.1 G/DL (ref 11.5–15.4)
IMM GRANULOCYTES # BLD AUTO: 0.02 THOUSAND/UL (ref 0–0.2)
IMM GRANULOCYTES NFR BLD AUTO: 0 % (ref 0–2)
LDLC SERPL CALC-MCNC: 55 MG/DL (ref 0–100)
LYMPHOCYTES # BLD AUTO: 1.32 THOUSANDS/ÂΜL (ref 0.6–4.47)
LYMPHOCYTES NFR BLD AUTO: 28 % (ref 14–44)
MCH RBC QN AUTO: 29.2 PG (ref 26.8–34.3)
MCHC RBC AUTO-ENTMCNC: 31.6 G/DL (ref 31.4–37.4)
MCV RBC AUTO: 93 FL (ref 82–98)
MICROALBUMIN UR-MCNC: 37.9 MG/L
MICROALBUMIN/CREAT 24H UR: 65 MG/G CREATININE (ref 0–30)
MONOCYTES # BLD AUTO: 0.32 THOUSAND/ÂΜL (ref 0.17–1.22)
MONOCYTES NFR BLD AUTO: 7 % (ref 4–12)
NEUTROPHILS # BLD AUTO: 2.98 THOUSANDS/ÂΜL (ref 1.85–7.62)
NEUTS SEG NFR BLD AUTO: 61 % (ref 43–75)
NONHDLC SERPL-MCNC: 73 MG/DL
NRBC BLD AUTO-RTO: 0 /100 WBCS
PLATELET # BLD AUTO: 194 THOUSANDS/UL (ref 149–390)
PMV BLD AUTO: 10.8 FL (ref 8.9–12.7)
POTASSIUM SERPL-SCNC: 4.1 MMOL/L (ref 3.5–5.3)
PROT SERPL-MCNC: 6.2 G/DL (ref 6.4–8.4)
RBC # BLD AUTO: 3.46 MILLION/UL (ref 3.81–5.12)
SODIUM SERPL-SCNC: 139 MMOL/L (ref 135–147)
TRIGL SERPL-MCNC: 91 MG/DL (ref ?–150)
TSH SERPL DL<=0.05 MIU/L-ACNC: 1.08 UIU/ML (ref 0.45–4.5)
VIT B12 SERPL-MCNC: 495 PG/ML (ref 180–914)
WBC # BLD AUTO: 4.79 THOUSAND/UL (ref 4.31–10.16)

## 2024-12-12 PROCEDURE — 82570 ASSAY OF URINE CREATININE: CPT

## 2024-12-12 PROCEDURE — 80053 COMPREHEN METABOLIC PANEL: CPT

## 2024-12-12 PROCEDURE — 82043 UR ALBUMIN QUANTITATIVE: CPT

## 2024-12-12 PROCEDURE — 36415 COLL VENOUS BLD VENIPUNCTURE: CPT

## 2024-12-12 PROCEDURE — 84443 ASSAY THYROID STIM HORMONE: CPT

## 2024-12-12 PROCEDURE — 80061 LIPID PANEL: CPT

## 2024-12-12 PROCEDURE — 83036 HEMOGLOBIN GLYCOSYLATED A1C: CPT

## 2024-12-12 PROCEDURE — 82746 ASSAY OF FOLIC ACID SERUM: CPT

## 2024-12-12 PROCEDURE — 85025 COMPLETE CBC W/AUTO DIFF WBC: CPT

## 2024-12-12 PROCEDURE — 82607 VITAMIN B-12: CPT

## 2024-12-17 ENCOUNTER — OFFICE VISIT (OUTPATIENT)
Dept: FAMILY MEDICINE CLINIC | Facility: CLINIC | Age: 84
End: 2024-12-17
Payer: MEDICARE

## 2024-12-17 VITALS
HEIGHT: 57 IN | SYSTOLIC BLOOD PRESSURE: 128 MMHG | HEART RATE: 80 BPM | DIASTOLIC BLOOD PRESSURE: 62 MMHG | OXYGEN SATURATION: 98 % | WEIGHT: 117 LBS | RESPIRATION RATE: 16 BRPM | BODY MASS INDEX: 25.24 KG/M2

## 2024-12-17 DIAGNOSIS — I10 PRIMARY HYPERTENSION: ICD-10-CM

## 2024-12-17 DIAGNOSIS — R30.0 DYSURIA: ICD-10-CM

## 2024-12-17 DIAGNOSIS — E78.2 MIXED HYPERLIPIDEMIA: ICD-10-CM

## 2024-12-17 DIAGNOSIS — E11.9 TYPE 2 DIABETES MELLITUS WITHOUT COMPLICATION, WITHOUT LONG-TERM CURRENT USE OF INSULIN (HCC): ICD-10-CM

## 2024-12-17 DIAGNOSIS — R54 AGE-RELATED PHYSICAL DEBILITY: Primary | ICD-10-CM

## 2024-12-17 DIAGNOSIS — N18.30 STAGE 3 CHRONIC KIDNEY DISEASE, UNSPECIFIED WHETHER STAGE 3A OR 3B CKD (HCC): ICD-10-CM

## 2024-12-17 PROBLEM — D64.9 ANEMIA: Status: RESOLVED | Noted: 2024-08-28 | Resolved: 2024-12-17

## 2024-12-17 PROBLEM — R62.7 FAILURE TO THRIVE IN ADULT: Status: RESOLVED | Noted: 2024-09-29 | Resolved: 2024-12-17

## 2024-12-17 LAB
SL AMB  POCT GLUCOSE, UA: 50
SL AMB LEUKOCYTE ESTERASE,UA: 75
SL AMB POCT BILIRUBIN,UA: ABNORMAL
SL AMB POCT BLOOD,UA: 50
SL AMB POCT CLARITY,UA: ABNORMAL
SL AMB POCT COLOR,UA: YELLOW
SL AMB POCT KETONES,UA: ABNORMAL
SL AMB POCT NITRITE,UA: ABNORMAL
SL AMB POCT PH,UA: 5
SL AMB POCT SPECIFIC GRAVITY,UA: 1.02
SL AMB POCT URINE PROTEIN: ABNORMAL
SL AMB POCT UROBILINOGEN: ABNORMAL

## 2024-12-17 PROCEDURE — G0439 PPPS, SUBSEQ VISIT: HCPCS | Performed by: FAMILY MEDICINE

## 2024-12-17 PROCEDURE — 81003 URINALYSIS AUTO W/O SCOPE: CPT | Performed by: FAMILY MEDICINE

## 2024-12-17 PROCEDURE — 99214 OFFICE O/P EST MOD 30 MIN: CPT | Performed by: FAMILY MEDICINE

## 2024-12-17 RX ORDER — ATORVASTATIN CALCIUM 10 MG/1
10 TABLET, FILM COATED ORAL DAILY
Qty: 90 TABLET | Refills: 1 | Status: SHIPPED | OUTPATIENT
Start: 2024-12-17

## 2024-12-17 RX ORDER — CEPHALEXIN 500 MG/1
500 CAPSULE ORAL EVERY 12 HOURS SCHEDULED
Qty: 14 CAPSULE | Refills: 0 | Status: SHIPPED | OUTPATIENT
Start: 2024-12-17 | End: 2024-12-24

## 2024-12-17 NOTE — PROGRESS NOTES
Assessment and Plan:     Problem List Items Addressed This Visit        Cardiovascular and Mediastinum    Primary hypertension    Hypertension remains adequately controlled on lisinopril/hydrochlorothiazide.  Continue same         Relevant Orders    CBC and differential    TSH, 3rd generation with Free T4 reflex       Endocrine    Type 2 diabetes mellitus without complication, without long-term current use of insulin ([facility])      Lab Results   Component Value Date    HGBA1C 6.2 (H) 12/12/2024     Only taking metformin 500 mg once a day.  A1c is controlled.  Goal A1c less than 7%.  Repeat diabetic parameters to be done in 4 months.         Relevant Orders    Albumin / creatinine urine ratio    Hemoglobin A1C       Genitourinary    Stage 3 chronic kidney disease, unspecified whether stage 3a or 3b CKD ([facility])    Lab Results   Component Value Date    EGFR 47 12/12/2024    EGFR 52 10/17/2024    EGFR 49 10/05/2024    CREATININE 1.06 12/12/2024    CREATININE 0.98 10/17/2024    CREATININE 1.04 10/05/2024     Lab Results   Component Value Date    EGFR 47 12/12/2024    EGFR 52 10/17/2024    EGFR 49 10/05/2024    CREATININE 1.06 12/12/2024    CREATININE 0.98 10/17/2024    CREATININE 1.04 10/05/2024     Try to avoid NSAIDs and dehydration.  This is diagnosis based upon age.  Her creatinine is actually normal and has always been normal         Relevant Orders    CBC and differential       Urinary    Dysuria    Abnormal urinalysis with leukocyte esterase and microscopic blood    -Urine will be sent for culture and sensitivity    -Patient will be placed on course of Keflex 500 mg twice daily x 7 days.  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  She does see geriatric medicine.  She is  living with her daughter and son-in-law            Other    Mixed hyperlipidemia    Remains on atorvastatin 10 mg daily.  Cholesterol remains very well-controlled with an LDL less than 70.  LDL at 55.  Repeat lipid panel in 4 months         Relevant Medications    atorvastatin (LIPITOR) 10 mg tablet    Other Relevant Orders    Comprehensive metabolic panel    Lipid panel           Depression Screening and Follow-up Plan: Patient was screened for depression during today's encounter. They screened negative with a PHQ-9 score of 2.    Falls Plan of Care: referral to physical therapy. Referral to geriatrics was provided.     Urinary Incontinence Plan of Care: counseling topics discussed: use restroom every 2 hours and improving blood sugar control.       Preventive health issues were discussed with patient, and age appropriate screening tests were ordered as noted in patient's After Visit Summary.  Personalized health advice and appropriate referrals for health education or preventive services given if needed, as noted in patient's After Visit Summary.     History of Present Illness:     Patient presents for Medicare Annual Wellness visit    Patient Care Team:  Ramin De Paz DO as PCP - General  DO Ramin Rowland DO Chatargy Kaza, MD David Warner, DPM (Podiatry)  Gold Francis MD as Endoscopist  Mumtaz Ayala Od (Optometry)     Problem List:     Patient Active Problem List   Diagnosis   • Adenomatous polyp of colon   • Primary hypertension   • Type 2 diabetes mellitus without complication, without long-term current use of insulin (HCC)   • Mixed hyperlipidemia   • Insomnia   • Change in bowel habits   • Generalized osteoarthritis   • Chronic idiopathic constipation   • Illness anxiety disorder   • Generalized anxiety disorder   • Uric acid kidney stone   • Dry scalp   • Constipation   • Stage 3 chronic kidney disease, unspecified whether stage 3a or 3b CKD (HCC)   • Major neurocognitive disorder (HCC)   •  Need for assistance with personal care   • Bad oral taste   • Major depressive disorder, recurrent episode, moderate (HCC)   • Age-related physical debility   • Dysuria      Past Medical and Surgical History:     Past Medical History:   Diagnosis Date   • Anxiety    • Colon polyps    • Depression    • Diabetes mellitus (HCC)    • Dizziness     Last assessed: 8/31/15   • DVT (deep venous thrombosis) ([facility])    • Dysuria    • Hematuria 2022   • Hyperlipidemia    • Hypertension    • Hypertensive urgency 10/22/2021   • Insomnia    • Panic attack    • Ureterolithiasis 2020     Past Surgical History:   Procedure Laterality Date   •  SECTION      1964 son, 1968 daughter   • COLONOSCOPY     • FL RETROGRADE PYELOGRAM  2020   • CO COLONOSCOPY FLX DX W/COLLJ SPEC WHEN PFRMD N/A 3/27/2017    Procedure: COLONOSCOPY;  Surgeon: Gold Francis MD;  Location: [facility];  Service: Gastroenterology   • CO CYSTO/URETERO W/LITHOTRIPSY &INDWELL STENT INSRT Right 2020    Procedure: CYSTOSCOPY URETEROSCOPY WITH LITHOTRIPSY HOLMIUM LASER, RETROGRADE PYELOGRAM AND INSERTION STENT URETERAL; EXCISION OF BLADDER TUMOR;  Surgeon: Edwin Love MD;  Location: [facility];  Service: Urology   • ROTATOR CUFF REPAIR Left     Last assessed: 3/29/15   • TONSILLECTOMY        Family History:     Family History   Problem Relation Age of Onset   • Depression Mother         w/anxiety   • Diabetes Mother         Mellitus   • Breast cancer Mother    • Hypertension Mother    • No Known Problems Father    • Depression Sister         w/anxiety   • Heart disease Sister         Cardiac Disorder   • Breast cancer Sister    • Hypertension Sister    • Diabetes Brother         Mellitus   • Alcohol abuse Son       Social History:     Social History     Socioeconomic History   • Marital status:      Spouse name: None   • Number of children: None   • Years of education: None   • Highest education level: None   Occupational History    • None   Tobacco Use   • Smoking status: Never   • Smokeless tobacco: Never   Vaping Use   • Vaping status: Never Used   Substance and Sexual Activity   • Alcohol use: Never   • Drug use: No   • Sexual activity: None   Other Topics Concern   • None   Social History Narrative    Lack of exercise     Social Drivers of Health     Financial Resource Strain: Low Risk  (8/29/2024)    Overall Financial Resource Strain (CARDIA)    • Difficulty of Paying Living Expenses: Not hard at all   Food Insecurity: No Food Insecurity (12/17/2024)    Hunger Vital Sign    • Worried About Running Out of Food in the Last Year: Never true    • Ran Out of Food in the Last Year: Never true   Transportation Needs: No Transportation Needs (12/17/2024)    PRAPARE - Transportation    • Lack of Transportation (Medical): No    • Lack of Transportation (Non-Medical): No   Physical Activity: Not on file   Stress: Not on file   Social Connections: Not on file   Intimate Partner Violence: Not At Risk (8/29/2024)    Humiliation, Afraid, Rape, and Kick questionnaire    • Fear of Current or Ex-Partner: No    • Emotionally Abused: No    • Physically Abused: No    • Sexually Abused: No   Housing Stability: Low Risk  (12/17/2024)    Housing Stability Vital Sign    • Unable to Pay for Housing in the Last Year: No    • Number of Times Moved in the Last Year: 1    • Homeless in the Last Year: No       Medications and Allergies:     Current Outpatient Medications   Medication Sig Dispense Refill   • aspirin 81 MG tablet Take 81 mg by mouth daily     • atorvastatin (LIPITOR) 10 mg tablet Take 1 tablet (10 mg total) by mouth daily 90 tablet 1   • cephalexin (KEFLEX) 500 mg capsule Take 1 capsule (500 mg total) by mouth every 12 (twelve) hours for 7 days 14 capsule 0   • escitalopram (Lexapro) 10 mg tablet Take 1 tablet (10 mg total) by mouth daily Do not start before November 12, 2024. 23 tablet 1   • linaCLOtide 145 MCG CAPS Take 1 capsule (145 mcg total) by  "mouth in the morning 90 capsule 0   • lisinopril-hydrochlorothiazide (PRINZIDE,ZESTORETIC) 10-12.5 MG per tablet TAKE ONE TABLET BY MOUTH EVERY DAY 90 tablet 1   • LORazepam (Ativan) 0.5 mg tablet Take 1 tablet (0.5 mg total) by mouth 3 (three) times a day 90 tablet 0   • metFORMIN (GLUCOPHAGE) 500 mg tablet Take 500 mg by mouth 2 (two) times a day with meals     • polyethylene glycol (MIRALAX) 17 g packet Take 17 g by mouth daily 30 each 3   • psyllium (METAMUCIL) packet Take 1 packet by mouth daily 54 packet 3   • risperiDONE (RisperDAL) 0.5 mg tablet Take 1 tablet (0.5 mg total) by mouth daily at bedtime 30 tablet 1   • senna-docusate sodium (SENOKOT S) 8.6-50 mg per tablet Take 1 tablet by mouth daily as needed for constipation 20 tablet 0   • Cholecalciferol (VITAMIN D3) 1,000 units tablet Take 2 tablets (2,000 Units total) by mouth daily for 30 doses 60 tablet 0     No current facility-administered medications for this visit.     No Known Allergies   Immunizations:     Immunization History   Administered Date(s) Administered   • COVID-19 PFIZER VACCINE 0.3 ML IM 02/14/2021, 03/05/2021, 01/23/2022   • Influenza Split High Dose Preservative Free IM 10/02/2013, 12/18/2014, 09/30/2015, 10/11/2016, 11/20/2017, 10/10/2024   • Influenza, high dose seasonal 0.7 mL 10/15/2018, 11/01/2019, 09/14/2020, 08/30/2022, 10/16/2023   • Pneumococcal Conjugate 13-Valent 10/11/2016   • Pneumococcal Polysaccharide PPV23 10/02/2013   • Tdap 02/01/2024      Health Maintenance:         Topic Date Due   • Colorectal Cancer Screening  12/17/2085 (Originally 11/15/1985)         Topic Date Due   • COVID-19 Vaccine (4 - 2024-25 season) 09/01/2024      Medicare Health Risk Assessment:     /62   Pulse 80   Resp 16   Ht 4' 9\" (1.448 m)   Wt 53.1 kg (117 lb)   SpO2 98%   BMI 25.32 kg/m²      Sandra is here for her Subsequent Wellness visit. Last Medicare Wellness visit information reviewed, patient interviewed, no change since last " AWV.     Health Risk Assessment:   Patient rates overall health as good. Patient feels that their physical health rating is same. Patient is satisfied with their life. Eyesight was rated as same. Hearing was rated as same. Patient feels that their emotional and mental health rating is same. Patients states they are never, rarely angry. Patient states they are sometimes unusually tired/fatigued. Pain experienced in the last 7 days has been none. Patient states that she has experienced no weight loss or gain in last 6 months.     Depression Screening:   PHQ-9 Score: 2      Fall Risk Screening:   In the past year, patient has experienced: no history of falling in past year      Urinary Incontinence Screening:   Patient has not leaked urine accidently in the last six months.     Home Safety:  Patient does not have trouble with stairs inside or outside of their home. Patient has working smoke alarms and has working carbon monoxide detector. Home safety hazards include: none.     Nutrition:   Current diet is Regular.     Medications:   Patient is currently taking over-the-counter supplements. OTC medications include: see medication list. Patient is able to manage medications.     Activities of Daily Living (ADLs)/Instrumental Activities of Daily Living (IADLs):   Walk and transfer into and out of bed and chair?: Yes  Dress and groom yourself?: Yes    Bathe or shower yourself?: Yes    Feed yourself? Yes  Do your laundry/housekeeping?: Yes  Manage your money, pay your bills and track your expenses?: Yes  Make your own meals?: Yes    Do your own shopping?: Yes    Previous Hospitalizations:   Any hospitalizations or ED visits within the last 12 months?: No      Advance Care Planning:   Living will: Yes    Durable POA for healthcare: Yes    Advanced directive: Yes    Advanced directive counseling given: No    Five wishes given: No    End of Life Decisions reviewed with patient: Yes    Provider agrees with end of life  decisions: Yes      PREVENTIVE SCREENINGS      Cardiovascular Screening:    General: Screening Not Indicated and History Lipid Disorder      Diabetes Screening:     General: Screening Not Indicated and History Diabetes      Colorectal Cancer Screening:     General: Screening Not Indicated      Breast Cancer Screening:     General: Screening Current      Cervical Cancer Screening:    General: Screening Not Indicated      Osteoporosis Screening:    General: Screening Current      Abdominal Aortic Aneurysm (AAA) Screening:        General: Screening Not Indicated      Lung Cancer Screening:     General: Screening Not Indicated      Hepatitis C Screening:    General: Screening Not Indicated    Screening, Brief Intervention, and Referral to Treatment (SBIRT)    Screening  Typical number of drinks in a day: 0  Typical number of drinks in a week: 0  Interpretation: Low risk drinking behavior.    AUDIT-C Screenin) How often did you have a drink containing alcohol in the past year? never  2) How many drinks did you have on a typical day when you were drinking in the past year? 0  3) How often did you have 6 or more drinks on one occasion in the past year? never    AUDIT-C Score: 0  Interpretation: Score 0-2 (female): Negative screen for alcohol misuse    Single Item Drug Screening:  How often have you used an illegal drug (including marijuana) or a prescription medication for non-medical reasons in the past year? never    Single Item Drug Screen Score: 0  Interpretation: Negative screen for possible drug use disorder      Ramin De Paz, DO    84-year-old female with past medical history of hypertension, diabetes mellitus type 2, hyperlipidemia, major depressive disorder, generalized anxiety disorder presents with her daughter for subsequent annual wellness visit and follow-up of chronic conditions.  Continues seeing new psychiatrist who prescribes medications as well as therapist, neuropsychiatrist and geriatric medicine.   Hemoglobin A1c of 6.2%.  Patient does complain of some urinary frequency.  No pain or burning with urination.  Daughter feels that the adjustments that they have made to her psychiatric medications have improved her functionality tremendously.  She is on Lexapro and Risperdal.  Her appetite has improved and she has gained 7 pounds.  Remainder of labs reviewed        Review of Systems   Constitutional:  Negative for fatigue.   HENT: Negative.     Eyes: Negative.    Respiratory: Negative.     Cardiovascular: Negative.    Gastrointestinal:  Negative for constipation.   Endocrine: Negative.    Genitourinary:  Positive for dysuria and frequency.   Musculoskeletal:  Negative for gait problem.   Skin: Negative.         Dryness of her scalp   Allergic/Immunologic: Negative.    Hematological: Negative.    Psychiatric/Behavioral:  Positive for sleep disturbance (Hypersomnia). The patient is nervous/anxious.        Physical Exam  Vitals and nursing note reviewed.   Constitutional:       General: She is not in acute distress.     Appearance: Normal appearance. She is well-developed and normal weight. She is not diaphoretic.   HENT:      Head: Normocephalic and atraumatic.      Nose: Nose normal.      Mouth/Throat:      Mouth: Mucous membranes are moist.      Pharynx: Oropharynx is clear.   Eyes:      Conjunctiva/sclera: Conjunctivae normal.      Pupils: Pupils are equal, round, and reactive to light.   Cardiovascular:      Rate and Rhythm: Normal rate and regular rhythm.      Pulses: no weak pulses.           Dorsalis pedis pulses are 2+ on the right side and 2+ on the left side.        Posterior tibial pulses are 2+ on the right side and 2+ on the left side.      Heart sounds: Normal heart sounds. No murmur heard.     No friction rub. No gallop.   Pulmonary:      Effort: Pulmonary effort is normal. No respiratory distress.      Breath sounds: Normal breath sounds. No wheezing or rales.   Chest:      Chest wall: No tenderness.    Abdominal:      General: Bowel sounds are normal.      Palpations: Abdomen is soft.   Musculoskeletal:         General: No tenderness or deformity. Normal range of motion.      Cervical back: Normal range of motion and neck supple.   Feet:      Right foot:      Skin integrity: No ulcer, skin breakdown, erythema, warmth, callus or dry skin.      Left foot:      Skin integrity: No ulcer, skin breakdown, erythema, warmth, callus or dry skin.   Skin:     General: Skin is warm and dry.      Capillary Refill: Capillary refill takes less than 2 seconds.      Findings: No rash.   Neurological:      General: No focal deficit present.      Mental Status: She is alert and oriented to person, place, and time. Mental status is at baseline.      Cranial Nerves: No cranial nerve deficit.      Sensory: No sensory deficit.      Motor: No abnormal muscle tone.      Coordination: Coordination normal.      Deep Tendon Reflexes: Reflexes normal.   Psychiatric:         Mood and Affect: Mood normal.         Behavior: Behavior normal.         Thought Content: Thought content normal.         Judgment: Judgment normal.       Recent Results (from the past 9 weeks)   ECG 12 lead    Collection Time: 10/17/24  9:07 AM   Result Value Ref Range    Ventricular Rate 79 BPM    Atrial Rate 79 BPM    GA Interval 166 ms    QRSD Interval 72 ms    QT Interval 358 ms    QTC Interval 410 ms    P Axis 52 degrees    QRS Axis 1 degrees    T Wave Axis 42 degrees   CBC and differential    Collection Time: 10/17/24  9:34 AM   Result Value Ref Range    WBC 5.82 4.31 - 10.16 Thousand/uL    RBC 3.88 3.81 - 5.12 Million/uL    Hemoglobin 11.3 (L) 11.5 - 15.4 g/dL    Hematocrit 34.9 34.8 - 46.1 %    MCV 90 82 - 98 fL    MCH 29.1 26.8 - 34.3 pg    MCHC 32.4 31.4 - 37.4 g/dL    RDW 13.3 11.6 - 15.1 %    MPV 10.8 8.9 - 12.7 fL    Platelets 230 149 - 390 Thousands/uL    nRBC 0 /100 WBCs    Segmented % 68 43 - 75 %    Immature Grans % 0 0 - 2 %    Lymphocytes % 24 14 - 44  %    Monocytes % 6 4 - 12 %    Eosinophils Relative 1 0 - 6 %    Basophils Relative 1 0 - 1 %    Absolute Neutrophils 3.94 1.85 - 7.62 Thousands/µL    Absolute Immature Grans 0.02 0.00 - 0.20 Thousand/uL    Absolute Lymphocytes 1.38 0.60 - 4.47 Thousands/µL    Absolute Monocytes 0.36 0.17 - 1.22 Thousand/µL    Eosinophils Absolute 0.08 0.00 - 0.61 Thousand/µL    Basophils Absolute 0.04 0.00 - 0.10 Thousands/µL   COVID19, Influenza A/B, RSV PCR, [facility]    Collection Time: 10/17/24  9:34 AM    Specimen: Nose; Nares   Result Value Ref Range    SARS-CoV-2 Negative Negative    INFLUENZA A PCR Negative Negative    INFLUENZA B PCR Negative Negative    RSV PCR Negative Negative   Comprehensive metabolic panel    Collection Time: 10/17/24 10:08 AM   Result Value Ref Range    Sodium 139 135 - 147 mmol/L    Potassium 3.8 3.5 - 5.3 mmol/L    Chloride 105 96 - 108 mmol/L    CO2 27 21 - 32 mmol/L    ANION GAP 7 4 - 13 mmol/L    BUN 21 5 - 25 mg/dL    Creatinine 0.98 0.60 - 1.30 mg/dL    Glucose 121 65 - 140 mg/dL    Calcium 8.9 8.4 - 10.2 mg/dL    AST 11 (L) 13 - 39 U/L    ALT 8 7 - 52 U/L    Alkaline Phosphatase 45 34 - 104 U/L    Total Protein 5.8 (L) 6.4 - 8.4 g/dL    Albumin 3.6 3.5 - 5.0 g/dL    Total Bilirubin 0.68 0.20 - 1.00 mg/dL    eGFR 52 ml/min/1.73sq m   Magnesium    Collection Time: 10/17/24 10:08 AM   Result Value Ref Range    Magnesium 1.8 (L) 1.9 - 2.7 mg/dL   UA w Reflex to Microscopic w Reflex to Culture    Collection Time: 10/17/24 10:59 AM    Specimen: Urine, Clean Catch   Result Value Ref Range    Color, UA Light Yellow     Clarity, UA Clear     Specific Gravity, UA 1.012 1.003 - 1.030    pH, UA 7.0 4.5, 5.0, 5.5, 6.0, 6.5, 7.0, 7.5, 8.0    Leukocytes, UA Negative Negative    Nitrite, UA Negative Negative    Protein, UA Negative Negative mg/dl    Glucose, UA Negative Negative mg/dl    Ketones, UA Negative Negative mg/dl    Urobilinogen, UA <2.0 <2.0 mg/dl mg/dl    Bilirubin, UA Negative Negative    Occult  Blood, UA Negative Negative   Wheeled Walker    Collection Time: 11/13/24  8:02 AM   Result Value Ref Range    Supplier Name [employer]     Supplier Phone Number [phone]     Order Status Delivery Successful     Delivery Note      Delivery Request Date 11/13/2024     Date Delivered  11/14/2024     Item Description Wheeled Walker, Adult    Albumin / creatinine urine ratio    Collection Time: 12/12/24  8:54 AM   Result Value Ref Range    Creatinine, Ur 58.6 Reference range not established. mg/dL    Albumin,U,Random 37.9 (H) <20.0 mg/L    Albumin Creat Ratio 65 (H) 0 - 30 mg/g creatinine   CBC and differential    Collection Time: 12/12/24  8:54 AM   Result Value Ref Range    WBC 4.79 4.31 - 10.16 Thousand/uL    RBC 3.46 (L) 3.81 - 5.12 Million/uL    Hemoglobin 10.1 (L) 11.5 - 15.4 g/dL    Hematocrit 32.0 (L) 34.8 - 46.1 %    MCV 93 82 - 98 fL    MCH 29.2 26.8 - 34.3 pg    MCHC 31.6 31.4 - 37.4 g/dL    RDW 13.9 11.6 - 15.1 %    MPV 10.8 8.9 - 12.7 fL    Platelets 194 149 - 390 Thousands/uL    nRBC 0 /100 WBCs    Segmented % 61 43 - 75 %    Immature Grans % 0 0 - 2 %    Lymphocytes % 28 14 - 44 %    Monocytes % 7 4 - 12 %    Eosinophils Relative 3 0 - 6 %    Basophils Relative 1 0 - 1 %    Absolute Neutrophils 2.98 1.85 - 7.62 Thousands/µL    Absolute Immature Grans 0.02 0.00 - 0.20 Thousand/uL    Absolute Lymphocytes 1.32 0.60 - 4.47 Thousands/µL    Absolute Monocytes 0.32 0.17 - 1.22 Thousand/µL    Eosinophils Absolute 0.12 0.00 - 0.61 Thousand/µL    Basophils Absolute 0.03 0.00 - 0.10 Thousands/µL   Comprehensive metabolic panel    Collection Time: 12/12/24  8:54 AM   Result Value Ref Range    Sodium 139 135 - 147 mmol/L    Potassium 4.1 3.5 - 5.3 mmol/L    Chloride 106 96 - 108 mmol/L    CO2 27 21 - 32 mmol/L    ANION GAP 6 4 - 13 mmol/L    BUN 26 (H) 5 - 25 mg/dL    Creatinine 1.06 0.60 - 1.30 mg/dL    Glucose, Fasting 92 65 - 99 mg/dL    Calcium 9.3 8.4 - 10.2 mg/dL    AST 11 (L) 13 -  39 U/L    ALT 7 7 - 52 U/L    Alkaline Phosphatase 55 34 - 104 U/L    Total Protein 6.2 (L) 6.4 - 8.4 g/dL    Albumin 3.7 3.5 - 5.0 g/dL    Total Bilirubin 0.77 0.20 - 1.00 mg/dL    eGFR 47 ml/min/1.73sq m   Hemoglobin A1C    Collection Time: 12/12/24  8:54 AM   Result Value Ref Range    Hemoglobin A1C 6.2 (H) Normal 4.0-5.6%; PreDiabetic 5.7-6.4%; Diabetic >=6.5%; Glycemic control for adults with diabetes <7.0% %     mg/dl   Lipid panel    Collection Time: 12/12/24  8:54 AM   Result Value Ref Range    Cholesterol 128 See Comment mg/dL    Triglycerides 91 See Comment mg/dL    HDL, Direct 55 >=50 mg/dL    LDL Calculated 55 0 - 100 mg/dL    Non-HDL-Chol (CHOL-HDL) 73 mg/dl   TSH, 3rd generation with Free T4 reflex    Collection Time: 12/12/24  8:54 AM   Result Value Ref Range    TSH 3RD GENERATON 1.083 0.450 - 4.500 uIU/mL   Vitamin B12    Collection Time: 12/12/24  8:54 AM   Result Value Ref Range    Vitamin B-12 495 180 - 914 pg/mL   Folate    Collection Time: 12/12/24  8:54 AM   Result Value Ref Range    Folate 16.1 >5.9 ng/mL   POCT urine dip    Collection Time: 12/17/24 12:09 PM   Result Value Ref Range    LEUKOCYTE ESTERASE,UA 75     NITRITE,UA neg     SL AMB POCT UROBILINOGEN norm     POCT URINE PROTEIN neg      PH,UA 5     BLOOD,UA 50     SPECIFIC GRAVITY,UA 1.020     KETONES,UA neg     BILIRUBIN,UA neg     GLUCOSE, UA 50      COLOR,UA yellow     CLARITY,UA cloudy       Patient's shoes and socks removed.    Right Foot/Ankle   Right Foot Inspection  Skin Exam: skin normal and skin intact. No dry skin, no warmth, no callus, no erythema, no maceration, no abnormal color, no pre-ulcer, no ulcer and no callus.     Toe Exam: ROM and strength within normal limits.     Sensory   Vibration: intact  Proprioception: intact  Monofilament testing: intact    Vascular  Capillary refills: < 3 seconds  The right DP pulse is 2+. The right PT pulse is 2+.     Left Foot/Ankle  Left Foot Inspection  Skin Exam: skin normal  and skin intact. No dry skin, no warmth, no erythema, no maceration, normal color, no pre-ulcer, no ulcer and no callus.     Toe Exam: ROM and strength within normal limits.     Sensory   Vibration: intact  Proprioception: intact  Monofilament testing: intact    Vascular  Capillary refills: < 3 seconds  The left DP pulse is 2+. The left PT pulse is 2+.     Assign Risk Category  No deformity present  No loss of protective sensation  No weak pulses  Risk: 0

## 2024-12-17 NOTE — PATIENT INSTRUCTIONS
Medicare Preventive Visit Patient Instructions  Thank you for completing your Welcome to Medicare Visit or Medicare Annual Wellness Visit today. Your next wellness visit will be due in one year (12/18/2025).  The screening/preventive services that you may require over the next 5-10 years are detailed below. Some tests may not apply to you based off risk factors and/or age. Screening tests ordered at today's visit but not completed yet may show as past due. Also, please note that scanned in results may not display below.  Preventive Screenings:  Service Recommendations Previous Testing/Comments   Colorectal Cancer Screening  * Colonoscopy    * Fecal Occult Blood Test (FOBT)/Fecal Immunochemical Test (FIT)  * Fecal DNA/Cologuard Test  * Flexible Sigmoidoscopy Age: 45-75 years old   Colonoscopy: every 10 years (may be performed more frequently if at higher risk)  OR  FOBT/FIT: every 1 year  OR  Cologuard: every 3 years  OR  Sigmoidoscopy: every 5 years  Screening may be recommended earlier than age 45 if at higher risk for colorectal cancer. Also, an individualized decision between you and your healthcare provider will decide whether screening between the ages of 76-85 would be appropriate. Colonoscopy: 03/27/2017  FOBT/FIT: Not on file  Cologuard: Not on file  Sigmoidoscopy: Not on file    Screening Not Indicated     Breast Cancer Screening Age: 40+ years old  Frequency: every 1-2 years  Not required if history of left and right mastectomy Mammogram: 04/06/2018    Screening Current   Cervical Cancer Screening Between the ages of 21-29, pap smear recommended once every 3 years.   Between the ages of 30-65, can perform pap smear with HPV co-testing every 5 years.   Recommendations may differ for women with a history of total hysterectomy, cervical cancer, or abnormal pap smears in past. Pap Smear: Not on file    Screening Not Indicated   Hepatitis C Screening Once for adults born between 1945 and 1965  More frequently  in patients at high risk for Hepatitis C Hep C Antibody: Not on file    Screening Not Indicated   Diabetes Screening 1-2 times per year if you're at risk for diabetes or have pre-diabetes Fasting glucose: 92 mg/dL (12/12/2024)  A1C: 6.2 % (12/12/2024)  Screening Not Indicated  History Diabetes   Cholesterol Screening Once every 5 years if you don't have a lipid disorder. May order more often based on risk factors. Lipid panel: 12/12/2024    Screening Not Indicated  History Lipid Disorder     Other Preventive Screenings Covered by Medicare:  Abdominal Aortic Aneurysm (AAA) Screening: covered once if your at risk. You're considered to be at risk if you have a family history of AAA.  Lung Cancer Screening: covers low dose CT scan once per year if you meet all of the following conditions: (1) Age 55-77; (2) No signs or symptoms of lung cancer; (3) Current smoker or have quit smoking within the last 15 years; (4) You have a tobacco smoking history of at least 20 pack years (packs per day multiplied by number of years you smoked); (5) You get a written order from a healthcare provider.  Glaucoma Screening: covered annually if you're considered high risk: (1) You have diabetes OR (2) Family history of glaucoma OR (3)  aged 50 and older OR (4)  American aged 65 and older  Osteoporosis Screening: covered every 2 years if you meet one of the following conditions: (1) You're estrogen deficient and at risk for osteoporosis based off medical history and other findings; (2) Have a vertebral abnormality; (3) On glucocorticoid therapy for more than 3 months; (4) Have primary hyperparathyroidism; (5) On osteoporosis medications and need to assess response to drug therapy.   Last bone density test (DXA Scan): Not on file.  HIV Screening: covered annually if you're between the age of 15-65. Also covered annually if you are younger than 15 and older than 65 with risk factors for HIV infection. For pregnant  patients, it is covered up to 3 times per pregnancy.    Immunizations:  Immunization Recommendations   Influenza Vaccine Annual influenza vaccination during flu season is recommended for all persons aged >= 6 months who do not have contraindications   Pneumococcal Vaccine   * Pneumococcal conjugate vaccine = PCV13 (Prevnar 13), PCV15 (Vaxneuvance), PCV20 (Prevnar 20)  * Pneumococcal polysaccharide vaccine = PPSV23 (Pneumovax) Adults 19-65 yo with certain risk factors or if 65+ yo  If never received any pneumonia vaccine: recommend Prevnar 20 (PCV20)  Give PCV20 if previously received 1 dose of PCV13 or PPSV23   Hepatitis B Vaccine 3 dose series if at intermediate or high risk (ex: diabetes, end stage renal disease, liver disease)   Respiratory syncytial virus (RSV) Vaccine - COVERED BY MEDICARE PART D  * RSVPreF3 (Arexvy) CDC recommends that adults 60 years of age and older may receive a single dose of RSV vaccine using shared clinical decision-making (SCDM)   Tetanus (Td) Vaccine - COST NOT COVERED BY MEDICARE PART B Following completion of primary series, a booster dose should be given every 10 years to maintain immunity against tetanus. Td may also be given as tetanus wound prophylaxis.   Tdap Vaccine - COST NOT COVERED BY MEDICARE PART B Recommended at least once for all adults. For pregnant patients, recommended with each pregnancy.   Shingles Vaccine (Shingrix) - COST NOT COVERED BY MEDICARE PART B  2 shot series recommended in those 19 years and older who have or will have weakened immune systems or those 50 years and older     Health Maintenance Due:      Topic Date Due   • Colorectal Cancer Screening  12/17/2085 (Originally 11/15/1985)     Immunizations Due:      Topic Date Due   • COVID-19 Vaccine (4 - 2024-25 season) 09/01/2024     Advance Directives   What are advance directives?  Advance directives are legal documents that state your wishes and plans for medical care. These plans are made ahead of time  in case you lose your ability to make decisions for yourself. Advance directives can apply to any medical decision, such as the treatments you want, and if you want to donate organs.   What are the types of advance directives?  There are many types of advance directives, and each state has rules about how to use them. You may choose a combination of any of the following:  Living will:  This is a written record of the treatment you want. You can also choose which treatments you do not want, which to limit, and which to stop at a certain time. This includes surgery, medicine, IV fluid, and tube feedings.   Durable power of  for healthcare (DPAHC):  This is a written record that states who you want to make healthcare choices for you when you are unable to make them for yourself. This person, called a proxy, is usually a family member or a friend. You may choose more than 1 proxy.  Do not resuscitate (DNR) order:  A DNR order is used in case your heart stops beating or you stop breathing. It is a request not to have certain forms of treatment, such as CPR. A DNR order may be included in other types of advance directives.  Medical directive:  This covers the care that you want if you are in a coma, near death, or unable to make decisions for yourself. You can list the treatments you want for each condition. Treatment may include pain medicine, surgery, blood transfusions, dialysis, IV or tube feedings, and a ventilator (breathing machine).  Values history:  This document has questions about your views, beliefs, and how you feel and think about life. This information can help others choose the care that you would choose.  Why are advance directives important?  An advance directive helps you control your care. Although spoken wishes may be used, it is better to have your wishes written down. Spoken wishes can be misunderstood, or not followed. Treatments may be given even if you do not want them. An advance  directive may make it easier for your family to make difficult choices about your care.   Weight Management   Why it is important to manage your weight:  Being overweight increases your risk of health conditions such as heart disease, high blood pressure, type 2 diabetes, and certain types of cancer. It can also increase your risk for osteoarthritis, sleep apnea, and other respiratory problems. Aim for a slow, steady weight loss. Even a small amount of weight loss can lower your risk of health problems.  How to lose weight safely:  A safe and healthy way to lose weight is to eat fewer calories and get regular exercise. You can lose up about 1 pound a week by decreasing the number of calories you eat by 500 calories each day.   Healthy meal plan for weight management:  A healthy meal plan includes a variety of foods, contains fewer calories, and helps you stay healthy. A healthy meal plan includes the following:  Eat whole-grain foods more often.  A healthy meal plan should contain fiber. Fiber is the part of grains, fruits, and vegetables that is not broken down by your body. Whole-grain foods are healthy and provide extra fiber in your diet. Some examples of whole-grain foods are whole-wheat breads and pastas, oatmeal, brown rice, and bulgur.  Eat a variety of vegetables every day.  Include dark, leafy greens such as spinach, kale, patrick greens, and mustard greens. Eat yellow and orange vegetables such as carrots, sweet potatoes, and winter squash.   Eat a variety of fruits every day.  Choose fresh or canned fruit (canned in its own juice or light syrup) instead of juice. Fruit juice has very little or no fiber.  Eat low-fat dairy foods.  Drink fat-free (skim) milk or 1% milk. Eat fat-free yogurt and low-fat cottage cheese. Try low-fat cheeses such as mozzarella and other reduced-fat cheeses.  Choose meat and other protein foods that are low in fat.  Choose beans or other legumes such as split peas or lentils.  Choose fish, skinless poultry (chicken or turkey), or lean cuts of red meat (beef or pork). Before you cook meat or poultry, cut off any visible fat.   Use less fat and oil.  Try baking foods instead of frying them. Add less fat, such as margarine, sour cream, regular salad dressing and mayonnaise to foods. Eat fewer high-fat foods. Some examples of high-fat foods include french fries, doughnuts, ice cream, and cakes.  Eat fewer sweets.  Limit foods and drinks that are high in sugar. This includes candy, cookies, regular soda, and sweetened drinks.  Exercise:  Exercise at least 30 minutes per day on most days of the week. Some examples of exercise include walking, biking, dancing, and swimming. You can also fit in more physical activity by taking the stairs instead of the elevator or parking farther away from stores. Ask your healthcare provider about the best exercise plan for you.      © Copyright [employer] 2018 Information is for End User's use only and may not be sold, redistributed or otherwise used for commercial purposes. All illustrations and images included in CareNotes® are the copyrighted property of [employer] or [employer]

## 2024-12-17 NOTE — ASSESSMENT & PLAN NOTE
Remains on atorvastatin 10 mg daily.  Cholesterol remains very well-controlled with an LDL less than 70.  LDL at 55.  Repeat lipid panel in 4 months

## 2024-12-17 NOTE — ASSESSMENT & PLAN NOTE
Resources are in place.  She does see geriatric medicine.  She is living with her daughter and son-in-law

## 2024-12-17 NOTE — ASSESSMENT & PLAN NOTE
Lab Results   Component Value Date    HGBA1C 6.2 (H) 12/12/2024     Only taking metformin 500 mg once a day.  A1c is controlled.  Goal A1c less than 7%.  Repeat diabetic parameters to be done in 4 months.

## 2024-12-17 NOTE — ASSESSMENT & PLAN NOTE
Lab Results   Component Value Date    EGFR 47 12/12/2024    EGFR 52 10/17/2024    EGFR 49 10/05/2024    CREATININE 1.06 12/12/2024    CREATININE 0.98 10/17/2024    CREATININE 1.04 10/05/2024     Lab Results   Component Value Date    EGFR 47 12/12/2024    EGFR 52 10/17/2024    EGFR 49 10/05/2024    CREATININE 1.06 12/12/2024    CREATININE 0.98 10/17/2024    CREATININE 1.04 10/05/2024     Try to avoid NSAIDs and dehydration.  This is diagnosis based upon age.  Her creatinine is actually normal and has always been normal

## 2024-12-17 NOTE — ASSESSMENT & PLAN NOTE
Abnormal urinalysis with leukocyte esterase and microscopic blood    -Urine will be sent for culture and sensitivity    -Patient will be placed on course of Keflex 500 mg twice daily x 7 days.  Will make adjustments based upon culture and sensitivity

## 2024-12-20 ENCOUNTER — TELEMEDICINE (OUTPATIENT)
Dept: PSYCHIATRY | Facility: CLINIC | Age: 84
End: 2024-12-20
Payer: MEDICARE

## 2024-12-20 DIAGNOSIS — F33.41 MAJOR DEPRESSIVE DISORDER, RECURRENT EPISODE, IN PARTIAL REMISSION (HCC): Primary | ICD-10-CM

## 2024-12-20 DIAGNOSIS — F45.21 ILLNESS ANXIETY DISORDER: ICD-10-CM

## 2024-12-20 PROCEDURE — 90833 PSYTX W PT W E/M 30 MIN: CPT

## 2024-12-20 PROCEDURE — 99213 OFFICE O/P EST LOW 20 MIN: CPT

## 2024-12-20 RX ORDER — RISPERIDONE 0.5 MG/1
0.5 TABLET ORAL
Qty: 30 TABLET | Refills: 1 | Status: SHIPPED | OUTPATIENT
Start: 2024-12-20

## 2024-12-20 RX ORDER — LORAZEPAM 0.5 MG/1
0.5 TABLET ORAL 3 TIMES DAILY
Qty: 90 TABLET | Refills: 0 | Status: SHIPPED | OUTPATIENT
Start: 2024-12-20

## 2024-12-20 RX ORDER — ESCITALOPRAM OXALATE 10 MG/1
10 TABLET ORAL DAILY
Qty: 30 TABLET | Refills: 1 | Status: SHIPPED | OUTPATIENT
Start: 2024-12-20

## 2024-12-20 NOTE — ASSESSMENT & PLAN NOTE
Orders:    escitalopram (Lexapro) 10 mg tablet; Take 1 tablet (10 mg total) by mouth daily    LORazepam (Ativan) 0.5 mg tablet; Take 1 tablet (0.5 mg total) by mouth 3 (three) times a day    risperiDONE (RisperDAL) 0.5 mg tablet; Take 1 tablet (0.5 mg total) by mouth daily at bedtime

## 2024-12-20 NOTE — PSYCH
Virtual Regular Visit    Verification of patient location:    Patient is located at Home in the following state in which I hold an active license [place]      Assessment/Plan:    Problem List Items Addressed This Visit       Illness anxiety disorder      Orders:    escitalopram (Lexapro) 10 mg tablet; Take 1 tablet (10 mg total) by mouth daily    LORazepam (Ativan) 0.5 mg tablet; Take 1 tablet (0.5 mg total) by mouth 3 (three) times a day    risperiDONE (RisperDAL) 0.5 mg tablet; Take 1 tablet (0.5 mg total) by mouth daily at bedtime           Relevant Medications    escitalopram (Lexapro) 10 mg tablet    LORazepam (Ativan) 0.5 mg tablet    risperiDONE (RisperDAL) 0.5 mg tablet    Major depressive disorder, recurrent episode, in partial remission (HCC) - Primary      The patient and her daughter report continued relief from previous symptoms with overall improvement. Reviewed family medicine's note which noted that patient's renal function is normal. Given stability no changes are indicated.     Continue escitalopram 10 mg QD for antidepressant maintenance  Continue lorazepam 0.5 mg TID for anxiety and obsessive thinking  Continue risperidone 0.5 mg QHS for antidepressant augmentation for obsessive thinking  Continue psychotherapy    Orders:    escitalopram (Lexapro) 10 mg tablet; Take 1 tablet (10 mg total) by mouth daily    risperiDONE (RisperDAL) 0.5 mg tablet; Take 1 tablet (0.5 mg total) by mouth daily at bedtime           Relevant Medications    escitalopram (Lexapro) 10 mg tablet    LORazepam (Ativan) 0.5 mg tablet    risperiDONE (RisperDAL) 0.5 mg tablet           Reason for visit is   Chief Complaint   Patient presents with    Virtual Regular Visit          Encounter provider López Gutierrez MD      Recent Visits  Date Type Provider Dept   12/17/24 Office Visit Ramin De Paz DO [facility]   Showing recent visits within past 7 days and meeting all other requirements  Today's Visits  Date Type Provider Dept    12/20/24 Telemedicine [facility] Ralph Gutierrez MD [facility]   Showing today's visits and meeting all other requirements  Future Appointments  No visits were found meeting these conditions.  Showing future appointments within next 150 days and meeting all other requirements       The patient was identified by name and date of birth. Sandra Peña was informed that this is a telemedicine visit and that the visit is being conducted throughthe Epic Embedded platform. She agrees to proceed..  My office door was closed. No one else was in the room.  She acknowledged consent and understanding of privacy and security of the video platform. The patient has agreed to participate and understands they can discontinue the visit at any time.    Patient is aware this is a billable service.         MEDICATION MANAGEMENT NOTE        [facility]      Name and Date of Birth:  Sandra Peña 84 y.o. 1940 MRN: [id number]    Date of Visit: December 20, 2024    Reason for Visit: Follow-up visit regarding medication management     _____________________________    Assessment & Plan  Major depressive disorder, recurrent episode, in partial remission (HCC)    The patient and her daughter report continued relief from previous symptoms with overall improvement. Reviewed family medicine's note which noted that patient's renal function is normal. Given stability no changes are indicated.     Continue escitalopram 10 mg QD for antidepressant maintenance  Continue lorazepam 0.5 mg TID for anxiety and obsessive thinking  Continue risperidone 0.5 mg QHS for antidepressant augmentation for obsessive thinking  Continue psychotherapy    Orders:    escitalopram (Lexapro) 10 mg tablet; Take 1 tablet (10 mg total) by mouth daily    risperiDONE (RisperDAL) 0.5 mg tablet; Take 1 tablet (0.5 mg total) by mouth daily at bedtime    Illness anxiety disorder    Orders:    escitalopram  "(Lexapro) 10 mg tablet; Take 1 tablet (10 mg total) by mouth daily    LORazepam (Ativan) 0.5 mg tablet; Take 1 tablet (0.5 mg total) by mouth 3 (three) times a day    risperiDONE (RisperDAL) 0.5 mg tablet; Take 1 tablet (0.5 mg total) by mouth daily at bedtime             Treatment Recommendations/Precautions:  Risks/benefits/alternatives to treatment discussed, including a myriad of potential adverse medication side effects, to which Sandra voiced understanding and consented fully to treatment.   Labs most recently obtained , reviewed.   Follow up in 4 weeks for medication management  Follow up with PCP for medical issues and ongoing care  Aware of 24 hour and weekend coverage for urgent situations accessed by calling [facility] main practice number    Individual psychotherapy provided: Counseling was provided during the session today for 20 minutes.     Treatment Plan:     Completed and signed during the session: Not applicable - Treatment Plan not due at this session    _____________________________________________    History of Present Illness     Chief Complaint: \"I feel negative\"    SUBJECTIVE:    Patient reports feeling \"half and half.\" Overall reports feeling improved. Occasional crying episodes, anxious at times, she will go upstairs for time to herself. At times will have argument with her daughter however reports she takes good care of her. She reports improved food intake though still reports dry mouth. Crying episodes are often induced by thoughts or memories but also described as just \"coming up.\" Currently treated for UTI.        Psychiatric Review Of Systems:  Unchanged information from this writer's previous assessment is copied and italicized; information that has changed is bolded.    Appetite: no  Adverse eating: no  Weight changes: no  Insomnia/sleeplessness: no  Fatigue/anergy: no  Anhedonia/lack of interest: no  Attention/concentration: no  Psychomotor " agitation/retardation: no  Somatic symptoms: yes  Anxiety/panic attack: yes  Zaira/hypomania: no  Hopelessness/helplessness/worthlessness: no  Self-injurious behavior/high-risk behavior: no  Suicidal ideation: no  Homicidal ideation: no  Auditory hallucinations: no  Visual hallucinations: no  Delusional thinking: no  Obsessive/compulsive symptoms: no    Review Of Systems:      Constitutional negative   ENT negative   Cardiovascular negative   Respiratory negative   Gastrointestinal negative   Genitourinary as noted in HPI   Musculoskeletal negative   Integumentary negative   Neurological negative   Endocrine negative   Other Symptoms none, all other systems are negative     Objective    OBJECTIVE:     Visit Vitals  OB Status Postmenopausal   Smoking Status Never      Wt Readings from Last 6 Encounters:   24 53.1 kg (117 lb)   24 49.9 kg (110 lb)   10/17/24 49.9 kg (110 lb 0.2 oz)   10/10/24 49.4 kg (109 lb)   10/04/24 48.7 kg (107 lb 5.8 oz)   24 49.4 kg (109 lb)        Past Medical History:   Diagnosis Date    Anxiety     Colon polyps     Depression     Diabetes mellitus (HCC)     Dizziness     Last assessed: 8/31/15    DVT (deep venous thrombosis) (HCC)     Dysuria     Hematuria 2022    Hyperlipidemia     Hypertension     Hypertensive urgency 10/22/2021    Insomnia     Panic attack     Ureterolithiasis 2020      Past Surgical History:   Procedure Laterality Date     SECTION      1964 son, 1968 daughter    COLONOSCOPY      FL RETROGRADE PYELOGRAM  2020    CT COLONOSCOPY FLX DX W/COLLJ SPEC WHEN PFRMD N/A 3/27/2017    Procedure: COLONOSCOPY;  Surgeon: Gold Francis MD;  Location: [facility];  Service: Gastroenterology    CT CYSTO/URETERO W/LITHOTRIPSY &INDWELL STENT INSRT Right 2020    Procedure: CYSTOSCOPY URETEROSCOPY WITH LITHOTRIPSY HOLMIUM LASER, RETROGRADE PYELOGRAM AND INSERTION STENT URETERAL; EXCISION OF BLADDER TUMOR;  Surgeon: Edwin Love MD;  Location:  "[facility];  Service: Urology    ROTATOR CUFF REPAIR Left     Last assessed: 3/29/15    TONSILLECTOMY         Meds/Allergies    No Known Allergies  Current Outpatient Medications   Medication Instructions    aspirin 81 mg, Daily    atorvastatin (LIPITOR) 10 mg, Oral, Daily    cephalexin (KEFLEX) 500 mg, Oral, Every 12 hours scheduled    Cholecalciferol (VITAMIN D3) 2,000 Units, Oral, Daily    escitalopram (LEXAPRO) 10 mg, Oral, Daily    linaCLOtide 145 mcg, Oral, Daily    lisinopril-hydrochlorothiazide (PRINZIDE,ZESTORETIC) 10-12.5 MG per tablet 1 tablet, Oral, Daily    LORazepam (ATIVAN) 0.5 mg, Oral, 3 times daily    metFORMIN (GLUCOPHAGE) 500 mg, 2 times daily with meals    polyethylene glycol (MIRALAX) 17 g, Oral, Daily    psyllium (METAMUCIL) packet 1 packet, Oral, Daily    risperiDONE (RISPERDAL) 0.5 mg, Oral, Daily at bedtime    senna-docusate sodium (SENOKOT S) 8.6-50 mg per tablet 1 tablet, Oral, Daily PRN           Mental Status Exam:    Appearance Short grey hair, pink sweater, good eye contact   Behavior/motor Calm and cooperative   Speech/language Normal rate and volume   Mood \"Half and half\"   Affect Full range, appropriate, mostly euthymic, tearful at times   Thought process Logical and linear, ruminating   Thought content No overt delusions, Denies hallucinations, Denies suicidal ideations   Cognition Awake and alert and concentrates on questions  Some memory difficulty   Insight/judgment Insight: fair  Judgment: fair     Laboratory Results: I have personally reviewed all pertinent laboratory/tests results    Office Visit on 12/17/2024   Component Date Value Ref Range Status    LEUKOCYTE ESTERASE,UA 12/17/2024 75   Final    NITRITE,UA 12/17/2024 neg   Final    SL AMB POCT UROBILINOGEN 12/17/2024 norm   Final    POCT URINE PROTEIN 12/17/2024 neg   Final     PH,UA 12/17/2024 5   Final    BLOOD,UA 12/17/2024 50   Final    SPECIFIC GRAVITY,UA 12/17/2024 1.020   Final    KETONES,UA 12/17/2024 neg   Final "    BILIRUBIN,UA 12/17/2024 neg   Final    GLUCOSE, UA 12/17/2024 50   Final     COLOR,UA 12/17/2024 yellow   Final    CLARITY,UA 12/17/2024 cloudy   Final   Appointment on 12/12/2024   Component Date Value Ref Range Status    Creatinine, Ur 12/12/2024 58.6  Reference range not established. mg/dL Final    Albumin,U,Random 12/12/2024 37.9 (H)  <20.0 mg/L Final    Albumin Creat Ratio 12/12/2024 65 (H)  0 - 30 mg/g creatinine Final    WBC 12/12/2024 4.79  4.31 - 10.16 Thousand/uL Final    RBC 12/12/2024 3.46 (L)  3.81 - 5.12 Million/uL Final    Hemoglobin 12/12/2024 10.1 (L)  11.5 - 15.4 g/dL Final    Hematocrit 12/12/2024 32.0 (L)  34.8 - 46.1 % Final    MCV 12/12/2024 93  82 - 98 fL Final    MCH 12/12/2024 29.2  26.8 - 34.3 pg Final    MCHC 12/12/2024 31.6  31.4 - 37.4 g/dL Final    RDW 12/12/2024 13.9  11.6 - 15.1 % Final    MPV 12/12/2024 10.8  8.9 - 12.7 fL Final    Platelets 12/12/2024 194  149 - 390 Thousands/uL Final    nRBC 12/12/2024 0  /100 WBCs Final    Segmented % 12/12/2024 61  43 - 75 % Final    Immature Grans % 12/12/2024 0  0 - 2 % Final    Lymphocytes % 12/12/2024 28  14 - 44 % Final    Monocytes % 12/12/2024 7  4 - 12 % Final    Eosinophils Relative 12/12/2024 3  0 - 6 % Final    Basophils Relative 12/12/2024 1  0 - 1 % Final    Absolute Neutrophils 12/12/2024 2.98  1.85 - 7.62 Thousands/µL Final    Absolute Immature Grans 12/12/2024 0.02  0.00 - 0.20 Thousand/uL Final    Absolute Lymphocytes 12/12/2024 1.32  0.60 - 4.47 Thousands/µL Final    Absolute Monocytes 12/12/2024 0.32  0.17 - 1.22 Thousand/µL Final    Eosinophils Absolute 12/12/2024 0.12  0.00 - 0.61 Thousand/µL Final    Basophils Absolute 12/12/2024 0.03  0.00 - 0.10 Thousands/µL Final    Sodium 12/12/2024 139  135 - 147 mmol/L Final    Potassium 12/12/2024 4.1  3.5 - 5.3 mmol/L Final    Chloride 12/12/2024 106  96 - 108 mmol/L Final    CO2 12/12/2024 27  21 - 32 mmol/L Final    ANION GAP 12/12/2024 6  4 - 13 mmol/L Final    BUN 12/12/2024  26 (H)  5 - 25 mg/dL Final    Creatinine 12/12/2024 1.06  0.60 - 1.30 mg/dL Final    Standardized to IDMS reference method    Glucose, Fasting 12/12/2024 92  65 - 99 mg/dL Final    Calcium 12/12/2024 9.3  8.4 - 10.2 mg/dL Final    AST 12/12/2024 11 (L)  13 - 39 U/L Final    ALT 12/12/2024 7  7 - 52 U/L Final    Specimen collection should occur prior to Sulfasalazine administration due to the potential for falsely depressed results.     Alkaline Phosphatase 12/12/2024 55  34 - 104 U/L Final    Total Protein 12/12/2024 6.2 (L)  6.4 - 8.4 g/dL Final    Albumin 12/12/2024 3.7  3.5 - 5.0 g/dL Final    Total Bilirubin 12/12/2024 0.77  0.20 - 1.00 mg/dL Final    Use of this assay is not recommended for patients undergoing treatment with eltrombopag due to the potential for falsely elevated results.  N-acetyl-p-benzoquinone imine (metabolite of Acetaminophen) will generate erroneously low results in samples for patients that have taken an overdose of Acetaminophen.    eGFR 12/12/2024 47  ml/min/1.73sq m Final    Hemoglobin A1C 12/12/2024 6.2 (H)  Normal 4.0-5.6%; PreDiabetic 5.7-6.4%; Diabetic >=6.5%; Glycemic control for adults with diabetes <7.0% % Final    EAG 12/12/2024 131  mg/dl Final    Cholesterol 12/12/2024 128  See Comment mg/dL Final    Cholesterol:         Pediatric <18 Years        Desirable          <170 mg/dL      Borderline High    170-199 mg/dL      High               >=200 mg/dL        Adult >=18 Years            Desirable         <200 mg/dL      Borderline High   200-239 mg/dL      High              >239 mg/dL      Triglycerides 12/12/2024 91  See Comment mg/dL Final    Triglyceride:     0-9Y            <75mg/dL     10Y-17Y         <90 mg/dL       >=18Y     Normal          <150 mg/dL     Borderline High 150-199 mg/dL     High            200-499 mg/dL        Very High       >499 mg/dL    Specimen collection should occur prior to Metamizole administration due to the potential for falsely depressed results.     HDL, Direct 12/12/2024 55  >=50 mg/dL Final    LDL Calculated 12/12/2024 55  0 - 100 mg/dL Final    LDL Cholesterol:     Optimal           <100 mg/dl     Near Optimal      100-129 mg/dl     Above Optimal       Borderline High 130-159 mg/dl       High            160-189 mg/dl       Very High       >189 mg/dl         This screening LDL is a calculated result.   It does not have the accuracy of the Direct Measured LDL in the monitoring of patients with hyperlipidemia and/or statin therapy.   Direct Measure LDL (BJS607) must be ordered separately in these patients.    Non-HDL-Chol (CHOL-HDL) 12/12/2024 73  mg/dl Final    TSH 3RD GENERATON 12/12/2024 1.083  0.450 - 4.500 uIU/mL Final    The recommended reference ranges for TSH during pregnancy are as follows:   First trimester 0.100 to 2.500 uIU/mL   Second trimester  0.200 to 3.000 uIU/mL   Third trimester 0.300 to 3.000 uIU/m    Note: Normal ranges may not apply to patients who are transgender, non-binary, or whose legal sex, sex at birth, and gender identity differ.  Adult TSH (3rd generation) reference range follows the recommended guidelines of the American Thyroid Association, January, 2020.    Vitamin B-12 12/12/2024 495  180 - 914 pg/mL Final    Folate 12/12/2024 16.1  >5.9 ng/mL Final    The World Health Organization has determined deficient folate concentrations are considered to be <4.0 ng/mL.   Orders Only on 11/13/2024   Component Date Value Ref Range Status    Supplier Name 11/13/2024 [employer]   Final-Edited    Supplier Phone Number 11/13/2024 [phone]   Final-Edited    Order Status 11/13/2024 Delivery Successful   Final-Edited    Delivery Request Date 11/13/2024 11/13/2024   Final-Edited    Date Delivered  11/13/2024 11/14/2024   Final-Edited    Item Description 11/13/2024 Evette Delacruz, Adult   Final-Edited    Qty: 1   Admission on 10/17/2024, Discharged on 10/17/2024   Component Date Value Ref Range Status    Sodium  10/17/2024 139  135 - 147 mmol/L Final    Potassium 10/17/2024 3.8  3.5 - 5.3 mmol/L Final    Chloride 10/17/2024 105  96 - 108 mmol/L Final    CO2 10/17/2024 27  21 - 32 mmol/L Final    ANION GAP 10/17/2024 7  4 - 13 mmol/L Final    BUN 10/17/2024 21  5 - 25 mg/dL Final    Creatinine 10/17/2024 0.98  0.60 - 1.30 mg/dL Final    Standardized to IDMS reference method    Glucose 10/17/2024 121  65 - 140 mg/dL Final    If the patient is fasting, the ADA then defines impaired fasting glucose as > 100 mg/dL and diabetes as > or equal to 123 mg/dL.    Calcium 10/17/2024 8.9  8.4 - 10.2 mg/dL Final    AST 10/17/2024 11 (L)  13 - 39 U/L Final    ALT 10/17/2024 8  7 - 52 U/L Final    Specimen collection should occur prior to Sulfasalazine administration due to the potential for falsely depressed results.     Alkaline Phosphatase 10/17/2024 45  34 - 104 U/L Final    Total Protein 10/17/2024 5.8 (L)  6.4 - 8.4 g/dL Final    Albumin 10/17/2024 3.6  3.5 - 5.0 g/dL Final    Total Bilirubin 10/17/2024 0.68  0.20 - 1.00 mg/dL Final    Use of this assay is not recommended for patients undergoing treatment with eltrombopag due to the potential for falsely elevated results.  N-acetyl-p-benzoquinone imine (metabolite of Acetaminophen) will generate erroneously low results in samples for patients that have taken an overdose of Acetaminophen.    eGFR 10/17/2024 52  ml/min/1.73sq m Final    WBC 10/17/2024 5.82  4.31 - 10.16 Thousand/uL Final    RBC 10/17/2024 3.88  3.81 - 5.12 Million/uL Final    Hemoglobin 10/17/2024 11.3 (L)  11.5 - 15.4 g/dL Final    Hematocrit 10/17/2024 34.9  34.8 - 46.1 % Final    MCV 10/17/2024 90  82 - 98 fL Final    MCH 10/17/2024 29.1  26.8 - 34.3 pg Final    MCHC 10/17/2024 32.4  31.4 - 37.4 g/dL Final    RDW 10/17/2024 13.3  11.6 - 15.1 % Final    MPV 10/17/2024 10.8  8.9 - 12.7 fL Final    Platelets 10/17/2024 230  149 - 390 Thousands/uL Final    nRBC 10/17/2024 0  /100 WBCs Final    Segmented % 10/17/2024 68   43 - 75 % Final    Immature Grans % 10/17/2024 0  0 - 2 % Final    Lymphocytes % 10/17/2024 24  14 - 44 % Final    Monocytes % 10/17/2024 6  4 - 12 % Final    Eosinophils Relative 10/17/2024 1  0 - 6 % Final    Basophils Relative 10/17/2024 1  0 - 1 % Final    Absolute Neutrophils 10/17/2024 3.94  1.85 - 7.62 Thousands/µL Final    Absolute Immature Grans 10/17/2024 0.02  0.00 - 0.20 Thousand/uL Final    Absolute Lymphocytes 10/17/2024 1.38  0.60 - 4.47 Thousands/µL Final    Absolute Monocytes 10/17/2024 0.36  0.17 - 1.22 Thousand/µL Final    Eosinophils Absolute 10/17/2024 0.08  0.00 - 0.61 Thousand/µL Final    Basophils Absolute 10/17/2024 0.04  0.00 - 0.10 Thousands/µL Final    Color, UA 10/17/2024 Light Yellow   Final    Clarity, UA 10/17/2024 Clear   Final    Specific Gravity, UA 10/17/2024 1.012  1.003 - 1.030 Final    pH, UA 10/17/2024 7.0  4.5, 5.0, 5.5, 6.0, 6.5, 7.0, 7.5, 8.0 Final    Leukocytes, UA 10/17/2024 Negative  Negative Final    Nitrite, UA 10/17/2024 Negative  Negative Final    Protein, UA 10/17/2024 Negative  Negative mg/dl Final    Glucose, UA 10/17/2024 Negative  Negative mg/dl Final    Ketones, UA 10/17/2024 Negative  Negative mg/dl Final    Urobilinogen, UA 10/17/2024 <2.0  <2.0 mg/dl mg/dl Final    Bilirubin, UA 10/17/2024 Negative  Negative Final    Occult Blood, UA 10/17/2024 Negative  Negative Final    Magnesium 10/17/2024 1.8 (L)  1.9 - 2.7 mg/dL Final    SARS-CoV-2 10/17/2024 Negative  Negative Final         INFLUENZA A PCR 10/17/2024 Negative  Negative Final         INFLUENZA B PCR 10/17/2024 Negative  Negative Final         RSV PCR 10/17/2024 Negative  Negative Final         Ventricular Rate 10/17/2024 79  BPM Final    Atrial Rate 10/17/2024 79  BPM Final    KS Interval 10/17/2024 166  ms Final    QRSD Interval 10/17/2024 72  ms Final    QT Interval 10/17/2024 358  ms Final    QTC Interval 10/17/2024 410  ms Final    P Axis 10/17/2024 52  degrees Final    QRS Axis 10/17/2024 1   degrees Final    T Wave [place] 10/17/2024 42  degrees Final   Admission on 10/03/2024, Discharged on 10/05/2024   Component Date Value Ref Range Status    WBC 10/04/2024 5.78  4.31 - 10.16 Thousand/uL Final    RBC 10/04/2024 3.90  3.81 - 5.12 Million/uL Final    Hemoglobin 10/04/2024 11.3 (L)  11.5 - 15.4 g/dL Final    Hematocrit 10/04/2024 35.2  34.8 - 46.1 % Final    MCV 10/04/2024 90  82 - 98 fL Final    MCH 10/04/2024 29.0  26.8 - 34.3 pg Final    MCHC 10/04/2024 32.1  31.4 - 37.4 g/dL Final    RDW 10/04/2024 12.9  11.6 - 15.1 % Final    MPV 10/04/2024 10.9  8.9 - 12.7 fL Final    Platelets 10/04/2024 256  149 - 390 Thousands/uL Final    nRBC 10/04/2024 0  /100 WBCs Final    Segmented % 10/04/2024 56  43 - 75 % Final    Immature Grans % 10/04/2024 0  0 - 2 % Final    Lymphocytes % 10/04/2024 32  14 - 44 % Final    Monocytes % 10/04/2024 8  4 - 12 % Final    Eosinophils Relative 10/04/2024 3  0 - 6 % Final    Basophils Relative 10/04/2024 1  0 - 1 % Final    Absolute Neutrophils 10/04/2024 3.21  1.85 - 7.62 Thousands/µL Final    Absolute Immature Grans 10/04/2024 0.02  0.00 - 0.20 Thousand/uL Final    Absolute Lymphocytes 10/04/2024 1.85  0.60 - 4.47 Thousands/µL Final    Absolute Monocytes 10/04/2024 0.48  0.17 - 1.22 Thousand/µL Final    Eosinophils Absolute 10/04/2024 0.18  0.00 - 0.61 Thousand/µL Final    Basophils Absolute 10/04/2024 0.04  0.00 - 0.10 Thousands/µL Final    Sodium 10/04/2024 137  135 - 147 mmol/L Final    Potassium 10/04/2024 4.4  3.5 - 5.3 mmol/L Final    Chloride 10/04/2024 102  96 - 108 mmol/L Final    CO2 10/04/2024 28  21 - 32 mmol/L Final    ANION GAP 10/04/2024 7  4 - 13 mmol/L Final    BUN 10/04/2024 34 (H)  5 - 25 mg/dL Final    Creatinine 10/04/2024 1.15  0.60 - 1.30 mg/dL Final    Standardized to IDMS reference method    Glucose 10/04/2024 153 (H)  65 - 140 mg/dL Final    If the patient is fasting, the ADA then defines impaired fasting glucose as > 100 mg/dL and diabetes as > or  equal to 123 mg/dL.    Calcium 10/04/2024 9.3  8.4 - 10.2 mg/dL Final    AST 10/04/2024 28  13 - 39 U/L Final    ALT 10/04/2024 20  7 - 52 U/L Final    Specimen collection should occur prior to Sulfasalazine administration due to the potential for falsely depressed results.     Alkaline Phosphatase 10/04/2024 58  34 - 104 U/L Final    Total Protein 10/04/2024 6.4  6.4 - 8.4 g/dL Final    Albumin 10/04/2024 3.8  3.5 - 5.0 g/dL Final    Total Bilirubin 10/04/2024 0.32  0.20 - 1.00 mg/dL Final    Use of this assay is not recommended for patients undergoing treatment with eltrombopag due to the potential for falsely elevated results.  N-acetyl-p-benzoquinone imine (metabolite of Acetaminophen) will generate erroneously low results in samples for patients that have taken an overdose of Acetaminophen.    eGFR 10/04/2024 43  ml/min/1.73sq m Final    LACTIC ACID 10/04/2024 2.2 (H)  0.5 - 2.0 mmol/L Final    LACTIC ACID 10/04/2024 2.2 (H)  0.5 - 2.0 mmol/L Final    WBC 10/04/2024 7.78  4.31 - 10.16 Thousand/uL Final    RBC 10/04/2024 3.81  3.81 - 5.12 Million/uL Final    Hemoglobin 10/04/2024 11.0 (L)  11.5 - 15.4 g/dL Final    Hematocrit 10/04/2024 36.7  34.8 - 46.1 % Final    MCV 10/04/2024 96  82 - 98 fL Final    short sample    MCH 10/04/2024 28.9  26.8 - 34.3 pg Final    MCHC 10/04/2024 30.0 (L)  31.4 - 37.4 g/dL Final    RDW 10/04/2024 12.7  11.6 - 15.1 % Final    MPV 10/04/2024 10.4  8.9 - 12.7 fL Final    Platelets 10/04/2024 221  149 - 390 Thousands/uL Final    nRBC 10/04/2024 0  /100 WBCs Final    Segmented % 10/04/2024 74  43 - 75 % Final    Immature Grans % 10/04/2024 0  0 - 2 % Final    Lymphocytes % 10/04/2024 18  14 - 44 % Final    Monocytes % 10/04/2024 5  4 - 12 % Final    Eosinophils Relative 10/04/2024 2  0 - 6 % Final    Basophils Relative 10/04/2024 1  0 - 1 % Final    Absolute Neutrophils 10/04/2024 5.74  1.85 - 7.62 Thousands/µL Final    Absolute Immature Grans 10/04/2024 0.03  0.00 - 0.20 Thousand/uL  Final    Absolute Lymphocytes 10/04/2024 1.42  0.60 - 4.47 Thousands/µL Final    Absolute Monocytes 10/04/2024 0.42  0.17 - 1.22 Thousand/µL Final    Eosinophils Absolute 10/04/2024 0.12  0.00 - 0.61 Thousand/µL Final    Basophils Absolute 10/04/2024 0.05  0.00 - 0.10 Thousands/µL Final    Sodium 10/04/2024 134 (L)  135 - 147 mmol/L Final    Potassium 10/04/2024 4.5  3.5 - 5.3 mmol/L Final    Chloride 10/04/2024 103  96 - 108 mmol/L Final    CO2 10/04/2024 24  21 - 32 mmol/L Final    ANION GAP 10/04/2024 7  4 - 13 mmol/L Final    BUN 10/04/2024 29 (H)  5 - 25 mg/dL Final    Creatinine 10/04/2024 1.05  0.60 - 1.30 mg/dL Final    Standardized to IDMS reference method    Glucose 10/04/2024 137  65 - 140 mg/dL Final    If the patient is fasting, the ADA then defines impaired fasting glucose as > 100 mg/dL and diabetes as > or equal to 123 mg/dL.    Calcium 10/04/2024 8.7  8.4 - 10.2 mg/dL Final    eGFR 10/04/2024 48  ml/min/1.73sq m Final    LACTIC ACID 10/04/2024 2.0  0.5 - 2.0 mmol/L Final    POC Glucose 10/04/2024 124  65 - 140 mg/dl Final    POC Glucose 10/04/2024 168 (H)  65 - 140 mg/dl Final    POC Glucose 10/04/2024 131  65 - 140 mg/dl Final    TSH 3RD GENERATON 10/05/2024 0.957  0.450 - 4.500 uIU/mL Final    The recommended reference ranges for TSH during pregnancy are as follows:   First trimester 0.100 to 2.500 uIU/mL   Second trimester  0.200 to 3.000 uIU/mL   Third trimester 0.300 to 3.000 uIU/m    Note: Normal ranges may not apply to patients who are transgender, non-binary, or whose legal sex, sex at birth, and gender identity differ.  Adult TSH (3rd generation) reference range follows the recommended guidelines of the [employer], January, 2020.    Protime 10/05/2024 14.3  12.3 - 15.0 seconds Final    INR 10/05/2024 1.04  0.85 - 1.19 Final    WBC 10/05/2024 5.15  4.31 - 10.16 Thousand/uL Final    RBC 10/05/2024 3.27 (L)  3.81 - 5.12 Million/uL Final    Hemoglobin 10/05/2024 9.8 (L)  11.5  - 15.4 g/dL Final    Hematocrit 10/05/2024 29.5 (L)  34.8 - 46.1 % Final    MCV 10/05/2024 90  82 - 98 fL Final    Results verified by repeat    MCH 10/05/2024 30.0  26.8 - 34.3 pg Final    MCHC 10/05/2024 33.2  31.4 - 37.4 g/dL Final    RDW 10/05/2024 12.7  11.6 - 15.1 % Final    MPV 10/05/2024 10.7  8.9 - 12.7 fL Final    Platelets 10/05/2024 210  149 - 390 Thousands/uL Final    nRBC 10/05/2024 0  /100 WBCs Final    Segmented % 10/05/2024 58  43 - 75 % Final    Immature Grans % 10/05/2024 0  0 - 2 % Final    Lymphocytes % 10/05/2024 31  14 - 44 % Final    Monocytes % 10/05/2024 6  4 - 12 % Final    Eosinophils Relative 10/05/2024 4  0 - 6 % Final    Basophils Relative 10/05/2024 1  0 - 1 % Final    Absolute Neutrophils 10/05/2024 2.97  1.85 - 7.62 Thousands/µL Final    Absolute Immature Grans 10/05/2024 0.02  0.00 - 0.20 Thousand/uL Final    Absolute Lymphocytes 10/05/2024 1.61  0.60 - 4.47 Thousands/µL Final    Absolute Monocytes 10/05/2024 0.33  0.17 - 1.22 Thousand/µL Final    Eosinophils Absolute 10/05/2024 0.19  0.00 - 0.61 Thousand/µL Final    Basophils Absolute 10/05/2024 0.03  0.00 - 0.10 Thousands/µL Final    Sodium 10/05/2024 139  135 - 147 mmol/L Final    Potassium 10/05/2024 4.1  3.5 - 5.3 mmol/L Final    Chloride 10/05/2024 106  96 - 108 mmol/L Final    CO2 10/05/2024 28  21 - 32 mmol/L Final    ANION GAP 10/05/2024 5  4 - 13 mmol/L Final    BUN 10/05/2024 20  5 - 25 mg/dL Final    Creatinine 10/05/2024 1.04  0.60 - 1.30 mg/dL Final    Standardized to IDMS reference method    Glucose 10/05/2024 106  65 - 140 mg/dL Final    If the patient is fasting, the ADA then defines impaired fasting glucose as > 100 mg/dL and diabetes as > or equal to 123 mg/dL.    Calcium 10/05/2024 8.7  8.4 - 10.2 mg/dL Final    Corrected Calcium 10/05/2024 9.4  8.3 - 10.1 mg/dL Final    AST 10/05/2024 11 (L)  13 - 39 U/L Final    ALT 10/05/2024 11  7 - 52 U/L Final    Specimen collection should occur prior to Sulfasalazine  administration due to the potential for falsely depressed results.     Alkaline Phosphatase 10/05/2024 48  34 - 104 U/L Final    Total Protein 10/05/2024 5.1 (L)  6.4 - 8.4 g/dL Final    Albumin 10/05/2024 3.1 (L)  3.5 - 5.0 g/dL Final    Total Bilirubin 10/05/2024 0.31  0.20 - 1.00 mg/dL Final    Use of this assay is not recommended for patients undergoing treatment with eltrombopag due to the potential for falsely elevated results.  N-acetyl-p-benzoquinone imine (metabolite of Acetaminophen) will generate erroneously low results in samples for patients that have taken an overdose of Acetaminophen.    eGFR 10/05/2024 49  ml/min/1.73sq m Final    POC Glucose 10/05/2024 105  65 - 140 mg/dl Final    POC Glucose 10/05/2024 132  65 - 140 mg/dl Final    POC Glucose 10/05/2024 187 (H)  65 - 140 mg/dl Final   Admission on 09/28/2024, Discharged on 09/30/2024   Component Date Value Ref Range Status    WBC 09/28/2024 7.21  4.31 - 10.16 Thousand/uL Final    RBC 09/28/2024 3.90  3.81 - 5.12 Million/uL Final    Hemoglobin 09/28/2024 11.2 (L)  11.5 - 15.4 g/dL Final    Hematocrit 09/28/2024 35.3  34.8 - 46.1 % Final    MCV 09/28/2024 91  82 - 98 fL Final    MCH 09/28/2024 28.7  26.8 - 34.3 pg Final    MCHC 09/28/2024 31.7  31.4 - 37.4 g/dL Final    RDW 09/28/2024 12.7  11.6 - 15.1 % Final    MPV 09/28/2024 10.4  8.9 - 12.7 fL Final    Platelets 09/28/2024 223  149 - 390 Thousands/uL Final    nRBC 09/28/2024 0  /100 WBCs Final    Segmented % 09/28/2024 65  43 - 75 % Final    Immature Grans % 09/28/2024 0  0 - 2 % Final    Lymphocytes % 09/28/2024 25  14 - 44 % Final    Monocytes % 09/28/2024 6  4 - 12 % Final    Eosinophils Relative 09/28/2024 3  0 - 6 % Final    Basophils Relative 09/28/2024 1  0 - 1 % Final    Absolute Neutrophils 09/28/2024 4.73  1.85 - 7.62 Thousands/µL Final    Absolute Immature Grans 09/28/2024 0.02  0.00 - 0.20 Thousand/uL Final    Absolute Lymphocytes 09/28/2024 1.77  0.60 - 4.47 Thousands/µL Final     Absolute Monocytes 09/28/2024 0.41  0.17 - 1.22 Thousand/µL Final    Eosinophils Absolute 09/28/2024 0.24  0.00 - 0.61 Thousand/µL Final    Basophils Absolute 09/28/2024 0.04  0.00 - 0.10 Thousands/µL Final    Sodium 09/28/2024 136  135 - 147 mmol/L Final    Potassium 09/28/2024 3.9  3.5 - 5.3 mmol/L Final    Chloride 09/28/2024 104  96 - 108 mmol/L Final    CO2 09/28/2024 24  21 - 32 mmol/L Final    ANION GAP 09/28/2024 8  4 - 13 mmol/L Final    BUN 09/28/2024 27 (H)  5 - 25 mg/dL Final    Creatinine 09/28/2024 1.14  0.60 - 1.30 mg/dL Final    Standardized to IDMS reference method    Glucose 09/28/2024 216 (H)  65 - 140 mg/dL Final    If the patient is fasting, the ADA then defines impaired fasting glucose as > 100 mg/dL and diabetes as > or equal to 123 mg/dL.    Calcium 09/28/2024 9.1  8.4 - 10.2 mg/dL Final    AST 09/28/2024 13  13 - 39 U/L Final    ALT 09/28/2024 7  7 - 52 U/L Final    Specimen collection should occur prior to Sulfasalazine administration due to the potential for falsely depressed results.     Alkaline Phosphatase 09/28/2024 46  34 - 104 U/L Final    Total Protein 09/28/2024 6.0 (L)  6.4 - 8.4 g/dL Final    Albumin 09/28/2024 3.7  3.5 - 5.0 g/dL Final    Total Bilirubin 09/28/2024 0.35  0.20 - 1.00 mg/dL Final    Use of this assay is not recommended for patients undergoing treatment with eltrombopag due to the potential for falsely elevated results.  N-acetyl-p-benzoquinone imine (metabolite of Acetaminophen) will generate erroneously low results in samples for patients that have taken an overdose of Acetaminophen.    eGFR 09/28/2024 43  ml/min/1.73sq m Final    Magnesium 09/28/2024 1.8 (L)  1.9 - 2.7 mg/dL Final    Phosphorus 09/28/2024 3.3  2.3 - 4.1 mg/dL Final    Ventricular Rate 09/29/2024 79  BPM Final    Atrial Rate 09/29/2024 300  BPM Final    QRSD Interval 09/29/2024 78  ms Final    QT Interval 09/29/2024 380  ms Final    QTC Interval 09/29/2024 435  ms Final    QRS Axis 09/29/2024 -10   degrees Final    T Wave [place] 09/29/2024 2  degrees Final    Hemoglobin A1C 09/29/2024 6.7 (H)  Normal 4.0-5.6%; PreDiabetic 5.7-6.4%; Diabetic >=6.5%; Glycemic control for adults with diabetes <7.0% % Final    EAG 09/29/2024 146  mg/dl Final    WBC 09/29/2024 9.50  4.31 - 10.16 Thousand/uL Final    RBC 09/29/2024 3.82  3.81 - 5.12 Million/uL Final    Hemoglobin 09/29/2024 11.0 (L)  11.5 - 15.4 g/dL Final    Hematocrit 09/29/2024 34.7 (L)  34.8 - 46.1 % Final    MCV 09/29/2024 91  82 - 98 fL Final    MCH 09/29/2024 28.8  26.8 - 34.3 pg Final    MCHC 09/29/2024 31.7  31.4 - 37.4 g/dL Final    RDW 09/29/2024 12.8  11.6 - 15.1 % Final    MPV 09/29/2024 10.7  8.9 - 12.7 fL Final    Platelets 09/29/2024 191  149 - 390 Thousands/uL Final    nRBC 09/29/2024 0  /100 WBCs Final    Segmented % 09/29/2024 86 (H)  43 - 75 % Final    Immature Grans % 09/29/2024 0  0 - 2 % Final    Lymphocytes % 09/29/2024 8 (L)  14 - 44 % Final    Monocytes % 09/29/2024 5  4 - 12 % Final    Eosinophils Relative 09/29/2024 1  0 - 6 % Final    Basophils Relative 09/29/2024 0  0 - 1 % Final    Absolute Neutrophils 09/29/2024 8.04 (H)  1.85 - 7.62 Thousands/µL Final    Absolute Immature Grans 09/29/2024 0.04  0.00 - 0.20 Thousand/uL Final    Absolute Lymphocytes 09/29/2024 0.79  0.60 - 4.47 Thousands/µL Final    Absolute Monocytes 09/29/2024 0.47  0.17 - 1.22 Thousand/µL Final    Eosinophils Absolute 09/29/2024 0.13  0.00 - 0.61 Thousand/µL Final    Basophils Absolute 09/29/2024 0.03  0.00 - 0.10 Thousands/µL Final    Sodium 09/29/2024 140  135 - 147 mmol/L Final    Potassium 09/29/2024 3.4 (L)  3.5 - 5.3 mmol/L Final    Chloride 09/29/2024 107  96 - 108 mmol/L Final    CO2 09/29/2024 24  21 - 32 mmol/L Final    ANION GAP 09/29/2024 9  4 - 13 mmol/L Final    BUN 09/29/2024 24  5 - 25 mg/dL Final    Creatinine 09/29/2024 0.98  0.60 - 1.30 mg/dL Final    Standardized to IDMS reference method    Glucose 09/29/2024 137  65 - 140 mg/dL Final    If the  patient is fasting, the ADA then defines impaired fasting glucose as > 100 mg/dL and diabetes as > or equal to 123 mg/dL.    Calcium 09/29/2024 8.6  8.4 - 10.2 mg/dL Final    eGFR 09/29/2024 52  ml/min/1.73sq m Final    Magnesium 09/29/2024 2.1  1.9 - 2.7 mg/dL Final    POC Glucose 09/29/2024 138  65 - 140 mg/dl Final    POC Glucose 09/29/2024 165 (H)  65 - 140 mg/dl Final    POC Glucose 09/29/2024 169 (H)  65 - 140 mg/dl Final    POC Glucose 09/29/2024 113  65 - 140 mg/dl Final    WBC 09/30/2024 6.30  4.31 - 10.16 Thousand/uL Final    RBC 09/30/2024 3.78 (L)  3.81 - 5.12 Million/uL Final    Hemoglobin 09/30/2024 10.7 (L)  11.5 - 15.4 g/dL Final    Hematocrit 09/30/2024 34.0 (L)  34.8 - 46.1 % Final    MCV 09/30/2024 90  82 - 98 fL Final    MCH 09/30/2024 28.3  26.8 - 34.3 pg Final    MCHC 09/30/2024 31.5  31.4 - 37.4 g/dL Final    RDW 09/30/2024 12.9  11.6 - 15.1 % Final    Platelets 09/30/2024 171  149 - 390 Thousands/uL Final    MPV 09/30/2024 10.6  8.9 - 12.7 fL Final    Sodium 09/30/2024 138  135 - 147 mmol/L Final    Potassium 09/30/2024 4.1  3.5 - 5.3 mmol/L Final    Chloride 09/30/2024 107  96 - 108 mmol/L Final    CO2 09/30/2024 24  21 - 32 mmol/L Final    ANION GAP 09/30/2024 7  4 - 13 mmol/L Final    BUN 09/30/2024 19  5 - 25 mg/dL Final    Creatinine 09/30/2024 0.96  0.60 - 1.30 mg/dL Final    Standardized to IDMS reference method    Glucose 09/30/2024 126  65 - 140 mg/dL Final    If the patient is fasting, the ADA then defines impaired fasting glucose as > 100 mg/dL and diabetes as > or equal to 123 mg/dL.    Calcium 09/30/2024 8.9  8.4 - 10.2 mg/dL Final    eGFR 09/30/2024 54  ml/min/1.73sq m Final    POC Glucose 09/30/2024 121  65 - 140 mg/dl Final    POC Glucose 09/30/2024 140  65 - 140 mg/dl Final   Admission on 09/23/2024, Discharged on 09/23/2024   Component Date Value Ref Range Status    Ventricular Rate 09/23/2024 85  BPM Final    Atrial Rate 09/23/2024 85  BPM Final    VA Interval 09/23/2024  160  ms Final    QRSD Interval 09/23/2024 72  ms Final    QT Interval 09/23/2024 348  ms Final    QTC Interval 09/23/2024 414  ms Final    P Axis 09/23/2024 39  degrees Final    QRS Axis 09/23/2024 -2  degrees Final    T Wave Axis 09/23/2024 13  degrees Final    WBC 09/23/2024 6.14  4.31 - 10.16 Thousand/uL Final    RBC 09/23/2024 4.21  3.81 - 5.12 Million/uL Final    Hemoglobin 09/23/2024 12.3  11.5 - 15.4 g/dL Final    Hematocrit 09/23/2024 37.8  34.8 - 46.1 % Final    MCV 09/23/2024 90  82 - 98 fL Final    MCH 09/23/2024 29.2  26.8 - 34.3 pg Final    MCHC 09/23/2024 32.5  31.4 - 37.4 g/dL Final    RDW 09/23/2024 12.7  11.6 - 15.1 % Final    MPV 09/23/2024 10.7  8.9 - 12.7 fL Final    Platelets 09/23/2024 197  149 - 390 Thousands/uL Final    nRBC 09/23/2024 0  /100 WBCs Final    Segmented % 09/23/2024 66  43 - 75 % Final    Immature Grans % 09/23/2024 0  0 - 2 % Final    Lymphocytes % 09/23/2024 24  14 - 44 % Final    Monocytes % 09/23/2024 6  4 - 12 % Final    Eosinophils Relative 09/23/2024 3  0 - 6 % Final    Basophils Relative 09/23/2024 1  0 - 1 % Final    Absolute Neutrophils 09/23/2024 4.11  1.85 - 7.62 Thousands/µL Final    Absolute Immature Grans 09/23/2024 0.02  0.00 - 0.20 Thousand/uL Final    Absolute Lymphocytes 09/23/2024 1.44  0.60 - 4.47 Thousands/µL Final    Absolute Monocytes 09/23/2024 0.35  0.17 - 1.22 Thousand/µL Final    Eosinophils Absolute 09/23/2024 0.17  0.00 - 0.61 Thousand/µL Final    Basophils Absolute 09/23/2024 0.05  0.00 - 0.10 Thousands/µL Final    Sodium 09/23/2024 137  135 - 147 mmol/L Final    Potassium 09/23/2024 5.0  3.5 - 5.3 mmol/L Final    Moderately Hemolyzed:Results may be affected.    Chloride 09/23/2024 104  96 - 108 mmol/L Final    CO2 09/23/2024 25  21 - 32 mmol/L Final    ANION GAP 09/23/2024 8  4 - 13 mmol/L Final    BUN 09/23/2024 37 (H)  5 - 25 mg/dL Final    Creatinine 09/23/2024 1.18  0.60 - 1.30 mg/dL Final    Standardized to IDMS reference method    Glucose  09/23/2024 109  65 - 140 mg/dL Final    If the patient is fasting, the ADA then defines impaired fasting glucose as > 100 mg/dL and diabetes as > or equal to 123 mg/dL.    Calcium 09/23/2024 9.6  8.4 - 10.2 mg/dL Final    eGFR 09/23/2024 42  ml/min/1.73sq m Final   Admission on 09/15/2024, Discharged on 09/15/2024   Component Date Value Ref Range Status    Ventricular Rate 09/15/2024 84  BPM Final    Atrial Rate 09/15/2024 84  BPM Final    GA Interval 09/15/2024 162  ms Final    QRSD Interval 09/15/2024 68  ms Final    QT Interval 09/15/2024 348  ms Final    QTC Interval 09/15/2024 411  ms Final    P Axis 09/15/2024 55  degrees Final    QRS Axis 09/15/2024 6  degrees Final    T Wave [place] 09/15/2024 39  degrees Final    WBC 09/15/2024 7.61  4.31 - 10.16 Thousand/uL Final    RBC 09/15/2024 4.13  3.81 - 5.12 Million/uL Final    Hemoglobin 09/15/2024 11.8  11.5 - 15.4 g/dL Final    Hematocrit 09/15/2024 37.1  34.8 - 46.1 % Final    MCV 09/15/2024 90  82 - 98 fL Final    MCH 09/15/2024 28.6  26.8 - 34.3 pg Final    MCHC 09/15/2024 31.8  31.4 - 37.4 g/dL Final    RDW 09/15/2024 12.8  11.6 - 15.1 % Final    MPV 09/15/2024 10.9  8.9 - 12.7 fL Final    Platelets 09/15/2024 218  149 - 390 Thousands/uL Final    nRBC 09/15/2024 0  /100 WBCs Final    Segmented % 09/15/2024 73  43 - 75 % Final    Immature Grans % 09/15/2024 0  0 - 2 % Final    Lymphocytes % 09/15/2024 19  14 - 44 % Final    Monocytes % 09/15/2024 5  4 - 12 % Final    Eosinophils Relative 09/15/2024 2  0 - 6 % Final    Basophils Relative 09/15/2024 1  0 - 1 % Final    Absolute Neutrophils 09/15/2024 5.59  1.85 - 7.62 Thousands/µL Final    Absolute Immature Grans 09/15/2024 0.02  0.00 - 0.20 Thousand/uL Final    Absolute Lymphocytes 09/15/2024 1.41  0.60 - 4.47 Thousands/µL Final    Absolute Monocytes 09/15/2024 0.40  0.17 - 1.22 Thousand/µL Final    Eosinophils Absolute 09/15/2024 0.14  0.00 - 0.61 Thousand/µL Final    Basophils Absolute 09/15/2024 0.05  0.00 -  "0.10 Thousands/µL Final    Sodium 09/15/2024 136  135 - 147 mmol/L Final    Potassium 09/15/2024 4.0  3.5 - 5.3 mmol/L Final    Chloride 09/15/2024 101  96 - 108 mmol/L Final    CO2 09/15/2024 25  21 - 32 mmol/L Final    ANION GAP 09/15/2024 10  4 - 13 mmol/L Final    BUN 09/15/2024 27 (H)  5 - 25 mg/dL Final    Creatinine 09/15/2024 1.05  0.60 - 1.30 mg/dL Final    Standardized to IDMS reference method    Glucose 09/15/2024 105  65 - 140 mg/dL Final    If the patient is fasting, the ADA then defines impaired fasting glucose as > 100 mg/dL and diabetes as > or equal to 123 mg/dL.    Calcium 09/15/2024 9.6  8.4 - 10.2 mg/dL Final    AST 09/15/2024 15  13 - 39 U/L Final    ALT 09/15/2024 9  7 - 52 U/L Final    Specimen collection should occur prior to Sulfasalazine administration due to the potential for falsely depressed results.     Alkaline Phosphatase 09/15/2024 49  34 - 104 U/L Final    Total Protein 09/15/2024 6.7  6.4 - 8.4 g/dL Final    Albumin 09/15/2024 4.1  3.5 - 5.0 g/dL Final    Total Bilirubin 09/15/2024 0.47  0.20 - 1.00 mg/dL Final    Use of this assay is not recommended for patients undergoing treatment with eltrombopag due to the potential for falsely elevated results.  N-acetyl-p-benzoquinone imine (metabolite of Acetaminophen) will generate erroneously low results in samples for patients that have taken an overdose of Acetaminophen.    eGFR 09/15/2024 48  ml/min/1.73sq m Final    Lipase 09/15/2024 45  11 - 82 u/L Final    hs TnI 0hr 09/15/2024 17  \"Refer to ACS Flowchart\"- see link ng/L Final    Comment:                                              Initial (time 0) result  If >=50 ng/L, Myocardial injury suggested ;  Type of myocardial injury and treatment strategy  to be determined.  If 5-49 ng/L, a delta result at 2 hours and or 4 hours will be needed to further evaluate.  If <4 ng/L, and chest pain has been >3 hours since onset, patient may qualify for discharge based on the HEART score in the " "ED.  If <5 ng/L and <3hours since onset of chest pain, a delta result at 2 hours will be needed to further evaluate.    HS Troponin 99th Percentile URL of a Health Population=12 ng/L with a 95% Confidence Interval of 8-18 ng/L.    Second Troponin (time 2 hours)  If calculated delta >= 20 ng/L,  Myocardial injury suggested ; Type of myocardial injury and treatment strategy to be determined.  If 5-49 ng/L and the calculated delta is 5-19 ng/L, consult medical service for evaluation.  Continue evaluation for ischemia on ecg and other possible etiology and repeat hs troponin at 4 hours.  If delta                            is <5 ng/L at 2 hours, consider discharge based on risk stratification via the HEART score (if in ED), or [place] risk score in IP/Observation.    HS Troponin 99th Percentile URL of a Health Population=12 ng/L with a 95% Confidence Interval of 8-18 ng/L.    Color, UA 09/15/2024 Light Yellow   Final    Clarity, UA 09/15/2024 Clear   Final    Specific Gravity, UA 09/15/2024 1.019  1.003 - 1.030 Final    pH, UA 09/15/2024 5.0  4.5, 5.0, 5.5, 6.0, 6.5, 7.0, 7.5, 8.0 Final    Leukocytes, UA 09/15/2024 Negative  Negative Final    Nitrite, UA 09/15/2024 Negative  Negative Final    Protein, UA 09/15/2024 Negative  Negative mg/dl Final    Glucose, UA 09/15/2024 Negative  Negative mg/dl Final    Ketones, UA 09/15/2024 Negative  Negative mg/dl Final    Urobilinogen, UA 09/15/2024 <2.0  <2.0 mg/dl mg/dl Final    Bilirubin, UA 09/15/2024 Negative  Negative Final    Occult Blood, UA 09/15/2024 Small (A)  Negative Final    hs TnI 2hr 09/15/2024 14  \"Refer to ACS Flowchart\"- see link ng/L Final    Comment:                                              Initial (time 0) result  If >=50 ng/L, Myocardial injury suggested ;  Type of myocardial injury and treatment strategy  to be determined.  If 5-49 ng/L, a delta result at 2 hours and or 4 hours will be needed to further evaluate.  If <4 ng/L, and chest pain has been >3 " hours since onset, patient may qualify for discharge based on the HEART score in the ED.  If <5 ng/L and <3hours since onset of chest pain, a delta result at 2 hours will be needed to further evaluate.    HS Troponin 99th Percentile URL of a Health Population=12 ng/L with a 95% Confidence Interval of 8-18 ng/L.    Second Troponin (time 2 hours)  If calculated delta >= 20 ng/L,  Myocardial injury suggested ; Type of myocardial injury and treatment strategy to be determined.  If 5-49 ng/L and the calculated delta is 5-19 ng/L, consult medical service for evaluation.  Continue evaluation for ischemia on ecg and other possible etiology and repeat hs troponin at 4 hours.  If delta                            is <5 ng/L at 2 hours, consider discharge based on risk stratification via the HEART score (if in ED), or [place] risk score in IP/Observation.    HS Troponin 99th Percentile URL of a Health Population=12 ng/L with a 95% Confidence Interval of 8-18 ng/L.    Delta 2hr hsTnI 09/15/2024 -3  <20 ng/L Final    RBC, UA 09/15/2024 10-20 (A)  None Seen, 1-2 /hpf Final    WBC, UA 09/15/2024 None Seen  None Seen, 1-2 /hpf Final    Epithelial Cells 09/15/2024 Occasional  None Seen, Occasional /hpf Final    Bacteria, UA 09/15/2024 None Seen  None Seen, Occasional /hpf Final   Admission on 08/28/2024, Discharged on 09/04/2024   Component Date Value Ref Range Status    Sodium 08/30/2024 136  135 - 147 mmol/L Final    Potassium 08/30/2024 3.8  3.5 - 5.3 mmol/L Final    Chloride 08/30/2024 100  96 - 108 mmol/L Final    CO2 08/30/2024 26  21 - 32 mmol/L Final    ANION GAP 08/30/2024 10  4 - 13 mmol/L Final    BUN 08/30/2024 25  5 - 25 mg/dL Final    Creatinine 08/30/2024 0.98  0.60 - 1.30 mg/dL Final    Standardized to IDMS reference method    Glucose 08/30/2024 136  65 - 140 mg/dL Final    If the patient is fasting, the ADA then defines impaired fasting glucose as > 100 mg/dL and diabetes as > or equal to 123 mg/dL.    Glucose, Fasting  08/30/2024 136 (H)  65 - 99 mg/dL Final    Calcium 08/30/2024 9.3  8.4 - 10.2 mg/dL Final    AST 08/30/2024 12 (L)  13 - 39 U/L Final    ALT 08/30/2024 6 (L)  7 - 52 U/L Final    Specimen collection should occur prior to Sulfasalazine administration due to the potential for falsely depressed results.     Alkaline Phosphatase 08/30/2024 43  34 - 104 U/L Final    Total Protein 08/30/2024 6.1 (L)  6.4 - 8.4 g/dL Final    Albumin 08/30/2024 3.8  3.5 - 5.0 g/dL Final    Total Bilirubin 08/30/2024 0.53  0.20 - 1.00 mg/dL Final    Use of this assay is not recommended for patients undergoing treatment with eltrombopag due to the potential for falsely elevated results.  N-acetyl-p-benzoquinone imine (metabolite of Acetaminophen) will generate erroneously low results in samples for patients that have taken an overdose of Acetaminophen.    eGFR 08/30/2024 52  ml/min/1.73sq m Final    Magnesium 08/30/2024 2.1  1.9 - 2.7 mg/dL Final    Phosphorus 08/30/2024 4.0  2.3 - 4.1 mg/dL Final    WBC 08/30/2024 5.82  4.31 - 10.16 Thousand/uL Final    RBC 08/30/2024 3.77 (L)  3.81 - 5.12 Million/uL Final    Hemoglobin 08/30/2024 11.0 (L)  11.5 - 15.4 g/dL Final    Hematocrit 08/30/2024 34.4 (L)  34.8 - 46.1 % Final    MCV 08/30/2024 91  82 - 98 fL Final    MCH 08/30/2024 29.2  26.8 - 34.3 pg Final    MCHC 08/30/2024 32.0  31.4 - 37.4 g/dL Final    RDW 08/30/2024 13.1  11.6 - 15.1 % Final    MPV 08/30/2024 10.9  8.9 - 12.7 fL Final    Platelets 08/30/2024 207  149 - 390 Thousands/uL Final    nRBC 08/30/2024 0  /100 WBCs Final    Segmented % 08/30/2024 68  43 - 75 % Final    Immature Grans % 08/30/2024 0  0 - 2 % Final    Lymphocytes % 08/30/2024 22  14 - 44 % Final    Monocytes % 08/30/2024 7  4 - 12 % Final    Eosinophils Relative 08/30/2024 2  0 - 6 % Final    Basophils Relative 08/30/2024 1  0 - 1 % Final    Absolute Neutrophils 08/30/2024 3.95  1.85 - 7.62 Thousands/µL Final    Absolute Immature Grans 08/30/2024 0.01  0.00 - 0.20  Thousand/uL Final    Absolute Lymphocytes 08/30/2024 1.26  0.60 - 4.47 Thousands/µL Final    Absolute Monocytes 08/30/2024 0.43  0.17 - 1.22 Thousand/µL Final    Eosinophils Absolute 08/30/2024 0.14  0.00 - 0.61 Thousand/µL Final    Basophils Absolute 08/30/2024 0.03  0.00 - 0.10 Thousands/µL Final    TSH 3RD GENERATON 08/30/2024 1.191  0.450 - 4.500 uIU/mL Final    The recommended reference ranges for TSH during pregnancy are as follows:   First trimester 0.100 to 2.500 uIU/mL   Second trimester  0.200 to 3.000 uIU/mL   Third trimester 0.300 to 3.000 uIU/m    Note: Normal ranges may not apply to patients who are transgender, non-binary, or whose legal sex, sex at birth, and gender identity differ.  Adult TSH (3rd generation) reference range follows the recommended guidelines of the [employer], January, 2020.    Vitamin B-12 08/30/2024 437  180 - 914 pg/mL Final    Folate 08/30/2024 >22.3  >5.9 ng/mL Final    The World Health Organization has determined deficient folate concentrations are considered to be <4.0 ng/mL.    Vit D, 25-Hydroxy 08/30/2024 40.4  30.0 - 100.0 ng/mL Final    Vitamin D guidelines established by Clinical Guidelines Subcommittee  of the [employer] Task Force, 2011    Deficiency <20ng/ml   Insufficiency 20-30ng/ml   Sufficient  ng/ml     Iron Saturation 08/30/2024 23  15 - 50 % Final    TIBC 08/30/2024 257  250 - 450 ug/dL Final    Iron 08/30/2024 58  50 - 212 ug/dL Final    Patients treated with metal-binding drugs (ie. Deferoxamine) may have depressed iron values.    UIBC 08/30/2024 199  155 - 355 ug/dL Final    Ferritin 08/30/2024 29  11 - 307 ng/mL Final    Syphilis Total Antibody 08/30/2024 Non-reactive  Non-Reactive Final    No serological evidence of infection with T. pallidum.  Early or incubating syphilis infection cannot be excluded.  Consider repeat testing based on clinical suspicion.    HIV-1 p24 Antigen 08/30/2024 Non-Reactive  Non-Reactive Final     HIV-1 Antibody 08/30/2024 Non-Reactive  Non-Reactive Final    HIV-2 Antibody 08/30/2024 Non-Reactive  Non-Reactive Final    HIV Ag-Ab 5th Gen 08/30/2024 Non-Reactive  Non-Reactive Final    A Non-Reactive test result does not preclude the possibility of exposure or infection with HIV-1 and/or HIV-2.  Non-Reactive results can occur if the quantity of marker present is below the detection limits or is not present during the stage of disease in which a sample is collected. Repeat testing should be considered where there is clinical suspicion of infection.       Ventricular Rate 08/30/2024 83  BPM Final    Atrial Rate 08/30/2024 83  BPM Final    ND Interval 08/30/2024 170  ms Final    QRSD Interval 08/30/2024 74  ms Final    QT Interval 08/30/2024 370  ms Final    QTC Interval 08/30/2024 434  ms Final    P Axis 08/30/2024 61  degrees Final    QRS [place] 08/30/2024 3  degrees Final    T Wave [place] 08/30/2024 51  degrees Final   Admission on 08/27/2024, Discharged on 08/28/2024   Component Date Value Ref Range Status    WBC 08/27/2024 6.15  4.31 - 10.16 Thousand/uL Final    RBC 08/27/2024 3.75 (L)  3.81 - 5.12 Million/uL Final    Hemoglobin 08/27/2024 11.0 (L)  11.5 - 15.4 g/dL Final    Hematocrit 08/27/2024 33.5 (L)  34.8 - 46.1 % Final    MCV 08/27/2024 89  82 - 98 fL Final    MCH 08/27/2024 29.3  26.8 - 34.3 pg Final    MCHC 08/27/2024 32.8  31.4 - 37.4 g/dL Final    RDW 08/27/2024 13.2  11.6 - 15.1 % Final    MPV 08/27/2024 10.2  8.9 - 12.7 fL Final    Platelets 08/27/2024 207  149 - 390 Thousands/uL Final    nRBC 08/27/2024 0  /100 WBCs Final    Segmented % 08/27/2024 66  43 - 75 % Final    Immature Grans % 08/27/2024 0  0 - 2 % Final    Lymphocytes % 08/27/2024 24  14 - 44 % Final    Monocytes % 08/27/2024 7  4 - 12 % Final    Eosinophils Relative 08/27/2024 2  0 - 6 % Final    Basophils Relative 08/27/2024 1  0 - 1 % Final    Absolute Neutrophils 08/27/2024 4.08  1.85 - 7.62 Thousands/µL Final    Absolute Immature  Grans 08/27/2024 0.02  0.00 - 0.20 Thousand/uL Final    Absolute Lymphocytes 08/27/2024 1.46  0.60 - 4.47 Thousands/µL Final    Absolute Monocytes 08/27/2024 0.43  0.17 - 1.22 Thousand/µL Final    Eosinophils Absolute 08/27/2024 0.12  0.00 - 0.61 Thousand/µL Final    Basophils Absolute 08/27/2024 0.04  0.00 - 0.10 Thousands/µL Final    Sodium 08/27/2024 136  135 - 147 mmol/L Final    Potassium 08/27/2024 3.9  3.5 - 5.3 mmol/L Final    Chloride 08/27/2024 102  96 - 108 mmol/L Final    CO2 08/27/2024 27  21 - 32 mmol/L Final    ANION GAP 08/27/2024 7  4 - 13 mmol/L Final    BUN 08/27/2024 26 (H)  5 - 25 mg/dL Final    Creatinine 08/27/2024 1.05  0.60 - 1.30 mg/dL Final    Standardized to IDMS reference method    Glucose 08/27/2024 122  65 - 140 mg/dL Final    If the patient is fasting, the ADA then defines impaired fasting glucose as > 100 mg/dL and diabetes as > or equal to 123 mg/dL.    Calcium 08/27/2024 9.4  8.4 - 10.2 mg/dL Final    AST 08/27/2024 12 (L)  13 - 39 U/L Final    ALT 08/27/2024 7  7 - 52 U/L Final    Specimen collection should occur prior to Sulfasalazine administration due to the potential for falsely depressed results.     Alkaline Phosphatase 08/27/2024 50  34 - 104 U/L Final    Total Protein 08/27/2024 6.4  6.4 - 8.4 g/dL Final    Albumin 08/27/2024 4.0  3.5 - 5.0 g/dL Final    Total Bilirubin 08/27/2024 0.48  0.20 - 1.00 mg/dL Final    Use of this assay is not recommended for patients undergoing treatment with eltrombopag due to the potential for falsely elevated results.  N-acetyl-p-benzoquinone imine (metabolite of Acetaminophen) will generate erroneously low results in samples for patients that have taken an overdose of Acetaminophen.    eGFR 08/27/2024 48  ml/min/1.73sq m Final    TSH 3RD GENERATON 08/27/2024 1.408  0.450 - 4.500 uIU/mL Final    The recommended reference ranges for TSH during pregnancy are as follows:   First trimester 0.100 to 2.500 uIU/mL   Second trimester  0.200 to 3.000  uIU/mL   Third trimester 0.300 to 3.000 uIU/m    Note: Normal ranges may not apply to patients who are transgender, non-binary, or whose legal sex, sex at birth, and gender identity differ.  Adult TSH (3rd generation) reference range follows the recommended guidelines of the American Thy[employer] Association, January, 2020.    Amph/Meth UR 08/27/2024 Negative  Negative Final    Barbiturate Ur 08/27/2024 Negative  Negative Final    Benzodiazepine Urine 08/27/2024 Negative  Negative Final    Cocaine Urine 08/27/2024 Negative  Negative Final    Methadone Urine 08/27/2024 Negative  Negative Final    Opiate Urine 08/27/2024 Negative  Negative Final    PCP Ur 08/27/2024 Negative  Negative Final    THC Urine 08/27/2024 Negative  Negative Final    Oxycodone Urine 08/27/2024 Negative  Negative Final    Fentanyl Urine 08/27/2024 Negative  Negative Final    HYDROCODONE URINE 08/27/2024 Negative  Negative Final    EXTBreath Alcohol 08/27/2024 0.000   Final    Color, UA 08/27/2024 Colorless   Final    Clarity, UA 08/27/2024 Clear   Final    Specific Gravity, UA 08/27/2024 1.008  1.003 - 1.030 Final    pH, UA 08/27/2024 5.0  4.5, 5.0, 5.5, 6.0, 6.5, 7.0, 7.5, 8.0 Final    Leukocytes, UA 08/27/2024 Negative  Negative Final    Nitrite, UA 08/27/2024 Negative  Negative Final    Protein, UA 08/27/2024 Negative  Negative mg/dl Final    Glucose, UA 08/27/2024 Negative  Negative mg/dl Final    Ketones, UA 08/27/2024 Negative  Negative mg/dl Final    Urobilinogen, UA 08/27/2024 <2.0  <2.0 mg/dl mg/dl Final    Bilirubin, UA 08/27/2024 Negative  Negative Final    Occult Blood, UA 08/27/2024 Negative  Negative Final    Ventricular Rate 08/27/2024 77  BPM Final    Atrial Rate 08/27/2024 77  BPM Final    TX Interval 08/27/2024 166  ms Final    QRSD Interval 08/27/2024 74  ms Final    QT Interval 08/27/2024 358  ms Final    QTC Interval 08/27/2024 405  ms Final    P Axis 08/27/2024 50  degrees Final    QRS Axis 08/27/2024 -1  degrees Final    T  Wave [place] 08/27/2024 23  degrees Final   There may be more visits with results that are not included.             ___________________________________    History Review: The following portions of the patient's history were reviewed and updated as appropriate: allergies, current medications, past family history, past medical history, past social history, past surgical history, and problem list.    Unchanged information from this writer's previous assessment is copied and italicized; information that has changed is bolded.    Past Psychiatric History:      Past psychiatric diagnoses:   Depression, anxiety  Inpatient psychiatric admissions:   8/2024 2013 following a surgery, sounded like extreme anxiety  Prior outpatient psychiatric treatment:   Started seeing a psychiatrist when first   Past/current psychotherapy:   active  History of suicidal attempts/gestures:   denies  Psychotropic medication trials:   Venlafaxine, sertraline, fluoxetine, escitalopram, aripiprazole (dizziness), risperidone, clonazepam, buspirone, gabapentin        Substance Abuse History:     Denies all history of substance use     Family Psychiatric History:      Family History             Family History   Problem Relation Age of Onset    Depression Mother           w/anxiety    Diabetes Mother           Mellitus    Breast cancer Mother      Hypertension Mother      No Known Problems Father      Depression Sister           w/anxiety    Heart disease Sister           Cardiac Disorder    Breast cancer Sister      Hypertension Sister      Diabetes Brother           Mellitus    Alcohol abuse Son                    Social History:     Developmental: nothing of note  Education: Bachelor degree  Marital history:  in 2006  Children: 2  Living arrangement, social support: as above, has been with daughter for 17 years  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  ___________________________________      Visit Time    Visit Start Time: 1005  Visit Stop Time: 1110  Total Visit Duration:  65 minutes    López Gutierrez MD   12/20/24

## 2024-12-20 NOTE — ASSESSMENT & PLAN NOTE
The patient and her daughter report continued relief from previous symptoms with overall improvement. Reviewed family medicine's note which noted that patient's renal function is normal. Given stability no changes are indicated.     Continue escitalopram 10 mg QD for antidepressant maintenance  Continue lorazepam 0.5 mg TID for anxiety and obsessive thinking  Continue risperidone 0.5 mg QHS for antidepressant augmentation for obsessive thinking  Continue psychotherapy    Orders:    escitalopram (Lexapro) 10 mg tablet; Take 1 tablet (10 mg total) by mouth daily    risperiDONE (RisperDAL) 0.5 mg tablet; Take 1 tablet (0.5 mg total) by mouth daily at bedtime

## 2025-01-05 DIAGNOSIS — F33.41 MAJOR DEPRESSIVE DISORDER, RECURRENT EPISODE, IN PARTIAL REMISSION (HCC): ICD-10-CM

## 2025-01-05 DIAGNOSIS — F45.21 ILLNESS ANXIETY DISORDER: ICD-10-CM

## 2025-01-07 RX ORDER — RISPERIDONE 0.5 MG/1
0.5 TABLET ORAL
Qty: 30 TABLET | Refills: 1 | OUTPATIENT
Start: 2025-01-07

## 2025-01-14 ENCOUNTER — TELEPHONE (OUTPATIENT)
Dept: BEHAVIORAL/MENTAL HEALTH CLINIC | Facility: CLINIC | Age: 85
End: 2025-01-14

## 2025-01-14 NOTE — TELEPHONE ENCOUNTER
Call to Sandra Peña; spoke with daughter Janay, approved contact. Discussed Ms. Peña's interest in group therapy at this time, due to no attendance thus far. Janay stated that her mother has joined a knitting group, and attends 2 times per week. She stated that this venue has been providing her mother with needed socialization and activity. She stated believing that group right now might be too much for her mother and declined participation at this time. She stated that her mother continues actively with psychiatry. We agreed that she/her mother will discuss with psychiatric provider in the future if/when interested in group.    Ms. Peña will be removed from active group participant list.

## 2025-01-20 DIAGNOSIS — E78.2 MIXED HYPERLIPIDEMIA: ICD-10-CM

## 2025-01-21 RX ORDER — ATORVASTATIN CALCIUM 10 MG/1
10 TABLET, FILM COATED ORAL DAILY
Qty: 90 TABLET | Refills: 1 | Status: SHIPPED | OUTPATIENT
Start: 2025-01-21

## 2025-01-24 ENCOUNTER — TELEMEDICINE (OUTPATIENT)
Dept: PSYCHIATRY | Facility: CLINIC | Age: 85
End: 2025-01-24
Payer: MEDICARE

## 2025-01-24 DIAGNOSIS — F45.21 ILLNESS ANXIETY DISORDER: Primary | ICD-10-CM

## 2025-01-24 DIAGNOSIS — F33.41 MAJOR DEPRESSIVE DISORDER, RECURRENT EPISODE, IN PARTIAL REMISSION (HCC): ICD-10-CM

## 2025-01-24 PROCEDURE — 99214 OFFICE O/P EST MOD 30 MIN: CPT

## 2025-01-24 PROCEDURE — G2211 COMPLEX E/M VISIT ADD ON: HCPCS

## 2025-01-24 RX ORDER — ESCITALOPRAM OXALATE 5 MG/1
5 TABLET ORAL DAILY
Qty: 30 TABLET | Refills: 1 | Status: SHIPPED | OUTPATIENT
Start: 2025-01-24

## 2025-01-24 RX ORDER — LORAZEPAM 0.5 MG/1
0.5 TABLET ORAL 3 TIMES DAILY
Qty: 90 TABLET | Refills: 0 | Status: SHIPPED | OUTPATIENT
Start: 2025-01-24

## 2025-01-24 RX ORDER — ESCITALOPRAM OXALATE 10 MG/1
10 TABLET ORAL DAILY
Qty: 30 TABLET | Refills: 1 | Status: SHIPPED | OUTPATIENT
Start: 2025-01-24

## 2025-01-24 NOTE — ASSESSMENT & PLAN NOTE
Increase escitalopram to 15 mg QD for obsessive thinking, anxiety, depressive symptoms  Continue lorazepam 0.5 mg TID for obsessive thinking  Continue risperidone 0.5 mg QHS for augmentation for obsessive thinking  Continue psychotherapy  Orders:    escitalopram (LEXAPRO) 5 mg tablet; Take 1 tablet (5 mg total) by mouth daily Total 15 mg daily    escitalopram (Lexapro) 10 mg tablet; Take 1 tablet (10 mg total) by mouth daily Total 15 mg daily    LORazepam (Ativan) 0.5 mg tablet; Take 1 tablet (0.5 mg total) by mouth 3 (three) times a day

## 2025-01-24 NOTE — ASSESSMENT & PLAN NOTE
Orders:    escitalopram (LEXAPRO) 5 mg tablet; Take 1 tablet (5 mg total) by mouth daily Total 15 mg daily    escitalopram (Lexapro) 10 mg tablet; Take 1 tablet (10 mg total) by mouth daily Total 15 mg daily

## 2025-01-24 NOTE — BH TREATMENT PLAN
TREATMENT PLAN - Medication Management        [facility]    Name and Date of Birth:  Sandra Peña 84 y.o. 1940  Date of Treatment Plan: January 24, 2025  Diagnosis/Diagnoses:    1. Illness anxiety disorder    2. Major depressive disorder, recurrent episode, in partial remission (HCC)        Strengths/Personal Resources for Self-Care: supportive family, taking medications as prescribed, ability to reason, Orthodox affiliation, special hobby/interest, well educated    Area/Areas of need: anxiety symptoms, depressive symptoms, obsessive-compulsive symptoms    Long Term Goal: continue improvement in obsessive thoughts  Target Date: 6 months - July 24, 2025  Person/Persons responsible for completion of goal: Sandra and López Gutierrez MD     Short Term Objective (s) - How will we reach this goal?:   Take medications as prescribed  Attend psychiatry appointments regularly  Follow up with medical providers  Get blood work drawn  Continue psychotherapy regularly  Target Date: 6 months - July 24, 2025  Person/Persons Responsible for Completion of Goal: Sandra     Progress Towards Goals: Continuing treatment    Treatment Modality: medication management every 1-3 months as needed, continue psychotherapy, and follow up with PCP  Review due 180 days from date of this plan: July 23, 2025   Expected length of service: Ongoing treatment    My physician and I have developed this plan together, and I agree to work on the goals and objectives. I understand the treatment goals that were developed for my treatment.    The treatment plan was created between López Gutierrez MD and Sandra Peña on 01/24/25 at 8:58 AM.

## 2025-01-24 NOTE — PSYCH
Virtual Regular Visit    Verification of patient location:    Patient is located at Home in the following state in which I hold an active license [place]      Assessment/Plan:    Problem List Items Addressed This Visit       Illness anxiety disorder - Primary    Increase escitalopram to 15 mg QD for obsessive thinking, anxiety, depressive symptoms  Continue lorazepam 0.5 mg TID for obsessive thinking  Continue risperidone 0.5 mg QHS for augmentation for obsessive thinking  Continue psychotherapy  Orders:    escitalopram (LEXAPRO) 5 mg tablet; Take 1 tablet (5 mg total) by mouth daily Total 15 mg daily    escitalopram (Lexapro) 10 mg tablet; Take 1 tablet (10 mg total) by mouth daily Total 15 mg daily    LORazepam (Ativan) 0.5 mg tablet; Take 1 tablet (0.5 mg total) by mouth 3 (three) times a day           Relevant Medications    escitalopram (LEXAPRO) 5 mg tablet    escitalopram (Lexapro) 10 mg tablet    LORazepam (Ativan) 0.5 mg tablet    Major depressive disorder, recurrent episode, in partial remission (HCC)      Orders:    escitalopram (LEXAPRO) 5 mg tablet; Take 1 tablet (5 mg total) by mouth daily Total 15 mg daily    escitalopram (Lexapro) 10 mg tablet; Take 1 tablet (10 mg total) by mouth daily Total 15 mg daily           Relevant Medications    escitalopram (LEXAPRO) 5 mg tablet    escitalopram (Lexapro) 10 mg tablet    LORazepam (Ativan) 0.5 mg tablet         Reason for visit is   Chief Complaint   Patient presents with    Virtual Regular Visit          Encounter provider López Gutierrez MD      Recent Visits  No visits were found meeting these conditions.  Showing recent visits within past 7 days and meeting all other requirements  Today's Visits  Date Type Provider Dept   01/24/25 Telemedicine López Gutierrez MD [facility]   Showing today's visits and meeting all other requirements  Future Appointments  No visits were found meeting these conditions.  Showing future appointments  within next 150 days and meeting all other requirements       The patient was identified by name and date of birth. Sandra Peña was informed that this is a telemedicine visit and that the visit is being conducted throughthe Epic Embedded platform. She agrees to proceed..  My office door was closed. No one else was in the room.  She acknowledged consent and understanding of privacy and security of the video platform. The patient has agreed to participate and understands they can discontinue the visit at any time.    Patient is aware this is a billable service.         MEDICATION MANAGEMENT NOTE        [facility]      Name and Date of Birth:  Sandra Peña 84 y.o. 1940 MRN: [id number]    Date of Visit: January 24, 2025    Reason for Visit: Follow-up visit regarding medication management     _____________________________    Assessment & Plan  Illness anxiety disorder  Increase escitalopram to 15 mg QD for obsessive thinking, anxiety, depressive symptoms  Continue lorazepam 0.5 mg TID for obsessive thinking  Continue risperidone 0.5 mg QHS for augmentation for obsessive thinking  Continue psychotherapy  Orders:    escitalopram (LEXAPRO) 5 mg tablet; Take 1 tablet (5 mg total) by mouth daily Total 15 mg daily    escitalopram (Lexapro) 10 mg tablet; Take 1 tablet (10 mg total) by mouth daily Total 15 mg daily    LORazepam (Ativan) 0.5 mg tablet; Take 1 tablet (0.5 mg total) by mouth 3 (three) times a day    Major depressive disorder, recurrent episode, in partial remission (HCC)    Orders:    escitalopram (LEXAPRO) 5 mg tablet; Take 1 tablet (5 mg total) by mouth daily Total 15 mg daily    escitalopram (Lexapro) 10 mg tablet; Take 1 tablet (10 mg total) by mouth daily Total 15 mg daily           Rule-out obsessive-compulsive disorder vs JONAH    Patient and daughter report recurrence of some previous symptoms including somatic symptoms, crying, and loss of speech,  "however not to the same severity as in past. Sleep and appetite are good. Will increase escitalopram to 15 mg, being mindful of patient's age, as first- for obsessive thinking believed to be underlying manifest symptoms. In future may consider dividing risperidone dose or small addition (0.25 mg) in morning if needed.    Sodium last month was normal. Kidney function noted, mindful of risperidone.    Treatment Recommendations/Precautions:  Risks/benefits/alternatives to treatment discussed, including a myriad of potential adverse medication side effects, to which Sandra voiced understanding and consented fully to treatment.   Labs most recently obtained , reviewed.   Follow up in 2 weeks for medication management  Follow up with PCP for medical issues and ongoing care  Aware of 24 hour and weekend coverage for urgent situations accessed by calling [facility] main practice number        Treatment Plan:     Completed and signed during the session: Yes - with Sandra    _____________________________________________    History of Present Illness     Chief Complaint: \"bad taste in my mouth\"    SUBJECTIVE:    Patient's daughter reports \"heightened anxiety\" recently over time. Some return of previously remitted symptoms such as obsessive thinking. More anxiety, loss of speech, crying, more than previous, but not as bad as in the past before remission. More frustration with daughter resulting in disputes. Contrary behavior; refusal of things that may help her to feel better. More somatic complaints.    Continuing to go to knitting group. However patient's obsessive thinking makes this difficult. Much negativity. Overall knitting is still a positive.    Sleep is \"ok.\" Patient preoccupied with \"hearing things.\" Daughter agrees sleep has been good. Helped by melatonin and risperidone. Waking at good times. Motivation in morning is still good. Appetite is good. Continuing to see individual " therapist weekly. Has been using an occasional fourth lorazepam dose. TID is 8, 1, and 5, sometimes will need one at 8 now. This is rare, however, last done two weeks ago. Felt risperidone has been very helpful.    No recent big changes in life that would cause stress that could lead to decline.    No unsteadiness/dizziness/lightheadedness noted. Urinating normally. Bowel movements are regular.    Psychiatric Review Of Systems:  Unchanged information from this writer's previous assessment is copied and italicized; information that has changed is bolded.    Appetite: no  Adverse eating: no  Weight changes: no  Insomnia/sleeplessness: no  Fatigue/anergy: no  Anhedonia/lack of interest: no  Attention/concentration: no  Psychomotor agitation/retardation: no  Somatic symptoms: yes  Anxiety/panic attack: yes  Zaira/hypomania: no  Hopelessness/helplessness/worthlessness: no  Self-injurious behavior/high-risk behavior: no  Suicidal ideation: no  Homicidal ideation: no  Auditory hallucinations: no  Visual hallucinations: no  Delusional thinking: no  Obsessive/compulsive symptoms: yes    Review Of Systems:      Constitutional negative   ENT negative   Cardiovascular negative   Respiratory negative   Gastrointestinal negative   Genitourinary negative   Musculoskeletal negative   Integumentary negative   Neurological negative   Endocrine negative   Other Symptoms none, all other systems are negative     Objective    OBJECTIVE:     Visit Vitals  OB Status Postmenopausal   Smoking Status Never      Wt Readings from Last 6 Encounters:   12/17/24 53.1 kg (117 lb)   11/18/24 49.9 kg (110 lb)   10/17/24 49.9 kg (110 lb 0.2 oz)   10/10/24 49.4 kg (109 lb)   10/04/24 48.7 kg (107 lb 5.8 oz)   09/29/24 49.4 kg (109 lb)        Past Medical History:   Diagnosis Date    Anxiety     Colon polyps     Depression     Diabetes mellitus (HCC)     Dizziness     Last assessed: 8/31/15    DVT (deep venous thrombosis) ([facility])     Dysuria     Hematuria  "2022    Hyperlipidemia     Hypertension     Hypertensive urgency 10/22/2021    Insomnia     Panic attack     Ureterolithiasis 2020      Past Surgical History:   Procedure Laterality Date     SECTION      1964 son, 1968 daughter    COLONOSCOPY      FL RETROGRADE PYELOGRAM  2020    LA COLONOSCOPY FLX DX W/COLLJ SPEC WHEN PFRMD N/A 3/27/2017    Procedure: COLONOSCOPY;  Surgeon: Gold Francis MD;  Location: [facility];  Service: Gastroenterology    LA CYSTO/URETERO W/LITHOTRIPSY &INDWELL STENT INSRT Right 2020    Procedure: CYSTOSCOPY URETEROSCOPY WITH LITHOTRIPSY HOLMIUM LASER, RETROGRADE PYELOGRAM AND INSERTION STENT URETERAL; EXCISION OF BLADDER TUMOR;  Surgeon: Edwin Love MD;  Location: [facility];  Service: Urology    ROTATOR CUFF REPAIR Left     Last assessed: 3/29/15    TONSILLECTOMY         Meds/Allergies    No Known Allergies  Current Outpatient Medications   Medication Instructions    aspirin 81 mg, Daily    atorvastatin (LIPITOR) 10 mg, Oral, Daily    Cholecalciferol (VITAMIN D3) 2,000 Units, Oral, Daily    escitalopram (LEXAPRO) 5 mg, Oral, Daily, Total 15 mg daily    escitalopram (LEXAPRO) 10 mg, Oral, Daily, Total 15 mg daily    linaCLOtide 145 mcg, Oral, Daily    lisinopril-hydrochlorothiazide (PRINZIDE,ZESTORETIC) 10-12.5 MG per tablet 1 tablet, Oral, Daily    LORazepam (ATIVAN) 0.5 mg, Oral, 3 times daily    metFORMIN (GLUCOPHAGE) 500 mg, 2 times daily with meals    polyethylene glycol (MIRALAX) 17 g, Oral, Daily    psyllium (METAMUCIL) packet 1 packet, Oral, Daily    risperiDONE (RISPERDAL) 0.5 mg, Oral, Daily at bedtime    senna-docusate sodium (SENOKOT S) 8.6-50 mg per tablet 1 tablet, Oral, Daily PRN           Mental Status Exam:    Appearance Blue sweater, glasses, short grey curly hair, good eye contact   Behavior/motor Calm and cooperative   Speech/language More halting/stuttering than previous   Mood \"negative\"   Affect Dysphoric at times, expresses full " range, appropriate   Thought process Logical and linear   Thought content Obsessive thinking    No overt delusions, Denies hallucinations, Denies suicidal ideations   Cognition Awake and alert, oriented and memory grossly intact, and concentrates on questions   Insight/judgment Insight: fair  Judgment: fair     Laboratory Results: I have personally reviewed all pertinent laboratory/tests results    Orders Only on 12/17/2024   Component Date Value Ref Range Status    Urine Culture Result 12/17/2024  (A)   Final    Comment:     CULTURE, URINE, ROUTINE         Micro Number:      [id number]    Test Status:       Final    Specimen Source:   Urine    Specimen Quality:  Adequate    Result:            50,000-100,000 CFU/mL of Escherichia coli      Comment:           A portion of the results were performed at [facility].                              E.coli                            ----------------                            INT   COOPER     AMIKACIN               S     4     AMOX/CLAVULANATE       S     8     AMP/SULBACTAM          I     16     CEFAZOLIN              NR    <=4 **2     CEFEPIME               S     <=0.12     CEFTAZIDIME            S     <=1     CEFTRIAXONE            S     <=0.25     CIPROFLOXACIN          I     0.5     GENTAMICIN             S     <=1     IMIPENEM               S     <=0.25     LEVOFLOXACIN           I     1     MEROPENEM              S     <=0.25     NITROFURANTOIN         S     <=16     PIP/TAZOBACTAM         S     <=4     TRIMETHOPRIM/SULFA     S     <=20    S = Susceptible  I = Inter                           mediate  R = Resistant  NS = Not susceptible  SDD = Susceptible Dose Dependent  * = Not Tested  NR = Not Reported  **NN = See Therapy Comments      THERAPY COMMENTS        Note 1:      For infections other than uncomplicated UTI      caused by E. coli, K. pneumoniae or P. mirabilis:      Cefazolin is resistant if COOPER > or = 8 mcg/mL.      (Distinguishing susceptible versus intermediate       for isolates with COOPER < or = 4 mcg/mL requires      additional testing.)        Note 2:      For uncomplicated UTI caused by E. coli,      K. pneumoniae or P. mirabilis: Cefazolin is      susceptible if COOPER <32 mcg/mL and predicts      susceptible to the oral agents cefaclor, cefdinir,      cefpodoxime, cefprozil, cefuroxime, cephalexin      and loracarbef.     Office Visit on 12/17/2024   Component Date Value Ref Range Status    LEUKOCYTE ESTERASE,UA 12/17/2024 75   Final    NITRITE,UA 12/17/2024 neg   Final    SL AMB POCT UROBILINOGEN 12/17/2024 norm   Final    POCT URINE PROTEIN 12/17/2024 neg   Final     PH,UA 12/17/2024 5   Final    BLOOD,UA 12/17/2024 50   Final    SPECIFIC GRAVITY,UA 12/17/2024 1.020   Final    KETONES,UA 12/17/2024 neg   Final    BILIRUBIN,UA 12/17/2024 neg   Final    GLUCOSE, UA 12/17/2024 50   Final     COLOR,UA 12/17/2024 yellow   Final    CLARITY,UA 12/17/2024 cloudy   Final   Appointment on 12/12/2024   Component Date Value Ref Range Status    Creatinine, Ur 12/12/2024 58.6  Reference range not established. mg/dL Final    Albumin,U,Random 12/12/2024 37.9 (H)  <20.0 mg/L Final    Albumin Creat Ratio 12/12/2024 65 (H)  0 - 30 mg/g creatinine Final    WBC 12/12/2024 4.79  4.31 - 10.16 Thousand/uL Final    RBC 12/12/2024 3.46 (L)  3.81 - 5.12 Million/uL Final    Hemoglobin 12/12/2024 10.1 (L)  11.5 - 15.4 g/dL Final    Hematocrit 12/12/2024 32.0 (L)  34.8 - 46.1 % Final    MCV 12/12/2024 93  82 - 98 fL Final    MCH 12/12/2024 29.2  26.8 - 34.3 pg Final    MCHC 12/12/2024 31.6  31.4 - 37.4 g/dL Final    RDW 12/12/2024 13.9  11.6 - 15.1 % Final    MPV 12/12/2024 10.8  8.9 - 12.7 fL Final    Platelets 12/12/2024 194  149 - 390 Thousands/uL Final    nRBC 12/12/2024 0  /100 WBCs Final    Segmented % 12/12/2024 61  43 - 75 % Final    Immature Grans % 12/12/2024 0  0 - 2 % Final    Lymphocytes % 12/12/2024 28  14 - 44 % Final    Monocytes % 12/12/2024 7  4 - 12 % Final    Eosinophils Relative  12/12/2024 3  0 - 6 % Final    Basophils Relative 12/12/2024 1  0 - 1 % Final    Absolute Neutrophils 12/12/2024 2.98  1.85 - 7.62 Thousands/µL Final    Absolute Immature Grans 12/12/2024 0.02  0.00 - 0.20 Thousand/uL Final    Absolute Lymphocytes 12/12/2024 1.32  0.60 - 4.47 Thousands/µL Final    Absolute Monocytes 12/12/2024 0.32  0.17 - 1.22 Thousand/µL Final    Eosinophils Absolute 12/12/2024 0.12  0.00 - 0.61 Thousand/µL Final    Basophils Absolute 12/12/2024 0.03  0.00 - 0.10 Thousands/µL Final    Sodium 12/12/2024 139  135 - 147 mmol/L Final    Potassium 12/12/2024 4.1  3.5 - 5.3 mmol/L Final    Chloride 12/12/2024 106  96 - 108 mmol/L Final    CO2 12/12/2024 27  21 - 32 mmol/L Final    ANION GAP 12/12/2024 6  4 - 13 mmol/L Final    BUN 12/12/2024 26 (H)  5 - 25 mg/dL Final    Creatinine 12/12/2024 1.06  0.60 - 1.30 mg/dL Final    Standardized to IDMS reference method    Glucose, Fasting 12/12/2024 92  65 - 99 mg/dL Final    Calcium 12/12/2024 9.3  8.4 - 10.2 mg/dL Final    AST 12/12/2024 11 (L)  13 - 39 U/L Final    ALT 12/12/2024 7  7 - 52 U/L Final    Specimen collection should occur prior to Sulfasalazine administration due to the potential for falsely depressed results.     Alkaline Phosphatase 12/12/2024 55  34 - 104 U/L Final    Total Protein 12/12/2024 6.2 (L)  6.4 - 8.4 g/dL Final    Albumin 12/12/2024 3.7  3.5 - 5.0 g/dL Final    Total Bilirubin 12/12/2024 0.77  0.20 - 1.00 mg/dL Final    Use of this assay is not recommended for patients undergoing treatment with eltrombopag due to the potential for falsely elevated results.  N-acetyl-p-benzoquinone imine (metabolite of Acetaminophen) will generate erroneously low results in samples for patients that have taken an overdose of Acetaminophen.    eGFR 12/12/2024 47  ml/min/1.73sq m Final    Hemoglobin A1C 12/12/2024 6.2 (H)  Normal 4.0-5.6%; PreDiabetic 5.7-6.4%; Diabetic >=6.5%; Glycemic control for adults with diabetes <7.0% % Final    EAG 12/12/2024  131  mg/dl Final    Cholesterol 12/12/2024 128  See Comment mg/dL Final    Cholesterol:         Pediatric <18 Years        Desirable          <170 mg/dL      Borderline High    170-199 mg/dL      High               >=200 mg/dL        Adult >=18 Years            Desirable         <200 mg/dL      Borderline High   200-239 mg/dL      High              >239 mg/dL      Triglycerides 12/12/2024 91  See Comment mg/dL Final    Triglyceride:     0-9Y            <75mg/dL     10Y-17Y         <90 mg/dL       >=18Y     Normal          <150 mg/dL     Borderline High 150-199 mg/dL     High            200-499 mg/dL        Very High       >499 mg/dL    Specimen collection should occur prior to Metamizole administration due to the potential for falsely depressed results.    HDL, Direct 12/12/2024 55  >=50 mg/dL Final    LDL Calculated 12/12/2024 55  0 - 100 mg/dL Final    LDL Cholesterol:     Optimal           <100 mg/dl     Near Optimal      100-129 mg/dl     Above Optimal       Borderline High 130-159 mg/dl       High            160-189 mg/dl       Very High       >189 mg/dl         This screening LDL is a calculated result.   It does not have the accuracy of the Direct Measured LDL in the monitoring of patients with hyperlipidemia and/or statin therapy.   Direct Measure LDL (ESI917) must be ordered separately in these patients.    Non-HDL-Chol (CHOL-HDL) 12/12/2024 73  mg/dl Final    TSH 3RD GENERATON 12/12/2024 1.083  0.450 - 4.500 uIU/mL Final    The recommended reference ranges for TSH during pregnancy are as follows:   First trimester 0.100 to 2.500 uIU/mL   Second trimester  0.200 to 3.000 uIU/mL   Third trimester 0.300 to 3.000 uIU/m    Note: Normal ranges may not apply to patients who are transgender, non-binary, or whose legal sex, sex at birth, and gender identity differ.  Adult TSH (3rd generation) reference range follows the recommended guidelines of the American Thyroid Association, January, 2020.    Vitamin B-12  12/12/2024 495  180 - 914 pg/mL Final    Folate 12/12/2024 16.1  >5.9 ng/mL Final    The World Health Organization has determined deficient folate concentrations are considered to be <4.0 ng/mL.   Orders Only on 11/13/2024   Component Date Value Ref Range Status    Supplier Name 11/13/2024 [employer]   Final-Edited    Supplier Phone Number 11/13/2024 [phone]   Final-Edited    Order Status 11/13/2024 Delivery Successful   Final-Edited    Delivery Request Date 11/13/2024 11/13/2024   Final-Edited    Date Delivered  11/13/2024 11/14/2024   Final-Edited    Item Description 11/13/2024 Evette Delacruz, Adult   Final-Edited    Qty: 1   Admission on 10/17/2024, Discharged on 10/17/2024   Component Date Value Ref Range Status    Sodium 10/17/2024 139  135 - 147 mmol/L Final    Potassium 10/17/2024 3.8  3.5 - 5.3 mmol/L Final    Chloride 10/17/2024 105  96 - 108 mmol/L Final    CO2 10/17/2024 27  21 - 32 mmol/L Final    ANION GAP 10/17/2024 7  4 - 13 mmol/L Final    BUN 10/17/2024 21  5 - 25 mg/dL Final    Creatinine 10/17/2024 0.98  0.60 - 1.30 mg/dL Final    Standardized to IDMS reference method    Glucose 10/17/2024 121  65 - 140 mg/dL Final    If the patient is fasting, the ADA then defines impaired fasting glucose as > 100 mg/dL and diabetes as > or equal to 123 mg/dL.    Calcium 10/17/2024 8.9  8.4 - 10.2 mg/dL Final    AST 10/17/2024 11 (L)  13 - 39 U/L Final    ALT 10/17/2024 8  7 - 52 U/L Final    Specimen collection should occur prior to Sulfasalazine administration due to the potential for falsely depressed results.     Alkaline Phosphatase 10/17/2024 45  34 - 104 U/L Final    Total Protein 10/17/2024 5.8 (L)  6.4 - 8.4 g/dL Final    Albumin 10/17/2024 3.6  3.5 - 5.0 g/dL Final    Total Bilirubin 10/17/2024 0.68  0.20 - 1.00 mg/dL Final    Use of this assay is not recommended for patients undergoing treatment with eltrombopag due to the potential for falsely elevated  results.  N-acetyl-p-benzoquinone imine (metabolite of Acetaminophen) will generate erroneously low results in samples for patients that have taken an overdose of Acetaminophen.    eGFR 10/17/2024 52  ml/min/1.73sq m Final    WBC 10/17/2024 5.82  4.31 - 10.16 Thousand/uL Final    RBC 10/17/2024 3.88  3.81 - 5.12 Million/uL Final    Hemoglobin 10/17/2024 11.3 (L)  11.5 - 15.4 g/dL Final    Hematocrit 10/17/2024 34.9  34.8 - 46.1 % Final    MCV 10/17/2024 90  82 - 98 fL Final    MCH 10/17/2024 29.1  26.8 - 34.3 pg Final    MCHC 10/17/2024 32.4  31.4 - 37.4 g/dL Final    RDW 10/17/2024 13.3  11.6 - 15.1 % Final    MPV 10/17/2024 10.8  8.9 - 12.7 fL Final    Platelets 10/17/2024 230  149 - 390 Thousands/uL Final    nRBC 10/17/2024 0  /100 WBCs Final    Segmented % 10/17/2024 68  43 - 75 % Final    Immature Grans % 10/17/2024 0  0 - 2 % Final    Lymphocytes % 10/17/2024 24  14 - 44 % Final    Monocytes % 10/17/2024 6  4 - 12 % Final    Eosinophils Relative 10/17/2024 1  0 - 6 % Final    Basophils Relative 10/17/2024 1  0 - 1 % Final    Absolute Neutrophils 10/17/2024 3.94  1.85 - 7.62 Thousands/µL Final    Absolute Immature Grans 10/17/2024 0.02  0.00 - 0.20 Thousand/uL Final    Absolute Lymphocytes 10/17/2024 1.38  0.60 - 4.47 Thousands/µL Final    Absolute Monocytes 10/17/2024 0.36  0.17 - 1.22 Thousand/µL Final    Eosinophils Absolute 10/17/2024 0.08  0.00 - 0.61 Thousand/µL Final    Basophils Absolute 10/17/2024 0.04  0.00 - 0.10 Thousands/µL Final    Color, UA 10/17/2024 Light Yellow   Final    Clarity, UA 10/17/2024 Clear   Final    Specific Gravity, UA 10/17/2024 1.012  1.003 - 1.030 Final    pH, UA 10/17/2024 7.0  4.5, 5.0, 5.5, 6.0, 6.5, 7.0, 7.5, 8.0 Final    Leukocytes, UA 10/17/2024 Negative  Negative Final    Nitrite, UA 10/17/2024 Negative  Negative Final    Protein, UA 10/17/2024 Negative  Negative mg/dl Final    Glucose, UA 10/17/2024 Negative  Negative mg/dl Final    Ketones, UA 10/17/2024 Negative   Negative mg/dl Final    Urobilinogen, UA 10/17/2024 <2.0  <2.0 mg/dl mg/dl Final    Bilirubin, UA 10/17/2024 Negative  Negative Final    Occult Blood, UA 10/17/2024 Negative  Negative Final    Magnesium 10/17/2024 1.8 (L)  1.9 - 2.7 mg/dL Final    SARS-CoV-2 10/17/2024 Negative  Negative Final         INFLUENZA A PCR 10/17/2024 Negative  Negative Final         INFLUENZA B PCR 10/17/2024 Negative  Negative Final         RSV PCR 10/17/2024 Negative  Negative Final         Ventricular Rate 10/17/2024 79  BPM Final    Atrial Rate 10/17/2024 79  BPM Final    WI Interval 10/17/2024 166  ms Final    QRSD Interval 10/17/2024 72  ms Final    QT Interval 10/17/2024 358  ms Final    QTC Interval 10/17/2024 410  ms Final    P Axis 10/17/2024 52  degrees Final    QRS Axis 10/17/2024 1  degrees Final    T Wave Axis 10/17/2024 42  degrees Final   Admission on 10/03/2024, Discharged on 10/05/2024   Component Date Value Ref Range Status    WBC 10/04/2024 5.78  4.31 - 10.16 Thousand/uL Final    RBC 10/04/2024 3.90  3.81 - 5.12 Million/uL Final    Hemoglobin 10/04/2024 11.3 (L)  11.5 - 15.4 g/dL Final    Hematocrit 10/04/2024 35.2  34.8 - 46.1 % Final    MCV 10/04/2024 90  82 - 98 fL Final    MCH 10/04/2024 29.0  26.8 - 34.3 pg Final    MCHC 10/04/2024 32.1  31.4 - 37.4 g/dL Final    RDW 10/04/2024 12.9  11.6 - 15.1 % Final    MPV 10/04/2024 10.9  8.9 - 12.7 fL Final    Platelets 10/04/2024 256  149 - 390 Thousands/uL Final    nRBC 10/04/2024 0  /100 WBCs Final    Segmented % 10/04/2024 56  43 - 75 % Final    Immature Grans % 10/04/2024 0  0 - 2 % Final    Lymphocytes % 10/04/2024 32  14 - 44 % Final    Monocytes % 10/04/2024 8  4 - 12 % Final    Eosinophils Relative 10/04/2024 3  0 - 6 % Final    Basophils Relative 10/04/2024 1  0 - 1 % Final    Absolute Neutrophils 10/04/2024 3.21  1.85 - 7.62 Thousands/µL Final    Absolute Immature Grans 10/04/2024 0.02  0.00 - 0.20 Thousand/uL Final    Absolute Lymphocytes 10/04/2024 1.85  0.60 -  4.47 Thousands/µL Final    Absolute Monocytes 10/04/2024 0.48  0.17 - 1.22 Thousand/µL Final    Eosinophils Absolute 10/04/2024 0.18  0.00 - 0.61 Thousand/µL Final    Basophils Absolute 10/04/2024 0.04  0.00 - 0.10 Thousands/µL Final    Sodium 10/04/2024 137  135 - 147 mmol/L Final    Potassium 10/04/2024 4.4  3.5 - 5.3 mmol/L Final    Chloride 10/04/2024 102  96 - 108 mmol/L Final    CO2 10/04/2024 28  21 - 32 mmol/L Final    ANION GAP 10/04/2024 7  4 - 13 mmol/L Final    BUN 10/04/2024 34 (H)  5 - 25 mg/dL Final    Creatinine 10/04/2024 1.15  0.60 - 1.30 mg/dL Final    Standardized to IDMS reference method    Glucose 10/04/2024 153 (H)  65 - 140 mg/dL Final    If the patient is fasting, the ADA then defines impaired fasting glucose as > 100 mg/dL and diabetes as > or equal to 123 mg/dL.    Calcium 10/04/2024 9.3  8.4 - 10.2 mg/dL Final    AST 10/04/2024 28  13 - 39 U/L Final    ALT 10/04/2024 20  7 - 52 U/L Final    Specimen collection should occur prior to Sulfasalazine administration due to the potential for falsely depressed results.     Alkaline Phosphatase 10/04/2024 58  34 - 104 U/L Final    Total Protein 10/04/2024 6.4  6.4 - 8.4 g/dL Final    Albumin 10/04/2024 3.8  3.5 - 5.0 g/dL Final    Total Bilirubin 10/04/2024 0.32  0.20 - 1.00 mg/dL Final    Use of this assay is not recommended for patients undergoing treatment with eltrombopag due to the potential for falsely elevated results.  N-acetyl-p-benzoquinone imine (metabolite of Acetaminophen) will generate erroneously low results in samples for patients that have taken an overdose of Acetaminophen.    eGFR 10/04/2024 43  ml/min/1.73sq m Final    LACTIC ACID 10/04/2024 2.2 (H)  0.5 - 2.0 mmol/L Final    LACTIC ACID 10/04/2024 2.2 (H)  0.5 - 2.0 mmol/L Final    WBC 10/04/2024 7.78  4.31 - 10.16 Thousand/uL Final    RBC 10/04/2024 3.81  3.81 - 5.12 Million/uL Final    Hemoglobin 10/04/2024 11.0 (L)  11.5 - 15.4 g/dL Final    Hematocrit 10/04/2024 36.7  34.8  - 46.1 % Final    MCV 10/04/2024 96  82 - 98 fL Final    short sample    MCH 10/04/2024 28.9  26.8 - 34.3 pg Final    MCHC 10/04/2024 30.0 (L)  31.4 - 37.4 g/dL Final    RDW 10/04/2024 12.7  11.6 - 15.1 % Final    MPV 10/04/2024 10.4  8.9 - 12.7 fL Final    Platelets 10/04/2024 221  149 - 390 Thousands/uL Final    nRBC 10/04/2024 0  /100 WBCs Final    Segmented % 10/04/2024 74  43 - 75 % Final    Immature Grans % 10/04/2024 0  0 - 2 % Final    Lymphocytes % 10/04/2024 18  14 - 44 % Final    Monocytes % 10/04/2024 5  4 - 12 % Final    Eosinophils Relative 10/04/2024 2  0 - 6 % Final    Basophils Relative 10/04/2024 1  0 - 1 % Final    Absolute Neutrophils 10/04/2024 5.74  1.85 - 7.62 Thousands/µL Final    Absolute Immature Grans 10/04/2024 0.03  0.00 - 0.20 Thousand/uL Final    Absolute Lymphocytes 10/04/2024 1.42  0.60 - 4.47 Thousands/µL Final    Absolute Monocytes 10/04/2024 0.42  0.17 - 1.22 Thousand/µL Final    Eosinophils Absolute 10/04/2024 0.12  0.00 - 0.61 Thousand/µL Final    Basophils Absolute 10/04/2024 0.05  0.00 - 0.10 Thousands/µL Final    Sodium 10/04/2024 134 (L)  135 - 147 mmol/L Final    Potassium 10/04/2024 4.5  3.5 - 5.3 mmol/L Final    Chloride 10/04/2024 103  96 - 108 mmol/L Final    CO2 10/04/2024 24  21 - 32 mmol/L Final    ANION GAP 10/04/2024 7  4 - 13 mmol/L Final    BUN 10/04/2024 29 (H)  5 - 25 mg/dL Final    Creatinine 10/04/2024 1.05  0.60 - 1.30 mg/dL Final    Standardized to IDMS reference method    Glucose 10/04/2024 137  65 - 140 mg/dL Final    If the patient is fasting, the ADA then defines impaired fasting glucose as > 100 mg/dL and diabetes as > or equal to 123 mg/dL.    Calcium 10/04/2024 8.7  8.4 - 10.2 mg/dL Final    eGFR 10/04/2024 48  ml/min/1.73sq m Final    LACTIC ACID 10/04/2024 2.0  0.5 - 2.0 mmol/L Final    POC Glucose 10/04/2024 124  65 - 140 mg/dl Final    POC Glucose 10/04/2024 168 (H)  65 - 140 mg/dl Final    POC Glucose 10/04/2024 131  65 - 140 mg/dl Final    TSH  3RD GENERATON 10/05/2024 0.957  0.450 - 4.500 uIU/mL Final    The recommended reference ranges for TSH during pregnancy are as follows:   First trimester 0.100 to 2.500 uIU/mL   Second trimester  0.200 to 3.000 uIU/mL   Third trimester 0.300 to 3.000 uIU/m    Note: Normal ranges may not apply to patients who are transgender, non-binary, or whose legal sex, sex at birth, and gender identity differ.  Adult TSH (3rd generation) reference range follows the recommended guidelines of the [employer], January, 2020.    Protime 10/05/2024 14.3  12.3 - 15.0 seconds Final    INR 10/05/2024 1.04  0.85 - 1.19 Final    WBC 10/05/2024 5.15  4.31 - 10.16 Thousand/uL Final    RBC 10/05/2024 3.27 (L)  3.81 - 5.12 Million/uL Final    Hemoglobin 10/05/2024 9.8 (L)  11.5 - 15.4 g/dL Final    Hematocrit 10/05/2024 29.5 (L)  34.8 - 46.1 % Final    MCV 10/05/2024 90  82 - 98 fL Final    Results verified by repeat    MCH 10/05/2024 30.0  26.8 - 34.3 pg Final    MCHC 10/05/2024 33.2  31.4 - 37.4 g/dL Final    RDW 10/05/2024 12.7  11.6 - 15.1 % Final    MPV 10/05/2024 10.7  8.9 - 12.7 fL Final    Platelets 10/05/2024 210  149 - 390 Thousands/uL Final    nRBC 10/05/2024 0  /100 WBCs Final    Segmented % 10/05/2024 58  43 - 75 % Final    Immature Grans % 10/05/2024 0  0 - 2 % Final    Lymphocytes % 10/05/2024 31  14 - 44 % Final    Monocytes % 10/05/2024 6  4 - 12 % Final    Eosinophils Relative 10/05/2024 4  0 - 6 % Final    Basophils Relative 10/05/2024 1  0 - 1 % Final    Absolute Neutrophils 10/05/2024 2.97  1.85 - 7.62 Thousands/µL Final    Absolute Immature Grans 10/05/2024 0.02  0.00 - 0.20 Thousand/uL Final    Absolute Lymphocytes 10/05/2024 1.61  0.60 - 4.47 Thousands/µL Final    Absolute Monocytes 10/05/2024 0.33  0.17 - 1.22 Thousand/µL Final    Eosinophils Absolute 10/05/2024 0.19  0.00 - 0.61 Thousand/µL Final    Basophils Absolute 10/05/2024 0.03  0.00 - 0.10 Thousands/µL Final    Sodium 10/05/2024 139  135 - 147  mmol/L Final    Potassium 10/05/2024 4.1  3.5 - 5.3 mmol/L Final    Chloride 10/05/2024 106  96 - 108 mmol/L Final    CO2 10/05/2024 28  21 - 32 mmol/L Final    ANION GAP 10/05/2024 5  4 - 13 mmol/L Final    BUN 10/05/2024 20  5 - 25 mg/dL Final    Creatinine 10/05/2024 1.04  0.60 - 1.30 mg/dL Final    Standardized to IDMS reference method    Glucose 10/05/2024 106  65 - 140 mg/dL Final    If the patient is fasting, the ADA then defines impaired fasting glucose as > 100 mg/dL and diabetes as > or equal to 123 mg/dL.    Calcium 10/05/2024 8.7  8.4 - 10.2 mg/dL Final    Corrected Calcium 10/05/2024 9.4  8.3 - 10.1 mg/dL Final    AST 10/05/2024 11 (L)  13 - 39 U/L Final    ALT 10/05/2024 11  7 - 52 U/L Final    Specimen collection should occur prior to Sulfasalazine administration due to the potential for falsely depressed results.     Alkaline Phosphatase 10/05/2024 48  34 - 104 U/L Final    Total Protein 10/05/2024 5.1 (L)  6.4 - 8.4 g/dL Final    Albumin 10/05/2024 3.1 (L)  3.5 - 5.0 g/dL Final    Total Bilirubin 10/05/2024 0.31  0.20 - 1.00 mg/dL Final    Use of this assay is not recommended for patients undergoing treatment with eltrombopag due to the potential for falsely elevated results.  N-acetyl-p-benzoquinone imine (metabolite of Acetaminophen) will generate erroneously low results in samples for patients that have taken an overdose of Acetaminophen.    eGFR 10/05/2024 49  ml/min/1.73sq m Final    POC Glucose 10/05/2024 105  65 - 140 mg/dl Final    POC Glucose 10/05/2024 132  65 - 140 mg/dl Final    POC Glucose 10/05/2024 187 (H)  65 - 140 mg/dl Final   Admission on 09/28/2024, Discharged on 09/30/2024   Component Date Value Ref Range Status    WBC 09/28/2024 7.21  4.31 - 10.16 Thousand/uL Final    RBC 09/28/2024 3.90  3.81 - 5.12 Million/uL Final    Hemoglobin 09/28/2024 11.2 (L)  11.5 - 15.4 g/dL Final    Hematocrit 09/28/2024 35.3  34.8 - 46.1 % Final    MCV 09/28/2024 91  82 - 98 fL Final    MCH  09/28/2024 28.7  26.8 - 34.3 pg Final    MCHC 09/28/2024 31.7  31.4 - 37.4 g/dL Final    RDW 09/28/2024 12.7  11.6 - 15.1 % Final    MPV 09/28/2024 10.4  8.9 - 12.7 fL Final    Platelets 09/28/2024 223  149 - 390 Thousands/uL Final    nRBC 09/28/2024 0  /100 WBCs Final    Segmented % 09/28/2024 65  43 - 75 % Final    Immature Grans % 09/28/2024 0  0 - 2 % Final    Lymphocytes % 09/28/2024 25  14 - 44 % Final    Monocytes % 09/28/2024 6  4 - 12 % Final    Eosinophils Relative 09/28/2024 3  0 - 6 % Final    Basophils Relative 09/28/2024 1  0 - 1 % Final    Absolute Neutrophils 09/28/2024 4.73  1.85 - 7.62 Thousands/µL Final    Absolute Immature Grans 09/28/2024 0.02  0.00 - 0.20 Thousand/uL Final    Absolute Lymphocytes 09/28/2024 1.77  0.60 - 4.47 Thousands/µL Final    Absolute Monocytes 09/28/2024 0.41  0.17 - 1.22 Thousand/µL Final    Eosinophils Absolute 09/28/2024 0.24  0.00 - 0.61 Thousand/µL Final    Basophils Absolute 09/28/2024 0.04  0.00 - 0.10 Thousands/µL Final    Sodium 09/28/2024 136  135 - 147 mmol/L Final    Potassium 09/28/2024 3.9  3.5 - 5.3 mmol/L Final    Chloride 09/28/2024 104  96 - 108 mmol/L Final    CO2 09/28/2024 24  21 - 32 mmol/L Final    ANION GAP 09/28/2024 8  4 - 13 mmol/L Final    BUN 09/28/2024 27 (H)  5 - 25 mg/dL Final    Creatinine 09/28/2024 1.14  0.60 - 1.30 mg/dL Final    Standardized to IDMS reference method    Glucose 09/28/2024 216 (H)  65 - 140 mg/dL Final    If the patient is fasting, the ADA then defines impaired fasting glucose as > 100 mg/dL and diabetes as > or equal to 123 mg/dL.    Calcium 09/28/2024 9.1  8.4 - 10.2 mg/dL Final    AST 09/28/2024 13  13 - 39 U/L Final    ALT 09/28/2024 7  7 - 52 U/L Final    Specimen collection should occur prior to Sulfasalazine administration due to the potential for falsely depressed results.     Alkaline Phosphatase 09/28/2024 46  34 - 104 U/L Final    Total Protein 09/28/2024 6.0 (L)  6.4 - 8.4 g/dL Final    Albumin 09/28/2024 3.7   3.5 - 5.0 g/dL Final    Total Bilirubin 09/28/2024 0.35  0.20 - 1.00 mg/dL Final    Use of this assay is not recommended for patients undergoing treatment with eltrombopag due to the potential for falsely elevated results.  N-acetyl-p-benzoquinone imine (metabolite of Acetaminophen) will generate erroneously low results in samples for patients that have taken an overdose of Acetaminophen.    eGFR 09/28/2024 43  ml/min/1.73sq m Final    Magnesium 09/28/2024 1.8 (L)  1.9 - 2.7 mg/dL Final    Phosphorus 09/28/2024 3.3  2.3 - 4.1 mg/dL Final    Ventricular Rate 09/29/2024 79  BPM Final    Atrial Rate 09/29/2024 300  BPM Final    QRSD Interval 09/29/2024 78  ms Final    QT Interval 09/29/2024 380  ms Final    QTC Interval 09/29/2024 435  ms Final    QRS Axis 09/29/2024 -10  degrees Final    T Wave [place] 09/29/2024 2  degrees Final    Hemoglobin A1C 09/29/2024 6.7 (H)  Normal 4.0-5.6%; PreDiabetic 5.7-6.4%; Diabetic >=6.5%; Glycemic control for adults with diabetes <7.0% % Final    EAG 09/29/2024 146  mg/dl Final    WBC 09/29/2024 9.50  4.31 - 10.16 Thousand/uL Final    RBC 09/29/2024 3.82  3.81 - 5.12 Million/uL Final    Hemoglobin 09/29/2024 11.0 (L)  11.5 - 15.4 g/dL Final    Hematocrit 09/29/2024 34.7 (L)  34.8 - 46.1 % Final    MCV 09/29/2024 91  82 - 98 fL Final    MCH 09/29/2024 28.8  26.8 - 34.3 pg Final    MCHC 09/29/2024 31.7  31.4 - 37.4 g/dL Final    RDW 09/29/2024 12.8  11.6 - 15.1 % Final    MPV 09/29/2024 10.7  8.9 - 12.7 fL Final    Platelets 09/29/2024 191  149 - 390 Thousands/uL Final    nRBC 09/29/2024 0  /100 WBCs Final    Segmented % 09/29/2024 86 (H)  43 - 75 % Final    Immature Grans % 09/29/2024 0  0 - 2 % Final    Lymphocytes % 09/29/2024 8 (L)  14 - 44 % Final    Monocytes % 09/29/2024 5  4 - 12 % Final    Eosinophils Relative 09/29/2024 1  0 - 6 % Final    Basophils Relative 09/29/2024 0  0 - 1 % Final    Absolute Neutrophils 09/29/2024 8.04 (H)  1.85 - 7.62 Thousands/µL Final    Absolute  Immature Grans 09/29/2024 0.04  0.00 - 0.20 Thousand/uL Final    Absolute Lymphocytes 09/29/2024 0.79  0.60 - 4.47 Thousands/µL Final    Absolute Monocytes 09/29/2024 0.47  0.17 - 1.22 Thousand/µL Final    Eosinophils Absolute 09/29/2024 0.13  0.00 - 0.61 Thousand/µL Final    Basophils Absolute 09/29/2024 0.03  0.00 - 0.10 Thousands/µL Final    Sodium 09/29/2024 140  135 - 147 mmol/L Final    Potassium 09/29/2024 3.4 (L)  3.5 - 5.3 mmol/L Final    Chloride 09/29/2024 107  96 - 108 mmol/L Final    CO2 09/29/2024 24  21 - 32 mmol/L Final    ANION GAP 09/29/2024 9  4 - 13 mmol/L Final    BUN 09/29/2024 24  5 - 25 mg/dL Final    Creatinine 09/29/2024 0.98  0.60 - 1.30 mg/dL Final    Standardized to IDMS reference method    Glucose 09/29/2024 137  65 - 140 mg/dL Final    If the patient is fasting, the ADA then defines impaired fasting glucose as > 100 mg/dL and diabetes as > or equal to 123 mg/dL.    Calcium 09/29/2024 8.6  8.4 - 10.2 mg/dL Final    eGFR 09/29/2024 52  ml/min/1.73sq m Final    Magnesium 09/29/2024 2.1  1.9 - 2.7 mg/dL Final    POC Glucose 09/29/2024 138  65 - 140 mg/dl Final    POC Glucose 09/29/2024 165 (H)  65 - 140 mg/dl Final    POC Glucose 09/29/2024 169 (H)  65 - 140 mg/dl Final    POC Glucose 09/29/2024 113  65 - 140 mg/dl Final    WBC 09/30/2024 6.30  4.31 - 10.16 Thousand/uL Final    RBC 09/30/2024 3.78 (L)  3.81 - 5.12 Million/uL Final    Hemoglobin 09/30/2024 10.7 (L)  11.5 - 15.4 g/dL Final    Hematocrit 09/30/2024 34.0 (L)  34.8 - 46.1 % Final    MCV 09/30/2024 90  82 - 98 fL Final    MCH 09/30/2024 28.3  26.8 - 34.3 pg Final    MCHC 09/30/2024 31.5  31.4 - 37.4 g/dL Final    RDW 09/30/2024 12.9  11.6 - 15.1 % Final    Platelets 09/30/2024 171  149 - 390 Thousands/uL Final    MPV 09/30/2024 10.6  8.9 - 12.7 fL Final    Sodium 09/30/2024 138  135 - 147 mmol/L Final    Potassium 09/30/2024 4.1  3.5 - 5.3 mmol/L Final    Chloride 09/30/2024 107  96 - 108 mmol/L Final    CO2 09/30/2024 24  21 -  32 mmol/L Final    ANION GAP 09/30/2024 7  4 - 13 mmol/L Final    BUN 09/30/2024 19  5 - 25 mg/dL Final    Creatinine 09/30/2024 0.96  0.60 - 1.30 mg/dL Final    Standardized to IDMS reference method    Glucose 09/30/2024 126  65 - 140 mg/dL Final    If the patient is fasting, the ADA then defines impaired fasting glucose as > 100 mg/dL and diabetes as > or equal to 123 mg/dL.    Calcium 09/30/2024 8.9  8.4 - 10.2 mg/dL Final    eGFR 09/30/2024 54  ml/min/1.73sq m Final    POC Glucose 09/30/2024 121  65 - 140 mg/dl Final    POC Glucose 09/30/2024 140  65 - 140 mg/dl Final   Admission on 09/23/2024, Discharged on 09/23/2024   Component Date Value Ref Range Status    Ventricular Rate 09/23/2024 85  BPM Final    Atrial Rate 09/23/2024 85  BPM Final    MT Interval 09/23/2024 160  ms Final    QRSD Interval 09/23/2024 72  ms Final    QT Interval 09/23/2024 348  ms Final    QTC Interval 09/23/2024 414  ms Final    P Axis 09/23/2024 39  degrees Final    QRS Axis 09/23/2024 -2  degrees Final    T Wave Axis 09/23/2024 13  degrees Final    WBC 09/23/2024 6.14  4.31 - 10.16 Thousand/uL Final    RBC 09/23/2024 4.21  3.81 - 5.12 Million/uL Final    Hemoglobin 09/23/2024 12.3  11.5 - 15.4 g/dL Final    Hematocrit 09/23/2024 37.8  34.8 - 46.1 % Final    MCV 09/23/2024 90  82 - 98 fL Final    MCH 09/23/2024 29.2  26.8 - 34.3 pg Final    MCHC 09/23/2024 32.5  31.4 - 37.4 g/dL Final    RDW 09/23/2024 12.7  11.6 - 15.1 % Final    MPV 09/23/2024 10.7  8.9 - 12.7 fL Final    Platelets 09/23/2024 197  149 - 390 Thousands/uL Final    nRBC 09/23/2024 0  /100 WBCs Final    Segmented % 09/23/2024 66  43 - 75 % Final    Immature Grans % 09/23/2024 0  0 - 2 % Final    Lymphocytes % 09/23/2024 24  14 - 44 % Final    Monocytes % 09/23/2024 6  4 - 12 % Final    Eosinophils Relative 09/23/2024 3  0 - 6 % Final    Basophils Relative 09/23/2024 1  0 - 1 % Final    Absolute Neutrophils 09/23/2024 4.11  1.85 - 7.62 Thousands/µL Final    Absolute  Immature Grans 09/23/2024 0.02  0.00 - 0.20 Thousand/uL Final    Absolute Lymphocytes 09/23/2024 1.44  0.60 - 4.47 Thousands/µL Final    Absolute Monocytes 09/23/2024 0.35  0.17 - 1.22 Thousand/µL Final    Eosinophils Absolute 09/23/2024 0.17  0.00 - 0.61 Thousand/µL Final    Basophils Absolute 09/23/2024 0.05  0.00 - 0.10 Thousands/µL Final    Sodium 09/23/2024 137  135 - 147 mmol/L Final    Potassium 09/23/2024 5.0  3.5 - 5.3 mmol/L Final    Moderately Hemolyzed:Results may be affected.    Chloride 09/23/2024 104  96 - 108 mmol/L Final    CO2 09/23/2024 25  21 - 32 mmol/L Final    ANION GAP 09/23/2024 8  4 - 13 mmol/L Final    BUN 09/23/2024 37 (H)  5 - 25 mg/dL Final    Creatinine 09/23/2024 1.18  0.60 - 1.30 mg/dL Final    Standardized to IDMS reference method    Glucose 09/23/2024 109  65 - 140 mg/dL Final    If the patient is fasting, the ADA then defines impaired fasting glucose as > 100 mg/dL and diabetes as > or equal to 123 mg/dL.    Calcium 09/23/2024 9.6  8.4 - 10.2 mg/dL Final    eGFR 09/23/2024 42  ml/min/1.73sq m Final   Admission on 09/15/2024, Discharged on 09/15/2024   Component Date Value Ref Range Status    Ventricular Rate 09/15/2024 84  BPM Final    Atrial Rate 09/15/2024 84  BPM Final    AL Interval 09/15/2024 162  ms Final    QRSD Interval 09/15/2024 68  ms Final    QT Interval 09/15/2024 348  ms Final    QTC Interval 09/15/2024 411  ms Final    P Axis 09/15/2024 55  degrees Final    QRS Axis 09/15/2024 6  degrees Final    T Wave [place] 09/15/2024 39  degrees Final    WBC 09/15/2024 7.61  4.31 - 10.16 Thousand/uL Final    RBC 09/15/2024 4.13  3.81 - 5.12 Million/uL Final    Hemoglobin 09/15/2024 11.8  11.5 - 15.4 g/dL Final    Hematocrit 09/15/2024 37.1  34.8 - 46.1 % Final    MCV 09/15/2024 90  82 - 98 fL Final    MCH 09/15/2024 28.6  26.8 - 34.3 pg Final    MCHC 09/15/2024 31.8  31.4 - 37.4 g/dL Final    RDW 09/15/2024 12.8  11.6 - 15.1 % Final    MPV 09/15/2024 10.9  8.9 - 12.7 fL Final     Platelets 09/15/2024 218  149 - 390 Thousands/uL Final    nRBC 09/15/2024 0  /100 WBCs Final    Segmented % 09/15/2024 73  43 - 75 % Final    Immature Grans % 09/15/2024 0  0 - 2 % Final    Lymphocytes % 09/15/2024 19  14 - 44 % Final    Monocytes % 09/15/2024 5  4 - 12 % Final    Eosinophils Relative 09/15/2024 2  0 - 6 % Final    Basophils Relative 09/15/2024 1  0 - 1 % Final    Absolute Neutrophils 09/15/2024 5.59  1.85 - 7.62 Thousands/µL Final    Absolute Immature Grans 09/15/2024 0.02  0.00 - 0.20 Thousand/uL Final    Absolute Lymphocytes 09/15/2024 1.41  0.60 - 4.47 Thousands/µL Final    Absolute Monocytes 09/15/2024 0.40  0.17 - 1.22 Thousand/µL Final    Eosinophils Absolute 09/15/2024 0.14  0.00 - 0.61 Thousand/µL Final    Basophils Absolute 09/15/2024 0.05  0.00 - 0.10 Thousands/µL Final    Sodium 09/15/2024 136  135 - 147 mmol/L Final    Potassium 09/15/2024 4.0  3.5 - 5.3 mmol/L Final    Chloride 09/15/2024 101  96 - 108 mmol/L Final    CO2 09/15/2024 25  21 - 32 mmol/L Final    ANION GAP 09/15/2024 10  4 - 13 mmol/L Final    BUN 09/15/2024 27 (H)  5 - 25 mg/dL Final    Creatinine 09/15/2024 1.05  0.60 - 1.30 mg/dL Final    Standardized to IDMS reference method    Glucose 09/15/2024 105  65 - 140 mg/dL Final    If the patient is fasting, the ADA then defines impaired fasting glucose as > 100 mg/dL and diabetes as > or equal to 123 mg/dL.    Calcium 09/15/2024 9.6  8.4 - 10.2 mg/dL Final    AST 09/15/2024 15  13 - 39 U/L Final    ALT 09/15/2024 9  7 - 52 U/L Final    Specimen collection should occur prior to Sulfasalazine administration due to the potential for falsely depressed results.     Alkaline Phosphatase 09/15/2024 49  34 - 104 U/L Final    Total Protein 09/15/2024 6.7  6.4 - 8.4 g/dL Final    Albumin 09/15/2024 4.1  3.5 - 5.0 g/dL Final    Total Bilirubin 09/15/2024 0.47  0.20 - 1.00 mg/dL Final    Use of this assay is not recommended for patients undergoing treatment with eltrombopag due to the  "potential for falsely elevated results.  N-acetyl-p-benzoquinone imine (metabolite of Acetaminophen) will generate erroneously low results in samples for patients that have taken an overdose of Acetaminophen.    eGFR 09/15/2024 48  ml/min/1.73sq m Final    Lipase 09/15/2024 45  11 - 82 u/L Final    hs TnI 0hr 09/15/2024 17  \"Refer to ACS Flowchart\"- see link ng/L Final    Comment:                                              Initial (time 0) result  If >=50 ng/L, Myocardial injury suggested ;  Type of myocardial injury and treatment strategy  to be determined.  If 5-49 ng/L, a delta result at 2 hours and or 4 hours will be needed to further evaluate.  If <4 ng/L, and chest pain has been >3 hours since onset, patient may qualify for discharge based on the HEART score in the ED.  If <5 ng/L and <3hours since onset of chest pain, a delta result at 2 hours will be needed to further evaluate.    HS Troponin 99th Percentile URL of a Health Population=12 ng/L with a 95% Confidence Interval of 8-18 ng/L.    Second Troponin (time 2 hours)  If calculated delta >= 20 ng/L,  Myocardial injury suggested ; Type of myocardial injury and treatment strategy to be determined.  If 5-49 ng/L and the calculated delta is 5-19 ng/L, consult medical service for evaluation.  Continue evaluation for ischemia on ecg and other possible etiology and repeat hs troponin at 4 hours.  If delta                            is <5 ng/L at 2 hours, consider discharge based on risk stratification via the HEART score (if in ED), or [place] risk score in IP/Observation.    HS Troponin 99th Percentile URL of a Health Population=12 ng/L with a 95% Confidence Interval of 8-18 ng/L.    Color, UA 09/15/2024 Light Yellow   Final    Clarity, UA 09/15/2024 Clear   Final    Specific Gravity, UA 09/15/2024 1.019  1.003 - 1.030 Final    pH, UA 09/15/2024 5.0  4.5, 5.0, 5.5, 6.0, 6.5, 7.0, 7.5, 8.0 Final    Leukocytes, UA 09/15/2024 Negative  Negative Final    Nitrite, UA " "09/15/2024 Negative  Negative Final    Protein, UA 09/15/2024 Negative  Negative mg/dl Final    Glucose, UA 09/15/2024 Negative  Negative mg/dl Final    Ketones, UA 09/15/2024 Negative  Negative mg/dl Final    Urobilinogen, UA 09/15/2024 <2.0  <2.0 mg/dl mg/dl Final    Bilirubin, UA 09/15/2024 Negative  Negative Final    Occult Blood, UA 09/15/2024 Small (A)  Negative Final    hs TnI 2hr 09/15/2024 14  \"Refer to ACS Flowchart\"- see link ng/L Final    Comment:                                              Initial (time 0) result  If >=50 ng/L, Myocardial injury suggested ;  Type of myocardial injury and treatment strategy  to be determined.  If 5-49 ng/L, a delta result at 2 hours and or 4 hours will be needed to further evaluate.  If <4 ng/L, and chest pain has been >3 hours since onset, patient may qualify for discharge based on the HEART score in the ED.  If <5 ng/L and <3hours since onset of chest pain, a delta result at 2 hours will be needed to further evaluate.    HS Troponin 99th Percentile URL of a Health Population=12 ng/L with a 95% Confidence Interval of 8-18 ng/L.    Second Troponin (time 2 hours)  If calculated delta >= 20 ng/L,  Myocardial injury suggested ; Type of myocardial injury and treatment strategy to be determined.  If 5-49 ng/L and the calculated delta is 5-19 ng/L, consult medical service for evaluation.  Continue evaluation for ischemia on ecg and other possible etiology and repeat hs troponin at 4 hours.  If delta                            is <5 ng/L at 2 hours, consider discharge based on risk stratification via the HEART score (if in ED), or [place] risk score in IP/Observation.    HS Troponin 99th Percentile URL of a Health Population=12 ng/L with a 95% Confidence Interval of 8-18 ng/L.    Delta 2hr hsTnI 09/15/2024 -3  <20 ng/L Final    RBC, UA 09/15/2024 10-20 (A)  None Seen, 1-2 /hpf Final    WBC, UA 09/15/2024 None Seen  None Seen, 1-2 /hpf Final    Epithelial Cells 09/15/2024 " Occasional  None Seen, Occasional /hpf Final    Bacteria, UA 09/15/2024 None Seen  None Seen, Occasional /hpf Final   Admission on 08/28/2024, Discharged on 09/04/2024   Component Date Value Ref Range Status    Sodium 08/30/2024 136  135 - 147 mmol/L Final    Potassium 08/30/2024 3.8  3.5 - 5.3 mmol/L Final    Chloride 08/30/2024 100  96 - 108 mmol/L Final    CO2 08/30/2024 26  21 - 32 mmol/L Final    ANION GAP 08/30/2024 10  4 - 13 mmol/L Final    BUN 08/30/2024 25  5 - 25 mg/dL Final    Creatinine 08/30/2024 0.98  0.60 - 1.30 mg/dL Final    Standardized to IDMS reference method    Glucose 08/30/2024 136  65 - 140 mg/dL Final    If the patient is fasting, the ADA then defines impaired fasting glucose as > 100 mg/dL and diabetes as > or equal to 123 mg/dL.    Glucose, Fasting 08/30/2024 136 (H)  65 - 99 mg/dL Final    Calcium 08/30/2024 9.3  8.4 - 10.2 mg/dL Final    AST 08/30/2024 12 (L)  13 - 39 U/L Final    ALT 08/30/2024 6 (L)  7 - 52 U/L Final    Specimen collection should occur prior to Sulfasalazine administration due to the potential for falsely depressed results.     Alkaline Phosphatase 08/30/2024 43  34 - 104 U/L Final    Total Protein 08/30/2024 6.1 (L)  6.4 - 8.4 g/dL Final    Albumin 08/30/2024 3.8  3.5 - 5.0 g/dL Final    Total Bilirubin 08/30/2024 0.53  0.20 - 1.00 mg/dL Final    Use of this assay is not recommended for patients undergoing treatment with eltrombopag due to the potential for falsely elevated results.  N-acetyl-p-benzoquinone imine (metabolite of Acetaminophen) will generate erroneously low results in samples for patients that have taken an overdose of Acetaminophen.    eGFR 08/30/2024 52  ml/min/1.73sq m Final    Magnesium 08/30/2024 2.1  1.9 - 2.7 mg/dL Final    Phosphorus 08/30/2024 4.0  2.3 - 4.1 mg/dL Final    WBC 08/30/2024 5.82  4.31 - 10.16 Thousand/uL Final    RBC 08/30/2024 3.77 (L)  3.81 - 5.12 Million/uL Final    Hemoglobin 08/30/2024 11.0 (L)  11.5 - 15.4 g/dL Final     Hematocrit 08/30/2024 34.4 (L)  34.8 - 46.1 % Final    MCV 08/30/2024 91  82 - 98 fL Final    MCH 08/30/2024 29.2  26.8 - 34.3 pg Final    MCHC 08/30/2024 32.0  31.4 - 37.4 g/dL Final    RDW 08/30/2024 13.1  11.6 - 15.1 % Final    MPV 08/30/2024 10.9  8.9 - 12.7 fL Final    Platelets 08/30/2024 207  149 - 390 Thousands/uL Final    nRBC 08/30/2024 0  /100 WBCs Final    Segmented % 08/30/2024 68  43 - 75 % Final    Immature Grans % 08/30/2024 0  0 - 2 % Final    Lymphocytes % 08/30/2024 22  14 - 44 % Final    Monocytes % 08/30/2024 7  4 - 12 % Final    Eosinophils Relative 08/30/2024 2  0 - 6 % Final    Basophils Relative 08/30/2024 1  0 - 1 % Final    Absolute Neutrophils 08/30/2024 3.95  1.85 - 7.62 Thousands/µL Final    Absolute Immature Grans 08/30/2024 0.01  0.00 - 0.20 Thousand/uL Final    Absolute Lymphocytes 08/30/2024 1.26  0.60 - 4.47 Thousands/µL Final    Absolute Monocytes 08/30/2024 0.43  0.17 - 1.22 Thousand/µL Final    Eosinophils Absolute 08/30/2024 0.14  0.00 - 0.61 Thousand/µL Final    Basophils Absolute 08/30/2024 0.03  0.00 - 0.10 Thousands/µL Final    TSH 3RD GENERATON 08/30/2024 1.191  0.450 - 4.500 uIU/mL Final    The recommended reference ranges for TSH during pregnancy are as follows:   First trimester 0.100 to 2.500 uIU/mL   Second trimester  0.200 to 3.000 uIU/mL   Third trimester 0.300 to 3.000 uIU/m    Note: Normal ranges may not apply to patients who are transgender, non-binary, or whose legal sex, sex at birth, and gender identity differ.  Adult TSH (3rd generation) reference range follows the recommended guidelines of the American Thy[employer] Association, January, 2020.    Vitamin B-12 08/30/2024 437  180 - 914 pg/mL Final    Folate 08/30/2024 >22.3  >5.9 ng/mL Final    The World Health Organization has determined deficient folate concentrations are considered to be <4.0 ng/mL.    Vit D, 25-Hydroxy 08/30/2024 40.4  30.0 - 100.0 ng/mL Final    Vitamin D guidelines established by Clinical  Guidelines Subcommittee  of the Endocrine Society Task Force, 2011    Deficiency <20ng/ml   Insufficiency 20-30ng/ml   Sufficient  ng/ml     Iron Saturation 08/30/2024 23  15 - 50 % Final    TIBC 08/30/2024 257  250 - 450 ug/dL Final    Iron 08/30/2024 58  50 - 212 ug/dL Final    Patients treated with metal-binding drugs (ie. Deferoxamine) may have depressed iron values.    UIBC 08/30/2024 199  155 - 355 ug/dL Final    Ferritin 08/30/2024 29  11 - 307 ng/mL Final    Syphilis Total Antibody 08/30/2024 Non-reactive  Non-Reactive Final    No serological evidence of infection with T. pallidum.  Early or incubating syphilis infection cannot be excluded.  Consider repeat testing based on clinical suspicion.    HIV-1 p24 Antigen 08/30/2024 Non-Reactive  Non-Reactive Final    HIV-1 Antibody 08/30/2024 Non-Reactive  Non-Reactive Final    HIV-2 Antibody 08/30/2024 Non-Reactive  Non-Reactive Final    HIV Ag-Ab 5th Gen 08/30/2024 Non-Reactive  Non-Reactive Final    A Non-Reactive test result does not preclude the possibility of exposure or infection with HIV-1 and/or HIV-2.  Non-Reactive results can occur if the quantity of marker present is below the detection limits or is not present during the stage of disease in which a sample is collected. Repeat testing should be considered where there is clinical suspicion of infection.       Ventricular Rate 08/30/2024 83  BPM Final    Atrial Rate 08/30/2024 83  BPM Final    UT Interval 08/30/2024 170  ms Final    QRSD Interval 08/30/2024 74  ms Final    QT Interval 08/30/2024 370  ms Final    QTC Interval 08/30/2024 434  ms Final    P Axis 08/30/2024 61  degrees Final    QRS [place] 08/30/2024 3  degrees Final    T Wave [place] 08/30/2024 51  degrees Final   There may be more visits with results that are not included.             ___________________________________    History Review: The following portions of the patient's history were reviewed and updated as appropriate: allergies,  current medications, past family history, past medical history, past social history, past surgical history, and problem list.    Unchanged information from this writer's previous assessment is copied and italicized; information that has changed is bolded.    Past Psychiatric History:      Past psychiatric diagnoses:   Depression, anxiety  Inpatient psychiatric admissions:   8/2024 2013 following a surgery, sounded like extreme anxiety  Prior outpatient psychiatric treatment:   Started seeing a psychiatrist when first   Past/current psychotherapy:   active  History of suicidal attempts/gestures:   denies  Psychotropic medication trials:   Venlafaxine, sertraline, fluoxetine, escitalopram, aripiprazole (dizziness), risperidone, clonazepam, buspirone, gabapentin        Substance Abuse History:     Denies all history of substance use     Family Psychiatric History:      Family History             Family History   Problem Relation Age of Onset    Depression Mother           w/anxiety    Diabetes Mother           Mellitus    Breast cancer Mother      Hypertension Mother      No Known Problems Father      Depression Sister           w/anxiety    Heart disease Sister           Cardiac Disorder    Breast cancer Sister      Hypertension Sister      Diabetes Brother           Mellitus    Alcohol abuse Son                    Social History:     Developmental: nothing of note  Education: Bachelor degree  Marital history:  in 2006  Children: 2  Living arrangement, social support: as above, has been with daughter for 17 years  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  ___________________________________      Visit Time    Visit Start Time: 0801  Visit Stop Time: 0853  Total Visit Duration:  52 minutes    López Gutierrez MD   01/24/25

## 2025-01-26 DIAGNOSIS — K59.04 CHRONIC IDIOPATHIC CONSTIPATION: ICD-10-CM

## 2025-02-04 ENCOUNTER — TELEPHONE (OUTPATIENT)
Age: 85
End: 2025-02-04

## 2025-02-04 NOTE — TELEPHONE ENCOUNTER
Please call back to reschedule 3 month follow up with [facility] that was scheduled for 2/3/25 and cancelled via [employer].

## 2025-02-05 ENCOUNTER — TELEPHONE (OUTPATIENT)
Dept: PSYCHIATRY | Facility: CLINIC | Age: 85
End: 2025-02-05

## 2025-02-05 ENCOUNTER — OFFICE VISIT (OUTPATIENT)
Dept: FAMILY MEDICINE CLINIC | Facility: CLINIC | Age: 85
End: 2025-02-05
Payer: MEDICARE

## 2025-02-05 VITALS
TEMPERATURE: 97.9 F | WEIGHT: 118 LBS | DIASTOLIC BLOOD PRESSURE: 60 MMHG | HEART RATE: 76 BPM | OXYGEN SATURATION: 100 % | BODY MASS INDEX: 25.46 KG/M2 | SYSTOLIC BLOOD PRESSURE: 120 MMHG | HEIGHT: 57 IN

## 2025-02-05 DIAGNOSIS — M79.89 SWELLING OF LOWER EXTREMITY: ICD-10-CM

## 2025-02-05 DIAGNOSIS — H92.02 DISCOMFORT OF LEFT EAR: ICD-10-CM

## 2025-02-05 DIAGNOSIS — R51.9 MODERATE HEADACHE: Primary | ICD-10-CM

## 2025-02-05 PROCEDURE — G2211 COMPLEX E/M VISIT ADD ON: HCPCS | Performed by: FAMILY MEDICINE

## 2025-02-05 PROCEDURE — 99214 OFFICE O/P EST MOD 30 MIN: CPT | Performed by: FAMILY MEDICINE

## 2025-02-05 NOTE — PROGRESS NOTES
Name: Sandra Peña      : 1940      MRN: [id number]  Encounter Provider: Rajeev Perez DO  Encounter Date: 2025   Encounter department: [facility]  :  Assessment & Plan  Moderate headache  Patient presents today with concern about headache on the left side.  Differential is broad for the patient, including musculoskeletal pain/spasm, increased stress, subdural hematoma, hypertension, anxiety, dehydration, high sugars, poor sleep.  Etiology is most likely dehydration.  Patient is not drinking enough water throughout the day and she is continuing the metformin 500 mg twice daily.  It also could be from withdrawal of any medications if she has discontinued them such as Risperdal, blood pressure meds, or Lexapro.  Neurological evaluation was completely normal.  She has no signs of stroke or residual neurological issues.  Therefore I recommended that the patient increase her fluid intake, make sure she was taking her medications appropriately, and eating small frequent meals throughout the day to avoid any drops in her blood sugar.  She should be watching her carbs as well.  Daughter presents with her and is comfortable with plan.  Therefore I we will have them continue to monitor her symptoms and if anything changes they are to go to her directly to the emergency room.  Stroke precautions given.       Discomfort of left ear  Patient having pain around the left ear.  There is no evidence of otitis externa, otitis media, or mastoiditis.       Swelling of lower extremity  Patient was likely experiencing dependent edema.  After having her feet up for the majority of the night, the swelling is significantly improved.  Her lower extremity pain has resolved              History of Present Illness   Patient presents today to follow-up for lower extremity edema as well as left-sided head pain.  The patients daughter noticed that there was an increase in swelling last night.  She also  "was having some pitting edema that likely caused increased pain and discomfort from the distention with fluid.  Upon waking today the fluid was significantly better, and there is only trace edema.  Last night there was a significant indent where the socks were and there was some discomfort as well.  They did work to keep her legs up overnight.    Additionally she has some left-sided head pain.  This is been going on over the last several weeks.  She was given ibuprofen previously and this did help slightly with the pain, but it seems to be getting progressively worse.  She has had some decrease in comprehension, but has not lost any vision nor is there any other neurological concerns at this time.      Review of Systems    Objective   /60   Pulse 76   Temp 97.9 °F (36.6 °C)   Ht 4' 9\" (1.448 m)   Wt 53.5 kg (118 lb)   SpO2 100%   BMI 25.53 kg/m²      Physical Exam  Constitutional:       General: She is not in acute distress.     Appearance: Normal appearance. She is not ill-appearing, toxic-appearing or diaphoretic.   HENT:      Head: Normocephalic and atraumatic.      Right Ear: Tympanic membrane, ear canal and external ear normal. There is no impacted cerumen.      Left Ear: Tympanic membrane, ear canal and external ear normal. There is no impacted cerumen.      Nose: Nose normal. No congestion or rhinorrhea.      Mouth/Throat:      Mouth: Mucous membranes are moist.      Pharynx: Oropharynx is clear. No oropharyngeal exudate or posterior oropharyngeal erythema.      Comments: Uvula midline, tongue with normal movement.  Eyes:      General:         Right eye: No discharge.         Left eye: No discharge.      Pupils: Pupils are equal, round, and reactive to light.   Cardiovascular:      Rate and Rhythm: Normal rate and regular rhythm.      Heart sounds: Normal heart sounds.   Pulmonary:      Effort: No respiratory distress.      Breath sounds: Normal breath sounds. No stridor. No wheezing, rhonchi or " rales.   Abdominal:      General: Bowel sounds are normal. There is no distension.      Palpations: Abdomen is soft.      Tenderness: There is no abdominal tenderness.   Musculoskeletal:      Cervical back: No tenderness.   Lymphadenopathy:      Cervical: No cervical adenopathy.   Skin:     Capillary Refill: Capillary refill takes less than 2 seconds.   Neurological:      Mental Status: She is alert.      Cranial Nerves: No cranial nerve deficit (Cranial nerves II-XII grossly intact).      Motor: No weakness (No new onset weakness unilaterally or globally compared to baseline).      Deep Tendon Reflexes: Reflexes normal (2/4, intact, C5, C6, L4, S1).

## 2025-02-05 NOTE — TELEPHONE ENCOUNTER
Called patient daughter to set up therapy appt with Dmitriy.  Transfer call to resident MR to make appt.

## 2025-02-07 ENCOUNTER — TELEPHONE (OUTPATIENT)
Age: 85
End: 2025-02-07

## 2025-02-07 ENCOUNTER — TELEMEDICINE (OUTPATIENT)
Dept: PSYCHIATRY | Facility: CLINIC | Age: 85
End: 2025-02-07
Payer: MEDICARE

## 2025-02-07 DIAGNOSIS — F41.1 GENERALIZED ANXIETY DISORDER: ICD-10-CM

## 2025-02-07 DIAGNOSIS — F45.21 ILLNESS ANXIETY DISORDER: Primary | ICD-10-CM

## 2025-02-07 DIAGNOSIS — F33.41 MAJOR DEPRESSIVE DISORDER, RECURRENT EPISODE, IN PARTIAL REMISSION (HCC): ICD-10-CM

## 2025-02-07 PROCEDURE — 99214 OFFICE O/P EST MOD 30 MIN: CPT

## 2025-02-07 RX ORDER — ESCITALOPRAM OXALATE 20 MG/1
20 TABLET ORAL DAILY
Qty: 30 TABLET | Refills: 1 | Status: SHIPPED | OUTPATIENT
Start: 2025-02-07

## 2025-02-07 NOTE — ASSESSMENT & PLAN NOTE
Rule-out obsessive-compulsive disorder vs JONAH  Increase escitalopram to 20 mg QD for obsessive thinking  Continue risperidone 0.5 mg QHS for antidepressant augmentation  Continue lorazepam 0.5 mg TID for augmentation for obsessive thinking  Continue psychotherapy  Orders:    escitalopram (Lexapro) 20 mg tablet; Take 1 tablet (20 mg total) by mouth daily

## 2025-02-07 NOTE — PSYCH
Virtual Regular Visit    Verification of patient location:    Patient is located at Home in the following state in which I hold an active license [place]      Assessment/Plan:    Problem List Items Addressed This Visit       Illness anxiety disorder - Primary    Relevant Medications    escitalopram (Lexapro) 20 mg tablet    Generalized anxiety disorder    Relevant Medications    escitalopram (Lexapro) 20 mg tablet    Major depressive disorder, recurrent episode, in partial remission (HCC)    Relevant Medications    escitalopram (Lexapro) 20 mg tablet         Reason for visit is   Chief Complaint   Patient presents with    Virtual Regular Visit          Encounter provider López Gutierrez MD      Recent Visits  Date Type Provider Dept   02/05/25 Telephone López Gutierrez MD [facility] [place]   Showing recent visits within past 7 days and meeting all other requirements  Today's Visits  Date Type Provider Dept   02/07/25 Telemedicine [facility]   Showing today's visits and meeting all other requirements  Future Appointments  No visits were found meeting these conditions.  Showing future appointments within next 150 days and meeting all other requirements       The patient was identified by name and date of birth. Sandra Peña was informed that this is a telemedicine visit and that the visit is being conducted throughthe Epic Embedded platform. She agrees to proceed..  My office door was closed. No one else was in the room.  She acknowledged consent and understanding of privacy and security of the video platform. The patient has agreed to participate and understands they can discontinue the visit at any time.    Patient is aware this is a billable service.         MEDICATION MANAGEMENT NOTE        [facility]      Name and Date of Birth:  Sandra Peña 84 y.o. 1940 MRN: [id number]    Date of Visit:  February 7, 2025    Reason for Visit: Follow-up visit regarding medication management     _____________________________    Assessment & Plan  Illness anxiety disorder  Rule-out obsessive-compulsive disorder vs JONAH  Increase escitalopram to 20 mg QD for obsessive thinking  Continue risperidone 0.5 mg QHS for antidepressant augmentation  Continue lorazepam 0.5 mg TID for augmentation for obsessive thinking  Continue psychotherapy  Orders:    escitalopram (Lexapro) 20 mg tablet; Take 1 tablet (20 mg total) by mouth daily    Major depressive disorder, recurrent episode, in partial remission (HCC)    Orders:    escitalopram (Lexapro) 20 mg tablet; Take 1 tablet (20 mg total) by mouth daily    Generalized anxiety disorder    Orders:    escitalopram (Lexapro) 20 mg tablet; Take 1 tablet (20 mg total) by mouth daily           The patient and her daughter report continued symptoms without improvement since last appointment, including crying, somatic preoccupations, and obsessive ruminations. There is concern for a more significant degree of cognitive impairment. In the past when neuropsychological testing was attempted, the results could not be interpreted reliably due to the patient's emotional distress during testing.     For obsessive thinking a higher dose of SSRI is likely needed. In the future, a modest increase of risperidone may considered.    Treatment Recommendations/Precautions:  Risks/benefits/alternatives to treatment discussed, including a myriad of potential adverse medication side effects, to which Sandra voiced understanding and consented fully to treatment.   Labs most recently obtained , reviewed.   Follow up in 2 weeks for medication management  Follow up with PCP for medical issues and ongoing care  Aware of 24 hour and weekend coverage for urgent situations accessed by calling [facility] main practice number        Treatment Plan:     Completed and signed during the session: Not  "applicable - Treatment Plan not due at this session    _____________________________________________    History of Present Illness     Chief Complaint: \"I'm fed up\"    SUBJECTIVE:    Crying all the time. Sad. Speech bothers her. Stops in the middle of talking. There has been discord with daughter. Somatic symptom and anxiety about headache. Frustration with difficulty reading or doing puzzles.     Swelling in legs. Resolved.        Psychiatric Review Of Systems:  Unchanged information from this writer's previous assessment is copied and italicized; information that has changed is bolded.    Appetite: no  Adverse eating: no  Weight changes: no  Insomnia/sleeplessness: no  Fatigue/anergy: no  Anhedonia/lack of interest: no  Attention/concentration: no  Psychomotor agitation/retardation: no  Somatic symptoms: yes  Anxiety/panic attack: yes  Zaira/hypomania: no  Hopelessness/helplessness/worthlessness: no  Self-injurious behavior/high-risk behavior: no  Suicidal ideation: no  Homicidal ideation: no  Auditory hallucinations: no  Visual hallucinations: no  Delusional thinking: no  Obsessive/compulsive symptoms: yes    Review Of Systems:      Constitutional negative   ENT negative   Cardiovascular negative   Respiratory negative   Gastrointestinal negative   Genitourinary negative   Musculoskeletal negative   Integumentary negative   Neurological negative   Endocrine negative   Other Symptoms none, all other systems are negative     Objective    OBJECTIVE:     Visit Vitals  OB Status Postmenopausal   Smoking Status Never      Wt Readings from Last 6 Encounters:   02/05/25 53.5 kg (118 lb)   12/17/24 53.1 kg (117 lb)   11/18/24 49.9 kg (110 lb)   10/17/24 49.9 kg (110 lb 0.2 oz)   10/10/24 49.4 kg (109 lb)   10/04/24 48.7 kg (107 lb 5.8 oz)        Past Medical History:   Diagnosis Date    Anxiety     Colon polyps     Depression     Diabetes mellitus (HCC)     Dizziness     Last assessed: 8/31/15    DVT (deep venous " "thrombosis) (HCC)     Dysuria     Hematuria 2022    Hyperlipidemia     Hypertension     Hypertensive urgency 10/22/2021    Insomnia     Panic attack     Ureterolithiasis 2020      Past Surgical History:   Procedure Laterality Date     SECTION      1964 son, 1968 daughter    COLONOSCOPY      FL RETROGRADE PYELOGRAM  2020    MD COLONOSCOPY FLX DX W/COLLJ SPEC WHEN PFRMD N/A 3/27/2017    Procedure: COLONOSCOPY;  Surgeon: Gold Francis MD;  Location: [facility];  Service: Gastroenterology    MD CYSTO/URETERO W/LITHOTRIPSY &INDWELL STENT INSRT Right 2020    Procedure: CYSTOSCOPY URETEROSCOPY WITH LITHOTRIPSY HOLMIUM LASER, RETROGRADE PYELOGRAM AND INSERTION STENT URETERAL; EXCISION OF BLADDER TUMOR;  Surgeon: Edwin Love MD;  Location: [facility];  Service: Urology    ROTATOR CUFF REPAIR Left     Last assessed: 3/29/15    TONSILLECTOMY         Meds/Allergies    No Known Allergies  Current Outpatient Medications   Medication Instructions    aspirin 81 mg, Daily    atorvastatin (LIPITOR) 10 mg, Oral, Daily    Cholecalciferol (VITAMIN D3) 2,000 Units, Oral, Daily    escitalopram (LEXAPRO) 20 mg, Oral, Daily    linaCLOtide 145 mcg, Oral, Daily    lisinopril-hydrochlorothiazide (PRINZIDE,ZESTORETIC) 10-12.5 MG per tablet 1 tablet, Oral, Daily    LORazepam (ATIVAN) 0.5 mg, Oral, 3 times daily    metFORMIN (GLUCOPHAGE) 500 mg, 2 times daily with meals    polyethylene glycol (MIRALAX) 17 g, Oral, Daily    psyllium (METAMUCIL) packet 1 packet, Oral, Daily    risperiDONE (RISPERDAL) 0.5 mg, Oral, Daily at bedtime    senna-docusate sodium (SENOKOT S) 8.6-50 mg per tablet 1 tablet, Oral, Daily PRN           Mental Status Exam:    Appearance Dark gray short curly hair, appropriate attire, good eye contact   Behavior/motor Calm and cooperative   Speech/language Somewhat reduced rate, halting   Mood \"Not too good\"   Affect Full range and appropriate, tearful at times   Thought process Logical, may be " perseverative, ruminating   Thought content Obsessive    No overt delusions, Denies hallucinations, Denies suicidal ideations   Cognition Possible short-term memory difficulty  Awake and alert  Oriented  Concentrates on questions   Insight/judgment Insight: fair  Judgment: fair     Laboratory Results: I have personally reviewed all pertinent laboratory/tests results    Orders Only on 12/17/2024   Component Date Value Ref Range Status    Urine Culture Result 12/17/2024  (A)   Final    Comment:     CULTURE, URINE, ROUTINE         Micro Number:      [id number]    Test Status:       Final    Specimen Source:   Urine    Specimen Quality:  Adequate    Result:            50,000-100,000 CFU/mL of Escherichia coli      Comment:           A portion of the results were performed at [facility].                              E.coli                            ----------------                            INT   COOPER     AMIKACIN               S     4     AMOX/CLAVULANATE       S     8     AMP/SULBACTAM          I     16     CEFAZOLIN              NR    <=4 **2     CEFEPIME               S     <=0.12     CEFTAZIDIME            S     <=1     CEFTRIAXONE            S     <=0.25     CIPROFLOXACIN          I     0.5     GENTAMICIN             S     <=1     IMIPENEM               S     <=0.25     LEVOFLOXACIN           I     1     MEROPENEM              S     <=0.25     NITROFURANTOIN         S     <=16     PIP/TAZOBACTAM         S     <=4     TRIMETHOPRIM/SULFA     S     <=20    S = Susceptible  I = Inter                           mediate  R = Resistant  NS = Not susceptible  SDD = Susceptible Dose Dependent  * = Not Tested  NR = Not Reported  **NN = See Therapy Comments      THERAPY COMMENTS        Note 1:      For infections other than uncomplicated UTI      caused by E. coli, K. pneumoniae or P. mirabilis:      Cefazolin is resistant if COOPER > or = 8 mcg/mL.      (Distinguishing susceptible versus intermediate      for isolates with COOPER < or = 4  mcg/mL requires      additional testing.)        Note 2:      For uncomplicated UTI caused by E. coli,      K. pneumoniae or P. mirabilis: Cefazolin is      susceptible if COOPER <32 mcg/mL and predicts      susceptible to the oral agents cefaclor, cefdinir,      cefpodoxime, cefprozil, cefuroxime, cephalexin      and loracarbef.     Office Visit on 12/17/2024   Component Date Value Ref Range Status    LEUKOCYTE ESTERASE,UA 12/17/2024 75   Final    NITRITE,UA 12/17/2024 neg   Final    SL AMB POCT UROBILINOGEN 12/17/2024 norm   Final    POCT URINE PROTEIN 12/17/2024 neg   Final     PH,UA 12/17/2024 5   Final    BLOOD,UA 12/17/2024 50   Final    SPECIFIC GRAVITY,UA 12/17/2024 1.020   Final    KETONES,UA 12/17/2024 neg   Final    BILIRUBIN,UA 12/17/2024 neg   Final    GLUCOSE, UA 12/17/2024 50   Final     COLOR,UA 12/17/2024 yellow   Final    CLARITY,UA 12/17/2024 cloudy   Final   Appointment on 12/12/2024   Component Date Value Ref Range Status    Creatinine, Ur 12/12/2024 58.6  Reference range not established. mg/dL Final    Albumin,U,Random 12/12/2024 37.9 (H)  <20.0 mg/L Final    Albumin Creat Ratio 12/12/2024 65 (H)  0 - 30 mg/g creatinine Final    WBC 12/12/2024 4.79  4.31 - 10.16 Thousand/uL Final    RBC 12/12/2024 3.46 (L)  3.81 - 5.12 Million/uL Final    Hemoglobin 12/12/2024 10.1 (L)  11.5 - 15.4 g/dL Final    Hematocrit 12/12/2024 32.0 (L)  34.8 - 46.1 % Final    MCV 12/12/2024 93  82 - 98 fL Final    MCH 12/12/2024 29.2  26.8 - 34.3 pg Final    MCHC 12/12/2024 31.6  31.4 - 37.4 g/dL Final    RDW 12/12/2024 13.9  11.6 - 15.1 % Final    MPV 12/12/2024 10.8  8.9 - 12.7 fL Final    Platelets 12/12/2024 194  149 - 390 Thousands/uL Final    nRBC 12/12/2024 0  /100 WBCs Final    Segmented % 12/12/2024 61  43 - 75 % Final    Immature Grans % 12/12/2024 0  0 - 2 % Final    Lymphocytes % 12/12/2024 28  14 - 44 % Final    Monocytes % 12/12/2024 7  4 - 12 % Final    Eosinophils Relative 12/12/2024 3  0 - 6 % Final     Basophils Relative 12/12/2024 1  0 - 1 % Final    Absolute Neutrophils 12/12/2024 2.98  1.85 - 7.62 Thousands/µL Final    Absolute Immature Grans 12/12/2024 0.02  0.00 - 0.20 Thousand/uL Final    Absolute Lymphocytes 12/12/2024 1.32  0.60 - 4.47 Thousands/µL Final    Absolute Monocytes 12/12/2024 0.32  0.17 - 1.22 Thousand/µL Final    Eosinophils Absolute 12/12/2024 0.12  0.00 - 0.61 Thousand/µL Final    Basophils Absolute 12/12/2024 0.03  0.00 - 0.10 Thousands/µL Final    Sodium 12/12/2024 139  135 - 147 mmol/L Final    Potassium 12/12/2024 4.1  3.5 - 5.3 mmol/L Final    Chloride 12/12/2024 106  96 - 108 mmol/L Final    CO2 12/12/2024 27  21 - 32 mmol/L Final    ANION GAP 12/12/2024 6  4 - 13 mmol/L Final    BUN 12/12/2024 26 (H)  5 - 25 mg/dL Final    Creatinine 12/12/2024 1.06  0.60 - 1.30 mg/dL Final    Standardized to IDMS reference method    Glucose, Fasting 12/12/2024 92  65 - 99 mg/dL Final    Calcium 12/12/2024 9.3  8.4 - 10.2 mg/dL Final    AST 12/12/2024 11 (L)  13 - 39 U/L Final    ALT 12/12/2024 7  7 - 52 U/L Final    Specimen collection should occur prior to Sulfasalazine administration due to the potential for falsely depressed results.     Alkaline Phosphatase 12/12/2024 55  34 - 104 U/L Final    Total Protein 12/12/2024 6.2 (L)  6.4 - 8.4 g/dL Final    Albumin 12/12/2024 3.7  3.5 - 5.0 g/dL Final    Total Bilirubin 12/12/2024 0.77  0.20 - 1.00 mg/dL Final    Use of this assay is not recommended for patients undergoing treatment with eltrombopag due to the potential for falsely elevated results.  N-acetyl-p-benzoquinone imine (metabolite of Acetaminophen) will generate erroneously low results in samples for patients that have taken an overdose of Acetaminophen.    eGFR 12/12/2024 47  ml/min/1.73sq m Final    Hemoglobin A1C 12/12/2024 6.2 (H)  Normal 4.0-5.6%; PreDiabetic 5.7-6.4%; Diabetic >=6.5%; Glycemic control for adults with diabetes <7.0% % Final    EAG 12/12/2024 131  mg/dl Final     Cholesterol 12/12/2024 128  See Comment mg/dL Final    Cholesterol:         Pediatric <18 Years        Desirable          <170 mg/dL      Borderline High    170-199 mg/dL      High               >=200 mg/dL        Adult >=18 Years            Desirable         <200 mg/dL      Borderline High   200-239 mg/dL      High              >239 mg/dL      Triglycerides 12/12/2024 91  See Comment mg/dL Final    Triglyceride:     0-9Y            <75mg/dL     10Y-17Y         <90 mg/dL       >=18Y     Normal          <150 mg/dL     Borderline High 150-199 mg/dL     High            200-499 mg/dL        Very High       >499 mg/dL    Specimen collection should occur prior to Metamizole administration due to the potential for falsely depressed results.    HDL, Direct 12/12/2024 55  >=50 mg/dL Final    LDL Calculated 12/12/2024 55  0 - 100 mg/dL Final    LDL Cholesterol:     Optimal           <100 mg/dl     Near Optimal      100-129 mg/dl     Above Optimal       Borderline High 130-159 mg/dl       High            160-189 mg/dl       Very High       >189 mg/dl         This screening LDL is a calculated result.   It does not have the accuracy of the Direct Measured LDL in the monitoring of patients with hyperlipidemia and/or statin therapy.   Direct Measure LDL (KQH505) must be ordered separately in these patients.    Non-HDL-Chol (CHOL-HDL) 12/12/2024 73  mg/dl Final    TSH 3RD GENERATON 12/12/2024 1.083  0.450 - 4.500 uIU/mL Final    The recommended reference ranges for TSH during pregnancy are as follows:   First trimester 0.100 to 2.500 uIU/mL   Second trimester  0.200 to 3.000 uIU/mL   Third trimester 0.300 to 3.000 uIU/m    Note: Normal ranges may not apply to patients who are transgender, non-binary, or whose legal sex, sex at birth, and gender identity differ.  Adult TSH (3rd generation) reference range follows the recommended guidelines of the American Thyroid Association, January, 2020.    Vitamin B-12 12/12/2024 294 945 - 340  pg/mL Final    Folate 12/12/2024 16.1  >5.9 ng/mL Final    The World Health Organization has determined deficient folate concentrations are considered to be <4.0 ng/mL.   Orders Only on 11/13/2024   Component Date Value Ref Range Status    Supplier Name 11/13/2024 [employer]   Final-Edited    Supplier Phone Number 11/13/2024 [phone]   Final-Edited    Order Status 11/13/2024 Delivery Successful   Final-Edited    Delivery Request Date 11/13/2024 11/13/2024   Final-Edited    Date Delivered  11/13/2024 11/14/2024   Final-Edited    Item Description 11/13/2024 Wheeled Walker, Adult   Final-Edited    Qty: 1   Admission on 10/17/2024, Discharged on 10/17/2024   Component Date Value Ref Range Status    Sodium 10/17/2024 139  135 - 147 mmol/L Final    Potassium 10/17/2024 3.8  3.5 - 5.3 mmol/L Final    Chloride 10/17/2024 105  96 - 108 mmol/L Final    CO2 10/17/2024 27  21 - 32 mmol/L Final    ANION GAP 10/17/2024 7  4 - 13 mmol/L Final    BUN 10/17/2024 21  5 - 25 mg/dL Final    Creatinine 10/17/2024 0.98  0.60 - 1.30 mg/dL Final    Standardized to IDMS reference method    Glucose 10/17/2024 121  65 - 140 mg/dL Final    If the patient is fasting, the ADA then defines impaired fasting glucose as > 100 mg/dL and diabetes as > or equal to 123 mg/dL.    Calcium 10/17/2024 8.9  8.4 - 10.2 mg/dL Final    AST 10/17/2024 11 (L)  13 - 39 U/L Final    ALT 10/17/2024 8  7 - 52 U/L Final    Specimen collection should occur prior to Sulfasalazine administration due to the potential for falsely depressed results.     Alkaline Phosphatase 10/17/2024 45  34 - 104 U/L Final    Total Protein 10/17/2024 5.8 (L)  6.4 - 8.4 g/dL Final    Albumin 10/17/2024 3.6  3.5 - 5.0 g/dL Final    Total Bilirubin 10/17/2024 0.68  0.20 - 1.00 mg/dL Final    Use of this assay is not recommended for patients undergoing treatment with eltrombopag due to the potential for falsely elevated results.  N-acetyl-p-benzoquinone imine  (metabolite of Acetaminophen) will generate erroneously low results in samples for patients that have taken an overdose of Acetaminophen.    eGFR 10/17/2024 52  ml/min/1.73sq m Final    WBC 10/17/2024 5.82  4.31 - 10.16 Thousand/uL Final    RBC 10/17/2024 3.88  3.81 - 5.12 Million/uL Final    Hemoglobin 10/17/2024 11.3 (L)  11.5 - 15.4 g/dL Final    Hematocrit 10/17/2024 34.9  34.8 - 46.1 % Final    MCV 10/17/2024 90  82 - 98 fL Final    MCH 10/17/2024 29.1  26.8 - 34.3 pg Final    MCHC 10/17/2024 32.4  31.4 - 37.4 g/dL Final    RDW 10/17/2024 13.3  11.6 - 15.1 % Final    MPV 10/17/2024 10.8  8.9 - 12.7 fL Final    Platelets 10/17/2024 230  149 - 390 Thousands/uL Final    nRBC 10/17/2024 0  /100 WBCs Final    Segmented % 10/17/2024 68  43 - 75 % Final    Immature Grans % 10/17/2024 0  0 - 2 % Final    Lymphocytes % 10/17/2024 24  14 - 44 % Final    Monocytes % 10/17/2024 6  4 - 12 % Final    Eosinophils Relative 10/17/2024 1  0 - 6 % Final    Basophils Relative 10/17/2024 1  0 - 1 % Final    Absolute Neutrophils 10/17/2024 3.94  1.85 - 7.62 Thousands/µL Final    Absolute Immature Grans 10/17/2024 0.02  0.00 - 0.20 Thousand/uL Final    Absolute Lymphocytes 10/17/2024 1.38  0.60 - 4.47 Thousands/µL Final    Absolute Monocytes 10/17/2024 0.36  0.17 - 1.22 Thousand/µL Final    Eosinophils Absolute 10/17/2024 0.08  0.00 - 0.61 Thousand/µL Final    Basophils Absolute 10/17/2024 0.04  0.00 - 0.10 Thousands/µL Final    Color, UA 10/17/2024 Light Yellow   Final    Clarity, UA 10/17/2024 Clear   Final    Specific Gravity, UA 10/17/2024 1.012  1.003 - 1.030 Final    pH, UA 10/17/2024 7.0  4.5, 5.0, 5.5, 6.0, 6.5, 7.0, 7.5, 8.0 Final    Leukocytes, UA 10/17/2024 Negative  Negative Final    Nitrite, UA 10/17/2024 Negative  Negative Final    Protein, UA 10/17/2024 Negative  Negative mg/dl Final    Glucose, UA 10/17/2024 Negative  Negative mg/dl Final    Ketones, UA 10/17/2024 Negative  Negative mg/dl Final    Urobilinogen, UA  10/17/2024 <2.0  <2.0 mg/dl mg/dl Final    Bilirubin, UA 10/17/2024 Negative  Negative Final    Occult Blood, UA 10/17/2024 Negative  Negative Final    Magnesium 10/17/2024 1.8 (L)  1.9 - 2.7 mg/dL Final    SARS-CoV-2 10/17/2024 Negative  Negative Final         INFLUENZA A PCR 10/17/2024 Negative  Negative Final         INFLUENZA B PCR 10/17/2024 Negative  Negative Final         RSV PCR 10/17/2024 Negative  Negative Final         Ventricular Rate 10/17/2024 79  BPM Final    Atrial Rate 10/17/2024 79  BPM Final    IN Interval 10/17/2024 166  ms Final    QRSD Interval 10/17/2024 72  ms Final    QT Interval 10/17/2024 358  ms Final    QTC Interval 10/17/2024 410  ms Final    P Axis 10/17/2024 52  degrees Final    QRS Axis 10/17/2024 1  degrees Final    T Wave Axis 10/17/2024 42  degrees Final   Admission on 10/03/2024, Discharged on 10/05/2024   Component Date Value Ref Range Status    WBC 10/04/2024 5.78  4.31 - 10.16 Thousand/uL Final    RBC 10/04/2024 3.90  3.81 - 5.12 Million/uL Final    Hemoglobin 10/04/2024 11.3 (L)  11.5 - 15.4 g/dL Final    Hematocrit 10/04/2024 35.2  34.8 - 46.1 % Final    MCV 10/04/2024 90  82 - 98 fL Final    MCH 10/04/2024 29.0  26.8 - 34.3 pg Final    MCHC 10/04/2024 32.1  31.4 - 37.4 g/dL Final    RDW 10/04/2024 12.9  11.6 - 15.1 % Final    MPV 10/04/2024 10.9  8.9 - 12.7 fL Final    Platelets 10/04/2024 256  149 - 390 Thousands/uL Final    nRBC 10/04/2024 0  /100 WBCs Final    Segmented % 10/04/2024 56  43 - 75 % Final    Immature Grans % 10/04/2024 0  0 - 2 % Final    Lymphocytes % 10/04/2024 32  14 - 44 % Final    Monocytes % 10/04/2024 8  4 - 12 % Final    Eosinophils Relative 10/04/2024 3  0 - 6 % Final    Basophils Relative 10/04/2024 1  0 - 1 % Final    Absolute Neutrophils 10/04/2024 3.21  1.85 - 7.62 Thousands/µL Final    Absolute Immature Grans 10/04/2024 0.02  0.00 - 0.20 Thousand/uL Final    Absolute Lymphocytes 10/04/2024 1.85  0.60 - 4.47 Thousands/µL Final    Absolute  Monocytes 10/04/2024 0.48  0.17 - 1.22 Thousand/µL Final    Eosinophils Absolute 10/04/2024 0.18  0.00 - 0.61 Thousand/µL Final    Basophils Absolute 10/04/2024 0.04  0.00 - 0.10 Thousands/µL Final    Sodium 10/04/2024 137  135 - 147 mmol/L Final    Potassium 10/04/2024 4.4  3.5 - 5.3 mmol/L Final    Chloride 10/04/2024 102  96 - 108 mmol/L Final    CO2 10/04/2024 28  21 - 32 mmol/L Final    ANION GAP 10/04/2024 7  4 - 13 mmol/L Final    BUN 10/04/2024 34 (H)  5 - 25 mg/dL Final    Creatinine 10/04/2024 1.15  0.60 - 1.30 mg/dL Final    Standardized to IDMS reference method    Glucose 10/04/2024 153 (H)  65 - 140 mg/dL Final    If the patient is fasting, the ADA then defines impaired fasting glucose as > 100 mg/dL and diabetes as > or equal to 123 mg/dL.    Calcium 10/04/2024 9.3  8.4 - 10.2 mg/dL Final    AST 10/04/2024 28  13 - 39 U/L Final    ALT 10/04/2024 20  7 - 52 U/L Final    Specimen collection should occur prior to Sulfasalazine administration due to the potential for falsely depressed results.     Alkaline Phosphatase 10/04/2024 58  34 - 104 U/L Final    Total Protein 10/04/2024 6.4  6.4 - 8.4 g/dL Final    Albumin 10/04/2024 3.8  3.5 - 5.0 g/dL Final    Total Bilirubin 10/04/2024 0.32  0.20 - 1.00 mg/dL Final    Use of this assay is not recommended for patients undergoing treatment with eltrombopag due to the potential for falsely elevated results.  N-acetyl-p-benzoquinone imine (metabolite of Acetaminophen) will generate erroneously low results in samples for patients that have taken an overdose of Acetaminophen.    eGFR 10/04/2024 43  ml/min/1.73sq m Final    LACTIC ACID 10/04/2024 2.2 (H)  0.5 - 2.0 mmol/L Final    LACTIC ACID 10/04/2024 2.2 (H)  0.5 - 2.0 mmol/L Final    WBC 10/04/2024 7.78  4.31 - 10.16 Thousand/uL Final    RBC 10/04/2024 3.81  3.81 - 5.12 Million/uL Final    Hemoglobin 10/04/2024 11.0 (L)  11.5 - 15.4 g/dL Final    Hematocrit 10/04/2024 36.7  34.8 - 46.1 % Final    MCV 10/04/2024 96   82 - 98 fL Final    short sample    MCH 10/04/2024 28.9  26.8 - 34.3 pg Final    MCHC 10/04/2024 30.0 (L)  31.4 - 37.4 g/dL Final    RDW 10/04/2024 12.7  11.6 - 15.1 % Final    MPV 10/04/2024 10.4  8.9 - 12.7 fL Final    Platelets 10/04/2024 221  149 - 390 Thousands/uL Final    nRBC 10/04/2024 0  /100 WBCs Final    Segmented % 10/04/2024 74  43 - 75 % Final    Immature Grans % 10/04/2024 0  0 - 2 % Final    Lymphocytes % 10/04/2024 18  14 - 44 % Final    Monocytes % 10/04/2024 5  4 - 12 % Final    Eosinophils Relative 10/04/2024 2  0 - 6 % Final    Basophils Relative 10/04/2024 1  0 - 1 % Final    Absolute Neutrophils 10/04/2024 5.74  1.85 - 7.62 Thousands/µL Final    Absolute Immature Grans 10/04/2024 0.03  0.00 - 0.20 Thousand/uL Final    Absolute Lymphocytes 10/04/2024 1.42  0.60 - 4.47 Thousands/µL Final    Absolute Monocytes 10/04/2024 0.42  0.17 - 1.22 Thousand/µL Final    Eosinophils Absolute 10/04/2024 0.12  0.00 - 0.61 Thousand/µL Final    Basophils Absolute 10/04/2024 0.05  0.00 - 0.10 Thousands/µL Final    Sodium 10/04/2024 134 (L)  135 - 147 mmol/L Final    Potassium 10/04/2024 4.5  3.5 - 5.3 mmol/L Final    Chloride 10/04/2024 103  96 - 108 mmol/L Final    CO2 10/04/2024 24  21 - 32 mmol/L Final    ANION GAP 10/04/2024 7  4 - 13 mmol/L Final    BUN 10/04/2024 29 (H)  5 - 25 mg/dL Final    Creatinine 10/04/2024 1.05  0.60 - 1.30 mg/dL Final    Standardized to IDMS reference method    Glucose 10/04/2024 137  65 - 140 mg/dL Final    If the patient is fasting, the ADA then defines impaired fasting glucose as > 100 mg/dL and diabetes as > or equal to 123 mg/dL.    Calcium 10/04/2024 8.7  8.4 - 10.2 mg/dL Final    eGFR 10/04/2024 48  ml/min/1.73sq m Final    LACTIC ACID 10/04/2024 2.0  0.5 - 2.0 mmol/L Final    POC Glucose 10/04/2024 124  65 - 140 mg/dl Final    POC Glucose 10/04/2024 168 (H)  65 - 140 mg/dl Final    POC Glucose 10/04/2024 131  65 - 140 mg/dl Final    TSH 3RD GENERATON 10/05/2024 0.957   0.450 - 4.500 uIU/mL Final    The recommended reference ranges for TSH during pregnancy are as follows:   First trimester 0.100 to 2.500 uIU/mL   Second trimester  0.200 to 3.000 uIU/mL   Third trimester 0.300 to 3.000 uIU/m    Note: Normal ranges may not apply to patients who are transgender, non-binary, or whose legal sex, sex at birth, and gender identity differ.  Adult TSH (3rd generation) reference range follows the recommended guidelines of the [employer], January, 2020.    Protime 10/05/2024 14.3  12.3 - 15.0 seconds Final    INR 10/05/2024 1.04  0.85 - 1.19 Final    WBC 10/05/2024 5.15  4.31 - 10.16 Thousand/uL Final    RBC 10/05/2024 3.27 (L)  3.81 - 5.12 Million/uL Final    Hemoglobin 10/05/2024 9.8 (L)  11.5 - 15.4 g/dL Final    Hematocrit 10/05/2024 29.5 (L)  34.8 - 46.1 % Final    MCV 10/05/2024 90  82 - 98 fL Final    Results verified by repeat    MCH 10/05/2024 30.0  26.8 - 34.3 pg Final    MCHC 10/05/2024 33.2  31.4 - 37.4 g/dL Final    RDW 10/05/2024 12.7  11.6 - 15.1 % Final    MPV 10/05/2024 10.7  8.9 - 12.7 fL Final    Platelets 10/05/2024 210  149 - 390 Thousands/uL Final    nRBC 10/05/2024 0  /100 WBCs Final    Segmented % 10/05/2024 58  43 - 75 % Final    Immature Grans % 10/05/2024 0  0 - 2 % Final    Lymphocytes % 10/05/2024 31  14 - 44 % Final    Monocytes % 10/05/2024 6  4 - 12 % Final    Eosinophils Relative 10/05/2024 4  0 - 6 % Final    Basophils Relative 10/05/2024 1  0 - 1 % Final    Absolute Neutrophils 10/05/2024 2.97  1.85 - 7.62 Thousands/µL Final    Absolute Immature Grans 10/05/2024 0.02  0.00 - 0.20 Thousand/uL Final    Absolute Lymphocytes 10/05/2024 1.61  0.60 - 4.47 Thousands/µL Final    Absolute Monocytes 10/05/2024 0.33  0.17 - 1.22 Thousand/µL Final    Eosinophils Absolute 10/05/2024 0.19  0.00 - 0.61 Thousand/µL Final    Basophils Absolute 10/05/2024 0.03  0.00 - 0.10 Thousands/µL Final    Sodium 10/05/2024 139  135 - 147 mmol/L Final    Potassium  10/05/2024 4.1  3.5 - 5.3 mmol/L Final    Chloride 10/05/2024 106  96 - 108 mmol/L Final    CO2 10/05/2024 28  21 - 32 mmol/L Final    ANION GAP 10/05/2024 5  4 - 13 mmol/L Final    BUN 10/05/2024 20  5 - 25 mg/dL Final    Creatinine 10/05/2024 1.04  0.60 - 1.30 mg/dL Final    Standardized to IDMS reference method    Glucose 10/05/2024 106  65 - 140 mg/dL Final    If the patient is fasting, the ADA then defines impaired fasting glucose as > 100 mg/dL and diabetes as > or equal to 123 mg/dL.    Calcium 10/05/2024 8.7  8.4 - 10.2 mg/dL Final    Corrected Calcium 10/05/2024 9.4  8.3 - 10.1 mg/dL Final    AST 10/05/2024 11 (L)  13 - 39 U/L Final    ALT 10/05/2024 11  7 - 52 U/L Final    Specimen collection should occur prior to Sulfasalazine administration due to the potential for falsely depressed results.     Alkaline Phosphatase 10/05/2024 48  34 - 104 U/L Final    Total Protein 10/05/2024 5.1 (L)  6.4 - 8.4 g/dL Final    Albumin 10/05/2024 3.1 (L)  3.5 - 5.0 g/dL Final    Total Bilirubin 10/05/2024 0.31  0.20 - 1.00 mg/dL Final    Use of this assay is not recommended for patients undergoing treatment with eltrombopag due to the potential for falsely elevated results.  N-acetyl-p-benzoquinone imine (metabolite of Acetaminophen) will generate erroneously low results in samples for patients that have taken an overdose of Acetaminophen.    eGFR 10/05/2024 49  ml/min/1.73sq m Final    POC Glucose 10/05/2024 105  65 - 140 mg/dl Final    POC Glucose 10/05/2024 132  65 - 140 mg/dl Final    POC Glucose 10/05/2024 187 (H)  65 - 140 mg/dl Final   Admission on 09/28/2024, Discharged on 09/30/2024   Component Date Value Ref Range Status    WBC 09/28/2024 7.21  4.31 - 10.16 Thousand/uL Final    RBC 09/28/2024 3.90  3.81 - 5.12 Million/uL Final    Hemoglobin 09/28/2024 11.2 (L)  11.5 - 15.4 g/dL Final    Hematocrit 09/28/2024 35.3  34.8 - 46.1 % Final    MCV 09/28/2024 91  82 - 98 fL Final    MCH 09/28/2024 28.7  26.8 - 34.3 pg  Final    MCHC 09/28/2024 31.7  31.4 - 37.4 g/dL Final    RDW 09/28/2024 12.7  11.6 - 15.1 % Final    MPV 09/28/2024 10.4  8.9 - 12.7 fL Final    Platelets 09/28/2024 223  149 - 390 Thousands/uL Final    nRBC 09/28/2024 0  /100 WBCs Final    Segmented % 09/28/2024 65  43 - 75 % Final    Immature Grans % 09/28/2024 0  0 - 2 % Final    Lymphocytes % 09/28/2024 25  14 - 44 % Final    Monocytes % 09/28/2024 6  4 - 12 % Final    Eosinophils Relative 09/28/2024 3  0 - 6 % Final    Basophils Relative 09/28/2024 1  0 - 1 % Final    Absolute Neutrophils 09/28/2024 4.73  1.85 - 7.62 Thousands/µL Final    Absolute Immature Grans 09/28/2024 0.02  0.00 - 0.20 Thousand/uL Final    Absolute Lymphocytes 09/28/2024 1.77  0.60 - 4.47 Thousands/µL Final    Absolute Monocytes 09/28/2024 0.41  0.17 - 1.22 Thousand/µL Final    Eosinophils Absolute 09/28/2024 0.24  0.00 - 0.61 Thousand/µL Final    Basophils Absolute 09/28/2024 0.04  0.00 - 0.10 Thousands/µL Final    Sodium 09/28/2024 136  135 - 147 mmol/L Final    Potassium 09/28/2024 3.9  3.5 - 5.3 mmol/L Final    Chloride 09/28/2024 104  96 - 108 mmol/L Final    CO2 09/28/2024 24  21 - 32 mmol/L Final    ANION GAP 09/28/2024 8  4 - 13 mmol/L Final    BUN 09/28/2024 27 (H)  5 - 25 mg/dL Final    Creatinine 09/28/2024 1.14  0.60 - 1.30 mg/dL Final    Standardized to IDMS reference method    Glucose 09/28/2024 216 (H)  65 - 140 mg/dL Final    If the patient is fasting, the ADA then defines impaired fasting glucose as > 100 mg/dL and diabetes as > or equal to 123 mg/dL.    Calcium 09/28/2024 9.1  8.4 - 10.2 mg/dL Final    AST 09/28/2024 13  13 - 39 U/L Final    ALT 09/28/2024 7  7 - 52 U/L Final    Specimen collection should occur prior to Sulfasalazine administration due to the potential for falsely depressed results.     Alkaline Phosphatase 09/28/2024 46  34 - 104 U/L Final    Total Protein 09/28/2024 6.0 (L)  6.4 - 8.4 g/dL Final    Albumin 09/28/2024 3.7  3.5 - 5.0 g/dL Final    Total  Bilirubin 09/28/2024 0.35  0.20 - 1.00 mg/dL Final    Use of this assay is not recommended for patients undergoing treatment with eltrombopag due to the potential for falsely elevated results.  N-acetyl-p-benzoquinone imine (metabolite of Acetaminophen) will generate erroneously low results in samples for patients that have taken an overdose of Acetaminophen.    eGFR 09/28/2024 43  ml/min/1.73sq m Final    Magnesium 09/28/2024 1.8 (L)  1.9 - 2.7 mg/dL Final    Phosphorus 09/28/2024 3.3  2.3 - 4.1 mg/dL Final    Ventricular Rate 09/29/2024 79  BPM Final    Atrial Rate 09/29/2024 300  BPM Final    QRSD Interval 09/29/2024 78  ms Final    QT Interval 09/29/2024 380  ms Final    QTC Interval 09/29/2024 435  ms Final    QRS Axis 09/29/2024 -10  degrees Final    [place] 09/29/2024 2  degrees Final    Hemoglobin A1C 09/29/2024 6.7 (H)  Normal 4.0-5.6%; PreDiabetic 5.7-6.4%; Diabetic >=6.5%; Glycemic control for adults with diabetes <7.0% % Final    EAG 09/29/2024 146  mg/dl Final    WBC 09/29/2024 9.50  4.31 - 10.16 Thousand/uL Final    RBC 09/29/2024 3.82  3.81 - 5.12 Million/uL Final    Hemoglobin 09/29/2024 11.0 (L)  11.5 - 15.4 g/dL Final    Hematocrit 09/29/2024 34.7 (L)  34.8 - 46.1 % Final    MCV 09/29/2024 91  82 - 98 fL Final    MCH 09/29/2024 28.8  26.8 - 34.3 pg Final    MCHC 09/29/2024 31.7  31.4 - 37.4 g/dL Final    RDW 09/29/2024 12.8  11.6 - 15.1 % Final    MPV 09/29/2024 10.7  8.9 - 12.7 fL Final    Platelets 09/29/2024 191  149 - 390 Thousands/uL Final    nRBC 09/29/2024 0  /100 WBCs Final    Segmented % 09/29/2024 86 (H)  43 - 75 % Final    Immature Grans % 09/29/2024 0  0 - 2 % Final    Lymphocytes % 09/29/2024 8 (L)  14 - 44 % Final    Monocytes % 09/29/2024 5  4 - 12 % Final    Eosinophils Relative 09/29/2024 1  0 - 6 % Final    Basophils Relative 09/29/2024 0  0 - 1 % Final    Absolute Neutrophils 09/29/2024 8.04 (H)  1.85 - 7.62 Thousands/µL Final    Absolute Immature Grans 09/29/2024 0.04  0.00  - 0.20 Thousand/uL Final    Absolute Lymphocytes 09/29/2024 0.79  0.60 - 4.47 Thousands/µL Final    Absolute Monocytes 09/29/2024 0.47  0.17 - 1.22 Thousand/µL Final    Eosinophils Absolute 09/29/2024 0.13  0.00 - 0.61 Thousand/µL Final    Basophils Absolute 09/29/2024 0.03  0.00 - 0.10 Thousands/µL Final    Sodium 09/29/2024 140  135 - 147 mmol/L Final    Potassium 09/29/2024 3.4 (L)  3.5 - 5.3 mmol/L Final    Chloride 09/29/2024 107  96 - 108 mmol/L Final    CO2 09/29/2024 24  21 - 32 mmol/L Final    ANION GAP 09/29/2024 9  4 - 13 mmol/L Final    BUN 09/29/2024 24  5 - 25 mg/dL Final    Creatinine 09/29/2024 0.98  0.60 - 1.30 mg/dL Final    Standardized to IDMS reference method    Glucose 09/29/2024 137  65 - 140 mg/dL Final    If the patient is fasting, the ADA then defines impaired fasting glucose as > 100 mg/dL and diabetes as > or equal to 123 mg/dL.    Calcium 09/29/2024 8.6  8.4 - 10.2 mg/dL Final    eGFR 09/29/2024 52  ml/min/1.73sq m Final    Magnesium 09/29/2024 2.1  1.9 - 2.7 mg/dL Final    POC Glucose 09/29/2024 138  65 - 140 mg/dl Final    POC Glucose 09/29/2024 165 (H)  65 - 140 mg/dl Final    POC Glucose 09/29/2024 169 (H)  65 - 140 mg/dl Final    POC Glucose 09/29/2024 113  65 - 140 mg/dl Final    WBC 09/30/2024 6.30  4.31 - 10.16 Thousand/uL Final    RBC 09/30/2024 3.78 (L)  3.81 - 5.12 Million/uL Final    Hemoglobin 09/30/2024 10.7 (L)  11.5 - 15.4 g/dL Final    Hematocrit 09/30/2024 34.0 (L)  34.8 - 46.1 % Final    MCV 09/30/2024 90  82 - 98 fL Final    MCH 09/30/2024 28.3  26.8 - 34.3 pg Final    MCHC 09/30/2024 31.5  31.4 - 37.4 g/dL Final    RDW 09/30/2024 12.9  11.6 - 15.1 % Final    Platelets 09/30/2024 171  149 - 390 Thousands/uL Final    MPV 09/30/2024 10.6  8.9 - 12.7 fL Final    Sodium 09/30/2024 138  135 - 147 mmol/L Final    Potassium 09/30/2024 4.1  3.5 - 5.3 mmol/L Final    Chloride 09/30/2024 107  96 - 108 mmol/L Final    CO2 09/30/2024 24  21 - 32 mmol/L Final    ANION GAP  09/30/2024 7  4 - 13 mmol/L Final    BUN 09/30/2024 19  5 - 25 mg/dL Final    Creatinine 09/30/2024 0.96  0.60 - 1.30 mg/dL Final    Standardized to IDMS reference method    Glucose 09/30/2024 126  65 - 140 mg/dL Final    If the patient is fasting, the ADA then defines impaired fasting glucose as > 100 mg/dL and diabetes as > or equal to 123 mg/dL.    Calcium 09/30/2024 8.9  8.4 - 10.2 mg/dL Final    eGFR 09/30/2024 54  ml/min/1.73sq m Final    POC Glucose 09/30/2024 121  65 - 140 mg/dl Final    POC Glucose 09/30/2024 140  65 - 140 mg/dl Final   Admission on 09/23/2024, Discharged on 09/23/2024   Component Date Value Ref Range Status    Ventricular Rate 09/23/2024 85  BPM Final    Atrial Rate 09/23/2024 85  BPM Final    CO Interval 09/23/2024 160  ms Final    QRSD Interval 09/23/2024 72  ms Final    QT Interval 09/23/2024 348  ms Final    QTC Interval 09/23/2024 414  ms Final    P Axis 09/23/2024 39  degrees Final    QRS Axis 09/23/2024 -2  degrees Final    [place] 09/23/2024 13  degrees Final    WBC 09/23/2024 6.14  4.31 - 10.16 Thousand/uL Final    RBC 09/23/2024 4.21  3.81 - 5.12 Million/uL Final    Hemoglobin 09/23/2024 12.3  11.5 - 15.4 g/dL Final    Hematocrit 09/23/2024 37.8  34.8 - 46.1 % Final    MCV 09/23/2024 90  82 - 98 fL Final    MCH 09/23/2024 29.2  26.8 - 34.3 pg Final    MCHC 09/23/2024 32.5  31.4 - 37.4 g/dL Final    RDW 09/23/2024 12.7  11.6 - 15.1 % Final    MPV 09/23/2024 10.7  8.9 - 12.7 fL Final    Platelets 09/23/2024 197  149 - 390 Thousands/uL Final    nRBC 09/23/2024 0  /100 WBCs Final    Segmented % 09/23/2024 66  43 - 75 % Final    Immature Grans % 09/23/2024 0  0 - 2 % Final    Lymphocytes % 09/23/2024 24  14 - 44 % Final    Monocytes % 09/23/2024 6  4 - 12 % Final    Eosinophils Relative 09/23/2024 3  0 - 6 % Final    Basophils Relative 09/23/2024 1  0 - 1 % Final    Absolute Neutrophils 09/23/2024 4.11  1.85 - 7.62 Thousands/µL Final    Absolute Immature Grans 09/23/2024 0.02   0.00 - 0.20 Thousand/uL Final    Absolute Lymphocytes 09/23/2024 1.44  0.60 - 4.47 Thousands/µL Final    Absolute Monocytes 09/23/2024 0.35  0.17 - 1.22 Thousand/µL Final    Eosinophils Absolute 09/23/2024 0.17  0.00 - 0.61 Thousand/µL Final    Basophils Absolute 09/23/2024 0.05  0.00 - 0.10 Thousands/µL Final    Sodium 09/23/2024 137  135 - 147 mmol/L Final    Potassium 09/23/2024 5.0  3.5 - 5.3 mmol/L Final    Moderately Hemolyzed:Results may be affected.    Chloride 09/23/2024 104  96 - 108 mmol/L Final    CO2 09/23/2024 25  21 - 32 mmol/L Final    ANION GAP 09/23/2024 8  4 - 13 mmol/L Final    BUN 09/23/2024 37 (H)  5 - 25 mg/dL Final    Creatinine 09/23/2024 1.18  0.60 - 1.30 mg/dL Final    Standardized to IDMS reference method    Glucose 09/23/2024 109  65 - 140 mg/dL Final    If the patient is fasting, the ADA then defines impaired fasting glucose as > 100 mg/dL and diabetes as > or equal to 123 mg/dL.    Calcium 09/23/2024 9.6  8.4 - 10.2 mg/dL Final    eGFR 09/23/2024 42  ml/min/1.73sq m Final   Admission on 09/15/2024, Discharged on 09/15/2024   Component Date Value Ref Range Status    Ventricular Rate 09/15/2024 84  BPM Final    Atrial Rate 09/15/2024 84  BPM Final    [place] Interval 09/15/2024 162  ms Final    QRSD Interval 09/15/2024 68  ms Final    QT Interval 09/15/2024 348  ms Final    QTC Interval 09/15/2024 411  ms Final    P Axis 09/15/2024 55  degrees Final    QRS Axis 09/15/2024 6  degrees Final    [place] 09/15/2024 39  degrees Final    WBC 09/15/2024 7.61  4.31 - 10.16 Thousand/uL Final    RBC 09/15/2024 4.13  3.81 - 5.12 Million/uL Final    Hemoglobin 09/15/2024 11.8  11.5 - 15.4 g/dL Final    Hematocrit 09/15/2024 37.1  34.8 - 46.1 % Final    MCV 09/15/2024 90  82 - 98 fL Final    MCH 09/15/2024 28.6  26.8 - 34.3 pg Final    MCHC 09/15/2024 31.8  31.4 - 37.4 g/dL Final    RDW 09/15/2024 12.8  11.6 - 15.1 % Final    MPV 09/15/2024 10.9  8.9 - 12.7 fL Final    Platelets 09/15/2024 218  149 -  390 Thousands/uL Final    nRBC 09/15/2024 0  /100 WBCs Final    Segmented % 09/15/2024 73  43 - 75 % Final    Immature Grans % 09/15/2024 0  0 - 2 % Final    Lymphocytes % 09/15/2024 19  14 - 44 % Final    Monocytes % 09/15/2024 5  4 - 12 % Final    Eosinophils Relative 09/15/2024 2  0 - 6 % Final    Basophils Relative 09/15/2024 1  0 - 1 % Final    Absolute Neutrophils 09/15/2024 5.59  1.85 - 7.62 Thousands/µL Final    Absolute Immature Grans 09/15/2024 0.02  0.00 - 0.20 Thousand/uL Final    Absolute Lymphocytes 09/15/2024 1.41  0.60 - 4.47 Thousands/µL Final    Absolute Monocytes 09/15/2024 0.40  0.17 - 1.22 Thousand/µL Final    Eosinophils Absolute 09/15/2024 0.14  0.00 - 0.61 Thousand/µL Final    Basophils Absolute 09/15/2024 0.05  0.00 - 0.10 Thousands/µL Final    Sodium 09/15/2024 136  135 - 147 mmol/L Final    Potassium 09/15/2024 4.0  3.5 - 5.3 mmol/L Final    Chloride 09/15/2024 101  96 - 108 mmol/L Final    CO2 09/15/2024 25  21 - 32 mmol/L Final    ANION GAP 09/15/2024 10  4 - 13 mmol/L Final    BUN 09/15/2024 27 (H)  5 - 25 mg/dL Final    Creatinine 09/15/2024 1.05  0.60 - 1.30 mg/dL Final    Standardized to IDMS reference method    Glucose 09/15/2024 105  65 - 140 mg/dL Final    If the patient is fasting, the ADA then defines impaired fasting glucose as > 100 mg/dL and diabetes as > or equal to 123 mg/dL.    Calcium 09/15/2024 9.6  8.4 - 10.2 mg/dL Final    AST 09/15/2024 15  13 - 39 U/L Final    ALT 09/15/2024 9  7 - 52 U/L Final    Specimen collection should occur prior to Sulfasalazine administration due to the potential for falsely depressed results.     Alkaline Phosphatase 09/15/2024 49  34 - 104 U/L Final    Total Protein 09/15/2024 6.7  6.4 - 8.4 g/dL Final    Albumin 09/15/2024 4.1  3.5 - 5.0 g/dL Final    Total Bilirubin 09/15/2024 0.47  0.20 - 1.00 mg/dL Final    Use of this assay is not recommended for patients undergoing treatment with eltrombopag due to the potential for falsely elevated  "results.  N-acetyl-p-benzoquinone imine (metabolite of Acetaminophen) will generate erroneously low results in samples for patients that have taken an overdose of Acetaminophen.    eGFR 09/15/2024 48  ml/min/1.73sq m Final    Lipase 09/15/2024 45  11 - 82 u/L Final    hs TnI 0hr 09/15/2024 17  \"Refer to ACS Flowchart\"- see link ng/L Final    Comment:                                              Initial (time 0) result  If >=50 ng/L, Myocardial injury suggested ;  Type of myocardial injury and treatment strategy  to be determined.  If 5-49 ng/L, a delta result at 2 hours and or 4 hours will be needed to further evaluate.  If <4 ng/L, and chest pain has been >3 hours since onset, patient may qualify for discharge based on the HEART score in the ED.  If <5 ng/L and <3hours since onset of chest pain, a delta result at 2 hours will be needed to further evaluate.    HS Troponin 99th Percentile URL of a Health Population=12 ng/L with a 95% Confidence Interval of 8-18 ng/L.    Second Troponin (time 2 hours)  If calculated delta >= 20 ng/L,  Myocardial injury suggested ; Type of myocardial injury and treatment strategy to be determined.  If 5-49 ng/L and the calculated delta is 5-19 ng/L, consult medical service for evaluation.  Continue evaluation for ischemia on ecg and other possible etiology and repeat hs troponin at 4 hours.  If delta                            is <5 ng/L at 2 hours, consider discharge based on risk stratification via the HEART score (if in ED), or [place] risk score in IP/Observation.    HS Troponin 99th Percentile URL of a Health Population=12 ng/L with a 95% Confidence Interval of 8-18 ng/L.    Color, UA 09/15/2024 Light Yellow   Final    Clarity, UA 09/15/2024 Clear   Final    Specific Gravity, UA 09/15/2024 1.019  1.003 - 1.030 Final    pH, UA 09/15/2024 5.0  4.5, 5.0, 5.5, 6.0, 6.5, 7.0, 7.5, 8.0 Final    Leukocytes, UA 09/15/2024 Negative  Negative Final    Nitrite, UA 09/15/2024 Negative  Negative " "Final    Protein, UA 09/15/2024 Negative  Negative mg/dl Final    Glucose, UA 09/15/2024 Negative  Negative mg/dl Final    Ketones, UA 09/15/2024 Negative  Negative mg/dl Final    Urobilinogen, UA 09/15/2024 <2.0  <2.0 mg/dl mg/dl Final    Bilirubin, UA 09/15/2024 Negative  Negative Final    Occult Blood, UA 09/15/2024 Small (A)  Negative Final    hs TnI 2hr 09/15/2024 14  \"Refer to ACS Flowchart\"- see link ng/L Final    Comment:                                              Initial (time 0) result  If >=50 ng/L, Myocardial injury suggested ;  Type of myocardial injury and treatment strategy  to be determined.  If 5-49 ng/L, a delta result at 2 hours and or 4 hours will be needed to further evaluate.  If <4 ng/L, and chest pain has been >3 hours since onset, patient may qualify for discharge based on the HEART score in the ED.  If <5 ng/L and <3hours since onset of chest pain, a delta result at 2 hours will be needed to further evaluate.    HS Troponin 99th Percentile URL of a Health Population=12 ng/L with a 95% Confidence Interval of 8-18 ng/L.    Second Troponin (time 2 hours)  If calculated delta >= 20 ng/L,  Myocardial injury suggested ; Type of myocardial injury and treatment strategy to be determined.  If 5-49 ng/L and the calculated delta is 5-19 ng/L, consult medical service for evaluation.  Continue evaluation for ischemia on ecg and other possible etiology and repeat hs troponin at 4 hours.  If delta                            is <5 ng/L at 2 hours, consider discharge based on risk stratification via the HEART score (if in ED), or [place] risk score in IP/Observation.    HS Troponin 99th Percentile URL of a Health Population=12 ng/L with a 95% Confidence Interval of 8-18 ng/L.    Delta 2hr hsTnI 09/15/2024 -3  <20 ng/L Final    RBC, UA 09/15/2024 10-20 (A)  None Seen, 1-2 /hpf Final    WBC, UA 09/15/2024 None Seen  None Seen, 1-2 /hpf Final    Epithelial Cells 09/15/2024 Occasional  None Seen, Occasional /hpf " Final    Bacteria, UA 09/15/2024 None Seen  None Seen, Occasional /hpf Final   Admission on 08/28/2024, Discharged on 09/04/2024   Component Date Value Ref Range Status    Sodium 08/30/2024 136  135 - 147 mmol/L Final    Potassium 08/30/2024 3.8  3.5 - 5.3 mmol/L Final    Chloride 08/30/2024 100  96 - 108 mmol/L Final    CO2 08/30/2024 26  21 - 32 mmol/L Final    ANION GAP 08/30/2024 10  4 - 13 mmol/L Final    BUN 08/30/2024 25  5 - 25 mg/dL Final    Creatinine 08/30/2024 0.98  0.60 - 1.30 mg/dL Final    Standardized to IDMS reference method    Glucose 08/30/2024 136  65 - 140 mg/dL Final    If the patient is fasting, the ADA then defines impaired fasting glucose as > 100 mg/dL and diabetes as > or equal to 123 mg/dL.    Glucose, Fasting 08/30/2024 136 (H)  65 - 99 mg/dL Final    Calcium 08/30/2024 9.3  8.4 - 10.2 mg/dL Final    AST 08/30/2024 12 (L)  13 - 39 U/L Final    ALT 08/30/2024 6 (L)  7 - 52 U/L Final    Specimen collection should occur prior to Sulfasalazine administration due to the potential for falsely depressed results.     Alkaline Phosphatase 08/30/2024 43  34 - 104 U/L Final    Total Protein 08/30/2024 6.1 (L)  6.4 - 8.4 g/dL Final    Albumin 08/30/2024 3.8  3.5 - 5.0 g/dL Final    Total Bilirubin 08/30/2024 0.53  0.20 - 1.00 mg/dL Final    Use of this assay is not recommended for patients undergoing treatment with eltrombopag due to the potential for falsely elevated results.  N-acetyl-p-benzoquinone imine (metabolite of Acetaminophen) will generate erroneously low results in samples for patients that have taken an overdose of Acetaminophen.    eGFR 08/30/2024 52  ml/min/1.73sq m Final    Magnesium 08/30/2024 2.1  1.9 - 2.7 mg/dL Final    Phosphorus 08/30/2024 4.0  2.3 - 4.1 mg/dL Final    WBC 08/30/2024 5.82  4.31 - 10.16 Thousand/uL Final    RBC 08/30/2024 3.77 (L)  3.81 - 5.12 Million/uL Final    Hemoglobin 08/30/2024 11.0 (L)  11.5 - 15.4 g/dL Final    Hematocrit 08/30/2024 34.4 (L)  34.8 -  46.1 % Final    MCV 08/30/2024 91  82 - 98 fL Final    MCH 08/30/2024 29.2  26.8 - 34.3 pg Final    MCHC 08/30/2024 32.0  31.4 - 37.4 g/dL Final    RDW 08/30/2024 13.1  11.6 - 15.1 % Final    MPV 08/30/2024 10.9  8.9 - 12.7 fL Final    Platelets 08/30/2024 207  149 - 390 Thousands/uL Final    nRBC 08/30/2024 0  /100 WBCs Final    Segmented % 08/30/2024 68  43 - 75 % Final    Immature Grans % 08/30/2024 0  0 - 2 % Final    Lymphocytes % 08/30/2024 22  14 - 44 % Final    Monocytes % 08/30/2024 7  4 - 12 % Final    Eosinophils Relative 08/30/2024 2  0 - 6 % Final    Basophils Relative 08/30/2024 1  0 - 1 % Final    Absolute Neutrophils 08/30/2024 3.95  1.85 - 7.62 Thousands/µL Final    Absolute Immature Grans 08/30/2024 0.01  0.00 - 0.20 Thousand/uL Final    Absolute Lymphocytes 08/30/2024 1.26  0.60 - 4.47 Thousands/µL Final    Absolute Monocytes 08/30/2024 0.43  0.17 - 1.22 Thousand/µL Final    Eosinophils Absolute 08/30/2024 0.14  0.00 - 0.61 Thousand/µL Final    Basophils Absolute 08/30/2024 0.03  0.00 - 0.10 Thousands/µL Final    TSH 3RD GENERATON 08/30/2024 1.191  0.450 - 4.500 uIU/mL Final    The recommended reference ranges for TSH during pregnancy are as follows:   First trimester 0.100 to 2.500 uIU/mL   Second trimester  0.200 to 3.000 uIU/mL   Third trimester 0.300 to 3.000 uIU/m    Note: Normal ranges may not apply to patients who are transgender, non-binary, or whose legal sex, sex at birth, and gender identity differ.  Adult TSH (3rd generation) reference range follows the recommended guidelines of the American Thyroid Association, January, 2020.    Vitamin B-12 08/30/2024 437  180 - 914 pg/mL Final    Folate 08/30/2024 >22.3  >5.9 ng/mL Final    The World Health Organization has determined deficient folate concentrations are considered to be <4.0 ng/mL.    Vit D, 25-Hydroxy 08/30/2024 40.4  30.0 - 100.0 ng/mL Final    Vitamin D guidelines established by Clinical Guidelines Subcommittee  of the Endocrine  Society Task Force, 2011    Deficiency <20ng/ml   Insufficiency 20-30ng/ml   Sufficient  ng/ml     Iron Saturation 08/30/2024 23  15 - 50 % Final    TIBC 08/30/2024 257  250 - 450 ug/dL Final    Iron 08/30/2024 58  50 - 212 ug/dL Final    Patients treated with metal-binding drugs (ie. Deferoxamine) may have depressed iron values.    UIBC 08/30/2024 199  155 - 355 ug/dL Final    Ferritin 08/30/2024 29  11 - 307 ng/mL Final    Syphilis Total Antibody 08/30/2024 Non-reactive  Non-Reactive Final    No serological evidence of infection with T. pallidum.  Early or incubating syphilis infection cannot be excluded.  Consider repeat testing based on clinical suspicion.    HIV-1 p24 Antigen 08/30/2024 Non-Reactive  Non-Reactive Final    HIV-1 Antibody 08/30/2024 Non-Reactive  Non-Reactive Final    HIV-2 Antibody 08/30/2024 Non-Reactive  Non-Reactive Final    HIV Ag-Ab 5th Gen 08/30/2024 Non-Reactive  Non-Reactive Final    A Non-Reactive test result does not preclude the possibility of exposure or infection with HIV-1 and/or HIV-2.  Non-Reactive results can occur if the quantity of marker present is below the detection limits or is not present during the stage of disease in which a sample is collected. Repeat testing should be considered where there is clinical suspicion of infection.       Ventricular Rate 08/30/2024 83  BPM Final    Atrial Rate 08/30/2024 83  BPM Final    IL Interval 08/30/2024 170  ms Final    QRSD Interval 08/30/2024 74  ms Final    QT Interval 08/30/2024 370  ms Final    QTC Interval 08/30/2024 434  ms Final    P Axis 08/30/2024 61  degrees Final    QRS Bethany 08/30/2024 3  degrees Final    T Wave Bethany 08/30/2024 51  degrees Final   There may be more visits with results that are not included.             ___________________________________    History Review: The following portions of the patient's history were reviewed and updated as appropriate: allergies, current medications, past family history,  past medical history, past social history, past surgical history, and problem list.    Unchanged information from this writer's previous assessment is copied and italicized; information that has changed is bolded.    Past Psychiatric History:      Past psychiatric diagnoses:   Depression, anxiety  Inpatient psychiatric admissions:   8/2024 2013 following a surgery, sounded like extreme anxiety  Prior outpatient psychiatric treatment:   Started seeing a psychiatrist when first   Past/current psychotherapy:   active  History of suicidal attempts/gestures:   denies  Psychotropic medication trials:   Venlafaxine, sertraline, fluoxetine, escitalopram, aripiprazole (dizziness), risperidone, clonazepam, buspirone, gabapentin        Substance Abuse History:     Denies all history of substance use     Family Psychiatric History:      Family History             Family History   Problem Relation Age of Onset    Depression Mother           w/anxiety    Diabetes Mother           Mellitus    Breast cancer Mother      Hypertension Mother      No Known Problems Father      Depression Sister           w/anxiety    Heart disease Sister           Cardiac Disorder    Breast cancer Sister      Hypertension Sister      Diabetes Brother           Mellitus    Alcohol abuse Son                    Social History:     Developmental: nothing of note  Education: Bachelor degree  Marital history:  in 2006  Children: 2  Living arrangement, social support: as above, has been with daughter for 17 years  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  ___________________________________      Visit Time    Visit Start Time: 1000  Visit Stop Time: 1100  Total Visit Duration:  60 minutes    López Gutierrez MD   02/07/25

## 2025-02-16 DIAGNOSIS — E11.9 TYPE 2 DIABETES MELLITUS WITHOUT COMPLICATION, WITHOUT LONG-TERM CURRENT USE OF INSULIN (HCC): Primary | ICD-10-CM

## 2025-02-21 ENCOUNTER — TELEMEDICINE (OUTPATIENT)
Dept: PSYCHIATRY | Facility: CLINIC | Age: 85
End: 2025-02-21

## 2025-02-21 DIAGNOSIS — F45.21 ILLNESS ANXIETY DISORDER: ICD-10-CM

## 2025-02-21 DIAGNOSIS — F33.41 MAJOR DEPRESSIVE DISORDER, RECURRENT EPISODE, IN PARTIAL REMISSION (HCC): Primary | ICD-10-CM

## 2025-02-21 PROCEDURE — PBNCHG PB NO CHARGE PLACEHOLDER

## 2025-02-21 RX ORDER — RISPERIDONE 0.25 MG/1
0.12 TABLET ORAL
Qty: 15 TABLET | Refills: 0 | Status: SHIPPED | OUTPATIENT
Start: 2025-02-21

## 2025-02-21 NOTE — ASSESSMENT & PLAN NOTE
Continue escitalopram 20 mg QD for obsessive thinking, anxiety, depressive symptoms  Continue lorazepam 0.5 mg TID for obsessive thinking and anxiety  Increase risperidone to 0.125 mg QAM and 0.5 mg QHS for antidepressant augmentation for obsessive thinking  Repeat referral to neurology for concern for PPA  Continue psychotherapy  Orders:  •  risperiDONE (RisperDAL) 0.25 mg tablet; Take 0.5 tablets (0.125 mg total) by mouth daily with breakfast  •  Ambulatory Referral to Neurology; Future

## 2025-02-21 NOTE — PSYCH
This note was not shared with the patient due to this is a psychotherapy note     Behavioral Health Psychotherapy Assessment    Date of Initial Psychotherapy Assessment: 25  Referral Source: self      Preferred Name: Sandra Peña  YOB: 1940 Age: 84 y.o.    Primary Care Physician:  Ramin De Paz DO   [facility] [id number]  [phone]      Physical Health History:  Past surgical procedures:   •  SECTION         1964 son, 1968 daughter   • COLONOSCOPY       • FL RETROGRADE PYELOGRAM   2020   • VT COLONOSCOPY FLX DX W/COLLJ SPEC WHEN PFRMD N/A 3/27/2017     Procedure: COLONOSCOPY;  Surgeon: Gold Francis MD;  Location: [facility];  Service: Gastroenterology   • VT CYSTO/URETERO W/LITHOTRIPSY &INDWELL STENT INSRT Right 2020     Procedure: CYSTOSCOPY URETEROSCOPY WITH LITHOTRIPSY HOLMIUM LASER, RETROGRADE PYELOGRAM AND INSERTION STENT URETERAL; EXCISION OF BLADDER TUMOR;  Surgeon: Edwin Love MD;  Location: [facility];  Service: Urology   • ROTATOR CUFF REPAIR Left       Last assessed: 3/29/15   • TONSILLECTOMY       Do you have a history of any of the following:   Past Medical History:   Diagnosis Date   • Colon polyps     • Diabetes mellitus (HCC)     • Dizziness       Last assessed: 8/31/15   • DVT (deep venous thrombosis) (HCC)     • Dysuria     • Hematuria 2022   • Hyperlipidemia     • Hypertension     • Hypertensive urgency 10/22/2021   • Insomnia     • Ureterolithiasis 2020     Do you have any mobility issues? Yes, describe: fatigues , leg pain    Relevant Family History:  Mother: depression, anxiety  Sister: depression, anxiety  Son: alcohol use    Presenting Problem (What brings you in?)  Referral from self in addition to other treatments including other therapies and medication for depressive symptoms including crying, anxiety, and obsessive thinking including illness anxiety disorder. Issues causing some discord with daughter at  home.        Diagnosis:   Diagnosis ICD-10-CM Associated Orders   1. Major depressive disorder, recurrent episode, in partial remission ([facility])  F33.41 risperiDONE (RisperDAL) 0.25 mg tablet     Ambulatory Referral to Neurology      2. Illness anxiety disorder  F45.21 risperiDONE (RisperDAL) 0.25 mg tablet     Ambulatory Referral to Neurology          Initial Assessment:     Current Mental Status:    Appearance: appropriate      Behavior/Manner: cooperative and tearful      Affect/Mood:  Anxious and sad    Speech:  Stuttering    Sleep:  Normal    Oriented to: oriented to self, oriented to place and oriented to time       Clinical Symptoms    Depression: yes      Anxiety: yes      Depression Symptoms: excessive crying and indecision      Anxiety Symptoms: excessive worry, nervous/anxious and difficulty controlling worry      Have you ever been assaultive to others or the environment: No      Have you ever been self-injurious: No      Counseling History:  Previous Counseling or Treatment  (Mental Health or Drug & Alcohol): No    Have you previously taken psychiatric medications: Yes    Previous Medications Attempted:  Venlafaxine, sertraline, fluoxetine, escitalopram, aripiprazole (dizziness), risperidone, clonazepam, buspirone, gabapentin    Suicide Risk Assessment  Have you ever had a suicide attempt: No    Have you had incidents of suicidal ideation: No    Are you currently experiencing suicidal thoughts: No      Substance Abuse/Addiction Assessment:  Alcohol: No    Heroin: No    Fentanyl: No    Opiates: No    Cocaine: No    Amphetamines: No    Hallucinogens: No    Club Drugs: No    Benzodiazepines: No    Other Rx Meds: No    Marijuana: No    Tobacco/Nicotine: No    Have you experienced blackouts as a result of substance use: No    Have you experienced symptoms of withdrawal: No    Have you ever overdosed on any substances?: No    Are you currently using any Medication Assisted Treatment for Substance Use: No       Disordered Eating History:  Do you have a history of disordered eating: No      Social Determinants of Health:    SDOH:  None    Trauma and Abuse History:    Have you ever been abused: No      Legal History:    Have you ever been arrested  or had a DUI: No      Have you been incarcerated: No      Are you currently on parole/probation: No      Any current Children and Youth involvement: No      Any pending legal charges: No      Relationship History:    Current marital status:       Natural Supports:  Other    Other natural supports:  Family    Employment History    Are you currently employed: No      Currently seeking employment: No       History:      Status: no history of  duty  Educational History:     Have you ever been diagnosed with a learning disability: No      Highest level of education:  Bachelor's Degree    Have you ever had an IEP or 504-plan: No      Do you need assistance with reading or writing: No      Recommended Treatment:     Psychotherapy:  Individual sessions and medication management    Frequency:  2 times    Session frequency:  Monthly      Visit start and stop times: 0805-0855 02/21/25       Administrative Statements   Encounter provider López Gutierrez MD    The Patient is located at Home and in the following state in which I hold an active license [place].    The patient was identified by name and date of birth. Sandra Peña was informed that this is a telemedicine visit and that the visit is being conducted through the Epic Embedded platform. She agrees to proceed..  My office door was closed. No one else was in the room.  She acknowledged consent and understanding of privacy and security of the video platform. The patient has agreed to participate and understands they can discontinue the visit at any time.    I have spent a total time of 50 minutes in caring for this patient on the day of the visit/encounter including Counseling / Coordination of  care.

## 2025-02-21 NOTE — ASSESSMENT & PLAN NOTE
Orders:  •  risperiDONE (RisperDAL) 0.25 mg tablet; Take 0.5 tablets (0.125 mg total) by mouth daily with breakfast  •  Ambulatory Referral to Neurology; Future

## 2025-02-21 NOTE — PSYCH
Administrative Statements   Encounter provider López Gutierrez MD    The Patient is located at Home and in the following state in which I hold an active license [place].    The patient was identified by name and date of birth. Sandra Peña was informed that this is a telemedicine visit and that the visit is being conducted through the Epic Embedded platform. She agrees to proceed..  My office door was closed. No one else was in the room.  She acknowledged consent and understanding of privacy and security of the video platform. The patient has agreed to participate and understands they can discontinue the visit at any time.    I have spent a total time of 50 minutes in caring for this patient on the day of the visit/encounter including Counseling / Coordination of care.    MEDICATION MANAGEMENT NOTE        [facility]      Name and Date of Birth:  Sandra Peña 84 y.o. 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 Speech is halting, stuttering. Diagnosis is complex. This may represent a kind of aphasia like logopenic or other PPA; although it was observed that some weeks ago, when anxiety and depressive symptoms were better controlled that currently, speech was more fluent. The patient's obsessive thought patterns and preoccupations may be responsible, or may be contributing to anxiety about an underlying problem and then exacerbating it. Previous neuropsychological testing was attempted however was overall of little utility due to the degree of anxiety the patient experienced during testing which made results unreliable. Was previously referred to neurology however may not have been seen due to chief complaint of cognitive issues at her age that are not seen though this is unclear. Also unclear if with this more specific development she could be evaluated neurologically.     Given positive response to addition of risperidone in the past for obsessive thoughts and depressive symptoms, as a result of anxiety and obsessivity, a small addition in the morning will be tried, otherwise no other medication changes are being considered at this time given overall improvement already, age, and doubtfulness of how much more responsive her symptoms would be due to any other psychotropics otherwise safe and indicated.    Treatment Recommendations/Precautions:  Risks/benefits/alternatives to treatment discussed, including a myriad of potential adverse medication side effects, to which Sandra voiced understanding and consented fully to treatment.   Labs most recently obtained , reviewed.   Follow up in 2 weeks for medication management  Follow up with PCP for medical issues and ongoing care  Aware of 24 hour and weekend coverage for urgent situations accessed by calling [facility] main practice number        Treatment Plan:     Completed and signed during the  "session: Not applicable - Treatment Plan not due at this session    _____________________________________________    History of Present Illness     Chief Complaint: \"something wrong with me\"    SUBJECTIVE:    The patient and daughter report plateau of response. She is better than previous, however not as improved as at some points. She continues to have some somatic preoccupations. Reports recent events with daughter good and bad and changes to some routine with additional people and enjoyable activities and current concerns.        Psychiatric Review Of Systems:  Unchanged information from this writer's previous assessment is copied and italicized; information that has changed is bolded.    Appetite: no  Adverse eating: no  Weight changes: no  Insomnia/sleeplessness: no  Fatigue/anergy: no  Anhedonia/lack of interest: no  Attention/concentration: no  Psychomotor agitation/retardation: no  Somatic symptoms: yes  Anxiety/panic attack: yes  Zaira/hypomania: no  Hopelessness/helplessness/worthlessness: no  Self-injurious behavior/high-risk behavior: no  Suicidal ideation: no  Homicidal ideation: no  Auditory hallucinations: no  Visual hallucinations: no  Delusional thinking: no  Obsessive/compulsive symptoms: yes    Review Of Systems:      Constitutional negative   ENT negative   Cardiovascular negative   Respiratory negative   Gastrointestinal negative   Genitourinary negative   Musculoskeletal negative   Integumentary negative   Neurological negative   Endocrine negative   Other Symptoms none, all other systems are negative     Objective    OBJECTIVE:     Visit Vitals  OB Status Postmenopausal   Smoking Status Never      Wt Readings from Last 6 Encounters:   02/05/25 53.5 kg (118 lb)   12/17/24 53.1 kg (117 lb)   11/18/24 49.9 kg (110 lb)   10/17/24 49.9 kg (110 lb 0.2 oz)   10/10/24 49.4 kg (109 lb)   10/04/24 48.7 kg (107 lb 5.8 oz)        Past Medical History:   Diagnosis Date   • Anxiety    • Colon polyps    • " Depression    • Diabetes mellitus (HCC)    • Dizziness     Last assessed: 8/31/15   • DVT (deep venous thrombosis) ([facility])    • Dysuria    • Hematuria 2022   • Hyperlipidemia    • Hypertension    • Hypertensive urgency 10/22/2021   • Insomnia    • Panic attack    • Ureterolithiasis 2020      Past Surgical History:   Procedure Laterality Date   •  SECTION      1964 son, 1968 daughter   • COLONOSCOPY     • FL RETROGRADE PYELOGRAM  2020   • NC COLONOSCOPY FLX DX W/COLLJ SPEC WHEN PFRMD N/A 3/27/2017    Procedure: COLONOSCOPY;  Surgeon: Gold Francis MD;  Location: [facility];  Service: Gastroenterology   • NC CYSTO/URETERO W/LITHOTRIPSY &INDWELL STENT INSRT Right 2020    Procedure: CYSTOSCOPY URETEROSCOPY WITH LITHOTRIPSY HOLMIUM LASER, RETROGRADE PYELOGRAM AND INSERTION STENT URETERAL; EXCISION OF BLADDER TUMOR;  Surgeon: Edwin Love MD;  Location: [facility];  Service: Urology   • ROTATOR CUFF REPAIR Left     Last assessed: 3/29/15   • TONSILLECTOMY         Meds/Allergies    No Known Allergies  Current Outpatient Medications   Medication Instructions   • aspirin 81 mg, Daily   • atorvastatin (LIPITOR) 10 mg, Oral, Daily   • Cholecalciferol (VITAMIN D3) 2,000 Units, Oral, Daily   • escitalopram (LEXAPRO) 20 mg, Oral, Daily   • linaCLOtide 145 mcg, Oral, Daily   • lisinopril-hydrochlorothiazide (PRINZIDE,ZESTORETIC) 10-12.5 MG per tablet 1 tablet, Oral, Daily   • LORazepam (ATIVAN) 0.5 mg, Oral, 3 times daily   • metFORMIN (GLUCOPHAGE) 500 mg, Oral, 2 times daily with meals   • polyethylene glycol (MIRALAX) 17 g, Oral, Daily   • psyllium (METAMUCIL) packet 1 packet, Oral, Daily   • risperiDONE (RISPERDAL) 0.5 mg, Oral, Daily at bedtime   • risperiDONE (RISPERDAL) 0.125 mg, Oral, Daily with breakfast   • senna-docusate sodium (SENOKOT S) 8.6-50 mg per tablet 1 tablet, Oral, Daily PRN           Mental Status Exam:    Appearance Short gray curly hair, good eye contact, sweater  "  Behavior/motor Calm and cooperative   Speech/language Halting, stuttering, normal volume   Mood \"Not too good\"   Affect Full range, tearful at times, appropriate   Thought process Logical and linear   Thought content Obsessive thinking  No overt delusions, Denies hallucinations, Denies suicidal ideations   Cognition Awake and alert, oriented and memory grossly intact, and concentrates on questions   Insight/judgment Insight: fair  Judgment: fair     Laboratory Results: I have personally reviewed all pertinent laboratory/tests results    Orders Only on 12/17/2024   Component Date Value Ref Range Status   • Urine Culture Result 12/17/2024  (A)   Final    Comment:     CULTURE, URINE, ROUTINE         Micro Number:      [id number]    Test Status:       Final    Specimen Source:   Urine    Specimen Quality:  Adequate    Result:            50,000-100,000 CFU/mL of Escherichia coli      Comment:           A portion of the results were performed at [facility].                              E.coli                            ----------------                            INT   COOPER     AMIKACIN               S     4     AMOX/CLAVULANATE       S     8     AMP/SULBACTAM          I     16     CEFAZOLIN              NR    <=4 **2     CEFEPIME               S     <=0.12     CEFTAZIDIME            S     <=1     CEFTRIAXONE            S     <=0.25     CIPROFLOXACIN          I     0.5     GENTAMICIN             S     <=1     IMIPENEM               S     <=0.25     LEVOFLOXACIN           I     1     MEROPENEM              S     <=0.25     NITROFURANTOIN         S     <=16     PIP/TAZOBACTAM         S     <=4     TRIMETHOPRIM/SULFA     S     <=20    S = Susceptible  I = Inter                           mediate  R = Resistant  NS = Not susceptible  SDD = Susceptible Dose Dependent  * = Not Tested  NR = Not Reported  **NN = See Therapy Comments      THERAPY COMMENTS        Note 1:      For infections other than uncomplicated UTI      caused by E. " coli, K. pneumoniae or P. mirabilis:      Cefazolin is resistant if COOPER > or = 8 mcg/mL.      (Distinguishing susceptible versus intermediate      for isolates with COOPER < or = 4 mcg/mL requires      additional testing.)        Note 2:      For uncomplicated UTI caused by E. coli,      K. pneumoniae or P. mirabilis: Cefazolin is      susceptible if COOPER <32 mcg/mL and predicts      susceptible to the oral agents cefaclor, cefdinir,      cefpodoxime, cefprozil, cefuroxime, cephalexin      and loracarbef.     Office Visit on 12/17/2024   Component Date Value Ref Range Status   • LEUKOCYTE ESTERASE,UA 12/17/2024 75   Final   • NITRITE,UA 12/17/2024 neg   Final   • SL AMB POCT UROBILINOGEN 12/17/2024 norm   Final   • POCT URINE PROTEIN 12/17/2024 neg   Final   •  PH,UA 12/17/2024 5   Final   • BLOOD,UA 12/17/2024 50   Final   • SPECIFIC GRAVITY,UA 12/17/2024 1.020   Final   • KETONES,UA 12/17/2024 neg   Final   • BILIRUBIN,UA 12/17/2024 neg   Final   • GLUCOSE, UA 12/17/2024 50   Final   •  COLOR,UA 12/17/2024 yellow   Final   • CLARITY,UA 12/17/2024 cloudy   Final   Appointment on 12/12/2024   Component Date Value Ref Range Status   • Creatinine, Ur 12/12/2024 58.6  Reference range not established. mg/dL Final   • Albumin,U,Random 12/12/2024 37.9 (H)  <20.0 mg/L Final   • Albumin Creat Ratio 12/12/2024 65 (H)  0 - 30 mg/g creatinine Final   • WBC 12/12/2024 4.79  4.31 - 10.16 Thousand/uL Final   • RBC 12/12/2024 3.46 (L)  3.81 - 5.12 Million/uL Final   • Hemoglobin 12/12/2024 10.1 (L)  11.5 - 15.4 g/dL Final   • Hematocrit 12/12/2024 32.0 (L)  34.8 - 46.1 % Final   • MCV 12/12/2024 93  82 - 98 fL Final   • MCH 12/12/2024 29.2  26.8 - 34.3 pg Final   • MCHC 12/12/2024 31.6  31.4 - 37.4 g/dL Final   • RDW 12/12/2024 13.9  11.6 - 15.1 % Final   • MPV 12/12/2024 10.8  8.9 - 12.7 fL Final   • Platelets 12/12/2024 194  149 - 390 Thousands/uL Final   • nRBC 12/12/2024 0  /100 WBCs Final   • Segmented % 12/12/2024 61  43 - 75 %  Final   • Immature Grans % 12/12/2024 0  0 - 2 % Final   • Lymphocytes % 12/12/2024 28  14 - 44 % Final   • Monocytes % 12/12/2024 7  4 - 12 % Final   • Eosinophils Relative 12/12/2024 3  0 - 6 % Final   • Basophils Relative 12/12/2024 1  0 - 1 % Final   • Absolute Neutrophils 12/12/2024 2.98  1.85 - 7.62 Thousands/µL Final   • Absolute Immature Grans 12/12/2024 0.02  0.00 - 0.20 Thousand/uL Final   • Absolute Lymphocytes 12/12/2024 1.32  0.60 - 4.47 Thousands/µL Final   • Absolute Monocytes 12/12/2024 0.32  0.17 - 1.22 Thousand/µL Final   • Eosinophils Absolute 12/12/2024 0.12  0.00 - 0.61 Thousand/µL Final   • Basophils Absolute 12/12/2024 0.03  0.00 - 0.10 Thousands/µL Final   • Sodium 12/12/2024 139  135 - 147 mmol/L Final   • Potassium 12/12/2024 4.1  3.5 - 5.3 mmol/L Final   • Chloride 12/12/2024 106  96 - 108 mmol/L Final   • CO2 12/12/2024 27  21 - 32 mmol/L Final   • ANION GAP 12/12/2024 6  4 - 13 mmol/L Final   • BUN 12/12/2024 26 (H)  5 - 25 mg/dL Final   • Creatinine 12/12/2024 1.06  0.60 - 1.30 mg/dL Final    Standardized to IDMS reference method   • Glucose, Fasting 12/12/2024 92  65 - 99 mg/dL Final   • Calcium 12/12/2024 9.3  8.4 - 10.2 mg/dL Final   • AST 12/12/2024 11 (L)  13 - 39 U/L Final   • ALT 12/12/2024 7  7 - 52 U/L Final    Specimen collection should occur prior to Sulfasalazine administration due to the potential for falsely depressed results.    • Alkaline Phosphatase 12/12/2024 55  34 - 104 U/L Final   • Total Protein 12/12/2024 6.2 (L)  6.4 - 8.4 g/dL Final   • Albumin 12/12/2024 3.7  3.5 - 5.0 g/dL Final   • Total Bilirubin 12/12/2024 0.77  0.20 - 1.00 mg/dL Final    Use of this assay is not recommended for patients undergoing treatment with eltrombopag due to the potential for falsely elevated results.  N-acetyl-p-benzoquinone imine (metabolite of Acetaminophen) will generate erroneously low results in samples for patients that have taken an overdose of Acetaminophen.   • eGFR  12/12/2024 47  ml/min/1.73sq m Final   • Hemoglobin A1C 12/12/2024 6.2 (H)  Normal 4.0-5.6%; PreDiabetic 5.7-6.4%; Diabetic >=6.5%; Glycemic control for adults with diabetes <7.0% % Final   • EAG 12/12/2024 131  mg/dl Final   • Cholesterol 12/12/2024 128  See Comment mg/dL Final    Cholesterol:         Pediatric <18 Years        Desirable          <170 mg/dL      Borderline High    170-199 mg/dL      High               >=200 mg/dL        Adult >=18 Years            Desirable         <200 mg/dL      Borderline High   200-239 mg/dL      High              >239 mg/dL     • Triglycerides 12/12/2024 91  See Comment mg/dL Final    Triglyceride:     0-9Y            <75mg/dL     10Y-17Y         <90 mg/dL       >=18Y     Normal          <150 mg/dL     Borderline High 150-199 mg/dL     High            200-499 mg/dL        Very High       >499 mg/dL    Specimen collection should occur prior to Metamizole administration due to the potential for falsely depressed results.   • HDL, Direct 12/12/2024 55  >=50 mg/dL Final   • LDL Calculated 12/12/2024 55  0 - 100 mg/dL Final    LDL Cholesterol:     Optimal           <100 mg/dl     Near Optimal      100-129 mg/dl     Above Optimal       Borderline High 130-159 mg/dl       High            160-189 mg/dl       Very High       >189 mg/dl         This screening LDL is a calculated result.   It does not have the accuracy of the Direct Measured LDL in the monitoring of patients with hyperlipidemia and/or statin therapy.   Direct Measure LDL (DMO705) must be ordered separately in these patients.   • Non-HDL-Chol (CHOL-HDL) 12/12/2024 73  mg/dl Final   • TSH 3RD GENERATON 12/12/2024 1.083  0.450 - 4.500 uIU/mL Final    The recommended reference ranges for TSH during pregnancy are as follows:   First trimester 0.100 to 2.500 uIU/mL   Second trimester  0.200 to 3.000 uIU/mL   Third trimester 0.300 to 3.000 uIU/m    Note: Normal ranges may not apply to patients who are transgender, non-binary,  or whose legal sex, sex at birth, and gender identity differ.  Adult TSH (3rd generation) reference range follows the recommended guidelines of the American Thyroid Association, January, 2020.   • Vitamin B-12 12/12/2024 495  180 - 914 pg/mL Final   • Folate 12/12/2024 16.1  >5.9 ng/mL Final    The World Health Organization has determined deficient folate concentrations are considered to be <4.0 ng/mL.   Orders Only on 11/13/2024   Component Date Value Ref Range Status   • Supplier Name 11/13/2024 [employer]   Final-Edited   • Supplier Phone Number 11/13/2024 [phone]   Final-Edited   • Order Status 11/13/2024 Delivery Successful   Final-Edited   • Delivery Request Date 11/13/2024 11/13/2024   Final-Edited   • Date Delivered  11/13/2024 11/14/2024   Final-Edited   • Item Description 11/13/2024 Evette Walker, Adult   Final-Edited    Qty: 1   Admission on 10/17/2024, Discharged on 10/17/2024   Component Date Value Ref Range Status   • Sodium 10/17/2024 139  135 - 147 mmol/L Final   • Potassium 10/17/2024 3.8  3.5 - 5.3 mmol/L Final   • Chloride 10/17/2024 105  96 - 108 mmol/L Final   • CO2 10/17/2024 27  21 - 32 mmol/L Final   • ANION GAP 10/17/2024 7  4 - 13 mmol/L Final   • BUN 10/17/2024 21  5 - 25 mg/dL Final   • Creatinine 10/17/2024 0.98  0.60 - 1.30 mg/dL Final    Standardized to IDMS reference method   • Glucose 10/17/2024 121  65 - 140 mg/dL Final    If the patient is fasting, the ADA then defines impaired fasting glucose as > 100 mg/dL and diabetes as > or equal to 123 mg/dL.   • Calcium 10/17/2024 8.9  8.4 - 10.2 mg/dL Final   • AST 10/17/2024 11 (L)  13 - 39 U/L Final   • ALT 10/17/2024 8  7 - 52 U/L Final    Specimen collection should occur prior to Sulfasalazine administration due to the potential for falsely depressed results.    • Alkaline Phosphatase 10/17/2024 45  34 - 104 U/L Final   • Total Protein 10/17/2024 5.8 (L)  6.4 - 8.4 g/dL Final   • Albumin 10/17/2024 3.6   3.5 - 5.0 g/dL Final   • Total Bilirubin 10/17/2024 0.68  0.20 - 1.00 mg/dL Final    Use of this assay is not recommended for patients undergoing treatment with eltrombopag due to the potential for falsely elevated results.  N-acetyl-p-benzoquinone imine (metabolite of Acetaminophen) will generate erroneously low results in samples for patients that have taken an overdose of Acetaminophen.   • eGFR 10/17/2024 52  ml/min/1.73sq m Final   • WBC 10/17/2024 5.82  4.31 - 10.16 Thousand/uL Final   • RBC 10/17/2024 3.88  3.81 - 5.12 Million/uL Final   • Hemoglobin 10/17/2024 11.3 (L)  11.5 - 15.4 g/dL Final   • Hematocrit 10/17/2024 34.9  34.8 - 46.1 % Final   • MCV 10/17/2024 90  82 - 98 fL Final   • MCH 10/17/2024 29.1  26.8 - 34.3 pg Final   • MCHC 10/17/2024 32.4  31.4 - 37.4 g/dL Final   • RDW 10/17/2024 13.3  11.6 - 15.1 % Final   • MPV 10/17/2024 10.8  8.9 - 12.7 fL Final   • Platelets 10/17/2024 230  149 - 390 Thousands/uL Final   • nRBC 10/17/2024 0  /100 WBCs Final   • Segmented % 10/17/2024 68  43 - 75 % Final   • Immature Grans % 10/17/2024 0  0 - 2 % Final   • Lymphocytes % 10/17/2024 24  14 - 44 % Final   • Monocytes % 10/17/2024 6  4 - 12 % Final   • Eosinophils Relative 10/17/2024 1  0 - 6 % Final   • Basophils Relative 10/17/2024 1  0 - 1 % Final   • Absolute Neutrophils 10/17/2024 3.94  1.85 - 7.62 Thousands/µL Final   • Absolute Immature Grans 10/17/2024 0.02  0.00 - 0.20 Thousand/uL Final   • Absolute Lymphocytes 10/17/2024 1.38  0.60 - 4.47 Thousands/µL Final   • Absolute Monocytes 10/17/2024 0.36  0.17 - 1.22 Thousand/µL Final   • Eosinophils Absolute 10/17/2024 0.08  0.00 - 0.61 Thousand/µL Final   • Basophils Absolute 10/17/2024 0.04  0.00 - 0.10 Thousands/µL Final   • Color, UA 10/17/2024 Light Yellow   Final   • Clarity, UA 10/17/2024 Clear   Final   • Specific Gravity, UA 10/17/2024 1.012  1.003 - 1.030 Final   • pH, UA 10/17/2024 7.0  4.5, 5.0, 5.5, 6.0, 6.5, 7.0, 7.5, 8.0 Final   • Leukocytes,  UA 10/17/2024 Negative  Negative Final   • Nitrite, UA 10/17/2024 Negative  Negative Final   • Protein, UA 10/17/2024 Negative  Negative mg/dl Final   • Glucose, UA 10/17/2024 Negative  Negative mg/dl Final   • Ketones, UA 10/17/2024 Negative  Negative mg/dl Final   • Urobilinogen, UA 10/17/2024 <2.0  <2.0 mg/dl mg/dl Final   • Bilirubin, UA 10/17/2024 Negative  Negative Final   • Occult Blood, UA 10/17/2024 Negative  Negative Final   • Magnesium 10/17/2024 1.8 (L)  1.9 - 2.7 mg/dL Final   • SARS-CoV-2 10/17/2024 Negative  Negative Final        • INFLUENZA A PCR 10/17/2024 Negative  Negative Final        • INFLUENZA B PCR 10/17/2024 Negative  Negative Final        • RSV PCR 10/17/2024 Negative  Negative Final        • Ventricular Rate 10/17/2024 79  BPM Final   • Atrial Rate 10/17/2024 79  BPM Final   • MO Interval 10/17/2024 166  ms Final   • QRSD Interval 10/17/2024 72  ms Final   • QT Interval 10/17/2024 358  ms Final   • QTC Interval 10/17/2024 410  ms Final   • P Axis 10/17/2024 52  degrees Final   • QRS Axis 10/17/2024 1  degrees Final   • T Wave Axis 10/17/2024 42  degrees Final   Admission on 10/03/2024, Discharged on 10/05/2024   Component Date Value Ref Range Status   • WBC 10/04/2024 5.78  4.31 - 10.16 Thousand/uL Final   • RBC 10/04/2024 3.90  3.81 - 5.12 Million/uL Final   • Hemoglobin 10/04/2024 11.3 (L)  11.5 - 15.4 g/dL Final   • Hematocrit 10/04/2024 35.2  34.8 - 46.1 % Final   • MCV 10/04/2024 90  82 - 98 fL Final   • MCH 10/04/2024 29.0  26.8 - 34.3 pg Final   • MCHC 10/04/2024 32.1  31.4 - 37.4 g/dL Final   • RDW 10/04/2024 12.9  11.6 - 15.1 % Final   • MPV 10/04/2024 10.9  8.9 - 12.7 fL Final   • Platelets 10/04/2024 256  149 - 390 Thousands/uL Final   • nRBC 10/04/2024 0  /100 WBCs Final   • Segmented % 10/04/2024 56  43 - 75 % Final   • Immature Grans % 10/04/2024 0  0 - 2 % Final   • Lymphocytes % 10/04/2024 32  14 - 44 % Final   • Monocytes % 10/04/2024 8  4 - 12 % Final   • Eosinophils  Relative 10/04/2024 3  0 - 6 % Final   • Basophils Relative 10/04/2024 1  0 - 1 % Final   • Absolute Neutrophils 10/04/2024 3.21  1.85 - 7.62 Thousands/µL Final   • Absolute Immature Grans 10/04/2024 0.02  0.00 - 0.20 Thousand/uL Final   • Absolute Lymphocytes 10/04/2024 1.85  0.60 - 4.47 Thousands/µL Final   • Absolute Monocytes 10/04/2024 0.48  0.17 - 1.22 Thousand/µL Final   • Eosinophils Absolute 10/04/2024 0.18  0.00 - 0.61 Thousand/µL Final   • Basophils Absolute 10/04/2024 0.04  0.00 - 0.10 Thousands/µL Final   • Sodium 10/04/2024 137  135 - 147 mmol/L Final   • Potassium 10/04/2024 4.4  3.5 - 5.3 mmol/L Final   • Chloride 10/04/2024 102  96 - 108 mmol/L Final   • CO2 10/04/2024 28  21 - 32 mmol/L Final   • ANION GAP 10/04/2024 7  4 - 13 mmol/L Final   • BUN 10/04/2024 34 (H)  5 - 25 mg/dL Final   • Creatinine 10/04/2024 1.15  0.60 - 1.30 mg/dL Final    Standardized to IDMS reference method   • Glucose 10/04/2024 153 (H)  65 - 140 mg/dL Final    If the patient is fasting, the ADA then defines impaired fasting glucose as > 100 mg/dL and diabetes as > or equal to 123 mg/dL.   • Calcium 10/04/2024 9.3  8.4 - 10.2 mg/dL Final   • AST 10/04/2024 28  13 - 39 U/L Final   • ALT 10/04/2024 20  7 - 52 U/L Final    Specimen collection should occur prior to Sulfasalazine administration due to the potential for falsely depressed results.    • Alkaline Phosphatase 10/04/2024 58  34 - 104 U/L Final   • Total Protein 10/04/2024 6.4  6.4 - 8.4 g/dL Final   • Albumin 10/04/2024 3.8  3.5 - 5.0 g/dL Final   • Total Bilirubin 10/04/2024 0.32  0.20 - 1.00 mg/dL Final    Use of this assay is not recommended for patients undergoing treatment with eltrombopag due to the potential for falsely elevated results.  N-acetyl-p-benzoquinone imine (metabolite of Acetaminophen) will generate erroneously low results in samples for patients that have taken an overdose of Acetaminophen.   • eGFR 10/04/2024 43  ml/min/1.73sq m Final   • LACTIC  ACID 10/04/2024 2.2 (H)  0.5 - 2.0 mmol/L Final   • LACTIC ACID 10/04/2024 2.2 (H)  0.5 - 2.0 mmol/L Final   • WBC 10/04/2024 7.78  4.31 - 10.16 Thousand/uL Final   • RBC 10/04/2024 3.81  3.81 - 5.12 Million/uL Final   • Hemoglobin 10/04/2024 11.0 (L)  11.5 - 15.4 g/dL Final   • Hematocrit 10/04/2024 36.7  34.8 - 46.1 % Final   • MCV 10/04/2024 96  82 - 98 fL Final    short sample   • MCH 10/04/2024 28.9  26.8 - 34.3 pg Final   • MCHC 10/04/2024 30.0 (L)  31.4 - 37.4 g/dL Final   • RDW 10/04/2024 12.7  11.6 - 15.1 % Final   • MPV 10/04/2024 10.4  8.9 - 12.7 fL Final   • Platelets 10/04/2024 221  149 - 390 Thousands/uL Final   • nRBC 10/04/2024 0  /100 WBCs Final   • Segmented % 10/04/2024 74  43 - 75 % Final   • Immature Grans % 10/04/2024 0  0 - 2 % Final   • Lymphocytes % 10/04/2024 18  14 - 44 % Final   • Monocytes % 10/04/2024 5  4 - 12 % Final   • Eosinophils Relative 10/04/2024 2  0 - 6 % Final   • Basophils Relative 10/04/2024 1  0 - 1 % Final   • Absolute Neutrophils 10/04/2024 5.74  1.85 - 7.62 Thousands/µL Final   • Absolute Immature Grans 10/04/2024 0.03  0.00 - 0.20 Thousand/uL Final   • Absolute Lymphocytes 10/04/2024 1.42  0.60 - 4.47 Thousands/µL Final   • Absolute Monocytes 10/04/2024 0.42  0.17 - 1.22 Thousand/µL Final   • Eosinophils Absolute 10/04/2024 0.12  0.00 - 0.61 Thousand/µL Final   • Basophils Absolute 10/04/2024 0.05  0.00 - 0.10 Thousands/µL Final   • Sodium 10/04/2024 134 (L)  135 - 147 mmol/L Final   • Potassium 10/04/2024 4.5  3.5 - 5.3 mmol/L Final   • Chloride 10/04/2024 103  96 - 108 mmol/L Final   • CO2 10/04/2024 24  21 - 32 mmol/L Final   • ANION GAP 10/04/2024 7  4 - 13 mmol/L Final   • BUN 10/04/2024 29 (H)  5 - 25 mg/dL Final   • Creatinine 10/04/2024 1.05  0.60 - 1.30 mg/dL Final    Standardized to IDMS reference method   • Glucose 10/04/2024 137  65 - 140 mg/dL Final    If the patient is fasting, the ADA then defines impaired fasting glucose as > 100 mg/dL and diabetes as >  or equal to 123 mg/dL.   • Calcium 10/04/2024 8.7  8.4 - 10.2 mg/dL Final   • eGFR 10/04/2024 48  ml/min/1.73sq m Final   • LACTIC ACID 10/04/2024 2.0  0.5 - 2.0 mmol/L Final   • POC Glucose 10/04/2024 124  65 - 140 mg/dl Final   • POC Glucose 10/04/2024 168 (H)  65 - 140 mg/dl Final   • POC Glucose 10/04/2024 131  65 - 140 mg/dl Final   • TSH 3RD GENERATON 10/05/2024 0.957  0.450 - 4.500 uIU/mL Final    The recommended reference ranges for TSH during pregnancy are as follows:   First trimester 0.100 to 2.500 uIU/mL   Second trimester  0.200 to 3.000 uIU/mL   Third trimester 0.300 to 3.000 uIU/m    Note: Normal ranges may not apply to patients who are transgender, non-binary, or whose legal sex, sex at birth, and gender identity differ.  Adult TSH (3rd generation) reference range follows the recommended guidelines of the [employer], January, 2020.   • Protime 10/05/2024 14.3  12.3 - 15.0 seconds Final   • INR 10/05/2024 1.04  0.85 - 1.19 Final   • WBC 10/05/2024 5.15  4.31 - 10.16 Thousand/uL Final   • RBC 10/05/2024 3.27 (L)  3.81 - 5.12 Million/uL Final   • Hemoglobin 10/05/2024 9.8 (L)  11.5 - 15.4 g/dL Final   • Hematocrit 10/05/2024 29.5 (L)  34.8 - 46.1 % Final   • MCV 10/05/2024 90  82 - 98 fL Final    Results verified by repeat   • MCH 10/05/2024 30.0  26.8 - 34.3 pg Final   • MCHC 10/05/2024 33.2  31.4 - 37.4 g/dL Final   • RDW 10/05/2024 12.7  11.6 - 15.1 % Final   • MPV 10/05/2024 10.7  8.9 - 12.7 fL Final   • Platelets 10/05/2024 210  149 - 390 Thousands/uL Final   • nRBC 10/05/2024 0  /100 WBCs Final   • Segmented % 10/05/2024 58  43 - 75 % Final   • Immature Grans % 10/05/2024 0  0 - 2 % Final   • Lymphocytes % 10/05/2024 31  14 - 44 % Final   • Monocytes % 10/05/2024 6  4 - 12 % Final   • Eosinophils Relative 10/05/2024 4  0 - 6 % Final   • Basophils Relative 10/05/2024 1  0 - 1 % Final   • Absolute Neutrophils 10/05/2024 2.97  1.85 - 7.62 Thousands/µL Final   • Absolute Immature  Grans 10/05/2024 0.02  0.00 - 0.20 Thousand/uL Final   • Absolute Lymphocytes 10/05/2024 1.61  0.60 - 4.47 Thousands/µL Final   • Absolute Monocytes 10/05/2024 0.33  0.17 - 1.22 Thousand/µL Final   • Eosinophils Absolute 10/05/2024 0.19  0.00 - 0.61 Thousand/µL Final   • Basophils Absolute 10/05/2024 0.03  0.00 - 0.10 Thousands/µL Final   • Sodium 10/05/2024 139  135 - 147 mmol/L Final   • Potassium 10/05/2024 4.1  3.5 - 5.3 mmol/L Final   • Chloride 10/05/2024 106  96 - 108 mmol/L Final   • CO2 10/05/2024 28  21 - 32 mmol/L Final   • ANION GAP 10/05/2024 5  4 - 13 mmol/L Final   • BUN 10/05/2024 20  5 - 25 mg/dL Final   • Creatinine 10/05/2024 1.04  0.60 - 1.30 mg/dL Final    Standardized to IDMS reference method   • Glucose 10/05/2024 106  65 - 140 mg/dL Final    If the patient is fasting, the ADA then defines impaired fasting glucose as > 100 mg/dL and diabetes as > or equal to 123 mg/dL.   • Calcium 10/05/2024 8.7  8.4 - 10.2 mg/dL Final   • Corrected Calcium 10/05/2024 9.4  8.3 - 10.1 mg/dL Final   • AST 10/05/2024 11 (L)  13 - 39 U/L Final   • ALT 10/05/2024 11  7 - 52 U/L Final    Specimen collection should occur prior to Sulfasalazine administration due to the potential for falsely depressed results.    • Alkaline Phosphatase 10/05/2024 48  34 - 104 U/L Final   • Total Protein 10/05/2024 5.1 (L)  6.4 - 8.4 g/dL Final   • Albumin 10/05/2024 3.1 (L)  3.5 - 5.0 g/dL Final   • Total Bilirubin 10/05/2024 0.31  0.20 - 1.00 mg/dL Final    Use of this assay is not recommended for patients undergoing treatment with eltrombopag due to the potential for falsely elevated results.  N-acetyl-p-benzoquinone imine (metabolite of Acetaminophen) will generate erroneously low results in samples for patients that have taken an overdose of Acetaminophen.   • eGFR 10/05/2024 49  ml/min/1.73sq m Final   • POC Glucose 10/05/2024 105  65 - 140 mg/dl Final   • POC Glucose 10/05/2024 132  65 - 140 mg/dl Final   • POC Glucose  10/05/2024 187 (H)  65 - 140 mg/dl Final   Admission on 09/28/2024, Discharged on 09/30/2024   Component Date Value Ref Range Status   • WBC 09/28/2024 7.21  4.31 - 10.16 Thousand/uL Final   • RBC 09/28/2024 3.90  3.81 - 5.12 Million/uL Final   • Hemoglobin 09/28/2024 11.2 (L)  11.5 - 15.4 g/dL Final   • Hematocrit 09/28/2024 35.3  34.8 - 46.1 % Final   • MCV 09/28/2024 91  82 - 98 fL Final   • MCH 09/28/2024 28.7  26.8 - 34.3 pg Final   • MCHC 09/28/2024 31.7  31.4 - 37.4 g/dL Final   • RDW 09/28/2024 12.7  11.6 - 15.1 % Final   • MPV 09/28/2024 10.4  8.9 - 12.7 fL Final   • Platelets 09/28/2024 223  149 - 390 Thousands/uL Final   • nRBC 09/28/2024 0  /100 WBCs Final   • Segmented % 09/28/2024 65  43 - 75 % Final   • Immature Grans % 09/28/2024 0  0 - 2 % Final   • Lymphocytes % 09/28/2024 25  14 - 44 % Final   • Monocytes % 09/28/2024 6  4 - 12 % Final   • Eosinophils Relative 09/28/2024 3  0 - 6 % Final   • Basophils Relative 09/28/2024 1  0 - 1 % Final   • Absolute Neutrophils 09/28/2024 4.73  1.85 - 7.62 Thousands/µL Final   • Absolute Immature Grans 09/28/2024 0.02  0.00 - 0.20 Thousand/uL Final   • Absolute Lymphocytes 09/28/2024 1.77  0.60 - 4.47 Thousands/µL Final   • Absolute Monocytes 09/28/2024 0.41  0.17 - 1.22 Thousand/µL Final   • Eosinophils Absolute 09/28/2024 0.24  0.00 - 0.61 Thousand/µL Final   • Basophils Absolute 09/28/2024 0.04  0.00 - 0.10 Thousands/µL Final   • Sodium 09/28/2024 136  135 - 147 mmol/L Final   • Potassium 09/28/2024 3.9  3.5 - 5.3 mmol/L Final   • Chloride 09/28/2024 104  96 - 108 mmol/L Final   • CO2 09/28/2024 24  21 - 32 mmol/L Final   • ANION GAP 09/28/2024 8  4 - 13 mmol/L Final   • BUN 09/28/2024 27 (H)  5 - 25 mg/dL Final   • Creatinine 09/28/2024 1.14  0.60 - 1.30 mg/dL Final    Standardized to IDMS reference method   • Glucose 09/28/2024 216 (H)  65 - 140 mg/dL Final    If the patient is fasting, the ADA then defines impaired fasting glucose as > 100 mg/dL and  diabetes as > or equal to 123 mg/dL.   • Calcium 09/28/2024 9.1  8.4 - 10.2 mg/dL Final   • AST 09/28/2024 13  13 - 39 U/L Final   • ALT 09/28/2024 7  7 - 52 U/L Final    Specimen collection should occur prior to Sulfasalazine administration due to the potential for falsely depressed results.    • Alkaline Phosphatase 09/28/2024 46  34 - 104 U/L Final   • Total Protein 09/28/2024 6.0 (L)  6.4 - 8.4 g/dL Final   • Albumin 09/28/2024 3.7  3.5 - 5.0 g/dL Final   • Total Bilirubin 09/28/2024 0.35  0.20 - 1.00 mg/dL Final    Use of this assay is not recommended for patients undergoing treatment with eltrombopag due to the potential for falsely elevated results.  N-acetyl-p-benzoquinone imine (metabolite of Acetaminophen) will generate erroneously low results in samples for patients that have taken an overdose of Acetaminophen.   • eGFR 09/28/2024 43  ml/min/1.73sq m Final   • Magnesium 09/28/2024 1.8 (L)  1.9 - 2.7 mg/dL Final   • Phosphorus 09/28/2024 3.3  2.3 - 4.1 mg/dL Final   • Ventricular Rate 09/29/2024 79  BPM Final   • Atrial Rate 09/29/2024 300  BPM Final   • QRSD Interval 09/29/2024 78  ms Final   • QT Interval 09/29/2024 380  ms Final   • QTC Interval 09/29/2024 435  ms Final   • QRS Axis 09/29/2024 -10  degrees Final   • T Wave [place] 09/29/2024 2  degrees Final   • Hemoglobin A1C 09/29/2024 6.7 (H)  Normal 4.0-5.6%; PreDiabetic 5.7-6.4%; Diabetic >=6.5%; Glycemic control for adults with diabetes <7.0% % Final   • EAG 09/29/2024 146  mg/dl Final   • WBC 09/29/2024 9.50  4.31 - 10.16 Thousand/uL Final   • RBC 09/29/2024 3.82  3.81 - 5.12 Million/uL Final   • Hemoglobin 09/29/2024 11.0 (L)  11.5 - 15.4 g/dL Final   • Hematocrit 09/29/2024 34.7 (L)  34.8 - 46.1 % Final   • MCV 09/29/2024 91  82 - 98 fL Final   • MCH 09/29/2024 28.8  26.8 - 34.3 pg Final   • MCHC 09/29/2024 31.7  31.4 - 37.4 g/dL Final   • RDW 09/29/2024 12.8  11.6 - 15.1 % Final   • MPV 09/29/2024 10.7  8.9 - 12.7 fL Final   • Platelets  09/29/2024 191  149 - 390 Thousands/uL Final   • nRBC 09/29/2024 0  /100 WBCs Final   • Segmented % 09/29/2024 86 (H)  43 - 75 % Final   • Immature Grans % 09/29/2024 0  0 - 2 % Final   • Lymphocytes % 09/29/2024 8 (L)  14 - 44 % Final   • Monocytes % 09/29/2024 5  4 - 12 % Final   • Eosinophils Relative 09/29/2024 1  0 - 6 % Final   • Basophils Relative 09/29/2024 0  0 - 1 % Final   • Absolute Neutrophils 09/29/2024 8.04 (H)  1.85 - 7.62 Thousands/µL Final   • Absolute Immature Grans 09/29/2024 0.04  0.00 - 0.20 Thousand/uL Final   • Absolute Lymphocytes 09/29/2024 0.79  0.60 - 4.47 Thousands/µL Final   • Absolute Monocytes 09/29/2024 0.47  0.17 - 1.22 Thousand/µL Final   • Eosinophils Absolute 09/29/2024 0.13  0.00 - 0.61 Thousand/µL Final   • Basophils Absolute 09/29/2024 0.03  0.00 - 0.10 Thousands/µL Final   • Sodium 09/29/2024 140  135 - 147 mmol/L Final   • Potassium 09/29/2024 3.4 (L)  3.5 - 5.3 mmol/L Final   • Chloride 09/29/2024 107  96 - 108 mmol/L Final   • CO2 09/29/2024 24  21 - 32 mmol/L Final   • ANION GAP 09/29/2024 9  4 - 13 mmol/L Final   • BUN 09/29/2024 24  5 - 25 mg/dL Final   • Creatinine 09/29/2024 0.98  0.60 - 1.30 mg/dL Final    Standardized to IDMS reference method   • Glucose 09/29/2024 137  65 - 140 mg/dL Final    If the patient is fasting, the ADA then defines impaired fasting glucose as > 100 mg/dL and diabetes as > or equal to 123 mg/dL.   • Calcium 09/29/2024 8.6  8.4 - 10.2 mg/dL Final   • eGFR 09/29/2024 52  ml/min/1.73sq m Final   • Magnesium 09/29/2024 2.1  1.9 - 2.7 mg/dL Final   • POC Glucose 09/29/2024 138  65 - 140 mg/dl Final   • POC Glucose 09/29/2024 165 (H)  65 - 140 mg/dl Final   • POC Glucose 09/29/2024 169 (H)  65 - 140 mg/dl Final   • POC Glucose 09/29/2024 113  65 - 140 mg/dl Final   • WBC 09/30/2024 6.30  4.31 - 10.16 Thousand/uL Final   • RBC 09/30/2024 3.78 (L)  3.81 - 5.12 Million/uL Final   • Hemoglobin 09/30/2024 10.7 (L)  11.5 - 15.4 g/dL Final   • Hematocrit  09/30/2024 34.0 (L)  34.8 - 46.1 % Final   • MCV 09/30/2024 90  82 - 98 fL Final   • MCH 09/30/2024 28.3  26.8 - 34.3 pg Final   • MCHC 09/30/2024 31.5  31.4 - 37.4 g/dL Final   • RDW 09/30/2024 12.9  11.6 - 15.1 % Final   • Platelets 09/30/2024 171  149 - 390 Thousands/uL Final   • MPV 09/30/2024 10.6  8.9 - 12.7 fL Final   • Sodium 09/30/2024 138  135 - 147 mmol/L Final   • Potassium 09/30/2024 4.1  3.5 - 5.3 mmol/L Final   • Chloride 09/30/2024 107  96 - 108 mmol/L Final   • CO2 09/30/2024 24  21 - 32 mmol/L Final   • ANION GAP 09/30/2024 7  4 - 13 mmol/L Final   • BUN 09/30/2024 19  5 - 25 mg/dL Final   • Creatinine 09/30/2024 0.96  0.60 - 1.30 mg/dL Final    Standardized to IDMS reference method   • Glucose 09/30/2024 126  65 - 140 mg/dL Final    If the patient is fasting, the ADA then defines impaired fasting glucose as > 100 mg/dL and diabetes as > or equal to 123 mg/dL.   • Calcium 09/30/2024 8.9  8.4 - 10.2 mg/dL Final   • eGFR 09/30/2024 54  ml/min/1.73sq m Final   • POC Glucose 09/30/2024 121  65 - 140 mg/dl Final   • POC Glucose 09/30/2024 140  65 - 140 mg/dl Final   Admission on 09/23/2024, Discharged on 09/23/2024   Component Date Value Ref Range Status   • Ventricular Rate 09/23/2024 85  BPM Final   • Atrial Rate 09/23/2024 85  BPM Final   • TN Interval 09/23/2024 160  ms Final   • QRSD Interval 09/23/2024 72  ms Final   • QT Interval 09/23/2024 348  ms Final   • QTC Interval 09/23/2024 414  ms Final   • P Axis 09/23/2024 39  degrees Final   • QRS Axis 09/23/2024 -2  degrees Final   • T Wave Axis 09/23/2024 13  degrees Final   • WBC 09/23/2024 6.14  4.31 - 10.16 Thousand/uL Final   • RBC 09/23/2024 4.21  3.81 - 5.12 Million/uL Final   • Hemoglobin 09/23/2024 12.3  11.5 - 15.4 g/dL Final   • Hematocrit 09/23/2024 37.8  34.8 - 46.1 % Final   • MCV 09/23/2024 90  82 - 98 fL Final   • MCH 09/23/2024 29.2  26.8 - 34.3 pg Final   • MCHC 09/23/2024 32.5  31.4 - 37.4 g/dL Final   • RDW 09/23/2024 12.7  11.6 -  15.1 % Final   • MPV 09/23/2024 10.7  8.9 - 12.7 fL Final   • Platelets 09/23/2024 197  149 - 390 Thousands/uL Final   • nRBC 09/23/2024 0  /100 WBCs Final   • Segmented % 09/23/2024 66  43 - 75 % Final   • Immature Grans % 09/23/2024 0  0 - 2 % Final   • Lymphocytes % 09/23/2024 24  14 - 44 % Final   • Monocytes % 09/23/2024 6  4 - 12 % Final   • Eosinophils Relative 09/23/2024 3  0 - 6 % Final   • Basophils Relative 09/23/2024 1  0 - 1 % Final   • Absolute Neutrophils 09/23/2024 4.11  1.85 - 7.62 Thousands/µL Final   • Absolute Immature Grans 09/23/2024 0.02  0.00 - 0.20 Thousand/uL Final   • Absolute Lymphocytes 09/23/2024 1.44  0.60 - 4.47 Thousands/µL Final   • Absolute Monocytes 09/23/2024 0.35  0.17 - 1.22 Thousand/µL Final   • Eosinophils Absolute 09/23/2024 0.17  0.00 - 0.61 Thousand/µL Final   • Basophils Absolute 09/23/2024 0.05  0.00 - 0.10 Thousands/µL Final   • Sodium 09/23/2024 137  135 - 147 mmol/L Final   • Potassium 09/23/2024 5.0  3.5 - 5.3 mmol/L Final    Moderately Hemolyzed:Results may be affected.   • Chloride 09/23/2024 104  96 - 108 mmol/L Final   • CO2 09/23/2024 25  21 - 32 mmol/L Final   • ANION GAP 09/23/2024 8  4 - 13 mmol/L Final   • BUN 09/23/2024 37 (H)  5 - 25 mg/dL Final   • Creatinine 09/23/2024 1.18  0.60 - 1.30 mg/dL Final    Standardized to IDMS reference method   • Glucose 09/23/2024 109  65 - 140 mg/dL Final    If the patient is fasting, the ADA then defines impaired fasting glucose as > 100 mg/dL and diabetes as > or equal to 123 mg/dL.   • Calcium 09/23/2024 9.6  8.4 - 10.2 mg/dL Final   • eGFR 09/23/2024 42  ml/min/1.73sq m Final   Admission on 09/15/2024, Discharged on 09/15/2024   Component Date Value Ref Range Status   • Ventricular Rate 09/15/2024 84  BPM Final   • Atrial Rate 09/15/2024 84  BPM Final   • MT Interval 09/15/2024 162  ms Final   • QRSD Interval 09/15/2024 68  ms Final   • QT Interval 09/15/2024 348  ms Final   • QTC Interval 09/15/2024 411  ms Final   •  P Axis 09/15/2024 55  degrees Final   • QRS Axis 09/15/2024 6  degrees Final   • T Wave [place] 09/15/2024 39  degrees Final   • WBC 09/15/2024 7.61  4.31 - 10.16 Thousand/uL Final   • RBC 09/15/2024 4.13  3.81 - 5.12 Million/uL Final   • Hemoglobin 09/15/2024 11.8  11.5 - 15.4 g/dL Final   • Hematocrit 09/15/2024 37.1  34.8 - 46.1 % Final   • MCV 09/15/2024 90  82 - 98 fL Final   • MCH 09/15/2024 28.6  26.8 - 34.3 pg Final   • MCHC 09/15/2024 31.8  31.4 - 37.4 g/dL Final   • RDW 09/15/2024 12.8  11.6 - 15.1 % Final   • MPV 09/15/2024 10.9  8.9 - 12.7 fL Final   • Platelets 09/15/2024 218  149 - 390 Thousands/uL Final   • nRBC 09/15/2024 0  /100 WBCs Final   • Segmented % 09/15/2024 73  43 - 75 % Final   • Immature Grans % 09/15/2024 0  0 - 2 % Final   • Lymphocytes % 09/15/2024 19  14 - 44 % Final   • Monocytes % 09/15/2024 5  4 - 12 % Final   • Eosinophils Relative 09/15/2024 2  0 - 6 % Final   • Basophils Relative 09/15/2024 1  0 - 1 % Final   • Absolute Neutrophils 09/15/2024 5.59  1.85 - 7.62 Thousands/µL Final   • Absolute Immature Grans 09/15/2024 0.02  0.00 - 0.20 Thousand/uL Final   • Absolute Lymphocytes 09/15/2024 1.41  0.60 - 4.47 Thousands/µL Final   • Absolute Monocytes 09/15/2024 0.40  0.17 - 1.22 Thousand/µL Final   • Eosinophils Absolute 09/15/2024 0.14  0.00 - 0.61 Thousand/µL Final   • Basophils Absolute 09/15/2024 0.05  0.00 - 0.10 Thousands/µL Final   • Sodium 09/15/2024 136  135 - 147 mmol/L Final   • Potassium 09/15/2024 4.0  3.5 - 5.3 mmol/L Final   • Chloride 09/15/2024 101  96 - 108 mmol/L Final   • CO2 09/15/2024 25  21 - 32 mmol/L Final   • ANION GAP 09/15/2024 10  4 - 13 mmol/L Final   • BUN 09/15/2024 27 (H)  5 - 25 mg/dL Final   • Creatinine 09/15/2024 1.05  0.60 - 1.30 mg/dL Final    Standardized to IDMS reference method   • Glucose 09/15/2024 105  65 - 140 mg/dL Final    If the patient is fasting, the ADA then defines impaired fasting glucose as > 100 mg/dL and diabetes as > or equal to  "123 mg/dL.   • Calcium 09/15/2024 9.6  8.4 - 10.2 mg/dL Final   • AST 09/15/2024 15  13 - 39 U/L Final   • ALT 09/15/2024 9  7 - 52 U/L Final    Specimen collection should occur prior to Sulfasalazine administration due to the potential for falsely depressed results.    • Alkaline Phosphatase 09/15/2024 49  34 - 104 U/L Final   • Total Protein 09/15/2024 6.7  6.4 - 8.4 g/dL Final   • Albumin 09/15/2024 4.1  3.5 - 5.0 g/dL Final   • Total Bilirubin 09/15/2024 0.47  0.20 - 1.00 mg/dL Final    Use of this assay is not recommended for patients undergoing treatment with eltrombopag due to the potential for falsely elevated results.  N-acetyl-p-benzoquinone imine (metabolite of Acetaminophen) will generate erroneously low results in samples for patients that have taken an overdose of Acetaminophen.   • eGFR 09/15/2024 48  ml/min/1.73sq m Final   • Lipase 09/15/2024 45  11 - 82 u/L Final   • hs TnI 0hr 09/15/2024 17  \"Refer to ACS Flowchart\"- see link ng/L Final    Comment:                                              Initial (time 0) result  If >=50 ng/L, Myocardial injury suggested ;  Type of myocardial injury and treatment strategy  to be determined.  If 5-49 ng/L, a delta result at 2 hours and or 4 hours will be needed to further evaluate.  If <4 ng/L, and chest pain has been >3 hours since onset, patient may qualify for discharge based on the HEART score in the ED.  If <5 ng/L and <3hours since onset of chest pain, a delta result at 2 hours will be needed to further evaluate.    HS Troponin 99th Percentile URL of a Health Population=12 ng/L with a 95% Confidence Interval of 8-18 ng/L.    Second Troponin (time 2 hours)  If calculated delta >= 20 ng/L,  Myocardial injury suggested ; Type of myocardial injury and treatment strategy to be determined.  If 5-49 ng/L and the calculated delta is 5-19 ng/L, consult medical service for evaluation.  Continue evaluation for ischemia on ecg and other possible etiology and repeat " "hs troponin at 4 hours.  If delta                            is <5 ng/L at 2 hours, consider discharge based on risk stratification via the HEART score (if in ED), or [place] risk score in IP/Observation.    HS Troponin 99th Percentile URL of a Health Population=12 ng/L with a 95% Confidence Interval of 8-18 ng/L.   • Color, UA 09/15/2024 Light Yellow   Final   • Clarity, UA 09/15/2024 Clear   Final   • Specific Gravity, UA 09/15/2024 1.019  1.003 - 1.030 Final   • pH, UA 09/15/2024 5.0  4.5, 5.0, 5.5, 6.0, 6.5, 7.0, 7.5, 8.0 Final   • Leukocytes, UA 09/15/2024 Negative  Negative Final   • Nitrite, UA 09/15/2024 Negative  Negative Final   • Protein, UA 09/15/2024 Negative  Negative mg/dl Final   • Glucose, UA 09/15/2024 Negative  Negative mg/dl Final   • Ketones, UA 09/15/2024 Negative  Negative mg/dl Final   • Urobilinogen, UA 09/15/2024 <2.0  <2.0 mg/dl mg/dl Final   • Bilirubin, UA 09/15/2024 Negative  Negative Final   • Occult Blood, UA 09/15/2024 Small (A)  Negative Final   • hs TnI 2hr 09/15/2024 14  \"Refer to ACS Flowchart\"- see link ng/L Final    Comment:                                              Initial (time 0) result  If >=50 ng/L, Myocardial injury suggested ;  Type of myocardial injury and treatment strategy  to be determined.  If 5-49 ng/L, a delta result at 2 hours and or 4 hours will be needed to further evaluate.  If <4 ng/L, and chest pain has been >3 hours since onset, patient may qualify for discharge based on the HEART score in the ED.  If <5 ng/L and <3hours since onset of chest pain, a delta result at 2 hours will be needed to further evaluate.    HS Troponin 99th Percentile URL of a Health Population=12 ng/L with a 95% Confidence Interval of 8-18 ng/L.    Second Troponin (time 2 hours)  If calculated delta >= 20 ng/L,  Myocardial injury suggested ; Type of myocardial injury and treatment strategy to be determined.  If 5-49 ng/L and the calculated delta is 5-19 ng/L, consult medical service " for evaluation.  Continue evaluation for ischemia on ecg and other possible etiology and repeat hs troponin at 4 hours.  If delta                            is <5 ng/L at 2 hours, consider discharge based on risk stratification via the HEART score (if in ED), or [place] risk score in IP/Observation.    HS Troponin 99th Percentile URL of a Health Population=12 ng/L with a 95% Confidence Interval of 8-18 ng/L.   • Delta 2hr hsTnI 09/15/2024 -3  <20 ng/L Final   • RBC, UA 09/15/2024 10-20 (A)  None Seen, 1-2 /hpf Final   • WBC, UA 09/15/2024 None Seen  None Seen, 1-2 /hpf Final   • Epithelial Cells 09/15/2024 Occasional  None Seen, Occasional /hpf Final   • Bacteria, UA 09/15/2024 None Seen  None Seen, Occasional /hpf Final   Admission on 08/28/2024, Discharged on 09/04/2024   Component Date Value Ref Range Status   • Sodium 08/30/2024 136  135 - 147 mmol/L Final   • Potassium 08/30/2024 3.8  3.5 - 5.3 mmol/L Final   • Chloride 08/30/2024 100  96 - 108 mmol/L Final   • CO2 08/30/2024 26  21 - 32 mmol/L Final   • ANION GAP 08/30/2024 10  4 - 13 mmol/L Final   • BUN 08/30/2024 25  5 - 25 mg/dL Final   • Creatinine 08/30/2024 0.98  0.60 - 1.30 mg/dL Final    Standardized to IDMS reference method   • Glucose 08/30/2024 136  65 - 140 mg/dL Final    If the patient is fasting, the ADA then defines impaired fasting glucose as > 100 mg/dL and diabetes as > or equal to 123 mg/dL.   • Glucose, Fasting 08/30/2024 136 (H)  65 - 99 mg/dL Final   • Calcium 08/30/2024 9.3  8.4 - 10.2 mg/dL Final   • AST 08/30/2024 12 (L)  13 - 39 U/L Final   • ALT 08/30/2024 6 (L)  7 - 52 U/L Final    Specimen collection should occur prior to Sulfasalazine administration due to the potential for falsely depressed results.    • Alkaline Phosphatase 08/30/2024 43  34 - 104 U/L Final   • Total Protein 08/30/2024 6.1 (L)  6.4 - 8.4 g/dL Final   • Albumin 08/30/2024 3.8  3.5 - 5.0 g/dL Final   • Total Bilirubin 08/30/2024 0.53  0.20 - 1.00 mg/dL Final    Use  of this assay is not recommended for patients undergoing treatment with eltrombopag due to the potential for falsely elevated results.  N-acetyl-p-benzoquinone imine (metabolite of Acetaminophen) will generate erroneously low results in samples for patients that have taken an overdose of Acetaminophen.   • eGFR 08/30/2024 52  ml/min/1.73sq m Final   • Magnesium 08/30/2024 2.1  1.9 - 2.7 mg/dL Final   • Phosphorus 08/30/2024 4.0  2.3 - 4.1 mg/dL Final   • WBC 08/30/2024 5.82  4.31 - 10.16 Thousand/uL Final   • RBC 08/30/2024 3.77 (L)  3.81 - 5.12 Million/uL Final   • Hemoglobin 08/30/2024 11.0 (L)  11.5 - 15.4 g/dL Final   • Hematocrit 08/30/2024 34.4 (L)  34.8 - 46.1 % Final   • MCV 08/30/2024 91  82 - 98 fL Final   • MCH 08/30/2024 29.2  26.8 - 34.3 pg Final   • MCHC 08/30/2024 32.0  31.4 - 37.4 g/dL Final   • RDW 08/30/2024 13.1  11.6 - 15.1 % Final   • MPV 08/30/2024 10.9  8.9 - 12.7 fL Final   • Platelets 08/30/2024 207  149 - 390 Thousands/uL Final   • nRBC 08/30/2024 0  /100 WBCs Final   • Segmented % 08/30/2024 68  43 - 75 % Final   • Immature Grans % 08/30/2024 0  0 - 2 % Final   • Lymphocytes % 08/30/2024 22  14 - 44 % Final   • Monocytes % 08/30/2024 7  4 - 12 % Final   • Eosinophils Relative 08/30/2024 2  0 - 6 % Final   • Basophils Relative 08/30/2024 1  0 - 1 % Final   • Absolute Neutrophils 08/30/2024 3.95  1.85 - 7.62 Thousands/µL Final   • Absolute Immature Grans 08/30/2024 0.01  0.00 - 0.20 Thousand/uL Final   • Absolute Lymphocytes 08/30/2024 1.26  0.60 - 4.47 Thousands/µL Final   • Absolute Monocytes 08/30/2024 0.43  0.17 - 1.22 Thousand/µL Final   • Eosinophils Absolute 08/30/2024 0.14  0.00 - 0.61 Thousand/µL Final   • Basophils Absolute 08/30/2024 0.03  0.00 - 0.10 Thousands/µL Final   • TSH 3RD GENERATON 08/30/2024 1.191  0.450 - 4.500 uIU/mL Final    The recommended reference ranges for TSH during pregnancy are as follows:   First trimester 0.100 to 2.500 uIU/mL   Second trimester  0.200 to  3.000 uIU/mL   Third trimester 0.300 to 3.000 uIU/m    Note: Normal ranges may not apply to patients who are transgender, non-binary, or whose legal sex, sex at birth, and gender identity differ.  Adult TSH (3rd generation) reference range follows the recommended guidelines of the American Thyroid Association, January, 2020.   • Vitamin B-12 08/30/2024 437  180 - 914 pg/mL Final   • Folate 08/30/2024 >22.3  >5.9 ng/mL Final    The World Health Organization has determined deficient folate concentrations are considered to be <4.0 ng/mL.   • Vit D, 25-Hydroxy 08/30/2024 40.4  30.0 - 100.0 ng/mL Final    Vitamin D guidelines established by Clinical Guidelines Subcommittee  of the Endocrine Society Task Force, 2011    Deficiency <20ng/ml   Insufficiency 20-30ng/ml   Sufficient  ng/ml    • Iron Saturation 08/30/2024 23  15 - 50 % Final   • TIBC 08/30/2024 257  250 - 450 ug/dL Final   • Iron 08/30/2024 58  50 - 212 ug/dL Final    Patients treated with metal-binding drugs (ie. Deferoxamine) may have depressed iron values.   • UIBC 08/30/2024 199  155 - 355 ug/dL Final   • Ferritin 08/30/2024 29  11 - 307 ng/mL Final   • Syphilis Total Antibody 08/30/2024 Non-reactive  Non-Reactive Final    No serological evidence of infection with T. pallidum.  Early or incubating syphilis infection cannot be excluded.  Consider repeat testing based on clinical suspicion.   • HIV-1 p24 Antigen 08/30/2024 Non-Reactive  Non-Reactive Final   • HIV-1 Antibody 08/30/2024 Non-Reactive  Non-Reactive Final   • HIV-2 Antibody 08/30/2024 Non-Reactive  Non-Reactive Final   • HIV Ag-Ab 5th Gen 08/30/2024 Non-Reactive  Non-Reactive Final    A Non-Reactive test result does not preclude the possibility of exposure or infection with HIV-1 and/or HIV-2.  Non-Reactive results can occur if the quantity of marker present is below the detection limits or is not present during the stage of disease in which a sample is collected. Repeat testing should be  considered where there is clinical suspicion of infection.      • Ventricular Rate 08/30/2024 83  BPM Final   • Atrial Rate 08/30/2024 83  BPM Final   • MO Interval 08/30/2024 170  ms Final   • QRSD Interval 08/30/2024 74  ms Final   • QT Interval 08/30/2024 370  ms Final   • QTC Interval 08/30/2024 434  ms Final   • P Axis 08/30/2024 61  degrees Final   • QRS Axis 08/30/2024 3  degrees Final   • T Wave [place] 08/30/2024 51  degrees Final   There may be more visits with results that are not included.             ___________________________________    History Review: The following portions of the patient's history were reviewed and updated as appropriate: allergies, current medications, past family history, past medical history, past social history, past surgical history, and problem list.    Unchanged information from this writer's previous assessment is copied and italicized; information that has changed is bolded.    Past Psychiatric History:      Past psychiatric diagnoses:   Depression, anxiety  Inpatient psychiatric admissions:   8/2024 2013 following a surgery, sounded like extreme anxiety  Prior outpatient psychiatric treatment:   Started seeing a psychiatrist when first   Past/current psychotherapy:   active  History of suicidal attempts/gestures:   denies  Psychotropic medication trials:   Venlafaxine, sertraline, fluoxetine, escitalopram, aripiprazole (dizziness), risperidone, clonazepam, buspirone, gabapentin        Substance Abuse History:     Denies all history of substance use     Family Psychiatric History:      Family History             Family History   Problem Relation Age of Onset   • Depression Mother           w/anxiety   • Diabetes Mother           Mellitus   • Breast cancer Mother     • Hypertension Mother     • No Known Problems Father     • Depression Sister           w/anxiety   • Heart disease Sister           Cardiac Disorder   • Breast cancer Sister     • Hypertension Sister     •  Diabetes Brother           Mellitus   • Alcohol abuse Son                    Social History:     Developmental: nothing of note  Education: Bachelor degree  Marital history:  in 2006  Children: 2  Living arrangement, social support: as above, has been with daughter for 17 years  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  ___________________________________      Visit Time    Visit Start Time: 0805  Visit Stop Time: 0855  Total Visit Duration:  55 minutes    López Gutierrez MD   02/21/25

## 2025-02-24 ENCOUNTER — TELEPHONE (OUTPATIENT)
Dept: PSYCHIATRY | Facility: CLINIC | Age: 85
End: 2025-02-24

## 2025-02-24 NOTE — TELEPHONE ENCOUNTER
----- Message from López Gutierrez MD sent at 2/21/2025 11:44 AM EST -----  Regarding: referral information  would somebody be able to call Sandra Peña's daughter and tell her I reordered a referral to neurology however they have to call and make an appointment. the number is

## 2025-02-24 NOTE — TELEPHONE ENCOUNTER
Called Janay [phone] - vnrs  requesting she return my call. Upon return call: please inform her that Dr. Gutierrez sent a referral to neurology but she has to call to schedule an appointment. Phone number is [phone].

## 2025-02-25 NOTE — TELEPHONE ENCOUNTER
Called Janay - she said last time she tried they told her they don't see older adult patients for cognitive issues anymore. She said she will still try calling to make an appointment and see what they say. She will call us back with an update.

## 2025-03-03 ENCOUNTER — TELEPHONE (OUTPATIENT)
Age: 85
End: 2025-03-03

## 2025-03-03 ENCOUNTER — OFFICE VISIT (OUTPATIENT)
Age: 85
End: 2025-03-03
Payer: MEDICARE

## 2025-03-03 VITALS
SYSTOLIC BLOOD PRESSURE: 124 MMHG | BODY MASS INDEX: 25.32 KG/M2 | HEART RATE: 78 BPM | DIASTOLIC BLOOD PRESSURE: 72 MMHG | WEIGHT: 117 LBS | TEMPERATURE: 98.2 F | OXYGEN SATURATION: 98 %

## 2025-03-03 DIAGNOSIS — F33.41 MAJOR DEPRESSIVE DISORDER, RECURRENT EPISODE, IN PARTIAL REMISSION (HCC): ICD-10-CM

## 2025-03-03 DIAGNOSIS — G30.1 MILD LATE ONSET ALZHEIMER'S DEMENTIA WITH ANXIETY (HCC): Primary | ICD-10-CM

## 2025-03-03 DIAGNOSIS — I10 PRIMARY HYPERTENSION: ICD-10-CM

## 2025-03-03 DIAGNOSIS — R54 FRAILTY SYNDROME IN GERIATRIC PATIENT: ICD-10-CM

## 2025-03-03 DIAGNOSIS — F33.1 MAJOR DEPRESSIVE DISORDER, RECURRENT EPISODE, MODERATE (HCC): ICD-10-CM

## 2025-03-03 DIAGNOSIS — E78.2 MIXED HYPERLIPIDEMIA: ICD-10-CM

## 2025-03-03 DIAGNOSIS — E11.628 TYPE 2 DIABETES MELLITUS WITH OTHER SKIN COMPLICATIONS (HCC): ICD-10-CM

## 2025-03-03 DIAGNOSIS — E11.9 TYPE 2 DIABETES MELLITUS WITHOUT COMPLICATION, WITHOUT LONG-TERM CURRENT USE OF INSULIN (HCC): ICD-10-CM

## 2025-03-03 DIAGNOSIS — F02.A4 MILD LATE ONSET ALZHEIMER'S DEMENTIA WITH ANXIETY (HCC): Primary | ICD-10-CM

## 2025-03-03 DIAGNOSIS — Z71.89 ADVANCED CARE PLANNING/COUNSELING DISCUSSION: ICD-10-CM

## 2025-03-03 DIAGNOSIS — F45.21 ILLNESS ANXIETY DISORDER: ICD-10-CM

## 2025-03-03 DIAGNOSIS — N18.30 STAGE 3 CHRONIC KIDNEY DISEASE, UNSPECIFIED WHETHER STAGE 3A OR 3B CKD (HCC): ICD-10-CM

## 2025-03-03 PROBLEM — G31.84 MCI (MILD COGNITIVE IMPAIRMENT): Status: ACTIVE | Noted: 2025-03-03

## 2025-03-03 PROCEDURE — 99215 OFFICE O/P EST HI 40 MIN: CPT | Performed by: INTERNAL MEDICINE

## 2025-03-03 RX ORDER — RISPERIDONE 0.25 MG/1
0.12 TABLET ORAL
Qty: 15 TABLET | Refills: 0 | Status: CANCELLED | OUTPATIENT
Start: 2025-03-03

## 2025-03-03 RX ORDER — LORAZEPAM 0.5 MG/1
0.5 TABLET ORAL 3 TIMES DAILY
Qty: 90 TABLET | Refills: 0 | Status: SHIPPED | OUTPATIENT
Start: 2025-03-03

## 2025-03-03 RX ORDER — RISPERIDONE 0.25 MG/1
0.25 TABLET ORAL
Qty: 30 TABLET | Refills: 0 | Status: SHIPPED | OUTPATIENT
Start: 2025-03-03 | End: 2025-03-10 | Stop reason: SDUPTHER

## 2025-03-03 NOTE — TELEPHONE ENCOUNTER
Patients daughter called in and requested to speak with the providers assistant.    Writer successfully transferred Janay to [facility], for further assistance.

## 2025-03-03 NOTE — PROGRESS NOTES
[facility]  Follow-up  [address] [place] [address] [phone]    NAME: Sandra Peña  AGE: 84 y.o. SEX: female    DATE OF ENCOUNTER: 3/3/2025    Assessment and Plan     1. 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  Also chronic Ativan use and anxiety could be contributing factors to her memory issues.  Discussed imaging study with daughter, however we agreed not to proceed with MRI as patient with no neurological findings at this time  Pharmacological management and risks and benefits of medications discussed at length with daughter and agreed on nonpharmacologic management  Patient advised to remain active physically, mentally and socially  Continue supportive care, knitting group twice/week  Speech therapy and OT referral  Continue following with neuropsychiatrist  Maintain chronic conditions under control and continue following with PCP  Caregiver review: Patient is independent with ADLs and needs assistance with IADLs.  Daughter is the primary caregiver, and been content with current caregiving arrangement. Discussed patient with social service and community resources information provided  Follow-up in 6 months and repeat MoCA    2. Frailty syndrome in geriatric patient  Multifactorial including advanced age, underlying dementia and comorbidities  Continue supportive care  Encourage physical activity    3. Primary hypertension  BP stable  Currently on lisinopril-hydrochlorothiazide 10/12.5 daily  Continue following with PCP    4. Type 2 diabetes mellitus without complication, without long-term current use of insulin ([facility])  A1c 6.2  Currently on metformin  Managed by PCP    5. Major depressive disorder, recurrent episode, in partial  remission (HCC)  GDS 11/15  Crying spells reported  No suicidal ideation  Currently on Lexapro 20 mg daily, Ativan 0.5 mg 3 times daily and risperidone 0.5 mg at bedtime.  Would change risperidone to twice daily  Continue following with psychiatrist    6. Mixed hyperlipidemia  Currently on atorvastatin 10 mg daily, tolerated    7. Stage 3 chronic kidney disease, unspecified whether stage 3a or 3b CKD (HCC)  GFR 47  Maintain adequate hydration  Avoid nephrotoxic agents    8. Advanced care planning/counseling discussion  Patient does not have a living will or POA  Discussed with daughter and Elder law attorneys list provided    Chief Complaint     Patient is here today for follow up of memory issues and chronic conditions     History of Present Illness     Sandra Peña who is a 84 y.o. female was seen in Geriatric follow-up today for memory difficulties and was accompanied by Janay (daughter). Patient was last seen on 11/18/2024 for a care conference and was diagnosed with mild cognitive impairment with possible early dementia.  Her MoCA score was 21/30 and GDS 14/50 on 8/26/2024.  Patient has a history of anxiety and depression and follows with a psychiatrist who adjusted her medications recently. Daughter states that patient is more forgetful and her memory is getting worse.  She has difficulties completing some tasks that she is familiar with like doing laundry.  Daughter also noted speech issues and difficulty finding words. Patient has joined a [employer] group and attends twice a week. Patient gets emotional and ifeanyi easily.  No history of visual or auditory hallucinations.  Patient lives with her daughter, Janay. She is independent with ADLs and needs assistance with IADLs.  She does not use assistive devices and no history of recent falls.  Reports fair appetite and no trouble sleeping.  No history of hearing loss.    The following portions of the patient's history were reviewed and updated as appropriate:  allergies, current medications, past family history, past medical history, past social history, past surgical history and problem list.     Review of Systems     Constitutional: Negative for activity change.   HENT: Negative for hearing loss, trouble swallowing.    Eyes: Negative for visual disturbance.   Respiratory: Negative for choking and shortness of breath.    Gastrointestinal: Negative for nausea.   Genitourinary: Negative for dysuria and frequency.   Musculoskeletal: Negative for back pain and neck pain.   Neurological: Negative for dizziness, facial asymmetry, speech difficulty, weakness and light-headedness.   Psychiatric/Behavioral: Negative for behavioral problems and confusion.      Objective     /72   Pulse 78   Temp 98.2 °F (36.8 °C)   Wt 53.1 kg (117 lb)   SpO2 98%   BMI 25.32 kg/m²     Physical Exam  Vitals and nursing note reviewed.   Constitutional:       Appearance: Normal appearance  HENT:      Head: Normocephalic and atraumatic.   Ears:     External ear normal.   Eyes:      Extraocular Movements: Extraocular movements intact.      Conjunctiva/sclera: Conjunctivae normal.      Pupils: Pupils are equal, round, and reactive to light.   Mouth/Throat:     Oropharynx is clear and moist. No oropharyngeal exudate.   Neck:    Normal range of motion. Neck supple. No JVD present. No tracheal deviation present. No thyromegaly present.   Cardiovascular:      Rate and Rhythm: Normal rate.      Pulses: Normal pulses.      Heart sounds: Normal heart sounds. No murmur heard.  Pulmonary:      Effort: Pulmonary effort is normal. No respiratory distress.      Breath sounds: Normal breath sounds. Breath sounds: No wheezing or rales.  Abdominal:      General: Abdomen is flat.      Palpations: Abdomen is soft.   Musculoskeletal:      Cervical back: Normal range of motion and neck supple.   Skin:     General: Skin is warm and dry.   Neurological:      Mental Status: alert and oriented to person, place, and  time.      Cranial Nerves: No cranial nerve deficit, dysarthria or facial asymmetry.      Sensory: Sensation is intact.      Motor: No weakness, or atrophy     Gait: slow     MoCA 14/30    TUG 15s   Psychiatric:     Mood and Affect: Mood normal.     Behavior: Behavior normal.     GDS 11/15     Pertinent Laboratory/Diagnostic Studies:  I have personally reviewed lab results from 12/12/2024 including  CMP: , K4.1, BUN 26, creatinine 1.06 and GFR 47  CBC: WBC 4.79, Hb 10.1, HCT 32 and platelets 194  A1c 6.2  TSH 1.08, B12 495  Lipid panel: Total cholesterol 128, HDL 55 and LDL 55      I have spent a total time of 50 minutes on 3/3/2025 in caring for this patient including Prognosis, Risks and benefits of tx options, Instructions for management, Patient and family education, Importance of tx compliance, Risk factor reductions, Impressions, Counseling / Coordination of care, Documenting in the medical record, Reviewing / ordering tests, medicine, and Obtaining / reviewing history, and communicating with other healthcare professionals.

## 2025-03-03 NOTE — PROGRESS NOTES
[facility]  [address] [place] [address], [address]  [place] [address]  [phone]    Social Work Follow-Up    MSW met with patient's daughter, Janay, for follow up visit.  MSW made aware of interest in resources for legal/financial planning and advanced care planning.  During visit MSW made aware that they had completed the application process for Waiver services, as previously discussed, and patient was denied for same due to exceeding the financial requirements.  Janay expressed that patient was just over the financial qualifications.  MSW expressed understanding and discussed other programs such as the caregiver support program as Janay is patient's primary support/caregiver and Janay expressed interest in same.  MSW provided and reviewed the following resources:   -Elder Law  list  -NIH Legal and Financial Planning  -NIH Advanced Care Planning  -Caregiver Support Program  -Home care, Waiver, AAA (highlighted contact number for [facility], to outreach for Caregiver Support Program)    MSW will remain available as needed.

## 2025-03-07 ENCOUNTER — TELEMEDICINE (OUTPATIENT)
Dept: PSYCHIATRY | Facility: CLINIC | Age: 85
End: 2025-03-07

## 2025-03-07 DIAGNOSIS — F45.21 ILLNESS ANXIETY DISORDER: ICD-10-CM

## 2025-03-07 DIAGNOSIS — F33.41 MAJOR DEPRESSIVE DISORDER, RECURRENT EPISODE, IN PARTIAL REMISSION (HCC): Primary | ICD-10-CM

## 2025-03-07 PROCEDURE — PBNCHG PB NO CHARGE PLACEHOLDER

## 2025-03-07 NOTE — PSYCH
Psychotherapy Note - Progress note        [facility]    Virtual Visit Disclaimer & Required Documentation  TeleMed provider: López Gutierrez MD, located at [facility], [address] [place], [address]. The patient is also located in [place] which is the state in which I hold an active license.    The patient was identified by name and date of birth. Sandra Peña was informed that this is a telemedicine visit and that the visit is being conducted through [employer] and patient was informed this is a secure, HIPAA-complaint platform. She agrees to proceed. My office door was closed. No one else was in the room. She acknowledged consent and understanding of privacy and security of the video platform. The patient has agreed to participate and understands they can discontinue the visit at any time.    Sandra Peña verbally agrees to participate in Virtual Care Services. Pt is aware that Virtual Care Services could be limited without vital signs or the ability to perform a full hands-on physical exam. The patient understands she or the provider may request at any time to terminate the video visit and request the patient to seek care or treatment in person. Patient is aware this is a billable service.        Psychotherapy Provided: Individual Psychotherapy     1. Major depressive disorder, recurrent episode, in partial remission (HCC)    2. Illness anxiety disorder        Goals addressed in session: Goal 1     DATA: Met with  Sandra Peña for scheduled individual session. Topics of discussion included: music and meaning, creative projects, relational histories    During this session, this clinician used the following therapeutic modalities: reminiscence         ASSESSMENT: Sandra Peña presents with a euthymic mood. Her affect is Normal range and intensity, appropriate.  Sandra exhibits good therapeutic rapport with this  clinician. Sandra continues to exhibit willingness to work on treatment goals and objectives.  Sandra Peña presents with a none risk of suicide, none risk of self-harm, and none risk of harm to others.          PLAN:  Sandra Peña will return in approximately2 weeks for the next scheduled session.  She will report back during the next session re: successes and barriers. At the next session, this clinician will use supportive psychotherapy, client-centered therapy, mindfulness-based strategies, Motivational Interviewing and solution-focused therapy to address mood regulation and relationship concerns, in an effort to assist  Sandra with meeting treatment goals.      Behavioral Health Treatment Plan [facility]'s: Diagnosis and Treatment Plan explained to  Sandra Peña, relates understanding diagnosis and is agreeable to Treatment Plan. Yes      This note was not shared with the patient due to this is a psychotherapy note     Visit start and stop times: 3549-7867     [facility] Ralph Gutierrez MD 03/07/25

## 2025-03-10 DIAGNOSIS — F45.21 ILLNESS ANXIETY DISORDER: ICD-10-CM

## 2025-03-10 DIAGNOSIS — F33.41 MAJOR DEPRESSIVE DISORDER, RECURRENT EPISODE, IN PARTIAL REMISSION (HCC): ICD-10-CM

## 2025-03-10 RX ORDER — RISPERIDONE 0.5 MG/1
0.5 TABLET ORAL
Qty: 90 TABLET | Refills: 0 | Status: SHIPPED | OUTPATIENT
Start: 2025-03-10

## 2025-03-10 RX ORDER — RISPERIDONE 0.25 MG/1
0.25 TABLET ORAL
Qty: 30 TABLET | Refills: 0 | Status: SHIPPED | OUTPATIENT
Start: 2025-03-10

## 2025-03-10 NOTE — TELEPHONE ENCOUNTER
Medication Refill Request     Name of Medication Risperdone  Dose/Frequency 0.5mg  Quantity 30 tab  Verified pharmacy   [x]  Verified ordering Provider   [x]  Does patient have enough for the next 3 days? Yes [] No [x]  Does patient have a follow-up appointment scheduled? Yes [x] No []   If so when is appointment: 3/21 at 8am    Medication Refill Request     Name of Medication Risperdone  Dose/Frequency 0.25mg  Quantity 30 tab  Verified pharmacy   [x]  Verified ordering Provider   [x]  Does patient have enough for the next 3 days? Yes [x] No []  Does patient have a follow-up appointment scheduled? Yes [x] No []   If so when is appointment: 3/21 at 8am

## 2025-03-21 ENCOUNTER — TELEMEDICINE (OUTPATIENT)
Dept: PSYCHIATRY | Facility: CLINIC | Age: 85
End: 2025-03-21

## 2025-03-21 DIAGNOSIS — F33.41 MAJOR DEPRESSIVE DISORDER, RECURRENT EPISODE, IN PARTIAL REMISSION (HCC): Primary | ICD-10-CM

## 2025-03-21 DIAGNOSIS — F45.21 ILLNESS ANXIETY DISORDER: ICD-10-CM

## 2025-03-21 PROCEDURE — PBNCHG PB NO CHARGE PLACEHOLDER

## 2025-03-21 NOTE — PSYCH
Psychotherapy Note - Progress note        [facility]    Virtual Visit Disclaimer & Required Documentation  TeleMed provider: López Gutierrez MD, located at [facility], [address] [place], [address]. The patient is also located in [place] which is the state in [place] I hold an active license.    The patient was identified by name and date of birth. Sandra Peña was informed that this is a telemedicine visit and that the visit is being conducted through [employer] and patient was informed this is a secure, HIPAA-complaint platform. She agrees to proceed. My office door was closed. No one else was in the room. She acknowledged consent and understanding of privacy and security of the video platform. The patient has agreed to participate and understands they can discontinue the visit at any time.    Sandra Peña verbally agrees to participate in Virtual Care Services. Pt is aware that Virtual Care Services could be limited without vital signs or the ability to perform a full hands-on physical exam. The patient understands she or the provider may request at any time to terminate the video visit and request the patient to seek care or treatment in person. Patient is aware this is a billable service.        Psychotherapy Provided: Individual Psychotherapy     1. Major depressive disorder, recurrent episode, in partial remission (HCC)    2. Illness anxiety disorder        Goals addressed in session: Goal 1     DATA: Met with  Sandra Peña for scheduled individual session. Topics of discussion included: music choice and associations, family, creative activity, physical health    During this session, this clinician used the following therapeutic modalities: reminiscence, psychodynamic, CBT        ASSESSMENT: Sandra Peña presents with a euthymic mood. Her affect is Normal range and intensity and Tearful, appropriate.  Sandra  exhibits good therapeutic rapport with this clinician. Sandra continues to exhibit willingness to work on treatment goals and objectives.  Sandra Peña presents with a none risk of suicide, none risk of self-harm, and none risk of harm to others.          PLAN:  Sandra Peña will return in approximately2 weeks for the next scheduled session. Between sessions,  Sandra will continue to work on involvement in fulfilling activities.  She will report back during the next session re: successes and barriers. At the next session, this clinician will use supportive psychotherapy, client-centered therapy, mindfulness-based strategies, Motivational Interviewing and solution-focused therapy to address mood regulation and relationship concerns, in an effort to assist  Sandra with meeting treatment goals.      Behavioral Health Treatment Plan [facility]'s: Diagnosis and Treatment Plan explained to  Sandra Peña, relates understanding diagnosis and is agreeable to Treatment Plan. Yes      This note was not shared with the patient due to this is a psychotherapy note     Visit start and stop times: [id number]     [facility] Ralph Gutierrez MD 03/21/25

## 2025-03-24 NOTE — PROGRESS NOTES
"Speech-Language Pathology Initial Evaluation    Today's date: 3/25/2025   Patient’s name: Sandra Peña  : 1940  MRN: [id number]  Safety measures: Fall Risk, Anxiety, Depression, Panic Attacks, Dementia  Referring provider: Jose Canada,*    Encounter Diagnosis     ICD-10-CM    1. Cognitive communication deficit  R41.841       2. Other symbolic dysfunctions  R48.8       3. 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 Patient is aware of her challenges with memory and was able to articulate particular difficulty with word finding. Based upon today's evaluation utilizing the BCAT cognitive assessment, patient presents with cognitive deficits placing her just within the \"Mild Dementia\" range and one point above the \"Moderate to Severe Dementia\" range. Would consider patient's deficits more moderate than mild. She demonstrated strengths in Visual Recognition/Naming, Attention (missing only 2 points), Abstraction, Visuospatial (missing only 1 point) and Delayed Visual Recall (recalling 2 out of 3 images) and Immediate Verbal Recall (missing only 1 of 4 words). Patient demonstrated moderate challenge in the areas of Orientation, Language, Executive (However, it should be noted that she was able to correctly answer mathematical word problem without difficulty), and Story Recognition. Severe deficits were noted in Delayed Verbal Recall, Immediate Story Recall and Delayed Story Recall.     It should be noted that patient tolerated testing very well demonstrating minimal fatigue or frustration. Patient initially demonstrated a few tears following the first section of Orientation but with encouragement from clinician demonstrated focus and attention. t is clear that patient's deficits are consistent with symptoms of dementia in older adults. Do not " suspect that anxiety is causing memory or word loss, although it may contribute to exacerbation of symptoms at times.    Recommend ST services to address cognitive-linguistic deficits related to dementia. Intervention will include patient/caregiver education on dementia, creation of a memory book, compensatory strategies for improved communication at home and in the community as well as strategies to encourage greater independence with ADLs. Plan of care was discussed with the patient and her daughter and they are in agreement at this time.         Short Term Goals ( 6/10/25)    Patient and family/caregiver will be educated on dementia and related topics for improved prognosis of care    Patient will participate in continued therapeutic trials with 80% accuracy for continued assessment and recommendations for carryover and functional tasks at home    Patient will improve long term memory retrieval and recall of information during reminiscing tasks for creation of memory book with 80% accuracy    Patient and family will participate in creation and use of memory book with carry over in 90% of opportunities to facilitate improved overall function and quality of life    Long Term Goals    1. Patient will improve functional cognitive-linguistic skills with the implementation of cues and external aids, by discharge.      2. Patient will demonstrate adequate memory/reasoning/judgment to perform desired activities in a supervised environment by discharge.     3. Patient's family/caregiver will demonstrate implementation of memory book/aides into daily activities to assist with ADLS and improve quality of life.         Plan:  Patient would benefit from outpatient skilled Speech Therapy services: Cognitive-linguistic therapy    Frequency: 1x weekly  Duration: 6-8 weeks    Intervention certification from: 3/25/2025  Intervention certification to: 6/10/2025      Subjective:  History of present illness: Patient is a 84  "y.o. female who was referred to outpatient skilled Speech Therapy services for a cognitive-linguistic evaluation.     Patient with prior medical history of diabetes (type II), anxiety, depression, HTN, and panic attacks (see history for full list).    Patient is currently being followed by psychiatry - Dr. Gutierrez. Dr. Gutierrez reports that patient has experienced anxiety and obsessive thinking throughout her life but that it has perhaps increased in her later years. She has some difficulty with stuttering and word find which he suspects may have been influenced by medications as these deficits have fluctuated with her speech improving at one point. The patient's deficits appear to lesson when she is distracted.    [facility] visit with Dr. Canada - 14/30 on MOCA. Brain MRI was discussed but determined to not proceed with it as there are no neurological findings.    During today's evaluation patient reports that she will be in conversation and will forget details like the name of a place that she wants to talk about. This makes her frustrated because words don't come out right. Her daughter also reports that she is using a lot of the wrong words - 80% of the time. This has been going on for a while and her anxiety will heighten things. Even with anxiety almost controlled (my medications) she is still having difficulty. She also needs reminders to perform some ADLs (intermittent). She can forget simple tasks like setting the table. She can still knit, fold clothes, etc.. She doesn't want to call her friends because she doesn't know the words she wants to say. Her psychiatrist, Dr. Gutierrez recommended that she write down bullet points of things she would want to talk about. She tried this and it helped. However, she doesn't like having to do that.    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 She goes twice a week to [employer] [place]. She is in a group of mixed age [employer] (classes are two hours long). She likes the people in this group.       Objective:  The Brief Cognitive Assessment Test (BCAT) is a multi-domain cognitive tool that assesses a patient’s orientation, verbal recall, visual recognition, visual recall, attention, abstraction, language, executive functions, and visuospatial processing. BCAT is sensitive to the full spectrum of cognitive functioning (normal, MCI, mild dementia, moderate-severe dementia) and can predict basic and instrumental activities of daily living (ADL, IADL).     During today’s administration of the test, patient achieved a total score of 26/50 points.     Using the BCAT Crosswalk Table as a reference, patient’s score falls within the range and may be suggestive of \"Mild Dementia\". The Crosswalk Table suggests the following:      Cognitive Stage: Mild Dementia (BCAT Range: 26-34 // MMSE: 19-23 // GDS: 4)  Cognitive & Functional Issues: IADL deficits; typically requires residential support services; clear objective evidence of memory and other cognitive declines. Medication management and community reintegration support indicated for many people in this range.    Patient's score of 26/50 places her on the borderline between Mild Dementia and Moderate Dementia as one point below is within the Moderate to Severe Dementia range.        Visit Tracking:  POC   Expires Auth Expiration Date ST Visit Limit   6/10/25 12/31/25 BOMN "          Visit/Unit Tracking:  Auth Status Date 3/25/25   No authorization required Used 1    Remaining BOMN       Intervention Comments:  50 of sound language comprehension evaluation time

## 2025-03-25 ENCOUNTER — EVALUATION (OUTPATIENT)
Dept: SPEECH THERAPY | Facility: CLINIC | Age: 85
End: 2025-03-25
Payer: MEDICARE

## 2025-03-25 DIAGNOSIS — G30.1 MILD LATE ONSET ALZHEIMER'S DEMENTIA WITH ANXIETY (HCC): ICD-10-CM

## 2025-03-25 DIAGNOSIS — R41.841 COGNITIVE COMMUNICATION DEFICIT: Primary | ICD-10-CM

## 2025-03-25 DIAGNOSIS — R48.8 OTHER SYMBOLIC DYSFUNCTIONS: ICD-10-CM

## 2025-03-25 DIAGNOSIS — F02.A4 MILD LATE ONSET ALZHEIMER'S DEMENTIA WITH ANXIETY (HCC): ICD-10-CM

## 2025-03-25 PROCEDURE — 92523 SPEECH SOUND LANG COMPREHEN: CPT | Performed by: SPEECH-LANGUAGE PATHOLOGIST

## 2025-04-01 ENCOUNTER — OFFICE VISIT (OUTPATIENT)
Dept: PSYCHIATRY | Facility: CLINIC | Age: 85
End: 2025-04-01
Payer: MEDICARE

## 2025-04-01 DIAGNOSIS — F02.80 MAJOR NEUROCOGNITIVE DISORDER DUE TO MULTIPLE ETIOLOGIES WITHOUT BEHAVIORAL DISTURBANCE (HCC): ICD-10-CM

## 2025-04-01 DIAGNOSIS — F02.80 ALZHEIMER'S DISEASE (HCC): ICD-10-CM

## 2025-04-01 DIAGNOSIS — G30.9 MAJOR NEUROCOGNITIVE DISORDER DUE TO ALZHEIMER'S DISEASE, WITHOUT BEHAVIORAL DISTURBANCE (HCC): ICD-10-CM

## 2025-04-01 DIAGNOSIS — G30.9 ALZHEIMER'S DISEASE (HCC): ICD-10-CM

## 2025-04-01 DIAGNOSIS — F01.50 VASCULAR DEMENTIA, UNCOMPLICATED (HCC): ICD-10-CM

## 2025-04-01 DIAGNOSIS — F33.41 MAJOR DEPRESSIVE DISORDER, RECURRENT EPISODE, IN PARTIAL REMISSION (HCC): ICD-10-CM

## 2025-04-01 DIAGNOSIS — F02.80 MAJOR NEUROCOGNITIVE DISORDER DUE TO ALZHEIMER'S DISEASE, WITHOUT BEHAVIORAL DISTURBANCE (HCC): ICD-10-CM

## 2025-04-01 DIAGNOSIS — F45.21 ILLNESS ANXIETY DISORDER: Primary | ICD-10-CM

## 2025-04-01 PROCEDURE — 99213 OFFICE O/P EST LOW 20 MIN: CPT

## 2025-04-01 RX ORDER — ESCITALOPRAM OXALATE 20 MG/1
20 TABLET ORAL DAILY
Qty: 90 TABLET | Refills: 0 | Status: SHIPPED | OUTPATIENT
Start: 2025-04-01

## 2025-04-01 RX ORDER — RISPERIDONE 0.25 MG/1
0.25 TABLET ORAL
Qty: 30 TABLET | Refills: 2 | Status: SHIPPED | OUTPATIENT
Start: 2025-04-01

## 2025-04-01 RX ORDER — LORAZEPAM 0.5 MG/1
0.5 TABLET ORAL 3 TIMES DAILY
Qty: 90 TABLET | Refills: 0 | Status: SHIPPED | OUTPATIENT
Start: 2025-04-02

## 2025-04-01 NOTE — ASSESSMENT & PLAN NOTE
Orders:  •  escitalopram (Lexapro) 20 mg tablet; Take 1 tablet (20 mg total) by mouth daily  •  LORazepam (Ativan) 0.5 mg tablet; Take 1 tablet (0.5 mg total) by mouth 3 (three) times a day Do not start before April 2, 2025.  •  risperiDONE (RisperDAL) 0.25 mg tablet; Take 1 tablet (0.25 mg total) by mouth daily with breakfast   7 month F presents to [facility] c/o tactile fever since last night. Pt has been unable to sleep due to fever. Motrin given at 10am. Runyn nose x2 weeks. Mild cough. Pt is baseline when fever free. Decreased PO intake but making wet diapers Denies vomiting, diarrhea. No sick contact.  Full term. no PMHx. no PSHx. Vaccine UTD.   PFHx: Mother hypothymism, Grandparents with DM 7 month F [facility] tactile fever since last night. Pt was unable to sleep well due to fever as per mother. Motrin last given at 10am today (4 hours prior to visit). Runny nose x 2 weeks. Mild cough. Pt is active and playful when fever free. Decreased PO intake today but making wet diapers well. Denies vomiting, diarrhea, rashes. No sick contacts.  Full term. no PMHx. no PSHx. Vaccine UTD.   PFHx: Mother hypothyroidism, Grandparents with DM

## 2025-04-01 NOTE — PSYCH
MEDICATION MANAGEMENT NOTE        [facility]      Name and Date of Birth:  Sandra Peña 84 y.o. 1940 MRN: [id number]    Date of Visit: April 1, 2025    Reason for Visit: Follow-up visit regarding medication management     _____________________________    Assessment & Plan  Illness anxiety disorder  Continue escitalopram 20 mg QD for antidepressant maintenance  Continue lorazepam 0.5 mg TID for obsessive thinking  Continue risperidone 0.25 mg QAM and 0.5 mg QHS for obsessive thinking  Continue speech therapy  Follow-up with audiology  Follow-up with optometry  Orders:  •  escitalopram (Lexapro) 20 mg tablet; Take 1 tablet (20 mg total) by mouth daily  •  LORazepam (Ativan) 0.5 mg tablet; Take 1 tablet (0.5 mg total) by mouth 3 (three) times a day Do not start before April 2, 2025.  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  •  risperiDONE (RisperDAL) 0.25 mg tablet; Take 1 tablet (0.25 mg total) by mouth daily with breakfast    Alzheimer's disease (HCC)    Orders:  •  escitalopram (Lexapro) 20 mg tablet; Take 1 tablet (20 mg total) by mouth daily  •  LORazepam (Ativan) 0.5 mg tablet; Take 1 tablet (0.5 mg total) by mouth 3 (three) times a day Do not start before April 2, 2025.  •  risperiDONE  (RisperDAL) 0.25 mg tablet; Take 1 tablet (0.25 mg total) by mouth daily with breakfast    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, moderate, 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)    Orders:  •  escitalopram (Lexapro) 20 mg tablet; Take 1 tablet (20 mg total) by mouth daily  •  LORazepam (Ativan) 0.5 mg tablet; Take 1 tablet (0.5 mg total) by mouth 3 (three) times a day Do not start before April 2, 2025.  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  •  risperiDONE (RisperDAL) 0.25 mg tablet; Take 1 tablet (0.25 mg total) by mouth daily with breakfast           Patient evaluated by speech pathology and evaluation was found to have confidence in validity (likely not confounded by anxiety/psychic distress) and was found to have deficits mostly in verbal and story recall with strength notably in visuospatial domain, among others. This indicates diagnosis of likely neurocognitive disorder, mild to moderate severity in evaluation. Discussed with patient and daughter about realistic goals.    Symptomatically the patient continues to be relatively stable on current medication. There are no concerns for ataxia or falls. Addition of risperidone in morning of questionable utility; may consider discontinuing in future. It is believed that medications have been helpful for reducing the patient's anxiety and aiding in production of speech.        Treatment Recommendations/Precautions:  Risks/benefits/alternatives to treatment discussed, including a myriad of potential adverse medication side effects, to which Sandra  "voiced understanding and consented fully to treatment.   Labs most recently obtained , reviewed.   Follow up in 4-8 weeks for medication management  Follow up with PCP for medical issues and ongoing care  Aware of 24 hour and weekend coverage for urgent situations accessed by calling [facility] main practice number       Treatment Plan:     Completed and signed during the session: Not applicable - Treatment Plan not due at this session    _____________________________________________    History of Present Illness     Chief Complaint: \"I can't get the words out\"    SUBJECTIVE:    Speech remains biggest problem. Experiences some frustration due to this and crying. Some discord between patient and daughter and patient seems distrustful of recommendations given. Improvements in mood continue with more activity and engagement outside the home.                  Psychiatric Review Of Systems:  Unchanged information from this writer's previous assessment is copied and italicized; information that has changed is bolded.    Appetite: no  Adverse eating: no  Weight changes: no  Insomnia/sleeplessness: no  Fatigue/anergy: no  Anhedonia/lack of interest: no  Attention/concentration: no  Psychomotor agitation/retardation: no  Somatic symptoms: yes  Anxiety/panic attack: yes  Zaira/hypomania: no  Hopelessness/helplessness/worthlessness: no  Self-injurious behavior/high-risk behavior: no  Suicidal ideation: no  Homicidal ideation: no  Auditory hallucinations: no  Visual hallucinations: no  Delusional thinking: no  Obsessive/compulsive symptoms: yes    Review Of Systems:      Constitutional negative   ENT negative   Cardiovascular negative   Respiratory negative   Gastrointestinal negative   Genitourinary negative   Musculoskeletal negative   Integumentary negative   Neurological negative   Endocrine negative   Other Symptoms none, all other systems are negative     Objective    OBJECTIVE:     Visit Vitals  OB " Status Postmenopausal   Smoking Status Never      Wt Readings from Last 6 Encounters:   25 53.1 kg (117 lb)   25 53.5 kg (118 lb)   24 53.1 kg (117 lb)   24 49.9 kg (110 lb)   10/17/24 49.9 kg (110 lb 0.2 oz)   10/10/24 49.4 kg (109 lb)        Past Medical History:   Diagnosis Date   • Anxiety    • Colon polyps    • Depression    • Diabetes mellitus (HCC)    • Dizziness     Last assessed: 8/31/15   • DVT (deep venous thrombosis) (HCC)    • Dysuria    • Hematuria 2022   • Hyperlipidemia    • Hypertension    • Hypertensive urgency 10/22/2021   • Insomnia    • Panic attack    • Ureterolithiasis 2020      Past Surgical History:   Procedure Laterality Date   •  SECTION      1964 son, 1968 daughter   • COLONOSCOPY     • FL RETROGRADE PYELOGRAM  2020   • [facility] COLONOSCOPY FLX DX W/COLLJ SPEC WHEN PFRMD N/A 3/27/2017    Procedure: COLONOSCOPY;  Surgeon: Gold Francis MD;  Location: [facility];  Service: Gastroenterology   • VA CYSTO/URETERO W/LITHOTRIPSY &INDWELL STENT INSRT Right 2020    Procedure: CYSTOSCOPY URETEROSCOPY WITH LITHOTRIPSY HOLMIUM LASER, RETROGRADE PYELOGRAM AND INSERTION STENT URETERAL; EXCISION OF BLADDER TUMOR;  Surgeon: Edwin Love MD;  Location: [facility];  Service: Urology   • ROTATOR CUFF REPAIR Left     Last assessed: 3/29/15   • TONSILLECTOMY         Meds/Allergies    No Known Allergies  Current Outpatient Medications   Medication Instructions   • aspirin 81 mg, Daily   • atorvastatin (LIPITOR) 10 mg, Oral, Daily   • Cholecalciferol (VITAMIN D3) 2,000 Units, Oral, Daily   • escitalopram (LEXAPRO) 20 mg, Oral, Daily   • linaCLOtide 145 mcg, Oral, Daily   • lisinopril-hydrochlorothiazide (PRINZIDE,ZESTORETIC) 10-12.5 MG per tablet 1 tablet, Oral, Daily   • [START ON 2025] LORazepam (ATIVAN) 0.5 mg, Oral, 3 times daily   • metFORMIN (GLUCOPHAGE) 500 mg, Oral, 2 times daily with meals   • polyethylene glycol (MIRALAX) 17 g, Oral, Daily   •  "psyllium (METAMUCIL) packet 1 packet, Oral, Daily   • risperiDONE (RISPERDAL) 0.5 mg, Oral, Daily at bedtime   • risperiDONE (RISPERDAL) 0.25 mg, Oral, Daily with breakfast   • senna-docusate sodium (SENOKOT S) 8.6-50 mg per tablet 1 tablet, Oral, Daily PRN           Mental Status Exam:    Appearance Petite, dark grey short curly hair, appropriate attire, good eye contact   Behavior/motor Calm and cooperative, ambulates independently   Speech/language Stuttering, soft, reduced rate   Mood \"Not too bad\"   Affect Full range, tearful at times, appropriate   Thought process Logical   Thought content Obsessive  No overt delusions, Denies hallucinations, Denies suicidal ideations   Cognition Awake and alert, oriented and memory grossly intact, and concentrates on questions   Insight/judgment Insight: fair  Judgment: fair     Laboratory Results: I have personally reviewed all pertinent laboratory/tests results    Orders Only on 12/17/2024   Component Date Value Ref Range Status   • Urine Culture Result 12/17/2024  (A)   Final    Comment:     CULTURE, URINE, ROUTINE         Micro Number:      [id number]    Test Status:       Final    Specimen Source:   Urine    Specimen Quality:  Adequate    Result:            50,000-100,000 CFU/mL of Escherichia coli      Comment:           A portion of the results were performed at [facility].                              E.coli                            ----------------                            INT   COOPER     AMIKACIN               S     4     AMOX/CLAVULANATE       S     8     AMP/SULBACTAM          I     16     CEFAZOLIN              NR    <=4 **2     CEFEPIME               S     <=0.12     CEFTAZIDIME            S     <=1     CEFTRIAXONE            S     <=0.25     CIPROFLOXACIN          I     0.5     GENTAMICIN             S     <=1     IMIPENEM               S     <=0.25     LEVOFLOXACIN           I     1     MEROPENEM              S     <=0.25     NITROFURANTOIN         S     <=16     " PIP/TAZOBACTAM         S     <=4     TRIMETHOPRIM/SULFA     S     <=20    S = Susceptible  I = Inter                           mediate  R = Resistant  NS = Not susceptible  SDD = Susceptible Dose Dependent  * = Not Tested  NR = Not Reported  **NN = See Therapy Comments      THERAPY COMMENTS        Note 1:      For infections other than uncomplicated UTI      caused by E. coli, K. pneumoniae or P. mirabilis:      Cefazolin is resistant if COOPER > or = 8 mcg/mL.      (Distinguishing susceptible versus intermediate      for isolates with COOPER < or = 4 mcg/mL requires      additional testing.)        Note 2:      For uncomplicated UTI caused by E. coli,      K. pneumoniae or P. mirabilis: Cefazolin is      susceptible if COOPER <32 mcg/mL and predicts      susceptible to the oral agents cefaclor, cefdinir,      cefpodoxime, cefprozil, cefuroxime, cephalexin      and loracarbef.     Office Visit on 12/17/2024   Component Date Value Ref Range Status   • LEUKOCYTE ESTERASE,UA 12/17/2024 75   Final   • NITRITE,UA 12/17/2024 neg   Final   • SL AMB POCT UROBILINOGEN 12/17/2024 norm   Final   • POCT URINE PROTEIN 12/17/2024 neg   Final   •  PH,UA 12/17/2024 5   Final   • BLOOD,UA 12/17/2024 50   Final   • SPECIFIC GRAVITY,UA 12/17/2024 1.020   Final   • KETONES,UA 12/17/2024 neg   Final   • BILIRUBIN,UA 12/17/2024 neg   Final   • GLUCOSE, UA 12/17/2024 50   Final   •  COLOR,UA 12/17/2024 yellow   Final   • CLARITY,UA 12/17/2024 cloudy   Final   Appointment on 12/12/2024   Component Date Value Ref Range Status   • Creatinine, Ur 12/12/2024 58.6  Reference range not established. mg/dL Final   • Albumin,U,Random 12/12/2024 37.9 (H)  <20.0 mg/L Final   • Albumin Creat Ratio 12/12/2024 65 (H)  0 - 30 mg/g creatinine Final   • WBC 12/12/2024 4.79  4.31 - 10.16 Thousand/uL Final   • RBC 12/12/2024 3.46 (L)  3.81 - 5.12 Million/uL Final   • Hemoglobin 12/12/2024 10.1 (L)  11.5 - 15.4 g/dL Final   • Hematocrit 12/12/2024 32.0 (L)  34.8 -  46.1 % Final   • MCV 12/12/2024 93  82 - 98 fL Final   • MCH 12/12/2024 29.2  26.8 - 34.3 pg Final   • MCHC 12/12/2024 31.6  31.4 - 37.4 g/dL Final   • RDW 12/12/2024 13.9  11.6 - 15.1 % Final   • MPV 12/12/2024 10.8  8.9 - 12.7 fL Final   • Platelets 12/12/2024 194  149 - 390 Thousands/uL Final   • nRBC 12/12/2024 0  /100 WBCs Final   • Segmented % 12/12/2024 61  43 - 75 % Final   • Immature Grans % 12/12/2024 0  0 - 2 % Final   • Lymphocytes % 12/12/2024 28  14 - 44 % Final   • Monocytes % 12/12/2024 7  4 - 12 % Final   • Eosinophils Relative 12/12/2024 3  0 - 6 % Final   • Basophils Relative 12/12/2024 1  0 - 1 % Final   • Absolute Neutrophils 12/12/2024 2.98  1.85 - 7.62 Thousands/µL Final   • Absolute Immature Grans 12/12/2024 0.02  0.00 - 0.20 Thousand/uL Final   • Absolute Lymphocytes 12/12/2024 1.32  0.60 - 4.47 Thousands/µL Final   • Absolute Monocytes 12/12/2024 0.32  0.17 - 1.22 Thousand/µL Final   • Eosinophils Absolute 12/12/2024 0.12  0.00 - 0.61 Thousand/µL Final   • Basophils Absolute 12/12/2024 0.03  0.00 - 0.10 Thousands/µL Final   • Sodium 12/12/2024 139  135 - 147 mmol/L Final   • Potassium 12/12/2024 4.1  3.5 - 5.3 mmol/L Final   • Chloride 12/12/2024 106  96 - 108 mmol/L Final   • CO2 12/12/2024 27  21 - 32 mmol/L Final   • ANION GAP 12/12/2024 6  4 - 13 mmol/L Final   • BUN 12/12/2024 26 (H)  5 - 25 mg/dL Final   • Creatinine 12/12/2024 1.06  0.60 - 1.30 mg/dL Final    Standardized to IDMS reference method   • Glucose, Fasting 12/12/2024 92  65 - 99 mg/dL Final   • Calcium 12/12/2024 9.3  8.4 - 10.2 mg/dL Final   • AST 12/12/2024 11 (L)  13 - 39 U/L Final   • ALT 12/12/2024 7  7 - 52 U/L Final    Specimen collection should occur prior to Sulfasalazine administration due to the potential for falsely depressed results.    • Alkaline Phosphatase 12/12/2024 55  34 - 104 U/L Final   • Total Protein 12/12/2024 6.2 (L)  6.4 - 8.4 g/dL Final   • Albumin 12/12/2024 3.7  3.5 - 5.0 g/dL Final   • Total  Bilirubin 12/12/2024 0.77  0.20 - 1.00 mg/dL Final    Use of this assay is not recommended for patients undergoing treatment with eltrombopag due to the potential for falsely elevated results.  N-acetyl-p-benzoquinone imine (metabolite of Acetaminophen) will generate erroneously low results in samples for patients that have taken an overdose of Acetaminophen.   • eGFR 12/12/2024 47  ml/min/1.73sq m Final   • Hemoglobin A1C 12/12/2024 6.2 (H)  Normal 4.0-5.6%; PreDiabetic 5.7-6.4%; Diabetic >=6.5%; Glycemic control for adults with diabetes <7.0% % Final   • EAG 12/12/2024 131  mg/dl Final   • Cholesterol 12/12/2024 128  See Comment mg/dL Final    Cholesterol:         Pediatric <18 Years        Desirable          <170 mg/dL      Borderline High    170-199 mg/dL      High               >=200 mg/dL        Adult >=18 Years            Desirable         <200 mg/dL      Borderline High   200-239 mg/dL      High              >239 mg/dL     • Triglycerides 12/12/2024 91  See Comment mg/dL Final    Triglyceride:     0-9Y            <75mg/dL     10Y-17Y         <90 mg/dL       >=18Y     Normal          <150 mg/dL     Borderline High 150-199 mg/dL     High            200-499 mg/dL        Very High       >499 mg/dL    Specimen collection should occur prior to Metamizole administration due to the potential for falsely depressed results.   • HDL, Direct 12/12/2024 55  >=50 mg/dL Final   • LDL Calculated 12/12/2024 55  0 - 100 mg/dL Final    LDL Cholesterol:     Optimal           <100 mg/dl     Near Optimal      100-129 mg/dl     Above Optimal       Borderline High 130-159 mg/dl       High            160-189 mg/dl       Very High       >189 mg/dl         This screening LDL is a calculated result.   It does not have the accuracy of the Direct Measured LDL in the monitoring of patients with hyperlipidemia and/or statin therapy.   Direct Measure LDL (JQN941) must be ordered separately in these patients.   • Non-HDL-Chol (CHOL-HDL)  12/12/2024 73  mg/dl Final   • TSH 3RD GENERATON 12/12/2024 1.083  0.450 - 4.500 uIU/mL Final    The recommended reference ranges for TSH during pregnancy are as follows:   First trimester 0.100 to 2.500 uIU/mL   Second trimester  0.200 to 3.000 uIU/mL   Third trimester 0.300 to 3.000 uIU/m    Note: Normal ranges may not apply to patients who are transgender, non-binary, or whose legal sex, sex at birth, and gender identity differ.  Adult TSH (3rd generation) reference range follows the recommended guidelines of the American Thyroid Association, January, 2020.   • Vitamin B-12 12/12/2024 495  180 - 914 pg/mL Final   • Folate 12/12/2024 16.1  >5.9 ng/mL Final    The World Health Organization has determined deficient folate concentrations are considered to be <4.0 ng/mL.   Orders Only on 11/13/2024   Component Date Value Ref Range Status   • Supplier Name 11/13/2024 [employer]   Final-Edited   • Supplier Phone Number 11/13/2024 [phone]   Final-Edited   • Order Status 11/13/2024 Delivery Successful   Final-Edited   • Delivery Request Date 11/13/2024 11/13/2024   Final-Edited   • Date Delivered  11/13/2024 11/14/2024   Final-Edited   • Item Description 11/13/2024 Evette Walker, Adult   Final-Edited    Qty: 1   Admission on 10/17/2024, Discharged on 10/17/2024   Component Date Value Ref Range Status   • Sodium 10/17/2024 139  135 - 147 mmol/L Final   • Potassium 10/17/2024 3.8  3.5 - 5.3 mmol/L Final   • Chloride 10/17/2024 105  96 - 108 mmol/L Final   • CO2 10/17/2024 27  21 - 32 mmol/L Final   • ANION GAP 10/17/2024 7  4 - 13 mmol/L Final   • BUN 10/17/2024 21  5 - 25 mg/dL Final   • Creatinine 10/17/2024 0.98  0.60 - 1.30 mg/dL Final    Standardized to IDMS reference method   • Glucose 10/17/2024 121  65 - 140 mg/dL Final    If the patient is fasting, the ADA then defines impaired fasting glucose as > 100 mg/dL and diabetes as > or equal to 123 mg/dL.   • Calcium 10/17/2024 8.9  8.4 -  10.2 mg/dL Final   • AST 10/17/2024 11 (L)  13 - 39 U/L Final   • ALT 10/17/2024 8  7 - 52 U/L Final    Specimen collection should occur prior to Sulfasalazine administration due to the potential for falsely depressed results.    • Alkaline Phosphatase 10/17/2024 45  34 - 104 U/L Final   • Total Protein 10/17/2024 5.8 (L)  6.4 - 8.4 g/dL Final   • Albumin 10/17/2024 3.6  3.5 - 5.0 g/dL Final   • Total Bilirubin 10/17/2024 0.68  0.20 - 1.00 mg/dL Final    Use of this assay is not recommended for patients undergoing treatment with eltrombopag due to the potential for falsely elevated results.  N-acetyl-p-benzoquinone imine (metabolite of Acetaminophen) will generate erroneously low results in samples for patients that have taken an overdose of Acetaminophen.   • eGFR 10/17/2024 52  ml/min/1.73sq m Final   • WBC 10/17/2024 5.82  4.31 - 10.16 Thousand/uL Final   • RBC 10/17/2024 3.88  3.81 - 5.12 Million/uL Final   • Hemoglobin 10/17/2024 11.3 (L)  11.5 - 15.4 g/dL Final   • Hematocrit 10/17/2024 34.9  34.8 - 46.1 % Final   • MCV 10/17/2024 90  82 - 98 fL Final   • MCH 10/17/2024 29.1  26.8 - 34.3 pg Final   • MCHC 10/17/2024 32.4  31.4 - 37.4 g/dL Final   • RDW 10/17/2024 13.3  11.6 - 15.1 % Final   • MPV 10/17/2024 10.8  8.9 - 12.7 fL Final   • Platelets 10/17/2024 230  149 - 390 Thousands/uL Final   • nRBC 10/17/2024 0  /100 WBCs Final   • Segmented % 10/17/2024 68  43 - 75 % Final   • Immature Grans % 10/17/2024 0  0 - 2 % Final   • Lymphocytes % 10/17/2024 24  14 - 44 % Final   • Monocytes % 10/17/2024 6  4 - 12 % Final   • Eosinophils Relative 10/17/2024 1  0 - 6 % Final   • Basophils Relative 10/17/2024 1  0 - 1 % Final   • Absolute Neutrophils 10/17/2024 3.94  1.85 - 7.62 Thousands/µL Final   • Absolute Immature Grans 10/17/2024 0.02  0.00 - 0.20 Thousand/uL Final   • Absolute Lymphocytes 10/17/2024 1.38  0.60 - 4.47 Thousands/µL Final   • Absolute Monocytes 10/17/2024 0.36  0.17 - 1.22 Thousand/µL Final   •  Eosinophils Absolute 10/17/2024 0.08  0.00 - 0.61 Thousand/µL Final   • Basophils Absolute 10/17/2024 0.04  0.00 - 0.10 Thousands/µL Final   • Color, UA 10/17/2024 Light Yellow   Final   • Clarity, UA 10/17/2024 Clear   Final   • Specific Gravity, UA 10/17/2024 1.012  1.003 - 1.030 Final   • pH, UA 10/17/2024 7.0  4.5, 5.0, 5.5, 6.0, 6.5, 7.0, 7.5, 8.0 Final   • Leukocytes, UA 10/17/2024 Negative  Negative Final   • Nitrite, UA 10/17/2024 Negative  Negative Final   • Protein, UA 10/17/2024 Negative  Negative mg/dl Final   • Glucose, UA 10/17/2024 Negative  Negative mg/dl Final   • Ketones, UA 10/17/2024 Negative  Negative mg/dl Final   • Urobilinogen, UA 10/17/2024 <2.0  <2.0 mg/dl mg/dl Final   • Bilirubin, UA 10/17/2024 Negative  Negative Final   • Occult Blood, UA 10/17/2024 Negative  Negative Final   • Magnesium 10/17/2024 1.8 (L)  1.9 - 2.7 mg/dL Final   • SARS-CoV-2 10/17/2024 Negative  Negative Final        • INFLUENZA A PCR 10/17/2024 Negative  Negative Final        • INFLUENZA B PCR 10/17/2024 Negative  Negative Final        • RSV PCR 10/17/2024 Negative  Negative Final        • Ventricular Rate 10/17/2024 79  BPM Final   • Atrial Rate 10/17/2024 79  BPM Final   • DE Interval 10/17/2024 166  ms Final   • QRSD Interval 10/17/2024 72  ms Final   • QT Interval 10/17/2024 358  ms Final   • QTC Interval 10/17/2024 410  ms Final   • P Axis 10/17/2024 52  degrees Final   • QRS Axis 10/17/2024 1  degrees Final   • T Wave Axis 10/17/2024 42  degrees Final   Admission on 10/03/2024, Discharged on 10/05/2024   Component Date Value Ref Range Status   • WBC 10/04/2024 5.78  4.31 - 10.16 Thousand/uL Final   • RBC 10/04/2024 3.90  3.81 - 5.12 Million/uL Final   • Hemoglobin 10/04/2024 11.3 (L)  11.5 - 15.4 g/dL Final   • Hematocrit 10/04/2024 35.2  34.8 - 46.1 % Final   • MCV 10/04/2024 90  82 - 98 fL Final   • MCH 10/04/2024 29.0  26.8 - 34.3 pg Final   • MCHC 10/04/2024 32.1  31.4 - 37.4 g/dL Final   • RDW 10/04/2024  12.9  11.6 - 15.1 % Final   • MPV 10/04/2024 10.9  8.9 - 12.7 fL Final   • Platelets 10/04/2024 256  149 - 390 Thousands/uL Final   • nRBC 10/04/2024 0  /100 WBCs Final   • Segmented % 10/04/2024 56  43 - 75 % Final   • Immature Grans % 10/04/2024 0  0 - 2 % Final   • Lymphocytes % 10/04/2024 32  14 - 44 % Final   • Monocytes % 10/04/2024 8  4 - 12 % Final   • Eosinophils Relative 10/04/2024 3  0 - 6 % Final   • Basophils Relative 10/04/2024 1  0 - 1 % Final   • Absolute Neutrophils 10/04/2024 3.21  1.85 - 7.62 Thousands/µL Final   • Absolute Immature Grans 10/04/2024 0.02  0.00 - 0.20 Thousand/uL Final   • Absolute Lymphocytes 10/04/2024 1.85  0.60 - 4.47 Thousands/µL Final   • Absolute Monocytes 10/04/2024 0.48  0.17 - 1.22 Thousand/µL Final   • Eosinophils Absolute 10/04/2024 0.18  0.00 - 0.61 Thousand/µL Final   • Basophils Absolute 10/04/2024 0.04  0.00 - 0.10 Thousands/µL Final   • Sodium 10/04/2024 137  135 - 147 mmol/L Final   • Potassium 10/04/2024 4.4  3.5 - 5.3 mmol/L Final   • Chloride 10/04/2024 102  96 - 108 mmol/L Final   • CO2 10/04/2024 28  21 - 32 mmol/L Final   • ANION GAP 10/04/2024 7  4 - 13 mmol/L Final   • BUN 10/04/2024 34 (H)  5 - 25 mg/dL Final   • Creatinine 10/04/2024 1.15  0.60 - 1.30 mg/dL Final    Standardized to IDMS reference method   • Glucose 10/04/2024 153 (H)  65 - 140 mg/dL Final    If the patient is fasting, the ADA then defines impaired fasting glucose as > 100 mg/dL and diabetes as > or equal to 123 mg/dL.   • Calcium 10/04/2024 9.3  8.4 - 10.2 mg/dL Final   • AST 10/04/2024 28  13 - 39 U/L Final   • ALT 10/04/2024 20  7 - 52 U/L Final    Specimen collection should occur prior to Sulfasalazine administration due to the potential for falsely depressed results.    • Alkaline Phosphatase 10/04/2024 58  34 - 104 U/L Final   • Total Protein 10/04/2024 6.4  6.4 - 8.4 g/dL Final   • Albumin 10/04/2024 3.8  3.5 - 5.0 g/dL Final   • Total Bilirubin 10/04/2024 0.32  0.20 - 1.00 mg/dL  Final    Use of this assay is not recommended for patients undergoing treatment with eltrombopag due to the potential for falsely elevated results.  N-acetyl-p-benzoquinone imine (metabolite of Acetaminophen) will generate erroneously low results in samples for patients that have taken an overdose of Acetaminophen.   • eGFR 10/04/2024 43  ml/min/1.73sq m Final   • LACTIC ACID 10/04/2024 2.2 (H)  0.5 - 2.0 mmol/L Final   • LACTIC ACID 10/04/2024 2.2 (H)  0.5 - 2.0 mmol/L Final   • WBC 10/04/2024 7.78  4.31 - 10.16 Thousand/uL Final   • RBC 10/04/2024 3.81  3.81 - 5.12 Million/uL Final   • Hemoglobin 10/04/2024 11.0 (L)  11.5 - 15.4 g/dL Final   • Hematocrit 10/04/2024 36.7  34.8 - 46.1 % Final   • MCV 10/04/2024 96  82 - 98 fL Final    short sample   • MCH 10/04/2024 28.9  26.8 - 34.3 pg Final   • MCHC 10/04/2024 30.0 (L)  31.4 - 37.4 g/dL Final   • RDW 10/04/2024 12.7  11.6 - 15.1 % Final   • MPV 10/04/2024 10.4  8.9 - 12.7 fL Final   • Platelets 10/04/2024 221  149 - 390 Thousands/uL Final   • nRBC 10/04/2024 0  /100 WBCs Final   • Segmented % 10/04/2024 74  43 - 75 % Final   • Immature Grans % 10/04/2024 0  0 - 2 % Final   • Lymphocytes % 10/04/2024 18  14 - 44 % Final   • Monocytes % 10/04/2024 5  4 - 12 % Final   • Eosinophils Relative 10/04/2024 2  0 - 6 % Final   • Basophils Relative 10/04/2024 1  0 - 1 % Final   • Absolute Neutrophils 10/04/2024 5.74  1.85 - 7.62 Thousands/µL Final   • Absolute Immature Grans 10/04/2024 0.03  0.00 - 0.20 Thousand/uL Final   • Absolute Lymphocytes 10/04/2024 1.42  0.60 - 4.47 Thousands/µL Final   • Absolute Monocytes 10/04/2024 0.42  0.17 - 1.22 Thousand/µL Final   • Eosinophils Absolute 10/04/2024 0.12  0.00 - 0.61 Thousand/µL Final   • Basophils Absolute 10/04/2024 0.05  0.00 - 0.10 Thousands/µL Final   • Sodium 10/04/2024 134 (L)  135 - 147 mmol/L Final   • Potassium 10/04/2024 4.5  3.5 - 5.3 mmol/L Final   • Chloride 10/04/2024 103  96 - 108 mmol/L Final   • CO2 10/04/2024  24  21 - 32 mmol/L Final   • ANION GAP 10/04/2024 7  4 - 13 mmol/L Final   • BUN 10/04/2024 29 (H)  5 - 25 mg/dL Final   • Creatinine 10/04/2024 1.05  0.60 - 1.30 mg/dL Final    Standardized to IDMS reference method   • Glucose 10/04/2024 137  65 - 140 mg/dL Final    If the patient is fasting, the ADA then defines impaired fasting glucose as > 100 mg/dL and diabetes as > or equal to 123 mg/dL.   • Calcium 10/04/2024 8.7  8.4 - 10.2 mg/dL Final   • eGFR 10/04/2024 48  ml/min/1.73sq m Final   • LACTIC ACID 10/04/2024 2.0  0.5 - 2.0 mmol/L Final   • POC Glucose 10/04/2024 124  65 - 140 mg/dl Final   • POC Glucose 10/04/2024 168 (H)  65 - 140 mg/dl Final   • POC Glucose 10/04/2024 131  65 - 140 mg/dl Final   • TSH 3RD GENERATON 10/05/2024 0.957  0.450 - 4.500 uIU/mL Final    The recommended reference ranges for TSH during pregnancy are as follows:   First trimester 0.100 to 2.500 uIU/mL   Second trimester  0.200 to 3.000 uIU/mL   Third trimester 0.300 to 3.000 uIU/m    Note: Normal ranges may not apply to patients who are transgender, non-binary, or whose legal sex, sex at birth, and gender identity differ.  Adult TSH (3rd generation) reference range follows the recommended guidelines of the [employer], January, 2020.   • Protime 10/05/2024 14.3  12.3 - 15.0 seconds Final   • INR 10/05/2024 1.04  0.85 - 1.19 Final   • WBC 10/05/2024 5.15  4.31 - 10.16 Thousand/uL Final   • RBC 10/05/2024 3.27 (L)  3.81 - 5.12 Million/uL Final   • Hemoglobin 10/05/2024 9.8 (L)  11.5 - 15.4 g/dL Final   • Hematocrit 10/05/2024 29.5 (L)  34.8 - 46.1 % Final   • MCV 10/05/2024 90  82 - 98 fL Final    Results verified by repeat   • MCH 10/05/2024 30.0  26.8 - 34.3 pg Final   • MCHC 10/05/2024 33.2  31.4 - 37.4 g/dL Final   • RDW 10/05/2024 12.7  11.6 - 15.1 % Final   • MPV 10/05/2024 10.7  8.9 - 12.7 fL Final   • Platelets 10/05/2024 210  149 - 390 Thousands/uL Final   • nRBC 10/05/2024 0  /100 WBCs Final   • Segmented %  10/05/2024 58  43 - 75 % Final   • Immature Grans % 10/05/2024 0  0 - 2 % Final   • Lymphocytes % 10/05/2024 31  14 - 44 % Final   • Monocytes % 10/05/2024 6  4 - 12 % Final   • Eosinophils Relative 10/05/2024 4  0 - 6 % Final   • Basophils Relative 10/05/2024 1  0 - 1 % Final   • Absolute Neutrophils 10/05/2024 2.97  1.85 - 7.62 Thousands/µL Final   • Absolute Immature Grans 10/05/2024 0.02  0.00 - 0.20 Thousand/uL Final   • Absolute Lymphocytes 10/05/2024 1.61  0.60 - 4.47 Thousands/µL Final   • Absolute Monocytes 10/05/2024 0.33  0.17 - 1.22 Thousand/µL Final   • Eosinophils Absolute 10/05/2024 0.19  0.00 - 0.61 Thousand/µL Final   • Basophils Absolute 10/05/2024 0.03  0.00 - 0.10 Thousands/µL Final   • Sodium 10/05/2024 139  135 - 147 mmol/L Final   • Potassium 10/05/2024 4.1  3.5 - 5.3 mmol/L Final   • Chloride 10/05/2024 106  96 - 108 mmol/L Final   • CO2 10/05/2024 28  21 - 32 mmol/L Final   • ANION GAP 10/05/2024 5  4 - 13 mmol/L Final   • BUN 10/05/2024 20  5 - 25 mg/dL Final   • Creatinine 10/05/2024 1.04  0.60 - 1.30 mg/dL Final    Standardized to IDMS reference method   • Glucose 10/05/2024 106  65 - 140 mg/dL Final    If the patient is fasting, the ADA then defines impaired fasting glucose as > 100 mg/dL and diabetes as > or equal to 123 mg/dL.   • Calcium 10/05/2024 8.7  8.4 - 10.2 mg/dL Final   • Corrected Calcium 10/05/2024 9.4  8.3 - 10.1 mg/dL Final   • AST 10/05/2024 11 (L)  13 - 39 U/L Final   • ALT 10/05/2024 11  7 - 52 U/L Final    Specimen collection should occur prior to Sulfasalazine administration due to the potential for falsely depressed results.    • Alkaline Phosphatase 10/05/2024 48  34 - 104 U/L Final   • Total Protein 10/05/2024 5.1 (L)  6.4 - 8.4 g/dL Final   • Albumin 10/05/2024 3.1 (L)  3.5 - 5.0 g/dL Final   • Total Bilirubin 10/05/2024 0.31  0.20 - 1.00 mg/dL Final    Use of this assay is not recommended for patients undergoing treatment with eltrombopag due to the potential for  falsely elevated results.  N-acetyl-p-benzoquinone imine (metabolite of Acetaminophen) will generate erroneously low results in samples for patients that have taken an overdose of Acetaminophen.   • eGFR 10/05/2024 49  ml/min/1.73sq m Final   • POC Glucose 10/05/2024 105  65 - 140 mg/dl Final   • POC Glucose 10/05/2024 132  65 - 140 mg/dl Final   • POC Glucose 10/05/2024 187 (H)  65 - 140 mg/dl Final             ___________________________________    History Review: The following portions of the patient's history were reviewed and updated as appropriate: allergies, current medications, past family history, past medical history, past social history, past surgical history, and problem list.    Unchanged information from this writer's previous assessment is copied and italicized; information that has changed is bolded.    Past Psychiatric History:      Past psychiatric diagnoses:   Depression, anxiety  Inpatient psychiatric admissions:   8/2024 2013 following a surgery, sounded like extreme anxiety  Prior outpatient psychiatric treatment:   Started seeing a psychiatrist when first   Past/current psychotherapy:   active  History of suicidal attempts/gestures:   denies  Psychotropic medication trials:   Venlafaxine, sertraline, fluoxetine, escitalopram, aripiprazole (dizziness), risperidone, clonazepam, buspirone, gabapentin        Substance Abuse History:     Denies all history of substance use     Family Psychiatric History:      Family History             Family History   Problem Relation Age of Onset   • Depression Mother           w/anxiety   • Diabetes Mother           Mellitus   • Breast cancer Mother     • Hypertension Mother     • No Known Problems Father     • Depression Sister           w/anxiety   • Heart disease Sister           Cardiac Disorder   • Breast cancer Sister     • Hypertension Sister     • Diabetes Brother           Mellitus   • Alcohol abuse Son                    Social History:      Developmental: nothing of note  Education: Bachelor degree  Marital history:  in 2006  Children: 2  Living arrangement, social support: as above, has been with daughter for 17 years  Occupational History: was a   [employer] Affiliation: has a Zoroastrianism ghanshyam  Access to firearms: denies        Traumatic History:      denies..  ___________________________________      Visit Time    Visit Start Time: 1040  Visit Stop Time: 1200  Total Visit Duration:  80 minutes    López Gutierrez MD   04/01/25

## 2025-04-01 NOTE — ASSESSMENT & PLAN NOTE
Orders:  •  escitalopram (Lexapro) 20 mg tablet; Take 1 tablet (20 mg total) by mouth daily  •  LORazepam (Ativan) 0.5 mg tablet; Take 1 tablet (0.5 mg total) by mouth 3 (three) times a day Do not start before April 2, 2025.  •  risperiDONE (RisperDAL) 0.25 mg tablet; Take 1 tablet (0.25 mg total) by mouth daily with breakfast

## 2025-04-01 NOTE — ASSESSMENT & PLAN NOTE
Continue escitalopram 20 mg QD for antidepressant maintenance  Continue lorazepam 0.5 mg TID for obsessive thinking  Continue risperidone 0.25 mg QAM and 0.5 mg QHS for obsessive thinking  Continue speech therapy  Follow-up with audiology  Follow-up with optometry  Orders:  •  escitalopram (Lexapro) 20 mg tablet; Take 1 tablet (20 mg total) by mouth daily  •  LORazepam (Ativan) 0.5 mg tablet; Take 1 tablet (0.5 mg total) by mouth 3 (three) times a day Do not start before April 2, 2025.  •  risperiDONE (RisperDAL) 0.25 mg tablet; Take 1 tablet (0.25 mg total) by mouth daily with breakfast

## 2025-04-01 NOTE — ASSESSMENT & PLAN NOTE
Orders:  •  escitalopram (Lexapro) 20 mg tablet; Take 1 tablet (20 mg total) by mouth daily  •  risperiDONE (RisperDAL) 0.25 mg tablet; Take 1 tablet (0.25 mg total) by mouth daily with breakfast

## 2025-04-02 NOTE — PROGRESS NOTES
Daily Speech Treatment Note    Today's date: 2025   Patient’s name: Sandra Peña  : 1940  MRN: [id number]  Safety measures: Fall Risk, Anxiety, Depression, Panic Attacks, Dementia   Referring provider: Jose Canada,*    Encounter Diagnosis     ICD-10-CM    1. Cognitive communication deficit  R41.841       2. Other symbolic dysfunctions  R48.8       3. 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 She has been enjoying working on this with family members.    Objective/Assessment:  -Patient's family member/caregiver was present during today's session. Daughter, Janay    Patient was engaged throughout treatment session and many practical strategies for home and the community were discussed. Her daughter has her on a good schedule of social and treatment activities throughout the week to keep her active.     Short-term goals:  Patient and family/caregiver will be educated on dementia and related topics for improved prognosis of care  Clinician provided the patient and her daughter with a packet on navigating dementia (e.g. general information on dementia, caregiver/patient communication strategies, activities and resources).     Patient was then provided with a handout of strategies for recall. Strategies were discussed and patient identified with many of the challenges/strategies addressed. Discussed scripting or bullet pointing what she wants to say with friends and loved ones before a phone call. Also discussed the benefits of having a daily log. Patient is interested in this and will be customized for her at her next treatment session.      Patient will participate in continued therapeutic trials with 80% accuracy for continued assessment and recommendations for carryover and functional tasks at home     Patient will improve long term memory retrieval and recall of information during reminiscing tasks for creation of memory book with 80% accuracy     Patient and family will participate in creation and use of memory book with carry over in 90% of opportunities to facilitate improved overall function and quality of life  Discussed creating a memory book with patient and her daughter. Clinician provided them with a template to be filled out at home and brought back at her next session.    Plan:  -Patient was provided with home exercises/activities to target goals in plan of care at the end of today's session.  -Continue with current plan of care.

## 2025-04-03 ENCOUNTER — OFFICE VISIT (OUTPATIENT)
Dept: SPEECH THERAPY | Facility: CLINIC | Age: 85
End: 2025-04-03
Payer: MEDICARE

## 2025-04-03 DIAGNOSIS — R41.841 COGNITIVE COMMUNICATION DEFICIT: Primary | ICD-10-CM

## 2025-04-03 DIAGNOSIS — R48.8 OTHER SYMBOLIC DYSFUNCTIONS: ICD-10-CM

## 2025-04-03 DIAGNOSIS — G30.1 MILD LATE ONSET ALZHEIMER'S DEMENTIA WITH ANXIETY (HCC): ICD-10-CM

## 2025-04-03 DIAGNOSIS — F02.A4 MILD LATE ONSET ALZHEIMER'S DEMENTIA WITH ANXIETY (HCC): ICD-10-CM

## 2025-04-03 PROCEDURE — 92507 TX SP LANG VOICE COMM INDIV: CPT | Performed by: SPEECH-LANGUAGE PATHOLOGIST

## 2025-04-04 ENCOUNTER — TELEMEDICINE (OUTPATIENT)
Dept: PSYCHIATRY | Facility: CLINIC | Age: 85
End: 2025-04-04

## 2025-04-04 DIAGNOSIS — F33.41 MAJOR DEPRESSIVE DISORDER, RECURRENT EPISODE, IN PARTIAL REMISSION (HCC): ICD-10-CM

## 2025-04-04 DIAGNOSIS — F45.21 ILLNESS ANXIETY DISORDER: Primary | ICD-10-CM

## 2025-04-04 PROCEDURE — PBNCHG PB NO CHARGE PLACEHOLDER

## 2025-04-04 NOTE — PSYCH
Psychotherapy Note - Progress note        [facility]    Virtual Visit Disclaimer & Required Documentation  TeleMed provider: López Gutierrez MD, located at [facility], [address] in [place], [place], [facility]. The patient is also located in [place] which is the state in which I hold an active license.    The patient was identified by name and date of birth. Sandra Peña was informed that this is a telemedicine visit and that the visit is being conducted through [employer] and patient was informed this is a secure, HIPAA-complaint platform. She agrees to proceed. My office door was closed. No one else was in the room. She acknowledged consent and understanding of privacy and security of the video platform. The patient has agreed to participate and understands they can discontinue the visit at any time.    Sandra Peña verbally agrees to participate in Virtual Care Services. Pt is aware that Virtual Care Services could be limited without vital signs or the ability to perform a full hands-on physical exam. The patient understands she or the provider may request at any time to terminate the video visit and request the patient to seek care or treatment in person. Patient is aware this is a billable service.        Psychotherapy Provided: Individual Psychotherapy     1. Illness anxiety disorder    2. Major depressive disorder, recurrent episode, in partial remission (HCC)        Goals addressed in session: Goal 1     DATA: Met with  Sandra Peña for scheduled individual session. Topics of discussion included: music choice and associations, somatic symptoms, keeping self occupied    During this session, this clinician used the following therapeutic modalities: reminiscence, CBT, supportive psychotherapy, client-centered therapy, solution-focused therapy.       ASSESSMENT: Sandra Peña presents with a tearful to happy mood. Her affect is  Normal range and intensity, appropriate.  Sandra exhibits good therapeutic rapport with this clinician. Sandra continues to exhibit willingness to work on treatment goals and objectives.  Sandra Peña presents with a none risk of suicide, none risk of self-harm, and none risk of harm to others.        PLAN:  Sandra Peña will return in approximately4 weeks for the next scheduled session. Between sessions,  Sandra will continue to work on keeping mind occupied, photographs to bring to therapy.  She will report back during the next session re: successes and barriers. At the next session, this clinician will use supportive psychotherapy, client-centered therapy, mindfulness-based strategies, Motivational Interviewing and solution-focused therapy to address mood regulation and relationship concerns, in an effort to assist  Sandra with meeting treatment goals.      Behavioral Health Treatment Plan [facility]'s: Diagnosis and Treatment Plan explained to  Sandra Peña, relates understanding diagnosis and is agreeable to Treatment Plan. Yes      This note was not shared with the patient due to this is a psychotherapy note     Visit start and stop times: 2381-3415     [facility] Ralph Gutierrez MD 04/04/25

## 2025-04-08 ENCOUNTER — APPOINTMENT (OUTPATIENT)
Dept: SPEECH THERAPY | Facility: CLINIC | Age: 85
End: 2025-04-08
Payer: MEDICARE

## 2025-04-10 ENCOUNTER — TELEPHONE (OUTPATIENT)
Age: 85
End: 2025-04-10

## 2025-04-10 NOTE — TELEPHONE ENCOUNTER
Patients daughter Janay called in stating patient had diarrhea yesterday and this morning. Janay stated patient does not have any vomiting, fever, or pain and is eating and drinking well. Janay stated she had given patient oatmeal for breakfast and had gotten rice and chicken broth for patient for lunch. Janay stated she also had her drink tea instead of coffee this morning. asked if there was anything she can give patient to help her.Janay did not think patient needed an appointment at this time as she is still eating and drinking and not having pain.  Please advise, thank you

## 2025-04-11 ENCOUNTER — APPOINTMENT (OUTPATIENT)
Dept: SPEECH THERAPY | Facility: CLINIC | Age: 85
End: 2025-04-11
Payer: MEDICARE

## 2025-04-11 NOTE — PROGRESS NOTES
Daily Speech Treatment Note    Today's date: 2025   Patient’s name: Sandra Peña  : 1940  MRN: [id number]  Safety measures: Fall Risk, Anxiety, Depression, Panic Attacks, Dementia   Referring provider: Jose Canada,*    No diagnosis found.    Visit Tracking:  POC   Expires Auth Expiration Date ST Visit Limit   6/10/25 12/31/25 BOMN              Visit/Unit Tracking:  Auth Status Date 3/25/25 4/3/25 ***   No authorization required Used 1 2 ***     Remaining BOMN BOMN ***         Subjective/Behavioral:  - Patient is practicing letters, shapes and writing words at home. She has been enjoying working on this with family members.    Objective/Assessment:  -Patient's family member/caregiver was present during today's session. DaughterJanay      Short-term goals:  Patient and family/caregiver will be educated on dementia and related topics for improved prognosis of care     Patient will participate in continued therapeutic trials with 80% accuracy for continued assessment and recommendations for carryover and functional tasks at home  Patient was provided with a series of 12 pictures that could be broken down into 3 distinct categories (4 pictures per category). Patient was able to accurately name and divide pictures into appropriate categories with ***% accuracy, ***. Clinician provided strategies for coding of information. After a thorough review of each category, pictures were removed and patient recalled pictures with ***% accuracy, independently. Following this, the patient was asked to recall all 12 pictures in their 3 categories, task completed with ***% accuracy, independently. The patient was asked to name all 12 following a 15 minute delay, recalling *** out of *** items with ***% accuracy , provided min cueing.     Patient was read small paragraphs (5 sentences) with a short story. He/She was then asked a series of 4 questions about what was just read. - Task completed with  ***%  accuracy with *** cueing. Questions were then reviewed, story was read a second time, questions were asked again. Task completed with ***% accuracy, ***.       Patient will improve long term memory retrieval and recall of information during reminiscing tasks for creation of memory book with 80% accuracy     Patient and family will participate in creation and use of memory book with carry over in 90% of opportunities to facilitate improved overall function and quality of life      Plan:  -Patient was provided with home exercises/activities to target goals in plan of care at the end of today's session.  -Continue with current plan of care.

## 2025-04-15 ENCOUNTER — APPOINTMENT (OUTPATIENT)
Dept: SPEECH THERAPY | Facility: CLINIC | Age: 85
End: 2025-04-15
Payer: MEDICARE

## 2025-04-18 ENCOUNTER — APPOINTMENT (OUTPATIENT)
Dept: SPEECH THERAPY | Facility: CLINIC | Age: 85
End: 2025-04-18
Payer: MEDICARE

## 2025-04-18 ENCOUNTER — OFFICE VISIT (OUTPATIENT)
Dept: SPEECH THERAPY | Facility: CLINIC | Age: 85
End: 2025-04-18
Payer: MEDICARE

## 2025-04-18 DIAGNOSIS — R48.8 OTHER SYMBOLIC DYSFUNCTIONS: ICD-10-CM

## 2025-04-18 DIAGNOSIS — R41.841 COGNITIVE COMMUNICATION DEFICIT: Primary | ICD-10-CM

## 2025-04-18 DIAGNOSIS — F02.A4 MILD LATE ONSET ALZHEIMER'S DEMENTIA WITH ANXIETY (HCC): ICD-10-CM

## 2025-04-18 DIAGNOSIS — G30.1 MILD LATE ONSET ALZHEIMER'S DEMENTIA WITH ANXIETY (HCC): ICD-10-CM

## 2025-04-18 PROCEDURE — 92507 TX SP LANG VOICE COMM INDIV: CPT | Performed by: SPEECH-LANGUAGE PATHOLOGIST

## 2025-04-18 NOTE — PROGRESS NOTES
Daily Speech Treatment Note    Today's date: 2025   Patient’s name: Sandra Peañ  : 1940  MRN: [id number]  Safety measures: Fall Risk, Anxiety, Depression, Panic Attacks, Dementia   Referring provider: Jose Canada,*    Encounter Diagnosis     ICD-10-CM    1. Cognitive communication deficit  R41.841       2. Other symbolic dysfunctions  R48.8       3. 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    Objective/Assessment:  -Patient's family member/caregiver was present during today's session. Mario Stubbs      Short-term goals:  Patient and family/caregiver will be educated on dementia and related topics for improved prognosis of care     Patient will participate in continued therapeutic trials with 80% accuracy for continued assessment and recommendations for carryover and functional tasks at home  Patient was provided with a series of 12 pictures that could be broken down into 3 distinct categories (4 pictures per category). Patient was able to accurately name and divide pictures into appropriate categories with 100% accuracy, independently. Clinician provided strategies for coding of information. After a thorough review of each category, pictures were removed and patient recalled pictures with 100% accuracy, independently. Following this, the patient was asked to recall all 12 pictures in their 3 categories, task completed with 100% accuracy, independently. The patient was asked to name all 12 following a 7 minute delay, recalling 12 out of 12 items with 100% accuracy , provided min cueing.      Patient was read small paragraphs (5 sentences) with a short story. She was then asked a series of 4 questions about what was just read. - Task completed with  71% accuracy, independently.     Patient will improve long term memory retrieval and recall of information during reminiscing tasks for creation of memory book with 80% accuracy     Patient and family will participate in creation and use of memory book with carry over in 90% of opportunities to facilitate improved overall function and quality of life      Plan:  -Patient was provided with home exercises/activities to target goals in plan of care at the end of today's session.  -Continue with current plan of care.

## 2025-04-20 DIAGNOSIS — K59.04 CHRONIC IDIOPATHIC CONSTIPATION: ICD-10-CM

## 2025-04-21 ENCOUNTER — TELEPHONE (OUTPATIENT)
Dept: PSYCHIATRY | Facility: CLINIC | Age: 85
End: 2025-04-21

## 2025-04-21 NOTE — TELEPHONE ENCOUNTER
Daughter called for on escitalopram (Lexapro) 20 mg tablet. Janay made aware that it was sent to the pharmacy 04/01/25 and dispensed 04/15/25. Janay will check around the house and call the pharmacy.

## 2025-04-22 ENCOUNTER — APPOINTMENT (OUTPATIENT)
Dept: SPEECH THERAPY | Facility: CLINIC | Age: 85
End: 2025-04-22
Payer: MEDICARE

## 2025-04-25 ENCOUNTER — OFFICE VISIT (OUTPATIENT)
Dept: SPEECH THERAPY | Facility: CLINIC | Age: 85
End: 2025-04-25
Payer: MEDICARE

## 2025-04-25 DIAGNOSIS — R48.8 OTHER SYMBOLIC DYSFUNCTIONS: ICD-10-CM

## 2025-04-25 DIAGNOSIS — G30.1 MILD LATE ONSET ALZHEIMER'S DEMENTIA WITH ANXIETY (HCC): ICD-10-CM

## 2025-04-25 DIAGNOSIS — R41.841 COGNITIVE COMMUNICATION DEFICIT: Primary | ICD-10-CM

## 2025-04-25 DIAGNOSIS — F02.A4 MILD LATE ONSET ALZHEIMER'S DEMENTIA WITH ANXIETY (HCC): ICD-10-CM

## 2025-04-25 PROCEDURE — 92507 TX SP LANG VOICE COMM INDIV: CPT | Performed by: SPEECH-LANGUAGE PATHOLOGIST

## 2025-04-25 NOTE — PROGRESS NOTES
Daily Speech Treatment Note    Today's date: 2025   Patient’s name: Sandra Peña  : 1940  MRN: [id number]  Safety measures: Fall Risk, Anxiety, Depression, Panic Attacks, Dementia   Referring provider: Jose Canada,*    Encounter Diagnosis     ICD-10-CM    1. Cognitive communication deficit  R41.841       2. Other symbolic dysfunctions  R48.8       3. 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    Objective/Assessment:  -Patient's family member/caregiver was present during today's session. Daughter, Janay    Discussed increasing patient to 2 times per week to receive increased gain and consistency in therapy. Patient loves to read and write but has been feeling less confident in her abilities. Will focus attention on improving skills in these areas in addition to creating her memory book with her and her daughter.      Short-term goals:  Patient and family/caregiver will be educated on dementia and related topics for improved prognosis of care     Patient will participate in continued therapeutic trials with 80% accuracy for continued assessment and recommendations for carryover and functional tasks at home  Patient was provided with a months worth of daily log sheets which will allow the patient to plan her days and document what she did in a given day (e.g. meds, to do list and activities). She will use this as a tool to aid in planning and recall to further ground him in his week and ADLs. Daily log was customized with the patient to reflect her specific needs and interests.     Patient will improve long  term memory retrieval and recall of information during reminiscing tasks for creation of memory book with 80% accuracy     Patient and family will participate in creation and use of memory book with carry over in 90% of opportunities to facilitate improved overall function and quality of life  Patient and her daughter would like to make the memory book into a crafting project. Will work on this both in and outside of the clinic.       Plan:  -Patient was provided with home exercises/activities to target goals in plan of care at the end of today's session.  -Continue with current plan of care.

## 2025-04-29 ENCOUNTER — APPOINTMENT (OUTPATIENT)
Dept: LAB | Facility: CLINIC | Age: 85
End: 2025-04-29
Payer: MEDICARE

## 2025-04-29 ENCOUNTER — APPOINTMENT (OUTPATIENT)
Dept: SPEECH THERAPY | Facility: CLINIC | Age: 85
End: 2025-04-29
Payer: MEDICARE

## 2025-04-29 ENCOUNTER — OFFICE VISIT (OUTPATIENT)
Dept: SPEECH THERAPY | Facility: CLINIC | Age: 85
End: 2025-04-29
Payer: MEDICARE

## 2025-04-29 DIAGNOSIS — N18.30 STAGE 3 CHRONIC KIDNEY DISEASE, UNSPECIFIED WHETHER STAGE 3A OR 3B CKD (HCC): ICD-10-CM

## 2025-04-29 DIAGNOSIS — F02.A4 MILD LATE ONSET ALZHEIMER'S DEMENTIA WITH ANXIETY (HCC): ICD-10-CM

## 2025-04-29 DIAGNOSIS — I10 PRIMARY HYPERTENSION: ICD-10-CM

## 2025-04-29 DIAGNOSIS — E78.2 MIXED HYPERLIPIDEMIA: ICD-10-CM

## 2025-04-29 DIAGNOSIS — R30.0 DYSURIA: ICD-10-CM

## 2025-04-29 DIAGNOSIS — R41.841 COGNITIVE COMMUNICATION DEFICIT: Primary | ICD-10-CM

## 2025-04-29 DIAGNOSIS — R48.8 OTHER SYMBOLIC DYSFUNCTIONS: ICD-10-CM

## 2025-04-29 DIAGNOSIS — E11.9 TYPE 2 DIABETES MELLITUS WITHOUT COMPLICATION, WITHOUT LONG-TERM CURRENT USE OF INSULIN (HCC): ICD-10-CM

## 2025-04-29 DIAGNOSIS — G30.1 MILD LATE ONSET ALZHEIMER'S DEMENTIA WITH ANXIETY (HCC): ICD-10-CM

## 2025-04-29 LAB
ALBUMIN SERPL BCG-MCNC: 4 G/DL (ref 3.5–5)
ALP SERPL-CCNC: 58 U/L (ref 34–104)
ALT SERPL W P-5'-P-CCNC: 7 U/L (ref 7–52)
ANION GAP SERPL CALCULATED.3IONS-SCNC: 9 MMOL/L (ref 4–13)
AST SERPL W P-5'-P-CCNC: 12 U/L (ref 13–39)
BASOPHILS # BLD AUTO: 0.04 THOUSANDS/ÂΜL (ref 0–0.1)
BASOPHILS NFR BLD AUTO: 1 % (ref 0–1)
BILIRUB SERPL-MCNC: 0.82 MG/DL (ref 0.2–1)
BUN SERPL-MCNC: 29 MG/DL (ref 5–25)
CALCIUM SERPL-MCNC: 9.7 MG/DL (ref 8.4–10.2)
CHLORIDE SERPL-SCNC: 104 MMOL/L (ref 96–108)
CHOLEST SERPL-MCNC: 142 MG/DL (ref ?–200)
CO2 SERPL-SCNC: 28 MMOL/L (ref 21–32)
CREAT SERPL-MCNC: 1.1 MG/DL (ref 0.6–1.3)
CREAT UR-MCNC: 90.6 MG/DL
EOSINOPHIL # BLD AUTO: 0.16 THOUSAND/ÂΜL (ref 0–0.61)
EOSINOPHIL NFR BLD AUTO: 3 % (ref 0–6)
ERYTHROCYTE [DISTWIDTH] IN BLOOD BY AUTOMATED COUNT: 13.6 % (ref 11.6–15.1)
EST. AVERAGE GLUCOSE BLD GHB EST-MCNC: 151 MG/DL
GFR SERPL CREATININE-BSD FRML MDRD: 45 ML/MIN/1.73SQ M
GLUCOSE P FAST SERPL-MCNC: 101 MG/DL (ref 65–99)
HBA1C MFR BLD: 6.9 %
HCT VFR BLD AUTO: 35.8 % (ref 34.8–46.1)
HDLC SERPL-MCNC: 55 MG/DL
HGB BLD-MCNC: 11.3 G/DL (ref 11.5–15.4)
IMM GRANULOCYTES # BLD AUTO: 0.02 THOUSAND/UL (ref 0–0.2)
IMM GRANULOCYTES NFR BLD AUTO: 0 % (ref 0–2)
LDLC SERPL CALC-MCNC: 69 MG/DL (ref 0–100)
LYMPHOCYTES # BLD AUTO: 1.61 THOUSANDS/ÂΜL (ref 0.6–4.47)
LYMPHOCYTES NFR BLD AUTO: 28 % (ref 14–44)
MCH RBC QN AUTO: 28.7 PG (ref 26.8–34.3)
MCHC RBC AUTO-ENTMCNC: 31.6 G/DL (ref 31.4–37.4)
MCV RBC AUTO: 91 FL (ref 82–98)
MICROALBUMIN UR-MCNC: 19.8 MG/L
MICROALBUMIN/CREAT 24H UR: 22 MG/G CREATININE (ref 0–30)
MONOCYTES # BLD AUTO: 0.39 THOUSAND/ÂΜL (ref 0.17–1.22)
MONOCYTES NFR BLD AUTO: 7 % (ref 4–12)
NEUTROPHILS # BLD AUTO: 3.56 THOUSANDS/ÂΜL (ref 1.85–7.62)
NEUTS SEG NFR BLD AUTO: 61 % (ref 43–75)
NONHDLC SERPL-MCNC: 87 MG/DL
NRBC BLD AUTO-RTO: 0 /100 WBCS
PLATELET # BLD AUTO: 183 THOUSANDS/UL (ref 149–390)
PMV BLD AUTO: 10.7 FL (ref 8.9–12.7)
POTASSIUM SERPL-SCNC: 4.3 MMOL/L (ref 3.5–5.3)
PROT SERPL-MCNC: 6.3 G/DL (ref 6.4–8.4)
RBC # BLD AUTO: 3.94 MILLION/UL (ref 3.81–5.12)
SODIUM SERPL-SCNC: 141 MMOL/L (ref 135–147)
TRIGL SERPL-MCNC: 90 MG/DL (ref ?–150)
TSH SERPL DL<=0.05 MIU/L-ACNC: 1.14 UIU/ML (ref 0.45–4.5)
WBC # BLD AUTO: 5.78 THOUSAND/UL (ref 4.31–10.16)

## 2025-04-29 PROCEDURE — 83036 HEMOGLOBIN GLYCOSYLATED A1C: CPT

## 2025-04-29 PROCEDURE — 92507 TX SP LANG VOICE COMM INDIV: CPT | Performed by: SPEECH-LANGUAGE PATHOLOGIST

## 2025-04-29 PROCEDURE — 82043 UR ALBUMIN QUANTITATIVE: CPT

## 2025-04-29 PROCEDURE — 84443 ASSAY THYROID STIM HORMONE: CPT

## 2025-04-29 PROCEDURE — 36415 COLL VENOUS BLD VENIPUNCTURE: CPT

## 2025-04-29 PROCEDURE — 80061 LIPID PANEL: CPT

## 2025-04-29 PROCEDURE — 85025 COMPLETE CBC W/AUTO DIFF WBC: CPT

## 2025-04-29 PROCEDURE — 80053 COMPREHEN METABOLIC PANEL: CPT

## 2025-04-29 PROCEDURE — 82570 ASSAY OF URINE CREATININE: CPT

## 2025-04-29 NOTE — PROGRESS NOTES
"Daily Speech Treatment Note    Today's date: 2025   Patient’s name: Sandra Peña  : 1940  MRN: [id number]  Safety measures: Fall Risk, Anxiety, Depression, Panic Attacks, Dementia   Referring provider: Jose Canada,*    Encounter Diagnosis     ICD-10-CM    1. Cognitive communication deficit  R41.841       2. Other symbolic dysfunctions  R48.8       3. 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    Patient reports, \"There are things that I could be doing but I don't feel like it.\" - coloring, reading (The Good Left [employer]done). She gets frustrated because she can't remember what she read previously.     Objective/Assessment:  -See goals targeted below during today's treatment session.          Short-term goals:  Patient and family/caregiver will be educated on dementia and related topics for improved prognosis of care     Patient will participate in continued therapeutic trials with 80% accuracy for continued assessment and recommendations for carryover and functional tasks at home  Clinician and patient adapted Daily Notes to include what she read in her book. Patient then filled out her daily note from today with min assistance from the clinician. Patient recalled the month, day, year and details from today's activities with 100% accuracy, independently. Patient demonstrated a few minor spelling errors.    She has read the first 20 pages from her book, The Good Left Undone. Setting, characters and plot points were reviewed. Clinician created a document for the patient to fill in to add " characters as she meets them in the book. There is also a section to write a sentence or two about the main plot point of each chapter.    Patient will improve long term memory retrieval and recall of information during reminiscing tasks for creation of memory book with 80% accuracy     Patient and family will participate in creation and use of memory book with carry over in 90% of opportunities to facilitate improved overall function and quality of life      Plan:  -Patient was provided with home exercises/activities to target goals in plan of care at the end of today's session.  -Continue with current plan of care.

## 2025-04-30 ENCOUNTER — RESULTS FOLLOW-UP (OUTPATIENT)
Dept: FAMILY MEDICINE CLINIC | Facility: CLINIC | Age: 85
End: 2025-04-30

## 2025-05-01 ENCOUNTER — OFFICE VISIT (OUTPATIENT)
Dept: FAMILY MEDICINE CLINIC | Facility: CLINIC | Age: 85
End: 2025-05-01
Payer: MEDICARE

## 2025-05-01 VITALS
HEIGHT: 57 IN | SYSTOLIC BLOOD PRESSURE: 122 MMHG | OXYGEN SATURATION: 98 % | DIASTOLIC BLOOD PRESSURE: 68 MMHG | BODY MASS INDEX: 25.46 KG/M2 | WEIGHT: 118 LBS | HEART RATE: 82 BPM | RESPIRATION RATE: 16 BRPM

## 2025-05-01 DIAGNOSIS — N18.30 STAGE 3 CHRONIC KIDNEY DISEASE, UNSPECIFIED WHETHER STAGE 3A OR 3B CKD (HCC): ICD-10-CM

## 2025-05-01 DIAGNOSIS — H25.013 CORTICAL AGE-RELATED CATARACT OF BOTH EYES: ICD-10-CM

## 2025-05-01 DIAGNOSIS — R54 AGE-RELATED PHYSICAL DEBILITY: ICD-10-CM

## 2025-05-01 DIAGNOSIS — E78.2 MIXED HYPERLIPIDEMIA: ICD-10-CM

## 2025-05-01 DIAGNOSIS — F33.41 MAJOR DEPRESSIVE DISORDER, RECURRENT EPISODE, IN PARTIAL REMISSION (HCC): ICD-10-CM

## 2025-05-01 DIAGNOSIS — L70.0 OPEN COMEDONE: ICD-10-CM

## 2025-05-01 DIAGNOSIS — K59.00 CONSTIPATION, UNSPECIFIED CONSTIPATION TYPE: ICD-10-CM

## 2025-05-01 DIAGNOSIS — E11.9 TYPE 2 DIABETES MELLITUS WITHOUT COMPLICATION, WITHOUT LONG-TERM CURRENT USE OF INSULIN (HCC): ICD-10-CM

## 2025-05-01 DIAGNOSIS — I10 PRIMARY HYPERTENSION: Primary | ICD-10-CM

## 2025-05-01 PROBLEM — E11.628 TYPE 2 DIABETES MELLITUS WITH OTHER SKIN COMPLICATIONS (HCC): Status: RESOLVED | Noted: 2025-03-03 | Resolved: 2025-05-01

## 2025-05-01 PROCEDURE — 99215 OFFICE O/P EST HI 40 MIN: CPT | Performed by: FAMILY MEDICINE

## 2025-05-01 PROCEDURE — G2211 COMPLEX E/M VISIT ADD ON: HCPCS | Performed by: FAMILY MEDICINE

## 2025-05-01 NOTE — PROGRESS NOTES
Subjective:      Patient ID: Sandra Peña is a 84 y.o. female.    84-year-old female presents with her daughter for 4-month follow-up of chronic conditions including diabetes mellitus type 2, chronic constipation, hypertension, depression with anxiety, early stage Alzheimer's type dementia.  Patient has been going to psychiatrist for therapy as well as medication management, geriatric medicine as well as speech/cognitive therapy.  Still has difficulty with formulating sentences and expressing herself, easily tearful.  Now cannot really enjoy reading because she has bilateral cataracts.  Has optometrist but not ophthalmologist.  Labs reviewed which showed normal TSH, urine for microalbumin, CBC, CMP with exception of impaired fasting glucose of 101, hemoglobin A1c at 6.9%.  She does also complain of recurrence of a very large open comedone in the pelvic area        Past Medical History:   Diagnosis Date   • Anxiety    • Colon polyps    • Depression    • Diabetes mellitus (HCC)    • Dizziness     Last assessed: 8/31/15   • DVT (deep venous thrombosis) ([facility])    • Dysuria    • Hematuria 2022   • Hyperlipidemia    • Hypertension    • Hypertensive urgency 10/22/2021   • Insomnia    • Panic attack    • Ureterolithiasis 2020       Family History   Problem Relation Age of Onset   • Depression Mother         w/anxiety   • Diabetes Mother         Mellitus   • Breast cancer Mother    • Hypertension Mother    • No Known Problems Father    • Depression Sister         w/anxiety   • Heart disease Sister         Cardiac Disorder   • Breast cancer Sister    • Hypertension Sister    • Diabetes Brother         Mellitus   • Alcohol abuse Son        Past Surgical History:   Procedure Laterality Date   •  SECTION       son, 1968 daughter   • COLONOSCOPY     • FL RETROGRADE PYELOGRAM  2020   • GA COLONOSCOPY FLX DX W/COLLJ SPEC WHEN PFRMD N/A 3/27/2017    Procedure: COLONOSCOPY;  Surgeon: Gold Francis  MD;  Location: [facility];  Service: Gastroenterology   • IA CYSTO/URETERO W/LITHOTRIPSY &INDWELL STENT INSRT Right 5/22/2020    Procedure: CYSTOSCOPY URETEROSCOPY WITH LITHOTRIPSY HOLMIUM LASER, RETROGRADE PYELOGRAM AND INSERTION STENT URETERAL; EXCISION OF BLADDER TUMOR;  Surgeon: Edwin Love MD;  Location: [facility];  Service: Urology   • ROTATOR CUFF REPAIR Left     Last assessed: 3/29/15   • TONSILLECTOMY          reports that she has never smoked. She has never used smokeless tobacco. She reports that she does not drink alcohol and does not use drugs.      Current Outpatient Medications:   •  aspirin 81 MG tablet, Take 81 mg by mouth daily, Disp: , Rfl:   •  atorvastatin (LIPITOR) 10 mg tablet, Take 1 tablet (10 mg total) by mouth daily, Disp: 90 tablet, Rfl: 1  •  escitalopram (Lexapro) 20 mg tablet, Take 1 tablet (20 mg total) by mouth daily, Disp: 90 tablet, Rfl: 0  •  linaCLOtide 145 MCG CAPS, Take 1 capsule (145 mcg total) by mouth in the morning, Disp: 90 capsule, Rfl: 1  •  lisinopril-hydrochlorothiazide (PRINZIDE,ZESTORETIC) 10-12.5 MG per tablet, TAKE ONE TABLET BY MOUTH EVERY DAY, Disp: 90 tablet, Rfl: 1  •  LORazepam (Ativan) 0.5 mg tablet, Take 1 tablet (0.5 mg total) by mouth 3 (three) times a day Do not start before April 2, 2025., Disp: 90 tablet, Rfl: 0  •  metFORMIN (GLUCOPHAGE) 500 mg tablet, Take 1 tablet (500 mg total) by mouth 2 (two) times a day with meals (Patient taking differently: Take 500 mg by mouth in the morning In the evening), Disp: 180 tablet, Rfl: 1  •  risperiDONE (RisperDAL) 0.25 mg tablet, Take 1 tablet (0.25 mg total) by mouth daily with breakfast, Disp: 30 tablet, Rfl: 2  •  risperiDONE (RisperDAL) 0.5 mg tablet, Take 1 tablet (0.5 mg total) by mouth daily at bedtime, Disp: 90 tablet, Rfl: 0  •  senna-docusate sodium (SENOKOT S) 8.6-50 mg per tablet, Take 1 tablet by mouth daily as needed for constipation, Disp: 20 tablet, Rfl: 0  •  Cholecalciferol (VITAMIN D3)  "1,000 units tablet, Take 2 tablets (2,000 Units total) by mouth daily for 30 doses, Disp: 60 tablet, Rfl: 0    The following portions of the patient's history were reviewed and updated as appropriate: allergies, current medications, past family history, past medical history, past social history, past surgical history and problem list.    Review of Systems   Constitutional: Negative.  Negative for appetite change and unexpected weight change.   HENT: Negative.     Eyes: Negative.    Respiratory: Negative.     Cardiovascular: Negative.    Gastrointestinal:  Positive for constipation.   Endocrine: Negative.    Genitourinary: Negative.    Musculoskeletal: Negative.    Skin: Negative.         Large open comedone right pelvic area   Allergic/Immunologic: Negative.    Neurological: Negative.    Hematological: Negative.    Psychiatric/Behavioral:  Positive for agitation, confusion, dysphoric mood and sleep disturbance. The patient is nervous/anxious.            Objective:    /68   Pulse 82   Resp 16   Ht 4' 9\" (1.448 m)   Wt 53.5 kg (118 lb)   SpO2 98%   BMI 25.53 kg/m²      Physical Exam  Vitals and nursing note reviewed.   Constitutional:       General: She is not in acute distress.     Appearance: Normal appearance. She is well-developed and normal weight. She is not diaphoretic.   HENT:      Head: Normocephalic and atraumatic.      Nose: Nose normal.      Mouth/Throat:      Mouth: Mucous membranes are moist.      Pharynx: Oropharynx is clear.   Eyes:      Conjunctiva/sclera: Conjunctivae normal.      Pupils: Pupils are equal, round, and reactive to light.   Cardiovascular:      Rate and Rhythm: Normal rate and regular rhythm.      Heart sounds: Normal heart sounds. No murmur heard.  Pulmonary:      Effort: Pulmonary effort is normal.      Breath sounds: Normal breath sounds.   Abdominal:      General: Bowel sounds are normal.      Palpations: Abdomen is soft.   Musculoskeletal:         General: Normal range " of motion.      Cervical back: Normal range of motion and neck supple.      Right lower leg: No edema.      Left lower leg: No edema.   Skin:     General: Skin is warm and dry.      Findings: No rash.   Neurological:      General: No focal deficit present.      Mental Status: She is alert and oriented to person, place, and time. Mental status is at baseline.      Deep Tendon Reflexes: Reflexes are normal and symmetric.      Comments: Mouth tremor   Psychiatric:         Mood and Affect: Mood is depressed. Affect is tearful.         Behavior: Behavior normal.         Thought Content: Thought content normal.         Cognition and Memory: Cognition is impaired. Memory is impaired.      Comments: Though patient does remember that I had removed open comedone when I first met her 12 years ago           Recent Results (from the past 6 weeks)   Albumin / creatinine urine ratio    Collection Time: 04/29/25  9:00 AM   Result Value Ref Range    Creatinine, Ur 90.6 Reference range not established. mg/dL    Albumin,U,Random 19.8 <20.0 mg/L    Albumin Creat Ratio 22 0 - 30 mg/g creatinine   CBC and differential    Collection Time: 04/29/25  9:00 AM   Result Value Ref Range    WBC 5.78 4.31 - 10.16 Thousand/uL    RBC 3.94 3.81 - 5.12 Million/uL    Hemoglobin 11.3 (L) 11.5 - 15.4 g/dL    Hematocrit 35.8 34.8 - 46.1 %    MCV 91 82 - 98 fL    MCH 28.7 26.8 - 34.3 pg    MCHC 31.6 31.4 - 37.4 g/dL    RDW 13.6 11.6 - 15.1 %    MPV 10.7 8.9 - 12.7 fL    Platelets 183 149 - 390 Thousands/uL    nRBC 0 /100 WBCs    Segmented % 61 43 - 75 %    Immature Grans % 0 0 - 2 %    Lymphocytes % 28 14 - 44 %    Monocytes % 7 4 - 12 %    Eosinophils Relative 3 0 - 6 %    Basophils Relative 1 0 - 1 %    Absolute Neutrophils 3.56 1.85 - 7.62 Thousands/µL    Absolute Immature Grans 0.02 0.00 - 0.20 Thousand/uL    Absolute Lymphocytes 1.61 0.60 - 4.47 Thousands/µL    Absolute Monocytes 0.39 0.17 - 1.22 Thousand/µL    Eosinophils Absolute 0.16 0.00 - 0.61  Thousand/µL    Basophils Absolute 0.04 0.00 - 0.10 Thousands/µL   Comprehensive metabolic panel    Collection Time: 04/29/25  9:00 AM   Result Value Ref Range    Sodium 141 135 - 147 mmol/L    Potassium 4.3 3.5 - 5.3 mmol/L    Chloride 104 96 - 108 mmol/L    CO2 28 21 - 32 mmol/L    ANION GAP 9 4 - 13 mmol/L    BUN 29 (H) 5 - 25 mg/dL    Creatinine 1.10 0.60 - 1.30 mg/dL    Glucose, Fasting 101 (H) 65 - 99 mg/dL    Calcium 9.7 8.4 - 10.2 mg/dL    AST 12 (L) 13 - 39 U/L    ALT 7 7 - 52 U/L    Alkaline Phosphatase 58 34 - 104 U/L    Total Protein 6.3 (L) 6.4 - 8.4 g/dL    Albumin 4.0 3.5 - 5.0 g/dL    Total Bilirubin 0.82 0.20 - 1.00 mg/dL    eGFR 45 ml/min/1.73sq m   Hemoglobin A1C    Collection Time: 04/29/25  9:00 AM   Result Value Ref Range    Hemoglobin A1C 6.9 (H) Normal 4.0-5.6%; PreDiabetic 5.7-6.4%; Diabetic >=6.5%; Glycemic control for adults with diabetes <7.0% %     mg/dl   Lipid panel    Collection Time: 04/29/25  9:00 AM   Result Value Ref Range    Cholesterol 142 See Comment mg/dL    Triglycerides 90 See Comment mg/dL    HDL, Direct 55 >=50 mg/dL    LDL Calculated 69 0 - 100 mg/dL    Non-HDL-Chol (CHOL-HDL) 87 mg/dl   TSH, 3rd generation with Free T4 reflex    Collection Time: 04/29/25  9:00 AM   Result Value Ref Range    TSH 3RD GENERATON 1.138 0.450 - 4.500 uIU/mL       Assessment/Plan:    Primary hypertension  Hypertension remains adequately controlled on lisinopril/hydrochlorothiazide.  Continue same reevaluate in 6 months    Type 2 diabetes mellitus without complication, without long-term current use of insulin (HCC)    Lab Results   Component Value Date    HGBA1C 6.9 (H) 04/29/2025     Goal hemoglobin A1c less than 7%.  Patient remains on metformin 500 mg and I advised her daughter to give it to her twice daily since she has had a slight increase in her hemoglobin A1c.  Repeat diabetic parameters to be done in 4 months    Stage 3 chronic kidney disease, unspecified whether stage 3a or 3b  CKD ([facility])  Lab Results   Component Value Date    EGFR 45 04/29/2025    EGFR 47 12/12/2024    EGFR 52 10/17/2024    CREATININE 1.10 04/29/2025    CREATININE 1.06 12/12/2024    CREATININE 0.98 10/17/2024     Diagnosis based upon age.  Creatinine has remained stable.  Avoid NSAIDs, dehydration    Major depressive disorder, recurrent episode, in partial remission ([facility])  Follow-up plan.  She has been seeing psychiatry.  She will continue seeing psychiatry.  Remains on Lexapro and Risperdal which are both prescribed by psychiatry.    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  She still lives with daughter and her family.  They tried to place her at assisted living facility and that did not work out    Mixed hyperlipidemia  Goal LDL less than 70 because she is diabetic.  LDL at 69.  Continue on atorvastatin 10 mg once daily.  Repeat lipid panel to be done in 4 months    Constipation  Chronic constipation.  She does have Linzess, Metamucil.  Did advise her daughter to give her metformin 500 mg twice daily which may also help to eliminate her chronic constipation    Cortical age-related cataract of both eyes  Referral to [facility]'s ophthalmology.  Can also go through [facility].  This may help with her reading which she always enjoys    Open comedone  Large open comedone in the right pelvic area removed using hemostat          Problem List Items Addressed This Visit        Cardiovascular and Mediastinum    Primary hypertension - Primary    Hypertension remains adequately controlled on lisinopril/hydrochlorothiazide.  Continue same reevaluate in 6 months         Relevant Orders    TSH, 3rd generation with Free T4 reflex       Endocrine    Type 2 diabetes mellitus without complication, without long-term current use of insulin ([facility])      Lab Results   Component Value Date    HGBA1C 6.9 (H)  04/29/2025     Goal hemoglobin A1c less than 7%.  Patient remains on metformin 500 mg and I advised her daughter to give it to her twice daily since she has had a slight increase in her hemoglobin A1c.  Repeat diabetic parameters to be done in 4 months         Relevant Orders    Hemoglobin A1C       Musculoskeletal and Integument    Open comedone    Large open comedone in the right pelvic area removed using hemostat            Genitourinary    Stage 3 chronic kidney disease, unspecified whether stage 3a or 3b CKD ([facility])    Lab Results   Component Value Date    EGFR 45 04/29/2025    EGFR 47 12/12/2024    EGFR 52 10/17/2024    CREATININE 1.10 04/29/2025    CREATININE 1.06 12/12/2024    CREATININE 0.98 10/17/2024     Diagnosis based upon age.  Creatinine has remained stable.  Avoid NSAIDs, dehydration         Relevant Orders    CBC and differential    Comprehensive metabolic panel       Behavioral Health    Major depressive disorder, recurrent episode, in partial remission ([facility])    Follow-up plan.  She has been seeing psychiatry.  She will continue seeing psychiatry.  Remains on Lexapro and Risperdal which are both prescribed by psychiatry.    Not certain why she needs a depression follow-up plan when she is seeing psychiatry on a regular basis            Eye    Cortical age-related cataract of both eyes    Referral to [facility]'s ophthalmology.  Can also go through [facility].  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  She still lives with daughter and her family.  They tried to place her at assisted living facility and that did not work out            Other    Constipation    Chronic constipation.  She does have Linzess, Metamucil.  Did advise her daughter to give her metformin 500 mg twice daily which may also help to eliminate her chronic  constipation         Mixed hyperlipidemia    Goal LDL less than 70 because she is diabetic.  LDL at 69.  Continue on atorvastatin 10 mg once daily.  Repeat lipid panel to be done in 4 months         Relevant Orders    Comprehensive metabolic panel    Lipid panel       I have spent a total time of 44 minutes in caring for this patient on the day of the visit/encounter including Diagnostic results, Prognosis, Risks and benefits of tx options, Instructions for management, Patient and family education, Importance of tx compliance, Risk factor reductions, Impressions, Counseling / Coordination of care, Documenting in the medical record, Reviewing/placing orders in the medical record (including tests, medications, and/or procedures), and Obtaining or reviewing history  .

## 2025-05-01 NOTE — ASSESSMENT & PLAN NOTE
Lab Results   Component Value Date    HGBA1C 6.9 (H) 04/29/2025     Goal hemoglobin A1c less than 7%.  Patient remains on metformin 500 mg and I advised her daughter to give it to her twice daily since she has had a slight increase in her hemoglobin A1c.  Repeat diabetic parameters to be done in 4 months

## 2025-05-01 NOTE — ASSESSMENT & PLAN NOTE
Chronic constipation.  She does have Linzess, Metamucil.  Did advise her daughter to give her metformin 500 mg twice daily which may also help to eliminate her chronic constipation

## 2025-05-01 NOTE — ASSESSMENT & PLAN NOTE
Follow-up plan.  She has been seeing psychiatry.  She will continue seeing psychiatry.  Remains on Lexapro and Risperdal which are both prescribed by psychiatry.    Not certain why she needs a depression follow-up plan when she is seeing psychiatry on a regular basis

## 2025-05-01 NOTE — ASSESSMENT & PLAN NOTE
Lab Results   Component Value Date    EGFR 45 04/29/2025    EGFR 47 12/12/2024    EGFR 52 10/17/2024    CREATININE 1.10 04/29/2025    CREATININE 1.06 12/12/2024    CREATININE 0.98 10/17/2024     Diagnosis based upon age.  Creatinine has remained stable.  Avoid NSAIDs, dehydration

## 2025-05-01 NOTE — ASSESSMENT & PLAN NOTE
Has been seen by geriatric medicine.  She still lives with daughter and her family.  They tried to place her at assisted living facility and that did not work out

## 2025-05-01 NOTE — ASSESSMENT & PLAN NOTE
Referral to  [facility].  Can also go through [facility].  This may help with her reading which she always enjoys

## 2025-05-01 NOTE — ASSESSMENT & PLAN NOTE
Goal LDL less than 70 because she is diabetic.  LDL at 69.  Continue on atorvastatin 10 mg once daily.  Repeat lipid panel to be done in 4 months

## 2025-05-01 NOTE — ASSESSMENT & PLAN NOTE
Hypertension remains adequately controlled on lisinopril/hydrochlorothiazide.  Continue same reevaluate in 6 months

## 2025-05-02 ENCOUNTER — OFFICE VISIT (OUTPATIENT)
Dept: SPEECH THERAPY | Facility: CLINIC | Age: 85
End: 2025-05-02
Payer: MEDICARE

## 2025-05-02 ENCOUNTER — TELEMEDICINE (OUTPATIENT)
Dept: PSYCHIATRY | Facility: CLINIC | Age: 85
End: 2025-05-02

## 2025-05-02 DIAGNOSIS — G30.1 MILD LATE ONSET ALZHEIMER'S DEMENTIA WITH ANXIETY (HCC): ICD-10-CM

## 2025-05-02 DIAGNOSIS — R41.841 COGNITIVE COMMUNICATION DEFICIT: Primary | ICD-10-CM

## 2025-05-02 DIAGNOSIS — F02.A4 MILD LATE ONSET ALZHEIMER'S DEMENTIA WITH ANXIETY (HCC): ICD-10-CM

## 2025-05-02 DIAGNOSIS — F45.21 ILLNESS ANXIETY DISORDER: Primary | ICD-10-CM

## 2025-05-02 DIAGNOSIS — R48.8 OTHER SYMBOLIC DYSFUNCTIONS: ICD-10-CM

## 2025-05-02 PROCEDURE — 92507 TX SP LANG VOICE COMM INDIV: CPT | Performed by: SPEECH-LANGUAGE PATHOLOGIST

## 2025-05-02 PROCEDURE — PBNCHG PB NO CHARGE PLACEHOLDER

## 2025-05-02 RX ORDER — LORAZEPAM 0.5 MG/1
0.5 TABLET ORAL
Qty: 30 TABLET | Refills: 0 | Status: SHIPPED | OUTPATIENT
Start: 2025-05-02

## 2025-05-02 NOTE — PSYCH
Psychotherapy Note - Progress note        [facility]    Virtual Visit Disclaimer & Required Documentation  TeleMed provider: López Gutierrez MD, located at [facility], [address] [place], [address]. The patient is also located in [place] which is the state in which I hold an active license.    The patient was identified by name and date of birth. Sandra Peña was informed that this is a telemedicine visit and that the visit is being conducted through [employer] and patient was informed this is a secure, HIPAA-complaint platform. She agrees to proceed. My office door was closed. No one else was in the room. She acknowledged consent and understanding of privacy and security of the video platform. The patient has agreed to participate and understands they can discontinue the visit at any time.    Sandra Peña verbally agrees to participate in Virtual Care Services. Pt is aware that Virtual Care Services could be limited without vital signs or the ability to perform a full hands-on physical exam. The patient understands she or the provider may request at any time to terminate the video visit and request the patient to seek care or treatment in person. Patient is aware this is a billable service.        Psychotherapy Provided: Individual Psychotherapy     1. Illness anxiety disorder        Goals addressed in session: Goal 1     DATA: Met with  Sandra Peña for scheduled individual session. Topics of discussion included: holiday, anxiety, speech therapy    During this session, this clinician used the following therapeutic modalities: supportive psychotherapy, client-centered therapy,  and solution-focused therapy, psychodynamic, CBT, reminiscence         ASSESSMENT: Sandra Peña presents with a sad mood. Her affect is Normal range and intensity and Tearful, appropriate.  Sandra exhibits good therapeutic rapport with this  clinician. Sandra continues to exhibit willingness to work on treatment goals and objectives.  Sandra Peña presents with a none risk of suicide, none risk of self-harm, and none risk of harm to others.          PLAN:  Sandra Peña will return in approximately2 weeks for the next scheduled session.  She will report back during the next session re: successes and barriers. At the next session, this clinician will use supportive psychotherapy, client-centered therapy, mindfulness-based strategies, Motivational Interviewing and solution-focused therapy to address mood regulation and relationship concerns, in an effort to assist  Sandra with meeting treatment goals.      Behavioral Health Treatment Plan [facility]'s: Diagnosis and Treatment Plan explained to  Sandra Peña, relates understanding diagnosis and is agreeable to Treatment Plan. Yes      This note was not shared with the patient due to this is a psychotherapy note     Visit start and stop times: [id number]     [facility] Ralph Gutierrez MD 05/02/25

## 2025-05-02 NOTE — PROGRESS NOTES
Daily Speech Treatment Note    Today's date: 2025   Patient’s name: Sandra Peña  : 1940  MRN: [id number]  Safety measures: Fall Risk, Anxiety, Depression, Panic Attacks, Dementia   Referring provider: Jose Canada,*    Encounter Diagnosis     ICD-10-CM    1. Cognitive communication deficit  R41.841       2. Other symbolic dysfunctions  R48.8       3. 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 She is getting confused with the back and forth of the past to the present.      Objective/Assessment:  -See goals targeted below during today's treatment session.          Short-term goals:  Patient and family/caregiver will be educated on dementia and related topics for improved prognosis of care     Patient will participate in continued therapeutic trials with 80% accuracy for continued assessment and recommendations for carryover and functional tasks at home  Clinician reviewed patient's daily logs from the past week. Pages were complete. Patient reported some overload of activity between her daily log and reading her book (including adding to her plot and character page). Discussed ways to simplify tasks.    Using the photo card game, [employer], patient made connections with cards based on characteristics (e.g. shape, color, content) and verbally described that connection to the clinician with 90% accuracy, provided min cueing.    Patient accurately named images depicted on cards with 80% accuracy, provided min cueing.     Patient will improve long term memory retrieval  and recall of information during reminiscing tasks for creation of memory book with 80% accuracy     Patient and family will participate in creation and use of memory book with carry over in 90% of opportunities to facilitate improved overall function and quality of life      Plan:  -Patient was provided with home exercises/activities to target goals in plan of care at the end of today's session.  -Continue with current plan of care.

## 2025-05-04 DIAGNOSIS — I10 BENIGN ESSENTIAL HYPERTENSION: ICD-10-CM

## 2025-05-05 RX ORDER — LISINOPRIL AND HYDROCHLOROTHIAZIDE 10; 12.5 MG/1; MG/1
1 TABLET ORAL DAILY
Qty: 90 TABLET | Refills: 1 | Status: SHIPPED | OUTPATIENT
Start: 2025-05-05

## 2025-05-06 DIAGNOSIS — F02.80 MAJOR NEUROCOGNITIVE DISORDER DUE TO ALZHEIMER'S DISEASE, WITHOUT BEHAVIORAL DISTURBANCE (HCC): ICD-10-CM

## 2025-05-06 DIAGNOSIS — G30.9 ALZHEIMER'S DISEASE (HCC): ICD-10-CM

## 2025-05-06 DIAGNOSIS — F01.50 VASCULAR DEMENTIA, UNCOMPLICATED (HCC): ICD-10-CM

## 2025-05-06 DIAGNOSIS — F02.80 MAJOR NEUROCOGNITIVE DISORDER DUE TO MULTIPLE ETIOLOGIES WITHOUT BEHAVIORAL DISTURBANCE (HCC): ICD-10-CM

## 2025-05-06 DIAGNOSIS — F45.21 ILLNESS ANXIETY DISORDER: ICD-10-CM

## 2025-05-06 DIAGNOSIS — F02.80 ALZHEIMER'S DISEASE (HCC): ICD-10-CM

## 2025-05-06 DIAGNOSIS — G30.9 MAJOR NEUROCOGNITIVE DISORDER DUE TO ALZHEIMER'S DISEASE, WITHOUT BEHAVIORAL DISTURBANCE (HCC): ICD-10-CM

## 2025-05-06 RX ORDER — LORAZEPAM 0.5 MG/1
0.5 TABLET ORAL 3 TIMES DAILY
Qty: 78 TABLET | Refills: 0 | Status: SHIPPED | OUTPATIENT
Start: 2025-05-06 | End: 2025-06-01

## 2025-05-06 NOTE — TELEPHONE ENCOUNTER
Reason for call:   [x] Refill   [] Prior Auth  [] Other:     Office:   [] PCP/Provider -   [x] Specialty/Provider -     Medication: LORazepam (Ativan) 0.5 mg tablet     Dose/Frequency: Take 1 tablet (0.5 mg total) by mouth 3 (three) times a day     Quantity: 90 tablet     Pharmacy: [address] [place] - [address]     [facility]   Does the patient have enough for 3 days?   [] Yes   [x] No - Send as HP to POD

## 2025-05-06 NOTE — TELEPHONE ENCOUNTER
Refill must be reviewed and completed by the office or provider. The refill is unable to be approved or denied by the medication management team.      05/04/2025 05/02/2025 05/02/2025 LORazepam (Tablet) 30.0 30 0.5 MG NA [id number] [place] 0 / 0 [place]   1 [id number] 04/05/2025 04/01/2025 04/01/2025 LORazepam (Tablet) 90.0 30 0.5 MG NA [id number] [place]   1 [id number] 03/06/2025 03/03/2025 03/03/2025 LORazepam (Tablet) 90.0 30 0.5 MG NA [id number] [place] 0 / 0 [place]

## 2025-05-09 ENCOUNTER — OFFICE VISIT (OUTPATIENT)
Dept: SPEECH THERAPY | Facility: CLINIC | Age: 85
End: 2025-05-09
Payer: MEDICARE

## 2025-05-09 DIAGNOSIS — R41.841 COGNITIVE COMMUNICATION DEFICIT: Primary | ICD-10-CM

## 2025-05-09 DIAGNOSIS — R48.8 OTHER SYMBOLIC DYSFUNCTIONS: ICD-10-CM

## 2025-05-09 DIAGNOSIS — G30.1 MILD LATE ONSET ALZHEIMER'S DEMENTIA WITH ANXIETY (HCC): ICD-10-CM

## 2025-05-09 DIAGNOSIS — F02.A4 MILD LATE ONSET ALZHEIMER'S DEMENTIA WITH ANXIETY (HCC): ICD-10-CM

## 2025-05-09 PROCEDURE — 92507 TX SP LANG VOICE COMM INDIV: CPT | Performed by: SPEECH-LANGUAGE PATHOLOGIST

## 2025-05-09 NOTE — PROGRESS NOTES
Daily Speech Treatment Note    Today's date: 2025   Patient’s name: Sandra Peña  : 1940  MRN: [id number]  Safety measures: Fall Risk, Anxiety, Depression, Panic Attacks, Dementia   Referring provider: Jose Canada,*    Encounter Diagnosis     ICD-10-CM    1. Cognitive communication deficit  R41.841       2. Other symbolic dysfunctions  R48.8       3. 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 She will skip lines when reading her morning devotional - at times reading the lines over again and trouble not understanding what it is saying. She is scheduled to get her cataracts removed, which may help.       Objective/Assessment:  -See goals targeted below during today's treatment session.          Short-term goals:  Patient and family/caregiver will be educated on dementia and related topics for improved prognosis of care     Patient will participate in continued therapeutic trials with 80% accuracy for continued assessment and recommendations for carryover and functional tasks at home  Patient participated in a word finding game called [employer], which challenges the patient to come up with letter specific words based upon categories given. 20 letters of the alphabet were provided for a max of 10 words named in a category per person. As the patient and clinician took turns naming words in a category, they used a different letter each time and the letter was then out of play after it was used.  Task completed with 100% accuracy, provided min-mod cueing.     Patient was read a paragraph  with information on various subjects (e.g. surface of the moon). Clinician reviewed each sentence as it was read to reinforce the facts. She was then asked a series of questions about what was just read. - 38% accuracy with moderate cueing    Patient will improve long term memory retrieval and recall of information during reminiscing tasks for creation of memory book with 80% accuracy     Patient and family will participate in creation and use of memory book with carry over in 90% of opportunities to facilitate improved overall function and quality of life      Plan:  -Patient was provided with home exercises/activities to target goals in plan of care at the end of today's session.  -Continue with current plan of care.

## 2025-05-16 ENCOUNTER — APPOINTMENT (OUTPATIENT)
Dept: SPEECH THERAPY | Facility: CLINIC | Age: 85
End: 2025-05-16
Payer: MEDICARE

## 2025-05-16 ENCOUNTER — TELEPHONE (OUTPATIENT)
Dept: PSYCHIATRY | Facility: CLINIC | Age: 85
End: 2025-05-16

## 2025-05-16 ENCOUNTER — TELEMEDICINE (OUTPATIENT)
Dept: PSYCHIATRY | Facility: CLINIC | Age: 85
End: 2025-05-16

## 2025-05-16 DIAGNOSIS — G30.9 ALZHEIMER'S DISEASE (HCC): ICD-10-CM

## 2025-05-16 DIAGNOSIS — F45.21 ILLNESS ANXIETY DISORDER: Primary | ICD-10-CM

## 2025-05-16 DIAGNOSIS — G30.1 MILD LATE ONSET ALZHEIMER'S DEMENTIA WITH ANXIETY (HCC): ICD-10-CM

## 2025-05-16 DIAGNOSIS — F02.80 MAJOR NEUROCOGNITIVE DISORDER DUE TO ALZHEIMER'S DISEASE, WITHOUT BEHAVIORAL DISTURBANCE (HCC): ICD-10-CM

## 2025-05-16 DIAGNOSIS — F02.80 MAJOR NEUROCOGNITIVE DISORDER DUE TO MULTIPLE ETIOLOGIES WITHOUT BEHAVIORAL DISTURBANCE (HCC): ICD-10-CM

## 2025-05-16 DIAGNOSIS — F02.80 ALZHEIMER'S DISEASE (HCC): ICD-10-CM

## 2025-05-16 DIAGNOSIS — R41.841 COGNITIVE COMMUNICATION DEFICIT: Primary | ICD-10-CM

## 2025-05-16 DIAGNOSIS — R48.8 OTHER SYMBOLIC DYSFUNCTIONS: ICD-10-CM

## 2025-05-16 DIAGNOSIS — F02.A4 MILD LATE ONSET ALZHEIMER'S DEMENTIA WITH ANXIETY (HCC): ICD-10-CM

## 2025-05-16 DIAGNOSIS — F45.21 ILLNESS ANXIETY DISORDER: ICD-10-CM

## 2025-05-16 DIAGNOSIS — F01.50 VASCULAR DEMENTIA, UNCOMPLICATED (HCC): ICD-10-CM

## 2025-05-16 DIAGNOSIS — F33.41 MAJOR DEPRESSIVE DISORDER, RECURRENT EPISODE, IN PARTIAL REMISSION (HCC): ICD-10-CM

## 2025-05-16 DIAGNOSIS — G30.9 MAJOR NEUROCOGNITIVE DISORDER DUE TO ALZHEIMER'S DISEASE, WITHOUT BEHAVIORAL DISTURBANCE (HCC): ICD-10-CM

## 2025-05-16 PROCEDURE — PBNCHG PB NO CHARGE PLACEHOLDER

## 2025-05-16 RX ORDER — RISPERIDONE 0.25 MG/1
0.25 TABLET ORAL 2 TIMES DAILY
Qty: 60 TABLET | Refills: 2 | Status: SHIPPED | OUTPATIENT
Start: 2025-05-16

## 2025-05-16 NOTE — PSYCH
"Psychotherapy Note - Progress note        [facility]    Virtual Visit Disclaimer & Required Documentation  TeleMed provider: López Gutierrez MD, located at [facility], [address] [place], [address]. The patient is also located in [place] which is the state in which I hold an active license.    The patient was identified by name and date of birth. Sandra Peña was informed that this is a telemedicine visit and that the visit is being conducted through [employer] and patient was informed this is a secure, HIPAA-complaint platform. She agrees to proceed. My office door was closed. No one else was in the room. She acknowledged consent and understanding of privacy and security of the video platform. The patient has agreed to participate and understands they can discontinue the visit at any time.    Sandra Peña verbally agrees to participate in Virtual Care Services. Pt is aware that Virtual Care Services could be limited without vital signs or the ability to perform a full hands-on physical exam. The patient understands she or the provider may request at any time to terminate the video visit and request the patient to seek care or treatment in person. Patient is aware this is a billable service.        Psychotherapy Provided: Individual Psychotherapy     1. Illness anxiety disorder        Goals addressed in session: Goal 1     DATA: Met with  Sandra Peña for scheduled individual session. Topics of discussion included: coping mechanisms, sleep, family dynamic, emotionality    During this session, this clinician used the following therapeutic modalities: supportive psychotherapy,  Motivational Interviewing and solution-focused therapy, CBT informed        ASSESSMENT: Sandra Peña presents with a \"half and half\" mood. Her affect is Normal range and intensity and Tearful, appropriate.  Sandra exhibits good therapeutic rapport " with this clinician. Sandra continues to exhibit willingness to work on treatment goals and objectives.  Sandra Peña presents with a none risk of suicide, none risk of self-harm, and none risk of harm to others.          PLAN:  Sandra Peña will return in approximately2 weeks for the next scheduled session. Between sessions,  Sandra will continue to work on sleep strategies, reframing, coping.  She will report back during the next session re: successes and barriers. At the next session, this clinician will use supportive psychotherapy, client-centered therapy, mindfulness-based strategies, Motivational Interviewing and solution-focused therapy to address mood regulation and relationship concerns, in an effort to assist  Sandra with meeting treatment goals.      Behavioral Health Treatment Plan [facility]: Diagnosis and Treatment Plan explained to  Sandra Peña, relates understanding diagnosis and is agreeable to Treatment Plan. Yes      This note was not shared with the patient due to this is a psychotherapy note     Visit start and stop times: [id number]     [facility] Ralph Gutierrez MD 05/16/25

## 2025-05-16 NOTE — PROGRESS NOTES
"Daily Speech Treatment Note    Today's date: 2025   Patient’s name: Sandra Peña  : 1940  MRN: [id number]  Safety measures: Fall Risk, Anxiety, Depression, Panic Attacks, Dementia   Referring provider: Jose Canada,*    Encounter Diagnosis     ICD-10-CM    1. Cognitive communication deficit  R41.841       2. Other symbolic dysfunctions  R48.8       3. 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 She will skip lines when reading her morning devotional - at times reading the lines over again and trouble not understanding what it is saying. She is scheduled to get her cataracts removed, which may help.       Objective/Assessment:  -See goals targeted below during today's treatment session.         Short-term goals:  Patient and family/caregiver will be educated on dementia and related topics for improved prognosis of care     Patient will participate in continued therapeutic trials with 80% accuracy for continued assessment and recommendations for carryover and functional tasks at home    Using story cubes, patient identified pictures on dice rolled and created sentences using 2 words (represented on story cubes) as the subjects. Task completed with ***% accuracy, requiring *** cueing. Patient demonstrated minor grammatical (e.g. \"a\" for \"the\") and spelling errors (e.g. \"lite\" for \"lit\").    Story Cubes - Using multiple dice (up to 9) with a different picture on each side, the patient had to come up with a story using the images that she rolled. The clinician then rolled " the dice and told a story. The patient was then challenged to retell the clinicians story. The patient completed the task with ***% accuracy, demonstrating good attention throughout the activity.    Story Cubes - Using multiple dice (***) with a different picture on each side, the patient had to come up with a story using the images that she rolled. The clinician provided an example story to start. The patient then rolled the dice, named the four objects pictured on the cubes (e.g. a rainbow, magnifying glass, eye and fountain). He/She then wrote down each word represented, followed by writing a sentence and reading the sentence out loud. At the end he/she read the entire story. The patient completed the task with ***% accuracy, requiring *** cueing.       Patient will improve long term memory retrieval and recall of information during reminiscing tasks for creation of memory book with 80% accuracy     Patient and family will participate in creation and use of memory book with carry over in 90% of opportunities to facilitate improved overall function and quality of life      Plan:  -Patient was provided with home exercises/activities to target goals in plan of care at the end of today's session.  -Continue with current plan of care.

## 2025-05-20 ENCOUNTER — TELEPHONE (OUTPATIENT)
Dept: BEHAVIORAL/MENTAL HEALTH CLINIC | Facility: CLINIC | Age: 85
End: 2025-05-20

## 2025-05-20 ENCOUNTER — APPOINTMENT (OUTPATIENT)
Dept: SPEECH THERAPY | Facility: CLINIC | Age: 85
End: 2025-05-20
Payer: MEDICARE

## 2025-05-20 NOTE — TELEPHONE ENCOUNTER
"Received in-basket referral from Dr. Gutierrez for daughter, Janay - \"[facility] Caregiver\" therapy group. Group is currently on hold given limited referrals, however, information to be provided regarding support group available within [facility]. Contacted Janay by phone and discussed the same- she notes information can be sent by email to [email or web address]. Call was disconnected- attempted to call back and left voicemail.    Email sent includin) support group flyer- call [phone] for more information and 2) resource website from geriatrics dept    [email or web address]    [email or web address]  "

## 2025-05-20 NOTE — TELEPHONE ENCOUNTER
Received return call from daughter, Janay, and further discussed cognitive decline, visit to [facility] + work with Dr. Canada/ team, encouraged outreach to discuss virtual caregiver support, [facility] as a resource. Janay plans to reach out later this afternoon.

## 2025-05-21 ENCOUNTER — TELEPHONE (OUTPATIENT)
Age: 85
End: 2025-05-21

## 2025-05-21 NOTE — TELEPHONE ENCOUNTER
Patient's daughter called, she was looking to speak to someone about the support group [facility] offers. She was given a flyer, but has some questions.     Please advise, thank you

## 2025-05-21 NOTE — TELEPHONE ENCOUNTER
MSW attempted outreach to patient's daughter, Janay, to follow up on questions regarding support group.  However, Janay was not available at time of call.  Voicemail left, with MSW's direct callback information, requesting a return call to discuss support group questions.  MSW will remain available as needed.

## 2025-05-22 ENCOUNTER — TELEPHONE (OUTPATIENT)
Age: 85
End: 2025-05-22

## 2025-05-22 NOTE — TELEPHONE ENCOUNTER
"MSW received return call from patient's daughter, Janay, this day.  Janay infomred that patient's behaviors have become more difficult to manage at times.  Janay expressed that patient's behaviors include her being very emotional and crying often.  Janay infomred that patient has increased trouble with speech and comprehension is very poor.  Often patient will wake up crying in the middle of the night with no reason as to why.  Janay informed that she is no longer working and patient's resides with her as she is patient's full time caregiver.  Patient also gets very upset with Janay asking her questions such as: why are you on the phone, why do you have to leave, what are you doing and so on.  Janay describes patient's behaviors as very \"toddler like.\"  MSW expressed understanding and offered emotional support and discussion which was accepted.  MSW reviewed options such as bringing in a care giver even a few hours a week to provide Janay with some relief due to caregiver stress.  Janay open to exploring same, but expresses not wanting to upset patient.  Janay expressed worries about upcoming vacation and considering respite care, but feeling as though this would also upset patient.  MSW reviewed home care, respite care, services to support with arranging these services and caregiver support/support groups.  MSW to mail the following resources and supports to Janay Mckeon at [address] [place] [address]:   -Alzheimer's Association: Communication  -Alzheimer's Association: The Importance of Keeping it Simple  -Therapeutic Fibbing article  -Alzheimer's Association: Caregiver Emotions  -Alzheimer's Association: What it means to be a caregiver  -Alzheimer's Association: Unearned Guilt, The Worst Kind  -[facility] Caregiver Support Group  -Alzheimer's [place] Caregiver Tip Sheet: Depression and Sadess  -Caregiver Support Program  -[facility] list  -Home care, Waiver, AAA (Highlighted [facility] " contact)  -[facility] Care [place] Application  -Alzheimer's Association: Respite Care  -[facility]/PC list  -[employer] brochure    MSW will remain available as needed.

## 2025-05-22 NOTE — TELEPHONE ENCOUNTER
Pt's daughter, Janay called requested a call back to discuss Medications and Behavior for her Mother, Sandra.    She can be reached at P# [phone].   Thank you.

## 2025-05-22 NOTE — PROGRESS NOTES
Daily Speech Treatment Note    Today's date: 2025   Patient’s name: Sandra Peña  : 1940  MRN: [id number]  Safety measures: Fall Risk, Anxiety, Depression, Panic Attacks, Dementia   Referring provider: Jose Canada,*    Encounter Diagnosis     ICD-10-CM    1. Cognitive communication deficit  R41.841       2. Other symbolic dysfunctions  R48.8       3. 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         Short-term goals:  Patient and family/caregiver will be educated on dementia and related topics for improved prognosis of care     Patient will participate in continued therapeutic trials with 80% accuracy for continued assessment and recommendations for carryover and functional tasks at home    Story Cubes - Using multiple dice (up to 9) with a different picture on each side, the patient had to come up with a story using the images that he rolled. He created the story with the clinician going back and forth adding a thought/sentence at a time. Task completed with ***% accuracy in creating relevant additions to the story, provided ***% accuracy. Patient was able to accurately name each image with ***% accuracy, provided *** cueing.  Task progressed to the clinician rolling the dice and telling a story.  The patient was then challenged to retell the clinicians story. The patient completed the task with ***% accuracy, demonstrating good attention throughout the activity.    Using story cubes, patient identified  "pictures on dice rolled and created sentences using 2 words (represented on story cubes) as the subjects. Task completed with ***% accuracy, requiring *** cueing. Patient demonstrated minor grammatical (e.g. \"a\" for \"the\") and spelling errors (e.g. \"lite\" for \"lit\").      Patient will improve long term memory retrieval and recall of information during reminiscing tasks for creation of memory book with 80% accuracy     Patient and family will participate in creation and use of memory book with carry over in 90% of opportunities to facilitate improved overall function and quality of life      Plan:  -Patient was provided with home exercises/activities to target goals in plan of care at the end of today's session.  -Continue with current plan of care.    "

## 2025-05-22 NOTE — TELEPHONE ENCOUNTER
Left message with daughter, Janay Mckeon ([phone]) regarding moving Follow up appointment w/Dr. Canada.     Please move appointment up w/ Dr. Canada, if appointment is available.

## 2025-05-22 NOTE — TELEPHONE ENCOUNTER
Janay returned the call. She reports Sandra's behaviors are getting more difficulty to manage. Having difficulty sleeping and frequently up at night. Bouts of non-stop crying. She recently was crying hysterically for 1 and 1/2 hours. She fears Janay being on the phone and leaving the house. Sandra thinks she'll die if Janay leaves the house. Sandra also feels her heart beating.     Janay feels Ativan is not helping, she is taking it 3 times a day. Taking risperidone 0.25 mg 2 times a day. She knows Dr. Gutierrez has concerns about Xanax, but she's asking if there is something that would work quickly to help Sandra. (Preferred pharmacy is [facility] [place] in [place].)

## 2025-05-23 ENCOUNTER — APPOINTMENT (OUTPATIENT)
Dept: SPEECH THERAPY | Facility: CLINIC | Age: 85
End: 2025-05-23
Payer: MEDICARE

## 2025-05-23 ENCOUNTER — TELEPHONE (OUTPATIENT)
Dept: PSYCHIATRY | Facility: CLINIC | Age: 85
End: 2025-05-23

## 2025-05-23 DIAGNOSIS — R48.8 OTHER SYMBOLIC DYSFUNCTIONS: ICD-10-CM

## 2025-05-23 DIAGNOSIS — F02.80 ALZHEIMER'S DISEASE (HCC): ICD-10-CM

## 2025-05-23 DIAGNOSIS — F33.41 MAJOR DEPRESSIVE DISORDER, RECURRENT EPISODE, IN PARTIAL REMISSION (HCC): ICD-10-CM

## 2025-05-23 DIAGNOSIS — F02.80 MAJOR NEUROCOGNITIVE DISORDER DUE TO ALZHEIMER'S DISEASE, WITHOUT BEHAVIORAL DISTURBANCE (HCC): ICD-10-CM

## 2025-05-23 DIAGNOSIS — F02.A4 MILD LATE ONSET ALZHEIMER'S DEMENTIA WITH ANXIETY (HCC): ICD-10-CM

## 2025-05-23 DIAGNOSIS — F45.21 ILLNESS ANXIETY DISORDER: ICD-10-CM

## 2025-05-23 DIAGNOSIS — F02.80 MAJOR NEUROCOGNITIVE DISORDER DUE TO MULTIPLE ETIOLOGIES WITHOUT BEHAVIORAL DISTURBANCE (HCC): ICD-10-CM

## 2025-05-23 DIAGNOSIS — R41.841 COGNITIVE COMMUNICATION DEFICIT: Primary | ICD-10-CM

## 2025-05-23 DIAGNOSIS — G30.9 ALZHEIMER'S DISEASE (HCC): ICD-10-CM

## 2025-05-23 DIAGNOSIS — G30.9 MAJOR NEUROCOGNITIVE DISORDER DUE TO ALZHEIMER'S DISEASE, WITHOUT BEHAVIORAL DISTURBANCE (HCC): ICD-10-CM

## 2025-05-23 DIAGNOSIS — F01.50 VASCULAR DEMENTIA, UNCOMPLICATED (HCC): ICD-10-CM

## 2025-05-23 DIAGNOSIS — G30.1 MILD LATE ONSET ALZHEIMER'S DEMENTIA WITH ANXIETY (HCC): ICD-10-CM

## 2025-05-23 RX ORDER — ESCITALOPRAM OXALATE 10 MG/1
10 TABLET ORAL DAILY
Qty: 30 TABLET | Refills: 1 | Status: SHIPPED | OUTPATIENT
Start: 2025-05-23

## 2025-05-23 RX ORDER — BUPROPION HYDROCHLORIDE 150 MG/1
150 TABLET ORAL EVERY MORNING
Qty: 30 TABLET | Refills: 1 | Status: SHIPPED | OUTPATIENT
Start: 2025-05-23

## 2025-05-30 ENCOUNTER — APPOINTMENT (OUTPATIENT)
Dept: SPEECH THERAPY | Facility: CLINIC | Age: 85
End: 2025-05-30
Payer: MEDICARE

## 2025-06-03 ENCOUNTER — OFFICE VISIT (OUTPATIENT)
Dept: PSYCHIATRY | Facility: CLINIC | Age: 85
End: 2025-06-03
Payer: MEDICARE

## 2025-06-03 DIAGNOSIS — F02.80 MAJOR NEUROCOGNITIVE DISORDER DUE TO ALZHEIMER'S DISEASE, WITHOUT BEHAVIORAL DISTURBANCE (HCC): ICD-10-CM

## 2025-06-03 DIAGNOSIS — F01.50 VASCULAR DEMENTIA, UNCOMPLICATED (HCC): ICD-10-CM

## 2025-06-03 DIAGNOSIS — F02.80 ALZHEIMER'S DISEASE (HCC): ICD-10-CM

## 2025-06-03 DIAGNOSIS — G30.9 ALZHEIMER'S DISEASE (HCC): ICD-10-CM

## 2025-06-03 DIAGNOSIS — F45.21 ILLNESS ANXIETY DISORDER: ICD-10-CM

## 2025-06-03 DIAGNOSIS — F33.41 MAJOR DEPRESSIVE DISORDER, RECURRENT EPISODE, IN PARTIAL REMISSION (HCC): ICD-10-CM

## 2025-06-03 DIAGNOSIS — F02.80 MAJOR NEUROCOGNITIVE DISORDER DUE TO MULTIPLE ETIOLOGIES WITHOUT BEHAVIORAL DISTURBANCE (HCC): Primary | ICD-10-CM

## 2025-06-03 DIAGNOSIS — G30.9 MAJOR NEUROCOGNITIVE DISORDER DUE TO ALZHEIMER'S DISEASE, WITHOUT BEHAVIORAL DISTURBANCE (HCC): ICD-10-CM

## 2025-06-03 PROCEDURE — 99214 OFFICE O/P EST MOD 30 MIN: CPT

## 2025-06-03 RX ORDER — ALPRAZOLAM 0.25 MG
0.25 TABLET ORAL 3 TIMES DAILY PRN
Qty: 30 TABLET | Refills: 0 | Status: SHIPPED | OUTPATIENT
Start: 2025-06-03 | End: 2025-06-09

## 2025-06-03 RX ORDER — RISPERIDONE 0.25 MG/1
0.25 TABLET ORAL
Qty: 30 TABLET | Refills: 0 | Status: SHIPPED | OUTPATIENT
Start: 2025-06-03

## 2025-06-03 NOTE — ASSESSMENT & PLAN NOTE
Orders:  •  risperiDONE (RisperDAL) 0.25 mg tablet; Take 1 tablet (0.25 mg total) by mouth daily at bedtime

## 2025-06-03 NOTE — ASSESSMENT & PLAN NOTE
Discontinue lorazepam  Begin alprazolam 0.25 mg TID PRN for anxiety  Continue bupropion  mg QD for antidepressant augmentation  Continue escitalopram 10 mg QD for anxiety, depressive, and obsessional symptoms  Decrease risperidone to 0.25 mg QHS  Orders:  •  risperiDONE (RisperDAL) 0.25 mg tablet; Take 1 tablet (0.25 mg total) by mouth daily at bedtime  •  ALPRAZolam (XANAX) 0.25 mg tablet; Take 1 tablet (0.25 mg total) by mouth 3 (three) times a day as needed for anxiety or sleep

## 2025-06-03 NOTE — ASSESSMENT & PLAN NOTE
Orders:  •  risperiDONE (RisperDAL) 0.25 mg tablet; Take 1 tablet (0.25 mg total) by mouth daily at bedtime  •  ALPRAZolam (XANAX) 0.25 mg tablet; Take 1 tablet (0.25 mg total) by mouth 3 (three) times a day as needed for anxiety or sleep

## 2025-06-03 NOTE — PSYCH
MEDICATION MANAGEMENT NOTE        [facility]      Name and Date of Birth:  Sandra Peña 84 y.o. 1940 MRN: [id number]    Date of Visit: Madisyn 3, 2025    Reason for Visit: Follow-up visit regarding medication management     _____________________________    Assessment & Plan  Major neurocognitive disorder due to multiple etiologies, moderate, 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)  Discontinue lorazepam  Begin alprazolam 0.25 mg TID PRN for anxiety  Continue bupropion  mg QD for antidepressant augmentation  Continue escitalopram 10 mg QD for anxiety, depressive, and obsessional symptoms  Decrease risperidone to 0.25 mg QHS  Orders:  •  risperiDONE (RisperDAL) 0.25 mg tablet; Take 1 tablet (0.25 mg total) by mouth daily at bedtime  •  ALPRAZolam (XANAX) 0.25 mg tablet; Take 1 tablet (0.25 mg total) by mouth 3 (three) times a day as needed for anxiety or sleep    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, moderate, without behavioral disturbance    Orders:  •  risperiDONE (RisperDAL) 0.25 mg tablet; Take 1 tablet (0.25 mg total) by mouth daily at bedtime  •  ALPRAZolam (XANAX) 0.25 mg tablet; Take 1 tablet (0.25 mg total) by mouth 3 (three) times a day as needed for anxiety or sleep    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, moderate, 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)    Orders:  •  risperiDONE (RisperDAL) 0.25 mg tablet; Take 1 tablet (0.25 mg total) by mouth daily at bedtime  •  ALPRAZolam (XANAX) 0.25 mg tablet; Take 1 tablet (0.25 mg total) by mouth 3 (three) times a day as needed for anxiety or sleep    Major depressive disorder, recurrent episode, in partial remission (HCC)    Orders:  •  risperiDONE (RisperDAL) 0.25 mg tablet; Take 1 tablet (0.25 mg total) by mouth daily at bedtime    Illness anxiety disorder    Orders:  •  risperiDONE (RisperDAL) 0.25 mg tablet; Take 1 tablet (0.25 mg total) by mouth  "daily at bedtime  •  ALPRAZolam (XANAX) 0.25 mg tablet; Take 1 tablet (0.25 mg total) by mouth 3 (three) times a day as needed for anxiety or sleep    Alzheimer's disease (HCC)    Orders:  •  risperiDONE (RisperDAL) 0.25 mg tablet; Take 1 tablet (0.25 mg total) by mouth daily at bedtime  •  ALPRAZolam (XANAX) 0.25 mg tablet; Take 1 tablet (0.25 mg total) by mouth 3 (three) times a day as needed for anxiety or sleep           Seemingly modest positive effect was noted from addition of bupropion for depressive symptoms. Patient will continue to have episodes of distress during the day and the primary concern is the distress, discomfort, fear/anxiety at night. Lorazepam reported to no longer be helpful with no noticeable effect.    Likely cognitive changes contributing to fear and anxiety. The patient was ambulating well and fully alert and engaged in interview, able to concentrate on questions and demonstrating memory. Will switch lorazepam to alprazolam for faster onset of action. Plan to discontinue risperidone due to lack of efficacy and attempting to limit polypharmacy.    Treatment Recommendations/Precautions:  Risks/benefits/alternatives to treatment discussed, including a myriad of potential adverse medication side effects, to which Sandra voiced understanding and consented fully to treatment.   Labs most recently obtained , reviewed.   Follow up in 2 weeks for medication management  Follow up with PCP for medical issues and ongoing care  Aware of 24 hour and weekend coverage for urgent situations accessed by calling [facility] main practice number       Treatment Plan:     Completed and signed during the session: Not applicable - Treatment Plan not due at this session    _____________________________________________    History of Present Illness     Chief Complaint: \"I blink and she's not there\"    SUBJECTIVE:    Patient and daughter report crying is a little better. Patient able to dress " self, do some laundry. Bupropion addition helps her to be more able to wake up in the morning and do things.   Continues to struggle at night with physical discomfort in bed, getting up. Taking lorazepam TID, 8/9, 1, 5. No noticeable effects from this any longer.        Psychiatric Review Of Systems:  Unchanged information from this writer's previous assessment is copied and italicized; information that has changed is bolded.    Appetite: no  Adverse eating: no  Weight changes: no  Insomnia/sleeplessness: no  Fatigue/anergy: no  Anhedonia/lack of interest: no  Attention/concentration: no  Psychomotor agitation/retardation: no  Somatic symptoms: yes  Anxiety/panic attack: yes  Zaira/hypomania: no  Hopelessness/helplessness/worthlessness: no  Self-injurious behavior/high-risk behavior: no  Suicidal ideation: no  Homicidal ideation: no  Auditory hallucinations: no  Visual hallucinations: no  Delusional thinking: no  Obsessive/compulsive symptoms: yes    Review Of Systems:      Constitutional negative   ENT negative   Cardiovascular negative   Respiratory negative   Gastrointestinal negative   Genitourinary negative   Musculoskeletal negative   Integumentary negative   Neurological negative   Endocrine negative   Other Symptoms none, all other systems are negative     Objective    OBJECTIVE:     Visit Vitals  OB Status Postmenopausal   Smoking Status Never      Wt Readings from Last 6 Encounters:   05/01/25 53.5 kg (118 lb)   03/03/25 53.1 kg (117 lb)   02/05/25 53.5 kg (118 lb)   12/17/24 53.1 kg (117 lb)   11/18/24 49.9 kg (110 lb)   10/17/24 49.9 kg (110 lb 0.2 oz)        Past Medical History[1]   Past Surgical History[2]    Meds/Allergies    Allergies[3]  Current Outpatient Medications   Medication Instructions   • ALPRAZolam (XANAX) 0.25 mg, Oral, 3 times daily PRN   • aspirin 81 mg, Daily   • atorvastatin (LIPITOR) 10 mg, Oral, Daily   • buPROPion (WELLBUTRIN XL) 150 mg, Oral, Every morning   • Cholecalciferol  "(VITAMIN D3) 2,000 Units, Oral, Daily   • escitalopram (LEXAPRO) 10 mg, Oral, Daily   • linaCLOtide 145 mcg, Oral, Daily   • lisinopril-hydrochlorothiazide (PRINZIDE,ZESTORETIC) 10-12.5 MG per tablet 1 tablet, Oral, Daily   • metFORMIN (GLUCOPHAGE) 500 mg, Oral, 2 times daily with meals   • risperiDONE (RISPERDAL) 0.25 mg, Oral, Daily at bedtime   • senna-docusate sodium (SENOKOT S) 8.6-50 mg per tablet 1 tablet, Oral, Daily PRN           Mental Status Exam:    Appearance Short gray curly hair, appropriate attire, cardigan, good eye contact   Behavior/motor Cooperative, engaged, pleasant   Speech/language Halted at times, normal rate and volume   Mood \"Could be better\"   Affect Full range and appropriate, tearful at times   Thought process Logical and linear   Thought content Obsessions  No overt delusions, Denies hallucinations, Denies suicidal ideations   Cognition Awake and alert, oriented and memory grossly intact, and concentrates on questions   Insight/judgment Insight: fair  Judgment: fair     Laboratory Results: I have personally reviewed all pertinent laboratory/tests results    Appointment on 04/29/2025   Component Date Value Ref Range Status   • Creatinine, Ur 04/29/2025 90.6  Reference range not established. mg/dL Final   • Albumin,U,Random 04/29/2025 19.8  <20.0 mg/L Final   • Albumin Creat Ratio 04/29/2025 22  0 - 30 mg/g creatinine Final   • WBC 04/29/2025 5.78  4.31 - 10.16 Thousand/uL Final   • RBC 04/29/2025 3.94  3.81 - 5.12 Million/uL Final   • Hemoglobin 04/29/2025 11.3 (L)  11.5 - 15.4 g/dL Final   • Hematocrit 04/29/2025 35.8  34.8 - 46.1 % Final   • MCV 04/29/2025 91  82 - 98 fL Final   • MCH 04/29/2025 28.7  26.8 - 34.3 pg Final   • MCHC 04/29/2025 31.6  31.4 - 37.4 g/dL Final   • RDW 04/29/2025 13.6  11.6 - 15.1 % Final   • MPV 04/29/2025 10.7  8.9 - 12.7 fL Final   • Platelets 04/29/2025 183  149 - 390 Thousands/uL Final   • nRBC 04/29/2025 0  /100 WBCs Final   • Segmented % 04/29/2025 61  " 43 - 75 % Final   • Immature Grans % 04/29/2025 0  0 - 2 % Final   • Lymphocytes % 04/29/2025 28  14 - 44 % Final   • Monocytes % 04/29/2025 7  4 - 12 % Final   • Eosinophils Relative 04/29/2025 3  0 - 6 % Final   • Basophils Relative 04/29/2025 1  0 - 1 % Final   • Absolute Neutrophils 04/29/2025 3.56  1.85 - 7.62 Thousands/µL Final   • Absolute Immature Grans 04/29/2025 0.02  0.00 - 0.20 Thousand/uL Final   • Absolute Lymphocytes 04/29/2025 1.61  0.60 - 4.47 Thousands/µL Final   • Absolute Monocytes 04/29/2025 0.39  0.17 - 1.22 Thousand/µL Final   • Eosinophils Absolute 04/29/2025 0.16  0.00 - 0.61 Thousand/µL Final   • Basophils Absolute 04/29/2025 0.04  0.00 - 0.10 Thousands/µL Final   • Sodium 04/29/2025 141  135 - 147 mmol/L Final   • Potassium 04/29/2025 4.3  3.5 - 5.3 mmol/L Final   • Chloride 04/29/2025 104  96 - 108 mmol/L Final   • CO2 04/29/2025 28  21 - 32 mmol/L Final   • ANION GAP 04/29/2025 9  4 - 13 mmol/L Final   • BUN 04/29/2025 29 (H)  5 - 25 mg/dL Final   • Creatinine 04/29/2025 1.10  0.60 - 1.30 mg/dL Final    Standardized to IDMS reference method   • Glucose, Fasting 04/29/2025 101 (H)  65 - 99 mg/dL Final   • Calcium 04/29/2025 9.7  8.4 - 10.2 mg/dL Final   • AST 04/29/2025 12 (L)  13 - 39 U/L Final   • ALT 04/29/2025 7  7 - 52 U/L Final    Specimen collection should occur prior to Sulfasalazine administration due to the potential for falsely depressed results.    • Alkaline Phosphatase 04/29/2025 58  34 - 104 U/L Final   • Total Protein 04/29/2025 6.3 (L)  6.4 - 8.4 g/dL Final   • Albumin 04/29/2025 4.0  3.5 - 5.0 g/dL Final   • Total Bilirubin 04/29/2025 0.82  0.20 - 1.00 mg/dL Final    Use of this assay is not recommended for patients undergoing treatment with eltrombopag due to the potential for falsely elevated results.  N-acetyl-p-benzoquinone imine (metabolite of Acetaminophen) will generate erroneously low results in samples for patients that have taken an overdose of Acetaminophen.    • eGFR 04/29/2025 45  ml/min/1.73sq m Final   • Hemoglobin A1C 04/29/2025 6.9 (H)  Normal 4.0-5.6%; PreDiabetic 5.7-6.4%; Diabetic >=6.5%; Glycemic control for adults with diabetes <7.0% % Final   • EAG 04/29/2025 151  mg/dl Final   • Cholesterol 04/29/2025 142  See Comment mg/dL Final    Cholesterol:         Pediatric <18 Years        Desirable          <170 mg/dL      Borderline High    170-199 mg/dL      High               >=200 mg/dL        Adult >=18 Years            Desirable         <200 mg/dL      Borderline High   200-239 mg/dL      High              >239 mg/dL     • Triglycerides 04/29/2025 90  See Comment mg/dL Final    Triglyceride:     0-9Y            <75mg/dL     10Y-17Y         <90 mg/dL       >=18Y     Normal          <150 mg/dL     Borderline High 150-199 mg/dL     High            200-499 mg/dL        Very High       >499 mg/dL    Specimen collection should occur prior to Metamizole administration due to the potential for falsely depressed results.   • HDL, Direct 04/29/2025 55  >=50 mg/dL Final   • LDL Calculated 04/29/2025 69  0 - 100 mg/dL Final    LDL Cholesterol:     Optimal           <100 mg/dl     Near Optimal      100-129 mg/dl     Above Optimal       Borderline High 130-159 mg/dl       High            160-189 mg/dl       Very High       >189 mg/dl         This screening LDL is a calculated result.   It does not have the accuracy of the Direct Measured LDL in the monitoring of patients with hyperlipidemia and/or statin therapy.   Direct Measure LDL (WHK735) must be ordered separately in these patients.   • Non-HDL-Chol (CHOL-HDL) 04/29/2025 87  mg/dl Final   • TSH 3RD GENERATON 04/29/2025 1.138  0.450 - 4.500 uIU/mL Final    The recommended reference ranges for TSH during pregnancy are as follows:   First trimester 0.100 to 2.500 uIU/mL   Second trimester  0.200 to 3.000 uIU/mL   Third trimester 0.300 to 3.000 uIU/m    Note: Normal ranges may not apply to patients who are transgender,  non-binary, or whose legal sex, sex at birth, and gender identity differ.  Adult TSH (3rd generation) reference range follows the recommended guidelines of the American Thyroid Association, January, 2020.   Orders Only on 12/17/2024   Component Date Value Ref Range Status   • Urine Culture Result 12/17/2024  (A)   Final    Comment:     CULTURE, URINE, ROUTINE         Micro Number:      [id number]    Test Status:       Final    Specimen Source:   Urine    Specimen Quality:  Adequate    Result:            50,000-100,000 CFU/mL of Escherichia coli      Comment:           A portion of the results were performed at [facility].                              E.coli                            ----------------                            INT   COOPER     AMIKACIN               S     4     AMOX/CLAVULANATE       S     8     AMP/SULBACTAM          I     16     CEFAZOLIN              NR    <=4 **2     CEFEPIME               S     <=0.12     CEFTAZIDIME            S     <=1     CEFTRIAXONE            S     <=0.25     CIPROFLOXACIN          I     0.5     GENTAMICIN             S     <=1     IMIPENEM               S     <=0.25     LEVOFLOXACIN           I     1     MEROPENEM              S     <=0.25     NITROFURANTOIN         S     <=16     PIP/TAZOBACTAM         S     <=4     TRIMETHOPRIM/SULFA     S     <=20    S = Susceptible  I = Inter                           mediate  R = Resistant  NS = Not susceptible  SDD = Susceptible Dose Dependent  * = Not Tested  NR = Not Reported  **NN = See Therapy Comments      THERAPY COMMENTS        Note 1:      For infections other than uncomplicated UTI      caused by E. coli, K. pneumoniae or P. mirabilis:      Cefazolin is resistant if COOPER > or = 8 mcg/mL.      (Distinguishing susceptible versus intermediate      for isolates with COOPER < or = 4 mcg/mL requires      additional testing.)        Note 2:      For uncomplicated UTI caused by E. coli,      K. pneumoniae or P. mirabilis: Cefazolin is       susceptible if COOPER <32 mcg/mL and predicts      susceptible to the oral agents cefaclor, cefdinir,      cefpodoxime, cefprozil, cefuroxime, cephalexin      and loracarbef.     Office Visit on 12/17/2024   Component Date Value Ref Range Status   • LEUKOCYTE ESTERASE,UA 12/17/2024 75   Final   • NITRITE,UA 12/17/2024 neg   Final   • SL AMB POCT UROBILINOGEN 12/17/2024 norm   Final   • POCT URINE PROTEIN 12/17/2024 neg   Final   •  PH,UA 12/17/2024 5   Final   • BLOOD,UA 12/17/2024 50   Final   • SPECIFIC GRAVITY,UA 12/17/2024 1.020   Final   • KETONES,UA 12/17/2024 neg   Final   • BILIRUBIN,UA 12/17/2024 neg   Final   • GLUCOSE, UA 12/17/2024 50   Final   •  COLOR,UA 12/17/2024 yellow   Final   • CLARITY,UA 12/17/2024 cloudy   Final   Appointment on 12/12/2024   Component Date Value Ref Range Status   • Creatinine, Ur 12/12/2024 58.6  Reference range not established. mg/dL Final   • Albumin,U,Random 12/12/2024 37.9 (H)  <20.0 mg/L Final   • Albumin Creat Ratio 12/12/2024 65 (H)  0 - 30 mg/g creatinine Final   • WBC 12/12/2024 4.79  4.31 - 10.16 Thousand/uL Final   • RBC 12/12/2024 3.46 (L)  3.81 - 5.12 Million/uL Final   • Hemoglobin 12/12/2024 10.1 (L)  11.5 - 15.4 g/dL Final   • Hematocrit 12/12/2024 32.0 (L)  34.8 - 46.1 % Final   • MCV 12/12/2024 93  82 - 98 fL Final   • MCH 12/12/2024 29.2  26.8 - 34.3 pg Final   • MCHC 12/12/2024 31.6  31.4 - 37.4 g/dL Final   • RDW 12/12/2024 13.9  11.6 - 15.1 % Final   • MPV 12/12/2024 10.8  8.9 - 12.7 fL Final   • Platelets 12/12/2024 194  149 - 390 Thousands/uL Final   • nRBC 12/12/2024 0  /100 WBCs Final   • Segmented % 12/12/2024 61  43 - 75 % Final   • Immature Grans % 12/12/2024 0  0 - 2 % Final   • Lymphocytes % 12/12/2024 28  14 - 44 % Final   • Monocytes % 12/12/2024 7  4 - 12 % Final   • Eosinophils Relative 12/12/2024 3  0 - 6 % Final   • Basophils Relative 12/12/2024 1  0 - 1 % Final   • Absolute Neutrophils 12/12/2024 2.98  1.85 - 7.62 Thousands/µL Final   •  Absolute Immature Grans 12/12/2024 0.02  0.00 - 0.20 Thousand/uL Final   • Absolute Lymphocytes 12/12/2024 1.32  0.60 - 4.47 Thousands/µL Final   • Absolute Monocytes 12/12/2024 0.32  0.17 - 1.22 Thousand/µL Final   • Eosinophils Absolute 12/12/2024 0.12  0.00 - 0.61 Thousand/µL Final   • Basophils Absolute 12/12/2024 0.03  0.00 - 0.10 Thousands/µL Final   • Sodium 12/12/2024 139  135 - 147 mmol/L Final   • Potassium 12/12/2024 4.1  3.5 - 5.3 mmol/L Final   • Chloride 12/12/2024 106  96 - 108 mmol/L Final   • CO2 12/12/2024 27  21 - 32 mmol/L Final   • ANION GAP 12/12/2024 6  4 - 13 mmol/L Final   • BUN 12/12/2024 26 (H)  5 - 25 mg/dL Final   • Creatinine 12/12/2024 1.06  0.60 - 1.30 mg/dL Final    Standardized to IDMS reference method   • Glucose, Fasting 12/12/2024 92  65 - 99 mg/dL Final   • Calcium 12/12/2024 9.3  8.4 - 10.2 mg/dL Final   • AST 12/12/2024 11 (L)  13 - 39 U/L Final   • ALT 12/12/2024 7  7 - 52 U/L Final    Specimen collection should occur prior to Sulfasalazine administration due to the potential for falsely depressed results.    • Alkaline Phosphatase 12/12/2024 55  34 - 104 U/L Final   • Total Protein 12/12/2024 6.2 (L)  6.4 - 8.4 g/dL Final   • Albumin 12/12/2024 3.7  3.5 - 5.0 g/dL Final   • Total Bilirubin 12/12/2024 0.77  0.20 - 1.00 mg/dL Final    Use of this assay is not recommended for patients undergoing treatment with eltrombopag due to the potential for falsely elevated results.  N-acetyl-p-benzoquinone imine (metabolite of Acetaminophen) will generate erroneously low results in samples for patients that have taken an overdose of Acetaminophen.   • eGFR 12/12/2024 47  ml/min/1.73sq m Final   • Hemoglobin A1C 12/12/2024 6.2 (H)  Normal 4.0-5.6%; PreDiabetic 5.7-6.4%; Diabetic >=6.5%; Glycemic control for adults with diabetes <7.0% % Final   • EAG 12/12/2024 131  mg/dl Final   • Cholesterol 12/12/2024 128  See Comment mg/dL Final    Cholesterol:         Pediatric <18 Years         Desirable          <170 mg/dL      Borderline High    170-199 mg/dL      High               >=200 mg/dL        Adult >=18 Years            Desirable         <200 mg/dL      Borderline High   200-239 mg/dL      High              >239 mg/dL     • Triglycerides 12/12/2024 91  See Comment mg/dL Final    Triglyceride:     0-9Y            <75mg/dL     10Y-17Y         <90 mg/dL       >=18Y     Normal          <150 mg/dL     Borderline High 150-199 mg/dL     High            200-499 mg/dL        Very High       >499 mg/dL    Specimen collection should occur prior to Metamizole administration due to the potential for falsely depressed results.   • HDL, Direct 12/12/2024 55  >=50 mg/dL Final   • LDL Calculated 12/12/2024 55  0 - 100 mg/dL Final    LDL Cholesterol:     Optimal           <100 mg/dl     Near Optimal      100-129 mg/dl     Above Optimal       Borderline High 130-159 mg/dl       High            160-189 mg/dl       Very High       >189 mg/dl         This screening LDL is a calculated result.   It does not have the accuracy of the Direct Measured LDL in the monitoring of patients with hyperlipidemia and/or statin therapy.   Direct Measure LDL (ZSI109) must be ordered separately in these patients.   • Non-HDL-Chol (CHOL-HDL) 12/12/2024 73  mg/dl Final   • TSH 3RD GENERATON 12/12/2024 1.083  0.450 - 4.500 uIU/mL Final    The recommended reference ranges for TSH during pregnancy are as follows:   First trimester 0.100 to 2.500 uIU/mL   Second trimester  0.200 to 3.000 uIU/mL   Third trimester 0.300 to 3.000 uIU/m    Note: Normal ranges may not apply to patients who are transgender, non-binary, or whose legal sex, sex at birth, and gender identity differ.  Adult TSH (3rd generation) reference range follows the recommended guidelines of the American Thyroid Association, January, 2020.   • Vitamin B-12 12/12/2024 495  180 - 914 pg/mL Final   • Folate 12/12/2024 16.1  >5.9 ng/mL Final    The World Health Organization has  determined deficient folate concentrations are considered to be <4.0 ng/mL.             ___________________________________    History Review: The following portions of the patient's history were reviewed and updated as appropriate: allergies, current medications, past family history, past medical history, past social history, past surgical history, and problem list.    Unchanged information from this writer's previous assessment is copied and italicized; information that has changed is bolded.    Past Psychiatric History:      Past psychiatric diagnoses:   Depression, anxiety  Inpatient psychiatric admissions:   8/2024 2013 following a surgery, sounded like extreme anxiety  Prior outpatient psychiatric treatment:   Started seeing a psychiatrist when first   Past/current psychotherapy:   active  History of suicidal attempts/gestures:   denies  Psychotropic medication trials:   Venlafaxine, sertraline, fluoxetine, escitalopram, aripiprazole (dizziness), risperidone, clonazepam, buspirone, gabapentin        Substance Abuse History:     Denies all history of substance use     Family Psychiatric History:      Family History             Family History   Problem Relation Age of Onset   • Depression Mother           w/anxiety   • Diabetes Mother           Mellitus   • Breast cancer Mother     • Hypertension Mother     • No Known Problems Father     • Depression Sister           w/anxiety   • Heart disease Sister           Cardiac Disorder   • Breast cancer Sister     • Hypertension Sister     • Diabetes Brother           Mellitus   • Alcohol abuse Son                    Social History:     Developmental: nothing of note  Education: Bachelor degree  Marital history:  in 2006  Children: 2  Living arrangement, social support: as above, has been with daughter for 17 years  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  ___________________________________      Visit Time    Visit Start Time: 1045  Visit Stop Time: 1200  Total Visit Duration: 75 minutes    López Gutierrez MD   25         [1]  Past Medical History:  Diagnosis Date   • Anxiety    • Colon polyps    • Depression    • Diabetes mellitus (HCC)    • Dizziness     Last assessed: 8/31/15   • DVT (deep venous thrombosis) (HCC)    • Dysuria    • Hematuria 2022   • Hyperlipidemia    • Hypertension    • Hypertensive urgency 10/22/2021   • Insomnia    • Panic attack    • Ureterolithiasis 2020   [2]  Past Surgical History:  Procedure Laterality Date   •  SECTION      1964 son, 1968 daughter   • COLONOSCOPY     • FL RETROGRADE PYELOGRAM  2020   • TX COLONOSCOPY FLX DX W/COLLJ SPEC WHEN PFRMD N/A 3/27/2017    Procedure: COLONOSCOPY;  Surgeon: Gold Francis MD;  Location: [facility];  Service: Gastroenterology   • TX CYSTO/URETERO W/LITHOTRIPSY &INDWELL STENT INSRT Right 2020    Procedure: CYSTOSCOPY URETEROSCOPY WITH LITHOTRIPSY HOLMIUM LASER, RETROGRADE PYELOGRAM AND INSERTION STENT URETERAL; EXCISION OF BLADDER TUMOR;  Surgeon: Edwin Love MD;  Location: [facility];  Service: Urology   • ROTATOR CUFF REPAIR Left     Last assessed: 3/29/15   • TONSILLECTOMY     [3]  No Known Allergies

## 2025-06-04 ENCOUNTER — TELEPHONE (OUTPATIENT)
Age: 85
End: 2025-06-04

## 2025-06-04 NOTE — TELEPHONE ENCOUNTER
Left message for patient's daughter, Janay, to call back if she wishes to reschedule 6/9/25 appointment which was cancelled via [employer] for patient's 6 month follow up.

## 2025-06-06 ENCOUNTER — TELEPHONE (OUTPATIENT)
Dept: PSYCHIATRY | Facility: CLINIC | Age: 85
End: 2025-06-06

## 2025-06-06 ENCOUNTER — TELEPHONE (OUTPATIENT)
Age: 85
End: 2025-06-06

## 2025-06-06 NOTE — TELEPHONE ENCOUNTER
Janay was transferred to this writer from the call center. She said Sandra just transitioned from ativan to xanax. Janay likes to give the last dose of xanax around bedtime because it helps her relax and calm down before bed. Sandra is so used to taking a third dose of ativan at dinner time that she feels anxious without medication. Janay suspects it could be placebo because she thinks she needs the medication to feel ok. I suggested shifting her doses a couple hours ahead so the last dose can be given closer to bedtime and then redirecting Sandra if she begins to feel anxious. Suggested distraction and coping skills. Janay agrees with this and will redirect/distract Sandra in the morning for a couple hours before giving a xanax so that the last dose is around bedtime. Next follow up is on 6/13. Nothing further needed at this time.

## 2025-06-06 NOTE — TELEPHONE ENCOUNTER
The patient's daughter contacted the office with a medication question. The writer transferred the call to the nurses for assistance.

## 2025-06-09 ENCOUNTER — TELEPHONE (OUTPATIENT)
Age: 85
End: 2025-06-09

## 2025-06-09 DIAGNOSIS — F45.21 ILLNESS ANXIETY DISORDER: Primary | ICD-10-CM

## 2025-06-09 RX ORDER — LORAZEPAM 0.5 MG/1
0.5 TABLET ORAL 3 TIMES DAILY
Qty: 90 TABLET | Refills: 0 | Status: SHIPPED | OUTPATIENT
Start: 2025-06-09 | End: 2025-06-13

## 2025-06-09 NOTE — TELEPHONE ENCOUNTER
Called Janay back and informed her that provider sent in a script for her mom's Ativan.  Informed her that provider is asking that she return the xanax to the pharmacy to be disposed of. Janay said that she will do that.  When she goes to  the Ativan, she will take back the xanax.

## 2025-06-09 NOTE — TELEPHONE ENCOUNTER
"Janay called to discuss Ativan vs Xanax.  Reports that although she asked to have Sandra switched to Xanax, she feels that it is not working the way she thought it would.  While it kicks in a lot faster, it also wears off faster.  She also feels that her mom is more lethargic on it.  While she thought the Ativan was not working well, since switching, she realized it was.  The Ativan kept her mom at a more \"even mood\".  Therefore, yesterday she started her mother back on the Ativan. She will run out of Ativan prior to the next office visit so she will need a refill.  She is not going to use the Xanax for her mom anymore.  She is aware the insurance may not pay for the Ativan since xanax was recently filled so she is willing to pay out of pocket.    Will refer to Dr Gutierrez for review.   "

## 2025-06-09 NOTE — TELEPHONE ENCOUNTER
Discontinue alprazolam and reordered lorazepam 0.5 mg TID. Last dose should be at bedtime. Ask to dispose of alprazolam at pharmacy.

## 2025-06-13 ENCOUNTER — TELEMEDICINE (OUTPATIENT)
Dept: PSYCHIATRY | Facility: CLINIC | Age: 85
End: 2025-06-13

## 2025-06-13 DIAGNOSIS — F33.41 MAJOR DEPRESSIVE DISORDER, RECURRENT EPISODE, IN PARTIAL REMISSION (HCC): ICD-10-CM

## 2025-06-13 DIAGNOSIS — F02.80 ALZHEIMER'S DISEASE (HCC): ICD-10-CM

## 2025-06-13 DIAGNOSIS — F02.80 MAJOR NEUROCOGNITIVE DISORDER DUE TO ALZHEIMER'S DISEASE, WITHOUT BEHAVIORAL DISTURBANCE (HCC): ICD-10-CM

## 2025-06-13 DIAGNOSIS — G30.9 ALZHEIMER'S DISEASE (HCC): ICD-10-CM

## 2025-06-13 DIAGNOSIS — F45.21 ILLNESS ANXIETY DISORDER: Primary | ICD-10-CM

## 2025-06-13 DIAGNOSIS — F02.80 MAJOR NEUROCOGNITIVE DISORDER DUE TO MULTIPLE ETIOLOGIES WITHOUT BEHAVIORAL DISTURBANCE (HCC): ICD-10-CM

## 2025-06-13 DIAGNOSIS — G30.9 MAJOR NEUROCOGNITIVE DISORDER DUE TO ALZHEIMER'S DISEASE, WITHOUT BEHAVIORAL DISTURBANCE (HCC): ICD-10-CM

## 2025-06-13 DIAGNOSIS — F01.50 VASCULAR DEMENTIA, UNCOMPLICATED (HCC): ICD-10-CM

## 2025-06-13 PROCEDURE — PBNCHG PB NO CHARGE PLACEHOLDER

## 2025-06-13 RX ORDER — BUPROPION HYDROCHLORIDE 150 MG/1
150 TABLET ORAL EVERY MORNING
Qty: 30 TABLET | Refills: 1 | Status: SHIPPED | OUTPATIENT
Start: 2025-06-13

## 2025-06-13 RX ORDER — LORAZEPAM 0.5 MG/1
0.5 TABLET ORAL 4 TIMES DAILY
Qty: 120 TABLET | Refills: 0 | Status: SHIPPED | OUTPATIENT
Start: 2025-06-13

## 2025-06-13 RX ORDER — BUPROPION HYDROCHLORIDE 75 MG/1
75 TABLET ORAL EVERY MORNING
Qty: 30 TABLET | Refills: 1 | Status: SHIPPED | OUTPATIENT
Start: 2025-06-13

## 2025-06-13 NOTE — ASSESSMENT & PLAN NOTE
Increase bupropion to 225 mg QD for antidepressant augmentation  Continue escitalopram 10 mg QD for anxiety, depressive, and obsessive symptoms  Increase lorazepam to 0.5 mg QID for agitation  Decrease risperidone to 0.25 mg QHS for two weeks then discontinue  Orders:  •  LORazepam (Ativan) 0.5 mg tablet; Take 1 tablet (0.5 mg total) by mouth 4 (four) times a day

## 2025-06-13 NOTE — PSYCH
MEDICATION MANAGEMENT NOTE    Name: Sandra Peña      : 1940      MRN: [id number]  Encounter Provider: López Gutierrez MD  Encounter Date: 2025   Encounter department: [facility]    Insurance: Payor: MEDICARE / Plan: MEDICARE A AND B / Product Type: Medicare A & B Fee for Service /      Reason for Visit:   Chief Complaint   Patient presents with   • Virtual Regular Visit     :      Administrative Statements   Administrative Statements   Encounter provider López Gutierrez MD    The Patient is located at Home and in the following state in which I hold an active license [place].    The patient was identified by name and date of birth. Sandra Peña was informed that this is a telemedicine visit and that the visit is being conducted through the Epic Embedded platform. She agrees to proceed..  My office door was closed. No one else was in the room.  She acknowledged consent and understanding of privacy and security of the video platform. The patient has agreed to participate and understands they can discontinue the visit at any time.    I have spent a total time of 45 minutes in caring for this patient on the day of the visit/encounter including Counseling / Coordination of care, not including the time spent for establishing the audio/video connection.    MEDICATION MANAGEMENT NOTE        [facility]      Name and Date of Birth:  Sandra Peña 84 y.o. 1940 MRN: [id number]    Date of Visit: 2025    Reason for Visit: Follow-up visit regarding medication management     _____________________________    Assessment & Plan  Illness anxiety disorder  Increase bupropion to 225 mg QD for antidepressant augmentation  Continue escitalopram 10 mg QD for anxiety, depressive, and obsessive symptoms  Increase lorazepam to 0.5 mg QID for agitation  Decrease risperidone to 0.25 mg QHS for two weeks then  discontinue  Orders:  •  LORazepam (Ativan) 0.5 mg tablet; Take 1 tablet (0.5 mg total) by mouth 4 (four) times a day    Major neurocognitive disorder due to multiple etiologies, moderate, 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)    Orders:  •  buPROPion (Wellbutrin XL) 150 mg 24 hr tablet; Take 1 tablet (150 mg total) by mouth every morning Take with 75 mg for 225 mg  •  buPROPion (WELLBUTRIN) 75 mg tablet; Take 1 tablet (75 mg total) by mouth every morning Take with 150 mg for 225 mg    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, moderate, without behavioral disturbance    Orders:  •  buPROPion (Wellbutrin XL) 150 mg 24 hr tablet; Take 1 tablet (150 mg total) by mouth every morning Take with 75 mg for 225 mg  •  buPROPion (WELLBUTRIN) 75 mg tablet; Take 1 tablet (75 mg total) by mouth every morning Take with 150 mg for 225 mg    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, moderate, 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)    Orders:  •  buPROPion (Wellbutrin XL) 150 mg 24 hr tablet; Take 1 tablet (150 mg total) by mouth every morning Take with 75 mg for 225 mg  •  buPROPion (WELLBUTRIN) 75 mg tablet; Take 1 tablet (75 mg total) by mouth every morning Take with 150 mg for 225 mg    Major depressive disorder, recurrent episode, in partial remission (HCC)    Orders:  •  buPROPion (Wellbutrin XL) 150 mg 24 hr tablet; Take 1 tablet (150 mg total) by mouth every morning Take with 75 mg for 225 mg  •  buPROPion (WELLBUTRIN) 75 mg tablet; Take 1 tablet (75 mg total) by mouth every morning Take with 150 mg for 225 mg    Alzheimer's disease (HCC)    Orders:  •  buPROPion (Wellbutrin XL) 150 mg 24 hr tablet; Take 1 tablet (150 mg total) by mouth every morning Take with 75 mg for 225 mg  •  buPROPion (WELLBUTRIN) 75 mg tablet; Take 1 tablet (75 mg total) by mouth every morning Take with 150 mg for 225 mg           The patient and daughter report continued struggle with obsessive thought  "patterns. Overall it is believed that the introduction of bupropion has been effective for mood and so a small increase can be trialled. Will continue taper of risperidone. Dose of lorazepam at bedtime has been helpful.     Treatment Recommendations/Precautions:  Risks/benefits/alternatives to treatment discussed, including a myriad of potential adverse medication side effects, to which Sandra voiced understanding and consented fully to treatment.   Labs most recently obtained , reviewed.   Follow up in 2 weeks for medication management  Follow up with PCP for medical issues and ongoing care  Aware of 24 hour and weekend coverage for urgent situations accessed by calling [facility] main practice number      Treatment Plan:     Completed and signed during the session: Not applicable - Treatment Plan not due at this session    _____________________________________________    History of Present Illness     Chief Complaint: \"she said she'd be back\"    SUBJECTIVE:    Patient reports anxiety around daughter's leaving the house. Anxiety about taking incorrect, or being given incorrect - medication. Nighttime awakening, though possibly better with medication.        Psychiatric Review Of Systems:  Unchanged information from this writer's previous assessment is copied and italicized; information that has changed is bolded.    Appetite: no  Adverse eating: no  Weight changes: no  Insomnia/sleeplessness: yes  Fatigue/anergy: no  Anhedonia/lack of interest: no  Attention/concentration: no  Psychomotor agitation/retardation: no  Somatic symptoms: yes  Anxiety/panic attack: yes  Zaira/hypomania: no  Hopelessness/helplessness/worthlessness: no  Self-injurious behavior/high-risk behavior: no  Suicidal ideation: no  Homicidal ideation: no  Auditory hallucinations: no  Visual hallucinations: no  Delusional thinking: no  Obsessive/compulsive symptoms: yes    Review Of Systems:      Constitutional negative   ENT " "negative   Cardiovascular negative   Respiratory negative   Gastrointestinal negative   Genitourinary negative   Musculoskeletal negative   Integumentary negative   Neurological negative   Endocrine negative   Other Symptoms none, all other systems are negative     Objective    OBJECTIVE:     Visit Vitals  OB Status Postmenopausal   Smoking Status Never      Wt Readings from Last 6 Encounters:   05/01/25 53.5 kg (118 lb)   03/03/25 53.1 kg (117 lb)   02/05/25 53.5 kg (118 lb)   12/17/24 53.1 kg (117 lb)   11/18/24 49.9 kg (110 lb)   10/17/24 49.9 kg (110 lb 0.2 oz)        Past Medical History[1]   Past Surgical History[2]    Meds/Allergies    Allergies[3]  Current Outpatient Medications   Medication Instructions   • aspirin 81 mg, Daily   • atorvastatin (LIPITOR) 10 mg, Oral, Daily   • buPROPion (WELLBUTRIN XL) 150 mg, Oral, Every morning, Take with 75 mg for 225 mg   • buPROPion (WELLBUTRIN) 75 mg, Oral, Every morning, Take with 150 mg for 225 mg   • Cholecalciferol (VITAMIN D3) 2,000 Units, Oral, Daily   • escitalopram (LEXAPRO) 10 mg, Oral, Daily   • linaCLOtide 145 mcg, Oral, Daily   • lisinopril-hydrochlorothiazide (PRINZIDE,ZESTORETIC) 10-12.5 MG per tablet 1 tablet, Oral, Daily   • LORazepam (ATIVAN) 0.5 mg, Oral, 4 times daily   • metFORMIN (GLUCOPHAGE) 500 mg, Oral, 2 times daily with meals   • risperiDONE (RISPERDAL) 0.25 mg, Oral, Daily at bedtime   • senna-docusate sodium (SENOKOT S) 8.6-50 mg per tablet 1 tablet, Oral, Daily PRN           Mental Status Exam:    Appearance Floral shirt, short curly gray hair, good eye contact   Behavior/motor Cooperative, seated comfortably   Speech/language Halting/stutter and slight reduced rate, possible increased latency, normal volume   Mood \"ok\"   Affect Tearful at times, anxious at times, full range and appropriate   Thought process Logical, ruminating   Thought content Obsessive thinking  No overt delusions, Denies hallucinations, Denies suicidal ideations "   Cognition Awake and alert, oriented and memory grossly intact, and concentrates on questions   Insight/judgment Insight: fair  Judgment: fair     Laboratory Results: I have personally reviewed all pertinent laboratory/tests results    Appointment on 04/29/2025   Component Date Value Ref Range Status   • Creatinine, Ur 04/29/2025 90.6  Reference range not established. mg/dL Final   • Albumin,U,Random 04/29/2025 19.8  <20.0 mg/L Final   • Albumin Creat Ratio 04/29/2025 22  0 - 30 mg/g creatinine Final   • WBC 04/29/2025 5.78  4.31 - 10.16 Thousand/uL Final   • RBC 04/29/2025 3.94  3.81 - 5.12 Million/uL Final   • Hemoglobin 04/29/2025 11.3 (L)  11.5 - 15.4 g/dL Final   • Hematocrit 04/29/2025 35.8  34.8 - 46.1 % Final   • MCV 04/29/2025 91  82 - 98 fL Final   • MCH 04/29/2025 28.7  26.8 - 34.3 pg Final   • MCHC 04/29/2025 31.6  31.4 - 37.4 g/dL Final   • RDW 04/29/2025 13.6  11.6 - 15.1 % Final   • MPV 04/29/2025 10.7  8.9 - 12.7 fL Final   • Platelets 04/29/2025 183  149 - 390 Thousands/uL Final   • nRBC 04/29/2025 0  /100 WBCs Final   • Segmented % 04/29/2025 61  43 - 75 % Final   • Immature Grans % 04/29/2025 0  0 - 2 % Final   • Lymphocytes % 04/29/2025 28  14 - 44 % Final   • Monocytes % 04/29/2025 7  4 - 12 % Final   • Eosinophils Relative 04/29/2025 3  0 - 6 % Final   • Basophils Relative 04/29/2025 1  0 - 1 % Final   • Absolute Neutrophils 04/29/2025 3.56  1.85 - 7.62 Thousands/µL Final   • Absolute Immature Grans 04/29/2025 0.02  0.00 - 0.20 Thousand/uL Final   • Absolute Lymphocytes 04/29/2025 1.61  0.60 - 4.47 Thousands/µL Final   • Absolute Monocytes 04/29/2025 0.39  0.17 - 1.22 Thousand/µL Final   • Eosinophils Absolute 04/29/2025 0.16  0.00 - 0.61 Thousand/µL Final   • Basophils Absolute 04/29/2025 0.04  0.00 - 0.10 Thousands/µL Final   • Sodium 04/29/2025 141  135 - 147 mmol/L Final   • Potassium 04/29/2025 4.3  3.5 - 5.3 mmol/L Final   • Chloride 04/29/2025 104  96 - 108 mmol/L Final   • CO2  04/29/2025 28  21 - 32 mmol/L Final   • ANION GAP 04/29/2025 9  4 - 13 mmol/L Final   • BUN 04/29/2025 29 (H)  5 - 25 mg/dL Final   • Creatinine 04/29/2025 1.10  0.60 - 1.30 mg/dL Final    Standardized to IDMS reference method   • Glucose, Fasting 04/29/2025 101 (H)  65 - 99 mg/dL Final   • Calcium 04/29/2025 9.7  8.4 - 10.2 mg/dL Final   • AST 04/29/2025 12 (L)  13 - 39 U/L Final   • ALT 04/29/2025 7  7 - 52 U/L Final    Specimen collection should occur prior to Sulfasalazine administration due to the potential for falsely depressed results.    • Alkaline Phosphatase 04/29/2025 58  34 - 104 U/L Final   • Total Protein 04/29/2025 6.3 (L)  6.4 - 8.4 g/dL Final   • Albumin 04/29/2025 4.0  3.5 - 5.0 g/dL Final   • Total Bilirubin 04/29/2025 0.82  0.20 - 1.00 mg/dL Final    Use of this assay is not recommended for patients undergoing treatment with eltrombopag due to the potential for falsely elevated results.  N-acetyl-p-benzoquinone imine (metabolite of Acetaminophen) will generate erroneously low results in samples for patients that have taken an overdose of Acetaminophen.   • eGFR 04/29/2025 45  ml/min/1.73sq m Final   • Hemoglobin A1C 04/29/2025 6.9 (H)  Normal 4.0-5.6%; PreDiabetic 5.7-6.4%; Diabetic >=6.5%; Glycemic control for adults with diabetes <7.0% % Final   • EAG 04/29/2025 151  mg/dl Final   • Cholesterol 04/29/2025 142  See Comment mg/dL Final    Cholesterol:         Pediatric <18 Years        Desirable          <170 mg/dL      Borderline High    170-199 mg/dL      High               >=200 mg/dL        Adult >=18 Years            Desirable         <200 mg/dL      Borderline High   200-239 mg/dL      High              >239 mg/dL     • Triglycerides 04/29/2025 90  See Comment mg/dL Final    Triglyceride:     0-9Y            <75mg/dL     10Y-17Y         <90 mg/dL       >=18Y     Normal          <150 mg/dL     Borderline High 150-199 mg/dL     High            200-499 mg/dL        Very High       >499  mg/dL    Specimen collection should occur prior to Metamizole administration due to the potential for falsely depressed results.   • HDL, Direct 04/29/2025 55  >=50 mg/dL Final   • LDL Calculated 04/29/2025 69  0 - 100 mg/dL Final    LDL Cholesterol:     Optimal           <100 mg/dl     Near Optimal      100-129 mg/dl     Above Optimal       Borderline High 130-159 mg/dl       High            160-189 mg/dl       Very High       >189 mg/dl         This screening LDL is a calculated result.   It does not have the accuracy of the Direct Measured LDL in the monitoring of patients with hyperlipidemia and/or statin therapy.   Direct Measure LDL (ZYR204) must be ordered separately in these patients.   • Non-HDL-Chol (CHOL-HDL) 04/29/2025 87  mg/dl Final   • TSH 3RD GENERATION 04/29/2025 1.138  0.450 - 4.500 uIU/mL Final    The recommended reference ranges for TSH during pregnancy are as follows:   First trimester 0.100 to 2.500 uIU/mL   Second trimester  0.200 to 3.000 uIU/mL   Third trimester 0.300 to 3.000 uIU/m    Note: Normal ranges may not apply to patients who are transgender, non-binary, or whose legal sex, sex at birth, and gender identity differ.  Adult TSH (3rd generation) reference range follows the recommended guidelines of the American Thy[employer] Association, January, 2020.   Orders Only on 12/17/2024   Component Date Value Ref Range Status   • Urine Culture Result 12/17/2024  (A)   Final    Comment:     CULTURE, URINE, ROUTINE         Micro Number:      [id number]    Test Status:       Final    Specimen Source:   Urine    Specimen Quality:  Adequate    Result:            50,000-100,000 CFU/mL of Escherichia coli      Comment:           A portion of the results were performed at [facility].                              E.coli                            ----------------                            INT   COOPER     AMIKACIN               S     4     AMOX/CLAVULANATE       S     8     AMP/SULBACTAM          I     16      CEFAZOLIN              NR    <=4 **2     CEFEPIME               S     <=0.12     CEFTAZIDIME            S     <=1     CEFTRIAXONE            S     <=0.25     CIPROFLOXACIN          I     0.5     GENTAMICIN             S     <=1     IMIPENEM               S     <=0.25     LEVOFLOXACIN           I     1     MEROPENEM              S     <=0.25     NITROFURANTOIN         S     <=16     PIP/TAZOBACTAM         S     <=4     TRIMETHOPRIM/SULFA     S     <=20    S = Susceptible  I = Inter                           mediate  R = Resistant  NS = Not susceptible  SDD = Susceptible Dose Dependent  * = Not Tested  NR = Not Reported  **NN = See Therapy Comments      THERAPY COMMENTS        Note 1:      For infections other than uncomplicated UTI      caused by E. coli, K. pneumoniae or P. mirabilis:      Cefazolin is resistant if COOPER > or = 8 mcg/mL.      (Distinguishing susceptible versus intermediate      for isolates with COOPER < or = 4 mcg/mL requires      additional testing.)        Note 2:      For uncomplicated UTI caused by E. coli,      K. pneumoniae or P. mirabilis: Cefazolin is      susceptible if COOPER <32 mcg/mL and predicts      susceptible to the oral agents cefaclor, cefdinir,      cefpodoxime, cefprozil, cefuroxime, cephalexin      and loracarbef.     Office Visit on 12/17/2024   Component Date Value Ref Range Status   • LEUKOCYTE ESTERASE,UA 12/17/2024 75   Final   • NITRITE,UA 12/17/2024 neg   Final   • SL AMB POCT UROBILINOGEN 12/17/2024 norm   Final   • POCT URINE PROTEIN 12/17/2024 neg   Final   •  PH,UA 12/17/2024 5   Final   • BLOOD,UA 12/17/2024 50   Final   • SPECIFIC GRAVITY,UA 12/17/2024 1.020   Final   • KETONES,UA 12/17/2024 neg   Final   • BILIRUBIN,UA 12/17/2024 neg   Final   • GLUCOSE, UA 12/17/2024 50   Final   •  COLOR,UA 12/17/2024 yellow   Final   • CLARITY,UA 12/17/2024 cloudy   Final             ___________________________________    History Review: The following portions of the patient's history  were reviewed and updated as appropriate: allergies, current medications, past family history, past medical history, past social history, past surgical history, and problem list.    Unchanged information from this writer's previous assessment is copied and italicized; information that has changed is bolded.    Past Psychiatric History:      Past psychiatric diagnoses:   Depression, anxiety  Inpatient psychiatric admissions:   8/2024 2013 following a surgery, sounded like extreme anxiety  Prior outpatient psychiatric treatment:   Started seeing a psychiatrist when first   Past/current psychotherapy:   active  History of suicidal attempts/gestures:   denies  Psychotropic medication trials:   Venlafaxine, sertraline, fluoxetine, escitalopram, aripiprazole (dizziness), risperidone, clonazepam, buspirone, gabapentin        Substance Abuse History:     Denies all history of substance use     Family Psychiatric History:      Family History             Family History   Problem Relation Age of Onset   • Depression Mother           w/anxiety   • Diabetes Mother           Mellitus   • Breast cancer Mother     • Hypertension Mother     • No Known Problems Father     • Depression Sister           w/anxiety   • Heart disease Sister           Cardiac Disorder   • Breast cancer Sister     • Hypertension Sister     • Diabetes Brother           Mellitus   • Alcohol abuse Son                    Social History:     Developmental: nothing of note  Education: Bachelor degree  Marital history:  in 2006  Children: 2  Living arrangement, social support: as above, has been with daughter for 17 years  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  ___________________________________      Visit Time    Visit Start Time: 0815  Visit Stop Time: 0900  Total Visit Duration: 45 minutes    López Gutierrez MD   06/13/25          [1]  Past Medical History:  Diagnosis Date   • Anxiety    • Colon polyps    • Depression    • Diabetes mellitus (HCC)    • Dizziness     Last assessed: 8/31/15   • DVT (deep venous thrombosis) (HCC)    • Dysuria    • Hematuria 2022   • Hyperlipidemia    • Hypertension    • Hypertensive urgency 10/22/2021   • Insomnia    • Panic attack    • Ureterolithiasis 2020   [2]  Past Surgical History:  Procedure Laterality Date   •  SECTION      1964 son, 1968 daughter   • COLONOSCOPY     • FL RETROGRADE PYELOGRAM  2020   • MI COLONOSCOPY FLX DX W/COLLJ SPEC WHEN PFRMD N/A 3/27/2017    Procedure: COLONOSCOPY;  Surgeon: Gold Francis MD;  Location: [facility];  Service: Gastroenterology   • MI CYSTO/URETERO W/LITHOTRIPSY &INDWELL STENT INSRT Right 2020    Procedure: CYSTOSCOPY URETEROSCOPY WITH LITHOTRIPSY HOLMIUM LASER, RETROGRADE PYELOGRAM AND INSERTION STENT URETERAL; EXCISION OF BLADDER TUMOR;  Surgeon: Edwin Love MD;  Location: [facility];  Service: Urology   • ROTATOR CUFF REPAIR Left     Last assessed: 3/29/15   • TONSILLECTOMY     [3]  No Known Allergies

## 2025-06-13 NOTE — ASSESSMENT & PLAN NOTE
Orders:  •  buPROPion (Wellbutrin XL) 150 mg 24 hr tablet; Take 1 tablet (150 mg total) by mouth every morning Take with 75 mg for 225 mg  •  buPROPion (WELLBUTRIN) 75 mg tablet; Take 1 tablet (75 mg total) by mouth every morning Take with 150 mg for 225 mg

## 2025-06-13 NOTE — PSYCH
Psychotherapy Note - Progress note        [facility]    Virtual Visit Disclaimer & Required Documentation  TeleMed provider: López Gutierrez MD, located at [facility], [address] [place], [address]. The patient is also located in [place] which is the state in which I hold an active license.    The patient was identified by name and date of birth. Sandra Hernandezcarlos ala was informed that this is a telemedicine visit and that the visit is being conducted through [employer] and patient was informed this is a secure, HIPAA-complaint platform. She agrees to proceed. My office door was closed. No one else was in the room. She acknowledged consent and understanding of privacy and security of the video platform. The patient has agreed to participate and understands they can discontinue the visit at any time.    Sandra Peña verbally agrees to participate in Virtual Care Services. Pt is aware that Virtual Care Services could be limited without vital signs or the ability to perform a full hands-on physical exam. The patient understands she or the provider may request at any time to terminate the video visit and request the patient to seek care or treatment in person. Patient is aware this is a billable service.        Psychotherapy Provided: Individual Psychotherapy     1. 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, moderate, 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)    4. Major depressive disorder, recurrent episode, in partial remission (HCC)    5. Illness anxiety disorder    6. Alzheimer's disease (HCC)        Goals addressed in session: Goal 1     DATA: Met with  Sandra Peña for scheduled individual  session. Topics of discussion included:medication, dynamic with daughter, expression of emotions    During this session, this clinician used the following therapeutic modalities: supportive psychotherapy, client-centered therapy,  Motivational Interviewing and solution-focused therapy.         ASSESSMENT: Sandra Peña presents with a anxious mood. Her affect is Tearful, appropriate.  Sandra exhibits good therapeutic rapport with this clinician. Sandra continues to exhibit willingness to work on treatment goals and objectives.  Sandra Peña presents with a none risk of suicide, none risk of self-harm, and none risk of harm to others.          PLAN:  Sandra Peña will return in approximately2 weeks for the next scheduled session.  She will report back during the next session re: successes and barriers. At the next session, this clinician will use supportive psychotherapy, client-centered therapy, mindfulness-based strategies, Motivational Interviewing and solution-focused therapy to address mood regulation and relationship concerns, in an effort to assist  Sandra with meeting treatment goals.      Behavioral Health Treatment Plan [facility]'s: Diagnosis and Treatment Plan explained to  Sandra Peña, relates understanding diagnosis and is agreeable to Treatment Plan. Yes      This note was not shared with the patient due to this is a psychotherapy note     Visit start and stop times: 4773-5657     López Gutierrez MD 06/13/25

## 2025-06-15 DIAGNOSIS — F02.80 MAJOR NEUROCOGNITIVE DISORDER DUE TO ALZHEIMER'S DISEASE, WITHOUT BEHAVIORAL DISTURBANCE (HCC): ICD-10-CM

## 2025-06-15 DIAGNOSIS — G30.9 MAJOR NEUROCOGNITIVE DISORDER DUE TO ALZHEIMER'S DISEASE, WITHOUT BEHAVIORAL DISTURBANCE (HCC): ICD-10-CM

## 2025-06-15 DIAGNOSIS — G30.9 ALZHEIMER'S DISEASE (HCC): ICD-10-CM

## 2025-06-15 DIAGNOSIS — F33.41 MAJOR DEPRESSIVE DISORDER, RECURRENT EPISODE, IN PARTIAL REMISSION (HCC): ICD-10-CM

## 2025-06-15 DIAGNOSIS — F01.50 VASCULAR DEMENTIA, UNCOMPLICATED (HCC): ICD-10-CM

## 2025-06-15 DIAGNOSIS — F02.80 ALZHEIMER'S DISEASE (HCC): ICD-10-CM

## 2025-06-15 DIAGNOSIS — F02.80 MAJOR NEUROCOGNITIVE DISORDER DUE TO MULTIPLE ETIOLOGIES WITHOUT BEHAVIORAL DISTURBANCE (HCC): ICD-10-CM

## 2025-06-15 DIAGNOSIS — F45.21 ILLNESS ANXIETY DISORDER: ICD-10-CM

## 2025-06-16 RX ORDER — RISPERIDONE 0.25 MG/1
0.25 TABLET ORAL
Qty: 30 TABLET | Refills: 2 | OUTPATIENT
Start: 2025-06-16

## 2025-06-25 ENCOUNTER — TELEPHONE (OUTPATIENT)
Age: 85
End: 2025-06-25

## 2025-06-25 NOTE — TELEPHONE ENCOUNTER
Patient's daughter, Janay Mckeon, called in to inquire about patient's medication. Patient's daughter stated that patient's Wellbutrin dosage was recently increased by 75mg (was originally at 150mg). Patient reportedly began to experiencing dizziness after the medication dosage increase, though emotional symptoms appeared to improve.    Due to patient's increased dizziness, patient's daughter cut back on patient's medication dosage by 75mg (back to original 150mg dosage). As a result, patient's dizziness has subsided, though patient is now reportedly constantly crying and experiencing outbursts.    Patient's daughter is looking for guidance until patient sees Dr. Matt mendez on 7/11/2025.    Writer attempted to transfer patient's daughter to nursing but was unsuccessful.    When available, please contact patient's daughter, Janay Mckeon, at [phone] to discuss.     Writer confirmed patient's daughter is on completed consent form.

## 2025-06-26 NOTE — TELEPHONE ENCOUNTER
Returned Janay's call.   Janay reports that Sandra only took the extra 75 MG of Wellbutrin from about 6/14 - 6/23 and then she stopped it.  States she did not notice any significant improvement while she was on it.  Sandra's behaviors are fluctuating, she seems very down and is crying a lot.  Janay describes Sandra's outbursts as hysterical crying, hard to calm down.  States yesterday she was so bad, she had to give her a Xanax, and that helped.  Discussed cutting Wellbutrin in half and trying to give Sandra half of the 75 MG tablet in addition to the 150 MG for a total daily dose of 187.5 MG.  Janay is in agreement to try this and will start tomorrow.  Janay also reports that the scheduled Ativan helps somewhat but wants to know if it is possible to have a small dose of PRN Xanax also prescribed in addition to the Ativan.  States it will help when her mother has these outbursts.  She is requesting further provider recommendation as well as a sooner appointment.    Will refer to Dr Gutierrez for review.

## 2025-06-27 ENCOUNTER — TELEPHONE (OUTPATIENT)
Dept: PSYCHIATRY | Facility: CLINIC | Age: 85
End: 2025-06-27

## 2025-06-27 NOTE — TELEPHONE ENCOUNTER
Patient's daughter reports symptoms have been better today. Educated about not taking Xanax and Ativan. Will arrange to be scheduled for sooner appointment.

## 2025-06-30 NOTE — TELEPHONE ENCOUNTER
LM on Janay's VM informing her that after further review provider is not recommending the extra half of the 75 MG tablet due to previous increase not helping.  He is also not recommending that Maryana take Xanax as needed per her request.  He recommends that in a case like this, an extra Ativan be provided.  Provider has referred to scheduling to see about getting her a sooner appointment.  Requested she call the office if she has any questions.

## 2025-07-01 ENCOUNTER — OFFICE VISIT (OUTPATIENT)
Dept: PSYCHIATRY | Facility: CLINIC | Age: 85
End: 2025-07-01

## 2025-07-01 DIAGNOSIS — G30.9 MAJOR NEUROCOGNITIVE DISORDER DUE TO ALZHEIMER'S DISEASE, WITHOUT BEHAVIORAL DISTURBANCE (HCC): ICD-10-CM

## 2025-07-01 DIAGNOSIS — F02.80 MAJOR NEUROCOGNITIVE DISORDER DUE TO ALZHEIMER'S DISEASE, WITHOUT BEHAVIORAL DISTURBANCE (HCC): ICD-10-CM

## 2025-07-01 DIAGNOSIS — G30.9 ALZHEIMER'S DISEASE (HCC): ICD-10-CM

## 2025-07-01 DIAGNOSIS — F02.80 ALZHEIMER'S DISEASE (HCC): ICD-10-CM

## 2025-07-01 DIAGNOSIS — F45.21 ILLNESS ANXIETY DISORDER: ICD-10-CM

## 2025-07-01 DIAGNOSIS — F33.41 MAJOR DEPRESSIVE DISORDER, RECURRENT EPISODE, IN PARTIAL REMISSION (HCC): ICD-10-CM

## 2025-07-01 DIAGNOSIS — F02.80 MAJOR NEUROCOGNITIVE DISORDER DUE TO MULTIPLE ETIOLOGIES WITHOUT BEHAVIORAL DISTURBANCE (HCC): Primary | ICD-10-CM

## 2025-07-01 DIAGNOSIS — F01.50 VASCULAR DEMENTIA, UNCOMPLICATED (HCC): ICD-10-CM

## 2025-07-01 PROCEDURE — PBNCHG PB NO CHARGE PLACEHOLDER

## 2025-07-01 RX ORDER — LORAZEPAM 0.5 MG/1
0.5 TABLET ORAL 4 TIMES DAILY
Qty: 120 TABLET | Refills: 0 | Status: SHIPPED | OUTPATIENT
Start: 2025-07-01

## 2025-07-01 RX ORDER — BUPROPION HYDROCHLORIDE 75 MG/1
37.5 TABLET ORAL EVERY MORNING
Qty: 15 TABLET | Refills: 1 | Status: SHIPPED | OUTPATIENT
Start: 2025-07-01

## 2025-07-01 NOTE — PSYCH
MEDICATION MANAGEMENT NOTE        [facility] - [facility]      Name and Date of Birth:  Sandra Peña 84 y.o. 1940 MRN: [id number]    Date of Visit: July 1, 2025    Reason for Visit: Follow-up visit regarding medication management     _____________________________    Assessment & Plan  Major neurocognitive disorder due to multiple etiologies, moderate, 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)  Continue bupropion 187.5 mg QD for antidepressant augmentation  Continue escitalopram 10 mg QD for anxious and depressive symptoms  Continue lorazepam 0.5 mg QID for agitation  Discontinue risperidone  Orders:  •  buPROPion (WELLBUTRIN) 75 mg tablet; Take 0.5 tablets (37.5 mg total) by mouth every morning Take with 150 mg for 187.5 mg    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, moderate, without behavioral disturbance    Orders:  •  buPROPion (WELLBUTRIN) 75 mg tablet; Take 0.5 tablets (37.5 mg total) by mouth every morning Take with 150 mg for 187.5 mg    Alzheimer's disease (HCC)    Orders:  •  buPROPion (WELLBUTRIN) 75 mg tablet; Take 0.5 tablets (37.5 mg total) by mouth every morning Take with 150 mg for 187.5 mg    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, moderate, 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)    Orders:  •  buPROPion (WELLBUTRIN) 75 mg tablet; Take 0.5 tablets (37.5 mg total) by mouth every morning Take with 150 mg for 187.5 mg    Illness anxiety disorder    Orders:  •  LORazepam (Ativan) 0.5 mg tablet; Take 1 tablet (0.5 mg total) by mouth 4 (four) times a day    Major depressive disorder, recurrent episode, in partial remission (HCC)    Orders:  •  buPROPion (WELLBUTRIN) 75 mg tablet; Take 0.5 tablets (37.5 mg total) by mouth every morning Take with 150 mg for 187.5 mg         The past week the patient has been stable on medication. Previous disturbance due to accidental stopping of bupropion for 3-4 days. Stable now on 150  "mg + half of 75 tablet. Tolerating lorazepam at current dose. At time of interview the patient was ambulating steadily without assistance, was not sedated or showing signs of over-sedation. She continues to desire to be and is engaged in multiple activities throughout the day.    Will complete taper of risperidone and discontinue. Patient complaining of dry mouth, which she has chronically historically. They deny urinary abnormalities. Bowel movements are regular with use of senna-docusate sodium and laxatives as needed. The patient and her daughter report that despite efforts, the patient will not drink enough water throughout the day. The importance of this continues to be reiterated.        Treatment Recommendations/Precautions:  Risks/benefits/alternatives to treatment discussed, including a myriad of potential adverse medication side effects, to which Sandra voiced understanding and consented fully to treatment.   Labs most recently obtained , reviewed.   Follow up in 4 weeks for medication management  Follow up with PCP for medical issues and ongoing care  Aware of 24 hour and weekend coverage for urgent situations accessed by calling [facility] main practice number        _____________________________________________    History of Present Illness     Chief Complaint: \"sometimes I cry\"    SUBJECTIVE:    Patient's daughter reports likely regression noted last week due to accidentally missed doses of medication (2-4) days, bupropion. Since returning to regularity of medication the patient has been better. They report mood is less sad and she is more engaged in activities with family, though she continues to have some lability and crying. Complains of dry mouth.            Psychiatric Review Of Systems:  Unchanged information from this writer's previous assessment is copied and italicized; information that has changed is bolded.    Appetite: no  Adverse eating: no  Weight changes: " no  Insomnia/sleeplessness: yes  Fatigue/anergy: no  Anhedonia/lack of interest: no  Attention/concentration: no  Psychomotor agitation/retardation: no  Somatic symptoms: yes  Anxiety/panic attack: yes  Zaira/hypomania: no  Hopelessness/helplessness/worthlessness: no  Self-injurious behavior/high-risk behavior: no  Suicidal ideation: no  Homicidal ideation: no  Auditory hallucinations: no  Visual hallucinations: no  Delusional thinking: no  Obsessive/compulsive symptoms: yes    Review Of Systems:      Constitutional negative   ENT negative   Cardiovascular negative   Respiratory negative   Gastrointestinal negative   Genitourinary negative   Musculoskeletal negative   Integumentary negative   Neurological negative   Endocrine negative   Other Symptoms none, all other systems are negative     Objective    OBJECTIVE:     Visit Vitals  OB Status Postmenopausal   Smoking Status Never      Wt Readings from Last 6 Encounters:   05/01/25 53.5 kg (118 lb)   03/03/25 53.1 kg (117 lb)   02/05/25 53.5 kg (118 lb)   12/17/24 53.1 kg (117 lb)   11/18/24 49.9 kg (110 lb)   10/17/24 49.9 kg (110 lb 0.2 oz)        Past Medical History[1]   Past Surgical History[2]    Meds/Allergies    Allergies[3]  Current Outpatient Medications   Medication Instructions   • aspirin 81 mg, Daily   • atorvastatin (LIPITOR) 10 mg, Oral, Daily   • buPROPion (WELLBUTRIN XL) 150 mg, Oral, Every morning, Take with 75 mg for 225 mg   • buPROPion (WELLBUTRIN) 37.5 mg, Oral, Every morning, Take with 150 mg for 187.5 mg   • Cholecalciferol (VITAMIN D3) 2,000 Units, Oral, Daily   • escitalopram (LEXAPRO) 10 mg, Oral, Daily   • linaCLOtide 145 mcg, Oral, Daily   • lisinopril-hydrochlorothiazide (PRINZIDE,ZESTORETIC) 10-12.5 MG per tablet 1 tablet, Oral, Daily   • LORazepam (ATIVAN) 0.5 mg, Oral, 4 times daily   • metFORMIN (GLUCOPHAGE) 500 mg, Oral, 2 times daily with meals   • senna-docusate sodium (SENOKOT S) 8.6-50 mg per tablet 1 tablet, Oral, Daily PRN     "       Mental Status Exam:    Appearance Petite, grey curly hair, white and red flower sweater, good eye contact   Behavior/motor Ambulating steadily at pace appropriate for age without assistance,   cooperative   Speech/language Normal volume and rate, halting at times, word-finding difficulty at times, spontaneous   Mood \"ok\"   Affect Full range, tearful at times, mostly appropriate   Thought process Logical and linear   Thought content Ruminating and obsessive thoughts  No overt delusions, Denies hallucinations, Denies suicidal ideations   Cognition Word-finding difficulty  Awake and alert, oriented and memory grossly intact, and concentrates on questions   Insight/judgment Insight: good  Judgment: good     Laboratory Results: I have personally reviewed all pertinent laboratory/tests results    Appointment on 04/29/2025   Component Date Value Ref Range Status   • Creatinine, Ur 04/29/2025 90.6  Reference range not established. mg/dL Final   • Albumin,U,Random 04/29/2025 19.8  <20.0 mg/L Final   • Albumin Creat Ratio 04/29/2025 22  0 - 30 mg/g creatinine Final   • WBC 04/29/2025 5.78  4.31 - 10.16 Thousand/uL Final   • RBC 04/29/2025 3.94  3.81 - 5.12 Million/uL Final   • Hemoglobin 04/29/2025 11.3 (L)  11.5 - 15.4 g/dL Final   • Hematocrit 04/29/2025 35.8  34.8 - 46.1 % Final   • MCV 04/29/2025 91  82 - 98 fL Final   • MCH 04/29/2025 28.7  26.8 - 34.3 pg Final   • MCHC 04/29/2025 31.6  31.4 - 37.4 g/dL Final   • RDW 04/29/2025 13.6  11.6 - 15.1 % Final   • MPV 04/29/2025 10.7  8.9 - 12.7 fL Final   • Platelets 04/29/2025 183  149 - 390 Thousands/uL Final   • nRBC 04/29/2025 0  /100 WBCs Final   • Segmented % 04/29/2025 61  43 - 75 % Final   • Immature Grans % 04/29/2025 0  0 - 2 % Final   • Lymphocytes % 04/29/2025 28  14 - 44 % Final   • Monocytes % 04/29/2025 7  4 - 12 % Final   • Eosinophils Relative 04/29/2025 3  0 - 6 % Final   • Basophils Relative 04/29/2025 1  0 - 1 % Final   • Absolute Neutrophils " 04/29/2025 3.56  1.85 - 7.62 Thousands/µL Final   • Absolute Immature Grans 04/29/2025 0.02  0.00 - 0.20 Thousand/uL Final   • Absolute Lymphocytes 04/29/2025 1.61  0.60 - 4.47 Thousands/µL Final   • Absolute Monocytes 04/29/2025 0.39  0.17 - 1.22 Thousand/µL Final   • Eosinophils Absolute 04/29/2025 0.16  0.00 - 0.61 Thousand/µL Final   • Basophils Absolute 04/29/2025 0.04  0.00 - 0.10 Thousands/µL Final   • Sodium 04/29/2025 141  135 - 147 mmol/L Final   • Potassium 04/29/2025 4.3  3.5 - 5.3 mmol/L Final   • Chloride 04/29/2025 104  96 - 108 mmol/L Final   • CO2 04/29/2025 28  21 - 32 mmol/L Final   • ANION GAP 04/29/2025 9  4 - 13 mmol/L Final   • BUN 04/29/2025 29 (H)  5 - 25 mg/dL Final   • Creatinine 04/29/2025 1.10  0.60 - 1.30 mg/dL Final    Standardized to IDMS reference method   • Glucose, Fasting 04/29/2025 101 (H)  65 - 99 mg/dL Final   • Calcium 04/29/2025 9.7  8.4 - 10.2 mg/dL Final   • AST 04/29/2025 12 (L)  13 - 39 U/L Final   • ALT 04/29/2025 7  7 - 52 U/L Final    Specimen collection should occur prior to Sulfasalazine administration due to the potential for falsely depressed results.    • Alkaline Phosphatase 04/29/2025 58  34 - 104 U/L Final   • Total Protein 04/29/2025 6.3 (L)  6.4 - 8.4 g/dL Final   • Albumin 04/29/2025 4.0  3.5 - 5.0 g/dL Final   • Total Bilirubin 04/29/2025 0.82  0.20 - 1.00 mg/dL Final    Use of this assay is not recommended for patients undergoing treatment with eltrombopag due to the potential for falsely elevated results.  N-acetyl-p-benzoquinone imine (metabolite of Acetaminophen) will generate erroneously low results in samples for patients that have taken an overdose of Acetaminophen.   • eGFR 04/29/2025 45  ml/min/1.73sq m Final   • Hemoglobin A1C 04/29/2025 6.9 (H)  Normal 4.0-5.6%; PreDiabetic 5.7-6.4%; Diabetic >=6.5%; Glycemic control for adults with diabetes <7.0% % Final   • EAG 04/29/2025 151  mg/dl Final   • Cholesterol 04/29/2025 142  See Comment mg/dL Final     Cholesterol:         Pediatric <18 Years        Desirable          <170 mg/dL      Borderline High    170-199 mg/dL      High               >=200 mg/dL        Adult >=18 Years            Desirable         <200 mg/dL      Borderline High   200-239 mg/dL      High              >239 mg/dL     • Triglycerides 04/29/2025 90  See Comment mg/dL Final    Triglyceride:     0-9Y            <75mg/dL     10Y-17Y         <90 mg/dL       >=18Y     Normal          <150 mg/dL     Borderline High 150-199 mg/dL     High            200-499 mg/dL        Very High       >499 mg/dL    Specimen collection should occur prior to Metamizole administration due to the potential for falsely depressed results.   • HDL, Direct 04/29/2025 55  >=50 mg/dL Final   • LDL Calculated 04/29/2025 69  0 - 100 mg/dL Final    LDL Cholesterol:     Optimal           <100 mg/dl     Near Optimal      100-129 mg/dl     Above Optimal       Borderline High 130-159 mg/dl       High            160-189 mg/dl       Very High       >189 mg/dl         This screening LDL is a calculated result.   It does not have the accuracy of the Direct Measured LDL in the monitoring of patients with hyperlipidemia and/or statin therapy.   Direct Measure LDL (GBX826) must be ordered separately in these patients.   • Non-HDL-Chol (CHOL-HDL) 04/29/2025 87  mg/dl Final   • TSH 3RD GENERATION 04/29/2025 1.138  0.450 - 4.500 uIU/mL Final    The recommended reference ranges for TSH during pregnancy are as follows:   First trimester 0.100 to 2.500 uIU/mL   Second trimester  0.200 to 3.000 uIU/mL   Third trimester 0.300 to 3.000 uIU/m    Note: Normal ranges may not apply to patients who are transgender, non-binary, or whose legal sex, sex at birth, and gender identity differ.  Adult TSH (3rd generation) reference range follows the recommended guidelines of the American Thyroid Association, January, 2020.             ___________________________________    History Review: The following  portions of the patient's history were reviewed and updated as appropriate: allergies, current medications, past family history, past medical history, past social history, past surgical history, and problem list.    Unchanged information from this writer's previous assessment is copied and italicized; information that has changed is bolded.    Past Psychiatric History:      Past psychiatric diagnoses:   Depression, anxiety  Inpatient psychiatric admissions:   8/2024 2013 following a surgery, sounded like extreme anxiety  Prior outpatient psychiatric treatment:   Started seeing a psychiatrist when first   Past/current psychotherapy:   active  History of suicidal attempts/gestures:   denies  Psychotropic medication trials:   Venlafaxine, sertraline, fluoxetine, escitalopram, aripiprazole (dizziness), risperidone, clonazepam, buspirone, gabapentin        Substance Abuse History:     Denies all history of substance use     Family Psychiatric History:      Family History             Family History   Problem Relation Age of Onset   • Depression Mother           w/anxiety   • Diabetes Mother           Mellitus   • Breast cancer Mother     • Hypertension Mother     • No Known Problems Father     • Depression Sister           w/anxiety   • Heart disease Sister           Cardiac Disorder   • Breast cancer Sister     • Hypertension Sister     • Diabetes Brother           Mellitus   • Alcohol abuse Son                    Social History:     Developmental: nothing of note  Education: Bachelor degree  Marital history:  in 2006  Children: 2  Living arrangement, social support: as above, has been with daughter for 17 years  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  ___________________________________      Visit Time    Visit Start Time: 1010  Visit Stop Time: 1110  Total Visit Duration: 60  minutes    López Ralph Gutierrez MD   23         [1]  Past Medical History:  Diagnosis Date   • Anxiety    • Colon polyps    • Depression    • Diabetes mellitus (HCC)    • Dizziness     Last assessed: 8/31/15   • DVT (deep venous thrombosis) (HCC)    • Dysuria    • Hematuria 2022   • Hyperlipidemia    • Hypertension    • Hypertensive urgency 10/22/2021   • Insomnia    • Panic attack    • Ureterolithiasis 2020   [2]  Past Surgical History:  Procedure Laterality Date   •  SECTION      1964 son, 1968 daughter   • COLONOSCOPY     • [place] RETROGRADE PYELOGRAM  2020   • [place] COLONOSCOPY FLX DX W/COLLJ SPEC WHEN PFRMD N/A 3/27/2017    Procedure: COLONOSCOPY;  Surgeon: Gold Francis MD;  Location: [facility];  Service: Gastroenterology   • [place] CYSTO/URETERO W/LITHOTRIPSY &INDWELL STENT INSRT Right 2020    Procedure: CYSTOSCOPY URETEROSCOPY WITH LITHOTRIPSY HOLMIUM LASER, RETROGRADE PYELOGRAM AND INSERTION STENT URETERAL; EXCISION OF BLADDER TUMOR;  Surgeon: Edwin Love MD;  Location: [facility];  Service: Urology   • ROTATOR CUFF REPAIR Left     Last assessed: 3/29/15   • TONSILLECTOMY     [3]  No Known Allergies

## 2025-07-01 NOTE — ASSESSMENT & PLAN NOTE
Orders:  •  buPROPion (WELLBUTRIN) 75 mg tablet; Take 0.5 tablets (37.5 mg total) by mouth every morning Take with 150 mg for 187.5 mg

## 2025-07-01 NOTE — ASSESSMENT & PLAN NOTE
Orders:  •  LORazepam (Ativan) 0.5 mg tablet; Take 1 tablet (0.5 mg total) by mouth 4 (four) times a day

## 2025-07-01 NOTE — ASSESSMENT & PLAN NOTE
Continue bupropion 187.5 mg QD for antidepressant augmentation  Continue escitalopram 10 mg QD for anxious and depressive symptoms  Continue lorazepam 0.5 mg QID for agitation  Discontinue risperidone  Orders:  •  buPROPion (WELLBUTRIN) 75 mg tablet; Take 0.5 tablets (37.5 mg total) by mouth every morning Take with 150 mg for 187.5 mg

## 2025-07-09 ENCOUNTER — TELEPHONE (OUTPATIENT)
Age: 85
End: 2025-07-09

## 2025-07-09 NOTE — TELEPHONE ENCOUNTER
Daughter of patient called in looking to inform the patients provider that at the last appointment it was   recommended that the patient stop the 2.5mg Risperidon that they were prescribed to take every night and daughter noticed that patient wasn't doing well for the few days they were off of it and was put back on.   Daughter would like patient to stay on it for now until they really have a chance to talk with the patients provider.

## 2025-07-11 ENCOUNTER — TELEMEDICINE (OUTPATIENT)
Dept: PSYCHIATRY | Facility: CLINIC | Age: 85
End: 2025-07-11

## 2025-07-11 DIAGNOSIS — G30.9 MAJOR NEUROCOGNITIVE DISORDER DUE TO ALZHEIMER'S DISEASE, WITHOUT BEHAVIORAL DISTURBANCE (HCC): ICD-10-CM

## 2025-07-11 DIAGNOSIS — F02.80 MAJOR NEUROCOGNITIVE DISORDER DUE TO MULTIPLE ETIOLOGIES WITHOUT BEHAVIORAL DISTURBANCE (HCC): ICD-10-CM

## 2025-07-11 DIAGNOSIS — G30.9 ALZHEIMER'S DISEASE (HCC): ICD-10-CM

## 2025-07-11 DIAGNOSIS — F33.41 MAJOR DEPRESSIVE DISORDER, RECURRENT EPISODE, IN PARTIAL REMISSION (HCC): Primary | ICD-10-CM

## 2025-07-11 DIAGNOSIS — F01.50 VASCULAR DEMENTIA, UNCOMPLICATED (HCC): ICD-10-CM

## 2025-07-11 DIAGNOSIS — F02.80 MAJOR NEUROCOGNITIVE DISORDER DUE TO ALZHEIMER'S DISEASE, WITHOUT BEHAVIORAL DISTURBANCE (HCC): ICD-10-CM

## 2025-07-11 DIAGNOSIS — F02.80 ALZHEIMER'S DISEASE (HCC): ICD-10-CM

## 2025-07-11 DIAGNOSIS — F45.21 ILLNESS ANXIETY DISORDER: ICD-10-CM

## 2025-07-11 PROCEDURE — PBNCHG PB NO CHARGE PLACEHOLDER

## 2025-07-11 NOTE — PSYCH
Psychotherapy Note - Progress note        [facility]    Virtual Visit Disclaimer & Required Documentation  TeleMed provider: López Gutierrez MD, located at [facility], [address] [place], [address]. The patient is also located in [place] which is the state in which I hold an active license.    The patient was identified by name and date of birth. Sandra Hernandezcarlos ala was informed that this is a telemedicine visit and that the visit is being conducted through [employer] and patient was informed this is a secure, HIPAA-complaint platform. She agrees to proceed. My office door was closed. No one else was in the room. She acknowledged consent and understanding of privacy and security of the video platform. The patient has agreed to participate and understands they can discontinue the visit at any time.    Sandra Peña verbally agrees to participate in Virtual Care Services. Pt is aware that Virtual Care Services could be limited without vital signs or the ability to perform a full hands-on physical exam. The patient understands she or the provider may request at any time to terminate the video visit and request the patient to seek care or treatment in person. Patient is aware this is a billable service.        Psychotherapy Provided: Individual Psychotherapy     1. Major depressive disorder, recurrent episode, in partial remission (HCC)    2. Illness anxiety disorder    3. 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 Major neurocognitive disorder due to multiple etiologies, moderate, 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)    5. Alzheimer's disease (HCC)    6. 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, moderate, 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)        Goals addressed in session: Goal 1     DATA: Met with  Sandra Peña for scheduled individual  session. Topics of discussion included:cognitive reframing, coping mechanisms    During this session, this clinician used the following therapeutic modalities: supportive psychotherapy, client-centered therapy, mindfulness-based strategies, Motivational Interviewing and solution-focused therapy.       ASSESSMENT: Sandra Peña presents with a euthymic mood. Her affect is Normal range and intensity, appropriate.  Sandra exhibits good therapeutic rapport with this clinician. Sandra continues to exhibit willingness to work on treatment goals and objectives.  Sandra Peña presents with a none risk of suicide, none risk of self-harm, and none risk of harm to others.          PLAN:  Sandra Peña will return in approximately2 weeks for the next scheduled session.  She will report back during the next session re: successes and barriers. At the next session, this clinician will use supportive psychotherapy, client-centered therapy, mindfulness-based strategies, Motivational Interviewing and solution-focused therapy to address mood regulation and relationship concerns, in an effort to assist  Sandra with meeting treatment goals.      Behavioral Health Treatment Plan [facility]'s: Diagnosis and Treatment Plan explained to  Sandra Peña, relates understanding diagnosis and is agreeable to Treatment Plan. Yes      This note was not shared with the patient due to this is a psychotherapy note     Visit start and stop times: 1144-4422     [facility] Ralph Gutierrez MD 07/11/25

## 2025-07-16 DIAGNOSIS — F33.41 MAJOR DEPRESSIVE DISORDER, RECURRENT EPISODE, IN PARTIAL REMISSION (HCC): ICD-10-CM

## 2025-07-16 DIAGNOSIS — F45.21 ILLNESS ANXIETY DISORDER: ICD-10-CM

## 2025-07-17 RX ORDER — RISPERIDONE 0.25 MG/1
TABLET ORAL
Qty: 15 TABLET | Refills: 0 | OUTPATIENT
Start: 2025-07-17

## 2025-07-20 DIAGNOSIS — F02.80 ALZHEIMER'S DISEASE (HCC): ICD-10-CM

## 2025-07-20 DIAGNOSIS — G30.9 ALZHEIMER'S DISEASE (HCC): ICD-10-CM

## 2025-07-20 DIAGNOSIS — E78.2 MIXED HYPERLIPIDEMIA: ICD-10-CM

## 2025-07-20 DIAGNOSIS — F45.21 ILLNESS ANXIETY DISORDER: ICD-10-CM

## 2025-07-20 DIAGNOSIS — G30.9 MAJOR NEUROCOGNITIVE DISORDER DUE TO ALZHEIMER'S DISEASE, WITHOUT BEHAVIORAL DISTURBANCE (HCC): ICD-10-CM

## 2025-07-20 DIAGNOSIS — F01.50 VASCULAR DEMENTIA, UNCOMPLICATED (HCC): ICD-10-CM

## 2025-07-20 DIAGNOSIS — F33.41 MAJOR DEPRESSIVE DISORDER, RECURRENT EPISODE, IN PARTIAL REMISSION (HCC): ICD-10-CM

## 2025-07-20 DIAGNOSIS — F02.80 MAJOR NEUROCOGNITIVE DISORDER DUE TO MULTIPLE ETIOLOGIES WITHOUT BEHAVIORAL DISTURBANCE (HCC): ICD-10-CM

## 2025-07-20 DIAGNOSIS — F02.80 MAJOR NEUROCOGNITIVE DISORDER DUE TO ALZHEIMER'S DISEASE, WITHOUT BEHAVIORAL DISTURBANCE (HCC): ICD-10-CM

## 2025-07-21 RX ORDER — ATORVASTATIN CALCIUM 10 MG/1
10 TABLET, FILM COATED ORAL DAILY
Qty: 90 TABLET | Refills: 1 | Status: SHIPPED | OUTPATIENT
Start: 2025-07-21

## 2025-07-22 RX ORDER — BUPROPION HYDROCHLORIDE 150 MG/1
150 TABLET ORAL DAILY
Qty: 30 TABLET | Refills: 1 | Status: SHIPPED | OUTPATIENT
Start: 2025-07-22

## 2025-07-22 RX ORDER — ESCITALOPRAM OXALATE 10 MG/1
10 TABLET ORAL DAILY
Qty: 30 TABLET | Refills: 1 | Status: SHIPPED | OUTPATIENT
Start: 2025-07-22

## 2025-07-25 ENCOUNTER — TELEPHONE (OUTPATIENT)
Dept: PSYCHIATRY | Facility: CLINIC | Age: 85
End: 2025-07-25

## 2025-07-25 ENCOUNTER — TELEMEDICINE (OUTPATIENT)
Dept: PSYCHIATRY | Facility: CLINIC | Age: 85
End: 2025-07-25

## 2025-07-25 DIAGNOSIS — G30.9 MAJOR NEUROCOGNITIVE DISORDER DUE TO ALZHEIMER'S DISEASE, WITHOUT BEHAVIORAL DISTURBANCE (HCC): ICD-10-CM

## 2025-07-25 DIAGNOSIS — F01.50 VASCULAR DEMENTIA, UNCOMPLICATED (HCC): ICD-10-CM

## 2025-07-25 DIAGNOSIS — F02.80 ALZHEIMER'S DISEASE (HCC): ICD-10-CM

## 2025-07-25 DIAGNOSIS — G30.9 ALZHEIMER'S DISEASE (HCC): ICD-10-CM

## 2025-07-25 DIAGNOSIS — F45.21 ILLNESS ANXIETY DISORDER: ICD-10-CM

## 2025-07-25 DIAGNOSIS — F02.80 MAJOR NEUROCOGNITIVE DISORDER DUE TO ALZHEIMER'S DISEASE, WITHOUT BEHAVIORAL DISTURBANCE (HCC): ICD-10-CM

## 2025-07-25 DIAGNOSIS — F02.80 MAJOR NEUROCOGNITIVE DISORDER DUE TO MULTIPLE ETIOLOGIES WITHOUT BEHAVIORAL DISTURBANCE (HCC): Primary | ICD-10-CM

## 2025-07-25 DIAGNOSIS — F02.80 MAJOR NEUROCOGNITIVE DISORDER DUE TO MULTIPLE ETIOLOGIES WITHOUT BEHAVIORAL DISTURBANCE (HCC): ICD-10-CM

## 2025-07-25 DIAGNOSIS — F33.41 MAJOR DEPRESSIVE DISORDER, RECURRENT EPISODE, IN PARTIAL REMISSION (HCC): ICD-10-CM

## 2025-07-25 PROCEDURE — PBNCHG PB NO CHARGE PLACEHOLDER

## 2025-07-25 RX ORDER — ALPRAZOLAM 0.25 MG
0.12 TABLET ORAL 4 TIMES DAILY
Qty: 30 TABLET | Refills: 0 | Status: SHIPPED | OUTPATIENT
Start: 2025-07-25

## 2025-07-25 RX ORDER — RISPERIDONE 0.25 MG/1
0.25 TABLET ORAL
Qty: 30 TABLET | Refills: 0 | Status: CANCELLED | OUTPATIENT
Start: 2025-07-25

## 2025-07-25 NOTE — ASSESSMENT & PLAN NOTE
Begin alprazolam 0.125 mg QID for agitation  Begin brexpiprazole 0.5 mg QHS for one week then increase to 1 mg QHS for agitation and depressive symptoms  Continue bupropion 187.5 mg QAM for antidepressant augmentation  Continue escitalopram 10 mg for depressive, anxiety, and obsessive symptoms  Discontinue lorazepam  Orders:  •  ALPRAZolam (XANAX) 0.25 mg tablet; Take 0.5 tablets (0.125 mg total) by mouth 4 (four) times a day  •  Brexpiprazole (REXULTI) 0.5 MG tablet; Take 1 tablet (0.5 mg total) by mouth daily at bedtime for 7 days, THEN 2 tablets (1 mg total) daily at bedtime for 7 days.

## 2025-07-25 NOTE — ASSESSMENT & PLAN NOTE
Orders:  •  ALPRAZolam (XANAX) 0.25 mg tablet; Take 0.5 tablets (0.125 mg total) by mouth 4 (four) times a day  •  Brexpiprazole (REXULTI) 0.5 MG tablet; Take 1 tablet (0.5 mg total) by mouth daily at bedtime for 7 days, THEN 2 tablets (1 mg total) daily at bedtime for 7 days.

## 2025-07-25 NOTE — TELEPHONE ENCOUNTER
----- Message from López Gutierrez MD sent at 7/25/2025  1:37 PM EDT -----  Regarding: instructions  Hello I saw this patient today and discussed some things. After reviewing some things I made changes to what we discussed so could someone please call and provide instruction. We will trial brexpiprazole now, instead of the risperidone discussed. The alprazolam dosing will be half of 0.25 tablet QID. Thank you. If PO intake remains poor, reiterate instruction to not hesitate to go to ED

## 2025-07-25 NOTE — PSYCH
MEDICATION MANAGEMENT NOTE    Name: Sandra Peña      : 1940      MRN: [id number]  Encounter Provider: López Gutierrez MD  Encounter Date: 2025   Encounter department: [facility]    Insurance: Payor: MEDICARE / Plan: MEDICARE A AND B / Product Type: Medicare A & B Fee for Service /      Reason for Visit:   Chief Complaint   Patient presents with   • Virtual Regular Visit   :    Administrative Statements   Administrative Statements   Encounter provider López Gutierrez MD    The Patient is located at Home and in the following state in which I hold an active license [place].    The patient was identified by name and date of birth. Sandra Peña was informed that this is a telemedicine visit and that the visit is being conducted through the Epic Embedded platform. She agrees to proceed..  My office door was closed. No one else was in the room.  She acknowledged consent and understanding of privacy and security of the video platform. The patient has agreed to participate and understands they can discontinue the visit at any time.    I have spent a total time of 40 minutes in caring for this patient on the day of the visit/encounter including Counseling / Coordination of care, not including the time spent for establishing the audio/video connection.        MEDICATION MANAGEMENT NOTE        [facility]      Name and Date of Birth:  Sandra Peña 84 y.o. 1940 MRN: [id number]    Date of Visit: 2025    Reason for Visit: Follow-up visit regarding medication management     _____________________________    Assessment & Plan  Assessment & Plan  Major neurocognitive disorder due to multiple etiologies, moderate, 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)  Begin alprazolam 0.125 mg QID for agitation  Begin brexpiprazole 0.5 mg QHS for one week then increase to 1 mg QHS for agitation and depressive symptoms  Continue  bupropion 187.5 mg QAM for antidepressant augmentation  Continue escitalopram 10 mg for depressive, anxiety, and obsessive symptoms  Discontinue lorazepam  Orders:  •  ALPRAZolam (XANAX) 0.25 mg tablet; Take 0.5 tablets (0.125 mg total) by mouth 4 (four) times a day  •  Brexpiprazole (REXULTI) 0.5 MG tablet; Take 1 tablet (0.5 mg total) by mouth daily at bedtime for 7 days, THEN 2 tablets (1 mg total) daily at bedtime for 7 days.    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    Alzheimer's disease (HCC)    Orders:  •  ALPRAZolam (XANAX) 0.25 mg tablet; Take 0.5 tablets (0.125 mg total) by mouth 4 (four) times a day  •  Brexpiprazole (REXULTI) 0.5 MG tablet; Take 1 tablet (0.5 mg total) by mouth daily at bedtime for 7 days, THEN 2 tablets (1 mg total) daily at bedtime for 7 days.    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, moderate, 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)    Orders:  •  ALPRAZolam (XANAX) 0.25 mg tablet; Take 0.5 tablets (0.125 mg total) by mouth 4 (four) times a day  •  Brexpiprazole (REXULTI) 0.5 MG tablet; Take 1 tablet (0.5 mg total) by mouth daily at bedtime for 7 days, THEN 2 tablets (1 mg total) daily at bedtime for 7 days.    Illness anxiety disorder    Orders:  •  ALPRAZolam (XANAX) 0.25 mg tablet; Take 0.5 tablets (0.125 mg total) by mouth 4 (four) times a day  •  Brexpiprazole (REXULTI) 0.5 MG tablet; Take 1 tablet (0.5 mg total) by mouth daily at bedtime for 7 days, THEN 2 tablets (1 mg total) daily at bedtime for 7 days.    Major depressive disorder, recurrent episode, in partial remission (HCC)    Orders:  •  ALPRAZolam (XANAX)  "0.25 mg tablet; Take 0.5 tablets (0.125 mg total) by mouth 4 (four) times a day  •  Brexpiprazole (REXULTI) 0.5 MG tablet; Take 1 tablet (0.5 mg total) by mouth daily at bedtime for 7 days, THEN 2 tablets (1 mg total) daily at bedtime for 7 days.                    Past few days worsening of symptoms. Bowel habits are regular. Denies dysuria, frequency or incontinence. Sleeping through night with 1-2 awakenings for urination. Though more fatigued during day. Mostly stable; one fall two weeks ago at night loss of balance going to bathroom, no injury. Reported symptoms, restlessness, feeling \"unsettled,\" persistent crying, fear and rumination, may be manifestation of agitation in the context of dementia. They do report that PO intake has decreased since these symptoms worsened the past few days and then experiencing nausea with medication. Preoccupied with receiving medication. Daughter perceived a possible correlation between stopping risperidone and gradual decline of patient since.     No obvious organic causes at this time including infection, constipation, pain, or delirium. Will continue to benefit from environmental modification and distraction. Will begin trial of brexpiprazole up to 1 mg and evaluate in two weeks and can increase to maximum of 2 mg. This may be beneficial given its use for major depression and symptoms of agitation in dementia. Will switch benzodiazepine to alprazolam given faster onset of action, converting to equivalent dose. Will continue close monitoring. Advised patient and daughter to go to the ED if PO intake worsens or emotional distress remains persistent. Weigh cost/benefit of inpatient treatment.    Treatment Recommendations/Precautions:  Risks/benefits/alternatives to treatment discussed, including a myriad of potential adverse medication side effects, to which Sandra voiced understanding and consented fully to treatment.   Labs most recently obtained , reviewed.   Follow up in 2 " "weeks for medication management  Follow up with PCP for medical issues and ongoing care  Aware of 24 hour and weekend coverage for urgent situations accessed by calling [facility] main practice number          _____________________________________________    History of Present Illness     Chief Complaint: \"I don't know what's wrong with me\"    SUBJECTIVE:    Discussed developments in symptoms and management with patient and her daughter.        Psychiatric Review Of Systems:  Unchanged information from this writer's previous assessment is copied and italicized; information that has changed is bolded.    Appetite: no  Adverse eating: no  Weight changes: no  Insomnia/sleeplessness: yes  Fatigue/anergy: no  Anhedonia/lack of interest: no  Attention/concentration: no  Psychomotor agitation/retardation: no  Somatic symptoms: yes  Anxiety/panic attack: yes  Zaira/hypomania: no  Hopelessness/helplessness/worthlessness: no  Self-injurious behavior/high-risk behavior: no  Suicidal ideation: no  Homicidal ideation: no  Auditory hallucinations: no  Visual hallucinations: no  Delusional thinking: no  Obsessive/compulsive symptoms: yes    Review Of Systems:      Constitutional negative   ENT negative   Cardiovascular negative   Respiratory negative   Gastrointestinal negative   Genitourinary negative   Musculoskeletal negative   Integumentary negative   Neurological negative   Endocrine negative   Other Symptoms none, all other systems are negative     Objective    OBJECTIVE:     Visit Vitals  OB Status Postmenopausal   Smoking Status Never      Wt Readings from Last 6 Encounters:   05/01/25 53.5 kg (118 lb)   03/03/25 53.1 kg (117 lb)   02/05/25 53.5 kg (118 lb)   12/17/24 53.1 kg (117 lb)   11/18/24 49.9 kg (110 lb)   10/17/24 49.9 kg (110 lb 0.2 oz)        Past Medical History[1]   Past Surgical History[1]    Meds/Allergies    Allergies[1]  Current Outpatient Medications   Medication Instructions   • " "ALPRAZolam (XANAX) 0.125 mg, Oral, 4 times daily   • aspirin 81 mg, Daily   • atorvastatin (LIPITOR) 10 mg, Oral, Daily   • Brexpiprazole (REXULTI) 0.5 MG tablet Take 1 tablet (0.5 mg total) by mouth daily at bedtime for 7 days, THEN 2 tablets (1 mg total) daily at bedtime for 7 days.   • buPROPion (WELLBUTRIN XL) 150 mg, Oral, Daily   • buPROPion (WELLBUTRIN) 37.5 mg, Oral, Every morning, Take with 150 mg for 187.5 mg   • Cholecalciferol (VITAMIN D3) 2,000 Units, Oral, Daily   • escitalopram (LEXAPRO) 10 mg, Oral, Daily   • linaCLOtide 145 mcg, Oral, Daily   • lisinopril-hydrochlorothiazide (PRINZIDE,ZESTORETIC) 10-12.5 MG per tablet 1 tablet, Oral, Daily   • metFORMIN (GLUCOPHAGE) 500 mg, Oral, 2 times daily with meals   • senna-docusate sodium (SENOKOT S) 8.6-50 mg per tablet 1 tablet, Oral, Daily PRN           Mental Status Exam:    Appearance White and red sweater, glasses, short curly gray hair, good eye contact   Behavior/motor Cooperative, tremulous   Speech/language Normal volume, reduced rate, can be halting, fluency is stable from previous   Mood \"Not too good\"   Affect Anxious, dysphoric, tearful   Thought process Ruminative, logical   Thought content obsessive  No overt delusions, Denies hallucinations, Denies suicidal ideations   Cognition Awake and alert, oriented and memory grossly intact, and concentrates on questions   Insight/judgment Insight: fair  Judgment: fair     Laboratory Results: I have personally reviewed all pertinent laboratory/tests results    Appointment on 04/29/2025   Component Date Value Ref Range Status   • Creatinine, Ur 04/29/2025 90.6  Reference range not established. mg/dL Final   • Albumin,U,Random 04/29/2025 19.8  <20.0 mg/L Final   • Albumin Creat Ratio 04/29/2025 22  0 - 30 mg/g creatinine Final   • WBC 04/29/2025 5.78  4.31 - 10.16 Thousand/uL Final   • RBC 04/29/2025 3.94  3.81 - 5.12 Million/uL Final   • Hemoglobin 04/29/2025 11.3 (L)  11.5 - 15.4 g/dL Final   • " Hematocrit 04/29/2025 35.8  34.8 - 46.1 % Final   • MCV 04/29/2025 91  82 - 98 fL Final   • MCH 04/29/2025 28.7  26.8 - 34.3 pg Final   • MCHC 04/29/2025 31.6  31.4 - 37.4 g/dL Final   • RDW 04/29/2025 13.6  11.6 - 15.1 % Final   • MPV 04/29/2025 10.7  8.9 - 12.7 fL Final   • Platelets 04/29/2025 183  149 - 390 Thousands/uL Final   • nRBC 04/29/2025 0  /100 WBCs Final   • Segmented % 04/29/2025 61  43 - 75 % Final   • Immature Grans % 04/29/2025 0  0 - 2 % Final   • Lymphocytes % 04/29/2025 28  14 - 44 % Final   • Monocytes % 04/29/2025 7  4 - 12 % Final   • Eosinophils Relative 04/29/2025 3  0 - 6 % Final   • Basophils Relative 04/29/2025 1  0 - 1 % Final   • Absolute Neutrophils 04/29/2025 3.56  1.85 - 7.62 Thousands/µL Final   • Absolute Immature Grans 04/29/2025 0.02  0.00 - 0.20 Thousand/uL Final   • Absolute Lymphocytes 04/29/2025 1.61  0.60 - 4.47 Thousands/µL Final   • Absolute Monocytes 04/29/2025 0.39  0.17 - 1.22 Thousand/µL Final   • Eosinophils Absolute 04/29/2025 0.16  0.00 - 0.61 Thousand/µL Final   • Basophils Absolute 04/29/2025 0.04  0.00 - 0.10 Thousands/µL Final   • Sodium 04/29/2025 141  135 - 147 mmol/L Final   • Potassium 04/29/2025 4.3  3.5 - 5.3 mmol/L Final   • Chloride 04/29/2025 104  96 - 108 mmol/L Final   • CO2 04/29/2025 28  21 - 32 mmol/L Final   • ANION GAP 04/29/2025 9  4 - 13 mmol/L Final   • BUN 04/29/2025 29 (H)  5 - 25 mg/dL Final   • Creatinine 04/29/2025 1.10  0.60 - 1.30 mg/dL Final    Standardized to IDMS reference method   • Glucose, Fasting 04/29/2025 101 (H)  65 - 99 mg/dL Final   • Calcium 04/29/2025 9.7  8.4 - 10.2 mg/dL Final   • AST 04/29/2025 12 (L)  13 - 39 U/L Final   • ALT 04/29/2025 7  7 - 52 U/L Final    Specimen collection should occur prior to Sulfasalazine administration due to the potential for falsely depressed results.    • Alkaline Phosphatase 04/29/2025 58  34 - 104 U/L Final   • Total Protein 04/29/2025 6.3 (L)  6.4 - 8.4 g/dL Final   • Albumin  04/29/2025 4.0  3.5 - 5.0 g/dL Final   • Total Bilirubin 04/29/2025 0.82  0.20 - 1.00 mg/dL Final    Use of this assay is not recommended for patients undergoing treatment with eltrombopag due to the potential for falsely elevated results.  N-acetyl-p-benzoquinone imine (metabolite of Acetaminophen) will generate erroneously low results in samples for patients that have taken an overdose of Acetaminophen.   • eGFR 04/29/2025 45  ml/min/1.73sq m Final   • Hemoglobin A1C 04/29/2025 6.9 (H)  Normal 4.0-5.6%; PreDiabetic 5.7-6.4%; Diabetic >=6.5%; Glycemic control for adults with diabetes <7.0% % Final   • EAG 04/29/2025 151  mg/dl Final   • Cholesterol 04/29/2025 142  See Comment mg/dL Final    Cholesterol:         Pediatric <18 Years        Desirable          <170 mg/dL      Borderline High    170-199 mg/dL      High               >=200 mg/dL        Adult >=18 Years            Desirable         <200 mg/dL      Borderline High   200-239 mg/dL      High              >239 mg/dL     • Triglycerides 04/29/2025 90  See Comment mg/dL Final    Triglyceride:     0-9Y            <75mg/dL     10Y-17Y         <90 mg/dL       >=18Y     Normal          <150 mg/dL     Borderline High 150-199 mg/dL     High            200-499 mg/dL        Very High       >499 mg/dL    Specimen collection should occur prior to Metamizole administration due to the potential for falsely depressed results.   • HDL, Direct 04/29/2025 55  >=50 mg/dL Final   • LDL Calculated 04/29/2025 69  0 - 100 mg/dL Final    LDL Cholesterol:     Optimal           <100 mg/dl     Near Optimal      100-129 mg/dl     Above Optimal       Borderline High 130-159 mg/dl       High            160-189 mg/dl       Very High       >189 mg/dl         This screening LDL is a calculated result.   It does not have the accuracy of the Direct Measured LDL in the monitoring of patients with hyperlipidemia and/or statin therapy.   Direct Measure LDL (BRU750) must be ordered separately in  these patients.   • Non-HDL-Chol (CHOL-HDL) 04/29/2025 87  mg/dl Final   • TSH 3RD GENERATION 04/29/2025 1.138  0.450 - 4.500 uIU/mL Final    The recommended reference ranges for TSH during pregnancy are as follows:   First trimester 0.100 to 2.500 uIU/mL   Second trimester  0.200 to 3.000 uIU/mL   Third trimester 0.300 to 3.000 uIU/m    Note: Normal ranges may not apply to patients who are transgender, non-binary, or whose legal sex, sex at birth, and gender identity differ.  Adult TSH (3rd generation) reference range follows the recommended guidelines of the American Thyroid Association, January, 2020.             ___________________________________    History Review: The following portions of the patient's history were reviewed and updated as appropriate: allergies, current medications, past family history, past medical history, past social history, past surgical history, and problem list.    Unchanged information from this writer's previous assessment is copied and italicized; information that has changed is bolded.    Past Psychiatric History:      Past psychiatric diagnoses:   Depression, anxiety  Inpatient psychiatric admissions:   8/2024 2013 following a surgery, sounded like extreme anxiety  Prior outpatient psychiatric treatment:   Started seeing a psychiatrist when first   Past/current psychotherapy:   active  History of suicidal attempts/gestures:   denies  Psychotropic medication trials:   Venlafaxine, sertraline, fluoxetine, escitalopram, aripiprazole (dizziness), risperidone, clonazepam, buspirone, gabapentin        Substance Abuse History:     Denies all history of substance use     Family Psychiatric History:      Family History             Family History   Problem Relation Age of Onset   • Depression Mother           w/anxiety   • Diabetes Mother           Mellitus   • Breast cancer Mother     • Hypertension Mother     • No Known Problems Father     • Depression Sister           w/anxiety    • Heart disease Sister           Cardiac Disorder   • Breast cancer Sister     • Hypertension Sister     • Diabetes Brother           Mellitus   • Alcohol abuse Son                    Social History:     Developmental: nothing of note  Education: Bachelor degree  Marital history:  in 2006  Children: 2  Living arrangement, social support: as above, has been with daughter for 17 years  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  ___________________________________      Visit Time    Visit Start Time: 1100  Visit Stop Time: 1140  Total Visit Duration: 40 minutes    López Gutierrez MD   25         [1]  Past Medical History:  Diagnosis Date   • Anxiety    • Colon polyps    • Depression    • Diabetes mellitus (HCC)    • Dizziness     Last assessed: 8/31/15   • DVT (deep venous thrombosis) ([facility])    • Dysuria    • Hematuria 2022   • Hyperlipidemia    • Hypertension    • Hypertensive urgency 10/22/2021   • Insomnia    • Panic attack    • Ureterolithiasis 2020   [1]  Past Surgical History:  Procedure Laterality Date   •  SECTION      1964 son, 1968 daughter   • COLONOSCOPY     • FL RETROGRADE PYELOGRAM  2020   • VT COLONOSCOPY FLX DX W/COLLJ SPEC WHEN PFRMD N/A 3/27/2017    Procedure: COLONOSCOPY;  Surgeon: Gold Francis MD;  Location: [facility];  Service: Gastroenterology   • VT CYSTO/URETERO W/LITHOTRIPSY &INDWELL STENT INSRT Right 2020    Procedure: CYSTOSCOPY URETEROSCOPY WITH LITHOTRIPSY HOLMIUM LASER, RETROGRADE PYELOGRAM AND INSERTION STENT URETERAL; EXCISION OF BLADDER TUMOR;  Surgeon: Edwin Love MD;  Location: [facility];  Service: Urology   • ROTATOR CUFF REPAIR Left     Last assessed: 3/29/15   • TONSILLECTOMY     [1]  No Known Allergies

## 2025-07-28 ENCOUNTER — HOSPITAL ENCOUNTER (INPATIENT)
Facility: HOSPITAL | Age: 85
LOS: 2 days | Discharge: HOME | End: 2025-07-30
Attending: EMERGENCY MEDICINE | Admitting: INTERNAL MEDICINE
Payer: MEDICARE

## 2025-07-28 ENCOUNTER — TELEPHONE (OUTPATIENT)
Dept: PSYCHIATRY | Facility: CLINIC | Age: 85
End: 2025-07-28

## 2025-07-28 ENCOUNTER — APPOINTMENT (EMERGENCY)
Dept: CT IMAGING | Facility: HOSPITAL | Age: 85
End: 2025-07-28
Payer: MEDICARE

## 2025-07-28 PROBLEM — R79.89 ELEVATED SERUM CREATININE: Status: ACTIVE | Noted: 2025-07-28

## 2025-07-28 PROBLEM — R19.7 DIARRHEA: Status: ACTIVE | Noted: 2025-07-28

## 2025-07-29 ENCOUNTER — APPOINTMENT (INPATIENT)
Dept: CT IMAGING | Facility: HOSPITAL | Age: 85
End: 2025-07-29
Payer: MEDICARE

## 2025-07-29 PROBLEM — N17.9 ACUTE-ON-CHRONIC KIDNEY INJURY  (HCC): Status: ACTIVE | Noted: 2025-07-29

## 2025-07-29 PROBLEM — G30.1 MODERATE LATE ONSET ALZHEIMER'S DEMENTIA WITH ANXIETY (HCC): Status: ACTIVE | Noted: 2024-08-29

## 2025-07-29 PROBLEM — N18.9 ACUTE-ON-CHRONIC KIDNEY INJURY  (HCC): Status: ACTIVE | Noted: 2025-07-29

## 2025-07-29 PROBLEM — F02.B4 MODERATE LATE ONSET ALZHEIMER'S DEMENTIA WITH ANXIETY (HCC): Status: ACTIVE | Noted: 2024-08-29

## 2025-07-30 ENCOUNTER — TELEPHONE (OUTPATIENT)
Age: 85
End: 2025-07-30

## 2025-07-31 ENCOUNTER — PATIENT OUTREACH (OUTPATIENT)
Dept: CASE MANAGEMENT | Facility: OTHER | Age: 85
End: 2025-07-31

## 2025-07-31 DIAGNOSIS — F45.21 ILLNESS ANXIETY DISORDER: ICD-10-CM

## 2025-07-31 DIAGNOSIS — F02.80 MAJOR NEUROCOGNITIVE DISORDER DUE TO ALZHEIMER'S DISEASE, WITHOUT BEHAVIORAL DISTURBANCE (HCC): ICD-10-CM

## 2025-07-31 DIAGNOSIS — F33.41 MAJOR DEPRESSIVE DISORDER, RECURRENT EPISODE, IN PARTIAL REMISSION (HCC): ICD-10-CM

## 2025-07-31 DIAGNOSIS — F02.80 ALZHEIMER'S DISEASE (HCC): ICD-10-CM

## 2025-07-31 DIAGNOSIS — F01.50 VASCULAR DEMENTIA, UNCOMPLICATED (HCC): ICD-10-CM

## 2025-07-31 DIAGNOSIS — G30.9 ALZHEIMER'S DISEASE (HCC): ICD-10-CM

## 2025-07-31 DIAGNOSIS — G30.9 MAJOR NEUROCOGNITIVE DISORDER DUE TO ALZHEIMER'S DISEASE, WITHOUT BEHAVIORAL DISTURBANCE (HCC): ICD-10-CM

## 2025-07-31 DIAGNOSIS — F02.80 MAJOR NEUROCOGNITIVE DISORDER DUE TO MULTIPLE ETIOLOGIES WITHOUT BEHAVIORAL DISTURBANCE (HCC): Primary | ICD-10-CM

## 2025-07-31 RX ORDER — ARIPIPRAZOLE 2 MG/1
2 TABLET ORAL DAILY
Qty: 30 TABLET | Refills: 0 | Status: SHIPPED | OUTPATIENT
Start: 2025-07-31

## 2025-08-01 ENCOUNTER — TRANSITIONAL CARE MANAGEMENT (OUTPATIENT)
Dept: FAMILY MEDICINE CLINIC | Facility: CLINIC | Age: 85
End: 2025-08-01

## 2025-08-07 ENCOUNTER — PATIENT OUTREACH (OUTPATIENT)
Dept: CASE MANAGEMENT | Facility: OTHER | Age: 85
End: 2025-08-07

## 2025-08-15 ENCOUNTER — OFFICE VISIT (OUTPATIENT)
Dept: PSYCHIATRY | Facility: CLINIC | Age: 85
End: 2025-08-15

## 2025-08-15 PROBLEM — F02.80 ALZHEIMER'S DISEASE (HCC): Status: ACTIVE | Noted: 2024-08-29

## 2025-08-15 PROBLEM — G30.9 ALZHEIMER'S DISEASE (HCC): Status: ACTIVE | Noted: 2024-08-29

## 2025-08-15 PROBLEM — F03.B0: Status: ACTIVE | Noted: 2025-08-15

## 2025-08-21 ENCOUNTER — EVALUATION (OUTPATIENT)
Dept: PHYSICAL THERAPY | Facility: CLINIC | Age: 85
End: 2025-08-21
Payer: MEDICARE

## 2025-08-21 DIAGNOSIS — R26.89 ABNORMALITY OF GAIT DUE TO IMPAIRMENT OF BALANCE: Primary | ICD-10-CM

## 2025-08-21 DIAGNOSIS — R53.1 WEAKNESS: ICD-10-CM

## 2025-08-21 PROCEDURE — 97530 THERAPEUTIC ACTIVITIES: CPT | Performed by: PHYSICAL THERAPIST

## 2025-08-21 PROCEDURE — 97162 PT EVAL MOD COMPLEX 30 MIN: CPT | Performed by: PHYSICAL THERAPIST

## 2025-08-22 ENCOUNTER — TELEMEDICINE (OUTPATIENT)
Dept: PSYCHIATRY | Facility: CLINIC | Age: 85
End: 2025-08-22

## 2025-08-22 DIAGNOSIS — F45.21 ILLNESS ANXIETY DISORDER: ICD-10-CM

## 2025-08-22 DIAGNOSIS — F02.80 ALZHEIMER'S DISEASE (HCC): ICD-10-CM

## 2025-08-22 DIAGNOSIS — F02.80 MAJOR NEUROCOGNITIVE DISORDER DUE TO ALZHEIMER'S DISEASE, WITHOUT BEHAVIORAL DISTURBANCE (HCC): ICD-10-CM

## 2025-08-22 DIAGNOSIS — G30.9 MAJOR NEUROCOGNITIVE DISORDER DUE TO ALZHEIMER'S DISEASE, WITHOUT BEHAVIORAL DISTURBANCE (HCC): ICD-10-CM

## 2025-08-22 DIAGNOSIS — F33.1 MAJOR DEPRESSIVE DISORDER, RECURRENT EPISODE, MODERATE (HCC): ICD-10-CM

## 2025-08-22 DIAGNOSIS — F03.B0: ICD-10-CM

## 2025-08-22 DIAGNOSIS — G30.9 ALZHEIMER'S DISEASE (HCC): ICD-10-CM

## 2025-08-22 DIAGNOSIS — F01.50 VASCULAR DEMENTIA, UNCOMPLICATED (HCC): Primary | ICD-10-CM

## 2025-08-22 PROCEDURE — PBNCHG PB NO CHARGE PLACEHOLDER

## (undated) DEVICE — CATH URETERAL 5FR X 70 CM FLEX TIP POLYUR BARD

## (undated) DEVICE — UROCATCH BAG

## (undated) DEVICE — SPECIMEN CONTAINER STERILE PEEL PACK

## (undated) DEVICE — PACK TUR

## (undated) DEVICE — GLOVE SRG BIOGEL 7.5

## (undated) DEVICE — CATH URET .038 10FR 50CM DUAL LUMEN

## (undated) DEVICE — GUIDEWIRE STRGHT TIP 0.035 IN  SOLO PLUS

## (undated) DEVICE — TUBING SUCTION 5MM X 12 FT

## (undated) DEVICE — STERILE SURGICAL LUBRICANT,  TUBE: Brand: SURGILUBE

## (undated) DEVICE — INVIEW CLEAR LEGGINGS: Brand: CONVERTORS